# Patient Record
Sex: FEMALE | Race: WHITE | NOT HISPANIC OR LATINO | Employment: OTHER | URBAN - METROPOLITAN AREA
[De-identification: names, ages, dates, MRNs, and addresses within clinical notes are randomized per-mention and may not be internally consistent; named-entity substitution may affect disease eponyms.]

---

## 2017-01-06 ENCOUNTER — APPOINTMENT (OUTPATIENT)
Dept: LAB | Facility: CLINIC | Age: 77
End: 2017-01-06
Payer: MEDICARE

## 2017-01-06 ENCOUNTER — GENERIC CONVERSION - ENCOUNTER (OUTPATIENT)
Dept: OTHER | Facility: OTHER | Age: 77
End: 2017-01-06

## 2017-01-06 ENCOUNTER — TRANSCRIBE ORDERS (OUTPATIENT)
Dept: LAB | Facility: CLINIC | Age: 77
End: 2017-01-06

## 2017-01-06 ENCOUNTER — ALLSCRIPTS OFFICE VISIT (OUTPATIENT)
Dept: OTHER | Facility: OTHER | Age: 77
End: 2017-01-06

## 2017-01-06 DIAGNOSIS — I35.9 NONRHEUMATIC AORTIC VALVE DISORDER: ICD-10-CM

## 2017-01-06 DIAGNOSIS — I71.2 THORACIC AORTIC ANEURYSM WITHOUT RUPTURE (HCC): ICD-10-CM

## 2017-01-06 DIAGNOSIS — M62.81 MUSCLE WEAKNESS (GENERALIZED): ICD-10-CM

## 2017-01-06 DIAGNOSIS — Z79.01 LONG TERM CURRENT USE OF ANTICOAGULANT: ICD-10-CM

## 2017-01-06 LAB
INR PPP: 3.07 (ref 0.86–1.16)
PROTHROMBIN TIME: 31.1 SECONDS (ref 12–14.3)

## 2017-01-06 PROCEDURE — 85610 PROTHROMBIN TIME: CPT

## 2017-01-06 PROCEDURE — 36415 COLL VENOUS BLD VENIPUNCTURE: CPT

## 2017-01-13 ENCOUNTER — HOSPITAL ENCOUNTER (OUTPATIENT)
Dept: CT IMAGING | Facility: CLINIC | Age: 77
Discharge: HOME | End: 2017-01-13
Payer: MEDICARE

## 2017-01-13 ENCOUNTER — HOSPITAL ENCOUNTER (OUTPATIENT)
Dept: NON INVASIVE DIAGNOSTICS | Facility: CLINIC | Age: 77
Discharge: HOME | End: 2017-01-13
Payer: MEDICARE

## 2017-01-13 DIAGNOSIS — M62.81 MUSCLE WEAKNESS (GENERALIZED): ICD-10-CM

## 2017-01-13 DIAGNOSIS — I71.2 THORACIC AORTIC ANEURYSM WITHOUT RUPTURE (HCC): ICD-10-CM

## 2017-01-13 PROCEDURE — 70450 CT HEAD/BRAIN W/O DYE: CPT

## 2017-01-13 PROCEDURE — 93306 TTE W/DOPPLER COMPLETE: CPT

## 2017-03-01 ENCOUNTER — GENERIC CONVERSION - ENCOUNTER (OUTPATIENT)
Dept: OTHER | Facility: OTHER | Age: 77
End: 2017-03-01

## 2017-03-01 ENCOUNTER — ALLSCRIPTS OFFICE VISIT (OUTPATIENT)
Dept: OTHER | Facility: OTHER | Age: 77
End: 2017-03-01

## 2017-03-01 ENCOUNTER — APPOINTMENT (OUTPATIENT)
Dept: LAB | Facility: CLINIC | Age: 77
End: 2017-03-01
Payer: MEDICARE

## 2017-03-01 DIAGNOSIS — I35.9 NONRHEUMATIC AORTIC VALVE DISORDER: ICD-10-CM

## 2017-03-01 DIAGNOSIS — E11.49 TYPE 2 DIABETES MELLITUS WITH OTHER DIABETIC NEUROLOGICAL COMPLICATION (HCC): ICD-10-CM

## 2017-03-01 DIAGNOSIS — I25.10 ATHEROSCLEROTIC HEART DISEASE OF NATIVE CORONARY ARTERY WITHOUT ANGINA PECTORIS: ICD-10-CM

## 2017-03-01 LAB
INR PPP: 3.29 (ref 0.86–1.16)
PROTHROMBIN TIME: 32.8 SECONDS (ref 12–14.3)

## 2017-03-01 PROCEDURE — 36415 COLL VENOUS BLD VENIPUNCTURE: CPT

## 2017-03-01 PROCEDURE — 85610 PROTHROMBIN TIME: CPT

## 2017-03-03 ENCOUNTER — GENERIC CONVERSION - ENCOUNTER (OUTPATIENT)
Dept: OTHER | Facility: OTHER | Age: 77
End: 2017-03-03

## 2017-04-06 ENCOUNTER — TRANSCRIBE ORDERS (OUTPATIENT)
Dept: LAB | Facility: CLINIC | Age: 77
End: 2017-04-06

## 2017-04-06 ENCOUNTER — GENERIC CONVERSION - ENCOUNTER (OUTPATIENT)
Dept: OTHER | Facility: OTHER | Age: 77
End: 2017-04-06

## 2017-04-06 ENCOUNTER — APPOINTMENT (OUTPATIENT)
Dept: LAB | Facility: CLINIC | Age: 77
End: 2017-04-06
Payer: MEDICARE

## 2017-04-06 DIAGNOSIS — I25.10 ATHEROSCLEROTIC HEART DISEASE OF NATIVE CORONARY ARTERY WITHOUT ANGINA PECTORIS: ICD-10-CM

## 2017-04-06 LAB
INR PPP: 3.86 (ref 0.86–1.16)
PROTHROMBIN TIME: 37 SECONDS (ref 12–14.3)

## 2017-04-06 PROCEDURE — 36415 COLL VENOUS BLD VENIPUNCTURE: CPT

## 2017-04-06 PROCEDURE — 85610 PROTHROMBIN TIME: CPT

## 2017-04-13 DIAGNOSIS — I25.10 ATHEROSCLEROTIC HEART DISEASE OF NATIVE CORONARY ARTERY WITHOUT ANGINA PECTORIS: ICD-10-CM

## 2017-05-16 ENCOUNTER — ALLSCRIPTS OFFICE VISIT (OUTPATIENT)
Dept: OTHER | Facility: OTHER | Age: 77
End: 2017-05-16

## 2017-05-24 ENCOUNTER — GENERIC CONVERSION - ENCOUNTER (OUTPATIENT)
Dept: OTHER | Facility: OTHER | Age: 77
End: 2017-05-24

## 2017-05-24 ENCOUNTER — ALLSCRIPTS OFFICE VISIT (OUTPATIENT)
Dept: OTHER | Facility: OTHER | Age: 77
End: 2017-05-24

## 2017-08-01 ENCOUNTER — LAB CONVERSION - ENCOUNTER (OUTPATIENT)
Dept: OTHER | Facility: OTHER | Age: 77
End: 2017-08-01

## 2017-08-01 ENCOUNTER — APPOINTMENT (OUTPATIENT)
Dept: LAB | Facility: CLINIC | Age: 77
End: 2017-08-01
Payer: MEDICARE

## 2017-08-01 DIAGNOSIS — I25.10 ATHEROSCLEROTIC HEART DISEASE OF NATIVE CORONARY ARTERY WITHOUT ANGINA PECTORIS: ICD-10-CM

## 2017-08-01 LAB
INR PPP: 3.53 (ref 0.86–1.16)
PROTHROMBIN TIME: 35.9 SECONDS (ref 12.1–14.4)

## 2017-08-01 PROCEDURE — 36415 COLL VENOUS BLD VENIPUNCTURE: CPT

## 2017-08-01 PROCEDURE — 85610 PROTHROMBIN TIME: CPT

## 2017-08-08 DIAGNOSIS — Z95.2 PRESENCE OF PROSTHETIC HEART VALVE: ICD-10-CM

## 2017-08-11 ENCOUNTER — GENERIC CONVERSION - ENCOUNTER (OUTPATIENT)
Dept: OTHER | Facility: OTHER | Age: 77
End: 2017-08-11

## 2017-08-11 ENCOUNTER — APPOINTMENT (OUTPATIENT)
Dept: LAB | Facility: CLINIC | Age: 77
End: 2017-08-11
Payer: MEDICARE

## 2017-08-11 ENCOUNTER — TRANSCRIBE ORDERS (OUTPATIENT)
Dept: LAB | Facility: CLINIC | Age: 77
End: 2017-08-11

## 2017-08-11 DIAGNOSIS — Z95.2 PRESENCE OF PROSTHETIC HEART VALVE: ICD-10-CM

## 2017-08-11 LAB
INR PPP: 3.05 (ref 0.86–1.16)
PROTHROMBIN TIME: 32 SECONDS (ref 12.1–14.4)

## 2017-08-11 PROCEDURE — 85610 PROTHROMBIN TIME: CPT

## 2017-08-11 PROCEDURE — 36415 COLL VENOUS BLD VENIPUNCTURE: CPT

## 2017-09-27 ENCOUNTER — GENERIC CONVERSION - ENCOUNTER (OUTPATIENT)
Dept: OTHER | Facility: OTHER | Age: 77
End: 2017-09-27

## 2017-09-27 ENCOUNTER — ALLSCRIPTS OFFICE VISIT (OUTPATIENT)
Dept: OTHER | Facility: OTHER | Age: 77
End: 2017-09-27

## 2017-09-27 ENCOUNTER — TRANSCRIBE ORDERS (OUTPATIENT)
Dept: LAB | Facility: CLINIC | Age: 77
End: 2017-09-27

## 2017-09-27 ENCOUNTER — APPOINTMENT (OUTPATIENT)
Dept: LAB | Facility: CLINIC | Age: 77
End: 2017-09-27
Payer: MEDICARE

## 2017-09-27 DIAGNOSIS — I25.10 ATHEROSCLEROTIC HEART DISEASE OF NATIVE CORONARY ARTERY WITHOUT ANGINA PECTORIS: ICD-10-CM

## 2017-09-27 DIAGNOSIS — I25.10 ATHEROSCLEROSIS OF NATIVE CORONARY ARTERY OF NATIVE HEART WITHOUT ANGINA PECTORIS: ICD-10-CM

## 2017-09-27 DIAGNOSIS — I25.10 ATHEROSCLEROSIS OF NATIVE CORONARY ARTERY OF NATIVE HEART WITHOUT ANGINA PECTORIS: Primary | ICD-10-CM

## 2017-09-27 DIAGNOSIS — E11.49 TYPE 2 DIABETES MELLITUS WITH OTHER DIABETIC NEUROLOGICAL COMPLICATION (HCC): ICD-10-CM

## 2017-09-27 DIAGNOSIS — E61.1 IRON DEFICIENCY: ICD-10-CM

## 2017-09-27 LAB
ANION GAP SERPL CALCULATED.3IONS-SCNC: 5 MMOL/L (ref 4–13)
BASOPHILS # BLD AUTO: 0.02 THOUSANDS/ΜL (ref 0–0.1)
BASOPHILS NFR BLD AUTO: 1 % (ref 0–1)
BILIRUB UR QL STRIP: NEGATIVE
BUN SERPL-MCNC: 33 MG/DL (ref 5–25)
CALCIUM SERPL-MCNC: 9.3 MG/DL (ref 8.3–10.1)
CHLORIDE SERPL-SCNC: 106 MMOL/L (ref 100–108)
CLARITY UR: CLEAR
CO2 SERPL-SCNC: 28 MMOL/L (ref 21–32)
COLOR UR: YELLOW
CREAT SERPL-MCNC: 1.6 MG/DL (ref 0.6–1.3)
CREAT UR-MCNC: 27.5 MG/DL
EOSINOPHIL # BLD AUTO: 0.17 THOUSAND/ΜL (ref 0–0.61)
EOSINOPHIL NFR BLD AUTO: 4 % (ref 0–6)
ERYTHROCYTE [DISTWIDTH] IN BLOOD BY AUTOMATED COUNT: 16.8 % (ref 11.6–15.1)
EST. AVERAGE GLUCOSE BLD GHB EST-MCNC: 143 MG/DL
GFR SERPL CREATININE-BSD FRML MDRD: 31 ML/MIN/1.73SQ M
GLUCOSE P FAST SERPL-MCNC: 128 MG/DL (ref 65–99)
GLUCOSE UR STRIP-MCNC: NEGATIVE MG/DL
HBA1C MFR BLD: 6.6 % (ref 4.2–6.3)
HCT VFR BLD AUTO: 40 % (ref 34.8–46.1)
HGB BLD-MCNC: 12.3 G/DL (ref 11.5–15.4)
HGB UR QL STRIP.AUTO: NEGATIVE
INR PPP: 2.44 (ref 0.86–1.16)
KETONES UR STRIP-MCNC: NEGATIVE MG/DL
LDLC SERPL DIRECT ASSAY-MCNC: 75 MG/DL (ref 0–100)
LEUKOCYTE ESTERASE UR QL STRIP: NEGATIVE
LYMPHOCYTES # BLD AUTO: 1.28 THOUSANDS/ΜL (ref 0.6–4.47)
LYMPHOCYTES NFR BLD AUTO: 32 % (ref 14–44)
MCH RBC QN AUTO: 25.9 PG (ref 26.8–34.3)
MCHC RBC AUTO-ENTMCNC: 30.8 G/DL (ref 31.4–37.4)
MCV RBC AUTO: 84 FL (ref 82–98)
MICROALBUMIN UR-MCNC: 10.2 MG/L (ref 0–20)
MICROALBUMIN/CREAT 24H UR: 37 MG/G CREATININE (ref 0–30)
MONOCYTES # BLD AUTO: 0.52 THOUSAND/ΜL (ref 0.17–1.22)
MONOCYTES NFR BLD AUTO: 13 % (ref 4–12)
NEUTROPHILS # BLD AUTO: 1.96 THOUSANDS/ΜL (ref 1.85–7.62)
NEUTS SEG NFR BLD AUTO: 50 % (ref 43–75)
NITRITE UR QL STRIP: NEGATIVE
NRBC BLD AUTO-RTO: 0 /100 WBCS
PH UR STRIP.AUTO: 6 [PH] (ref 4.5–8)
PLATELET # BLD AUTO: 314 THOUSANDS/UL (ref 149–390)
PMV BLD AUTO: 11.5 FL (ref 8.9–12.7)
POTASSIUM SERPL-SCNC: 4.4 MMOL/L (ref 3.5–5.3)
PROT UR STRIP-MCNC: NEGATIVE MG/DL
PROTHROMBIN TIME: 26.8 SECONDS (ref 12.1–14.4)
RBC # BLD AUTO: 4.74 MILLION/UL (ref 3.81–5.12)
SODIUM SERPL-SCNC: 139 MMOL/L (ref 136–145)
SP GR UR STRIP.AUTO: 1.01 (ref 1–1.03)
UROBILINOGEN UR QL STRIP.AUTO: 0.2 E.U./DL
WBC # BLD AUTO: 3.95 THOUSAND/UL (ref 4.31–10.16)

## 2017-09-27 PROCEDURE — 82570 ASSAY OF URINE CREATININE: CPT

## 2017-09-27 PROCEDURE — 85610 PROTHROMBIN TIME: CPT

## 2017-09-27 PROCEDURE — 83036 HEMOGLOBIN GLYCOSYLATED A1C: CPT

## 2017-09-27 PROCEDURE — 80048 BASIC METABOLIC PNL TOTAL CA: CPT

## 2017-09-27 PROCEDURE — 36415 COLL VENOUS BLD VENIPUNCTURE: CPT

## 2017-09-27 PROCEDURE — 85025 COMPLETE CBC W/AUTO DIFF WBC: CPT

## 2017-09-27 PROCEDURE — 82043 UR ALBUMIN QUANTITATIVE: CPT

## 2017-09-27 PROCEDURE — 83721 ASSAY OF BLOOD LIPOPROTEIN: CPT

## 2017-09-27 PROCEDURE — 81003 URINALYSIS AUTO W/O SCOPE: CPT

## 2017-10-02 ENCOUNTER — ALLSCRIPTS OFFICE VISIT (OUTPATIENT)
Dept: OTHER | Facility: OTHER | Age: 77
End: 2017-10-02

## 2017-10-03 NOTE — PROGRESS NOTES
Assessment  Assessed    1  S/P AVR (aortic valve replacement) (V43 3) (Z95 2)   2  CAD in native artery (414 01) (I25 10)   3  Presence of cardiac pacemaker (V45 01) (Z95 0)   4  Hyperlipidemia (272 4) (E78 5)   5  Hypertension (401 9) (I10)   6  Anticoagulant long-term use (V58 61) (Z79 01)    Discussion/Summary  Cardiology Discussion Summary Free Text Note Form  Chetna Noun:   Patient is stable from a cardiac perspective no angina CHF symptomatic ectopy--continue with pacemaker checks regularly no significant arrhythmia---continue risk modification optimize weight regular exercise reviewed signs and symptoms of abnormal bleeding fu pt/inr-- report any bleeding safety measures reviewed in detail-reminded to get regular ProTime checks-reviewed signs and symptoms of abnormal bleeding-safety measures reviewed in detail-optimize weight regular exercise up with Dr Cici Jerez recommendation to be evaluated in the ER any head trauma on anticoagulationin pacer clinic no arrhythmic symptomsis cardiovascularly stable, patient understands antibody prophylaxis--all meds  Report any symptoms  All questions answered  Previous studies reviewed with patient, medications reviewed and possible side effects discussed  Continue risk factor modification  All questions answered  Safety measures reviewed  Patient advised to report any problems promptly to medical attention  Discussed concepts of atherosclerosis, signs and symptoms of cardiac disease-including ischemia arrhythmia congestion Optimize weight, regular exercise and follow up with appropriate specialists as discussed  Return for follow up visit in 4 months or earlier if needed      Counseling Documentation With Imm: The patient was counseled regarding diagnostic results,-instructions for management,-risk factor reductions,-risks and benefits of treatment options,-importance of compliance with treatment        Chief Complaint  Chief Complaint Free Text Note Form: Patient is seen today for follow-up cardiac evaluation  -History of AVR, pacemaker-ddd- had generator replacement May 2016     Chief Complaint Chronic Condition [facility]: Patient is here today for follow up of chronic conditions described in HPI  History of Present Illness  Cardiology HPI Free Text Note Form [facility]: Patient presents for follow up cardiovascular evaluation   She is feeling very well from a cardiac perspective active and asymptomatic-- Known history of AVR pacemaker mild nonobstructive CAD--surgery by Dr Alberto Langley 2007--active physically and doing well no impairment of exercise tolerance  no claudication -does all household activities shopping cleaning with no difficultyoverall feeling very well from a cardiac perspective -- active without difficulty-- no smoking no wheezes palpitations lightheadedness syncope seizures no angina not aware of any pacemaker abnormalities no palpitations dizziness lightheadednessPatient lost her  to pancreatic cancer May 2nd  2014 No angina, CHF, palpitations, syncope, seizures, dizziness, lightheadedness, amaurosis, orthostasis, myositis or edema  No urinary or bowel complaints  No rashes, fevers, arrhythmic symptoms, thromboembolic symptoms  No melena hematuria hematochezia focal motor symptoms  No bleeding bruising problems on Coumadin pacer check April 2017 with normal function battery life 9 yearsecho from January 2017 with EF of 45%-with borderline LVH abnormal septal motion due to pacemaker rhythm mild MR mechanical aortic valve function appropriately aorta normal sized  recent lab tests by Dr Magda Villanueva in September 2017 with hematocrit of 40 stable creatinine 1 6 LDL 75 A1c 6  6lives in her house with her son daughter and 2 grandchildren boys age 6 and 16    previously noted weakness in the left hand the symptoms resolved CT of the brain was negative  had mechanical AVR 2007 by dr Garth Mederos, he reimplanted coronaries  On angiogram before aortic valve replacement  Patient had 40% LAD disease  also with history of thoracic aortic aneurysm, cholesterol is good on therapy no myositis - on statins and fibrates not smoking  Blood pressures been controlled   CAT scan of the chest October 2012 of 4 3 cm dilatation of the ascending aorta, aortic root measuring 3 5 cm in 2015--aortic root measured 3 4 on echo from January 17 2014 with EF 45-50 appropriate mechanical prosthetic aVR function  blood tests 2016 with good cholesterol pacemaker implanted DDD unit  No arrhythmic symptoms status post recent generator replacement well-tolerated in May 2016  patient states her sugars are running very well no hypoglycemia3 8 in April 2017 reminded to go for regular checksdoes relate a fall with abrasion of the left side of her head on April 17--safety measures reviewed--she got tangled up in a telephone cordbleeding, bruising problems  No night sweats  No fevers no back pains  No bleeding on Coumadin- no melena hematuria hematochezia history of hypothyroidism, on treatment  of AVR- on coumadin, DM - glipizide, HPL - lipitor- normal SGOT no muscle pain during the day HTN - lasix  stress testing showing no ischemia  Alessandra Zarco CAT scan showing COPD patient was a previous smoker for many years, having stopped 10 years ago  Denies wheezes, hemoptysis    No myositis, orthostasis,   has gone for mammograms in the paststates that she is up to date with colonoscopydepression no claudication  No rashespreviously had cataract surgery by Dr Flaquita Stein, which was well tolerated on her right eyes for cataracts    pacer checks look good--April 2017 pacer check--With normal function   [ Aortic valve disease, Hypertension, Hyperlipidemia, Hypercholesterolemia, Gastroesophageal Reflux Disease, Cardiomyopathy, Aneurysm, Coronary Artery Disease, Type 2 Diabetes   S/p Aortic valve replacement and Thoracic aneurysm surgery (March 2007 [facility]), complete heart block s/p PPM with [facility] (2007), cardiac cath 2007 LAD 40%, LVEF 30%  ]  [ Father's and mother health unknown ]  [ Former smoker  No alcohol    - Smoked from age [age]  in the past atrial pacing ventricular sensing with left bundle branch block conduction      Review of Systems  Cardiology Female ROS:     Cardiac: No complaints of chest pain, no palpitations, no fainting  Skin: No complaints of nonhealing sores or skin rash  Genitourinary: No complaints of recurrent urinary tract infections, frequent urination at night, difficult urination, blood in urine, kidney stones, loss of bladder control, kidney problems, denies any birth control or hormone replacement, is not post menopausal, not currently pregnant  Psychological: No complaints of feeling depressed, anxiety, panic attacks, or difficulty concentrating  General: No complaints of trouble sleeping, lack of energy, fatigue, appetite changes, weight changes, fever, frequent infections, or night sweats  Respiratory: No complaints of shortness of breath, cough with sputum, or wheezing  HEENT: No complaints of serious problems, hearing problems, nose problems, throat problems, or snoring  Gastrointestinal: No complaints of liver problems, nausea, vomiting, heartburn, constipation, bloody stools, diarrhea, problems swallowing, adbominal pain, or rectal bleeding  Hematologic: No complaints of bleeding disorders, anemia, blood clots, or excessive brusing  Neurological: No complaints of numbness, tingling, dizziness, weakness, seizures, headaches, syncope or fainting, AM fatigue, daytime sleepiness, no witnessed apnea episodes  Musculoskeletal: No complaints of arthritis, back pain, or painfull swelling  Active Problems  Problems    1  Aneurysm of thoracic aorta (441 2) (I71 2)   2  Anticoagulant long-term use (V58 61) (Z79 01)   3  CAD in native artery (414 01) (I25 10)   4  Chronic obstructive pulmonary disease (496) (J44 9)   5  Diabetes mellitus with neurological manifestation (250 60) (E11 49)   6   Flu vaccine need (V04 81) (Z23)   7  History of aortic valve disorder (V12 59) (Z86 79)   8  Hyperlipidemia (272 4) (E78 5)   9  Hypertension (401 9) (I10)   10  Hypothyroidism (244 9) (E03 9)   11  Iron deficiency (280 9) (E61 1)   12  Laceration of scalp with delay in treatment, initial encounter (873 1) (S01 01XA)   13  Left hand weakness (728 87) (R29 898)   14  Pacemaker at end of battery life (V53 31) (Z45 010)   15  Presence of cardiac pacemaker (V45 01) (Z95 0)   16  Renal function impairment (593 9) (N28 9)   17  S/P AVR (aortic valve replacement) (V43 3) (Z95 2)   18  Screening for genitourinary condition (V81 6) (Z13 89)   19  Sinoatrial node dysfunction (427 81) (I49 5)   20  TIA (transient ischemic attack) (435 9) (G45 9)    Past Medical History  Problems    1  History of Benign Neoplasm Of The Sigmoid Colon (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 3)   2  History of Black tarry stools (578 1) (K92 1)   3  History of Cardiomyopathy (425 4) (I42 9)   4  History of Chest pain (786 50) (R07 9)   5  History of Emphysema (492 8) (J43 9)   6  History of GERD (gastroesophageal reflux disease) (530 81) (K21 9)   7  History of acute bronchitis (V12 69) (Z87 09)   8  History of aortic valve disorder (V12 59) (Z86 79)   9  History of aortic valve disorder (V12 59) (Z86 79)   10  History of complete atrioventricular block (V12 59) (Z86 79)   11  History of dizziness (V13 89) (Z87 898)   12  History of shortness of breath (V13 89) (Z87 898)   13  History of Type 2 diabetes mellitus (250 00) (E11 9)  Active Problems And Past Medical History Reviewed: The active problems and past medical history were reviewed and updated today  Surgical History  Problems    1  History of Aortic Valve Replacement   2  History of Capsule Endoscopy   3  History of Cholecystectomy   4  History of Complete Colonoscopy   5  History of Diagnostic Esophagogastroduodenoscopy   6  History of Hysterectomy   7  History of Pacemaker Placement  Surgical History Reviewed:    The surgical history was reviewed and updated today  Family History  Mother    1  No pertinent family history  Father    2  No pertinent family history  Family History Reviewed: The family history was reviewed and updated today  Social History  Problems    · Former smoker (V15 82) (L65 303)   · [address] (D[address])   · Living Independently With Spouse   · Never Drank Alcohol   · Never Used Drugs  Social History Reviewed: The social history was reviewed and updated today  The social history was reviewed and is unchanged  Current Meds   1  Aspirin 81 MG TABS; Take 1 tablet daily Recorded   2  Atorvastatin Calcium 20 MG Oral Tablet; take 1 tablet by mouth daily; Therapy: 66UYS8334 to (Evaluate:06Cqn6158)  Requested for: 50LGJ8350; Last   Rx:87Egk7701 Ordered   3  Fenofibrate Micronized 134 MG Oral Capsule; take 1 capsule by mouth once daily; Therapy: 07VSL7406 to (Evaluate:87Oli9815)  Requested for: 74Rag5692; Last   Rx:85Ehq1438 Ordered   4  FreeStyle Lancets Miscellaneous; test twice daily; Therapy: 39QRB1846 to (Evaluate:19Mar2015)  Requested for: 19GNQ3141; Last   Rx:23Jun2014 Ordered   5  FreeStyle Lancets Miscellaneous; testing bid  Requested for: 04TYA3128; Last   BY:30EHY2184 Ordered   6  FreeStyle Test In Vitro Strip; test blood twice daily; Therapy: 89GAV8052 to (Humble Flax)  Requested for: 23ETT0999; Last   Rx:23Jun2014 Ordered   7  FreeStyle Test In Vitro Strip; TEST TWICE DAILY  Requested for: 29MSW2894; Last   Rx:91Mwg9197 Ordered   8  Furosemide 40 MG Oral Tablet; take 1 and 1/2 tablets by mouth once daily; Therapy: 24WPY6411 to (Annamary Lords)  Requested for: 28Oct2016; Last   Rx:28Oct2016 Ordered   9  Gabapentin 100 MG Oral Capsule; take 3 capsules by mouth at bedtime; Therapy: 41GXE7562 to (Evaluate:78Oin7978)  Requested for: 04QCM8986; Last   Rx:52Iqp2692 Ordered   10   GlipiZIDE 10 MG Oral Tablet; TAKE 1 AND 1/2 TABLET BY MOUTH ONCE DAILY; Therapy: 00VPO4601 to (Evaluate:77Crp3194)  Requested for: 13Hfr9381; Last    Rx:41Rpk8943 Ordered   11  Levothyroxine Sodium 50 MCG Oral Tablet; take 1 tablet by mouth daily; Therapy: 56FJI5609 to (Evaluate:43Xdb2608)  Requested for: 71MTH7230; Last    Rx:66Vfx4628 Ordered   12  Lisinopril 2 5 MG Oral Tablet; take 1 tablet by mouth once daily; Therapy: 36Vpz9053 to (Evaluate:49Dtq5805)  Requested for: 60Lld8360; Last    Rx:63Mqv5133 Ordered   13  Metoprolol Tartrate 50 MG Oral Tablet; take 1 tablet by mouth twice a day; Therapy: 13Yqs9496 to (Evaluate:65Veb9041)  Requested for: 17Aug2017; Last    Rx:05Ppr1935 Ordered   14  Warfarin Sodium 5 MG Oral Tablet; take 1 tablet by mouth daily; Therapy: 48WBL6365 to (Evaluate:19Wti9822)  Requested for: 17Aug2017; Last    Rx:59Mwb5547 Ordered  Medication List Reviewed: The medication list was reviewed and updated today  Allergies  Medication    1  No Known Drug Allergies    Vitals  Vital Signs    Recorded: 98DYU2284 08:44AM   Heart Rate 60   Systolic 733   Diastolic 64   Height 5 ft 5 3 in   Weight 164 lb 7 04 oz   BMI Calculated 27 11   BSA Calculated 1 82     Physical Exam    Constitutional Comfortable overweight female in no distress  Eyes   Conjunctiva and Sclera examination: Conjunctiva pink, sclera anicteric  Ears, Nose, Mouth, and Throat - External inspection of ears and nose: Normal without deformities or discharge -Nasal mucosa, septum, and turbinates: Normal, no edema or discharge -Oropharynx: Clear, nares are clear, mucous membranes are moist    Neck   Neck and thyroid: Normal, supple, trachea midline, no thyromegaly  Pulmonary   Respiratory effort: No increased work of breathing or signs of respiratory distress  Auscultation of lungs: Clear to auscultation, no rales, no rhonchi, no wheezing, good air movement  Cardiovascular   Palpation of heart: Normal PMI, no thrills      Auscultation of heart: Abnormal  -S1-S2 normal crisp prosthetic heart sounds the aortic valve no gallops thrills rubs or diastolic murmur  Carotid pulses: Abnormal  -Soft bruits bilaterally  Peripheral vascular exam: Normal pulses throughout, no tenderness, erythema or swelling  Pedal pulses: Normal, 2+ bilaterally  Examination of extremities for edema and/or varicosities: Abnormal  -Mild spider veins no calf tenderness or edema  Chest -   Abnormal  -Well-healed median sternotomy  Abdomen   Abdomen: Non-tender and no distention  Liver and spleen: No hepatomegaly or splenomegaly  Musculoskeletal Gait and station: Normal gait  -Digits and nails: Normal without clubbing or cyanosis -Inspection/palpation of joints, bones, and muscles: Normal, ROM normal     Skin - Skin and subcutaneous tissue: Abnormal -pacer site healing well  Neurologic - Cranial nerves: II - XII intact -Speech: Normal     Psychiatric - Orientation to person, place, and time: Normal -Mood and affect: Normal       Future Appointments    Date/Time Provider Specialty Site   10/27/2017 09:00 AM Cardiology, Pacemaker Clin KRISTINE  [facility]   03/27/2018 09:00 AM CARSON Nowak   Internal Medicine [facility] by [facility]     Signatures   Electronically signed by : CARSON Payan ; Oct  2 2017  9:34AM EST                       (Author)

## 2017-10-27 ENCOUNTER — GENERIC CONVERSION - ENCOUNTER (OUTPATIENT)
Dept: OTHER | Facility: OTHER | Age: 77
End: 2017-10-27

## 2017-10-27 ENCOUNTER — APPOINTMENT (OUTPATIENT)
Dept: LAB | Facility: CLINIC | Age: 77
End: 2017-10-27
Payer: MEDICARE

## 2017-10-27 ENCOUNTER — TRANSCRIBE ORDERS (OUTPATIENT)
Dept: LAB | Facility: CLINIC | Age: 77
End: 2017-10-27

## 2017-10-27 DIAGNOSIS — E61.1 IRON DEFICIENCY: ICD-10-CM

## 2017-10-27 DIAGNOSIS — I25.10 ATHEROSCLEROTIC HEART DISEASE OF NATIVE CORONARY ARTERY WITHOUT ANGINA PECTORIS: ICD-10-CM

## 2017-10-27 LAB
FERRITIN SERPL-MCNC: 27 NG/ML (ref 8–388)
INR PPP: 2.51 (ref 0.86–1.16)
IRON SERPL-MCNC: 86 UG/DL (ref 50–170)
PROTHROMBIN TIME: 27.4 SECONDS (ref 12.1–14.4)
TRANSFERRIN SERPL-MCNC: 455 MG/DL (ref 200–400)

## 2017-10-27 PROCEDURE — 83540 ASSAY OF IRON: CPT

## 2017-10-27 PROCEDURE — 82728 ASSAY OF FERRITIN: CPT

## 2017-10-27 PROCEDURE — 84466 ASSAY OF TRANSFERRIN: CPT

## 2017-10-27 PROCEDURE — 36415 COLL VENOUS BLD VENIPUNCTURE: CPT

## 2017-10-27 PROCEDURE — 85610 PROTHROMBIN TIME: CPT

## 2017-11-01 ENCOUNTER — GENERIC CONVERSION - ENCOUNTER (OUTPATIENT)
Dept: OTHER | Facility: OTHER | Age: 77
End: 2017-11-01

## 2017-11-01 ENCOUNTER — APPOINTMENT (OUTPATIENT)
Dept: LAB | Facility: CLINIC | Age: 77
End: 2017-11-01
Payer: MEDICARE

## 2017-11-01 DIAGNOSIS — E61.1 IRON DEFICIENCY: ICD-10-CM

## 2017-11-01 LAB — HEMOCCULT STL QL IA: POSITIVE

## 2017-11-01 PROCEDURE — G0328 FECAL BLOOD SCRN IMMUNOASSAY: HCPCS

## 2017-11-14 ENCOUNTER — ALLSCRIPTS OFFICE VISIT (OUTPATIENT)
Dept: OTHER | Facility: OTHER | Age: 77
End: 2017-11-14

## 2017-11-15 NOTE — CONSULTS
Assessment    1  Fecal occult blood test positive (792 1) (R19 5)   2  Iron deficiency (280 9) (E61 1)    Plan  Aneurysm of thoracic aorta, Anticoagulant long-term use    · Enoxaparin Sodium 60 MG/0 6ML Subcutaneous Solution; take 60mg SC BID for 3days as directed   Rx By: Camlile Dos Santos; Dispense: 0 Days ; #:4 ML; Refill: 0;For: Aneurysm of thoracic aorta, Anticoagulant long-term use; ASHWINI = N; Sent To: [address]  Iron deficiency    · COLONOSCOPY; Status:Active; Requested for:14Nov2017;    Perform:[facility]; LRR:[id number]; Ordered; For:Iron deficiency; Ordered By:Genevieve Mcleod;   · EGD; Status:Hold For - Scheduling; Requested JCN:97ZOK8721;    Perform:[facility]; JMN:60ZIN2945;QCMIXZN;ZWPNUGFZGB; Ordered By:Genevieve Mcleod; Discussion/Summary  Discussion Summary:   She 49-year-old female with fecal immunochemical test that was positive  She does have a lot of heartburn that is uncontrolled  She had a colonoscopy with polyps in 2012  I treated her  and she wanted to see me   we'll do endoscopy and colonoscopy to investigate  I'm holding on giving her proton pump inhibitor  she will have bridge therapy we will hold her Coumadin for 3 days and I went over in detail her Lovenox shot regimen  Per  Counseling Documentation With Imm: The patient was counseled regarding diagnostic results,-- prognosis,-- risks and benefits of treatment options  History of Present Illness  HPI: She is 49-year-old female with 2 years in a row of stool Hemoccult-positive  Her fecal immunochemical test is positive  She is on warfarin  She had a polyp on a colonoscopy in 2012  She is here because her  just saw me and she wants to see me  She was having an iron deficiency  Her blood level is normal  She has a lot of heartburn  She denies nausea vomiting fevers chills melanoma hematochezia  She is a mechanical aortic valve        Review of Systems  Complete-Female GI Adult:  Constitutional: No fever, no chills, feels well, no tiredness, no recent weight gain or weight loss  Eyes: No complaints of eye pain, no red eyes, no eyesight problems, no discharge, no dry eyes, no itching of eyes  ENT: no complaints of earache, no loss of hearing, no nose bleeds, no nasal discharge, no sore throat, no hoarseness  Cardiovascular: No complaints of slow heart rate, no fast heart rate, no chest pain, no palpitations, no leg claudication, no lower extremity edema  Respiratory: No complaints of shortness of breath, no wheezing, no cough, no SOB on exertion, no orthopnea, no PND  Gastrointestinal: No complaints of abdominal pain, no constipation, no nausea or vomiting, no diarrhea, no bloody stools  Genitourinary: No complaints of dysuria, no incontinence, no pelvic pain, no dysmenorrhea, no vaginal discharge or bleeding  Musculoskeletal: No complaints of arthralgias, no myalgias, no joint swelling or stiffness, no limb pain or swelling  Integumentary: No complaints of skin rash or lesions, no itching, no skin wounds, no breast pain or lump  Neurological: No complaints of headache, no confusion, no convulsions, no numbness, no dizziness or fainting, no tingling, no limb weakness, no difficulty walking  Psychiatric: Not suicidal, no sleep disturbance, no anxiety or depression, no change in personality, no emotional problems  Endocrine: No complaints of proptosis, no hot flashes, no muscle weakness, no deepening of the voice, no feelings of weakness  Hematologic/Lymphatic: No complaints of swollen glands, no swollen glands in the neck, does not bleed easily, does not bruise easily  ROS Reviewed:   ROS reviewed  Active Problems  1  Aneurysm of thoracic aorta (441 2) (I71 2)   2  Anticoagulant long-term use (V58 61) (Z79 01)   3  CAD in native artery (414 01) (I25 10)   4  Chronic obstructive pulmonary disease (496) (J44 9)   5  Diabetes mellitus with neurological manifestation (250 60) (E11 49)   6  Fecal occult blood test positive (792 1) (R19 5)   7  Flu vaccine need (V04 81) (Z23)   8  History of aortic valve disorder (V12 59) (Z86 79)   9  Hyperlipidemia (272 4) (E78 5)   10  Hypertension (401 9) (I10)   11  Hypothyroidism (244 9) (E03 9)   12  Iron deficiency (280 9) (E61 1)   13  Laceration of scalp with delay in treatment, initial encounter (873 1) (S01 01XA)   14  Left hand weakness (728 87) (R29 898)   15  Pacemaker at end of battery life (V53 31) (Z45 010)   16  Presence of cardiac pacemaker (V45 01) (Z95 0)   17  Renal function impairment (593 9) (N28 9)   18  S/P AVR (aortic valve replacement) (V43 3) (Z95 2)   19  Screening for genitourinary condition (V81 6) (Z13 89)   20  Sinoatrial node dysfunction (427 81) (I49 5)   21  TIA (transient ischemic attack) (435 9) (G45 9)    Past Medical History  1  History of Benign Neoplasm Of The Sigmoid Colon (211 3)   2  History of Black tarry stools (578 1) (K92 1)   3  History of Cardiomyopathy (425 4) (I42 9)   4  History of Chest pain (786 50) (R07 9)   5  History of Emphysema (492 8) (J43 9)   6  History of GERD (gastroesophageal reflux disease) (530 81) (K21 9)   7  History of acute bronchitis (V12 69) (Z87 09)   8  History of aortic valve disorder (V12 59) (Z86 79)   9  History of aortic valve disorder (V12 59) (Z86 79)   10  History of complete atrioventricular block (V12 59) (Z86 79)   11  History of dizziness (V13 89) (Z87 898)   12  History of shortness of breath (V13 89) (Z87 898)   13  History of Type 2 diabetes mellitus (250 00) (E11 9)  Active Problems And Past Medical History Reviewed: The active problems and past medical history were reviewed and updated today  Surgical History  1  History of Aortic Valve Replacement   2  History of Capsule Endoscopy   3  History of Cholecystectomy   4  History of Complete Colonoscopy   5  History of Diagnostic Esophagogastroduodenoscopy   6  History of Hysterectomy   7   History of Pacemaker Placement  Surgical History Reviewed: The surgical history was reviewed and updated today  Family History  Mother    1  No pertinent family history  Father    2  No pertinent family history  Family History Reviewed: The family history was reviewed and updated today  Social History     · Former smoker (V15 82) (H46 829)   · [address] (DME)   · Living Independently With Spouse   · Never Drank Alcohol   · Never Used Drugs  Social History Reviewed: The social history was reviewed and updated today  The social history was reviewed and is unchanged  Current Meds   1  Aspirin 81 MG TABS; Take 1 tablet daily Recorded   2  Atorvastatin Calcium 20 MG Oral Tablet; take 1 tablet by mouth daily; Therapy: 64PGD6650 to (Evaluate:02Qlf1950)  Requested for: 02ZNE8947; Last Rx:73Nbw1191 Ordered   3  Fenofibrate Micronized 134 MG Oral Capsule; take 1 capsule by mouth once daily; Therapy: 76SIP4393 to (Evaluate:08Duu0927)  Requested for: 94Mkn7024; Last Rx:26Jzm6119 Ordered   4  FreeStyle Lancets Miscellaneous; test twice daily; Therapy: 45JOO0471 to (Evaluate:19Mar2015)  Requested for: 27SRC0374; Last Rx:93Ker0559 Ordered   5  FreeStyle Lancets Miscellaneous; testing bid  Requested for: 88GPS8949; Last KP:26JXG8791 Ordered   6  FreeStyle Test In Vitro Strip; test blood twice daily; Therapy: 22MSW1085 to (Catherine Rush)  Requested for: 84HXT7115; Last Rx:16Vcq6188 Ordered   7  FreeStyle Test In Vitro Strip; TEST TWICE DAILY  Requested for: 50VUU1763; Last Rx:85Etk2327 Ordered   8  Furosemide 40 MG Oral Tablet; take 1 and 1/2 tablets by mouth once daily; Therapy: 29NYE5431 to (Ivy Meth)  Requested for: 28Oct2016; Last Rx:28Oct2016 Ordered   9  Gabapentin 100 MG Oral Capsule; take 3 capsules by mouth at bedtime; Therapy: 53FTN3476 to (Evaluate:30Cdc1236)  Requested for: 81EUK3070; Last Rx:25Ttb7886 Ordered   10   GlipiZIDE 10 MG Oral Tablet; TAKE 1 AND 1/2 TABLET BY MOUTH ONCE DAILY; Therapy: 40PJP6572 to (Evaluate:70Yvr0594)  Requested for: 03Aip5045; Last  Rx:64Ajl2688 Ordered   11  Levothyroxine Sodium 50 MCG Oral Tablet; take 1 tablet by mouth daily; Therapy: 42JXU7605 to (Evaluate:04Nui1193)  Requested for: 39MBD6115; Last  Rx:66Jmz3283 Ordered   12  Lisinopril 2 5 MG Oral Tablet; take 1 tablet by mouth once daily; Therapy: 34Esz8769 to (Evaluate:15Iyj5821)  Requested for: 61Bee4146; Last  Rx:12Mjc2165 Ordered   13  Metoprolol Tartrate 50 MG Oral Tablet; take 1 tablet by mouth twice a day; Therapy: 55Bld8306 to (Evaluate:49Zad1126)  Requested for: 70Uik3704; Last  Rx:41Uon2073 Ordered   14  Warfarin Sodium 5 MG Oral Tablet; take 1 tablet by mouth daily; Therapy: 17ZUM0752 to (Evaluate:49Cuq2546)  Requested for: 17Aug2017; Last  Rx:57Kxx5216 Ordered  Medication List Reviewed: The medication list was reviewed and updated today  Allergies  1  No Known Drug Allergies    Vitals  Vital Signs    Recorded: 64WBW3003 11:41AM   Heart Rate 60   Systolic 866   Diastolic 70   Height 5 ft 5 3 in   Weight 164 lb 2 08 oz   BMI Calculated 27 06   BSA Calculated 1 82       Physical Exam   Constitutional  General appearance: No acute distress, well appearing and well nourished  Eyes  Conjunctiva and lids: No swelling, erythema or discharge  Pupils and irises: Equal, round and reactive to light  Ears, Nose, Mouth, and Throat  External inspection of ears and nose: Normal    Otoscopic examination: Tympanic membranes translucent with normal light reflex  Canals patent without erythema  Nasal mucosa, septum, and turbinates: Normal without edema or erythema  Oropharynx: Normal with no erythema, edema, exudate or lesions  Pulmonary  Respiratory effort: No increased work of breathing or signs of respiratory distress  Auscultation of lungs: Clear to auscultation  Cardiovascular  Palpation of heart: Normal PMI, no thrills     Auscultation of heart: Normal rate and rhythm, normal S1 and S2, without murmurs  Examination of extremities for edema and/or varicosities: Normal    Carotid pulses: Normal    Abdomen  Abdomen: Non-tender, no masses  Liver and spleen: No hepatomegaly or splenomegaly  Lymphatic  Palpation of lymph nodes in neck: No lymphadenopathy  Musculoskeletal  Gait and station: Normal    Digits and nails: Normal without clubbing or cyanosis  Inspection/palpation of joints, bones, and muscles: Normal    Skin  Skin and subcutaneous tissue: Normal without rashes or lesions  Neurologic  Cranial nerves: Cranial nerves 2-12 intact  Reflexes: 2+ and symmetric  Sensation: No sensory loss  Future Appointments    Date/Time Provider Specialty Site   04/27/2018 09:00 AM Cardiology, Pacemaker Clin KRISTINE  [facility]   03/27/2018 09:00 AM CARSON Bonilla   Internal Medicine [facility]       Signatures   Electronically signed by : Sheryle Clamp, MD; Nov 14 2017 11:57AM EST                       (Author)

## 2017-11-16 DIAGNOSIS — I25.10 ATHEROSCLEROTIC HEART DISEASE OF NATIVE CORONARY ARTERY WITHOUT ANGINA PECTORIS: ICD-10-CM

## 2017-11-22 RX ORDER — GLIPIZIDE 10 MG/1
10 TABLET ORAL
COMMUNITY
End: 2019-01-02 | Stop reason: SDUPTHER

## 2017-11-22 RX ORDER — LEVOTHYROXINE SODIUM 0.05 MG/1
50 TABLET ORAL DAILY
COMMUNITY
End: 2018-09-21 | Stop reason: SDUPTHER

## 2017-11-22 RX ORDER — METOPROLOL SUCCINATE 50 MG/1
25 TABLET, EXTENDED RELEASE ORAL DAILY
COMMUNITY
End: 2018-04-04 | Stop reason: ALTCHOICE

## 2017-11-22 RX ORDER — FENOFIBRATE 134 MG/1
134 CAPSULE ORAL
COMMUNITY
End: 2018-11-26 | Stop reason: SDUPTHER

## 2017-11-22 RX ORDER — ASPIRIN 81 MG/1
81 TABLET ORAL DAILY
COMMUNITY
End: 2018-03-27 | Stop reason: SDUPTHER

## 2017-11-22 RX ORDER — LISINOPRIL 2.5 MG/1
25 TABLET ORAL DAILY
COMMUNITY
End: 2018-08-14 | Stop reason: SDUPTHER

## 2017-11-22 RX ORDER — FUROSEMIDE 40 MG/1
40 TABLET ORAL 2 TIMES DAILY
COMMUNITY
End: 2019-01-21 | Stop reason: SDUPTHER

## 2017-11-22 RX ORDER — ATORVASTATIN CALCIUM 20 MG/1
20 TABLET, FILM COATED ORAL DAILY
COMMUNITY
End: 2018-03-27 | Stop reason: ALTCHOICE

## 2017-11-22 RX ORDER — WARFARIN SODIUM 5 MG/1
TABLET ORAL
COMMUNITY
End: 2018-11-26 | Stop reason: SDUPTHER

## 2017-11-22 RX ORDER — GABAPENTIN 100 MG/1
300 CAPSULE ORAL 3 TIMES DAILY
COMMUNITY
End: 2018-09-21 | Stop reason: SDUPTHER

## 2017-11-29 ENCOUNTER — ANESTHESIA EVENT (OUTPATIENT)
Dept: PERIOP | Facility: HOSPITAL | Age: 77
End: 2017-11-29
Payer: MEDICARE

## 2017-11-29 ENCOUNTER — ANESTHESIA (OUTPATIENT)
Dept: PERIOP | Facility: HOSPITAL | Age: 77
End: 2017-11-29
Payer: MEDICARE

## 2017-11-29 ENCOUNTER — GENERIC CONVERSION - ENCOUNTER (OUTPATIENT)
Dept: OTHER | Facility: OTHER | Age: 77
End: 2017-11-29

## 2017-11-29 ENCOUNTER — HOSPITAL ENCOUNTER (OUTPATIENT)
Facility: HOSPITAL | Age: 77
Setting detail: OUTPATIENT SURGERY
Discharge: HOME | End: 2017-11-29
Attending: INTERNAL MEDICINE | Admitting: INTERNAL MEDICINE
Payer: MEDICARE

## 2017-11-29 VITALS
DIASTOLIC BLOOD PRESSURE: 60 MMHG | OXYGEN SATURATION: 95 % | HEIGHT: 66 IN | TEMPERATURE: 98 F | WEIGHT: 159.5 LBS | BODY MASS INDEX: 25.63 KG/M2 | RESPIRATION RATE: 18 BRPM | HEART RATE: 60 BPM | SYSTOLIC BLOOD PRESSURE: 125 MMHG

## 2017-11-29 DIAGNOSIS — R19.5 FECAL OCCULT BLOOD TEST POSITIVE: ICD-10-CM

## 2017-11-29 DIAGNOSIS — E61.1 IRON DEFICIENCY: ICD-10-CM

## 2017-11-29 LAB — GLUCOSE SERPL-MCNC: 154 MG/DL (ref 65–140)

## 2017-11-29 PROCEDURE — 88305 TISSUE EXAM BY PATHOLOGIST: CPT | Performed by: INTERNAL MEDICINE

## 2017-11-29 PROCEDURE — 88342 IMHCHEM/IMCYTCHM 1ST ANTB: CPT | Performed by: INTERNAL MEDICINE

## 2017-11-29 PROCEDURE — 82948 REAGENT STRIP/BLOOD GLUCOSE: CPT

## 2017-11-29 RX ORDER — LIDOCAINE HYDROCHLORIDE 10 MG/ML
INJECTION, SOLUTION INFILTRATION; PERINEURAL AS NEEDED
Status: DISCONTINUED | OUTPATIENT
Start: 2017-11-29 | End: 2017-11-29 | Stop reason: SURG

## 2017-11-29 RX ORDER — SODIUM CHLORIDE, SODIUM LACTATE, POTASSIUM CHLORIDE, CALCIUM CHLORIDE 600; 310; 30; 20 MG/100ML; MG/100ML; MG/100ML; MG/100ML
50 INJECTION, SOLUTION INTRAVENOUS CONTINUOUS
Status: DISCONTINUED | OUTPATIENT
Start: 2017-11-29 | End: 2017-11-29 | Stop reason: HOSPADM

## 2017-11-29 RX ORDER — PROPOFOL 10 MG/ML
INJECTION, EMULSION INTRAVENOUS AS NEEDED
Status: DISCONTINUED | OUTPATIENT
Start: 2017-11-29 | End: 2017-11-29 | Stop reason: SURG

## 2017-11-29 RX ADMIN — PROPOFOL 20 MG: 10 INJECTION, EMULSION INTRAVENOUS at 08:17

## 2017-11-29 RX ADMIN — PROPOFOL 20 MG: 10 INJECTION, EMULSION INTRAVENOUS at 08:11

## 2017-11-29 RX ADMIN — PROPOFOL 20 MG: 10 INJECTION, EMULSION INTRAVENOUS at 08:03

## 2017-11-29 RX ADMIN — PROPOFOL 50 MG: 10 INJECTION, EMULSION INTRAVENOUS at 08:07

## 2017-11-29 RX ADMIN — PROPOFOL 20 MG: 10 INJECTION, EMULSION INTRAVENOUS at 08:14

## 2017-11-29 RX ADMIN — PROPOFOL 70 MG: 10 INJECTION, EMULSION INTRAVENOUS at 07:57

## 2017-11-29 RX ADMIN — SODIUM CHLORIDE, SODIUM LACTATE, POTASSIUM CHLORIDE, AND CALCIUM CHLORIDE: .6; .31; .03; .02 INJECTION, SOLUTION INTRAVENOUS at 07:30

## 2017-11-29 RX ADMIN — LIDOCAINE HYDROCHLORIDE 50 MG: 10 INJECTION, SOLUTION INFILTRATION; PERINEURAL at 07:57

## 2017-11-29 NOTE — ANESTHESIA PREPROCEDURE EVALUATION
Review of Systems/Medical History  Patient summary reviewed  Chart reviewed  No history of anesthetic complications     Cardiovascular  Hyperlipidemia, Hypertension , Valve replacement, CAD, , CHF compensated CHF,   Comment: Hx of mechanical Aortic Valve replacement 0099  Systolic HF  LVEF 52%  ,  Pulmonary  COPD , ,        GI/Hepatic    GERD ,        Chronic kidney disease ,        Endo/Other  Diabetes well controlled , History of thyroid disease ,      GYN       Hematology  Anemia ,     Musculoskeletal       Neurology    TIA,    Psychology           Physical Exam    Airway    Mallampati score: III  TM Distance: >3 FB       Dental   Comment: Edentulous ,     Cardiovascular  Rhythm: regular, Rate: normal, Murmur, Systolic click,     Pulmonary  Breath sounds clear to auscultation,     Other Findings        Anesthesia Plan  ASA Score- 3       Anesthesia Type- IV sedation with anesthesia with ASA Monitors  Additional Monitors:   Airway Plan:           Induction- intravenous  Informed Consent- Anesthetic plan and risks discussed with patient  I personally reviewed this patient with the CRNA  Discussed and agreed on the Anesthesia Plan with the CRNA  Staci Nolasco

## 2017-11-29 NOTE — ANESTHESIA POSTPROCEDURE EVALUATION
Post-Op Assessment Note      CV Status:  Stable    Mental Status:  Alert and awake    Hydration Status:  Euvolemic    PONV Controlled:  Controlled    Airway Patency:  Patent    Post Op Vitals Reviewed: Yes          Staff: CRNA           BP   120/58   Temp 98   Pulse 61   Resp 20   SpO2 94

## 2017-11-29 NOTE — OP NOTE
**** GI/ENDOSCOPY REPORT ****     PATIENT NAME: LAURA PULLIAM - VISIT ID:  Patient ID: [id number]   YOB: 1940     INTRODUCTION: Esophagogastroduodenoscopy - A 68 female patient presents   for an outpatient Esophagogastroduodenoscopy at [address] [place] [address]  INDICATIONS: GERD  Fecal occult blood positive  CONSENT: The benefits, risks, and alternatives to the procedure were   discussed and informed consent was obtained from the patient  PREPARATION:  EKG, pulse, pulse oximetry and blood pressure were monitored   throughout the procedure  ASA Classification: Class 3 - Patient has severe   systemic disturbance that may or may not be related to the disorder   requiring surgery  The patient was kept NPO for eight hours prior to the   procedure  MEDICATIONS: MAC anesthesia  PROCEDURE:  The endoscope was passed without difficulty through the mouth   under direct visualization and advanced to the 2nd portion of the   duodenum  The scope was withdrawn and the mucosa was carefully examined  FINDINGS:   Esophagus: The esophagus appeared to be normal   Stomach: Mild   erosive gastritis was found in the antrum and body of the stomach  The   fundus and cardia appeared to be normal  Multiple random biopsies was   taken  Duodenum: There was a single medium-sized ulcer in the duodenal   bulb  It showed no bleeding stigmata  The 2nd portion of the duodenum   appeared to be normal  Multiple random biopsies was taken  COMPLICATIONS: There were no complications  IMPRESSIONS: Normal esophagus  Mild erosive gastritis found in the antrum   and body of the stomach  Normal fundus and cardia  An ulcer found in the   duodenal bulb  Normal 2nd portion of the duodenum  Multiple biopsies taken  RECOMMENDATIONS: Anti-reflux measures: Raise the head of the bed 4 to 6   inches  Avoid smoking  Avoid excess coffee, tea or other caffeinated   beverages   Avoid garments that fit tightly through the abdomen  Avoid   eating before bed  PPI course  Follow-up on the results of the biopsy   specimens in 1 week  ESTIMATED BLOOD LOSS: None  PATHOLOGY SPECIMENS: Multiple random biopsies taken  Multiple random   biopsies taken  Yes     PROCEDURE CODES: 49562 - EGD flexible; with biopsy     ICD-9 Codes: 530 81 Esophageal reflux 792 1 Nonspecific abnormal findings   in stool contents 535 40 Other specified gastritides, without mention of   hemorrhage     ICD-10 Codes: K21 Gastro-esophageal reflux disease R19 5 Other fecal   abnormalities K29 Gastritis and duodenitis K26 Duodenal ulcer     PERFORMED BY: CARSON Pineda  on 11/29/2017  Version 1, electronically signed by CARSON Bolanos Aas  on   11/29/2017 at 08:03

## 2017-11-29 NOTE — OP NOTE
**** GI/ENDOSCOPY REPORT ****     PATIENT NAME: LAURA PULLIAM ------ VISIT ID:  Patient ID:   [id number] YOB: 1940     INTRODUCTION: Colonoscopy - A 68 female patient presents for an outpatient   Colonoscopy at [facility]  PREVIOUS COLONOSCOPY: Patient's last colonoscopy was 5 years ago  INDICATIONS: Surveillance  Screening for personal history of polyps  Fecal   occult blood positive  CONSENT:  The benefits, risks, and alternatives to the procedure were   discussed and informed consent was obtained from the patient  PREPARATION: EKG, pulse, pulse oximetry and blood pressure were monitored   throughout the procedure  The patient was identified by myself both   verbally and by visual inspection of ID band  Airway Assessment   Classification: Airway class 2 - Visualization of the soft palate, fauces   and uvula  ASA Classification: Class 3 - Patient has severe systemic   disturbance that may or may not be related to the disorder requiring   surgery  The patient was kept NPO for eight hours prior to the procedure  The patient received Nulytely in preparation for the procedure  MEDICATIONS: Anesthesia-check records MAC anesthesia  PROCEDURE:  The endoscope was passed without difficulty through the anus   under direct visualization and advanced to the cecum, confirmed by   appendiceal orifice and ileocecal valve  The scope was withdrawn and the   mucosa was carefully examined  The quality of the preparation was good  Cecal Intubation Time: 6 minutes(s) Scope Withdrawal Time: 8 minutes(s)     RECTAL EXAM: Normal rectal exam      FINDINGS:  There was evidence of diverticulosis in the sigmoid colon  A   single sessile polyp, measuring between 5 and 10 mm in size, was found in   the sigmoid colon  The polyp was completely removed by cold biopsy   polypectomy  The polyp was retrieved   The cecum, ascending colon, hepatic   flexure, transverse colon, rectosigmoid junction, and rectum appeared to   be normal      COMPLICATIONS: There were no complications  IMPRESSIONS: Diverticulosis found in the sigmoid colon  A single sessile   polyp found in the sigmoid colon; removed by cold biopsy polypectomy  Normal cecum, ascending colon, hepatic flexure, transverse colon,   rectosigmoid junction, and rectum  RECOMMENDATIONS: Follow-up on the results of the biopsy specimens  ESTIMATED BLOOD LOSS: None  PATHOLOGY SPECIMENS: Completely removed by cold biopsy polypectomy  Yes     PROCEDURE CODES: Colonoscopy with biopsy     ICD-9 Codes: V12 72 Personal history of colonic polyps 792 1 Nonspecific   abnormal findings in stool contents 562 10 Diverticulosis of colon   (without mention of hemorrhage) 211 3 Benign neoplasm of colon     ICD-10 Codes: Z86 010 Personal history of colonic polyps R19 5 Other fecal   abnormalities K57 Diverticular disease of intestine K63 5 Polyp of colon     PERFORMED BY: CARSON Cartagena  on 11/29/2017  Version 1, electronically signed by CARSON Yi  on   11/29/2017 at 08:20

## 2017-11-29 NOTE — ANESTHESIA PREPROCEDURE EVALUATION
Review of Systems/Medical History          Cardiovascular  EKG reviewed, Exercise tolerance: poor,  Hyperlipidemia, Hypertension poorly controlled, Valve replacement, CAD, , RAMON,    Pulmonary  COPD moderate- medication dependent , Shortness of breath, ,        GI/Hepatic    GERD ,             Endo/Other  Diabetes poorly controlled , History of thyroid disease , hypothyroidism,      GYN       Hematology  Anemia ,     Musculoskeletal       Neurology    TIA, CVA , no residual symptoms,    Psychology           Physical Exam    Airway    Mallampati score: II  TM Distance: >3 FB  Neck ROM: full     Dental   upper dentures and lower dentures,     Cardiovascular  Rhythm: regular, Rate: normal, Systolic click,     Pulmonary  Breath sounds clear to auscultation,     Other Findings        Anesthesia Plan  ASA Score- 3       Anesthesia Type- IV sedation with anesthesia with ASA Monitors  Additional Monitors:   Airway Plan:           Induction- intravenous  Informed Consent- Anesthetic plan and risks discussed with patient  I personally reviewed this patient with the CRNA  Discussed and agreed on the Anesthesia Plan with the CRNA  Heladio Nelson

## 2017-12-04 ENCOUNTER — GENERIC CONVERSION - ENCOUNTER (OUTPATIENT)
Dept: OTHER | Facility: OTHER | Age: 77
End: 2017-12-04

## 2018-01-09 NOTE — PROGRESS NOTES
REPORT NAME: Progress Notes Report  VISIT DATE: 10/27/2017  VISIT TIME: 4:22 PM EDT  PATIENT NAME: Jose Osorio   MEDICAL RECORD NUMBER: [id number]  YOB: 1940  AGE: 68  REFERRING PHYSICIAN: Mirela Hilton  SUPERVISING CLINICIAN: Theron Abrams CARE PROVIDER: Rock Lopez  PATIENT HOME ADDRESS: [address], [facility] [address] PHONE: [phone]  SOCIAL SECURITY NUMBER:   DIAGNOSIS 1: Aortic Valve Replacement / 424 1  DIAGNOSIS 2:   INR RANGE: 2 5 - 3 5  INR GOAL: 3  TREATMENT START DATE:   TREATMENT END DATE:   NEXT VISIT:     VISIT RESULTS  ENCOUNTER NUMBER:   TEST LOCATION: [address]  TEST TYPE: Outside Lab (Venipuncture)  VISIT TYPE:   CURRENT INR: 2 51 PROTIME:   SPECIMEN COL AND RPT DATE: 10/27/2017 4:22 PM  EDT  VITAL SIGNS  PULSE:  BP: / WEIGHT:  HEIGHT:  TEMP:   CURRENT ANTICOAGULANT DOSING SCHEDULE  DOSE SIZE: 5mg    ANTICOAGULANT TYPE: COUMADIN  DOSING REGIMEN  Sun       Mon       Tues      Wed       Thurs     Fri       Sat  Total/Wk  2 5       5         5         2 5       5         5         2 5       27 5  PATIENT MEDICATION INSTRUCTION: Yes  PATIENT NUTRITIONAL COUNSELING: No  PATIENT BRUISING INSTRUCTION: No  LAST EDUCATION DATE:   PREVIOUS VISIT INFORMATION  VISITDATE  INRGoal INR   Sun    Mon    Tues   Wed    Thurs  Fri    Sat  Total/wk  10/27/2017  3       2 51  2 5    5      5      2 5    5      5      2 5  27 5  9/27/2017   3       2 44  2 5    5      5      2 5    5      5      2 5  27 5  8/11/2017   3       3 05  2 5    5      5      2 5    5      5      2 5  27 5  8/1/2017    3       3 53  2 5    5      5      2 5    5      5      2 5  27 5  ADDITIONAL PREVIOUS VISIT INFORMATION  VISITDATE   PRIMARY RX               DOSE      CrCl  10/27/2017  COUMADIN                 5mg  9/27/2017   COUMADIN                 5mg  8/11/2017   COUMADIN                 5mg  8/1/2017    COUMADIN                 5mg  OTHER CURRENT MEDICATIONS:  COUMADIN  PROGRESS NOTES: PER AL/PA SAME DOSE RECHECK 3 WEEKS, SPOKE WITH PT  PATIENT INSTRUCTIONS: SEE PROGRESS NOTE  TEST LOCATION: [address] [place] [address], , ,   INBOUND LAB DATA:  Lab       Lab Value Col Date                 Rpt Date                 Lab  Reference Range  Electronically signed by: Sae Acevedo on 10/27/2017 4:22 PM EDT

## 2018-01-11 NOTE — RESULT NOTES
Verified Results  (1) PT WITH INR [id number] 09:29AM Makenzie Feathers Order Number: [id number]     Test Name Result Flag Reference   INR 3 07 H 0 86-1 16   PT 31 1 seconds H 12 0-14 3

## 2018-01-11 NOTE — RESULT NOTES
Verified Results  (1) PT WITH INR [id number] 09:20AM Roberto Carlos Caban Order Number: [id number]     Test Name Result Flag Reference   INR 3 29 H 0 86-1 16   PT 32 8 seconds H 12 0-14 3

## 2018-01-12 VITALS
WEIGHT: 164.13 LBS | DIASTOLIC BLOOD PRESSURE: 70 MMHG | HEIGHT: 65 IN | BODY MASS INDEX: 27.35 KG/M2 | SYSTOLIC BLOOD PRESSURE: 115 MMHG | HEART RATE: 60 BPM

## 2018-01-12 VITALS
SYSTOLIC BLOOD PRESSURE: 124 MMHG | HEART RATE: 58 BPM | HEIGHT: 67 IN | BODY MASS INDEX: 26.23 KG/M2 | DIASTOLIC BLOOD PRESSURE: 62 MMHG | WEIGHT: 167.13 LBS

## 2018-01-12 NOTE — RESULT NOTES
Verified Results  (1) PT WITH INR [id number] 08:07AM Dolores Hawkins Order Number: [id number]   Order Number: [id number]     Test Name Result Flag Reference   INR 2 58 H 0 86-1 16   PT 27 3 seconds H 12 0-14 3

## 2018-01-13 VITALS
HEIGHT: 65 IN | SYSTOLIC BLOOD PRESSURE: 132 MMHG | HEART RATE: 60 BPM | DIASTOLIC BLOOD PRESSURE: 64 MMHG | BODY MASS INDEX: 27.4 KG/M2 | WEIGHT: 164.44 LBS

## 2018-01-13 VITALS
HEIGHT: 67 IN | HEART RATE: 65 BPM | BODY MASS INDEX: 25.64 KG/M2 | SYSTOLIC BLOOD PRESSURE: 124 MMHG | WEIGHT: 163.38 LBS | DIASTOLIC BLOOD PRESSURE: 58 MMHG | OXYGEN SATURATION: 97 %

## 2018-01-13 VITALS
DIASTOLIC BLOOD PRESSURE: 60 MMHG | SYSTOLIC BLOOD PRESSURE: 122 MMHG | HEART RATE: 60 BPM | HEIGHT: 67 IN | BODY MASS INDEX: 26.37 KG/M2 | WEIGHT: 168 LBS

## 2018-01-13 VITALS
BODY MASS INDEX: 26.11 KG/M2 | HEIGHT: 67 IN | WEIGHT: 166.38 LBS | HEART RATE: 78 BPM | DIASTOLIC BLOOD PRESSURE: 78 MMHG | OXYGEN SATURATION: 95 % | SYSTOLIC BLOOD PRESSURE: 140 MMHG

## 2018-01-13 NOTE — PROGRESS NOTES
REPORT NAME: Progress Notes Report  VISIT DATE: 4/6/2017  VISIT TIME: 10:45 AM EDT  PATIENT NAME: Terry Simpson   MEDICAL RECORD NUMBER: [id number]  YOB: 1940  AGE: 68  REFERRING  PHYSICIAN: Danyell Boone  SUPERVISING CLINICIAN: [address] PROVIDER: Sae Acevedo   PATIENT HOME ADDRESS: [facility] [address], [facility], [address] PHONE: [phone]  SOCIAL SECURITY NUMBER:    DIAGNOSIS 1: Aortic Valve Replacement / 424 1  DIAGNOSIS 2:   INR RANGE: 2 5 - 3 5  INR GOAL: 3  TREATMENT START DATE:   TREATMENT END DATE:   NEXT VISIT:     VISIT RESULTS  ENCOUNTER NUMBER:   TEST LOCATION: [address]  TEST TYPE: Outside Lab (Venipuncture)  VISIT TYPE:   CURRENT INR: 3 86 PROTIME:   SPECIMEN COL AND RPT DATE: 4/6/2017 10:45 AM  EDT  VITAL SIGNS  PULSE:  BP: / WEIGHT:  HEIGHT:  TEMP:   CURRENT ANTICOAGULANT DOSING SCHEDULE   DOSE SIZE: 5mg    ANTICOAGULANT TYPE: COUMADIN  DOSING REGIMEN  Sun       Mon Tues Wed Thurs Fri       Sat  Total/Wk  5         5         5         5         5         5         5         35  PATIENT MEDICATION INSTRUCTION:  Yes  PATIENT NUTRITIONAL COUNSELING: No  PATIENT BRUISING INSTRUCTION: No  LAST EDUCATION DATE:   PREVIOUS VISIT INFORMATION  VISITDATE  INRGoal INR   Sun    Mon    Tues   Wed    Thurs  Fri    Sat  Total/wk  4/6/2017    3       3 86   5      5      5      5      5      5      5  35  3/1/2017    3       3 29  5      5      5      5      5      5      5  35  1/6/2017    3       3 07  5      5      5      5      5      5      5  35  10/10/2016  3       2 85  5       5      5      5      5      5      5  35  ADDITIONAL PREVIOUS VISIT INFORMATION  VISITDATE   PRIMARY RX               DOSE      CrCl  4/6/2017    COUMADIN                 5mg  3/1/2017    COUMADIN                 5mg  1/6/2017    COUMADIN                  5mg  10/10/2016  COUMADIN                 5mg  OTHER CURRENT MEDICATIONS:  COUMADIN  PROGRESS NOTES: PER DR Aragon Courser HOLD 1 DAY RESUME NORMAL RECHECK 1 WEEK, SPOKE  WITH PT  PATIENT INSTRUCTIONS: SEE PROGRESS NOTE  TEST  LOCATION: [address], , ,   INBOUND LAB DATA:  Lab       Lab Value Col Date                 Rpt Date                 Lab  Reference Range  Electronically signed by: Skye Veloz  on 4/7/2017 10:45 AM EDT              Electronically signed Jeane MENDIOLA    Apr 28 2017  9:30AM EST

## 2018-01-13 NOTE — PROGRESS NOTES
REPORT NAME: Progress Notes Report  VISIT DATE: 8/1/2017  VISIT TIME: 3:12 PM EDT  PATIENT NAME: Ernestine Conklin   MEDICAL RECORD NUMBER: [id number]  YOB: 1940  AGE: 68  REFERRING PHYSICIAN: Estephanie Shah  SUPERVISING CLINICIAN: [address] PROVIDER: Damian Roberto  PATIENT HOME ADDRESS: [place] [address] [address] , [address], [address]  PATIENT HOME PHONE: [phone]  SOCIAL SECURITY NUMBER:   DIAGNOSIS 1: Aortic Valve Replacement / 424 1  DIAGNOSIS 2:   INR RANGE: 2 5 - 3 5  INR GOAL: 3  TREATMENT START DATE:   TREATMENT END DATE:   NEXT VISIT:     VISIT RESULTS  ENCOUNTER NUMBER:   TEST LOCATION: [address]  TEST TYPE: Outside Lab (Venipuncture)  VISIT TYPE:   CURRENT INR: 3 53 PROTIME:   SPECIMEN COL AND RPT DATE: 8/1/2017 3:12 PM  EDT  VITAL SIGNS  PULSE:  BP: / WEIGHT:  HEIGHT:  TEMP:   CURRENT ANTICOAGULANT DOSING SCHEDULE  DOSE SIZE: 5mg    ANTICOAGULANT TYPE: COUMADIN  DOSING REGIMEN  Sun       Mon       Tues      Wed       Thurs     Fri       Sat  Total/Wk  2 5       5         5         2 5       5         5         2 5       27 5  PATIENT MEDICATION INSTRUCTION: Yes  PATIENT NUTRITIONAL COUNSELING: No  PATIENT BRUISING INSTRUCTION: No  LAST EDUCATION DATE:   PREVIOUS VISIT INFORMATION  VISITDATE  INRGoal INR   Sun    Mon    Tues   Wed    Thurs  Fri    Sat  Total/wk  8/1/2017    3       3 53  2 5    5      5      2 5    5      5      2 5  27 5  4/6/2017    3       3 86  5      5      5      5      5      5      5  35  3/1/2017    3       3 29  5      5      5      5      5      5      5  35  1/6/2017    3       3 07  5      5      5      5      5      5      5  35  ADDITIONAL PREVIOUS VISIT INFORMATION  VISITDATE   PRIMARY RX               DOSE      CrCl  8/1/2017    COUMADIN                 5mg  4/6/2017    COUMADIN                 5mg  3/1/2017    COUMADIN                 5mg  1/6/2017    COUMADIN                 5mg  OTHER CURRENT MEDICATIONS:  COUMADIN  PROGRESS NOTES: PER DR Joshi Ready DECREASE TO 2 5/5/5MG RECHECK 1 WEEK, SPOKE  WITH PT  PATIENT INSTRUCTIONS: SEE PROGRESS NOTE  TEST LOCATION: [address], , ,   INBOUND LAB DATA:  Lab       Lab Value Col Date                 Rpt Date                 Lab  Reference Range  Electronically signed by: Stephanie Reyna on 8/1/2017 3:12 PM EDT

## 2018-01-13 NOTE — RESULT NOTES
Verified Results  (1) PT WITH INR [id number] 10:04AM Donna Bueno     Test Name Result Flag Reference   INR 2 86 H 0 86-1 16   PT 28 5 seconds H 11 8-14 1

## 2018-01-14 NOTE — PROGRESS NOTES
REPORT NAME: Patient Visit Summary Report   VISIT DATE: 10/10/2016  VISIT TIME: 3:32 PM EDT  PATIENT NAME: Supriya [address] RECORD NUMBER: [id number]  SOCIAL SECURITY NUMBER:   YOB: 1940  AGE: 68  REFERRING PHYSICIAN: Lana Miramontes  SUPERVISING CLINICIAN: Lana Miramontes  HEALTH CARE PROFESSIONAL: Dayana Murray   PATIENT HOME ADDRESS: [place] [address], [address] [facility] PHONE: [phone]  DIAGNOSIS 1: Aortic Valve Replacement / 424 1  DIAGNOSIS 2:   DIAGNOSIS 3:   DIAGNOSIS 4:   INR RANGE: 2 5 - 3 5  INR GOAL: 3  TREATMENT START DATE:   TREATMENT END DATE:   NEXT VISIT:       VISIT RESULTS   ENCOUNTER NUMBER:   TEST LOCATION: [facility]  TEST TYPE: Outside Lab (Venipuncture)  VISIT TYPE:   CURRENT INR: 2 85 PROTIME:   SPECIMEN COL AND RPT DATE: 10/10/2016 3:32 PM  EDT    VITAL SIGNS  PULSE:  B/P:  WEIGHT:  HEIGHT:  TEMP:     CURRENT ANTICOAGULANT DOSING SCHEDULE  DOSE SIZE: 5mg    ANTICOAGULANT TYPE: COUMADIN  DOSING REGIMEN  Sun       Mon Tues Wed Thurs Fri       Sat  Total/Wk  5         5         5         5         5         5         5         35    PATIENT MEDICATION INSTRUCTION: Yes  PATIENT NUTRITIONAL COUNSELING: No  PATIENT BRUISING INSTRUCTION: No      LAST EDUCATION DATE:       PREVIOUS VISIT INFORMATION  VISITDATE   INR Goal  INR   Sun     Mon     Tues    Wed     Thurs   Fri  Sat     Total/wk  10/10/2016  3         2 85  5       5       5       5       5       5  5       35  8/11/2016   3         2 5   5       5       5       5       5       5  5       35  6/15/2016   3         2 58  5       5       5       5       5       5  5       35  3/10/2016   3         3 03  5       5       5       5       5       5  5       35    ADDITIONAL PREVIOUS VISIT INFORMATION  VISITDATE   PRIMARY RX               DOSE      CrCl  10/10/2016  COUMADIN                 5mg                 8/11/2016   COUMADIN                 5mg 6/15/2016   COUMADIN                 5mg                 3/10/2016   COUMADIN                 5mg                     OTHER CURRENT MEDICATIONS: COUMADIN      PROGRESS NOTES: PER AS/PA SMAE DOSE RECHECK 3 WEEKS, SPOKE WITH PT    PATIENT INSTRUCTIONS: SEE PROGRESS NOTE    TEST LOCATION: [address]    Electronically signed by: Ap Marsh  on 10/10/2016 at 3:32 PM EDT

## 2018-01-15 NOTE — RESULT NOTES
Verified Results  (1) PT WITH INR [id number] 10:01AM Minesh No Order Number: [id number]   Order Number: [id number]     Test Name Result Flag Reference   INR 2 55 H 0 86-1 16   PT 27 0 seconds H 12 0-14 3

## 2018-01-15 NOTE — PROGRESS NOTES
REPORT NAME: Patient Visit Summary Report   VISIT DATE: 2/3/2016  VISIT TIME: 4:24 PM EST  PATIENT NAME: Supriya [address] RECORD NUMBER: [id number]  SOCIAL SECURITY NUMBER:   YOB: 1940  AGE: 76  REFERRING PHYSICIAN: Mirela Hilton  SUPERVISING CLINICIAN: Mirela Hilton  HEALTH CARE PROFESSIONAL: Jeffery Vigil   PATIENT HOME ADDRESS: [address] PHONE: [phone]  DIAGNOSIS 1: Aortic Valve Replacement / 424 1  DIAGNOSIS 2:   DIAGNOSIS 3:   DIAGNOSIS 4:   INR RANGE: 2 5 - 3 5  INR GOAL: 3  TREATMENT START DATE:   TREATMENT END DATE:   NEXT VISIT:       VISIT RESULTS   ENCOUNTER NUMBER:   TEST LOCATION: [address]  TEST TYPE: Outside Lab (Venipuncture)  VISIT TYPE:   CURRENT INR: 2 86 PROTIME:   SPECIMEN COL AND RPT DATE: 2/3/2016 4:24 PM  EST    VITAL SIGNS  PULSE:  B/P:  WEIGHT:  HEIGHT:  TEMP:     CURRENT ANTICOAGULANT DOSING SCHEDULE  DOSE SIZE: 5mg    ANTICOAGULANT TYPE: COUMADIN  DOSING REGIMEN  Sun       Mon Tues Wed Thurs Fri       Sat  Total/Wk  5         5         5         5         5         5         5         35    PATIENT MEDICATION INSTRUCTION: Yes  PATIENT NUTRITIONAL COUNSELING: No  PATIENT BRUISING INSTRUCTION: No      LAST EDUCATION DATE:       PREVIOUS VISIT INFORMATION  VISITDATE   INR Goal  INR   Sun     Mon     Tues    Wed     Thurs   Fri  Sat     Total/wk  2/3/2016    3         2 86  5       5       5       5       5       5  5       35  11/18/2015  3         2 82  5       5       5       5       5       5  5       35  9/21/2015   3         3 34  5       5       5       5       5       5  5       35  8/26/2015   3         3 92  5       5       5       5       5       5  5       35    ADDITIONAL PREVIOUS VISIT INFORMATION  VISITDATE   PRIMARY RX               DOSE      CrCl  2/3/2016    COUMADIN                 5mg                 11/18/2015  COUMADIN                 5mg 9/21/2015   COUMADIN                 5mg                 8/26/2015   COUMADIN                 5mg                     OTHER CURRENT MEDICATIONS: COUMADIN      PROGRESS NOTES: PER SS/NP SAME DOSE RECHECK 3 WEEKS, SPOKE WITH PT    PATIENT INSTRUCTIONS: SEE PROGRESS NOTE    TEST LOCATION: [facility] [address] [place] [address]    Electronically signed by: Nolan Roa  on 2/3/2016 at 4:24 PM EST

## 2018-01-15 NOTE — RESULT NOTES
Verified Results  (1) PT WITH INR [id number] 09:36AM Meghan Bueno     Test Name Result Flag Reference   INR 2 44 H 0 86-1 16   PT 26 8 seconds H 12 1-14 4

## 2018-01-15 NOTE — RESULT NOTES
Verified Results  (1) OCCULT BLOOD, FECAL IMMUNOCHEMICAL TEST [id number] 08:59AM Gee Jimenes    Order Number: [id number]     Test Name Result Flag Reference   OCCULT BLD, FECAL IMMUNOLOGICAL Positive A Negative   Performed by Fecal Immunochemical Test        Plan  Anticoagulant long-term use, Fecal occult blood test positive    · 1 Fransisco Ta MD, Neeta Marcelino (Gastroenterology) Co-Management  *PATIENT HAS FECAL  OCCULT BLOOD IN THE STOOL AND SHE IS ON cOUMADIN   pLEASE GET HER  IN AS SOON AS POSSIBLE  Status: Active  Requested for: [id number]  Care Summary provided  : Yes

## 2018-01-15 NOTE — RESULT NOTES
Verified Results  (1) PT WITH INR 10Oct2016 11:34AM Geovany Bueno [place]     Test Name Result Flag Reference   INR 2 85 H 0 86-1 16   PT 29 4 seconds H 12 0-14 3

## 2018-01-15 NOTE — PROGRESS NOTES
REPORT NAME: Progress Notes Report  VISIT DATE: 8/11/2017  VISIT TIME: 3:00 PM EDT  PATIENT NAME: Cuco Lugo   MEDICAL RECORD NUMBER: [id number]  YOB: 1940  AGE: 68  REFERRING PHYSICIAN: Yonatan Ugalde  SUPERVISING CLINICIAN: [address] PROVIDER: James Sauceda  PATIENT HOME ADDRESS: [facility] [address] [facility], [facility] [address] [facility] [address], [address]  PATIENT HOME PHONE: [phone]  SOCIAL SECURITY NUMBER:   DIAGNOSIS 1: Aortic Valve Replacement / 424 1  DIAGNOSIS 2:   INR RANGE: 2 5 - 3 5  INR GOAL: 3  TREATMENT START DATE:   TREATMENT END DATE:   NEXT VISIT:     VISIT RESULTS  ENCOUNTER NUMBER:   TEST LOCATION: [address]  TEST TYPE: Outside Lab (Venipuncture)  VISIT TYPE:   CURRENT INR: 3 05 PROTIME:   SPECIMEN COL AND RPT DATE: 8/11/2017 3:00 PM  EDT  VITAL SIGNS  PULSE:  BP: / WEIGHT:  HEIGHT:  TEMP:   CURRENT ANTICOAGULANT DOSING SCHEDULE  DOSE SIZE: 5mg    ANTICOAGULANT TYPE: COUMADIN  DOSING REGIMEN  Sun       Mon       Tues      Wed       Thurs     Fri       Sat  Total/Wk  2 5       5         5         2 5       5         5         2 5       27 5  PATIENT MEDICATION INSTRUCTION: Yes  PATIENT NUTRITIONAL COUNSELING: No  PATIENT BRUISING INSTRUCTION: No  LAST EDUCATION DATE:   PREVIOUS VISIT INFORMATION  VISITDATE  INRGoal INR   Sun    Mon Tues Wed Thurs Fri    Sat  Total/wk  8/11/2017   3       3 05  2 5    5      5      2 5    5      5      2 5  27 5  8/1/2017    3       3 53  2 5    5      5      2 5    5      5      2 5  27 5  4/6/2017    3       3 86  5      5      5      5      5      5      5  35  3/1/2017    3       3 29  5      5      5      5      5      5      5  35  ADDITIONAL PREVIOUS VISIT INFORMATION  VISITDATE   PRIMARY RX               DOSE      CrCl  8/11/2017   COUMADIN                 5mg  8/1/2017    COUMADIN                 5mg  4/6/2017    COUMADIN                 5mg  3/1/2017    COUMADIN                 5mg  OTHER CURRENT MEDICATIONS:  COUMADIN  PROGRESS NOTES: Per Dr Anoop Duncan, keep dose the same and recheck in two weeks  PATIENT INSTRUCTIONS: Spoke with patient    TEST LOCATION: [address], , ,   INBOUND LAB DATA:  Lab       Lab Value Col Date                 Rpt Date                 Lab  Reference Range  Electronically signed by: Sunil Badillo on 8/11/2017 3:00 PM EDT

## 2018-01-16 NOTE — RESULT NOTES
Verified Results  (1) PT WITH INR [id number] 01:17PM Jovana Cassidy Order Number: [id number]     Test Name Result Flag Reference   INR 2 51 H 0 86-1 16   PT 27 4 seconds H 12 1-14 4

## 2018-01-16 NOTE — PROGRESS NOTES
Assessment   1  Encounter for preventive health examination (V70 0) (Z00 00)    Plan  Diabetes mellitus with neurological manifestation    · (1) BASIC METABOLIC PROFILE; Status:Active; Requested STEPHANIE:[id number];    · (1) CBC/PLT/DIFF; Status:Active; Requested USJ:50YWJ0423;    · (1) CBC/PLT/DIFF; Status:Canceled;    · (1) COMPREHENSIVE METABOLIC PANEL; Status:Canceled;    · (1) HEMOGLOBIN A1C; Status:Active; Requested KBX:[id number];    · (1) HEMOGLOBIN A1C; Status:Canceled;    · (1) LDL,DIRECT; Status:Active; Requested SAFIA:[id number];    · (1) LIPID PANEL FASTING W DIRECT LDL REFLEX; Status:Canceled;    · (1) MICROALBUMIN CREATININE RATIO, RANDOM URINE; Status:Active; Requested  DVS:33CTW7467;    · (1) MICROALBUMIN CREATININE RATIO, RANDOM URINE; Status:Canceled;    · (1) TSH; Status:Canceled;    · (1) URINALYSIS w URINE C/S REFLEX (will reflex a microscopy if leukocytes, occult  blood, or nitrites are not within normal limits); Status:Active; Requested UNQ:86HKZ4185;    · (1) URINALYSIS w URINE C/S REFLEX (will reflex a microscopy if leukocytes, occult  blood, or nitrites are not within normal limits); Status:Canceled; Discussion/Summary    Recommendation is to recheck hemoglobin A1c today and to come back for a follow-up visit in 6 months  Impression: Subsequent Annual Wellness Visit, with preventive exam as well as age and risk appropriate counseling completed  Cardiovascular screening and counseling: due for a lipid panel and Dx - V81 2 Screen for CV Disorder  Diabetes screening and counseling: due for blood glucose and Dx - V77 1 Screen for DM  Colorectal cancer screening and counseling: screening is current and Dx - V76 51 Screen for CRC  Breast cancer screening and counseling: the patient declines screening  Cervical cancer screening and counseling: screening not indicated   Immunizations: influenza vaccination is recommended annually, the patient declines the pneumococcal vaccination, hepatitis B vaccination series is not indicated at this time due to the patient's low risk of gage the disease, the patient declines the hepatitis vaccination series, the patient declines the Zostavax vaccine, the patient declines the Td vaccine and the patient declines the Tdap vaccine  Advance Directive Planning: she was encouraged to follow-up with me to discuss her questions and/or decisions  Patient Discussion: plan discussed with the patient, follow-up visit needed in one year  History of Present Illness  HPI: Chronic issues are "mild non obstructive CAD " followed by cardiology,hypertension, and hypothyroidism on replacement, hyperlipidemia, diabetes, COPD, and history of REPLACEMENT OF THE AORTIC VALVE  Has a pacemaker   Continued complaint of exertional dyspnea going on for years without a bad outcome    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 which is far more severe than one would think possible given her excellent diabetic control  However, she has not fallen  At the time of a prior visit I offered her gabapentin and Mirapex but she doesn't want to take them because she's afraid of potential side effects  The patient refuses vaccinations mammogram and colonoscopy  Her   from pancreatic cancer in    Welcome to Medicare and Wellness Visits: The patient is being seen for the subsequent annual wellness visit  Medicare Screening and Risk Factors   Hospitalizations: no previous hospitalizations  Once per lifetime medicare screening tests: AAA screening [place] has not yet been done  Medicare Screening Tests Risk Questions   Drug and Alcohol Use: The patient is a former cigarette smoker and quit smoking   The patient reports never drinking alcohol  She has never used illicit drugs  Diet and Physical Activity: Current diet includes well balanced meals, 1 servings of fruit per day, 1 servings of vegetables per day and 1 servings of meat per day   Exercise: walking  Mood Disorder and Cognitive Impairment Screening: PHQ-9 Depression Scale   Over the past 2 weeks, how often have you been bothered by the following problems? 1 ) Little interest or pleasure in doing things? Not at all    2 ) Feeling down, depressed or hopeless? Not at all    3 ) Trouble falling asleep or sleeping too much? Not at all    4 ) Feeling tired or having little energy? Not at all    5 ) Poor appetite or overeating? Not at all    6 ) Feeling bad about yourself, or that you are a failure, or have let yourself or your family down? Not at all    7 ) Trouble concentrating on things, such as reading a newspaper or watching television? Not at all    8 ) Moving or speaking so slowly that other people could have noticed, or the opposite, moving or speaking faster than usual? Not at all    9 ) Thoughts that you would be off dead or of hurting yourself in some way? Not at all  TOTAL SCORE: 0  She denies feeling down, depressed, or hopeless over the past two weeks  She denies feeling little interest or pleasure in doing things over the past two weeks  Cognitive impairment screening: denies difficulty learning/retaining new information, denies difficulty handling complex tasks, denies difficulty with reasoning, denies difficulty with spatial ability and orientation, denies difficulty with language and denies difficulty with behavior  Advance Directives: Advance directives: no living will, no durable power of  for health care directives and no advance directives  Co-Managers and Medical Equipment/Suppliers: See Patient Care Team      Patient Care Team    Care Team Member Role Specialty Office Number   Aviva Thaddeus MENDIOLA  Cardiology [phone]     Review of Systems    Over the past 2 weeks, how often have you been bothered by the following problems? 1 ) Little interest or pleasure in doing things? Not at all    2 ) Feeling down, depressed or hopeless?  Not at all    3 ) Trouble falling asleep or sleeping too much? Not at all    4 ) Feeling tired or having little energy? Not at all    5 ) Poor appetite or overeating? Not at all    6 ) Feeling bad about yourself, or that you are a failure, or have let yourself or your family down? Not at all    7 ) Trouble concentrating on things, such as reading a newspaper or watching television? Not at all    8 ) Moving or speaking so slowly that other people could have noticed, or the opposite, moving or speaking faster than usual? Not at all    9 ) Thoughts that you would be better off dead or of hurting yourself in some way? Not at all  Score 0      Active Problems   1  Aneurysm of thoracic aorta (441 2) (I71 2)  2  Anticoagulant long-term use (V58 61) (Z79 01)  3  CAD in native artery (414 01) (I25 10)  4  Chronic obstructive pulmonary disease (496) (J44 9)  5  Diabetes mellitus with neurological manifestation (250 60) (E11 49)  6  Flu vaccine need (V04 81) (Z23)  7  History of aortic valve disorder (V12 59) (Z86 79)  8  Hyperlipidemia (272 4) (E78 5)  9  Hypertension (401 9) (I10)  10  Hypothyroidism (244 9) (E03 9)  11  Laceration of scalp with delay in treatment, initial encounter (873 1) (S01 01XA)  12  Left hand weakness (728 87) (R29 898)  13  Pacemaker at end of battery life (V53 31) (Z45 010)  14  Presence of cardiac pacemaker (V45 01) (Z95 0)  15  Renal function impairment (593 9) (N28 9)  16  S/P AVR (aortic valve replacement) (V43 3) (Z95 2)  17  Screening for genitourinary condition (V81 6) (Z13 89)  18  Sinoatrial node dysfunction (427 81) (I49 5)  19   TIA (transient ischemic attack) (435 9) (G45 9)    Past Medical History    · History of Benign Neoplasm Of The Sigmoid Colon (211 3)   · History of Black tarry stools (578 1) (K92 1)   · History of Cardiomyopathy (425 4) (I42 9)   · History of Chest pain (786 50) (R07 9)   · History of Emphysema (492 8) (J43 9)   · History of GERD (gastroesophageal reflux disease) (530 81) (K21 9)   · History of acute bronchitis (V12 69) (Z87 09)   · History of aortic valve disorder (V12 59) (Z86 79)   · History of aortic valve disorder (V12 59) (Z86 79)   · History of complete atrioventricular block (V12 59) (Z86 79)   · History of dizziness (V13 89) (I22 369)   · History of shortness of breath (V13 89) (D48 545)   · History of Type 2 diabetes mellitus (250 00) (E11 9)    The active problems and past medical history were reviewed and updated today  Surgical History    · History of Aortic Valve Replacement   · History of Capsule Endoscopy   · History of Cholecystectomy   · History of Complete Colonoscopy   · History of Diagnostic Esophagogastroduodenoscopy   · History of Hysterectomy   · History of Pacemaker Placement    The surgical history was reviewed and updated today  Family History  Mother    · No pertinent family history  Father    · No pertinent family history    The family history was reviewed and updated today  Social History    · Former smoker (Z68 61) (D32 401)   · 2007   · [address] (DME)   · FREESTYLE TEST STRIPS BIDFREESTYLE LANCETS BID   · Living Independently With Spouse   · Never Drank Alcohol   · Never Used Drugs  The social history was reviewed and updated today  Current Meds  1  Aspirin 81 MG TABS; Take 1 tablet daily Recorded  2  Atorvastatin Calcium 20 MG Oral Tablet; take 1 tablet by mouth daily; Therapy: 01SAX5641 to (Evaluate:63Wus5142)  Requested for: 24YQD7839; Last   Rx:71Vbn7415 Ordered  3  Fenofibrate Micronized 134 MG Oral Capsule; take 1 capsule by mouth once daily; Therapy: 53HUW1684 to (Evaluate:30Too5688)  Requested for: 84Wlw7728; Last   Rx:78Bxy2536 Ordered  4  FreeStyle Lancets Miscellaneous; test twice daily; Therapy: 53OOD0810 to (Evaluate:59Xzu1556)  Requested for: 90VNC4329; Last   Rx:33Zwo1437 Ordered  5  FreeStyle Lancets Miscellaneous; testing bid  Requested for: 61OYJ7366; Last   BN:84XED0064 Ordered  6   FreeStyle Test In Vitro Strip; test blood twice daily; Therapy: 75GQW9832 to (Gilberto Von Ormy)  Requested for: 46AIU2320; Last   Rx:59Qio6721 Ordered  7  FreeStyle Test In Vitro Strip; TEST TWICE DAILY  Requested for: 75QMO9442; Last   Rx:61Cvm4159 Ordered  8  Furosemide 40 MG Oral Tablet; take 1 and 1/2 tablets by mouth once daily; Therapy: 82JHW1555 to ( Repress)  Requested for: 28Oct2016; Last   Rx:28Oct2016 Ordered  9  Gabapentin 100 MG Oral Capsule; take 3 capsules by mouth at bedtime; Therapy: 66MXC5505 to (Evaluate:47Unz2345)  Requested for: 00MJY0788; Last   Rx:64Zyp6346; Status: ACTIVE - Retrospective By Protocol Authorization Ordered  10  GlipiZIDE 10 MG Oral Tablet; TAKE 1 AND 1/2 TABLET BY MOUTH ONCE DAILY; Therapy: 85UBB8477 to (Evaluate:96Oew1890)  Requested for: 14Wrk6653; Last    Rx:06Zhh5782 Ordered  11  Levothyroxine Sodium 50 MCG Oral Tablet; take 1 tablet by mouth daily; Therapy: 01XHK0407 to (Evaluate:99Jhc2462)  Requested for: 41QAM8868; Last    Rx:12Ctk3288; Status: ACTIVE - Retrospective By Protocol Authorization Ordered  12  Lisinopril 2 5 MG Oral Tablet; take 1 tablet by mouth once daily; Therapy: 48Mgl3315 to (Evaluate:81Xlr3345)  Requested for: 29Exx1175; Last    Rx:82Xon5053 Ordered  13  Metoprolol Tartrate 50 MG Oral Tablet; take 1 tablet by mouth twice a day; Therapy: 02Uub3750 to (Evaluate:90Yrb0883)  Requested for: 22Dvu2240; Last    Rx:78Qgj5877 Ordered  14  Warfarin Sodium 5 MG Oral Tablet; take 1 tablet by mouth daily; Therapy: 84HEN9258 to (Evaluate:66Fij2770)  Requested for: 55Bhq7628; Last    Rx:85Fqq8403 Ordered    The medication list was reviewed and updated today  Allergies   1   No Known Drug Allergies    Immunizations   1 2    Influenza  03-Sep-2014 08-Oct-2015    PPSV  NEVER     Tdap  16-May-2017 NO    Zoster  NEVER      Vitals  Signs    Heart Rate: 61  Systolic: 893  Diastolic: 80  Height: 5 ft 5 25 in  Weight: 164 lb   BMI Calculated: 27 08  BSA Calculated: 1 82  O2 Saturation: 98    Results/Data  PHQ-9 Adult Depression Screening 27Sep2017 08:30AM User, [employer]     Test Name Result Flag Reference   PHQ-9 Adult Depression Score 1     Over the last two weeks, how often have you been bothered by any of the following problems? Little interest or pleasure in doing things: Not at all - 0  Feeling down, depressed, or hopeless: Not at all - 0  Trouble falling or staying asleep, or sleeping too much: Not at all - 0  Feeling tired or having little energy: Several days - 1  Poor appetite or over eating: Not at all - 0  Feeling bad about yourself - or that you are a failure or have let yourself or your family down: Not at all - 0  Trouble concentrating on things, such as reading the newspaper or watching television: Not at all - 0  Moving or speaking so slowly that other people could have noticed  Or the opposite -  being so fidgety or restless that you have been moving around a lot more than usual: Not at all - 0  Thoughts that you would be better off dead, or of hurting yourself in some way: Not at all - 0   PHQ-9 Adult Depression Screening Negative     PHQ-9 Difficulty Level Not difficult at all     PHQ-9 Severity Minimal Depression       Falls Risk Assessment (Dx Z13 89 Screen for Neurologic Disorder) 27Sep2017 08:30AM User, [employer]     Test Name Result Flag Reference   Falls Risk      No falls in the past year       Future Appointments    Date/Time Provider Specialty Site   10/02/2017 08:40 AM CARSON Dickinson Arm   Cardiology [facility]   10/27/2017 09:00 AM Cardiology, [facility] KRISTINE  [address]     Signatures   Electronically signed by : CARSON Pastrana ; Sep 27 2017  9:26AM EST                       (Author)

## 2018-01-16 NOTE — PROGRESS NOTES
REPORT NAME: Progress Notes Report  VISIT DATE: 9/27/2017  VISIT TIME: 8:47 AM EDT  PATIENT NAME: Yolanda Westbrook   MEDICAL RECORD NUMBER: [id number]  YOB: 1940  AGE: 68  REFERRING  PHYSICIAN: Mary Grace Brown  SUPERVISING CLINICIAN: Theron Abrams CARE PROVIDER: Stephanie Reyna  PATIENT HOME ADDRESS: [facility] [address], [place] [address], [facility] PHONE: [phone]  SOCIAL SECURITY NUMBER:    DIAGNOSIS 1: Aortic Valve Replacement / 424 1  DIAGNOSIS 2:   INR RANGE: 2 5 - 3 5  INR GOAL: 3  TREATMENT START DATE:   TREATMENT END DATE:   NEXT VISIT:     VISIT RESULTS  ENCOUNTER NUMBER:   TEST LOCATION: [address]  TEST TYPE: Outside Lab (Venipuncture)  VISIT TYPE:   CURRENT INR: 2 44 PROTIME:   SPECIMEN COL AND RPT DATE: 9/27/2017 8:47 AM  EDT  VITAL SIGNS  PULSE:  BP: / WEIGHT:  HEIGHT:  TEMP:   CURRENT ANTICOAGULANT DOSING SCHEDULE   DOSE SIZE: 5mg    ANTICOAGULANT TYPE: COUMADIN  DOSING REGIMEN  Sun       Mon       Tues      Wed       Thurs     Fri       Sat  Total/Wk  2 5       5         5         2 5       5         5         2 5       27 5  PATIENT MEDICATION INSTRUCTION:  Yes  PATIENT NUTRITIONAL COUNSELING: No  PATIENT BRUISING INSTRUCTION: No  LAST EDUCATION DATE:   PREVIOUS VISIT INFORMATION  VISITDATE  INRGoal INR   Sun    Mon    Tues   Wed    Thurs  Fri    Sat  Total/wk  9/27/2017   3       2 44   2 5    5      5      2 5    5      5      2 5  27 5  8/11/2017   3       3 05  2 5    5      5      2 5    5      5      2 5  27 5  8/1/2017    3       3 53  2 5    5      5      2 5    5      5      2 5  27 5  4/6/2017    3       3 86   5      5      5      5      5      5      5  35  ADDITIONAL PREVIOUS VISIT INFORMATION  VISITDATE   PRIMARY RX               DOSE      CrCl  9/27/2017   COUMADIN                 5mg  8/11/2017   COUMADIN                 5mg  8/1/2017     COUMADIN                 5mg  4/6/2017    COUMADIN                 5mg  OTHER CURRENT MEDICATIONS:  COUMADIN  PROGRESS NOTES: PER DR Niraj Holland SAME DOSE RECHECK 2 WEEKS, LMOM  PATIENT INSTRUCTIONS: SEE PROGRESS NOTE  TEST LOCATION: [address], , ,   INBOUND LAB DATA:  Lab       Lab Value Col Date                 Rpt Date                 Lab  Reference Range  Electronically signed by: Damian Roberto on 9/28/2017 8:47 AM EDT              Electronically signed Lulu MENDIOLA    Oct 24 2017  5:38PM EST

## 2018-01-16 NOTE — PROGRESS NOTES
REPORT NAME: Patient Visit Summary Report   VISIT DATE: 3/1/2017  VISIT TIME: 8:49 AM EST  PATIENT NAME: Supriya [address] RECORD NUMBER: [id number]  SOCIAL SECURITY NUMBER:   YOB: 1940  AGE: 68  REFERRING PHYSICIAN: Rigo Harris  SUPERVISING CLINICIAN: Theron Abrams CARE PROFESSIONAL: Pamela Strickland   PATIENT HOME ADDRESS: [address], [facility] [address] PHONE: [phone]  DIAGNOSIS 1: Aortic Valve Replacement / 424 1  DIAGNOSIS 2:   DIAGNOSIS 3:   DIAGNOSIS 4:   INR RANGE: 2 5 - 3 5  INR GOAL: 3  TREATMENT START DATE:   TREATMENT END DATE:   NEXT VISIT:       VISIT RESULTS   ENCOUNTER NUMBER:   TEST LOCATION: [address]  TEST TYPE: Outside Lab (Venipuncture)  VISIT TYPE:   CURRENT INR: 3 29 PROTIME:   SPECIMEN COL AND RPT DATE: 3/1/2017 8:49 AM  EST    VITAL SIGNS  PULSE:  B/P:  WEIGHT:  HEIGHT:  TEMP:     CURRENT ANTICOAGULANT DOSING SCHEDULE  DOSE SIZE: 5mg    ANTICOAGULANT TYPE: COUMADIN  DOSING REGIMEN  Sun       Mon Tues Wed Thurs Fri       Sat  Total/Wk  5         5         5         5         5         5         5         35    PATIENT MEDICATION INSTRUCTION: Yes  PATIENT NUTRITIONAL COUNSELING: No  PATIENT BRUISING INSTRUCTION: No      LAST EDUCATION DATE:       PREVIOUS VISIT INFORMATION  VISITDATE   INR Goal  INR   Sun     Mon Tues Wed Thurs Fri  Sat     Total/wk  3/1/2017    3         3 29  5       5       5       5       5       5  5       35  1/6/2017    3         3 07  5       5       5       5       5       5  5       35  10/10/2016  3         2 85  5       5       5       5       5       5  5       35  8/11/2016   3         2 5   5       5       5       5       5       5  5       35    ADDITIONAL PREVIOUS VISIT INFORMATION  VISITDATE   PRIMARY RX               DOSE      CrCl  3/1/2017    COUMADIN                 5mg                 1/6/2017    COUMADIN                 5mg 10/10/2016  COUMADIN                 5mg                 8/11/2016   COUMADIN                 5mg                     OTHER CURRENT MEDICATIONS: COUMADIN      PROGRESS NOTES: PER SS/NP SAME DOSE RECHECK 4 WEEKS, LMOM    PATIENT INSTRUCTIONS: SEE PROGRESS NOTE    TEST LOCATION: [address]    Electronically signed by: Ap Marsh  on 3/3/2017 at 8:49 AM EST

## 2018-01-17 NOTE — PROGRESS NOTES
REPORT NAME: Patient Visit Summary Report   VISIT DATE: 6/15/2016  VISIT TIME: 9:14 AM EDT  PATIENT NAME: Supriya [address] RECORD NUMBER: [id number]  SOCIAL SECURITY NUMBER:   YOB: 1940  AGE: 68  REFERRING PHYSICIAN: Jadyn Thornton  SUPERVISING CLINICIAN: Theron Abrams CARE PROFESSIONAL: Cielo Siu   PATIENT HOME ADDRESS: [place] [address], [address] PHONE: [phone]  DIAGNOSIS 1: Aortic Valve Replacement / 424 1  DIAGNOSIS 2:   DIAGNOSIS 3:   DIAGNOSIS 4:   INR RANGE: 2 5 - 3 5  INR GOAL: 3  TREATMENT START DATE:   TREATMENT END DATE:   NEXT VISIT:       VISIT RESULTS   ENCOUNTER NUMBER:   TEST LOCATION: [address]  TEST TYPE: Outside Lab (Venipuncture)  VISIT TYPE:   CURRENT INR: 2 58 PROTIME:   SPECIMEN COL AND RPT DATE: 6/15/2016 9:14 AM  EDT    VITAL SIGNS  PULSE:  B/P:  WEIGHT:  HEIGHT:  TEMP:     CURRENT ANTICOAGULANT DOSING SCHEDULE  DOSE SIZE: 5mg    ANTICOAGULANT TYPE: COUMADIN  DOSING REGIMEN  Sun       Mon       Tues      Wed       Thurs     Fri       Sat  Total/Wk  5         5         5         5         5         5         5         35    PATIENT MEDICATION INSTRUCTION: Yes  PATIENT NUTRITIONAL COUNSELING: No  PATIENT BRUISING INSTRUCTION: No      LAST EDUCATION DATE:       PREVIOUS VISIT INFORMATION  VISITDATE   INR Goal  INR   Sun     Mon     Tues    Wed     Thurs   Fri  Sat     Total/wk  6/15/2016   3         2 58  5       5       5       5       5       5  5       35  3/10/2016   3         3 03  5       5       5       5       5       5  5       35  2/3/2016    3         2 86  5       5       5       5       5       5  5       35  11/18/2015  3         2 82  5       5       5       5       5       5  5       35    ADDITIONAL PREVIOUS VISIT INFORMATION  VISITDATE   PRIMARY RX               DOSE      CrCl  6/15/2016   COUMADIN                 5mg                 3/10/2016   COUMADIN                 5mg 2/3/2016    COUMADIN                 5mg                 11/18/2015  COUMADIN                 5mg                     OTHER CURRENT MEDICATIONS: COUMADIN      PROGRESS NOTES: PER AS/PA SAME DOSE RECHECK 3 WEEKS, SPOKE WITH PT    PATIENT INSTRUCTIONS: SEE PROGRESS NOTE    TEST LOCATION: Pallavi Mcrae [address] [place] [address]    Electronically signed by: Mery Genao  on 6/16/2016 at 9:14 AM EDT

## 2018-01-17 NOTE — RESULT NOTES
Verified Results  (1) PT WITH INR [id number] 09:45AM Harvie Goodell Order Number: [id number]     Test Name Result Flag Reference   INR 3 53 H 0 86-1 16   PT 35 9 seconds H 12 1-14 4

## 2018-01-18 NOTE — RESULT NOTES
Verified Results  (1) PT WITH INR [id number] 09:43AM [place] Order Number: [id number]     Order Number: [id number]     Test Name Result Flag Reference   INR 3 03 H 0 86-1 16   PT 29 7 seconds H 11 8-14 1

## 2018-01-22 VITALS
HEART RATE: 61 BPM | HEIGHT: 65 IN | OXYGEN SATURATION: 98 % | DIASTOLIC BLOOD PRESSURE: 68 MMHG | SYSTOLIC BLOOD PRESSURE: 122 MMHG | WEIGHT: 164 LBS | BODY MASS INDEX: 27.32 KG/M2

## 2018-01-23 NOTE — RESULT NOTES
Verified Results  (1) TISSUE EXAM [id number] 08:00AM [employer]     Test Name Result Flag Reference   LAB AP CASE REPORT (Report)     Surgical Pathology Report             Case: [id number]                   Authorizing Provider: Macho Lee MD  Collected:      11/29/2017 0800        Ordering Location:   [facility] Received:      11/29/2017 1110                    Operating Room                                 Pathologist:      Lyla Patino MD                             Specimens:  A) - Duodenum                                             B) - Stomach                                              C) - Polyp, Colorectal, sigmoid   LAB AP FINAL DIAGNOSIS (Report)     A  Duodenum, biopsy:  - Benign duodenal mucosa  - No villous atrophy, intraepithelial lymphocytosis or crypt hyperplasia;   negative for features of malabsorptive enteropathy   - Negative for chronic or active duodenitis, dysplasia or malignancy  B  Stomach, biopsy:  - Chronic inactive antral gastritis  - Negative for Helicobacter pylori, confirmed by immunohistochemical   stain, evaluated with appropriate positive controls  - Negative for atrophy, intestinal metaplasia, dysplasia or carcinoma  C  Colon, sigmoid polyp, biopsy:   - Hyperplastic polyp  Electronically signed by Lyla Patino MD on 12/1/2017 at 1:59 PM   LAB AP SURGICAL ADDITIONAL INFORMATION (Report)     All controls performed with the immunohistochemical stains reported above   reacted appropriately  These tests were developed and their performance   characteristics determined by [facility] or   [facility]  They may not be cleared or approved by the [place]  Food and Drug Administration  The FDA has determined that such clearance   or approval is not necessary  These tests are used for clinical purposes  They should not be regarded as investigational or for research   This   laboratory has been approved by CLIA 88, designated as a high-complexity   laboratory and is qualified to perform these tests  Interpretation performed at , [facility]   LAB AP GROSS DESCRIPTION (Report)     A  The specimen is received in formalin, labeled with the patient's name   and hospital number, and is designated duodenum biopsy  The specimen   consists of 2 tan-pink soft tissue fragments each measuring 0 2 cm in   greatest dimension  Entirely submitted  One cassette  B  The specimen is received in formalin, labeled with the patient's name   and hospital number, and is designated Stomach  The specimen consists   of 2 tan-pink soft tissue fragments measuring 0 3 cm and 0 5 cm in   greatest dimension  Entirely submitted  One cassette  C  The specimen is received in formalin, labeled with the patient's name   and hospital number, and is designated Sigmoid polyp  The specimen   consists of one tan-pink soft tissue fragment measuring 0 2 cm in greatest   dimension  Entirely submitted  One cassette  Note: The estimated total formalin fixation time based upon information   provided by the submitting clinician and the standard processing schedule   is under 72 hours    Sonu   LAB AP CLINICAL INFORMATION Cold bx r/o c claudine     Cold bx r/o c claudine

## 2018-01-31 ENCOUNTER — APPOINTMENT (OUTPATIENT)
Dept: LAB | Facility: CLINIC | Age: 78
End: 2018-01-31
Payer: MEDICARE

## 2018-01-31 DIAGNOSIS — I25.10 ATHEROSCLEROTIC HEART DISEASE OF NATIVE CORONARY ARTERY WITHOUT ANGINA PECTORIS: ICD-10-CM

## 2018-01-31 LAB
INR PPP: 3.05 (ref 0.86–1.16)
INR PPP: 3.05 (ref 0.86–1.16)
PROTHROMBIN TIME: 32 SECONDS (ref 12.1–14.4)

## 2018-01-31 PROCEDURE — 85610 PROTHROMBIN TIME: CPT

## 2018-01-31 PROCEDURE — 36415 COLL VENOUS BLD VENIPUNCTURE: CPT

## 2018-02-08 ENCOUNTER — OFFICE VISIT (OUTPATIENT)
Dept: CARDIOLOGY CLINIC | Facility: CLINIC | Age: 78
End: 2018-02-08
Payer: MEDICARE

## 2018-02-08 VITALS
BODY MASS INDEX: 26.61 KG/M2 | DIASTOLIC BLOOD PRESSURE: 60 MMHG | HEART RATE: 60 BPM | WEIGHT: 165.6 LBS | HEIGHT: 66 IN | SYSTOLIC BLOOD PRESSURE: 118 MMHG | OXYGEN SATURATION: 98 %

## 2018-02-08 DIAGNOSIS — Z79.01 CHRONIC ANTICOAGULATION: ICD-10-CM

## 2018-02-08 DIAGNOSIS — E78.2 COMBINED HYPERLIPIDEMIA: ICD-10-CM

## 2018-02-08 DIAGNOSIS — I35.9 AORTIC VALVE DISORDER: Primary | ICD-10-CM

## 2018-02-08 DIAGNOSIS — I10 HYPERTENSION, ESSENTIAL: ICD-10-CM

## 2018-02-08 PROCEDURE — 99214 OFFICE O/P EST MOD 30 MIN: CPT | Performed by: INTERNAL MEDICINE

## 2018-02-08 NOTE — PROGRESS NOTES
Cardiology Follow Up    Micheal Oglesby  1940  [id number]  [address] [place] [id number]    1  Aortic valve disorder     2  Chronic anticoagulation     3  Combined hyperlipidemia     4  Hypertension, essential       Chief Complaint   Patient presents with    Follow-up       Interval History:  Patient presents for follow-up visit   patient used to be followed by Dr Bueno  Patient has history of mechanical aortic valve replacement and is on anticoagulation  Patient also has history of hypertension  Patient has been having some pain in the legs and cramping at night  She has been having this for a long time  Patient denies any bleeding issues  Patient states that she has some shortness of breath which is stable  No history of orthopnea PND  She states that she has been compliant with all her present medications    Patient Active Problem List   Diagnosis    Aortic valve disorder    Combined hyperlipidemia    Chronic anticoagulation    Hypertension, essential     Past Medical History:   Diagnosis Date    Anemia     Aneurysm of thoracic aorta (HCC)     Aortic valve disorder     [facility]ch[facility] obstructive [facility] disease) ([facility] )     Coronary artery disease     Diabetes mellitus ([facility] )     Disease of thyroid gland     GERD (gastroesophageal reflux disease)     Hyperlipidemia     Hypertension     Stroke ([facility] )     pt  states it was a TIA    TIA (transient ischemic attack)      Social History     Social History    Marital status:      Spouse name: N/A    Number of children: N/A    Years of education: N/A     Occupational History    Not on file       Social History Main Topics    Smoking status: Former Smoker     Quit date: 11/29/2007    Smokeless tobacco: Never Used    Alcohol use No    Drug use: No    Sexual activity: Not on file     Other Topics Concern    Not on file     Social History Narrative    No narrative on file      No family history on file  Past Surgical History:   Procedure Laterality Date    AORTIC VALVE REPLACEMENT      CARDIAC SURGERY      aortic valve replacement    CHOLECYSTECTOMY      COLONOSCOPY      HYSTERECTOMY      INSERT / REPLACE / REMOVE PACEMAKER      IN ESOPHAGOGASTRODUODENOSCOPY TRANSORAL DIAGNOSTIC N/A 11/29/2017    Procedure: EGD AND COLONOSCOPY;  Surgeon: Jean Claude Meier MD;  Location: [facility]; Service: Gastroenterology       Current Outpatient Prescriptions:     aspirin (ECOTRIN LOW STRENGTH) 81 mg EC tablet, Take 81 mg by mouth daily, Disp: , Rfl:     atorvastatin (LIPITOR) 20 mg tablet, Take 20 mg by mouth daily, Disp: , Rfl:     enoxaparin (LOVENOX) 40 mg/0 4 mL, Inject under the skin, Disp: , Rfl:     fenofibrate micronized (LOFIBRA) 134 MG capsule, Take 134 mg by mouth every morning before breakfast, Disp: , Rfl:     furosemide (LASIX) 40 mg tablet, Take 40 mg by mouth 2 (two) times a day, Disp: , Rfl:     gabapentin (NEURONTIN) 100 mg capsule, Take 100 mg by mouth 3 (three) times a day, Disp: , Rfl:     glipiZIDE (GLUCOTROL) 10 mg tablet, Take 10 mg by mouth 2 (two) times a day before meals, Disp: , Rfl:     levothyroxine 50 mcg tablet, Take 50 mcg by mouth daily, Disp: , Rfl:     lisinopril (ZESTRIL) 2 5 mg tablet, Take 2 5 mg by mouth daily, Disp: , Rfl:     metoprolol succinate (TOPROL-XL) 50 mg 24 hr tablet, Take 25 mg by mouth daily, Disp: , Rfl:     warfarin (COUMADIN) 5 mg tablet, Take by mouth daily, Disp: , Rfl:   No Known Allergies    Labs:  Appointment on 01/31/2018   Component Date Value    Protime 01/31/2018 32 0*    INR 01/31/2018 3 05*     Imaging: No results found      Review of Systems:  Review of Systems   REVIEW OF SYSTEMS:  Constitutional:  Denies fever or chills   Eyes:  Denies change in visual acuity   HENT:  Denies nasal congestion or sore throat   Respiratory:  Denies cough or shortness of breath   Cardiovascular:  Denies chest pain or edema   GI:  Denies abdominal pain, nausea, vomiting, bloody stools or diarrhea   :  Denies dysuria, frequency, difficulty in micturition and nocturia  Musculoskeletal:  Foot cramping mostly at night  Neurologic:  Denies headache, focal weakness or sensory changes   Endocrine:  Denies polyuria or polydipsia   Lymphatic:  Denies swollen glands   Psychiatric:  Denies depression or anxiety     Physical Exam:  Physical Exam   PHYSICAL EXAM:  General:  Patient is not in acute distress   Head: Normocephalic, Atraumatic  HEENT:  Both pupils normal-size atraumatic, normocephalic, nonicteric  Neck:  JVP not raised  Trachea central  No carotid bruit  Respiratory:  normal breath sounds no crackles  no rhonchi  Cardiovascular:  Regular rate and rhythm no S3 no murmurs heart sounds consistent with prosthetic mechanical valve  GI:  Abdomen soft nontender  No organomegaly  Lymphatic:  No cervical or inguinal lymphadenopathy  Neurologic:  Patient is awake alert, oriented   Grossly nonfocal  Extremities reveal 1+ edema    Discussion/Summary:   Patient with multiple medical problems who seems to be doing reasonably well from cardiac standpoint  Previous studies reviewed with patient  Medications reviewed and possible side effects discussed  concepts of cardiovascular disease , signs and symptoms of heart disease  Dietary and risk factor modification reinforced  All questions answered  Safety measures reviewed  Patient advised to report any problems prompting medical attention  The etiology of leg cramps and pain is unclear  Question whether this is related to statins  Patient has been instructed to stop atorvastatin for a month and see if the symptoms improve  Patient also may need lower extremity arterial studies if symptoms do not improve  Patient will be scheduled for a an echocardiogram to reassess ejection fraction and prosthetic mechanical aortic valve  Continue anticoagulation  Patient understands the risks and benefits of anticoagulation to prevent thrombotic risk from mechanical aortic valve  Patient had a few questions which were answered  Follow-up in 1 month or earlier as needed

## 2018-02-09 DIAGNOSIS — I35.9 AORTIC VALVE DISORDER: Primary | ICD-10-CM

## 2018-02-16 ENCOUNTER — ANTICOAG VISIT (OUTPATIENT)
Dept: CARDIOLOGY CLINIC | Facility: CLINIC | Age: 78
End: 2018-02-16

## 2018-02-16 DIAGNOSIS — Z95.2 HX OF AORTIC VALVE REPLACEMENT: Primary | ICD-10-CM

## 2018-02-21 ENCOUNTER — HOSPITAL ENCOUNTER (OUTPATIENT)
Dept: NON INVASIVE DIAGNOSTICS | Facility: CLINIC | Age: 78
Discharge: HOME | End: 2018-02-21
Payer: MEDICARE

## 2018-02-21 DIAGNOSIS — I35.9 AORTIC VALVE DISORDER: ICD-10-CM

## 2018-02-21 PROCEDURE — 93306 TTE W/DOPPLER COMPLETE: CPT

## 2018-02-23 PROCEDURE — 93306 TTE W/DOPPLER COMPLETE: CPT | Performed by: INTERNAL MEDICINE

## 2018-02-26 ENCOUNTER — TELEPHONE (OUTPATIENT)
Dept: CARDIOLOGY CLINIC | Facility: CLINIC | Age: 78
End: 2018-02-26

## 2018-02-26 NOTE — TELEPHONE ENCOUNTER
----- Message from Susanna Alvarez MD sent at 2/24/2018  6:56 PM EST -----  Echo shows normally working prosthetic mechanical AV  LEVF slightly reduced 45%   No major change from last year

## 2018-03-26 RX ORDER — LANCETS 28 GAUGE
EACH MISCELLANEOUS 2 TIMES DAILY
COMMUNITY
End: 2020-06-29 | Stop reason: SDUPTHER

## 2018-03-26 RX ORDER — METOPROLOL TARTRATE 50 MG/1
50 TABLET, FILM COATED ORAL EVERY 12 HOURS SCHEDULED
Refills: 0 | COMMUNITY
Start: 2018-02-15 | End: 2018-08-17 | Stop reason: SDUPTHER

## 2018-03-26 RX ORDER — LANCETS 28 GAUGE
EACH MISCELLANEOUS 2 TIMES DAILY
COMMUNITY
Start: 2014-05-23 | End: 2018-04-25 | Stop reason: SDUPTHER

## 2018-03-26 RX ORDER — PANTOPRAZOLE SODIUM 40 MG/1
1 TABLET, DELAYED RELEASE ORAL DAILY
COMMUNITY
Start: 2017-11-29 | End: 2018-03-27 | Stop reason: ALTCHOICE

## 2018-03-27 ENCOUNTER — APPOINTMENT (OUTPATIENT)
Dept: LAB | Facility: CLINIC | Age: 78
End: 2018-03-27
Payer: MEDICARE

## 2018-03-27 ENCOUNTER — TELEPHONE (OUTPATIENT)
Dept: INTERNAL MEDICINE CLINIC | Facility: CLINIC | Age: 78
End: 2018-03-27

## 2018-03-27 ENCOUNTER — OFFICE VISIT (OUTPATIENT)
Dept: INTERNAL MEDICINE CLINIC | Facility: CLINIC | Age: 78
End: 2018-03-27
Payer: MEDICARE

## 2018-03-27 ENCOUNTER — ANTICOAG VISIT (OUTPATIENT)
Dept: CARDIOLOGY CLINIC | Facility: CLINIC | Age: 78
End: 2018-03-27

## 2018-03-27 VITALS
HEIGHT: 65 IN | OXYGEN SATURATION: 98 % | DIASTOLIC BLOOD PRESSURE: 58 MMHG | HEART RATE: 67 BPM | BODY MASS INDEX: 26.99 KG/M2 | WEIGHT: 162 LBS | SYSTOLIC BLOOD PRESSURE: 130 MMHG

## 2018-03-27 DIAGNOSIS — R06.00 DYSPNEA ON EXERTION: ICD-10-CM

## 2018-03-27 DIAGNOSIS — E11.40 TYPE 2 DIABETES MELLITUS WITH DIABETIC NEUROPATHY, WITHOUT LONG-TERM CURRENT USE OF INSULIN (HCC): ICD-10-CM

## 2018-03-27 DIAGNOSIS — D50.9 IRON DEFICIENCY ANEMIA, UNSPECIFIED IRON DEFICIENCY ANEMIA TYPE: ICD-10-CM

## 2018-03-27 DIAGNOSIS — I35.9 AORTIC VALVE DISORDER: ICD-10-CM

## 2018-03-27 DIAGNOSIS — I25.118 CORONARY ARTERY DISEASE OF NATIVE ARTERY OF NATIVE HEART WITH STABLE ANGINA PECTORIS (HCC): Primary | ICD-10-CM

## 2018-03-27 DIAGNOSIS — G45.9 TRANSIENT CEREBRAL ISCHEMIA, UNSPECIFIED TYPE: ICD-10-CM

## 2018-03-27 DIAGNOSIS — I10 HYPERTENSION, ESSENTIAL: ICD-10-CM

## 2018-03-27 DIAGNOSIS — K21.9 GASTROESOPHAGEAL REFLUX DISEASE, ESOPHAGITIS PRESENCE NOT SPECIFIED: Primary | ICD-10-CM

## 2018-03-27 DIAGNOSIS — I25.10 CAD IN NATIVE ARTERY: ICD-10-CM

## 2018-03-27 DIAGNOSIS — N28.9 RENAL FUNCTION IMPAIRMENT: ICD-10-CM

## 2018-03-27 DIAGNOSIS — E78.2 COMBINED HYPERLIPIDEMIA: ICD-10-CM

## 2018-03-27 DIAGNOSIS — J41.0 SIMPLE CHRONIC BRONCHITIS (HCC): ICD-10-CM

## 2018-03-27 DIAGNOSIS — E61.1 IRON DEFICIENCY: ICD-10-CM

## 2018-03-27 DIAGNOSIS — Z79.01 CHRONIC ANTICOAGULATION: ICD-10-CM

## 2018-03-27 DIAGNOSIS — E03.9 ACQUIRED HYPOTHYROIDISM: ICD-10-CM

## 2018-03-27 PROBLEM — I44.2 COMPLETE ATRIOVENTRICULAR BLOCK (HCC): Status: RESOLVED | Noted: 2018-03-27 | Resolved: 2018-03-27

## 2018-03-27 PROBLEM — D12.5 BENIGN NEOPLASM OF SIGMOID COLON: Status: RESOLVED | Noted: 2018-03-27 | Resolved: 2018-03-27

## 2018-03-27 PROBLEM — R06.09 DYSPNEA ON EXERTION: Status: ACTIVE | Noted: 2018-03-27

## 2018-03-27 LAB
ALBUMIN SERPL BCP-MCNC: 3.5 G/DL (ref 3.5–5)
ALP SERPL-CCNC: 83 U/L (ref 46–116)
ALT SERPL W P-5'-P-CCNC: 15 U/L (ref 12–78)
ANION GAP SERPL CALCULATED.3IONS-SCNC: 7 MMOL/L (ref 4–13)
AST SERPL W P-5'-P-CCNC: 18 U/L (ref 5–45)
BASOPHILS # BLD AUTO: 0.02 THOUSANDS/ΜL (ref 0–0.1)
BASOPHILS NFR BLD AUTO: 1 % (ref 0–1)
BILIRUB SERPL-MCNC: 0.27 MG/DL (ref 0.2–1)
BILIRUB UR QL STRIP: NEGATIVE
BUN SERPL-MCNC: 39 MG/DL (ref 5–25)
CALCIUM SERPL-MCNC: 8.3 MG/DL (ref 8.3–10.1)
CHLORIDE SERPL-SCNC: 106 MMOL/L (ref 100–108)
CLARITY UR: CLEAR
CO2 SERPL-SCNC: 28 MMOL/L (ref 21–32)
COLOR UR: YELLOW
CREAT SERPL-MCNC: 1.87 MG/DL (ref 0.6–1.3)
EOSINOPHIL # BLD AUTO: 0.1 THOUSAND/ΜL (ref 0–0.61)
EOSINOPHIL NFR BLD AUTO: 3 % (ref 0–6)
ERYTHROCYTE [DISTWIDTH] IN BLOOD BY AUTOMATED COUNT: 16.4 % (ref 11.6–15.1)
EST. AVERAGE GLUCOSE BLD GHB EST-MCNC: 131 MG/DL
GFR SERPL CREATININE-BSD FRML MDRD: 26 ML/MIN/1.73SQ M
GLUCOSE P FAST SERPL-MCNC: 147 MG/DL (ref 65–99)
GLUCOSE UR STRIP-MCNC: NEGATIVE MG/DL
HBA1C MFR BLD: 6.2 % (ref 4.2–6.3)
HCT VFR BLD AUTO: 24.9 % (ref 34.8–46.1)
HGB BLD-MCNC: 7.2 G/DL (ref 11.5–15.4)
HGB UR QL STRIP.AUTO: NEGATIVE
IRON SERPL-MCNC: 16 UG/DL (ref 50–170)
KETONES UR STRIP-MCNC: NEGATIVE MG/DL
LEUKOCYTE ESTERASE UR QL STRIP: NEGATIVE
LYMPHOCYTES # BLD AUTO: 0.98 THOUSANDS/ΜL (ref 0.6–4.47)
LYMPHOCYTES NFR BLD AUTO: 26 % (ref 14–44)
MCH RBC QN AUTO: 21.1 PG (ref 26.8–34.3)
MCHC RBC AUTO-ENTMCNC: 28.9 G/DL (ref 31.4–37.4)
MCV RBC AUTO: 73 FL (ref 82–98)
MONOCYTES # BLD AUTO: 0.48 THOUSAND/ΜL (ref 0.17–1.22)
MONOCYTES NFR BLD AUTO: 13 % (ref 4–12)
NEUTROPHILS # BLD AUTO: 2.2 THOUSANDS/ΜL (ref 1.85–7.62)
NEUTS SEG NFR BLD AUTO: 57 % (ref 43–75)
NITRITE UR QL STRIP: NEGATIVE
NRBC BLD AUTO-RTO: 0 /100 WBCS
PH UR STRIP.AUTO: 6 [PH] (ref 4.5–8)
PLATELET # BLD AUTO: 508 THOUSANDS/UL (ref 149–390)
PMV BLD AUTO: 10.3 FL (ref 8.9–12.7)
POTASSIUM SERPL-SCNC: 4 MMOL/L (ref 3.5–5.3)
PROT SERPL-MCNC: 7.5 G/DL (ref 6.4–8.2)
PROT UR STRIP-MCNC: NEGATIVE MG/DL
RBC # BLD AUTO: 3.41 MILLION/UL (ref 3.81–5.12)
RETICS # AUTO: ABNORMAL 10*3/UL (ref 14097–95744)
RETICS # CALC: 1.97 % (ref 0.37–1.87)
SODIUM SERPL-SCNC: 141 MMOL/L (ref 136–145)
SP GR UR STRIP.AUTO: 1.01 (ref 1–1.03)
TSH SERPL DL<=0.05 MIU/L-ACNC: 2.99 UIU/ML (ref 0.36–3.74)
UROBILINOGEN UR QL STRIP.AUTO: 0.2 E.U./DL
VIT B12 SERPL-MCNC: 277 PG/ML (ref 100–900)
WBC # BLD AUTO: 3.79 THOUSAND/UL (ref 4.31–10.16)

## 2018-03-27 PROCEDURE — 83540 ASSAY OF IRON: CPT

## 2018-03-27 PROCEDURE — 84443 ASSAY THYROID STIM HORMONE: CPT

## 2018-03-27 PROCEDURE — 81003 URINALYSIS AUTO W/O SCOPE: CPT | Performed by: INTERNAL MEDICINE

## 2018-03-27 PROCEDURE — 36415 COLL VENOUS BLD VENIPUNCTURE: CPT

## 2018-03-27 PROCEDURE — 85045 AUTOMATED RETICULOCYTE COUNT: CPT

## 2018-03-27 PROCEDURE — 85610 PROTHROMBIN TIME: CPT

## 2018-03-27 PROCEDURE — 80053 COMPREHEN METABOLIC PANEL: CPT

## 2018-03-27 PROCEDURE — 85025 COMPLETE CBC W/AUTO DIFF WBC: CPT

## 2018-03-27 PROCEDURE — 83036 HEMOGLOBIN GLYCOSYLATED A1C: CPT

## 2018-03-27 PROCEDURE — 99215 OFFICE O/P EST HI 40 MIN: CPT | Performed by: INTERNAL MEDICINE

## 2018-03-27 PROCEDURE — 83010 ASSAY OF HAPTOGLOBIN QUANT: CPT

## 2018-03-27 PROCEDURE — 82607 VITAMIN B-12: CPT

## 2018-03-27 RX ORDER — NITROGLYCERIN 0.4 MG/1
0.4 TABLET SUBLINGUAL
Qty: 90 TABLET | Refills: 0 | Status: SHIPPED | OUTPATIENT
Start: 2018-03-27 | End: 2020-08-11 | Stop reason: ALTCHOICE

## 2018-03-27 RX ORDER — NITROGLYCERIN 0.4 MG/1
0.4 TABLET SUBLINGUAL
Status: DISCONTINUED | OUTPATIENT
Start: 2018-03-27 | End: 2019-05-08

## 2018-03-27 RX ORDER — FERROUS SULFATE TAB EC 324 MG (65 MG FE EQUIVALENT) 324 (65 FE) MG
324 TABLET DELAYED RESPONSE ORAL
Qty: 60 TABLET | Refills: 5 | Status: SHIPPED | OUTPATIENT
Start: 2018-03-27 | End: 2019-01-21 | Stop reason: SDUPTHER

## 2018-03-27 NOTE — PROGRESS NOTES
Assessment/Plan:       There are no diagnoses linked to this encounter  Subjective:      Patient ID: Carlos Castle is a 68 y o  female  Chief complaint today verbalized of increasing shortness of breath with exertion, in particular for the past month     This sensation of shortness of breath is accompanied by a sensation of pain in the back of the neck which radiates down the spine  This never occurs at rest   Alleviated with rest       [place] Smaller to [facility] Emergency room with low blood sugar March 9th  She reports her sugar to have been in the 40s  She received IV dextrose and was discharged home  There was no recommendation to decrease the dose of glipizide  While she was there, she had some lab testing done  It was done after she was given dextrose IV so the sugar was high but the Michaellesvia Diop and feature for the shortness of breath is that she had a hemoglobin of 8 3 with hematocrit of 26 3 and microcytic indices  Chronic issues are "mild non obstructive CAD " followed by cardiology,hypertension, and hypothyroidism on replacement, hyperlipidemia, diabetes, with hemoglobin A1c historically in the mid 6s  COPD, and history of REPLACEMENT OF THE AORTIC VALVE  Has a pacemaker     Continued complaint of exertional dyspnea going on for years without a bad outcome, but now she says it is worse, as mentioned above  In November 2017 she had a heme-positive stool which was investigated with colonoscopy which documented a sessile polyp in the sigmoid colon only  EGD done also nondiagnostic regarding any bleeding lesions     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 which is far more severe than one would think possible given her excellent diabetic control  However, she has not fallen         The patient refuses vaccinations mammogram but she did go for colonoscopy in November 29, 2017; 1 small sessile polyp in the sigmoid colon documented        Shortness of Breath   Pertinent negatives include no abdominal pain, chest pain, fever, headaches, leg swelling, rash, sore throat, vomiting or wheezing  Fatigue   Associated symptoms include arthralgias and fatigue  Pertinent negatives include no abdominal pain, chest pain, chills, coughing, fever, headaches, joint swelling, nausea, rash, sore throat or vomiting  Back Pain   Pertinent negatives include no abdominal pain, chest pain, dysuria, fever, headaches or pelvic pain  The following portions of the patient's history were reviewed and updated as appropriate:   She has a past medical history of Anemia; Aneurysm of thoracic aorta ([facility] ); Aortic valve disorder; Benign neoplasm of sigmoid colon; Cardiomyopathy ([facility] ); 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HCC); COPD (chronic obstructive pulmonary disease) ([facility] ); Coronary artery disease; Diabetes mellitus ([facility] ); Disease of thyroid gland; GERD (gastroesophageal reflux disease); History of emphysema; Hyperlipidemia; Hypertension; Stroke [facility]); TIA (transient ischemic attack); and Type 2 diabetes mellitus ([place] [address] )  ,   does not have any pertinent problems on file  ,   has a past surgical history that includes Insert / replace / remove pacemaker; Cardiac surgery; Cholecystectomy; Colonoscopy; Hysterectomy; Aortic valve replacement; pr esophagogastroduodenoscopy transoral diagnostic (N/A, 11/29/2017); Other surgical history; and Cardiac pacemaker placement  ,  family history includes No Known Problems in her father and mother  ,   reports that she quit smoking about 10 years ago  She quit smokeless tobacco use about 11 years ago  She reports that she does not drink alcohol or use drugs  ,  has No Known Allergies     Current Outpatient Prescriptions   Medication Sig Dispense Refill    glucose blood (FREESTYLE TEST STRIPS) test strip by In Vitro route      Lancets (FREESTYLE) lancets by Does not apply route 2 (two) times a day      pantoprazole (PROTONIX) 40 mg tablet Take 1 tablet by mouth daily      aspirin (ECOTRIN LOW STRENGTH) 81 mg EC tablet Take 81 mg by mouth daily      aspirin 81 MG tablet Take 1 tablet by mouth daily      atorvastatin (LIPITOR) 20 mg tablet Take 20 mg by mouth daily      enoxaparin (LOVENOX) 40 mg/0 4 mL Inject under the skin      fenofibrate micronized (LOFIBRA) 134 MG capsule Take 134 mg by mouth every morning before breakfast      furosemide (LASIX) 40 mg tablet Take 40 mg by mouth 2 (two) times a day      gabapentin (NEURONTIN) 100 mg capsule Take 100 mg by mouth 3 (three) times a day      glipiZIDE (GLUCOTROL) 10 mg tablet Take 10 mg by mouth 2 (two) times a day before meals      Lancets (FREESTYLE) lancets by Does not apply route 2 (two) times a day      levothyroxine 50 mcg tablet Take 50 mcg by mouth daily      lisinopril (ZESTRIL) 2 5 mg tablet Take 2 5 mg by mouth daily      metoprolol succinate (TOPROL-XL) 50 mg 24 hr tablet Take 25 mg by mouth daily      metoprolol tartrate (LOPRESSOR) 50 mg tablet   0    warfarin (COUMADIN) 5 mg tablet Take by mouth daily       No current facility-administered medications for this visit  Review of Systems   Constitutional: Positive for fatigue  Negative for chills and fever  HENT: Negative for sore throat and trouble swallowing  Eyes: Negative for pain  Respiratory: Positive for shortness of breath  Negative for cough and wheezing  Cardiovascular: Negative for chest pain, palpitations and leg swelling  Gastrointestinal: Negative for abdominal pain, diarrhea, nausea and vomiting  Endocrine: Negative for cold intolerance and heat intolerance  Genitourinary: Negative for dysuria, frequency and pelvic pain  Musculoskeletal: Positive for arthralgias and back pain  Negative for joint swelling  Skin: Negative for rash and wound  Allergic/Immunologic: Negative for immunocompromised state  Neurological: Negative for dizziness, seizures, syncope and headaches     Psychiatric/Behavioral: Negative for dysphoric mood  The patient is not nervous/anxious  Objective: There were no vitals filed for this visit  Physical Exam   Constitutional: She is oriented to person, place, and time  She appears well-developed and well-nourished  HENT:   Head: Normocephalic and atraumatic  Eyes: EOM are normal  Pupils are equal, round, and reactive to light  Neck: Normal range of motion  Neck supple  No tracheal deviation present  No thyromegaly present  Cardiovascular: Normal rate and regular rhythm  Exam reveals no gallop  No murmur heard  Pulses:       Dorsalis pedis pulses are 0 on the right side, and 0 on the left side  Posterior tibial pulses are 0 on the right side, and 0 on the left side  Loud mechanical S2   Pulmonary/Chest: Effort normal  No respiratory distress  She has no wheezes  She has no rales  Abdominal: Soft  Bowel sounds are normal  There is no tenderness  Musculoskeletal: Normal range of motion  She exhibits no tenderness or deformity  Neurological: She is alert and oriented to person, place, and time  Coordination normal    Skin: Skin is warm  There is pallor  Psychiatric: She has a normal mood and affect   Judgment normal

## 2018-03-27 NOTE — TELEPHONE ENCOUNTER
----- Message from Waylon Crowder MD sent at 3/27/2018  3:56 PM EDT -----  Extremely anemic and extremely low iron level  Needs to repeat the Hemoccult  Try to get that done as soon as possible  May need intravenous iron in may need a blood transfusion  Meanwhile, start oral iron 325 mg twice a day

## 2018-03-27 NOTE — TELEPHONE ENCOUNTER
----- Message from Tony Pat MD sent at 3/27/2018  4:00 PM EDT -----  Please call the lab  Request that they add a reticulocyte count 2 today's lab work    Order already put in

## 2018-03-27 NOTE — PATIENT INSTRUCTIONS
Dyspnea on exertion with anginal equivalent noted in this patient with known CAD  Also, known anemia  At this point, I suspect the biggest component of this is anemia  Her shortness of breath is predictable and reproducible and never occurs at rest; it is progressive in the sense of decreasing levels of exertion have been producing the same amount of dyspnea  Recommendation is to repeat anemia protocol and also to see Cardiology but I think the anemia will need to be addressed in some fashion or other, either with iron via infusion, erythropoietin, other supplementation, or some combination thereof  So get the laboratory testing and see me back here after the cardiology visit

## 2018-03-28 ENCOUNTER — TELEPHONE (OUTPATIENT)
Dept: INTERNAL MEDICINE CLINIC | Facility: CLINIC | Age: 78
End: 2018-03-28

## 2018-03-28 LAB — HAPTOGLOB SERPL-MCNC: <10 MG/DL (ref 34–200)

## 2018-03-28 NOTE — TELEPHONE ENCOUNTER
Pt  states BM was ordering meds for her in the event her blood sugar dropped too low her daughter could put the tablet under her tongue  Pls send to [facility] and call her once sent   [phone]

## 2018-03-28 NOTE — TELEPHONE ENCOUNTER
This older patient has severe iron deficiency anemia getting worse  She has symptomatic angina pectoris due to anemia  She had heme-positive stool with a nondiagnostic workup  I have started on oral iron  I do wonder to recheck the Hemoccult study  The you have any further suggestions? HIDA why know whether or not she needs intravenous iron? Thank you in advance for your response

## 2018-03-28 NOTE — TELEPHONE ENCOUNTER
Her haptoglobin is less than 10  She may have some hemolysis also  Her MCV is also low  I think you can set her up to see one of us for IV iron As with a low MCV she does appear to have a low iron  I would also recommend checking her iron studies  I would also consider getting a TEODORO to look for autoimmune hemolytic anemia  This is a direct Cyn test  I would also check an LDH  She needs both iron and a workup for immune mediated hemolytic anemia

## 2018-04-03 ENCOUNTER — APPOINTMENT (OUTPATIENT)
Dept: LAB | Facility: CLINIC | Age: 78
End: 2018-04-03
Payer: MEDICARE

## 2018-04-03 ENCOUNTER — TELEPHONE (OUTPATIENT)
Dept: GASTROENTEROLOGY | Facility: CLINIC | Age: 78
End: 2018-04-03

## 2018-04-03 ENCOUNTER — OFFICE VISIT (OUTPATIENT)
Dept: INTERNAL MEDICINE CLINIC | Facility: CLINIC | Age: 78
End: 2018-04-03
Payer: MEDICARE

## 2018-04-03 VITALS
OXYGEN SATURATION: 95 % | HEIGHT: 65 IN | HEART RATE: 69 BPM | WEIGHT: 161.4 LBS | BODY MASS INDEX: 26.89 KG/M2 | SYSTOLIC BLOOD PRESSURE: 122 MMHG | DIASTOLIC BLOOD PRESSURE: 52 MMHG

## 2018-04-03 DIAGNOSIS — E61.1 IRON DEFICIENCY: Primary | ICD-10-CM

## 2018-04-03 DIAGNOSIS — D50.0 IRON DEFICIENCY ANEMIA DUE TO CHRONIC BLOOD LOSS: ICD-10-CM

## 2018-04-03 PROBLEM — D64.9 ANEMIA: Status: ACTIVE | Noted: 2018-04-03

## 2018-04-03 LAB
BASOPHILS # BLD AUTO: 0.04 THOUSANDS/ΜL (ref 0–0.1)
BASOPHILS NFR BLD AUTO: 1 % (ref 0–1)
EOSINOPHIL # BLD AUTO: 0.1 THOUSAND/ΜL (ref 0–0.61)
EOSINOPHIL NFR BLD AUTO: 2 % (ref 0–6)
ERYTHROCYTE [DISTWIDTH] IN BLOOD BY AUTOMATED COUNT: 19.9 % (ref 11.6–15.1)
HCT VFR BLD AUTO: 26.6 % (ref 34.8–46.1)
HGB BLD-MCNC: 7.5 G/DL (ref 11.5–15.4)
LYMPHOCYTES # BLD AUTO: 1.02 THOUSANDS/ΜL (ref 0.6–4.47)
LYMPHOCYTES NFR BLD AUTO: 24 % (ref 14–44)
MCH RBC QN AUTO: 22.1 PG (ref 26.8–34.3)
MCHC RBC AUTO-ENTMCNC: 28.2 G/DL (ref 31.4–37.4)
MCV RBC AUTO: 78 FL (ref 82–98)
MONOCYTES # BLD AUTO: 0.49 THOUSAND/ΜL (ref 0.17–1.22)
MONOCYTES NFR BLD AUTO: 12 % (ref 4–12)
NEUTROPHILS # BLD AUTO: 2.54 THOUSANDS/ΜL (ref 1.85–7.62)
NEUTS SEG NFR BLD AUTO: 61 % (ref 43–75)
NRBC BLD AUTO-RTO: 0 /100 WBCS
PLATELET # BLD AUTO: 485 THOUSANDS/UL (ref 149–390)
PMV BLD AUTO: 10 FL (ref 8.9–12.7)
RBC # BLD AUTO: 3.4 MILLION/UL (ref 3.81–5.12)
RETICS # AUTO: ABNORMAL 10*3/UL (ref 14097–95744)
RETICS # CALC: 5.18 % (ref 0.37–1.87)
WBC # BLD AUTO: 4.2 THOUSAND/UL (ref 4.31–10.16)

## 2018-04-03 PROCEDURE — 85025 COMPLETE CBC W/AUTO DIFF WBC: CPT

## 2018-04-03 PROCEDURE — 36415 COLL VENOUS BLD VENIPUNCTURE: CPT

## 2018-04-03 PROCEDURE — 99213 OFFICE O/P EST LOW 20 MIN: CPT | Performed by: INTERNAL MEDICINE

## 2018-04-03 PROCEDURE — 96372 THER/PROPH/DIAG INJ SC/IM: CPT | Performed by: INTERNAL MEDICINE

## 2018-04-03 PROCEDURE — 85045 AUTOMATED RETICULOCYTE COUNT: CPT

## 2018-04-03 RX ORDER — FERROUS SULFATE 325(65) MG
1 TABLET ORAL
Refills: 0 | COMMUNITY
Start: 2018-03-27 | End: 2018-04-03 | Stop reason: SDUPTHER

## 2018-04-03 RX ORDER — CYANOCOBALAMIN 1000 UG/ML
1000 INJECTION INTRAMUSCULAR; SUBCUTANEOUS
Status: DISCONTINUED | OUTPATIENT
Start: 2018-04-03 | End: 2019-05-08

## 2018-04-03 RX ADMIN — CYANOCOBALAMIN 1000 MCG: 1000 INJECTION INTRAMUSCULAR; SUBCUTANEOUS at 09:47

## 2018-04-03 NOTE — PATIENT INSTRUCTIONS
Severe anemia most likely iron deficiency  GI referral needed  Iron twice a day  B12 shot today  Repeat CBC plus reticulocyte count today  Follow-up with me weekly  If the hemoglobin drops below 7 she will need a blood transfusion

## 2018-04-03 NOTE — PROGRESS NOTES
Assessment/Plan:       There are no diagnoses linked to this encounter  Subjective:      Patient ID: Valentino Maillard is a 68 y o  female  Dyspnea on exertion  A 68year-old lady with this complaint ongoing for months  She was seen about a week ago  History of RAMON without chest pain  Prior history of heme-positive stool evaluated with a GI run which was nondiagnostic  Examination significant for pallor  Laboratory testing city of again for a hypochromic microcytic anemia with hemoglobin of 7 2, very small Red cell indices, reticulocyte count 1 92        diagnosis is iron deficiency anemia; statistically the most likely source is GI blood loss  The patient was started on iron  B12 is below ideal but I do not think it is relevant to her problem  Nevertheless, we can still give her a B12 shot again  The major issue here is whether not the blood count continues to fall  She does need GI referral, possibly Hematology referral           The following portions of the patient's history were reviewed and updated as appropriate:   She has a past medical history of Anemia; Aneurysm of thoracic aorta ([address] ); Aortic valve disorder; Benign neoplasm of sigmoid colon; Cardiomyopathy ([address] ); 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[address] ); Coronary artery disease; GERD (gastroesophageal reflux disease); History of emphysema; Hyperlipidemia; Hypertension; and TIA (transient ischemic attack)  ,   does not have any pertinent problems on file  ,   has a past surgical history that includes Insert / replace / remove pacemaker; Cardiac surgery; Cholecystectomy; Colonoscopy; Hysterectomy; Aortic valve replacement; pr esophagogastroduodenoscopy transoral diagnostic (N/A, 11/29/2017); Other surgical history; and Cardiac pacemaker placement  ,  family history includes No Known Problems in her father and mother  ,   reports that she quit smoking about 10 years ago  She quit smokeless tobacco use about 11 years ago   She reports that she does not drink alcohol or use drugs  ,  has No Known Allergies     Current Outpatient Prescriptions   Medication Sig Dispense Refill    aspirin 81 MG tablet Take 1 tablet by mouth daily      fenofibrate micronized (LOFIBRA) 134 MG capsule Take 134 mg by mouth every morning before breakfast      ferrous sulfate 324 (65 Fe) mg Take 1 tablet (324 mg total) by mouth 2 (two) times a day before meals 60 tablet 5    furosemide (LASIX) 40 mg tablet Take 40 mg by mouth 2 (two) times a day      gabapentin (NEURONTIN) 100 mg capsule Take 100 mg by mouth 3 (three) times a day      glipiZIDE (GLUCOTROL) 10 mg tablet Take 10 mg by mouth 2 (two) times a day before meals      glucose blood (FREESTYLE TEST STRIPS) test strip by In Vitro route      Lancets (FREESTYLE) lancets by Does not apply route 2 (two) times a day      Lancets (FREESTYLE) lancets by Does not apply route 2 (two) times a day      levothyroxine 50 mcg tablet Take 50 mcg by mouth daily      lisinopril (ZESTRIL) 2 5 mg tablet Take 2 5 mg by mouth daily      metoprolol succinate (TOPROL-XL) 50 mg 24 hr tablet Take 25 mg by mouth daily      metoprolol tartrate (LOPRESSOR) 50 mg tablet   0    nitroglycerin (NITROSTAT) 0 4 mg SL tablet Place 1 tablet (0 4 mg total) under the tongue every 5 (five) minutes as needed for chest pain 90 tablet 0    warfarin (COUMADIN) 5 mg tablet Take by mouth daily       Current Facility-Administered Medications   Medication Dose Route Frequency Provider Last Rate Last Dose    nitroglycerin (NITROSTAT) SL tablet 0 4 mg  0 4 mg Sublingual Q5 Min PRN Agustin Smith MD           Review of Systems   Constitutional: Positive for fatigue  Negative for chills and fever  HENT: Negative for sore throat and trouble swallowing  Eyes: Negative for pain  Respiratory: Positive for shortness of breath  Negative for cough and wheezing  Cardiovascular: Negative for chest pain, palpitations and leg swelling     Gastrointestinal: Negative for abdominal pain, diarrhea, nausea and vomiting  Endocrine: Negative for cold intolerance and heat intolerance  Genitourinary: Negative for dysuria, frequency and pelvic pain  Musculoskeletal: Positive for arthralgias and back pain  Negative for joint swelling  Skin: Negative for rash and wound  Allergic/Immunologic: Negative for immunocompromised state  Neurological: Negative for dizziness, seizures, syncope and headaches  Psychiatric/Behavioral: Negative for dysphoric mood  The patient is not nervous/anxious  Objective:  Vitals:    04/03/18 0912   BP: 122/52   Pulse: 69   SpO2: 95%      Physical Exam   Constitutional: She appears well-developed and well-nourished  Cardiovascular: Normal rate and regular rhythm  Pulmonary/Chest: Effort normal and breath sounds normal  She has no wheezes  She has no rales     Skin:   Global pallor

## 2018-04-04 ENCOUNTER — OFFICE VISIT (OUTPATIENT)
Dept: CARDIOLOGY CLINIC | Facility: CLINIC | Age: 78
End: 2018-04-04
Payer: MEDICARE

## 2018-04-04 VITALS
DIASTOLIC BLOOD PRESSURE: 64 MMHG | BODY MASS INDEX: 27.39 KG/M2 | WEIGHT: 164.4 LBS | HEART RATE: 68 BPM | SYSTOLIC BLOOD PRESSURE: 118 MMHG | HEIGHT: 65 IN | OXYGEN SATURATION: 98 %

## 2018-04-04 DIAGNOSIS — I10 HYPERTENSION, ESSENTIAL: Primary | ICD-10-CM

## 2018-04-04 DIAGNOSIS — I25.10 CAD IN NATIVE ARTERY: ICD-10-CM

## 2018-04-04 DIAGNOSIS — Z79.01 CHRONIC ANTICOAGULATION: ICD-10-CM

## 2018-04-04 PROBLEM — R06.09 DYSPNEA ON EXERTION: Status: RESOLVED | Noted: 2018-03-27 | Resolved: 2018-04-04

## 2018-04-04 PROBLEM — D64.9 ANEMIA: Status: RESOLVED | Noted: 2018-04-03 | Resolved: 2018-04-04

## 2018-04-04 PROBLEM — R06.00 DYSPNEA ON EXERTION: Status: RESOLVED | Noted: 2018-03-27 | Resolved: 2018-04-04

## 2018-04-04 PROCEDURE — 99214 OFFICE O/P EST MOD 30 MIN: CPT | Performed by: INTERNAL MEDICINE

## 2018-04-04 NOTE — TELEPHONE ENCOUNTER
Spoke with patient and daughter owen  They were unable to make her appointment today at 1320, but they are able to come in tomorrow at 1340 to follow-up with Dr Mcleod  We will see her tomorrow  Dr Mcleod aware

## 2018-04-04 NOTE — PROGRESS NOTES
[facility]  [id number] [facility]  [id number]  Cardiology Follow Up    [facility]  1940  [phone]      1  Hypertension, essential     2  CAD in native artery     3  Chronic anticoagulation         Chief Complaint   Patient presents with    Follow-up    Shortness of Breath       Interval History:  Patient presents for follow-up visit  Patient has symptoms of shortness of breath and tiredness and weakness  Patient is been diagnosed to have severe anemia with hemoglobin in the 7 range recently  Patient was seen by her primary care physician  Patient is supposed to see Gastroenterology  Patient does have 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  Patient is also on baby aspirin  Patient denies any history of bleeding stools or bright red blood per rectum  Patient recently had an echocardiogram   She states that she has been compliant with all her present medications  Patient Active Problem List   Diagnosis    Combined hyperlipidemia    Chronic anticoagulation    Hypertension, essential    CAD in native artery    Chronic obstructive pulmonary disease ([facility] [address] )    Diabetes mellitus with neurological manifestation (HCC)    Hypothyroidism    Iron deficiency    Renal function impairment    GERD (gastroesophageal reflux disease)     Past Medical History:   Diagnosis Date    Anemia     Aneurysm of thoracic aorta ([facility] [address] )     Aortic valve disorder     Benign neoplasm of sigmoid colon     Cardiomyopathy ([facility] [address] )     last assessed: 10/10/2014    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HCC)     last assessed: 10/10/2014    Coronary artery disease     GERD (gastroesophageal reflux disease)     History of emphysema     Hyperlipidemia     Hypertension     TIA (transient ischemic attack)      Social History     Social History    Marital status:       Spouse name: N/A    Number of children: N/A    Years of education: N/A Occupational History    Not on file  Social History Main Topics    Smoking status: Former Smoker     Quit date: 11/29/2007    Smokeless tobacco: Former User     Quit date: 2007    Alcohol use No    Drug use: No    Sexual activity: Not on file     Other Topics Concern    Not on file     Social History Narrative    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 BID Freestyle lancets BID    Living independently with spouse          Family History   Problem Relation Age of Onset    No Known Problems Mother     No Known Problems Father      Past Surgical History:   Procedure Laterality Date    AORTIC VALVE REPLACEMENT      CARDIAC PACEMAKER PLACEMENT      last assessed: 10/10/2014   Cecile Ruiz CARDIAC SURGERY      aortic valve replacement    CHOLECYSTECTOMY      COLONOSCOPY      HYSTERECTOMY      INSERT / REPLACE / REMOVE PACEMAKER      OTHER SURGICAL HISTORY      Capsule ENDOscopy description: 12/19/2012    ID ESOPHAGOGASTRODUODENOSCOPY TRANSORAL DIAGNOSTIC N/A 11/29/2017    Procedure: EGD AND COLONOSCOPY;  Surgeon: Socorro Hernandez MD;  Location: [facility];   Service: Gastroenterology       Current Outpatient Prescriptions:     aspirin 81 MG tablet, Take 1 tablet by mouth daily, Disp: , Rfl:     fenofibrate micronized (LOFIBRA) 134 MG capsule, Take 134 mg by mouth every morning before breakfast, Disp: , Rfl:     ferrous sulfate 324 (65 Fe) mg, Take 1 tablet (324 mg total) by mouth 2 (two) times a day before meals, Disp: 60 tablet, Rfl: 5    furosemide (LASIX) 40 mg tablet, Take 40 mg by mouth 2 (two) times a day, Disp: , Rfl:     gabapentin (NEURONTIN) 100 mg capsule, Take 100 mg by mouth 3 (three) times a day, Disp: , Rfl:     glipiZIDE (GLUCOTROL) 10 mg tablet, Take 10 mg by mouth 2 (two) times a day before meals, Disp: , Rfl:     glucose blood (FREESTYLE TEST STRIPS) test strip, by In Vitro route, Disp: , Rfl:     Lancets (FREESTYLE) lancets, by Does not apply route 2 (two) times a day, Disp: , Rfl:     Lancets (FREESTYLE) lancets, by Does not apply route 2 (two) times a day, Disp: , Rfl:     levothyroxine 50 mcg tablet, Take 50 mcg by mouth daily, Disp: , Rfl:     lisinopril (ZESTRIL) 2 5 mg tablet, Take 2 5 mg by mouth daily, Disp: , Rfl:     metoprolol succinate (TOPROL-XL) 50 mg 24 hr tablet, Take 25 mg by mouth daily, Disp: , Rfl:     metoprolol tartrate (LOPRESSOR) 50 mg tablet, , Disp: , Rfl: 0    nitroglycerin (NITROSTAT) 0 4 mg SL tablet, Place 1 tablet (0 4 mg total) under the tongue every 5 (five) minutes as needed for chest pain, Disp: 90 tablet, Rfl: 0    warfarin (COUMADIN) 5 mg tablet, Take by mouth daily, Disp: , Rfl:     Current Facility-Administered Medications:     cyanocobalamin injection 1,000 mcg, 1,000 mcg, Intramuscular, Q30 Days, Jaylin Pardo MD, 1,000 mcg at 04/03/18 0947    nitroglycerin (NITROSTAT) SL tablet 0 4 mg, 0 4 mg, Sublingual, Q5 Min PRN, Jaylin Pardo MD  No Known Allergies    Labs:  Appointment on 04/03/2018   Component Date Value    WBC 04/03/2018 4 20*    RBC 04/03/2018 3 40*    Hemoglobin 04/03/2018 7 5*    Hematocrit 04/03/2018 26 6*    MCV 04/03/2018 78*    MCH 04/03/2018 22 1*    MCHC 04/03/2018 28 2*    RDW 04/03/2018 19 9*    MPV 04/03/2018 10 0     Platelets 97/28/5910 485*    nRBC 04/03/2018 0     Neutrophils Relative 04/03/2018 61     Lymphocytes Relative 04/03/2018 24     Monocytes Relative 04/03/2018 12     Eosinophils Relative 04/03/2018 2     Basophils Relative 04/03/2018 1     Neutrophils Absolute 04/03/2018 2 54     Lymphocytes Absolute 04/03/2018 1 02     Monocytes Absolute 04/03/2018 0 49     Eosinophils Absolute 04/03/2018 0 10     Basophils Absolute 04/03/2018 0 04     Retic Ct Abs 04/03/2018 539974*    Retic Ct Pct 04/03/2018 5 18*   Appointment on 03/27/2018   Component Date Value    WBC 03/27/2018 3 79*    RBC 03/27/2018 3 41*    Hemoglobin 03/27/2018 7 2*    Hematocrit 03/27/2018 24 9*  MCV 03/27/2018 73*    MCH 03/27/2018 21 1*    MCHC 03/27/2018 28 9*    RDW 03/27/2018 16 4*    MPV 03/27/2018 10 3     Platelets 49/03/0984 508*    nRBC 03/27/2018 0     Neutrophils Relative 03/27/2018 57     Lymphocytes Relative 03/27/2018 26     Monocytes Relative 03/27/2018 13*    Eosinophils Relative 03/27/2018 3     Basophils Relative 03/27/2018 1     Neutrophils Absolute 03/27/2018 2 20     Lymphocytes Absolute 03/27/2018 0 98     Monocytes Absolute 03/27/2018 0 48     Eosinophils Absolute 03/27/2018 0 10     Basophils Absolute 03/27/2018 0 02     Sodium 03/27/2018 141     Potassium 03/27/2018 4 0     Chloride 03/27/2018 106     CO2 03/27/2018 28     Anion Gap 03/27/2018 7     BUN 03/27/2018 39*    Creatinine 03/27/2018 1 87*    Glucose, Fasting 03/27/2018 147*    Calcium 03/27/2018 8 3     AST 03/27/2018 18     ALT 03/27/2018 15     Alkaline Phosphatase 03/27/2018 83     Total Protein 03/27/2018 7 5     Albumin 03/27/2018 3 5     Total Bilirubin 03/27/2018 0 27     eGFR 03/27/2018 26     Hemoglobin A1C 03/27/2018 6 2     EAG 03/27/2018 131     TSH 3RD GENERATON 03/27/2018 2 990     Iron 03/27/2018 16*    Vitamin B-12 03/27/2018 277     Haptoglobin 03/27/2018 <10*    Retic Ct Abs 03/27/2018 13598     Retic Ct Pct 03/27/2018 1 97*   Office Visit on 03/27/2018   Component Date Value    Color, UA 03/27/2018 Yellow     Clarity, UA 03/27/2018 Clear     Specific Gravity, UA 03/27/2018 1 009     pH, UA 03/27/2018 6 0     Leukocytes, UA 03/27/2018 Negative     Nitrite, UA 03/27/2018 Negative     Protein, UA 03/27/2018 Negative     Glucose, UA 03/27/2018 Negative     Ketones, UA 03/27/2018 Negative     Urobilinogen, UA 03/27/2018 0 2     Bilirubin, UA 03/27/2018 Negative     Blood, UA 03/27/2018 Negative    Ancillary Orders on 03/23/2018   Component Date Value    Protime 03/27/2018 29 3*    INR 03/27/2018 2 73*   Anticoag visit on 02/16/2018   Component Date Value    INR 01/31/2018 3 05*     Imaging: No results found  Review of Systems:  Review of Systems   REVIEW OF SYSTEMS:  Constitutional:  Fatigue and tiredness  Eyes:  Denies change in visual acuity   HENT:  Denies nasal congestion or sore throat   Respiratory: shortness of breath   Cardiovascular:  Denies chest pain or edema   GI:  Denies abdominal pain, nausea, vomiting, bloody stools or diarrhea   :  Denies dysuria, frequency, difficulty in micturition and nocturia  Musculoskeletal:  Denies back pain or joint pain   Neurologic:  Denies headache, focal weakness or sensory changes   Endocrine:  Denies polyuria or polydipsia   Lymphatic:  Denies swollen glands   Psychiatric:  Denies depression or anxiety     Physical Exam:    /64   Pulse 68   Ht 5' 5" (1 651 m)   Wt 74 6 kg (164 lb 6 4 oz)   SpO2 98%   BMI 27 36 kg/m²     Physical Exam   PHYSICAL EXAM:  General:  Patient is not in acute distress   Head: Normocephalic, Atraumatic  HEENT:  Both pupils normal-size atraumatic, normocephalic, nonicteric  Significant pallor  Neck:  JVP not raised  Trachea central  No carotid bruit  Respiratory:  normal breath sounds no crackles  no rhonchi  Cardiovascular:  Regular rate and rhythm no S3 no murmurs  Heart sounds consistent with prosthetic mechanical valve  GI:  Abdomen soft nontender  No organomegaly  Lymphatic:  No cervical or inguinal lymphadenopathy  Neurologic:  Patient is awake alert, oriented   Grossly nonfocal    Discussion/Summary:   Patient with multiple medical problems who seems to be doing reasonably well from cardiac standpoint  Previous studies reviewed with patient  Medications reviewed and possible side effects discussed  concepts of cardiovascular disease , signs and symptoms of heart disease  Dietary and risk factor modification reinforced  All questions answered  Safety measures reviewed  Patient advised to report any problems prompting medical attention      Patient will continue antibiotic prophylaxis  Patient needs a GI evaluation for severe anemia  Patient may need subsequent Hematology Oncology evaluation if GI workup is negative  Patient has been started on iron supplements by primary care physician Dr Alissa Murray  Results of echocardiogram recently which showed normal ejection fraction with normally functioning prosthetic mechanical aortic valve reviewed with patient  Follow-up in a few months or earlier as needed  Follow-up with primary care physician as well as Gastroenterology  Patient is agreeable with the plan of care

## 2018-04-05 ENCOUNTER — OFFICE VISIT (OUTPATIENT)
Dept: GASTROENTEROLOGY | Facility: CLINIC | Age: 78
End: 2018-04-05
Payer: MEDICARE

## 2018-04-05 VITALS
BODY MASS INDEX: 26.96 KG/M2 | DIASTOLIC BLOOD PRESSURE: 60 MMHG | WEIGHT: 162 LBS | SYSTOLIC BLOOD PRESSURE: 126 MMHG | HEART RATE: 60 BPM

## 2018-04-05 DIAGNOSIS — I82.90 CLOT: Primary | ICD-10-CM

## 2018-04-05 DIAGNOSIS — K21.9 GASTROESOPHAGEAL REFLUX DISEASE, ESOPHAGITIS PRESENCE NOT SPECIFIED: ICD-10-CM

## 2018-04-05 DIAGNOSIS — E61.1 IRON DEFICIENCY: ICD-10-CM

## 2018-04-05 PROCEDURE — 99214 OFFICE O/P EST MOD 30 MIN: CPT | Performed by: INTERNAL MEDICINE

## 2018-04-05 RX ORDER — PANTOPRAZOLE SODIUM 40 MG/1
40 TABLET, DELAYED RELEASE ORAL 2 TIMES DAILY
Qty: 60 TABLET | Refills: 2 | Status: SHIPPED | OUTPATIENT
Start: 2018-04-05 | End: 2018-07-15 | Stop reason: SDUPTHER

## 2018-04-05 NOTE — LETTER
April 5, 2018     Vince Newell MD  [address] [address]    Patient: Xiao Moreno   YOB: 1940   Date of Visit: 4/5/2018       Dear Dr Reynoso Late: Thank you for referring Xiao Moreno to me for evaluation  Below are my notes for this consultation  If you have questions, please do not hesitate to call me  I look forward to following your patient along with you  Sincerely,        Loy Rangel MD        CC: No Recipients  Loy Rangel MD  4/5/2018  1:55 PM  Sign at close encounter  Follow-up Note - [facility]  Xiao Moreno 1940 68 y o  female     ASSESSMENT @ PLAN:   She is a 66-year-old female on warfarin for aortic valve replacement with a drop in her hemoglobin to 7 5 with microcytosis and iron deficiency indicative of a GI bleed  She had a duodenal ulcer in the fall and she had a normal colonoscopy at that time  1 will do EGD to investigate  We will hold her warfarin and we will do bridge therapy with Lovenox weight based    2 she will do Protonix 40 mg p o  B i d     3 she is doing an iron supplement    4 if the endoscopy is non revealing she will need a video capsule endoscopy    Reason:  GERD and iron deficiency anemia    HPI:  She is 66-year-old female with a hemoglobin of 7 2 that was recently Check rechecks and is 7 5  It is microcytic and iron deficient  She has had no overt bleeding  She denies melena or hematochezia  She had endoscopy colonoscopy with me in the fall she had a duodenal ulcer and erosive gastritis  She reports that her symptoms of acid got better on Protonix  The colonoscopy showed diverticulosis and a small polyp removed  She had a pill endoscopy years ago  She is maintained on a blood thinner for aortic valve replacement years ago    She denies nausea vomiting abdominal pain diarrhea constipation as well she does report fatigue and but no shortness of breath she is resting comfortably in the office    REVIEW OF SYSTEMS:     CONSTITUTIONAL: Denies any fever, chills, or rigors  Good appetite, and no recent weight loss  HEENT: No earache or tinnitus  Denies hearing loss or visual disturbances  CARDIOVASCULAR: No chest pain or palpitations  RESPIRATORY: Denies any cough, hemoptysis, shortness of breath or dyspnea on exertion  GASTROINTESTINAL: As noted in the History of Present Illness  GENITOURINARY: No problems with urination  Denies any hematuria or dysuria  NEUROLOGIC: No dizziness or vertigo, denies headaches  MUSCULOSKELETAL: Denies any muscle or joint pain  SKIN: Denies skin rashes or itching  ENDOCRINE: Denies excessive thirst  Denies intolerance to heat or cold  PSYCHOSOCIAL: Denies depression or anxiety  Denies any recent memory loss  Past Medical History:   Diagnosis Date    Anemia     Aneurysm of thoracic aorta (HCC)     Aortic valve disorder     Benign neoplasm of sigmoid colon     Cardiomyopathy ([facility] [address] )     last assessed: 10/10/2014    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HCC)     last assessed: 10/10/2014    Coronary artery disease     GERD (gastroesophageal reflux disease)     History of emphysema     Hyperlipidemia     Hypertension     TIA (transient ischemic attack)       Past Surgical History:   Procedure Laterality Date    AORTIC VALVE REPLACEMENT      CARDIAC PACEMAKER PLACEMENT      last assessed: 10/10/2014   Maisha Lemon CARDIAC SURGERY      aortic valve replacement    CHOLECYSTECTOMY      COLONOSCOPY      HYSTERECTOMY      INSERT / REPLACE / REMOVE PACEMAKER      OTHER SURGICAL HISTORY      Capsule ENDOscopy description: 12/19/2012    RI ESOPHAGOGASTRODUODENOSCOPY TRANSORAL DIAGNOSTIC N/A 11/29/2017    Procedure: EGD AND COLONOSCOPY;  Surgeon: Barbara Goode MD;  Location: [facility]; Service: Gastroenterology     Social History     Social History    Marital status:       Spouse name: N/A    Number of children: N/A    Years of education: N/A     Occupational History    Not on file  Social History Main Topics    Smoking status: Former Smoker     Quit date: 11/29/2007    Smokeless tobacco: Former User     Quit date: 2007    Alcohol use No    Drug use: No    Sexual activity: Not on file     Other Topics Concern    Not on file     Social History Narrative    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 BID Freestyle lancets BID    Living independently with spouse         Family History   Problem Relation Age of Onset    No Known Problems Mother     No Known Problems Father      Patient has no known allergies    Current Outpatient Prescriptions   Medication Sig Dispense Refill    fenofibrate micronized (LOFIBRA) 134 MG capsule Take 134 mg by mouth every morning before breakfast      ferrous sulfate 324 (65 Fe) mg Take 1 tablet (324 mg total) by mouth 2 (two) times a day before meals 60 tablet 5    furosemide (LASIX) 40 mg tablet Take 40 mg by mouth 2 (two) times a day      gabapentin (NEURONTIN) 100 mg capsule Take 100 mg by mouth 3 (three) times a day      glipiZIDE (GLUCOTROL) 10 mg tablet Take 10 mg by mouth 2 (two) times a day before meals      glucose blood (FREESTYLE TEST STRIPS) test strip by In Vitro route      Lancets (FREESTYLE) lancets by Does not apply route 2 (two) times a day      Lancets (FREESTYLE) lancets by Does not apply route 2 (two) times a day      levothyroxine 50 mcg tablet Take 50 mcg by mouth daily      lisinopril (ZESTRIL) 2 5 mg tablet Take 2 5 mg by mouth daily      metoprolol tartrate (LOPRESSOR) 50 mg tablet 50 mg every 12 (twelve) hours    0    nitroglycerin (NITROSTAT) 0 4 mg SL tablet Place 1 tablet (0 4 mg total) under the tongue every 5 (five) minutes as needed for chest pain 90 tablet 0    warfarin (COUMADIN) 5 mg tablet Take by mouth daily      enoxaparin (LOVENOX) 60 mg/0 6 mL Inject 0 6 mL (60 mg total) under the skin 2 (two) times a day 4 Syringe 0    pantoprazole (PROTONIX) 40 mg tablet Take 1 tablet (40 mg total) by mouth 2 (two) times a day 60 tablet 2     Current Facility-Administered Medications   Medication Dose Route Frequency Provider Last Rate Last Dose    cyanocobalamin injection 1,000 mcg  1,000 mcg Intramuscular Q30 Days Agustin Smith MD   1,000 mcg at 04/03/18 0947    nitroglycerin (NITROSTAT) SL tablet 0 4 mg  0 4 mg Sublingual Q5 Min PRN Agustin Smith MD           Blood pressure 126/60, pulse 60, weight 73 5 kg (162 lb)  PHYSICAL EXAM:     General Appearance:   Alert, cooperative, no distress, appears stated age    HEENT:   Normocephalic, atraumatic, anicteric      Neck:  Supple, symmetrical, trachea midline, no adenopathy;    thyroid: no enlargement/tenderness/nodules; no carotid  bruit or JVD    Lungs:   Clear to auscultation bilaterally; no rales, rhonchi or wheezing; respirations unlabored    Heart[de-identified]   S1 and S2 normal; regular rate and rhythm; no murmur, rub, or gallop     Abdomen:   Soft, non-tender, non-distended; normal bowel sounds; no masses, no organomegaly    Genitalia:   Deferred    Rectal:   Deferred    Extremities:  No cyanosis, clubbing or edema    Pulses:  2+ and symmetric all extremities    Skin:  Skin color, texture, turgor normal, no rashes or lesions    Lymph nodes:  No palpable cervical, axillary or inguinal lymphadenopathy        Lab Results   Component Value Date    WBC 4 20 (L) 04/03/2018    HGB 7 5 (L) 04/03/2018    HCT 26 6 (L) 04/03/2018    MCV 78 (L) 04/03/2018     (H) 04/03/2018     Lab Results   Component Value Date    GLUCOSE 167 (H) 08/24/2016    CALCIUM 8 3 03/27/2018     03/27/2018    K 4 0 03/27/2018    CO2 28 03/27/2018     03/27/2018    BUN 39 (H) 03/27/2018    CREATININE 1 87 (H) 03/27/2018     Lab Results   Component Value Date    ALT 15 03/27/2018    AST 18 03/27/2018    ALKPHOS 83 03/27/2018    BILITOT 0 27 03/27/2018     Lab Results   Component Value Date    INR 2 73 (H) 03/27/2018    INR 3 05 (H) 01/31/2018    INR 3 05 (A) 01/31/2018    PROTIME 29 3 (H) 03/27/2018    PROTIME 32 0 (H) 01/31/2018    PROTIME 27 4 (H) 10/27/2017

## 2018-04-05 NOTE — PROGRESS NOTES
Follow-up Note -  [facility]  Jane June 1940 68 y o  female     ASSESSMENT @ PLAN:   She is a 26-year-old female on warfarin for aortic valve replacement with a drop in her hemoglobin to 7 5 with microcytosis and iron deficiency indicative of a GI bleed  She had a duodenal ulcer in the fall and she had a normal colonoscopy at that time  1 will do EGD to investigate  We will hold her warfarin and we will do bridge therapy with Lovenox weight based    2 she will do Protonix 40 mg p o  B i d     3 she is doing an iron supplement    4 if the endoscopy is non revealing she will need a video capsule endoscopy    Reason:  GERD and iron deficiency anemia    HPI:  She is 26-year-old female with a hemoglobin of 7 2 that was recently Check rechecks and is 7 5  It is microcytic and iron deficient  She has had no overt bleeding  She denies melena or hematochezia  She had endoscopy colonoscopy with me in the fall she had a duodenal ulcer and erosive gastritis  She reports that her symptoms of acid got better on Protonix  The colonoscopy showed diverticulosis and a small polyp removed  She had a pill endoscopy years ago  She is maintained on a blood thinner for aortic valve replacement years ago  She denies nausea vomiting abdominal pain diarrhea constipation as well she does report fatigue and but no shortness of breath she is resting comfortably in the office    REVIEW OF SYSTEMS:     CONSTITUTIONAL: Denies any fever, chills, or rigors  Good appetite, and no recent weight loss  HEENT: No earache or tinnitus  Denies hearing loss or visual disturbances  CARDIOVASCULAR: No chest pain or palpitations  RESPIRATORY: Denies any cough, hemoptysis, shortness of breath or dyspnea on exertion  GASTROINTESTINAL: As noted in the History of Present Illness  GENITOURINARY: No problems with urination  Denies any hematuria or dysuria  NEUROLOGIC: No dizziness or vertigo, denies headaches  MUSCULOSKELETAL: Denies any muscle or joint pain  SKIN: Denies skin rashes or itching  ENDOCRINE: Denies excessive thirst  Denies intolerance to heat or cold  PSYCHOSOCIAL: Denies depression or anxiety  Denies any recent memory loss  Past Medical History:   Diagnosis Date    Anemia     Aneurysm of thoracic aorta (HCC)     Aortic valve disorder     Benign neoplasm of sigmoid colon     Cardiomyopathy ([facility] [address] )     last assessed: 10/10/2014    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HCC)     last assessed: 10/10/2014    Coronary artery disease     GERD (gastroesophageal reflux disease)     History of emphysema     Hyperlipidemia     Hypertension     TIA (transient ischemic attack)       Past Surgical History:   Procedure Laterality Date    AORTIC VALVE REPLACEMENT      CARDIAC PACEMAKER PLACEMENT      last assessed: 10/10/2014   Muriel Kaplan CARDIAC SURGERY      aortic valve replacement    CHOLECYSTECTOMY      COLONOSCOPY      HYSTERECTOMY      INSERT / REPLACE / REMOVE PACEMAKER      OTHER SURGICAL HISTORY      Capsule ENDOscopy description: 12/19/2012    OH ESOPHAGOGASTRODUODENOSCOPY TRANSORAL DIAGNOSTIC N/A 11/29/2017    Procedure: EGD AND COLONOSCOPY;  Surgeon: Riddhi Mckenna MD;  Location: [facility]; Service: Gastroenterology     Social History     Social History    Marital status:      Spouse name: N/A    Number of children: N/A    Years of education: N/A     Occupational History    Not on file       Social History Main Topics    Smoking status: Former Smoker     Quit date: 11/29/2007    Smokeless tobacco: Former User     Quit date: 2007    Alcohol use No    Drug use: No    Sexual activity: Not on file     Other Topics Concern    Not on file     Social History Narrative    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 BID Freestyle lancets BID    Living independently with spouse         Family History   Problem Relation Age of Onset    No Known Problems Mother     No Known Problems Father      Patient has no known allergies  Current Outpatient Prescriptions   Medication Sig Dispense Refill    fenofibrate micronized (LOFIBRA) 134 MG capsule Take 134 mg by mouth every morning before breakfast      ferrous sulfate 324 (65 Fe) mg Take 1 tablet (324 mg total) by mouth 2 (two) times a day before meals 60 tablet 5    furosemide (LASIX) 40 mg tablet Take 40 mg by mouth 2 (two) times a day      gabapentin (NEURONTIN) 100 mg capsule Take 100 mg by mouth 3 (three) times a day      glipiZIDE (GLUCOTROL) 10 mg tablet Take 10 mg by mouth 2 (two) times a day before meals      glucose blood (FREESTYLE TEST STRIPS) test strip by In Vitro route      Lancets (FREESTYLE) lancets by Does not apply route 2 (two) times a day      Lancets (FREESTYLE) lancets by Does not apply route 2 (two) times a day      levothyroxine 50 mcg tablet Take 50 mcg by mouth daily      lisinopril (ZESTRIL) 2 5 mg tablet Take 2 5 mg by mouth daily      metoprolol tartrate (LOPRESSOR) 50 mg tablet 50 mg every 12 (twelve) hours    0    nitroglycerin (NITROSTAT) 0 4 mg SL tablet Place 1 tablet (0 4 mg total) under the tongue every 5 (five) minutes as needed for chest pain 90 tablet 0    warfarin (COUMADIN) 5 mg tablet Take by mouth daily      enoxaparin (LOVENOX) 60 mg/0 6 mL Inject 0 6 mL (60 mg total) under the skin 2 (two) times a day 4 Syringe 0    pantoprazole (PROTONIX) 40 mg tablet Take 1 tablet (40 mg total) by mouth 2 (two) times a day 60 tablet 2     Current Facility-Administered Medications   Medication Dose Route Frequency Provider Last Rate Last Dose    cyanocobalamin injection 1,000 mcg  1,000 mcg Intramuscular Q30 Days Richard Osei MD   1,000 mcg at 04/03/18 0947    nitroglycerin (NITROSTAT) SL tablet 0 4 mg  0 4 mg Sublingual Q5 Min PRN Richard Osei MD           Blood pressure 126/60, pulse 60, weight 73 5 kg (162 lb)      PHYSICAL EXAM:     General Appearance:   Alert, cooperative, no distress, appears stated age    HEENT:   Normocephalic, atraumatic, anicteric      Neck:  Supple, symmetrical, trachea midline, no adenopathy;    thyroid: no enlargement/tenderness/nodules; no carotid  bruit or JVD    Lungs:   Clear to auscultation bilaterally; no rales, rhonchi or wheezing; respirations unlabored    Heart[de-identified]   S1 and S2 normal; regular rate and rhythm; no murmur, rub, or gallop     Abdomen:   Soft, non-tender, non-distended; normal bowel sounds; no masses, no organomegaly    Genitalia:   Deferred    Rectal:   Deferred    Extremities:  No cyanosis, clubbing or edema    Pulses:  2+ and symmetric all extremities    Skin:  Skin color, texture, turgor normal, no rashes or lesions    Lymph nodes:  No palpable cervical, axillary or inguinal lymphadenopathy        Lab Results   Component Value Date    WBC 4 20 (L) 04/03/2018    HGB 7 5 (L) 04/03/2018    HCT 26 6 (L) 04/03/2018    MCV 78 (L) 04/03/2018     (H) 04/03/2018     Lab Results   Component Value Date    GLUCOSE 167 (H) 08/24/2016    CALCIUM 8 3 03/27/2018     03/27/2018    K 4 0 03/27/2018    CO2 28 03/27/2018     03/27/2018    BUN 39 (H) 03/27/2018    CREATININE 1 87 (H) 03/27/2018     Lab Results   Component Value Date    ALT 15 03/27/2018    AST 18 03/27/2018    ALKPHOS 83 03/27/2018    BILITOT 0 27 03/27/2018     Lab Results   Component Value Date    INR 2 73 (H) 03/27/2018    INR 3 05 (H) 01/31/2018    INR 3 05 (A) 01/31/2018    PROTIME 29 3 (H) 03/27/2018    PROTIME 32 0 (H) 01/31/2018    PROTIME 27 4 (H) 10/27/2017

## 2018-04-09 ENCOUNTER — TELEPHONE (OUTPATIENT)
Dept: INTERNAL MEDICINE CLINIC | Facility: CLINIC | Age: 78
End: 2018-04-09

## 2018-04-09 NOTE — TELEPHONE ENCOUNTER
Called pt to see if she received a call from us advising that she needed to repeat hemoccult test  Patient daughter said she received the kit in the mail today and will be sending it off

## 2018-04-09 NOTE — TELEPHONE ENCOUNTER
----- Message from Mattie David MD sent at 4/5/2018  6:15 PM EDT -----  Needs to redo the Hemoccult test specimen was unsatisfactory    Please call her

## 2018-04-10 ENCOUNTER — APPOINTMENT (OUTPATIENT)
Dept: LAB | Facility: CLINIC | Age: 78
End: 2018-04-10
Payer: MEDICARE

## 2018-04-10 ENCOUNTER — TRANSCRIBE ORDERS (OUTPATIENT)
Dept: LAB | Facility: CLINIC | Age: 78
End: 2018-04-10

## 2018-04-10 DIAGNOSIS — D50.0 IRON DEFICIENCY ANEMIA SECONDARY TO BLOOD LOSS (CHRONIC): Primary | ICD-10-CM

## 2018-04-10 DIAGNOSIS — D50.0 IRON DEFICIENCY ANEMIA SECONDARY TO BLOOD LOSS (CHRONIC): ICD-10-CM

## 2018-04-10 PROCEDURE — G0328 FECAL BLOOD SCRN IMMUNOASSAY: HCPCS

## 2018-04-20 ENCOUNTER — CLINICAL SUPPORT (OUTPATIENT)
Dept: GASTROENTEROLOGY | Facility: CLINIC | Age: 78
End: 2018-04-20
Payer: MEDICARE

## 2018-04-20 DIAGNOSIS — K55.21 AVM (ARTERIOVENOUS MALFORMATION) OF SMALL BOWEL, ACQUIRED WITH HEMORRHAGE: Primary | ICD-10-CM

## 2018-04-20 PROCEDURE — 99211 OFF/OP EST MAY X REQ PHY/QHP: CPT | Performed by: INTERNAL MEDICINE

## 2018-04-25 ENCOUNTER — TELEPHONE (OUTPATIENT)
Dept: INTERNAL MEDICINE CLINIC | Facility: CLINIC | Age: 78
End: 2018-04-25

## 2018-04-25 ENCOUNTER — OFFICE VISIT (OUTPATIENT)
Dept: INTERNAL MEDICINE CLINIC | Facility: CLINIC | Age: 78
End: 2018-04-25
Payer: MEDICARE

## 2018-04-25 ENCOUNTER — ANTICOAG VISIT (OUTPATIENT)
Dept: CARDIOLOGY CLINIC | Facility: CLINIC | Age: 78
End: 2018-04-25

## 2018-04-25 ENCOUNTER — APPOINTMENT (OUTPATIENT)
Dept: LAB | Facility: CLINIC | Age: 78
End: 2018-04-25
Payer: MEDICARE

## 2018-04-25 VITALS
WEIGHT: 160.8 LBS | HEIGHT: 65 IN | DIASTOLIC BLOOD PRESSURE: 60 MMHG | RESPIRATION RATE: 14 BRPM | HEART RATE: 56 BPM | BODY MASS INDEX: 26.79 KG/M2 | SYSTOLIC BLOOD PRESSURE: 140 MMHG

## 2018-04-25 DIAGNOSIS — I25.10 CAD IN NATIVE ARTERY: ICD-10-CM

## 2018-04-25 DIAGNOSIS — N28.9 RENAL FUNCTION IMPAIRMENT: ICD-10-CM

## 2018-04-25 DIAGNOSIS — E61.1 IRON DEFICIENCY: ICD-10-CM

## 2018-04-25 DIAGNOSIS — K29.51 CHRONIC GASTRITIS WITH BLEEDING, UNSPECIFIED GASTRITIS TYPE: Primary | ICD-10-CM

## 2018-04-25 DIAGNOSIS — Z79.01 CHRONIC ANTICOAGULATION: ICD-10-CM

## 2018-04-25 DIAGNOSIS — I10 HYPERTENSION, ESSENTIAL: ICD-10-CM

## 2018-04-25 LAB
BASOPHILS # BLD AUTO: 0.03 THOUSANDS/ΜL (ref 0–0.1)
BASOPHILS NFR BLD AUTO: 1 % (ref 0–1)
EOSINOPHIL # BLD AUTO: 0.1 THOUSAND/ΜL (ref 0–0.61)
EOSINOPHIL NFR BLD AUTO: 3 % (ref 0–6)
HCT VFR BLD AUTO: 36.2 % (ref 34.8–46.1)
HGB BLD-MCNC: 10.5 G/DL (ref 11.5–15.4)
LYMPHOCYTES # BLD AUTO: 0.87 THOUSANDS/ΜL (ref 0.6–4.47)
LYMPHOCYTES NFR BLD AUTO: 24 % (ref 14–44)
MCH RBC QN AUTO: 24.8 PG (ref 26.8–34.3)
MCHC RBC AUTO-ENTMCNC: 29 G/DL (ref 31.4–37.4)
MCV RBC AUTO: 86 FL (ref 82–98)
MONOCYTES # BLD AUTO: 0.37 THOUSAND/ΜL (ref 0.17–1.22)
MONOCYTES NFR BLD AUTO: 10 % (ref 4–12)
NEUTROPHILS # BLD AUTO: 2.28 THOUSANDS/ΜL (ref 1.85–7.62)
NEUTS SEG NFR BLD AUTO: 62 % (ref 43–75)
NRBC BLD AUTO-RTO: 0 /100 WBCS
PLATELET # BLD AUTO: 355 THOUSANDS/UL (ref 149–390)
PMV BLD AUTO: 10.6 FL (ref 8.9–12.7)
RBC # BLD AUTO: 4.23 MILLION/UL (ref 3.81–5.12)
WBC # BLD AUTO: 3.66 THOUSAND/UL (ref 4.31–10.16)

## 2018-04-25 PROCEDURE — 85610 PROTHROMBIN TIME: CPT

## 2018-04-25 PROCEDURE — 99214 OFFICE O/P EST MOD 30 MIN: CPT | Performed by: INTERNAL MEDICINE

## 2018-04-25 PROCEDURE — 36415 COLL VENOUS BLD VENIPUNCTURE: CPT

## 2018-04-25 PROCEDURE — 85025 COMPLETE CBC W/AUTO DIFF WBC: CPT

## 2018-04-25 RX ORDER — ATORVASTATIN CALCIUM 20 MG/1
20 TABLET, FILM COATED ORAL DAILY
COMMUNITY
End: 2018-11-26 | Stop reason: SDUPTHER

## 2018-04-25 NOTE — TELEPHONE ENCOUNTER
----- Message from Tej Obregon MD sent at 4/25/2018  2:38 PM EDT -----  As we anticipated, major improvement in hemoglobin up to 10 5  Continue the iron

## 2018-04-25 NOTE — PROGRESS NOTES
Assessment/Plan:       Diagnoses and all orders for this visit:    Chronic gastritis with bleeding, unspecified gastritis type    CAD in native artery    Hypertension, essential    Renal function impairment    Chronic anticoagulation    Iron deficiency    Other orders  -     atorvastatin (LIPITOR) 20 mg tablet; Take 20 mg by mouth daily              Subjective:      Patient ID: Seth Major is a 68 y o  female  The chronic microcytic anemia with strong suspicion for occult GI bleed  The patient had a heme-positive stool in November  Just recently had an upper endoscopy done which documented mild chronic gastritis  A prior colonoscopy was nondiagnostic  Now placed on double dose PPI for treatment of the chronic gastritis  Also, the capsule endoscopy was done but I do not have that result  Following up today  Taking iron  The patient's color is improved so I anticipate that her hemoglobin  Will be better  I had seen her in March; hemoglobin was down to the sevens and chief complaint was dyspnea on exertion  She has chronic RAMON due to COPD, but this was worse  Iron initiated and patient referred back to Gi  Chronic issues are "mild non obstructive CAD " followed by cardiology,hypertension, and hypothyroidism on replacement, hyperlipidemia, diabetes, with hemoglobin A1c historically in the mid 6s  COPD, and history of REPLACEMENT OF THE AORTIC VALVE  Has a pacemaker    COUMADIN for the mechanical valve followed by Cardiology        The following portions of the patient's history were reviewed and updated as appropriate:   She has a past medical history of Anemia; Aneurysm of thoracic aorta ([address] ); Aortic valve disorder; Benign neoplasm of sigmoid colon; Cardiomyopathy ([address] ); 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HCC); GERD (gastroesophageal reflux disease); History of emphysema; Hyperlipidemia; and TIA (transient ischemic attack)  ,   does not have any pertinent problems on file  ,   has a past surgical history that includes Insert / replace / remove pacemaker; Cardiac surgery; Cholecystectomy; Colonoscopy; Hysterectomy; Aortic valve replacement; pr esophagogastroduodenoscopy transoral diagnostic (N/A, 11/29/2017); Other surgical history; and Cardiac pacemaker placement  ,  family history includes No Known Problems in her father and mother  ,   reports that she quit smoking about 10 years ago  She quit smokeless tobacco use about 11 years ago  She reports that she does not drink alcohol or use drugs  ,  has No Known Allergies     Current Outpatient Prescriptions   Medication Sig Dispense Refill    atorvastatin (LIPITOR) 20 mg tablet Take 20 mg by mouth daily      fenofibrate micronized (LOFIBRA) 134 MG capsule Take 134 mg by mouth every morning before breakfast      ferrous sulfate 324 (65 Fe) mg Take 1 tablet (324 mg total) by mouth 2 (two) times a day before meals 60 tablet 5    furosemide (LASIX) 40 mg tablet Take 40 mg by mouth 2 (two) times a day      gabapentin (NEURONTIN) 100 mg capsule Take 100 mg by mouth 3 (three) times a day      glipiZIDE (GLUCOTROL) 10 mg tablet Take 10 mg by mouth 2 (two) times a day before meals      glucose blood (FREESTYLE TEST STRIPS) test strip by In Vitro route      Lancets (FREESTYLE) lancets by Does not apply route 2 (two) times a day      levothyroxine 50 mcg tablet Take 50 mcg by mouth daily      lisinopril (ZESTRIL) 2 5 mg tablet Take 2 5 mg by mouth daily      metoprolol tartrate (LOPRESSOR) 50 mg tablet 50 mg every 12 (twelve) hours    0    nitroglycerin (NITROSTAT) 0 4 mg SL tablet Place 1 tablet (0 4 mg total) under the tongue every 5 (five) minutes as needed for chest pain 90 tablet 0    pantoprazole (PROTONIX) 40 mg tablet Take 1 tablet (40 mg total) by mouth 2 (two) times a day 60 tablet 2    warfarin (COUMADIN) 5 mg tablet Take by mouth daily      enoxaparin (LOVENOX) 60 mg/0 6 mL Inject 0 6 mL (60 mg total) under the skin 2 (two) times a day 4 Syringe 0 Current Facility-Administered Medications   Medication Dose Route Frequency Provider Last Rate Last Dose    cyanocobalamin injection 1,000 mcg  1,000 mcg Intramuscular Q30 Days Carry MD [place]   1,000 mcg at 04/03/18 0947    nitroglycerin (NITROSTAT) SL tablet 0 4 mg  0 4 mg Sublingual Q5 Min PRN Carry MD [place]           Review of Systems   Constitutional: Positive for fatigue  Negative for chills and fever  HENT: Negative for sore throat and trouble swallowing  Eyes: Negative for pain  Respiratory: Positive for shortness of breath  Negative for cough and wheezing  Cardiovascular: Negative for chest pain, palpitations and leg swelling  Gastrointestinal: Negative for abdominal pain, diarrhea, nausea and vomiting  Endocrine: Negative for cold intolerance and heat intolerance  Genitourinary: Negative for dysuria, frequency and pelvic pain  Musculoskeletal: Positive for arthralgias and back pain  Negative for joint swelling  Skin: Negative for rash and wound  Allergic/Immunologic: Negative for immunocompromised state  Neurological: Negative for dizziness, seizures, syncope and headaches  Psychiatric/Behavioral: Negative for dysphoric mood  The patient is not nervous/anxious  Objective:  Vitals:    04/25/18 0752   BP: 140/60   Pulse: 56   Resp: 14      Physical Exam   Constitutional: She appears well-developed and well-nourished  Cardiovascular: Normal rate and regular rhythm  Pulmonary/Chest: Effort normal and breath sounds normal  She has no wheezes  She has no rales     Skin:   Global pallor

## 2018-04-25 NOTE — PATIENT INSTRUCTIONS
Being treated for iron deficiency  Chronic gastritis is a plausible explanation although the capsule needs to be interpreted  Recheck the hemoglobin today  Recheck the Hemoccult test of the stool - the last 1, a few weeks ago, was negative -and plan to see me again in late September or early October  Call if any problems develop  Also, recheck the hemoglobin in another month      There will be 2 laboratory lab slips out front,, with different dates

## 2018-04-26 ENCOUNTER — TELEPHONE (OUTPATIENT)
Dept: INTERNAL MEDICINE CLINIC | Facility: CLINIC | Age: 78
End: 2018-04-26

## 2018-04-26 NOTE — TELEPHONE ENCOUNTER
----- Message from Brennan Lozada MA sent at 4/26/2018 10:26 AM EDT -----  Patient called and notified of results  ----- Message -----  From: Eda Kahn MD  Sent: 4/25/2018   2:38 PM  To: [facility] Clinical    As we anticipated, major improvement in hemoglobin up to 10 5  Continue the iron

## 2018-04-27 ENCOUNTER — IN-CLINIC DEVICE VISIT (OUTPATIENT)
Dept: CARDIOLOGY CLINIC | Facility: CLINIC | Age: 78
End: 2018-04-27
Payer: MEDICARE

## 2018-04-27 DIAGNOSIS — Z95.0 CARDIAC PACEMAKER IN SITU: Primary | ICD-10-CM

## 2018-04-27 DIAGNOSIS — I49.5 SINOATRIAL NODE DYSFUNCTION (HCC): ICD-10-CM

## 2018-04-27 PROCEDURE — 93280 PM DEVICE PROGR EVAL DUAL: CPT | Performed by: INTERNAL MEDICINE

## 2018-04-27 NOTE — PROGRESS NOTES
Device in person    Levy Garner's device    Normal pacemaker function  Normal lead impedance  Battery status is good    P waves -   1 2 mV  R-waves  -  paced    Capture thresholds    Atrial lead -    0 62  V@   0 5 ms    Ventricular lead -  0 75   V@     0 5ms    Atrial lead impedance -     350  Ohms     ventricular lead impedance -    510  Ohms

## 2018-05-01 ENCOUNTER — TELEPHONE (OUTPATIENT)
Dept: GASTROENTEROLOGY | Facility: CLINIC | Age: 78
End: 2018-05-01

## 2018-05-01 PROBLEM — K55.21 AVM (ARTERIOVENOUS MALFORMATION) OF SMALL BOWEL, ACQUIRED WITH HEMORRHAGE: Status: ACTIVE | Noted: 2018-05-01

## 2018-05-02 ENCOUNTER — APPOINTMENT (OUTPATIENT)
Dept: LAB | Facility: CLINIC | Age: 78
End: 2018-05-02
Payer: MEDICARE

## 2018-05-02 DIAGNOSIS — E61.1 IRON DEFICIENCY: ICD-10-CM

## 2018-05-02 LAB — HEMOCCULT STL QL IA: NEGATIVE

## 2018-05-02 PROCEDURE — G0328 FECAL BLOOD SCRN IMMUNOASSAY: HCPCS

## 2018-05-03 ENCOUNTER — TELEPHONE (OUTPATIENT)
Dept: GASTROENTEROLOGY | Facility: CLINIC | Age: 78
End: 2018-05-03

## 2018-05-03 NOTE — TELEPHONE ENCOUNTER
It looks like Dr Michelle Toledo had an extensive convo with her on 5/1  Did she have further questions or just want to be scheduled? If so, ask Dr Michelle Toledo what procedure he wants so we can order it and have her set up for it at [place]

## 2018-05-03 NOTE — TELEPHONE ENCOUNTER
Pt was instructed to call the office back with the descion to go through with the procedure that goes down the throat and kill the vessels that are bleeding; that can only be done at the office in [place]  She agreed and would like a call back as to be instructed further

## 2018-05-03 NOTE — TELEPHONE ENCOUNTER
PLEASE SCHEDULE A DOUBLE BALLOON ENTEROSCOPY ASAP LET US KNOW WHAT DATE WAS CHOSEN PATIENT IS EXPECTING YOUR CALL Bryant Robledo YOU SO MUCH!

## 2018-05-14 NOTE — TELEPHONE ENCOUNTER
I called and spoke to the patient, I gave her your number and told her to call around 2  I explained again that this is very important and she needs to return your calls

## 2018-05-15 PROBLEM — I99.8 ANGIECTASIA: Status: ACTIVE | Noted: 2018-05-15

## 2018-05-15 NOTE — TELEPHONE ENCOUNTER
Balloon enteroscopy scheduled with Dr Dennis in [place] on 5/31  I gave pt verbal instructions/mailed  Pt is Diabetic   Medication Clearance faxed to Lyndsey Ureña

## 2018-05-18 ENCOUNTER — TELEPHONE (OUTPATIENT)
Dept: CARDIOLOGY CLINIC | Facility: CLINIC | Age: 78
End: 2018-05-18

## 2018-05-18 NOTE — TELEPHONE ENCOUNTER
MARK FROM  WANTS TO KNOW IF YOU RECEIVED A FAX FOR A MEDICATION CLEARANCE  PLEASE CALL AND LET HER KNOW  IT WAS SENT TO [facility] FAX ON 05/15  MARK SAID DR LOOMIS'S FAX WASN'T WORKING   Thee Serrano

## 2018-05-18 NOTE — TELEPHONE ENCOUNTER
Spoke with Alexandria Moura at the GI office, she said pt is having a single balloon enteroscopy that is scheduled on 5/31 and she'd like to know how long she should hold her warfarin?  She'll be faxing over a form that has to be completed also, aware you'll be in Monday to respond-MS

## 2018-05-21 NOTE — TELEPHONE ENCOUNTER
JENNA Logan's VM that Dr Willena Mohs wrote a note with his recommendations in patient's chart  If they need it faxed somewhere to give me a call back with fax number

## 2018-05-22 DIAGNOSIS — D64.9 ANEMIA, UNSPECIFIED TYPE: Primary | ICD-10-CM

## 2018-05-25 ENCOUNTER — ANTICOAG VISIT (OUTPATIENT)
Dept: CARDIOLOGY CLINIC | Facility: CLINIC | Age: 78
End: 2018-05-25

## 2018-05-25 ENCOUNTER — APPOINTMENT (OUTPATIENT)
Dept: LAB | Facility: CLINIC | Age: 78
End: 2018-05-25
Payer: MEDICARE

## 2018-05-25 ENCOUNTER — TRANSCRIBE ORDERS (OUTPATIENT)
Dept: LAB | Facility: CLINIC | Age: 78
End: 2018-05-25

## 2018-05-25 DIAGNOSIS — E61.1 IRON DEFICIENCY: ICD-10-CM

## 2018-05-25 LAB
BASOPHILS # BLD AUTO: 0.02 THOUSANDS/ΜL (ref 0–0.1)
BASOPHILS NFR BLD AUTO: 1 % (ref 0–1)
EOSINOPHIL # BLD AUTO: 0.11 THOUSAND/ΜL (ref 0–0.61)
EOSINOPHIL NFR BLD AUTO: 3 % (ref 0–6)
ERYTHROCYTE [DISTWIDTH] IN BLOOD BY AUTOMATED COUNT: 21.8 % (ref 11.6–15.1)
HCT VFR BLD AUTO: 38.3 % (ref 34.8–46.1)
HGB BLD-MCNC: 11.8 G/DL (ref 11.5–15.4)
LYMPHOCYTES # BLD AUTO: 1.21 THOUSANDS/ΜL (ref 0.6–4.47)
LYMPHOCYTES NFR BLD AUTO: 34 % (ref 14–44)
MCH RBC QN AUTO: 26 PG (ref 26.8–34.3)
MCHC RBC AUTO-ENTMCNC: 30.8 G/DL (ref 31.4–37.4)
MCV RBC AUTO: 85 FL (ref 82–98)
MONOCYTES # BLD AUTO: 0.33 THOUSAND/ΜL (ref 0.17–1.22)
MONOCYTES NFR BLD AUTO: 9 % (ref 4–12)
NEUTROPHILS # BLD AUTO: 1.88 THOUSANDS/ΜL (ref 1.85–7.62)
NEUTS SEG NFR BLD AUTO: 53 % (ref 43–75)
NRBC BLD AUTO-RTO: 0 /100 WBCS
PLATELET # BLD AUTO: 281 THOUSANDS/UL (ref 149–390)
PMV BLD AUTO: 11.8 FL (ref 8.9–12.7)
RBC # BLD AUTO: 4.53 MILLION/UL (ref 3.81–5.12)
WBC # BLD AUTO: 3.55 THOUSAND/UL (ref 4.31–10.16)

## 2018-05-25 PROCEDURE — 85025 COMPLETE CBC W/AUTO DIFF WBC: CPT

## 2018-05-29 ENCOUNTER — TELEPHONE (OUTPATIENT)
Dept: GASTROENTEROLOGY | Facility: CLINIC | Age: 78
End: 2018-05-29

## 2018-05-29 NOTE — TELEPHONE ENCOUNTER
Dr Allison Estrella pt     Please call the patient back in regards to her prep   She is scheduled on 5/31  [phone]

## 2018-05-31 ENCOUNTER — HOSPITAL ENCOUNTER (OUTPATIENT)
Facility: HOSPITAL | Age: 78
Setting detail: OUTPATIENT SURGERY
Discharge: HOME | End: 2018-05-31
Attending: INTERNAL MEDICINE | Admitting: INTERNAL MEDICINE
Payer: MEDICARE

## 2018-05-31 ENCOUNTER — ANESTHESIA (OUTPATIENT)
Dept: GASTROENTEROLOGY | Facility: HOSPITAL | Age: 78
End: 2018-05-31
Payer: MEDICARE

## 2018-05-31 ENCOUNTER — ANESTHESIA EVENT (OUTPATIENT)
Dept: GASTROENTEROLOGY | Facility: HOSPITAL | Age: 78
End: 2018-05-31
Payer: MEDICARE

## 2018-05-31 VITALS
HEIGHT: 65 IN | WEIGHT: 162 LBS | RESPIRATION RATE: 16 BRPM | OXYGEN SATURATION: 95 % | BODY MASS INDEX: 26.99 KG/M2 | TEMPERATURE: 97.8 F | HEART RATE: 60 BPM | DIASTOLIC BLOOD PRESSURE: 55 MMHG | SYSTOLIC BLOOD PRESSURE: 116 MMHG

## 2018-05-31 DIAGNOSIS — I99.8 ANGIECTASIA: ICD-10-CM

## 2018-05-31 LAB — GLUCOSE SERPL-MCNC: 114 MG/DL (ref 65–140)

## 2018-05-31 PROCEDURE — 43270 EGD LESION ABLATION: CPT | Performed by: INTERNAL MEDICINE

## 2018-05-31 PROCEDURE — 82948 REAGENT STRIP/BLOOD GLUCOSE: CPT

## 2018-05-31 PROCEDURE — 44369 SMALL BOWEL ENDOSCOPY: CPT | Performed by: INTERNAL MEDICINE

## 2018-05-31 PROCEDURE — 88305 TISSUE EXAM BY PATHOLOGIST: CPT | Performed by: PATHOLOGY

## 2018-05-31 PROCEDURE — 43239 EGD BIOPSY SINGLE/MULTIPLE: CPT | Performed by: INTERNAL MEDICINE

## 2018-05-31 RX ORDER — PROPOFOL 10 MG/ML
INJECTION, EMULSION INTRAVENOUS CONTINUOUS PRN
Status: DISCONTINUED | OUTPATIENT
Start: 2018-05-31 | End: 2018-05-31 | Stop reason: SURG

## 2018-05-31 RX ORDER — PROPOFOL 10 MG/ML
INJECTION, EMULSION INTRAVENOUS AS NEEDED
Status: DISCONTINUED | OUTPATIENT
Start: 2018-05-31 | End: 2018-05-31 | Stop reason: SURG

## 2018-05-31 RX ORDER — SODIUM CHLORIDE 9 MG/ML
50 INJECTION, SOLUTION INTRAVENOUS CONTINUOUS
Status: DISCONTINUED | OUTPATIENT
Start: 2018-05-31 | End: 2018-05-31 | Stop reason: HOSPADM

## 2018-05-31 RX ADMIN — SODIUM CHLORIDE: 0.9 INJECTION, SOLUTION INTRAVENOUS at 10:36

## 2018-05-31 RX ADMIN — SODIUM CHLORIDE 50 ML/HR: 0.9 INJECTION, SOLUTION INTRAVENOUS at 10:34

## 2018-05-31 RX ADMIN — PROPOFOL 50 MG: 10 INJECTION, EMULSION INTRAVENOUS at 10:42

## 2018-05-31 RX ADMIN — PROPOFOL 150 MCG/KG/MIN: 10 INJECTION, EMULSION INTRAVENOUS at 10:42

## 2018-05-31 NOTE — OP NOTE
**** GI/ENDOSCOPY REPORT ****     PATIENT NAME: LAURA PULLIAM - VISIT ID:  Patient ID: [id number]   YOB: 1940     INTRODUCTION: Enteroscopy - A 66 female patient presents for an outpatient   Enteroscopy at [address] [place] [address]  INDICATIONS: Iron deficiency anemia  Obscure GI bleeding from small bowel   AVM's seen on capsule  CONSENT: The benefits, risks, and alternatives to the procedure were   discussed and informed consent was obtained from the patient  PREPARATION:  EKG, pulse, pulse oximetry and blood pressure were monitored   throughout the procedure  MEDICATIONS: Anesthesia-check records     PROCEDURE:  The enteroscope was passed with ease through the mouth under   direct visualization and advanced to the jejunal crest  The scope was   withdrawn and the mucosa was carefully examined  The views were good  The   patient's toleration of the procedure was good  FINDINGS:   Esophagus: The esophagus appeared to be normal    GE junction:   The GE junction appeared to be normal   Stomach: The stomach appeared to   be normal      Duodenum: 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  Argon plasma   coagulation was applied for ablation, with success  A single smooth polyp   (measuring 20 mm in size) was found in the duodenum  Multiple biopsies   was taken  Jejunum: 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  Argon plasma   coagulation was applied for ablation, with success  The site was marked   with 2 injections of tattoo ([place] ink), with success  The total dosage   was 2 cc's  COMPLICATIONS: There were no complications  ESTIMATED BLOOD LOSS:     IMPRESSIONS: Normal esophagus  Normal GE junction  Normal stomach  Two   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  Argon plasma coagulation was applied  Duodenal polyp  Multiple biopsies taken  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

## 2018-05-31 NOTE — ANESTHESIA POSTPROCEDURE EVALUATION
Post-Op Assessment Note      CV Status:  Stable    Mental Status:  Alert and awake    Hydration Status:  Euvolemic    PONV Controlled:  Controlled    Airway Patency:  Patent    Post Op Vitals Reviewed: Yes          Staff: AnesthesiologistKAITLYNN           BP (!) 95/49 (05/31/18 1129)    Temp      Pulse 60 (05/31/18 1129)   Resp 16 (05/31/18 1129)    SpO2 99 % (05/31/18 1129)

## 2018-05-31 NOTE — ANESTHESIA PREPROCEDURE EVALUATION
Review of Systems/Medical History    Chart reviewed  No history of anesthetic complications     Cardiovascular  EKG reviewed, Exercise tolerance (METS): >4,  Pacemaker/AICD, Hyperlipidemia, Hypertension , Valve replacement aortic valve  replacement, CAD , Dysrhythmias , RAMON,   Comment: SUMMARY     LEFT VENTRICLE:  Ejection fraction was estimated to be 45 %  Abnormal septal motion c/w previous history of cardiac surgery  This study was inadequate for the evaluation of regional wall motion      RIGHT VENTRICLE:  Estimated peak pressure was at least 30 mmHg      LEFT ATRIUM:  The atrium was mildly dilated      MITRAL VALVE:  There was mild to moderate regurgitation      AORTIC VALVE:  A mechanical prosthesis was present  It exhibited normal function  There was mild regurgitation      TRICUSPID VALVE:  There was mild regurgitation  ,  Pulmonary  COPD mild- PRN medicaiton , Shortness of breath,        GI/Hepatic    GERD well controlled,        Negative  ROS        Endo/Other  Diabetes poorly controlled type 2 , History of thyroid disease , hypothyroidism,      GYN       Hematology  Anemia ,     Musculoskeletal  Negative musculoskeletal ROS        Neurology    TIA,    Psychology           Physical Exam    Airway    Mallampati score: III  TM Distance: >3 FB  Neck ROM: full     Dental   Comment: Pt edentulous, upper dentures,     Cardiovascular      Pulmonary      Other Findings        Anesthesia Plan  ASA Score- 3     Anesthesia Type- IV sedation with anesthesia with ASA Monitors  Additional Monitors:   Airway Plan:     Comment: GA backup  Plan Factors-    Induction- intravenous  Postoperative Plan-     Informed Consent- Anesthetic plan and risks discussed with patient  I personally reviewed this patient with the CRNA  Discussed and agreed on the Anesthesia Plan with the CRNA  Oliverio De La Torre

## 2018-05-31 NOTE — H&P
History and Physical -  [facility]  Claudia Ferguson 66 y o  female MRN: [id number]                  HPI: Claudia Ferguson is a 66y o  year old female who presents for small-bowel AVMs  REVIEW OF SYSTEMS: Per the HPI, and otherwise unremarkable  Historical Information   Past Medical History:   Diagnosis Date    Anemia     Aneurysm of thoracic aorta ([facility] )     Aortic valve disorder     Benign neoplasm of sigmoid colon     Cardiomyopathy ([facility] )     last assessed: 10/10/2014    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HCC)     last assessed: 10/10/2014    Diabetes mellitus ([facility] )     Disease of thyroid gland     hypothyroid    RAMON (dyspnea on exertion)     GERD (gastroesophageal reflux disease)     History of emphysema     Hyperlipidemia     Hypertension     TIA (transient ischemic attack)      Past Surgical History:   Procedure Laterality Date    AORTIC VALVE REPLACEMENT      CARDIAC PACEMAKER PLACEMENT      last assessed: 10/10/2014   [facility] CARDIAC SURGERY      aortic valve replacement    CHOLECYSTECTOMY      COLONOSCOPY      HYSTERECTOMY      INSERT / REPLACE / REMOVE PACEMAKER      KNEE SURGERY Right     OTHER SURGICAL HISTORY      Capsule ENDOscopy description: 12/19/2012    CO ESOPHAGOGASTRODUODENOSCOPY TRANSORAL DIAGNOSTIC N/A 11/29/2017    Procedure: EGD AND COLONOSCOPY;  Surgeon: Barbara Goode MD;  Location: [facility];   Service: Gastroenterology     Social History   History   Alcohol Use No     History   Drug Use No     History   Smoking Status    Former Smoker    Quit date: 11/29/2007   Smokeless Tobacco    Former User    Quit date: 2007     Family History   Problem Relation Age of Onset    No Known Problems Mother     No Known Problems Father        Meds/Allergies     Facility-Administered Medications Prior to Admission   Medication    cyanocobalamin injection 1,000 mcg    nitroglycerin (NITROSTAT) SL tablet 0 4 mg     Prescriptions Prior to Admission Medication    levothyroxine 50 mcg tablet    lisinopril (ZESTRIL) 2 5 mg tablet    metoprolol tartrate (LOPRESSOR) 50 mg tablet    warfarin (COUMADIN) 5 mg tablet    atorvastatin (LIPITOR) 20 mg tablet    fenofibrate micronized (LOFIBRA) 134 MG capsule    ferrous sulfate 324 (65 Fe) mg    furosemide (LASIX) 40 mg tablet    gabapentin (NEURONTIN) 100 mg capsule    glipiZIDE (GLUCOTROL) 10 mg tablet    glucose blood (FREESTYLE TEST STRIPS) test strip    Lancets (FREESTYLE) lancets    nitroglycerin (NITROSTAT) 0 4 mg SL tablet    pantoprazole (PROTONIX) 40 mg tablet       No Known Allergies    Objective     Blood pressure 135/62, pulse 60, temperature 97 8 °F (36 6 °C), temperature source Oral, resp  rate 18, height 5' 5" (1 651 m), weight 73 5 kg (162 lb), SpO2 96 %  PHYSICAL EXAM    Gen: NAD  CV: RRR  CHEST: Clear  ABD: soft, NT/ND  EXT: no edema      ASSESSMENT/PLAN:  This is a 66y o  year old female here for small-bowel AVMs  PLAN:   Procedure:  Single balloon enteroscopy

## 2018-06-07 NOTE — PROGRESS NOTES
I spoke with her about the results  She appears to have a subepithelial lesion in her duodenum  The mucosal biopsies were normal as expected  This is probably too distal for me to reach with an echoendoscope, but I would like to try as it would allow me to make a more definitive diagnosis  She said she would prefer not to have any procedures done in the near future, so I told her it would be okay to do the endoscopic ultrasound in about six months  The differential diagnosis for this lesion includes leiomyoma or gastrointestinal stromal tumor  A carcinoid or vascular lesion would be less likely

## 2018-07-15 DIAGNOSIS — I82.90 CLOT: ICD-10-CM

## 2018-07-16 RX ORDER — PANTOPRAZOLE SODIUM 40 MG/1
TABLET, DELAYED RELEASE ORAL
Qty: 60 TABLET | Refills: 2 | Status: SHIPPED | OUTPATIENT
Start: 2018-07-16 | End: 2018-10-16 | Stop reason: SDUPTHER

## 2018-08-13 ENCOUNTER — APPOINTMENT (OUTPATIENT)
Dept: LAB | Facility: CLINIC | Age: 78
End: 2018-08-13
Payer: MEDICARE

## 2018-08-13 ENCOUNTER — ANTICOAG VISIT (OUTPATIENT)
Dept: CARDIOLOGY CLINIC | Facility: CLINIC | Age: 78
End: 2018-08-13

## 2018-08-14 DIAGNOSIS — I10 ESSENTIAL HYPERTENSION: Primary | ICD-10-CM

## 2018-08-15 ENCOUNTER — OFFICE VISIT (OUTPATIENT)
Dept: CARDIOLOGY CLINIC | Facility: CLINIC | Age: 78
End: 2018-08-15
Payer: MEDICARE

## 2018-08-15 VITALS
DIASTOLIC BLOOD PRESSURE: 72 MMHG | SYSTOLIC BLOOD PRESSURE: 140 MMHG | HEIGHT: 65 IN | BODY MASS INDEX: 27.26 KG/M2 | WEIGHT: 163.6 LBS | HEART RATE: 65 BPM | OXYGEN SATURATION: 97 %

## 2018-08-15 DIAGNOSIS — E78.2 COMBINED HYPERLIPIDEMIA: ICD-10-CM

## 2018-08-15 DIAGNOSIS — I10 HYPERTENSION, ESSENTIAL: Primary | ICD-10-CM

## 2018-08-15 DIAGNOSIS — Z79.01 CHRONIC ANTICOAGULATION: ICD-10-CM

## 2018-08-15 DIAGNOSIS — I25.10 CAD IN NATIVE ARTERY: ICD-10-CM

## 2018-08-15 PROCEDURE — 99214 OFFICE O/P EST MOD 30 MIN: CPT | Performed by: INTERNAL MEDICINE

## 2018-08-15 NOTE — PROGRESS NOTES
[facility] [place]  [address] [place] [address] [address] [id number]  Cardiology Follow Up    Katharine Atkins  1940  [id number]      1  Hypertension, essential     2  CAD in native artery     3  Combined hyperlipidemia     4  Chronic anticoagulation         Chief Complaint   Patient presents with    Follow-up     no cardiac complaints       Interval History:  Patient presents for follow-up visit  Patient denies any chest pain  No shortness of breath out of the ordinary  No history of leg edema orthopnea PN D  No history of presyncope syncope  No bleeding issues presently  Patient had a extensive workup for evaluation of anemia  Patient Active Problem List   Diagnosis    Combined hyperlipidemia    Chronic anticoagulation    Hypertension, essential    CAD in native artery    Chronic obstructive pulmonary disease ([facility] )    Diabetes mellitus with neurological manifestation (HCC)    Hypothyroidism    Iron deficiency    Renal function impairment    GERD (gastroesophageal reflux disease)    Chronic gastritis with bleeding    AVM (arteriovenous malformation) of small bowel, acquired with hemorrhage ([facility] )    Angiectasia     Past Medical History:   Diagnosis Date    Anemia     Aneurysm of thoracic aorta (HCC)     Aortic valve disorder     Benign neoplasm of sigmoid colon     Cardiomyopathy ([facility] )     last assessed: 10/10/2014    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HCC)     last assessed: 10/10/2014    Diabetes mellitus ([facility] )     Disease of thyroid gland     hypothyroid    ATKINS (dyspnea on exertion)     GERD (gastroesophageal reflux disease)     History of emphysema     Hyperlipidemia     Hypertension     TIA (transient ischemic attack)      Social History     Social History    Marital status:      Spouse name: N/A    Number of children: N/A    Years of education: N/A     Occupational History    Not on file       Social History Main Topics    Smoking status: Former Smoker     Quit date: 11/29/2007    Smokeless tobacco: Former User     Quit date: 2007    Alcohol use No    Drug use: No    Sexual activity: Not on file     Other Topics Concern    Not on file     Social History Narrative    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 BID Freestyle lancets BID    Living independently with spouse          Family History   Problem Relation Age of Onset    No Known Problems Mother     No Known Problems Father      Past Surgical History:   Procedure Laterality Date    AORTIC VALVE REPLACEMENT      CARDIAC PACEMAKER PLACEMENT      last assessed: 10/10/2014   [facility] CARDIAC SURGERY      aortic valve replacement    CHOLECYSTECTOMY      COLONOSCOPY      HYSTERECTOMY      INSERT / REPLACE / REMOVE PACEMAKER      KNEE SURGERY Right     OTHER SURGICAL HISTORY      Capsule ENDOscopy description: 12/19/2012    MO COLONOSCOPY FLX DX W/COLLJ SPEC WHEN PFRMD N/A 5/31/2018    Procedure: single balloon ENTEROSCOPY;  Surgeon: Jayce Alvarado MD;  [facility]; Service: Gastroenterology    MO ESOPHAGOGASTRODUODENOSCOPY TRANSORAL DIAGNOSTIC N/A 11/29/2017    Procedure: EGD AND COLONOSCOPY;  Surgeon: Meng Baker MD;  [facility];   Service: Gastroenterology       Current Outpatient Prescriptions:     atorvastatin (LIPITOR) 20 mg tablet, Take 20 mg by mouth daily, Disp: , Rfl:     fenofibrate micronized (LOFIBRA) 134 MG capsule, Take 134 mg by mouth every morning before breakfast, Disp: , Rfl:     ferrous sulfate 324 (65 Fe) mg, Take 1 tablet (324 mg total) by mouth 2 (two) times a day before meals, Disp: 60 tablet, Rfl: 5    furosemide (LASIX) 40 mg tablet, Take 40 mg by mouth 2 (two) times a day, Disp: , Rfl:     gabapentin (NEURONTIN) 100 mg capsule, Take 300 mg by mouth 3 (three) times a day  , Disp: , Rfl:     glipiZIDE (GLUCOTROL) 10 mg tablet, Take 10 mg by mouth 2 (two) times a day before meals, Disp: , Rfl:     glucose blood (FREESTYLE TEST STRIPS) test strip, by In Vitro route, Disp: , Rfl:     Lancets (FREESTYLE) lancets, by Does not apply route 2 (two) times a day, Disp: , Rfl:     levothyroxine 50 mcg tablet, Take 50 mcg by mouth daily, Disp: , Rfl:     lisinopril (ZESTRIL) 2 5 mg tablet, Take 25 mg by mouth daily  , Disp: , Rfl:     metoprolol tartrate (LOPRESSOR) 50 mg tablet, 50 mg every 12 (twelve) hours  , Disp: , Rfl: 0    pantoprazole (PROTONIX) 40 mg tablet, take 1 tablet by mouth twice a day, Disp: 60 tablet, Rfl: 2    warfarin (COUMADIN) 5 mg tablet, Take by mouth daily, Disp: , Rfl:     nitroglycerin (NITROSTAT) 0 4 mg SL tablet, Place 1 tablet (0 4 mg total) under the tongue every 5 (five) minutes as needed for chest pain (Patient not taking: Reported on 8/15/2018 ), Disp: 90 tablet, Rfl: 0    Current Facility-Administered Medications:     cyanocobalamin injection 1,000 mcg, 1,000 mcg, Intramuscular, Q30 Days, Agustin Smith MD, 1,000 mcg at 04/03/18 0947    nitroglycerin (NITROSTAT) SL tablet 0 4 mg, 0 4 mg, Sublingual, Q5 Min PRN, Agustin Smith MD  No Known Allergies    Labs: Ancillary Orders on 07/27/2018   Component Date Value    Protime 08/13/2018 31 4*    INR 08/13/2018 3 03*     Imaging: No results found      Review of Systems:  Review of Systems   REVIEW OF SYSTEMS:  Constitutional:  Denies fever or chills   Eyes:  Denies change in visual acuity   HENT:  Denies nasal congestion or sore throat   Respiratory:  Denies cough or shortness of breath   Cardiovascular:  Denies chest pain or edema   GI:  Denies abdominal pain, nausea, vomiting, bloody stools or diarrhea   :  Denies dysuria, frequency, difficulty in micturition and nocturia  Musculoskeletal:  Denies back pain or joint pain   Neurologic:  Denies headache, focal weakness or sensory changes   Endocrine:  Denies polyuria or polydipsia   Lymphatic:  Denies swollen glands   Psychiatric:  Denies depression or anxiety     Physical Exam:    /72 (BP Location: Left arm, Patient Position: Sitting, Cuff Size: Adult)   Pulse 65   Ht 5' 5" (1 651 m)   Wt 74 2 kg (163 lb 9 6 oz)   SpO2 97%   BMI 27 22 kg/m²     Physical Exam   PHYSICAL EXAM:  General:  Patient is not in acute distress   Head: Normocephalic, Atraumatic  HEENT:  Both pupils normal-size atraumatic, normocephalic, nonicteric  Neck:  JVP not raised  Trachea central  No carotid bruit  Respiratory:  normal breath sounds no crackles  no rhonchi  Cardiovascular:  Heart sounds consistent with prosthetic mechanical valve   GI:  Abdomen soft nontender  No organomegaly  Lymphatic:  No cervical or inguinal lymphadenopathy  Neurologic:  Patient is awake alert, oriented   Grossly nonfocal    Discussion/Summary:  Patient with multiple medical problems who seems to be doing reasonably well from cardiac standpoint  Previous studies reviewed with patient  Medications reviewed and possible side effects discussed  concepts of cardiovascular disease , signs and symptoms of heart disease  Dietary and risk factor modification reinforced  All questions answered  Safety measures reviewed  Patient advised to report any problems prompting medical attention  Continue antibiotic prophylaxis  Patient to continue to follow PT INR for monitoring of anticoagulation  Results of echocardiogram reviewed  Stable functioning mechanical prosthetic aortic valve  Ejection fraction 45% which is unchanged from last year  Patient had a few questions which were answered  Follow-up in 6 months or earlier as needed  Follow-up with primary care physician

## 2018-08-16 RX ORDER — LISINOPRIL 2.5 MG/1
TABLET ORAL
Qty: 90 TABLET | Refills: 0 | Status: SHIPPED | OUTPATIENT
Start: 2018-08-16 | End: 2018-11-15 | Stop reason: SDUPTHER

## 2018-08-17 DIAGNOSIS — I10 HYPERTENSION, ESSENTIAL: Primary | ICD-10-CM

## 2018-08-17 RX ORDER — METOPROLOL TARTRATE 50 MG/1
TABLET, FILM COATED ORAL
Qty: 180 TABLET | Refills: 3 | Status: SHIPPED | OUTPATIENT
Start: 2018-08-17 | End: 2019-02-22 | Stop reason: SDUPTHER

## 2018-09-21 DIAGNOSIS — E03.9 HYPOTHYROIDISM, UNSPECIFIED TYPE: Primary | ICD-10-CM

## 2018-09-21 RX ORDER — GABAPENTIN 100 MG/1
CAPSULE ORAL
Qty: 270 CAPSULE | Refills: 3 | Status: SHIPPED | OUTPATIENT
Start: 2018-09-21 | End: 2019-05-08

## 2018-09-21 RX ORDER — LEVOTHYROXINE SODIUM 0.05 MG/1
TABLET ORAL
Qty: 90 TABLET | Refills: 3 | Status: SHIPPED | OUTPATIENT
Start: 2018-09-21 | End: 2019-09-27 | Stop reason: SDUPTHER

## 2018-10-16 DIAGNOSIS — I82.90 CLOT: ICD-10-CM

## 2018-10-16 RX ORDER — PANTOPRAZOLE SODIUM 40 MG/1
TABLET, DELAYED RELEASE ORAL
Qty: 60 TABLET | Refills: 0 | Status: SHIPPED | OUTPATIENT
Start: 2018-10-16 | End: 2019-02-22 | Stop reason: ALTCHOICE

## 2018-10-26 ENCOUNTER — IN-CLINIC DEVICE VISIT (OUTPATIENT)
Dept: CARDIOLOGY CLINIC | Facility: CLINIC | Age: 78
End: 2018-10-26
Payer: MEDICARE

## 2018-10-26 DIAGNOSIS — I49.5 SINOATRIAL NODE DYSFUNCTION (HCC): ICD-10-CM

## 2018-10-26 DIAGNOSIS — Z95.0 PACEMAKER: ICD-10-CM

## 2018-10-26 PROCEDURE — 93280 PM DEVICE PROGR EVAL DUAL: CPT | Performed by: INTERNAL MEDICINE

## 2018-11-07 ENCOUNTER — OFFICE VISIT (OUTPATIENT)
Dept: INTERNAL MEDICINE CLINIC | Facility: CLINIC | Age: 78
End: 2018-11-07
Payer: MEDICARE

## 2018-11-07 ENCOUNTER — APPOINTMENT (OUTPATIENT)
Dept: LAB | Facility: CLINIC | Age: 78
End: 2018-11-07
Payer: MEDICARE

## 2018-11-07 ENCOUNTER — TELEPHONE (OUTPATIENT)
Dept: INTERNAL MEDICINE CLINIC | Facility: CLINIC | Age: 78
End: 2018-11-07

## 2018-11-07 VITALS
HEART RATE: 64 BPM | SYSTOLIC BLOOD PRESSURE: 128 MMHG | HEIGHT: 66 IN | OXYGEN SATURATION: 94 % | DIASTOLIC BLOOD PRESSURE: 54 MMHG | WEIGHT: 161.2 LBS | BODY MASS INDEX: 25.91 KG/M2

## 2018-11-07 DIAGNOSIS — K55.21 AVM (ARTERIOVENOUS MALFORMATION) OF SMALL BOWEL, ACQUIRED WITH HEMORRHAGE: ICD-10-CM

## 2018-11-07 DIAGNOSIS — I10 HYPERTENSION, ESSENTIAL: ICD-10-CM

## 2018-11-07 DIAGNOSIS — I25.10 CAD IN NATIVE ARTERY: ICD-10-CM

## 2018-11-07 DIAGNOSIS — N28.9 RENAL FUNCTION IMPAIRMENT: ICD-10-CM

## 2018-11-07 DIAGNOSIS — J41.0 SIMPLE CHRONIC BRONCHITIS (HCC): ICD-10-CM

## 2018-11-07 DIAGNOSIS — E11.40 TYPE 2 DIABETES MELLITUS WITH DIABETIC NEUROPATHY, WITHOUT LONG-TERM CURRENT USE OF INSULIN (HCC): ICD-10-CM

## 2018-11-07 DIAGNOSIS — E03.9 ACQUIRED HYPOTHYROIDISM: ICD-10-CM

## 2018-11-07 DIAGNOSIS — Z23 ENCOUNTER FOR IMMUNIZATION: ICD-10-CM

## 2018-11-07 DIAGNOSIS — E78.2 COMBINED HYPERLIPIDEMIA: ICD-10-CM

## 2018-11-07 DIAGNOSIS — K29.51 CHRONIC GASTRITIS WITH BLEEDING, UNSPECIFIED GASTRITIS TYPE: Primary | ICD-10-CM

## 2018-11-07 DIAGNOSIS — E61.1 IRON DEFICIENCY: ICD-10-CM

## 2018-11-07 DIAGNOSIS — Z79.01 CHRONIC ANTICOAGULATION: ICD-10-CM

## 2018-11-07 LAB
LEFT EYE DIABETIC RETINOPATHY: NORMAL
RIGHT EYE DIABETIC RETINOPATHY: NORMAL
SEVERITY (EYE EXAM): NORMAL

## 2018-11-07 PROCEDURE — G0009 ADMIN PNEUMOCOCCAL VACCINE: HCPCS | Performed by: INTERNAL MEDICINE

## 2018-11-07 PROCEDURE — 99214 OFFICE O/P EST MOD 30 MIN: CPT | Performed by: INTERNAL MEDICINE

## 2018-11-07 PROCEDURE — 90670 PCV13 VACCINE IM: CPT | Performed by: INTERNAL MEDICINE

## 2018-11-07 NOTE — PROGRESS NOTES
Assessment/Plan:       Diagnoses and all orders for this visit:    Chronic gastritis with bleeding, unspecified gastritis type  -     CBC and differential; Future    AVM (arteriovenous malformation) of small bowel, acquired with hemorrhage (HCC)  -     CBC and differential; Future    Acquired hypothyroidism  -     TSH, 3rd generation with Free T4 reflex; Future    Type 2 diabetes mellitus with diabetic neuropathy, without long-term current use of insulin (HCC)  -     CBC and differential; Future  -     Lipid Panel with Direct LDL reflex; Future  -     Hemoglobin A1C; Future  -     Comprehensive metabolic panel; Future  -     TSH, 3rd generation with Free T4 reflex; Future  -     Urinalysis with reflex to microscopic  -     Sedimentation rate, automated; Future  -     CK (with reflex to MB); Future  -     Magnesium; Future    Simple chronic bronchitis (HCC)    Hypertension, essential  -     CBC and differential; Future  -     Lipid Panel with Direct LDL reflex; Future  -     Hemoglobin A1C; Future  -     Comprehensive metabolic panel; Future  -     TSH, 3rd generation with Free T4 reflex; Future  -     Urinalysis with reflex to microscopic  -     Sedimentation rate, automated; Future  -     CK (with reflex to MB); Future  -     Magnesium; Future    CAD in native artery  -     CBC and differential; Future  -     Lipid Panel with Direct LDL reflex; Future  -     Hemoglobin A1C; Future  -     Comprehensive metabolic panel; Future  -     TSH, 3rd generation with Free T4 reflex; Future  -     Urinalysis with reflex to microscopic  -     Sedimentation rate, automated; Future  -     CK (with reflex to MB); Future  -     Magnesium; Future    Renal function impairment  -     CBC and differential; Future  -     Lipid Panel with Direct LDL reflex; Future  -     Hemoglobin A1C; Future  -     Comprehensive metabolic panel; Future  -     TSH, 3rd generation with Free T4 reflex;  Future  -     Urinalysis with reflex to microscopic  -     Sedimentation rate, automated; Future  -     CK (with reflex to MB); Future  -     Magnesium; Future    Iron deficiency  -     CBC and differential; Future    Combined hyperlipidemia  -     Lipid Panel with Direct LDL reflex; Future    Chronic anticoagulation    Encounter for immunization  -     PNEUMOCOCCAL CONJUGATE VACCINE 13-VALENT GREATER THAN 6 MONTHS          Patient Instructions   A patient doing well  Recheck laboratory panels include CK and magnesium for nocturnal leg cramps  Sedimentation rate for the headache  Follow-up in 6 months  Influenza and pneumococcal vaccines 23 are done  Prevnar today  Six-month follow-up        Subjective:      Patient ID: Amy Nancy is a 66 y o  female  Chronic diagnoses:  Mild nonobstructive CAD followed by Cardiology  Essential hypertension treated and stable  Hypothyroidism on replacement  Hyperlipidemia treated  Diabetes well controlled with hemoglobin A1c in the mid 60s  COPD which is stable  Aortic mechanical valve replacement; on chronic Coumadin anticoagulation which is followed by Cardiology  Pacemaker in place    The patient has a chronic microcytic anemia  The levi was hemoglobin 7 5 in April  A few months later she was up into the 11th  She has had extensive GI workup for finding of Hemoccult positive stool  She appears to have AVMs  She had a "mild "chronic gastritis documented about 6 months ago treated with double dose PPI  Also, repeat endoscopy documented some kind of sub and of the ileal lesion in the duodenum  Per the GI note, the patient refused further workup right now  Current plan is endoscopic ultrasound down the road in about 6 months  Taking iron  No chest pain  Complaint of shortness of breath on exertion but the patient states I have always had that   No changes  She reports huffing and puffing a bit when walking up steps without chest pain or tightness or diaphoresis      Complaint of a throbbing sensation in the right temporal region occurring once or twice a week lasting 5 or 10 minutes for the past month with no obvious precipitant and no associated visual or neurological deficit  Complaint of nocturnal leg cramps  The following portions of the patient's history were reviewed and updated as appropriate:   She has a past medical history of Anemia; Aneurysm of thoracic aorta ([facility] ); Aortic valve disorder; Benign neoplasm of sigmoid colon; Cardiomyopathy ([facility] ); 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HCC); RAMON (dyspnea on exertion); GERD (gastroesophageal reflux disease); History of emphysema; Hyperlipidemia; and TIA (transient ischemic attack)  ,   does not have any pertinent problems on file  ,   has a past surgical history that includes Cholecystectomy; Colonoscopy; Hysterectomy; Aortic valve replacement; pr esophagogastroduodenoscopy transoral diagnostic (N/A, 11/29/2017); Other surgical history; Cardiac surgery; Insert / replace / remove pacemaker; Cardiac pacemaker placement; Knee surgery (Right); and pr colonoscopy flx dx w/collj spec when pfrmd (N/A, 5/31/2018)  ,  family history includes No Known Problems in her father and mother  ,   reports that she quit smoking about 10 years ago  She has a 50 00 pack-year smoking history  She quit smokeless tobacco use about 11 years ago  She reports that she does not drink alcohol or use drugs  ,  has No Known Allergies     Current Outpatient Prescriptions   Medication Sig Dispense Refill    atorvastatin (LIPITOR) 20 mg tablet Take 20 mg by mouth daily      fenofibrate micronized (LOFIBRA) 134 MG capsule Take 134 mg by mouth every morning before breakfast      ferrous sulfate 324 (65 Fe) mg Take 1 tablet (324 mg total) by mouth 2 (two) times a day before meals 60 tablet 5    furosemide (LASIX) 40 mg tablet Take 40 mg by mouth 2 (two) times a day      gabapentin (NEURONTIN) 100 mg capsule take 3 capsules by mouth at bedtime 270 capsule 3    glipiZIDE (GLUCOTROL) 10 mg tablet Take 10 mg by mouth 2 (two) times a day before meals      glucose blood (FREESTYLE TEST STRIPS) test strip by In Vitro route      Lancets (FREESTYLE) lancets by Does not apply route 2 (two) times a day      levothyroxine 50 mcg tablet take 1 tablet by mouth once daily 90 tablet 3    lisinopril (ZESTRIL) 2 5 mg tablet take 1 tablet by mouth once daily 90 tablet 0    metoprolol tartrate (LOPRESSOR) 50 mg tablet take 1 tablet by mouth twice a day 180 tablet 3    nitroglycerin (NITROSTAT) 0 4 mg SL tablet Place 1 tablet (0 4 mg total) under the tongue every 5 (five) minutes as needed for chest pain 90 tablet 0    pantoprazole (PROTONIX) 40 mg tablet take 1 tablet by mouth twice a day 60 tablet 0    warfarin (COUMADIN) 5 mg tablet Take by mouth daily       Current Facility-Administered Medications   Medication Dose Route Frequency Provider Last Rate Last Dose    cyanocobalamin injection 1,000 mcg  1,000 mcg Intramuscular Q30 Days David Graf MD   1,000 mcg at 04/03/18 0947    nitroglycerin (NITROSTAT) SL tablet 0 4 mg  0 4 mg Sublingual Q5 Min PRN David Graf MD           Review of Systems   Constitutional: Negative for chills and fever  HENT: Negative for sore throat and trouble swallowing  Eyes: Negative for pain  Respiratory: Positive for shortness of breath  Negative for cough and wheezing  Cardiovascular: Negative for chest pain and leg swelling  Gastrointestinal: Negative for abdominal pain, diarrhea, nausea and vomiting  Endocrine: Negative for cold intolerance and heat intolerance  Genitourinary: Negative for dysuria, frequency and pelvic pain  Musculoskeletal: Positive for arthralgias, back pain and myalgias  Negative for joint swelling  Skin: Negative for rash and wound  Allergic/Immunologic: Negative for immunocompromised state  Neurological: Positive for headaches  Negative for dizziness, seizures and syncope     Psychiatric/Behavioral: Negative for dysphoric mood  The patient is not nervous/anxious  Objective:  Vitals:    11/07/18 0754   BP: 128/54   Pulse: 64   SpO2: 94%      Physical Exam   Constitutional: She is oriented to person, place, and time  She appears well-developed and well-nourished  No distress  A female patient who appears stated age   HENT:   Head: Normocephalic and atraumatic  Eyes: Pupils are equal, round, and reactive to light  EOM are normal    Neck: Normal range of motion  Neck supple  No tracheal deviation present  No thyromegaly present  Cardiovascular: Normal rate, regular rhythm and normal heart sounds  Exam reveals no gallop  No murmur heard  Pulses:       Dorsalis pedis pulses are 2+ on the right side, and 2+ on the left side  Posterior tibial pulses are 1+ on the right side, and 1+ on the left side  Very soft S1  Loud mechanical S2   Pulmonary/Chest: No respiratory distress  She has no wheezes  She has no rales  Abdominal: Soft  Bowel sounds are normal  There is no tenderness  Musculoskeletal: Normal range of motion  She exhibits deformity  She exhibits no tenderness  Mild osteoarthritis deformities and also hammertoe of toes 2 through 5 both right and left  Neurological: She is alert and oriented to person, place, and time  Coordination normal    Skin: Skin is warm  Psychiatric: She has a normal mood and affect   Judgment normal

## 2018-11-07 NOTE — PATIENT INSTRUCTIONS
A patient doing well  Recheck laboratory panels include CK and magnesium for nocturnal leg cramps  Sedimentation rate for the headache  Follow-up in 6 months  Influenza and pneumococcal vaccines 23 are done  Prevnar today    Six-month follow-up

## 2018-11-15 DIAGNOSIS — I10 ESSENTIAL HYPERTENSION: ICD-10-CM

## 2018-11-15 RX ORDER — LISINOPRIL 2.5 MG/1
2.5 TABLET ORAL DAILY
Qty: 90 TABLET | Refills: 3 | Status: SHIPPED | OUTPATIENT
Start: 2018-11-15 | End: 2019-06-07

## 2018-11-26 DIAGNOSIS — E78.2 COMBINED HYPERLIPIDEMIA: Primary | ICD-10-CM

## 2018-11-26 DIAGNOSIS — I35.9 AORTIC VALVE DISORDER: ICD-10-CM

## 2018-11-26 RX ORDER — FENOFIBRATE 134 MG/1
134 CAPSULE ORAL
Qty: 90 CAPSULE | Refills: 3 | Status: SHIPPED | OUTPATIENT
Start: 2018-11-26 | End: 2019-01-02 | Stop reason: SDUPTHER

## 2018-11-26 RX ORDER — WARFARIN SODIUM 5 MG/1
5 TABLET ORAL
Qty: 90 TABLET | Refills: 3 | Status: SHIPPED | OUTPATIENT
Start: 2018-11-26 | End: 2019-12-13 | Stop reason: SDUPTHER

## 2018-11-26 RX ORDER — ATORVASTATIN CALCIUM 20 MG/1
20 TABLET, FILM COATED ORAL DAILY
Qty: 90 TABLET | Refills: 3 | Status: SHIPPED | OUTPATIENT
Start: 2018-11-26 | End: 2019-12-13 | Stop reason: SDUPTHER

## 2018-11-26 NOTE — TELEPHONE ENCOUNTER
Pt would like to know can prescriptions be sent in today pt has no more gavinin left  Pharmacy is requesting new scripts to be sent in  Please send refills to pharmacy on file for a 90 day supply

## 2018-12-01 ENCOUNTER — TELEPHONE (OUTPATIENT)
Dept: INTERNAL MEDICINE CLINIC | Facility: CLINIC | Age: 78
End: 2018-12-01

## 2018-12-01 NOTE — TELEPHONE ENCOUNTER
From groin to feet  On side & between her legs and sometimes down the back    Her muscles cramp    And she thinks the tendons get really tight    For past week very bad    This happened couple months ago    She can hardly stand    She needs help to get up    She saw Dr Ernestina Guido for this awhile back and he prescribed med for her, but after she read the side affects, she was afraid to take the med    What can she do  Gerhardt Sep ? ??

## 2018-12-28 ENCOUNTER — ANTICOAG VISIT (OUTPATIENT)
Dept: CARDIOLOGY CLINIC | Facility: CLINIC | Age: 78
End: 2018-12-28

## 2018-12-28 ENCOUNTER — APPOINTMENT (OUTPATIENT)
Dept: LAB | Facility: CLINIC | Age: 78
End: 2018-12-28
Payer: MEDICARE

## 2018-12-28 DIAGNOSIS — E11.40 TYPE 2 DIABETES MELLITUS WITH DIABETIC NEUROPATHY, WITHOUT LONG-TERM CURRENT USE OF INSULIN (HCC): Primary | ICD-10-CM

## 2018-12-28 DIAGNOSIS — I10 HYPERTENSION, ESSENTIAL: ICD-10-CM

## 2018-12-28 DIAGNOSIS — E11.40 TYPE 2 DIABETES MELLITUS WITH DIABETIC NEUROPATHY, WITHOUT LONG-TERM CURRENT USE OF INSULIN (HCC): ICD-10-CM

## 2018-12-28 DIAGNOSIS — N28.9 RENAL FUNCTION IMPAIRMENT: ICD-10-CM

## 2018-12-28 DIAGNOSIS — I25.10 CAD IN NATIVE ARTERY: ICD-10-CM

## 2018-12-28 DIAGNOSIS — E03.9 ACQUIRED HYPOTHYROIDISM: ICD-10-CM

## 2018-12-28 LAB
ALBUMIN SERPL BCP-MCNC: 3.6 G/DL (ref 3.5–5)
ALP SERPL-CCNC: 80 U/L (ref 46–116)
ALT SERPL W P-5'-P-CCNC: 31 U/L (ref 12–78)
ANION GAP SERPL CALCULATED.3IONS-SCNC: 1 MMOL/L (ref 4–13)
AST SERPL W P-5'-P-CCNC: 31 U/L (ref 5–45)
BASOPHILS # BLD AUTO: 0.03 THOUSANDS/ΜL (ref 0–0.1)
BASOPHILS NFR BLD AUTO: 1 % (ref 0–1)
BILIRUB SERPL-MCNC: 0.5 MG/DL (ref 0.2–1)
BILIRUB UR QL STRIP: NEGATIVE
BUN SERPL-MCNC: 30 MG/DL (ref 5–25)
CALCIUM SERPL-MCNC: 8.7 MG/DL (ref 8.3–10.1)
CHLORIDE SERPL-SCNC: 106 MMOL/L (ref 100–108)
CHOLEST SERPL-MCNC: 131 MG/DL (ref 50–200)
CK MB SERPL-MCNC: 1.7 % (ref 0–2.5)
CK MB SERPL-MCNC: 2.8 NG/ML (ref 0–5)
CK SERPL-CCNC: 162 U/L (ref 26–192)
CLARITY UR: CLEAR
CO2 SERPL-SCNC: 33 MMOL/L (ref 21–32)
COLOR UR: YELLOW
CREAT SERPL-MCNC: 1.58 MG/DL (ref 0.6–1.3)
CREAT UR-MCNC: 18.6 MG/DL
EOSINOPHIL # BLD AUTO: 0.12 THOUSAND/ΜL (ref 0–0.61)
EOSINOPHIL NFR BLD AUTO: 4 % (ref 0–6)
ERYTHROCYTE [DISTWIDTH] IN BLOOD BY AUTOMATED COUNT: 14.6 % (ref 11.6–15.1)
ERYTHROCYTE [SEDIMENTATION RATE] IN BLOOD: 12 MM/HOUR (ref 0–20)
EST. AVERAGE GLUCOSE BLD GHB EST-MCNC: 148 MG/DL
GFR SERPL CREATININE-BSD FRML MDRD: 31 ML/MIN/1.73SQ M
GLUCOSE SERPL-MCNC: 116 MG/DL (ref 65–140)
GLUCOSE UR STRIP-MCNC: NEGATIVE MG/DL
HBA1C MFR BLD: 6.8 % (ref 4.2–6.3)
HCT VFR BLD AUTO: 41 % (ref 34.8–46.1)
HDLC SERPL-MCNC: 28 MG/DL (ref 40–60)
HGB BLD-MCNC: 13 G/DL (ref 11.5–15.4)
HGB UR QL STRIP.AUTO: NEGATIVE
IMM GRANULOCYTES # BLD AUTO: 0.01 THOUSAND/UL (ref 0–0.2)
IMM GRANULOCYTES NFR BLD AUTO: 0 % (ref 0–2)
KETONES UR STRIP-MCNC: NEGATIVE MG/DL
LDLC SERPL CALC-MCNC: 65 MG/DL (ref 0–100)
LEUKOCYTE ESTERASE UR QL STRIP: NEGATIVE
LYMPHOCYTES # BLD AUTO: 1.23 THOUSANDS/ΜL (ref 0.6–4.47)
LYMPHOCYTES NFR BLD AUTO: 36 % (ref 14–44)
MAGNESIUM SERPL-MCNC: 2.6 MG/DL (ref 1.6–2.6)
MCH RBC QN AUTO: 28.7 PG (ref 26.8–34.3)
MCHC RBC AUTO-ENTMCNC: 31.7 G/DL (ref 31.4–37.4)
MCV RBC AUTO: 91 FL (ref 82–98)
MICROALBUMIN UR-MCNC: <5 MG/L (ref 0–20)
MICROALBUMIN/CREAT 24H UR: <27 MG/G CREATININE (ref 0–30)
MONOCYTES # BLD AUTO: 0.4 THOUSAND/ΜL (ref 0.17–1.22)
MONOCYTES NFR BLD AUTO: 12 % (ref 4–12)
NEUTROPHILS # BLD AUTO: 1.59 THOUSANDS/ΜL (ref 1.85–7.62)
NEUTS SEG NFR BLD AUTO: 47 % (ref 43–75)
NITRITE UR QL STRIP: NEGATIVE
NRBC BLD AUTO-RTO: 0 /100 WBCS
PH UR STRIP.AUTO: 7 [PH] (ref 4.5–8)
PLATELET # BLD AUTO: 251 THOUSANDS/UL (ref 149–390)
PMV BLD AUTO: 11.5 FL (ref 8.9–12.7)
POTASSIUM SERPL-SCNC: 4.5 MMOL/L (ref 3.5–5.3)
PROT SERPL-MCNC: 7.7 G/DL (ref 6.4–8.2)
PROT UR STRIP-MCNC: NEGATIVE MG/DL
RBC # BLD AUTO: 4.53 MILLION/UL (ref 3.81–5.12)
SODIUM SERPL-SCNC: 140 MMOL/L (ref 136–145)
SP GR UR STRIP.AUTO: 1.01 (ref 1–1.03)
TRIGL SERPL-MCNC: 188 MG/DL
TSH SERPL DL<=0.05 MIU/L-ACNC: 2.3 UIU/ML (ref 0.36–3.74)
UROBILINOGEN UR QL STRIP.AUTO: 0.2 E.U./DL
WBC # BLD AUTO: 3.38 THOUSAND/UL (ref 4.31–10.16)

## 2018-12-28 PROCEDURE — 81003 URINALYSIS AUTO W/O SCOPE: CPT | Performed by: INTERNAL MEDICINE

## 2018-12-28 PROCEDURE — 85025 COMPLETE CBC W/AUTO DIFF WBC: CPT

## 2018-12-28 PROCEDURE — 80053 COMPREHEN METABOLIC PANEL: CPT

## 2018-12-28 PROCEDURE — 82553 CREATINE MB FRACTION: CPT

## 2018-12-28 PROCEDURE — 85652 RBC SED RATE AUTOMATED: CPT

## 2018-12-28 PROCEDURE — 84443 ASSAY THYROID STIM HORMONE: CPT

## 2018-12-28 PROCEDURE — 82043 UR ALBUMIN QUANTITATIVE: CPT | Performed by: INTERNAL MEDICINE

## 2018-12-28 PROCEDURE — 82570 ASSAY OF URINE CREATININE: CPT | Performed by: INTERNAL MEDICINE

## 2018-12-28 PROCEDURE — 83036 HEMOGLOBIN GLYCOSYLATED A1C: CPT

## 2018-12-28 PROCEDURE — 83735 ASSAY OF MAGNESIUM: CPT

## 2018-12-28 PROCEDURE — 80061 LIPID PANEL: CPT

## 2018-12-28 PROCEDURE — 82550 ASSAY OF CK (CPK): CPT

## 2018-12-28 RX ORDER — GLIPIZIDE 10 MG/1
10 TABLET ORAL
Qty: 90 TABLET | Refills: 0 | OUTPATIENT
Start: 2018-12-28

## 2018-12-28 RX ORDER — GLIPIZIDE 10 MG/1
10 TABLET ORAL
Qty: 180 TABLET | Refills: 0 | OUTPATIENT
Start: 2018-12-28

## 2019-01-02 ENCOUNTER — TELEPHONE (OUTPATIENT)
Dept: INTERNAL MEDICINE CLINIC | Facility: CLINIC | Age: 79
End: 2019-01-02

## 2019-01-02 DIAGNOSIS — E78.2 COMBINED HYPERLIPIDEMIA: ICD-10-CM

## 2019-01-02 DIAGNOSIS — E11.9 TYPE 2 DIABETES MELLITUS WITHOUT COMPLICATION, WITHOUT LONG-TERM CURRENT USE OF INSULIN (HCC): Primary | ICD-10-CM

## 2019-01-02 RX ORDER — GLIPIZIDE 10 MG/1
10 TABLET ORAL
Qty: 180 TABLET | Refills: 2 | Status: SHIPPED | OUTPATIENT
Start: 2019-01-02 | End: 2019-11-22 | Stop reason: SDUPTHER

## 2019-01-02 RX ORDER — FENOFIBRATE 134 MG/1
134 CAPSULE ORAL
Qty: 90 CAPSULE | Refills: 2 | Status: SHIPPED | OUTPATIENT
Start: 2019-01-02 | End: 2019-12-30 | Stop reason: SDUPTHER

## 2019-01-02 NOTE — TELEPHONE ENCOUNTER
Patient called- She was in and got her labs done on Friday 12/28 and called to see if Dr Linda Flower sent in med renewals for her Fenofibrate 134mg and Glipizide 10mg    Pharmacy [address] [place] [address] in [facility]#[phone]

## 2019-01-21 DIAGNOSIS — D50.9 IRON DEFICIENCY ANEMIA, UNSPECIFIED IRON DEFICIENCY ANEMIA TYPE: ICD-10-CM

## 2019-01-21 DIAGNOSIS — I50.9 CONGESTIVE HEART FAILURE, UNSPECIFIED HF CHRONICITY, UNSPECIFIED HEART FAILURE TYPE (HCC): Primary | ICD-10-CM

## 2019-01-21 RX ORDER — FERROUS SULFATE TAB EC 324 MG (65 MG FE EQUIVALENT) 324 (65 FE) MG
324 TABLET DELAYED RESPONSE ORAL
Qty: 60 TABLET | Refills: 0 | Status: SHIPPED | OUTPATIENT
Start: 2019-01-21 | End: 2019-03-04 | Stop reason: SDUPTHER

## 2019-01-21 RX ORDER — FUROSEMIDE 40 MG/1
40 TABLET ORAL 2 TIMES DAILY
Qty: 180 TABLET | Refills: 3 | Status: SHIPPED | OUTPATIENT
Start: 2019-01-21 | End: 2019-02-22 | Stop reason: SDUPTHER

## 2019-02-22 ENCOUNTER — APPOINTMENT (OUTPATIENT)
Dept: LAB | Facility: CLINIC | Age: 79
End: 2019-02-22
Payer: MEDICARE

## 2019-02-22 ENCOUNTER — ANTICOAG VISIT (OUTPATIENT)
Dept: CARDIOLOGY CLINIC | Facility: CLINIC | Age: 79
End: 2019-02-22

## 2019-02-22 ENCOUNTER — OFFICE VISIT (OUTPATIENT)
Dept: CARDIOLOGY CLINIC | Facility: CLINIC | Age: 79
End: 2019-02-22
Payer: MEDICARE

## 2019-02-22 VITALS
OXYGEN SATURATION: 98 % | BODY MASS INDEX: 25.88 KG/M2 | HEIGHT: 66 IN | WEIGHT: 161 LBS | DIASTOLIC BLOOD PRESSURE: 68 MMHG | HEART RATE: 60 BPM | SYSTOLIC BLOOD PRESSURE: 130 MMHG

## 2019-02-22 DIAGNOSIS — Z79.01 CHRONIC ANTICOAGULATION: ICD-10-CM

## 2019-02-22 DIAGNOSIS — I50.9 CONGESTIVE HEART FAILURE, UNSPECIFIED HF CHRONICITY, UNSPECIFIED HEART FAILURE TYPE (HCC): ICD-10-CM

## 2019-02-22 DIAGNOSIS — I35.9 AVD (AORTIC VALVE DISEASE): Primary | ICD-10-CM

## 2019-02-22 DIAGNOSIS — I25.10 CAD IN NATIVE ARTERY: ICD-10-CM

## 2019-02-22 DIAGNOSIS — I10 HYPERTENSION, ESSENTIAL: Primary | ICD-10-CM

## 2019-02-22 DIAGNOSIS — I35.9 AORTIC VALVE DISORDER: ICD-10-CM

## 2019-02-22 LAB
INR PPP: 1.8 (ref 0.86–1.17)
PROTHROMBIN TIME: 21 SECONDS (ref 11.8–14.2)

## 2019-02-22 PROCEDURE — 36415 COLL VENOUS BLD VENIPUNCTURE: CPT

## 2019-02-22 PROCEDURE — 99214 OFFICE O/P EST MOD 30 MIN: CPT | Performed by: INTERNAL MEDICINE

## 2019-02-22 PROCEDURE — 85610 PROTHROMBIN TIME: CPT

## 2019-02-22 RX ORDER — METOPROLOL TARTRATE 50 MG/1
50 TABLET, FILM COATED ORAL 2 TIMES DAILY
Qty: 180 TABLET | Refills: 3
Start: 2019-02-22 | End: 2019-02-22 | Stop reason: SDUPTHER

## 2019-02-22 RX ORDER — FUROSEMIDE 40 MG/1
TABLET ORAL
Qty: 90 TABLET | Refills: 3
Start: 2019-02-22 | End: 2020-06-24 | Stop reason: SDUPTHER

## 2019-02-22 RX ORDER — METOPROLOL TARTRATE 50 MG/1
TABLET, FILM COATED ORAL
Qty: 90 TABLET | Refills: 3
Start: 2019-02-22 | End: 2020-01-29 | Stop reason: SDUPTHER

## 2019-02-22 NOTE — PROGRESS NOTES
[facility] [place] [id number]  [address]  Cardiology Follow Up    Vilma Rivera  1940  [id number]      1  Hypertension, essential     2  CAD in native artery     3  Chronic anticoagulation     4  Aortic valve disorder         Chief Complaint   Patient presents with    Follow-up     Gets a pulsating feeling in her Rt temple that comes and goes, ache underneath her breast at times    Shortness of Breath    Dizziness     Lightheaded at times       Interval History:  Patient presents for follow-up visit  Patient has been having issues with dizziness and lightheadedness  Patient has 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  Patient has not been regular with checking her INR on a regular basis  Patient denies any bleeding issues  She states that she has been compliant with all her present medications  Patient Active Problem List   Diagnosis    Combined hyperlipidemia    Chronic anticoagulation    Hypertension, essential    CAD in native artery    [facility] disease ([facility]    Diabetes mellitus with neurological manifestation (HCC)    Hypothyroidism    Iron deficiency    Renal function impairment    GERD (gastroesophageal reflux disease)    Chronic gastritis with bleeding    AVM (arteriovenous malformation) of small bowel, acquired with hemorrhage ([facility] )    Angiectasia     Past Medical History:   Diagnosis Date    Anemia     Aneurysm of thoracic aorta (HCC)     Aortic valve disorder     Benign neoplasm of sigmoid colon     Cardiomyopathy ([facility] )     last assessed: 10/10/2014    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HCC)     last assessed: 10/10/2014    RAMON (dyspnea on exertion)     GERD (gastroesophageal reflux disease)     History of emphysema     Hyperlipidemia     TIA (transient ischemic attack)      Social History     Socioeconomic History    Marital status:       Spouse name: Not on file    Number of children: Not on file    Years of education: Not on file    Highest education level: Not on file   Occupational History    Not on file   Social Needs    Financial resource strain: Not on file    Food insecurity:     Worry: Not on file     Inability: Not on file    Transportation needs:     Medical: Not on file     Non-medical: Not on file   Tobacco Use    Smoking status: Former Smoker     Packs/day: 1 00     Years: 50 00     Pack years: 50 00     Last attempt to quit: 2007     Years since quittin 2    Smokeless tobacco: Former User     Quit date:    Substance and Sexual Activity    Alcohol use: No    Drug use: No    Sexual activity: Not on file   Lifestyle    Physical activity:     Days per week: Not on file     Minutes per session: Not on file    Stress: Not on file   Relationships    Social connections:     Talks on phone: Not on file     Gets together: Not on file     Attends Jainism service: Not on file     Active member of club or organization: Not on file     Attends meetings of clubs or organizations: Not on file     Relationship status: Not on file    Intimate partner violence:     Fear of current or ex partner: Not on file     Emotionally abused: Not on file     Physically abused: Not on file     Forced sexual activity: Not on file   Other Topics Concern    Not on file   Social History Narrative    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 BID Freestyle lancets BID    Living independently with spouse      Family History   Problem Relation Age of Onset    No Known Problems Mother     No Known Problems Father      Past Surgical History:   Procedure Laterality Date    AORTIC VALVE REPLACEMENT      CARDIAC PACEMAKER PLACEMENT      last assessed: 10/10/2014    CARDIAC SURGERY      aortic valve replacement    CHOLECYSTECTOMY      COLONOSCOPY      HYSTERECTOMY      INSERT / REPLACE / [address] Right     OTHER SURGICAL HISTORY      Capsule ENDOscopy description: 12/19/2012    SC COLONOSCOPY FLX DX W/COLLJ SPEC WHEN PFRMD N/A 5/31/2018    Procedure: single balloon ENTEROSCOPY;  Surgeon: Celestino Brennan MD;  [facility]; Service: Gastroenterology    SC ESOPHAGOGASTRODUODENOSCOPY TRANSORAL DIAGNOSTIC N/A 11/29/2017    Procedure: EGD AND COLONOSCOPY;  Surgeon: Obi White MD;  [facility];   Service: Gastroenterology       Current Outpatient Medications:     atorvastatin (LIPITOR) 20 mg tablet, Take 1 tablet (20 mg total) by mouth daily, Disp: 90 tablet, Rfl: 3    fenofibrate micronized (LOFIBRA) 134 MG capsule, Take 1 capsule (134 mg total) by mouth daily with breakfast, Disp: 90 capsule, Rfl: 2    ferrous sulfate 324 (65 Fe) mg, Take 1 tablet (324 mg total) by mouth 2 (two) times a day before meals, Disp: 60 tablet, Rfl: 0    furosemide (LASIX) 40 mg tablet, Take 1 tablet (40 mg total) by mouth 2 (two) times a day, Disp: 180 tablet, Rfl: 3    gabapentin (NEURONTIN) 100 mg capsule, take 3 capsules by mouth at bedtime, Disp: 270 capsule, Rfl: 3    glipiZIDE (GLUCOTROL) 10 mg tablet, Take 1 tablet (10 mg total) by mouth 2 (two) times a day before meals, Disp: 180 tablet, Rfl: 2    glucose blood (FREESTYLE TEST STRIPS) test strip, by In Vitro route, Disp: , Rfl:     Lancets (FREESTYLE) lancets, by Does not apply route 2 (two) times a day, Disp: , Rfl:     levothyroxine 50 mcg tablet, take 1 tablet by mouth once daily, Disp: 90 tablet, Rfl: 3    lisinopril (ZESTRIL) 2 5 mg tablet, Take 1 tablet (2 5 mg total) by mouth daily, Disp: 90 tablet, Rfl: 3    metoprolol tartrate (LOPRESSOR) 50 mg tablet, take 1 tablet by mouth twice a day, Disp: 180 tablet, Rfl: 3    nitroglycerin (NITROSTAT) 0 4 mg SL tablet, Place 1 tablet (0 4 mg total) under the tongue every 5 (five) minutes as needed for chest pain, Disp: 90 tablet, Rfl: 0    warfarin (COUMADIN) 5 mg tablet, Take 1 tablet (5 mg total) by mouth daily, Disp: 90 tablet, Rfl: 3    pantoprazole (PROTONIX) 40 mg tablet, take 1 tablet by mouth twice a day (Patient not taking: Reported on 2/22/2019), Disp: 60 tablet, Rfl: 0    Current Facility-Administered Medications:     cyanocobalamin injection 1,000 mcg, 1,000 mcg, Intramuscular, Q30 Days, Meghana Lang MD, 1,000 mcg at 04/03/18 0947    nitroglycerin (NITROSTAT) SL tablet 0 4 mg, 0 4 mg, Sublingual, Q5 Min PRN, Meghana Lang MD  No Known Allergies    Labs:  Appointment on 12/28/2018   Component Date Value    WBC 12/28/2018 3 38*    RBC 12/28/2018 4 53     Hemoglobin 12/28/2018 13 0     Hematocrit 12/28/2018 41 0     MCV 12/28/2018 91     MCH 12/28/2018 28 7     MCHC 12/28/2018 31 7     RDW 12/28/2018 14 6     MPV 12/28/2018 11 5     Platelets 33/47/0400 251     nRBC 12/28/2018 0     Neutrophils Relative 12/28/2018 47     Immat GRANS % 12/28/2018 0     Lymphocytes Relative 12/28/2018 36     Monocytes Relative 12/28/2018 12     Eosinophils Relative 12/28/2018 4     Basophils Relative 12/28/2018 1     Neutrophils Absolute 12/28/2018 1 59*    Immature Grans Absolute 12/28/2018 0 01     Lymphocytes Absolute 12/28/2018 1 23     Monocytes Absolute 12/28/2018 0 40     Eosinophils Absolute 12/28/2018 0 12     Basophils Absolute 12/28/2018 0 03     Cholesterol 12/28/2018 131     Triglycerides 12/28/2018 188*    HDL, Direct 12/28/2018 28*    LDL Calculated 12/28/2018 65     Sodium 12/28/2018 140     Potassium 12/28/2018 4 5     Chloride 12/28/2018 106     CO2 12/28/2018 33*    ANION GAP 12/28/2018 1*    BUN 12/28/2018 30*    Creatinine 12/28/2018 1 58*    Glucose 12/28/2018 116     Calcium 12/28/2018 8 7     AST 12/28/2018 31     ALT 12/28/2018 31     Alkaline Phosphatase 12/28/2018 80     Total Protein 12/28/2018 7 7     Albumin 12/28/2018 3 6     Total Bilirubin 12/28/2018 0 50     eGFR 12/28/2018 31     TSH 3RD GENERATON 12/28/2018 2 300     Hemoglobin A1C 12/28/2018 6 8*    EAG 12/28/2018 148     Sed Rate 12/28/2018 12     Total CK 12/28/2018 162     Magnesium 12/28/2018 2 6     CK-MB Index 12/28/2018 1 7     CK-MB 12/28/2018 2 8    Orders Only on 12/28/2018   Component Date Value    Color, UA 12/28/2018 Yellow     Clarity, UA 12/28/2018 Clear     Specific Gravity, UA 12/28/2018 1 015     pH, UA 12/28/2018 7 0     Leukocytes, UA 12/28/2018 Negative     Nitrite, UA 12/28/2018 Negative     Protein, UA 12/28/2018 Negative     Glucose, UA 12/28/2018 Negative     Ketones, UA 12/28/2018 Negative     Urobilinogen, UA 12/28/2018 0 2     Bilirubin, UA 12/28/2018 Negative     Blood, UA 12/28/2018 Negative     Creatinine, Ur 12/28/2018 18 6     Microalbum  ,U,Random 12/28/2018 <5 0     Microalb Creat Ratio 12/28/2018 <27    Ancillary Orders on 12/28/2018   Component Date Value    Protime 12/28/2018 22 0*    INR 12/28/2018 1 91*     Imaging: No results found  Review of Systems:  Review of Systems   REVIEW OF SYSTEMS:  Constitutional:  Denies fever or chills   Eyes:  Denies change in visual acuity   HENT:  Denies nasal congestion or sore throat   Respiratory: shortness of breath   Cardiovascular:  Denies chest pain or edema   GI:  Denies abdominal pain, nausea, vomiting, bloody stools or diarrhea   :  Denies dysuria, frequency, difficulty in micturition and nocturia  Musculoskeletal:  Denies back pain or joint pain   Neurologic:  Denies headache, focal weakness or sensory changes   Endocrine:  Denies polyuria or polydipsia   Lymphatic:  Denies swollen glands   Psychiatric:  Denies depression or anxiety     Physical Exam:    /68   Pulse 60   Ht 5' 5 5" (1 664 m)   Wt 73 kg (161 lb)   SpO2 98%   BMI 26 38 kg/m²     Physical Exam  PHYSICAL EXAM:  General:  Patient is not in acute distress   Head: Normocephalic, Atraumatic  HEENT:  Both pupils normal-size atraumatic, normocephalic, nonicteric  Neck:  JVP not raised   Trachea central  No carotid bruit  Respiratory:  normal breath sounds no crackles  no rhonchi  Cardiovascular:  Regular rate and rhythm no S3 no murmurs  Heart sounds consistent with prosthetic mechanical valve  GI:  Abdomen soft nontender  No organomegaly  Lymphatic:  No cervical or inguinal lymphadenopathy  Neurologic:  Patient is awake alert, oriented   Grossly nonfocal      Discussion/Summary:  Patient with multiple medical problems who seems to be doing reasonably well from cardiac standpoint  Previous studies reviewed with patient  Medications reviewed and possible side effects discussed  concepts of cardiovascular disease , signs and symptoms of heart disease  Dietary and risk factor modification reinforced  All questions answered  Safety measures reviewed  Patient advised to report any problems prompting medical attention  Will decrease metoprolol to 25 mg twice a day to see if that helps her dizziness and lightheadedness  Symptoms water from cardiac standpoint which would indicate the need for further cardiac evaluation discussed with patient  Echocardiogram will be done to reassess ejection fraction and prosthetic mechanical aortic valve  Patient counseled about the regular PT INR measurements  Patient has been very sporadic and infrequent with her INRs  Continue antibiotic prophylaxis  All her medications were reviewed  Patient had a few questions which were answered  Follow-up in 6 months  Follow-up with primary care physician  Patient is agreeable with the plan of care

## 2019-03-04 DIAGNOSIS — D50.9 IRON DEFICIENCY ANEMIA, UNSPECIFIED IRON DEFICIENCY ANEMIA TYPE: ICD-10-CM

## 2019-03-04 RX ORDER — FERROUS SULFATE TAB EC 324 MG (65 MG FE EQUIVALENT) 324 (65 FE) MG
324 TABLET DELAYED RESPONSE ORAL
Qty: 60 TABLET | Refills: 0 | Status: SHIPPED | OUTPATIENT
Start: 2019-03-04 | End: 2019-05-16 | Stop reason: SDUPTHER

## 2019-03-05 ENCOUNTER — ANTICOAG VISIT (OUTPATIENT)
Dept: CARDIOLOGY CLINIC | Facility: CLINIC | Age: 79
End: 2019-03-05

## 2019-03-05 ENCOUNTER — APPOINTMENT (OUTPATIENT)
Dept: LAB | Facility: CLINIC | Age: 79
End: 2019-03-05
Payer: MEDICARE

## 2019-03-05 DIAGNOSIS — I35.9 AVD (AORTIC VALVE DISEASE): ICD-10-CM

## 2019-03-05 LAB
INR PPP: 2.28 (ref 0.86–1.17)
PROTHROMBIN TIME: 25.2 SECONDS (ref 11.8–14.2)

## 2019-03-05 PROCEDURE — 36415 COLL VENOUS BLD VENIPUNCTURE: CPT

## 2019-03-05 PROCEDURE — 85610 PROTHROMBIN TIME: CPT

## 2019-03-07 ENCOUNTER — HOSPITAL ENCOUNTER (OUTPATIENT)
Dept: NON INVASIVE DIAGNOSTICS | Facility: CLINIC | Age: 79
Discharge: HOME | End: 2019-03-07
Payer: MEDICARE

## 2019-03-07 DIAGNOSIS — I35.9 AORTIC VALVE DISORDER: ICD-10-CM

## 2019-03-07 PROCEDURE — 93306 TTE W/DOPPLER COMPLETE: CPT

## 2019-03-07 PROCEDURE — 93306 TTE W/DOPPLER COMPLETE: CPT | Performed by: INTERNAL MEDICINE

## 2019-03-08 NOTE — RESULT ENCOUNTER NOTE
S/w pts daughter and she verbally understood per Dr Carol Joseph echo has not changed from previous echo  Normal functioning prosthetic mechanical aortic valve  She is to continue present medications

## 2019-04-07 DIAGNOSIS — D50.9 IRON DEFICIENCY ANEMIA, UNSPECIFIED IRON DEFICIENCY ANEMIA TYPE: ICD-10-CM

## 2019-04-09 RX ORDER — FERROUS SULFATE TAB EC 324 MG (65 MG FE EQUIVALENT) 324 (65 FE) MG
TABLET DELAYED RESPONSE ORAL
Qty: 60 TABLET | Refills: 0 | Status: SHIPPED | OUTPATIENT
Start: 2019-04-09 | End: 2019-05-08

## 2019-04-11 ENCOUNTER — APPOINTMENT (OUTPATIENT)
Dept: LAB | Facility: CLINIC | Age: 79
End: 2019-04-11
Payer: MEDICARE

## 2019-04-11 ENCOUNTER — ANTICOAG VISIT (OUTPATIENT)
Dept: CARDIOLOGY CLINIC | Facility: CLINIC | Age: 79
End: 2019-04-11

## 2019-04-25 ENCOUNTER — ANTICOAG VISIT (OUTPATIENT)
Dept: CARDIOLOGY CLINIC | Facility: CLINIC | Age: 79
End: 2019-04-25

## 2019-04-25 ENCOUNTER — APPOINTMENT (OUTPATIENT)
Dept: LAB | Facility: CLINIC | Age: 79
End: 2019-04-25
Payer: MEDICARE

## 2019-04-26 ENCOUNTER — IN-CLINIC DEVICE VISIT (OUTPATIENT)
Dept: CARDIOLOGY CLINIC | Facility: CLINIC | Age: 79
End: 2019-04-26
Payer: MEDICARE

## 2019-04-26 DIAGNOSIS — I44.2 CHB (COMPLETE HEART BLOCK) (HCC): ICD-10-CM

## 2019-04-26 DIAGNOSIS — I48.0 PAROXYSMAL ATRIAL FIBRILLATION (HCC): Primary | ICD-10-CM

## 2019-04-26 DIAGNOSIS — Z95.0 PRESENCE OF PERMANENT CARDIAC PACEMAKER: ICD-10-CM

## 2019-04-26 PROCEDURE — 93280 PM DEVICE PROGR EVAL DUAL: CPT | Performed by: INTERNAL MEDICINE

## 2019-05-08 ENCOUNTER — TELEPHONE (OUTPATIENT)
Dept: INTERNAL MEDICINE CLINIC | Facility: CLINIC | Age: 79
End: 2019-05-08

## 2019-05-08 ENCOUNTER — OFFICE VISIT (OUTPATIENT)
Dept: INTERNAL MEDICINE CLINIC | Facility: CLINIC | Age: 79
End: 2019-05-08
Payer: MEDICARE

## 2019-05-08 ENCOUNTER — APPOINTMENT (OUTPATIENT)
Dept: LAB | Facility: CLINIC | Age: 79
End: 2019-05-08
Payer: MEDICARE

## 2019-05-08 VITALS
HEIGHT: 65 IN | SYSTOLIC BLOOD PRESSURE: 138 MMHG | BODY MASS INDEX: 26.49 KG/M2 | OXYGEN SATURATION: 96 % | HEART RATE: 61 BPM | DIASTOLIC BLOOD PRESSURE: 58 MMHG | WEIGHT: 159 LBS

## 2019-05-08 DIAGNOSIS — D50.9 IRON DEFICIENCY ANEMIA, UNSPECIFIED IRON DEFICIENCY ANEMIA TYPE: Primary | ICD-10-CM

## 2019-05-08 DIAGNOSIS — K55.8 OTHER VASCULAR DISORDERS OF INTESTINE (HCC): ICD-10-CM

## 2019-05-08 DIAGNOSIS — E78.2 COMBINED HYPERLIPIDEMIA: ICD-10-CM

## 2019-05-08 DIAGNOSIS — E11.40 TYPE 2 DIABETES MELLITUS WITH DIABETIC NEUROPATHY, WITHOUT LONG-TERM CURRENT USE OF INSULIN (HCC): ICD-10-CM

## 2019-05-08 DIAGNOSIS — E03.9 HYPOTHYROIDISM, UNSPECIFIED TYPE: ICD-10-CM

## 2019-05-08 DIAGNOSIS — I25.118 CORONARY ARTERY DISEASE OF NATIVE ARTERY OF NATIVE HEART WITH STABLE ANGINA PECTORIS (HCC): ICD-10-CM

## 2019-05-08 DIAGNOSIS — K21.9 GASTROESOPHAGEAL REFLUX DISEASE, ESOPHAGITIS PRESENCE NOT SPECIFIED: Primary | ICD-10-CM

## 2019-05-08 DIAGNOSIS — K55.21 AVM (ARTERIOVENOUS MALFORMATION) OF SMALL BOWEL, ACQUIRED WITH HEMORRHAGE: ICD-10-CM

## 2019-05-08 DIAGNOSIS — Z79.01 CHRONIC ANTICOAGULATION: ICD-10-CM

## 2019-05-08 DIAGNOSIS — K21.9 GASTROESOPHAGEAL REFLUX DISEASE, ESOPHAGITIS PRESENCE NOT SPECIFIED: ICD-10-CM

## 2019-05-08 LAB
ALBUMIN SERPL BCP-MCNC: 3.8 G/DL (ref 3.5–5)
ALP SERPL-CCNC: 137 U/L (ref 46–116)
ALT SERPL W P-5'-P-CCNC: 37 U/L (ref 12–78)
ANION GAP SERPL CALCULATED.3IONS-SCNC: 3 MMOL/L (ref 4–13)
AST SERPL W P-5'-P-CCNC: 30 U/L (ref 5–45)
BACTERIA UR QL AUTO: NORMAL /HPF
BASOPHILS # BLD AUTO: 0.03 THOUSANDS/ΜL (ref 0–0.1)
BASOPHILS NFR BLD AUTO: 1 % (ref 0–1)
BILIRUB SERPL-MCNC: 0.5 MG/DL (ref 0.2–1)
BILIRUB UR QL STRIP: NEGATIVE
BUN SERPL-MCNC: 34 MG/DL (ref 5–25)
CALCIUM SERPL-MCNC: 8.6 MG/DL (ref 8.3–10.1)
CHLORIDE SERPL-SCNC: 104 MMOL/L (ref 100–108)
CHOLEST SERPL-MCNC: 143 MG/DL (ref 50–200)
CLARITY UR: CLEAR
CO2 SERPL-SCNC: 31 MMOL/L (ref 21–32)
COLOR UR: YELLOW
CREAT SERPL-MCNC: 1.62 MG/DL (ref 0.6–1.3)
CREAT UR-MCNC: 32.4 MG/DL
EOSINOPHIL # BLD AUTO: 0.11 THOUSAND/ΜL (ref 0–0.61)
EOSINOPHIL NFR BLD AUTO: 3 % (ref 0–6)
ERYTHROCYTE [DISTWIDTH] IN BLOOD BY AUTOMATED COUNT: 14.2 % (ref 11.6–15.1)
EST. AVERAGE GLUCOSE BLD GHB EST-MCNC: 143 MG/DL
GFR SERPL CREATININE-BSD FRML MDRD: 30 ML/MIN/1.73SQ M
GLUCOSE P FAST SERPL-MCNC: 150 MG/DL (ref 65–99)
GLUCOSE UR STRIP-MCNC: NEGATIVE MG/DL
HBA1C MFR BLD: 6.6 % (ref 4.2–6.3)
HCT VFR BLD AUTO: 44.7 % (ref 34.8–46.1)
HDLC SERPL-MCNC: 34 MG/DL (ref 40–60)
HGB BLD-MCNC: 14.2 G/DL (ref 11.5–15.4)
HGB UR QL STRIP.AUTO: NEGATIVE
HYALINE CASTS #/AREA URNS LPF: NORMAL /LPF
IMM GRANULOCYTES # BLD AUTO: 0.01 THOUSAND/UL (ref 0–0.2)
IMM GRANULOCYTES NFR BLD AUTO: 0 % (ref 0–2)
KETONES UR STRIP-MCNC: NEGATIVE MG/DL
LDLC SERPL CALC-MCNC: 71 MG/DL (ref 0–100)
LEUKOCYTE ESTERASE UR QL STRIP: ABNORMAL
LYMPHOCYTES # BLD AUTO: 1.11 THOUSANDS/ΜL (ref 0.6–4.47)
LYMPHOCYTES NFR BLD AUTO: 33 % (ref 14–44)
MCH RBC QN AUTO: 29.1 PG (ref 26.8–34.3)
MCHC RBC AUTO-ENTMCNC: 31.8 G/DL (ref 31.4–37.4)
MCV RBC AUTO: 92 FL (ref 82–98)
MICROALBUMIN UR-MCNC: 8.9 MG/L (ref 0–20)
MICROALBUMIN/CREAT 24H UR: 27 MG/G CREATININE (ref 0–30)
MONOCYTES # BLD AUTO: 0.49 THOUSAND/ΜL (ref 0.17–1.22)
MONOCYTES NFR BLD AUTO: 14 % (ref 4–12)
NEUTROPHILS # BLD AUTO: 1.67 THOUSANDS/ΜL (ref 1.85–7.62)
NEUTS SEG NFR BLD AUTO: 49 % (ref 43–75)
NITRITE UR QL STRIP: NEGATIVE
NON-SQ EPI CELLS URNS QL MICRO: NORMAL /HPF
NRBC BLD AUTO-RTO: 0 /100 WBCS
PH UR STRIP.AUTO: 6 [PH]
PLATELET # BLD AUTO: 248 THOUSANDS/UL (ref 149–390)
PMV BLD AUTO: 11.1 FL (ref 8.9–12.7)
POTASSIUM SERPL-SCNC: 4.3 MMOL/L (ref 3.5–5.3)
PROT SERPL-MCNC: 7.9 G/DL (ref 6.4–8.2)
PROT UR STRIP-MCNC: NEGATIVE MG/DL
RBC # BLD AUTO: 4.88 MILLION/UL (ref 3.81–5.12)
RBC #/AREA URNS AUTO: NORMAL /HPF
SODIUM SERPL-SCNC: 138 MMOL/L (ref 136–145)
SP GR UR STRIP.AUTO: 1.01 (ref 1–1.03)
TRIGL SERPL-MCNC: 188 MG/DL
TSH SERPL DL<=0.05 MIU/L-ACNC: 2.99 UIU/ML (ref 0.36–3.74)
UROBILINOGEN UR QL STRIP.AUTO: 0.2 E.U./DL
WBC # BLD AUTO: 3.42 THOUSAND/UL (ref 4.31–10.16)
WBC #/AREA URNS AUTO: NORMAL /HPF

## 2019-05-08 PROCEDURE — 84443 ASSAY THYROID STIM HORMONE: CPT

## 2019-05-08 PROCEDURE — 85025 COMPLETE CBC W/AUTO DIFF WBC: CPT

## 2019-05-08 PROCEDURE — 83036 HEMOGLOBIN GLYCOSYLATED A1C: CPT

## 2019-05-08 PROCEDURE — 82043 UR ALBUMIN QUANTITATIVE: CPT | Performed by: INTERNAL MEDICINE

## 2019-05-08 PROCEDURE — 82570 ASSAY OF URINE CREATININE: CPT | Performed by: INTERNAL MEDICINE

## 2019-05-08 PROCEDURE — 81001 URINALYSIS AUTO W/SCOPE: CPT | Performed by: INTERNAL MEDICINE

## 2019-05-08 PROCEDURE — 80053 COMPREHEN METABOLIC PANEL: CPT

## 2019-05-08 PROCEDURE — 80061 LIPID PANEL: CPT

## 2019-05-08 PROCEDURE — 99214 OFFICE O/P EST MOD 30 MIN: CPT | Performed by: INTERNAL MEDICINE

## 2019-05-08 PROCEDURE — 36415 COLL VENOUS BLD VENIPUNCTURE: CPT

## 2019-05-08 PROCEDURE — G0439 PPPS, SUBSEQ VISIT: HCPCS | Performed by: INTERNAL MEDICINE

## 2019-05-08 RX ORDER — PANTOPRAZOLE SODIUM 40 MG/1
40 TABLET, DELAYED RELEASE ORAL
Qty: 90 TABLET | Refills: 3 | Status: SHIPPED | OUTPATIENT
Start: 2019-05-08 | End: 2020-04-28

## 2019-05-08 RX ORDER — GABAPENTIN 300 MG/1
300 CAPSULE ORAL 3 TIMES DAILY
Qty: 270 CAPSULE | Refills: 3 | Status: SHIPPED | OUTPATIENT
Start: 2019-05-08 | End: 2020-05-20

## 2019-05-16 DIAGNOSIS — D50.9 IRON DEFICIENCY ANEMIA, UNSPECIFIED IRON DEFICIENCY ANEMIA TYPE: ICD-10-CM

## 2019-05-16 RX ORDER — FERROUS SULFATE TAB EC 324 MG (65 MG FE EQUIVALENT) 324 (65 FE) MG
324 TABLET DELAYED RESPONSE ORAL
Qty: 60 TABLET | Refills: 2 | Status: SHIPPED | OUTPATIENT
Start: 2019-05-16 | End: 2019-09-17 | Stop reason: SDUPTHER

## 2019-05-28 ENCOUNTER — TELEPHONE (OUTPATIENT)
Dept: CARDIOLOGY CLINIC | Facility: CLINIC | Age: 79
End: 2019-05-28

## 2019-05-31 ENCOUNTER — APPOINTMENT (OUTPATIENT)
Dept: LAB | Facility: CLINIC | Age: 79
End: 2019-05-31
Payer: MEDICARE

## 2019-05-31 ENCOUNTER — ANTICOAG VISIT (OUTPATIENT)
Dept: CARDIOLOGY CLINIC | Facility: CLINIC | Age: 79
End: 2019-05-31

## 2019-05-31 ENCOUNTER — TRANSCRIBE ORDERS (OUTPATIENT)
Dept: LAB | Facility: CLINIC | Age: 79
End: 2019-05-31

## 2019-05-31 DIAGNOSIS — C71.9: ICD-10-CM

## 2019-05-31 DIAGNOSIS — C71.9: Primary | ICD-10-CM

## 2019-05-31 PROCEDURE — 36415 COLL VENOUS BLD VENIPUNCTURE: CPT

## 2019-06-07 ENCOUNTER — OFFICE VISIT (OUTPATIENT)
Dept: INTERNAL MEDICINE CLINIC | Facility: CLINIC | Age: 79
End: 2019-06-07
Payer: MEDICARE

## 2019-06-07 VITALS
DIASTOLIC BLOOD PRESSURE: 62 MMHG | SYSTOLIC BLOOD PRESSURE: 124 MMHG | BODY MASS INDEX: 26.36 KG/M2 | OXYGEN SATURATION: 98 % | WEIGHT: 158.2 LBS | HEART RATE: 60 BPM | HEIGHT: 65 IN

## 2019-06-07 DIAGNOSIS — J41.0 SIMPLE CHRONIC BRONCHITIS (HCC): ICD-10-CM

## 2019-06-07 DIAGNOSIS — T78.3XXA ANGIOEDEMA, INITIAL ENCOUNTER: Primary | ICD-10-CM

## 2019-06-07 PROCEDURE — 99495 TRANSJ CARE MGMT MOD F2F 14D: CPT | Performed by: INTERNAL MEDICINE

## 2019-06-07 RX ORDER — METHYLPREDNISOLONE 4 MG/1
TABLET ORAL
Qty: 1 EACH | Refills: 0 | Status: SHIPPED | OUTPATIENT
Start: 2019-06-07 | End: 2019-11-25 | Stop reason: ALTCHOICE

## 2019-06-07 RX ORDER — AMOXICILLIN AND CLAVULANATE POTASSIUM 875; 125 MG/1; MG/1
TABLET, FILM COATED ORAL
Refills: 0 | COMMUNITY
Start: 2019-05-27 | End: 2019-06-07

## 2019-06-07 RX ORDER — LACTOBACILLUS ACIDOPH-L.BULGARICUS 1 MILLION CELL CHEWABLE TABLET 1MM CELL
1 TABLET,CHEWABLE ORAL DAILY
Refills: 0 | COMMUNITY
Start: 2019-05-27 | End: 2020-08-11 | Stop reason: ALTCHOICE

## 2019-06-07 RX ORDER — METHYLPREDNISOLONE 4 MG/1
TABLET ORAL
Refills: 0 | COMMUNITY
Start: 2019-05-27 | End: 2019-06-07 | Stop reason: SDUPTHER

## 2019-06-14 ENCOUNTER — APPOINTMENT (OUTPATIENT)
Dept: LAB | Facility: CLINIC | Age: 79
End: 2019-06-14
Payer: MEDICARE

## 2019-06-14 ENCOUNTER — ANTICOAG VISIT (OUTPATIENT)
Dept: CARDIOLOGY CLINIC | Facility: CLINIC | Age: 79
End: 2019-06-14

## 2019-08-07 ENCOUNTER — REMOTE DEVICE CLINIC VISIT (OUTPATIENT)
Dept: CARDIOLOGY CLINIC | Facility: CLINIC | Age: 79
End: 2019-08-07
Payer: MEDICARE

## 2019-08-07 ENCOUNTER — TELEPHONE (OUTPATIENT)
Dept: CARDIOLOGY CLINIC | Facility: CLINIC | Age: 79
End: 2019-08-07

## 2019-08-07 DIAGNOSIS — Z95.0 PRESENCE OF PERMANENT CARDIAC PACEMAKER: Primary | ICD-10-CM

## 2019-08-07 PROCEDURE — 93296 REM INTERROG EVL PM/IDS: CPT | Performed by: INTERNAL MEDICINE

## 2019-08-07 PROCEDURE — 93294 REM INTERROG EVL PM/LDLS PM: CPT | Performed by: INTERNAL MEDICINE

## 2019-08-07 NOTE — PROGRESS NOTES
SJM DUAL CHAMBER PM  MERLIN TRANSMISSION:  BATTERY VOLTAGE ADEQUATE (9 7 YR)   AP 62%  99%   ALL AVAILABLE LEAD PARAMETERS WITHIN NORMAL LIMITS   2 AMS EPISODES WITH EGMS SHOWING AT/AF WITH LONGEST EPISODE 2 HR 39 MIN  [facility] & ON WARFARIN, ASA, AND METOPROLOL   NORMAL DEVICE FUNCTION   RG

## 2019-09-17 DIAGNOSIS — D50.9 IRON DEFICIENCY ANEMIA, UNSPECIFIED IRON DEFICIENCY ANEMIA TYPE: ICD-10-CM

## 2019-09-17 RX ORDER — FERROUS SULFATE TAB EC 324 MG (65 MG FE EQUIVALENT) 324 (65 FE) MG
324 TABLET DELAYED RESPONSE ORAL
Qty: 60 TABLET | Refills: 2 | Status: SHIPPED | OUTPATIENT
Start: 2019-09-17 | End: 2020-01-06 | Stop reason: SDUPTHER

## 2019-09-27 DIAGNOSIS — E03.9 HYPOTHYROIDISM, UNSPECIFIED TYPE: ICD-10-CM

## 2019-09-30 RX ORDER — LEVOTHYROXINE SODIUM 0.05 MG/1
TABLET ORAL
Qty: 90 TABLET | Refills: 3 | Status: SHIPPED | OUTPATIENT
Start: 2019-09-30 | End: 2020-09-23

## 2019-10-16 ENCOUNTER — TELEPHONE (OUTPATIENT)
Dept: CARDIOLOGY CLINIC | Facility: CLINIC | Age: 79
End: 2019-10-16

## 2019-10-16 NOTE — TELEPHONE ENCOUNTER
Called pt to remind her she is overdue for her INR  I also reminded her of the importance of close monitoring and management  She said she will go Friday

## 2019-10-18 ENCOUNTER — APPOINTMENT (OUTPATIENT)
Dept: LAB | Facility: CLINIC | Age: 79
End: 2019-10-18
Payer: MEDICARE

## 2019-10-18 ENCOUNTER — ANTICOAG VISIT (OUTPATIENT)
Dept: CARDIOLOGY CLINIC | Facility: CLINIC | Age: 79
End: 2019-10-18

## 2019-11-06 ENCOUNTER — REMOTE DEVICE CLINIC VISIT (OUTPATIENT)
Dept: CARDIOLOGY CLINIC | Facility: CLINIC | Age: 79
End: 2019-11-06
Payer: MEDICARE

## 2019-11-06 DIAGNOSIS — Z95.0 PRESENCE OF PERMANENT CARDIAC PACEMAKER: Primary | ICD-10-CM

## 2019-11-06 PROCEDURE — 93296 REM INTERROG EVL PM/IDS: CPT | Performed by: INTERNAL MEDICINE

## 2019-11-06 PROCEDURE — 93294 REM INTERROG EVL PM/LDLS PM: CPT | Performed by: INTERNAL MEDICINE

## 2019-11-06 NOTE — PROGRESS NOTES
SJM DUAL CHAMBER PM  MERLIN TRANSMISSION:  BATTERY VOLTAGE ADEQUATE (9 7 YR)    AP 63%  99% (CHB)   ALL LEAD PARAMETERS WITHIN NORMAL LIMITS   NO SIGNIFICANT HIGH RATE EPISODES   NORMAL DEVICE FUNCTION   RG

## 2019-11-13 ENCOUNTER — ANTICOAG VISIT (OUTPATIENT)
Dept: CARDIOLOGY CLINIC | Facility: CLINIC | Age: 79
End: 2019-11-13

## 2019-11-13 ENCOUNTER — APPOINTMENT (OUTPATIENT)
Dept: LAB | Facility: CLINIC | Age: 79
End: 2019-11-13
Payer: MEDICARE

## 2019-11-22 DIAGNOSIS — E11.9 TYPE 2 DIABETES MELLITUS WITHOUT COMPLICATION, WITHOUT LONG-TERM CURRENT USE OF INSULIN (HCC): ICD-10-CM

## 2019-11-22 RX ORDER — GLIPIZIDE 10 MG/1
TABLET ORAL
Qty: 180 TABLET | Refills: 2 | Status: SHIPPED | OUTPATIENT
Start: 2019-11-22 | End: 2020-09-14

## 2019-11-25 ENCOUNTER — OFFICE VISIT (OUTPATIENT)
Dept: CARDIOLOGY CLINIC | Facility: CLINIC | Age: 79
End: 2019-11-25
Payer: MEDICARE

## 2019-11-25 VITALS
HEART RATE: 62 BPM | BODY MASS INDEX: 27.49 KG/M2 | OXYGEN SATURATION: 97 % | HEIGHT: 65 IN | DIASTOLIC BLOOD PRESSURE: 64 MMHG | SYSTOLIC BLOOD PRESSURE: 132 MMHG | WEIGHT: 165 LBS

## 2019-11-25 DIAGNOSIS — Z79.01 CHRONIC ANTICOAGULATION: ICD-10-CM

## 2019-11-25 DIAGNOSIS — I35.9 AVD (AORTIC VALVE DISEASE): ICD-10-CM

## 2019-11-25 DIAGNOSIS — I25.10 CAD IN NATIVE ARTERY: ICD-10-CM

## 2019-11-25 DIAGNOSIS — M17.0 PRIMARY OSTEOARTHRITIS OF BOTH KNEES: Primary | ICD-10-CM

## 2019-11-25 DIAGNOSIS — I10 HYPERTENSION, ESSENTIAL: ICD-10-CM

## 2019-11-25 PROCEDURE — 99214 OFFICE O/P EST MOD 30 MIN: CPT | Performed by: INTERNAL MEDICINE

## 2019-11-25 NOTE — PROGRESS NOTES
[facility] [place]  [address] [facility] [address]  Cardiology Follow Up    Sterling Lee  1940  [id number]      1  Primary osteoarthritis of both knees  Walker rolling   2  CAD in native artery     3  AVD (aortic valve disease)     4  Hypertension, essential     5  Chronic anticoagulation         Chief Complaint   Patient presents with    Follow-up    Hypertension       Interval History:  Patient presents for follow-up visit  Patient does have some shortness of breath with exertion  Patient denies any history of chest pain  No history of leg edema orthopnea PND  No history of bleeding issues  She states that she has been compliant with all her present medications  Patient Active Problem List   Diagnosis    Combined hyperlipidemia    Chronic anticoagulation    Hypertension, essential    Coronary artery disease of native artery of native heart with stable angina pectoris (HCC)    [facility]    Diabetes mellitus with neurological manifestation (HCC)    Hypothyroidism    Iron deficiency    Renal function impairment    GERD (gastroesophageal reflux disease)    Chronic gastritis with bleeding    AVM (arteriovenous malformation) of small bowel, acquired with hemorrhage    Angiectasia    Other vascular disorders of intestine (HCC)    Angioedema     Past Medical History:   Diagnosis Date    Anemia     Aneurysm of thoracic aorta (HCC)     Aortic valve disorder     Benign neoplasm of sigmoid colon     Cardiomyopathy ([facility] )     last assessed: 10/10/2014    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HCC)     last assessed: 10/10/2014    RAMON (dyspnea on exertion)     GERD (gastroesophageal reflux disease)     History of emphysema     Hyperlipidemia     Pneumonia     TIA (transient ischemic attack)      Social History     Socioeconomic History    Marital status:       Spouse name: Not on file    Number of children: Not on file    Years of education: Not on file    Highest education level: Not on file   Occupational History    Not on file   Social Needs    Financial resource strain: Not on file    Food insecurity:     Worry: Not on file     Inability: Not on file    Transportation needs:     Medical: Not on file     Non-medical: Not on file   Tobacco Use    Smoking status: Former Smoker     Packs/day: 1 00     Years: 50 00     Pack years: 50 00     Last attempt to quit: 2007     Years since quittin 9    Smokeless tobacco: Former User     Quit date:    Substance and Sexual Activity    Alcohol use: No    Drug use: No    Sexual activity: Not on file   Lifestyle    Physical activity:     Days per week: Not on file     Minutes per session: Not on file    Stress: Not on file   Relationships    Social connections:     Talks on phone: Not on file     Gets together: Not on file     Attends Rastafari service: Not on file     Active member of club or organization: Not on file     Attends meetings of clubs or organizations: Not on file     Relationship status: Not on file    Intimate partner violence:     Fear of current or ex partner: Not on file     Emotionally abused: Not on file     Physically abused: Not on file     Forced sexual activity: Not on file   Other Topics Concern    Not on file   Social History Narrative    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 BID Freestyle lancets BID    Living independently with spouse      Family History   Problem Relation Age of Onset    No Known Problems Mother     No Known Problems Father      Past Surgical History:   Procedure Laterality Date    AORTIC VALVE REPLACEMENT      CARDIAC PACEMAKER PLACEMENT      last assessed: 10/10/2014    CARDIAC SURGERY      aortic valve replacement    CHOLECYSTECTOMY      COLONOSCOPY      HYSTERECTOMY      INSERT / REPLACE / [address] Right     OTHER SURGICAL HISTORY      Capsule ENDOscopy description: 12/19/2012    AK COLONOSCOPY FLX DX W/COLLJ SPEC WHEN PFRMD N/A 5/31/2018    Procedure: single balloon ENTEROSCOPY;  Surgeon: Diane Michele MD;  [facility]; Service: Gastroenterology    AK ESOPHAGOGASTRODUODENOSCOPY TRANSORAL DIAGNOSTIC N/A 11/29/2017    Procedure: EGD AND COLONOSCOPY;  Surgeon: Franky Culver MD;  [facility];   Service: Gastroenterology       Current Outpatient Medications:     atorvastatin (LIPITOR) 20 mg tablet, Take 1 tablet (20 mg total) by mouth daily, Disp: 90 tablet, Rfl: 3    fenofibrate micronized (LOFIBRA) 134 MG capsule, Take 1 capsule (134 mg total) by mouth daily with breakfast, Disp: 90 capsule, Rfl: 2    ferrous sulfate 324 (65 Fe) mg, Take 1 tablet (324 mg total) by mouth 2 (two) times a day before meals, Disp: 60 tablet, Rfl: 2    furosemide (LASIX) 40 mg tablet, 1 tab daily, Disp: 90 tablet, Rfl: 3    gabapentin (NEURONTIN) 300 mg capsule, Take 1 capsule (300 mg total) by mouth 3 (three) times a day, Disp: 270 capsule, Rfl: 3    glipiZIDE (GLUCOTROL) 10 mg tablet, take 1 tablet by mouth twice a day before meals, Disp: 180 tablet, Rfl: 2    glucose blood (FREESTYLE TEST STRIPS) test strip, by In Vitro route, Disp: , Rfl:     lactobacillus acidophilus-bulgaricus (LACTINEX) chewable tablet, Chew 1 tablet daily, Disp: , Rfl: 0    Lancets (FREESTYLE) lancets, by Does not apply route 2 (two) times a day, Disp: , Rfl:     levothyroxine 50 mcg tablet, take 1 tablet by mouth once daily, Disp: 90 tablet, Rfl: 3    metoprolol tartrate (LOPRESSOR) 50 mg tablet, 1/2 tab twice a day, Disp: 90 tablet, Rfl: 3    nitroglycerin (NITROSTAT) 0 4 mg SL tablet, Place 1 tablet (0 4 mg total) under the tongue every 5 (five) minutes as needed for chest pain, Disp: 90 tablet, Rfl: 0    pantoprazole (PROTONIX) 40 mg tablet, Take 1 tablet (40 mg total) by mouth daily before breakfast, Disp: 90 tablet, Rfl: 3    warfarin (COUMADIN) 5 mg tablet, Take 1 tablet (5 mg total) by mouth daily, Disp: 90 tablet, Rfl: 3  No Known Allergies    Labs: Ancillary Orders on 11/01/2019   Component Date Value    Protime 11/13/2019 26 3*    INR 11/13/2019 2 48*   Ancillary Orders on 10/18/2019   Component Date Value    Protime 10/18/2019 33 7*    INR 10/18/2019 3 39*     Imaging: No results found  Review of Systems:  Review of Systems   REVIEW OF SYSTEMS:  Constitutional:  Denies fever or chills   Eyes:  Denies change in visual acuity   HENT:  Denies nasal congestion or sore throat   Respiratory:   shortness of breath   Cardiovascular:  Denies chest pain or edema   GI:  Denies abdominal pain, nausea, vomiting, bloody stools or diarrhea   :  Denies dysuria, frequency, difficulty in micturition and nocturia  Musculoskeletal:  DJD  Neurologic:  Denies headache, focal weakness or sensory changes   Endocrine:  Denies polyuria or polydipsia   Lymphatic:  Denies swollen glands   Psychiatric:  Denies depression or anxiety   Physical Exam:    /64   Pulse 62   Ht 5' 5" (1 651 m)   Wt 74 8 kg (165 lb)   SpO2 97%   BMI 27 46 kg/m²     Physical Exam   PHYSICAL EXAM:  General:  Patient is not in acute distress   Head: Normocephalic, Atraumatic  HEENT:  Both pupils normal-size atraumatic, normocephalic, nonicteric  Neck:  JVP not raised  Trachea central  No carotid bruit  Respiratory:  normal breath sounds no crackles  no rhonchi  Cardiovascular:  Regular rate and rhythm no S3 no murmurs  Heart sounds consistent with prosthetic mechanical valve  GI:  Abdomen soft nontender  No organomegaly  Lymphatic:  No cervical or inguinal lymphadenopathy  Neurologic:  Patient is awake alert, oriented   Grossly nonfocal  Extremities no edema    Discussion/Summary:  Patient with multiple medical problems who seems to be doing reasonably well from cardiac standpoint  Previous studies reviewed with patient  Medications reviewed and possible side effects discussed   concepts of cardiovascular disease , signs and symptoms of heart disease  Dietary and risk factor modification reinforced  All questions answered  Safety measures reviewed  Patient advised to report any problems prompting medical attention  Given symptoms of shortness of breath and multiple risk factors for coronary artery disease, options of pharmacological nuclear stress test discussed with the patient  Patient declines any cardiac evaluation for this at this time  Patient did have an echocardiogram earlier this year which was reviewed with the patient  This showed mildly reduced LV systolic function with normally functioning prosthetic mechanical aortic valve  Antibiotic prophylaxis to continue  Patient will continue anticoagulation  Patient report any bleeding issues  Overall conservative management based on patient's preferences  Follow-up with primary care physician  Followup in 6 months or earlier as needed

## 2019-12-04 ENCOUNTER — TELEPHONE (OUTPATIENT)
Dept: CARDIOLOGY CLINIC | Facility: CLINIC | Age: 79
End: 2019-12-04

## 2019-12-04 DIAGNOSIS — M15.9 PRIMARY OSTEOARTHRITIS INVOLVING MULTIPLE JOINTS: Primary | ICD-10-CM

## 2019-12-04 NOTE — TELEPHONE ENCOUNTER
Dr Lay Tellez wrote a script for pt to get a rolling walker but pt needs a rollator which inc a seat & a braking system   Fax the script to [facility] at [phone]

## 2019-12-04 NOTE — TELEPHONE ENCOUNTER
Dr Albina Bedolla please put a order in for a walker wheels misc add into comment with seat and brakes

## 2019-12-13 DIAGNOSIS — I35.9 AORTIC VALVE DISORDER: ICD-10-CM

## 2019-12-13 DIAGNOSIS — E78.2 COMBINED HYPERLIPIDEMIA: ICD-10-CM

## 2019-12-13 RX ORDER — ATORVASTATIN CALCIUM 20 MG/1
20 TABLET, FILM COATED ORAL DAILY
Qty: 90 TABLET | Refills: 3 | Status: SHIPPED | OUTPATIENT
Start: 2019-12-13 | End: 2020-06-25

## 2019-12-13 RX ORDER — WARFARIN SODIUM 5 MG/1
5 TABLET ORAL
Qty: 90 TABLET | Refills: 3 | Status: SHIPPED | OUTPATIENT
Start: 2019-12-13 | End: 2020-08-28 | Stop reason: SDUPTHER

## 2019-12-30 DIAGNOSIS — E78.2 COMBINED HYPERLIPIDEMIA: ICD-10-CM

## 2019-12-30 RX ORDER — FENOFIBRATE 134 MG/1
CAPSULE ORAL
Qty: 90 CAPSULE | Refills: 3 | Status: SHIPPED | OUTPATIENT
Start: 2019-12-30 | End: 2020-06-25

## 2020-01-06 DIAGNOSIS — D50.9 IRON DEFICIENCY ANEMIA, UNSPECIFIED IRON DEFICIENCY ANEMIA TYPE: ICD-10-CM

## 2020-01-06 RX ORDER — FERROUS SULFATE TAB EC 324 MG (65 MG FE EQUIVALENT) 324 (65 FE) MG
324 TABLET DELAYED RESPONSE ORAL
Qty: 60 TABLET | Refills: 2 | Status: SHIPPED | OUTPATIENT
Start: 2020-01-06 | End: 2020-04-17

## 2020-01-29 DIAGNOSIS — I10 HYPERTENSION, ESSENTIAL: ICD-10-CM

## 2020-01-29 RX ORDER — METOPROLOL TARTRATE 50 MG/1
TABLET, FILM COATED ORAL
Qty: 90 TABLET | Refills: 3
Start: 2020-01-29 | End: 2020-01-31 | Stop reason: SDUPTHER

## 2020-01-31 NOTE — TELEPHONE ENCOUNTER
Patient called and stated the medication that she requested has not reached the pharmacy, Metoprolol 50 mg  It looks like medication was sent to pharmacy   Please Advise

## 2020-02-01 RX ORDER — METOPROLOL TARTRATE 50 MG/1
TABLET, FILM COATED ORAL
Qty: 90 TABLET | Refills: 3 | Status: SHIPPED | OUTPATIENT
Start: 2020-02-01 | End: 2021-02-03 | Stop reason: SDUPTHER

## 2020-02-05 ENCOUNTER — TELEPHONE (OUTPATIENT)
Dept: CARDIOLOGY CLINIC | Facility: CLINIC | Age: 80
End: 2020-02-05

## 2020-02-05 ENCOUNTER — REMOTE DEVICE CLINIC VISIT (OUTPATIENT)
Dept: CARDIOLOGY CLINIC | Facility: CLINIC | Age: 80
End: 2020-02-05
Payer: MEDICARE

## 2020-02-05 DIAGNOSIS — Z95.0 PRESENCE OF PERMANENT CARDIAC PACEMAKER: Primary | ICD-10-CM

## 2020-02-05 PROCEDURE — 93294 REM INTERROG EVL PM/LDLS PM: CPT | Performed by: INTERNAL MEDICINE

## 2020-02-05 PROCEDURE — 93296 REM INTERROG EVL PM/IDS: CPT | Performed by: INTERNAL MEDICINE

## 2020-02-05 NOTE — PROGRESS NOTES
[facility] DUAL CHAMBER PM  MERLIN TRANSMISSION:  BATTERY VOLTAGE ADEQUATE (9 8 YR)    AP 66%  >99% ([facility])   ALL LEAD PARAMETERS WITHIN NORMAL LIMITS   NO SIGNIFICANT HIGH RATE EPISODES   NORMAL DEVICE FUNCTION   RG

## 2020-02-05 NOTE — TELEPHONE ENCOUNTER
Called pt and lm with son Katie Gonzalez to remind her she is over due to have INR  Levels checked  He said he will give her the message

## 2020-02-07 ENCOUNTER — APPOINTMENT (OUTPATIENT)
Dept: LAB | Facility: CLINIC | Age: 80
End: 2020-02-07
Payer: MEDICARE

## 2020-02-07 ENCOUNTER — ANTICOAG VISIT (OUTPATIENT)
Dept: CARDIOLOGY CLINIC | Facility: CLINIC | Age: 80
End: 2020-02-07

## 2020-04-17 DIAGNOSIS — D50.9 IRON DEFICIENCY ANEMIA, UNSPECIFIED IRON DEFICIENCY ANEMIA TYPE: ICD-10-CM

## 2020-04-17 RX ORDER — FERROUS SULFATE 325(65) MG
TABLET ORAL
Qty: 60 TABLET | Refills: 2 | Status: SHIPPED | OUTPATIENT
Start: 2020-04-17 | End: 2020-07-23

## 2020-04-28 DIAGNOSIS — K21.9 GASTROESOPHAGEAL REFLUX DISEASE, ESOPHAGITIS PRESENCE NOT SPECIFIED: ICD-10-CM

## 2020-04-28 RX ORDER — PANTOPRAZOLE SODIUM 40 MG/1
TABLET, DELAYED RELEASE ORAL
Qty: 90 TABLET | Refills: 3 | Status: SHIPPED | OUTPATIENT
Start: 2020-04-28 | End: 2021-04-23

## 2020-05-13 ENCOUNTER — REMOTE DEVICE CLINIC VISIT (OUTPATIENT)
Dept: CARDIOLOGY CLINIC | Facility: CLINIC | Age: 80
End: 2020-05-13
Payer: MEDICARE

## 2020-05-13 DIAGNOSIS — Z95.0 CARDIAC PACEMAKER IN SITU: Primary | ICD-10-CM

## 2020-05-13 PROCEDURE — 93294 REM INTERROG EVL PM/LDLS PM: CPT | Performed by: INTERNAL MEDICINE

## 2020-05-13 PROCEDURE — 93296 REM INTERROG EVL PM/IDS: CPT | Performed by: INTERNAL MEDICINE

## 2020-05-18 ENCOUNTER — TELEPHONE (OUTPATIENT)
Dept: INTERNAL MEDICINE CLINIC | Facility: CLINIC | Age: 80
End: 2020-05-18

## 2020-05-18 ENCOUNTER — OFFICE VISIT (OUTPATIENT)
Dept: INTERNAL MEDICINE CLINIC | Facility: CLINIC | Age: 80
End: 2020-05-18
Payer: MEDICARE

## 2020-05-18 VITALS
HEIGHT: 65 IN | SYSTOLIC BLOOD PRESSURE: 130 MMHG | BODY MASS INDEX: 27.32 KG/M2 | WEIGHT: 164 LBS | DIASTOLIC BLOOD PRESSURE: 70 MMHG | HEART RATE: 61 BPM | OXYGEN SATURATION: 96 %

## 2020-05-18 DIAGNOSIS — E11.40 TYPE 2 DIABETES MELLITUS WITH DIABETIC NEUROPATHY, WITHOUT LONG-TERM CURRENT USE OF INSULIN (HCC): Primary | ICD-10-CM

## 2020-05-18 DIAGNOSIS — I10 HYPERTENSION, ESSENTIAL: ICD-10-CM

## 2020-05-18 DIAGNOSIS — I25.118 CORONARY ARTERY DISEASE OF NATIVE ARTERY OF NATIVE HEART WITH STABLE ANGINA PECTORIS (HCC): ICD-10-CM

## 2020-05-18 DIAGNOSIS — I25.5 ISCHEMIC CARDIOMYOPATHY: ICD-10-CM

## 2020-05-18 DIAGNOSIS — M94.0 COSTOCHONDRITIS: ICD-10-CM

## 2020-05-18 DIAGNOSIS — M79.10 PAIN IN THE MUSCLES: ICD-10-CM

## 2020-05-18 DIAGNOSIS — E03.9 ACQUIRED HYPOTHYROIDISM: ICD-10-CM

## 2020-05-18 DIAGNOSIS — E78.2 MIXED HYPERLIPIDEMIA: ICD-10-CM

## 2020-05-18 PROBLEM — I42.9 CARDIOMYOPATHY (HCC): Status: ACTIVE | Noted: 2020-05-18

## 2020-05-18 PROBLEM — E78.5 HYPERLIPIDEMIA: Status: ACTIVE | Noted: 2018-02-08

## 2020-05-18 LAB
LEFT EYE DIABETIC RETINOPATHY: NORMAL
LEFT EYE IMAGE QUALITY: NORMAL
LEFT EYE MACULAR EDEMA: NORMAL
LEFT EYE OTHER RETINOPATHY: NORMAL
RIGHT EYE DIABETIC RETINOPATHY: NORMAL
RIGHT EYE IMAGE QUALITY: NORMAL
RIGHT EYE MACULAR EDEMA: NORMAL
RIGHT EYE OTHER RETINOPATHY: NORMAL
SEVERITY (EYE EXAM): NORMAL

## 2020-05-18 PROCEDURE — 1160F RVW MEDS BY RX/DR IN RCRD: CPT | Performed by: INTERNAL MEDICINE

## 2020-05-18 PROCEDURE — 3008F BODY MASS INDEX DOCD: CPT | Performed by: INTERNAL MEDICINE

## 2020-05-18 PROCEDURE — 2022F DILAT RTA XM EVC RTNOPTHY: CPT | Performed by: INTERNAL MEDICINE

## 2020-05-18 PROCEDURE — 92250 FUNDUS PHOTOGRAPHY W/I&R: CPT | Performed by: INTERNAL MEDICINE

## 2020-05-18 PROCEDURE — 1036F TOBACCO NON-USER: CPT | Performed by: INTERNAL MEDICINE

## 2020-05-18 PROCEDURE — 3078F DIAST BP <80 MM HG: CPT | Performed by: INTERNAL MEDICINE

## 2020-05-18 PROCEDURE — 99214 OFFICE O/P EST MOD 30 MIN: CPT | Performed by: INTERNAL MEDICINE

## 2020-05-18 PROCEDURE — 3075F SYST BP GE 130 - 139MM HG: CPT | Performed by: INTERNAL MEDICINE

## 2020-05-18 PROCEDURE — 4040F PNEUMOC VAC/ADMIN/RCVD: CPT | Performed by: INTERNAL MEDICINE

## 2020-05-19 ENCOUNTER — APPOINTMENT (OUTPATIENT)
Dept: LAB | Facility: CLINIC | Age: 80
End: 2020-05-19
Payer: MEDICARE

## 2020-05-19 DIAGNOSIS — I25.5 ISCHEMIC CARDIOMYOPATHY: ICD-10-CM

## 2020-05-19 DIAGNOSIS — M79.10 PAIN IN THE MUSCLES: ICD-10-CM

## 2020-05-19 DIAGNOSIS — E78.2 MIXED HYPERLIPIDEMIA: ICD-10-CM

## 2020-05-19 DIAGNOSIS — E11.40 TYPE 2 DIABETES MELLITUS WITH DIABETIC NEUROPATHY, WITHOUT LONG-TERM CURRENT USE OF INSULIN (HCC): ICD-10-CM

## 2020-05-19 DIAGNOSIS — E03.9 ACQUIRED HYPOTHYROIDISM: ICD-10-CM

## 2020-05-19 DIAGNOSIS — I25.118 CORONARY ARTERY DISEASE OF NATIVE ARTERY OF NATIVE HEART WITH STABLE ANGINA PECTORIS (HCC): ICD-10-CM

## 2020-05-19 LAB
ALBUMIN SERPL BCP-MCNC: 3.6 G/DL (ref 3.5–5)
ALP SERPL-CCNC: 137 U/L (ref 46–116)
ALT SERPL W P-5'-P-CCNC: 35 U/L (ref 12–78)
ANION GAP SERPL CALCULATED.3IONS-SCNC: 6 MMOL/L (ref 4–13)
AST SERPL W P-5'-P-CCNC: 26 U/L (ref 5–45)
BASOPHILS # BLD AUTO: 0.02 THOUSANDS/ΜL (ref 0–0.1)
BASOPHILS NFR BLD AUTO: 1 % (ref 0–1)
BILIRUB SERPL-MCNC: 0.49 MG/DL (ref 0.2–1)
BILIRUB UR QL STRIP: NEGATIVE
BUN SERPL-MCNC: 35 MG/DL (ref 5–25)
CALCIUM SERPL-MCNC: 8.7 MG/DL (ref 8.3–10.1)
CHLORIDE SERPL-SCNC: 100 MMOL/L (ref 100–108)
CHOLEST SERPL-MCNC: 148 MG/DL (ref 50–200)
CK MB SERPL-MCNC: 2.7 NG/ML (ref 0–5)
CK MB SERPL-MCNC: <1 % (ref 0–2.5)
CK SERPL-CCNC: 337 U/L (ref 26–192)
CLARITY UR: CLEAR
CO2 SERPL-SCNC: 30 MMOL/L (ref 21–32)
COLOR UR: YELLOW
CREAT SERPL-MCNC: 1.68 MG/DL (ref 0.6–1.3)
CREAT UR-MCNC: 14.6 MG/DL
EOSINOPHIL # BLD AUTO: 0.1 THOUSAND/ΜL (ref 0–0.61)
EOSINOPHIL NFR BLD AUTO: 3 % (ref 0–6)
ERYTHROCYTE [DISTWIDTH] IN BLOOD BY AUTOMATED COUNT: 13.6 % (ref 11.6–15.1)
EST. AVERAGE GLUCOSE BLD GHB EST-MCNC: 263 MG/DL
GFR SERPL CREATININE-BSD FRML MDRD: 29 ML/MIN/1.73SQ M
GLUCOSE P FAST SERPL-MCNC: 351 MG/DL (ref 65–99)
GLUCOSE UR STRIP-MCNC: ABNORMAL MG/DL
HBA1C MFR BLD: 10.8 %
HCT VFR BLD AUTO: 42 % (ref 34.8–46.1)
HDLC SERPL-MCNC: 28 MG/DL
HGB BLD-MCNC: 14 G/DL (ref 11.5–15.4)
HGB UR QL STRIP.AUTO: NEGATIVE
IMM GRANULOCYTES # BLD AUTO: 0.01 THOUSAND/UL (ref 0–0.2)
IMM GRANULOCYTES NFR BLD AUTO: 0 % (ref 0–2)
KETONES UR STRIP-MCNC: NEGATIVE MG/DL
LDLC SERPL CALC-MCNC: 56 MG/DL (ref 0–100)
LEUKOCYTE ESTERASE UR QL STRIP: NEGATIVE
LYMPHOCYTES # BLD AUTO: 1.17 THOUSANDS/ΜL (ref 0.6–4.47)
LYMPHOCYTES NFR BLD AUTO: 31 % (ref 14–44)
MCH RBC QN AUTO: 29.9 PG (ref 26.8–34.3)
MCHC RBC AUTO-ENTMCNC: 33.3 G/DL (ref 31.4–37.4)
MCV RBC AUTO: 90 FL (ref 82–98)
MICROALBUMIN UR-MCNC: <5 MG/L (ref 0–20)
MICROALBUMIN/CREAT 24H UR: <34 MG/G CREATININE (ref 0–30)
MONOCYTES # BLD AUTO: 0.38 THOUSAND/ΜL (ref 0.17–1.22)
MONOCYTES NFR BLD AUTO: 10 % (ref 4–12)
NEUTROPHILS # BLD AUTO: 2.12 THOUSANDS/ΜL (ref 1.85–7.62)
NEUTS SEG NFR BLD AUTO: 55 % (ref 43–75)
NITRITE UR QL STRIP: NEGATIVE
NRBC BLD AUTO-RTO: 0 /100 WBCS
NT-PROBNP SERPL-MCNC: 827 PG/ML
PH UR STRIP.AUTO: 6.5 [PH]
PLATELET # BLD AUTO: 244 THOUSANDS/UL (ref 149–390)
PMV BLD AUTO: 11.7 FL (ref 8.9–12.7)
POTASSIUM SERPL-SCNC: 4 MMOL/L (ref 3.5–5.3)
PROT SERPL-MCNC: 7.6 G/DL (ref 6.4–8.2)
PROT UR STRIP-MCNC: NEGATIVE MG/DL
RBC # BLD AUTO: 4.69 MILLION/UL (ref 3.81–5.12)
SODIUM SERPL-SCNC: 136 MMOL/L (ref 136–145)
SP GR UR STRIP.AUTO: 1.01 (ref 1–1.03)
TRIGL SERPL-MCNC: 319 MG/DL
TSH SERPL DL<=0.05 MIU/L-ACNC: 3.67 UIU/ML (ref 0.36–3.74)
UROBILINOGEN UR QL STRIP.AUTO: 1 E.U./DL
WBC # BLD AUTO: 3.8 THOUSAND/UL (ref 4.31–10.16)

## 2020-05-19 PROCEDURE — 80053 COMPREHEN METABOLIC PANEL: CPT

## 2020-05-19 PROCEDURE — 36415 COLL VENOUS BLD VENIPUNCTURE: CPT

## 2020-05-19 PROCEDURE — 82085 ASSAY OF ALDOLASE: CPT

## 2020-05-19 PROCEDURE — 82553 CREATINE MB FRACTION: CPT

## 2020-05-19 PROCEDURE — 80061 LIPID PANEL: CPT

## 2020-05-19 PROCEDURE — 85025 COMPLETE CBC W/AUTO DIFF WBC: CPT

## 2020-05-19 PROCEDURE — 81003 URINALYSIS AUTO W/O SCOPE: CPT | Performed by: INTERNAL MEDICINE

## 2020-05-19 PROCEDURE — 83036 HEMOGLOBIN GLYCOSYLATED A1C: CPT

## 2020-05-19 PROCEDURE — 82570 ASSAY OF URINE CREATININE: CPT | Performed by: INTERNAL MEDICINE

## 2020-05-19 PROCEDURE — 82043 UR ALBUMIN QUANTITATIVE: CPT | Performed by: INTERNAL MEDICINE

## 2020-05-19 PROCEDURE — 84443 ASSAY THYROID STIM HORMONE: CPT

## 2020-05-19 PROCEDURE — 83880 ASSAY OF NATRIURETIC PEPTIDE: CPT

## 2020-05-19 PROCEDURE — 82550 ASSAY OF CK (CPK): CPT

## 2020-05-20 ENCOUNTER — TELEPHONE (OUTPATIENT)
Dept: INTERNAL MEDICINE CLINIC | Facility: CLINIC | Age: 80
End: 2020-05-20

## 2020-05-20 DIAGNOSIS — E11.40 TYPE 2 DIABETES MELLITUS WITH DIABETIC NEUROPATHY, WITHOUT LONG-TERM CURRENT USE OF INSULIN (HCC): ICD-10-CM

## 2020-05-20 LAB — ALDOLASE SERPL-CCNC: 4.7 U/L (ref 3.3–10.3)

## 2020-05-20 RX ORDER — GABAPENTIN 300 MG/1
CAPSULE ORAL
Qty: 270 CAPSULE | Refills: 3 | Status: SHIPPED | OUTPATIENT
Start: 2020-05-20 | End: 2021-05-24

## 2020-06-05 ENCOUNTER — HOSPITAL ENCOUNTER (OUTPATIENT)
Dept: NON INVASIVE DIAGNOSTICS | Facility: CLINIC | Age: 80
Discharge: HOME | End: 2020-06-05
Payer: MEDICARE

## 2020-06-05 ENCOUNTER — APPOINTMENT (OUTPATIENT)
Dept: LAB | Facility: CLINIC | Age: 80
End: 2020-06-05
Payer: MEDICARE

## 2020-06-05 ENCOUNTER — TELEPHONE (OUTPATIENT)
Dept: CARDIOLOGY CLINIC | Facility: CLINIC | Age: 80
End: 2020-06-05

## 2020-06-05 ENCOUNTER — TRANSCRIBE ORDERS (OUTPATIENT)
Dept: LAB | Facility: CLINIC | Age: 80
End: 2020-06-05

## 2020-06-05 ENCOUNTER — ANTICOAG VISIT (OUTPATIENT)
Dept: CARDIOLOGY CLINIC | Facility: CLINIC | Age: 80
End: 2020-06-05

## 2020-06-05 DIAGNOSIS — I35.9 AORTIC VALVE DISEASE: Primary | ICD-10-CM

## 2020-06-05 DIAGNOSIS — I48.0 PAROXYSMAL ATRIAL FIBRILLATION (HCC): Primary | ICD-10-CM

## 2020-06-05 DIAGNOSIS — I25.5 ISCHEMIC CARDIOMYOPATHY: ICD-10-CM

## 2020-06-05 DIAGNOSIS — I35.9 AORTIC VALVE DISEASE: ICD-10-CM

## 2020-06-05 DIAGNOSIS — I25.118 CORONARY ARTERY DISEASE OF NATIVE ARTERY OF NATIVE HEART WITH STABLE ANGINA PECTORIS (HCC): ICD-10-CM

## 2020-06-05 DIAGNOSIS — Z79.01 LONG TERM (CURRENT) USE OF ANTICOAGULANTS: ICD-10-CM

## 2020-06-05 DIAGNOSIS — E11.40 TYPE 2 DIABETES MELLITUS WITH DIABETIC NEUROPATHY, WITHOUT LONG-TERM CURRENT USE OF INSULIN (HCC): ICD-10-CM

## 2020-06-05 LAB
INR PPP: 1.55 (ref 0.84–1.19)
PROTHROMBIN TIME: 18.1 SECONDS (ref 11.6–14.5)

## 2020-06-05 PROCEDURE — 85610 PROTHROMBIN TIME: CPT

## 2020-06-05 PROCEDURE — 93306 TTE W/DOPPLER COMPLETE: CPT | Performed by: INTERNAL MEDICINE

## 2020-06-05 PROCEDURE — 93306 TTE W/DOPPLER COMPLETE: CPT

## 2020-06-05 PROCEDURE — 36415 COLL VENOUS BLD VENIPUNCTURE: CPT

## 2020-06-23 ENCOUNTER — TELEPHONE (OUTPATIENT)
Dept: INTERNAL MEDICINE CLINIC | Facility: CLINIC | Age: 80
End: 2020-06-23

## 2020-06-24 ENCOUNTER — TELEPHONE (OUTPATIENT)
Dept: INTERNAL MEDICINE CLINIC | Facility: CLINIC | Age: 80
End: 2020-06-24

## 2020-06-24 ENCOUNTER — APPOINTMENT (OUTPATIENT)
Dept: LAB | Facility: CLINIC | Age: 80
End: 2020-06-24
Payer: MEDICARE

## 2020-06-24 ENCOUNTER — OFFICE VISIT (OUTPATIENT)
Dept: INTERNAL MEDICINE CLINIC | Facility: CLINIC | Age: 80
End: 2020-06-24
Payer: MEDICARE

## 2020-06-24 ENCOUNTER — ANTICOAG VISIT (OUTPATIENT)
Dept: CARDIOLOGY CLINIC | Facility: CLINIC | Age: 80
End: 2020-06-24

## 2020-06-24 VITALS
TEMPERATURE: 98.2 F | OXYGEN SATURATION: 92 % | DIASTOLIC BLOOD PRESSURE: 70 MMHG | SYSTOLIC BLOOD PRESSURE: 160 MMHG | BODY MASS INDEX: 27.49 KG/M2 | HEART RATE: 75 BPM | HEIGHT: 65 IN | WEIGHT: 165 LBS

## 2020-06-24 DIAGNOSIS — N18.4 CKD (CHRONIC KIDNEY DISEASE) STAGE 4, GFR 15-29 ML/MIN (HCC): ICD-10-CM

## 2020-06-24 DIAGNOSIS — E11.40 TYPE 2 DIABETES MELLITUS WITH DIABETIC NEUROPATHY, WITHOUT LONG-TERM CURRENT USE OF INSULIN (HCC): Primary | ICD-10-CM

## 2020-06-24 DIAGNOSIS — N18.4 TYPE 2 DIABETES MELLITUS WITH STAGE 4 CHRONIC KIDNEY DISEASE, WITH LONG-TERM CURRENT USE OF INSULIN (HCC): ICD-10-CM

## 2020-06-24 DIAGNOSIS — Z79.4 TYPE 2 DIABETES MELLITUS WITH STAGE 4 CHRONIC KIDNEY DISEASE, WITH LONG-TERM CURRENT USE OF INSULIN (HCC): ICD-10-CM

## 2020-06-24 DIAGNOSIS — M79.10 PAIN IN THE MUSCLES: ICD-10-CM

## 2020-06-24 DIAGNOSIS — I50.9 CONGESTIVE HEART FAILURE, UNSPECIFIED HF CHRONICITY, UNSPECIFIED HEART FAILURE TYPE (HCC): ICD-10-CM

## 2020-06-24 DIAGNOSIS — N28.9 RENAL FUNCTION IMPAIRMENT: ICD-10-CM

## 2020-06-24 DIAGNOSIS — E11.22 TYPE 2 DIABETES MELLITUS WITH STAGE 4 CHRONIC KIDNEY DISEASE, WITH LONG-TERM CURRENT USE OF INSULIN (HCC): ICD-10-CM

## 2020-06-24 LAB
INR PPP: 1.62 (ref 0.84–1.19)
PROTHROMBIN TIME: 19.2 SECONDS (ref 11.6–14.5)

## 2020-06-24 PROCEDURE — 3077F SYST BP >= 140 MM HG: CPT | Performed by: INTERNAL MEDICINE

## 2020-06-24 PROCEDURE — 36415 COLL VENOUS BLD VENIPUNCTURE: CPT

## 2020-06-24 PROCEDURE — 85610 PROTHROMBIN TIME: CPT

## 2020-06-24 PROCEDURE — 3008F BODY MASS INDEX DOCD: CPT | Performed by: INTERNAL MEDICINE

## 2020-06-24 PROCEDURE — 3046F HEMOGLOBIN A1C LEVEL >9.0%: CPT | Performed by: INTERNAL MEDICINE

## 2020-06-24 PROCEDURE — 3078F DIAST BP <80 MM HG: CPT | Performed by: INTERNAL MEDICINE

## 2020-06-24 PROCEDURE — 4040F PNEUMOC VAC/ADMIN/RCVD: CPT | Performed by: INTERNAL MEDICINE

## 2020-06-24 PROCEDURE — 1036F TOBACCO NON-USER: CPT | Performed by: INTERNAL MEDICINE

## 2020-06-24 PROCEDURE — 1160F RVW MEDS BY RX/DR IN RCRD: CPT | Performed by: INTERNAL MEDICINE

## 2020-06-24 PROCEDURE — 99214 OFFICE O/P EST MOD 30 MIN: CPT | Performed by: INTERNAL MEDICINE

## 2020-06-24 PROCEDURE — 3066F NEPHROPATHY DOC TX: CPT | Performed by: INTERNAL MEDICINE

## 2020-06-24 PROCEDURE — 2022F DILAT RTA XM EVC RTNOPTHY: CPT | Performed by: INTERNAL MEDICINE

## 2020-06-24 RX ORDER — FUROSEMIDE 40 MG/1
TABLET ORAL
Qty: 90 TABLET | Refills: 3 | Status: SHIPPED | OUTPATIENT
Start: 2020-06-24 | End: 2021-07-21

## 2020-06-24 RX ORDER — PEN NEEDLE, DIABETIC 31 G X1/4"
NEEDLE, DISPOSABLE MISCELLANEOUS DAILY
Qty: 100 EACH | Refills: 3 | Status: SHIPPED | OUTPATIENT
Start: 2020-06-24 | End: 2021-08-20

## 2020-06-25 ENCOUNTER — OFFICE VISIT (OUTPATIENT)
Dept: INTERNAL MEDICINE CLINIC | Facility: CLINIC | Age: 80
End: 2020-06-25
Payer: MEDICARE

## 2020-06-25 VITALS
OXYGEN SATURATION: 95 % | DIASTOLIC BLOOD PRESSURE: 90 MMHG | SYSTOLIC BLOOD PRESSURE: 134 MMHG | HEART RATE: 75 BPM | TEMPERATURE: 98.3 F

## 2020-06-25 DIAGNOSIS — J41.0 SIMPLE CHRONIC BRONCHITIS (HCC): ICD-10-CM

## 2020-06-25 DIAGNOSIS — E11.40 TYPE 2 DIABETES MELLITUS WITH DIABETIC NEUROPATHY, WITHOUT LONG-TERM CURRENT USE OF INSULIN (HCC): Primary | ICD-10-CM

## 2020-06-25 DIAGNOSIS — I25.5 ISCHEMIC CARDIOMYOPATHY: ICD-10-CM

## 2020-06-25 DIAGNOSIS — I10 HYPERTENSION, ESSENTIAL: ICD-10-CM

## 2020-06-25 DIAGNOSIS — E03.9 ACQUIRED HYPOTHYROIDISM: ICD-10-CM

## 2020-06-25 DIAGNOSIS — K55.8 OTHER VASCULAR DISORDERS OF INTESTINE (HCC): ICD-10-CM

## 2020-06-25 DIAGNOSIS — C71.9: ICD-10-CM

## 2020-06-25 DIAGNOSIS — E78.2 MIXED HYPERLIPIDEMIA: ICD-10-CM

## 2020-06-25 PROCEDURE — 1160F RVW MEDS BY RX/DR IN RCRD: CPT | Performed by: PHYSICIAN ASSISTANT

## 2020-06-25 PROCEDURE — 4040F PNEUMOC VAC/ADMIN/RCVD: CPT | Performed by: PHYSICIAN ASSISTANT

## 2020-06-25 PROCEDURE — 3066F NEPHROPATHY DOC TX: CPT | Performed by: PHYSICIAN ASSISTANT

## 2020-06-25 PROCEDURE — 3046F HEMOGLOBIN A1C LEVEL >9.0%: CPT | Performed by: PHYSICIAN ASSISTANT

## 2020-06-25 PROCEDURE — 3008F BODY MASS INDEX DOCD: CPT | Performed by: PHYSICIAN ASSISTANT

## 2020-06-25 PROCEDURE — 2022F DILAT RTA XM EVC RTNOPTHY: CPT | Performed by: PHYSICIAN ASSISTANT

## 2020-06-25 PROCEDURE — 1036F TOBACCO NON-USER: CPT | Performed by: PHYSICIAN ASSISTANT

## 2020-06-25 PROCEDURE — 99214 OFFICE O/P EST MOD 30 MIN: CPT | Performed by: PHYSICIAN ASSISTANT

## 2020-06-25 PROCEDURE — 3080F DIAST BP >= 90 MM HG: CPT | Performed by: PHYSICIAN ASSISTANT

## 2020-06-25 PROCEDURE — 3075F SYST BP GE 130 - 139MM HG: CPT | Performed by: PHYSICIAN ASSISTANT

## 2020-06-26 ENCOUNTER — TELEPHONE (OUTPATIENT)
Dept: INTERNAL MEDICINE CLINIC | Facility: CLINIC | Age: 80
End: 2020-06-26

## 2020-06-26 DIAGNOSIS — E11.40 TYPE 2 DIABETES MELLITUS WITH DIABETIC NEUROPATHY, WITHOUT LONG-TERM CURRENT USE OF INSULIN (HCC): Primary | ICD-10-CM

## 2020-06-29 RX ORDER — LANCETS 28 GAUGE
EACH MISCELLANEOUS
Qty: 300 EACH | Refills: 3 | Status: SHIPPED | OUTPATIENT
Start: 2020-06-29 | End: 2021-12-06 | Stop reason: SDUPTHER

## 2020-06-30 ENCOUNTER — TELEPHONE (OUTPATIENT)
Dept: INTERNAL MEDICINE CLINIC | Facility: CLINIC | Age: 80
End: 2020-06-30

## 2020-07-01 ENCOUNTER — TELEPHONE (OUTPATIENT)
Dept: INTERNAL MEDICINE CLINIC | Facility: CLINIC | Age: 80
End: 2020-07-01

## 2020-07-01 ENCOUNTER — OFFICE VISIT (OUTPATIENT)
Dept: CARDIOLOGY CLINIC | Facility: CLINIC | Age: 80
End: 2020-07-01
Payer: MEDICARE

## 2020-07-01 VITALS
TEMPERATURE: 98.2 F | WEIGHT: 165 LBS | BODY MASS INDEX: 27.49 KG/M2 | HEART RATE: 64 BPM | SYSTOLIC BLOOD PRESSURE: 128 MMHG | DIASTOLIC BLOOD PRESSURE: 76 MMHG | HEIGHT: 65 IN | OXYGEN SATURATION: 96 %

## 2020-07-01 DIAGNOSIS — I10 HYPERTENSION, ESSENTIAL: ICD-10-CM

## 2020-07-01 DIAGNOSIS — I35.9 AORTIC VALVE DISORDER: ICD-10-CM

## 2020-07-01 DIAGNOSIS — Z79.01 LONG TERM (CURRENT) USE OF ANTICOAGULANTS: ICD-10-CM

## 2020-07-01 DIAGNOSIS — I48.0 PAROXYSMAL ATRIAL FIBRILLATION (HCC): Primary | ICD-10-CM

## 2020-07-01 PROCEDURE — 3074F SYST BP LT 130 MM HG: CPT | Performed by: INTERNAL MEDICINE

## 2020-07-01 PROCEDURE — 3008F BODY MASS INDEX DOCD: CPT | Performed by: INTERNAL MEDICINE

## 2020-07-01 PROCEDURE — 2022F DILAT RTA XM EVC RTNOPTHY: CPT | Performed by: INTERNAL MEDICINE

## 2020-07-01 PROCEDURE — 1036F TOBACCO NON-USER: CPT | Performed by: INTERNAL MEDICINE

## 2020-07-01 PROCEDURE — 3046F HEMOGLOBIN A1C LEVEL >9.0%: CPT | Performed by: INTERNAL MEDICINE

## 2020-07-01 PROCEDURE — 4040F PNEUMOC VAC/ADMIN/RCVD: CPT | Performed by: INTERNAL MEDICINE

## 2020-07-01 PROCEDURE — 3078F DIAST BP <80 MM HG: CPT | Performed by: INTERNAL MEDICINE

## 2020-07-01 PROCEDURE — 99214 OFFICE O/P EST MOD 30 MIN: CPT | Performed by: INTERNAL MEDICINE

## 2020-07-01 PROCEDURE — 1160F RVW MEDS BY RX/DR IN RCRD: CPT | Performed by: INTERNAL MEDICINE

## 2020-07-01 PROCEDURE — 3066F NEPHROPATHY DOC TX: CPT | Performed by: INTERNAL MEDICINE

## 2020-07-01 NOTE — TELEPHONE ENCOUNTER
Called pharmacy in re: to the form that needs to be filled out and signed and they are re faxing it over to my attention

## 2020-07-01 NOTE — PROGRESS NOTES
[facility] [place]  [address] [facility] [id number]  Cardiology Follow Up    Fatuma Dozier  1940  [id number]      1  Paroxysmal atrial fibrillation (HCC)     2  Hypertension, essential     3  Aortic valve disorder     4  Long term (current) use of anticoagulants         Chief Complaint   Patient presents with    Follow-up       Interval History:  Patient presents for follow-up visit  Patient denies any history of chest pain shortness of breath  Patient denies any history of leg edema or orthopnea PND  No history of presyncope syncope  Patient states compliance with the present list of medications          Patient Active Problem List   Diagnosis    Hyperlipidemia    Chronic anticoagulation    Hypertension, essential    Coronary artery disease of native artery of native heart with stable angina pectoris (HCC)    Chronic obstructive pulmonary disease (HCC)    Diabetes mellitus with neurological manifestation ([facility])    Hypothyroidism    Iron deficiency    Renal function impairment    GERD (gastroesophageal reflux disease)    Chronic gastritis with bleeding    AVM (arteriovenous malformation) of small bowel, acquired with hemorrhage    Angiectasia    Other vascular disorders of intestine (HCC)    Angioedema    Costochondritis    Aortic valve replaced    [facility]    Pain in the muscles    Type 2 diabetes mellitus with diabetic neuropathy, without long-term current use of insulin (HCC)    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[facility])     Past Medical History:   Diagnosis Date    Anemia     Aneurysm of thoracic aorta (HCC)     Aortic valve disorder     Benign neoplasm of sigmoid colon     Cardiomyopathy ([facility] )     last assessed: 10/10/2014    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HCC)     last assessed: 10/10/2014    RAMON (dyspnea on exertion)     GERD (gastroesophageal reflux disease)     History of emphysema     Hyperlipidemia     TIA (transient ischemic attack)      Social History     Socioeconomic History    Marital status:       Spouse name: Not on file    Number of children: Not on file    Years of education: Not on file    Highest education level: Not on file   Occupational History    Not on file   Social Needs    Financial resource strain: Not on file    Food insecurity:     Worry: Not on file     Inability: Not on file    Transportation needs:     Medical: Not on file     Non-medical: Not on file   Tobacco Use    Smoking status: Former Smoker     Packs/day: 1 00     Years: 50 00     Pack years: 50 00     Last attempt to quit: 2007     Years since quittin 5    Smokeless tobacco: Former User     Quit date:    Substance and Sexual Activity    Alcohol use: No    Drug use: No    Sexual activity: Not Currently   Lifestyle    Physical activity:     Days per week: 0 days     Minutes per session: 0 min    Stress: Not at all   Relationships    Social connections:     Talks on phone: Not on file     Gets together: Not on file     Attends [employer] service: Not on file     Active member of club or organization: Not on file     Attends meetings of clubs or organizations: Not on file     Relationship status: Not on file    Intimate partner violence:     Fear of current or ex partner: Not on file     Emotionally abused: Not on file     Physically abused: Not on file     Forced sexual activity: Not on file   Other Topics Concern    Not on file   Social History Narrative    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 BID Freestyle lancets BID    Living independently with spouse      Family History   Problem Relation Age of Onset    No Known Problems Mother     No Known Problems Father      Past Surgical History:   Procedure Laterality Date    AORTIC VALVE REPLACEMENT      CARDIAC PACEMAKER PLACEMENT      last assessed: 10/10/2014   [facility]      aortic valve replacement    CHOLECYSTECTOMY      COLONOSCOPY      HYSTERECTOMY      INSERT / REPLACE / REMOVE PACEMAKER      KNEE SURGERY Right     OTHER SURGICAL HISTORY      Capsule ENDOscopy description: 12/19/2012    MN COLONOSCOPY FLX DX W/COLLJ SPEC WHEN PFRMD N/A 5/31/2018    Procedure: single balloon ENTEROSCOPY;  Surgeon: Peter Dickerson MD;  Location: [facility]; Service: Gastroenterology    MN ESOPHAGOGASTRODUODENOSCOPY TRANSORAL DIAGNOSTIC N/A 11/29/2017    Procedure: EGD AND COLONOSCOPY;  Surgeon: Concetta Riojas MD;  Location: [facility];   Service: Gastroenterology       Current Outpatient Medications:     furosemide (LASIX) 40 mg tablet, 1 tab daily, Disp: 90 tablet, Rfl: 3    gabapentin (NEURONTIN) 300 mg capsule, take 1 capsule by mouth three times a day, Disp: 270 capsule, Rfl: 3    glipiZIDE (GLUCOTROL) 10 mg tablet, take 1 tablet by mouth twice a day before meals, Disp: 180 tablet, Rfl: 2    insulin detemir (Levemir FlexTouch) 100 Units/mL injection pen, Inject 20 Units under the skin daily at bedtime, Disp: 5 pen, Rfl: 11    Insulin Pen Needle (PEN NEEDLES) 31G X 6 MM MISC, by Does not apply route daily, Disp: 100 each, Rfl: 3    lactobacillus acidophilus-bulgaricus (LACTINEX) chewable tablet, Chew 1 tablet daily, Disp: , Rfl: 0    Lancets (FREESTYLE) lancets, Check blood glucose 3 times daily, Disp: 300 each, Rfl: 3    levothyroxine 50 mcg tablet, take 1 tablet by mouth once daily, Disp: 90 tablet, Rfl: 3    metoprolol tartrate (LOPRESSOR) 50 mg tablet, 1/2 tab twice a day, Disp: 90 tablet, Rfl: 3    nitroglycerin (NITROSTAT) 0 4 mg SL tablet, Place 1 tablet (0 4 mg total) under the tongue every 5 (five) minutes as needed for chest pain, Disp: 90 tablet, Rfl: 0    pantoprazole (PROTONIX) 40 mg tablet, take 1 tablet by mouth once daily BEFORE BREAKFAST, Disp: 90 tablet, Rfl: 3    RA IRON 325 (65 Fe) MG tablet, take 1 tablet by mouth twice a day before meals, Disp: 60 tablet, Rfl: 2    warfarin (COUMADIN) 5 mg tablet, Take 1 tablet (5 mg total) by mouth daily, Disp: 90 tablet, Rfl: 3  No Known Allergies    Labs:  Orders Only on 06/05/2020   Component Date Value    Protime 06/24/2020 19 2*    INR 06/24/2020 1 62*   Appointment on 06/05/2020   Component Date Value    Protime 06/05/2020 18 1*    INR 06/05/2020 1 55*   Appointment on 05/19/2020   Component Date Value    WBC 05/19/2020 3 80*    RBC 05/19/2020 4 69     Hemoglobin 05/19/2020 14 0     Hematocrit 05/19/2020 42 0     MCV 05/19/2020 90     MCH 05/19/2020 29 9     MCHC 05/19/2020 33 3     RDW 05/19/2020 13 6     MPV 05/19/2020 11 7     Platelets 41/14/1032 244     nRBC 05/19/2020 0     Neutrophils Relative 05/19/2020 55     Immat GRANS % 05/19/2020 0     Lymphocytes Relative 05/19/2020 31     Monocytes Relative 05/19/2020 10     Eosinophils Relative 05/19/2020 3     Basophils Relative 05/19/2020 1     Neutrophils Absolute 05/19/2020 2 12     Immature Grans Absolute 05/19/2020 0 01     Lymphocytes Absolute 05/19/2020 1 17     Monocytes Absolute 05/19/2020 0 38     Eosinophils Absolute 05/19/2020 0 10     Basophils Absolute 05/19/2020 0 02     Cholesterol 05/19/2020 148     Triglycerides 05/19/2020 319*    HDL, Direct 05/19/2020 28*    LDL Calculated 05/19/2020 56     TSH 3RD GENERATON 05/19/2020 3 670     Hemoglobin A1C 05/19/2020 10 8*    EAG 05/19/2020 263     Sodium 05/19/2020 136     Potassium 05/19/2020 4 0     Chloride 05/19/2020 100     CO2 05/19/2020 30     ANION GAP 05/19/2020 6     BUN 05/19/2020 35*    Creatinine 05/19/2020 1 68*    Glucose, Fasting 05/19/2020 351*    Calcium 05/19/2020 8 7     AST 05/19/2020 26     ALT 05/19/2020 35     Alkaline Phosphatase 05/19/2020 137*    Total Protein 05/19/2020 7 6     Albumin 05/19/2020 3 6     Total Bilirubin 05/19/2020 0 49     eGFR 05/19/2020 29     Total CK 05/19/2020 337*    Aldolase 05/19/2020 4 7     NT-proBNP 05/19/2020 827*    CK-MB Index 05/19/2020 <1 0     CK-MB 2020 2 7    Office Visit on 2020   Component Date Value    Severity 2020 NORMAL     Right Eye Diabetic Retin* 2020 None     Right Eye Macular Edema 2020 None     Right Eye Other Retinopa* 2020 None     Right Eye Image Quality 2020 Gradeable Image     Left Eye Diabetic Retino* 2020 None     Left Eye Macular Edema 2020 None     Left Eye Other Retinopat* 2020 None     Left Eye Image Quality 2020 Gradeable Image     Result 2020 Retinal Study Result for LAURA PULLIAM     Result 2020 vijaya FUENTES 77 y/o, F (: 99-, MRN: [id number])     Result 2020 presented to [facility] on 2020 for a retinal imaging study of the left and right eyes   Result 2020 Based on the findings of the study, the following is recommended for LAURA PULLIAM     Result 2020 Normal Study: Re-scan the patient in 12 months or in the next calendar year   Result 2020 Interpreting Provider's Comments:  No comments provided     Result 2020 Right eye findings: Normal Result  Negative for Diabetic Retinopathy   Result 2020 Left eye findings: Normal Result  Negative for Diabetic Retinopathy   Result 2020 This result was electronically signed by Jenny Oconnell MD, NPI: [id number], Taxonomy: [id number] on 2020 08:37:38 [facility] time   Result 2020 NOTE:  Any pathology noted on this diabetic retinal evaluation should be confirmed by an appropriate ophthalmic examination       Color, UA 2020 Yellow     Clarity, UA 2020 Clear     Specific Gravity, UA 2020 1 011     pH, UA 2020 6 5     Leukocytes, UA 2020 Negative     Nitrite, UA 2020 Negative     Protein, UA 2020 Negative     Glucose, UA 2020 500 (1/2%)*    Ketones, UA 2020 Negative     Urobilinogen, UA 2020 1 0     Bilirubin, UA 2020 Negative     Blood, UA 05/19/2020 Negative     Creatinine, Ur 05/19/2020 14 6     Microalbum  ,U,Random 05/19/2020 <5 0     Microalb Creat Ratio 05/19/2020 <34*     Imaging: No results found  Review of Systems:  Review of Systems   REVIEW OF SYSTEMS:  Constitutional:  Denies fever or chills   Eyes:  Denies change in visual acuity   HENT:  Denies nasal congestion or sore throat   Respiratory:  Denies cough or shortness of breath   Cardiovascular:  Denies chest pain or edema   GI:  Denies abdominal pain, nausea, vomiting, bloody stools or diarrhea   :  Denies dysuria, frequency, difficulty in micturition and nocturia  Musculoskeletal:  Denies back pain or joint pain   Neurologic:  Denies headache, focal weakness or sensory changes   Endocrine:  Denies polyuria or polydipsia   Lymphatic:  Denies swollen glands   Psychiatric:  Denies depression or anxiety       Physical Exam:    /76   Pulse 64   Temp 98 2 °F (36 8 °C) (Temporal)   Ht 5' 5" (1 651 m)   Wt 74 8 kg (165 lb)   SpO2 96%   BMI 27 46 kg/m²     Physical Exam  PHYSICAL EXAM:  General:  Patient is not in acute distress   Head: Normocephalic, Atraumatic  HEENT:  Both pupils normal-size atraumatic, normocephalic, nonicteric  Neck:  JVP not raised  Trachea central  No carotid bruit  Respiratory:  normal breath sounds no crackles  no rhonchi  Cardiovascular:  Regular rate and rhythm no S3 no murmurs  Heart sounds consistent with prosthetic mechanical valve  GI:  Abdomen soft nontender  No organomegaly  Lymphatic:  No cervical or inguinal lymphadenopathy  Neurologic:  Patient is awake alert, oriented   Grossly nonfocal  Extremities no edema    Discussion/Summary:  Patient with multiple medical problems who seems to be doing reasonably well from cardiac standpoint  Previous studies reviewed with patient  Medications reviewed and possible side effects discussed  concepts of cardiovascular disease , signs and symptoms of heart disease   Dietary and risk factor modification reinforced  All questions answered  Safety measures reviewed  Patient advised to report any problems prompting medical attention  Recent echocardiogram showed ejection fraction of 45 to 50% which is overall unchanged from previous echocardiograms  Prosthetic mechanical aortic valve which appears to be functioning normally  Antibiotic prophylaxis to continue  Continue anticoagulation  Patient understands the risks and benefits of anticoagulation to prevent thrombotic risk from prosthetic mechanical aortic valve  Importance of salt restriction reinforced  Medications reviewed  Follow-up in 6 months  Follow-up with primary care physician

## 2020-07-10 DIAGNOSIS — E11.40 TYPE 2 DIABETES MELLITUS WITH DIABETIC NEUROPATHY, WITHOUT LONG-TERM CURRENT USE OF INSULIN (HCC): Primary | ICD-10-CM

## 2020-07-10 NOTE — TELEPHONE ENCOUNTER
Got the ok for blood monitor now needs strips and lancets    Uses the one touch verio-IQ machine    She has been waiting for a week and a half for all this and is not very happy    Tests 2x a day      HUAN # [phone]    [place] cage # [phone]    Can Ariel Douglas put the order in?

## 2020-07-11 ENCOUNTER — TELEPHONE (OUTPATIENT)
Dept: INTERNAL MEDICINE CLINIC | Facility: CLINIC | Age: 80
End: 2020-07-11

## 2020-07-11 NOTE — TELEPHONE ENCOUNTER
Pt spoke with [facility]jel [facility]  Don't have orders for test strips or lancets    I see orders from [address] [place] [address] for these     Please send orders to [facility] [address]    Pt is completely out of strips and lancets    Call back upon completion

## 2020-07-11 NOTE — TELEPHONE ENCOUNTER
Have been back and forth on the phone with pharmacy and spoke with sridhar in scanning, we do not have the other forms and they are sending new ones to us  Will look out for this on Monday  none

## 2020-07-15 ENCOUNTER — TELEPHONE (OUTPATIENT)
Dept: INTERNAL MEDICINE CLINIC | Facility: CLINIC | Age: 80
End: 2020-07-15

## 2020-07-15 NOTE — TELEPHONE ENCOUNTER
Forms that need to be filled out for medicare from [employer] for the lancets and strips  [facility] has these medications for the patient, BUT Dr Demario Cerrato needs to fill out the form patient name, how many time a day to take for sugar and why she needs them  This has been going on for 2 weeks,  [employer] sent over this form 2x's

## 2020-07-16 ENCOUNTER — TELEPHONE (OUTPATIENT)
Dept: INTERNAL MEDICINE CLINIC | Facility: CLINIC | Age: 80
End: 2020-07-16

## 2020-07-22 ENCOUNTER — APPOINTMENT (OUTPATIENT)
Dept: LAB | Facility: CLINIC | Age: 80
End: 2020-07-22
Payer: MEDICARE

## 2020-07-22 ENCOUNTER — ANTICOAG VISIT (OUTPATIENT)
Dept: CARDIOLOGY CLINIC | Facility: CLINIC | Age: 80
End: 2020-07-22

## 2020-07-22 DIAGNOSIS — M79.10 PAIN IN THE MUSCLES: ICD-10-CM

## 2020-07-22 DIAGNOSIS — E11.40 TYPE 2 DIABETES MELLITUS WITH DIABETIC NEUROPATHY, WITHOUT LONG-TERM CURRENT USE OF INSULIN (HCC): ICD-10-CM

## 2020-07-22 LAB
ANION GAP SERPL CALCULATED.3IONS-SCNC: 7 MMOL/L (ref 4–13)
BUN SERPL-MCNC: 31 MG/DL (ref 5–25)
CALCIUM SERPL-MCNC: 9.2 MG/DL (ref 8.3–10.1)
CHLORIDE SERPL-SCNC: 110 MMOL/L (ref 100–108)
CK MB SERPL-MCNC: 1.3 % (ref 0–2.5)
CK MB SERPL-MCNC: 2.6 NG/ML (ref 0–5)
CK SERPL-CCNC: 200 U/L (ref 26–192)
CO2 SERPL-SCNC: 27 MMOL/L (ref 21–32)
CREAT SERPL-MCNC: 1.49 MG/DL (ref 0.6–1.3)
GFR SERPL CREATININE-BSD FRML MDRD: 33 ML/MIN/1.73SQ M
GLUCOSE P FAST SERPL-MCNC: 138 MG/DL (ref 65–99)
POTASSIUM SERPL-SCNC: 3.9 MMOL/L (ref 3.5–5.3)
SODIUM SERPL-SCNC: 144 MMOL/L (ref 136–145)

## 2020-07-22 PROCEDURE — 80048 BASIC METABOLIC PNL TOTAL CA: CPT

## 2020-07-22 PROCEDURE — 82553 CREATINE MB FRACTION: CPT

## 2020-07-22 PROCEDURE — 82550 ASSAY OF CK (CPK): CPT

## 2020-07-22 PROCEDURE — 82085 ASSAY OF ALDOLASE: CPT

## 2020-07-22 NOTE — PROGRESS NOTES
lmom for pt with inr results , dosing instructions and to retest 2 weeks  Pt to remain on same dose

## 2020-07-23 DIAGNOSIS — D50.9 IRON DEFICIENCY ANEMIA, UNSPECIFIED IRON DEFICIENCY ANEMIA TYPE: ICD-10-CM

## 2020-07-23 LAB — ALDOLASE SERPL-CCNC: 4.8 U/L (ref 3.3–10.3)

## 2020-07-23 RX ORDER — FERROUS SULFATE 325(65) MG
TABLET ORAL
Qty: 60 TABLET | Refills: 2 | Status: SHIPPED | OUTPATIENT
Start: 2020-07-23

## 2020-08-07 ENCOUNTER — TELEPHONE (OUTPATIENT)
Dept: NEPHROLOGY | Facility: CLINIC | Age: 80
End: 2020-08-07

## 2020-08-10 ENCOUNTER — TELEPHONE (OUTPATIENT)
Dept: NEPHROLOGY | Facility: CLINIC | Age: 80
End: 2020-08-10

## 2020-08-11 ENCOUNTER — CONSULT (OUTPATIENT)
Dept: NEPHROLOGY | Facility: CLINIC | Age: 80
End: 2020-08-11
Payer: MEDICARE

## 2020-08-11 VITALS — TEMPERATURE: 97.5 F | RESPIRATION RATE: 16 BRPM | HEIGHT: 65 IN | BODY MASS INDEX: 27.29 KG/M2 | WEIGHT: 163.8 LBS

## 2020-08-11 DIAGNOSIS — I10 HYPERTENSION, ESSENTIAL: ICD-10-CM

## 2020-08-11 DIAGNOSIS — M89.9 CHRONIC KIDNEY DISEASE-MINERAL AND BONE DISORDER: Primary | ICD-10-CM

## 2020-08-11 DIAGNOSIS — Z95.2 AORTIC VALVE REPLACED: ICD-10-CM

## 2020-08-11 DIAGNOSIS — I25.5 ISCHEMIC CARDIOMYOPATHY: ICD-10-CM

## 2020-08-11 DIAGNOSIS — N18.9 CHRONIC KIDNEY DISEASE-MINERAL AND BONE DISORDER: Primary | ICD-10-CM

## 2020-08-11 DIAGNOSIS — N18.4 CKD (CHRONIC KIDNEY DISEASE) STAGE 4, GFR 15-29 ML/MIN (HCC): ICD-10-CM

## 2020-08-11 DIAGNOSIS — E83.9 CHRONIC KIDNEY DISEASE-MINERAL AND BONE DISORDER: Primary | ICD-10-CM

## 2020-08-11 DIAGNOSIS — E11.40 TYPE 2 DIABETES MELLITUS WITH DIABETIC NEUROPATHY, WITHOUT LONG-TERM CURRENT USE OF INSULIN (HCC): ICD-10-CM

## 2020-08-11 PROCEDURE — 2022F DILAT RTA XM EVC RTNOPTHY: CPT | Performed by: INTERNAL MEDICINE

## 2020-08-11 PROCEDURE — 1036F TOBACCO NON-USER: CPT | Performed by: INTERNAL MEDICINE

## 2020-08-11 PROCEDURE — 4040F PNEUMOC VAC/ADMIN/RCVD: CPT | Performed by: INTERNAL MEDICINE

## 2020-08-11 PROCEDURE — 3066F NEPHROPATHY DOC TX: CPT | Performed by: INTERNAL MEDICINE

## 2020-08-11 PROCEDURE — 99204 OFFICE O/P NEW MOD 45 MIN: CPT | Performed by: INTERNAL MEDICINE

## 2020-08-11 PROCEDURE — 3074F SYST BP LT 130 MM HG: CPT | Performed by: INTERNAL MEDICINE

## 2020-08-11 PROCEDURE — 3008F BODY MASS INDEX DOCD: CPT | Performed by: INTERNAL MEDICINE

## 2020-08-11 PROCEDURE — 3078F DIAST BP <80 MM HG: CPT | Performed by: INTERNAL MEDICINE

## 2020-08-11 PROCEDURE — 1160F RVW MEDS BY RX/DR IN RCRD: CPT | Performed by: INTERNAL MEDICINE

## 2020-08-11 PROCEDURE — 3046F HEMOGLOBIN A1C LEVEL >9.0%: CPT | Performed by: INTERNAL MEDICINE

## 2020-08-11 NOTE — ASSESSMENT & PLAN NOTE
Very well controlled with present medication  She is on beta-blocker  May need to consider giving ACE-inhibitor as part of the renal protection    Will monitor patient closely before starting that medication

## 2020-08-11 NOTE — ASSESSMENT & PLAN NOTE
Lab Results   Component Value Date    HGBA1C 10 8 (H) 05/19/2020    Diabetes does not seems to be well controlled with hemoglobin A1c 10 8  Patient recently started on insulin    Importance of diabetic control and kidney disease discussed with

## 2020-08-11 NOTE — ASSESSMENT & PLAN NOTE
Patient GFR is 31 right now so it is actually stage III though it is fluctuating  Likely etiology is combination of diabetes and hypertension along with cardiac condition    Pathophysiology of kidney disease discussed at length with the patient  Asymptomatic and progressive nature of kidney disease also discussed with her    Importance of hydration and avoiding nephrotoxic medicine of procedure was advised    I will repeat blood and urine test   Will also get kidney ultrasound as part of the CKD management

## 2020-08-11 NOTE — ASSESSMENT & PLAN NOTE
Patient EF is 45-50%  On diuretic and stable at this point    Being monitored by cardiologist   Volume management discussed with the patient also

## 2020-08-11 NOTE — PATIENT INSTRUCTIONS
Chronic Kidney Disease   AMBULATORY CARE:   Chronic kidney disease (CKD)  is the gradual and permanent loss of kidney function  Normally, the kidneys remove fluid, chemicals, and waste from your blood  These wastes are turned into urine by your kidneys  CKD may worsen over time and lead to kidney failure  Common symptoms include the following:   · Changes in how often you need to urinate    · Swelling in your arms, legs, or feet    · Shortness of breath    · Fatigue or weakness    · Bad or bitter taste in your mouth    · Nausea, vomiting, or loss of appetite  Seek care immediately if:   · You are confused and very drowsy  · You have a seizure  · You have shortness of breath  Contact your healthcare provider if:   · You suddenly gain or lose more weight than your healthcare provider has told you is okay  · You have itchy skin or a rash  · You urinate more or less than you normally do  · You have blood in your urine  · You have nausea and repeated vomiting  · You have fatigue or muscle weakness  · You have hiccups that will not stop  · You have questions or concerns about your condition or care  Treatment for CKD:  Medicines may be given to decrease blood pressure and get rid of extra fluid  You may also receive medicine to manage health conditions that may occur with CKD  Dialysis is a treatment to remove chemicals and waste from your blood when your kidneys can no longer do this  Surgery may be needed to create an arteriovenous fistula (AVF) in your arm or insert a catheter into your abdomen so that you can receive dialysis  A kidney transplant may be done if your CKD becomes severe  Manage CKD:   · Maintain a healthy weight  Ask your healthcare provider how much you should weigh  Ask him to help you create a weight loss plan if you are overweight  · Exercise 30 to 60 minutes a day, 4 to 7 times a week, or as directed  Ask about the best exercise plan for you   Regular exercise can help you manage CKD, high blood pressure, and diabetes  · Follow your healthcare provider's advice about what to eat and drink  He may tell you to eat food low in sodium (salt), potassium, phosphorus, or protein  You may need to see a dietitian if you need help planning meals  Ask how much liquid to drink each day and which liquids are best for you  · Limit alcohol  Ask how much alcohol is safe for you to drink  A drink of alcohol is 12 ounces of beer, 5 ounces of wine, or 1½ ounces of liquor  · Do not smoke  Nicotine and other chemicals in cigarettes and cigars can cause lung and kidney damage  Ask your healthcare provider for information if you currently smoke and need help to quit  E-cigarettes or smokeless tobacco still contain nicotine  Talk to your healthcare provider before you use these products  · Ask your healthcare provider if you need vaccines  Infections such as pneumonia, influenza, and hepatitis can be more harmful or more likely to occur in a person who has CKD  Vaccines reduce your risk of infection with these viruses  Follow up with your healthcare provider as directed:  Write down your questions so you remember to ask them during your visits  © 2017 [employer] Information is for End User's use only and may not be sold, redistributed or otherwise used for commercial purposes  All illustrations and images included in CareNotes® are the copyrighted property of [employer]  or [employer]usman[employer] Mathe[employer]  The above information is an  only  It is not intended as medical advice for individual conditions or treatments  Talk to your doctor, nurse or pharmacist before following any medical regimen to see if it is safe and effective for you

## 2020-08-11 NOTE — PROGRESS NOTES
NEPHROLOGY OFFICE CONSULT  Vadim Dyer [de-identified] y o  female MRN: [id number]    Encounter: [id number] 8/11/2020    REASON FOR VISIT: Vadim Dyer is a [de-identified] y  o female who was referred by Bill Kenyon MD for evaluation of Chronic Kidney Disease and Consult    HPI:    Chana Graham is [de-identified] year woman with history of hypertension and diabetes was found to have worsening creatinine so was advised to see me    Patient with long history of diabetes and hypertension though according to her it is usually well control  She does get regular blood test done recently she was found to increasing creatinine with decrease in GFR so she was advised to see us    Patient claims she is feeling quite well denies any complaint    She also had aortic wall surgery in 2008 and since then she does get short of breath requiring diuretic    Patient has a long history of diabetic with diabetic neuropathy requiring gabapentin  Denies any diabetic retinopathy    Also long history of hypertension usually well control    Denies taking nonsteroidal pain killer      REVIEW OF SYSTEMS:    Review of Systems   Constitutional: Negative for activity change and fatigue  HENT: Negative for congestion and ear discharge  Eyes: Negative for photophobia and pain  Respiratory: Negative for apnea and choking  Cardiovascular: Negative for chest pain and palpitations  Gastrointestinal: Negative for abdominal distention and blood in stool  Endocrine: Negative for heat intolerance and polyphagia  Genitourinary: Negative for flank pain and urgency  Musculoskeletal: Negative for neck pain and neck stiffness  Skin: Negative for color change and wound  Allergic/Immunologic: Negative for food allergies and immunocompromised state  Neurological: Negative for seizures and facial asymmetry  Hematological: Negative for adenopathy  Does not bruise/bleed easily  Psychiatric/Behavioral: Negative for self-injury and suicidal ideas           PAST MEDICAL HISTORY:  Past Medical History:   Diagnosis Date    Anemia     Aneurysm of thoracic aorta (HCC)     Aortic valve disorder     Benign neoplasm of sigmoid colon     Cardiomyopathy ([facility] )     last assessed: 10/10/2014    CHF (congestive heart failure) (HCC)     Chronic kidney disease     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[facility])     last assessed: 10/10/2014    Coronary artery disease     Diabetes mellitus ([facility] )     Diabetic nephropathy ([place] [address] )     RAMON (dyspnea on exertion)     GERD (gastroesophageal reflux disease)     History of emphysema     Hyperlipidemia     Hypertension     TIA (transient ischemic attack)        PAST SURGICAL HISTORY:  Past Surgical History:   Procedure Laterality Date    AORTIC VALVE REPLACEMENT      CARDIAC PACEMAKER PLACEMENT      last assessed: 10/10/2014   Roc Polio CARDIAC SURGERY      aortic valve replacement    CHOLECYSTECTOMY      COLONOSCOPY      HYSTERECTOMY      INSERT / REPLACE / REMOVE PACEMAKER      KNEE SURGERY Right     OTHER SURGICAL HISTORY      Capsule ENDOscopy description: 2012    OK COLONOSCOPY FLX DX W/COLLJ SPEC WHEN PFRMD N/A 2018    Procedure: single balloon ENTEROSCOPY;  Surgeon: Tahira Richards MD;  Location:  GI LAB; Service: Gastroenterology    OK ESOPHAGOGASTRODUODENOSCOPY TRANSORAL DIAGNOSTIC N/A 2017    Procedure: EGD AND COLONOSCOPY;  Surgeon: Lexis Waterman MD;  Location: [place] [facility];   Service: Gastroenterology       SOCIAL HISTORY:  Social History     Substance and Sexual Activity   Alcohol Use No     Social History     Substance and Sexual Activity   Drug Use No     Social History     Tobacco Use   Smoking Status Former Smoker    Packs/day: 1 00    Years: 50 00    Pack years: 50 00    Last attempt to quit: 2007    Years since quittin 7   Smokeless Tobacco Former User    Quit date:        FAMILY HISTORY:  Family History   Problem Relation Age of Onset    No Known Problems Mother     No Known Problems Father        MEDICATIONS:    Current Outpatient Medications:     furosemide (LASIX) 40 mg tablet, 1 tab daily, Disp: 90 tablet, Rfl: 3    gabapentin (NEURONTIN) 300 mg capsule, take 1 capsule by mouth three times a day, Disp: 270 capsule, Rfl: 3    glipiZIDE (GLUCOTROL) 10 mg tablet, take 1 tablet by mouth twice a day before meals, Disp: 180 tablet, Rfl: 2    insulin detemir (Levemir FlexTouch) 100 Units/mL injection pen, Inject 20 Units under the skin daily at bedtime, Disp: 5 pen, Rfl: 11    Insulin Pen Needle (PEN NEEDLES) 31G X 6 MM MISC, by Does not apply route daily, Disp: 100 each, Rfl: 3    Lancets (FREESTYLE) lancets, Check blood glucose 3 times daily, Disp: 300 each, Rfl: 3    levothyroxine 50 mcg tablet, take 1 tablet by mouth once daily, Disp: 90 tablet, Rfl: 3    metoprolol tartrate (LOPRESSOR) 50 mg tablet, 1/2 tab twice a day, Disp: 90 tablet, Rfl: 3    pantoprazole (PROTONIX) 40 mg tablet, take 1 tablet by mouth once daily BEFORE BREAKFAST, Disp: 90 tablet, Rfl: 3    RA IRON 325 (65 Fe) MG tablet, take 1 tablet by mouth twice a day before meals, Disp: 60 tablet, Rfl: 2    warfarin (COUMADIN) 5 mg tablet, Take 1 tablet (5 mg total) by mouth daily, Disp: 90 tablet, Rfl: 3    PHYSICAL EXAM:  Vitals:    08/11/20 0857   Resp: 16   Temp: 97 5 °F (36 4 °C)   TempSrc: Tympanic   Weight: 74 3 kg (163 lb 12 8 oz)   Height: 5' 5" (1 651 m)     Body mass index is 27 26 kg/m²  Physical Exam  Constitutional:       General: She is not in acute distress  Appearance: Normal appearance  She is well-developed  HENT:      Head: Normocephalic and atraumatic  Mouth/Throat:      Mouth: Mucous membranes are moist    Eyes:      General: No scleral icterus  Extraocular Movements: Extraocular movements intact  Conjunctiva/sclera: Conjunctivae normal       Pupils: Pupils are equal, round, and reactive to light  Neck:      Musculoskeletal: Neck supple  Vascular: No JVD     Cardiovascular: Rate and Rhythm: Normal rate  Pulses: Normal pulses  Heart sounds: Normal heart sounds  No murmur  Pulmonary:      Effort: Pulmonary effort is normal  No respiratory distress  Breath sounds: No wheezing  Abdominal:      General: Abdomen is flat  There is no distension  Palpations: Abdomen is soft  Tenderness: There is no abdominal tenderness  Musculoskeletal: Normal range of motion  Skin:     General: Skin is warm  Findings: No rash  Neurological:      General: No focal deficit present  Mental Status: She is alert and oriented to person, place, and time  Psychiatric:         Behavior: Behavior normal          LAB RESULTS:  Results for orders placed or performed in visit on 06/26/29   Basic metabolic panel   Result Value Ref Range    Sodium 144 136 - 145 mmol/L    Potassium 3 9 3 5 - 5 3 mmol/L    Chloride 110 (H) 100 - 108 mmol/L    CO2 27 21 - 32 mmol/L    ANION GAP 7 4 - 13 mmol/L    BUN 31 (H) 5 - 25 mg/dL    Creatinine 1 49 (H) 0 60 - 1 30 mg/dL    Glucose, Fasting 138 (H) 65 - 99 mg/dL    Calcium 9 2 8 3 - 10 1 mg/dL    eGFR 33 ml/min/1 73sq m   CK (with reflex to MB)   Result Value Ref Range    Total  (H) 26 - 192 U/L   Aldolase   Result Value Ref Range    Aldolase 4 8 3 3 - 10 3 U/L   CKMB   Result Value Ref Range    CK-MB Index 1 3 0 0 - 2 5 %    CK-MB 2 6 0 0 - 5 0 ng/mL       ASSESSMENT and PLAN:    CKD (chronic kidney disease) stage 4, GFR 15-29 ml/min ([facility])  Patient GFR is 31 right now so it is actually stage III though it is fluctuating  Likely etiology is combination of diabetes and hypertension along with cardiac condition    Pathophysiology of kidney disease discussed at length with the patient  Asymptomatic and progressive nature of kidney disease also discussed with her    Importance of hydration and avoiding nephrotoxic medicine of procedure was advised    I will repeat blood and urine test   Will also get kidney ultrasound as part of the CKD management    Chronic kidney disease-mineral and bone disorder  Will check PTH and phosphorus along with vitamin-D as part of the  CKD management    Cardiomyopathy [facility])  Patient EF is 45-50%  On diuretic and stable at this point  Being monitored by cardiologist   Volume management discussed with the patient also    Hypertension, essential  Very well controlled with present medication  She is on beta-blocker  May need to consider giving ACE-inhibitor as part of the renal protection  Will monitor patient closely before starting that medication    Type 2 diabetes mellitus with diabetic neuropathy, without long-term current use of insulin ([facility])    Lab Results   Component Value Date    HGBA1C 10 8 (H) 05/19/2020    Diabetes does not seems to be well controlled with hemoglobin A1c 10 8  Patient recently started on insulin  Importance of diabetic control and kidney disease discussed with    Aortic valve replaced  Almost done 13 years ago  Patient is doing quite well on  Coumadin     I discussed everything at length with the patient  I will repeat blood and urine test along with kidney ultrasound  I will see her back in 3 months  Thank you very much for the consultation I will monitor the patient with you        Portions of the record may have been created with voice recognition software  Occasional wrong word or "sound a like" substitutions may have occurred due to the inherent limitations of voice recognition software  Read the chart carefully and recognize, using context, where substitutions have occurred  If you have any questions, please contact the dictating provider

## 2020-08-24 ENCOUNTER — ANTICOAG VISIT (OUTPATIENT)
Dept: CARDIOLOGY CLINIC | Facility: CLINIC | Age: 80
End: 2020-08-24

## 2020-08-24 ENCOUNTER — APPOINTMENT (OUTPATIENT)
Dept: LAB | Facility: CLINIC | Age: 80
End: 2020-08-24
Payer: MEDICARE

## 2020-08-26 ENCOUNTER — TELEPHONE (OUTPATIENT)
Dept: INTERNAL MEDICINE CLINIC | Facility: CLINIC | Age: 80
End: 2020-08-26

## 2020-08-26 NOTE — TELEPHONE ENCOUNTER
Has an appt with Dr Taty Jovel 8/27  Stated she had a sore throat last week  It has gone away and she just has a cold now   Do we still want to keep this appt reg or virtual????

## 2020-08-27 ENCOUNTER — OFFICE VISIT (OUTPATIENT)
Dept: INTERNAL MEDICINE CLINIC | Facility: CLINIC | Age: 80
End: 2020-08-27
Payer: MEDICARE

## 2020-08-27 ENCOUNTER — APPOINTMENT (OUTPATIENT)
Dept: LAB | Facility: CLINIC | Age: 80
End: 2020-08-27
Payer: MEDICARE

## 2020-08-27 ENCOUNTER — IN-CLINIC DEVICE VISIT (OUTPATIENT)
Dept: CARDIOLOGY CLINIC | Facility: CLINIC | Age: 80
End: 2020-08-27
Payer: MEDICARE

## 2020-08-27 ENCOUNTER — TELEPHONE (OUTPATIENT)
Dept: INTERNAL MEDICINE CLINIC | Facility: CLINIC | Age: 80
End: 2020-08-27

## 2020-08-27 VITALS
SYSTOLIC BLOOD PRESSURE: 124 MMHG | HEIGHT: 66 IN | TEMPERATURE: 97.9 F | OXYGEN SATURATION: 95 % | DIASTOLIC BLOOD PRESSURE: 76 MMHG | WEIGHT: 162 LBS | HEART RATE: 73 BPM | BODY MASS INDEX: 26.03 KG/M2

## 2020-08-27 DIAGNOSIS — E11.40 TYPE 2 DIABETES MELLITUS WITH DIABETIC NEUROPATHY, WITHOUT LONG-TERM CURRENT USE OF INSULIN (HCC): Primary | ICD-10-CM

## 2020-08-27 DIAGNOSIS — Z95.0 PRESENCE OF CARDIAC PACEMAKER: Primary | ICD-10-CM

## 2020-08-27 DIAGNOSIS — E11.40 TYPE 2 DIABETES MELLITUS WITH DIABETIC NEUROPATHY, WITHOUT LONG-TERM CURRENT USE OF INSULIN (HCC): ICD-10-CM

## 2020-08-27 DIAGNOSIS — I25.118 CORONARY ARTERY DISEASE OF NATIVE ARTERY OF NATIVE HEART WITH STABLE ANGINA PECTORIS (HCC): ICD-10-CM

## 2020-08-27 DIAGNOSIS — N18.4 CKD (CHRONIC KIDNEY DISEASE) STAGE 4, GFR 15-29 ML/MIN (HCC): ICD-10-CM

## 2020-08-27 DIAGNOSIS — Z95.2 AORTIC VALVE REPLACED: ICD-10-CM

## 2020-08-27 DIAGNOSIS — M79.10 PAIN IN THE MUSCLES: ICD-10-CM

## 2020-08-27 LAB
ANION GAP SERPL CALCULATED.3IONS-SCNC: 6 MMOL/L (ref 4–13)
BUN SERPL-MCNC: 26 MG/DL (ref 5–25)
CALCIUM SERPL-MCNC: 8.5 MG/DL (ref 8.3–10.1)
CHLORIDE SERPL-SCNC: 108 MMOL/L (ref 100–108)
CK SERPL-CCNC: 128 U/L (ref 26–192)
CO2 SERPL-SCNC: 27 MMOL/L (ref 21–32)
CREAT SERPL-MCNC: 1.24 MG/DL (ref 0.6–1.3)
EST. AVERAGE GLUCOSE BLD GHB EST-MCNC: 160 MG/DL
GFR SERPL CREATININE-BSD FRML MDRD: 41 ML/MIN/1.73SQ M
GLUCOSE P FAST SERPL-MCNC: 148 MG/DL (ref 65–99)
HBA1C MFR BLD: 7.2 %
POTASSIUM SERPL-SCNC: 4.6 MMOL/L (ref 3.5–5.3)
SODIUM SERPL-SCNC: 141 MMOL/L (ref 136–145)

## 2020-08-27 PROCEDURE — 93280 PM DEVICE PROGR EVAL DUAL: CPT | Performed by: INTERNAL MEDICINE

## 2020-08-27 PROCEDURE — 2022F DILAT RTA XM EVC RTNOPTHY: CPT | Performed by: INTERNAL MEDICINE

## 2020-08-27 PROCEDURE — 3078F DIAST BP <80 MM HG: CPT | Performed by: INTERNAL MEDICINE

## 2020-08-27 PROCEDURE — 3066F NEPHROPATHY DOC TX: CPT | Performed by: INTERNAL MEDICINE

## 2020-08-27 PROCEDURE — 3074F SYST BP LT 130 MM HG: CPT | Performed by: INTERNAL MEDICINE

## 2020-08-27 PROCEDURE — 82550 ASSAY OF CK (CPK): CPT

## 2020-08-27 PROCEDURE — 3046F HEMOGLOBIN A1C LEVEL >9.0%: CPT | Performed by: INTERNAL MEDICINE

## 2020-08-27 PROCEDURE — 83036 HEMOGLOBIN GLYCOSYLATED A1C: CPT

## 2020-08-27 PROCEDURE — 4040F PNEUMOC VAC/ADMIN/RCVD: CPT | Performed by: INTERNAL MEDICINE

## 2020-08-27 PROCEDURE — 82085 ASSAY OF ALDOLASE: CPT

## 2020-08-27 PROCEDURE — 1160F RVW MEDS BY RX/DR IN RCRD: CPT | Performed by: INTERNAL MEDICINE

## 2020-08-27 PROCEDURE — 3008F BODY MASS INDEX DOCD: CPT | Performed by: INTERNAL MEDICINE

## 2020-08-27 PROCEDURE — 36415 COLL VENOUS BLD VENIPUNCTURE: CPT

## 2020-08-27 PROCEDURE — 1036F TOBACCO NON-USER: CPT | Performed by: INTERNAL MEDICINE

## 2020-08-27 PROCEDURE — 80048 BASIC METABOLIC PNL TOTAL CA: CPT

## 2020-08-27 PROCEDURE — 99214 OFFICE O/P EST MOD 30 MIN: CPT | Performed by: INTERNAL MEDICINE

## 2020-08-27 NOTE — PROGRESS NOTES
Results for orders placed or performed in visit on 08/27/20   Cardiac EP device report    Narrative    SJM DUAL CHAMBER PM  DEVICE INTERROGATED IN THE [place] OFFICE: TEMP: 98 6  BATTERY VOLTAGE ADEQUATE (9 9 YRS)  AP: 59%  : 98% (>40%~CHB)  ALL LEAD PARAMETERS WITHIN NORMAL LIMITS  NO SIGNIFICANT HIGH RATE EPISODES  NO PROGRAMMING CHANGES MADE TO DEVICE PARAMETERS  PACEMAKER FUNCTIONING APPROPRIATELY    [address] [place] [address]

## 2020-08-27 NOTE — PROGRESS NOTES
Assessment/Plan:       Diagnoses and all orders for this visit:    Type 2 diabetes mellitus with diabetic neuropathy, without long-term current use of insulin ([facility])  -     Basic metabolic panel; Future  -     Hemoglobin A1C; Future  -     Aldolase; Future  -     CK (with reflex to MB); Future    Pain in the muscles  -     Basic metabolic panel; Future  -     Hemoglobin A1C; Future  -     Aldolase; Future  -     CK (with reflex to MB); Future    Coronary artery disease of native artery of native heart with stable angina pectoris ([facility])    CKD (chronic kidney disease) stage 4, GFR 15-29 ml/min ([facility])  -     Basic metabolic panel; Future  -     Hemoglobin A1C; Future  -     Aldolase; Future  -     CK (with reflex to MB); Future    Aortic valve replaced    Other orders  -     Cancel: HEMOGLOBIN A1C W/ EAG ESTIMATION; Future                Subjective:      Patient ID: Sung Burnett is a [de-identified] y o  female  An 44-year-old female who had been seen several months ago with complaints of a costochondritic left chest pain exacerbated by range of motion, a muscle pain in left upper thigh exacerbated by walking and activity  This had begun spontaneously    please refer to that note  The patient was screened for inflammatory disease and found to have elevated muscle enzymes  Recommendation was to  discontinue lipid therapy including statin and fibrate  She had a recheck about 1 month ago  The left chest pain exacerbated right range of motion disappeared  Left upper thigh pain exacerbated by activity and walking has continued; alleviated by wearing compression stocking  She had a recheck of the CK and although it had dropped it had not normalized  Went from 337-200  Hemoglobin A1c was elevated at 10 8 and she was started on insulin  Currently using 20 units of Lantus HS along with glimepiride  Again, CKD precludes metformin and the Invokana type drug  Home sugar readings vary anywhere between 100 to and 220   Will recheck the A1c again today  Consultation with nephrology regarding CKD  No major changes  Short of breath; this is chronic and unchanged from a baseline  She has a history of lung disease  Also history of cardiomyopathy with reduced ejection fraction and has an ICD  She had a metallic  Aortic valve placed about 10 years ago and has been maintained on chronic high-dose Coumadin since    Diabetic with some neuropathic symptoms  She has some paresthesia and numbness of the  Feet  No fever chills sweats no appetite loss no weight loss no abdominal pain  No rectal bleeding         The following portions of the patient's history were reviewed and updated as appropriate:   She has a past medical history of Anemia, Aneurysm of thoracic aorta ([facility] ), Aortic valve disorder, Benign neoplasm of sigmoid colon, Cardiomyopathy ([facility] ), CHF (congestive heart failure) ([facility] ), Chronic kidney disease, 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[facility] ), Diabetic nephropathy ([facility] ), RAMON (dyspnea on exertion), GERD (gastroesophageal reflux disease), History of emphysema, Hyperlipidemia, Hypertension, and TIA (transient ischemic attack)  ,  does not have any pertinent problems on file  ,   has a past surgical history that includes Cholecystectomy; Colonoscopy; Hysterectomy; Aortic valve replacement; pr esophagogastroduodenoscopy transoral diagnostic (N/A, 11/29/2017); Other surgical history; Cardiac surgery; Insert / replace / remove pacemaker; Cardiac pacemaker placement; Knee surgery (Right); and pr colonoscopy flx dx w/collj spec when pfrmd (N/A, 5/31/2018)  ,  family history includes No Known Problems in her father and mother  ,   reports that she quit smoking about 12 years ago  She has a 50 00 pack-year smoking history  She quit smokeless tobacco use about 13 years ago  She reports that she does not drink alcohol or use drugs  ,  has No Known Allergies     Current Outpatient Medications   Medication Sig Dispense Refill    furosemide (LASIX) 40 mg tablet 1 tab daily 90 tablet 3    gabapentin (NEURONTIN) 300 mg capsule take 1 capsule by mouth three times a day 270 capsule 3    glipiZIDE (GLUCOTROL) 10 mg tablet take 1 tablet by mouth twice a day before meals 180 tablet 2    insulin detemir (Levemir FlexTouch) 100 Units/mL injection pen Inject 20 Units under the skin daily at bedtime 5 pen 11    Insulin Pen Needle (PEN NEEDLES) 31G X 6 MM MISC by Does not apply route daily 100 each 3    Lancets (FREESTYLE) lancets Check blood glucose 3 times daily 300 each 3    levothyroxine 50 mcg tablet take 1 tablet by mouth once daily 90 tablet 3    metoprolol tartrate (LOPRESSOR) 50 mg tablet 1/2 tab twice a day 90 tablet 3    pantoprazole (PROTONIX) 40 mg tablet take 1 tablet by mouth once daily BEFORE BREAKFAST 90 tablet 3    RA IRON 325 (65 Fe) MG tablet take 1 tablet by mouth twice a day before meals 60 tablet 2    warfarin (COUMADIN) 5 mg tablet Take 1 tablet (5 mg total) by mouth daily 90 tablet 3     No current facility-administered medications for this visit  Review of Systems   Constitutional: Negative for fever  HENT: Positive for congestion  Negative for sinus pressure  Respiratory: Positive for shortness of breath  Negative for cough and wheezing  Gastrointestinal: Negative for abdominal distention and abdominal pain  Genitourinary: Negative for dyspareunia  Musculoskeletal: Positive for arthralgias  Skin: Negative for rash  Neurological: Negative for headaches  Hematological: Does not bruise/bleed easily  Psychiatric/Behavioral: Negative for decreased concentration  Objective:  Vitals:    08/27/20 0750   BP: 124/76   Pulse: 73   Temp: 97 9 °F (36 6 °C)   SpO2: 95%      Physical Exam  Constitutional:       Appearance: She is well-developed  Comments: Alert female patient who seems well  Seems stated age but looks to be in good shape overall     HENT:      Head: Normocephalic and atraumatic  Eyes:      Pupils: Pupils are equal, round, and reactive to light  Neck:      Musculoskeletal: Normal range of motion and neck supple  Thyroid: No thyromegaly  Trachea: No tracheal deviation  Cardiovascular:      Rate and Rhythm: Normal rate and regular rhythm  Heart sounds: Murmur present  Systolic murmur present with a grade of 1/6  No gallop  Comments:   A minimal aortic murmur but an extremely loud mechanical / metallic S2  Pulmonary:      Effort: No respiratory distress  Breath sounds: No wheezing or rales  Abdominal:      General: Bowel sounds are normal       Palpations: Abdomen is soft  Tenderness: There is no abdominal tenderness  Musculoskeletal: Normal range of motion  General: Deformity present  No tenderness  Comments:   Bilateral hammertoe deformity  Minimal arthropathy small joints  Skin:     General: Skin is warm  Neurological:      Mental Status: She is alert and oriented to person, place, and time  Coordination: Coordination normal    Psychiatric:         Judgment: Judgment normal            Patient Instructions     Diabetes type 2-needs recheck A1c using insulin 20  Muscle pain with elevated CK; falling  Will recheck  Hopefully it has normalized, if not this will need further investigation  Cardiac status is stable as is pulmonary status  Chronic low-grade shortness of breath  No chest pain, no PND, no pedal edema        So laboratory testing today; revisit after the next renal appointment which will be in late November

## 2020-08-27 NOTE — TELEPHONE ENCOUNTER
----- Message from Citlaly Martinez MD sent at 8/27/2020  1:02 PM EDT -----  Excellent blood test result  CK has normalized  Kidney is stable    Hemoglobin A1c of 7 2% is excellent

## 2020-08-27 NOTE — PATIENT INSTRUCTIONS
Diabetes type 2-needs recheck A1c using insulin 20  Muscle pain with elevated CK; falling  Will recheck  Hopefully it has normalized, if not this will need further investigation  Cardiac status is stable as is pulmonary status  Chronic low-grade shortness of breath  No chest pain, no PND, no pedal edema        So laboratory testing today; revisit after the next renal appointment which will be in late November

## 2020-08-28 ENCOUNTER — TELEPHONE (OUTPATIENT)
Dept: CARDIOLOGY CLINIC | Facility: CLINIC | Age: 80
End: 2020-08-28

## 2020-08-28 DIAGNOSIS — I35.9 AORTIC VALVE DISORDER: ICD-10-CM

## 2020-08-28 LAB — ALDOLASE SERPL-CCNC: 4.2 U/L (ref 3.3–10.3)

## 2020-08-28 NOTE — TELEPHONE ENCOUNTER
Pt called stating due to her warfarin dose she will be short 24 pills with the 90 days supply which she will need refills on 09/15  Pt also states she is currently short 24 pills before her refills

## 2020-08-31 RX ORDER — WARFARIN SODIUM 5 MG/1
5 TABLET ORAL
Qty: 90 TABLET | Refills: 3 | Status: SHIPPED | OUTPATIENT
Start: 2020-08-31 | End: 2020-09-02 | Stop reason: SDUPTHER

## 2020-09-02 DIAGNOSIS — I35.9 AORTIC VALVE DISORDER: ICD-10-CM

## 2020-09-02 RX ORDER — WARFARIN SODIUM 5 MG/1
TABLET ORAL
Qty: 180 TABLET | Refills: 3 | Status: SHIPPED | OUTPATIENT
Start: 2020-09-02 | End: 2021-09-13 | Stop reason: SDUPTHER

## 2020-09-12 DIAGNOSIS — E11.9 TYPE 2 DIABETES MELLITUS WITHOUT COMPLICATION, WITHOUT LONG-TERM CURRENT USE OF INSULIN (HCC): ICD-10-CM

## 2020-09-14 RX ORDER — GLIPIZIDE 10 MG/1
TABLET ORAL
Qty: 180 TABLET | Refills: 2 | Status: SHIPPED | OUTPATIENT
Start: 2020-09-14 | End: 2021-06-21

## 2020-09-18 ENCOUNTER — TELEMEDICINE (OUTPATIENT)
Dept: INTERNAL MEDICINE CLINIC | Facility: CLINIC | Age: 80
End: 2020-09-18
Payer: MEDICARE

## 2020-09-18 DIAGNOSIS — J06.9 UPPER RESPIRATORY TRACT INFECTION, UNSPECIFIED TYPE: Primary | ICD-10-CM

## 2020-09-18 PROCEDURE — 99442 PR PHYS/QHP TELEPHONE EVALUATION 11-20 MIN: CPT | Performed by: NURSE PRACTITIONER

## 2020-09-18 RX ORDER — PEN NEEDLE, DIABETIC 32 GX 1/4"
NEEDLE, DISPOSABLE MISCELLANEOUS
COMMUNITY
Start: 2020-09-16 | End: 2022-05-05

## 2020-09-18 RX ORDER — BLOOD-GLUCOSE METER
KIT MISCELLANEOUS
COMMUNITY
Start: 2020-09-04 | End: 2021-12-06 | Stop reason: SDUPTHER

## 2020-09-18 RX ORDER — AMOXICILLIN 500 MG/1
500 CAPSULE ORAL EVERY 8 HOURS SCHEDULED
Qty: 30 CAPSULE | Refills: 0 | Status: SHIPPED | OUTPATIENT
Start: 2020-09-18 | End: 2020-09-28

## 2020-09-18 NOTE — PATIENT INSTRUCTIONS
Cold Symptoms   AMBULATORY CARE:   Cold symptoms  include sneezing, dry throat, a stuffy nose, headache, watery eyes, and a cough  Your cough may be dry, or you may cough up mucus  You may also have muscle aches, joint pain, and tiredness  Rarely, you may have a fever  Cold symptoms occur from inflammation in your upper respiratory system caused by a virus  Most colds go away without treatment  Seek care immediately if:   · You have increased tiredness and weakness  · You are unable to eat  · Your heart is beating much faster than usual for you  · You see white spots in the back of your throat and your neck is swollen and sore to the touch  · You see pinpoint or larger reddish-purple dots on your skin  Contact your healthcare provider if:   · You have a fever higher than 102°F (38 9°C)  · You have new or worsening shortness of breath  · You have thick nasal drainage for more than 2 days  · Your symptoms do not improve or get worse within 5 days  · You have questions or concerns about your condition or care  Treatment for cold symptoms  may include NSAIDS to decrease muscle aches and fever  Cold medicines may also be given to decrease coughing, nasal stuffiness, sneezing, and a runny nose  Manage your cold symptoms: The following may help relieve cold symptoms, such as a dry throat and congestion:  · Gargle with mouthwash or warm salt water as directed  · Suck on throat lozenges or hard candy  · Use a cold or warm vaporizer or humidifier to ease your breathing  · Rest for at least 2 days and then as needed to decrease tiredness and weakness  · Use petroleum based jelly around your nostrils to decrease irritation from blowing your nose  · Drink plenty of liquids  Liquids will help thin and loosen thick mucus so you can cough it up  Liquids will also keep you hydrated   Ask your healthcare provider which liquids are best for you and how much to drink each day   Prevent the spread of germs  by washing your hands often  You can spread your cold germs to others for at least 3 days after your symptoms start  Do not share items, such as eating utensils  Cover your nose and mouth when you cough or sneeze using the crook of your elbow instead of your hands  Throw used tissues in the garbage  Do not smoke:  Smoking may worsen your symptoms and increase the length of time you feel sick  Talk with your healthcare provider if you need help to stop smoking  Follow up with your healthcare provider as directed:  Write down your questions so you remember to ask them during your visits  © 2017 [facility] Information is for End User's use only and may not be sold, redistributed or otherwise used for commercial purposes  All illustrations and images included in CareNotes® are the copyrighted property of [employer]  or [employer]  The above information is an  only  It is not intended as medical advice for individual conditions or treatments  Talk to your doctor, nurse or pharmacist before following any medical regimen to see if it is safe and effective for you

## 2020-09-18 NOTE — PROGRESS NOTES
Virtual Brief Visit    Assessment/Plan:  Patient most likely suffering from URI or ear infection due to progressive symptoms over the past four weeks  Will treat with amoxicillin  Advised to call back if symptoms do not improve  Advised to take with food  May want to take a probiotic to avoid GI upset  Advised this may affect warfarin levels  Patient check INR monthly and is due to be checked next week  Problem List Items Addressed This Visit     None      Visit Diagnoses     Upper respiratory tract infection, unspecified type    -  Primary    Relevant Medications    amoxicillin (AMOXIL) 500 mg capsule                Reason for visit is   Chief Complaint   Patient presents with    Virtual Brief Visit        Encounter provider CAMACHO Gilmore    Provider located at [address] [place] [address]    Recent Visits  No visits were found meeting these conditions  Showing recent visits within past 7 days and meeting all other requirements     Today's Visits  Date Type Provider Dept   09/18/20 [address]  Jasiel De Paume today's visits and meeting all other requirements     Future Appointments  No visits were found meeting these conditions  Showing future appointments within next 150 days and meeting all other requirements        After connecting through telephone, the patient was identified by name and date of birth  Tee Molina was informed that this is a telemedicine visit and that the visit is being conducted through telephone  My office door was closed  No one else was in the room  She acknowledged consent and understanding of privacy and security of the platform  The patient has agreed to participate and understands she can discontinue the visit at any time  Patient is aware this is a billable service       Subjective    Tee Molina is a [de-identified] y o  female with complaints of a head cold  Patient admits to having a "cold" for about 4 weeks now  States she had been taking Mucinex D over the counter which seemed to help in the beginning  States she knows she should be careful with using over the counter medications due to her other health conditions and medications  She admits to a feeling of her ears being clogged and pressure on the right side of the head  At times she feels off balance due to the pressure  She also has pain into her neck  She denies fever, sore throat, nasal congestion, post nasal drip, cough, or chest congestion  Patient is upset that every time she goes to the pharmacy she wants to get her flu shot, but has been sick and not able to get it  Past Medical History:   Diagnosis Date    Anemia     Aneurysm of thoracic aorta (HCC)     Aortic valve disorder     Benign neoplasm of sigmoid colon     [facility]     last assessed: 10/10/2014    [facility]est[facility])     Chronic kidney disease     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[facility])     last assessed: 10/10/2014    Diabetic nephropathy ([facility] )     RAMON (dyspnea on exertion)     GERD (gastroesophageal reflux disease)     History of emphysema     Hyperlipidemia     Hypertension     TIA (transient ischemic attack)        Past Surgical History:   Procedure Laterality Date    AORTIC VALVE REPLACEMENT      CARDIAC PACEMAKER PLACEMENT      last assessed: 10/10/2014   [facility] CARDIAC SURGERY      aortic valve replacement    CHOLECYSTECTOMY      COLONOSCOPY      HYSTERECTOMY      INSERT / REPLACE / REMOVE PACEMAKER      KNEE SURGERY Right     OTHER SURGICAL HISTORY      Capsule ENDOscopy description: 12/19/2012    NM COLONOSCOPY FLX DX W/COLLJ SPEC WHEN PFRMD N/A 5/31/2018    Procedure: single balloon ENTEROSCOPY;  Surgeon: Ruby Sierra MD;  [facility];   Service: Gastroenterology    NM ESOPHAGOGASTRODUODENOSCOPY TRANSORAL DIAGNOSTIC N/A 11/29/2017    Procedure: EGD AND COLONOSCOPY;  Surgeon: Grant Norman MD;  [facility]; Service: Gastroenterology       Current Outpatient Medications   Medication Sig Dispense Refill    amoxicillin (AMOXIL) 500 mg capsule Take 1 capsule (500 mg total) by mouth every 8 (eight) hours for 10 days 30 capsule 0    BD Pen Needle Micro U/F 32G X 6 MM MISC       FREESTYLE LITE test strip use 1 TEST STRIP to TEST BLOOD SUGAR three times a day      furosemide (LASIX) 40 mg tablet 1 tab daily 90 tablet 3    gabapentin (NEURONTIN) 300 mg capsule take 1 capsule by mouth three times a day 270 capsule 3    glipiZIDE (GLUCOTROL) 10 mg tablet take 1 tablet by mouth twice a day before meals 180 tablet 2    insulin detemir (Levemir FlexTouch) 100 Units/mL injection pen Inject 20 Units under the skin daily at bedtime 5 pen 11    Insulin Pen Needle (PEN NEEDLES) 31G X 6 MM MISC by Does not apply route daily 100 each 3    Lancets (FREESTYLE) lancets Check blood glucose 3 times daily 300 each 3    levothyroxine 50 mcg tablet take 1 tablet by mouth once daily 90 tablet 3    metoprolol tartrate (LOPRESSOR) 50 mg tablet 1/2 tab twice a day 90 tablet 3    pantoprazole (PROTONIX) 40 mg tablet take 1 tablet by mouth once daily BEFORE BREAKFAST 90 tablet 3    RA IRON 325 (65 Fe) MG tablet take 1 tablet by mouth twice a day before meals 60 tablet 2    warfarin (COUMADIN) 5 mg tablet Take 1-2 tabs daily or as directed 180 tablet 3     No current facility-administered medications for this visit  No Known Allergies    Review of Systems   Constitutional: Positive for fatigue  Negative for chills and fever  HENT: Positive for ear pain and tinnitus  Negative for congestion, postnasal drip, sinus pain and sore throat  Respiratory: Negative for cough, chest tightness, shortness of breath and wheezing  Cardiovascular: Negative for chest pain and palpitations     Gastrointestinal: Negative for abdominal pain, constipation, diarrhea and nausea  Neurological: Positive for dizziness  Negative for headaches  There were no vitals filed for this visit  I spent 15 minutes directly with the patient during this visit    VIRTUAL VISIT DISCLAIMER    Isidra Frank acknowledges that she has consented to an online visit or consultation  She understands that the online visit is based solely on information provided by her, and that, in the absence of a face-to-face physical evaluation by the physician, the diagnosis she receives is both limited and provisional in terms of accuracy and completeness  This is not intended to replace a full medical face-to-face evaluation by the physician  Isidra Frank understands and accepts these terms

## 2020-09-23 DIAGNOSIS — E03.9 HYPOTHYROIDISM, UNSPECIFIED TYPE: ICD-10-CM

## 2020-09-23 RX ORDER — LEVOTHYROXINE SODIUM 0.05 MG/1
TABLET ORAL
Qty: 90 TABLET | Refills: 3 | Status: SHIPPED | OUTPATIENT
Start: 2020-09-23 | End: 2021-09-10

## 2020-11-11 ENCOUNTER — HOSPITAL ENCOUNTER (OUTPATIENT)
Dept: ULTRASOUND IMAGING | Facility: CLINIC | Age: 80
Discharge: HOME | End: 2020-11-11
Payer: MEDICARE

## 2020-11-11 DIAGNOSIS — M89.9 CHRONIC KIDNEY DISEASE-MINERAL AND BONE DISORDER: ICD-10-CM

## 2020-11-11 DIAGNOSIS — E83.9 CHRONIC KIDNEY DISEASE-MINERAL AND BONE DISORDER: ICD-10-CM

## 2020-11-11 DIAGNOSIS — N18.4 CKD (CHRONIC KIDNEY DISEASE) STAGE 4, GFR 15-29 ML/MIN (HCC): ICD-10-CM

## 2020-11-11 DIAGNOSIS — N18.9 CHRONIC KIDNEY DISEASE-MINERAL AND BONE DISORDER: ICD-10-CM

## 2020-11-11 PROCEDURE — 76770 US EXAM ABDO BACK WALL COMP: CPT

## 2020-11-12 ENCOUNTER — ANTICOAG VISIT (OUTPATIENT)
Dept: CARDIOLOGY CLINIC | Facility: CLINIC | Age: 80
End: 2020-11-12

## 2020-11-12 ENCOUNTER — LAB (OUTPATIENT)
Dept: LAB | Facility: CLINIC | Age: 80
End: 2020-11-12
Payer: MEDICARE

## 2020-11-12 DIAGNOSIS — N18.4 CKD (CHRONIC KIDNEY DISEASE) STAGE 4, GFR 15-29 ML/MIN (HCC): ICD-10-CM

## 2020-11-12 DIAGNOSIS — N18.9 CHRONIC KIDNEY DISEASE-MINERAL AND BONE DISORDER: ICD-10-CM

## 2020-11-12 DIAGNOSIS — E83.9 CHRONIC KIDNEY DISEASE-MINERAL AND BONE DISORDER: ICD-10-CM

## 2020-11-12 DIAGNOSIS — M89.9 CHRONIC KIDNEY DISEASE-MINERAL AND BONE DISORDER: ICD-10-CM

## 2020-11-12 LAB
25(OH)D3 SERPL-MCNC: 17 NG/ML (ref 30–100)
ANION GAP SERPL CALCULATED.3IONS-SCNC: 3 MMOL/L (ref 4–13)
BACTERIA UR QL AUTO: NORMAL /HPF
BASOPHILS # BLD AUTO: 0.03 THOUSANDS/ΜL (ref 0–0.1)
BASOPHILS NFR BLD AUTO: 1 % (ref 0–1)
BILIRUB UR QL STRIP: NEGATIVE
BUN SERPL-MCNC: 20 MG/DL (ref 5–25)
CALCIUM SERPL-MCNC: 8.6 MG/DL (ref 8.3–10.1)
CHLORIDE SERPL-SCNC: 110 MMOL/L (ref 100–108)
CLARITY UR: CLEAR
CO2 SERPL-SCNC: 31 MMOL/L (ref 21–32)
COLOR UR: YELLOW
CREAT SERPL-MCNC: 1.06 MG/DL (ref 0.6–1.3)
CREAT UR-MCNC: 144 MG/DL
EOSINOPHIL # BLD AUTO: 0.09 THOUSAND/ΜL (ref 0–0.61)
EOSINOPHIL NFR BLD AUTO: 3 % (ref 0–6)
ERYTHROCYTE [DISTWIDTH] IN BLOOD BY AUTOMATED COUNT: 14.2 % (ref 11.6–15.1)
GFR SERPL CREATININE-BSD FRML MDRD: 50 ML/MIN/1.73SQ M
GLUCOSE P FAST SERPL-MCNC: 134 MG/DL (ref 65–99)
GLUCOSE UR STRIP-MCNC: NEGATIVE MG/DL
HCT VFR BLD AUTO: 42.6 % (ref 34.8–46.1)
HGB BLD-MCNC: 14 G/DL (ref 11.5–15.4)
HGB UR QL STRIP.AUTO: ABNORMAL
HYALINE CASTS #/AREA URNS LPF: NORMAL /LPF
IMM GRANULOCYTES # BLD AUTO: 0.01 THOUSAND/UL (ref 0–0.2)
IMM GRANULOCYTES NFR BLD AUTO: 0 % (ref 0–2)
KETONES UR STRIP-MCNC: NEGATIVE MG/DL
LEUKOCYTE ESTERASE UR QL STRIP: ABNORMAL
LYMPHOCYTES # BLD AUTO: 1.25 THOUSANDS/ΜL (ref 0.6–4.47)
LYMPHOCYTES NFR BLD AUTO: 36 % (ref 14–44)
MCH RBC QN AUTO: 29.8 PG (ref 26.8–34.3)
MCHC RBC AUTO-ENTMCNC: 32.9 G/DL (ref 31.4–37.4)
MCV RBC AUTO: 91 FL (ref 82–98)
MONOCYTES # BLD AUTO: 0.41 THOUSAND/ΜL (ref 0.17–1.22)
MONOCYTES NFR BLD AUTO: 12 % (ref 4–12)
NEUTROPHILS # BLD AUTO: 1.65 THOUSANDS/ΜL (ref 1.85–7.62)
NEUTS SEG NFR BLD AUTO: 48 % (ref 43–75)
NITRITE UR QL STRIP: NEGATIVE
NON-SQ EPI CELLS URNS QL MICRO: NORMAL /HPF
NRBC BLD AUTO-RTO: 0 /100 WBCS
PH UR STRIP.AUTO: 6 [PH]
PHOSPHATE SERPL-MCNC: 3.1 MG/DL (ref 2.3–4.1)
PLATELET # BLD AUTO: 219 THOUSANDS/UL (ref 149–390)
PMV BLD AUTO: 10.6 FL (ref 8.9–12.7)
POTASSIUM SERPL-SCNC: 3.9 MMOL/L (ref 3.5–5.3)
PROT UR STRIP-MCNC: NEGATIVE MG/DL
PROT UR-MCNC: 14 MG/DL
PROT/CREAT UR: 0.1 MG/G{CREAT} (ref 0–0.1)
PTH-INTACT SERPL-MCNC: 74.2 PG/ML (ref 18.4–80.1)
RBC # BLD AUTO: 4.7 MILLION/UL (ref 3.81–5.12)
RBC #/AREA URNS AUTO: NORMAL /HPF
SODIUM SERPL-SCNC: 144 MMOL/L (ref 136–145)
SP GR UR STRIP.AUTO: 1.02 (ref 1–1.03)
UROBILINOGEN UR QL STRIP.AUTO: 1 E.U./DL
WBC # BLD AUTO: 3.44 THOUSAND/UL (ref 4.31–10.16)
WBC #/AREA URNS AUTO: NORMAL /HPF

## 2020-11-12 PROCEDURE — 80048 BASIC METABOLIC PNL TOTAL CA: CPT

## 2020-11-12 PROCEDURE — 82570 ASSAY OF URINE CREATININE: CPT

## 2020-11-12 PROCEDURE — 85025 COMPLETE CBC W/AUTO DIFF WBC: CPT

## 2020-11-12 PROCEDURE — 84156 ASSAY OF PROTEIN URINE: CPT

## 2020-11-12 PROCEDURE — 83970 ASSAY OF PARATHORMONE: CPT

## 2020-11-12 PROCEDURE — 84100 ASSAY OF PHOSPHORUS: CPT

## 2020-11-12 PROCEDURE — 81001 URINALYSIS AUTO W/SCOPE: CPT

## 2020-11-12 PROCEDURE — 82306 VITAMIN D 25 HYDROXY: CPT

## 2020-11-17 ENCOUNTER — TELEPHONE (OUTPATIENT)
Dept: NEPHROLOGY | Facility: CLINIC | Age: 80
End: 2020-11-17

## 2020-11-18 ENCOUNTER — OFFICE VISIT (OUTPATIENT)
Dept: NEPHROLOGY | Facility: CLINIC | Age: 80
End: 2020-11-18
Payer: MEDICARE

## 2020-11-18 VITALS
WEIGHT: 166.6 LBS | RESPIRATION RATE: 16 BRPM | DIASTOLIC BLOOD PRESSURE: 80 MMHG | HEART RATE: 68 BPM | HEIGHT: 66 IN | TEMPERATURE: 96.5 F | SYSTOLIC BLOOD PRESSURE: 140 MMHG | BODY MASS INDEX: 26.78 KG/M2

## 2020-11-18 DIAGNOSIS — Z95.2 AORTIC VALVE REPLACED: ICD-10-CM

## 2020-11-18 DIAGNOSIS — N18.9 CHRONIC KIDNEY DISEASE-MINERAL AND BONE DISORDER: ICD-10-CM

## 2020-11-18 DIAGNOSIS — E11.40 TYPE 2 DIABETES MELLITUS WITH DIABETIC NEUROPATHY, WITHOUT LONG-TERM CURRENT USE OF INSULIN (HCC): ICD-10-CM

## 2020-11-18 DIAGNOSIS — E83.9 CHRONIC KIDNEY DISEASE-MINERAL AND BONE DISORDER: ICD-10-CM

## 2020-11-18 DIAGNOSIS — M89.9 CHRONIC KIDNEY DISEASE-MINERAL AND BONE DISORDER: ICD-10-CM

## 2020-11-18 DIAGNOSIS — N18.31 STAGE 3A CHRONIC KIDNEY DISEASE (HCC): Primary | ICD-10-CM

## 2020-11-18 DIAGNOSIS — I10 HYPERTENSION, ESSENTIAL: ICD-10-CM

## 2020-11-18 DIAGNOSIS — I25.5 ISCHEMIC CARDIOMYOPATHY: ICD-10-CM

## 2020-11-18 PROCEDURE — 99214 OFFICE O/P EST MOD 30 MIN: CPT | Performed by: INTERNAL MEDICINE

## 2020-11-18 RX ORDER — ERGOCALCIFEROL 1.25 MG/1
50000 CAPSULE ORAL WEEKLY
Qty: 15 CAPSULE | Refills: 0 | Status: SHIPPED | OUTPATIENT
Start: 2020-11-18 | End: 2022-01-22 | Stop reason: HOSPADM

## 2020-11-23 ENCOUNTER — APPOINTMENT (OUTPATIENT)
Dept: LAB | Facility: CLINIC | Age: 80
End: 2020-11-23
Payer: MEDICARE

## 2020-11-23 ENCOUNTER — ANTICOAG VISIT (OUTPATIENT)
Dept: CARDIOLOGY CLINIC | Facility: CLINIC | Age: 80
End: 2020-11-23

## 2020-11-23 ENCOUNTER — OFFICE VISIT (OUTPATIENT)
Dept: INTERNAL MEDICINE CLINIC | Facility: CLINIC | Age: 80
End: 2020-11-23
Payer: MEDICARE

## 2020-11-23 VITALS
WEIGHT: 166 LBS | TEMPERATURE: 98.2 F | BODY MASS INDEX: 26.68 KG/M2 | DIASTOLIC BLOOD PRESSURE: 70 MMHG | HEART RATE: 77 BPM | OXYGEN SATURATION: 95 % | SYSTOLIC BLOOD PRESSURE: 120 MMHG | HEIGHT: 66 IN

## 2020-11-23 DIAGNOSIS — M89.9 CHRONIC KIDNEY DISEASE-MINERAL AND BONE DISORDER: ICD-10-CM

## 2020-11-23 DIAGNOSIS — I25.5 ISCHEMIC CARDIOMYOPATHY: ICD-10-CM

## 2020-11-23 DIAGNOSIS — I50.9 CONGESTIVE HEART FAILURE, UNSPECIFIED HF CHRONICITY, UNSPECIFIED HEART FAILURE TYPE (HCC): ICD-10-CM

## 2020-11-23 DIAGNOSIS — Z12.11 COLON CANCER SCREENING: ICD-10-CM

## 2020-11-23 DIAGNOSIS — M25.572 ARTHRALGIA OF LEFT FOOT: ICD-10-CM

## 2020-11-23 DIAGNOSIS — E83.9 CHRONIC KIDNEY DISEASE-MINERAL AND BONE DISORDER: Primary | ICD-10-CM

## 2020-11-23 DIAGNOSIS — I10 HYPERTENSION, ESSENTIAL: ICD-10-CM

## 2020-11-23 DIAGNOSIS — E83.9 CHRONIC KIDNEY DISEASE-MINERAL AND BONE DISORDER: ICD-10-CM

## 2020-11-23 DIAGNOSIS — N18.9 CHRONIC KIDNEY DISEASE-MINERAL AND BONE DISORDER: ICD-10-CM

## 2020-11-23 DIAGNOSIS — E11.40 TYPE 2 DIABETES MELLITUS WITH DIABETIC NEUROPATHY, WITHOUT LONG-TERM CURRENT USE OF INSULIN (HCC): ICD-10-CM

## 2020-11-23 DIAGNOSIS — M89.9 CHRONIC KIDNEY DISEASE-MINERAL AND BONE DISORDER: Primary | ICD-10-CM

## 2020-11-23 DIAGNOSIS — I25.118 CORONARY ARTERY DISEASE OF NATIVE ARTERY OF NATIVE HEART WITH STABLE ANGINA PECTORIS (HCC): ICD-10-CM

## 2020-11-23 DIAGNOSIS — N18.9 CHRONIC KIDNEY DISEASE-MINERAL AND BONE DISORDER: Primary | ICD-10-CM

## 2020-11-23 DIAGNOSIS — N18.31 STAGE 3A CHRONIC KIDNEY DISEASE (HCC): ICD-10-CM

## 2020-11-23 DIAGNOSIS — E03.9 ACQUIRED HYPOTHYROIDISM: ICD-10-CM

## 2020-11-23 PROBLEM — K29.51 CHRONIC GASTRITIS WITH BLEEDING: Status: RESOLVED | Noted: 2018-04-25 | Resolved: 2020-11-23

## 2020-11-23 PROBLEM — M94.0 COSTOCHONDRITIS: Status: RESOLVED | Noted: 2020-05-18 | Resolved: 2020-11-23

## 2020-11-23 LAB
CK MB SERPL-MCNC: 2.1 % (ref 0–2.5)
CK MB SERPL-MCNC: 3.3 NG/ML (ref 0–5)
CK SERPL-CCNC: 155 U/L (ref 26–192)
ERYTHROCYTE [SEDIMENTATION RATE] IN BLOOD: 11 MM/HOUR (ref 0–29)
RHEUMATOID FACT SER QL LA: NEGATIVE
URATE SERPL-MCNC: 8.2 MG/DL (ref 2–6.8)

## 2020-11-23 PROCEDURE — 86200 CCP ANTIBODY: CPT

## 2020-11-23 PROCEDURE — 84550 ASSAY OF BLOOD/URIC ACID: CPT

## 2020-11-23 PROCEDURE — 99214 OFFICE O/P EST MOD 30 MIN: CPT | Performed by: INTERNAL MEDICINE

## 2020-11-23 PROCEDURE — 85652 RBC SED RATE AUTOMATED: CPT

## 2020-11-23 PROCEDURE — 86038 ANTINUCLEAR ANTIBODIES: CPT

## 2020-11-23 PROCEDURE — 86430 RHEUMATOID FACTOR TEST QUAL: CPT

## 2020-11-23 PROCEDURE — 82550 ASSAY OF CK (CPK): CPT

## 2020-11-23 PROCEDURE — 82553 CREATINE MB FRACTION: CPT

## 2020-11-23 PROCEDURE — 82085 ASSAY OF ALDOLASE: CPT

## 2020-11-23 PROCEDURE — G0439 PPPS, SUBSEQ VISIT: HCPCS | Performed by: INTERNAL MEDICINE

## 2020-11-23 RX ORDER — A/SINGAPORE/GP1908/2015 IVR-180 (AN A/MICHIGAN/45/2015 (H1N1)PDM09-LIKE VIRUS, A/HONG KONG/4801/2014, NYMC X-263B (H3N2) (AN A/HONG KONG/4801/2014-LIKE VIRUS), AND B/BRISBANE/60/2008, WILD TYPE (A B/BRISBANE/60/2008-LIKE VIRUS) 15; 15; 15 UG/.5ML; UG/.5ML; UG/.5ML
INJECTION, SUSPENSION INTRAMUSCULAR
COMMUNITY
Start: 2020-10-02 | End: 2022-01-22 | Stop reason: HOSPADM

## 2020-11-24 LAB — ALDOLASE SERPL-CCNC: 4.6 U/L (ref 3.3–10.3)

## 2020-11-25 LAB
CCP IGA+IGG SERPL IA-ACNC: 6 UNITS (ref 0–19)
RYE IGE QN: NEGATIVE

## 2020-11-27 ENCOUNTER — REMOTE DEVICE CLINIC VISIT (OUTPATIENT)
Dept: CARDIOLOGY CLINIC | Facility: CLINIC | Age: 80
End: 2020-11-27
Payer: MEDICARE

## 2020-11-27 DIAGNOSIS — Z95.0 CARDIAC PACEMAKER IN SITU: Primary | ICD-10-CM

## 2020-11-27 PROCEDURE — 93296 REM INTERROG EVL PM/IDS: CPT | Performed by: INTERNAL MEDICINE

## 2020-11-27 PROCEDURE — 93294 REM INTERROG EVL PM/LDLS PM: CPT | Performed by: INTERNAL MEDICINE

## 2020-11-28 ENCOUNTER — TELEPHONE (OUTPATIENT)
Dept: INTERNAL MEDICINE CLINIC | Facility: CLINIC | Age: 80
End: 2020-11-28

## 2021-02-03 DIAGNOSIS — I10 HYPERTENSION, ESSENTIAL: ICD-10-CM

## 2021-02-03 RX ORDER — METOPROLOL TARTRATE 50 MG/1
TABLET, FILM COATED ORAL
Qty: 90 TABLET | Refills: 0 | Status: SHIPPED | OUTPATIENT
Start: 2021-02-03 | End: 2021-05-06 | Stop reason: SDUPTHER

## 2021-02-03 NOTE — TELEPHONE ENCOUNTER
Pt called and needs refill for Metoprolol 50 mg for a 90 day supply   Please send to pharmacy on file

## 2021-03-01 ENCOUNTER — REMOTE DEVICE CLINIC VISIT (OUTPATIENT)
Dept: CARDIOLOGY CLINIC | Facility: CLINIC | Age: 81
End: 2021-03-01
Payer: MEDICARE

## 2021-03-01 DIAGNOSIS — Z95.0 PRESENCE OF PERMANENT CARDIAC PACEMAKER: Primary | ICD-10-CM

## 2021-03-01 PROCEDURE — 93296 REM INTERROG EVL PM/IDS: CPT | Performed by: INTERNAL MEDICINE

## 2021-03-01 PROCEDURE — 93294 REM INTERROG EVL PM/LDLS PM: CPT | Performed by: INTERNAL MEDICINE

## 2021-03-01 NOTE — PROGRESS NOTES
Results for orders placed or performed in visit on 03/01/21   Cardiac EP device report    Narrative    SJM DUAL CHAMBER PM  MERLIN TRANSMISSION: BATTERY VOLTAGE ADEQUATE  (9 8 YRS) AP 59%  99%  ALL AVAILABLE LEAD PARAMETERS WITHIN NORMAL LIMITS  NO SIGNIFICANT HIGH RATE EPISODES  3 AMS EPISODES DETECTED LONGEST 32 MINS  PATIENT IS ON WARFARIN AND METOPROLOL TART  NORMAL DEVICE FUNCTION  ----VALLE

## 2021-04-23 DIAGNOSIS — K21.9 GASTROESOPHAGEAL REFLUX DISEASE: ICD-10-CM

## 2021-04-23 RX ORDER — PANTOPRAZOLE SODIUM 40 MG/1
TABLET, DELAYED RELEASE ORAL
Qty: 90 TABLET | Refills: 3 | Status: SHIPPED | OUTPATIENT
Start: 2021-04-23 | End: 2022-04-26

## 2021-05-06 DIAGNOSIS — I10 HYPERTENSION, ESSENTIAL: ICD-10-CM

## 2021-05-06 RX ORDER — METOPROLOL TARTRATE 50 MG/1
TABLET, FILM COATED ORAL
Qty: 90 TABLET | Refills: 3 | Status: SHIPPED | OUTPATIENT
Start: 2021-05-06 | End: 2022-05-12 | Stop reason: SDUPTHER

## 2021-05-12 ENCOUNTER — TELEPHONE (OUTPATIENT)
Dept: NEPHROLOGY | Facility: CLINIC | Age: 81
End: 2021-05-12

## 2021-05-14 ENCOUNTER — ANTICOAG VISIT (OUTPATIENT)
Dept: CARDIOLOGY CLINIC | Facility: CLINIC | Age: 81
End: 2021-05-14

## 2021-05-14 ENCOUNTER — APPOINTMENT (OUTPATIENT)
Dept: LAB | Facility: CLINIC | Age: 81
End: 2021-05-14
Payer: MEDICARE

## 2021-05-14 DIAGNOSIS — N18.9 CHRONIC KIDNEY DISEASE-MINERAL AND BONE DISORDER: ICD-10-CM

## 2021-05-14 DIAGNOSIS — N18.31 STAGE 3A CHRONIC KIDNEY DISEASE (HCC): ICD-10-CM

## 2021-05-14 DIAGNOSIS — M89.9 CHRONIC KIDNEY DISEASE-MINERAL AND BONE DISORDER: ICD-10-CM

## 2021-05-14 DIAGNOSIS — E83.9 CHRONIC KIDNEY DISEASE-MINERAL AND BONE DISORDER: ICD-10-CM

## 2021-05-14 LAB
25(OH)D3 SERPL-MCNC: 26.9 NG/ML (ref 30–100)
ANION GAP SERPL CALCULATED.3IONS-SCNC: 4 MMOL/L (ref 4–13)
BACTERIA UR QL AUTO: NORMAL /HPF
BASOPHILS # BLD AUTO: 0.02 THOUSANDS/ΜL (ref 0–0.1)
BASOPHILS NFR BLD AUTO: 1 % (ref 0–1)
BILIRUB UR QL STRIP: NEGATIVE
BUN SERPL-MCNC: 18 MG/DL (ref 5–25)
CALCIUM SERPL-MCNC: 8.3 MG/DL (ref 8.3–10.1)
CHLORIDE SERPL-SCNC: 101 MMOL/L (ref 100–108)
CLARITY UR: CLEAR
CO2 SERPL-SCNC: 30 MMOL/L (ref 21–32)
COLOR UR: YELLOW
CREAT SERPL-MCNC: 1.1 MG/DL (ref 0.6–1.3)
CREAT UR-MCNC: 28.1 MG/DL
EOSINOPHIL # BLD AUTO: 0.08 THOUSAND/ΜL (ref 0–0.61)
EOSINOPHIL NFR BLD AUTO: 2 % (ref 0–6)
ERYTHROCYTE [DISTWIDTH] IN BLOOD BY AUTOMATED COUNT: 13.4 % (ref 11.6–15.1)
GFR SERPL CREATININE-BSD FRML MDRD: 48 ML/MIN/1.73SQ M
GLUCOSE P FAST SERPL-MCNC: 169 MG/DL (ref 65–99)
GLUCOSE UR STRIP-MCNC: NEGATIVE MG/DL
HCT VFR BLD AUTO: 42.6 % (ref 34.8–46.1)
HGB BLD-MCNC: 14.1 G/DL (ref 11.5–15.4)
HGB UR QL STRIP.AUTO: ABNORMAL
IMM GRANULOCYTES # BLD AUTO: 0.01 THOUSAND/UL (ref 0–0.2)
IMM GRANULOCYTES NFR BLD AUTO: 0 % (ref 0–2)
KETONES UR STRIP-MCNC: NEGATIVE MG/DL
LEUKOCYTE ESTERASE UR QL STRIP: NEGATIVE
LYMPHOCYTES # BLD AUTO: 1.25 THOUSANDS/ΜL (ref 0.6–4.47)
LYMPHOCYTES NFR BLD AUTO: 30 % (ref 14–44)
MCH RBC QN AUTO: 29.9 PG (ref 26.8–34.3)
MCHC RBC AUTO-ENTMCNC: 33.1 G/DL (ref 31.4–37.4)
MCV RBC AUTO: 90 FL (ref 82–98)
MONOCYTES # BLD AUTO: 0.42 THOUSAND/ΜL (ref 0.17–1.22)
MONOCYTES NFR BLD AUTO: 10 % (ref 4–12)
NEUTROPHILS # BLD AUTO: 2.38 THOUSANDS/ΜL (ref 1.85–7.62)
NEUTS SEG NFR BLD AUTO: 57 % (ref 43–75)
NITRITE UR QL STRIP: NEGATIVE
NON-SQ EPI CELLS URNS QL MICRO: NORMAL /HPF
NRBC BLD AUTO-RTO: 0 /100 WBCS
PH UR STRIP.AUTO: 6.5 [PH]
PHOSPHATE SERPL-MCNC: 2.4 MG/DL (ref 2.3–4.1)
PLATELET # BLD AUTO: 246 THOUSANDS/UL (ref 149–390)
PMV BLD AUTO: 10.8 FL (ref 8.9–12.7)
POTASSIUM SERPL-SCNC: 4 MMOL/L (ref 3.5–5.3)
PROT UR STRIP-MCNC: NEGATIVE MG/DL
PROT UR-MCNC: <6 MG/DL
PROT/CREAT UR: <0.21 MG/G{CREAT} (ref 0–0.1)
PTH-INTACT SERPL-MCNC: 142.4 PG/ML (ref 18.4–80.1)
RBC # BLD AUTO: 4.71 MILLION/UL (ref 3.81–5.12)
RBC #/AREA URNS AUTO: NORMAL /HPF
SODIUM SERPL-SCNC: 135 MMOL/L (ref 136–145)
SP GR UR STRIP.AUTO: 1 (ref 1–1.03)
UROBILINOGEN UR QL STRIP.AUTO: 0.2 E.U./DL
WBC # BLD AUTO: 4.16 THOUSAND/UL (ref 4.31–10.16)
WBC #/AREA URNS AUTO: NORMAL /HPF

## 2021-05-14 PROCEDURE — 85025 COMPLETE CBC W/AUTO DIFF WBC: CPT

## 2021-05-14 PROCEDURE — 82306 VITAMIN D 25 HYDROXY: CPT

## 2021-05-14 PROCEDURE — 84100 ASSAY OF PHOSPHORUS: CPT

## 2021-05-14 PROCEDURE — 81001 URINALYSIS AUTO W/SCOPE: CPT

## 2021-05-14 PROCEDURE — 80048 BASIC METABOLIC PNL TOTAL CA: CPT

## 2021-05-14 PROCEDURE — 82570 ASSAY OF URINE CREATININE: CPT

## 2021-05-14 PROCEDURE — 84156 ASSAY OF PROTEIN URINE: CPT

## 2021-05-14 PROCEDURE — 83970 ASSAY OF PARATHORMONE: CPT

## 2021-05-18 ENCOUNTER — OFFICE VISIT (OUTPATIENT)
Dept: NEPHROLOGY | Facility: CLINIC | Age: 81
End: 2021-05-18
Payer: MEDICARE

## 2021-05-18 VITALS
HEIGHT: 66 IN | SYSTOLIC BLOOD PRESSURE: 120 MMHG | WEIGHT: 172 LBS | TEMPERATURE: 97.4 F | RESPIRATION RATE: 16 BRPM | DIASTOLIC BLOOD PRESSURE: 70 MMHG | HEART RATE: 68 BPM | BODY MASS INDEX: 27.64 KG/M2

## 2021-05-18 DIAGNOSIS — E11.40 TYPE 2 DIABETES MELLITUS WITH DIABETIC NEUROPATHY, WITHOUT LONG-TERM CURRENT USE OF INSULIN (HCC): ICD-10-CM

## 2021-05-18 DIAGNOSIS — N18.31 STAGE 3A CHRONIC KIDNEY DISEASE (HCC): Primary | ICD-10-CM

## 2021-05-18 DIAGNOSIS — M89.9 CHRONIC KIDNEY DISEASE-MINERAL AND BONE DISORDER: ICD-10-CM

## 2021-05-18 DIAGNOSIS — I25.5 ISCHEMIC CARDIOMYOPATHY: ICD-10-CM

## 2021-05-18 DIAGNOSIS — N18.9 CHRONIC KIDNEY DISEASE-MINERAL AND BONE DISORDER: ICD-10-CM

## 2021-05-18 DIAGNOSIS — I10 HYPERTENSION, ESSENTIAL: ICD-10-CM

## 2021-05-18 DIAGNOSIS — E83.9 CHRONIC KIDNEY DISEASE-MINERAL AND BONE DISORDER: ICD-10-CM

## 2021-05-18 PROCEDURE — 99214 OFFICE O/P EST MOD 30 MIN: CPT | Performed by: INTERNAL MEDICINE

## 2021-05-18 NOTE — ASSESSMENT & PLAN NOTE
Lab Results   Component Value Date    HGBA1C 7 2 (H) 08/27/2020    diabetes seems to be reasonably well control    Importance of diabetic control and kidney disease discussed with the patient

## 2021-05-18 NOTE — ASSESSMENT & PLAN NOTE
Patient last EF was about 45%    Patient is on diuretic and overall seems to be quite asymptomatic being monitored by cardiologist

## 2021-05-18 NOTE — PATIENT INSTRUCTIONS

## 2021-05-18 NOTE — ASSESSMENT & PLAN NOTE
Lab Results   Component Value Date    EGFR 48 05/14/2021    EGFR 50 11/12/2020    EGFR 41 08/27/2020    CREATININE 1 10 05/14/2021    CREATININE 1 06 11/12/2020    CREATININE 1 24 08/27/2020    patient kidney function seems to be stable at GFR of 48  Asymptomatic and progressive nature of kidney disease discussed with the patient    Advised cautious hydration and avoiding any nephrotoxic medicine

## 2021-05-18 NOTE — PROGRESS NOTES
NEPHROLOGY OFFICE FOLLOW UP  Meka Lind [de-identified] y o  female MRN: [id number]    Encounter: [id number] 5/18/2021    REASON FOR VISIT: Meka Lind is a [de-identified] y o  female who is here on 5/18/2021 for Chronic Kidney Disease and Follow-up    HPI:    Wenceslao Crump came in today for follow-up of CKD stage 3  [age] year woman with stage III CKD who is doing well overall  Denies any acute complaint     No chest pain no palpitation or shortness of breath     No nausea no vomiting     Denies any leg swelling or any urinary complaint      REVIEW OF SYSTEMS:    Review of Systems   Constitutional: Negative for activity change and fatigue  HENT: Negative for congestion and ear discharge  Eyes: Negative for photophobia and pain  Respiratory: Negative for apnea and choking  Cardiovascular: Negative for chest pain and palpitations  Gastrointestinal: Negative for abdominal distention and blood in stool  Endocrine: Negative for heat intolerance and polyphagia  Genitourinary: Negative for flank pain and urgency  Musculoskeletal: Negative for neck pain and neck stiffness  Skin: Negative for color change and wound  Allergic/Immunologic: Negative for food allergies and immunocompromised state  Neurological: Negative for seizures and facial asymmetry  Hematological: Negative for adenopathy  Does not bruise/bleed easily  Psychiatric/Behavioral: Negative for self-injury and suicidal ideas           PAST MEDICAL HISTORY:  Past Medical History:   Diagnosis Date    Anemia     [facility]     Aortic valve disorder     Benign neoplasm of sigmoid colon     [facility]     last assessed: 10/10/2014    CHF (congestive heart failure) (HCC)     Chronic kidney disease     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HCC)     last assessed: 10/10/2014    Diabetic nephropathy [facility]     RAMON (dyspnea on exertion)     GERD (gastroesophageal reflux disease)     History of emphysema     Hyperlipidemia     Hypertension     TIA (transient ischemic attack)        PAST SURGICAL HISTORY:  Past Surgical History:   Procedure Laterality Date    AORTIC VALVE REPLACEMENT      CARDIAC PACEMAKER PLACEMENT      last assessed: 10/10/2014   [facility] CARDIAC SURGERY      aortic valve replacement    CHOLECYSTECTOMY      COLONOSCOPY      HYSTERECTOMY      INSERT / REPLACE / REMOVE PACEMAKER      KNEE SURGERY Right     OTHER SURGICAL HISTORY      Capsule ENDOscopy description: 2012    VA COLONOSCOPY FLX DX W/COLLJ SPEC WHEN PFRMD N/A 2018    Procedure: single balloon ENTEROSCOPY;  Surgeon: Reinaldo Spangler MD;  [facility]; Service: Gastroenterology    VA ESOPHAGOGASTRODUODENOSCOPY TRANSORAL DIAGNOSTIC N/A 2017    Procedure: EGD AND COLONOSCOPY;  Surgeon: Sri Oconnor MD;  [facility];   Service: Gastroenterology       SOCIAL HISTORY:  Social History     Substance and Sexual Activity   Alcohol Use No     Social History     Substance and Sexual Activity   Drug Use No     Social History     Tobacco Use   Smoking Status Former Smoker    Packs/day: 1 00    Years: 50 00    Pack years: 50 00    Quit date: 2007    Years since quittin 4   Smokeless Tobacco Former User    Quit date:        FAMILY HISTORY:  Family History   Problem Relation Age of Onset    No Known Problems Mother     No Known Problems Father        MEDICATIONS:    Current Outpatient Medications:     BD Pen Needle Micro U/F 32G X 6 MM MISC, , Disp: , Rfl:     Fluad Quadrivalent 0 5 ML PRSY, inject 0 5 milliliters intramuscularly, Disp: , Rfl:     FREESTYLE LITE test strip, use 1 TEST STRIP to TEST BLOOD SUGAR three times a day, Disp: , Rfl:     furosemide (LASIX) 40 mg tablet, 1 tab daily, Disp: 90 tablet, Rfl: 3    gabapentin (NEURONTIN) 300 mg capsule, take 1 capsule by mouth three times a day, Disp: 270 capsule, Rfl: 3    glipiZIDE (GLUCOTROL) 10 mg tablet, take 1 tablet by mouth twice a day before meals, Disp: 180 tablet, Rfl: 2    insulin detemir (Levemir FlexTouch) 100 Units/mL injection pen, Inject 20 Units under the skin daily at bedtime, Disp: 5 pen, Rfl: 11    Insulin Pen Needle (PEN NEEDLES) 31G X 6 MM MISC, by Does not apply route daily, Disp: 100 each, Rfl: 3    Lancets (FREESTYLE) lancets, Check blood glucose 3 times daily, Disp: 300 each, Rfl: 3    levothyroxine 50 mcg tablet, take 1 tablet by mouth once daily, Disp: 90 tablet, Rfl: 3    metoprolol tartrate (LOPRESSOR) 50 mg tablet, 1/2 tab twice a day, Disp: 90 tablet, Rfl: 3    pantoprazole (PROTONIX) 40 mg tablet, take 1 tablet by mouth once daily BEFORE BREAKFAST, Disp: 90 tablet, Rfl: 3    RA IRON 325 (65 Fe) MG tablet, take 1 tablet by mouth twice a day before meals, Disp: 60 tablet, Rfl: 2    warfarin (COUMADIN) 5 mg tablet, Take 1-2 tabs daily or as directed, Disp: 180 tablet, Rfl: 3    ergocalciferol (VITAMIN D2) 50,000 units, Take 1 capsule (50,000 Units total) by mouth once a week (Patient not taking: Reported on 5/18/2021), Disp: 15 capsule, Rfl: 0    PHYSICAL EXAM:  Vitals:    05/18/21 0906   BP: 120/70   BP Location: Right arm   Patient Position: Sitting   Pulse: 68   Resp: 16   Temp: (!) 97 4 °F (36 3 °C)   TempSrc: Temporal   Weight: 78 kg (172 lb)   Height: 5' 6" (1 676 m)     Body mass index is 27 76 kg/m²  Physical Exam  Constitutional:       General: She is not in acute distress  Appearance: Normal appearance  She is well-developed  HENT:      Head: Normocephalic  Eyes:      General: No scleral icterus  Conjunctiva/sclera: Conjunctivae normal       Pupils: Pupils are equal, round, and reactive to light  Neck:      Musculoskeletal: Neck supple  Vascular: No JVD  Cardiovascular:      Rate and Rhythm: Normal rate and regular rhythm  Heart sounds: Normal heart sounds  Pulmonary:      Effort: Pulmonary effort is normal       Breath sounds: Normal breath sounds  No wheezing     Abdominal:      General: Bowel sounds are normal       Palpations: Abdomen is soft  Tenderness: There is no abdominal tenderness  Musculoskeletal: Normal range of motion  Skin:     General: Skin is warm  Findings: No rash  Neurological:      Mental Status: She is alert and oriented to person, place, and time     Psychiatric:         Behavior: Behavior normal          LAB RESULTS:  Results for orders placed or performed in visit on 74/43/29   Basic metabolic panel   Result Value Ref Range    Sodium 135 (L) 136 - 145 mmol/L    Potassium 4 0 3 5 - 5 3 mmol/L    Chloride 101 100 - 108 mmol/L    CO2 30 21 - 32 mmol/L    ANION GAP 4 4 - 13 mmol/L    BUN 18 5 - 25 mg/dL    Creatinine 1 10 0 60 - 1 30 mg/dL    Glucose, Fasting 169 (H) 65 - 99 mg/dL    Calcium 8 3 8 3 - 10 1 mg/dL    eGFR 48 ml/min/1 73sq m   CBC and differential   Result Value Ref Range    WBC 4 16 (L) 4 31 - 10 16 Thousand/uL    RBC 4 71 3 81 - 5 12 Million/uL    Hemoglobin 14 1 11 5 - 15 4 g/dL    Hematocrit 42 6 34 8 - 46 1 %    MCV 90 82 - 98 fL    MCH 29 9 26 8 - 34 3 pg    MCHC 33 1 31 4 - 37 4 g/dL    RDW 13 4 11 6 - 15 1 %    MPV 10 8 8 9 - 12 7 fL    Platelets 194 240 - 751 Thousands/uL    nRBC 0 /100 WBCs    Neutrophils Relative 57 43 - 75 %    Immat GRANS % 0 0 - 2 %    Lymphocytes Relative 30 14 - 44 %    Monocytes Relative 10 4 - 12 %    Eosinophils Relative 2 0 - 6 %    Basophils Relative 1 0 - 1 %    Neutrophils Absolute 2 38 1 85 - 7 62 Thousands/µL    Immature Grans Absolute 0 01 0 00 - 0 20 Thousand/uL    Lymphocytes Absolute 1 25 0 60 - 4 47 Thousands/µL    Monocytes Absolute 0 42 0 17 - 1 22 Thousand/µL    Eosinophils Absolute 0 08 0 00 - 0 61 Thousand/µL    Basophils Absolute 0 02 0 00 - 0 10 Thousands/µL   Phosphorus   Result Value Ref Range    Phosphorus 2 4 2 3 - 4 1 mg/dL   Protein / creatinine ratio, urine   Result Value Ref Range    Creatinine, Ur 28 1 mg/dL    Protein Urine Random <6 mg/dL    Prot/Creat Ratio, Ur <0 21 (H) 0 00 - 0 10 PTH, intact   Result Value Ref Range     4 (H) 18 4 - 80 1 pg/mL   UA (URINE) with reflex to Scope   Result Value Ref Range    Color, UA Yellow     Clarity, UA Clear     Specific [place], UA 1 005 1 003 - 1 030    pH, UA 6 5 4 5, 5 0, 5 5, 6 0, 6 5, 7 0, 7 5, 8 0    Leukocytes, UA Negative Negative    Nitrite, UA Negative Negative    Protein, UA Negative Negative mg/dl    Glucose, UA Negative Negative mg/dl    Ketones, UA Negative Negative mg/dl    Urobilinogen, UA 0 2 0 2, 1 0 E U /dl E U /dl    Bilirubin, UA Negative Negative    Blood, UA Trace (A) Negative   Vitamin D 25 hydroxy   Result Value Ref Range    Vit D, 25-Hydroxy 26 9 (L) 30 0 - 100 0 ng/mL   Urine Microscopic   Result Value Ref Range    RBC, UA None Seen None Seen, 2-4 /hpf    WBC, UA None Seen None Seen, 2-4 /hpf    Epithelial Cells None Seen None Seen, Occasional /hpf    Bacteria, UA None Seen None Seen, Occasional /hpf       ASSESSMENT and PLAN:      Stage 3a chronic kidney disease  Lab Results   Component Value Date    EGFR 48 05/14/2021    EGFR 50 11/12/2020    EGFR 41 08/27/2020    CREATININE 1 10 05/14/2021    CREATININE 1 06 11/12/2020    CREATININE 1 24 08/27/2020    patient kidney function seems to be stable at GFR of 48  Asymptomatic and progressive nature of kidney disease discussed with the patient  Advised cautious hydration and avoiding any nephrotoxic medicine    Chronic kidney disease-mineral and bone disorder  Lab Results   Component Value Date    EGFR 48 05/14/2021    EGFR 50 11/12/2020    EGFR 41 08/27/2020    CREATININE 1 10 05/14/2021    CREATININE 1 06 11/12/2020    CREATININE 1 24 08/27/2020    PTH and phosphorus are within acceptable range  Vitamin-D level is still low  Advised to take over-the-counter vitamin-D 2000 unit once a day    Cardiomyopathy [facility])   Patient last EF was about 45%    Patient is on diuretic and overall seems to be quite asymptomatic being monitored by cardiologist    Type 2 diabetes mellitus with diabetic neuropathy, without long-term current use of insulin ([facility] )    Lab Results   Component Value Date    HGBA1C 7 2 (H) 08/27/2020    diabetes seems to be reasonably well control  Importance of diabetic control and kidney disease discussed with the patient    Hypertension, essential   Quite well control         everything discussed the patient at length  Discussed blood work and medication  I will see her back in 6 months and will get blood and urine test before that visit      Portions of the record may have been created with voice recognition software  Occasional wrong word or "sound a like" substitutions may have occurred due to the inherent limitations of voice recognition software  Read the chart carefully and recognize, using context, where substitutions have occurred  If you have any questions, please contact the dictating provider

## 2021-05-18 NOTE — ASSESSMENT & PLAN NOTE
Lab Results   Component Value Date    EGFR 48 05/14/2021    EGFR 50 11/12/2020    EGFR 41 08/27/2020    CREATININE 1 10 05/14/2021    CREATININE 1 06 11/12/2020    CREATININE 1 24 08/27/2020    PTH and phosphorus are within acceptable range  Vitamin-D level is still low    Advised to take over-the-counter vitamin-D 2000 unit once a day

## 2021-05-22 DIAGNOSIS — E11.40 TYPE 2 DIABETES MELLITUS WITH DIABETIC NEUROPATHY, WITHOUT LONG-TERM CURRENT USE OF INSULIN (HCC): ICD-10-CM

## 2021-05-24 RX ORDER — GABAPENTIN 300 MG/1
CAPSULE ORAL
Qty: 270 CAPSULE | Refills: 3 | Status: SHIPPED | OUTPATIENT
Start: 2021-05-24 | End: 2022-06-17

## 2021-05-26 ENCOUNTER — OFFICE VISIT (OUTPATIENT)
Dept: INTERNAL MEDICINE CLINIC | Facility: CLINIC | Age: 81
End: 2021-05-26
Payer: MEDICARE

## 2021-05-26 VITALS
SYSTOLIC BLOOD PRESSURE: 120 MMHG | BODY MASS INDEX: 27.45 KG/M2 | WEIGHT: 170.8 LBS | HEIGHT: 66 IN | DIASTOLIC BLOOD PRESSURE: 80 MMHG | OXYGEN SATURATION: 97 % | HEART RATE: 65 BPM | TEMPERATURE: 99 F

## 2021-05-26 DIAGNOSIS — E11.40 TYPE 2 DIABETES MELLITUS WITH DIABETIC NEUROPATHY, WITHOUT LONG-TERM CURRENT USE OF INSULIN (HCC): Primary | ICD-10-CM

## 2021-05-26 DIAGNOSIS — J41.0 SIMPLE CHRONIC BRONCHITIS (HCC): ICD-10-CM

## 2021-05-26 DIAGNOSIS — E78.2 MIXED HYPERLIPIDEMIA: ICD-10-CM

## 2021-05-26 DIAGNOSIS — I10 HYPERTENSION, ESSENTIAL: ICD-10-CM

## 2021-05-26 DIAGNOSIS — M15.9 PRIMARY OSTEOARTHRITIS INVOLVING MULTIPLE JOINTS: ICD-10-CM

## 2021-05-26 DIAGNOSIS — Z95.2 AORTIC VALVE REPLACED: ICD-10-CM

## 2021-05-26 PROBLEM — M79.10 PAIN IN THE MUSCLES: Status: RESOLVED | Noted: 2020-05-18 | Resolved: 2021-05-26

## 2021-05-26 PROBLEM — M15.0 PRIMARY OSTEOARTHRITIS INVOLVING MULTIPLE JOINTS: Status: ACTIVE | Noted: 2021-05-26

## 2021-05-26 LAB — SL AMB POCT HEMOGLOBIN AIC: 8.6 (ref ?–6.5)

## 2021-05-26 PROCEDURE — 83036 HEMOGLOBIN GLYCOSYLATED A1C: CPT | Performed by: INTERNAL MEDICINE

## 2021-05-26 PROCEDURE — 99214 OFFICE O/P EST MOD 30 MIN: CPT | Performed by: INTERNAL MEDICINE

## 2021-05-26 NOTE — PROGRESS NOTES
Diabetic Foot Exam    Patient's shoes and socks removed  Right Foot/Ankle   Right Foot Inspection  Skin Exam: skin normal and skin intact no dry skin, no warmth, no callus, no erythema, no maceration, no abnormal color, no pre-ulcer, no ulcer and no callus                                  Left Foot/Ankle  Left Foot Inspection  Skin Exam: skin normal and skin intactno dry skin, no warmth, no erythema, no maceration, normal color, no pre-ulcer, no ulcer and no callus                                             Assign Risk Category:  ; Loss of protective sensation; Weak pulses       Risk: 2

## 2021-05-26 NOTE — PROGRESS NOTES
Assessment/Plan:       Diagnoses and all orders for this visit:    Type 2 diabetes mellitus with diabetic neuropathy, without long-term current use of insulin (HCC)  -     POCT hemoglobin A1c    Simple chronic bronchitis (HCC)    Hypertension, essential    Aortic valve replaced    Mixed hyperlipidemia    Primary osteoarthritis involving multiple joints    Other orders  -     Cancel: CT lung screening program; Future  -     Cancel: PNEUMOCOCCAL POLYSACCHARIDE VACCINE 23-VALENT =>3YO SQ IM  -     Cancel: Hemoglobin A1C ([facility]); Future  -     Cancel: IRIS Diabetic eye exam                Subjective:      Patient ID: Vadim Dyer is a [de-identified] y o  female  [de-identified]Year old       Chronic problems are documented below     Still c/o  Foot pain with swelling Feet are numb secondary to diabetic neuropathy  Lack of sensation to monofilament  No ulcers  Deformities are noted including hammertoe  diabetic  Last year control was good but today her hemoglobin A1c fingerstick is 8 6  CKD stage 3 followed by Nephrology  mechanical aortic valve replacement in 2007  Last year I had seen her with a musculoskeletal chest pain , a muscle pain in left upper thigh exacerbated by walking and activity  This had begun spontaneously    please refer to that note  The patient was screened for inflammatory disease and found to have elevated muscle enzymes  Recommendation was to  discontinue lipid therapy including statin and fibrate  The left chest pain exacerbated right range of motion disappeared  Left upper thigh pain exacerbated by activity and walking has continued; alleviated by wearing compression stocking  serial CKs documented normal level in August and November      Hemoglobin A1c was elevated at 10 8 and she was started on insulin   Currently using 20 units of Lantus HS along with glimepiride  Again, CKD precludes metformin and the Invokana type drug        Consultation with nephrology regarding CKD  No major changes  Short of breath; this is chronic and unchanged from a baseline  She has a history of lung disease  A she stopped smoking 14 years ago  Also history of cardiomyopathy with reduced ejection fraction and has an ICD  She had a metallic  Aortic valve placed about 10 years ago and has been maintained on chronic high-dose Coumadin since    Diabetic with some neuropathic symptoms  She has some paresthesia and numbness of the  feet  No fever chills sweats no appetite loss no weight loss no abdominal pain  No rectal bleeding         The following portions of the patient's history were reviewed and updated as appropriate:   She has a past medical history of Anemia, Aneurysm of thoracic aorta ([address] ), Aortic valve disorder, Benign neoplasm of sigmoid colon, Cardiomyopathy ([address] ), CHF (congestive heart failure) ([address] ), Chronic kidney disease, 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[address] ), Diabetic nephropathy ([address] ), RAMON (dyspnea on exertion), GERD (gastroesophageal reflux disease), History of emphysema, Hyperlipidemia, and TIA (transient ischemic attack)  ,  does not have any pertinent problems on file  ,   has a past surgical history that includes Cholecystectomy; Colonoscopy; Hysterectomy; Aortic valve replacement; pr esophagogastroduodenoscopy transoral diagnostic (N/A, 11/29/2017); Other surgical history; Cardiac surgery; Insert / replace / remove pacemaker; Cardiac pacemaker placement; Knee surgery (Right); and pr colonoscopy flx dx w/collj spec when pfrmd (N/A, 5/31/2018)  ,  family history includes No Known Problems in her father and mother  ,   reports that she quit smoking about 13 years ago  She has a 50 00 pack-year smoking history  She quit smokeless tobacco use about 14 years ago  She reports that she does not drink alcohol or use drugs  ,  has No Known Allergies     Current Outpatient Medications   Medication Sig Dispense Refill    BD Pen Needle Micro U/F 32G X 6 MM MISC       ergocalciferol (VITAMIN D2) 50,000 units Take 1 capsule (50,000 Units total) by mouth once a week 15 capsule 0    FREESTYLE LITE test strip use 1 TEST STRIP to TEST BLOOD SUGAR three times a day      furosemide (LASIX) 40 mg tablet 1 tab daily 90 tablet 3    gabapentin (NEURONTIN) 300 mg capsule take 1 capsule by mouth three times a day 270 capsule 3    glipiZIDE (GLUCOTROL) 10 mg tablet take 1 tablet by mouth twice a day before meals 180 tablet 2    insulin detemir (Levemir FlexTouch) 100 Units/mL injection pen Inject 20 Units under the skin daily at bedtime 5 pen 11    Insulin Pen Needle (PEN NEEDLES) 31G X 6 MM MISC by Does not apply route daily 100 each 3    Lancets (FREESTYLE) lancets Check blood glucose 3 times daily 300 each 3    levothyroxine 50 mcg tablet take 1 tablet by mouth once daily 90 tablet 3    metoprolol tartrate (LOPRESSOR) 50 mg tablet 1/2 tab twice a day 90 tablet 3    pantoprazole (PROTONIX) 40 mg tablet take 1 tablet by mouth once daily BEFORE BREAKFAST 90 tablet 3    RA IRON 325 (65 Fe) MG tablet take 1 tablet by mouth twice a day before meals 60 tablet 2    warfarin (COUMADIN) 5 mg tablet Take 1-2 tabs daily or as directed 180 tablet 3    Fluad Quadrivalent 0 5 ML PRSY inject 0 5 milliliters intramuscularly       No current facility-administered medications for this visit  Review of Systems   Musculoskeletal: Positive for arthralgias  Neurological: Positive for numbness  All other systems reviewed and are negative  Objective:  Vitals:    05/26/21 0757   BP: 120/80   Pulse: 65   Temp: 99 °F (37 2 °C)   SpO2: 97%      Physical Exam  Constitutional:       Appearance: She is well-developed  HENT:      Head: Normocephalic and atraumatic  Eyes:      Pupils: Pupils are equal, round, and reactive to light  Neck:      Musculoskeletal: Normal range of motion and neck supple  Thyroid: No thyromegaly  Trachea: No tracheal deviation     Cardiovascular: Rate and Rhythm: Normal rate and regular rhythm  Heart sounds: Normal heart sounds  No murmur  No gallop  Comments:   A very loud S2  Pulmonary:      Effort: No respiratory distress  Breath sounds: No wheezing or rales  Abdominal:      General: Bowel sounds are normal       Palpations: Abdomen is soft  Tenderness: There is no abdominal tenderness  Musculoskeletal: Normal range of motion  General: No tenderness or deformity  Skin:     General: Skin is warm  Neurological:      Mental Status: She is alert and oriented to person, place, and time  Coordination: Coordination normal    Psychiatric:         Judgment: Judgment normal            Patient Instructions   Hemoglobin A1c is elevated 8 6  Currently, you are using Levemir 20 units daily  Try to increase your activity is a bit   Less carbohydrates    increase the Levemir to 25 units  Check the blood sugar twice a day 3 days a week  Call me in 2 weeks to let me know how things are going  We might have to adjust the dose over the phone   Plan to see me again this time in about 3 months

## 2021-05-26 NOTE — PATIENT INSTRUCTIONS
Hemoglobin A1c is elevated 8 6  Currently, you are using Levemir 20 units daily  Try to increase your activity is a bit   Less carbohydrates    increase the Levemir to 25 units  Check the blood sugar twice a day 3 days a week  Call me in 2 weeks to let me know how things are going  We might have to adjust the dose over the phone   Plan to see me again this time in about 3 months

## 2021-06-01 ENCOUNTER — REMOTE DEVICE CLINIC VISIT (OUTPATIENT)
Dept: CARDIOLOGY CLINIC | Facility: CLINIC | Age: 81
End: 2021-06-01
Payer: MEDICARE

## 2021-06-01 DIAGNOSIS — Z95.0 PRESENCE OF CARDIAC PACEMAKER: Primary | ICD-10-CM

## 2021-06-01 PROCEDURE — 93294 REM INTERROG EVL PM/LDLS PM: CPT | Performed by: INTERNAL MEDICINE

## 2021-06-01 PROCEDURE — 93296 REM INTERROG EVL PM/IDS: CPT | Performed by: INTERNAL MEDICINE

## 2021-06-01 NOTE — PROGRESS NOTES
Results for orders placed or performed in visit on 06/01/21   Cardiac EP device report    Narrative    [facility] PM  [employer] TRANSMISSION: BATTERY VOLTAGE ADEQUATE (9 8 YRS)  AP: 57%  : 99% (>40%~CHB)  ALL AVAILABLE LEAD PARAMETERS WITHIN NORMAL LIMITS  7 AMS EPISODES W/ AVAIL EGRMS SHOWING AF, PAT, MAX DURATION 6 HRS 62 MINS  PT TAKES METOPROLOL TART, WARFARIN  EF: 40-45% (ECHO 6/5/20)  PACEMAKER FUNCTIONING APPROPRIATELY    [address] [place] [address]

## 2021-06-11 ENCOUNTER — TELEPHONE (OUTPATIENT)
Dept: NEPHROLOGY | Facility: CLINIC | Age: 81
End: 2021-06-11

## 2021-06-18 DIAGNOSIS — E11.9 TYPE 2 DIABETES MELLITUS WITHOUT COMPLICATION, WITHOUT LONG-TERM CURRENT USE OF INSULIN (HCC): ICD-10-CM

## 2021-06-21 RX ORDER — GLIPIZIDE 10 MG/1
TABLET ORAL
Qty: 180 TABLET | Refills: 2 | Status: SHIPPED | OUTPATIENT
Start: 2021-06-21 | End: 2021-07-27 | Stop reason: SDUPTHER

## 2021-07-12 NOTE — TELEPHONE ENCOUNTER
Called patient and set up her 6 month neph follow up appt with Dr Sophia Duvall  Pt stated she can only do MWF earlier in the am   Set her up for first avail within those parameters (11/29/21 at 9:40)    Reminded pt to get her fasting lab work done shortly before this visit

## 2021-07-18 DIAGNOSIS — I50.9 CONGESTIVE HEART FAILURE, UNSPECIFIED HF CHRONICITY, UNSPECIFIED HEART FAILURE TYPE (HCC): ICD-10-CM

## 2021-07-20 DIAGNOSIS — Z79.4 TYPE 2 DIABETES MELLITUS WITH STAGE 4 CHRONIC KIDNEY DISEASE, WITH LONG-TERM CURRENT USE OF INSULIN (HCC): ICD-10-CM

## 2021-07-20 DIAGNOSIS — E11.22 TYPE 2 DIABETES MELLITUS WITH STAGE 4 CHRONIC KIDNEY DISEASE, WITH LONG-TERM CURRENT USE OF INSULIN (HCC): ICD-10-CM

## 2021-07-20 DIAGNOSIS — N18.4 TYPE 2 DIABETES MELLITUS WITH STAGE 4 CHRONIC KIDNEY DISEASE, WITH LONG-TERM CURRENT USE OF INSULIN (HCC): ICD-10-CM

## 2021-07-21 RX ORDER — FUROSEMIDE 40 MG/1
TABLET ORAL
Qty: 90 TABLET | Refills: 3 | Status: SHIPPED | OUTPATIENT
Start: 2021-07-21 | End: 2022-07-19 | Stop reason: SDUPTHER

## 2021-07-21 RX ORDER — INSULIN DETEMIR 100 [IU]/ML
INJECTION, SOLUTION SUBCUTANEOUS
Qty: 15 ML | Refills: 5 | Status: SHIPPED | OUTPATIENT
Start: 2021-07-21 | End: 2021-08-03 | Stop reason: SDUPTHER

## 2021-07-24 DIAGNOSIS — N18.4 TYPE 2 DIABETES MELLITUS WITH STAGE 4 CHRONIC KIDNEY DISEASE, WITH LONG-TERM CURRENT USE OF INSULIN (HCC): ICD-10-CM

## 2021-07-24 DIAGNOSIS — E11.22 TYPE 2 DIABETES MELLITUS WITH STAGE 4 CHRONIC KIDNEY DISEASE, WITH LONG-TERM CURRENT USE OF INSULIN (HCC): ICD-10-CM

## 2021-07-24 DIAGNOSIS — Z79.4 TYPE 2 DIABETES MELLITUS WITH STAGE 4 CHRONIC KIDNEY DISEASE, WITH LONG-TERM CURRENT USE OF INSULIN (HCC): ICD-10-CM

## 2021-07-24 NOTE — TELEPHONE ENCOUNTER
Pt sd Dr increased to 26 units per day, Instructions have to be changed on the RX  Pls change and order asap    she only has enough for 2 days

## 2021-07-27 RX ORDER — INSULIN DETEMIR 100 [IU]/ML
INJECTION, SOLUTION SUBCUTANEOUS
Qty: 15 ML | Refills: 5 | OUTPATIENT
Start: 2021-07-27

## 2021-07-27 NOTE — TELEPHONE ENCOUNTER
Pt ck'ed w/pharmacy and she was told they have not received RX for Levemir FlexTouch 100 units/mL injection pen, even though we show e prescribing status receipt confirmed by pharmacy 7/21/21  Instructions are still for 20 units  Naz Lightning and patient said Dr increased to 26 units    Should it be 20 or 26 units ? ?? Can the correct RX be resent to pharmacy ? ? She will be out in a few   day's

## 2021-07-30 ENCOUNTER — APPOINTMENT (OUTPATIENT)
Dept: LAB | Facility: CLINIC | Age: 81
End: 2021-07-30
Payer: MEDICARE

## 2021-07-30 ENCOUNTER — ANTICOAG VISIT (OUTPATIENT)
Dept: CARDIOLOGY CLINIC | Facility: CLINIC | Age: 81
End: 2021-07-30

## 2021-07-30 DIAGNOSIS — Z79.01 CHRONIC ANTICOAGULATION: Primary | ICD-10-CM

## 2021-07-30 DIAGNOSIS — I48.0 PAROXYSMAL ATRIAL FIBRILLATION (HCC): ICD-10-CM

## 2021-07-30 DIAGNOSIS — Z95.2 AORTIC VALVE REPLACED: ICD-10-CM

## 2021-07-30 NOTE — TELEPHONE ENCOUNTER
Patient is still waiting on her rx  Dr rivas increase it to 26 units  Please send to pharmacy  Patient is almost out of medication    Savanah Angulo

## 2021-08-03 DIAGNOSIS — Z79.4 TYPE 2 DIABETES MELLITUS WITH STAGE 4 CHRONIC KIDNEY DISEASE, WITH LONG-TERM CURRENT USE OF INSULIN (HCC): ICD-10-CM

## 2021-08-03 DIAGNOSIS — E11.22 TYPE 2 DIABETES MELLITUS WITH STAGE 4 CHRONIC KIDNEY DISEASE, WITH LONG-TERM CURRENT USE OF INSULIN (HCC): ICD-10-CM

## 2021-08-03 DIAGNOSIS — N18.4 TYPE 2 DIABETES MELLITUS WITH STAGE 4 CHRONIC KIDNEY DISEASE, WITH LONG-TERM CURRENT USE OF INSULIN (HCC): ICD-10-CM

## 2021-08-03 RX ORDER — INSULIN DETEMIR 100 [IU]/ML
INJECTION, SOLUTION SUBCUTANEOUS
Qty: 15 ML | Refills: 0 | Status: CANCELLED | OUTPATIENT
Start: 2021-08-03

## 2021-08-03 RX ORDER — INSULIN DETEMIR 100 [IU]/ML
26 INJECTION, SOLUTION SUBCUTANEOUS
Qty: 26 ML | Refills: 5 | Status: SHIPPED | OUTPATIENT
Start: 2021-08-03 | End: 2022-07-19 | Stop reason: SDUPTHER

## 2021-08-03 NOTE — TELEPHONE ENCOUNTER
Pt is stating that her conversation at her last visit with Dr Magda Villanueva was to increase the Levemir FlexTouch 100 units from 20 to 26 units, however the order that went to the pharmacy is only 20 units  She is down to 1 unit and states insurance will deny refill if this is not entered today  She is requested for a new order for 26 units of Levemir, and is requesting a follow up call to ensure there is no miscommunication       Per management instructed to route to MD & Practice Admin

## 2021-08-18 ENCOUNTER — OFFICE VISIT (OUTPATIENT)
Dept: CARDIOLOGY CLINIC | Facility: CLINIC | Age: 81
End: 2021-08-18
Payer: MEDICARE

## 2021-08-18 VITALS
SYSTOLIC BLOOD PRESSURE: 126 MMHG | OXYGEN SATURATION: 97 % | DIASTOLIC BLOOD PRESSURE: 66 MMHG | BODY MASS INDEX: 27 KG/M2 | HEIGHT: 66 IN | HEART RATE: 66 BPM | WEIGHT: 168 LBS

## 2021-08-18 DIAGNOSIS — I10 HYPERTENSION, ESSENTIAL: ICD-10-CM

## 2021-08-18 DIAGNOSIS — E78.2 COMBINED HYPERLIPIDEMIA: ICD-10-CM

## 2021-08-18 DIAGNOSIS — I48.0 PAROXYSMAL ATRIAL FIBRILLATION (HCC): ICD-10-CM

## 2021-08-18 DIAGNOSIS — I35.9 AORTIC VALVE DISORDER: Primary | ICD-10-CM

## 2021-08-18 DIAGNOSIS — Z79.01 CHRONIC ANTICOAGULATION: ICD-10-CM

## 2021-08-18 PROCEDURE — 99214 OFFICE O/P EST MOD 30 MIN: CPT | Performed by: INTERNAL MEDICINE

## 2021-08-18 NOTE — PROGRESS NOTES
[facility] [address] [id number]  Cardiology Follow Up    [facility]  1940  [id number]      1  Aortic valve disorder  Echo complete with contrast if indicated   2  Chronic anticoagulation     3  Paroxysmal atrial fibrillation (HCC)     4  Hypertension, essential     5  Combined hyperlipidemia         Chief Complaint   Patient presents with    Follow-up       Interval History:   Patient presents for follow-up visit  Patient denies any history of chest pain shortness of breath  Patient denies any history of leg edema or orthopnea PND  No history of presyncope syncope  Patient states compliance with the present list of medications  Patient denies any bleeding issues      Patient Active Problem List   Diagnosis    Hyperlipidemia    Chronic anticoagulation    Hypertension, essential    Coronary artery disease of native artery of native heart with stable angina pectoris (HCC)    Chronic obstructive pulmonary disease (HCC)    Diabetes mellitus with neurological manifestation (HCC)    Hypothyroidism    Iron deficiency    GERD (gastroesophageal reflux disease)    AVM (arteriovenous malformation) of small bowel, acquired with hemorrhage    Angiectasia    Other vascular disorders of intestine (HCC)    Angioedema    Aortic valve replaced    Cardiomy[facility]    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HCC)    Stage 3a chronic kidney disease    Chronic kidney disease-mineral and bone disorder    Primary osteoarthritis involving multiple joints     Past Medical History:   Diagnosis Date    Anemia     Aneurysm of thoracic aorta (HCC)     Aortic valve disorder     Benign neoplasm of sigmoid colon     Cardiomyopathy ([facility] )     last assessed: 10/10/2014    CHF (congestive heart failure) (HCC)     Chronic kidney disease     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[address] )     last assessed: 10/10/2014    Diabetic nephropathy (HCC)     RAMON (dyspnea on exertion)     GERD (gastroesophageal reflux disease)     History of emphysema     Hyperlipidemia     TIA (transient ischemic attack)      Social History     Socioeconomic History    Marital status:      Spouse name: Not on file    Number of children: Not on file    Years of education: Not on file    Highest education level: Not on file   Occupational History    Not on file   Tobacco Use    Smoking status: Former Smoker     Packs/day: 1 00     Years: 50 00     Pack years: 50 00     Quit date: 2007     Years since quittin 7    Smokeless tobacco: Former User     Quit date:    Vaping Use    Vaping Use: Never used   Substance and Sexual Activity    Alcohol use: No    Drug use: No    Sexual activity: Not Currently   Other Topics Concern    Not on file   Social History Narrative    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 BID Freestyle lancets BID    Living independently with spouse     Social Determinants of Health     Financial Resource Strain:     Difficulty of Paying Living Expenses:    Food Insecurity:     Worried About Running Out of Food in the Last Year:     [address] in the Last Year:    Transportation Needs:     Lack of Transportation (Medical):      Lack of Transportation (Non-Medical):    Physical Activity: Inactive    Days of Exercise per Week: 0 days    Minutes of Exercise per Session: 0 min   Stress: No Stress Concern Present    Feeling of Stress : Not at all   Social Connections:     Frequency of Communication with Friends and Family:     Frequency of Social Gatherings with Friends and Family:     Attends Jew Services:     Active Member of Clubs or Organizations:     Attends Club or Organization Meetings:     Marital Status:    Intimate Partner Violence:     Fear of Current or Ex-Partner:     Emotionally Abused:     Physically Abused:     Sexually Abused:       Family History   Problem Relation Age of Onset    No Known Problems Mother     No Known Problems Father      Past Surgical History:   Procedure Laterality Date    AORTIC VALVE REPLACEMENT      CARDIAC PACEMAKER PLACEMENT      last assessed: 10/10/2014   [facility]      aortic valve replacement    CHOLECYSTECTOMY      COLONOSCOPY      HYSTERECTOMY      INSERT / REPLACE / REMOVE PACEMAKER      KNEE SURGERY Right     OTHER SURGICAL HISTORY      Capsule ENDOscopy description: 12/19/2012    MD COLONOSCOPY FLX DX W/COLLJ SPEC WHEN PFRMD N/A 5/31/2018    Procedure: single balloon ENTEROSCOPY;  Surgeon: Stan Kraft MD;  Location: [facility]; Service: Gastroenterology    MD ESOPHAGOGASTRODUODENOSCOPY TRANSORAL DIAGNOSTIC N/A 11/29/2017    Procedure: EGD AND COLONOSCOPY;  Surgeon: Marlea Sacks, MD;  Location: [facility];   Service: Gastroenterology       Current Outpatient Medications:     BD Pen Needle Micro U/F 32G X 6 MM MISC, , Disp: , Rfl:     ergocalciferol (VITAMIN D2) 50,000 units, Take 1 capsule (50,000 Units total) by mouth once a week, Disp: 15 capsule, Rfl: 0    Fluad Quadrivalent 0 5 ML PRSY, inject 0 5 milliliters intramuscularly, Disp: , Rfl:     FREESTYLE LITE test strip, use 1 TEST STRIP to TEST BLOOD SUGAR three times a day, Disp: , Rfl:     furosemide (LASIX) 40 mg tablet, take 1 tablet by mouth daily, Disp: 90 tablet, Rfl: 3    gabapentin (NEURONTIN) 300 mg capsule, take 1 capsule by mouth three times a day, Disp: 270 capsule, Rfl: 3    glipiZIDE (GLUCOTROL) 10 mg tablet, Take 1 tablet (10 mg total) by mouth 2 (two) times a day before meals, Disp: 180 tablet, Rfl: 2    insulin detemir (Levemir FlexTouch) 100 Units/mL injection pen, Inject 26 Units under the skin daily at bedtime, Disp: 26 mL, Rfl: 5    Insulin Pen Needle (PEN NEEDLES) 31G X 6 MM MISC, by Does not apply route daily, Disp: 100 each, Rfl: 3    Lancets (FREESTYLE) lancets, Check blood glucose 3 times daily, Disp: 300 each, Rfl: 3    levothyroxine 50 mcg tablet, take 1 tablet by mouth once daily, Disp: 90 tablet, Rfl: 3    metoprolol tartrate (LOPRESSOR) 50 mg tablet, 1/2 tab twice a day, Disp: 90 tablet, Rfl: 3    pantoprazole (PROTONIX) 40 mg tablet, take 1 tablet by mouth once daily BEFORE BREAKFAST, Disp: 90 tablet, Rfl: 3    RA IRON 325 (65 Fe) MG tablet, take 1 tablet by mouth twice a day before meals, Disp: 60 tablet, Rfl: 2    warfarin (COUMADIN) 5 mg tablet, Take 1-2 tabs daily or as directed, Disp: 180 tablet, Rfl: 3  No Known Allergies    Labs:  No visits with results within 2 Month(s) from this visit  Latest known visit with results is:   Office Visit on 05/26/2021   Component Date Value    Hemoglobin A1C 05/26/2021 8 6*     Imaging: No results found  Review of Systems:  Review of Systems     REVIEW OF SYSTEMS:  Constitutional:  Denies fever or chills   Eyes:  Denies change in visual acuity   HENT:  Denies nasal congestion or sore throat   Respiratory:  Denies cough or shortness of breath   Cardiovascular:  Denies chest pain or edema   GI:  Denies abdominal pain, nausea, vomiting, bloody stools or diarrhea   :  Denies dysuria, frequency, difficulty in micturition and nocturia  Musculoskeletal:  Denies back pain or joint pain   Neurologic:  Denies headache, focal weakness or sensory changes   Endocrine:  Denies polyuria or polydipsia   Lymphatic:  Denies swollen glands   Psychiatric:  Denies depression or anxiety     Physical Exam:    /66   Pulse 66   Ht 5' 5 5" (1 664 m)   Wt 76 2 kg (168 lb)   SpO2 97%   BMI 27 53 kg/m²     Physical Exam   PHYSICAL EXAM:  General:  Patient is not in acute distress   Head: Normocephalic, Atraumatic  HEENT:  Both pupils normal-size atraumatic, normocephalic, nonicteric  Neck:  JVP not raised  Trachea central  No carotid bruit  Respiratory:  normal breath sounds no crackles  no rhonchi  Cardiovascular:  Regular rate and rhythm no S3 no murmurs   Heart sounds consistent with prosthetic mechanical valve  GI: Abdomen soft nontender  No organomegaly  Lymphatic:  No cervical or inguinal lymphadenopathy  Neurologic:  Patient is awake alert, oriented   Grossly nonfocal   extremities trace edema    Discussion/Summary:   Patient with multiple medical problems who seems to be doing reasonably well from cardiac standpoint  Previous studies reviewed with patient  Medications reviewed and possible side effects discussed  concepts of cardiovascular disease , signs and symptoms of heart disease  Dietary and risk factor modification reinforced  All questions answered  Safety measures reviewed  Patient advised to report any problems prompting medical attention  Risks and benefits  and alternativesof anticoagulation to prevent thromboembolic risk from   Prosthetic mechanical aortic valve discussed at length  Patient to report any bleeding issues  echocardiogram will be done to reassess prosthetic mechanical aortic valve  Antibiotic prophylaxis to continue  Symptoms to watch out from cardiac standpoint discussed at length with patient  Follow-up in 6 months or earlier as needed  Patient is agreeable with the plan of care

## 2021-09-02 ENCOUNTER — IN-CLINIC DEVICE VISIT (OUTPATIENT)
Dept: CARDIOLOGY CLINIC | Facility: CLINIC | Age: 81
End: 2021-09-02
Payer: MEDICARE

## 2021-09-02 DIAGNOSIS — Z95.0 PRESENCE OF PERMANENT CARDIAC PACEMAKER: Primary | ICD-10-CM

## 2021-09-02 PROCEDURE — 93280 PM DEVICE PROGR EVAL DUAL: CPT | Performed by: INTERNAL MEDICINE

## 2021-09-02 NOTE — PROGRESS NOTES
[facility]UAL [facility] PM   DEVICE INTERROGATED IN THE [place] OFFICE:  BATTERY VOLTAGE ADEQUATE (10 0 YR)   AP 55%  99% (>40%/AVB)   ALL LEAD PARAMETERS WITHIN NORMAL LIMITS     6 AMS EPISODES SHOWING AT/AF (<1%), WITH LONGEST EPISODE 1:04 HR   PT TAKES WARFARIN AND METOPROLOL   NO PROGRAMMING CHANGES MADE TO DEVICE PARAMETERS   NORMAL DEVICE FUNCTION   RG

## 2021-09-13 DIAGNOSIS — I35.9 AORTIC VALVE DISORDER: ICD-10-CM

## 2021-09-13 RX ORDER — WARFARIN SODIUM 5 MG/1
TABLET ORAL
Qty: 180 TABLET | Refills: 3 | Status: SHIPPED | OUTPATIENT
Start: 2021-09-13 | End: 2022-07-19 | Stop reason: SDUPTHER

## 2021-09-21 ENCOUNTER — HOSPITAL ENCOUNTER (OUTPATIENT)
Dept: NON INVASIVE DIAGNOSTICS | Facility: CLINIC | Age: 81
Discharge: HOME | End: 2021-09-21
Payer: MEDICARE

## 2021-09-21 ENCOUNTER — APPOINTMENT (OUTPATIENT)
Dept: LAB | Facility: CLINIC | Age: 81
End: 2021-09-21
Payer: MEDICARE

## 2021-09-21 ENCOUNTER — ANTICOAG VISIT (OUTPATIENT)
Dept: CARDIOLOGY CLINIC | Facility: CLINIC | Age: 81
End: 2021-09-21

## 2021-09-21 DIAGNOSIS — I35.9 AORTIC VALVE DISORDER: ICD-10-CM

## 2021-09-21 LAB
INR PPP: 3.06 (ref 0.84–1.19)
PROTHROMBIN TIME: 30.1 SECONDS (ref 11.6–14.5)

## 2021-09-21 PROCEDURE — 36415 COLL VENOUS BLD VENIPUNCTURE: CPT

## 2021-09-21 PROCEDURE — 93306 TTE W/DOPPLER COMPLETE: CPT

## 2021-09-21 PROCEDURE — 93306 TTE W/DOPPLER COMPLETE: CPT | Performed by: INTERNAL MEDICINE

## 2021-09-21 PROCEDURE — 85610 PROTHROMBIN TIME: CPT

## 2021-10-18 DIAGNOSIS — E03.9 HYPOTHYROIDISM, UNSPECIFIED TYPE: ICD-10-CM

## 2021-10-18 RX ORDER — LEVOTHYROXINE SODIUM 0.05 MG/1
TABLET ORAL
Qty: 30 TABLET | Refills: 0 | Status: SHIPPED | OUTPATIENT
Start: 2021-10-18 | End: 2021-11-14

## 2021-11-14 DIAGNOSIS — E03.9 HYPOTHYROIDISM, UNSPECIFIED TYPE: ICD-10-CM

## 2021-11-14 RX ORDER — LEVOTHYROXINE SODIUM 0.05 MG/1
TABLET ORAL
Qty: 30 TABLET | Refills: 0 | Status: SHIPPED | OUTPATIENT
Start: 2021-11-14 | End: 2021-11-16 | Stop reason: SDUPTHER

## 2021-11-15 ENCOUNTER — TELEPHONE (OUTPATIENT)
Dept: OTHER | Facility: OTHER | Age: 81
End: 2021-11-15

## 2021-11-19 ENCOUNTER — ANTICOAG VISIT (OUTPATIENT)
Dept: CARDIOLOGY CLINIC | Facility: CLINIC | Age: 81
End: 2021-11-19

## 2021-11-19 ENCOUNTER — APPOINTMENT (OUTPATIENT)
Dept: LAB | Facility: CLINIC | Age: 81
End: 2021-11-19
Payer: MEDICARE

## 2021-11-19 DIAGNOSIS — I25.5 ISCHEMIC CARDIOMYOPATHY: ICD-10-CM

## 2021-11-19 DIAGNOSIS — N18.31 STAGE 3A CHRONIC KIDNEY DISEASE (HCC): ICD-10-CM

## 2021-11-19 DIAGNOSIS — N18.9 CHRONIC KIDNEY DISEASE-MINERAL AND BONE DISORDER: ICD-10-CM

## 2021-11-19 DIAGNOSIS — E83.9 CHRONIC KIDNEY DISEASE-MINERAL AND BONE DISORDER: ICD-10-CM

## 2021-11-19 DIAGNOSIS — M89.9 CHRONIC KIDNEY DISEASE-MINERAL AND BONE DISORDER: ICD-10-CM

## 2021-11-19 LAB
25(OH)D3 SERPL-MCNC: 28.7 NG/ML (ref 30–100)
ANION GAP SERPL CALCULATED.3IONS-SCNC: 5 MMOL/L (ref 4–13)
BASOPHILS # BLD AUTO: 0.03 THOUSANDS/ΜL (ref 0–0.1)
BASOPHILS NFR BLD AUTO: 1 % (ref 0–1)
BILIRUB UR QL STRIP: NEGATIVE
BUN SERPL-MCNC: 24 MG/DL (ref 5–25)
CALCIUM SERPL-MCNC: 8.4 MG/DL (ref 8.3–10.1)
CHLORIDE SERPL-SCNC: 104 MMOL/L (ref 100–108)
CLARITY UR: CLEAR
CO2 SERPL-SCNC: 28 MMOL/L (ref 21–32)
COLOR UR: YELLOW
CREAT SERPL-MCNC: 1.39 MG/DL (ref 0.6–1.3)
CREAT UR-MCNC: 36.2 MG/DL
EOSINOPHIL # BLD AUTO: 0.12 THOUSAND/ΜL (ref 0–0.61)
EOSINOPHIL NFR BLD AUTO: 2 % (ref 0–6)
ERYTHROCYTE [DISTWIDTH] IN BLOOD BY AUTOMATED COUNT: 14.2 % (ref 11.6–15.1)
GFR SERPL CREATININE-BSD FRML MDRD: 36 ML/MIN/1.73SQ M
GLUCOSE SERPL-MCNC: 190 MG/DL (ref 65–140)
GLUCOSE UR STRIP-MCNC: NEGATIVE MG/DL
HCT VFR BLD AUTO: 41.8 % (ref 34.8–46.1)
HGB BLD-MCNC: 13.6 G/DL (ref 11.5–15.4)
HGB UR QL STRIP.AUTO: NEGATIVE
IMM GRANULOCYTES # BLD AUTO: 0.01 THOUSAND/UL (ref 0–0.2)
IMM GRANULOCYTES NFR BLD AUTO: 0 % (ref 0–2)
KETONES UR STRIP-MCNC: NEGATIVE MG/DL
LEUKOCYTE ESTERASE UR QL STRIP: NEGATIVE
LYMPHOCYTES # BLD AUTO: 1.18 THOUSANDS/ΜL (ref 0.6–4.47)
LYMPHOCYTES NFR BLD AUTO: 22 % (ref 14–44)
MCH RBC QN AUTO: 29.1 PG (ref 26.8–34.3)
MCHC RBC AUTO-ENTMCNC: 32.5 G/DL (ref 31.4–37.4)
MCV RBC AUTO: 90 FL (ref 82–98)
MONOCYTES # BLD AUTO: 0.44 THOUSAND/ΜL (ref 0.17–1.22)
MONOCYTES NFR BLD AUTO: 8 % (ref 4–12)
NEUTROPHILS # BLD AUTO: 3.53 THOUSANDS/ΜL (ref 1.85–7.62)
NEUTS SEG NFR BLD AUTO: 67 % (ref 43–75)
NITRITE UR QL STRIP: NEGATIVE
NRBC BLD AUTO-RTO: 0 /100 WBCS
PH UR STRIP.AUTO: 7 [PH]
PHOSPHATE SERPL-MCNC: 2.9 MG/DL (ref 2.3–4.1)
PLATELET # BLD AUTO: 231 THOUSANDS/UL (ref 149–390)
PMV BLD AUTO: 11 FL (ref 8.9–12.7)
POTASSIUM SERPL-SCNC: 4.4 MMOL/L (ref 3.5–5.3)
PROT UR STRIP-MCNC: NEGATIVE MG/DL
PROT UR-MCNC: <6 MG/DL
PROT/CREAT UR: <0.17 MG/G{CREAT} (ref 0–0.1)
PTH-INTACT SERPL-MCNC: 110.2 PG/ML (ref 18.4–80.1)
RBC # BLD AUTO: 4.67 MILLION/UL (ref 3.81–5.12)
SODIUM SERPL-SCNC: 137 MMOL/L (ref 136–145)
SP GR UR STRIP.AUTO: 1.01 (ref 1–1.03)
UROBILINOGEN UR QL STRIP.AUTO: 0.2 E.U./DL
WBC # BLD AUTO: 5.31 THOUSAND/UL (ref 4.31–10.16)

## 2021-11-19 PROCEDURE — 83970 ASSAY OF PARATHORMONE: CPT

## 2021-11-19 PROCEDURE — 84156 ASSAY OF PROTEIN URINE: CPT

## 2021-11-19 PROCEDURE — 80048 BASIC METABOLIC PNL TOTAL CA: CPT

## 2021-11-19 PROCEDURE — 81003 URINALYSIS AUTO W/O SCOPE: CPT

## 2021-11-19 PROCEDURE — 82306 VITAMIN D 25 HYDROXY: CPT

## 2021-11-19 PROCEDURE — 82570 ASSAY OF URINE CREATININE: CPT

## 2021-11-19 PROCEDURE — 85025 COMPLETE CBC W/AUTO DIFF WBC: CPT

## 2021-11-19 PROCEDURE — 84100 ASSAY OF PHOSPHORUS: CPT

## 2021-11-26 ENCOUNTER — TELEPHONE (OUTPATIENT)
Dept: NEPHROLOGY | Facility: CLINIC | Age: 81
End: 2021-11-26

## 2021-11-29 ENCOUNTER — OFFICE VISIT (OUTPATIENT)
Dept: NEPHROLOGY | Facility: CLINIC | Age: 81
End: 2021-11-29
Payer: MEDICARE

## 2021-11-29 VITALS
BODY MASS INDEX: 27.4 KG/M2 | RESPIRATION RATE: 16 BRPM | DIASTOLIC BLOOD PRESSURE: 60 MMHG | HEART RATE: 75 BPM | WEIGHT: 170.5 LBS | HEIGHT: 66 IN | TEMPERATURE: 97.2 F | SYSTOLIC BLOOD PRESSURE: 160 MMHG

## 2021-11-29 DIAGNOSIS — I25.118 CORONARY ARTERY DISEASE OF NATIVE ARTERY OF NATIVE HEART WITH STABLE ANGINA PECTORIS (HCC): ICD-10-CM

## 2021-11-29 DIAGNOSIS — M89.9 CHRONIC KIDNEY DISEASE-MINERAL AND BONE DISORDER: ICD-10-CM

## 2021-11-29 DIAGNOSIS — E83.9 CHRONIC KIDNEY DISEASE-MINERAL AND BONE DISORDER: ICD-10-CM

## 2021-11-29 DIAGNOSIS — N18.31 STAGE 3A CHRONIC KIDNEY DISEASE (HCC): Primary | ICD-10-CM

## 2021-11-29 DIAGNOSIS — I25.5 ISCHEMIC CARDIOMYOPATHY: ICD-10-CM

## 2021-11-29 DIAGNOSIS — N18.9 CHRONIC KIDNEY DISEASE-MINERAL AND BONE DISORDER: ICD-10-CM

## 2021-11-29 DIAGNOSIS — Z95.2 AORTIC VALVE REPLACED: ICD-10-CM

## 2021-11-29 DIAGNOSIS — I10 HYPERTENSION, ESSENTIAL: ICD-10-CM

## 2021-11-29 DIAGNOSIS — E11.40 TYPE 2 DIABETES MELLITUS WITH DIABETIC NEUROPATHY, WITHOUT LONG-TERM CURRENT USE OF INSULIN (HCC): ICD-10-CM

## 2021-11-29 PROCEDURE — 99214 OFFICE O/P EST MOD 30 MIN: CPT | Performed by: INTERNAL MEDICINE

## 2021-12-01 ENCOUNTER — TELEPHONE (OUTPATIENT)
Dept: CARDIOLOGY CLINIC | Facility: CLINIC | Age: 81
End: 2021-12-01

## 2021-12-06 ENCOUNTER — TELEPHONE (OUTPATIENT)
Dept: INTERNAL MEDICINE CLINIC | Facility: CLINIC | Age: 81
End: 2021-12-06

## 2021-12-07 ENCOUNTER — REMOTE DEVICE CLINIC VISIT (OUTPATIENT)
Dept: CARDIOLOGY CLINIC | Facility: CLINIC | Age: 81
End: 2021-12-07
Payer: MEDICARE

## 2021-12-07 DIAGNOSIS — Z95.0 CARDIAC PACEMAKER IN SITU: Primary | ICD-10-CM

## 2021-12-07 PROCEDURE — 93294 REM INTERROG EVL PM/LDLS PM: CPT | Performed by: INTERNAL MEDICINE

## 2021-12-07 PROCEDURE — 93296 REM INTERROG EVL PM/IDS: CPT | Performed by: INTERNAL MEDICINE

## 2022-01-05 ENCOUNTER — TELEMEDICINE (OUTPATIENT)
Dept: INTERNAL MEDICINE CLINIC | Facility: CLINIC | Age: 82
End: 2022-01-05
Payer: MEDICARE

## 2022-01-05 DIAGNOSIS — B34.9 VIRAL INFECTION, UNSPECIFIED: ICD-10-CM

## 2022-01-05 DIAGNOSIS — J06.9 VIRAL UPPER RESPIRATORY TRACT INFECTION: Primary | ICD-10-CM

## 2022-01-05 PROCEDURE — U0005 INFEC AGEN DETEC AMPLI PROBE: HCPCS | Performed by: NURSE PRACTITIONER

## 2022-01-05 PROCEDURE — 99441 PR PHYS/QHP TELEPHONE EVALUATION 5-10 MIN: CPT | Performed by: NURSE PRACTITIONER

## 2022-01-05 PROCEDURE — U0003 INFECTIOUS AGENT DETECTION BY NUCLEIC ACID (DNA OR RNA); SEVERE ACUTE RESPIRATORY SYNDROME CORONAVIRUS 2 (SARS-COV-2) (CORONAVIRUS DISEASE [COVID-19]), AMPLIFIED PROBE TECHNIQUE, MAKING USE OF HIGH THROUGHPUT TECHNOLOGIES AS DESCRIBED BY CMS-2020-01-R: HCPCS | Performed by: NURSE PRACTITIONER

## 2022-01-05 RX ORDER — AZITHROMYCIN 250 MG/1
TABLET, FILM COATED ORAL
Qty: 6 TABLET | Refills: 0 | Status: SHIPPED | OUTPATIENT
Start: 2022-01-05 | End: 2022-01-10

## 2022-01-05 NOTE — PROGRESS NOTES
COVID-19 Outpatient Progress Note    Assessment/Plan:  Advised to come to office for covid testing today  Rest, hydrate, start zpack, continue mucinex or day cold medications prns  Problem List Items Addressed This Visit     None      Visit Diagnoses     Viral upper respiratory tract infection    -  Primary    Relevant Medications    azithromycin (Zithromax) 250 mg tablet    Viral infection, unspecified        Relevant Orders    COVID Only - Office Collect         Disposition:     Recommended patient to come to the office to test for COVID-19  I recommended self-quarantine for 10 days and to watch for symptoms until 14 days after exposure  If patient were to develop symptoms, they should self isolate and call our office for further guidance  I have spent 10 minutes directly with the patient  Greater than 50% of this time was spent in counseling/coordination of care regarding: instructions for management, patient and family education, risk factor reductions and impressions  Encounter provider CAMACHO Valle    Provider located at [address] [place] [id number]    Recent Visits  No visits were found meeting these conditions  Showing recent visits within past 7 days and meeting all other requirements  Today's Visits  Date Type Provider Dept   01/05/22 [address] today's visits and meeting all other requirements  Future Appointments  No visits were found meeting these conditions  Showing future appointments within next 150 days and meeting all other requirements     This virtual check-in was done via telephone and she agrees to proceed  Patient agrees to participate in a virtual check in via telephone or video visit instead of presenting to the office to address urgent/immediate medical needs  Patient is aware this is a billable service      After connecting through Telephone, the patient was identified by name and date of birth  Isadora Carter was informed that this was a telemedicine visit and that the exam was being conducted confidentially over secure lines  My office door was closed  No one else was in the room  Isadora Carter acknowledged consent and understanding of privacy and security of the telemedicine visit  I informed the patient that I have reviewed her record in Epic and presented the opportunity for her to ask any questions regarding the visit today  The patient agreed to participate  It was my intent to perform this visit via video technology but the patient was not able to do a video connection so the visit was completed via audio telephone only  Verification of patient location:  Patient is located in the following state in which I hold an active license: [place]    Subjective:   Isadora Carter is a 80 y o  female who is concerned about COVID-19  Patient's symptoms include sore throat, chest tightness and headache  Patient denies fever, chills, fatigue, malaise, congestion, rhinorrhea, anosmia, loss of taste, cough, shortness of breath, abdominal pain, nausea, vomiting, diarrhea and myalgias       Date of symptom onset: 1/3/2022  COVID-19 vaccination status: Not vaccinated    Exposure:   Contact with a person who is under investigation (PUI) for or who is positive for COVID-19 within the last 14 days?: No    Hospitalized recently for fever and/or lower respiratory symptoms?: No      Currently a healthcare worker that is involved in direct patient care?: No      Works in a special setting where the risk of COVID-19 transmission may be high? (this may include long-term care, correctional and skilled nursing facilities; homeless shelters; assisted-living facilities and group homes ): No      Resident in a special setting where the risk of COVID-19 transmission may be high? (this may include long-term care, correctional and skilled nursing facilities; homeless shelters; assisted-living facilities and group homes ): No      Flu like symptoms for one week, then went away then returned  Taking mucinex and day/night cold medications  No results found for: [address] [facility], [address] [facility], [facility] [address], [address] [facility]  Past Medical History:   Diagnosis Date    Anemia     Aneurysm of thoracic aorta ([facility] )     Aortic valve disorder     Benign neoplasm of sigmoid colon     Cardiomyopathy ([facility] )     last assessed: 10/10/2014    CHF (congestive heart failure) (HCC)     Chronic kidney disease     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[facility] )     last assessed: 10/10/2014    Diabetic nephropathy ([facility] )     RAMON (dyspnea on exertion)     GERD (gastroesophageal reflux disease)     History of emphysema     Hyperlipidemia     TIA (transient ischemic attack)      Past Surgical History:   Procedure Laterality Date    AORTIC VALVE REPLACEMENT      CARDIAC PACEMAKER PLACEMENT      last assessed: 10/10/2014   Ashutosh Accomack CARDIAC SURGERY      aortic valve replacement    CHOLECYSTECTOMY      COLONOSCOPY      HYSTERECTOMY      INSERT / REPLACE / REMOVE PACEMAKER      KNEE SURGERY Right     OTHER SURGICAL HISTORY      Capsule ENDOscopy description: 12/19/2012    NC COLONOSCOPY FLX DX W/COLLJ SPEC WHEN PFRMD N/A 5/31/2018    Procedure: single balloon ENTEROSCOPY;  Surgeon: Tim Varela MD;  Location: [facility]; Service: Gastroenterology    NC ESOPHAGOGASTRODUODENOSCOPY TRANSORAL DIAGNOSTIC N/A 11/29/2017    Procedure: EGD AND COLONOSCOPY;  Surgeon: Felisa Fowler MD;  Location: [facility]; Service: Gastroenterology     Current Outpatient Medications   Medication Sig Dispense Refill    azithromycin (Zithromax) 250 mg tablet Take 2 tablets (500 mg total) by mouth daily for 1 day, THEN 1 tablet (250 mg total) daily for 4 days   6 tablet 0    BD Pen Needle Micro U/F 32G X 6 MM MISC       BD Pen Needle Micro U/F 32G X 6 MM MISC use daily 100 each 3    ergocalciferol (VITAMIN D2) 50,000 units Take 1 capsule (50,000 Units total) by mouth once a week (Patient not taking: Reported on 11/29/2021 ) 15 capsule 0    Fluad Quadrivalent 0 5 ML PRSY inject 0 5 milliliters intramuscularly      FREESTYLE LITE test strip Check blood glucose up to 3 times daily 300 strip 3    furosemide (LASIX) 40 mg tablet take 1 tablet by mouth daily 90 tablet 3    gabapentin (NEURONTIN) 300 mg capsule take 1 capsule by mouth three times a day 270 capsule 3    glipiZIDE (GLUCOTROL) 10 mg tablet Take 1 tablet (10 mg total) by mouth 2 (two) times a day before meals 180 tablet 2    insulin detemir (Levemir FlexTouch) 100 Units/mL injection pen Inject 26 Units under the skin daily at bedtime 26 mL 5    Lancets (freestyle) lancets Check blood glucose 3 times daily 300 each 3    levothyroxine 50 mcg tablet Take 1 tablet (50 mcg total) by mouth daily 100 tablet 0    metoprolol tartrate (LOPRESSOR) 50 mg tablet 1/2 tab twice a day 90 tablet 3    pantoprazole (PROTONIX) 40 mg tablet take 1 tablet by mouth once daily BEFORE BREAKFAST 90 tablet 3    RA IRON 325 (65 Fe) MG tablet take 1 tablet by mouth twice a day before meals 60 tablet 2    warfarin (COUMADIN) 5 mg tablet Take 1-2 tabs daily or as directed 180 tablet 3     No current facility-administered medications for this visit  No Known Allergies    Review of Systems   Constitutional: Negative for chills, fatigue and fever  HENT: Positive for sore throat  Negative for congestion and rhinorrhea  Respiratory: Positive for chest tightness  Negative for cough and shortness of breath  Gastrointestinal: Negative for abdominal pain, diarrhea, nausea and vomiting  Musculoskeletal: Positive for gait problem (feels off balanced)  Negative for myalgias  Neurological: Positive for headaches  Objective: There were no vitals filed for this visit  Physical Exam  Constitutional:       General: She is not in acute distress  Appearance: She is ill-appearing  Pulmonary:      Effort: No tachypnea or respiratory distress  Neurological:      Mental Status: She is alert and oriented to person, place, and time  Psychiatric:         Attention and Perception: Attention normal          Mood and Affect: Mood normal          Speech: Speech normal          Behavior: Behavior normal  Behavior is cooperative  VIRTUAL VISIT DISCLAIMER    Vadim Dyer verbally agrees to participate in [facility]  Pt is aware that [facility] could be limited without vital signs or the ability to perform a full hands-on physical Kiran Cruise understands she or the provider may request at any time to terminate the video visit and request the patient to seek care or treatment in person

## 2022-01-05 NOTE — PATIENT INSTRUCTIONS
COVID-19 (Coronavirus Disease 2019)   AMBULATORY CARE:   Coronavirus disease 2019 (COVID-19)  is the disease caused by a coronavirus first discovered in December 2019  Coronaviruses generally cause upper respiratory (nose, throat, and lung) infections, such as a cold  The new virus spreads quickly and easily  The virus can be spread starting 2 days before symptoms even begin  The virus has also changed into several new forms (called variants) since it was discovered  The variants may be more contagious (easily spread) than the original form  Some may also cause more severe illness than others  It is important to follow local, national, and worldwide measures to protect yourself and others from infection  Signs and symptoms of COVID-19  may not develop  Signs and symptoms that develop usually start about 5 days after infection but can take 2 to 14 days  You may feel like you have the flu or a bad cold  Some signs and symptoms go away in a few days  Others can last weeks, months, or possibly years  Information on COVID-19 is still being learned  Tell your healthcare provider if you think you were infected but develop signs or symptoms not listed below:  · A cough    · Shortness of breath or trouble breathing that may become severe    · A fever of at least 100 4°F, or 38°C (may be lower in adults 65 or older)    · Chills that might include shaking    · Muscle pain, body aches, or a headache    · A sore throat    · Suddenly not being able to taste or smell anything    · Feeling mentally and physically tired (fatigue)    · Congestion (stuffy head and nose), or a runny nose    · Diarrhea, nausea, or vomiting    If you think you or someone you know may be infected:  Do the following to protect others:  · If emergency care is needed,  tell the  about the possible infection, or call ahead and tell the emergency department  · Call a healthcare provider  for instructions if symptoms are mild   Anyone who may be infected should not  arrive without calling first  The provider will need to protect staff members and other patients  · The person who may be infected needs to wear a face covering  while getting medical care  This will help lower the risk of infecting others  Coverings are not used for anyone who is younger than 2 years, has breathing problems, or cannot remove it  The provider can give you instructions for anyone who cannot wear a covering  Call your local emergency number (911 in the [address] [facility]) or an emergency department if:   · You have trouble breathing or shortness of breath at rest     · You have chest pain or pressure that lasts longer than 5 minutes  · You become confused or hard to wake  · Your lips or face are blue  · You have a fever of 104°F (40°C) or higher  Call your doctor if:   · You do not  have symptoms of COVID-19 but had close physical contact within 14 days with someone who tested positive  · You have questions or concerns about your condition or care  How COVID-19 is diagnosed: If you think you have COVID-19, call your healthcare provider  He or she will tell you what to do based on your symptoms and testing guidelines in your area  In general, the following may be used:  · A viral test  shows if you have a current infection  Samples are taken from your nose and throat, usually with swabs  You may need to wait several days to get the test results  Your healthcare provider will tell you how to get your results  You will need to quarantine (stay physically away from others) until you get your results  If results show you have COVID-19, you will need to continue until you are well  Your provider or other health official may give you more directions  You will also need to prevent another infection until it is known if you can get COVID-19 again  · An antibody test  shows if you had a past infection   Blood samples are used for this test  Antibodies are made by your immune system to attack the virus that causes COVID-19  Antibodies will form 1 to 3 weeks after you are infected  It is not known if antibodies prevent a second infection, or for how long a person might be protected  If you have antibodies, you will still need to be careful around others until more is known  · CT scans or x-rays  may be used to check for signs of pneumonia  The 2019 coronavirus causes a specific kind of pneumonia, usually in both lungs  The pictures may also be used to check for health problems in other parts of your body  Treatment  such as monoclonal antibodies and convalescent plasma have emergency use authorization (EUA)  This means they may be given only to patients who are hospitalized with severe signs and symptoms  The following may be used to manage your symptoms:  · Mild symptoms  may get better on their own  If you do not need to be treated in a hospital, you will be given instructions to use at home  Your condition will be closely monitored  You will need to watch for worsening symptoms and seek immediate care if needed  Talk to your healthcare provider about the following:    ? Decongestants  help reduce nasal congestion and help you breathe more easily  If you take decongestant pills, they may make you feel restless or cause problems with your sleep  Do not use decongestant sprays for more than a few days  ? Cough suppressants  help reduce coughing  Ask your healthcare provider which type of cough medicine is best for you  ? To soothe a sore throat,  gargle with warm salt water, or use throat lozenges or a throat spray  Drink more liquids to thin and loosen mucus and to prevent dehydration  ? NSAIDs or acetaminophen  can help lower a fever and relieve body aches or a headache  Follow directions  If not taken correctly, NSAIDs can cause kidney damage and acetaminophen can cause liver damage      · Severe or life-threatening symptoms  are treated in the hospital  You may need a combination of the following:    ? Medicines  may be given to lower or prevent inflammation or to fight the virus  You may also need blood thinners to prevent or treat blood clots  If you have a deep vein thrombosis (DVT) or pulmonary embolism (PE), you may need to keep using blood thinners for 3 months  ? Extra oxygen  may be given if you have respiratory failure  This means your lungs cannot get enough oxygen into your blood and out to your organs  Extra oxygen can help prevent organ failure  ? A ventilator  may be used to help you breathe  What you need to know about health problems the virus may cause:  Serious health problems may improve or continue for weeks, months, and possibly years  Health problems that continue may be called long COVID  Anyone can develop serious problems from this virus, but your risk is higher if you are 72 or older  A weak immune system, diabetes, or a heart or lung condition can also increase your risk  Your risk is also higher if you are a current or former cigarette smoker  COVID-19 can lead to any of the following:  · Multisymptom inflammatory syndrome in adults (MIS-A) or in children (MIS-C), causing inflammation in the heart, digestive system, skin, or brain    · Serious lower respiratory conditions, such as pneumonia or acute respiratory distress syndrome (ARDS)    · Blood vessel damage, leading to blood clots    · Organ damage from a lack of oxygen or from blood clots    · Sleep problems    · Problems thinking clearly, remembering information, or concentrating    · Mood changes, depression, or anxiety    What you need to know about COVID-19 vaccines:  Get a vaccine even if you already had COVID-19  · COVID-19 vaccines are given as a shot in 1 or 2 doses  Some vaccines have emergency use authorization (EUA)  An EUA means the vaccine is not approved but is given because the benefits outweigh the risks   A 2-dose vaccine is fully approved for use in those 16 years or older  This vaccine also has an EUA for adolescents 12 to 15 years  Your healthcare provider can help you understand the benefits and risks  · A third dose is recommended for adults with a weakened immune system who get a 2-dose vaccine  The third dose is given at least 28 days after the second  · Even after you get the vaccine, continue social distancing and other measures  Experts are still learning how well the vaccines work to prevent infection, transmission, and severe illness  Although rare, you can become infected after you get the vaccine  You may also be able to pass the virus to others without knowing you are infected  · After you get the vaccine, check local, national, and international travel rules  Check to see if you need to be tested before you travel  You may also need to quarantine after you return  Some countries require proof of a negative test before you leave  You should also be tested 3 to 5 days after you return from another country  How the 2019 coronavirus spreads: The following are ways the virus is thought to spread, but more information may be coming:  · Droplets are the main way all coronaviruses spread  The virus travels in droplets that form when a person talks, coughs, or sneezes  The droplets can also float in the air for minutes or hours  Infection happens when you breathe in the droplets or get them in your eyes or nose  Close personal contact with an infected person increases your risk for infection  This means being within 6 feet (2 meters) of the person for at least 15 minutes over 24 hours  · Person-to-person contact can spread the virus  For example, a person with the virus on his or her hands can spread it by shaking hands with someone  · The virus can stay on objects and surfaces for a short time  You may become infected by touching the object or surface and then touching your eyes or mouth      · An infected animal may be able to infect a person who touches it  This may happen at live markets or on a farm  Help lower the risk for COVID-19:  The best way to prevent infection is to avoid anyone who is infected, but this can be hard to do  An infected person can spread the virus before signs or symptoms begin, or even if signs or symptoms never develop  The following can help lower the risk for infection:      · Wash your hands often throughout the day  Use soap and water  Rub your soapy hands together, lacing your fingers, for at least 20 seconds  Rinse with warm, running water  Dry your hands with a clean towel or paper towel  Use hand  that contains alcohol if soap and water are not available  Teach children how to wash their hands and use hand   · Cover sneezes and coughs  Turn your face away and cover your mouth and nose with a tissue  Throw the tissue away  Use the bend of your arm if a tissue is not available  Then wash your hands well with soap and water or use hand   Teach children how to cover a cough or sneeze  · Wear a face covering (mask) around anyone who does not live in your home  Use a cloth covering with at least 2 layers  You can also create layers by putting a cloth covering over a disposable non-medical mask  Cover your mouth and your nose  The covering should fit snugly against the bridge of your nose  Securely fasten it under your chin and on the sides of your face  Do not  wear a plastic face shield instead of a covering  Continue social distancing and washing your hands often  A face covering is not a substitute for social distancing safety measures  · Follow worldwide, national, and local social distancing guidelines  Keep at least 6 feet (2 meters) between you and others  Also keep this distance from anyone who comes to your home, such as someone making a delivery  Wear a face covering while you are around others   You will need to wear a covering in restaurants, stores, and other public buildings  You will also need a covering on mass transit, such as a bus, subway, or airplane  Remember to use a covering made from thick material or wear 2 coverings together  · Make a habit of not touching your face  If you get the virus on your hands, you can transfer it to your eyes, nose, or mouth and become infected  You can also transfer it to objects, surfaces, or people  Do not touch your eyes, nose, or mouth without washing your hands first     · Clean and disinfect high-touch surfaces and objects often  Use disinfecting wipes, or make a solution of 4 teaspoons of bleach in 1 quart (4 cups) of water  Clean and disinfect even if you think no one living in or coming to your home is infected with the virus  · Ask about other vaccines you may need  Get the influenza (flu) vaccine as soon as recommended each year, usually starting in September or October  Get the pneumonia vaccine if recommended  Your healthcare provider can tell you if you should also get other vaccines, and when to get them  Follow social distancing guidelines:  National and local social distancing rules vary  Rules may change over time as restrictions are lifted  Restrictions may return if an outbreak happens where you live  It is important to know and follow all current social distancing rules in your area  The following are general guidelines:  · Stay home if you are sick or think you may have COVID-19  It is important to stay home if you are waiting for a testing appointment or for test results  Even if you do not have symptoms, you can pass the virus to others  · Limit trips out of your home  Have food, medicines, and other supplies delivered and left at your door or other area, if possible  Plan trips out of your home so you make the fewest stops possible to limit close personal contact  Keep track of places you go  This will help contact tracers notify others if you become infected      · Avoid close physical contact with anyone who does not live in your home  Do not shake hands with, hug, or kiss a person as a greeting  If you must use public transportation (such as a bus or subway), try to sit or stand away from others  Only allow necessary people into your home  Wear your face covering, and remind others to wear a face covering  Remind them to wash their hands when they arrive and before they leave  Do not  let someone into your home or go to someone's home just to visit  Even if you both do not feel sick, the virus can pass from one of you to the other  · Avoid in-person gatherings and crowds  Gatherings or crowds of 10 or more individuals can cause the virus to spread  Avoid places such as [place], beaches, sporting events, and tourist attractions  For events such as parties, holiday meals, [employer] services, and conferences, attend virtually (on a computer), if possible  · Ask your healthcare provider for other ways to have appointments  Some providers offer phone, video, or other types of appointments  You may also be able to get prescriptions for a few months of your medicines at a time  · Stay safe if you must go out to work  Keep physical distance between you and other workers as much as possible  Follow your employer's rules so everyone stays safe  If you have COVID-19 and are recovering at home,  healthcare providers will give you specific instructions to follow  The following are general guidelines to remind you how to keep others safe until you are well:  · Wash your hands often  Use soap and water as much as possible  Use hand  that contains alcohol if soap and water are not available  Dry your hands with a clean towel or paper towel  Do not share towels with anyone  If you use paper towels, throw them away in a lined trash can kept in your room or area  Use a covered trash can, if possible  · Do not go out of your home unless it is necessary    Ask someone who is not infected to go out for groceries or supplies, or have them delivered  Do not go to your healthcare provider's office without an appointment  · Only have close physical contact with a person giving direct care, or a baby or child you must care for  Family members and friends should not visit you  If possible, stay in a separate area or room of your home if you live with others  No one should go into the area or room except to give you care  You can visit with others by phone, video chat, e-mail, or similar systems  · Wear a face covering while others are near you  This can help prevent droplets from spreading the virus when you talk, sneeze, or cough  Put the covering on before anyone comes into your room or area  Remind the person to cover his or her nose and mouth before coming in to provide care for you  · Do not share items  Do not share dishes, towels, or other items with anyone  Items need to be washed after you use them  · Protect your baby  Some newborns have tested positive for the virus  It is not known if they became infected before or after birth  The highest risk is when a  has close contact with an infected person  If you are pregnant or breastfeeding, talk to your healthcare provider or obstetrician about any concerns you have  He or she will tell you when to bring your baby in for check-ups and vaccines  He or she will also tell you what to do if you think your baby was infected with the coronavirus  Wash your hands and put on a clean face covering before you breastfeed or care for your baby  · Do not handle live animals unless it is necessary  Some animals, including pets, have been infected with the new coronavirus  Ask someone who is not infected to take care of your pet until you are well  If you must care for a pet, wear a face covering  Wash your hands before and after you give care  Talk to your healthcare provider about how to keep a service animal safe, if needed      · Follow directions from your healthcare provider for being around others after you recover  It is not known if or for how long a recovered person can pass the virus to others  Your provider may give you instructions, such as continuing social distancing and wearing a face covering  He or she will tell you when it is okay to be around others again  This may be 10 to 20 days after symptoms started or you had a positive test  Most symptoms will also need to be gone  Your provider will give you more information  Follow up with your doctor as directed:  Write down your questions so you remember to ask them during your visits  For more information:   · Centers for Disease Control and Prevention  [address] [place] , [address]  Phone: 4- 046 - 910-4655  Web Address: [employer]    © [address] [place] [address] 2021 Information is for End User's use only and may not be sold, redistributed or otherwise used for commercial purposes  All illustrations and images included in CareNotes® are the copyrighted property of [employer] D [employer] M [employer]  or [address] [place], [place] [address]pascale annamarie   The above information is an  only  It is not intended as medical advice for individual conditions or treatments  Talk to your doctor, nurse or pharmacist before following any medical regimen to see if it is safe and effective for you

## 2022-01-06 ENCOUNTER — TELEPHONE (OUTPATIENT)
Dept: INTERNAL MEDICINE CLINIC | Facility: CLINIC | Age: 82
End: 2022-01-06

## 2022-01-06 LAB — SARS-COV-2 RNA RESP QL NAA+PROBE: POSITIVE

## 2022-01-06 NOTE — TELEPHONE ENCOUNTER
----- Message from Juan Serrano sent at 1/6/2022  4:36 PM EST -----  Covid positive, quarantine for 10 days from onset of symptoms  Take medication as prescribed  Increase fluid intake  Vitamin c 1000 mg daily and vitamin d 5000 IU daily for immune support

## 2022-01-14 ENCOUNTER — TELEPHONE (OUTPATIENT)
Dept: INTERNAL MEDICINE CLINIC | Facility: CLINIC | Age: 82
End: 2022-01-14

## 2022-01-14 NOTE — TELEPHONE ENCOUNTER
Patient is COVID Positive   She is having body aches, nausea and diarrhea x 1 week  Very fatigued  She is not able to take her regular medications due to the nausea/vomiting  Patient is not eating and today is reporting she is very weak  Please advise her son Jennifer Soriano on recommendations   [phone]

## 2022-01-18 NOTE — TELEPHONE ENCOUNTER
Patient's son Leslie Haley called back  Patient is taking food such as a fruit cup and is drinking plenty of water but is still experiencing very loose bowels  Whatever she intakes, it filters right out  She is very fatigued and is requiring assistance going to and from the bathroom and getting out of bed       Call Leslie Haley at [phone]

## 2022-01-19 ENCOUNTER — HOSPITAL ENCOUNTER (INPATIENT)
Facility: HOSPITAL | Age: 82
LOS: 3 days | Discharge: HOME HEALTH | DRG: 871 | End: 2022-01-22
Attending: EMERGENCY MEDICINE | Admitting: INTERNAL MEDICINE
Payer: MEDICARE

## 2022-01-19 ENCOUNTER — APPOINTMENT (INPATIENT)
Dept: CT IMAGING | Facility: HOSPITAL | Age: 82
DRG: 871 | End: 2022-01-19
Payer: MEDICARE

## 2022-01-19 ENCOUNTER — APPOINTMENT (EMERGENCY)
Dept: RADIOLOGY | Facility: HOSPITAL | Age: 82
DRG: 871 | End: 2022-01-19
Payer: MEDICARE

## 2022-01-19 DIAGNOSIS — J12.82 PNEUMONIA DUE TO COVID-19 VIRUS: Primary | ICD-10-CM

## 2022-01-19 DIAGNOSIS — E87.1 HYPONATREMIA: ICD-10-CM

## 2022-01-19 DIAGNOSIS — R79.89 ELEVATED D-DIMER: ICD-10-CM

## 2022-01-19 DIAGNOSIS — U07.1 PNEUMONIA DUE TO COVID-19 VIRUS: Primary | ICD-10-CM

## 2022-01-19 DIAGNOSIS — R73.9 ELEVATED BLOOD SUGAR: ICD-10-CM

## 2022-01-19 DIAGNOSIS — R09.02 HYPOXEMIA: ICD-10-CM

## 2022-01-19 DIAGNOSIS — E86.0 DEHYDRATION: ICD-10-CM

## 2022-01-19 PROBLEM — I48.0 PAROXYSMAL ATRIAL FIBRILLATION (HCC): Status: ACTIVE | Noted: 2022-01-19

## 2022-01-19 PROBLEM — A41.89 VIRAL SEPSIS (HCC): Status: ACTIVE | Noted: 2022-01-19

## 2022-01-19 PROBLEM — B97.89 VIRAL SEPSIS (HCC): Status: ACTIVE | Noted: 2022-01-19

## 2022-01-19 LAB
2HR DELTA HS TROPONIN: 4 NG/L
4HR DELTA HS TROPONIN: 5 NG/L
ALBUMIN SERPL BCP-MCNC: 3 G/DL (ref 3.5–5)
ALP SERPL-CCNC: 62 U/L (ref 46–116)
ALT SERPL W P-5'-P-CCNC: 75 U/L (ref 12–78)
ANION GAP SERPL CALCULATED.3IONS-SCNC: 16 MMOL/L (ref 4–13)
APTT PPP: 31 SECONDS (ref 23–37)
APTT PPP: 69 SECONDS (ref 23–37)
AST SERPL W P-5'-P-CCNC: 64 U/L (ref 5–45)
BASOPHILS # BLD MANUAL: 0 THOUSAND/UL (ref 0–0.1)
BASOPHILS NFR MAR MANUAL: 0 % (ref 0–1)
BILIRUB SERPL-MCNC: 0.87 MG/DL (ref 0.2–1)
BUN SERPL-MCNC: 27 MG/DL (ref 5–25)
CALCIUM ALBUM COR SERPL-MCNC: 9.4 MG/DL (ref 8.3–10.1)
CALCIUM SERPL-MCNC: 8.6 MG/DL (ref 8.3–10.1)
CARDIAC TROPONIN I PNL SERPL HS: 36 NG/L
CARDIAC TROPONIN I PNL SERPL HS: 40 NG/L
CARDIAC TROPONIN I PNL SERPL HS: 41 NG/L
CHLORIDE SERPL-SCNC: 95 MMOL/L (ref 100–108)
CO2 SERPL-SCNC: 20 MMOL/L (ref 21–32)
CREAT SERPL-MCNC: 1.19 MG/DL (ref 0.6–1.3)
D DIMER PPP FEU-MCNC: 3.35 UG/ML FEU
EOSINOPHIL # BLD MANUAL: 0.04 THOUSAND/UL (ref 0–0.4)
EOSINOPHIL NFR BLD MANUAL: 1 % (ref 0–6)
ERYTHROCYTE [DISTWIDTH] IN BLOOD BY AUTOMATED COUNT: 13.4 % (ref 11.6–15.1)
GFR SERPL CREATININE-BSD FRML MDRD: 42 ML/MIN/1.73SQ M
GLUCOSE SERPL-MCNC: 137 MG/DL (ref 65–140)
GLUCOSE SERPL-MCNC: 177 MG/DL (ref 65–140)
GLUCOSE SERPL-MCNC: 202 MG/DL (ref 65–140)
HCT VFR BLD AUTO: 45.6 % (ref 34.8–46.1)
HGB BLD-MCNC: 15.2 G/DL (ref 11.5–15.4)
INR PPP: 1.32 (ref 0.84–1.19)
LACTATE SERPL-SCNC: 1.6 MMOL/L (ref 0.5–2)
LYMPHOCYTES # BLD AUTO: 0.7 THOUSAND/UL (ref 0.6–4.47)
LYMPHOCYTES # BLD AUTO: 19 % (ref 14–44)
MCH RBC QN AUTO: 28.5 PG (ref 26.8–34.3)
MCHC RBC AUTO-ENTMCNC: 33.3 G/DL (ref 31.4–37.4)
MCV RBC AUTO: 86 FL (ref 82–98)
MONOCYTES # BLD AUTO: 0.41 THOUSAND/UL (ref 0–1.22)
MONOCYTES NFR BLD: 11 % (ref 4–12)
NEUTROPHILS # BLD MANUAL: 2.41 THOUSAND/UL (ref 1.85–7.62)
NEUTS SEG NFR BLD AUTO: 65 % (ref 43–75)
PLATELET # BLD AUTO: 230 THOUSANDS/UL (ref 149–390)
PLATELET BLD QL SMEAR: ADEQUATE
PMV BLD AUTO: 10.9 FL (ref 8.9–12.7)
POTASSIUM SERPL-SCNC: 4.4 MMOL/L (ref 3.5–5.3)
PROCALCITONIN SERPL-MCNC: 0.13 NG/ML
PROT SERPL-MCNC: 7.9 G/DL (ref 6.4–8.2)
PROTHROMBIN TIME: 15.8 SECONDS (ref 11.6–14.5)
RBC # BLD AUTO: 5.33 MILLION/UL (ref 3.81–5.12)
RBC MORPH BLD: NORMAL
SODIUM SERPL-SCNC: 131 MMOL/L (ref 136–145)
VARIANT LYMPHS # BLD AUTO: 4 %
WBC # BLD AUTO: 3.7 THOUSAND/UL (ref 4.31–10.16)

## 2022-01-19 PROCEDURE — 93005 ELECTROCARDIOGRAM TRACING: CPT

## 2022-01-19 PROCEDURE — 85379 FIBRIN DEGRADATION QUANT: CPT | Performed by: EMERGENCY MEDICINE

## 2022-01-19 PROCEDURE — 99285 EMERGENCY DEPT VISIT HI MDM: CPT

## 2022-01-19 PROCEDURE — 84145 PROCALCITONIN (PCT): CPT | Performed by: EMERGENCY MEDICINE

## 2022-01-19 PROCEDURE — 85027 COMPLETE CBC AUTOMATED: CPT | Performed by: EMERGENCY MEDICINE

## 2022-01-19 PROCEDURE — 83605 ASSAY OF LACTIC ACID: CPT | Performed by: EMERGENCY MEDICINE

## 2022-01-19 PROCEDURE — 84484 ASSAY OF TROPONIN QUANT: CPT | Performed by: EMERGENCY MEDICINE

## 2022-01-19 PROCEDURE — G1004 CDSM NDSC: HCPCS

## 2022-01-19 PROCEDURE — 87040 BLOOD CULTURE FOR BACTERIA: CPT | Performed by: EMERGENCY MEDICINE

## 2022-01-19 PROCEDURE — 82948 REAGENT STRIP/BLOOD GLUCOSE: CPT

## 2022-01-19 PROCEDURE — 71275 CT ANGIOGRAPHY CHEST: CPT

## 2022-01-19 PROCEDURE — 36415 COLL VENOUS BLD VENIPUNCTURE: CPT | Performed by: EMERGENCY MEDICINE

## 2022-01-19 PROCEDURE — 85730 THROMBOPLASTIN TIME PARTIAL: CPT | Performed by: INTERNAL MEDICINE

## 2022-01-19 PROCEDURE — 99223 1ST HOSP IP/OBS HIGH 75: CPT | Performed by: INTERNAL MEDICINE

## 2022-01-19 PROCEDURE — 1124F ACP DISCUSS-NO DSCNMKR DOCD: CPT | Performed by: EMERGENCY MEDICINE

## 2022-01-19 PROCEDURE — XW033E5 INTRODUCTION OF REMDESIVIR ANTI-INFECTIVE INTO PERIPHERAL VEIN, PERCUTANEOUS APPROACH, NEW TECHNOLOGY GROUP 5: ICD-10-PCS | Performed by: INTERNAL MEDICINE

## 2022-01-19 PROCEDURE — 99285 EMERGENCY DEPT VISIT HI MDM: CPT | Performed by: EMERGENCY MEDICINE

## 2022-01-19 PROCEDURE — 85610 PROTHROMBIN TIME: CPT | Performed by: EMERGENCY MEDICINE

## 2022-01-19 PROCEDURE — 80053 COMPREHEN METABOLIC PANEL: CPT | Performed by: EMERGENCY MEDICINE

## 2022-01-19 PROCEDURE — 71045 X-RAY EXAM CHEST 1 VIEW: CPT

## 2022-01-19 PROCEDURE — 85007 BL SMEAR W/DIFF WBC COUNT: CPT | Performed by: EMERGENCY MEDICINE

## 2022-01-19 PROCEDURE — 85730 THROMBOPLASTIN TIME PARTIAL: CPT | Performed by: EMERGENCY MEDICINE

## 2022-01-19 RX ORDER — ACETAMINOPHEN 325 MG/1
650 TABLET ORAL EVERY 6 HOURS PRN
Status: DISCONTINUED | OUTPATIENT
Start: 2022-01-19 | End: 2022-01-22 | Stop reason: HOSPADM

## 2022-01-19 RX ORDER — LEVOTHYROXINE SODIUM 0.05 MG/1
50 TABLET ORAL DAILY
Status: DISCONTINUED | OUTPATIENT
Start: 2022-01-19 | End: 2022-01-22 | Stop reason: HOSPADM

## 2022-01-19 RX ORDER — GABAPENTIN 300 MG/1
300 CAPSULE ORAL 3 TIMES DAILY
Status: DISCONTINUED | OUTPATIENT
Start: 2022-01-19 | End: 2022-01-19

## 2022-01-19 RX ORDER — HEPARIN SODIUM 1000 [USP'U]/ML
4000 INJECTION, SOLUTION INTRAVENOUS; SUBCUTANEOUS ONCE
Status: COMPLETED | OUTPATIENT
Start: 2022-01-19 | End: 2022-01-19

## 2022-01-19 RX ORDER — HEPARIN SODIUM 1000 [USP'U]/ML
2000 INJECTION, SOLUTION INTRAVENOUS; SUBCUTANEOUS
Status: DISCONTINUED | OUTPATIENT
Start: 2022-01-19 | End: 2022-01-21

## 2022-01-19 RX ORDER — WARFARIN SODIUM 5 MG/1
5 TABLET ORAL
Status: COMPLETED | OUTPATIENT
Start: 2022-01-19 | End: 2022-01-19

## 2022-01-19 RX ORDER — HEPARIN SODIUM 10000 [USP'U]/100ML
3-20 INJECTION, SOLUTION INTRAVENOUS
Status: DISCONTINUED | OUTPATIENT
Start: 2022-01-19 | End: 2022-01-21

## 2022-01-19 RX ORDER — PANTOPRAZOLE SODIUM 40 MG/1
40 TABLET, DELAYED RELEASE ORAL
Status: DISCONTINUED | OUTPATIENT
Start: 2022-01-20 | End: 2022-01-22 | Stop reason: HOSPADM

## 2022-01-19 RX ORDER — FUROSEMIDE 40 MG/1
40 TABLET ORAL DAILY
Status: DISCONTINUED | OUTPATIENT
Start: 2022-01-19 | End: 2022-01-21

## 2022-01-19 RX ORDER — DEXAMETHASONE SODIUM PHOSPHATE 4 MG/ML
6 INJECTION, SOLUTION INTRA-ARTICULAR; INTRALESIONAL; INTRAMUSCULAR; INTRAVENOUS; SOFT TISSUE EVERY 24 HOURS
Status: DISCONTINUED | OUTPATIENT
Start: 2022-01-19 | End: 2022-01-22 | Stop reason: HOSPADM

## 2022-01-19 RX ORDER — HEPARIN SODIUM 1000 [USP'U]/ML
4000 INJECTION, SOLUTION INTRAVENOUS; SUBCUTANEOUS
Status: DISCONTINUED | OUTPATIENT
Start: 2022-01-19 | End: 2022-01-21

## 2022-01-19 RX ADMIN — REMDESIVIR 200 MG: 100 INJECTION, POWDER, LYOPHILIZED, FOR SOLUTION INTRAVENOUS at 16:42

## 2022-01-19 RX ADMIN — HEPARIN SODIUM 4000 UNITS: 1000 INJECTION INTRAVENOUS; SUBCUTANEOUS at 16:08

## 2022-01-19 RX ADMIN — HEPARIN SODIUM AND DEXTROSE 12 UNITS/KG/HR: 10000; 5 INJECTION INTRAVENOUS at 16:13

## 2022-01-19 RX ADMIN — FUROSEMIDE 40 MG: 40 TABLET ORAL at 16:06

## 2022-01-19 RX ADMIN — DEXAMETHASONE SODIUM PHOSPHATE 6 MG: 4 INJECTION INTRA-ARTICULAR; INTRALESIONAL; INTRAMUSCULAR; INTRAVENOUS; SOFT TISSUE at 16:03

## 2022-01-19 RX ADMIN — WARFARIN SODIUM 5 MG: 5 TABLET ORAL at 17:29

## 2022-01-19 RX ADMIN — Medication 12.5 MG: at 21:33

## 2022-01-19 RX ADMIN — SODIUM CHLORIDE 1000 ML: 9 INJECTION, SOLUTION INTRAVENOUS at 13:41

## 2022-01-19 RX ADMIN — IOHEXOL 85 ML: 350 INJECTION, SOLUTION INTRAVENOUS at 15:08

## 2022-01-19 RX ADMIN — LEVOTHYROXINE SODIUM 50 MCG: 50 TABLET ORAL at 16:43

## 2022-01-19 RX ADMIN — INSULIN LISPRO 4 UNITS: 100 INJECTION, SOLUTION INTRAVENOUS; SUBCUTANEOUS at 21:31

## 2022-01-19 NOTE — ASSESSMENT & PLAN NOTE
Patient presents with tachycardia, tachypnea    Patient was COVID-19 positive on 01/05/2022  Patient meets criteria sepsis  Patient received 1 L of NS at ED  In the setting of pneumonia due to COVID-19, will hold fluids

## 2022-01-19 NOTE — ED PROVIDER NOTES
Pt Name: Cindy Clemente  MRN: [id number]  [place] 1940  Age/Sex: 80 y o  female  Date of evaluation: 1/19/2022  PCP: Ariel Sotelo MD    [address] [place] [address]    Chief Complaint   Patient presents with    Shortness of Breath     Pt reports COVID + on 1/5, increased SOB and cough since  HPI    80 y o  female presenting with shortness of breath  Patient states that she was diagnosed with COVID based on positive test on the 5th of this month, has had worsening cough and shortness of breath since then with abrupt worsening over the past 1-2 days  Patient is unable to walk more than 1 or 2 steps without becoming very out of breath and lightheaded  Patient has also had significantly decreased appetite, having difficulty drinking water or eating more than a fruit cup a day  She denies fever, nausea, vomiting, diarrhea, trauma, other symptoms        HPI      Past Medical and Surgical History    Past Medical History:   Diagnosis Date    Anemia     Aneurysm of thoracic aorta ([facility] )     Aortic valve disorder     Benign neoplasm of sigmoid colon     Cardiomyopathy ([facility] )     last assessed: 10/10/2014    CHF (congestive heart failure) (HCC)     Chronic kidney disease     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HCC)     last assessed: 10/10/2014    Diabetic nephropathy ([facility] )     RAMON (dyspnea on exertion)     GERD (gastroesophageal reflux disease)     History of emphysema     Hyperlipidemia     TIA (transient ischemic attack)        Past Surgical History:   Procedure Laterality Date    AORTIC VALVE REPLACEMENT      CARDIAC PACEMAKER PLACEMENT      last assessed: 10/10/2014   [facility] CARDIAC SURGERY      aortic valve replacement    CHOLECYSTECTOMY      COLONOSCOPY      HYSTERECTOMY      INSERT / REPLACE / REMOVE PACEMAKER      KNEE SURGERY Right     OTHER SURGICAL HISTORY      Capsule ENDOscopy description: 12/19/2012    AL COLONOSCOPY FLX DX W/COLLJ SPEC WHEN PFRMD N/A 5/31/2018    Procedure: single balloon ENTEROSCOPY;  Surgeon: Roscoe Thompson MD;  Location:  GI LAB; Service: Gastroenterology    CO ESOPHAGOGASTRODUODENOSCOPY TRANSORAL DIAGNOSTIC N/A 2017    Procedure: EGD AND COLONOSCOPY;  Surgeon: Belle Villagomez MD;  Location: [place] [facility] LAB; Service: Gastroenterology       Family History   Problem Relation Age of Onset    No Known Problems Mother     No Known Problems Father        Social History     Tobacco Use    Smoking status: Former Smoker     Packs/day: 1 00     Years: 50 00     Pack years: 50 00     Quit date: 2007     Years since quittin 1    Smokeless tobacco: Former User     Quit date:    Vaping Use    Vaping Use: Never used   Substance Use Topics    Alcohol use: Never    Drug use: No           Allergies    No Known Allergies    Home Medications    Prior to Admission medications    Medication Sig Start Date End Date Taking?  Authorizing Provider   BD Pen Needle Micro U/F 32G X 6 MM MISC  20   Historical Provider, MD   BD Pen Needle Micro U/F 32G X 6 MM MISC use daily 21   Stan Green MD   ergocalciferol (VITAMIN D2) 50,000 units Take 1 capsule (50,000 Units total) by mouth once a week  Patient not taking: Reported on 2021   Victor M Cuba MD   Fluad Quadrivalent 0 5 ML PRSY inject 0 5 milliliters intramuscularly 10/2/20   Historical Provider, MD   FREESTYLE LITE test strip Check blood glucose up to 3 times daily 21   Stan Green MD   furosemide (LASIX) 40 mg tablet take 1 tablet by mouth daily 21   CAMACHO Jennings   gabapentin (NEURONTIN) 300 mg capsule take 1 capsule by mouth three times a day 21   Stan Green MD   glipiZIDE (GLUCOTROL) 10 mg tablet Take 1 tablet (10 mg total) by mouth 2 (two) times a day before meals 21   Stan Green MD   insulin detemir (Levemir FlexTouch) 100 Units/mL injection pen Inject 26 Units under the skin daily at bedtime 8/3/21   CAMACHO Benites   Lancets (freestyle) lancets Check blood glucose 3 times daily 12/6/21   Cody Miguel MD   levothyroxine 50 mcg tablet Take 1 tablet (50 mcg total) by mouth daily 11/16/21   Cody Miguel MD   metoprolol tartrate (LOPRESSOR) 50 mg tablet 1/2 tab twice a day 5/6/21   Adis Bull MD   pantoprazole (PROTONIX) 40 mg tablet take 1 tablet by mouth once daily BEFORE BREAKFAST 4/23/21   Cody Miguel MD   RA IRON 325 (65 Fe) MG tablet take 1 tablet by mouth twice a day before meals 7/23/20   Cody Miguel MD   warfarin (COUMADIN) 5 mg tablet Take 1-2 tabs daily or as directed 9/13/21   Adis Bull MD           Review of Systems    Review of Systems   Constitutional: Positive for fatigue  Negative for activity change, chills and fever  HENT: Negative for drooling and facial swelling  Eyes: Negative for pain, discharge and visual disturbance  Respiratory: Positive for cough, shortness of breath and wheezing  Negative for apnea and chest tightness  Cardiovascular: Negative for chest pain and leg swelling  Gastrointestinal: Negative for abdominal pain, constipation, diarrhea, nausea and vomiting  Genitourinary: Negative for difficulty urinating, dysuria and urgency  Musculoskeletal: Negative for arthralgias, back pain and gait problem  Skin: Negative for color change and rash  Neurological: Negative for dizziness, speech difficulty, weakness and headaches  Psychiatric/Behavioral: Negative for agitation, behavioral problems and confusion  All other systems reviewed and negative      Physical Exam      ED Triage Vitals   Temperature Pulse Respirations Blood Pressure SpO2   01/19/22 1020 01/19/22 1020 01/19/22 1020 01/19/22 1020 01/19/22 1020   97 7 °F (36 5 °C) 102 (!) 26 140/69 90 %      Temp Source Heart Rate Source Patient Position - Orthostatic VS BP Location FiO2 (%)   01/19/22 1020 01/19/22 1020 01/19/22 1020 01/19/22 1020 --   Oral Monitor Sitting Left arm       Pain Score       01/19/22 8014 No Pain               Physical Exam  Vitals and nursing note reviewed  Constitutional:       Appearance: She is well-developed  HENT:      Head: Normocephalic and atraumatic  Right Ear: External ear normal       Left Ear: External ear normal    Eyes:      Conjunctiva/sclera: Conjunctivae normal       Pupils: Pupils are equal, round, and reactive to light  Cardiovascular:      Rate and Rhythm: Normal rate and regular rhythm  Heart sounds: Normal heart sounds  Pulmonary:      Effort: Respiratory distress present  Breath sounds: No wheezing or rales  Comments: Patient in moderate to severe respiratory distress, diffuse crackles, profoundly dyspneic on transfer from wheelchair to bed  Abdominal:      General: There is no distension  Palpations: Abdomen is soft  Tenderness: There is no abdominal tenderness  There is no guarding or rebound  Musculoskeletal:         General: No deformity  Normal range of motion  Cervical back: Normal range of motion and neck supple  Skin:     General: Skin is warm and dry  Findings: No erythema or rash  Neurological:      Mental Status: She is alert and oriented to person, place, and time  Psychiatric:         Behavior: Behavior normal          Thought Content: Thought content normal          Judgment: Judgment normal               Diagnostic Results    EKG Interpretation    Rate:  101  BPM  Rhythm:  Normal Sinus Rhythm   Axis:  Right axis deviation  Intervals: With left bundle-branch block QTc  557 ms  Q waves:  No pathologic Q waves   T waves:  Normal   ST segments:  No significant elevations or depressions     Impression:  Normal sinus rhythm with right axis deviation and left bundle-branch block without evidence of acute ischemia or significant arrhythmia      EKG for comparison:  None available    EKG interpreted by me       Labs:    Results Reviewed     Procedure Component Value Units Date/Time    Fingerstick Glucose (POCT) [[id number]]  (Normal) Collected: 01/19/22 1641    Lab Status: Final result Updated: 01/19/22 1652     POC Glucose 137 mg/dl     HS Troponin I 4hr [[id number]] Collected: 01/19/22 1535    Lab Status: Final result Specimen: Blood from Arm, Right Updated: 01/19/22 1604     hs TnI 4hr 41 ng/L      Delta 4hr hsTnI 5 ng/L     HS Troponin I 2hr [[id number]] Collected: 01/19/22 1331    Lab Status: Final result Specimen: Blood from Arm, Left Updated: 01/19/22 1414     hs TnI 2hr 40 ng/L      Delta 2hr hsTnI 4 ng/L     D-dimer, quantitative [[id number]]  (Abnormal) Collected: 01/19/22 1130    Lab Status: Final result Specimen: Blood from Arm, Right Updated: 01/19/22 1252     D-Dimer, Quant 3 35 ug/ml FEU     Protime-INR [[id number]]  (Abnormal) Collected: 01/19/22 1130    Lab Status: Final result Specimen: Blood from Arm, Right Updated: 01/19/22 1248     Protime 15 8 seconds      INR 1 32    APTT [[id number]]  (Normal) Collected: 01/19/22 1130    Lab Status: Final result Specimen: Blood from Arm, Right Updated: 01/19/22 1248     PTT 31 seconds     Manual Differential(PHLEBS Do Not Order) [[id number]]  (Abnormal) Collected: 01/19/22 1129    Lab Status: Final result Specimen: Blood from Arm, Right Updated: 01/19/22 1227     Segmented % 65 %      Lymphocytes % 19 %      Monocytes % 11 %      Eosinophils, % 1 %      Basophils % 0 %      Atypical Lymphocytes % 4 %      Absolute Neutrophils 2 41 Thousand/uL      Lymphocytes Absolute 0 70 Thousand/uL      Monocytes Absolute 0 41 Thousand/uL      Eosinophils Absolute 0 04 Thousand/uL      Basophils Absolute 0 00 Thousand/uL      Total Counted --     RBC Morphology Normal     Platelet Estimate Adequate    CBC and differential [[id number]]  (Abnormal) Collected: 01/19/22 1129    Lab Status: Final result Specimen: Blood from Arm, Right Updated: 01/19/22 1227     WBC 3 70 Thousand/uL      RBC 5 33 Million/uL      Hemoglobin 15 2 g/dL      Hematocrit 45 6 %      MCV 86 fL      MCH 28 5 pg MCHC 33 3 g/dL      RDW 13 4 %      MPV 10 9 fL      Platelets 748 Thousands/uL     Narrative: This is an appended report  These results have been appended to a previously verified report  Procalcitonin with AM Reflex [[id number]]  (Normal) Collected: 01/19/22 1130    Lab Status: Final result Specimen: Blood from Arm, Right Updated: 01/19/22 1223     Procalcitonin 0 13 ng/ml     Procalcitonin Reflex [[id number]]     Lab Status: No result Specimen: Blood     Lactic acid [[id number]]  (Normal) Collected: 01/19/22 1130    Lab Status: Final result Specimen: Blood from Arm, Right Updated: 01/19/22 1223     LACTIC ACID 1 6 mmol/L     Narrative:      Result may be elevated if tourniquet was used during collection      HS Troponin 0hr (reflex protocol) [[id number]]  (Normal) Collected: 01/19/22 1130    Lab Status: Final result Specimen: Blood from Arm, Right Updated: 01/19/22 1223     hs TnI 0hr 36 ng/L     Comprehensive metabolic panel [[id number]]  (Abnormal) Collected: 01/19/22 1130    Lab Status: Final result Specimen: Blood from Arm, Right Updated: 01/19/22 1215     Sodium 131 mmol/L      Potassium 4 4 mmol/L      Chloride 95 mmol/L      CO2 20 mmol/L      ANION GAP 16 mmol/L      BUN 27 mg/dL      Creatinine 1 19 mg/dL      Glucose 177 mg/dL      Calcium 8 6 mg/dL      Corrected Calcium 9 4 mg/dL      AST 64 U/L      ALT 75 U/L      Alkaline Phosphatase 62 U/L      Total Protein 7 9 g/dL      Albumin 3 0 g/dL      Total Bilirubin 0 87 mg/dL      eGFR 42 ml/min/1 73sq m     Narrative:      [place] guidelines for Chronic Kidney Disease (CKD):     Stage 1 with normal or high GFR (GFR > 90 mL/min/1 73 square meters)    Stage 2 Mild CKD (GFR = 60-89 mL/min/1 73 square meters)    Stage 3A Moderate CKD (GFR = 45-59 mL/min/1 73 square meters)    Stage 3B Moderate CKD (GFR = 30-44 mL/min/1 73 square meters)    Stage 4 Severe CKD (GFR = 15-29 mL/min/1 73 square meters)    Stage 5 End Stage CKD (GFR <15 mL/min/1 73 square meters)  Note: GFR calculation is accurate only with a steady state creatinine    Blood culture #2 [[id number]] Collected: 01/19/22 1130    Lab Status: In process Specimen: Blood from Arm, Right Updated: 01/19/22 1138    Blood culture #1 [[id number]] Collected: 01/19/22 1130    Lab Status: In process Specimen: Blood from Arm, Right Updated: 01/19/22 1138    UA w Reflex to Microscopic w Reflex to Culture [[id number]]     Lab Status: No result Specimen: Urine           All labs reviewed and utilized in the medical decision making process    Radiology:    CTA chest pe study   Final Result      1  No pulmonary embolus  2   Bilateral pulmonary infiltrates with the pattern consistent with Covid 19 pneumonia  3   Stable 4 1 cm enlarged ascending aorta  Workstation performed: [id number]         XR chest portable   Final Result   Bilateral groundglass opacities, left greater than right  In the setting of clinically suspected/proven COVID-19, this plain film appearance while nonspecific, can be seen in cases of viral pneumonia such as COVID-19  Workstation performed: [id number]             All radiology studies independently viewed by me and interpreted by the radiologist     Procedure    Procedures        ED Course of Care and Re-Assessments      Patient given IV fluids for volume resuscitation due to dehydration hyponatremia  Admitted Internal Medicine      Medications   levothyroxine tablet 50 mcg (50 mcg Oral Given 1/19/22 1643)   metoprolol tartrate (LOPRESSOR) tablet 12 5 mg (has no administration in time range)   pantoprazole (PROTONIX) EC tablet 40 mg (has no administration in time range)   acetaminophen (TYLENOL) tablet 650 mg (has no administration in time range)   dexamethasone (DECADRON) injection 6 mg (6 mg Intravenous Given 1/19/22 1603)   remdesivir (Veklury) 200 mg in sodium chloride 0 9 % 290 mL IVPB (200 mg Intravenous Given 1/19/22 1642)     Followed by   remdesivir Catherine Nightingale) 100 mg in sodium chloride 0 9 % 270 mL IVPB (has no administration in time range)   warfarin (COUMADIN) tablet 5 mg (has no administration in time range)   heparin (porcine) 25,000 units in D5W 250 mL infusion (premix) (12 Units/kg/hr × 70 kg (Order-Specific) Intravenous New Bag 1/19/22 1613)   heparin (porcine) injection 4,000 Units (has no administration in time range)   heparin (porcine) injection 2,000 Units (has no administration in time range)   insulin lispro (HumaLOG) 100 units/mL subcutaneous injection 2-12 Units (2 Units Subcutaneous Not Given 1/19/22 1642)   insulin lispro (HumaLOG) 100 units/mL subcutaneous injection 2-12 Units (has no administration in time range)   furosemide (LASIX) tablet 40 mg (40 mg Oral Given 1/19/22 1606)   sodium chloride 0 9 % bolus 1,000 mL (0 mL Intravenous Stopped 1/19/22 1552)   iohexol (OMNIPAQUE) 350 MG/ML injection (SINGLE-DOSE) 85 mL (85 mL Intravenous Given 1/19/22 1508)   heparin (porcine) injection 4,000 Units (4,000 Units Intravenous Given 1/19/22 1608)           FINAL IMPRESSION    Final diagnoses:   Pneumonia due to COVID-19 virus   Hypoxemia   Elevated d-dimer   Hyponatremia   Dehydration   Elevated blood sugar         DISPOSITION/PLAN    Presentation as above with hypoxemia felt to be secondary pneumonia  COVID-19 virus as well as hyponatremia, dehydration, elevated blood sugar and elevated D-dimer  After initial treatment resuscitation emergency department, admitted Internal Medicine for further care, hemodynamically stable and comfortable at time of admit    Time reflects when diagnosis was documented in both MDM as applicable and the Disposition within this note     Time User Action Codes Description Comment    1/19/2022  1:37 PM Zoë Asp Add [U07 1,  J12 82] Pneumonia due to COVID-19 virus     1/19/2022  1:37 PM Zoë Asp Add [R09 02] Hypoxemia     1/19/2022  1:37 PM Zoë Asp Add [R79 89] Elevated d-dimer     1/19/2022  1:38 PM Shirin [place] T Add [E87 1] Hyponatremia     1/19/2022  1:38 PM Shirin [place] T Add [E86 0] Dehydration     1/19/2022  1:38 PM Shirin [place] T Add [R73 9] Elevated blood sugar       ED Disposition     ED Disposition Condition Date/Time Comment    Admit Stable Wed Jan 19, 2022  1:37 PM Case was discussed with HARRIET and the patient's admission status was agreed to be Admission Status: inpatient status to the service of Dr Milind Ritchie   Follow-up Information    None           PATIENT REFERRED TO:    No follow-up provider specified      DISCHARGE MEDICATIONS:    Current Discharge Medication List      CONTINUE these medications which have NOT CHANGED    Details   ergocalciferol (VITAMIN D2) 50,000 units Take 1 capsule (50,000 Units total) by mouth once a week  Qty: 15 capsule, Refills: 0    Associated Diagnoses: Chronic kidney disease-mineral and bone disorder      furosemide (LASIX) 40 mg tablet take 1 tablet by mouth daily  Qty: 90 tablet, Refills: 3    Associated Diagnoses: Congestive heart failure, unspecified HF chronicity, unspecified heart failure type ([facility])      gabapentin (NEURONTIN) 300 mg capsule take 1 capsule by mouth three times a day  Qty: 270 capsule, Refills: 3    Associated Diagnoses: Type 2 diabetes mellitus with diabetic neuropathy, without long-term current use of insulin ([facility])      glipiZIDE (GLUCOTROL) 10 mg tablet Take 1 tablet (10 mg total) by mouth 2 (two) times a day before meals  Qty: 180 tablet, Refills: 2    Associated Diagnoses: Type 2 diabetes mellitus with diabetic polyneuropathy, with long-term current use of insulin ([facility])      insulin detemir (Levemir FlexTouch) 100 Units/mL injection pen Inject 26 Units under the skin daily at bedtime  Qty: 26 mL, Refills: 5    Associated Diagnoses: Type 2 diabetes mellitus with stage 4 chronic kidney disease, with long-term current use of insulin ([facility])      levothyroxine 50 mcg tablet Take 1 tablet (50 mcg total) by mouth daily  Qty: 100 tablet, Refills: 0    Associated Diagnoses: Hypothyroidism, unspecified type      metoprolol tartrate (LOPRESSOR) 50 mg tablet 1/2 tab twice a day  Qty: 90 tablet, Refills: 3    Associated Diagnoses: Hypertension, essential      pantoprazole (PROTONIX) 40 mg tablet take 1 tablet by mouth once daily BEFORE BREAKFAST  Qty: 90 tablet, Refills: 3    Associated Diagnoses: Gastroesophageal reflux disease      RA IRON 325 (65 Fe) MG tablet take 1 tablet by mouth twice a day before meals  Qty: 60 tablet, Refills: 2    Associated Diagnoses: Iron deficiency anemia, unspecified iron deficiency anemia type      warfarin (COUMADIN) 5 mg tablet Take 1-2 tabs daily or as directed  Qty: 180 tablet, Refills: 3    Associated Diagnoses: Aortic valve disorder      !! BD Pen Needle Micro U/F 32G X 6 MM MISC       !! BD Pen Needle Micro U/F 32G X 6 MM MISC use daily  Qty: 100 each, Refills: 3    Associated Diagnoses: Type 2 diabetes mellitus with diabetic neuropathy, without long-term current use of insulin ([facility])      Fluad Quadrivalent 0 5 ML PRSY inject 0 5 milliliters intramuscularly      FREESTYLE LITE test strip Check blood glucose up to 3 times daily  Qty: 300 strip, Refills: 3    Associated Diagnoses: Type 2 diabetes mellitus with diabetic neuropathy, without long-term current use of insulin ([facility])      Lancets (freestyle) lancets Check blood glucose 3 times daily  Qty: 300 each, Refills: 3    Associated Diagnoses: Type 2 diabetes mellitus with diabetic neuropathy, without long-term current use of insulin ([facility] [address] )       ! ! - Potential duplicate medications found  Please discuss with provider  No discharge procedures on file  Carmelo Hernandez MD    Portions of the record may have been created with voice recognition software  Occasional wrong word or "sound alike" substitutions may have occurred due to the inherent limitations of voice recognition software    Please read the chart carefully and recognize, using context, where substitutions have occurred     Thea Silverio MD  01/19/22 2509

## 2022-01-19 NOTE — ASSESSMENT & PLAN NOTE
TTE on 9/2021 revealed LVEF 40%, nuclear study pending  Patient seems euvolemic  Will continue home dose Lasix 40 mg p o   Daily and metoprolol 12 5 mg p o  B i d

## 2022-01-19 NOTE — ASSESSMENT & PLAN NOTE
Lab Results   Component Value Date    EGFR 42 01/19/2022    EGFR 36 11/19/2021    EGFR 48 05/14/2021    CREATININE 1 19 01/19/2022    CREATININE 1 39 (H) 11/19/2021    CREATININE 1 10 05/14/2021     Baseline creatinine appears at 1 1-1 5  Currently at baseline    Patient received dye for CTA PE study  Avoid nephrotoxic medications  Will monitor creatinine

## 2022-01-19 NOTE — ASSESSMENT & PLAN NOTE
Heart rate is acceptable  Continue anticoagulation with warfarin, currently bridging with IV heparin

## 2022-01-19 NOTE — ASSESSMENT & PLAN NOTE
Patient presents with worsening shortness of breath and cough since 01/05/2022  Patient was tested COVID-19 positive on 01/05/2022 at primary care doctor's office due to flu-like symptoms  Patient was treated with vitamin D and C at home, but without improvement  Associated with poor appetite, she only can take a cup of fruit a day  Patient has stopped taking all his medications including warfarin since then  At ED, patient says 86% on room air, sats 94 on 2 L of nasal cannula  D-dimer was 3 35, in setting of COVID and mechanical aortic valve and    Anticoagulation for 2 weeks, CTA PE study revealed no evidence of PE but bilateral ground-glass opacity consistent with COVID-19 infection  Will treat patient with mild COVID-19 pathway  Initiate remdesivir, dexamethasone  IV heparin for anticoagulation  Will check CRP

## 2022-01-19 NOTE — H&P
[facility]  H&P- Raul Lama 1940, 80 y o  female MRN: [id number]  Unit/Bed#: -Nick Encounter: [id number]  Primary Care Provider: Lisa Berg MD   Date and time admitted to hospital: 1/19/2022 10:38 AM    * Pneumonia due to COVID-19 virus  Assessment & Plan  Patient presents with worsening shortness of breath and cough since 01/05/2022  Patient was tested COVID-19 positive on 01/05/2022 at primary care doctor's office due to flu-like symptoms  Patient was treated with vitamin D and C at home, but without improvement  Associated with poor appetite, she only can take a cup of fruit a day  Patient has stopped taking all his medications including warfarin since then  At ED, patient says 86% on room air, sats 94 on 2 L of nasal cannula  D-dimer was 3 35, in setting of COVID and mechanical aortic valve and  Anticoagulation for 2 weeks, CTA PE study revealed no evidence of PE but bilateral ground-glass opacity consistent with COVID-19 infection  Will treat patient with mild COVID-19 pathway  Initiate remdesivir, dexamethasone  IV heparin for anticoagulation  Will check CRP      Paroxysmal atrial fibrillation (HCC)  Assessment & Plan  Heart rate is acceptable  Continue anticoagulation with warfarin, currently bridging with IV heparin    Viral sepsis [facility])  Assessment & Plan  Patient presents with tachycardia, tachypnea  Patient was COVID-19 positive on 01/05/2022  Patient meets criteria sepsis  Patient received 1 L of NS at ED  In the setting of pneumonia due to COVID-19, will hold fluids    Stage 3a chronic kidney disease  Assessment & Plan  Lab Results   Component Value Date    EGFR 42 01/19/2022    EGFR 36 11/19/2021    EGFR 48 05/14/2021    CREATININE 1 19 01/19/2022    CREATININE 1 39 (H) 11/19/2021    CREATININE 1 10 05/14/2021     Baseline creatinine appears at 1 1-1 5  Currently at baseline    Patient received dye for CTA PE study  Avoid nephrotoxic medications  Will monitor creatinine    Cardiomyopathy ([facility] [address] )  Assessment & Plan  TTE on 9/2021 revealed LVEF 40%, nuclear study pending  Patient seems euvolemic  Will continue home dose Lasix 40 mg p o  Daily and metoprolol 12 5 mg p o  B i d  Aortic valve replaced  Assessment & Plan  Mechanical aortic valve  Outpatient follow-up with Cardiology  On Coumadin for anticoagulation    Chronic anticoagulation  Assessment & Plan  Status post aortic aneurysm repair and  aortic valve replacement  Patient was taking warfarin 5 mg on Tuesday, Thursday, Saturday and Sunday, 7 mg on Monday, Wednesday and Friday  Patient has not been taking warfarin for past 2 weeks  Will restart warfarin, IV heparin bridging  Check INR daily    VTE Prophylaxis: Heparin Drip  / sequential compression device   Code Status:  Level 1  POLST: There is no POLST form on file for this patient (pre-hospital)  Discussion with family:  Patient and his son    Anticipated Length of Stay:  Patient will be admitted on an Inpatient basis with an anticipated length of stay of  2 midnights  Justification for Hospital Stay:  Pneumonia      Chief Complaint:   Shortness of breath and cough    History of Present Illness:    Panda Tijerina is a 80 y o  female with a past medical history of cardiomyopathy, proximal atrial fibrillation, aortic aneurysm status post repair and mechanical aortic valve and CKD who presents with shortness breath and cough and generalized weakness  Patient was tested COVID 19 positive on 01/05/2022 at primary care doctor's office due to flu-like symptoms  Patient was treated with vitamin D and C at home, but without improvement  Associated with poor appetite, she only can take a cup of fruit a day  Patient has stopped taking all his medications including warfarin since then  At ED, patient says 86% on room air, sats 94 on 2 L of nasal cannula  Patient had 1 episode of diarrhea at ED    Patient denies chest pain, fever, chills, nausea or vomiting  Review of Systems:    Review of Systems   Constitutional: Negative for chills and fever  HENT: Negative for congestion, rhinorrhea, sneezing and sore throat  Eyes: Negative for pain and discharge  Respiratory: Positive for cough and shortness of breath  Negative for chest tightness and wheezing  Cardiovascular: Negative for chest pain and leg swelling  Gastrointestinal: Negative for abdominal pain, nausea and vomiting  Endocrine: Negative for polydipsia, polyphagia and polyuria  Genitourinary: Negative for flank pain, frequency and urgency  Musculoskeletal: Negative for arthralgias, back pain and joint swelling  Skin: Negative for color change and pallor  Neurological: Positive for weakness and light-headedness  Negative for dizziness and headaches  Psychiatric/Behavioral: Negative for agitation and confusion         Past Medical and Surgical History:     Past Medical History:   Diagnosis Date    Anemia     Aneurysm of thoracic aorta ([facility] [address] )     Aortic valve disorder     Benign neoplasm of sigmoid colon     Cardiomyopathy ([facility] [address] )     last assessed: 10/10/2014    CHF (congestive heart failure) (HCC)     Chronic kidney disease     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HCC)     last assessed: 10/10/2014    Diabetic nephropathy (HCC)     RAMON (dyspnea on exertion)     GERD (gastroesophageal reflux disease)     History of emphysema     Hyperlipidemia     TIA (transient ischemic attack)        Past Surgical History:   Procedure Laterality Date    AORTIC VALVE REPLACEMENT      CARDIAC PACEMAKER PLACEMENT      last assessed: 10/10/2014   [facility] CARDIAC SURGERY      aortic valve replacement    CHOLECYSTECTOMY      COLONOSCOPY      HYSTERECTOMY      INSERT / REPLACE / REMOVE PACEMAKER      KNEE SURGERY Right     OTHER SURGICAL HISTORY      Capsule ENDOscopy description: 12/19/2012    RI COLONOSCOPY FLX DX W/COLLJ SPEC WHEN PFRMD N/A 5/31/2018    Procedure: single balloon ENTEROSCOPY;  Surgeon: Nancy Echevarria MD;  Location:  GI LAB; Service: Gastroenterology    ID ESOPHAGOGASTRODUODENOSCOPY TRANSORAL DIAGNOSTIC N/A 11/29/2017    Procedure: EGD AND COLONOSCOPY;  Surgeon: Leann Cash MD;  Location: [place] [facility]; Service: Gastroenterology       Meds/Allergies:    Prior to Admission medications    Medication Sig Start Date End Date Taking? Authorizing Provider   ergocalciferol (VITAMIN D2) 50,000 units Take 1 capsule (50,000 Units total) by mouth once a week 11/18/20  Yes Yasir Putnam MD   furosemide (LASIX) 40 mg tablet take 1 tablet by mouth daily 7/21/21  Yes CAMACHO Jennings   gabapentin (NEURONTIN) 300 mg capsule take 1 capsule by mouth three times a day 5/24/21  Yes Melissa Kumari MD   glipiZIDE (GLUCOTROL) 10 mg tablet Take 1 tablet (10 mg total) by mouth 2 (two) times a day before meals 7/27/21  Yes Melissa Kumari MD   insulin detemir (Levemir FlexTouch) 100 Units/mL injection pen Inject 26 Units under the skin daily at bedtime 8/3/21  Yes CAMACHO Teresa   levothyroxine 50 mcg tablet Take 1 tablet (50 mcg total) by mouth daily 11/16/21  Yes Melissa Kumari MD   metoprolol tartrate (LOPRESSOR) 50 mg tablet 1/2 tab twice a day 5/6/21  Yes Alicia Haddad MD   pantoprazole (PROTONIX) 40 mg tablet take 1 tablet by mouth once daily BEFORE BREAKFAST 4/23/21  Yes Melissa Kumari MD   RA IRON 325 (65 Fe) MG tablet take 1 tablet by mouth twice a day before meals 7/23/20  Yes Melissa Kumari MD   warfarin (COUMADIN) 5 mg tablet Take 1-2 tabs daily or as directed  Patient taking differently: 5 mg 5 mg on sat, sun, tues-thursdays   7 mg M-W-F 9/13/21  Yes Alicia Haddad MD   BD Pen Needle Micro U/F 32G X 6 MM MISC  9/16/20   Peggy Hopkins MD   BD Pen Needle Micro U/F 32G X 6 MM MISC use daily 8/20/21   Melissa Kumari MD   Fluad Quadrivalent 0 5 ML PRSY inject 0 5 milliliters intramuscularly 10/2/20   Historical Provider, MD   FREESTYLE LITE test strip Check blood glucose up to 3 times daily 21   Neva Flores MD   Lancets (freestyle) lancets Check blood glucose 3 times daily 21   Neva Flores MD     I have reviewed home medications with patient personally  Allergies: No Known Allergies    Social History:     Marital Status:    Occupation:  Retired  Patient Pre-hospital Level of Mobility:  Independent  Patient Pre-hospital Diet Restrictions:  None  Substance Use History:   Social History     Substance and Sexual Activity   Alcohol Use Never     Social History     Tobacco Use   Smoking Status Former Smoker    Packs/day: 1 00    Years: 50 00    Pack years: 50 00    Quit date: 2007    Years since quittin 1   Smokeless Tobacco Former User    Quit date:      Social History     Substance and Sexual Activity   Drug Use No       Family History:    non-contributory    Physical Exam:     Vitals:   Blood Pressure: 152/79 (22)  Pulse: 87 (22)  Temperature: (!) 97 4 °F (36 3 °C) (22)  Temp Source: Oral (22 144)  Respirations: (!) 24 (22)  Height: 5' 6" (167 6 cm) (22 1444)  Weight - Scale: 74 4 kg (164 lb) (22 144)  SpO2: 91 % (22)    Physical Exam  HENT:      Head: Normocephalic  Eyes:      Pupils: Pupils are equal, round, and reactive to light  Cardiovascular:      Rate and Rhythm: Normal rate and regular rhythm  Heart sounds: No murmur heard  Pulmonary:      Effort: Pulmonary effort is normal  No respiratory distress  Breath sounds: Rales present  No wheezing  Abdominal:      Palpations: Abdomen is soft  Tenderness: There is no abdominal tenderness  Musculoskeletal:         General: No swelling  Normal range of motion  Cervical back: Normal range of motion  Skin:     General: Skin is warm  Neurological:      Mental Status: She is alert and oriented to person, place, and time     Psychiatric:         Mood and Affect: Mood normal            Additional Data:     Lab Results: I have personally reviewed pertinent reports  Results from last 7 days   Lab Units 01/19/22  1129   WBC Thousand/uL 3 70*   HEMOGLOBIN g/dL 15 2   HEMATOCRIT % 45 6   PLATELETS Thousands/uL 230   LYMPHO PCT % 19   MONO PCT % 11   EOS PCT % 1     Results from last 7 days   Lab Units 01/19/22  1130   SODIUM mmol/L 131*   POTASSIUM mmol/L 4 4   CHLORIDE mmol/L 95*   CO2 mmol/L 20*   BUN mg/dL 27*   CREATININE mg/dL 1 19   ANION GAP mmol/L 16*   CALCIUM mg/dL 8 6   ALBUMIN g/dL 3 0*   TOTAL BILIRUBIN mg/dL 0 87   ALK PHOS U/L 62   ALT U/L 75   AST U/L 64*   GLUCOSE RANDOM mg/dL 177*     Results from last 7 days   Lab Units 01/19/22  1130   INR  1 32*     Results from last 7 days   Lab Units 01/19/22  1641   POC GLUCOSE mg/dl 137         Results from last 7 days   Lab Units 01/19/22  1130   LACTIC ACID mmol/L 1 6   PROCALCITONIN ng/ml 0 13       Imaging: I have personally reviewed pertinent reports  CTA chest pe study   Final Result by Radha Barahona MD (01/19 1606)      1  No pulmonary embolus  2   Bilateral pulmonary infiltrates with the pattern consistent with Covid 19 pneumonia  3   Stable 4 1 cm enlarged ascending aorta  Workstation performed: [id number]         XR chest portable   Final Result by Anny Jha MD (01/19 1250)   Bilateral groundglass opacities, left greater than right  In the setting of clinically suspected/proven COVID-19, this plain film appearance while nonspecific, can be seen in cases of viral pneumonia such as COVID-19  Workstation performed: [id number]             EKG, Pathology, and Other Studies Reviewed on Admission:   · EKG: pending    Allscripts / Epic Records Reviewed: Yes     ** Please Note: This note has been constructed using a voice recognition system   **

## 2022-01-19 NOTE — ED NOTES
Pt noted to have room air ambulatory spo2 of 86%, Dr Hannah Heredia at bedside and aware   Pt placed on 2 L O2 nasal cannula, spo2 increased to  94%     Ran Molina RN  01/19/22 0444

## 2022-01-19 NOTE — ASSESSMENT & PLAN NOTE
Status post aortic aneurysm repair and  aortic valve replacement  Patient was taking warfarin 5 mg on Tuesday, Thursday, Saturday and Sunday, 7 mg on Monday, Wednesday and Friday  Patient has not been taking warfarin for past 2 weeks  Will restart warfarin, IV heparin bridging    Check INR daily

## 2022-01-19 NOTE — PLAN OF CARE
Problem: Potential for Falls  Goal: Patient will remain free of falls  Description: INTERVENTIONS:  - Educate patient/family on patient safety including physical limitations  - Instruct patient to call for assistance with activity   - Consult OT/PT to assist with strengthening/mobility   - Keep Call bell within reach  - Keep bed low and locked with side rails adjusted as appropriate  - Keep care items and personal belongings within reach  - Initiate and maintain comfort rounds  - Make Fall Risk Sign visible to staff  - Offer Toileting every 2 Hours, in advance of need  - Initiate/Maintain bed alarm  - Obtain necessary fall risk management equipment:   - Apply yellow socks and bracelet for high fall risk patients  - Consider moving patient to room near nurses station  Outcome: Progressing     Problem: Prexisting or High Potential for Compromised Skin Integrity  Goal: Skin integrity is maintained or improved  Description: INTERVENTIONS:  - Identify patients at risk for skin breakdown  - Assess and monitor skin integrity  - Assess and monitor nutrition and hydration status  - Monitor labs   - Assess for incontinence   - Turn and reposition patient  - Assist with mobility/ambulation  - Relieve pressure over bony prominences  - Avoid friction and shearing  - Provide appropriate hygiene as needed including keeping skin clean and dry  - Evaluate need for skin moisturizer/barrier cream  - Collaborate with interdisciplinary team   - Patient/family teaching  - Consider wound care consult   Outcome: Progressing     Problem: PAIN - ADULT  Goal: Verbalizes/displays adequate comfort level or baseline comfort level  Description: Interventions:  - Encourage patient to monitor pain and request assistance  - Assess pain using appropriate pain scale  - Administer analgesics based on type and severity of pain and evaluate response  - Implement non-pharmacological measures as appropriate and evaluate response  - Consider cultural and social influences on pain and pain management  - Notify physician/advanced practitioner if interventions unsuccessful or patient reports new pain  Outcome: Progressing     Problem: INFECTION - ADULT  Goal: Absence or prevention of progression during hospitalization  Description: INTERVENTIONS:  - Assess and monitor for signs and symptoms of infection  - Monitor lab/diagnostic results  - Monitor all insertion sites, i e  indwelling lines, tubes, and drains  - Monitor endotracheal if appropriate and nasal secretions for changes in amount and color  - [place] appropriate cooling/warming therapies per order  - Administer medications as ordered  - Instruct and encourage patient and family to use good hand hygiene technique  - Identify and instruct in appropriate isolation precautions for identified infection/condition  Outcome: Progressing     Problem: SAFETY ADULT  Goal: Patient will remain free of falls  Description: INTERVENTIONS:  - Educate patient/family on patient safety including physical limitations  - Instruct patient to call for assistance with activity   - Consult OT/PT to assist with strengthening/mobility   - Keep Call bell within reach  - Keep bed low and locked with side rails adjusted as appropriate  - Keep care items and personal belongings within reach  - Initiate and maintain comfort rounds  - Make Fall Risk Sign visible to staff  - Offer Toileting every 2 Hours, in advance of need  - Initiate/Maintain bed alarm  - Obtain necessary fall risk management equipment:   - Apply yellow socks and bracelet for high fall risk patients  - Consider moving patient to room near nurses station  Outcome: Progressing  Goal: Maintain or return to baseline ADL function  Description: INTERVENTIONS:  - Educate patient/family on patient safety including physical limitations  - Instruct patient to call for assistance with activity   - Consult OT/PT to assist with strengthening/mobility   - Keep Call bell within reach  - Keep bed low and locked with side rails adjusted as appropriate  - Keep care items and personal belongings within reach  - Initiate and maintain comfort rounds  - Make Fall Risk Sign visible to staff  - Offer Toileting every 2 Hours, in advance of need  - Initiate/Maintain bed alarm  - Obtain necessary fall risk management equipment:   - Apply yellow socks and bracelet for high fall risk patients  - Consider moving patient to room near nurses station  Outcome: Progressing  Goal: Maintains/Returns to pre admission functional level  Description: INTERVENTIONS:  -  Assess patient's ability to carry out ADLs; assess patient's baseline for ADL function and identify physical deficits which impact ability to perform ADLs (bathing, care of mouth/teeth, toileting, grooming, dressing, etc )  - Assess/evaluate cause of self-care deficits   - Assess range of motion  - Assess patient's mobility; develop plan if impaired  - Assess patient's need for assistive devices and provide as appropriate  - Encourage maximum independence but intervene and supervise when necessary  - Involve family in performance of ADLs  - Assess for home care needs following discharge   - Consider OT consult to assist with ADL evaluation and planning for discharge  - Provide patient education as appropriate  Outcome: Progressing     Problem: DISCHARGE PLANNING  Goal: Discharge to home or other facility with appropriate resources  Description: INTERVENTIONS:  - Identify barriers to discharge w/patient and caregiver  - Arrange for needed discharge resources and transportation as appropriate  - Identify discharge learning needs (meds, wound care, etc )  - Arrange for interpretive services to assist at discharge as needed  - Refer to Case Management Department for coordinating discharge planning if the patient needs post-hospital services based on physician/advanced practitioner order or complex needs related to functional status, cognitive ability, or social support system  Outcome: Progressing     Problem: Knowledge Deficit  Goal: Patient/family/caregiver demonstrates understanding of disease process, treatment plan, medications, and discharge instructions  Description: Complete learning assessment and assess knowledge base    Interventions:  - Provide teaching at level of understanding  - Provide teaching via preferred learning methods  Outcome: Progressing     Problem: RESPIRATORY - ADULT  Goal: Achieves optimal ventilation and oxygenation  Description: INTERVENTIONS:  - Assess for changes in respiratory status  - Assess for changes in mentation and behavior  - Position to facilitate oxygenation and minimize respiratory effort  - Oxygen administered by appropriate delivery if ordered  - Initiate smoking cessation education as indicated  - Encourage broncho-pulmonary hygiene including cough, deep breathe, Incentive Spirometry  - Assess the need for suctioning and aspirate as needed  - Assess and instruct to report SOB or any respiratory difficulty  - Respiratory Therapy support as indicated  Outcome: Progressing     Problem: METABOLIC, FLUID AND ELECTROLYTES - ADULT  Goal: Electrolytes maintained within normal limits  Description: INTERVENTIONS:  - Monitor labs and assess patient for signs and symptoms of electrolyte imbalances  - Administer electrolyte replacement as ordered  - Monitor response to electrolyte replacements, including repeat lab results as appropriate  - Instruct patient on fluid and nutrition as appropriate  Outcome: Progressing  Goal: Fluid balance maintained  Description: INTERVENTIONS:  - Monitor labs   - Monitor I/O and WT  - Instruct patient on fluid and nutrition as appropriate  - Assess for signs & symptoms of volume excess or deficit  Outcome: Progressing  Goal: Glucose maintained within target range  Description: INTERVENTIONS:  - Monitor Blood Glucose as ordered  - Assess for signs and symptoms of hyperglycemia and hypoglycemia  - Administer ordered medications to maintain glucose within target range  - Assess nutritional intake and initiate nutrition service referral as needed  Outcome: Progressing     Problem: SKIN/TISSUE INTEGRITY - ADULT  Goal: Skin Integrity remains intact(Skin Breakdown Prevention)  Description: Assess:  -Perform Sanjeev assessment every   -Clean and moisturize skin every   -Inspect skin when repositioning, toileting, and assisting with ADLS  -Assess under medical devices such as  every   -Assess extremities for adequate circulation and sensation     Bed Management:  -Have minimal linens on bed & keep smooth, unwrinkled  -Change linens as needed when moist or perspiring  -Avoid sitting or lying in one position for more than  hours while in bed  -Keep HOB at degrees     Toileting:  -Offer bedside commode  -Assess for incontinence every   -Use incontinent care products after each incontinent episode such as     Activity:  -Mobilize patient  times a day  -Encourage activity and walks on unit  -Encourage or provide ROM exercises   -Turn and reposition patient every  Hours  -Use appropriate equipment to lift or move patient in bed  -Instruct/ Assist with weight shifting every  when out of bed in chair  -Consider limitation of chair time  hour intervals    Skin Care:  -Avoid use of baby powder, tape, friction and shearing, hot water or constrictive clothing  -Relieve pressure over bony prominences using   -Do not massage red bony areas    Next Steps:  -Teach patient strategies to minimize risks such as    -Consider consults to  interdisciplinary teams such as   Outcome: Progressing  Goal: Incision(s), wounds(s) or drain site(s) healing without S/S of infection  Description: INTERVENTIONS  - Assess and document dressing, incision, wound bed, drain sites and surrounding tissue  - Provide patient and family education  - Perform skin care/dressing changes every   Outcome: Progressing  Goal: Pressure injury heals and does not worsen  Description: Interventions:  - Implement low air loss mattress or specialty surface (Criteria met)  - Apply silicone foam dressing  - Instruct/assist with weight shifting every  minutes when in chair   - Limit chair time to  hour intervals  - Use special pressure reducing interventions such as  when in chair   - Apply fecal or urinary incontinence containment device   - Perform passive or active ROM every   - Turn and reposition patient & offload bony prominences every  hours   - Utilize friction reducing device or surface for transfers   - Consider consults to  interdisciplinary teams such as   - Use incontinent care products after each incontinent episode such as  - Consider nutrition services referral as needed  Outcome: Progressing     Problem: MUSCULOSKELETAL - ADULT  Goal: Maintain or return mobility to safest level of function  Description: INTERVENTIONS:  - Assess patient's ability to carry out ADLs; assess patient's baseline for ADL function and identify physical deficits which impact ability to perform ADLs (bathing, care of mouth/teeth, toileting, grooming, dressing, etc )  - Assess/evaluate cause of self-care deficits   - Assess range of motion  - Assess patient's mobility  - Assess patient's need for assistive devices and provide as appropriate  - Encourage maximum independence but intervene and supervise when necessary  - Involve family in performance of ADLs  - Assess for home care needs following discharge   - Consider OT consult to assist with ADL evaluation and planning for discharge  - Provide patient education as appropriate  Outcome: Progressing  Goal: Maintain proper alignment of affected body part  Description: INTERVENTIONS:  - Support, maintain and protect limb and body alignment  - Provide patient/ family with appropriate education  Outcome: Progressing

## 2022-01-20 PROBLEM — D70.3 NEUTROPENIA ASSOCIATED WITH INFECTION (HCC): Status: ACTIVE | Noted: 2022-01-20

## 2022-01-20 LAB
ALBUMIN SERPL BCP-MCNC: 2.5 G/DL (ref 3.5–5)
ALP SERPL-CCNC: 55 U/L (ref 46–116)
ALT SERPL W P-5'-P-CCNC: 59 U/L (ref 12–78)
ANION GAP SERPL CALCULATED.3IONS-SCNC: 11 MMOL/L (ref 4–13)
ANISOCYTOSIS BLD QL SMEAR: PRESENT
APTT PPP: 52 SECONDS (ref 23–37)
APTT PPP: 80 SECONDS (ref 23–37)
APTT PPP: 94 SECONDS (ref 23–37)
AST SERPL W P-5'-P-CCNC: 39 U/L (ref 5–45)
ATRIAL RATE: 79 BPM
BASOPHILS # BLD MANUAL: 0 THOUSAND/UL (ref 0–0.1)
BASOPHILS NFR MAR MANUAL: 0 % (ref 0–1)
BILIRUB SERPL-MCNC: 0.42 MG/DL (ref 0.2–1)
BUN SERPL-MCNC: 29 MG/DL (ref 5–25)
BURR CELLS BLD QL SMEAR: PRESENT
CALCIUM ALBUM COR SERPL-MCNC: 9.2 MG/DL (ref 8.3–10.1)
CALCIUM SERPL-MCNC: 8 MG/DL (ref 8.3–10.1)
CHLORIDE SERPL-SCNC: 103 MMOL/L (ref 100–108)
CO2 SERPL-SCNC: 22 MMOL/L (ref 21–32)
CREAT SERPL-MCNC: 1.02 MG/DL (ref 0.6–1.3)
CRP SERPL QL: 146.3 MG/L
EOSINOPHIL # BLD MANUAL: 0 THOUSAND/UL (ref 0–0.4)
EOSINOPHIL NFR BLD MANUAL: 0 % (ref 0–6)
ERYTHROCYTE [DISTWIDTH] IN BLOOD BY AUTOMATED COUNT: 13.3 % (ref 11.6–15.1)
GFR SERPL CREATININE-BSD FRML MDRD: 51 ML/MIN/1.73SQ M
GLUCOSE SERPL-MCNC: 150 MG/DL (ref 65–140)
GLUCOSE SERPL-MCNC: 170 MG/DL (ref 65–140)
GLUCOSE SERPL-MCNC: 175 MG/DL (ref 65–140)
GLUCOSE SERPL-MCNC: 177 MG/DL (ref 65–140)
GLUCOSE SERPL-MCNC: 207 MG/DL (ref 65–140)
GLUCOSE SERPL-MCNC: 252 MG/DL (ref 65–140)
HCT VFR BLD AUTO: 42.1 % (ref 34.8–46.1)
HGB BLD-MCNC: 14.1 G/DL (ref 11.5–15.4)
INR PPP: 1.46 (ref 0.84–1.19)
LYMPHOCYTES # BLD AUTO: 0.6 THOUSAND/UL (ref 0.6–4.47)
LYMPHOCYTES # BLD AUTO: 40 % (ref 14–44)
MCH RBC QN AUTO: 28.3 PG (ref 26.8–34.3)
MCHC RBC AUTO-ENTMCNC: 33.5 G/DL (ref 31.4–37.4)
MCV RBC AUTO: 85 FL (ref 82–98)
MONOCYTES # BLD AUTO: 0.16 THOUSAND/UL (ref 0–1.22)
MONOCYTES NFR BLD: 11 % (ref 4–12)
NEUTROPHILS # BLD MANUAL: 0.74 THOUSAND/UL (ref 1.85–7.62)
NEUTS SEG NFR BLD AUTO: 49 % (ref 43–75)
P AXIS: 63 DEGREES
PLATELET # BLD AUTO: 231 THOUSANDS/UL (ref 149–390)
PLATELET BLD QL SMEAR: ADEQUATE
PMV BLD AUTO: 10.9 FL (ref 8.9–12.7)
POIKILOCYTOSIS BLD QL SMEAR: PRESENT
POTASSIUM SERPL-SCNC: 4.5 MMOL/L (ref 3.5–5.3)
PR INTERVAL: 230 MS
PROCALCITONIN SERPL-MCNC: 0.14 NG/ML
PROT SERPL-MCNC: 6.9 G/DL (ref 6.4–8.2)
PROTHROMBIN TIME: 17.1 SECONDS (ref 11.6–14.5)
QRS AXIS: 94 DEGREES
QRSD INTERVAL: 152 MS
QT INTERVAL: 468 MS
QTC INTERVAL: 536 MS
RBC # BLD AUTO: 4.98 MILLION/UL (ref 3.81–5.12)
SODIUM SERPL-SCNC: 136 MMOL/L (ref 136–145)
T WAVE AXIS: -68 DEGREES
VENTRICULAR RATE: 79 BPM
WBC # BLD AUTO: 1.5 THOUSAND/UL (ref 4.31–10.16)

## 2022-01-20 PROCEDURE — 82948 REAGENT STRIP/BLOOD GLUCOSE: CPT

## 2022-01-20 PROCEDURE — 85730 THROMBOPLASTIN TIME PARTIAL: CPT | Performed by: INTERNAL MEDICINE

## 2022-01-20 PROCEDURE — 85610 PROTHROMBIN TIME: CPT | Performed by: INTERNAL MEDICINE

## 2022-01-20 PROCEDURE — 85007 BL SMEAR W/DIFF WBC COUNT: CPT | Performed by: INTERNAL MEDICINE

## 2022-01-20 PROCEDURE — 86140 C-REACTIVE PROTEIN: CPT | Performed by: INTERNAL MEDICINE

## 2022-01-20 PROCEDURE — 84145 PROCALCITONIN (PCT): CPT | Performed by: INTERNAL MEDICINE

## 2022-01-20 PROCEDURE — 99233 SBSQ HOSP IP/OBS HIGH 50: CPT | Performed by: INTERNAL MEDICINE

## 2022-01-20 PROCEDURE — 85027 COMPLETE CBC AUTOMATED: CPT | Performed by: INTERNAL MEDICINE

## 2022-01-20 PROCEDURE — 80053 COMPREHEN METABOLIC PANEL: CPT | Performed by: INTERNAL MEDICINE

## 2022-01-20 PROCEDURE — 93010 ELECTROCARDIOGRAM REPORT: CPT | Performed by: INTERNAL MEDICINE

## 2022-01-20 RX ORDER — WARFARIN SODIUM 7.5 MG/1
7.5 TABLET ORAL
Status: COMPLETED | OUTPATIENT
Start: 2022-01-20 | End: 2022-01-20

## 2022-01-20 RX ADMIN — HEPARIN SODIUM AND DEXTROSE 14 UNITS/KG/HR: 10000; 5 INJECTION INTRAVENOUS at 14:37

## 2022-01-20 RX ADMIN — WARFARIN SODIUM 7.5 MG: 7.5 TABLET ORAL at 17:03

## 2022-01-20 RX ADMIN — INSULIN LISPRO 2 UNITS: 100 INJECTION, SOLUTION INTRAVENOUS; SUBCUTANEOUS at 17:03

## 2022-01-20 RX ADMIN — HEPARIN SODIUM 2000 UNITS: 1000 INJECTION INTRAVENOUS; SUBCUTANEOUS at 06:12

## 2022-01-20 RX ADMIN — INSULIN LISPRO 4 UNITS: 100 INJECTION, SOLUTION INTRAVENOUS; SUBCUTANEOUS at 11:25

## 2022-01-20 RX ADMIN — Medication 12.5 MG: at 21:30

## 2022-01-20 RX ADMIN — REMDESIVIR 100 MG: 100 INJECTION, POWDER, LYOPHILIZED, FOR SOLUTION INTRAVENOUS at 14:46

## 2022-01-20 RX ADMIN — FUROSEMIDE 40 MG: 40 TABLET ORAL at 08:45

## 2022-01-20 RX ADMIN — INSULIN LISPRO 6 UNITS: 100 INJECTION, SOLUTION INTRAVENOUS; SUBCUTANEOUS at 21:30

## 2022-01-20 RX ADMIN — PANTOPRAZOLE SODIUM 40 MG: 40 TABLET, DELAYED RELEASE ORAL at 06:12

## 2022-01-20 RX ADMIN — LEVOTHYROXINE SODIUM 50 MCG: 50 TABLET ORAL at 08:45

## 2022-01-20 RX ADMIN — Medication 12.5 MG: at 08:45

## 2022-01-20 RX ADMIN — INSULIN LISPRO 2 UNITS: 100 INJECTION, SOLUTION INTRAVENOUS; SUBCUTANEOUS at 08:44

## 2022-01-20 RX ADMIN — DEXAMETHASONE SODIUM PHOSPHATE 6 MG: 4 INJECTION INTRA-ARTICULAR; INTRALESIONAL; INTRAMUSCULAR; INTRAVENOUS; SOFT TISSUE at 14:46

## 2022-01-20 NOTE — CASE MANAGEMENT
Case Management Assessment & Discharge Planning Note    Patient name Koko Thibodeaux  Location [address] [id number]/-[id number] MRN [id number]  : 1940 Date 2022       Current Admission Date: 2022  Current Admission Diagnosis:Pneumonia due to COVID-19 virus   Patient Active Problem List    Diagnosis Date Noted    Neutropenia associated with infection ([place] [address] ) 2022    Pneumonia due to COVID-19 virus 2022    Viral sepsis ([place] [address] ) 2022    Paroxysmal atrial fibrillation ([place] [address] ) 2022    Primary osteoarthritis involving multiple joints 2021    Stage 3a chronic kidney disease 2020    Chronic kidney disease-mineral and bone disorder 2020    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[place] [address] ) 2020    Cardiomyopathy ([place] [address] ) 2020    Angioedema 2019    Other vascular disorders of intestine ([place] [address] ) 2019    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    AVM (arteriovenous malformation) of small bowel, acquired with hemorrhage 2018    GERD (gastroesophageal reflux disease) 2018    Hyperlipidemia 2018    Chronic anticoagulation 2018    Hypertension, essential 2018    Iron deficiency 2017    Coronary artery disease of native artery of native heart with stable angina pectoris ([place] [address] ) 2015    Hypothyroidism 2014    Chronic obstructive pulmonary disease ([place] [address] ) 2014    Diabetes mellitus with neurological manifestation ([place] [address] ) 2014    Aortic valve replaced 2007      LOS (days): 1  Geometric Mean LOS (GMLOS) (days): 4 80  Days to GMLOS:3 8     OBJECTIVE:    Risk of Unplanned Readmission Score: 23         Current admission status: Inpatient       Preferred Pharmacy:   [id number] [address]   [address] [facility] [place] [address]  Phone: [phone] Fax: [phone]    [facility] [address], [address] [place] [address]  [address] [facility] [address]  Phone: [phone] Fax: [phone]    Primary Care Provider: Bradley Cao MD    Primary Insurance: MEDICARE  Secondary Insurance:     ASSESSMENT:  [address] [facility] Representative - Son   Primary Phone: [phone] (Home)               Advance Directives  Does patient have a [address]?: No  Was patient offered paperwork?: Yes (Patient refused paperwork at this time )  Does patient currently have a Health Care decision maker?: Yes, please see Health Care Proxy section  Does patient have Advance Directives?: No  Was patient offered paperwork?: Yes (Patient refused paperwork at this time )  Primary Contact: Patient's Son    Readmission Root Cause  30 Day Readmission: No    Patient Information  Admitted from[de-identified] Home  Mental Status: Alert  During Assessment patient was accompanied by: Not accompanied during assessment  Assessment information provided by[de-identified] Patient  Primary Caregiver: Self  Support Systems: [facility] Residence: [place] [address] do you live in?: [place] entry access options   Select all that apply : Stairs  Number of steps to enter home : One Flight (13)  Do the steps have railings?: Yes  Type of Current Residence: [place]  In the last 12 months, was there a time when you were not able to pay the mortgage or rent on time?: No  In the last 12 months, how many places have you lived?: 1  In the last 12 months, was there a time when you did not have a steady place to sleep or slept in a shelter (including now)?: No  Homeless/housing insecurity resource given?: N/A  Living Arrangements: Other (Comment) (lives with son,daughter, and grandchildren)  Is patient a ?: No    Activities of Daily Living Prior to Admission  Functional Status: Independent  Completes ADLs independently?: Yes  Ambulates independently?: Yes  Does patient use assisted devices?: No  Does patient currently own DME?: No  Does patient have a history of Outpatient Therapy (PT/OT)?: No  Does the patient have a history of Short-Term Rehab?: No  Does patient have a history of HHC?: No  Does patient currently have [address]?: No    Patient Information Continued  Income Source: Pension/alf  Does patient have prescription coverage?: Yes  Within the past 12 months, you worried that your food would run out before you got the money to buy more : Never true  Within the past 12 months, the food you bought just didnt last and you didnt have money to get more : Never true  Food insecurity resource given?: N/A  Does patient receive dialysis treatments?: No  Does patient have a history of substance abuse?: No  Does patient have a history of Mental Health Diagnosis?: No    Means of Transportation  Means of Transport to Appts[de-identified] Family transport  In the past 12 months, has lack of transportation kept you from medical appointments or from getting medications?: No  In the past 12 months, has lack of transportation kept you from meetings, work, or from getting things needed for daily living?: No  Was application for public transport provided?: N/A    DISCHARGE DETAILS:    Discharge planning discussed with[de-identified] Patient  Freedom of Choice: Yes  Comments - Freedom of Choice: Patient denied any needs at this time and is not interested in [address]    CM contacted family/caregiver?: No- see comments (Patient declined phone call at this time )  Were Treatment Team discharge recommendations reviewed with patient/caregiver?: Yes  Did patient/caregiver verbalize understanding of patient care needs?: Yes  Were patient/caregiver advised of the risks associated with not following Treatment Team discharge recommendations?: Yes    [address]         Is the patient interested in [address] at discharge?: No    DME Referral Provided  Referral made for DME?: No    Would you like to participate in our [address] service program?  : No - Declined    Treatment Team Recommendation: Home  Discharge Destination Plan[de-identified] Home  Transport at Discharge : Family

## 2022-01-20 NOTE — ASSESSMENT & PLAN NOTE
Patient presents with tachycardia, tachypnea    Patient was COVID-19 positive on 01/05/2022  Patient meets criteria sepsis  Patient received 1 L of NS at ED  In the setting of pneumonia due to COVID-19, will hold fluids  Treatment see COVID-19

## 2022-01-20 NOTE — PROGRESS NOTES
[address] [facility]  Progress Note - Giovanna Camps 1940, 80 y o  female MRN: [id number]  Unit/Bed#: MS [facility] Encounter: [id number]  Primary Care Provider: Marianna Shook MD   Date and time admitted to hospital: 1/19/2022 10:38 AM    * Pneumonia due to COVID-19 virus  Assessment & Plan  Patient presents with worsening shortness of breath and cough since 01/05/2022  Patient was tested COVID-19 positive on 01/05/2022 at primary care doctor's office due to flu-like symptoms  Patient was treated with vitamin D and C at home, but without improvement  Associated with poor appetite, she only can take a cup of fruit a day  Patient has stopped taking all his medications including warfarin since then  At ED, patient says 86% on room air, sats 94 on 2 L of nasal cannula  D-dimer was 3 35, in setting of COVID and mechanical aortic valve and  Anticoagulation for 2 weeks, CTA PE study revealed no evidence of PE but bilateral ground-glass opacity consistent with COVID-19 infection    Procalcitonin negative x2  Continue to treat patient with mild COVID-19 pathway  Continue remdesivir (D2), dexamethasone (D2)  IV heparin for anticoagulation  Incentive spirometry        Neutropenia associated with infection (HCC)  Assessment & Plan  WBC 1 5, Absolute neutrophil 0 74  Likely due to COVID-19  Patient is afebrile  Procalcitonin negative x2  Will hold antibiotics  Continue monitor CBC  Neutropenic precautions    Paroxysmal atrial fibrillation (HCC)  Assessment & Plan  Heart rate is acceptable  Continue anticoagulation with warfarin, currently bridging with IV heparin    Viral sepsis [facility])  Assessment & Plan  Patient presents with tachycardia, tachypnea    Patient was COVID-19 positive on 01/05/2022  Patient meets criteria sepsis  Patient received 1 L of NS at ED  In the setting of pneumonia due to COVID-19, will hold fluids  Treatment see COVID-19    Stage 3a chronic kidney disease  Assessment & Plan  Lab Results   Component Value Date    EGFR 51 2022    EGFR 42 2022    EGFR 36 2021    CREATININE 1 02 2022    CREATININE 1 19 2022    CREATININE 1 39 (H) 2021     Baseline creatinine appears at 1 1-1 5  Currently at baseline  Patient received dye for CTA PE study  Avoid nephrotoxic medications  Will monitor creatinine    Cardiomyopathy ([facility] [address] )  Assessment & Plan  TTE on 2021 revealed LVEF 40%, nuclear study pending  Patient seems euvolemic  Will continue home dose Lasix 40 mg p o  Daily and metoprolol 12 5 mg p o  B i d  Aortic valve replaced  Assessment & Plan  Mechanical aortic valve  Outpatient follow-up with Cardiology  On Coumadin for anticoagulation    Chronic anticoagulation  Assessment & Plan  Status post aortic aneurysm repair and  aortic valve replacement  Patient was taking warfarin 5 mg on Tuesday, Thursday, Saturday and , 7 mg on Monday, Wednesday and Friday  Patient has not been taking warfarin for past 2 weeks  Will restart warfarin, IV heparin bridging  INR today 1 46  will give warfarin 7 5 mg po          VTE Pharmacologic Prophylaxis:     High Risk (Score >/= 5) - Pharmacological DVT Prophylaxis Ordered: Heparin Drip  Sequential Compression Devices Ordered  Mechanical VTE Prophylaxis in Place: Yes    Patient Centered Rounds: I have performed bedside rounds with nursing staff today  Discussions with Specialists or Other Care Team Provider:  None    Education and Discussions with Family / Patient: Updated  (son) via phone  Current Length of Stay: 1 day(s)     Current Patient Status: Inpatient     Discharge Plan / Estimated Discharge Date: To be determined    Code Status: Level 1 - Full Code      Subjective:   Patient states that her breathing is getting better  Patient denies fever, chills  No chest pain      Objective:     Vitals:   Temp (24hrs), Av 3 °F (36 3 °C), Min:96 2 °F (35 7 °C), Max:97 8 °F (36 6 °C)    Temp: [96 2 °F (35 7 °C)-97 8 °F (36 6 °C)] 97 6 °F (36 4 °C)  HR:  [68-87] 68  Resp:  [16-24] 22  BP: (134-176)/(69-80) 134/72  SpO2:  [91 %-98 %] 93 %  Body mass index is 26 47 kg/m²  Input and Output Summary (last 24 hours): Intake/Output Summary (Last 24 hours) at 1/20/2022 1410  Last data filed at 1/20/2022 0733  Gross per 24 hour   Intake 1420 ml   Output 1450 ml   Net -30 ml       Physical Exam:     Physical Exam  HENT:      Head: Normocephalic  Eyes:      Pupils: Pupils are equal, round, and reactive to light  Cardiovascular:      Rate and Rhythm: Normal rate and regular rhythm  Heart sounds: No murmur heard  Pulmonary:      Effort: Pulmonary effort is normal  No respiratory distress  Breath sounds: Rales present  No wheezing  Abdominal:      Palpations: Abdomen is soft  Tenderness: There is no abdominal tenderness  Musculoskeletal:         General: No swelling  Normal range of motion  Cervical back: Normal range of motion  Skin:     General: Skin is warm  Neurological:      Mental Status: She is alert and oriented to person, place, and time     Psychiatric:         Mood and Affect: Mood normal           Additional Data:     Labs:  Results from last 7 days   Lab Units 01/20/22  0451   WBC Thousand/uL 1 50*   HEMOGLOBIN g/dL 14 1   HEMATOCRIT % 42 1   PLATELETS Thousands/uL 231   LYMPHO PCT % 40   MONO PCT % 11   EOS PCT % 0     Results from last 7 days   Lab Units 01/20/22  0451   SODIUM mmol/L 136   POTASSIUM mmol/L 4 5   CHLORIDE mmol/L 103   CO2 mmol/L 22   BUN mg/dL 29*   CREATININE mg/dL 1 02   ANION GAP mmol/L 11   CALCIUM mg/dL 8 0*   ALBUMIN g/dL 2 5*   TOTAL BILIRUBIN mg/dL 0 42   ALK PHOS U/L 55   ALT U/L 59   AST U/L 39   GLUCOSE RANDOM mg/dL 177*     Results from last 7 days   Lab Units 01/20/22  0451   INR  1 46*     Results from last 7 days   Lab Units 01/20/22  1104 01/20/22  0730 01/20/22  0450 01/19/22  2131 01/19/22  1641   POC GLUCOSE mg/dl 207* 175* 170* 202* 137         Results from last 7 days   Lab Units 01/20/22  1210 01/19/22  1130   LACTIC ACID mmol/L  --  1 6   PROCALCITONIN ng/ml 0 14 0 13       Imaging: No pertinent imaging reviewed  Recent Cultures (last 7 days):     Results from last 7 days   Lab Units 01/19/22  1130   BLOOD CULTURE  Received in Microbiology Lab  Culture in Progress  Received in Microbiology Lab  Culture in Progress  Lines/Drains:  Invasive Devices  Report    Peripheral Intravenous Line            Peripheral IV 01/19/22 Right Hand 1 day    Peripheral IV 01/19/22 Right Antecubital <1 day          Drain            External Urinary Catheter <1 day                Telemetry:        Last 24 Hours Medication List:   Current Facility-Administered Medications   Medication Dose Route Frequency Provider Last Rate    acetaminophen  650 mg Oral Q6H PRN Jade Adrian MD      dexamethasone  6 mg Intravenous Q24H Jade Adrian MD      furosemide  40 mg Oral Daily Jade Adrian MD      heparin (porcine)  3-20 Units/kg/hr (Order-Specific) Intravenous Titrated aJde Adrian MD 14 Units/kg/hr (01/20/22 0612)    heparin (porcine)  2,000 Units Intravenous Q1H PRN Jade Adrian MD      heparin (porcine)  4,000 Units Intravenous Q1H PRN Jade Adrian MD      insulin lispro  2-12 Units Subcutaneous TID AC Jade Adrian MD      insulin lispro  2-12 Units Subcutaneous HS Jade Adrian MD      levothyroxine  50 mcg Oral Daily Jade Adrian MD      metoprolol tartrate  12 5 mg Oral Q12H [address] Jade Adrian MD      pantoprazole  40 mg Oral Early Morning Jade Adrian MD      remdesivir  100 mg Intravenous Q24H Jade Adrian MD      warfarin  7 5 mg Oral Once (warfarin) Steven Jackman MD          Today, Patient Was Seen By: Jade Adrian MD    ** Please Note: This note has been constructed using a voice recognition system   **

## 2022-01-20 NOTE — PLAN OF CARE
Problem: Potential for Falls  Goal: Patient will remain free of falls  Description: INTERVENTIONS:  - Educate patient/family on patient safety including physical limitations  - Instruct patient to call for assistance with activity   - Consult OT/PT to assist with strengthening/mobility   - Keep Call bell within reach  - Keep bed low and locked with side rails adjusted as appropriate  - Keep care items and personal belongings within reach  - Initiate and maintain comfort rounds  - Make Fall Risk Sign visible to staff  - Offer Toileting every 2 Hours, in advance of need  - Initiate/Maintain BED alarm  - Obtain necessary fall risk management equipment:   - Apply yellow socks and bracelet for high fall risk patients  - Consider moving patient to room near nurses station  Outcome: Progressing     Problem: Prexisting or High Potential for Compromised Skin Integrity  Goal: Skin integrity is maintained or improved  Description: INTERVENTIONS:  - Identify patients at risk for skin breakdown  - Assess and monitor skin integrity  - Assess and monitor nutrition and hydration status  - Monitor labs   - Assess for incontinence   - Turn and reposition patient  - Assist with mobility/ambulation  - Relieve pressure over bony prominences  - Avoid friction and shearing  - Provide appropriate hygiene as needed including keeping skin clean and dry  - Evaluate need for skin moisturizer/barrier cream  - Collaborate with interdisciplinary team   - Patient/family teaching  - Consider wound care consult   Outcome: Progressing     Problem: PAIN - ADULT  Goal: Verbalizes/displays adequate comfort level or baseline comfort level  Description: Interventions:  - Encourage patient to monitor pain and request assistance  - Assess pain using appropriate pain scale  - Administer analgesics based on type and severity of pain and evaluate response  - Implement non-pharmacological measures as appropriate and evaluate response  - Consider cultural and social influences on pain and pain management  - Notify physician/advanced practitioner if interventions unsuccessful or patient reports new pain  Outcome: Progressing     Problem: INFECTION - ADULT  Goal: Absence or prevention of progression during hospitalization  Description: INTERVENTIONS:  - Assess and monitor for signs and symptoms of infection  - Monitor lab/diagnostic results  - Monitor all insertion sites, i e  indwelling lines, tubes, and drains  - Monitor endotracheal if appropriate and nasal secretions for changes in amount and color  - [place] appropriate cooling/warming therapies per order  - Administer medications as ordered  - Instruct and encourage patient and family to use good hand hygiene technique  - Identify and instruct in appropriate isolation precautions for identified infection/condition  Outcome: Progressing     Problem: SAFETY ADULT  Goal: Patient will remain free of falls  Description: INTERVENTIONS:  - Educate patient/family on patient safety including physical limitations  - Instruct patient to call for assistance with activity   - Consult OT/PT to assist with strengthening/mobility   - Keep Call bell within reach  - Keep bed low and locked with side rails adjusted as appropriate  - Keep care items and personal belongings within reach  - Initiate and maintain comfort rounds  - Make Fall Risk Sign visible to staff  - Offer Toileting every 2 Hours, in advance of need  - Initiate/Maintain BED alarm  - Obtain necessary fall risk management equipment:   - Apply yellow socks and bracelet for high fall risk patients  - Consider moving patient to room near nurses station  Outcome: Progressing  Goal: Maintain or return to baseline ADL function  Description: INTERVENTIONS:  -  Assess patient's ability to carry out ADLs; assess patient's baseline for ADL function and identify physical deficits which impact ability to perform ADLs (bathing, care of mouth/teeth, toileting, grooming, dressing, etc )  - Assess/evaluate cause of self-care deficits   - Assess range of motion  - Assess patient's mobility; develop plan if impaired  - Assess patient's need for assistive devices and provide as appropriate  - Encourage maximum independence but intervene and supervise when necessary  - Involve family in performance of ADLs  - Assess for home care needs following discharge   - Consider OT consult to assist with ADL evaluation and planning for discharge  - Provide patient education as appropriate  Outcome: Progressing  Goal: Maintains/Returns to pre admission functional level  Description: INTERVENTIONS:  - Perform BMAT or MOVE assessment daily    - Set and communicate daily mobility goal to care team and patient/family/caregiver  - Collaborate with rehabilitation services on mobility goals if consulted  - Perform Range of Motion 3 times a day  - Reposition patient every 2 hours    - Dangle patient 3 times a day  - Stand patient 3 times a day  - Ambulate patient 3 times a day  - Out of bed to chair 3 times a day   - Out of bed for meals 3 times a day  - Out of bed for toileting  - Record patient progress and toleration of activity level   Outcome: Progressing     Problem: DISCHARGE PLANNING  Goal: Discharge to home or other facility with appropriate resources  Description: INTERVENTIONS:  - Identify barriers to discharge w/patient and caregiver  - Arrange for needed discharge resources and transportation as appropriate  - Identify discharge learning needs (meds, wound care, etc )  - Arrange for interpretive services to assist at discharge as needed  - Refer to Case Management Department for coordinating discharge planning if the patient needs post-hospital services based on physician/advanced practitioner order or complex needs related to functional status, cognitive ability, or social support system  Outcome: Progressing     Problem: Knowledge Deficit  Goal: Patient/family/caregiver demonstrates understanding of disease process, treatment plan, medications, and discharge instructions  Description: Complete learning assessment and assess knowledge base    Interventions:  - Provide teaching at level of understanding  - Provide teaching via preferred learning methods  Outcome: Progressing     Problem: RESPIRATORY - ADULT  Goal: Achieves optimal ventilation and oxygenation  Description: INTERVENTIONS:  - Assess for changes in respiratory status  - Assess for changes in mentation and behavior  - Position to facilitate oxygenation and minimize respiratory effort  - Oxygen administered by appropriate delivery if ordered  - Initiate smoking cessation education as indicated  - Encourage broncho-pulmonary hygiene including cough, deep breathe, Incentive Spirometry  - Assess the need for suctioning and aspirate as needed  - Assess and instruct to report SOB or any respiratory difficulty  - Respiratory Therapy support as indicated  Outcome: Progressing     Problem: METABOLIC, FLUID AND ELECTROLYTES - ADULT  Goal: Electrolytes maintained within normal limits  Description: INTERVENTIONS:  - Monitor labs and assess patient for signs and symptoms of electrolyte imbalances  - Administer electrolyte replacement as ordered  - Monitor response to electrolyte replacements, including repeat lab results as appropriate  - Instruct patient on fluid and nutrition as appropriate  Outcome: Progressing  Goal: Fluid balance maintained  Description: INTERVENTIONS:  - Monitor labs   - Monitor I/O and WT  - Instruct patient on fluid and nutrition as appropriate  - Assess for signs & symptoms of volume excess or deficit  Outcome: Progressing  Goal: Glucose maintained within target range  Description: INTERVENTIONS:  - Monitor Blood Glucose as ordered  - Assess for signs and symptoms of hyperglycemia and hypoglycemia  - Administer ordered medications to maintain glucose within target range  - Assess nutritional intake and initiate nutrition service referral as needed  Outcome: Progressing     Problem: SKIN/TISSUE INTEGRITY - ADULT  Goal: Skin Integrity remains intact(Skin Breakdown Prevention)  Description: Assess:  -Perform Sanjeev assessment every   -Clean and moisturize skin every   -Inspect skin when repositioning, toileting, and assisting with ADLS  -Assess under medical devices such as  every   -Assess extremities for adequate circulation and sensation     Bed Management:  -Have minimal linens on bed & keep smooth, unwrinkled  -Change linens as needed when moist or perspiring  -Avoid sitting or lying in one position for more than  hours while in bed  -Keep HOB at degrees     Toileting:  -Offer bedside commode  -Assess for incontinence every   -Use incontinent care products after each incontinent episode such as     Activity:  -Mobilize patient  times a day  -Encourage activity and walks on unit  -Encourage or provide ROM exercises   -Turn and reposition patient every  Hours  -Use appropriate equipment to lift or move patient in bed  -Instruct/ Assist with weight shifting every  when out of bed in chair  -Consider limitation of chair time  hour intervals    Skin Care:  -Avoid use of baby powder, tape, friction and shearing, hot water or constrictive clothing  -Relieve pressure over bony prominences using   -Do not massage red bony areas    Next Steps:  -Teach patient strategies to minimize risks such as    -Consider consults to  interdisciplinary teams such as   Outcome: Progressing  Goal: Incision(s), wounds(s) or drain site(s) healing without S/S of infection  Description: INTERVENTIONS  - Assess and document dressing, incision, wound bed, drain sites and surrounding tissue  - Provide patient and family education  - Perform skin care/dressing changes every   Outcome: Progressing  Goal: Pressure injury heals and does not worsen  Description: Interventions:  - Implement low air loss mattress or specialty surface (Criteria met)  - Apply silicone foam dressing  - Instruct/assist with weight shifting every  minutes when in chair   - Limit chair time to  hour intervals  - Use special pressure reducing interventions such as  when in chair   - Apply fecal or urinary incontinence containment device   - Perform passive or active ROM every   - Turn and reposition patient & offload bony prominences every  hours   - Utilize friction reducing device or surface for transfers   - Consider consults to  interdisciplinary teams such as  - Use incontinent care products after each incontinent episode such as   - Consider nutrition services referral as needed  Outcome: Progressing     Problem: MUSCULOSKELETAL - ADULT  Goal: Maintain or return mobility to safest level of function  Description: INTERVENTIONS:  - Assess patient's ability to carry out ADLs; assess patient's baseline for ADL function and identify physical deficits which impact ability to perform ADLs (bathing, care of mouth/teeth, toileting, grooming, dressing, etc )  - Assess/evaluate cause of self-care deficits   - Assess range of motion  - Assess patient's mobility  - Assess patient's need for assistive devices and provide as appropriate  - Encourage maximum independence but intervene and supervise when necessary  - Involve family in performance of ADLs  - Assess for home care needs following discharge   - Consider OT consult to assist with ADL evaluation and planning for discharge  - Provide patient education as appropriate  Outcome: Progressing  Goal: Maintain proper alignment of affected body part  Description: INTERVENTIONS:  - Support, maintain and protect limb and body alignment  - Provide patient/ family with appropriate education  Outcome: Progressing     Problem: MOBILITY - ADULT  Goal: Maintain or return to baseline ADL function  Description: INTERVENTIONS:  -  Assess patient's ability to carry out ADLs; assess patient's baseline for ADL function and identify physical deficits which impact ability to perform ADLs (bathing, care of mouth/teeth, toileting, grooming, dressing, etc )  - Assess/evaluate cause of self-care deficits   - Assess range of motion  - Assess patient's mobility; develop plan if impaired  - Assess patient's need for assistive devices and provide as appropriate  - Encourage maximum independence but intervene and supervise when necessary  - Involve family in performance of ADLs  - Assess for home care needs following discharge   - Consider OT consult to assist with ADL evaluation and planning for discharge  - Provide patient education as appropriate  Outcome: Progressing  Goal: Maintains/Returns to pre admission functional level  Description: INTERVENTIONS:  - Perform BMAT or MOVE assessment daily    - Set and communicate daily mobility goal to care team and patient/family/caregiver  - Collaborate with rehabilitation services on mobility goals if consulted  - Perform Range of Motion  times a day  - Reposition patient every hours    - Dangle patient  times a day  - Stand patient times a day  - Ambulate patient  times a day  - Out of bed to chair  times a day   - Out of bed for meals  times a day  - Out of bed for toileting  - Record patient progress and toleration of activity level   Outcome: Progressing

## 2022-01-20 NOTE — ASSESSMENT & PLAN NOTE
Status post aortic aneurysm repair and  aortic valve replacement  Patient was taking warfarin 5 mg on Tuesday, Thursday, Saturday and Sunday, 7 mg on Monday, Wednesday and Friday  Patient has not been taking warfarin for past 2 weeks  Will restart warfarin, IV heparin bridging    INR today 1 46  will give warfarin 7 5 mg po

## 2022-01-20 NOTE — ASSESSMENT & PLAN NOTE
Lab Results   Component Value Date    EGFR 51 01/20/2022    EGFR 42 01/19/2022    EGFR 36 11/19/2021    CREATININE 1 02 01/20/2022    CREATININE 1 19 01/19/2022    CREATININE 1 39 (H) 11/19/2021     Baseline creatinine appears at 1 1-1 5  Currently at baseline    Patient received dye for CTA PE study  Avoid nephrotoxic medications  Will monitor creatinine

## 2022-01-20 NOTE — ASSESSMENT & PLAN NOTE
WBC 1 5, Absolute neutrophil 0 74  Likely due to COVID-19  Patient is afebrile  Procalcitonin negative x2  Will hold antibiotics  Continue monitor CBC  Neutropenic precautions

## 2022-01-20 NOTE — ASSESSMENT & PLAN NOTE
Patient presents with worsening shortness of breath and cough since 01/05/2022  Patient was tested COVID-19 positive on 01/05/2022 at primary care doctor's office due to flu-like symptoms  Patient was treated with vitamin D and C at home, but without improvement  Associated with poor appetite, she only can take a cup of fruit a day  Patient has stopped taking all his medications including warfarin since then  At ED, patient says 86% on room air, sats 94 on 2 L of nasal cannula  D-dimer was 3 35, in setting of COVID and mechanical aortic valve and    Anticoagulation for 2 weeks, CTA PE study revealed no evidence of PE but bilateral ground-glass opacity consistent with COVID-19 infection    Procalcitonin negative x2  Continue to treat patient with mild COVID-19 pathway  Continue remdesivir (D2), dexamethasone (D2)  IV heparin for anticoagulation  Incentive spirometry

## 2022-01-20 NOTE — PLAN OF CARE
Problem: Potential for Falls  Goal: Patient will remain free of falls  Description: INTERVENTIONS:  - Educate patient/family on patient safety including physical limitations  - Instruct patient to call for assistance with activity   - Consult OT/PT to assist with strengthening/mobility   - Keep Call bell within reach  - Keep bed low and locked with side rails adjusted as appropriate  - Keep care items and personal belongings within reach  - Initiate and maintain comfort rounds  - Make Fall Risk Sign visible to staff  - Offer Toileting every  Hours, in advance of need  - Initiate/Maintain alarm  - Obtain necessary fall risk management equipment:   - Apply yellow socks and bracelet for high fall risk patients  - Consider moving patient to room near nurses station  Outcome: Progressing     Problem: Prexisting or High Potential for Compromised Skin Integrity  Goal: Skin integrity is maintained or improved  Description: INTERVENTIONS:  - Identify patients at risk for skin breakdown  - Assess and monitor skin integrity  - Assess and monitor nutrition and hydration status  - Monitor labs   - Assess for incontinence   - Turn and reposition patient  - Assist with mobility/ambulation  - Relieve pressure over bony prominences  - Avoid friction and shearing  - Provide appropriate hygiene as needed including keeping skin clean and dry  - Evaluate need for skin moisturizer/barrier cream  - Collaborate with interdisciplinary team   - Patient/family teaching  - Consider wound care consult   Outcome: Progressing     Problem: PAIN - ADULT  Goal: Verbalizes/displays adequate comfort level or baseline comfort level  Description: Interventions:  - Encourage patient to monitor pain and request assistance  - Assess pain using appropriate pain scale  - Administer analgesics based on type and severity of pain and evaluate response  - Implement non-pharmacological measures as appropriate and evaluate response  - Consider cultural and social influences on pain and pain management  - Notify physician/advanced practitioner if interventions unsuccessful or patient reports new pain  Outcome: Progressing     Problem: INFECTION - ADULT  Goal: Absence or prevention of progression during hospitalization  Description: INTERVENTIONS:  - Assess and monitor for signs and symptoms of infection  - Monitor lab/diagnostic results  - Monitor all insertion sites, i e  indwelling lines, tubes, and drains  - Monitor endotracheal if appropriate and nasal secretions for changes in amount and color  - [place] appropriate cooling/warming therapies per order  - Administer medications as ordered  - Instruct and encourage patient and family to use good hand hygiene technique  - Identify and instruct in appropriate isolation precautions for identified infection/condition  Outcome: Progressing     Problem: SAFETY ADULT  Goal: Patient will remain free of falls  Description: INTERVENTIONS:  - Educate patient/family on patient safety including physical limitations  - Instruct patient to call for assistance with activity   - Consult OT/PT to assist with strengthening/mobility   - Keep Call bell within reach  - Keep bed low and locked with side rails adjusted as appropriate  - Keep care items and personal belongings within reach  - Initiate and maintain comfort rounds  - Make Fall Risk Sign visible to staff  - Offer Toileting every  Hours, in advance of need  - Initiate/Maintain alarm  - Obtain necessary fall risk management equipment:   - Apply yellow socks and bracelet for high fall risk patients  - Consider moving patient to room near nurses station  Outcome: Progressing  Goal: Maintain or return to baseline ADL function  Description: INTERVENTIONS:  -  Assess patient's ability to carry out ADLs; assess patient's baseline for ADL function and identify physical deficits which impact ability to perform ADLs (bathing, care of mouth/teeth, toileting, grooming, dressing, etc )  - Assess/evaluate cause of self-care deficits   - Assess range of motion  - Assess patient's mobility; develop plan if impaired  - Assess patient's need for assistive devices and provide as appropriate  - Encourage maximum independence but intervene and supervise when necessary  - Involve family in performance of ADLs  - Assess for home care needs following discharge   - Consider OT consult to assist with ADL evaluation and planning for discharge  - Provide patient education as appropriate  Outcome: Progressing  Goal: Maintains/Returns to pre admission functional level  Description: INTERVENTIONS:  - Perform BMAT or MOVE assessment daily    - Set and communicate daily mobility goal to care team and patient/family/caregiver  - Collaborate with rehabilitation services on mobility goals if consulted  - Perform Range of Motion  times a day  - Reposition patient every  hours    - Dangle patient  times a day  - Stand patient  times a day  - Ambulate patient  times a day  - Out of bed to chair  times a day   - Out of bed for meals  times a day  - Out of bed for toileting  - Record patient progress and toleration of activity level   Outcome: Progressing     Problem: DISCHARGE PLANNING  Goal: Discharge to home or other facility with appropriate resources  Description: INTERVENTIONS:  - Identify barriers to discharge w/patient and caregiver  - Arrange for needed discharge resources and transportation as appropriate  - Identify discharge learning needs (meds, wound care, etc )  - Arrange for interpretive services to assist at discharge as needed  - Refer to Case Management Department for coordinating discharge planning if the patient needs post-hospital services based on physician/advanced practitioner order or complex needs related to functional status, cognitive ability, or social support system  Outcome: Progressing     Problem: Knowledge Deficit  Goal: Patient/family/caregiver demonstrates understanding of disease process, treatment plan, medications, and discharge instructions  Description: Complete learning assessment and assess knowledge base    Interventions:  - Provide teaching at level of understanding  - Provide teaching via preferred learning methods  Outcome: Progressing     Problem: RESPIRATORY - ADULT  Goal: Achieves optimal ventilation and oxygenation  Description: INTERVENTIONS:  - Assess for changes in respiratory status  - Assess for changes in mentation and behavior  - Position to facilitate oxygenation and minimize respiratory effort  - Oxygen administered by appropriate delivery if ordered  - Initiate smoking cessation education as indicated  - Encourage broncho-pulmonary hygiene including cough, deep breathe, Incentive Spirometry  - Assess the need for suctioning and aspirate as needed  - Assess and instruct to report SOB or any respiratory difficulty  - Respiratory Therapy support as indicated  Outcome: Progressing     Problem: METABOLIC, FLUID AND ELECTROLYTES - ADULT  Goal: Electrolytes maintained within normal limits  Description: INTERVENTIONS:  - Monitor labs and assess patient for signs and symptoms of electrolyte imbalances  - Administer electrolyte replacement as ordered  - Monitor response to electrolyte replacements, including repeat lab results as appropriate  - Instruct patient on fluid and nutrition as appropriate  Outcome: Progressing  Goal: Fluid balance maintained  Description: INTERVENTIONS:  - Monitor labs   - Monitor I/O and WT  - Instruct patient on fluid and nutrition as appropriate  - Assess for signs & symptoms of volume excess or deficit  Outcome: Progressing  Goal: Glucose maintained within target range  Description: INTERVENTIONS:  - Monitor Blood Glucose as ordered  - Assess for signs and symptoms of hyperglycemia and hypoglycemia  - Administer ordered medications to maintain glucose within target range  - Assess nutritional intake and initiate nutrition service referral as needed  Outcome: Progressing     Problem: SKIN/TISSUE INTEGRITY - ADULT  Goal: Skin Integrity remains intact(Skin Breakdown Prevention)  Description: Assess:  -Perform Sanjeev assessment every   -Clean and moisturize skin every   -Inspect skin when repositioning, toileting, and assisting with ADLS  -Assess under medical devices such as  every   -Assess extremities for adequate circulation and sensation     Bed Management:  -Have minimal linens on bed & keep smooth, unwrinkled  -Change linens as needed when moist or perspiring  -Avoid sitting or lying in one position for more than  hours while in bed  -Keep HOB at degrees     Toileting:  -Offer bedside commode  -Assess for incontinence every   -Use incontinent care products after each incontinent episode such as     Activity:  -Mobilize patient  times a day  -Encourage activity and walks on unit  -Encourage or provide ROM exercises   -Turn and reposition patient every  Hours  -Use appropriate equipment to lift or move patient in bed  -Instruct/ Assist with weight shifting every  when out of bed in chair  -Consider limitation of chair time  hour intervals    Skin Care:  -Avoid use of baby powder, tape, friction and shearing, hot water or constrictive clothing  -Relieve pressure over bony prominences using   -Do not massage red bony areas    Next Steps:  -Teach patient strategies to minimize risks such as    -Consider consults to  interdisciplinary teams such as   Outcome: Progressing  Goal: Incision(s), wounds(s) or drain site(s) healing without S/S of infection  Description: INTERVENTIONS  - Assess and document dressing, incision, wound bed, drain sites and surrounding tissue  - Provide patient and family education  - Perform skin care/dressing changes every   Outcome: Progressing  Goal: Pressure injury heals and does not worsen  Description: Interventions:  - Implement low air loss mattress or specialty surface (Criteria met)  - Apply silicone foam dressing  - Instruct/assist with weight shifting every  minutes when in chair   - Limit chair time to  hour intervals  - Use special pressure reducing interventions such as  when in chair   - Apply fecal or urinary incontinence containment device   - Perform passive or active ROM every   - Turn and reposition patient & offload bony prominences every  hours   - Utilize friction reducing device or surface for transfers   - Consider consults to  interdisciplinary teams such as   - Use incontinent care products after each incontinent episode such as   - Consider nutrition services referral as needed  Outcome: Progressing     Problem: MUSCULOSKELETAL - ADULT  Goal: Maintain or return mobility to safest level of function  Description: INTERVENTIONS:  - Assess patient's ability to carry out ADLs; assess patient's baseline for ADL function and identify physical deficits which impact ability to perform ADLs (bathing, care of mouth/teeth, toileting, grooming, dressing, etc )  - Assess/evaluate cause of self-care deficits   - Assess range of motion  - Assess patient's mobility  - Assess patient's need for assistive devices and provide as appropriate  - Encourage maximum independence but intervene and supervise when necessary  - Involve family in performance of ADLs  - Assess for home care needs following discharge   - Consider OT consult to assist with ADL evaluation and planning for discharge  - Provide patient education as appropriate  Outcome: Progressing  Goal: Maintain proper alignment of affected body part  Description: INTERVENTIONS:  - Support, maintain and protect limb and body alignment  - Provide patient/ family with appropriate education  Outcome: Progressing     Problem: MOBILITY - ADULT  Goal: Maintain or return to baseline ADL function  Description: INTERVENTIONS:  -  Assess patient's ability to carry out ADLs; assess patient's baseline for ADL function and identify physical deficits which impact ability to perform ADLs (bathing, care of mouth/teeth, toileting, grooming, dressing, etc )  - Assess/evaluate cause of self-care deficits   - Assess range of motion  - Assess patient's mobility; develop plan if impaired  - Assess patient's need for assistive devices and provide as appropriate  - Encourage maximum independence but intervene and supervise when necessary  - Involve family in performance of ADLs  - Assess for home care needs following discharge   - Consider OT consult to assist with ADL evaluation and planning for discharge  - Provide patient education as appropriate  Outcome: Progressing  Goal: Maintains/Returns to pre admission functional level  Description: INTERVENTIONS:  - Perform BMAT or MOVE assessment daily    - Set and communicate daily mobility goal to care team and patient/family/caregiver  - Collaborate with rehabilitation services on mobility goals if consulted  - Perform Range of Motion  times a day  - Reposition patient every  hours    - Dangle patient  times a day  - Stand patien times a day  - Ambulate patient  times a day  - Out of bed to chair  times a day   - Out of bed for meals  times a day  - Out of bed for toileting  - Record patient progress and toleration of activity level   Outcome: Progressing

## 2022-01-21 LAB
ANION GAP SERPL CALCULATED.3IONS-SCNC: 11 MMOL/L (ref 4–13)
APTT PPP: 49 SECONDS (ref 23–37)
APTT PPP: 78 SECONDS (ref 23–37)
BACTERIA UR QL AUTO: ABNORMAL /HPF
BASOPHILS # BLD MANUAL: 0 THOUSAND/UL (ref 0–0.1)
BASOPHILS NFR MAR MANUAL: 0 % (ref 0–1)
BILIRUB UR QL STRIP: NEGATIVE
BUN SERPL-MCNC: 36 MG/DL (ref 5–25)
CALCIUM SERPL-MCNC: 9 MG/DL (ref 8.3–10.1)
CHLORIDE SERPL-SCNC: 103 MMOL/L (ref 100–108)
CLARITY UR: CLEAR
CO2 SERPL-SCNC: 25 MMOL/L (ref 21–32)
COLOR UR: YELLOW
CREAT SERPL-MCNC: 1.34 MG/DL (ref 0.6–1.3)
EOSINOPHIL # BLD MANUAL: 0.04 THOUSAND/UL (ref 0–0.4)
EOSINOPHIL NFR BLD MANUAL: 1 % (ref 0–6)
ERYTHROCYTE [DISTWIDTH] IN BLOOD BY AUTOMATED COUNT: 13.6 % (ref 11.6–15.1)
GFR SERPL CREATININE-BSD FRML MDRD: 37 ML/MIN/1.73SQ M
GLUCOSE SERPL-MCNC: 161 MG/DL (ref 65–140)
GLUCOSE SERPL-MCNC: 183 MG/DL (ref 65–140)
GLUCOSE SERPL-MCNC: 209 MG/DL (ref 65–140)
GLUCOSE SERPL-MCNC: 221 MG/DL (ref 65–140)
GLUCOSE SERPL-MCNC: 234 MG/DL (ref 65–140)
GLUCOSE UR STRIP-MCNC: NEGATIVE MG/DL
HCT VFR BLD AUTO: 45.8 % (ref 34.8–46.1)
HGB BLD-MCNC: 15.3 G/DL (ref 11.5–15.4)
HGB UR QL STRIP.AUTO: NEGATIVE
INR PPP: 1.97 (ref 0.84–1.19)
KETONES UR STRIP-MCNC: NEGATIVE MG/DL
LEUKOCYTE ESTERASE UR QL STRIP: ABNORMAL
LYMPHOCYTES # BLD AUTO: 1.31 THOUSAND/UL (ref 0.6–4.47)
LYMPHOCYTES # BLD AUTO: 33 % (ref 14–44)
MCH RBC QN AUTO: 28.7 PG (ref 26.8–34.3)
MCHC RBC AUTO-ENTMCNC: 33.4 G/DL (ref 31.4–37.4)
MCV RBC AUTO: 86 FL (ref 82–98)
MONOCYTES # BLD AUTO: 0.28 THOUSAND/UL (ref 0–1.22)
MONOCYTES NFR BLD: 7 % (ref 4–12)
NEUTROPHILS # BLD MANUAL: 2.34 THOUSAND/UL (ref 1.85–7.62)
NEUTS BAND NFR BLD MANUAL: 1 % (ref 0–8)
NEUTS SEG NFR BLD AUTO: 58 % (ref 43–75)
NITRITE UR QL STRIP: NEGATIVE
NON-SQ EPI CELLS URNS QL MICRO: ABNORMAL /HPF
PH UR STRIP.AUTO: 6 [PH]
PLATELET # BLD AUTO: 357 THOUSANDS/UL (ref 149–390)
PLATELET BLD QL SMEAR: ADEQUATE
PMV BLD AUTO: 10.5 FL (ref 8.9–12.7)
POTASSIUM SERPL-SCNC: 4 MMOL/L (ref 3.5–5.3)
PROT UR STRIP-MCNC: NEGATIVE MG/DL
PROTHROMBIN TIME: 21.4 SECONDS (ref 11.6–14.5)
RBC # BLD AUTO: 5.33 MILLION/UL (ref 3.81–5.12)
RBC #/AREA URNS AUTO: ABNORMAL /HPF
SODIUM SERPL-SCNC: 139 MMOL/L (ref 136–145)
SP GR UR STRIP.AUTO: 1.01 (ref 1–1.03)
UROBILINOGEN UR QL STRIP.AUTO: 0.2 E.U./DL
WBC # BLD AUTO: 3.96 THOUSAND/UL (ref 4.31–10.16)
WBC #/AREA URNS AUTO: ABNORMAL /HPF

## 2022-01-21 PROCEDURE — 85610 PROTHROMBIN TIME: CPT | Performed by: INTERNAL MEDICINE

## 2022-01-21 PROCEDURE — 99233 SBSQ HOSP IP/OBS HIGH 50: CPT | Performed by: INTERNAL MEDICINE

## 2022-01-21 PROCEDURE — 81001 URINALYSIS AUTO W/SCOPE: CPT | Performed by: INTERNAL MEDICINE

## 2022-01-21 PROCEDURE — 87086 URINE CULTURE/COLONY COUNT: CPT | Performed by: INTERNAL MEDICINE

## 2022-01-21 PROCEDURE — 87077 CULTURE AEROBIC IDENTIFY: CPT | Performed by: INTERNAL MEDICINE

## 2022-01-21 PROCEDURE — 85027 COMPLETE CBC AUTOMATED: CPT | Performed by: INTERNAL MEDICINE

## 2022-01-21 PROCEDURE — 82948 REAGENT STRIP/BLOOD GLUCOSE: CPT

## 2022-01-21 PROCEDURE — 85730 THROMBOPLASTIN TIME PARTIAL: CPT | Performed by: INTERNAL MEDICINE

## 2022-01-21 PROCEDURE — 85007 BL SMEAR W/DIFF WBC COUNT: CPT | Performed by: INTERNAL MEDICINE

## 2022-01-21 PROCEDURE — 80048 BASIC METABOLIC PNL TOTAL CA: CPT | Performed by: INTERNAL MEDICINE

## 2022-01-21 RX ORDER — WARFARIN SODIUM 5 MG/1
5 TABLET ORAL
Status: COMPLETED | OUTPATIENT
Start: 2022-01-21 | End: 2022-01-21

## 2022-01-21 RX ADMIN — LEVOTHYROXINE SODIUM 50 MCG: 50 TABLET ORAL at 09:00

## 2022-01-21 RX ADMIN — FUROSEMIDE 40 MG: 40 TABLET ORAL at 08:59

## 2022-01-21 RX ADMIN — WARFARIN SODIUM 5 MG: 5 TABLET ORAL at 17:56

## 2022-01-21 RX ADMIN — Medication 12.5 MG: at 08:59

## 2022-01-21 RX ADMIN — INSULIN LISPRO 4 UNITS: 100 INJECTION, SOLUTION INTRAVENOUS; SUBCUTANEOUS at 21:09

## 2022-01-21 RX ADMIN — INSULIN LISPRO 4 UNITS: 100 INJECTION, SOLUTION INTRAVENOUS; SUBCUTANEOUS at 12:12

## 2022-01-21 RX ADMIN — HEPARIN SODIUM 2000 UNITS: 1000 INJECTION INTRAVENOUS; SUBCUTANEOUS at 01:44

## 2022-01-21 RX ADMIN — Medication 12.5 MG: at 21:08

## 2022-01-21 RX ADMIN — INSULIN LISPRO 2 UNITS: 100 INJECTION, SOLUTION INTRAVENOUS; SUBCUTANEOUS at 17:56

## 2022-01-21 RX ADMIN — DEXAMETHASONE SODIUM PHOSPHATE 6 MG: 4 INJECTION INTRA-ARTICULAR; INTRALESIONAL; INTRAMUSCULAR; INTRAVENOUS; SOFT TISSUE at 15:55

## 2022-01-21 RX ADMIN — PANTOPRAZOLE SODIUM 40 MG: 40 TABLET, DELAYED RELEASE ORAL at 06:00

## 2022-01-21 RX ADMIN — INSULIN LISPRO 2 UNITS: 100 INJECTION, SOLUTION INTRAVENOUS; SUBCUTANEOUS at 08:58

## 2022-01-21 NOTE — PLAN OF CARE
Problem: Potential for Falls  Goal: Patient will remain free of falls  Description: INTERVENTIONS:  - Educate patient/family on patient safety including physical limitations  - Instruct patient to call for assistance with activity   - Consult OT/PT to assist with strengthening/mobility   - Keep Call bell within reach  - Keep bed low and locked with side rails adjusted as appropriate  - Keep care items and personal belongings within reach  - Initiate and maintain comfort rounds  - Make Fall Risk Sign visible to staff  - Offer Toileting every 2 Hours, in advance of need  - Initiate/Maintain alarm  - Obtain necessary fall risk management equipment:   - Apply yellow socks and bracelet for high fall risk patients  - Consider moving patient to room near nurses station  Outcome: Progressing     Problem: Prexisting or High Potential for Compromised Skin Integrity  Goal: Skin integrity is maintained or improved  Description: INTERVENTIONS:  - Identify patients at risk for skin breakdown  - Assess and monitor skin integrity  - Assess and monitor nutrition and hydration status  - Monitor labs   - Assess for incontinence   - Turn and reposition patient  - Assist with mobility/ambulation  - Relieve pressure over bony prominences  - Avoid friction and shearing  - Provide appropriate hygiene as needed including keeping skin clean and dry  - Evaluate need for skin moisturizer/barrier cream  - Collaborate with interdisciplinary team   - Patient/family teaching  - Consider wound care consult   Outcome: Progressing     Problem: PAIN - ADULT  Goal: Verbalizes/displays adequate comfort level or baseline comfort level  Description: Interventions:  - Encourage patient to monitor pain and request assistance  - Assess pain using appropriate pain scale  - Administer analgesics based on type and severity of pain and evaluate response  - Implement non-pharmacological measures as appropriate and evaluate response  - Consider cultural and social influences on pain and pain management  - Notify physician/advanced practitioner if interventions unsuccessful or patient reports new pain  Outcome: Progressing     Problem: INFECTION - ADULT  Goal: Absence or prevention of progression during hospitalization  Description: INTERVENTIONS:  - Assess and monitor for signs and symptoms of infection  - Monitor lab/diagnostic results  - Monitor all insertion sites, i e  indwelling lines, tubes, and drains  - Monitor endotracheal if appropriate and nasal secretions for changes in amount and color  - [place] appropriate cooling/warming therapies per order  - Administer medications as ordered  - Instruct and encourage patient and family to use good hand hygiene technique  - Identify and instruct in appropriate isolation precautions for identified infection/condition  Outcome: Progressing     Problem: SAFETY ADULT  Goal: Patient will remain free of falls  Description: INTERVENTIONS:  - Educate patient/family on patient safety including physical limitations  - Instruct patient to call for assistance with activity   - Consult OT/PT to assist with strengthening/mobility   - Keep Call bell within reach  - Keep bed low and locked with side rails adjusted as appropriate  - Keep care items and personal belongings within reach  - Initiate and maintain comfort rounds  - Make Fall Risk Sign visible to staff  - Offer Toileting every 2 Hours, in advance of need  - Initiate/Maintain alarm  - Obtain necessary fall risk management equipment:   - Apply yellow socks and bracelet for high fall risk patients  - Consider moving patient to room near nurses station  Outcome: Progressing  Goal: Maintain or return to baseline ADL function  Description: INTERVENTIONS:  -  Assess patient's ability to carry out ADLs; assess patient's baseline for ADL function and identify physical deficits which impact ability to perform ADLs (bathing, care of mouth/teeth, toileting, grooming, dressing, etc )  - Assess/evaluate cause of self-care deficits   - Assess range of motion  - Assess patient's mobility; develop plan if impaired  - Assess patient's need for assistive devices and provide as appropriate  - Encourage maximum independence but intervene and supervise when necessary  - Involve family in performance of ADLs  - Assess for home care needs following discharge   - Consider OT consult to assist with ADL evaluation and planning for discharge  - Provide patient education as appropriate  Outcome: Progressing  Goal: Maintains/Returns to pre admission functional level  Description: INTERVENTIONS:  - Perform BMAT or MOVE assessment daily    - Set and communicate daily mobility goal to care team and patient/family/caregiver  - Collaborate with rehabilitation services on mobility goals if consulted  - Perform Range of Motion 2 times a day  - Reposition patient every 2 hours    - Dangle patient 2 times a day  - Stand patient 2 times a day  - Ambulate patient 2 times a day  - Out of bed to chair 2 times a day   - Out of bed for meals 2 times a day  - Out of bed for toileting  - Record patient progress and toleration of activity level   Outcome: Progressing     Problem: DISCHARGE PLANNING  Goal: Discharge to home or other facility with appropriate resources  Description: INTERVENTIONS:  - Identify barriers to discharge w/patient and caregiver  - Arrange for needed discharge resources and transportation as appropriate  - Identify discharge learning needs (meds, wound care, etc )  - Arrange for interpretive services to assist at discharge as needed  - Refer to Case Management Department for coordinating discharge planning if the patient needs post-hospital services based on physician/advanced practitioner order or complex needs related to functional status, cognitive ability, or social support system  Outcome: Progressing     Problem: Knowledge Deficit  Goal: Patient/family/caregiver demonstrates understanding of disease process, treatment plan, medications, and discharge instructions  Description: Complete learning assessment and assess knowledge base    Interventions:  - Provide teaching at level of understanding  - Provide teaching via preferred learning methods  Outcome: Progressing     Problem: RESPIRATORY - ADULT  Goal: Achieves optimal ventilation and oxygenation  Description: INTERVENTIONS:  - Assess for changes in respiratory status  - Assess for changes in mentation and behavior  - Position to facilitate oxygenation and minimize respiratory effort  - Oxygen administered by appropriate delivery if ordered  - Initiate smoking cessation education as indicated  - Encourage broncho-pulmonary hygiene including cough, deep breathe, Incentive Spirometry  - Assess the need for suctioning and aspirate as needed  - Assess and instruct to report SOB or any respiratory difficulty  - Respiratory Therapy support as indicated  Outcome: Progressing     Problem: METABOLIC, FLUID AND ELECTROLYTES - ADULT  Goal: Electrolytes maintained within normal limits  Description: INTERVENTIONS:  - Monitor labs and assess patient for signs and symptoms of electrolyte imbalances  - Administer electrolyte replacement as ordered  - Monitor response to electrolyte replacements, including repeat lab results as appropriate  - Instruct patient on fluid and nutrition as appropriate  Outcome: Progressing  Goal: Fluid balance maintained  Description: INTERVENTIONS:  - Monitor labs   - Monitor I/O and WT  - Instruct patient on fluid and nutrition as appropriate  - Assess for signs & symptoms of volume excess or deficit  Outcome: Progressing  Goal: Glucose maintained within target range  Description: INTERVENTIONS:  - Monitor Blood Glucose as ordered  - Assess for signs and symptoms of hyperglycemia and hypoglycemia  - Administer ordered medications to maintain glucose within target range  - Assess nutritional intake and initiate nutrition service referral as needed  Outcome: Progressing     Problem: SKIN/TISSUE INTEGRITY - ADULT  Goal: Skin Integrity remains intact(Skin Breakdown Prevention)  Description: Assess:  -Perform Sanjeev assessment every shift and after incontinent episodes  -Clean and moisturize skin   -Inspect skin when repositioning, toileting, and assisting with ADLS  -Assess under medical devices   -Assess extremities for adequate circulation and sensation     Bed Management:  -Have minimal linens on bed & keep smooth, unwrinkled  -Change linens as needed when moist or perspiring  -Avoid sitting or lying in one position for more than 2 hours while in bed  -Keep HOB at 30 degrees     Toileting:  -Offer bedside commode  -Assess for incontinence  -Use incontinent care products after each incontinent episode    Activity:  -Mobilize patient 2 times a day  -Encourage activity and walks on unit  -Encourage or provide ROM exercises   -Turn and reposition patient every 2 Hours  -Use appropriate equipment to lift or move patient in bed  -Instruct/ Assist with weight shifting every 2 hours when out of bed in chair  -Consider limitation of chair time 2 hour intervals    Skin Care:  -Avoid use of baby powder, tape, friction and shearing, hot water or constrictive clothing  -Relieve pressure over bony prominences using wedge  -Do not massage red bony areas    Next Steps:  -Teach patient strategies to minimize risks   -Consider consults to  interdisciplinary teams   Outcome: Progressing  Goal: Incision(s), wounds(s) or drain site(s) healing without S/S of infection  Description: INTERVENTIONS  - Assess and document dressing, incision, wound bed, drain sites and surrounding tissue  - Provide patient and family education  - Perform skin care/dressing changes   Outcome: Progressing  Goal: Pressure injury heals and does not worsen  Description: Interventions:  - Implement low air loss mattress or specialty surface (Criteria met)  - Apply silicone foam dressing  - Instruct/assist with weight shifting every 60  minutes when in chair   - Limit chair time to 2 hour intervals  - Use special pressure reducing interventions   - Apply fecal or urinary incontinence containment device   - Perform passive or active ROM every shift x2  - Turn and reposition patient & offload bony prominences every 2 hours   - Utilize friction reducing device or surface for transfers   - Consider consults to  interdisciplinary teams   - Use incontinent care products after each incontinent episode   - Consider nutrition services referral as needed  Outcome: Progressing     Problem: MUSCULOSKELETAL - ADULT  Goal: Maintain or return mobility to safest level of function  Description: INTERVENTIONS:  - Assess patient's ability to carry out ADLs; assess patient's baseline for ADL function and identify physical deficits which impact ability to perform ADLs (bathing, care of mouth/teeth, toileting, grooming, dressing, etc )  - Assess/evaluate cause of self-care deficits   - Assess range of motion  - Assess patient's mobility  - Assess patient's need for assistive devices and provide as appropriate  - Encourage maximum independence but intervene and supervise when necessary  - Involve family in performance of ADLs  - Assess for home care needs following discharge   - Consider OT consult to assist with ADL evaluation and planning for discharge  - Provide patient education as appropriate  Outcome: Progressing  Goal: Maintain proper alignment of affected body part  Description: INTERVENTIONS:  - Support, maintain and protect limb and body alignment  - Provide patient/ family with appropriate education  Outcome: Progressing     Problem: MOBILITY - ADULT  Goal: Maintain or return to baseline ADL function  Description: INTERVENTIONS:  -  Assess patient's ability to carry out ADLs; assess patient's baseline for ADL function and identify physical deficits which impact ability to perform ADLs (bathing, care of mouth/teeth, toileting, grooming, dressing, etc )  - Assess/evaluate cause of self-care deficits   - Assess range of motion  - Assess patient's mobility; develop plan if impaired  - Assess patient's need for assistive devices and provide as appropriate  - Encourage maximum independence but intervene and supervise when necessary  - Involve family in performance of ADLs  - Assess for home care needs following discharge   - Consider OT consult to assist with ADL evaluation and planning for discharge  - Provide patient education as appropriate  Outcome: Progressing  Goal: Maintains/Returns to pre admission functional level  Description: INTERVENTIONS:  - Perform BMAT or MOVE assessment daily    - Set and communicate daily mobility goal to care team and patient/family/caregiver  - Collaborate with rehabilitation services on mobility goals if consulted  - Perform Range of Motion 2 times a day  - Reposition patient every 2 hours    - Dangle patient 2 times a day  - Stand patient 2 times a day  - Ambulate patient 2 times a day  - Out of bed to chair 2 times a day   - Out of bed for meals 2 times a day  - Out of bed for toileting  - Record patient progress and toleration of activity level   Outcome: Progressing

## 2022-01-21 NOTE — ASSESSMENT & PLAN NOTE
Patient presents with worsening shortness of breath and cough since 01/05/2022  Patient was tested COVID-19 positive on 01/05/2022 at primary care doctor's office due to flu-like symptoms  Patient was treated with vitamin D and C at home, but without improvement  Associated with poor appetite, she only can take a cup of fruit a day  Patient has stopped taking all his medications including warfarin since then  At ED, patient says 86% on room air, sats 94 on 2 L of nasal cannula  D-dimer was 3 35, in setting of COVID and mechanical aortic valve and    Anticoagulation for 2 weeks, CTA PE study revealed no evidence of PE but bilateral ground-glass opacity consistent with COVID-19 infection    Procalcitonin negative x2  Continue to treat patient with mild COVID-19 pathway  Continue remdesivir, dexamethasone  IV heparin for anticoagulation, bridging with warfarin  Incentive spirometry

## 2022-01-21 NOTE — PROGRESS NOTES
[address] [facility]  Progress Note - Almaz Peña 1940, 80 y o  female MRN: [id number]  Unit/Bed#: -Nick Encounter: [id number]  Primary Care Provider: Tomas Giordano MD   Date and time admitted to hospital: 1/19/2022 10:38 AM    Neutropenia associated with infection [facility])  Assessment & Plan  WBC 1 5, Absolute neutrophil 0 74 on 1/20/22- now improved  Likely due to COVID-19  Patient is afebrile  Procalcitonin negative x2  Will hold antibiotics  Continue monitor CBC periodically  Neutropenic precautions    Paroxysmal atrial fibrillation (HCC)  Assessment & Plan  Heart rate is acceptable  Continue anticoagulation with warfarin, currently bridging with IV heparin    Blood pressure 144/73, pulse 66, temperature (!) 96 6 °F (35 9 °C), resp  rate 18, height 5' 6" (1 676 m), weight 74 4 kg (164 lb), SpO2 93 %  Viral sepsis [facility])  Assessment & Plan  Patient presented with tachycardia, tachypnea  Patient was COVID-19 positive on 01/05/2022  Viral sepsis is improving  In the setting of pneumonia due to COVID-19, will hold fluids  Treatment see COVID-19    Stage 3a chronic kidney disease  Assessment & Plan  Lab Results   Component Value Date    EGFR 37 01/21/2022    EGFR 51 01/20/2022    EGFR 42 01/19/2022    CREATININE 1 34 (H) 01/21/2022    CREATININE 1 02 01/20/2022    CREATININE 1 19 01/19/2022     Baseline creatinine appears at 1 1-1 5  Currently at baseline  Patient received dye for CTA PE study  Avoid nephrotoxic medications  Will monitor creatinine periodically especially as the creatinine bumped up to 1 34 from a baseline of 1 0    Cardiomyopathy [facility])  Assessment & Plan  TTE on 9/2021 revealed LVEF 40%  Patient seems euvolemic  Will continue home dose Lasix 40 mg p o  Daily and metoprolol 12 5 mg p o  B i d      Aortic valve replaced  Assessment & Plan  Mechanical aortic valve  Outpatient follow-up with Cardiology  On Coumadin for anticoagulation, bridging with heparin    Chronic anticoagulation  Assessment & Plan  Status post aortic aneurysm repair and  aortic valve replacement  Patient was taking warfarin 5 mg on Tuesday, Thursday, Saturday and Sunday, 7 mg on Monday, Wednesday and Friday  Patient has not been taking warfarin for past 2 weeks  Will do warfarin, IV heparin bridging  INR today 1 9  will give warfarin 5 mg po    * Pneumonia due to COVID-19 virus  Assessment & Plan  Patient presents with worsening shortness of breath and cough since 01/05/2022  Patient was tested COVID-19 positive on 01/05/2022 at primary care doctor's office due to flu-like symptoms  Patient was treated with vitamin D and C at home, but without improvement  Associated with poor appetite, she only can take a cup of fruit a day  Patient has stopped taking all his medications including warfarin since then  At ED, patient says 86% on room air, sats 94 on 2 L of nasal cannula  D-dimer was 3 35, in setting of COVID and mechanical aortic valve and  Anticoagulation for 2 weeks, CTA PE study revealed no evidence of PE but bilateral ground-glass opacity consistent with COVID-19 infection    Procalcitonin negative x2  Continue to treat patient with mild COVID-19 pathway  Continue remdesivir, dexamethasone  IV heparin for anticoagulation, bridging with warfarin  Incentive spirometry          TE Pharmacologic Prophylaxis: Warfarin (Coumadin)    Patient Centered Rounds: I have performed bedside rounds with nursing staff today    Discussions with Specialists or Other Care Team Provider:  Care team  Education and Discussions with Family / Patient:  Patient    Current Length of Stay: 2 day(s)  Current Patient Status: Inpatient     Certification Statement: The patient will continue to require additional inpatient hospital stay due to Pneumonia due to COVID-19 virus  Discharge Plan / Estimated Discharge Date:  1-2 days    Code Status: Level 1 - Full Code  ______________________________________________________________________________    Subjective:   Patient seen and examined by me  Patient does not complain of any chest pain, palpitations or diaphoresis  Does complain of weakness that is generalized  Does have mild cough but no expectoration  Does have shortness of breath which is present at rest and increases on exertion but overall feels a little bit better today compared to yesterday    Objective:   Vitals: Blood pressure 144/73, pulse 66, temperature (!) 96 6 °F (35 9 °C), resp  rate 18, height 5' 6" (1 676 m), weight 74 4 kg (164 lb), SpO2 93 %      Physical Exam:   General appearance: alert, appears stated age and cooperative  Constitutional- looks a little weak  HEENT - atraumatic and normocephalic  Neck- supple  Skin - no fresh rash  Extremities no fresh focal deformities  Cardiovascular- S1-S2 heard  Respiratory- bilateral air entry present, no crackles or rhonchi, no use of accessory muscles of respiration  Skin - no fresh rash  Abdomen - normal bowel sounds present, no rebound tenderness  CNS- No fresh focal deficits  Psych- no acute psychosis     Additional Data:   Labs:  Results from last 7 days   Lab Units 01/21/22  0929 01/20/22  0451 01/19/22  1130 01/19/22  1129   WBC Thousand/uL 3 96* 1 50*  --  3 70*   HEMOGLOBIN g/dL 15 3 14 1  --  15 2   HEMATOCRIT % 45 8 42 1  --  45 6   MCV fL 86 85  --  86   TOTAL NEUT ABS Thousand/uL 2 34 0 74*  --  2 41   BANDS PCT % 1  --   --   --    PLATELETS Thousands/uL 357 231  --  230   INR  1 97* 1 46* 1 32*  --      Results from last 7 days   Lab Units 01/21/22  0929 01/20/22  0451 01/19/22  1130   SODIUM mmol/L 139 136 131*   POTASSIUM mmol/L 4 0 4 5 4 4   CHLORIDE mmol/L 103 103 95*   CO2 mmol/L 25 22 20*   ANION GAP mmol/L 11 11 16*   BUN mg/dL 36* 29* 27*   CREATININE mg/dL 1 34* 1 02 1 19   CALCIUM mg/dL 9 0 8 0* 8 6   ALBUMIN g/dL  --  2 5* 3 0*   TOTAL BILIRUBIN mg/dL  --  0 42 0 87   ALK PHOS U/L --  55 62   ALT U/L  --  59 75   AST U/L  --  39 64*   EGFR ml/min/1 73sq m 37 51 42   GLUCOSE RANDOM mg/dL 234* 177* 177*                  Results from last 7 days   Lab Units 01/20/22  1210 01/19/22  1130 01/19/22  1130   LACTIC ACID mmol/L  --   --  1 6   PROCALCITONIN ng/ml 0 14   < > 0 13    < > = values in this interval not displayed  Results from last 7 days   Lab Units 01/21/22  1103 01/21/22  0749 01/20/22  2102 01/20/22  1549 01/20/22  1104 01/20/22  0730 01/20/22  0450 01/19/22  2131 01/19/22  1641   POC GLUCOSE mg/dl 209* 161* 252* 150* 207* 175* 170* 202* 137             * I Have Reviewed All Lab Data Listed Above  Cultures:   Results from last 7 days   Lab Units 01/19/22  1130   BLOOD CULTURE  No Growth at 24 hrs  No Growth at 24 hrs  Imaging:  Imaging Reports Reviewed Today Include:   XR chest portable    Result Date: 1/19/2022  Impression: Bilateral groundglass opacities, left greater than right  In the setting of clinically suspected/proven COVID-19, this plain film appearance while nonspecific, can be seen in cases of viral pneumonia such as COVID-19  Workstation performed: [id number]     CTA chest pe study    Result Date: 1/19/2022  Impression: 1  No pulmonary embolus  2   Bilateral pulmonary infiltrates with the pattern consistent with Covid 19 pneumonia  3   Stable 4 1 cm enlarged ascending aorta   Workstation performed: [id number]     Scheduled Meds:  Current Facility-Administered Medications   Medication Dose Route Frequency Provider Last Rate    acetaminophen  650 mg Oral Q6H PRN Ghazal Lynn MD      dexamethasone  6 mg Intravenous Q24H Ghazal Lynn MD      insulin lispro  2-12 Units Subcutaneous TID AC Ghazal Lynn MD      insulin lispro  2-12 Units Subcutaneous HS Ghazal Lynn MD      levothyroxine  50 mcg Oral Daily Ghazal Lynn MD      metoprolol tartrate  12 5 mg Oral Q12H [facility] Ghazal Lynn MD      pantoprazole  40 mg Oral Early Morning Ghazal Lynn MD      warfarin 5 mg Oral Once (warfarin) MD Misael Dennis MD [address] [facility]    ** Please Note: This note has been constructed using a voice recognition system   **

## 2022-01-21 NOTE — ASSESSMENT & PLAN NOTE
WBC 1 5, Absolute neutrophil 0 74 on 1/20/22- now improved  Likely due to COVID-19  Patient is afebrile  Procalcitonin negative x2  Will hold antibiotics  Continue monitor CBC periodically  Neutropenic precautions

## 2022-01-21 NOTE — PLAN OF CARE
Problem: Potential for Falls  Goal: Patient will remain free of falls  Description: INTERVENTIONS:  - Educate patient/family on patient safety including physical limitations  - Instruct patient to call for assistance with activity   - Consult OT/PT to assist with strengthening/mobility   - Keep Call bell within reach  - Keep bed low and locked with side rails adjusted as appropriate  - Keep care items and personal belongings within reach  - Initiate and maintain comfort rounds  - Make Fall Risk Sign visible to staff  - Offer Toileting every  Hours, in advance of need  - Initiate/Maintain alarm  - Obtain necessary fall risk management equipment:   - Apply yellow socks and bracelet for high fall risk patients  - Consider moving patient to room near nurses station  Outcome: Progressing     Problem: Prexisting or High Potential for Compromised Skin Integrity  Goal: Skin integrity is maintained or improved  Description: INTERVENTIONS:  - Identify patients at risk for skin breakdown  - Assess and monitor skin integrity  - Assess and monitor nutrition and hydration status  - Monitor labs   - Assess for incontinence   - Turn and reposition patient  - Assist with mobility/ambulation  - Relieve pressure over bony prominences  - Avoid friction and shearing  - Provide appropriate hygiene as needed including keeping skin clean and dry  - Evaluate need for skin moisturizer/barrier cream  - Collaborate with interdisciplinary team   - Patient/family teaching  - Consider wound care consult   Outcome: Progressing     Problem: PAIN - ADULT  Goal: Verbalizes/displays adequate comfort level or baseline comfort level  Description: Interventions:  - Encourage patient to monitor pain and request assistance  - Assess pain using appropriate pain scale  - Administer analgesics based on type and severity of pain and evaluate response  - Implement non-pharmacological measures as appropriate and evaluate response  - Consider cultural and social influences on pain and pain management  - Notify physician/advanced practitioner if interventions unsuccessful or patient reports new pain  Outcome: Progressing     Problem: INFECTION - ADULT  Goal: Absence or prevention of progression during hospitalization  Description: INTERVENTIONS:  - Assess and monitor for signs and symptoms of infection  - Monitor lab/diagnostic results  - Monitor all insertion sites, i e  indwelling lines, tubes, and drains  - Monitor endotracheal if appropriate and nasal secretions for changes in amount and color  - [place] appropriate cooling/warming therapies per order  - Administer medications as ordered  - Instruct and encourage patient and family to use good hand hygiene technique  - Identify and instruct in appropriate isolation precautions for identified infection/condition  Outcome: Progressing     Problem: SAFETY ADULT  Goal: Patient will remain free of falls  Description: INTERVENTIONS:  - Educate patient/family on patient safety including physical limitations  - Instruct patient to call for assistance with activity   - Consult OT/PT to assist with strengthening/mobility   - Keep Call bell within reach  - Keep bed low and locked with side rails adjusted as appropriate  - Keep care items and personal belongings within reach  - Initiate and maintain comfort rounds  - Make Fall Risk Sign visible to staff  - Offer Toileting every  Hours, in advance of need  - Initiate/Maintain alarm  - Obtain necessary fall risk management equipment:   - Apply yellow socks and bracelet for high fall risk patients  - Consider moving patient to room near nurses station  Outcome: Progressing  Goal: Maintain or return to baseline ADL function  Description: INTERVENTIONS:  -  Assess patient's ability to carry out ADLs; assess patient's baseline for ADL function and identify physical deficits which impact ability to perform ADLs (bathing, care of mouth/teeth, toileting, grooming, dressing, etc )  - Assess/evaluate cause of self-care deficits   - Assess range of motion  - Assess patient's mobility; develop plan if impaired  - Assess patient's need for assistive devices and provide as appropriate  - Encourage maximum independence but intervene and supervise when necessary  - Involve family in performance of ADLs  - Assess for home care needs following discharge   - Consider OT consult to assist with ADL evaluation and planning for discharge  - Provide patient education as appropriate  Outcome: Progressing  Goal: Maintains/Returns to pre admission functional level  Description: INTERVENTIONS:  - Perform BMAT or MOVE assessment daily    - Set and communicate daily mobility goal to care team and patient/family/caregiver  - Collaborate with rehabilitation services on mobility goals if consulted  - Perform Range of Motion  times a day  - Reposition patient every  hours    - Dangle patient  times a day  - Stand patient  times a day  - Ambulate patient  times a day  - Out of bed to chair  times a day   - Out of bed for meals  times a day  - Out of bed for toileting  - Record patient progress and toleration of activity level   Outcome: Progressing     Problem: DISCHARGE PLANNING  Goal: Discharge to home or other facility with appropriate resources  Description: INTERVENTIONS:  - Identify barriers to discharge w/patient and caregiver  - Arrange for needed discharge resources and transportation as appropriate  - Identify discharge learning needs (meds, wound care, etc )  - Arrange for interpretive services to assist at discharge as needed  - Refer to Case Management Department for coordinating discharge planning if the patient needs post-hospital services based on physician/advanced practitioner order or complex needs related to functional status, cognitive ability, or social support system  Outcome: Progressing     Problem: Knowledge Deficit  Goal: Patient/family/caregiver demonstrates understanding of disease process, treatment plan, medications, and discharge instructions  Description: Complete learning assessment and assess knowledge base    Interventions:  - Provide teaching at level of understanding  - Provide teaching via preferred learning methods  Outcome: Progressing     Problem: RESPIRATORY - ADULT  Goal: Achieves optimal ventilation and oxygenation  Description: INTERVENTIONS:  - Assess for changes in respiratory status  - Assess for changes in mentation and behavior  - Position to facilitate oxygenation and minimize respiratory effort  - Oxygen administered by appropriate delivery if ordered  - Initiate smoking cessation education as indicated  - Encourage broncho-pulmonary hygiene including cough, deep breathe, Incentive Spirometry  - Assess the need for suctioning and aspirate as needed  - Assess and instruct to report SOB or any respiratory difficulty  - Respiratory Therapy support as indicated  Outcome: Progressing     Problem: METABOLIC, FLUID AND ELECTROLYTES - ADULT  Goal: Electrolytes maintained within normal limits  Description: INTERVENTIONS:  - Monitor labs and assess patient for signs and symptoms of electrolyte imbalances  - Administer electrolyte replacement as ordered  - Monitor response to electrolyte replacements, including repeat lab results as appropriate  - Instruct patient on fluid and nutrition as appropriate  Outcome: Progressing  Goal: Fluid balance maintained  Description: INTERVENTIONS:  - Monitor labs   - Monitor I/O and WT  - Instruct patient on fluid and nutrition as appropriate  - Assess for signs & symptoms of volume excess or deficit  Outcome: Progressing  Goal: Glucose maintained within target range  Description: INTERVENTIONS:  - Monitor Blood Glucose as ordered  - Assess for signs and symptoms of hyperglycemia and hypoglycemia  - Administer ordered medications to maintain glucose within target range  - Assess nutritional intake and initiate nutrition service referral as needed  Outcome: Progressing     Problem: SKIN/TISSUE INTEGRITY - ADULT  Goal: Skin Integrity remains intact(Skin Breakdown Prevention)  Description: Assess:  -Perform Sanjeev assessment every   -Clean and moisturize skin every   -Inspect skin when repositioning, toileting, and assisting with ADLS  -Assess under medical devices such as  every   -Assess extremities for adequate circulation and sensation     Bed Management:  -Have minimal linens on bed & keep smooth, unwrinkled  -Change linens as needed when moist or perspiring  -Avoid sitting or lying in one position for more than  hours while in bed  -Keep HOB at degrees     Toileting:  -Offer bedside commode  -Assess for incontinence every   -Use incontinent care products after each incontinent episode such as     Activity:  -Mobilize patient  times a day  -Encourage activity and walks on unit  -Encourage or provide ROM exercises   -Turn and reposition patient every  Hours  -Use appropriate equipment to lift or move patient in bed  -Instruct/ Assist with weight shifting every  when out of bed in chair  -Consider limitation of chair time  hour intervals    Skin Care:  -Avoid use of baby powder, tape, friction and shearing, hot water or constrictive clothing  -Relieve pressure over bony prominences using   -Do not massage red bony areas    Next Steps:  -Teach patient strategies to minimize risks such as    -Consider consults to  interdisciplinary teams such as   Outcome: Progressing  Goal: Incision(s), wounds(s) or drain site(s) healing without S/S of infection  Description: INTERVENTIONS  - Assess and document dressing, incision, wound bed, drain sites and surrounding tissue  - Provide patient and family education  - Perform skin care/dressing changes every   Outcome: Progressing  Goal: Pressure injury heals and does not worsen  Description: Interventions:  - Implement low air loss mattress or specialty surface (Criteria met)  - Apply silicone foam dressing  - Instruct/assist with weight shifting every  minutes when in chair   - Limit chair time to  hour intervals  - Use special pressure reducing interventions such as  when in chair   - Apply fecal or urinary incontinence containment device   - Perform passive or active ROM every   - Turn and reposition patient & offload bony prominences every  hours   - Utilize friction reducing device or surface for transfers   - Consider consults to  interdisciplinary teams such as   - Use incontinent care products after each incontinent episode such as   - Consider nutrition services referral as needed  Outcome: Progressing     Problem: MUSCULOSKELETAL - ADULT  Goal: Maintain or return mobility to safest level of function  Description: INTERVENTIONS:  - Assess patient's ability to carry out ADLs; assess patient's baseline for ADL function and identify physical deficits which impact ability to perform ADLs (bathing, care of mouth/teeth, toileting, grooming, dressing, etc )  - Assess/evaluate cause of self-care deficits   - Assess range of motion  - Assess patient's mobility  - Assess patient's need for assistive devices and provide as appropriate  - Encourage maximum independence but intervene and supervise when necessary  - Involve family in performance of ADLs  - Assess for home care needs following discharge   - Consider OT consult to assist with ADL evaluation and planning for discharge  - Provide patient education as appropriate  Outcome: Progressing  Goal: Maintain proper alignment of affected body part  Description: INTERVENTIONS:  - Support, maintain and protect limb and body alignment  - Provide patient/ family with appropriate education  Outcome: Progressing     Problem: MOBILITY - ADULT  Goal: Maintain or return to baseline ADL function  Description: INTERVENTIONS:  -  Assess patient's ability to carry out ADLs; assess patient's baseline for ADL function and identify physical deficits which impact ability to perform ADLs (bathing, care of mouth/teeth, toileting, grooming, dressing, etc )  - Assess/evaluate cause of self-care deficits   - Assess range of motion  - Assess patient's mobility; develop plan if impaired  - Assess patient's need for assistive devices and provide as appropriate  - Encourage maximum independence but intervene and supervise when necessary  - Involve family in performance of ADLs  - Assess for home care needs following discharge   - Consider OT consult to assist with ADL evaluation and planning for discharge  - Provide patient education as appropriate  Outcome: Progressing  Goal: Maintains/Returns to pre admission functional level  Description: INTERVENTIONS:  - Perform BMAT or MOVE assessment daily    - Set and communicate daily mobility goal to care team and patient/family/caregiver  - Collaborate with rehabilitation services on mobility goals if consulted  - Perform Range of [place] times a day  - Reposition patient every  hours    - Dangle patient  times a day  - Stand patient  times a day  - Ambulate patient  times a day  - Out of bed to chair  times a day   - Out of bed for meals  times a day  - Out of bed for toileting  - Record patient progress and toleration of activity level   Outcome: Progressing

## 2022-01-21 NOTE — NURSING NOTE
Pulse ox eval completed per providers orders  On exertion, patient dropped to 86% o2 on room air  Provider made aware

## 2022-01-21 NOTE — CASE MANAGEMENT
Case Management Progress Note    Patient name Isadora Carter  Location [address] 209/-01 MRN [id number]  : 1940 Date 2022       LOS (days): 2  Geometric Mean LOS (GMLOS) (days): 4 80  Days to GMLOS:2 8        OBJECTIVE:        Current admission status: Inpatient  Preferred Pharmacy:   [address] [facility]   [address] [facility] [address]  Phone: [phone] Fax: [phone] [address] Jos[address] Zhang, [address]  [address] [address]  Phone: [phone] Fax: [phone]    Primary Care Provider: Joy Villafana MD    Primary Insurance: MEDICARE  Secondary Insurance:     PROGRESS NOTE:    Per rounding with SLIM, patient is anticipated to be discharged in 24-48 hrs and will need a home O2 evaluation prior to discharge  CM reviewed chart, and it has not been completed at this time  CM department will continue to follow patient through discharge

## 2022-01-21 NOTE — ASSESSMENT & PLAN NOTE
Lab Results   Component Value Date    EGFR 37 01/21/2022    EGFR 51 01/20/2022    EGFR 42 01/19/2022    CREATININE 1 34 (H) 01/21/2022    CREATININE 1 02 01/20/2022    CREATININE 1 19 01/19/2022     Baseline creatinine appears at 1 1-1 5  Currently at baseline    Patient received dye for CTA PE study  Avoid nephrotoxic medications  Will monitor creatinine periodically especially as the creatinine bumped up to 1 34 from a baseline of 1 0

## 2022-01-21 NOTE — ASSESSMENT & PLAN NOTE
Heart rate is acceptable  Continue anticoagulation with warfarin, currently bridging with IV heparin    Blood pressure 144/73, pulse 66, temperature (!) 96 6 °F (35 9 °C), resp  rate 18, height 5' 6" (1 676 m), weight 74 4 kg (164 lb), SpO2 93 %

## 2022-01-21 NOTE — ASSESSMENT & PLAN NOTE
Status post aortic aneurysm repair and  aortic valve replacement  Patient was taking warfarin 5 mg on Tuesday, Thursday, Saturday and Sunday, 7 mg on Monday, Wednesday and Friday  Patient has not been taking warfarin for past 2 weeks  Will do warfarin, IV heparin bridging    INR today 1 9  will give warfarin 5 mg po

## 2022-01-21 NOTE — ASSESSMENT & PLAN NOTE
TTE on 9/2021 revealed LVEF 40%  Patient seems euvolemic  Will continue home dose Lasix 40 mg p o   Daily and metoprolol 12 5 mg p o  B i d

## 2022-01-21 NOTE — ASSESSMENT & PLAN NOTE
Mechanical aortic valve  Outpatient follow-up with Cardiology  On Coumadin for anticoagulation, bridging with heparin

## 2022-01-21 NOTE — ASSESSMENT & PLAN NOTE
Patient presented with tachycardia, tachypnea    Patient was COVID-19 positive on 01/05/2022  Viral sepsis is improving  In the setting of pneumonia due to COVID-19, will hold fluids  Treatment see COVID-19

## 2022-01-22 VITALS
BODY MASS INDEX: 24.52 KG/M2 | TEMPERATURE: 96.7 F | DIASTOLIC BLOOD PRESSURE: 84 MMHG | SYSTOLIC BLOOD PRESSURE: 156 MMHG | HEART RATE: 93 BPM | WEIGHT: 152.56 LBS | HEIGHT: 66 IN | RESPIRATION RATE: 20 BRPM | OXYGEN SATURATION: 94 %

## 2022-01-22 LAB
ANION GAP SERPL CALCULATED.3IONS-SCNC: 6 MMOL/L (ref 4–13)
ATRIAL RATE: 101 BPM
BUN SERPL-MCNC: 31 MG/DL (ref 5–25)
CALCIUM SERPL-MCNC: 8.5 MG/DL (ref 8.3–10.1)
CHLORIDE SERPL-SCNC: 103 MMOL/L (ref 100–108)
CO2 SERPL-SCNC: 29 MMOL/L (ref 21–32)
CREAT SERPL-MCNC: 1.2 MG/DL (ref 0.6–1.3)
ERYTHROCYTE [DISTWIDTH] IN BLOOD BY AUTOMATED COUNT: 13.4 % (ref 11.6–15.1)
GFR SERPL CREATININE-BSD FRML MDRD: 42 ML/MIN/1.73SQ M
GLUCOSE SERPL-MCNC: 161 MG/DL (ref 65–140)
GLUCOSE SERPL-MCNC: 162 MG/DL (ref 65–140)
GLUCOSE SERPL-MCNC: 192 MG/DL (ref 65–140)
HCT VFR BLD AUTO: 41.6 % (ref 34.8–46.1)
HGB BLD-MCNC: 14.1 G/DL (ref 11.5–15.4)
INR PPP: 2.35 (ref 0.84–1.19)
MCH RBC QN AUTO: 29 PG (ref 26.8–34.3)
MCHC RBC AUTO-ENTMCNC: 33.9 G/DL (ref 31.4–37.4)
MCV RBC AUTO: 86 FL (ref 82–98)
P AXIS: 86 DEGREES
PLATELET # BLD AUTO: 286 THOUSANDS/UL (ref 149–390)
PMV BLD AUTO: 10.3 FL (ref 8.9–12.7)
POTASSIUM SERPL-SCNC: 5 MMOL/L (ref 3.5–5.3)
PR INTERVAL: 174 MS
PROTHROMBIN TIME: 24.6 SECONDS (ref 11.6–14.5)
QRS AXIS: 111 DEGREES
QRSD INTERVAL: 164 MS
QT INTERVAL: 430 MS
QTC INTERVAL: 557 MS
RBC # BLD AUTO: 4.86 MILLION/UL (ref 3.81–5.12)
SODIUM SERPL-SCNC: 138 MMOL/L (ref 136–145)
T WAVE AXIS: -52 DEGREES
VENTRICULAR RATE: 101 BPM
WBC # BLD AUTO: 4.22 THOUSAND/UL (ref 4.31–10.16)

## 2022-01-22 PROCEDURE — 93010 ELECTROCARDIOGRAM REPORT: CPT | Performed by: INTERNAL MEDICINE

## 2022-01-22 PROCEDURE — 97163 PT EVAL HIGH COMPLEX 45 MIN: CPT

## 2022-01-22 PROCEDURE — 85027 COMPLETE CBC AUTOMATED: CPT | Performed by: INTERNAL MEDICINE

## 2022-01-22 PROCEDURE — 80048 BASIC METABOLIC PNL TOTAL CA: CPT | Performed by: INTERNAL MEDICINE

## 2022-01-22 PROCEDURE — 85610 PROTHROMBIN TIME: CPT | Performed by: INTERNAL MEDICINE

## 2022-01-22 PROCEDURE — 82948 REAGENT STRIP/BLOOD GLUCOSE: CPT

## 2022-01-22 RX ADMIN — PANTOPRAZOLE SODIUM 40 MG: 40 TABLET, DELAYED RELEASE ORAL at 05:03

## 2022-01-22 RX ADMIN — INSULIN LISPRO 2 UNITS: 100 INJECTION, SOLUTION INTRAVENOUS; SUBCUTANEOUS at 12:43

## 2022-01-22 RX ADMIN — INSULIN LISPRO 2 UNITS: 100 INJECTION, SOLUTION INTRAVENOUS; SUBCUTANEOUS at 09:19

## 2022-01-22 RX ADMIN — LEVOTHYROXINE SODIUM 50 MCG: 50 TABLET ORAL at 09:18

## 2022-01-22 RX ADMIN — Medication 12.5 MG: at 09:18

## 2022-01-22 NOTE — DISCHARGE SUMMARY
[facility]  Discharge- Sung Burnett 1940, 80 y o  female MRN: [id number]  Unit/Bed#: -01 Encounter: [id number]  Primary Care Provider: Jim Michele MD   Date and time admitted to hospital: 1/19/2022 10:38 AM    * Pneumonia due to COVID-19 virus  Assessment & Plan  Patient presents with worsening shortness of breath and cough since 01/05/2022  Patient was tested COVID-19 positive on 01/05/2022 at primary care doctor's office due to flu-like symptoms  Patient was treated with vitamin D and C at home, but without improvement  Associated with poor appetite, she only can take a cup of fruit a day  Patient has stopped taking all his medications including warfarin since then  At ED, patient says 86% on room air, sats 94 on 2 L of nasal cannula  D-dimer was 3 35, in setting of COVID and mechanical aortic valve and  Anticoagulation for 2 weeks, CTA PE study revealed no evidence of PE but bilateral ground-glass opacity consistent with COVID-19 infection    Procalcitonin negative x2  Incentive spirometry  Patient feels better  Patient is currently on room air  PT recommended short-term rehab, but Patient expressed that she does not want to go to [facility]  Will arrange home PT  Home O2 evaluation reviewed patient qualify for home oxygen  Patient is stable for discharge  Discharge plan discussed with her son  Neutropenia associated with infection ([facility] [address] )  Assessment & Plan  WBC 1 5, Absolute neutrophil 0 74 on 1/20/22  Likely due to COVID-19  Patient is afebrile  Procalcitonin negative x2  Will hold antibiotics  Continue monitor CBC periodically  Resolved    Paroxysmal atrial fibrillation (HCC)  Assessment & Plan  Heart rate is acceptable  Continue anticoagulation with warfarin    Blood pressure 156/84, pulse 93, temperature (!) 96 7 °F (35 9 °C), temperature source Axillary, resp  rate 20, height 5' 6" (1 676 m), weight 69 2 kg (152 lb 8 9 oz), SpO2 91 %          Viral sepsis [facility])  Assessment & Plan  Patient presented with tachycardia, tachypnea  Patient was COVID-19 positive on 01/05/2022  In the setting of pneumonia due to COVID-19, will hold fluids  Treatment see COVID-19  Resolved  Stage 3a chronic kidney disease  Assessment & Plan  Lab Results   Component Value Date    EGFR 42 01/22/2022    EGFR 37 01/21/2022    EGFR 51 01/20/2022    CREATININE 1 20 01/22/2022    CREATININE 1 34 (H) 01/21/2022    CREATININE 1 02 01/20/2022     Baseline creatinine appears at 1 1-1 5  Currently at baseline  Patient received dye for CTA PE study  Avoid nephrotoxic medications  monitor creatinine periodically     Cardiomyopathy [facility])  Assessment & Plan  TTE on 9/2021 revealed LVEF 40%  Patient seems euvolemic  Daily and metoprolol 12 5 mg p o  B i d   Home lasix was hold today due to elevate Cr  Will resume after discharge    Aortic valve replaced  Assessment & Plan  Mechanical aortic valve  Outpatient follow-up with Cardiology  On Coumadin for anticoagulation, current INR is at goal    Chronic anticoagulation  Assessment & Plan  Status post aortic aneurysm repair and  aortic valve replacement  Patient was taking warfarin 5 mg on Tuesday, Thursday, Saturday and Sunday, 7 mg on Monday, Wednesday and Friday  Patient has not been taking warfarin for past 2 weeks  Discontinued heparin yesterday  INR was 2 3 today  Will resume home regimen upon discharge      Discharging Resident Physician: Laurel Morejon MD  Attending: Fabian Larkin MD  PCP: Tony Pat MD  Admission Date: 1/19/2022  Discharge Date: 01/22/22    Disposition:     Home    Reason for Admission:  Shortness of breath    Consultations During Hospital Stay:  · None    Procedures Performed:     · None    Significant Findings / Test Results:     CTA chest pe study   Final Result by Maisha Zabala MD (01/19 7914)      1  No pulmonary embolus        2   Bilateral pulmonary infiltrates with the pattern consistent with Covid 19 pneumonia  3   Stable 4 1 cm enlarged ascending aorta  Workstation performed: [id number]         XR chest portable   Final Result by Mai Jenkins MD (01/19 1250)   Bilateral groundglass opacities, left greater than right  In the setting of clinically suspected/proven COVID-19, this plain film appearance while nonspecific, can be seen in cases of viral pneumonia such as COVID-19  Workstation performed: [id number]             Incidental Findings:   · None    Test Results Pending at Discharge (will require follow up): · None     Outpatient Tests Requested:  · None    Complications:  None    Hospital Course:     Sung Burnett is a 80 y o  female patient who originally presented to the hospital on 1/19/2022 due to shortness of breath  Patient was test COVID-19 positive on 1/05/2022 due to flu-like symptoms  Patient did not get improved  Due to poor oral intakes, patient stop taking or home meds in the past 2 weeks before admission  CTA PE study on admission revealed negative for PE, but bilateral +ground opacity consistent with COVID-19 pneumonia  Patient was treated with dexamethasone and supportive care and incentive spirometry  Currently patient has improved  Patient is on room air  Home O2 evaluation reviewed patient qualifies for oxygen  PT recommended short-term rehab, however patient refused to go to short-term rehab  Advised patient outpatient follow-up with PCP  Will refer patient for home physical therapy  Condition at Discharge: stable     Discharge Day Visit / Exam:     Subjective:  Patient feels better  Patient denies shortness of breath on room air  Patient feels weak but was able to get out of bed and use walker to walk    Vitals: Blood Pressure: 156/84 (01/22/22 0722)  Pulse: 93 (01/22/22 0918)  Temperature: (!) 96 7 °F (35 9 °C) (01/22/22 0722)  Temp Source: Axillary (01/22/22 6082)  Respirations: 20 (01/21/22 2107)  Height: 5' 6" (167 6 cm) (01/19/22 1444)  Weight - Scale: 69 2 kg (152 lb 8 9 oz) (01/22/22 0505)  SpO2: 94 % (01/22/22 1245)  Exam:   Physical Exam  Constitutional:       General: She is not in acute distress  Appearance: She is well-developed  She is not diaphoretic  HENT:      Head: Normocephalic  Mouth/Throat:      Pharynx: No oropharyngeal exudate  Eyes:      Pupils: Pupils are equal, round, and reactive to light  Cardiovascular:      Rate and Rhythm: Normal rate and regular rhythm  Heart sounds: No murmur heard  Pulmonary:      Effort: Pulmonary effort is normal  No respiratory distress  Breath sounds: No wheezing or rales  Abdominal:      General: Bowel sounds are normal       Palpations: Abdomen is soft  Tenderness: There is no abdominal tenderness  Musculoskeletal:         General: No tenderness  Normal range of motion  Cervical back: Normal range of motion  Skin:     General: Skin is warm  Neurological:      Mental Status: She is alert and oriented to person, place, and time  Discussion with Family:     Discharge instructions/Information to patient and family:   See after visit summary for information provided to patient and family  Provisions for Follow-Up Care:  See after visit summary for information related to follow-up care and any pertinent home health orders  Planned Readmission:  No     Discharge Medications:  See after visit summary for reconciled discharge medications provided to patient and family        ** Please Note: This note has been constructed using a voice recognition system **

## 2022-01-22 NOTE — ASSESSMENT & PLAN NOTE
Lab Results   Component Value Date    EGFR 42 01/22/2022    EGFR 37 01/21/2022    EGFR 51 01/20/2022    CREATININE 1 20 01/22/2022    CREATININE 1 34 (H) 01/21/2022    CREATININE 1 02 01/20/2022     Baseline creatinine appears at 1 1-1 5  Currently at baseline    Patient received dye for CTA PE study  Avoid nephrotoxic medications  monitor creatinine periodically

## 2022-01-22 NOTE — RESPIRATORY THERAPY NOTE
Home Oxygen Qualifying Test       Patient name: Meka Lind        : 1940   Date of Test:  2022  Diagnosis: covid     Home Oxygen Test:    **Medicare Guidelines require item(s) 1-5 on all ambulatory patients or 1 and 2 on non-ambulatory patients  1   Baseline SPO2 on Room Air at rest 90 %  2   SPO2 during exercise on Room Air 85 %  During exercise monitor SpO2  If SPO2 increases >=89% with ambulation do not add supplemental             oxygen  If <= 88% on room air add O2 via NC and titrate patient  Patient must be ambulated with O2 and titrated to > 88% with exertion  3   SPO2 on Oxygen at rest 94 % 2 lpm     4   SPO2 during exercise on Oxygen  89% a liter flow of 2 lpm           [x]  Supplemental Home Oxygen is indicated  []  Client does not qualify for home oxygen  Respiratory Additional Notes- Spo2 drops to 85% while attempting to stand  Pt seems off balance and did not seem to be able to stand without great difficulty  Pt placed on 2L  Spo2 increases to 89%       Nathan Wing, RT

## 2022-01-22 NOTE — DISCHARGE INSTR - AVS FIRST PAGE
Please outpatient follow-up with your PCP in a week  Please outpatient follow-up with Cardiology and anticoagulation clinic for INR

## 2022-01-22 NOTE — DISCHARGE INSTRUCTIONS
COVID-19 and Chronic Health Conditions   WHAT YOU NEED TO KNOW:   Your chronic condition may increase your risk for COVID-19 or serious problems it can cause  Healthcare providers might need to make changes that affect how you usually manage your chronic health condition  Providers may change hours of operation or not have patients come in to be seen  You may not be able to make appointments to get blood drawn or to have tests or procedures  This may continue until the virus that causes COVID-19 is controlled  Until then, you can take steps to manage your condition  The steps will also lower your risk for COVID-19 or the serious problems it causes  If you do develop COVID-19, healthcare providers will tell you when it is okay to be around others after you recover  This depends on your chronic condition, any symptoms of COVID-19 that developed, and how severe the symptoms were  DISCHARGE INSTRUCTIONS:   If you think you may be infected with the coronavirus,  do the following to protect others:  · If emergency care is needed,  tell the  about the possible infection, or call ahead and tell the emergency department  · Call a healthcare provider  for instructions if symptoms are mild  Do not  arrive without calling first  Your provider will need to protect staff members and other patients  · Cover your mouth and nose  while you are getting medical care  This will help lower the risk of infecting others  Call your local emergency number [phone] in the [address] [place] [address],[address]) or an emergency department if:   · You have trouble breathing or shortness of breath  · You have chest pain or pressure that lasts longer than 5 minutes  · You become confused or hard to wake  · Your lips or face are blue  · You have a fever of 104°F (40°C) or higher  Call your doctor or healthcare provider if:   · You do not  have symptoms of COVID-19 but had close physical contact within 14 days with someone who tested positive      · You have questions or concerns about your COVID-19 or your chronic condition  What you need to know about serious problems from the virus:  Serious health problems may improve or continue for weeks or months, and possibly years  Health problems that continue may be called long COVID  · The virus can affect many parts of the body  Information is still being learned  You may develop these or other health problems:    ? Serious lower respiratory conditions, such as pneumonia or acute respiratory distress syndrome (ARDS)    ? Blood vessel damage, leading to blood clots    ? Organ damage from a lack of oxygen or from blood clots    ? Sleep problems    ? Problems thinking clearly, remembering information, or concentrating    ? Mood changes, depression, or anxiety    · Anyone can develop serious problems from the virus,  but some health conditions increase the risk  Healthcare professionals are still learning how the virus affects a person who has a chronic health condition  Protect yourself from infection, even if your chronic condition is not listed below  More is being learned about the virus every day  Health conditions known to increase the risk for COVID-19 or its serious complications include the following:    ? Obesity, diabetes, cancer, Down syndrome, or a liver disease    ? Kidney failure, chronic kidney disease, or sickle cell disease    ? Lung disease, COPD, moderate-to-severe asthma, cystic fibrosis, or pulmonary fibrosis (scarring in your lungs)    ? A severe heart disease, such as coronary artery disease or heart failure    ? A brain blood vessel disorder or disease, or a condition such as dementia    ? Hypertension (or high blood pressure), or thalassemia    ? An immune system problem, HIV or AIDS, or a blood, bone marrow, or organ transplant    · Other factors that increase your risk  are age 72 or older or living in a long-term care facility   Pregnancy, cigarette smoking, or long-term corticosteroid use can also increase your risk  How the 2019 coronavirus spreads: The virus spreads quickly and easily  The virus can be passed starting 2 days before symptoms begin or before a positive test if symptoms never begin  The following are ways the virus is thought to spread, but more information may be coming:  · Droplets are the main way all coronaviruses spread  The virus travels in droplets that form when a person talks, coughs, or sneezes  The droplets can also float in the air for minutes or hours  Infection happens when you breathe in the droplets or get them in your eyes or nose  Close personal contact with an infected person increases your risk for infection  This means being within 6 feet (2 meters) of the person for at least 15 minutes over 24 hours  · Person-to-person contact can spread the virus  For example, a person with the virus on his or her hands can spread it by shaking hands with someone  · The virus can stay on objects and surfaces for a short time  You may become infected by touching the object or surface and then touching your eyes or mouth  · An infected animal may be able to infect a person who touches it  This may happen at live markets or on a farm  Manage your chronic health condition during this time:  If you do not have a regular healthcare provider, experts recommend you contact a local community health center or health department  The following can help you manage your condition and prevent COVID-19:  · Get emergency care for your condition if needed  Talk to your healthcare providers about symptoms of your chronic condition that need immediate care  Your providers can help you create a plan or add exacerbation management to your plan  The plan will include when to go to an emergency department and when to call your local emergency number  This will depend on where you live and the services that are available during this time      · Go to dialysis appointments as scheduled  It is important to stay on schedule  You will need to have enough food to be able to follow the emergency diet plan if you must miss a session  The emergency diet needs to be part of the management plan for your condition  · Reschedule any upcoming appointments as needed  Medical facilities may be closed until the coronavirus is better controlled  This means you may need to reschedule a surgery, procedure, or check-up appointment  If you cannot have a phone or video appointment, you will need to make a new appointment  Some providers may be scheduling appointments several months in advance  Some surgeries and procedures will happen as scheduled  This depends on the medical condition and the reason for the surgery or procedure  You may need to have extra testing for COVID-19 several days before  · Follow any regular management plan you use  Your healthcare provider will tell you if you need to make any changes to your regular management plan  For example, if you have asthma, continue to follow your asthma action plan  If you have diabetes, you may need to check your blood sugar level more often  Stress and illness can make blood sugar levels go up  You may need to adjust medicine such as insulin  If you have a heart condition or high blood pressure, you may need to check your blood pressure more often  Stress and illness can also raise your blood pressure  · Talk to your healthcare providers about your medicines  You may be able to get more than 1 month of medicine at a time  This will lower the number of times you need to go to a pharmacy to get your medicines  Make sure you have enough medicine if you have a condition that can lead to an emergency  Examples include asthma medicines, insulin, or an epinephrine pen  Check the expiration dates on the medicines you currently have  Ask for refills as soon as possible, if needed   If it is not time to refill prescriptions, you may be able to get an emergency supply of some medicines  Medicine plans vary, so ask your healthcare provider or pharmacist for options  · Have supplies available in your home  If possible, get extra supplies you use regularly  Examples include absorbent pads, syringes, and wound cleaning solutions  This will limit the number of trips out of your home to get supplies  · Know the signs and symptoms of COVID-19  Signs and symptoms usually start about 5 days after infection but can take 2 to 14 days  Signs and symptoms range from mild to severe  You may feel like you have the flu or a bad cold  Your chronic health condition may cause some of the same symptoms COVID-19 causes  This can make it hard to know if a symptom is from COVID-19 or your chronic condition  Keep a record of any new or worsening symptom you have  This is especially important if you have a condition that often causes shortness of breath  Your provider can tell you if you should be tested for COVID-19  Information is still being learned  Tell your healthcare provider if you think you were infected but develop signs or symptoms not listed below:    ? A cough    ? Shortness of breath or trouble breathing that may become severe    ? A fever of at least 100 4°F, or 38°C (may be lower in adults 65 or older)    ? Chills that might include shaking    ? Muscle pain, body aches, or a headache    ? A sore throat    ? Suddenly not being able to taste or smell anything    ? Feeling very tired (fatigue)    ? Congestion (stuffy head and nose), or a runny nose    ? Diarrhea, nausea, or vomiting    What you can do to prevent having to go out of your home during this time:   · Ask your healthcare provider for other ways to have appointments  Some providers offer phone, video, or other types of appointments  · Have food, medicines, and other supplies delivered  Some pharmacies can send certain medicines to you through the mail   Grocery stores and restaurants may be able to deliver food and other items  If possible, have delivered items placed somewhere  Try not to have someone hand you an item  You will be so close to the person that the virus can spread between you  · Ask someone to get items you need  The person can get groceries, medicines, or other needed items for you  Choose a person who does not have signs or symptoms of COVID-19 or has tested negative for it  The person should not be waiting for test results  He or she should not have a condition that increases the risk for COVID-19 or serious problems it causes  What you need to know about COVID-19 vaccines: Your healthcare provider can give you more information about what to expect, depending on your chronic condition  · COVID-19 vaccines are given as a shot in 1 or 2 doses  A 2-dose vaccine is fully approved for use in those 16 years or older  Other vaccines have emergency use authorization (EUA)  An EUA means the vaccine is not approved but is given because the benefits outweigh the risks  Your healthcare provider can help you understand the benefits and risks  · A third dose is recommended for adults with a weakened immune system who get a 2-dose vaccine  The third dose is given at least 28 days after the second  · Even after you get the vaccine, continue social distancing and other measures  Experts are still learning how well the vaccines work to prevent infection, transmission, and severe illness  Although rare, you can become infected after you get the vaccine  You may also be able to pass the virus to others without knowing you are infected  · After you get the vaccine, check local, national, and international travel rules  Check to see if you need to be tested before you travel  You may also need to quarantine after you return  Some countries require proof of a negative test before you leave  You should also be tested 3 to 5 days after you return from another country      Lower your risk for COVID-19:  The best way to prevent infection is to avoid anyone who is infected, but this can be hard to do  An infected person can spread the virus before signs or symptoms begin, or even if signs or symptoms never develop  The following are ways to prevent the spread of the virus and lower your risk for COVID-19:      · Wash your hands often throughout the day  Use soap and water  Rub your soapy hands together, lacing your fingers  Wash the front and back of each hand, and in between your fingers  Use the fingers of one hand to scrub under the fingernails of the other hand  Wash for at least 20 seconds  Rinse with warm, running water for several seconds  Then dry your hands with a clean towel or paper towel  Use hand  that contains alcohol if soap and water are not available  Do not touch your eyes, nose, or mouth without washing your hands first  Teach children how to wash their hands  · Cover sneezes and coughs  Turn your face away and cover your mouth and nose with a tissue  Throw the tissue away  Use the bend of your arm if a tissue is not available  Then wash your hands well with soap and water or use hand   Turn your head and cover your face if someone near you is coughing or sneezing  Teach children how to cover a cough or sneeze  Remind them to wash their hands  · Make a habit of not touching your face  If you get the virus on your hands, you can transfer it to your eyes, nose, or mouth and become infected  · Wear a face covering (mask) around anyone who does not live in your home  A covering helps protect the person wearing it from being infected or passing the virus to others  Use a cloth covering with at least 2 layers  You can also create layers by putting a cloth face covering over a disposable non-medical mask  Cover your mouth and your nose  The covering should fit snugly against the bridge of your nose   Securely fasten it under your chin and on the sides of your face  Do not use coverings on children younger than 2 years or on anyone who has breathing problems or cannot remove it  Your healthcare provider can tell you what to do if you cannot wear a face covering  · Clean and disinfect high-touch surfaces and objects in your home often  Some surfaces and objects may need to be cleaned several times each day, depending on how often they are used  Use disinfecting wipes, or make a solution by mixing 4 teaspoons of bleach with 1 quart (4 cups) of water  Do not  use any cleaning or disinfecting products that can trigger an asthma attack or other breathing problems  Open windows or have circulating air as you clean  Do not  mix ammonia with bleach  This will create toxic fumes  How to follow social distancing guidelines:  Social distancing means people avoid close personal contact so the virus cannot spread from one person to another  Close personal contact means being within 6 feet (2 meters) of someone for at least 15 minutes over 24 hours  National and local social distancing rules vary  Rules may change over time as restrictions are lifted, but they may return if an outbreak happens where you live  It is important to know and follow all current social distancing rules in your area  The following are general guidelines:  · Stay home if you are sick or think you may have COVID-19  It is important to stay home if you are waiting for a testing appointment or for test results  Even if you do not have symptoms, you can pass the virus to others  · Limit trips out of your home  Plan your route so you make the fewest stops possible to limit contact  Keep track of places you go  This will help contact tracers notify others if you become infected  Wear a face covering while you are out  You will need to wear the covering on mass transit, such as a bus or the subway  You will also need to wear it while you travel on an airplane      · Stay at least 6 feet (2 meters) away from anyone who does not live in your home  Do not shake hands with, hug, or kiss a person as a greeting  Stand or walk as far from others as possible, especially around anyone who is sneezing or coughing  If you must use public transportation (such as a bus or subway), try to sit or stand away from others  Check in on anyone who lives alone  · Do not have anyone over to your home unless it is necessary  It is okay to have medical workers or other helpers in to provide care  Wear a face covering, and remind others to wear a mask or face covering  Remind them to wash their hands when they arrive and before they leave  Do not  have anyone over just to visit, even if you both do not feel sick  The virus can pass from one of you to the other before symptoms of COVID-19 begin  Some people never even develop symptoms  It is important that you continue social distancing with everyone, including children  It may be hard to tell a child not to hug or kiss you  Explain that this is how he or she can help you stay healthy  · Do not go to someone else's home unless it is necessary  Do not go over to visit, even if the person is lonely  Only go if you need to help him or her  · Avoid in-person gatherings and crowds  Gatherings or crowds of 10 or more individuals can cause the virus to spread  Avoid places such as [place], beaches, sporting events, and tourist attractions  For events such as parties, holiday meals, [employer] services, and conferences, attend virtually (on a computer), if possible  · Stay safe if you must go out to work  Keep physical distance between you and other workers as much as possible  Follow your employer's rules so everyone stays safe  Help strengthen your immune system:   · Ask about other vaccines you may need  Get the influenza (flu) vaccine as soon as recommended each year, usually starting in September or October  Get the pneumonia vaccine if recommended   Your healthcare provider can tell you if you should also get other vaccines, and when to get them  · Do not smoke  Nicotine and other chemicals in cigarettes and cigars can increase your risk for infection and for serious COVID-19 effects  Ask your healthcare provider for information if you currently smoke and need help to quit  E-cigarettes or smokeless tobacco still contain nicotine  Talk to your healthcare provider before you use these products  · Eat a variety of healthy foods  Examples include vegetables, fruits, whole-grain breads and cereals, lean meats and poultry, fish, low-fat dairy products, and cooked beans  Healthy foods contain nutrients that help keep your immune system strong  · Find ways to manage stress  You may be feeling more stressed than usual because of the COVID-19 outbreak  The situation is very stressful to many people  Talk to your healthcare providers about ways to manage stress during this time  Stress can lead to breathing problems or make the problems worse  Stress can trigger an attack or exacerbation of many health conditions  It is important to do things that help you feel more relaxed, such as the following:     ? Pick 1 or 2 times a day to watch the news  Constant news watching can increase your stress levels  ? Talk to a friend on the phone or through a video chat  ? Take a warm, soothing bath  ? Listen to music  ? Exercise can also help relieve stress  This may be hard if your regular gym or outdoor exercise area is closed  If you do not have exercise equipment at home, try walking inside your home  You can walk quickly or turn on music and dance  Follow up with your doctor or healthcare provider as directed: Your providers will tell you when you can come in for tests, procedures, or check-ups  Bring your symptom record with you to all appointments  Write down your questions so you remember to ask them during your visits    For more information:   · Centers for Disease Control and Prevention  [address] [place] , [address]  Phone: 1- 353 - 8850731  Phone: 8- 834 - 7503691  Web Address: [employer]    © 0938 [facility] 2021 Information is for End User's use only and may not be sold, redistributed or otherwise used for commercial purposes  All illustrations and images included in CareNotes® are the copyrighted property of [employer]  or [facility] Malgorzata Sweeney   The above information is an  only  It is not intended as medical advice for individual conditions or treatments  Talk to your doctor, nurse or pharmacist before following any medical regimen to see if it is safe and effective for you

## 2022-01-22 NOTE — ASSESSMENT & PLAN NOTE
Heart rate is acceptable  Continue anticoagulation with warfarin    Blood pressure 156/84, pulse 93, temperature (!) 96 7 °F (35 9 °C), temperature source Axillary, resp  rate 20, height 5' 6" (1 676 m), weight 69 2 kg (152 lb 8 9 oz), SpO2 91 %

## 2022-01-22 NOTE — ASSESSMENT & PLAN NOTE
WBC 1 5, Absolute neutrophil 0 74 on 1/20/22  Likely due to COVID-19  Patient is afebrile  Procalcitonin negative x2  Will hold antibiotics  Continue monitor CBC periodically  Resolved

## 2022-01-22 NOTE — ASSESSMENT & PLAN NOTE
Patient presented with tachycardia, tachypnea  Patient was COVID-19 positive on 01/05/2022  In the setting of pneumonia due to COVID-19, will hold fluids  Treatment see COVID-19  Resolved

## 2022-01-22 NOTE — PLAN OF CARE
Problem: Potential for Falls  Goal: Patient will remain free of falls  Description: INTERVENTIONS:  - Educate patient/family on patient safety including physical limitations  - Instruct patient to call for assistance with activity   - Consult OT/PT to assist with strengthening/mobility   - Keep Call bell within reach  - Keep bed low and locked with side rails adjusted as appropriate  - Keep care items and personal belongings within reach  - Initiate and maintain comfort rounds  - Make Fall Risk Sign visible to staff  - Offer Toileting every 2 Hours, in advance of need  - Initiate/Maintain alarm  - Obtain necessary fall risk management equipment:   - Apply yellow socks and bracelet for high fall risk patients  - Consider moving patient to room near nurses station  Outcome: Progressing     Problem: Prexisting or High Potential for Compromised Skin Integrity  Goal: Skin integrity is maintained or improved  Description: INTERVENTIONS:  - Identify patients at risk for skin breakdown  - Assess and monitor skin integrity  - Assess and monitor nutrition and hydration status  - Monitor labs   - Assess for incontinence   - Turn and reposition patient  - Assist with mobility/ambulation  - Relieve pressure over bony prominences  - Avoid friction and shearing  - Provide appropriate hygiene as needed including keeping skin clean and dry  - Evaluate need for skin moisturizer/barrier cream  - Collaborate with interdisciplinary team   - Patient/family teaching  - Consider wound care consult   Outcome: Progressing     Problem: PAIN - ADULT  Goal: Verbalizes/displays adequate comfort level or baseline comfort level  Description: Interventions:  - Encourage patient to monitor pain and request assistance  - Assess pain using appropriate pain scale  - Administer analgesics based on type and severity of pain and evaluate response  - Implement non-pharmacological measures as appropriate and evaluate response  - Consider cultural and social influences on pain and pain management  - Notify physician/advanced practitioner if interventions unsuccessful or patient reports new pain  Outcome: Progressing     Problem: INFECTION - ADULT  Goal: Absence or prevention of progression during hospitalization  Description: INTERVENTIONS:  - Assess and monitor for signs and symptoms of infection  - Monitor lab/diagnostic results  - Monitor all insertion sites, i e  indwelling lines, tubes, and drains  - Monitor endotracheal if appropriate and nasal secretions for changes in amount and color  - Accident appropriate cooling/warming therapies per order  - Administer medications as ordered  - Instruct and encourage patient and family to use good hand hygiene technique  - Identify and instruct in appropriate isolation precautions for identified infection/condition  Outcome: Progressing     Problem: SAFETY ADULT  Goal: Patient will remain free of falls  Description: INTERVENTIONS:  - Educate patient/family on patient safety including physical limitations  - Instruct patient to call for assistance with activity   - Consult OT/PT to assist with strengthening/mobility   - Keep Call bell within reach  - Keep bed low and locked with side rails adjusted as appropriate  - Keep care items and personal belongings within reach  - Initiate and maintain comfort rounds  - Make Fall Risk Sign visible to staff  - Offer Toileting every 2 Hours, in advance of need  - Initiate/Maintain alarm  - Obtain necessary fall risk management equipment:   - Apply yellow socks and bracelet for high fall risk patients  - Consider moving patient to room near nurses station  Outcome: Progressing  Goal: Maintain or return to baseline ADL function  Description: INTERVENTIONS:  -  Assess patient's ability to carry out ADLs; assess patient's baseline for ADL function and identify physical deficits which impact ability to perform ADLs (bathing, care of mouth/teeth, toileting, grooming, dressing, etc )  - Assess/evaluate cause of self-care deficits   - Assess range of motion  - Assess patient's mobility; develop plan if impaired  - Assess patient's need for assistive devices and provide as appropriate  - Encourage maximum independence but intervene and supervise when necessary  - Involve family in performance of ADLs  - Assess for home care needs following discharge   - Consider OT consult to assist with ADL evaluation and planning for discharge  - Provide patient education as appropriate  Outcome: Progressing  Goal: Maintains/Returns to pre admission functional level  Description: INTERVENTIONS:  - Perform BMAT or MOVE assessment daily    - Set and communicate daily mobility goal to care team and patient/family/caregiver  - Collaborate with rehabilitation services on mobility goals if consulted  - Perform Range of Motion 2 times a day  - Reposition patient every 2 hours    - Dangle patient 2 times a day  - Stand patient 2 times a day  - Ambulate patient 2 times a day  - Out of bed to chair 2 times a day   - Out of bed for meals 2 times a day  - Out of bed for toileting  - Record patient progress and toleration of activity level   Outcome: Progressing     Problem: DISCHARGE PLANNING  Goal: Discharge to home or other facility with appropriate resources  Description: INTERVENTIONS:  - Identify barriers to discharge w/patient and caregiver  - Arrange for needed discharge resources and transportation as appropriate  - Identify discharge learning needs (meds, wound care, etc )  - Arrange for interpretive services to assist at discharge as needed  - Refer to Case Management Department for coordinating discharge planning if the patient needs post-hospital services based on physician/advanced practitioner order or complex needs related to functional status, cognitive ability, or social support system  Outcome: Progressing     Problem: Knowledge Deficit  Goal: Patient/family/caregiver demonstrates understanding of disease process, treatment plan, medications, and discharge instructions  Description: Complete learning assessment and assess knowledge base    Interventions:  - Provide teaching at level of understanding  - Provide teaching via preferred learning methods  Outcome: Progressing     Problem: RESPIRATORY - ADULT  Goal: Achieves optimal ventilation and oxygenation  Description: INTERVENTIONS:  - Assess for changes in respiratory status  - Assess for changes in mentation and behavior  - Position to facilitate oxygenation and minimize respiratory effort  - Oxygen administered by appropriate delivery if ordered  - Initiate smoking cessation education as indicated  - Encourage broncho-pulmonary hygiene including cough, deep breathe, Incentive Spirometry  - Assess the need for suctioning and aspirate as needed  - Assess and instruct to report SOB or any respiratory difficulty  - Respiratory Therapy support as indicated  Outcome: Progressing     Problem: METABOLIC, FLUID AND ELECTROLYTES - ADULT  Goal: Electrolytes maintained within normal limits  Description: INTERVENTIONS:  - Monitor labs and assess patient for signs and symptoms of electrolyte imbalances  - Administer electrolyte replacement as ordered  - Monitor response to electrolyte replacements, including repeat lab results as appropriate  - Instruct patient on fluid and nutrition as appropriate  Outcome: Progressing  Goal: Fluid balance maintained  Description: INTERVENTIONS:  - Monitor labs   - Monitor I/O and WT  - Instruct patient on fluid and nutrition as appropriate  - Assess for signs & symptoms of volume excess or deficit  Outcome: Progressing  Goal: Glucose maintained within target range  Description: INTERVENTIONS:  - Monitor Blood Glucose as ordered  - Assess for signs and symptoms of hyperglycemia and hypoglycemia  - Administer ordered medications to maintain glucose within target range  - Assess nutritional intake and initiate nutrition service referral as needed  Outcome: Progressing     Problem: SKIN/TISSUE INTEGRITY - ADULT  Goal: Skin Integrity remains intact(Skin Breakdown Prevention)  Description: Assess:  -Perform Sanjeev assessment every shift and after incontinent episode   -Clean and moisturize skin every shift and after incontinent episode   -Inspect skin when repositioning, toileting, and assisting with ADLS  -Assess under medical devices  -Assess extremities for adequate circulation and sensation     Bed Management:  -Have minimal linens on bed & keep smooth, unwrinkled  -Change linens as needed when moist or perspiring  -Avoid sitting or lying in one position for more than 2 hours while in bed  -Keep HOB at 30 degrees     Toileting:  -Offer bedside commode  -Assess for incontinence   -Use incontinent care products after each incontinent episode such as     Activity:  -Mobilize patient 2 times a day  -Encourage activity and walks on unit  -Encourage or provide ROM exercises   -Turn and reposition patient every 2 Hours  -Use appropriate equipment to lift or move patient in bed  -Instruct/ Assist with weight shifting every 2 hours when out of bed in chair  -Consider limitation of chair time 2 hour intervals    Skin Care:  -Avoid use of baby powder, tape, friction and shearing, hot water or constrictive clothing  -Relieve pressure over bony prominences   -Do not massage red bony areas    Next Steps:  -Teach patient strategies to minimize risks    -Consider consults to  interdisciplinary teqmw  Outcome: Progressing  Goal: Incision(s), wounds(s) or drain site(s) healing without S/S of infection  Description: INTERVENTIONS  - Assess and document dressing, incision, wound bed, drain sites and surrounding tissue  - Provide patient and family education  - Perform skin care/dressing changes   Outcome: Progressing  Goal: Pressure injury heals and does not worsen  Description: Interventions:  - Implement low air loss mattress or specialty surface (Criteria met)  - Apply silicone foam dressing  - Instruct/assist with weight shifting every 30 minutes when in chair   - Limit chair time to 2 hour intervals  - Use special pressure reducing interventions such as wedge when in chair   - Apply fecal or urinary incontinence containment device   - Perform passive or active ROM   - Turn and reposition patient & offload bony prominences   - Utilize friction reducing device or surface for transfers   - Consider consults to  interdisciplinary teams   - Use incontinent care products after each incontinent episode   - Consider nutrition services referral as needed  Outcome: Progressing     Problem: MUSCULOSKELETAL - ADULT  Goal: Maintain or return mobility to safest level of function  Description: INTERVENTIONS:  - Assess patient's ability to carry out ADLs; assess patient's baseline for ADL function and identify physical deficits which impact ability to perform ADLs (bathing, care of mouth/teeth, toileting, grooming, dressing, etc )  - Assess/evaluate cause of self-care deficits   - Assess range of motion  - Assess patient's mobility  - Assess patient's need for assistive devices and provide as appropriate  - Encourage maximum independence but intervene and supervise when necessary  - Involve family in performance of ADLs  - Assess for home care needs following discharge   - Consider OT consult to assist with ADL evaluation and planning for discharge  - Provide patient education as appropriate  Outcome: Progressing  Goal: Maintain proper alignment of affected body part  Description: INTERVENTIONS:  - Support, maintain and protect limb and body alignment  - Provide patient/ family with appropriate education  Outcome: Progressing     Problem: MOBILITY - ADULT  Goal: Maintain or return to baseline ADL function  Description: INTERVENTIONS:  -  Assess patient's ability to carry out ADLs; assess patient's baseline for ADL function and identify physical deficits which impact ability to perform ADLs (bathing, care of mouth/teeth, toileting, grooming, dressing, etc )  - Assess/evaluate cause of self-care deficits   - Assess range of motion  - Assess patient's mobility; develop plan if impaired  - Assess patient's need for assistive devices and provide as appropriate  - Encourage maximum independence but intervene and supervise when necessary  - Involve family in performance of ADLs  - Assess for home care needs following discharge   - Consider OT consult to assist with ADL evaluation and planning for discharge  - Provide patient education as appropriate  Outcome: Progressing  Goal: Maintains/Returns to pre admission functional level  Description: INTERVENTIONS:  - Perform BMAT or MOVE assessment daily    - Set and communicate daily mobility goal to care team and patient/family/caregiver  - Collaborate with rehabilitation services on mobility goals if consulted  - Perform Range of Motion 2 times a day  - Reposition patient every 2 hours    - Dangle patient 2 times a day  - Stand patient 2 times a day  - Ambulate patient 2 times a day  - Out of bed to chair 2 times a day   - Out of bed for meals 2 times a day  - Out of bed for toileting  - Record patient progress and toleration of activity level   Outcome: Progressing

## 2022-01-22 NOTE — ASSESSMENT & PLAN NOTE
Mechanical aortic valve  Outpatient follow-up with Cardiology  On Coumadin for anticoagulation, current INR is at goal

## 2022-01-22 NOTE — PHYSICAL THERAPY NOTE
Physical Therapy Evaluation     Patient's Name: Sherie Azevedo    Admitting Diagnosis  Dehydration [E86 0]  Hypoxemia [R09 02]  Hyponatremia [E87 1]  Elevated blood sugar [R73 9]  Elevated d-dimer [R79 89]  Pneumonia due to COVID-19 virus [U07 1, J12 82]    Problem List  Patient Active Problem List   Diagnosis    Hyperlipidemia    Chronic anticoagulation    Hypertension, essential    Coronary artery disease of native artery of native heart with stable angina pectoris ([facility]    Chronic obstructive pulmonary disease ([facility] )    Diabetes mellitus with neurological manifestation ([place] [address] )    Hypothyroidism    Iron deficiency    GERD (gastroesophageal reflux disease)    AVM (arteriovenous malformation) of small bowel, acquired with hemorrhage    Angiectasia    Other vascular disorders of intestine ([facility]    Angioedema    Aortic valve replaced    Cardiomyopathy ([facility] )    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[facility]    Stage 3a chronic kidney disease    Chronic kidney disease-mineral and bone disorder    Primary osteoarthritis involving multiple joints    Pneumonia due to COVID-19 virus    Viral sepsis ([facility] )    Paroxysmal atrial fibrillation ([place] [address] )    Neutropenia associated with infection ([place] [address] )     Past Medical History  Past Medical History:   Diagnosis Date    Anemia     Aneurysm of thoracic aorta ([facility] )     Aortic valve disorder     Benign neoplasm of sigmoid colon     Cardiomyopathy ([facility] )     last assessed: 10/10/2014    CHF (congestive heart failure) ([facility])     Chronic kidney disease     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[facility] )     last assessed: 10/10/2014    Diabetic nephropathy ([facility] )     RAMON (dyspnea on exertion)     GERD (gastroesophageal reflux disease)     History of emphysema     Hyperlipidemia     TIA (transient ischemic attack)      Past Surgical History  Past Surgical History:   Procedure Laterality Date    AORTIC VALVE REPLACEMENT      CARDIAC PACEMAKER PLACEMENT      last assessed: 10/10/2014  CARDIAC SURGERY      aortic valve replacement    CHOLECYSTECTOMY      COLONOSCOPY      HYSTERECTOMY      INSERT / REPLACE / REMOVE PACEMAKER      KNEE SURGERY Right     OTHER SURGICAL HISTORY      Capsule ENDOscopy description: 12/19/2012    MT COLONOSCOPY FLX DX W/COLLJ SPEC WHEN PFRMD N/A 5/31/2018    Procedure: single balloon ENTEROSCOPY;  Surgeon: Gregoria Worrell MD;  Location: [facility]; Service: Gastroenterology    MT ESOPHAGOGASTRODUODENOSCOPY TRANSORAL DIAGNOSTIC N/A 11/29/2017    Procedure: EGD AND COLONOSCOPY;  Surgeon: Silvina Carbone MD;  Location: [facility]; Service: Gastroenterology      01/22/22 1053   PT Last Visit   PT Visit Date 01/22/22   Note Type   Note type Evaluation   Pain Assessment   Pain Assessment Tool 0-10   Pain Score No Pain   Restrictions/Precautions   Weight Bearing Precautions Per Order No   Braces or Orthoses Other (Comment)  (none per patient)   Other Precautions Contact/isolation; Airborne/isolation;Droplet precautions; Chair Alarm; Bed Alarm;O2;Fall Risk  (+COVID, +2L O2 via NC)   Home Living   Type of [address] [place] [address] Two level; Able to live on main level with bedroom/bathroom;Stairs to enter with rails  (0 SHIRA from basement; flight of stairs to main area)   Bathroom Shower/Tub Walk-in shower   Bathroom Toilet Standard   Bathroom Equipment Grab bars in [facility]   Additional Comments Pt ambulates without an AD     Prior Function   Level of [place] Independent with ADLs and functional mobility   Lives With Son;Daughter;Family   Receives Help From Family   ADL Assistance Independent   IADLs Independent   Falls in the last 6 months 1 to 4  (1 fall)   Vocational Retired   General   Family/Caregiver Present No   Cognition   Overall Cognitive Status   (questionable)   Arousal/Participation Alert   Orientation Level Oriented X4   Memory Decreased short term memory   Following Commands Follows multistep commands with increased time or repetition   Comments Pt agreeable to PT  Subjective   Subjective "I haven't been up "   RLE Assessment   RLE Assessment X   Strength RLE   RLE Overall Strength 4-/5   LLE Assessment   LLE Assessment X   Strength LLE   LLE Overall Strength 4-/5   Light Touch   RLE Light Touch Grossly intact   LLE Light Touch Grossly intact   Bed Mobility   Supine to Sit 5  Supervision   Additional items Assist x 1;HOB elevated; Bedrails; Increased time required;Verbal cues   Transfers   Sit to Stand 4  Minimal assistance   Additional items Assist x 1; Armrests; Increased time required;Verbal cues   Stand to Sit 4  Minimal assistance   Additional items Assist x 1; Armrests; Increased time required;Verbal cues   Ambulation/Elevation   Gait pattern Excessively slow; Step to;Short stride; Shuffling;Narrow MARIVEL   Gait Assistance 4  Minimal assist   Additional items Assist x 1;Verbal cues   Assistive Device Rolling walker   Distance 3 feet forward/backward; 3 feet, bed to chair   Stair Management Assistance Not tested   Balance   Static Sitting Fair +   Dynamic Sitting Fair   Static Standing Fair -   Dynamic Standing Poor +   Ambulatory Poor +   Endurance Deficit   Endurance Deficit Yes   Endurance Deficit Description decreased activity tolerance; pt requiring supplemental oxygen; pt was received on 2L O2 via NC; pt's SpO2 decreased to 83% with functional mobility, increased to 94% with seated rest break   Activity Tolerance   Activity Tolerance Patient limited by fatigue   Medical Staff Lamont Morales MD, Ma made aware of session outcomes   Nurse Made Aware Disucssed case with JOSSIE Akbar; post session pt was left seated in recliner in NAD, all belongings within reach, +chair alarm   Assessment   Prognosis Good   Problem List Decreased strength;Decreased endurance; Impaired balance;Decreased mobility   Assessment Pt is 80year old female seen for PT evaluation s/p admit to [facility] on 1/19/2022 with Pneumonia due to COVID-19 virus  PT consulted to assess pt's functional mobility and d/c needs  Order placed for PT eval and tx, with up and OOB as tolerated order  Comorbidities affecting pt's physical performance at time of assessment include chronic anticoagulation, aortic valve replaced, cardiomyopathy, stage 3a CKD, viral sepsis, paroxysmal atrial fibrillation, and neutropenia associated with infection  PTA, pt was independent with all functional mobility without an AD and ambulates household and community distances and elevations  Personal factors affecting pt at time of IE include inaccessible home environment, ambulating with an assistive device, stairs to enter home, inability to ambulate household distances, inability to navigate community distances, inability to navigate level surfaces without external assistance, unable to perform dynamic tasks in community, positive fall history, inability to perform IADLs, and inability to perform ADLs  Please find objective findings from PT assessment regarding body systems outlined above with impairments and limitations including weakness, impaired balance, decreased endurance, gait deviations, decreased activity tolerance, decreased functional mobility tolerance, fall risk, and SOB upon exertion  The following objective measures performed on IE also reveal limitations: Barthel Index: 50/100 and Modified [place]: 4 (moderate/severe disability)  Pt's clinical presentation is currently unstable/unpredictable seen in pt's presentation of need for ongoing medical management/monitoring, pt is a fall risk, pt is requiring use of RW for safe ambulation, pt is requiring supplemental oxygen, pt has SOB that is limiting functional mobility, and pt requires cues/assist for safety with functional mobility  Pt to benefit from continued PT tx to address deficits as defined above and maximize level of functional independent mobility and consistency   From PT/mobility standpoint, recommendation at time of d/c would be STR pending progress in order to facilitate return to PLOF  Barriers to Discharge Inaccessible home environment   Goals   STG Expiration Date 02/01/22   Short Term Goal #1 In 7-10 days: Increase bilateral LE strength 1/2 grade to facilitate independent mobility, Perform all bed mobility tasks modified independent to decrease caregiver burden, Perform all transfers modified independent to improve independence, Ambulate > 150 ft  with least restrictive assistive device modified independent w/o LOB and w/ normalized gait pattern 100% of the time, Navigate 13 stairs with CG assist with unilateral handrail to facilitate return to previous living environment and Increase all balance 1/2 grade to decrease risk for falls   Plan   Treatment/Interventions Functional transfer training;LE strengthening/ROM; Elevations; Therapeutic exercise; Endurance training;Patient/family training;Bed mobility;Gait training;Spoke to nursing;Spoke to MD   PT Frequency 3-5x/wk   Recommendation   PT Discharge Recommendation Post acute rehabilitation services   AM-PAC Basic Mobility Inpatient   Turning in Bed Without Bedrails 3   Lying on Back to Sitting on Edge of Flat Bed 3   Moving Bed to Chair 3   Standing Up From Chair 3   Walk in Room 2   Climb 3-5 Stairs 1   Basic Mobility Inpatient Raw Score 15   Basic Mobility Standardized Score 36 97   Highest Level Of Mobility   JH-HL Goal 4: Move to chair/commode   JH-HLM Highest Level of Mobility 4: Move to chair/commode   JH-HLM Goal Achieved Yes   Modified [place] Scale   Modified [place] Scale 4   Barthel Index   Feeding 10   Bathing 0   Grooming Score 0   Dressing Score 5   Bladder Score 10   Bowels Score 10   Toilet Use Score 5   Transfers (Bed/Chair) Score 10   Mobility (Level Surface) Score 0   Stairs Score 0   Barthel Index Score 50     PT Evaluation Time: 8658-0721    Deonte Sanon, PT, DPT

## 2022-01-22 NOTE — ASSESSMENT & PLAN NOTE
Status post aortic aneurysm repair and  aortic valve replacement  Patient was taking warfarin 5 mg on Tuesday, Thursday, Saturday and Sunday, 7 mg on Monday, Wednesday and Friday  Patient has not been taking warfarin for past 2 weeks  Discontinued heparin yesterday  INR was 2 3 today     Will resume home regimen upon discharge

## 2022-01-22 NOTE — ASSESSMENT & PLAN NOTE
TTE on 9/2021 revealed LVEF 40%  Patient seems euvolemic  Daily and metoprolol 12 5 mg p o  B i d   Home lasix was hold today due to elevate Cr   Will resume after discharge

## 2022-01-22 NOTE — CASE MANAGEMENT
Case Management Discharge Planning Note    Patient name Almaz Peña  Location [address] 209/-83 MRN [id number]  : 1940 Date 2022       Current Admission Date: 2022  Current Admission Diagnosis:Pneumonia due to COVID-19 virus   Patient Active Problem List    Diagnosis Date Noted    Neutropenia associated with infection ([place] [address] ) 2022    Pneumonia due to COVID-19 virus 2022    Viral sepsis ([place] [address] ) 2022    Paroxysmal atrial fibrillation ([place] [address] ) 2022    Primary osteoarthritis involving multiple joints 2021    Stage 3a chronic kidney disease 2020    Chronic kidney disease-mineral and bone disorder 2020    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[place] [address] ) 2020    Cardiomyopathy ([place] [address] ) 2020    Angioedema 2019    Other vascular disorders of intestine ([place] [address] ) 2019    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    AVM (arteriovenous malformation) of small bowel, acquired with hemorrhage 2018    GERD (gastroesophageal reflux disease) 2018    Hyperlipidemia 2018    Chronic anticoagulation 2018    Hypertension, essential 2018    Iron deficiency 2017    Coronary artery disease of native artery of native heart with stable angina pectoris ([place] [address] ) 2015    Hypothyroidism 2014    Chronic obstructive pulmonary disease ([place] [address] ) 2014    Diabetes mellitus with neurological manifestation ([place] [address] ) 2014    Aortic valve replaced 2007      LOS (days): 3  Geometric Mean LOS (GMLOS) (days): 4 80  Days to GMLOS:1 8     OBJECTIVE:  Risk of Unplanned Readmission Score: 20         Current admission status: Inpatient   Preferred Pharmacy:   [id number] 2   [address] [place] [address]  Phone: [phone] Fax: [phone]    [facility] [address], [address] [place] [address]  [facility] [place] [address]  Phone: [phone] Fax: [phone]    Primary Care Provider: Jose Reynolds MD    Primary Insurance: MEDICARE  Secondary Insurance:     DISCHARGE DETAILS:    Additional Comments: Per [place] communication, CM was asked to deliver [place] [facility]  [place] [facility] provided to [address] [place] [address], delivery ticket in folder for [employer]  Per [employer], [facility] able to accept at time of discharge  AVS updated to reflect same

## 2022-01-22 NOTE — CASE MANAGEMENT
Case Management Discharge Planning Note    Patient name Raul Lama  Location [address] 209/-83 MRN [id number]  : 1940 Date 2022       Current Admission Date: 2022  Current Admission Diagnosis:Pneumonia due to COVID-19 virus   Patient Active Problem List    Diagnosis Date Noted    Neutropenia associated with infection ([place] [address] ) 2022    Pneumonia due to COVID-19 virus 2022    Viral sepsis ([place] [address] ) 2022    Paroxysmal atrial fibrillation ([place] [address] ) 2022    Primary osteoarthritis involving multiple joints 2021    Stage 3a chronic kidney disease 2020    Chronic kidney disease-mineral and bone disorder 2020    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[place] [address] ) 2020    Cardiomyopathy ([place] [address] ) 2020    Angioedema 2019    Other vascular disorders of intestine ([place] [address] ) 2019    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    AVM (arteriovenous malformation) of small bowel, acquired with hemorrhage 2018    GERD (gastroesophageal reflux disease) 2018    Hyperlipidemia 2018    Chronic anticoagulation 2018    Hypertension, essential 2018    Iron deficiency 2017    Coronary artery disease of native artery of native heart with stable angina pectoris ([place] [address] ) 2015    Hypothyroidism 2014    Chronic obstructive pulmonary disease ([place] [address] ) 2014    Diabetes mellitus with neurological manifestation ([place] [address] ) 2014    Aortic valve replaced 2007      LOS (days): 3  Geometric Mean LOS (GMLOS) (days): 4 80  Days to GMLOS:1 9     OBJECTIVE:  Risk of Unplanned Readmission Score: 19         Current admission status: Inpatient   Preferred Pharmacy:   [id number] [place], [place] [facility] 2   [address] [place] [address]  Phone: [phone] Fax: [phone]    [facility] [address], [address] [place] [address]  [facility] [address]  Phone: [phone] Fax: [phone]    Primary Care Provider: Reyes Perales MD    Primary Insurance: MEDICARE  Secondary Insurance:     DISCHARGE DETAILS:    [address]         Is the patient interested in [facility] at discharge?: Yes  [address] requested[de-identified] [address] Name[de-identified] Other  [address] [place] [address] Provider[de-identified] PCP  Home Health Services Needed[de-identified] Strengthening/Theraputic Exercises to Improve Function,Evaluate Functional Status and Safety,Gait/ADL Training,Oxygen Via Nasal Cannula  Oxygen LPM Ordered (if applicable based on home health services needed):: 2 LPM  Homebound Criteria Met[de-identified] Requires the Assistance of Another Person for Safe Ambulation or to Leave the Home  Supporting Clincal Findings[de-identified] Fatigues Easliy in Short Distances,Limited Endurance,Dyspnea with Exertion,Requires Oxygen    Other Referral/Resources/Interventions Provided:  Interventions: [facility]  Referral Comments: Referrals placed via [facility], pt reports no preference for agency  Treatment Team Recommendation: Home with [address]  Discharge Destination Plan[de-identified] Home with [place] at Discharge : Family     Additional Comments: Patient reviewed with Dr Arnav Nair who informed that pt is medically stable for discharge home pending home 02 setup  Per respiratory note, pt qualifies for 2L home 02  CM sent referral via [place] to Dr Oz Brooks for signature and indicated that pt would qualify for [place] [facility] due to 2L requirement  CM department to follow for direction to deliver [place] [facility] to bedside  CM spoke with pt who is interested in [facility] for SN/PT/OT  Pt reports no preference for agency, referrals placed via [facility]  CM department to follow for accepting agency

## 2022-01-22 NOTE — PLAN OF CARE
Problem: PHYSICAL THERAPY ADULT  Goal: Performs mobility at highest level of function for planned discharge setting  See evaluation for individualized goals  Description: Treatment/Interventions: Functional transfer training,LE strengthening/ROM,Elevations,Therapeutic exercise,Endurance training,Patient/family training,Bed mobility,Gait training,Spoke to nursing,Spoke to MD          See flowsheet documentation for full assessment, interventions and recommendations  1/22/2022 1216 by Ynes Barr, PT  Note: Prognosis: Good  Problem List: Decreased strength,Decreased endurance,Impaired balance,Decreased mobility  Assessment: Pt is 80year old female seen for PT evaluation s/p admit to [facility] on 1/19/2022 with Pneumonia due to COVID-19 virus  PT consulted to assess pt's functional mobility and d/c needs  Order placed for PT eval and tx, with up and OOB as tolerated order  Comorbidities affecting pt's physical performance at time of assessment include chronic anticoagulation, aortic valve replaced, cardiomyopathy, stage 3a CKD, viral sepsis, paroxysmal atrial fibrillation, and neutropenia associated with infection  PTA, pt was independent with all functional mobility without an AD and ambulates household and community distances and elevations  Personal factors affecting pt at time of IE include inaccessible home environment, ambulating with an assistive device, stairs to enter home, inability to ambulate household distances, inability to navigate community distances, inability to navigate level surfaces without external assistance, unable to perform dynamic tasks in community, positive fall history, inability to perform IADLs, and inability to perform ADLs   Please find objective findings from PT assessment regarding body systems outlined above with impairments and limitations including weakness, impaired balance, decreased endurance, gait deviations, decreased activity tolerance, decreased functional mobility tolerance, fall risk, and SOB upon exertion  The following objective measures performed on IE also reveal limitations: Barthel Index: 50/100 and Modified [place]: 4 (moderate/severe disability)  Pt's clinical presentation is currently unstable/unpredictable seen in pt's presentation of need for ongoing medical management/monitoring, pt is a fall risk, pt is requiring use of RW for safe ambulation, pt is requiring supplemental oxygen, pt has SOB that is limiting functional mobility, and pt requires cues/assist for safety with functional mobility  Pt to benefit from continued PT tx to address deficits as defined above and maximize level of functional independent mobility and consistency  From PT/mobility standpoint, recommendation at time of d/c would be STR pending progress in order to facilitate return to PLOF  Barriers to Discharge: Inaccessible home environment     PT Discharge Recommendation: Post acute rehabilitation services     See flowsheet documentation for full assessment

## 2022-01-22 NOTE — ASSESSMENT & PLAN NOTE
Patient presents with worsening shortness of breath and cough since 01/05/2022  Patient was tested COVID-19 positive on 01/05/2022 at primary care doctor's office due to flu-like symptoms  Patient was treated with vitamin D and C at home, but without improvement  Associated with poor appetite, she only can take a cup of fruit a day  Patient has stopped taking all his medications including warfarin since then  At ED, patient says 86% on room air, sats 94 on 2 L of nasal cannula  D-dimer was 3 35, in setting of COVID and mechanical aortic valve and  Anticoagulation for 2 weeks, CTA PE study revealed no evidence of PE but bilateral ground-glass opacity consistent with COVID-19 infection    Procalcitonin negative x2  Incentive spirometry  Patient feels better  Patient is currently on room air  PT recommended short-term rehab, but Patient expressed that she does not want to go to STR  Will arrange home PT  Home O2 evaluation reviewed patient qualify for home oxygen  Patient is stable for discharge  Discharge plan discussed with her son

## 2022-01-24 ENCOUNTER — TRANSITIONAL CARE MANAGEMENT (OUTPATIENT)
Dept: INTERNAL MEDICINE CLINIC | Facility: CLINIC | Age: 82
End: 2022-01-24

## 2022-01-24 LAB
BACTERIA BLD CULT: NORMAL
BACTERIA BLD CULT: NORMAL
BACTERIA UR CULT: ABNORMAL
BACTERIA UR CULT: ABNORMAL

## 2022-01-31 ENCOUNTER — TELEPHONE (OUTPATIENT)
Dept: INTERNAL MEDICINE CLINIC | Facility: CLINIC | Age: 82
End: 2022-01-31

## 2022-01-31 NOTE — TELEPHONE ENCOUNTER
GUY Mitchell from physical therapy    patient requesting to be discharge   Dominic Quintanilla     Will be discharge today 1/31/2022

## 2022-02-07 ENCOUNTER — OFFICE VISIT (OUTPATIENT)
Dept: INTERNAL MEDICINE CLINIC | Facility: CLINIC | Age: 82
End: 2022-02-07
Payer: MEDICARE

## 2022-02-07 VITALS
WEIGHT: 156.4 LBS | DIASTOLIC BLOOD PRESSURE: 62 MMHG | HEIGHT: 66 IN | TEMPERATURE: 98.6 F | HEART RATE: 67 BPM | BODY MASS INDEX: 25.13 KG/M2 | SYSTOLIC BLOOD PRESSURE: 126 MMHG | OXYGEN SATURATION: 97 %

## 2022-02-07 DIAGNOSIS — Z23 ENCOUNTER FOR IMMUNIZATION: Primary | ICD-10-CM

## 2022-02-07 DIAGNOSIS — E11.40 TYPE 2 DIABETES MELLITUS WITH DIABETIC NEUROPATHY, WITHOUT LONG-TERM CURRENT USE OF INSULIN (HCC): ICD-10-CM

## 2022-02-07 PROCEDURE — 99214 OFFICE O/P EST MOD 30 MIN: CPT | Performed by: INTERNAL MEDICINE

## 2022-02-07 PROCEDURE — 92250 FUNDUS PHOTOGRAPHY W/I&R: CPT | Performed by: INTERNAL MEDICINE

## 2022-02-07 RX ORDER — MULTIVIT WITH MINERALS/LUTEIN
1000 TABLET ORAL DAILY
COMMUNITY

## 2022-02-07 RX ORDER — CHOLECALCIFEROL (VITAMIN D3) 125 MCG
2000 CAPSULE ORAL DAILY
COMMUNITY

## 2022-02-07 NOTE — PROGRESS NOTES
Assessment/Plan:       Diagnoses and all orders for this visit:    Encounter for immunization    Type 2 diabetes mellitus with diabetic neuropathy, without long-term current use of insulin ([facility] )  -     IRIS Diabetic eye exam  -     Hemoglobin A1C ([facility]); Future  -     CBC and differential; Future  -     Basic metabolic panel; Future    Other orders  -     Ascorbic Acid (vitamin C) 1000 MG tablet; Take 1,000 mg by mouth daily  -     Cholecalciferol (Vitamin D3) 50 MCG (2000 UT) TABS; Take 2,000 Units by mouth daily                Subjective:      Patient ID: Aleida Bain is a 80 y o  female  TCM  This 80year-old was hospitalized for 4 days with COVID pneumonia  She was on vaccinated  Still complaint of fatigue and still using oxygen at night but no coughing no wheezing no fever no chills and no sweats; almost completely back to baseline  Diabetic decent control but needs to check A1c eye exam       The following portions of the patient's history were reviewed and updated as appropriate:   She has a past medical history of Anemia, Aneurysm of thoracic aorta (HCC), Aortic valve disorder, Benign neoplasm of sigmoid colon, Cardiomyopathy ([facility] ), CHF (congestive heart failure) ([address] ), Chronic kidney disease, 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[facility] ), Diabetic nephropathy ([facility] ), RAMON (dyspnea on exertion), GERD (gastroesophageal reflux disease), History of emphysema, Hyperlipidemia, and TIA (transient ischemic attack)  ,  does not have any pertinent problems on file  ,   has a past surgical history that includes Cholecystectomy; Colonoscopy; Hysterectomy; Aortic valve replacement; pr esophagogastroduodenoscopy transoral diagnostic (N/A, 11/29/2017); Other surgical history; Cardiac surgery; Insert / replace / remove pacemaker; Cardiac pacemaker placement; Knee surgery (Right); and pr colonoscopy flx dx w/collj spec when pfrmd (N/A, 5/31/2018)  ,  family history includes No Known Problems in her father and mother  ,   reports that she quit smoking about 14 years ago  She has a 50 00 pack-year smoking history  She quit smokeless tobacco use about 15 years ago  She reports that she does not drink alcohol and does not use drugs  ,  has No Known Allergies     Current Outpatient Medications   Medication Sig Dispense Refill    Ascorbic Acid (vitamin C) 1000 MG tablet Take 1,000 mg by mouth daily      BD Pen Needle Micro U/F 32G X 6 MM MISC       BD Pen Needle Micro U/F 32G X 6 MM MISC use daily 100 each 3    Cholecalciferol (Vitamin D3) 50 MCG (2000 UT) TABS Take 2,000 Units by mouth daily      FREESTYLE LITE test strip Check blood glucose up to 3 times daily 300 strip 3    furosemide (LASIX) 40 mg tablet take 1 tablet by mouth daily 90 tablet 3    gabapentin (NEURONTIN) 300 mg capsule take 1 capsule by mouth three times a day 270 capsule 3    glipiZIDE (GLUCOTROL) 10 mg tablet Take 1 tablet (10 mg total) by mouth 2 (two) times a day before meals 180 tablet 2    insulin detemir (Levemir FlexTouch) 100 Units/mL injection pen Inject 26 Units under the skin daily at bedtime 26 mL 5    Lancets (freestyle) lancets Check blood glucose 3 times daily 300 each 3    levothyroxine 50 mcg tablet Take 1 tablet (50 mcg total) by mouth daily 100 tablet 0    metoprolol tartrate (LOPRESSOR) 50 mg tablet 1/2 tab twice a day 90 tablet 3    pantoprazole (PROTONIX) 40 mg tablet take 1 tablet by mouth once daily BEFORE BREAKFAST 90 tablet 3    RA IRON 325 (65 Fe) MG tablet take 1 tablet by mouth twice a day before meals 60 tablet 2    warfarin (COUMADIN) 5 mg tablet Take 1-2 tabs daily or as directed (Patient taking differently: 5 mg 5 mg on sat, sun, tues-thursdays  7 mg M-W-F ) 180 tablet 3     No current facility-administered medications for this visit  Review of Systems   Constitutional: Positive for fatigue  Respiratory: Positive for shortness of breath  Musculoskeletal: Positive for arthralgias  Objective:  Vitals:    02/07/22 1317   BP: 126/62   Pulse: 67   Temp: 98 6 °F (37 °C)   SpO2: 97%      Physical Exam  Constitutional:       Comments:   Female who appears to be stated age   Cardiovascular:      Rate and Rhythm: Normal rate and regular rhythm  Pulmonary:      Effort: Pulmonary effort is normal       Breath sounds: Normal breath sounds  No wheezing, rhonchi or rales  Neurological:      General: No focal deficit present  Patient Instructions    Will check A1c CBC BMP  Follow-up in May      Advised to initiate COVID vaccine April

## 2022-02-10 ENCOUNTER — APPOINTMENT (OUTPATIENT)
Dept: LAB | Facility: CLINIC | Age: 82
End: 2022-02-10
Payer: MEDICARE

## 2022-02-10 ENCOUNTER — ANTICOAG VISIT (OUTPATIENT)
Dept: CARDIOLOGY CLINIC | Facility: CLINIC | Age: 82
End: 2022-02-10

## 2022-02-10 DIAGNOSIS — N18.31 STAGE 3A CHRONIC KIDNEY DISEASE (HCC): ICD-10-CM

## 2022-02-10 DIAGNOSIS — E11.40 TYPE 2 DIABETES MELLITUS WITH DIABETIC NEUROPATHY, WITHOUT LONG-TERM CURRENT USE OF INSULIN (HCC): ICD-10-CM

## 2022-02-10 DIAGNOSIS — E83.9 CHRONIC KIDNEY DISEASE-MINERAL AND BONE DISORDER: ICD-10-CM

## 2022-02-10 DIAGNOSIS — N18.9 CHRONIC KIDNEY DISEASE-MINERAL AND BONE DISORDER: ICD-10-CM

## 2022-02-10 DIAGNOSIS — M89.9 CHRONIC KIDNEY DISEASE-MINERAL AND BONE DISORDER: ICD-10-CM

## 2022-02-10 LAB
25(OH)D3 SERPL-MCNC: 52.8 NG/ML (ref 30–100)
ANION GAP SERPL CALCULATED.3IONS-SCNC: 6 MMOL/L (ref 4–13)
BACTERIA UR QL AUTO: ABNORMAL /HPF
BASOPHILS # BLD AUTO: 0.02 THOUSANDS/ΜL (ref 0–0.1)
BASOPHILS NFR BLD AUTO: 0 % (ref 0–1)
BILIRUB UR QL STRIP: NEGATIVE
BUN SERPL-MCNC: 17 MG/DL (ref 5–25)
CALCIUM SERPL-MCNC: 9.2 MG/DL (ref 8.3–10.1)
CHLORIDE SERPL-SCNC: 100 MMOL/L (ref 100–108)
CLARITY UR: ABNORMAL
CO2 SERPL-SCNC: 32 MMOL/L (ref 21–32)
COLOR UR: YELLOW
CREAT SERPL-MCNC: 1.19 MG/DL (ref 0.6–1.3)
CREAT UR-MCNC: 222 MG/DL
EOSINOPHIL # BLD AUTO: 0.1 THOUSAND/ΜL (ref 0–0.61)
EOSINOPHIL NFR BLD AUTO: 2 % (ref 0–6)
ERYTHROCYTE [DISTWIDTH] IN BLOOD BY AUTOMATED COUNT: 14.5 % (ref 11.6–15.1)
EST. AVERAGE GLUCOSE BLD GHB EST-MCNC: 174 MG/DL
GFR SERPL CREATININE-BSD FRML MDRD: 42 ML/MIN/1.73SQ M
GLUCOSE P FAST SERPL-MCNC: 162 MG/DL (ref 65–99)
GLUCOSE UR STRIP-MCNC: NEGATIVE MG/DL
HBA1C MFR BLD: 7.7 %
HCT VFR BLD AUTO: 41.7 % (ref 34.8–46.1)
HGB BLD-MCNC: 13.8 G/DL (ref 11.5–15.4)
HGB UR QL STRIP.AUTO: NEGATIVE
HYALINE CASTS #/AREA URNS LPF: ABNORMAL /LPF
IMM GRANULOCYTES # BLD AUTO: 0.01 THOUSAND/UL (ref 0–0.2)
IMM GRANULOCYTES NFR BLD AUTO: 0 % (ref 0–2)
KETONES UR STRIP-MCNC: ABNORMAL MG/DL
LEUKOCYTE ESTERASE UR QL STRIP: ABNORMAL
LYMPHOCYTES # BLD AUTO: 1.23 THOUSANDS/ΜL (ref 0.6–4.47)
LYMPHOCYTES NFR BLD AUTO: 28 % (ref 14–44)
MCH RBC QN AUTO: 29.1 PG (ref 26.8–34.3)
MCHC RBC AUTO-ENTMCNC: 33.1 G/DL (ref 31.4–37.4)
MCV RBC AUTO: 88 FL (ref 82–98)
MONOCYTES # BLD AUTO: 0.45 THOUSAND/ΜL (ref 0.17–1.22)
MONOCYTES NFR BLD AUTO: 10 % (ref 4–12)
MUCOUS THREADS UR QL AUTO: ABNORMAL
NEUTROPHILS # BLD AUTO: 2.65 THOUSANDS/ΜL (ref 1.85–7.62)
NEUTS SEG NFR BLD AUTO: 60 % (ref 43–75)
NITRITE UR QL STRIP: NEGATIVE
NON-SQ EPI CELLS URNS QL MICRO: ABNORMAL /HPF
NRBC BLD AUTO-RTO: 0 /100 WBCS
PH UR STRIP.AUTO: 6 [PH]
PHOSPHATE SERPL-MCNC: 2.5 MG/DL (ref 2.3–4.1)
PLATELET # BLD AUTO: 204 THOUSANDS/UL (ref 149–390)
PMV BLD AUTO: 11.2 FL (ref 8.9–12.7)
POTASSIUM SERPL-SCNC: 3.5 MMOL/L (ref 3.5–5.3)
PROT UR STRIP-MCNC: ABNORMAL MG/DL
PROT UR-MCNC: 44 MG/DL
PROT/CREAT UR: 0.2 MG/G{CREAT} (ref 0–0.1)
PTH-INTACT SERPL-MCNC: 53.5 PG/ML (ref 18.4–80.1)
RBC # BLD AUTO: 4.75 MILLION/UL (ref 3.81–5.12)
RBC #/AREA URNS AUTO: ABNORMAL /HPF
SODIUM SERPL-SCNC: 138 MMOL/L (ref 136–145)
SP GR UR STRIP.AUTO: 1.01 (ref 1–1.03)
UROBILINOGEN UR QL STRIP.AUTO: 0.2 E.U./DL
WBC # BLD AUTO: 4.46 THOUSAND/UL (ref 4.31–10.16)
WBC #/AREA URNS AUTO: ABNORMAL /HPF

## 2022-02-10 PROCEDURE — 83036 HEMOGLOBIN GLYCOSYLATED A1C: CPT

## 2022-02-10 PROCEDURE — 82306 VITAMIN D 25 HYDROXY: CPT

## 2022-02-10 PROCEDURE — 85025 COMPLETE CBC W/AUTO DIFF WBC: CPT

## 2022-02-10 PROCEDURE — 84156 ASSAY OF PROTEIN URINE: CPT

## 2022-02-10 PROCEDURE — 84100 ASSAY OF PHOSPHORUS: CPT

## 2022-02-10 PROCEDURE — 80048 BASIC METABOLIC PNL TOTAL CA: CPT

## 2022-02-10 PROCEDURE — 82570 ASSAY OF URINE CREATININE: CPT

## 2022-02-10 PROCEDURE — 83970 ASSAY OF PARATHORMONE: CPT

## 2022-02-10 PROCEDURE — 81001 URINALYSIS AUTO W/SCOPE: CPT

## 2022-02-23 DIAGNOSIS — E03.9 HYPOTHYROIDISM, UNSPECIFIED TYPE: ICD-10-CM

## 2022-02-23 RX ORDER — LEVOTHYROXINE SODIUM 0.05 MG/1
50 TABLET ORAL DAILY
Qty: 30 TABLET | Refills: 0 | Status: SHIPPED | OUTPATIENT
Start: 2022-02-23 | End: 2022-02-25 | Stop reason: SDUPTHER

## 2022-02-25 DIAGNOSIS — E03.9 HYPOTHYROIDISM, UNSPECIFIED TYPE: ICD-10-CM

## 2022-02-25 NOTE — TELEPHONE ENCOUNTER
Patient is requesting a call back in regard to the medication levothyroxine 50mcg, she states the refill was sent for a 30 day supply but it should've been sent for 90 day  Please advise if this could be corrected

## 2022-02-26 RX ORDER — LEVOTHYROXINE SODIUM 0.05 MG/1
50 TABLET ORAL DAILY
Qty: 90 TABLET | Refills: 0 | Status: SHIPPED | OUTPATIENT
Start: 2022-02-26 | End: 2022-06-17

## 2022-03-15 ENCOUNTER — TELEPHONE (OUTPATIENT)
Dept: NEPHROLOGY | Facility: CLINIC | Age: 82
End: 2022-03-15

## 2022-03-16 ENCOUNTER — OFFICE VISIT (OUTPATIENT)
Dept: NEPHROLOGY | Facility: CLINIC | Age: 82
End: 2022-03-16
Payer: MEDICARE

## 2022-03-16 VITALS
DIASTOLIC BLOOD PRESSURE: 60 MMHG | RESPIRATION RATE: 16 BRPM | SYSTOLIC BLOOD PRESSURE: 140 MMHG | HEART RATE: 74 BPM | OXYGEN SATURATION: 95 % | TEMPERATURE: 96.5 F | WEIGHT: 159.6 LBS | HEIGHT: 66 IN | BODY MASS INDEX: 25.65 KG/M2

## 2022-03-16 DIAGNOSIS — E83.9 CHRONIC KIDNEY DISEASE-MINERAL AND BONE DISORDER: ICD-10-CM

## 2022-03-16 DIAGNOSIS — E11.40 TYPE 2 DIABETES MELLITUS WITH DIABETIC NEUROPATHY, WITHOUT LONG-TERM CURRENT USE OF INSULIN (HCC): ICD-10-CM

## 2022-03-16 DIAGNOSIS — I25.5 ISCHEMIC CARDIOMYOPATHY: ICD-10-CM

## 2022-03-16 DIAGNOSIS — N18.31 STAGE 3A CHRONIC KIDNEY DISEASE (HCC): Primary | ICD-10-CM

## 2022-03-16 DIAGNOSIS — I10 HYPERTENSION, ESSENTIAL: ICD-10-CM

## 2022-03-16 DIAGNOSIS — N18.9 CHRONIC KIDNEY DISEASE-MINERAL AND BONE DISORDER: ICD-10-CM

## 2022-03-16 DIAGNOSIS — M89.9 CHRONIC KIDNEY DISEASE-MINERAL AND BONE DISORDER: ICD-10-CM

## 2022-03-16 PROCEDURE — 99214 OFFICE O/P EST MOD 30 MIN: CPT | Performed by: INTERNAL MEDICINE

## 2022-03-16 NOTE — ASSESSMENT & PLAN NOTE
Lab Results   Component Value Date    EGFR 42 02/10/2022    EGFR 42 01/22/2022    EGFR 37 01/21/2022    CREATININE 1 19 02/10/2022    CREATININE 1 20 01/22/2022    CREATININE 1 34 (H) 01/21/2022   Patient renal function is overall stable    Advised hydration and avoiding any nephrotoxic medication

## 2022-03-16 NOTE — ASSESSMENT & PLAN NOTE
Lab Results   Component Value Date    HGBA1C 7 7 (H) 02/10/2022   Reasonably well control    Importance of diabetic control and effect on kidney disease discussed with the patient

## 2022-03-16 NOTE — ASSESSMENT & PLAN NOTE
Lab Results   Component Value Date    EGFR 42 02/10/2022    EGFR 42 01/22/2022    EGFR 37 01/21/2022    CREATININE 1 19 02/10/2022    CREATININE 1 20 01/22/2022    CREATININE 1 34 (H) 01/21/2022   Patient phosphorus PTH and vitamin-D are within acceptable range and will continue to monitor

## 2022-03-16 NOTE — PATIENT INSTRUCTIONS
Chronic Kidney Disease   AMBULATORY CARE:   Chronic kidney disease (CKD)  is the gradual and permanent loss of kidney function  Normally, the kidneys remove fluid, chemicals, and waste from your blood  These wastes are turned into urine by your kidneys  CKD may worsen over time and lead to kidney failure  Common signs and symptoms include the following:   · Changes in how often you need to urinate    · Swelling in your arms, legs, or feet    · Shortness of breath    · Fatigue or weakness    · Bad or bitter taste in your mouth    · Nausea, vomiting, or loss of appetite    Call your local emergency number (911 in the [address] [facility]) if:   · You have a seizure  · You have shortness of breath  Seek care immediately if:   · You are confused and very drowsy  Call your doctor or nephrologist if:   · You suddenly gain or lose more weight than your healthcare provider has told you is okay  · You have itchy skin or a rash  · You urinate more or less than you normally do  · You have blood in your urine  · You have nausea and are vomiting  · You have fatigue or muscle weakness  · You have hiccups that will not stop  · You have questions or concerns about your condition or care  How CKD is diagnosed:  CKD has 5 stages  Your healthcare provider will use results from the following tests to find the stage of CKD you have:  · Blood and urine tests  show how well your kidneys are working  They may also show the cause of your CKD  · Ultrasound, CT scan, or MRI  pictures may be used to check your kidneys  You may be given contrast liquid to help your kidneys show up better in the pictures  Tell the healthcare provider if you have ever had an allergic reaction to contrast liquid  Do not enter the MRI room with anything metal  Metal can cause serious injury  Tell the healthcare provider if you have any metal in or on your body      · A biopsy  is a procedure to remove a small piece of tissue from your kidney  It is done to find the cause of your CKD  Treatment  can help control signs and symptoms, and prevent a worse stage of CKD  Your care team may include specialists, such as a dietitian or a heart specialist  This depends on the stage of your CKD and if you have other health conditions to manage  Healthcare providers will work with you to create a plan based on your decisions for treatment  Your treatment plan may include any of the following:  · Medicines  may be given to decrease your blood pressure and get rid of extra fluid  You may also receive medicine to manage health conditions that may occur with CKD, such as anemia, diabetes, and heart disease  · Dialysis  is a treatment to remove chemicals and waste from your blood when your kidneys can no longer do this  · Surgery  may be needed to create an arteriovenous fistula (AVF) in your arm or insert a catheter into your abdomen  This is done so you can receive dialysis  · A kidney transplant  may be done if your CKD becomes severe  What you can do to manage CKD: Management may include making some lifestyle changes  Tell your healthcare provider if you have any concerns about being able to make changes  He or she can help you find solutions, including working with specialists  Ask for help creating a plan to break large goals into smaller steps  Your plan may include any of the following:  · Manage other health conditions  Your healthcare provider will work with you to make a care plan that meets your needs  You will be checked regularly for heart disease or other conditions that can make CKD worse, such as diabetes  Your blood pressure will be closely monitored  You will also get a target blood pressure and help making a plan to reach your target  This may include taking your blood pressure at home  · Maintain a healthy weight  Your weight and body mass index (BMI) will be checked regularly   BMI helps find if your weight is healthy for your height  Your healthcare provider will use other tests to check your muscle and protein levels  Extra weight can strain your kidneys  A low weight or low muscle mass can make you feel more tired  You may have trouble doing your daily activities  Ask your provider what a healthy weight is for you  He or she can help you create a plan to lose or gain weight safely, if needed  The plan may include keeping a food diary  This is a list of foods and liquids you have each day  Your provider will use the diary to help you make changes, if needed  Changes are based on your health and any other conditions you have, such as diabetes  · Create an exercise plan  Regular exercise can help you manage CKD, high blood pressure, and diabetes  Exercise also helps control weight  Your provider can help you create exercise goals and a plan to reach those goals  For example, your goal may be to exercise for 30 minutes in a day  Your plan can include breaking exercise into 10 minute sessions, 3 times during the day  · Create a healthy eating plan  Your provider may tell you to eat food low in potassium, phosphorus, or protein  Your provider may also recommend vitamin or mineral supplements  Do not take any supplements without talking to your provider  A dietitian can help you plan meals if needed  Ask how much liquid to drink each day and which liquids are best for you  · Limit sodium (salt) as directed  You may need to limit sodium to less than 2,300 milligrams (mg) each day  Ask your dietitian or healthcare provider how much sodium you can have each day  The amount depends on your stage of kidney disease  Table salt, canned foods, soups, salted snacks, and processed meats, like deli meats and sausage, are high in sodium  Your provider or a dietitian can show you how to read food labels for sodium  · Limit alcohol as directed  Alcohol can cause fluid retention and can affect your kidneys   Ask how much alcohol is safe for you  A drink of alcohol is 12 ounces of beer, 5 ounces of wine, or 1½ ounces of liquor  · Do not smoke  Nicotine and other chemicals in cigarettes and cigars can cause kidney damage  Ask your provider for information if you currently smoke and need help to quit  E-cigarettes or smokeless tobacco still contain nicotine  Talk to your provider before you use these products  · Ask about over-the-counter medicines  Medicines such as NSAIDs and laxatives may harm your kidneys  Some cough and cold medicines can raise your blood pressure  Always ask if a medicine is safe before you take it  · Ask about vaccines you may need  CKD can increase your risk for infections such as pneumonia, influenza, and hepatitis  Vaccines lower your risk for infection  Your healthcare provider will tell you which vaccines you need and when to get them  Follow up with your doctor or nephrologist as directed: You will need to return for tests to monitor your kidney and nerve function, and your parathyroid hormone level  Your medicines may be changed, based on certain test results  Write down your questions so you remember to ask them during your visits  © Copyright [employer] 2022 Information is for End User's use only and may not be sold, redistributed or otherwise used for commercial purposes  All illustrations and images included in CareNotes® are the copyrighted property of [employer]  or Sudarshan Crisostomo  The above information is an  only  It is not intended as medical advice for individual conditions or treatments  Talk to your doctor, nurse or pharmacist before following any medical regimen to see if it is safe and effective for you

## 2022-03-16 NOTE — PROGRESS NOTES
NEPHROLOGY OFFICE FOLLOW UP  Vilma Rivera 80 y o  female MRN: [id number]    Encounter: [id number] 3/16/2022    REASON FOR VISIT: Vilma Rivera is a 80 y o  female who is here on 3/16/2022 for Chronic Kidney Disease and Follow-up    HPI:    Blanquita Morocho came in today for follow-up of CKD  [age] woman with history of hypertension diabetes and CKD  Since I saw her last she was hospital with COVID-19 infection and pneumonia    She is feeling much better now     Denies any acute complaint     No chest pain no palpitation or shortness of breath     No nausea no vomiting or any urinary complaint      REVIEW OF SYSTEMS:    Review of Systems   Constitutional: Negative for activity change and fatigue  HENT: Negative for congestion and ear discharge  Eyes: Negative for photophobia and pain  Respiratory: Negative for apnea and choking  Cardiovascular: Negative for chest pain and palpitations  Gastrointestinal: Negative for abdominal distention and blood in stool  Endocrine: Negative for heat intolerance and polyphagia  Genitourinary: Negative for flank pain and urgency  Musculoskeletal: Negative for neck pain and neck stiffness  Skin: Negative for color change and wound  Allergic/Immunologic: Negative for food allergies and immunocompromised state  Neurological: Negative for seizures and facial asymmetry  Hematological: Negative for adenopathy  Does not bruise/bleed easily  Psychiatric/Behavioral: Negative for self-injury and suicidal ideas           PAST MEDICAL HISTORY:  Past Medical History:   Diagnosis Date    Anemia     Aneurysm [facility] aorta ([facility]     Aortic valve disorder     Benign neoplasm of sigmoid colon     Cardiomyopathy ([facility] )     last assessed: 10/10/2014    CHF (congestive heart failure) (HCC)     Chronic kidney disease     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[facility] )     last assessed: 10/10/2014    Diabetic nephropathy (HCC)     RAMON (dyspnea on exertion)     GERD (gastroesophageal reflux disease)     History of emphysema     Hyperlipidemia     TIA (transient ischemic attack)        PAST SURGICAL HISTORY:  Past Surgical History:   Procedure Laterality Date    AORTIC VALVE REPLACEMENT      CARDIAC PACEMAKER PLACEMENT      last assessed: 10/10/2014   Kermit Cao CARDIAC SURGERY      aortic valve replacement    CHOLECYSTECTOMY      COLONOSCOPY      HYSTERECTOMY      INSERT / REPLACE / REMOVE PACEMAKER      KNEE SURGERY Right     OTHER SURGICAL HISTORY      Capsule ENDOscopy description: 2012    RI COLONOSCOPY FLX DX W/COLLJ SPEC WHEN PFRMD N/A 2018    Procedure: single balloon ENTEROSCOPY;  Surgeon: Mary Anne Gibson MD;  [facility]; Service: Gastroenterology    RI ESOPHAGOGASTRODUODENOSCOPY TRANSORAL DIAGNOSTIC N/A 2017    Procedure: EGD AND COLONOSCOPY;  Surgeon: Grant Norman MD;  [facility];   Service: Gastroenterology       SOCIAL HISTORY:  Social History     Substance and Sexual Activity   Alcohol Use Never     Social History     Substance and Sexual Activity   Drug Use No     Social History     Tobacco Use   Smoking Status Former Smoker    Packs/day: 1 00    Years: 50 00    Pack years: 50 00    Quit date: 2007    Years since quittin 3   Smokeless Tobacco Former User    Quit date:        FAMILY HISTORY:  Family History   Problem Relation Age of Onset    No Known Problems Mother     No Known Problems Father        MEDICATIONS:    Current Outpatient Medications:     Ascorbic Acid (vitamin C) 1000 MG tablet, Take 1,000 mg by mouth daily, Disp: , Rfl:     BD Pen Needle Micro U/F 32G X 6 MM MISC, , Disp: , Rfl:     BD Pen Needle Micro U/F 32G X 6 MM MISC, use daily, Disp: 100 each, Rfl: 3    Cholecalciferol (Vitamin D3) 50 MCG (2000 UT) TABS, Take 2,000 Units by mouth daily, Disp: , Rfl:     FREESTYLE LITE test strip, Check blood glucose up to 3 times daily, Disp: 300 strip, Rfl: 3    furosemide (LASIX) 40 mg tablet, take 1 tablet by mouth daily, Disp: 90 tablet, Rfl: 3    gabapentin (NEURONTIN) 300 mg capsule, take 1 capsule by mouth three times a day, Disp: 270 capsule, Rfl: 3    glipiZIDE (GLUCOTROL) 10 mg tablet, Take 1 tablet (10 mg total) by mouth 2 (two) times a day before meals, Disp: 180 tablet, Rfl: 2    insulin detemir (Levemir FlexTouch) 100 Units/mL injection pen, Inject 26 Units under the skin daily at bedtime, Disp: 26 mL, Rfl: 5    Lancets (freestyle) lancets, Check blood glucose 3 times daily, Disp: 300 each, Rfl: 3    levothyroxine 50 mcg tablet, Take 1 tablet (50 mcg total) by mouth daily, Disp: 90 tablet, Rfl: 0    metoprolol tartrate (LOPRESSOR) 50 mg tablet, 1/2 tab twice a day, Disp: 90 tablet, Rfl: 3    pantoprazole (PROTONIX) 40 mg tablet, take 1 tablet by mouth once daily BEFORE BREAKFAST, Disp: 90 tablet, Rfl: 3    RA IRON 325 (65 Fe) MG tablet, take 1 tablet by mouth twice a day before meals, Disp: 60 tablet, Rfl: 2    warfarin (COUMADIN) 5 mg tablet, Take 1-2 tabs daily or as directed (Patient taking differently: 5 mg 5 mg on sat, sun, tues-thursdays  7 mg M-W-F ), Disp: 180 tablet, Rfl: 3    PHYSICAL EXAM:  Vitals:    03/16/22 0853   BP: 140/60   BP Location: Right arm   Patient Position: Sitting   Pulse: 74   Resp: 16   Temp: (!) 96 5 °F (35 8 °C)   TempSrc: Temporal   SpO2: 95%   Weight: 72 4 kg (159 lb 9 6 oz)   Height: 5' 6" (1 676 m)     Body mass index is 25 76 kg/m²  Physical Exam  Constitutional:       General: She is not in acute distress  Appearance: She is well-developed  HENT:      Head: Normocephalic  Eyes:      General: No scleral icterus  Conjunctiva/sclera: Conjunctivae normal    Neck:      Vascular: No JVD  Cardiovascular:      Rate and Rhythm: Normal rate  Heart sounds: Normal heart sounds  Pulmonary:      Effort: Pulmonary effort is normal       Breath sounds: Normal breath sounds  No wheezing     Abdominal:      General: Bowel sounds are normal       Palpations: Abdomen is soft  Tenderness: There is no abdominal tenderness  Musculoskeletal:         General: Normal range of motion  Cervical back: Normal range of motion and neck supple  Skin:     General: Skin is warm  Findings: No rash  Neurological:      Mental Status: She is alert and oriented to person, place, and time     Psychiatric:         Behavior: Behavior normal          LAB RESULTS:  Results for orders placed or performed in visit on 25/18/35   Basic metabolic panel   Result Value Ref Range    Sodium 138 136 - 145 mmol/L    Potassium 3 5 3 5 - 5 3 mmol/L    Chloride 100 100 - 108 mmol/L    CO2 32 21 - 32 mmol/L    ANION GAP 6 4 - 13 mmol/L    BUN 17 5 - 25 mg/dL    Creatinine 1 19 0 60 - 1 30 mg/dL    Glucose, Fasting 162 (H) 65 - 99 mg/dL    Calcium 9 2 8 3 - 10 1 mg/dL    eGFR 42 ml/min/1 73sq m   CBC and differential   Result Value Ref Range    WBC 4 46 4 31 - 10 16 Thousand/uL    RBC 4 75 3 81 - 5 12 Million/uL    Hemoglobin 13 8 11 5 - 15 4 g/dL    Hematocrit 41 7 34 8 - 46 1 %    MCV 88 82 - 98 fL    MCH 29 1 26 8 - 34 3 pg    MCHC 33 1 31 4 - 37 4 g/dL    RDW 14 5 11 6 - 15 1 %    MPV 11 2 8 9 - 12 7 fL    Platelets 786 696 - 288 Thousands/uL    nRBC 0 /100 WBCs    Neutrophils Relative 60 43 - 75 %    Immat GRANS % 0 0 - 2 %    Lymphocytes Relative 28 14 - 44 %    Monocytes Relative 10 4 - 12 %    Eosinophils Relative 2 0 - 6 %    Basophils Relative 0 0 - 1 %    Neutrophils Absolute 2 65 1 85 - 7 62 Thousands/µL    Immature Grans Absolute 0 01 0 00 - 0 20 Thousand/uL    Lymphocytes Absolute 1 23 0 60 - 4 47 Thousands/µL    Monocytes Absolute 0 45 0 17 - 1 22 Thousand/µL    Eosinophils Absolute 0 10 0 00 - 0 61 Thousand/µL    Basophils Absolute 0 02 0 00 - 0 10 Thousands/µL   Phosphorus   Result Value Ref Range    Phosphorus 2 5 2 3 - 4 1 mg/dL   Protein / creatinine ratio, urine   Result Value Ref Range    Creatinine, Ur 222 0 mg/dL    Protein Urine Random 44 mg/dL    Prot/Creat Ratio, Ur 0 20 (H) 0 00 - 0 10   PTH, intact   Result Value Ref Range    PTH 53 5 18 4 - 80 1 pg/mL   UA (URINE) with reflex to Scope   Result Value Ref Range    Color, UA Yellow     Clarity, UA Hazy     Specific [place], UA 1 015 1 003 - 1 030    pH, UA 6 0 4 5, 5 0, 5 5, 6 0, 6 5, 7 0, 7 5, 8 0    Leukocytes, UA Moderate (A) Negative    Nitrite, UA Negative Negative    Protein, UA 30 (1+) (A) Negative mg/dl    Glucose, UA Negative Negative mg/dl    Ketones, UA Trace (A) Negative mg/dl    Urobilinogen, UA 0 2 0 2, 1 0 E U /dl E U /dl    Bilirubin, UA Negative Negative    Blood, UA Negative    Vitamin D 25 hydroxy   Result Value Ref Range    Vit D, 25-Hydroxy 52 8 30 0 - 100 0 ng/mL   Hemoglobin A1C ([facility])   Result Value Ref Range    Hemoglobin A1C 7 7 (H) Normal 3 8-5 6%; PreDiabetic 5 7-6 4%; Diabetic >=6 5%; Glycemic control for adults with diabetes <7 0% %     mg/dl   Urine Microscopic   Result Value Ref Range    RBC, UA None Seen None Seen, 2-4 /hpf    WBC, UA 20-30 (A) None Seen, 2-4, 5-60 /hpf    Epithelial Cells Occasional None Seen, Occasional /hpf    Bacteria, UA Occasional None Seen, Occasional /hpf    Hyaline Casts, UA 10-25 (A) None Seen /lpf    MUCUS THREADS Occasional (A) None Seen       ASSESSMENT and PLAN:      Stage 3a chronic kidney disease  Lab Results   Component Value Date    EGFR 42 02/10/2022    EGFR 42 01/22/2022    EGFR 37 01/21/2022    CREATININE 1 19 02/10/2022    CREATININE 1 20 01/22/2022    CREATININE 1 34 (H) 01/21/2022   Patient renal function is overall stable    Advised hydration and avoiding any nephrotoxic medication    Chronic kidney disease-mineral and bone disorder  Lab Results   Component Value Date    EGFR 42 02/10/2022    EGFR 42 01/22/2022    EGFR 37 01/21/2022    CREATININE 1 19 02/10/2022    CREATININE 1 20 01/22/2022    CREATININE 1 34 (H) 01/21/2022   Patient phosphorus PTH and vitamin-D are within acceptable range and will continue to monitor    Hypertension, essential  Overall she is well control and will continue present management    Diabetes mellitus with neurological manifestation ([facility] [address] )    Lab Results   Component Value Date    HGBA1C 7 7 (H) 02/10/2022   Reasonably well control  Importance of diabetic control and effect on kidney disease discussed with the patient    Cardiomyopathy ([facility] [address] )  On diuretic and seems to be quite asymptomatic      Everything discussed with the patient at length  I will see her back in 6 months will get blood and urine test before that visit        Portions of the record may have been created with voice recognition software  Occasional wrong word or "sound a like" substitutions may have occurred due to the inherent limitations of voice recognition software  Read the chart carefully and recognize, using context, where substitutions have occurred  If you have any questions, please contact the dictating provider

## 2022-03-22 ENCOUNTER — REMOTE DEVICE CLINIC VISIT (OUTPATIENT)
Dept: CARDIOLOGY CLINIC | Facility: CLINIC | Age: 82
End: 2022-03-22
Payer: MEDICARE

## 2022-03-22 DIAGNOSIS — Z95.0 CARDIAC PACEMAKER IN SITU: Primary | ICD-10-CM

## 2022-03-22 PROCEDURE — 93294 REM INTERROG EVL PM/LDLS PM: CPT | Performed by: INTERNAL MEDICINE

## 2022-03-22 PROCEDURE — 93296 REM INTERROG EVL PM/IDS: CPT | Performed by: INTERNAL MEDICINE

## 2022-03-22 NOTE — PROGRESS NOTES
SJM DUAL CHAMBER PM/ NOT MRI CONDITIONAL   MERLIN TRANSMISSION: BATTERY VOLTAGE ADEQUATE (9 7 YRS)  AP 40%   99% (>40% CHB/DDDR 60)  ALL AVAILABLE LEAD PARAMETERS WITHIN NORMAL LIMITS  7 AMS EPISODES MAX DURATION 17:28:56 HRS  AF BURDEN 5 5%  HX:  PAF & PATIENT ON WARFARIN, METOPROLOL TART   PACEMAKER FUNCTIONING APPROPRIATELY   ES

## 2022-04-08 DIAGNOSIS — E11.42 TYPE 2 DIABETES MELLITUS WITH DIABETIC POLYNEUROPATHY, WITH LONG-TERM CURRENT USE OF INSULIN (HCC): ICD-10-CM

## 2022-04-08 DIAGNOSIS — Z79.4 TYPE 2 DIABETES MELLITUS WITH DIABETIC POLYNEUROPATHY, WITH LONG-TERM CURRENT USE OF INSULIN (HCC): ICD-10-CM

## 2022-04-09 RX ORDER — GLIPIZIDE 10 MG/1
10 TABLET ORAL
Qty: 180 TABLET | Refills: 3
Start: 2022-04-09 | End: 2022-04-10

## 2022-04-10 DIAGNOSIS — E11.42 TYPE 2 DIABETES MELLITUS WITH DIABETIC POLYNEUROPATHY, WITH LONG-TERM CURRENT USE OF INSULIN (HCC): ICD-10-CM

## 2022-04-10 DIAGNOSIS — Z79.4 TYPE 2 DIABETES MELLITUS WITH DIABETIC POLYNEUROPATHY, WITH LONG-TERM CURRENT USE OF INSULIN (HCC): ICD-10-CM

## 2022-04-10 RX ORDER — GLIPIZIDE 10 MG/1
TABLET ORAL
Qty: 180 TABLET | Refills: 3 | Status: SHIPPED | OUTPATIENT
Start: 2022-04-10 | End: 2022-07-19 | Stop reason: SDUPTHER

## 2022-04-26 DIAGNOSIS — K21.9 GASTROESOPHAGEAL REFLUX DISEASE: ICD-10-CM

## 2022-04-26 RX ORDER — PANTOPRAZOLE SODIUM 40 MG/1
TABLET, DELAYED RELEASE ORAL
Qty: 90 TABLET | Refills: 3 | Status: SHIPPED | OUTPATIENT
Start: 2022-04-26 | End: 2022-07-19 | Stop reason: SDUPTHER

## 2022-05-05 ENCOUNTER — TELEPHONE (OUTPATIENT)
Dept: INTERNAL MEDICINE CLINIC | Facility: CLINIC | Age: 82
End: 2022-05-05

## 2022-05-05 ENCOUNTER — APPOINTMENT (OUTPATIENT)
Dept: LAB | Facility: CLINIC | Age: 82
End: 2022-05-05
Payer: MEDICARE

## 2022-05-05 ENCOUNTER — OFFICE VISIT (OUTPATIENT)
Dept: INTERNAL MEDICINE CLINIC | Facility: CLINIC | Age: 82
End: 2022-05-05
Payer: MEDICARE

## 2022-05-05 ENCOUNTER — ANTICOAG VISIT (OUTPATIENT)
Dept: CARDIOLOGY CLINIC | Facility: CLINIC | Age: 82
End: 2022-05-05

## 2022-05-05 VITALS
TEMPERATURE: 98.6 F | DIASTOLIC BLOOD PRESSURE: 70 MMHG | BODY MASS INDEX: 26 KG/M2 | OXYGEN SATURATION: 97 % | WEIGHT: 161.8 LBS | HEART RATE: 66 BPM | HEIGHT: 66 IN | SYSTOLIC BLOOD PRESSURE: 158 MMHG

## 2022-05-05 DIAGNOSIS — I48.0 PAROXYSMAL ATRIAL FIBRILLATION (HCC): Primary | ICD-10-CM

## 2022-05-05 DIAGNOSIS — E03.9 ACQUIRED HYPOTHYROIDISM: ICD-10-CM

## 2022-05-05 DIAGNOSIS — L65.9 HAIR LOSS: Primary | ICD-10-CM

## 2022-05-05 DIAGNOSIS — L65.9 HAIR LOSS: ICD-10-CM

## 2022-05-05 PROBLEM — R05.9 COUGH: Status: ACTIVE | Noted: 2022-05-05

## 2022-05-05 PROBLEM — N18.4 CKD (CHRONIC KIDNEY DISEASE) STAGE 4, GFR 15-29 ML/MIN (HCC): Status: ACTIVE | Noted: 2022-05-05

## 2022-05-05 LAB
BASOPHILS # BLD AUTO: 0.02 THOUSANDS/ΜL (ref 0–0.1)
BASOPHILS NFR BLD AUTO: 0 % (ref 0–1)
EOSINOPHIL # BLD AUTO: 0.14 THOUSAND/ΜL (ref 0–0.61)
EOSINOPHIL NFR BLD AUTO: 3 % (ref 0–6)
ERYTHROCYTE [DISTWIDTH] IN BLOOD BY AUTOMATED COUNT: 13.8 % (ref 11.6–15.1)
HCT VFR BLD AUTO: 42.4 % (ref 34.8–46.1)
HGB BLD-MCNC: 14 G/DL (ref 11.5–15.4)
IMM GRANULOCYTES # BLD AUTO: 0.02 THOUSAND/UL (ref 0–0.2)
IMM GRANULOCYTES NFR BLD AUTO: 0 % (ref 0–2)
INR PPP: 2.75 (ref 0.84–1.19)
LYMPHOCYTES # BLD AUTO: 1.89 THOUSANDS/ΜL (ref 0.6–4.47)
LYMPHOCYTES NFR BLD AUTO: 38 % (ref 14–44)
MCH RBC QN AUTO: 29.5 PG (ref 26.8–34.3)
MCHC RBC AUTO-ENTMCNC: 33 G/DL (ref 31.4–37.4)
MCV RBC AUTO: 89 FL (ref 82–98)
MONOCYTES # BLD AUTO: 0.6 THOUSAND/ΜL (ref 0.17–1.22)
MONOCYTES NFR BLD AUTO: 12 % (ref 4–12)
NEUTROPHILS # BLD AUTO: 2.3 THOUSANDS/ΜL (ref 1.85–7.62)
NEUTS SEG NFR BLD AUTO: 47 % (ref 43–75)
NRBC BLD AUTO-RTO: 0 /100 WBCS
PLATELET # BLD AUTO: 226 THOUSANDS/UL (ref 149–390)
PMV BLD AUTO: 10.8 FL (ref 8.9–12.7)
PROTHROMBIN TIME: 27.7 SECONDS (ref 11.6–14.5)
RBC # BLD AUTO: 4.75 MILLION/UL (ref 3.81–5.12)
TSH SERPL DL<=0.05 MIU/L-ACNC: 2.78 UIU/ML (ref 0.45–4.5)
WBC # BLD AUTO: 4.97 THOUSAND/UL (ref 4.31–10.16)

## 2022-05-05 PROCEDURE — 85610 PROTHROMBIN TIME: CPT

## 2022-05-05 PROCEDURE — 85025 COMPLETE CBC W/AUTO DIFF WBC: CPT

## 2022-05-05 PROCEDURE — 99213 OFFICE O/P EST LOW 20 MIN: CPT

## 2022-05-05 PROCEDURE — 84443 ASSAY THYROID STIM HORMONE: CPT

## 2022-05-05 PROCEDURE — 36415 COLL VENOUS BLD VENIPUNCTURE: CPT

## 2022-05-05 NOTE — TELEPHONE ENCOUNTER
----- Message from Joi Reynaga sent at 5/5/2022 12:36 PM EDT -----  Please let her know that her labs looked good, hair loss most likely due to recent covid

## 2022-05-05 NOTE — TELEPHONE ENCOUNTER
I spoke to pt she is aware of her results she also want to know if provider can put in an order for her insulin needles she just have 5 left

## 2022-05-05 NOTE — PROGRESS NOTES
INTERNAL MEDICINE OFFICE VISIT  [facility]    NAME: Stephon Escalante  AGE: 80 y o  SEX: female  : 1940     DATE: 2022     Assessment and Plan:     Diagnoses and all orders for this visit:    Hair loss  Comments:  COVID infection in January, hairloss started around the same time  Will also obtain CBC and TSH  Instructed patient to use biotin OTC  Orders:  -     TSH, 3rd generation with Free T4 reflex; Future  -     CBC and differential; Future    Acquired hypothyroidism  Comments:  Rule out due to hair loss  Obtain TSH       Chief Complaint:     Chief Complaint   Patient presents with    Alopecia     Patient stated her hair is falling out by the chunks and its been happening for a few weeks now, patient denies any pain or scratching     HAMMER TOES     and bunnions that cause pain in her feet and she is supposed to get a form faxed here that will need to be signed so she can get the shoes      History of Present Illness:     Patient presents today in the office for a complaint of hair loss  She recently had covid in January and notes the hair loss started around then  She denies any other stressors, changes to diet, or medication or herbal use  The following portions of the patient's history were reviewed and updated as appropriate: allergies, current medications, past family history, past medical history, past social history, past surgical history and problem list      Review of Systems:     Review of Systems   Constitutional: Negative for appetite change, chills, diaphoresis, fatigue, fever and unexpected weight change  HENT: Negative for postnasal drip and sneezing  Eyes: Negative for visual disturbance  Respiratory: Negative for chest tightness and shortness of breath  Cardiovascular: Negative for chest pain, palpitations and leg swelling  Gastrointestinal: Negative for abdominal pain and blood in stool     Endocrine: Negative for cold intolerance, heat intolerance, polydipsia, polyphagia and polyuria  Genitourinary: Negative for difficulty urinating, dysuria, frequency and urgency  Musculoskeletal: Negative for arthralgias and myalgias  Skin: Negative for rash and wound  Complains of hair loss   Neurological: Negative for dizziness, weakness, light-headedness and headaches  Hematological: Negative for adenopathy  Psychiatric/Behavioral: Negative for confusion, dysphoric mood and sleep disturbance  The patient is not nervous/anxious  Past Medical History:     Past Medical History:   Diagnosis Date    Anemia     [facility] ([facility] [address] [facility]     Aortic valve disorder     Benign neoplasm of sigmoid colon     [facility]     last assessed: 10/10/2014    CHF (congestive heart failure) (HCC)     Chronic kidney disease     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HCC)     last assessed: 10/10/2014    Diabetic nephropathy (HCC)     RAMON (dyspnea on exertion)     GERD (gastroesophageal reflux disease)     History of emphysema     Hyperlipidemia     TIA (transient ischemic attack)         Past Surgical History:     Past Surgical History:   Procedure Laterality Date    AORTIC VALVE REPLACEMENT      CARDIAC PACEMAKER PLACEMENT      last assessed: 10/10/2014   Terri Chua CARDIAC SURGERY      aortic valve replacement    CHOLECYSTECTOMY      COLONOSCOPY      HYSTERECTOMY      INSERT / REPLACE / REMOVE PACEMAKER      KNEE SURGERY Right     OTHER SURGICAL HISTORY      Capsule ENDOscopy description: 12/19/2012    UT COLONOSCOPY FLX DX W/COLLJ SPEC WHEN PFRMD N/A 5/31/2018    Procedure: single balloon ENTEROSCOPY;  Surgeon: Celestina Mitchell MD;  [facility]; Service: Gastroenterology    UT ESOPHAGOGASTRODUODENOSCOPY TRANSORAL DIAGNOSTIC N/A 11/29/2017    Procedure: EGD AND COLONOSCOPY;  Surgeon: Cheryl Reynaga MD;  [facility];   Service: Gastroenterology        Social History:     Social History Socioeconomic History    Marital status:      Spouse name: None    Number of children: None    Years of education: None    Highest education level: None   Occupational History    None   Tobacco Use    Smoking status: Former Smoker     Packs/day: 1 00     Years: 50 00     Pack years: 50 00     Quit date: 2007     Years since quittin 4    Smokeless tobacco: Former User     Quit date:    Vaping Use    Vaping Use: Never used   Substance and Sexual Activity    Alcohol use: Never    Drug use: No    Sexual activity: Not Currently   Other Topics Concern    None   Social History Narrative    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 BID Freestyle lancets BID    Living independently with spouse     Social Determinants of Health     Financial Resource Strain: Not on file   Food Insecurity: No Food Insecurity    Worried About Running Out of Food in the Last Year: Never true    Arlen of Food in the Last Year: Never true   Transportation Needs: No Transportation Needs    Lack of Transportation (Medical): No    Lack of Transportation (Non-Medical):  No   Physical Activity: Inactive    Days of Exercise per Week: 0 days    Minutes of Exercise per Session: 0 min   Stress: No Stress Concern Present    Feeling of Stress : Not at all   Social Connections: Not on file   Intimate Partner Violence: Not on file   Housing Stability: Low Risk     Unable to Pay for Housing in the Last Year: No    Number of Places Lived in the Last Year: 1    Unstable Housing in the Last Year: No         Family History:     Family History   Problem Relation Age of Onset    No Known Problems Mother     No Known Problems Father         Current Medications:     Current Outpatient Medications:     Ascorbic Acid (vitamin C) 1000 MG tablet, Take 1,000 mg by mouth daily, Disp: , Rfl:     Cholecalciferol (Vitamin D3) 50 MCG (2000 UT) TABS, Take 2,000 Units by mouth daily, Disp: , Rfl:     FREESTYLE LITE test strip, Check blood glucose up to 3 times daily, Disp: 300 strip, Rfl: 3    furosemide (LASIX) 40 mg tablet, take 1 tablet by mouth daily, Disp: 90 tablet, Rfl: 3    gabapentin (NEURONTIN) 300 mg capsule, take 1 capsule by mouth three times a day, Disp: 270 capsule, Rfl: 3    glipiZIDE (GLUCOTROL) 10 mg tablet, take 1 tablet by mouth twice a day before meals, Disp: 180 tablet, Rfl: 3    insulin detemir (Levemir FlexTouch) 100 Units/mL injection pen, Inject 26 Units under the skin daily at bedtime, Disp: 26 mL, Rfl: 5    Lancets (freestyle) lancets, Check blood glucose 3 times daily, Disp: 300 each, Rfl: 3    levothyroxine 50 mcg tablet, Take 1 tablet (50 mcg total) by mouth daily, Disp: 90 tablet, Rfl: 0    metoprolol tartrate (LOPRESSOR) 50 mg tablet, 1/2 tab twice a day, Disp: 90 tablet, Rfl: 3    pantoprazole (PROTONIX) 40 mg tablet, take 1 tablet by mouth once daily BEFORE BREAKFAST, Disp: 90 tablet, Rfl: 3    RA IRON 325 (65 Fe) MG tablet, take 1 tablet by mouth twice a day before meals, Disp: 60 tablet, Rfl: 2    warfarin (COUMADIN) 5 mg tablet, Take 1-2 tabs daily or as directed (Patient taking differently: 5 mg 5 mg on sat, sun, tues-thursdays  7 mg M-W-F ), Disp: 180 tablet, Rfl: 3     Allergies:   No Known Allergies     Physical Exam:     /70 (BP Location: Left arm, Patient Position: Sitting, Cuff Size: Standard) Comment: bp  Pulse 66   Temp 98 6 °F (37 °C) (Temporal) Comment: no  Ht 5' 6" (1 676 m)   Wt 73 4 kg (161 lb 12 8 oz)   SpO2 97%   BMI 26 12 kg/m²     Physical Exam  Vitals reviewed  Constitutional:       General: She is not in acute distress  Appearance: She is not ill-appearing or toxic-appearing  HENT:      Head: Normocephalic and atraumatic  Right Ear: Tympanic membrane, ear canal and external ear normal  There is no impacted cerumen  Left Ear: Tympanic membrane, ear canal and external ear normal  There is no impacted cerumen        Nose: Nose normal  No congestion or rhinorrhea  Mouth/Throat:      Mouth: Mucous membranes are moist       Pharynx: No oropharyngeal exudate or posterior oropharyngeal erythema  Eyes:      General: No scleral icterus  Right eye: No discharge  Left eye: No discharge  Extraocular Movements: Extraocular movements intact  Conjunctiva/sclera: Conjunctivae normal       Pupils: Pupils are equal, round, and reactive to light  Neck:      Vascular: No carotid bruit  Cardiovascular:      Rate and Rhythm: Normal rate and regular rhythm  Heart sounds: No murmur heard  Pulmonary:      Effort: Pulmonary effort is normal  No respiratory distress  Breath sounds: Normal breath sounds  No wheezing or rales  Abdominal:      General: Abdomen is flat  Bowel sounds are normal  There is no distension  Palpations: Abdomen is soft  Tenderness: There is no abdominal tenderness  Hernia: No hernia is present  Musculoskeletal:         General: No tenderness or deformity  Cervical back: Neck supple  No muscular tenderness  Right lower leg: No edema  Left lower leg: No edema  Lymphadenopathy:      Cervical: No cervical adenopathy  Skin:     General: Skin is warm and dry  Findings: No rash  Neurological:      General: No focal deficit present  Mental Status: She is alert and oriented to person, place, and time  Cranial Nerves: No cranial nerve deficit  Sensory: No sensory deficit  Motor: No weakness        Coordination: Coordination normal       Gait: Gait normal    Psychiatric:         Mood and Affect: Mood normal          Behavior: Behavior normal            Data:     Laboratory Results: TSH and CBC pending    CAMACHO Barnett  [facility] [address] [place] [address]

## 2022-05-12 DIAGNOSIS — I10 HYPERTENSION, ESSENTIAL: ICD-10-CM

## 2022-05-12 RX ORDER — METOPROLOL TARTRATE 50 MG/1
TABLET, FILM COATED ORAL
Qty: 90 TABLET | Refills: 3 | Status: SHIPPED | OUTPATIENT
Start: 2022-05-12 | End: 2022-07-19 | Stop reason: SDUPTHER

## 2022-06-07 ENCOUNTER — REMOTE DEVICE CLINIC VISIT (OUTPATIENT)
Dept: CARDIOLOGY CLINIC | Facility: CLINIC | Age: 82
End: 2022-06-07
Payer: MEDICARE

## 2022-06-07 DIAGNOSIS — Z95.0 PRESENCE OF CARDIAC PACEMAKER: Primary | ICD-10-CM

## 2022-06-07 PROCEDURE — 93296 REM INTERROG EVL PM/IDS: CPT | Performed by: INTERNAL MEDICINE

## 2022-06-07 PROCEDURE — 93294 REM INTERROG EVL PM/LDLS PM: CPT | Performed by: INTERNAL MEDICINE

## 2022-06-07 NOTE — PROGRESS NOTES
Results for orders placed or performed in visit on 06/07/22   Cardiac EP device report    Narrative    SJM DUAL CHAMBER PM/ NOT MRI CONDITIONAL  MERLIN TRANSMISSION: BATTERY VOLTAGE ADEQUATE (9 4 YRS)  AP: 49%  : 97% (>40%~CHB)  ALL AVAILABLE LEAD PARAMETERS WITHIN NORMAL LIMITS  13 AMS EPISODES W/ AVAIL EGMS SHOWING PAT, AF, MAX DURATION 17 HRS 28 MINS  AF BURDEN: 1 1%  PT TAKES METOPROLOL TART, WARFARIN  EF: 40% (ECHO 9/21/21)  PACEMAKER FUNCTIONING APPROPRIATELY    [address] [place] [address]

## 2022-06-11 NOTE — TELEPHONE ENCOUNTER
Pt noted with distended, firm abdomen  Hypoactive bowel sounds  Pt having c/o of abdominal discomfort  PRN miralax administered @ 1440  Results pending  What is her phone number

## 2022-06-17 DIAGNOSIS — E11.40 TYPE 2 DIABETES MELLITUS WITH DIABETIC NEUROPATHY, WITHOUT LONG-TERM CURRENT USE OF INSULIN (HCC): ICD-10-CM

## 2022-06-17 DIAGNOSIS — E03.9 HYPOTHYROIDISM, UNSPECIFIED TYPE: ICD-10-CM

## 2022-06-17 RX ORDER — LEVOTHYROXINE SODIUM 0.05 MG/1
50 TABLET ORAL DAILY
Qty: 90 TABLET | Refills: 0 | Status: SHIPPED | OUTPATIENT
Start: 2022-06-17 | End: 2022-07-26

## 2022-06-17 RX ORDER — GABAPENTIN 300 MG/1
CAPSULE ORAL
Qty: 270 CAPSULE | Refills: 3 | Status: SHIPPED | OUTPATIENT
Start: 2022-06-17

## 2022-06-17 RX ORDER — GABAPENTIN 300 MG/1
300 CAPSULE ORAL 3 TIMES DAILY
Qty: 270 CAPSULE | Refills: 0 | Status: SHIPPED | OUTPATIENT
Start: 2022-06-17 | End: 2022-09-15

## 2022-06-17 RX ORDER — LEVOTHYROXINE SODIUM 0.05 MG/1
TABLET ORAL
Qty: 90 TABLET | Refills: 0 | Status: SHIPPED | OUTPATIENT
Start: 2022-06-17 | End: 2022-07-19 | Stop reason: SDUPTHER

## 2022-06-17 NOTE — TELEPHONE ENCOUNTER
Medication Refill Request     Name gabapentin (NEURONTIN) 300 mg capsule  Dose/Frequency take 1 capsule by mouth three times   Quantity 270 tablets  Verified pharmacy   [x]  Verified ordering Provider   [x]  Verified enough for 3 days  [x]    __________________________________Medication Refill Request     Name levothyroxine 50 mcg tablet (  Dose/Frequency Take 1 tablet (50 mcg total) by mouth daily  Quantity 90 tablets  Verified pharmacy   [x]  Verified ordering Provider   [x]  Verified enough for 3 days  [x]

## 2022-07-05 ENCOUNTER — EPISODE CHANGES (OUTPATIENT)
Dept: CASE MANAGEMENT | Facility: OTHER | Age: 82
End: 2022-07-05

## 2022-07-05 ENCOUNTER — PATIENT OUTREACH (OUTPATIENT)
Dept: INTERNAL MEDICINE CLINIC | Facility: CLINIC | Age: 82
End: 2022-07-05

## 2022-07-05 NOTE — PROGRESS NOTES
Chart review completed for the following sections:   Recent Vital Signs   Allergies/Contradictions   Medication Review    History    [facility]    Problem List   Immunizations   Past hospitalizations and major procedures, including surgery   Significant past illnesses and treatment history including: History and Physical   Relevant past medications related to the patient's condition

## 2022-07-05 NOTE — PROGRESS NOTES
Patient does not meet the criteria for CCMP due to not having any behavioral health conditions      I am removing myself and RNCM from the care team

## 2022-07-19 ENCOUNTER — TELEPHONE (OUTPATIENT)
Dept: CARDIOLOGY CLINIC | Facility: CLINIC | Age: 82
End: 2022-07-19

## 2022-07-19 DIAGNOSIS — I10 HYPERTENSION, ESSENTIAL: ICD-10-CM

## 2022-07-19 DIAGNOSIS — E11.22 TYPE 2 DIABETES MELLITUS WITH STAGE 4 CHRONIC KIDNEY DISEASE, WITH LONG-TERM CURRENT USE OF INSULIN (HCC): ICD-10-CM

## 2022-07-19 DIAGNOSIS — E11.42 TYPE 2 DIABETES MELLITUS WITH DIABETIC POLYNEUROPATHY, WITH LONG-TERM CURRENT USE OF INSULIN (HCC): ICD-10-CM

## 2022-07-19 DIAGNOSIS — I50.9 CONGESTIVE HEART FAILURE, UNSPECIFIED HF CHRONICITY, UNSPECIFIED HEART FAILURE TYPE (HCC): ICD-10-CM

## 2022-07-19 DIAGNOSIS — N18.4 TYPE 2 DIABETES MELLITUS WITH STAGE 4 CHRONIC KIDNEY DISEASE, WITH LONG-TERM CURRENT USE OF INSULIN (HCC): ICD-10-CM

## 2022-07-19 DIAGNOSIS — I35.9 AORTIC VALVE DISORDER: ICD-10-CM

## 2022-07-19 DIAGNOSIS — Z79.4 TYPE 2 DIABETES MELLITUS WITH DIABETIC POLYNEUROPATHY, WITH LONG-TERM CURRENT USE OF INSULIN (HCC): ICD-10-CM

## 2022-07-19 DIAGNOSIS — E03.9 HYPOTHYROIDISM, UNSPECIFIED TYPE: ICD-10-CM

## 2022-07-19 DIAGNOSIS — I48.0 PAROXYSMAL ATRIAL FIBRILLATION (HCC): Primary | ICD-10-CM

## 2022-07-19 DIAGNOSIS — Z79.4 TYPE 2 DIABETES MELLITUS WITH STAGE 4 CHRONIC KIDNEY DISEASE, WITH LONG-TERM CURRENT USE OF INSULIN (HCC): ICD-10-CM

## 2022-07-19 DIAGNOSIS — K21.9 GASTROESOPHAGEAL REFLUX DISEASE: ICD-10-CM

## 2022-07-20 RX ORDER — FUROSEMIDE 40 MG/1
TABLET ORAL
Qty: 90 TABLET | Refills: 3 | Status: SHIPPED | OUTPATIENT
Start: 2022-07-20

## 2022-07-20 RX ORDER — INSULIN DETEMIR 100 [IU]/ML
26 INJECTION, SOLUTION SUBCUTANEOUS
Qty: 26 ML | Refills: 5 | Status: SHIPPED | OUTPATIENT
Start: 2022-07-20

## 2022-07-20 RX ORDER — BLOOD SUGAR DIAGNOSTIC
STRIP MISCELLANEOUS DAILY
Qty: 100 EACH | Refills: 3 | Status: SHIPPED | OUTPATIENT
Start: 2022-07-20

## 2022-07-20 RX ORDER — LEVOTHYROXINE SODIUM 0.05 MG/1
50 TABLET ORAL DAILY
Qty: 90 TABLET | Refills: 3 | Status: SHIPPED | OUTPATIENT
Start: 2022-07-20

## 2022-07-20 RX ORDER — PANTOPRAZOLE SODIUM 40 MG/1
40 TABLET, DELAYED RELEASE ORAL DAILY
Qty: 90 TABLET | Refills: 3 | Status: SHIPPED | OUTPATIENT
Start: 2022-07-20

## 2022-07-20 RX ORDER — GLIPIZIDE 10 MG/1
10 TABLET ORAL
Qty: 180 TABLET | Refills: 3 | Status: SHIPPED | OUTPATIENT
Start: 2022-07-20

## 2022-07-20 RX ORDER — METOPROLOL TARTRATE 50 MG/1
TABLET, FILM COATED ORAL
Qty: 90 TABLET | Refills: 3 | Status: SHIPPED | OUTPATIENT
Start: 2022-07-20

## 2022-07-20 RX ORDER — WARFARIN SODIUM 5 MG/1
TABLET ORAL
Qty: 180 TABLET | Refills: 3 | Status: SHIPPED | OUTPATIENT
Start: 2022-07-20

## 2022-07-26 ENCOUNTER — ANTICOAG VISIT (OUTPATIENT)
Dept: CARDIOLOGY CLINIC | Facility: CLINIC | Age: 82
End: 2022-07-26

## 2022-07-26 ENCOUNTER — APPOINTMENT (OUTPATIENT)
Dept: LAB | Facility: CLINIC | Age: 82
End: 2022-07-26
Payer: MEDICARE

## 2022-07-26 LAB
INR PPP: 2.01 (ref 0.84–1.19)
PROTHROMBIN TIME: 23 SECONDS (ref 11.6–14.5)

## 2022-07-26 PROCEDURE — 85610 PROTHROMBIN TIME: CPT | Performed by: INTERNAL MEDICINE

## 2022-07-26 PROCEDURE — 36415 COLL VENOUS BLD VENIPUNCTURE: CPT | Performed by: INTERNAL MEDICINE

## 2022-08-16 ENCOUNTER — TELEPHONE (OUTPATIENT)
Dept: NEPHROLOGY | Facility: CLINIC | Age: 82
End: 2022-08-16

## 2022-08-16 NOTE — TELEPHONE ENCOUNTER
Received a voice message from [facility] [phone]  [facility]  Jaime is a pt in [facility]   Returned Ana's call- left voice message with the number for [facility] office

## 2022-08-18 ENCOUNTER — ANTICOAG VISIT (OUTPATIENT)
Dept: CARDIOLOGY CLINIC | Facility: CLINIC | Age: 82
End: 2022-08-18

## 2022-08-18 ENCOUNTER — OFFICE VISIT (OUTPATIENT)
Dept: INTERNAL MEDICINE CLINIC | Facility: CLINIC | Age: 82
End: 2022-08-18
Payer: COMMERCIAL

## 2022-08-18 ENCOUNTER — APPOINTMENT (OUTPATIENT)
Dept: LAB | Facility: CLINIC | Age: 82
End: 2022-08-18
Payer: COMMERCIAL

## 2022-08-18 VITALS
BODY MASS INDEX: 26.61 KG/M2 | SYSTOLIC BLOOD PRESSURE: 122 MMHG | WEIGHT: 165.6 LBS | OXYGEN SATURATION: 98 % | HEIGHT: 66 IN | HEART RATE: 64 BPM | TEMPERATURE: 97.4 F | DIASTOLIC BLOOD PRESSURE: 70 MMHG

## 2022-08-18 DIAGNOSIS — E03.9 ACQUIRED HYPOTHYROIDISM: ICD-10-CM

## 2022-08-18 DIAGNOSIS — M81.0 AGE-RELATED OSTEOPOROSIS WITHOUT CURRENT PATHOLOGICAL FRACTURE: ICD-10-CM

## 2022-08-18 DIAGNOSIS — N18.4 CKD (CHRONIC KIDNEY DISEASE) STAGE 4, GFR 15-29 ML/MIN (HCC): ICD-10-CM

## 2022-08-18 DIAGNOSIS — E11.42 TYPE 2 DIABETES MELLITUS WITH DIABETIC POLYNEUROPATHY, WITH LONG-TERM CURRENT USE OF INSULIN (HCC): ICD-10-CM

## 2022-08-18 DIAGNOSIS — I25.118 CORONARY ARTERY DISEASE OF NATIVE ARTERY OF NATIVE HEART WITH STABLE ANGINA PECTORIS (HCC): ICD-10-CM

## 2022-08-18 DIAGNOSIS — Z78.0 POSTMENOPAUSAL: Primary | ICD-10-CM

## 2022-08-18 DIAGNOSIS — Z79.4 TYPE 2 DIABETES MELLITUS WITH DIABETIC POLYNEUROPATHY, WITH LONG-TERM CURRENT USE OF INSULIN (HCC): ICD-10-CM

## 2022-08-18 PROBLEM — A41.89 VIRAL SEPSIS (HCC): Status: RESOLVED | Noted: 2022-01-19 | Resolved: 2022-08-18

## 2022-08-18 PROBLEM — B97.89 VIRAL SEPSIS (HCC): Status: RESOLVED | Noted: 2022-01-19 | Resolved: 2022-08-18

## 2022-08-18 PROCEDURE — G0439 PPPS, SUBSEQ VISIT: HCPCS | Performed by: INTERNAL MEDICINE

## 2022-08-18 PROCEDURE — 99214 OFFICE O/P EST MOD 30 MIN: CPT | Performed by: INTERNAL MEDICINE

## 2022-08-18 NOTE — PROGRESS NOTES
Diabetic Foot Exam    Patient's shoes and socks removed  Right Foot/Ankle   Right Foot Inspection  Skin Exam: skin intact  Toe Exam: right toe deformity  Sensory   Monofilament testing: intact    Vascular  The right DP pulse is 1+  The right PT pulse is 0  Left Foot/Ankle  Left Foot Inspection  Skin Exam: skin intact  Toe Exam: left toe deformity  Sensory   Monofilament testing: intact    Vascular  The left DP pulse is 1+  The left PT pulse is 0       Assign Risk Category  Deformity present  No loss of protective sensation  Weak pulses  Risk: 1

## 2022-08-18 NOTE — PROGRESS NOTES
Assessment and Plan:     Problem List Items Addressed This Visit        Endocrine    Diabetes mellitus with neurological manifestation ([facility] [address] )    Relevant Orders    DXA bone density spine hip and pelvis    Hemoglobin A1C    Hypothyroidism       Cardiovascular and Mediastinum    Coronary artery disease of native artery of native heart with stable angina pectoris (HCC)       Genitourinary    CKD (chronic kidney disease) stage 4, GFR 15-29 ml/min ([facility] [address] )      Other Visit Diagnoses     Postmenopausal    -  Primary    Relevant Orders    Diabetic foot exam    Basic metabolic panel    Age-related osteoporosis without current pathological fracture         Relevant Orders    DXA bone density spine hip and pelvis        BMI Counseling: Body mass index is 26 73 kg/m²  Follow-up plan was not completed due to elderly patient (72 years old) where weight reduction/weight gain would complicate underlying health condition such as: illness or physical disability  Preventive health issues were discussed with patient, and age appropriate screening tests were ordered as noted in patient's After Visit Summary  Personalized health advice and appropriate referrals for health education or preventive services given if needed, as noted in patient's After Visit Summary       History of Present Illness:     Patient presents for a Medicare Wellness Visit    HPI   Patient Care Team:  Joy Villafana MD as PCP - Jayla Johnson MD as PCP - [address] [place] [address] (RTE)  Jean Claude Meier MD as Endoscopist     Review of Systems:     Review of Systems     Problem List:     Patient Active Problem List   Diagnosis    Hyperlipidemia    Chronic anticoagulation    Hypertension, essential    Coronary artery disease of native artery of native heart with stable angina pectoris ([address] )    Chronic obstructive pulmonary disease ([address] )    Diabetes mellitus with neurological manifestation ([facility] [address] )    Hypothyroidism    Iron deficiency    GERD (gastroesophageal reflux disease)    AVM (arteriovenous malformation) of small bowel, acquired with hemorrhage    Angiectasia    Other vascular disorders of intestine (HCC)    Angioedema    Aortic valve replaced    Cardiomyopathy (HCC)    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HCC)    Stage 3a chronic kidney disease    Chronic kidney disease-mineral and bone disorder    Primary osteoarthritis involving multiple joints    Pneumonia due to COVID-19 virus    Paroxysmal atrial fibrillation (HCC)    Neutropenia associated with infection (HCC)    Cough    CKD (chronic kidney disease) stage 4, GFR 15-29 ml/min (HCC)      Past Medical and Surgical History:     Past Medical History:   Diagnosis Date    Anemia     Aneurysm of thoracic aorta (HCC)     Aortic valve disorder     Benign neoplasm of sigmoid colon     Cardiomyopathy ([facility] [address] )     last assessed: 10/10/2014    CHF (congestive heart failure) (HCC)     Chronic kidney disease     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HCC)     last assessed: 10/10/2014    Diabetic nephropathy (HCC)     RAMON (dyspnea on exertion)     GERD (gastroesophageal reflux disease)     History of emphysema     Hyperlipidemia     TIA (transient ischemic attack)      Past Surgical History:   Procedure Laterality Date    AORTIC VALVE REPLACEMENT      CARDIAC PACEMAKER PLACEMENT      last assessed: 10/10/2014   Bradley Ama CARDIAC SURGERY      aortic valve replacement    CHOLECYSTECTOMY      COLONOSCOPY      HYSTERECTOMY      INSERT / REPLACE / REMOVE PACEMAKER      KNEE SURGERY Right     OTHER SURGICAL HISTORY      Capsule ENDOscopy description: 12/19/2012    [place] COLONOSCOPY FLX DX W/COLLJ SPEC WHEN PFRMD N/A 5/31/2018    Procedure: single balloon ENTEROSCOPY;  Surgeon: Claudio Petersen MD;  Location: [facility];   Service: Gastroenterology    AZ ESOPHAGOGASTRODUODENOSCOPY TRANSORAL DIAGNOSTIC N/A 11/29/2017    Procedure: EGD AND COLONOSCOPY;  Surgeon: Joi Deutsch MD;  Location: [facility];  Service: Gastroenterology      Family History:     Family History   Problem Relation Age of Onset    No Known Problems Mother     No Known Problems Father       Social History:     Social History     Socioeconomic History    Marital status:      Spouse name: None    Number of children: None    Years of education: None    Highest education level: None   Occupational History    None   Tobacco Use    Smoking status: Former Smoker     Packs/day: 1 00     Years: 50 00     Pack years: 50 00     Quit date: 2007     Years since quittin 7    Smokeless tobacco: Former User     Quit date:    Vaping Use    Vaping Use: Never used   Substance and Sexual Activity    Alcohol use: Never    Drug use: No    Sexual activity: Not Currently   Other Topics Concern    None   Social History Narrative    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 BID Freestyle lancets BID    Living independently with spouse     Social Determinants of Health     Financial Resource Strain: Not on file   Food Insecurity: No Food Insecurity    Worried About Running Out of Food in the Last Year: Never true    Arlen of Food in the Last Year: Never true   Transportation Needs: No Transportation Needs    Lack of Transportation (Medical): No    Lack of Transportation (Non-Medical):  No   Physical Activity: Not on file   Stress: Not on file   Social Connections: Not on file   Intimate Partner Violence: Not on file   Housing Stability: Low Risk     Unable to Pay for Housing in the Last Year: No    Number of Places Lived in the Last Year: 1    Unstable Housing in the Last Year: No      Medications and Allergies:     Current Outpatient Medications   Medication Sig Dispense Refill    Ascorbic Acid (vitamin C) 1000 MG tablet Take 1,000 mg by mouth daily      Cholecalciferol (Vitamin D3) 50 MCG (2000 UT) TABS Take 2,000 Units by mouth daily      FREESTYLE LITE test strip Check blood glucose up to 3 times daily 300 strip 3    furosemide (LASIX) 40 mg tablet take 1 tablet by mouth daily 90 tablet 3    gabapentin (NEURONTIN) 300 mg capsule Take 1 capsule (300 mg total) by mouth 3 (three) times a day 270 capsule 0    glipiZIDE (GLUCOTROL) 10 mg tablet Take 1 tablet (10 mg total) by mouth 2 (two) times a day before meals 180 tablet 3    insulin detemir (Levemir FlexTouch) 100 Units/mL injection pen Inject 26 Units under the skin daily at bedtime 26 mL 5    Insulin Pen Needle (Advocate Insulin Pen Needles) 31G X 8 MM MISC Use daily 100 each 3    Lancets (freestyle) lancets Check blood glucose 3 times daily 300 each 3    levothyroxine 50 mcg tablet Take 1 tablet (50 mcg total) by mouth daily 90 tablet 3    metoprolol tartrate (LOPRESSOR) 50 mg tablet 1/2 tab twice a day 90 tablet 3    pantoprazole (PROTONIX) 40 mg tablet Take 1 tablet (40 mg total) by mouth daily 90 tablet 3    RA IRON 325 (65 Fe) MG tablet take 1 tablet by mouth twice a day before meals 60 tablet 2    warfarin (COUMADIN) 5 mg tablet Take 1-2 tabs daily or as directed 180 tablet 3    gabapentin (NEURONTIN) 300 mg capsule take 1 capsule by mouth three times a day 270 capsule 3     No current facility-administered medications for this visit  No Known Allergies   Immunizations:     Immunization History   Administered Date(s) Administered    INFLUENZA 09/16/2014, 09/16/2014, 10/08/2015, 09/08/2016, 09/14/2018, 09/27/2021    Influenza Split High Dose Preservative Free IM 10/08/2015    Influenza, high dose seasonal 0 7 mL 09/01/2020    Influenza, seasonal, injectable 09/03/2014    Pneumococcal Conjugate 13-Valent 11/07/2018    Pneumococcal Polysaccharide PPV23 1940, 04/13/2022    Tdap 1940, 05/16/2017    Zoster 1940    influenza, trivalent, adjuvanted 09/16/2019      Health Maintenance: There are no preventive care reminders to display for this patient        Topic Date Due    COVID-19 Vaccine (1) Never done    Influenza Vaccine (1) 09/01/2022      Medicare Screening Tests and Risk Assessments:     Francisca Page is here for her Subsequent Wellness visit  Last Medicare Wellness visit information reviewed, patient interviewed and updates made to the record today        PREVENTIVE SCREENINGS      Cardiovascular Screening:    General: Screening Not Indicated and History Lipid Disorder      Diabetes Screening:     General: Screening Not Indicated and History Diabetes      Cervical Cancer Screening:    General: Screening Not Indicated      Osteoporosis Screening:    General: Screening Not Indicated and History Osteoporosis      Lung Cancer Screening:     General: Screening Not Indicated    No exam data present     Physical Exam:     /70   Pulse 64   Temp (!) 97 4 °F (36 3 °C)   Ht 5' 6" (1 676 m)   Wt 75 1 kg (165 lb 9 6 oz)   SpO2 98%   BMI 26 73 kg/m²     Physical Exam     Leland Schmidt MD

## 2022-08-18 NOTE — PATIENT INSTRUCTIONS
A patient with numerous diagnoses but who was at a stable baseline   Recommendation is to recheck A1c      This can be done in September   Continue to follow with various specialists   Revisit with me for diabetic rate assessment after the new year

## 2022-08-18 NOTE — PROGRESS NOTES
Assessment/Plan:       Diagnoses and all orders for this visit:    Postmenopausal  -     Diabetic foot exam; Future  -     Basic metabolic panel; Future    Type 2 diabetes mellitus with diabetic polyneuropathy, with long-term current use of insulin ([address] )  -     DXA bone density spine hip and pelvis; Future  -     Hemoglobin A1C; Future    CKD (chronic kidney disease) stage 4, GFR 15-29 ml/min (HCC)    Coronary artery disease of native artery of native heart with stable angina pectoris (HCC)    Age-related osteoporosis without current pathological fracture   -     DXA bone density spine hip and pelvis; Future    Acquired hypothyroidism                Subjective:      Patient ID: Umberto Abreu is a 80 y o  female  Follow-up of this 80-year-old female who has multiple health problems but who at this point is doing quite well  Diabetic with moderate control hemoglobin A1c last 7 7 needs a recheck     Chronic lung disease but no major symptoms     Coronary artery disease but no angina pectoris     CKD 4    Reduced ejection fraction status post ICD but doing well again with no symptoms other than perhaps exertional fatigue     Mechanical aortic valve replacement about 15 years ago anticoagulated on Coumadin with no complication     Minimal complaints today; arthralgias joint pain   On examination we see a patient who looks to be the stated age  Clear lungs   Mechanical S2   Arthritic deformity especially with hammertoe     Diabetic foot exam really unremarkable  Perhaps some reduction in pulse and a bit of hyperesthesia of the feet but she does have sensation        The following portions of the patient's history were reviewed and updated as appropriate:   She has a past medical history of Anemia, Aneurysm of thoracic aorta ([address] ), Aortic valve disorder, Benign neoplasm of sigmoid colon, Cardiomyopathy ([address] ), CHF (congestive heart failure) ([address] ), Chronic kidney disease, 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[address] ), Diabetic nephropathy (HCC), RAMON (dyspnea on exertion), GERD (gastroesophageal reflux disease), History of emphysema, Hyperlipidemia, and TIA (transient ischemic attack)  ,  does not have any pertinent problems on file  ,   has a past surgical history that includes Cholecystectomy; Colonoscopy; Hysterectomy; Aortic valve replacement; pr esophagogastroduodenoscopy transoral diagnostic (N/A, 11/29/2017); Other surgical history; Cardiac surgery; Insert / replace / remove pacemaker; Cardiac pacemaker placement; Knee surgery (Right); and pr colonoscopy flx dx w/collj spec when pfrmd (N/A, 5/31/2018)  ,  family history includes No Known Problems in her father and mother  ,   reports that she quit smoking about 14 years ago  She has a 50 00 pack-year smoking history  She quit smokeless tobacco use about 15 years ago  She reports that she does not drink alcohol and does not use drugs  ,  has No Known Allergies     Current Outpatient Medications   Medication Sig Dispense Refill    Ascorbic Acid (vitamin C) 1000 MG tablet Take 1,000 mg by mouth daily      Cholecalciferol (Vitamin D3) 50 MCG (2000 UT) TABS Take 2,000 Units by mouth daily      FREESTYLE LITE test strip Check blood glucose up to 3 times daily 300 strip 3    furosemide (LASIX) 40 mg tablet take 1 tablet by mouth daily 90 tablet 3    gabapentin (NEURONTIN) 300 mg capsule Take 1 capsule (300 mg total) by mouth 3 (three) times a day 270 capsule 0    glipiZIDE (GLUCOTROL) 10 mg tablet Take 1 tablet (10 mg total) by mouth 2 (two) times a day before meals 180 tablet 3    insulin detemir (Levemir FlexTouch) 100 Units/mL injection pen Inject 26 Units under the skin daily at bedtime 26 mL 5    Insulin Pen Needle (Advocate Insulin Pen Needles) 31G X 8 MM MISC Use daily 100 each 3    Lancets (freestyle) lancets Check blood glucose 3 times daily 300 each 3    levothyroxine 50 mcg tablet Take 1 tablet (50 mcg total) by mouth daily 90 tablet 3    metoprolol tartrate (LOPRESSOR) 50 mg tablet 1/2 tab twice a day 90 tablet 3    pantoprazole (PROTONIX) 40 mg tablet Take 1 tablet (40 mg total) by mouth daily 90 tablet 3    RA IRON 325 (65 Fe) MG tablet take 1 tablet by mouth twice a day before meals 60 tablet 2    warfarin (COUMADIN) 5 mg tablet Take 1-2 tabs daily or as directed 180 tablet 3    gabapentin (NEURONTIN) 300 mg capsule take 1 capsule by mouth three times a day 270 capsule 3     No current facility-administered medications for this visit  Review of Systems   Respiratory: Positive for shortness of breath  Musculoskeletal: Positive for arthralgias  All other systems reviewed and are negative  Objective:  Vitals:    08/18/22 0753   BP: 122/70   Pulse: 64   Temp: (!) 97 4 °F (36 3 °C)   SpO2: 98%      Physical Exam  Constitutional:       Appearance: She is well-developed  Comments: A female patient who appears to be stated age but who looks well in spite of her numerous diagnoses   HENT:      Head: Normocephalic and atraumatic  Eyes:      General: No scleral icterus  Pupils: Pupils are equal, round, and reactive to light  Neck:      Thyroid: No thyromegaly  Trachea: No tracheal deviation  Cardiovascular:      Rate and Rhythm: Normal rate and regular rhythm  Pulses:           Dorsalis pedis pulses are 1+ on the right side and 1+ on the left side  Posterior tibial pulses are 0 on the right side and 0 on the left side  Heart sounds: Normal heart sounds  No murmur heard  No gallop  Comments: Lab mechanical S2  Pulmonary:      Effort: No respiratory distress  Breath sounds: No wheezing or rales  Abdominal:      General: Bowel sounds are normal       Palpations: Abdomen is soft  Tenderness: There is no abdominal tenderness  Musculoskeletal:         General: Deformity present  No tenderness  Normal range of motion  Cervical back: Normal range of motion and neck supple        Right lower leg: No edema  Left lower leg: No edema  Comments: Taurus bilateral   Skin:     General: Skin is warm  Neurological:      Mental Status: She is alert and oriented to person, place, and time  Coordination: Coordination normal    Psychiatric:         Judgment: Judgment normal            There are no Patient Instructions on file for this visit

## 2022-08-31 ENCOUNTER — TELEPHONE (OUTPATIENT)
Dept: NEPHROLOGY | Facility: CLINIC | Age: 82
End: 2022-08-31

## 2022-09-06 ENCOUNTER — APPOINTMENT (OUTPATIENT)
Dept: LAB | Facility: CLINIC | Age: 82
End: 2022-09-06
Payer: COMMERCIAL

## 2022-09-06 DIAGNOSIS — N18.9 CHRONIC KIDNEY DISEASE-MINERAL AND BONE DISORDER: ICD-10-CM

## 2022-09-06 DIAGNOSIS — E83.9 CHRONIC KIDNEY DISEASE-MINERAL AND BONE DISORDER: ICD-10-CM

## 2022-09-06 DIAGNOSIS — M89.9 CHRONIC KIDNEY DISEASE-MINERAL AND BONE DISORDER: ICD-10-CM

## 2022-09-06 DIAGNOSIS — E11.42 TYPE 2 DIABETES MELLITUS WITH DIABETIC POLYNEUROPATHY, WITH LONG-TERM CURRENT USE OF INSULIN (HCC): ICD-10-CM

## 2022-09-06 DIAGNOSIS — N18.31 STAGE 3A CHRONIC KIDNEY DISEASE (HCC): ICD-10-CM

## 2022-09-06 DIAGNOSIS — Z79.4 TYPE 2 DIABETES MELLITUS WITH DIABETIC POLYNEUROPATHY, WITH LONG-TERM CURRENT USE OF INSULIN (HCC): ICD-10-CM

## 2022-09-06 DIAGNOSIS — Z78.0 POSTMENOPAUSAL: ICD-10-CM

## 2022-09-06 LAB
25(OH)D3 SERPL-MCNC: 35.9 NG/ML (ref 30–100)
ANION GAP SERPL CALCULATED.3IONS-SCNC: 2 MMOL/L (ref 4–13)
BACTERIA UR QL AUTO: ABNORMAL /HPF
BASOPHILS # BLD AUTO: 0.02 THOUSANDS/ΜL (ref 0–0.1)
BASOPHILS NFR BLD AUTO: 1 % (ref 0–1)
BILIRUB UR QL STRIP: NEGATIVE
BUN SERPL-MCNC: 24 MG/DL (ref 5–25)
CALCIUM SERPL-MCNC: 9.2 MG/DL (ref 8.3–10.1)
CHLORIDE SERPL-SCNC: 107 MMOL/L (ref 96–108)
CLARITY UR: CLEAR
CO2 SERPL-SCNC: 30 MMOL/L (ref 21–32)
COLOR UR: ABNORMAL
CREAT SERPL-MCNC: 1.4 MG/DL (ref 0.6–1.3)
CREAT UR-MCNC: 171 MG/DL
EOSINOPHIL # BLD AUTO: 0.12 THOUSAND/ΜL (ref 0–0.61)
EOSINOPHIL NFR BLD AUTO: 3 % (ref 0–6)
ERYTHROCYTE [DISTWIDTH] IN BLOOD BY AUTOMATED COUNT: 14.1 % (ref 11.6–15.1)
EST. AVERAGE GLUCOSE BLD GHB EST-MCNC: 166 MG/DL
GFR SERPL CREATININE-BSD FRML MDRD: 35 ML/MIN/1.73SQ M
GLUCOSE P FAST SERPL-MCNC: 168 MG/DL (ref 65–99)
GLUCOSE UR STRIP-MCNC: NEGATIVE MG/DL
HBA1C MFR BLD: 7.4 %
HCT VFR BLD AUTO: 44.2 % (ref 34.8–46.1)
HGB BLD-MCNC: 14.2 G/DL (ref 11.5–15.4)
HGB UR QL STRIP.AUTO: NEGATIVE
HYALINE CASTS #/AREA URNS LPF: ABNORMAL /LPF
IMM GRANULOCYTES # BLD AUTO: 0.01 THOUSAND/UL (ref 0–0.2)
IMM GRANULOCYTES NFR BLD AUTO: 0 % (ref 0–2)
KETONES UR STRIP-MCNC: NEGATIVE MG/DL
LEUKOCYTE ESTERASE UR QL STRIP: ABNORMAL
LYMPHOCYTES # BLD AUTO: 1.6 THOUSANDS/ΜL (ref 0.6–4.47)
LYMPHOCYTES NFR BLD AUTO: 36 % (ref 14–44)
MCH RBC QN AUTO: 29.2 PG (ref 26.8–34.3)
MCHC RBC AUTO-ENTMCNC: 32.1 G/DL (ref 31.4–37.4)
MCV RBC AUTO: 91 FL (ref 82–98)
MONOCYTES # BLD AUTO: 0.49 THOUSAND/ΜL (ref 0.17–1.22)
MONOCYTES NFR BLD AUTO: 11 % (ref 4–12)
MUCOUS THREADS UR QL AUTO: ABNORMAL
NEUTROPHILS # BLD AUTO: 2.18 THOUSANDS/ΜL (ref 1.85–7.62)
NEUTS SEG NFR BLD AUTO: 49 % (ref 43–75)
NITRITE UR QL STRIP: NEGATIVE
NON-SQ EPI CELLS URNS QL MICRO: ABNORMAL /HPF
NRBC BLD AUTO-RTO: 0 /100 WBCS
PH UR STRIP.AUTO: 6 [PH]
PHOSPHATE SERPL-MCNC: 3 MG/DL (ref 2.3–4.1)
PLATELET # BLD AUTO: 234 THOUSANDS/UL (ref 149–390)
PMV BLD AUTO: 11 FL (ref 8.9–12.7)
POTASSIUM SERPL-SCNC: 4.3 MMOL/L (ref 3.5–5.3)
PROT UR STRIP-MCNC: NEGATIVE MG/DL
PROT UR-MCNC: 14 MG/DL
PROT/CREAT UR: 0.08 MG/G{CREAT} (ref 0–0.1)
PTH-INTACT SERPL-MCNC: 50.8 PG/ML (ref 18.4–80.1)
RBC # BLD AUTO: 4.86 MILLION/UL (ref 3.81–5.12)
RBC #/AREA URNS AUTO: ABNORMAL /HPF
SODIUM SERPL-SCNC: 139 MMOL/L (ref 135–147)
SP GR UR STRIP.AUTO: 1.02 (ref 1–1.03)
UROBILINOGEN UR STRIP-ACNC: <2 MG/DL
WBC # BLD AUTO: 4.42 THOUSAND/UL (ref 4.31–10.16)
WBC #/AREA URNS AUTO: ABNORMAL /HPF

## 2022-09-06 PROCEDURE — 84100 ASSAY OF PHOSPHORUS: CPT

## 2022-09-06 PROCEDURE — 36415 COLL VENOUS BLD VENIPUNCTURE: CPT

## 2022-09-06 PROCEDURE — 82570 ASSAY OF URINE CREATININE: CPT

## 2022-09-06 PROCEDURE — 83970 ASSAY OF PARATHORMONE: CPT

## 2022-09-06 PROCEDURE — 83036 HEMOGLOBIN GLYCOSYLATED A1C: CPT

## 2022-09-06 PROCEDURE — 82306 VITAMIN D 25 HYDROXY: CPT

## 2022-09-06 PROCEDURE — 85025 COMPLETE CBC W/AUTO DIFF WBC: CPT

## 2022-09-06 PROCEDURE — 84156 ASSAY OF PROTEIN URINE: CPT

## 2022-09-06 PROCEDURE — 80048 BASIC METABOLIC PNL TOTAL CA: CPT

## 2022-09-06 PROCEDURE — 81001 URINALYSIS AUTO W/SCOPE: CPT

## 2022-09-07 ENCOUNTER — TELEPHONE (OUTPATIENT)
Dept: INTERNAL MEDICINE CLINIC | Facility: CLINIC | Age: 82
End: 2022-09-07

## 2022-09-07 ENCOUNTER — TELEPHONE (OUTPATIENT)
Dept: NEPHROLOGY | Facility: CLINIC | Age: 82
End: 2022-09-07

## 2022-09-07 NOTE — TELEPHONE ENCOUNTER
----- Message from Brayden Anthony MD sent at 9/7/2022 10:40 AM EDT -----  Improvement in hemoglobin A1c down to 7 3%

## 2022-09-08 ENCOUNTER — OFFICE VISIT (OUTPATIENT)
Dept: NEPHROLOGY | Facility: CLINIC | Age: 82
End: 2022-09-08
Payer: COMMERCIAL

## 2022-09-08 VITALS
OXYGEN SATURATION: 97 % | HEIGHT: 66 IN | SYSTOLIC BLOOD PRESSURE: 130 MMHG | WEIGHT: 165 LBS | HEART RATE: 80 BPM | RESPIRATION RATE: 16 BRPM | TEMPERATURE: 97.5 F | BODY MASS INDEX: 26.52 KG/M2 | DIASTOLIC BLOOD PRESSURE: 70 MMHG

## 2022-09-08 DIAGNOSIS — N18.9 CHRONIC KIDNEY DISEASE-MINERAL AND BONE DISORDER: ICD-10-CM

## 2022-09-08 DIAGNOSIS — M89.9 CHRONIC KIDNEY DISEASE-MINERAL AND BONE DISORDER: ICD-10-CM

## 2022-09-08 DIAGNOSIS — I10 HYPERTENSION, ESSENTIAL: ICD-10-CM

## 2022-09-08 DIAGNOSIS — N18.31 STAGE 3A CHRONIC KIDNEY DISEASE (HCC): Primary | ICD-10-CM

## 2022-09-08 DIAGNOSIS — E83.9 CHRONIC KIDNEY DISEASE-MINERAL AND BONE DISORDER: ICD-10-CM

## 2022-09-08 DIAGNOSIS — I42.9 CARDIOMYOPATHY, UNSPECIFIED TYPE (HCC): ICD-10-CM

## 2022-09-08 DIAGNOSIS — E11.40 TYPE 2 DIABETES MELLITUS WITH DIABETIC NEUROPATHY, WITHOUT LONG-TERM CURRENT USE OF INSULIN (HCC): ICD-10-CM

## 2022-09-08 DIAGNOSIS — I48.0 PAROXYSMAL ATRIAL FIBRILLATION (HCC): ICD-10-CM

## 2022-09-08 PROCEDURE — 99214 OFFICE O/P EST MOD 30 MIN: CPT | Performed by: INTERNAL MEDICINE

## 2022-09-08 NOTE — ASSESSMENT & PLAN NOTE
Lab Results   Component Value Date    EGFR 35 09/06/2022    EGFR 42 02/10/2022    EGFR 42 01/22/2022    CREATININE 1 40 (H) 09/06/2022    CREATININE 1 19 02/10/2022    CREATININE 1 20 01/22/2022   It is fluctuating based on volume status as patient is on diuretic because of cardiomyopathy  Asymptomatic and progressive nature of kidney disease discussed with the patient    Advised cautious hydration and avoiding nephrotoxic medication

## 2022-09-08 NOTE — ASSESSMENT & PLAN NOTE
Lab Results   Component Value Date    EGFR 35 09/06/2022    EGFR 42 02/10/2022    EGFR 42 01/22/2022    CREATININE 1 40 (H) 09/06/2022    CREATININE 1 19 02/10/2022    CREATININE 1 20 01/22/2022   PTH and phosphorus along with vitamin-D are within acceptable range and will continue to monitor

## 2022-09-08 NOTE — PROGRESS NOTES
NEPHROLOGY OFFICE FOLLOW UP  Koko Thibodeaux 80 y o  female MRN: [id number]    Encounter: [id number] 9/8/2022    REASON FOR VISIT: Koko Thibodeaux is a 80 y o  female who is here on 9/8/2022 for Chronic Kidney Disease and Follow-up    HPI:    Marcus Capps came in today for follow-up of CKD  [age] year woman with history of cardiomyopathy who also has diabetic and hypertensive     She is overall feeling well denies any acute complaint     No chest pain no palpitation or shortness of breath     No nausea no vomiting no abdominal discomfort     Denies any urinary complaint      REVIEW OF SYSTEMS:    Review of Systems   Constitutional: Negative for activity change and fatigue  HENT: Negative for congestion and ear discharge  Eyes: Negative for photophobia and pain  Respiratory: Negative for apnea and choking  Cardiovascular: Negative for chest pain and palpitations  Gastrointestinal: Negative for abdominal distention and blood in stool  Endocrine: Negative for heat intolerance and polyphagia  Genitourinary: Negative for flank pain and urgency  Musculoskeletal: Negative for neck pain and neck stiffness  Skin: Negative for color change and wound  Allergic/Immunologic: Negative for food allergies and immunocompromised state  Neurological: Negative for seizures and facial asymmetry  Hematological: Negative for adenopathy  Does not bruise/bleed easily  Psychiatric/Behavioral: Negative for self-injury and suicidal ideas           PAST MEDICAL HISTORY:  Past Medical History:   Diagnosis Date    Anemia     [facility]     Aortic valve disorder     Benign neoplasm of sigmoid colon     Cardiomyopathy ([facility] )     last assessed: 10/10/2014    CHF (congestive heart failure) (HCC)     Chronic kidney disease     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[facility] )     last assessed: 10/10/2014    Diabetic nephropathy (HCC)     RAMON (dyspnea on exertion)     GERD (gastroesophageal reflux disease)     History of emphysema     Hyperlipidemia     TIA (transient ischemic attack)        PAST SURGICAL HISTORY:  Past Surgical History:   Procedure Laterality Date    AORTIC VALVE REPLACEMENT      CARDIAC PACEMAKER PLACEMENT      last assessed: 10/10/2014   Verna Gray CARDIAC SURGERY      aortic valve replacement    CHOLECYSTECTOMY      COLONOSCOPY      HYSTERECTOMY      INSERT / REPLACE / REMOVE PACEMAKER      KNEE SURGERY Right     OTHER SURGICAL HISTORY      Capsule ENDOscopy description: 2012    IA COLONOSCOPY FLX DX W/COLLJ SPEC WHEN PFRMD N/A 2018    Procedure: single balloon ENTEROSCOPY;  Surgeon: Soco Denny MD;  Location: [facility]; Service: Gastroenterology    IA ESOPHAGOGASTRODUODENOSCOPY TRANSORAL DIAGNOSTIC N/A 2017    Procedure: EGD AND COLONOSCOPY;  Surgeon: Macho Lee MD;  Location: [facility];   Service: Gastroenterology       SOCIAL HISTORY:  Social History     Substance and Sexual Activity   Alcohol Use Never     Social History     Substance and Sexual Activity   Drug Use No     Social History     Tobacco Use   Smoking Status Former Smoker    Packs/day: 1 00    Years: 50 00    Pack years: 50 00    Quit date: 2007    Years since quittin 7   Smokeless Tobacco Former User    Quit date:        FAMILY HISTORY:  Family History   Problem Relation Age of Onset    No Known Problems Mother     No Known Problems Father        MEDICATIONS:    Current Outpatient Medications:     Ascorbic Acid (vitamin C) 1000 MG tablet, Take 1,000 mg by mouth daily, Disp: , Rfl:     Cholecalciferol (Vitamin D3) 50 MCG (2000 UT) TABS, Take 2,000 Units by mouth daily, Disp: , Rfl:     FREESTYLE LITE test strip, Check blood glucose up to 3 times daily, Disp: 300 strip, Rfl: 3    furosemide (LASIX) 40 mg tablet, take 1 tablet by mouth daily, Disp: 90 tablet, Rfl: 3    gabapentin (NEURONTIN) 300 mg capsule, take 1 capsule by mouth three times a day, Disp: 270 capsule, Rfl: 3    gabapentin (NEURONTIN) 300 mg capsule, Take 1 capsule (300 mg total) by mouth 3 (three) times a day, Disp: 270 capsule, Rfl: 0    glipiZIDE (GLUCOTROL) 10 mg tablet, Take 1 tablet (10 mg total) by mouth 2 (two) times a day before meals, Disp: 180 tablet, Rfl: 3    insulin detemir (Levemir FlexTouch) 100 Units/mL injection pen, Inject 26 Units under the skin daily at bedtime, Disp: 26 mL, Rfl: 5    Insulin Pen Needle (Advocate Insulin Pen Needles) 31G X 8 MM MISC, Use daily, Disp: 100 each, Rfl: 3    Lancets (freestyle) lancets, Check blood glucose 3 times daily, Disp: 300 each, Rfl: 3    levothyroxine 50 mcg tablet, Take 1 tablet (50 mcg total) by mouth daily, Disp: 90 tablet, Rfl: 3    metoprolol tartrate (LOPRESSOR) 50 mg tablet, 1/2 tab twice a day, Disp: 90 tablet, Rfl: 3    pantoprazole (PROTONIX) 40 mg tablet, Take 1 tablet (40 mg total) by mouth daily, Disp: 90 tablet, Rfl: 3    RA IRON 325 (65 Fe) MG tablet, take 1 tablet by mouth twice a day before meals, Disp: 60 tablet, Rfl: 2    warfarin (COUMADIN) 5 mg tablet, Take 1-2 tabs daily or as directed, Disp: 180 tablet, Rfl: 3    PHYSICAL EXAM:  Vitals:    09/08/22 0855   BP: 130/70   BP Location: Right arm   Patient Position: Sitting   Pulse: 80   Resp: 16   Temp: 97 5 °F (36 4 °C)   TempSrc: Temporal   SpO2: 97%   Weight: 74 8 kg (165 lb)   Height: 5' 6" (1 676 m)     Body mass index is 26 63 kg/m²  Physical Exam  Constitutional:       General: She is not in acute distress  Appearance: She is well-developed  HENT:      Head: Normocephalic  Eyes:      General: No scleral icterus  Conjunctiva/sclera: Conjunctivae normal    Neck:      Vascular: No JVD  Cardiovascular:      Rate and Rhythm: Normal rate  Heart sounds: Normal heart sounds  Pulmonary:      Effort: Pulmonary effort is normal       Breath sounds: No wheezing  Abdominal:      Palpations: Abdomen is soft  Tenderness:  There is no abdominal tenderness  Musculoskeletal:         General: Normal range of motion  Cervical back: Neck supple  Skin:     General: Skin is warm  Findings: No rash  Neurological:      Mental Status: She is alert and oriented to person, place, and time     Psychiatric:         Behavior: Behavior normal          LAB RESULTS:  Results for orders placed or performed in visit on 09/06/22   CBC and differential   Result Value Ref Range    WBC 4 42 4 31 - 10 16 Thousand/uL    RBC 4 86 3 81 - 5 12 Million/uL    Hemoglobin 14 2 11 5 - 15 4 g/dL    Hematocrit 44 2 34 8 - 46 1 %    MCV 91 82 - 98 fL    MCH 29 2 26 8 - 34 3 pg    MCHC 32 1 31 4 - 37 4 g/dL    RDW 14 1 11 6 - 15 1 %    MPV 11 0 8 9 - 12 7 fL    Platelets 427 495 - 121 Thousands/uL    nRBC 0 /100 WBCs    Neutrophils Relative 49 43 - 75 %    Immat GRANS % 0 0 - 2 %    Lymphocytes Relative 36 14 - 44 %    Monocytes Relative 11 4 - 12 %    Eosinophils Relative 3 0 - 6 %    Basophils Relative 1 0 - 1 %    Neutrophils Absolute 2 18 1 85 - 7 62 Thousands/µL    Immature Grans Absolute 0 01 0 00 - 0 20 Thousand/uL    Lymphocytes Absolute 1 60 0 60 - 4 47 Thousands/µL    Monocytes Absolute 0 49 0 17 - 1 22 Thousand/µL    Eosinophils Absolute 0 12 0 00 - 0 61 Thousand/µL    Basophils Absolute 0 02 0 00 - 0 10 Thousands/µL   Phosphorus   Result Value Ref Range    Phosphorus 3 0 2 3 - 4 1 mg/dL   Protein / creatinine ratio, urine   Result Value Ref Range    Creatinine, Ur 171 0 mg/dL    Protein Urine Random 14 mg/dL    Prot/Creat Ratio, Ur 0 08 0 00 - 0 10   PTH, intact   Result Value Ref Range    PTH 50 8 18 4 - 80 1 pg/mL   UA (URINE) with reflex to Scope   Result Value Ref Range    Color, UA Light Yellow     Clarity, UA Clear     Specific [place], UA 1 016 1 003 - 1 030    pH, UA 6 0 4 5, 5 0, 5 5, 6 0, 6 5, 7 0, 7 5, 8 0    Leukocytes, UA Small (A) Negative    Nitrite, UA Negative Negative    Protein, UA Negative Negative mg/dl    Glucose, UA Negative Negative mg/dl Ketones, UA Negative Negative mg/dl    Urobilinogen, UA <2 0 <2 0 mg/dl mg/dl    Bilirubin, UA Negative Negative    Occult Blood, UA Negative Negative   Vitamin D 25 hydroxy   Result Value Ref Range    Vit D, 25-Hydroxy 35 9 30 0 - 100 0 ng/mL   Hemoglobin A1C   Result Value Ref Range    Hemoglobin A1C 7 4 (H) Normal 3 8-5 6%; PreDiabetic 5 7-6 4%; Diabetic >=6 5%; Glycemic control for adults with diabetes <7 0% %     mg/dl   Basic metabolic panel   Result Value Ref Range    Sodium 139 135 - 147 mmol/L    Potassium 4 3 3 5 - 5 3 mmol/L    Chloride 107 96 - 108 mmol/L    CO2 30 21 - 32 mmol/L    ANION GAP 2 (L) 4 - 13 mmol/L    BUN 24 5 - 25 mg/dL    Creatinine 1 40 (H) 0 60 - 1 30 mg/dL    Glucose, Fasting 168 (H) 65 - 99 mg/dL    Calcium 9 2 8 3 - 10 1 mg/dL    eGFR 35 ml/min/1 73sq m   Urine Microscopic   Result Value Ref Range    RBC, UA 2-4 (A) None Seen, 1-2 /hpf    WBC, UA 4-10 (A) None Seen, 1-2 /hpf    Epithelial Cells Occasional None Seen, Occasional /hpf    Bacteria, UA None Seen None Seen, Occasional /hpf    MUCUS THREADS Occasional (A) None Seen    Hyaline Casts, UA 0-3 (A) None Seen /lpf       ASSESSMENT and PLAN:      Stage 3a chronic kidney disease  Lab Results   Component Value Date    EGFR 35 09/06/2022    EGFR 42 02/10/2022    EGFR 42 01/22/2022    CREATININE 1 40 (H) 09/06/2022    CREATININE 1 19 02/10/2022    CREATININE 1 20 01/22/2022   It is fluctuating based on volume status as patient is on diuretic because of cardiomyopathy  Asymptomatic and progressive nature of kidney disease discussed with the patient    Advised cautious hydration and avoiding nephrotoxic medication    Chronic kidney disease-mineral and bone disorder  Lab Results   Component Value Date    EGFR 35 09/06/2022    EGFR 42 02/10/2022    EGFR 42 01/22/2022    CREATININE 1 40 (H) 09/06/2022    CREATININE 1 19 02/10/2022    CREATININE 1 20 01/22/2022   PTH and phosphorus along with vitamin-D are within acceptable range and will continue to monitor    Paroxysmal atrial fibrillation (HCC)  Rate is very well controlled    Diabetes mellitus with neurological manifestation [facility])    Lab Results   Component Value Date    HGBA1C 7 4 (H) 09/06/2022   Diabetes seems to be reasonably well controlled with present medication  Importance of diabetic control and effect on kidney disease discussed with the patient    Cardiomyopathy ([address] )  Last echo showed EF about 40%  Patient is on diuretic and seems to be euvolemic  Everything discussed at length with the patient  I will see her back in 6 months  She will get blood work in 3 months and 6 months in view of fluctuating kidney function        Portions of the record may have been created with voice recognition software  Occasional wrong word or "sound a like" substitutions may have occurred due to the inherent limitations of voice recognition software  Read the chart carefully and recognize, using context, where substitutions have occurred  If you have any questions, please contact the dictating provider

## 2022-09-08 NOTE — ASSESSMENT & PLAN NOTE
Lab Results   Component Value Date    HGBA1C 7 4 (H) 09/06/2022   Diabetes seems to be reasonably well controlled with present medication    Importance of diabetic control and effect on kidney disease discussed with the patient

## 2022-09-13 DIAGNOSIS — E11.40 TYPE 2 DIABETES MELLITUS WITH DIABETIC NEUROPATHY, WITHOUT LONG-TERM CURRENT USE OF INSULIN (HCC): ICD-10-CM

## 2022-09-13 RX ORDER — LANCETS 28 GAUGE
EACH MISCELLANEOUS
Qty: 300 EACH | Refills: 0 | Status: SHIPPED | OUTPATIENT
Start: 2022-09-13

## 2022-09-13 NOTE — TELEPHONE ENCOUNTER
Medication Refill Request     Name Lancets (freestyle) lancetsv  Dose/Frequency 300   Quantity 300  Verified pharmacy   [x]  Verified ordering Provider   [x]  Does patient have enough for the next 3 days?  Yes [] No [x]

## 2022-09-16 ENCOUNTER — APPOINTMENT (OUTPATIENT)
Dept: LAB | Facility: CLINIC | Age: 82
End: 2022-09-16
Payer: COMMERCIAL

## 2022-09-16 ENCOUNTER — ANTICOAG VISIT (OUTPATIENT)
Dept: CARDIOLOGY CLINIC | Facility: CLINIC | Age: 82
End: 2022-09-16

## 2022-09-16 ENCOUNTER — IN-CLINIC DEVICE VISIT (OUTPATIENT)
Dept: CARDIOLOGY CLINIC | Facility: CLINIC | Age: 82
End: 2022-09-16
Payer: COMMERCIAL

## 2022-09-16 DIAGNOSIS — Z95.0 PRESENCE OF PERMANENT CARDIAC PACEMAKER: Primary | ICD-10-CM

## 2022-09-16 PROCEDURE — 93280 PM DEVICE PROGR EVAL DUAL: CPT | Performed by: INTERNAL MEDICINE

## 2022-09-16 NOTE — PROGRESS NOTES
SJM DUAL CHAMBER PM/ NOT MRI CONDITIONAL   DEVICE INTERROGATED IN THE [place] OFFICE: BATTERY VOLTAGE ADEQUATE (3 3 YRS) AP 55%  99% (>40%/CHB)  ALL LEAD PARAMETERS WITHIN NORMAL LIMITS  8 AMS EPISODES WITH ALL AVAILABLE EGMS SHOWING AF WITH LONGEST 5 5 HOURS  PT TAKES WARFARIN AND METOPROLOL  NO PROGRAMMING CHANGES MADE TO DEVICE PARAMETERS  NORMAL DEVICE FUNCTION   AM/RG

## 2022-09-22 ENCOUNTER — TELEPHONE (OUTPATIENT)
Dept: OTHER | Facility: OTHER | Age: 82
End: 2022-09-22

## 2022-09-22 DIAGNOSIS — Z79.4 TYPE 2 DIABETES MELLITUS WITH STAGE 4 CHRONIC KIDNEY DISEASE, WITH LONG-TERM CURRENT USE OF INSULIN (HCC): Primary | ICD-10-CM

## 2022-09-22 DIAGNOSIS — N18.4 TYPE 2 DIABETES MELLITUS WITH STAGE 4 CHRONIC KIDNEY DISEASE, WITH LONG-TERM CURRENT USE OF INSULIN (HCC): Primary | ICD-10-CM

## 2022-09-22 DIAGNOSIS — E11.22 TYPE 2 DIABETES MELLITUS WITH STAGE 4 CHRONIC KIDNEY DISEASE, WITH LONG-TERM CURRENT USE OF INSULIN (HCC): Primary | ICD-10-CM

## 2022-09-22 RX ORDER — LANCETS
EACH MISCELLANEOUS
COMMUNITY
End: 2022-09-23 | Stop reason: SDUPTHER

## 2022-09-22 RX ORDER — BLOOD-GLUCOSE METER
EACH MISCELLANEOUS
Status: CANCELLED | OUTPATIENT
Start: 2022-09-22

## 2022-09-22 RX ORDER — LANCETS
EACH MISCELLANEOUS
Status: CANCELLED | OUTPATIENT
Start: 2022-09-22

## 2022-09-22 RX ORDER — BLOOD-GLUCOSE METER
EACH MISCELLANEOUS
COMMUNITY
End: 2022-09-23 | Stop reason: CLARIF

## 2022-09-22 NOTE — TELEPHONE ENCOUNTER
Patient states her insurance will no longer cover the Freestyle glucose meter, lancets, or test strips  She will need a new glucose kit ordered with test strip and lancet supplies  Please send to [facility]

## 2022-09-23 RX ORDER — LANCETS
EACH MISCELLANEOUS 3 TIMES DAILY
Qty: 300 EACH | Refills: 3 | Status: SHIPPED | OUTPATIENT
Start: 2022-09-23

## 2022-09-23 RX ORDER — BLOOD-GLUCOSE METER
EACH MISCELLANEOUS 3 TIMES DAILY
Qty: 1 KIT | Refills: 0 | Status: SHIPPED | OUTPATIENT
Start: 2022-09-23

## 2022-09-23 RX ORDER — BLOOD SUGAR DIAGNOSTIC
1 STRIP MISCELLANEOUS 3 TIMES DAILY
Qty: 300 EACH | Refills: 3 | Status: SHIPPED | OUTPATIENT
Start: 2022-09-23

## 2022-10-11 PROBLEM — R05.9 COUGH: Status: RESOLVED | Noted: 2022-05-05 | Resolved: 2022-10-11

## 2022-10-12 ENCOUNTER — HOSPITAL ENCOUNTER (OUTPATIENT)
Dept: BONE DENSITY | Facility: CLINIC | Age: 82
Discharge: HOME | End: 2022-10-12
Payer: COMMERCIAL

## 2022-10-12 DIAGNOSIS — E11.42 TYPE 2 DIABETES MELLITUS WITH DIABETIC POLYNEUROPATHY, WITH LONG-TERM CURRENT USE OF INSULIN (HCC): ICD-10-CM

## 2022-10-12 DIAGNOSIS — M81.0 AGE-RELATED OSTEOPOROSIS WITHOUT CURRENT PATHOLOGICAL FRACTURE: ICD-10-CM

## 2022-10-12 DIAGNOSIS — Z79.4 TYPE 2 DIABETES MELLITUS WITH DIABETIC POLYNEUROPATHY, WITH LONG-TERM CURRENT USE OF INSULIN (HCC): ICD-10-CM

## 2022-10-12 PROBLEM — J12.82 PNEUMONIA DUE TO COVID-19 VIRUS: Status: RESOLVED | Noted: 2022-01-19 | Resolved: 2022-10-12

## 2022-10-12 PROBLEM — U07.1 PNEUMONIA DUE TO COVID-19 VIRUS: Status: RESOLVED | Noted: 2022-01-19 | Resolved: 2022-10-12

## 2022-10-12 PROCEDURE — 77080 DXA BONE DENSITY AXIAL: CPT

## 2022-11-03 ENCOUNTER — OFFICE VISIT (OUTPATIENT)
Dept: INTERNAL MEDICINE CLINIC | Facility: CLINIC | Age: 82
End: 2022-11-03

## 2022-11-03 ENCOUNTER — NURSE TRIAGE (OUTPATIENT)
Dept: OTHER | Facility: OTHER | Age: 82
End: 2022-11-03

## 2022-11-03 VITALS
TEMPERATURE: 99.1 F | HEART RATE: 63 BPM | WEIGHT: 166.8 LBS | HEIGHT: 66 IN | OXYGEN SATURATION: 98 % | DIASTOLIC BLOOD PRESSURE: 80 MMHG | BODY MASS INDEX: 26.81 KG/M2 | SYSTOLIC BLOOD PRESSURE: 140 MMHG | RESPIRATION RATE: 16 BRPM

## 2022-11-03 DIAGNOSIS — J06.9 VIRAL UPPER RESPIRATORY TRACT INFECTION: ICD-10-CM

## 2022-11-03 DIAGNOSIS — B99.9 HEADACHE ASSOCIATED WITH INFECTION: ICD-10-CM

## 2022-11-03 DIAGNOSIS — J02.9 SORE THROAT: Primary | ICD-10-CM

## 2022-11-03 DIAGNOSIS — I10 HYPERTENSION, ESSENTIAL: ICD-10-CM

## 2022-11-03 DIAGNOSIS — B00.1 HERPES SIMPLEX LABIALIS: ICD-10-CM

## 2022-11-03 DIAGNOSIS — N18.31 STAGE 3A CHRONIC KIDNEY DISEASE (HCC): Primary | ICD-10-CM

## 2022-11-03 DIAGNOSIS — E11.40 TYPE 2 DIABETES MELLITUS WITH DIABETIC NEUROPATHY, WITHOUT LONG-TERM CURRENT USE OF INSULIN (HCC): ICD-10-CM

## 2022-11-03 DIAGNOSIS — E03.9 ACQUIRED HYPOTHYROIDISM: ICD-10-CM

## 2022-11-03 DIAGNOSIS — I48.0 PAROXYSMAL ATRIAL FIBRILLATION (HCC): ICD-10-CM

## 2022-11-03 DIAGNOSIS — I25.5 ISCHEMIC CARDIOMYOPATHY: ICD-10-CM

## 2022-11-03 DIAGNOSIS — Z79.01 CHRONIC ANTICOAGULATION: ICD-10-CM

## 2022-11-03 DIAGNOSIS — R51.9 HEADACHE ASSOCIATED WITH INFECTION: ICD-10-CM

## 2022-11-03 DIAGNOSIS — J41.0 SIMPLE CHRONIC BRONCHITIS (HCC): ICD-10-CM

## 2022-11-03 DIAGNOSIS — E78.2 MIXED HYPERLIPIDEMIA: ICD-10-CM

## 2022-11-03 DIAGNOSIS — Z95.2 AORTIC VALVE REPLACED: ICD-10-CM

## 2022-11-03 NOTE — TELEPHONE ENCOUNTER
Reason for Disposition  • SEVERE (e g , excruciating) throat pain    Answer Assessment - Initial Assessment Questions  1  ONSET: "When did the throat start hurting?" (Hours or days ago)       Threes days   2  SEVERITY: "How bad is the sore throat?" (Scale 1-10; mild, moderate or severe)    - MILD (1-3):  doesn't interfere with eating or normal activities    - MODERATE (4-7): interferes with eating some solids and normal activities    - SEVERE (8-10):  excruciating pain, interferes with most normal activities    - SEVERE DYSPHAGIA: can't swallow liquids, drooling  8/10  3  STREP EXPOSURE: "Has there been any exposure to strep within the past week?" If Yes, ask: "What type of contact occurred?"       Denies   4  VIRAL SYMPTOMS: "Are there any symptoms of a cold, such as a runny nose, cough, hoarse voice or red eyes?"       Cough and chest congestion  5  FEVER: "Do you have a fever?" If Yes, ask: "What is your temperature, how was it measured, and when did it start?"      Denies   7   OTHER SYMPTOMS: "Do you have any other symptoms?" (e g , difficulty breathing, headache, rash)   headache , diarrhea x1 - loose , chest congestion   robatussiun d    Protocols used: SORE THROAT-ADULT-AH

## 2022-11-03 NOTE — ASSESSMENT & PLAN NOTE
- allegra 1 pill daily  - flonase nasal spray  - over the counter losenges for sore throat  - continue mucinex

## 2022-11-03 NOTE — TELEPHONE ENCOUNTER
Regarding: sore throat, HA x 3 days  ----- Message from Luther Louie sent at 11/3/2022  7:28 AM EDT -----  "I have a sore throat and a headache for three days  "

## 2022-11-03 NOTE — PROGRESS NOTES
Assessment/Plan:    Herpes simplex labialis  Symptom onset 1 day ago  However patient with kidney disease will hold off antiviral therapy    Viral upper respiratory tract infection  - allegra 1 pill daily  - flonase nasal spray  - over the counter losenges for sore throat  - continue mucinex    Hypothyroidism  Continue levothyroxin    Chronic obstructive pulmonary disease (HCC)  Not in exacerabation    Hypertension, essential  /80 mmHg    - continue home medications    Paroxysmal atrial fibrillation ([address] )  Pacemaker in place  - continue on warfarin  - continue metoprolol      Subjective:      Patient ID: Don Yanez is a 80 y o  female  HPI    Patient comes in for cough and sore throat  Patient also had diarea yesterday, no N/V, no pain in belly  No chest pain, SOB at baseline, no fever/chills, no muscle aches  Patients grand son who lives with her had same symptoms 1 week prior  The symptoms started one week ago but did not go away  She took robitussin, mucinex  Patient is not vaccinated for COVID19  She was seek with COVID pneumonia in Jan 2022 for wich she was hospitalized and required O2 supplement for a while  Review of Systems   Constitutional: Negative  Negative for chills, fatigue and fever  HENT: Positive for rhinorrhea and sinus pain  Negative for congestion, hearing loss, mouth sores and trouble swallowing  Eyes: Negative  Negative for redness  Respiratory: Positive for cough  Negative for chest tightness, shortness of breath and wheezing  Cardiovascular: Negative for chest pain, palpitations and leg swelling  Gastrointestinal: Negative for abdominal pain, blood in stool, constipation, diarrhea and nausea  Endocrine: Negative  Genitourinary: Negative for dysuria, flank pain and hematuria  Musculoskeletal: Negative for arthralgias and joint swelling  Skin: Negative for pallor and rash  Allergic/Immunologic: Negative      Neurological: Negative for dizziness, numbness and headaches  Hematological: Negative  Psychiatric/Behavioral: Negative for suicidal ideas  The patient is not nervous/anxious  Objective:      /80 (BP Location: Left arm, Patient Position: Sitting, Cuff Size: Standard)   Pulse 63   Temp 99 1 °F (37 3 °C) (Tympanic)   Resp 16   Ht 5' 6" (1 676 m)   Wt 75 7 kg (166 lb 12 8 oz)   SpO2 98%   BMI 26 92 kg/m²          Physical Exam  Vitals reviewed  Constitutional:       Appearance: Normal appearance  She is obese  HENT:      Head: Normocephalic and atraumatic  Right Ear: No drainage or swelling  No middle ear effusion  Tympanic membrane is not erythematous  Left Ear: No drainage or swelling  No middle ear effusion  Tympanic membrane is not erythematous  Nose: Nose normal       Mouth/Throat:      Mouth: Mucous membranes are moist       Pharynx: No oropharyngeal exudate  Eyes:      General: No scleral icterus  Extraocular Movements: Extraocular movements intact  Conjunctiva/sclera: Conjunctivae normal    Cardiovascular:      Rate and Rhythm: Normal rate and regular rhythm  Pulses: Normal pulses  Heart sounds: Normal heart sounds  No murmur heard  No gallop  Pulmonary:      Effort: Pulmonary effort is normal  No respiratory distress  Breath sounds: Normal breath sounds  No wheezing or rales  Abdominal:      General: Bowel sounds are normal  There is no distension  Tenderness: There is no abdominal tenderness  There is no guarding or rebound  Musculoskeletal:         General: No swelling or tenderness  Skin:     General: Skin is warm  Coloration: Skin is not jaundiced  Findings: No erythema or rash  Neurological:      General: No focal deficit present  Mental Status: She is alert and oriented to person, place, and time  Cranial Nerves: No cranial nerve deficit  Motor: No weakness     Psychiatric:         Mood and Affect: Mood normal          Behavior: Behavior normal

## 2022-11-03 NOTE — TELEPHONE ENCOUNTER
Patient called in stating she is having chest congestion, cough, and sore throat  Didn't test for covid  Denies other symptoms  Scheduled appointment for 21  with Dr Brandy Vincent

## 2022-11-04 ENCOUNTER — TELEPHONE (OUTPATIENT)
Dept: INTERNAL MEDICINE CLINIC | Facility: CLINIC | Age: 82
End: 2022-11-04

## 2022-11-04 LAB — SARS-COV-2 RNA RESP QL NAA+PROBE: POSITIVE

## 2022-11-04 NOTE — TELEPHONE ENCOUNTER
----- Message from Tevin Dyer, [address] sent at 11/4/2022  4:37 PM EDT -----  Her covid was postiive- if her sx are getting worse she should go to the ER

## 2022-11-04 NOTE — TELEPHONE ENCOUNTER
Spoke with pt, advised for pt to stay home for 5 days from onset on symptoms  I told the pt this can be treated with basic cold and flu otc medication, warm salt gargle, lots of warms liquids like soups and teas  Pt is aware she should monitor her temperatures and if her symptoms get worse to visit the ER as soon as she can

## 2022-12-10 DIAGNOSIS — E11.22 TYPE 2 DIABETES MELLITUS WITH STAGE 4 CHRONIC KIDNEY DISEASE, WITH LONG-TERM CURRENT USE OF INSULIN (HCC): ICD-10-CM

## 2022-12-10 DIAGNOSIS — N18.4 TYPE 2 DIABETES MELLITUS WITH STAGE 4 CHRONIC KIDNEY DISEASE, WITH LONG-TERM CURRENT USE OF INSULIN (HCC): ICD-10-CM

## 2022-12-10 DIAGNOSIS — Z79.4 TYPE 2 DIABETES MELLITUS WITH STAGE 4 CHRONIC KIDNEY DISEASE, WITH LONG-TERM CURRENT USE OF INSULIN (HCC): ICD-10-CM

## 2022-12-10 RX ORDER — BLOOD-GLUCOSE METER
EACH MISCELLANEOUS
Qty: 1 KIT | Refills: 2 | Status: SHIPPED | OUTPATIENT
Start: 2022-12-10

## 2023-01-02 PROBLEM — J06.9 VIRAL UPPER RESPIRATORY TRACT INFECTION: Status: RESOLVED | Noted: 2022-11-03 | Resolved: 2023-01-02

## 2023-01-05 ENCOUNTER — APPOINTMENT (OUTPATIENT)
Dept: LAB | Facility: CLINIC | Age: 83
End: 2023-01-05

## 2023-01-05 DIAGNOSIS — M89.9 CHRONIC KIDNEY DISEASE-MINERAL AND BONE DISORDER: ICD-10-CM

## 2023-01-05 DIAGNOSIS — N18.9 CHRONIC KIDNEY DISEASE-MINERAL AND BONE DISORDER: ICD-10-CM

## 2023-01-05 DIAGNOSIS — N18.31 STAGE 3A CHRONIC KIDNEY DISEASE (HCC): ICD-10-CM

## 2023-01-05 DIAGNOSIS — E83.9 CHRONIC KIDNEY DISEASE-MINERAL AND BONE DISORDER: ICD-10-CM

## 2023-01-05 LAB
25(OH)D3 SERPL-MCNC: 29.5 NG/ML (ref 30–100)
ANION GAP SERPL CALCULATED.3IONS-SCNC: 4 MMOL/L (ref 4–13)
BACTERIA UR QL AUTO: ABNORMAL /HPF
BASOPHILS # BLD AUTO: 0.03 THOUSANDS/ÂΜL (ref 0–0.1)
BASOPHILS NFR BLD AUTO: 1 % (ref 0–1)
BILIRUB UR QL STRIP: NEGATIVE
BUN SERPL-MCNC: 22 MG/DL (ref 5–25)
CALCIUM SERPL-MCNC: 8.8 MG/DL (ref 8.3–10.1)
CHLORIDE SERPL-SCNC: 106 MMOL/L (ref 96–108)
CLARITY UR: ABNORMAL
CO2 SERPL-SCNC: 30 MMOL/L (ref 21–32)
COLOR UR: YELLOW
CREAT SERPL-MCNC: 1.18 MG/DL (ref 0.6–1.3)
EOSINOPHIL # BLD AUTO: 0.09 THOUSAND/ÂΜL (ref 0–0.61)
EOSINOPHIL NFR BLD AUTO: 2 % (ref 0–6)
ERYTHROCYTE [DISTWIDTH] IN BLOOD BY AUTOMATED COUNT: 14 % (ref 11.6–15.1)
GFR SERPL CREATININE-BSD FRML MDRD: 43 ML/MIN/1.73SQ M
GLUCOSE P FAST SERPL-MCNC: 182 MG/DL (ref 65–99)
GLUCOSE UR STRIP-MCNC: NEGATIVE MG/DL
HCT VFR BLD AUTO: 41.7 % (ref 34.8–46.1)
HGB BLD-MCNC: 13.7 G/DL (ref 11.5–15.4)
HGB UR QL STRIP.AUTO: NEGATIVE
HYALINE CASTS #/AREA URNS LPF: ABNORMAL /LPF
IMM GRANULOCYTES # BLD AUTO: 0.03 THOUSAND/UL (ref 0–0.2)
IMM GRANULOCYTES NFR BLD AUTO: 1 % (ref 0–2)
KETONES UR STRIP-MCNC: NEGATIVE MG/DL
LEUKOCYTE ESTERASE UR QL STRIP: ABNORMAL
LYMPHOCYTES # BLD AUTO: 1.65 THOUSANDS/ÂΜL (ref 0.6–4.47)
LYMPHOCYTES NFR BLD AUTO: 31 % (ref 14–44)
MCH RBC QN AUTO: 29.7 PG (ref 26.8–34.3)
MCHC RBC AUTO-ENTMCNC: 32.9 G/DL (ref 31.4–37.4)
MCV RBC AUTO: 90 FL (ref 82–98)
MONOCYTES # BLD AUTO: 0.48 THOUSAND/ÂΜL (ref 0.17–1.22)
MONOCYTES NFR BLD AUTO: 9 % (ref 4–12)
MUCOUS THREADS UR QL AUTO: ABNORMAL
NEUTROPHILS # BLD AUTO: 3.08 THOUSANDS/ÂΜL (ref 1.85–7.62)
NEUTS SEG NFR BLD AUTO: 56 % (ref 43–75)
NITRITE UR QL STRIP: NEGATIVE
NON-SQ EPI CELLS URNS QL MICRO: ABNORMAL /HPF
NRBC BLD AUTO-RTO: 0 /100 WBCS
PH UR STRIP.AUTO: 6 [PH]
PHOSPHATE SERPL-MCNC: 3 MG/DL (ref 2.3–4.1)
PLATELET # BLD AUTO: 250 THOUSANDS/UL (ref 149–390)
PMV BLD AUTO: 10.7 FL (ref 8.9–12.7)
POTASSIUM SERPL-SCNC: 4.3 MMOL/L (ref 3.5–5.3)
PROT UR STRIP-MCNC: ABNORMAL MG/DL
PTH-INTACT SERPL-MCNC: 72.6 PG/ML (ref 18.4–80.1)
RBC # BLD AUTO: 4.62 MILLION/UL (ref 3.81–5.12)
RBC #/AREA URNS AUTO: ABNORMAL /HPF
SODIUM SERPL-SCNC: 140 MMOL/L (ref 135–147)
SP GR UR STRIP.AUTO: 1.02 (ref 1–1.03)
UROBILINOGEN UR STRIP-ACNC: <2 MG/DL
WBC # BLD AUTO: 5.36 THOUSAND/UL (ref 4.31–10.16)
WBC #/AREA URNS AUTO: ABNORMAL /HPF

## 2023-01-06 ENCOUNTER — ANTICOAG VISIT (OUTPATIENT)
Dept: CARDIOLOGY CLINIC | Facility: CLINIC | Age: 83
End: 2023-01-06

## 2023-01-06 LAB
CREAT UR-MCNC: 193 MG/DL
PROT UR-MCNC: 16 MG/DL
PROT/CREAT UR: 0.08 MG/G{CREAT} (ref 0–0.1)

## 2023-01-11 ENCOUNTER — REMOTE DEVICE CLINIC VISIT (OUTPATIENT)
Dept: CARDIOLOGY CLINIC | Facility: CLINIC | Age: 83
End: 2023-01-11

## 2023-01-11 DIAGNOSIS — Z95.0 PRESENCE OF PERMANENT CARDIAC PACEMAKER: Primary | ICD-10-CM

## 2023-01-11 NOTE — PROGRESS NOTES
SJM DUAL CHAMBER PM/ NOT MRI CONDITIONAL   [employer] TRANSMISSION:  BATTERY VOLTAGE ADEQUATE (2 9 YR )   AP 61%  99% (>40%/CHB)   ALL LEAD PARAMETERS WITHIN NORMAL LIMITS   DEVICE REPORTS ELEVATED ATRIAL PACING THRESHOLD OF 1 875V/0 5MS (PROGRAMMED SETTING OF 2V/0 5MS)   NO CHANGE IN P WAVE AMPLITUDE OR ATRIAL PACING IMPEDANCE   NO SIGNIFICANT HIGH RATE EPISODES   NORMAL DEVICE FUNCTION   RG

## 2023-01-22 ENCOUNTER — TELEPHONE (OUTPATIENT)
Dept: OTHER | Facility: OTHER | Age: 83
End: 2023-01-22

## 2023-01-22 NOTE — TELEPHONE ENCOUNTER
Patient is calling regarding cancelling an appointment      Date/Time: Mon 1/23/23 @ 0800    Patient was rescheduled: YES [] NO [x]    Patient requesting call back to reschedule: YES [x] NO []

## 2023-03-07 ENCOUNTER — TELEPHONE (OUTPATIENT)
Dept: NEPHROLOGY | Facility: CLINIC | Age: 83
End: 2023-03-07

## 2023-03-07 ENCOUNTER — APPOINTMENT (OUTPATIENT)
Dept: LAB | Facility: CLINIC | Age: 83
End: 2023-03-07

## 2023-03-07 DIAGNOSIS — N18.31 STAGE 3A CHRONIC KIDNEY DISEASE (HCC): Primary | ICD-10-CM

## 2023-03-08 ENCOUNTER — ANTICOAG VISIT (OUTPATIENT)
Dept: CARDIOLOGY CLINIC | Facility: CLINIC | Age: 83
End: 2023-03-08

## 2023-03-09 ENCOUNTER — TELEPHONE (OUTPATIENT)
Dept: NEPHROLOGY | Facility: CLINIC | Age: 83
End: 2023-03-09

## 2023-03-10 ENCOUNTER — OFFICE VISIT (OUTPATIENT)
Dept: NEPHROLOGY | Facility: CLINIC | Age: 83
End: 2023-03-10

## 2023-03-10 VITALS
HEART RATE: 72 BPM | OXYGEN SATURATION: 98 % | TEMPERATURE: 98.1 F | RESPIRATION RATE: 16 BRPM | DIASTOLIC BLOOD PRESSURE: 70 MMHG | SYSTOLIC BLOOD PRESSURE: 130 MMHG | HEIGHT: 66 IN | WEIGHT: 166.6 LBS | BODY MASS INDEX: 26.78 KG/M2

## 2023-03-10 DIAGNOSIS — I42.9 CARDIOMYOPATHY, UNSPECIFIED TYPE (HCC): ICD-10-CM

## 2023-03-10 DIAGNOSIS — N18.9 CHRONIC KIDNEY DISEASE-MINERAL AND BONE DISORDER: ICD-10-CM

## 2023-03-10 DIAGNOSIS — E11.40 TYPE 2 DIABETES MELLITUS WITH DIABETIC NEUROPATHY, WITHOUT LONG-TERM CURRENT USE OF INSULIN (HCC): ICD-10-CM

## 2023-03-10 DIAGNOSIS — E83.9 CHRONIC KIDNEY DISEASE-MINERAL AND BONE DISORDER: ICD-10-CM

## 2023-03-10 DIAGNOSIS — I25.5 ISCHEMIC CARDIOMYOPATHY: ICD-10-CM

## 2023-03-10 DIAGNOSIS — M89.9 CHRONIC KIDNEY DISEASE-MINERAL AND BONE DISORDER: ICD-10-CM

## 2023-03-10 DIAGNOSIS — N18.31 STAGE 3A CHRONIC KIDNEY DISEASE (HCC): Primary | ICD-10-CM

## 2023-03-10 DIAGNOSIS — I10 HYPERTENSION, ESSENTIAL: ICD-10-CM

## 2023-03-10 PROBLEM — N18.4 CKD (CHRONIC KIDNEY DISEASE) STAGE 4, GFR 15-29 ML/MIN (HCC): Status: RESOLVED | Noted: 2022-05-05 | Resolved: 2023-03-10

## 2023-03-10 NOTE — PROGRESS NOTES
NEPHROLOGY OFFICE FOLLOW UP  Kathie Resendiz 80 y o  female MRN: [id number]    Encounter: [id number] 3/10/2023    REASON FOR VISIT: Kathie Resendiz is a 80 y o  female who is here on 3/10/2023 for Chronic Kidney Disease and Follow-up    HPI:    Boy Davies came in today for follow-up of CKD  Patient is doing well overall  Denies any acute complaint    No chest pain no palpitation    No shortness of breath    No nausea no vomiting      REVIEW OF SYSTEMS:    Review of Systems   Constitutional: Negative for activity change and fatigue  HENT: Negative for congestion and ear discharge  Eyes: Negative for photophobia and pain  Respiratory: Negative for apnea and choking  Cardiovascular: Negative for chest pain and palpitations  Gastrointestinal: Negative for abdominal distention and blood in stool  Endocrine: Negative for heat intolerance and polyphagia  Genitourinary: Negative for flank pain and urgency  Musculoskeletal: Negative for neck pain and neck stiffness  Skin: Negative for color change and wound  Allergic/Immunologic: Negative for food allergies and immunocompromised state  Neurological: Negative for seizures and facial asymmetry  Hematological: Negative for adenopathy  Does not bruise/bleed easily  Psychiatric/Behavioral: Negative for self-injury and suicidal ideas           PAST MEDICAL HISTORY:  Past Medical History:   Diagnosis Date   • Anemia    • Aneurysm of thoracic aorta    • Aortic valve disorder    • Benign neoplasm of sigmoid colon    • Cardiomyopathy ([facility] )     last assessed: 10/10/2014   • CHF (congestive heart failure) (HCC)    • Chronic kidney disease    • 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[facility] )     last assessed: 10/10/2014   • Diabetic nephropathy (HCC)    • RAMON (dyspnea on exertion)    • GERD (gastroesophageal reflux disease)    • History of emphysema    • Hyperlipidemia    • TIA (transient ischemic attack)        PAST SURGICAL HISTORY:  Past Surgical History: Procedure Laterality Date   • AORTIC VALVE REPLACEMENT     • CARDIAC PACEMAKER PLACEMENT      last assessed: 10/10/2014   • CARDIAC SURGERY      aortic valve replacement   • CHOLECYSTECTOMY     • COLONOSCOPY     • HYSTERECTOMY     • INSERT / REPLACE / REMOVE PACEMAKER     • KNEE SURGERY Right    • OTHER SURGICAL HISTORY      Capsule ENDOscopy description: 12/19/2012   • [place] COLONOSCOPY FLX DX W/COLLJ SPEC WHEN PFRMD N/A 5/31/2018    Procedure: single balloon ENTEROSCOPY;  Surgeon: Deny Torres MD;  Location: [facility]; Service: Gastroenterology   • [place] ESOPHAGOGASTRODUODENOSCOPY TRANSORAL DIAGNOSTIC N/A 11/29/2017    Procedure: EGD AND COLONOSCOPY;  Surgeon: Beti Patterson MD;  Location: [facility];   Service: Gastroenterology       SOCIAL HISTORY:  Social History     Substance and Sexual Activity   Alcohol Use Never     Social History     Substance and Sexual Activity   Drug Use No     Social History     Tobacco Use   Smoking Status Former   • Packs/day: 1 00   • Years: 50 00   • Pack years: 50 00   • Types: Cigarettes   • Quit date: 11/29/2007   • Years since quitting: 15 2   Smokeless Tobacco Former   • Quit date: 2007       FAMILY HISTORY:  Family History   Problem Relation Age of Onset   • No Known Problems Mother    • No Known Problems Father        MEDICATIONS:    Current Outpatient Medications:   •  Accu-Chek FastClix Lancets MISC, Use 3 (three) times a day, Disp: 300 each, Rfl: 3  •  Ascorbic Acid (vitamin C) 1000 MG tablet, Take 1,000 mg by mouth daily, Disp: , Rfl:   •  Blood Glucose Monitoring Suppl (Accu-Chek Guide Me) w/Device KIT, USE 3 TIMES DAILY, Disp: 1 kit, Rfl: 2  •  Cholecalciferol (Vitamin D3) 50 MCG (2000 UT) TABS, Take 2,000 Units by mouth daily, Disp: , Rfl:   •  furosemide (LASIX) 40 mg tablet, take 1 tablet by mouth daily, Disp: 90 tablet, Rfl: 3  •  gabapentin (NEURONTIN) 300 mg capsule, take 1 capsule by mouth three times a day (Patient taking differently: Take 300 mg by mouth daily), Disp: 270 capsule, Rfl: 3  •  glipiZIDE (GLUCOTROL) 10 mg tablet, Take 1 tablet (10 mg total) by mouth 2 (two) times a day before meals, Disp: 180 tablet, Rfl: 3  •  glucose blood (Accu-Chek Celeste Plus) test strip, Use 1 each 3 (three) times a day Use as instructed, Disp: 300 each, Rfl: 3  •  insulin detemir (Levemir FlexTouch) 100 Units/mL injection pen, Inject 26 Units under the skin daily at bedtime, Disp: 26 mL, Rfl: 5  •  Insulin Pen Needle (Advocate Insulin Pen Needles) 31G X 8 MM MISC, Use daily, Disp: 100 each, Rfl: 3  •  Lancets (freestyle) lancets, Check blood glucose 3 times daily, Disp: 300 each, Rfl: 0  •  levothyroxine 50 mcg tablet, Take 1 tablet (50 mcg total) by mouth daily, Disp: 90 tablet, Rfl: 3  •  metoprolol tartrate (LOPRESSOR) 50 mg tablet, 1/2 tab twice a day, Disp: 90 tablet, Rfl: 3  •  pantoprazole (PROTONIX) 40 mg tablet, Take 1 tablet (40 mg total) by mouth daily, Disp: 90 tablet, Rfl: 3  •  warfarin (COUMADIN) 5 mg tablet, Take 1-2 tabs daily or as directed, Disp: 180 tablet, Rfl: 3    PHYSICAL EXAM:  Vitals:    03/10/23 0847   BP: 130/70   BP Location: Right arm   Patient Position: Sitting   Pulse: 72   Resp: 16   Temp: 98 1 °F (36 7 °C)   TempSrc: Temporal   SpO2: 98%   Weight: 75 6 kg (166 lb 9 6 oz)   Height: 5' 6" (1 676 m)     Body mass index is 26 89 kg/m²  Physical Exam  Constitutional:       General: She is not in acute distress  Appearance: She is well-developed  HENT:      Head: Normocephalic  Eyes:      General: No scleral icterus  Conjunctiva/sclera: Conjunctivae normal    Neck:      Vascular: No JVD  Cardiovascular:      Rate and Rhythm: Normal rate  Heart sounds: Normal heart sounds  Pulmonary:      Effort: Pulmonary effort is normal       Breath sounds: No wheezing  Abdominal:      Palpations: Abdomen is soft  Tenderness: There is no abdominal tenderness  Musculoskeletal:         General: Normal range of motion        Cervical back: Neck supple  Skin:     General: Skin is warm  Findings: No rash  Neurological:      Mental Status: She is alert and oriented to person, place, and time  Psychiatric:         Behavior: Behavior normal          LAB RESULTS:  Results for orders placed or performed in visit on 01/13/23   Protime-INR   Result Value Ref Range    Protime 41 2 (H) 11 6 - 14 5 seconds    INR 4 26 (H) 0 84 - 1 19       ASSESSMENT and PLAN:      Stage 3a chronic kidney disease  Lab Results   Component Value Date    EGFR 43 01/05/2023    EGFR 35 09/06/2022    EGFR 42 02/10/2022    CREATININE 1 18 01/05/2023    CREATININE 1 40 (H) 09/06/2022    CREATININE 1 19 02/10/2022   Patient renal function is stable overall  GFR is about 43    Asymptomatic and progressive nature of kidney disease discussed with the patient  Importance of hydration and avoiding nephrotoxic medicine also discussed with her    Chronic kidney disease-mineral and bone disorder  Lab Results   Component Value Date    EGFR 43 01/05/2023    EGFR 35 09/06/2022    EGFR 42 02/10/2022    CREATININE 1 18 01/05/2023    CREATININE 1 40 (H) 09/06/2022    CREATININE 1 19 02/10/2022   PTH and phosphorus along with vitamin D are within acceptable range and will continue to monitor    Cardiomyopathy [facility])  Quite asymptomatic    Diabetes mellitus with neurological manifestation ([facility] )    Lab Results   Component Value Date    HGBA1C 7 4 (H) 09/06/2022   Reasonably well controlled  Importance of diabetic control effect on kidney disease discussed with the patient    Hypertension, essential  Very well controlled with present medication which we will continue      Everything discussed at length with the patient  I will see her back in 6 months  We will get blood and urine test before that visit        Portions of the record may have been created with voice recognition software   Occasional wrong word or "sound a like" substitutions may have occurred due to the inherent limitations of voice recognition software  Read the chart carefully and recognize, using context, where substitutions have occurred  If you have any questions, please contact the dictating provider

## 2023-03-10 NOTE — ASSESSMENT & PLAN NOTE
Lab Results   Component Value Date    EGFR 43 01/05/2023    EGFR 35 09/06/2022    EGFR 42 02/10/2022    CREATININE 1 18 01/05/2023    CREATININE 1 40 (H) 09/06/2022    CREATININE 1 19 02/10/2022   Patient renal function is stable overall  GFR is about 43    Asymptomatic and progressive nature of kidney disease discussed with the patient    Importance of hydration and avoiding nephrotoxic medicine also discussed with her

## 2023-03-10 NOTE — ASSESSMENT & PLAN NOTE
Lab Results   Component Value Date    EGFR 43 01/05/2023    EGFR 35 09/06/2022    EGFR 42 02/10/2022    CREATININE 1 18 01/05/2023    CREATININE 1 40 (H) 09/06/2022    CREATININE 1 19 02/10/2022   PTH and phosphorus along with vitamin D are within acceptable range and will continue to monitor

## 2023-03-10 NOTE — ASSESSMENT & PLAN NOTE
Lab Results   Component Value Date    HGBA1C 7 4 (H) 09/06/2022   Reasonably well controlled    Importance of diabetic control effect on kidney disease discussed with the patient

## 2023-03-14 ENCOUNTER — RA CDI HCC (OUTPATIENT)
Dept: OTHER | Facility: HOSPITAL | Age: 83
End: 2023-03-14

## 2023-03-14 NOTE — PROGRESS NOTES
[facility]  coding opportunities     J44 9 and E11 22     Chart Reviewed number of suggestions sent to Provider: 2     Patients Insurance     Medicare Insurance: [address] [place] [address]

## 2023-03-20 ENCOUNTER — OFFICE VISIT (OUTPATIENT)
Dept: INTERNAL MEDICINE CLINIC | Facility: CLINIC | Age: 83
End: 2023-03-20

## 2023-03-20 ENCOUNTER — ANTICOAG VISIT (OUTPATIENT)
Dept: CARDIOLOGY CLINIC | Facility: CLINIC | Age: 83
End: 2023-03-20

## 2023-03-20 ENCOUNTER — APPOINTMENT (OUTPATIENT)
Dept: LAB | Facility: CLINIC | Age: 83
End: 2023-03-20

## 2023-03-20 ENCOUNTER — TELEPHONE (OUTPATIENT)
Dept: ADMINISTRATIVE | Facility: OTHER | Age: 83
End: 2023-03-20

## 2023-03-20 VITALS
SYSTOLIC BLOOD PRESSURE: 140 MMHG | HEIGHT: 66 IN | HEART RATE: 79 BPM | BODY MASS INDEX: 26.93 KG/M2 | DIASTOLIC BLOOD PRESSURE: 70 MMHG | WEIGHT: 167.6 LBS | OXYGEN SATURATION: 97 % | TEMPERATURE: 97.7 F

## 2023-03-20 DIAGNOSIS — J41.0 SIMPLE CHRONIC BRONCHITIS (HCC): ICD-10-CM

## 2023-03-20 DIAGNOSIS — E11.40 TYPE 2 DIABETES MELLITUS WITH DIABETIC NEUROPATHY, WITHOUT LONG-TERM CURRENT USE OF INSULIN (HCC): ICD-10-CM

## 2023-03-20 DIAGNOSIS — E03.9 ACQUIRED HYPOTHYROIDISM: ICD-10-CM

## 2023-03-20 DIAGNOSIS — M15.9 PRIMARY OSTEOARTHRITIS INVOLVING MULTIPLE JOINTS: ICD-10-CM

## 2023-03-20 DIAGNOSIS — N18.4 TYPE 2 DIABETES MELLITUS WITH STAGE 4 CHRONIC KIDNEY DISEASE, WITH LONG-TERM CURRENT USE OF INSULIN (HCC): Primary | ICD-10-CM

## 2023-03-20 DIAGNOSIS — Z79.4 TYPE 2 DIABETES MELLITUS WITH STAGE 4 CHRONIC KIDNEY DISEASE, WITH LONG-TERM CURRENT USE OF INSULIN (HCC): Primary | ICD-10-CM

## 2023-03-20 DIAGNOSIS — E11.22 TYPE 2 DIABETES MELLITUS WITH STAGE 4 CHRONIC KIDNEY DISEASE, WITH LONG-TERM CURRENT USE OF INSULIN (HCC): Primary | ICD-10-CM

## 2023-03-20 DIAGNOSIS — I25.118 CORONARY ARTERY DISEASE OF NATIVE ARTERY OF NATIVE HEART WITH STABLE ANGINA PECTORIS (HCC): ICD-10-CM

## 2023-03-20 DIAGNOSIS — I25.5 ISCHEMIC CARDIOMYOPATHY: ICD-10-CM

## 2023-03-20 DIAGNOSIS — I48.0 PAROXYSMAL ATRIAL FIBRILLATION (HCC): ICD-10-CM

## 2023-03-20 LAB
BASOPHILS # BLD AUTO: 0.03 THOUSANDS/ÂΜL (ref 0–0.1)
BASOPHILS NFR BLD AUTO: 1 % (ref 0–1)
CREAT UR-MCNC: 169 MG/DL
EOSINOPHIL # BLD AUTO: 0.08 THOUSAND/ÂΜL (ref 0–0.61)
EOSINOPHIL NFR BLD AUTO: 2 % (ref 0–6)
ERYTHROCYTE [DISTWIDTH] IN BLOOD BY AUTOMATED COUNT: 14.1 % (ref 11.6–15.1)
EST. AVERAGE GLUCOSE BLD GHB EST-MCNC: 146 MG/DL
HBA1C MFR BLD: 6.7 %
HCT VFR BLD AUTO: 40.8 % (ref 34.8–46.1)
HGB BLD-MCNC: 12.9 G/DL (ref 11.5–15.4)
IMM GRANULOCYTES # BLD AUTO: 0.01 THOUSAND/UL (ref 0–0.2)
IMM GRANULOCYTES NFR BLD AUTO: 0 % (ref 0–2)
LYMPHOCYTES # BLD AUTO: 1.33 THOUSANDS/ÂΜL (ref 0.6–4.47)
LYMPHOCYTES NFR BLD AUTO: 32 % (ref 14–44)
MCH RBC QN AUTO: 29 PG (ref 26.8–34.3)
MCHC RBC AUTO-ENTMCNC: 31.6 G/DL (ref 31.4–37.4)
MCV RBC AUTO: 92 FL (ref 82–98)
MICROALBUMIN UR-MCNC: 25.9 MG/L (ref 0–20)
MICROALBUMIN/CREAT 24H UR: 15 MG/G CREATININE (ref 0–30)
MONOCYTES # BLD AUTO: 0.45 THOUSAND/ÂΜL (ref 0.17–1.22)
MONOCYTES NFR BLD AUTO: 11 % (ref 4–12)
NEUTROPHILS # BLD AUTO: 2.24 THOUSANDS/ÂΜL (ref 1.85–7.62)
NEUTS SEG NFR BLD AUTO: 54 % (ref 43–75)
NRBC BLD AUTO-RTO: 0 /100 WBCS
NT-PROBNP SERPL-MCNC: 903 PG/ML
PLATELET # BLD AUTO: 246 THOUSANDS/UL (ref 149–390)
PMV BLD AUTO: 10.8 FL (ref 8.9–12.7)
RBC # BLD AUTO: 4.45 MILLION/UL (ref 3.81–5.12)
T4 FREE SERPL-MCNC: 0.96 NG/DL (ref 0.76–1.46)
TSH SERPL DL<=0.05 MIU/L-ACNC: 4.59 UIU/ML (ref 0.45–4.5)
WBC # BLD AUTO: 4.14 THOUSAND/UL (ref 4.31–10.16)

## 2023-03-20 RX ORDER — FERROUS SULFATE 325(65) MG
325 TABLET ORAL
COMMUNITY

## 2023-03-20 NOTE — TELEPHONE ENCOUNTER
----- Message from Jaye Nicolas sent at 3/20/2023  8:03 AM EDT -----  03/20/23 8:04 AM    Hello, our patient Sung Burnett has had eye exam completed/performed  Please assist in updating the patient chart by making an External outreach to [facility]   The date of service is oct/nov 2022    Thank you,  [address]

## 2023-03-20 NOTE — PATIENT INSTRUCTIONS
Patient with worsening dyspnea and ankle edema needs an extensive work-up before initiation of treatment

## 2023-03-20 NOTE — PROGRESS NOTES
Assessment/Plan:       Diagnoses and all orders for this visit:    Type 2 diabetes mellitus with stage 4 chronic kidney disease, with long-term current use of insulin ([facility])  -     Microalbumin / creatinine urine ratio    Simple chronic bronchitis (HCC)  -     CBC and differential; Future  -     TSH, 3rd generation with Free T4 reflex; Future  -     Echo complete w/ contrast if indicated; Future  -     Complete PFT with post bronchodilator; Future  -     NT-BNP [facility] only; Future  -     Hemoglobin A1C; Future    Paroxysmal atrial fibrillation (HCC)    Coronary artery disease of native artery of native heart with stable angina pectoris (HCC)  -     CBC and differential; Future  -     TSH, 3rd generation with Free T4 reflex; Future  -     Echo complete w/ contrast if indicated; Future  -     Complete PFT with post bronchodilator; Future  -     NT[facility]BNP [facility] only; Future  -     Hemoglobin A1C; Future    Acquired hypothyroidism  -     CBC and differential; Future  -     TSH, 3rd generation with Free T4 reflex; Future  -     Echo complete w/ contrast if indicated; Future  -     Complete PFT with post bronchodilator; Future  -     NT[facility]BNP [facility] only; Future  -     Hemoglobin A1C; Future    Type 2 diabetes mellitus with diabetic neuropathy, without long-term current use of insulin (HCC)  -     CBC and differential; Future  -     TSH, 3rd generation with Free T4 reflex; Future  -     Echo complete w/ contrast if indicated; Future  -     Complete PFT with post bronchodilator; Future  -     [facility]BNP [facility] only; Future  -     Hemoglobin A1C; Future    Ischemic cardiomyopathy    Primary osteoarthritis involving multiple joints    Other orders  -     ferrous sulfate 325 (65 Fe) mg tablet; Take 325 mg by mouth daily with breakfast                Subjective:      Patient ID: Wilner Thomas is a 80 y o  female      Diabetic with moderate control hemoglobin A1c last 7 4 needs a recheck     Chronic lung disease     Today, the patient is stating that her shortness of breath is considerably worse  There is some associated ankle edema which occurs towards the end of the day  Coronary artery disease but no angina pectoris  The patient has a mechanical aortic valve  CKD 4    Reduced ejection fraction status post ICD but doing well again with no symptoms other than perhaps exertional fatigue     Mechanical aortic valve replacement about 15 years ago anticoagulated on Coumadin with no complication     Minimal complaints today; arthralgias joint pain   On examination we see a patient who looks to be the stated age  Clear lungs   Mechanical S2   Arthritic deformity especially with hammertoe     Diabetic foot exam really unremarkable  Perhaps some reduction in pulse and a bit of hyperesthesia of the feet but she does have sensation  She states she will not do a stress test another walking nor chemical       The following portions of the patient's history were reviewed and updated as appropriate:   She has a past medical history of Anemia, Aneurysm of thoracic aorta, Aortic valve disorder, Benign neoplasm of sigmoid colon, Cardiomyopathy ([address] ), CHF (congestive heart failure) ([address] ), Chronic kidney disease, 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[address] ), Diabetic nephropathy ([address] ), RAMON (dyspnea on exertion), GERD (gastroesophageal reflux disease), History of emphysema, Hyperlipidemia, and TIA (transient ischemic attack)  ,  does not have any pertinent problems on file  ,   has a past surgical history that includes Cholecystectomy; Colonoscopy; Hysterectomy; Aortic valve replacement; pr esophagogastroduodenoscopy transoral diagnostic (N/A, 11/29/2017); Other surgical history; Cardiac surgery; Insert / replace / remove pacemaker; Cardiac pacemaker placement; Knee surgery (Right); and pr colonoscopy flx dx w/collj spec when pfrmd (N/A, 5/31/2018)  ,  family history includes No Known Problems in her father and mother  ,   reports that she quit smoking about 15 years ago  Her smoking use included cigarettes  She has a 50 00 pack-year smoking history  She quit smokeless tobacco use about 16 years ago  She reports that she does not drink alcohol and does not use drugs  ,  has No Known Allergies     Current Outpatient Medications   Medication Sig Dispense Refill   • Accu-Chek FastClix Lancets MISC Use 3 (three) times a day 300 each 3   • Ascorbic Acid (vitamin C) 1000 MG tablet Take 1,000 mg by mouth daily     • Blood Glucose Monitoring Suppl (Accu-Chek Guide Me) w/Device KIT USE 3 TIMES DAILY 1 kit 2   • Cholecalciferol (Vitamin D3) 50 MCG (2000 UT) TABS Take 2,000 Units by mouth daily     • ferrous sulfate 325 (65 Fe) mg tablet Take 325 mg by mouth daily with breakfast     • furosemide (LASIX) 40 mg tablet take 1 tablet by mouth daily 90 tablet 3   • gabapentin (NEURONTIN) 300 mg capsule take 1 capsule by mouth three times a day (Patient taking differently: Take 300 mg by mouth daily) 270 capsule 3   • glipiZIDE (GLUCOTROL) 10 mg tablet Take 1 tablet (10 mg total) by mouth 2 (two) times a day before meals 180 tablet 3   • glucose blood (Accu-Chek Celeste Plus) test strip Use 1 each 3 (three) times a day Use as instructed 300 each 3   • insulin detemir (Levemir FlexTouch) 100 Units/mL injection pen Inject 26 Units under the skin daily at bedtime 26 mL 5   • Insulin Pen Needle (Advocate Insulin Pen Needles) 31G X 8 MM MISC Use daily 100 each 3   • Lancets (freestyle) lancets Check blood glucose 3 times daily 300 each 0   • levothyroxine 50 mcg tablet Take 1 tablet (50 mcg total) by mouth daily 90 tablet 3   • metoprolol tartrate (LOPRESSOR) 50 mg tablet 1/2 tab twice a day 90 tablet 3   • pantoprazole (PROTONIX) 40 mg tablet Take 1 tablet (40 mg total) by mouth daily 90 tablet 3   • warfarin (COUMADIN) 5 mg tablet Take 1-2 tabs daily or as directed 180 tablet 3     No current facility-administered medications for this visit         Review of Systems Respiratory: Positive for shortness of breath  Cardiovascular: Positive for leg swelling  Musculoskeletal: Positive for arthralgias  All other systems reviewed and are negative  Objective:  Vitals:    03/20/23 0752   BP: 140/70   Pulse: 79   Temp: 97 7 °F (36 5 °C)   SpO2: 97%      Physical Exam  Constitutional:       Appearance: She is well-developed  Comments: A female patient who appears to be stated age but who looks well in spite of her numerous diagnoses   HENT:      Head: Normocephalic and atraumatic  Eyes:      General: No scleral icterus  Pupils: Pupils are equal, round, and reactive to light  Neck:      Thyroid: No thyromegaly  Trachea: No tracheal deviation  Cardiovascular:      Rate and Rhythm: Normal rate and regular rhythm  Pulses:           Dorsalis pedis pulses are 1+ on the right side and 1+ on the left side  Posterior tibial pulses are 0 on the right side and 0 on the left side  Heart sounds: Normal heart sounds  No murmur heard  No gallop  Comments: Lab mechanical S2  Pulmonary:      Effort: No respiratory distress  Breath sounds: No wheezing or rales  Abdominal:      General: Bowel sounds are normal       Palpations: Abdomen is soft  Tenderness: There is no abdominal tenderness  Musculoskeletal:         General: Deformity present  No tenderness  Normal range of motion  Cervical back: Normal range of motion and neck supple  Right lower leg: No edema  Left lower leg: No edema  Comments: Hammertoe bilateral   Skin:     General: Skin is warm  Neurological:      Mental Status: She is alert and oriented to person, place, and time  Coordination: Coordination normal    Psychiatric:         Judgment: Judgment normal            Patient Instructions   Patient with worsening dyspnea and ankle edema needs an extensive work-up before initiation of treatment

## 2023-03-21 NOTE — TELEPHONE ENCOUNTER
Upon review of the In Basket request we have found that we are unable to obtain a copy of the exclusion documentation requested because I called the number for [facility] in [place] and was redirected to WRAREN Wyman looked it up in the computer and [facility]    Any additional questions or concerns should be emailed to [employer] via the appropriate education email address, please do not reply via In Basket      Thank you  Max Morrison MA

## 2023-04-24 ENCOUNTER — HOSPITAL ENCOUNTER (OUTPATIENT)
Dept: PULMONOLOGY | Facility: HOSPITAL | Age: 83
Discharge: HOME | End: 2023-04-24
Attending: INTERNAL MEDICINE

## 2023-04-24 DIAGNOSIS — E03.9 ACQUIRED HYPOTHYROIDISM: ICD-10-CM

## 2023-04-24 DIAGNOSIS — J41.0 SIMPLE CHRONIC BRONCHITIS (HCC): ICD-10-CM

## 2023-04-24 DIAGNOSIS — I25.118 CORONARY ARTERY DISEASE OF NATIVE ARTERY OF NATIVE HEART WITH STABLE ANGINA PECTORIS (HCC): ICD-10-CM

## 2023-04-24 DIAGNOSIS — E11.40 TYPE 2 DIABETES MELLITUS WITH DIABETIC NEUROPATHY, WITHOUT LONG-TERM CURRENT USE OF INSULIN (HCC): ICD-10-CM

## 2023-04-24 RX ORDER — ALBUTEROL SULFATE 2.5 MG/3ML
2.5 SOLUTION RESPIRATORY (INHALATION) ONCE
Status: COMPLETED | OUTPATIENT
Start: 2023-04-24 | End: 2023-04-24

## 2023-04-24 RX ADMIN — ALBUTEROL SULFATE 2.5 MG: 2.5 SOLUTION RESPIRATORY (INHALATION) at 09:23

## 2023-05-11 DIAGNOSIS — I50.9 CONGESTIVE HEART FAILURE, UNSPECIFIED HF CHRONICITY, UNSPECIFIED HEART FAILURE TYPE (HCC): ICD-10-CM

## 2023-05-12 RX ORDER — FUROSEMIDE 40 MG/1
TABLET ORAL
Qty: 90 TABLET | Refills: 3 | Status: SHIPPED | OUTPATIENT
Start: 2023-05-12

## 2023-05-15 ENCOUNTER — ANTICOAG VISIT (OUTPATIENT)
Dept: CARDIOLOGY CLINIC | Facility: CLINIC | Age: 83
End: 2023-05-15

## 2023-05-15 ENCOUNTER — APPOINTMENT (OUTPATIENT)
Age: 83
End: 2023-05-15

## 2023-05-16 ENCOUNTER — OFFICE VISIT (OUTPATIENT)
Dept: CARDIOLOGY CLINIC | Facility: CLINIC | Age: 83
End: 2023-05-16

## 2023-05-16 VITALS
HEART RATE: 68 BPM | OXYGEN SATURATION: 99 % | RESPIRATION RATE: 16 BRPM | BODY MASS INDEX: 26.36 KG/M2 | SYSTOLIC BLOOD PRESSURE: 132 MMHG | WEIGHT: 164 LBS | DIASTOLIC BLOOD PRESSURE: 70 MMHG | HEIGHT: 66 IN

## 2023-05-16 DIAGNOSIS — I35.9 AORTIC VALVE DISORDER: ICD-10-CM

## 2023-05-16 DIAGNOSIS — Z79.01 CHRONIC ANTICOAGULATION: ICD-10-CM

## 2023-05-16 DIAGNOSIS — R06.02 SHORTNESS OF BREATH: ICD-10-CM

## 2023-05-16 DIAGNOSIS — I10 HYPERTENSION, ESSENTIAL: ICD-10-CM

## 2023-05-16 DIAGNOSIS — I48.0 PAROXYSMAL ATRIAL FIBRILLATION (HCC): Primary | ICD-10-CM

## 2023-05-16 NOTE — PROGRESS NOTES
[facility] [address] [address]  Cardiology Follow Up    Letha West  1940  [id number]      1  Paroxysmal atrial fibrillation (HCC)        2  Aortic valve disorder        3  Chronic anticoagulation        4  Hypertension, essential            Chief Complaint   Patient presents with   • Follow-up       Interval History: Patient presents for follow-up visit  Patient denies any chest pain  Patient does have some shortness of breath with exertion  Patient had echocardiogram as well as pulmonary function test done through primary care physician  Patient also had COVID last year  Patient denies any history of leg edema orthopnea PND  No history of presyncope syncope  She denies any bleeding issues      Patient Active Problem List   Diagnosis   • Hyperlipidemia   • Chronic anticoagulation   • Hypertension, essential   • Coronary artery disease of native artery of native heart with stable angina pectoris (HCC)   • Chronic obstructive pulmonary disease (HCC)   • Diabetes mellitus with neurological manifestation (HCC)   • Hypothyroidism   • Iron deficiency   • GERD (gastroesophageal reflux disease)   • AVM (arteriovenous malformation) of small bowel, acquired with hemorrhage   • Angiectasia   • Other vascular disorders of intestine (HCC)   • Angioedema   • Aortic valve replaced   • Cardiomyopathy (HCC)   • 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HCC)   • Stage 3a chronic kidney disease   • Chronic kidney disease-mineral and bone disorder   • Primary osteoarthritis involving multiple joints   • Paroxysmal atrial fibrillation (HCC)   • Neutropenia associated with infection (HCC)   • Herpes simplex labialis     Past Medical History:   Diagnosis Date   • Anemia    • Aneurysm of thoracic aorta (HCC)    • Aortic valve disorder    • Benign neoplasm of sigmoid colon    • Cardiomyopathy ([facility] [address] )     last assessed: 10/10/2014   • CHF (congestive heart failure) (HCC)    • Chronic kidney disease    • 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HCC)     last assessed: 10/10/2014   • Diabetic nephropathy (HCC)    • ARMON (dyspnea on exertion)    • GERD (gastroesophageal reflux disease)    • History of emphysema    • Hyperlipidemia    • TIA (transient ischemic attack)      Social History     Socioeconomic History   • Marital status:       Spouse name: Not on file   • Number of children: Not on file   • Years of education: Not on file   • Highest education level: Not on file   Occupational History   • Not on file   Tobacco Use   • Smoking status: Former     Packs/day: 1 00     Years: 50 00     Pack years: 50 00     Types: Cigarettes     Quit date: 11/29/2007     Years since quitting: 15 4   • Smokeless tobacco: Former     Quit date: 2007   Vaping Use   • Vaping Use: Never used   Substance and Sexual Activity   • Alcohol use: Never   • Drug use: No   • Sexual activity: Not Currently     Partners: Male   Other Topics Concern   • Not on file   Social History Narrative    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 BID Freestyle lancets BID    Living independently with spouse     Social Determinants of Health     Financial Resource Strain: Not on file   Food Insecurity: Not on file   Transportation Needs: Not on file   Physical Activity: Not on file   Stress: Not on file   Social Connections: Not on file   Intimate Partner Violence: Not on file   Housing Stability: Not on file      Family History   Problem Relation Age of Onset   • No Known Problems Mother    • No Known Problems Father      Past Surgical History:   Procedure Laterality Date   • AORTIC VALVE REPLACEMENT     • CARDIAC PACEMAKER PLACEMENT      last assessed: 10/10/2014   • CARDIAC SURGERY      aortic valve replacement   • CHOLECYSTECTOMY     • COLONOSCOPY     • HYSTERECTOMY     • INSERT / REPLACE / REMOVE PACEMAKER     • KNEE SURGERY Right    • OTHER SURGICAL HISTORY      Capsule ENDOscopy description: 12/19/2012   • [place] COLONOSCOPY FLX DX W/COLLJ SPEC WHEN PFRMD N/A 5/31/2018    Procedure: single balloon ENTEROSCOPY;  Surgeon: Trinidad Sands MD;  Location: [facility]; Service: Gastroenterology   • MT ESOPHAGOGASTRODUODENOSCOPY TRANSORAL DIAGNOSTIC N/A 11/29/2017    Procedure: EGD AND COLONOSCOPY;  Surgeon: Salomón Harvey MD;  Location: [facility];   Service: Gastroenterology       Current Outpatient Medications:   •  Accu-Chek FastClix Lancets MISC, Use 3 (three) times a day, Disp: 300 each, Rfl: 3  •  Ascorbic Acid (vitamin C) 1000 MG tablet, Take 1,000 mg by mouth daily, Disp: , Rfl:   •  Blood Glucose Monitoring Suppl (Accu-Chek Guide Me) w/Device KIT, USE 3 TIMES DAILY, Disp: 1 kit, Rfl: 2  •  Cholecalciferol (Vitamin D3) 50 MCG (2000 UT) TABS, Take 2,000 Units by mouth daily, Disp: , Rfl:   •  furosemide (LASIX) 40 mg tablet, TAKE 1 TABLET BY MOUTH  DAILY, Disp: 90 tablet, Rfl: 3  •  gabapentin (NEURONTIN) 300 mg capsule, take 1 capsule by mouth three times a day (Patient taking differently: Take 300 mg by mouth daily), Disp: 270 capsule, Rfl: 3  •  glipiZIDE (GLUCOTROL) 10 mg tablet, Take 1 tablet (10 mg total) by mouth 2 (two) times a day before meals, Disp: 180 tablet, Rfl: 3  •  glucose blood (Accu-Chek Celeste Plus) test strip, Use 1 each 3 (three) times a day Use as instructed, Disp: 300 each, Rfl: 3  •  insulin detemir (Levemir FlexTouch) 100 Units/mL injection pen, Inject 26 Units under the skin daily at bedtime, Disp: 26 mL, Rfl: 5  •  Insulin Pen Needle (Advocate Insulin Pen Needles) 31G X 8 MM MISC, Use daily, Disp: 100 each, Rfl: 3  •  Lancets (freestyle) lancets, Check blood glucose 3 times daily, Disp: 300 each, Rfl: 0  •  levothyroxine 50 mcg tablet, Take 1 tablet (50 mcg total) by mouth daily, Disp: 90 tablet, Rfl: 3  •  metoprolol tartrate (LOPRESSOR) 50 mg tablet, 1/2 tab twice a day, Disp: 90 tablet, Rfl: 3  •  pantoprazole (PROTONIX) 40 mg tablet, Take 1 tablet (40 mg total) by mouth daily, Disp: 90 tablet, Rfl: 3  • warfarin (COUMADIN) 5 mg tablet, Take 1-2 tabs daily or as directed, Disp: 180 tablet, Rfl: 3  No Known Allergies    Labs:   Ancillary Orders on 05/05/2023   Component Date Value   • Protime 05/15/2023 33 0 (H)    • INR 05/15/2023 3 20 (H)    Hospital Outpatient Visit on 04/11/2023   Component Date Value   • AV area peak lionel 04/11/2023 4 8    • LA size 04/11/2023 4 1    • Aortic valve mean veloci* 04/11/2023 11 80    • Triscuspid Valve Regurgi* 04/11/2023 18 0    • Tricuspid valve peak reg* 04/11/2023 2 11    • LVPWd 04/11/2023 1 30    • Left Atrium Area-systoli* 04/11/2023 24 7    • Left Atrium Area-systoli* 04/11/2023 23 3    • MV E' Tissue Velocity Se* 04/11/2023 5    • TR Peak Lionel 04/11/2023 2 1    • IVSd 04/11/2023 5 87    • LV DIASTOLIC VOLUME (MOD* 74/49/7596 68    • LEFT VENTRICLE SYSTOLIC * 39/09/6333 40    • Left ventricular stroke * 04/11/2023 27 00    • A4C EF 04/11/2023 42    • LA length (A2C) 04/11/2023 6 00    • LVIDd 04/11/2023 3 90    • IVS 04/11/2023 1 3    • LVIDS 04/11/2023 3 20    • FS 04/11/2023 18    • Ao root 04/11/2023 3 70    • RVID d 04/11/2023 3 5    • LVOT mn grad 04/11/2023 5 0    • AV area by cont VTI 04/11/2023 5 3    • AV mean gradient 04/11/2023 6    • AV LVOT peak gradient 04/11/2023 9    • MV valve area p 1/2 meth* 04/11/2023 3 55    • E wave deceleration time 04/11/2023 213    • LVOT diameter 04/11/2023 2 6    • LVOT peak lionel 04/11/2023 1 53    • LVOT peak VTI 04/11/2023 37 41    • Aortic valve peak veloci* 04/11/2023 1 7    • Ao VTI 04/11/2023 37 83    • LVOT stroke volume 04/11/2023 198 52    • AV peak gradient 04/11/2023 12    • MV Peak E Lionel 04/11/2023 99    • MV Peak A Lionel 04/11/2023 1 05    • ALAN A4C 04/11/2023 21 9    • RAA A4C 04/11/2023 16 5    • MV stenosis pressure 1/2* 04/11/2023 62    • LVOT stroke volume index 04/11/2023 110 80    • LVSV, 2D 04/11/2023 27    • LVOT area 04/11/2023 5 31    • DVI 04/11/2023 0 90    • AV valve area 04/11/2023 5 25    • LV EF 04/11/2023 40    Appointment on 2023   Component Date Value   • WBC 2023 4 14 (L)    • RBC 2023 4 45    • Hemoglobin 2023 12 9    • Hematocrit 2023 40 8    • MCV 2023 92    • MCH 2023 29 0    • MCHC 2023 31 6    • RDW 2023 14 1    • MPV 2023 10 8    • Platelets  246    • nRBC 2023 0    • Neutrophils Relative 2023 54    • Immat GRANS % 2023 0    • Lymphocytes Relative 2023 32    • Monocytes Relative 2023 11    • Eosinophils Relative 2023 2    • Basophils Relative 2023 1    • Neutrophils Absolute 2023 2 24    • Immature Grans Absolute 2023 0 01    • Lymphocytes Absolute 2023 1 33    • Monocytes Absolute 2023 0 45    • Eosinophils Absolute 2023 0 08    • Basophils Absolute 2023 0 03    • TSH 3RD GENERATON 2023 4 590 (H)    • NT-proBNP 2023 903 (H)    • Hemoglobin A1C 2023 6 7 (H)    • EAG 2023 146    • Free T4 2023 0 96    Office Visit on 2023   Component Date Value   • Creatinine, Ur 2023 169 0    • Albumin,U,Random 2023 25 9 (H)    • Albumin Creat Ratio 2023 15      Imaging: Complete PFT with post bronchodilator    Result Date: 2023  Narrative: Pulmonary Function Test Report Mila Nelson 80 y o  female MRN: [id number] Date of Testin2023 Requesting Physician: Dr Cristina Lopez Reason for Testing: Chronic simple bronchitis Reference set for interpretation: NHANES III Procedure: The patient was taken to pulmonary function testing laboratory  The patient demonstrated good effort and cooperation  The results of this test meet ATS standards for acceptability and repeatability  Data set appears appropriate for interpretation  Post bronchodilator testing performed after the administration of 2 5mg albuterol in 3cc normal saline administered via nebulizer per bronchodilator protocol   Results: FEV1/FVC Ratio: 75 15 "Forced Vital Capacity: 1 69 L, 64% predicted FEV1: 1 27 L, 64% predicted After administration of bronchodilator FEV1: 1 42 L, 71% predicted with no appreciable response to the bronchodilator Lung volumes by  nitrogen wash out : Total Lung Capacity 73% predicted Residual volume 74% predicted DLCO corrected for patients hemoglobin level: 46% Interpretation: Patient had a full lung function testing with spirometry lung volumes and DLCO  Patient gave a good effort  Results meet the ATS standards for acceptability and repeatability  The flow-volume curve demonstrates a restrictive pattern Spirometry demonstrates restrictive airflow limitation The lung volumes are mildly decreased The DLCO is severely decreased  Evans Ross MD [facility]       Review of Systems:  Review of Systems   REVIEW OF SYSTEMS:  Constitutional:  Denies fever or chills   Eyes:  Denies change in visual acuity   HENT:  Denies nasal congestion or sore throat   Respiratory: shortness of breath   Cardiovascular:  Denies chest pain or edema   GI:  Denies abdominal pain, nausea, vomiting, bloody stools or diarrhea   :  Denies dysuria, frequency, difficulty in micturition and nocturia  Musculoskeletal:  Denies back pain or joint pain   Neurologic:  Denies headache, focal weakness or sensory changes   Endocrine:  Denies polyuria or polydipsia   Lymphatic:  Denies swollen glands   Psychiatric:  Denies depression or anxiety    Physical Exam:    /70 (BP Location: Left arm, Patient Position: Sitting, Cuff Size: Standard)   Pulse 68   Resp 16   Ht 5' 6\" (1 676 m)   Wt 74 4 kg (164 lb)   SpO2 99%   BMI 26 47 kg/m²     Physical Exam   PHYSICAL EXAM:  General:  Patient is not in acute distress   Head: Normocephalic, Atraumatic  HEENT:  Both pupils normal-size atraumatic, normocephalic, nonicteric  Neck:  JVP not raised   Trachea central  No carotid bruit  Respiratory: Decreased breath sounds " bilateral  Cardiovascular:  Regular rate and rhythm no S3 heart sounds consistent with prosthetic mechanical valve  GI:  Abdomen soft nontender  No organomegaly  Lymphatic:  No cervical or inguinal lymphadenopathy  Neurologic:  Patient is awake alert, oriented   Grossly nonfocal  Extremities no edema    Echocardiogram done showed ejection fraction of 40%  Normally functioning prosthetic mechanical aortic valve  No major change from previous echocardiograms  PFTs showed restrictive flow pattern with severely reduced DLCO        Discussion/Summary:    Patient with multiple medical problems who seems to be doing reasonably well from cardiac standpoint  Previous studies reviewed with patient  Medications reviewed and possible side effects discussed  concepts of cardiovascular disease , signs and symptoms of heart disease  Dietary and risk factor modification reinforced  All questions answered  Safety measures reviewed  Patient advised to report any problems prompting medical attention  Possible etiologies for her symptoms of shortness of breath discussed  Patient refuses consideration for pharmacological nuclear stress test     Patient does have history of smoking in the past and also had COVID in the past   PFTs were abnormal   Referred to pulmonary for further evaluation for symptoms of shortness of breath  Patient appears clinically euvolemic at this point  Continue anticoagulation  Patient understands risks and benefits of anticoagulation to prevent thrombotic risk from prosthetic mechanical aortic valve  Follow-up in 6 months or earlier as needed  Patient is agreeable with the plan of care

## 2023-05-28 DIAGNOSIS — E03.9 HYPOTHYROIDISM, UNSPECIFIED TYPE: ICD-10-CM

## 2023-05-28 DIAGNOSIS — K21.9 GASTROESOPHAGEAL REFLUX DISEASE: ICD-10-CM

## 2023-05-28 DIAGNOSIS — I10 HYPERTENSION, ESSENTIAL: ICD-10-CM

## 2023-05-28 DIAGNOSIS — Z79.4 TYPE 2 DIABETES MELLITUS WITH DIABETIC POLYNEUROPATHY, WITH LONG-TERM CURRENT USE OF INSULIN (HCC): ICD-10-CM

## 2023-05-28 DIAGNOSIS — E11.42 TYPE 2 DIABETES MELLITUS WITH DIABETIC POLYNEUROPATHY, WITH LONG-TERM CURRENT USE OF INSULIN (HCC): ICD-10-CM

## 2023-05-28 DIAGNOSIS — I35.9 AORTIC VALVE DISORDER: ICD-10-CM

## 2023-05-29 RX ORDER — PANTOPRAZOLE SODIUM 40 MG/1
TABLET, DELAYED RELEASE ORAL
Qty: 90 TABLET | Refills: 3 | Status: SHIPPED | OUTPATIENT
Start: 2023-05-29

## 2023-05-29 RX ORDER — WARFARIN SODIUM 5 MG/1
TABLET ORAL
Qty: 180 TABLET | Refills: 3 | Status: SHIPPED | OUTPATIENT
Start: 2023-05-29

## 2023-05-29 RX ORDER — METOPROLOL TARTRATE 50 MG/1
TABLET, FILM COATED ORAL
Qty: 90 TABLET | Refills: 3 | Status: SHIPPED | OUTPATIENT
Start: 2023-05-29

## 2023-05-29 RX ORDER — LEVOTHYROXINE SODIUM 0.05 MG/1
TABLET ORAL
Qty: 90 TABLET | Refills: 3 | Status: SHIPPED | OUTPATIENT
Start: 2023-05-29

## 2023-05-31 RX ORDER — GLIPIZIDE 10 MG/1
TABLET ORAL
Qty: 180 TABLET | Refills: 3 | Status: SHIPPED | OUTPATIENT
Start: 2023-05-31

## 2023-07-06 ENCOUNTER — TELEPHONE (OUTPATIENT)
Dept: CARDIOLOGY CLINIC | Facility: CLINIC | Age: 83
End: 2023-07-06

## 2023-07-06 NOTE — TELEPHONE ENCOUNTER
Patient called complaining of severe SOB every day. She called the pulmonology to schedule as per Dr. Shelia Zambrano referral and she could not get an appointment until 8/19 and she is concerned that it will be too far. She states everything she does now makes her short of breath. She would like to discuss being put on oxygen and if that would help. P.s. PT stated she did not want to talk to a nurse at the time of the call.

## 2023-07-12 ENCOUNTER — REMOTE DEVICE CLINIC VISIT (OUTPATIENT)
Dept: CARDIOLOGY CLINIC | Facility: CLINIC | Age: 83
End: 2023-07-12
Payer: COMMERCIAL

## 2023-07-12 DIAGNOSIS — Z95.0 CARDIAC PACEMAKER IN SITU: Primary | ICD-10-CM

## 2023-07-12 PROCEDURE — 93294 REM INTERROG EVL PM/LDLS PM: CPT | Performed by: INTERNAL MEDICINE

## 2023-07-12 PROCEDURE — 93296 REM INTERROG EVL PM/IDS: CPT | Performed by: INTERNAL MEDICINE

## 2023-07-12 NOTE — PROGRESS NOTES
Results for orders placed or performed in visit on 07/12/23   Cardiac EP device report    Narrative    SJM DUAL CHAMBER PM/ NOT MRI CONDITIONAL  MERLIN TRANSMISSION:  BATTERY VOLTAGE ADEQUATE (2.5 YR.). AP 51%  99% (>40%/CHB/DDDR 60PPM). ALL AVAILABLE LEAD PARAMETERS WITHIN NORMAL LIMITS.  2 AT/AF EPISODE WITH MAX EPISODE 10 SECS; PAT ON EGM'S; AT/AF BURDEN <1%;  PATIENT TAKES WARFARIN, METOPROLOL TART. CORVUE IMPEDANCE THRESHOLD WITHIN NORMAL LIMITS. NORMAL DEVICE FUNCTION.   ES

## 2023-07-18 ENCOUNTER — TELEPHONE (OUTPATIENT)
Dept: PULMONOLOGY | Facility: MEDICAL CENTER | Age: 83
End: 2023-07-18

## 2023-07-18 NOTE — TELEPHONE ENCOUNTER
Location was given to patient when appt was scheduled. Tried calling back and call just gets dropped. If patient calls back please provide address again.

## 2023-07-20 ENCOUNTER — TELEPHONE (OUTPATIENT)
Age: 83
End: 2023-07-20

## 2023-07-20 ENCOUNTER — CONSULT (OUTPATIENT)
Age: 83
End: 2023-07-20
Payer: COMMERCIAL

## 2023-07-20 VITALS
HEART RATE: 59 BPM | DIASTOLIC BLOOD PRESSURE: 80 MMHG | WEIGHT: 167.6 LBS | TEMPERATURE: 98.8 F | SYSTOLIC BLOOD PRESSURE: 124 MMHG | HEIGHT: 66 IN | BODY MASS INDEX: 26.93 KG/M2 | OXYGEN SATURATION: 95 %

## 2023-07-20 DIAGNOSIS — I25.5 ISCHEMIC CARDIOMYOPATHY: ICD-10-CM

## 2023-07-20 DIAGNOSIS — J98.4 RESTRICTIVE LUNG DISEASE: ICD-10-CM

## 2023-07-20 DIAGNOSIS — R06.02 SHORTNESS OF BREATH: Primary | ICD-10-CM

## 2023-07-20 DIAGNOSIS — Z87.891 FORMER SMOKER: ICD-10-CM

## 2023-07-20 DIAGNOSIS — J41.0 SIMPLE CHRONIC BRONCHITIS (HCC): ICD-10-CM

## 2023-07-20 LAB
DME PARACHUTE DELIVERY DATE REQUESTED: NORMAL
DME PARACHUTE ITEM DESCRIPTION: NORMAL
DME PARACHUTE ORDER STATUS: NORMAL
DME PARACHUTE SUPPLIER NAME: NORMAL
DME PARACHUTE SUPPLIER PHONE: NORMAL

## 2023-07-20 PROCEDURE — 94618 PULMONARY STRESS TESTING: CPT | Performed by: INTERNAL MEDICINE

## 2023-07-20 PROCEDURE — 99204 OFFICE O/P NEW MOD 45 MIN: CPT | Performed by: INTERNAL MEDICINE

## 2023-07-20 RX ORDER — IPRATROPIUM BROMIDE AND ALBUTEROL SULFATE 2.5; .5 MG/3ML; MG/3ML
3 SOLUTION RESPIRATORY (INHALATION) 4 TIMES DAILY
Qty: 360 ML | Refills: 2 | Status: SHIPPED | OUTPATIENT
Start: 2023-07-20 | End: 2023-07-21 | Stop reason: SDUPTHER

## 2023-07-20 RX ORDER — ALBUTEROL SULFATE 90 UG/1
2 AEROSOL, METERED RESPIRATORY (INHALATION) EVERY 6 HOURS PRN
Qty: 18 G | Refills: 1 | Status: SHIPPED | OUTPATIENT
Start: 2023-07-20 | End: 2023-07-21 | Stop reason: SDUPTHER

## 2023-07-20 NOTE — PROGRESS NOTES
Assessment/Plan:     Diagnoses and all orders for this visit:    Shortness of breath  -     Ambulatory referral to Pulmonology  -     POCT 6 minute walk  -     Cancel: CT chest without contrast; Future  -     CT chest high resolution; Future    Simple chronic bronchitis (HCC)  -     albuterol (Ventolin HFA) 90 mcg/act inhaler; Inhale 2 puffs every 6 (six) hours as needed for wheezing  -     ipratropium-albuterol (DUO-NEB) 0.5-2.5 mg/3 mL nebulizer solution; Take 3 mL by nebulization 4 (four) times a day  -     Pulse oximetry overnight    Former smoker    Ischemic cardiomyopathy    Restrictive lung disease  -     CT chest high resolution; Future          Plan for follow up:  Shortness of breath likely multifactorial from heart cardiac etiology also likely reactive airway disease/chronic simple bronchitis setting of prior smoking history  Prior PFTs demonstrated mainly restrictive airflow limitatio and the DLCO is severely decreased  ? Fibrosis from the COVID-19 infection  Also reviewed CT of the chest from January 2022 with bilateral pulmonary infiltrates pattern consistent with COVID 19 pneumonia at that time. We will follow-up with a HRCT  of the chest as well , given restrictive airflow limitation given her current worsening shortness of breath  DuoNeb via the nebulizer 4 times daily as needed  Albuterol MDI to be carried outside when she is outside the house for 2 puffs 4 times daily as needed  MDI technique reviewed with the patient. She had a 6-minute walk in the office with no evidence of any exercise desaturation. Discussed with the patient that she does not need any oxygen on exertion and we can get a nocturnal pulse oximetry to see if there is any nocturnal desaturations ordered for overnight pulse oximetry to be sent to [facility]. Call if any worsening symptoms otherwise we will see her back in 4 to 6 weeks or as needed earlier as needed. Return in about 6 weeks (around 8/31/2023).   All questions are answered to the patient's satisfaction and understanding. She verbalizes understanding. She is encouraged to call with any further questions or concerns. Portions of the record may have been created with voice recognition software. Occasional wrong word or "sound a like" substitutions may have occurred due to the inherent limitations of voice recognition software. Read the chart carefully and recognize, using context, where substitutions have occurred. a    Electronically Signed by Alyson Cantu MD    ______________________________________________________________________    Chief Complaint:   Chief Complaint   Patient presents with   • Shortness of Breath        Patient ID: Beatriz Coats is a 80 y.o. y.o. female has a past medical history of Anemia, Aneurysm of thoracic aorta ([address]), Aortic valve disorder, Benign neoplasm of sigmoid colon, Cardiomyopathy ([address]), CHF (congestive heart failure) ([address]), Chronic kidney disease, 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[address]), Diabetic nephropathy ([address]), RAMON (dyspnea on exertion), GERD (gastroesophageal reflux disease), History of emphysema, Hyperlipidemia, and TIA (transient ischemic attack). 7/20/2023  Patient presents today for initial visit. Beatriz Coats is a very pleasant 70-year-old lady with approximately 50-pack-year smoking history who quit in 2007 when she was diagnosed with coronary artery disease and she had surgery done. She also had an aortic valve replacement currently on Coumadin  States she has been having shortness of breath and dyspnea on exertion and occasional chest tightness on and off with cough and especially when it is raining and on humid days. She did not have any history of asthma she states in the past.  She has had extensive cardiac work-up done and it has all been negative currently. Here for evaluation for pulmonary cause for the shortness of breath as well as to see if she needs oxygen she states.         Occupational/Exposure history: retired  Pets/Enviroment: dog  Travel history: none  Review of Systems   Constitutional: Positive for fatigue. HENT: Negative. Eyes: Negative. Respiratory: Positive for cough and shortness of breath. Cardiovascular: Negative. Gastrointestinal: Negative. Endocrine: Negative. Genitourinary: Negative. Musculoskeletal: Negative. Allergic/Immunologic: Negative. Neurological: Negative. Hematological: Negative. Social history: She reports that she quit smoking about 15 years ago. Her smoking use included cigarettes. She started smoking about 68 years ago. She has a 52.00 pack-year smoking history. She quit smokeless tobacco use about 16 years ago. She reports that she does not drink alcohol and does not use drugs. Past surgical history:   Past Surgical History:   Procedure Laterality Date   • AORTIC VALVE REPLACEMENT     • CARDIAC PACEMAKER PLACEMENT      last assessed: 10/10/2014   • CARDIAC SURGERY      aortic valve replacement   • CHOLECYSTECTOMY     • COLONOSCOPY     • HYSTERECTOMY     • INSERT / REPLACE / REMOVE PACEMAKER     • KNEE SURGERY Right    • OTHER SURGICAL HISTORY      Capsule ENDOscopy description: 12/19/2012   • NV COLONOSCOPY FLX DX W/COLLJ SPEC WHEN PFRMD N/A 5/31/2018    Procedure: single balloon ENTEROSCOPY;  Surgeon: Dean Trejo MD;  Location:  GI LAB; Service: Gastroenterology   • NV ESOPHAGOGASTRODUODENOSCOPY TRANSORAL DIAGNOSTIC N/A 11/29/2017    Procedure: EGD AND COLONOSCOPY;  Surgeon: Kym Cisse MD;  Location: [facility];   Service: Gastroenterology     Family history:   Family History   Problem Relation Age of Onset   • No Known Problems Mother    • No Known Problems Father        Immunization History   Administered Date(s) Administered   • INFLUENZA 09/16/2014, 09/16/2014, 10/08/2015, 09/08/2016, 09/14/2018, 09/27/2021, 09/07/2022   • Influenza Split High Dose Preservative Free IM 10/08/2015   • Influenza, high dose seasonal 0.7 mL 09/01/2020   • Influenza, seasonal, injectable 09/03/2014   • Pneumococcal Conjugate 13-Valent 11/07/2018   • Pneumococcal Polysaccharide PPV23 1940, 04/13/2022   • Tdap 1940, 05/16/2017   • Zoster 1940   • influenza, trivalent, adjuvanted 09/16/2019     Current Outpatient Medications   Medication Sig Dispense Refill   • Accu-Chek FastClix Lancets MISC Use 3 (three) times a day 300 each 3   • albuterol (Ventolin HFA) 90 mcg/act inhaler Inhale 2 puffs every 6 (six) hours as needed for wheezing 18 g 1   • Ascorbic Acid (vitamin C) 1000 MG tablet Take 1,000 mg by mouth daily     • Blood Glucose Monitoring Suppl (Accu-Chek Guide Me) w/Device KIT USE 3 TIMES DAILY 1 kit 2   • Cholecalciferol (Vitamin D3) 50 MCG (2000 UT) TABS Take 2,000 Units by mouth daily     • furosemide (LASIX) 40 mg tablet TAKE 1 TABLET BY MOUTH  DAILY 90 tablet 3   • gabapentin (NEURONTIN) 300 mg capsule take 1 capsule by mouth three times a day 270 capsule 3   • glipiZIDE (GLUCOTROL) 10 mg tablet TAKE 1 TABLET BY MOUTH  TWICE DAILY BEFORE MEALS 180 tablet 3   • glucose blood (Accu-Chek Celeste Plus) test strip Use 1 each 3 (three) times a day Use as instructed 300 each 3   • insulin detemir (Levemir FlexTouch) 100 Units/mL injection pen Inject 26 Units under the skin daily at bedtime 26 mL 5   • Insulin Pen Needle (Advocate Insulin Pen Needles) 31G X 8 MM MISC Use daily 100 each 3   • ipratropium-albuterol (DUO-NEB) 0.5-2.5 mg/3 mL nebulizer solution Take 3 mL by nebulization 4 (four) times a day 360 mL 2   • Lancets (freestyle) lancets Check blood glucose 3 times daily 300 each 0   • levothyroxine 50 mcg tablet TAKE 1 TABLET BY MOUTH  DAILY 90 tablet 3   • metoprolol tartrate (LOPRESSOR) 50 mg tablet TAKE ONE-HALF TABLET BY  MOUTH TWICE DAILY 90 tablet 3   • pantoprazole (PROTONIX) 40 mg tablet TAKE 1 TABLET BY MOUTH  DAILY 90 tablet 3   • warfarin (COUMADIN) 5 mg tablet TAKE 1 TO 2 TABLETS BY  MOUTH DAILY OR AS DIRECTED 180 tablet 3     No current facility-administered medications for this visit. Allergies: Patient has no known allergies. Objective:  Vitals:    07/20/23 1207   BP: 124/80   Pulse: 59   Temp: 98.8 °F (37.1 °C)   SpO2: 95%   Weight: 76 kg (167 lb 9.6 oz)   Height: 5' 6" (1.676 m)   Oxygen Therapy  SpO2: 95 %  . Wt Readings from Last 3 Encounters:   07/20/23 76 kg (167 lb 9.6 oz)   05/16/23 74.4 kg (164 lb)   04/11/23 75.8 kg (167 lb)     Body mass index is 27.05 kg/m². Physical Exam  Vitals and nursing note reviewed. Constitutional:       Appearance: She is well-developed. HENT:      Head: Normocephalic and atraumatic. Eyes:      Conjunctiva/sclera: Conjunctivae normal.      Pupils: Pupils are equal, round, and reactive to light. Neck:      Thyroid: No thyromegaly. Vascular: No JVD. Cardiovascular:      Rate and Rhythm: Normal rate and regular rhythm. Heart sounds: Normal heart sounds. No murmur heard. No friction rub. No gallop. Pulmonary:      Effort: No respiratory distress. Breath sounds: No wheezing or rales. Comments: Decreased breath sounds bilaterally   Chest:      Chest wall: No tenderness. Musculoskeletal:         General: No tenderness or deformity. Normal range of motion. Cervical back: Normal range of motion and neck supple. Lymphadenopathy:      Cervical: No cervical adenopathy. Skin:     General: Skin is warm and dry. Neurological:      Mental Status: She is alert and oriented to person, place, and time. Diagnostics:  I have personally reviewed pertinent films in PACS  ESS: Total score: 6  Cardiac EP device report    Result Date: 7/12/2023  Narrative: SJM DUAL CHAMBER PM/ NOT MRI CONDITIONAL MERLIN TRANSMISSION:  BATTERY VOLTAGE ADEQUATE (2.5 YR.). AP 51%  99% (>40%/CHB/DDDR 60PPM).   ALL AVAILABLE LEAD PARAMETERS WITHIN NORMAL LIMITS.  2 AT/AF EPISODE WITH MAX EPISODE 10 SECS; PAT ON EGM'S; AT/AF BURDEN <1%;  PATIENT TAKES WARFARIN, METOPROLOL TART. CORVUE IMPEDANCE THRESHOLD WITHIN NORMAL LIMITS. NORMAL DEVICE FUNCTION. ES     Cardiac EP device report    Result Date: 7/12/2023  Narrative: SJM DUAL CHAMBER PM/ NOT MRI CONDITIONAL MERLIN TRANSMISSION:  BATTERY VOLTAGE ADEQUATE (2.5 YR.). AP 51%  99% (>40%/CHB/DDDR 60PPM). ALL AVAILABLE LEAD PARAMETERS WITHIN NORMAL LIMITS.  2 AT/AF EPISODE WITH MAX EPISODE 10 SECS; PAT ON EGM'S; AT/AF BURDEN <1%;  PATIENT TAKES WARFARIN, METOPROLOL TART. CORVUE IMPEDANCE THRESHOLD WITHIN NORMAL LIMITS. NORMAL DEVICE FUNCTION.   ES

## 2023-07-21 DIAGNOSIS — J41.0 SIMPLE CHRONIC BRONCHITIS (HCC): ICD-10-CM

## 2023-07-21 LAB
DME PARACHUTE DELIVERY DATE ACTUAL: NORMAL
DME PARACHUTE DELIVERY DATE REQUESTED: NORMAL
DME PARACHUTE ITEM DESCRIPTION: NORMAL
DME PARACHUTE ORDER STATUS: NORMAL
DME PARACHUTE SUPPLIER NAME: NORMAL
DME PARACHUTE SUPPLIER PHONE: NORMAL

## 2023-07-21 RX ORDER — ALBUTEROL SULFATE 90 UG/1
2 AEROSOL, METERED RESPIRATORY (INHALATION) EVERY 6 HOURS PRN
Qty: 18 G | Refills: 1 | Status: SHIPPED | OUTPATIENT
Start: 2023-07-21

## 2023-07-21 RX ORDER — IPRATROPIUM BROMIDE AND ALBUTEROL SULFATE 2.5; .5 MG/3ML; MG/3ML
3 SOLUTION RESPIRATORY (INHALATION) 4 TIMES DAILY
Qty: 360 ML | Refills: 2 | Status: SHIPPED | OUTPATIENT
Start: 2023-07-21 | End: 2023-10-19

## 2023-07-21 NOTE — TELEPHONE ENCOUNTER
Patient lvm asking if the doctor can send the scripts for the Duo Neb and albuterol nebulizer solution to the [address] [place] [address] on [facility] instead because [facility] says that they don't have these medications in stock. Please advise.

## 2023-07-24 NOTE — TELEPHONE ENCOUNTER
Pt lvm stating she needs our office to call [facility] and get her scripts sent over. It seems we have sent the script to her [address] on 7/21. I tried returning the pts call, its rang twice and the phone cuts out, unable to lvm. Please advise pt when she calls back.

## 2023-07-26 LAB
DME PARACHUTE DELIVERY DATE ACTUAL: NORMAL
DME PARACHUTE DELIVERY DATE EXPECTED: NORMAL
DME PARACHUTE DELIVERY DATE REQUESTED: NORMAL
DME PARACHUTE ITEM DESCRIPTION: NORMAL
DME PARACHUTE ORDER STATUS: NORMAL
DME PARACHUTE SUPPLIER NAME: NORMAL
DME PARACHUTE SUPPLIER PHONE: NORMAL

## 2023-08-04 DIAGNOSIS — E11.40 TYPE 2 DIABETES MELLITUS WITH DIABETIC NEUROPATHY, WITHOUT LONG-TERM CURRENT USE OF INSULIN (HCC): ICD-10-CM

## 2023-08-04 RX ORDER — GABAPENTIN 300 MG/1
300 CAPSULE ORAL 3 TIMES DAILY
Qty: 270 CAPSULE | Refills: 3 | Status: SHIPPED | OUTPATIENT
Start: 2023-08-04

## 2023-08-08 ENCOUNTER — HOSPITAL ENCOUNTER (OUTPATIENT)
Dept: CT IMAGING | Facility: CLINIC | Age: 83
Discharge: HOME | End: 2023-08-08
Payer: COMMERCIAL

## 2023-08-08 DIAGNOSIS — R06.02 SHORTNESS OF BREATH: ICD-10-CM

## 2023-08-08 DIAGNOSIS — J98.4 RESTRICTIVE LUNG DISEASE: ICD-10-CM

## 2023-08-08 PROCEDURE — G1004 CDSM NDSC: HCPCS

## 2023-08-08 PROCEDURE — 71250 CT THORAX DX C-: CPT

## 2023-08-09 DIAGNOSIS — G47.34 NOCTURNAL HYPOXEMIA: Primary | ICD-10-CM

## 2023-08-09 DIAGNOSIS — J98.4 RESTRICTIVE LUNG DISEASE: ICD-10-CM

## 2023-08-11 LAB
DME PARACHUTE DELIVERY DATE EXPECTED: NORMAL
DME PARACHUTE DELIVERY DATE REQUESTED: NORMAL
DME PARACHUTE ITEM DESCRIPTION: NORMAL
DME PARACHUTE ORDER STATUS: NORMAL
DME PARACHUTE SUPPLIER NAME: NORMAL
DME PARACHUTE SUPPLIER PHONE: NORMAL

## 2023-08-15 LAB

## 2023-08-19 DIAGNOSIS — Z79.4 TYPE 2 DIABETES MELLITUS WITH DIABETIC POLYNEUROPATHY, WITH LONG-TERM CURRENT USE OF INSULIN (HCC): ICD-10-CM

## 2023-08-19 DIAGNOSIS — E11.42 TYPE 2 DIABETES MELLITUS WITH DIABETIC POLYNEUROPATHY, WITH LONG-TERM CURRENT USE OF INSULIN (HCC): ICD-10-CM

## 2023-08-21 RX ORDER — PEN NEEDLE, DIABETIC 32GX 5/32"
NEEDLE, DISPOSABLE MISCELLANEOUS DAILY
Qty: 100 EACH | Refills: 1 | Status: SHIPPED | OUTPATIENT
Start: 2023-08-21

## 2023-08-29 ENCOUNTER — TELEPHONE (OUTPATIENT)
Dept: NEPHROLOGY | Facility: CLINIC | Age: 83
End: 2023-08-29

## 2023-08-29 NOTE — TELEPHONE ENCOUNTER
Called spoke with patient advised patient to get labs done prior to 09/07/23 fu with . patient understood and is okay with it.

## 2023-09-01 ENCOUNTER — APPOINTMENT (OUTPATIENT)
Age: 83
End: 2023-09-01
Payer: COMMERCIAL

## 2023-09-01 ENCOUNTER — OFFICE VISIT (OUTPATIENT)
Age: 83
End: 2023-09-01
Payer: COMMERCIAL

## 2023-09-01 VITALS
SYSTOLIC BLOOD PRESSURE: 126 MMHG | TEMPERATURE: 98.2 F | HEIGHT: 66 IN | OXYGEN SATURATION: 96 % | DIASTOLIC BLOOD PRESSURE: 80 MMHG | HEART RATE: 71 BPM | WEIGHT: 168.6 LBS | BODY MASS INDEX: 27.1 KG/M2

## 2023-09-01 DIAGNOSIS — N18.9 CHRONIC KIDNEY DISEASE-MINERAL AND BONE DISORDER: ICD-10-CM

## 2023-09-01 DIAGNOSIS — M89.9 CHRONIC KIDNEY DISEASE-MINERAL AND BONE DISORDER: ICD-10-CM

## 2023-09-01 DIAGNOSIS — N18.31 STAGE 3A CHRONIC KIDNEY DISEASE (HCC): ICD-10-CM

## 2023-09-01 DIAGNOSIS — E83.9 CHRONIC KIDNEY DISEASE-MINERAL AND BONE DISORDER: ICD-10-CM

## 2023-09-01 DIAGNOSIS — J41.0 SIMPLE CHRONIC BRONCHITIS (HCC): ICD-10-CM

## 2023-09-01 DIAGNOSIS — G47.34 NOCTURNAL HYPOXEMIA: ICD-10-CM

## 2023-09-01 DIAGNOSIS — R91.8 GROUND GLASS OPACITY PRESENT ON IMAGING OF LUNG: ICD-10-CM

## 2023-09-01 DIAGNOSIS — J98.4 RESTRICTIVE LUNG DISEASE: ICD-10-CM

## 2023-09-01 DIAGNOSIS — R06.02 SHORTNESS OF BREATH: Primary | ICD-10-CM

## 2023-09-01 LAB
25(OH)D3 SERPL-MCNC: 43 NG/ML (ref 30–100)
ANION GAP SERPL CALCULATED.3IONS-SCNC: 11 MMOL/L
BASOPHILS # BLD AUTO: 0.02 THOUSANDS/ÂΜL (ref 0–0.1)
BASOPHILS NFR BLD AUTO: 1 % (ref 0–1)
BILIRUB UR QL STRIP: NEGATIVE
BUN SERPL-MCNC: 24 MG/DL (ref 5–25)
CALCIUM SERPL-MCNC: 8.6 MG/DL (ref 8.4–10.2)
CHLORIDE SERPL-SCNC: 104 MMOL/L (ref 96–108)
CLARITY UR: CLEAR
CO2 SERPL-SCNC: 27 MMOL/L (ref 21–32)
COLOR UR: COLORLESS
CREAT SERPL-MCNC: 1.41 MG/DL (ref 0.6–1.3)
CREAT UR-MCNC: 37.1 MG/DL
EOSINOPHIL # BLD AUTO: 0.18 THOUSAND/ÂΜL (ref 0–0.61)
EOSINOPHIL NFR BLD AUTO: 4 % (ref 0–6)
ERYTHROCYTE [DISTWIDTH] IN BLOOD BY AUTOMATED COUNT: 14.6 % (ref 11.6–15.1)
GFR SERPL CREATININE-BSD FRML MDRD: 34 ML/MIN/1.73SQ M
GLUCOSE P FAST SERPL-MCNC: 268 MG/DL (ref 65–99)
GLUCOSE UR STRIP-MCNC: NEGATIVE MG/DL
HCT VFR BLD AUTO: 40.7 % (ref 34.8–46.1)
HGB BLD-MCNC: 13.2 G/DL (ref 11.5–15.4)
HGB UR QL STRIP.AUTO: NEGATIVE
IMM GRANULOCYTES # BLD AUTO: 0.01 THOUSAND/UL (ref 0–0.2)
IMM GRANULOCYTES NFR BLD AUTO: 0 % (ref 0–2)
KETONES UR STRIP-MCNC: NEGATIVE MG/DL
LEUKOCYTE ESTERASE UR QL STRIP: NEGATIVE
LYMPHOCYTES # BLD AUTO: 1.32 THOUSANDS/ÂΜL (ref 0.6–4.47)
LYMPHOCYTES NFR BLD AUTO: 31 % (ref 14–44)
MCH RBC QN AUTO: 28.6 PG (ref 26.8–34.3)
MCHC RBC AUTO-ENTMCNC: 32.4 G/DL (ref 31.4–37.4)
MCV RBC AUTO: 88 FL (ref 82–98)
MONOCYTES # BLD AUTO: 0.37 THOUSAND/ÂΜL (ref 0.17–1.22)
MONOCYTES NFR BLD AUTO: 9 % (ref 4–12)
NEUTROPHILS # BLD AUTO: 2.43 THOUSANDS/ÂΜL (ref 1.85–7.62)
NEUTS SEG NFR BLD AUTO: 55 % (ref 43–75)
NITRITE UR QL STRIP: NEGATIVE
NRBC BLD AUTO-RTO: 0 /100 WBCS
PH UR STRIP.AUTO: 6 [PH]
PHOSPHATE SERPL-MCNC: 2.4 MG/DL (ref 2.3–4.1)
PLATELET # BLD AUTO: 240 THOUSANDS/UL (ref 149–390)
PMV BLD AUTO: 11.5 FL (ref 8.9–12.7)
POTASSIUM SERPL-SCNC: 4.3 MMOL/L (ref 3.5–5.3)
PROT UR STRIP-MCNC: NEGATIVE MG/DL
PROT UR-MCNC: <4 MG/DL
PTH-INTACT SERPL-MCNC: 101.3 PG/ML (ref 12–88)
RBC # BLD AUTO: 4.61 MILLION/UL (ref 3.81–5.12)
SODIUM SERPL-SCNC: 142 MMOL/L (ref 135–147)
SP GR UR STRIP.AUTO: 1.01 (ref 1–1.03)
UROBILINOGEN UR STRIP-ACNC: <2 MG/DL
WBC # BLD AUTO: 4.33 THOUSAND/UL (ref 4.31–10.16)

## 2023-09-01 PROCEDURE — 85025 COMPLETE CBC W/AUTO DIFF WBC: CPT

## 2023-09-01 PROCEDURE — 84156 ASSAY OF PROTEIN URINE: CPT

## 2023-09-01 PROCEDURE — 80048 BASIC METABOLIC PNL TOTAL CA: CPT

## 2023-09-01 PROCEDURE — 36415 COLL VENOUS BLD VENIPUNCTURE: CPT

## 2023-09-01 PROCEDURE — 82570 ASSAY OF URINE CREATININE: CPT

## 2023-09-01 PROCEDURE — 84100 ASSAY OF PHOSPHORUS: CPT

## 2023-09-01 PROCEDURE — 83970 ASSAY OF PARATHORMONE: CPT

## 2023-09-01 PROCEDURE — 99214 OFFICE O/P EST MOD 30 MIN: CPT | Performed by: PHYSICIAN ASSISTANT

## 2023-09-01 PROCEDURE — 82306 VITAMIN D 25 HYDROXY: CPT

## 2023-09-01 PROCEDURE — 81003 URINALYSIS AUTO W/O SCOPE: CPT

## 2023-09-01 NOTE — PROGRESS NOTES
Assessment/Plan:   Diagnoses and all orders for this visit:    Shortness of breath    Simple chronic bronchitis (HCC)    Restrictive lung disease    Ground glass opacity present on imaging of lung  -     CT chest without contrast; Future    Nocturnal hypoxemia    Patient is here today for follow-up. Shortness of breath likely multifactorial related to her cardiac disease, COPD, restrictive lung disease. He is stable with her breathing, has been using the DuoNeb as needed and does note some improvement in her breathing particularly on very humid days. She is also using the oxygen 2 L at night and does note improvement in her sleep. She had a high-resolution CT scan done given her prior COVID and restrictive lung disease on PFT. CT scan shows some minimal dependent lower lobe groundglass opacity, possibly some early fibrosis related to her previous COVID, some scarring related to COVID, mild air trapping. There is a 9 mm groundglass opacity in the posterior right apex but unable to tell if this was present in January 2022. We will order a repeat CT scan to be done in 1 year. She will follow-up with us in about 6 months or sooner if necessary. Return in about 6 months (around 3/1/2024). All questions are answered to the patient's satisfaction and understanding. She verbalizes understanding. She is encouraged to call with any further questions or concerns. Portions of the record may have been created with voice recognition software. Occasional wrong word or "sound a like" substitutions may have occurred due to the inherent limitations of voice recognition software. Read the chart carefully and recognize, using context, where substitutions have occurred. Electronically Signed by Forrest Lama PA-C    ______________________________________________________________________    Chief Complaint:   Chief Complaint   Patient presents with   • Follow-up       Patient ID: Clau Ambriz is a 80 y.o. y.o. female has a past medical history of Anemia, Aneurysm of thoracic aorta (HCC), Aortic valve disorder, Benign neoplasm of sigmoid colon, Cardiomyopathy ([address]), CHF (congestive heart failure) ([address]), Chronic kidney disease, 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[address]), Diabetic nephropathy ([address]), RAMON (dyspnea on exertion), GERD (gastroesophageal reflux disease), History of emphysema, Hyperlipidemia, and TIA (transient ischemic attack). 9/1/2023  Patient presents today for follow-up visit. Patient is an 80-year-old female with about 50-pack-year smoking history who quit in 2007 with past medical history of CAD, aortic valve replacement, COPD, hypertension, hypothyroidism, cardiomyopathy. She is here today for follow-up. She is overall stable since her last visit, continues to have some shortness of breath/dyspnea on exertion without much change. She has been using her nebulizer as needed, particularly on very humid days. She has noted improvement with the use of the nebulizer. She has been using the oxygen at night and does find she is sleeping better with the oxygen. Review of Systems   Constitutional: Negative. HENT: Negative. Respiratory: Positive for shortness of breath. Cardiovascular: Negative. Gastrointestinal: Negative. Genitourinary: Negative. Musculoskeletal: Negative. Skin: Negative. Allergic/Immunologic: Negative. Neurological: Negative. Psychiatric/Behavioral: Negative. Smoking history: She reports that she quit smoking about 15 years ago. Her smoking use included cigarettes. She started smoking about 68 years ago. She has a 52.00 pack-year smoking history. She quit smokeless tobacco use about 16 years ago.     The following portions of the patient's history were reviewed and updated as appropriate: allergies, current medications, past family history, past medical history, past social history, past surgical history and problem list.    Immunization History Administered Date(s) Administered   • INFLUENZA 09/16/2014, 09/16/2014, 10/08/2015, 09/08/2016, 09/14/2018, 09/27/2021, 09/07/2022   • Influenza Split High Dose Preservative Free IM 10/08/2015   • Influenza, high dose seasonal 0.7 mL 09/01/2020   • Influenza, seasonal, injectable 09/03/2014   • Pneumococcal Conjugate 13-Valent 11/07/2018   • Pneumococcal Polysaccharide PPV23 1940, 04/13/2022   • Tdap 1940, 05/16/2017   • Zoster 1940   • influenza, trivalent, adjuvanted 09/16/2019     Current Outpatient Medications   Medication Sig Dispense Refill   • Accu-Chek FastClix Lancets MISC Use 3 (three) times a day 300 each 3   • albuterol (Ventolin HFA) 90 mcg/act inhaler Inhale 2 puffs every 6 (six) hours as needed for wheezing 18 g 1   • Ascorbic Acid (vitamin C) 1000 MG tablet Take 1,000 mg by mouth daily     • BD Pen Needle Rosie U/F 32G X 4 MM MISC USE DAILY 100 each 1   • Blood Glucose Monitoring Suppl (Accu-Chek Guide Me) w/Device KIT USE 3 TIMES DAILY 1 kit 2   • Cholecalciferol (Vitamin D3) 50 MCG (2000 UT) TABS Take 2,000 Units by mouth daily     • furosemide (LASIX) 40 mg tablet TAKE 1 TABLET BY MOUTH  DAILY 90 tablet 3   • gabapentin (NEURONTIN) 300 mg capsule Take 1 capsule (300 mg total) by mouth 3 (three) times a day 270 capsule 3   • glipiZIDE (GLUCOTROL) 10 mg tablet TAKE 1 TABLET BY MOUTH  TWICE DAILY BEFORE MEALS 180 tablet 3   • glucose blood (Accu-Chek Celeste Plus) test strip Use 1 each 3 (three) times a day Use as instructed 300 each 3   • insulin detemir (Levemir FlexTouch) 100 Units/mL injection pen Inject 26 Units under the skin daily at bedtime 26 mL 5   • ipratropium-albuterol (DUO-NEB) 0.5-2.5 mg/3 mL nebulizer solution Take 3 mL by nebulization 4 (four) times a day 360 mL 2   • Lancets (freestyle) lancets Check blood glucose 3 times daily 300 each 0   • levothyroxine 50 mcg tablet TAKE 1 TABLET BY MOUTH  DAILY 90 tablet 3   • metoprolol tartrate (LOPRESSOR) 50 mg tablet TAKE ONE-HALF TABLET BY  MOUTH TWICE DAILY 90 tablet 3   • pantoprazole (PROTONIX) 40 mg tablet TAKE 1 TABLET BY MOUTH  DAILY 90 tablet 3   • warfarin (COUMADIN) 5 mg tablet TAKE 1 TO 2 TABLETS BY  MOUTH DAILY OR AS DIRECTED 180 tablet 3     No current facility-administered medications for this visit. Allergies: Patient has no known allergies. Objective:  Vitals:    09/01/23 1330   BP: 126/80   Pulse: 71   Temp: 98.2 °F (36.8 °C)   SpO2: 96%   Weight: 76.5 kg (168 lb 9.6 oz)   Height: 5' 6" (1.676 m)   Oxygen Therapy  SpO2: 96 %  . Wt Readings from Last 3 Encounters:   09/01/23 76.5 kg (168 lb 9.6 oz)   07/20/23 76 kg (167 lb 9.6 oz)   05/16/23 74.4 kg (164 lb)     Body mass index is 27.21 kg/m². Physical Exam  Vitals reviewed. Constitutional:       Appearance: Normal appearance. HENT:      Head: Normocephalic and atraumatic. Mouth/Throat:      Pharynx: Oropharynx is clear. Eyes:      Conjunctiva/sclera: Conjunctivae normal.   Cardiovascular:      Rate and Rhythm: Normal rate and regular rhythm. Pulmonary:      Effort: Pulmonary effort is normal. No respiratory distress. Breath sounds: Normal breath sounds. No decreased breath sounds, wheezing, rhonchi or rales. Abdominal:      General: Abdomen is flat. There is no distension. Musculoskeletal:         General: Normal range of motion. Cervical back: Normal range of motion. Right lower leg: No edema. Left lower leg: No edema. Skin:     General: Skin is warm and dry. Neurological:      Mental Status: She is alert and oriented to person, place, and time.    Psychiatric:         Mood and Affect: Mood normal.         Behavior: Behavior normal.         Lab Review:   Lab Results   Component Value Date     11/18/2015    K 4.3 01/05/2023    K 5.2 11/18/2015     01/05/2023     11/18/2015    CO2 30 01/05/2023    CO2 29 11/18/2015    BUN 22 01/05/2023    BUN 37 (H) 11/18/2015    CREATININE 1.18 01/05/2023 CREATININE 1.90 (H) 11/18/2015    GLUCOSE 83 11/18/2015    CALCIUM 8.8 01/05/2023    CALCIUM 9.1 11/18/2015     Lab Results   Component Value Date    WBC 4.14 (L) 03/20/2023    WBC 3.2 (L) 06/18/2015    HGB 12.9 03/20/2023    HGB 13.2 06/18/2015    HCT 40.8 03/20/2023    HCT 39.6 06/18/2015    MCV 92 03/20/2023    MCV 88 06/18/2015     03/20/2023     06/18/2015       Diagnostics:  I have personally reviewed pertinent reports. and I have personally reviewed pertinent films in PACS  Reviewed CT scan   Office Spirometry Results:     ESS:    CT chest high resolution    Result Date: 8/16/2023  Narrative: CT CHEST WITH HIGH RESOLUTION SLICES - WITHOUT CONTRAST INDICATION:   R06.02: Shortness of breath J98.4: Other disorders of lung. Per my review of the medical record, former smoker with ischemic cardiomyopathy, shortness of breath likely multifactorial, likely reactive airway disease/chronic simple bronchitis. Prior PFTs mainly restrictive airflow limitation and severely decreased DLCO. Question fibrosis from COVID in January 2022. COMPARISON: CXR and chest CT 1/19/2022, chest CT 10/11/2012. TECHNIQUE: CT of the chest without intravenous contrast.  Contiguous 1.25 mm axial images and 1 x 10 mm transverse images in supine position. Thin section images at 10 mm intervals, supine and prone in inspiration and supine in expiration. Sagittal and  coronal reformats. Axial and coronal MIP. Coronal minIP. Radiation dose length product (DLP):  593 mGy-cm . Radiation dose exposure minimized using iterative reconstruction and automated exposure control. FINDINGS: LUNGS: Mild diffuse bilateral juxtapleural reticulation. Small subpleural line in the juxtapleural right upper lobe. 1 cm groundglass opacity in the posterior right apex (209/31. Stable 7 mm juxtapleural left lower lobe nodule since 2012, benign (209/157). Mild dependent lower lobe groundglass opacity persists in the prone position.  No architectural distortion with no traction bronchiectasis/bronchiolectasis or honeycombing. Mild air trapping on expiration. Mild paramediastinal scarring the right middle lobe. AIRWAYS: No significant filling defects. PLEURA:  Unremarkable. HEART/GREAT VESSELS: Mild cardiomegaly. AVR. Slight increase in size of ascending aorta from 4.1 to 4.3 cm. Left subclavian pacemaker leads in right atrial appendage and right ventricular apex. MEDIASTINUM AND MARYURI:  Unremarkable. CHEST WALL AND LOWER NECK: Unremarkable. UPPER ABDOMEN: Cholecystectomy. OSSEOUS STRUCTURES: Normal for age. Impression: Mild multifocal juxtapleural reticulation with minimal dependent lower lobe groundglass opacity which persists on prone imaging. It is uncertain if this represents early fibrosis. There is no architectural distortion such as traction bronchiectasis/bronchiolectasis or honeycombing. Small subpleural line in the juxtapleural right upper lobe, likely the sequela of COVID-19. Mild air trapping on expiration indicating small airways disease. 9 mm groundglass opacity in the posterior right apex. It cannot be determined if this was present in January 2022 due to changes from [facility]. Per 2017 Fleischner guidelines, follow-up is recommended with a chest CT with no contrast at 6 to 12 months to confirm persistence then every 2 years until 5 years of stability is established. Slight increase in size of the ectatic aorta from 4.1 to 4.3 cm. Follow-up is recommended with a chest CT with no contrast in 1 year. This study was marked in Epic for follow-up.  Workstation performed: [id number]

## 2023-09-06 ENCOUNTER — TELEPHONE (OUTPATIENT)
Dept: NEPHROLOGY | Facility: CLINIC | Age: 83
End: 2023-09-06

## 2023-09-06 NOTE — TELEPHONE ENCOUNTER
i called and left message on patients answering machine confirming appt for tomorrow thursday 09/07/2023 with Dr. Deyanira Rogers

## 2023-09-07 ENCOUNTER — OFFICE VISIT (OUTPATIENT)
Dept: NEPHROLOGY | Facility: CLINIC | Age: 83
End: 2023-09-07

## 2023-09-07 VITALS
HEIGHT: 66 IN | HEART RATE: 74 BPM | OXYGEN SATURATION: 91 % | SYSTOLIC BLOOD PRESSURE: 130 MMHG | WEIGHT: 166.4 LBS | DIASTOLIC BLOOD PRESSURE: 70 MMHG | RESPIRATION RATE: 18 BRPM | BODY MASS INDEX: 26.74 KG/M2 | TEMPERATURE: 98.2 F

## 2023-09-07 DIAGNOSIS — I25.5 ISCHEMIC CARDIOMYOPATHY: ICD-10-CM

## 2023-09-07 DIAGNOSIS — N18.9 CHRONIC KIDNEY DISEASE-MINERAL AND BONE DISORDER: ICD-10-CM

## 2023-09-07 DIAGNOSIS — E83.9 CHRONIC KIDNEY DISEASE-MINERAL AND BONE DISORDER: ICD-10-CM

## 2023-09-07 DIAGNOSIS — N18.31 STAGE 3A CHRONIC KIDNEY DISEASE (HCC): Primary | ICD-10-CM

## 2023-09-07 DIAGNOSIS — M89.9 CHRONIC KIDNEY DISEASE-MINERAL AND BONE DISORDER: ICD-10-CM

## 2023-09-07 DIAGNOSIS — E11.40 TYPE 2 DIABETES MELLITUS WITH DIABETIC NEUROPATHY, WITHOUT LONG-TERM CURRENT USE OF INSULIN (HCC): ICD-10-CM

## 2023-09-07 DIAGNOSIS — I10 HYPERTENSION, ESSENTIAL: ICD-10-CM

## 2023-09-07 NOTE — ASSESSMENT & PLAN NOTE
Called patient to review results. She verbalized understanding.    Lab Results   Component Value Date    EGFR 34 09/01/2023    EGFR 43 01/05/2023    EGFR 35 09/06/2022    CREATININE 1.41 (H) 09/01/2023    CREATININE 1.18 01/05/2023    CREATININE 1.40 (H) 09/06/2022   Renal function is fluctuating. Actually at this stage it is stage IIIb. We will continue to monitor. Fluctuation is based on volume status because of cardiomyopathy and need for diuretic    Everything discussed at length with the patient.   We will continue to monitor

## 2023-09-07 NOTE — ASSESSMENT & PLAN NOTE
Lab Results   Component Value Date    HGBA1C 6.7 (H) 03/20/2023   Diabetes seems to be reasonably well controlled. We will continue to monitor.   Importance of diabetic control and effect on kidney disease discussed with the patient

## 2023-09-07 NOTE — PROGRESS NOTES
NEPHROLOGY OFFICE FOLLOW UP  Anatoliy Webb 80 y.o. female MRN: [id number]    Encounter: [id number] 9/7/2023    REASON FOR VISIT: Anatoliy Webb is a 80 y.o. female who is here on 9/7/2023 for Chronic Kidney Disease and Follow-up  . HPI:    Linda Cho came in today for follow-up of CKD. 80-year-old woman who is overall doing well. Does a problem with recurrent shortness of breath and being monitored by cardiologist for cardiomyopathy and pulmonary failure pulmonary fibrosis    Patient is on oxygen at night    Does not appear volume overloaded with no leg swelling    No chest pain no palpitation      REVIEW OF SYSTEMS:    Review of Systems   Constitutional: Negative for activity change and fatigue. HENT: Negative for congestion and ear discharge. Eyes: Negative for photophobia and pain. Respiratory: Positive for shortness of breath. Negative for apnea and choking. Cardiovascular: Negative for chest pain and palpitations. Gastrointestinal: Negative for abdominal distention and blood in stool. Endocrine: Negative for heat intolerance and polyphagia. Genitourinary: Negative for flank pain and urgency. Musculoskeletal: Negative for neck pain and neck stiffness. Skin: Negative for color change and wound. Allergic/Immunologic: Negative for food allergies and immunocompromised state. Neurological: Negative for seizures and facial asymmetry. Hematological: Negative for adenopathy. Does not bruise/bleed easily. Psychiatric/Behavioral: Negative for self-injury and suicidal ideas.          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[address])     last assessed: 10/10/2014   • Diabetic nephropathy ([address])    • RAMON (dyspnea on exertion)    • GERD (gastroesophageal reflux disease)    • History of emphysema    • Hyperlipidemia    • TIA (transient ischemic attack)        PAST SURGICAL HISTORY:  Past Surgical History:   Procedure Laterality Date   • AORTIC VALVE REPLACEMENT     • CARDIAC PACEMAKER PLACEMENT      last assessed: 10/10/2014   • CARDIAC SURGERY      aortic valve replacement   • CHOLECYSTECTOMY     • COLONOSCOPY     • HYSTERECTOMY     • INSERT / REPLACE / REMOVE PACEMAKER     • KNEE SURGERY Right    • OTHER SURGICAL HISTORY      Capsule ENDOscopy description: 12/19/2012   • KS COLONOSCOPY FLX DX W/COLLJ SPEC WHEN PFRMD N/A 5/31/2018    Procedure: single balloon ENTEROSCOPY;  Surgeon: Clayton Fung MD;  Location: [facility]; Service: Gastroenterology   • KS ESOPHAGOGASTRODUODENOSCOPY TRANSORAL DIAGNOSTIC N/A 11/29/2017    Procedure: EGD AND COLONOSCOPY;  Surgeon: Dequan Schultz MD;  Location: [facility];   Service: Gastroenterology       SOCIAL HISTORY:  Social History     Substance and Sexual Activity   Alcohol Use Never     Social History     Substance and Sexual Activity   Drug Use Never     Social History     Tobacco Use   Smoking Status Former   • Packs/day: 1.00   • Years: 52.00   • Total pack years: 52.00   • Types: Cigarettes   • Start date: 5   • Quit date: 11/29/2007   • Years since quitting: 15.7   • Passive exposure: Past   Smokeless Tobacco Former   • Quit date: 2007       FAMILY HISTORY:  Family History   Problem Relation Age of Onset   • No Known Problems Mother    • No Known Problems Father        MEDICATIONS:    Current Outpatient Medications:   •  Accu-Chek FastClix Lancets MISC, Use 3 (three) times a day, Disp: 300 each, Rfl: 3  •  albuterol (Ventolin HFA) 90 mcg/act inhaler, Inhale 2 puffs every 6 (six) hours as needed for wheezing, Disp: 18 g, Rfl: 1  •  Ascorbic Acid (vitamin C) 1000 MG tablet, Take 1,000 mg by mouth daily, Disp: , Rfl:   •  BD Pen Needle Rosie U/F 32G X 4 MM MISC, USE DAILY, Disp: 100 each, Rfl: 1  •  Blood Glucose Monitoring Suppl (Accu-Chek Guide Me) w/Device KIT, USE 3 TIMES DAILY, Disp: 1 kit, Rfl: 2  •  Cholecalciferol (Vitamin D3) 50 MCG (2000 UT) TABS, Take 2,000 Units by mouth daily, Disp: , Rfl:   •  furosemide (LASIX) 40 mg tablet, TAKE 1 TABLET BY MOUTH  DAILY, Disp: 90 tablet, Rfl: 3  •  gabapentin (NEURONTIN) 300 mg capsule, Take 1 capsule (300 mg total) by mouth 3 (three) times a day (Patient taking differently: Take 300 mg by mouth daily at bedtime), Disp: 270 capsule, Rfl: 3  •  glipiZIDE (GLUCOTROL) 10 mg tablet, TAKE 1 TABLET BY MOUTH  TWICE DAILY BEFORE MEALS, Disp: 180 tablet, Rfl: 3  •  glucose blood (Accu-Chek Celeste Plus) test strip, Use 1 each 3 (three) times a day Use as instructed, Disp: 300 each, Rfl: 3  •  insulin detemir (Levemir FlexTouch) 100 Units/mL injection pen, Inject 26 Units under the skin daily at bedtime, Disp: 26 mL, Rfl: 5  •  ipratropium-albuterol (DUO-NEB) 0.5-2.5 mg/3 mL nebulizer solution, Take 3 mL by nebulization 4 (four) times a day, Disp: 360 mL, Rfl: 2  •  Lancets (freestyle) lancets, Check blood glucose 3 times daily, Disp: 300 each, Rfl: 0  •  levothyroxine 50 mcg tablet, TAKE 1 TABLET BY MOUTH  DAILY, Disp: 90 tablet, Rfl: 3  •  metoprolol tartrate (LOPRESSOR) 50 mg tablet, TAKE ONE-HALF TABLET BY  MOUTH TWICE DAILY, Disp: 90 tablet, Rfl: 3  •  oxygen gas, Inhale 2 L/min daily at bedtime as needed home oxygen nightly, Disp: , Rfl:   •  pantoprazole (PROTONIX) 40 mg tablet, TAKE 1 TABLET BY MOUTH  DAILY, Disp: 90 tablet, Rfl: 3  •  warfarin (COUMADIN) 5 mg tablet, TAKE 1 TO 2 TABLETS BY  MOUTH DAILY OR AS DIRECTED, Disp: 180 tablet, Rfl: 3    PHYSICAL EXAM:  Vitals:    09/07/23 0938   BP: 130/70   BP Location: Right arm   Patient Position: Sitting   Pulse: 74   Resp: 18   Temp: 98.2 °F (36.8 °C)   TempSrc: Temporal   SpO2: 91%   Weight: 75.5 kg (166 lb 6.4 oz)   Height: 5' 6" (1.676 m)     Body mass index is 26.86 kg/m². Physical Exam  Constitutional:       General: She is not in acute distress. Appearance: She is well-developed. HENT:      Head: Normocephalic. Eyes:      General: No scleral icterus. Conjunctiva/sclera: Conjunctivae normal.   Neck:      Vascular: No JVD. Cardiovascular:      Rate and Rhythm: Normal rate. Heart sounds: Normal heart sounds. Pulmonary:      Effort: Pulmonary effort is normal.      Breath sounds: No wheezing. Abdominal:      Palpations: Abdomen is soft. Tenderness: There is no abdominal tenderness. Musculoskeletal:         General: Normal range of motion. Cervical back: Neck supple. Skin:     General: Skin is warm. Findings: No rash. Neurological:      Mental Status: She is alert and oriented to person, place, and time.    Psychiatric:         Behavior: Behavior normal.         LAB RESULTS:  Results for orders placed or performed in visit on 88/53/65   Basic metabolic panel   Result Value Ref Range    Sodium 142 135 - 147 mmol/L    Potassium 4.3 3.5 - 5.3 mmol/L    Chloride 104 96 - 108 mmol/L    CO2 27 21 - 32 mmol/L    ANION GAP 11 mmol/L    BUN 24 5 - 25 mg/dL    Creatinine 1.41 (H) 0.60 - 1.30 mg/dL    Glucose, Fasting 268 (H) 65 - 99 mg/dL    Calcium 8.6 8.4 - 10.2 mg/dL    eGFR 34 ml/min/1.73sq m   CBC and differential   Result Value Ref Range    WBC 4.33 4.31 - 10.16 Thousand/uL    RBC 4.61 3.81 - 5.12 Million/uL    Hemoglobin 13.2 11.5 - 15.4 g/dL    Hematocrit 40.7 34.8 - 46.1 %    MCV 88 82 - 98 fL    MCH 28.6 26.8 - 34.3 pg    MCHC 32.4 31.4 - 37.4 g/dL    RDW 14.6 11.6 - 15.1 %    MPV 11.5 8.9 - 12.7 fL    Platelets 045 076 - 872 Thousands/uL    nRBC 0 /100 WBCs    Neutrophils Relative 55 43 - 75 %    Immat GRANS % 0 0 - 2 %    Lymphocytes Relative 31 14 - 44 %    Monocytes Relative 9 4 - 12 %    Eosinophils Relative 4 0 - 6 %    Basophils Relative 1 0 - 1 %    Neutrophils Absolute 2.43 1.85 - 7.62 Thousands/µL    Immature Grans Absolute 0. 01 0.00 - 0.20 Thousand/uL    Lymphocytes Absolute 1.32 0.60 - 4.47 Thousands/µL    Monocytes Absolute 0.37 0.17 - 1.22 Thousand/µL    Eosinophils Absolute 0.18 0.00 - 0.61 Thousand/µL    Basophils Absolute 0.02 0.00 - 0.10 Thousands/µL   Phosphorus   Result Value Ref Range    Phosphorus 2.4 2.3 - 4.1 mg/dL   Protein / creatinine ratio, urine   Result Value Ref Range    Creatinine, Ur 37.1 Reference range not established. mg/dL    Protein Urine Random <4 Reference range not established. mg/dL    Prot/Creat Ratio, Ur     PTH, intact   Result Value Ref Range    .3 (H) 12.0 - 88.0 pg/mL   UA (URINE) with reflex to Scope   Result Value Ref Range    Color, UA Colorless     Clarity, UA Clear     Specific Gravity, UA 1.008 1.003 - 1.030    pH, UA 6.0 4.5, 5.0, 5.5, 6.0, 6.5, 7.0, 7.5, 8.0    Leukocytes, UA Negative Negative    Nitrite, UA Negative Negative    Protein, UA Negative Negative mg/dl    Glucose, UA Negative Negative mg/dl    Ketones, UA Negative Negative mg/dl    Urobilinogen, UA <2.0 <2.0 mg/dl mg/dl    Bilirubin, UA Negative Negative    Occult Blood, UA Negative Negative   Vitamin D 25 hydroxy   Result Value Ref Range    Vit D, 25-Hydroxy 43.0 30.0 - 100.0 ng/mL       ASSESSMENT and PLAN:      Stage 3a chronic kidney disease  Lab Results   Component Value Date    EGFR 34 09/01/2023    EGFR 43 01/05/2023    EGFR 35 09/06/2022    CREATININE 1.41 (H) 09/01/2023    CREATININE 1.18 01/05/2023    CREATININE 1.40 (H) 09/06/2022   Renal function is fluctuating. Actually at this stage it is stage IIIb. We will continue to monitor. Fluctuation is based on volume status because of cardiomyopathy and need for diuretic    Everything discussed at length with the patient.   We will continue to monitor    Chronic kidney disease-mineral and bone disorder  Lab Results   Component Value Date    EGFR 34 09/01/2023    EGFR 43 01/05/2023    EGFR 35 09/06/2022    CREATININE 1.41 (H) 09/01/2023    CREATININE 1.18 01/05/2023    CREATININE 1.40 (H) 09/06/2022   PTH and phosphorus along with vitamin D are within acceptable range and will continue to monitor    Diabetes mellitus with neurological manifestation [facility])    Lab Results   Component Value Date    HGBA1C 6.7 (H) 03/20/2023   Diabetes seems to be reasonably well controlled. We will continue to monitor. Importance of diabetic control and effect on kidney disease discussed with the patient    Cardiomyopathy ([address])  Last EF is 40%. Patient is on diuretic and being monitored by cardiologist    Hypertension, essential  Very well controlled      Length with the patient. I will see her back in 6 months. We will get blood and urine test before that visit        Portions of the record may have been created with voice recognition software. Occasional wrong word or "sound a like" substitutions may have occurred due to the inherent limitations of voice recognition software. Read the chart carefully and recognize, using context, where substitutions have occurred. If you have any questions, please contact the dictating provider.

## 2023-09-15 DIAGNOSIS — N18.4 TYPE 2 DIABETES MELLITUS WITH STAGE 4 CHRONIC KIDNEY DISEASE, WITH LONG-TERM CURRENT USE OF INSULIN (HCC): ICD-10-CM

## 2023-09-15 DIAGNOSIS — E11.22 TYPE 2 DIABETES MELLITUS WITH STAGE 4 CHRONIC KIDNEY DISEASE, WITH LONG-TERM CURRENT USE OF INSULIN (HCC): ICD-10-CM

## 2023-09-15 DIAGNOSIS — Z79.4 TYPE 2 DIABETES MELLITUS WITH STAGE 4 CHRONIC KIDNEY DISEASE, WITH LONG-TERM CURRENT USE OF INSULIN (HCC): ICD-10-CM

## 2023-09-18 RX ORDER — INSULIN DETEMIR 100 [IU]/ML
INJECTION, SOLUTION SUBCUTANEOUS
Qty: 30 ML | Refills: 2 | Status: SHIPPED | OUTPATIENT
Start: 2023-09-18

## 2023-09-22 ENCOUNTER — IN-CLINIC DEVICE VISIT (OUTPATIENT)
Dept: CARDIOLOGY CLINIC | Facility: CLINIC | Age: 83
End: 2023-09-22
Payer: COMMERCIAL

## 2023-09-22 DIAGNOSIS — Z95.0 PRESENCE OF PERMANENT CARDIAC PACEMAKER: Primary | ICD-10-CM

## 2023-09-22 PROCEDURE — 93280 PM DEVICE PROGR EVAL DUAL: CPT | Performed by: INTERNAL MEDICINE

## 2023-09-22 NOTE — PROGRESS NOTES
SJM DUAL CHAMBER PM/ NOT MRI CONDITIONAL   DEVICE INTERROGATED IN THE [place] OFFICE:  BATTERY VOLTAGE ADEQUATE (2.2 YR.).  AP 42%  98% (>40%/AVB/DDDR 60 PPM, 180-200 MS).  ALL LEAD PARAMETERS WITHIN NORMAL LIMITS.  NO HIGH RATE EPISODES.  NO PROGRAMMING CHANGES MADE TO DEVICE PARAMETERS.  NORMAL DEVICE FUNCTION.  RG

## 2023-10-17 ENCOUNTER — TELEPHONE (OUTPATIENT)
Dept: NEPHROLOGY | Facility: CLINIC | Age: 83
End: 2023-10-17

## 2023-10-28 ENCOUNTER — HOSPITAL ENCOUNTER (INPATIENT)
Facility: HOSPITAL | Age: 83
LOS: 3 days | Discharge: HOME | DRG: 065 | End: 2023-10-31
Attending: EMERGENCY MEDICINE | Admitting: STUDENT IN AN ORGANIZED HEALTH CARE EDUCATION/TRAINING PROGRAM
Payer: COMMERCIAL

## 2023-10-28 ENCOUNTER — APPOINTMENT (EMERGENCY)
Dept: RADIOLOGY | Facility: HOSPITAL | Age: 83
DRG: 065 | End: 2023-10-28
Payer: COMMERCIAL

## 2023-10-28 ENCOUNTER — APPOINTMENT (EMERGENCY)
Dept: CT IMAGING | Facility: HOSPITAL | Age: 83
DRG: 065 | End: 2023-10-28
Payer: COMMERCIAL

## 2023-10-28 DIAGNOSIS — R29.90 STROKE-LIKE SYMPTOMS: ICD-10-CM

## 2023-10-28 DIAGNOSIS — R29.810 FACIAL DROOP: Primary | ICD-10-CM

## 2023-10-28 DIAGNOSIS — R29.898 LEFT ARM WEAKNESS: ICD-10-CM

## 2023-10-28 DIAGNOSIS — S81.801A WOUND OF RIGHT LEG, INITIAL ENCOUNTER: ICD-10-CM

## 2023-10-28 DIAGNOSIS — R29.898 LEFT LEG WEAKNESS: ICD-10-CM

## 2023-10-28 PROBLEM — I16.0 HYPERTENSIVE URGENCY: Status: ACTIVE | Noted: 2023-10-28

## 2023-10-28 LAB
2HR DELTA HS TROPONIN: 1 NG/L
ABO GROUP BLD: NORMAL
ABO GROUP BLD: NORMAL
ANION GAP SERPL CALCULATED.3IONS-SCNC: 6 MMOL/L
APTT PPP: 38 SECONDS (ref 23–37)
BLD GP AB SCN SERPL QL: NEGATIVE
BUN SERPL-MCNC: 24 MG/DL (ref 5–25)
CALCIUM SERPL-MCNC: 9 MG/DL (ref 8.4–10.2)
CARDIAC TROPONIN I PNL SERPL HS: 15 NG/L
CARDIAC TROPONIN I PNL SERPL HS: 16 NG/L
CHLORIDE SERPL-SCNC: 101 MMOL/L (ref 96–108)
CHOLEST SERPL-MCNC: 214 MG/DL
CO2 SERPL-SCNC: 29 MMOL/L (ref 21–32)
CREAT SERPL-MCNC: 1.2 MG/DL (ref 0.6–1.3)
ERYTHROCYTE [DISTWIDTH] IN BLOOD BY AUTOMATED COUNT: 14.3 % (ref 11.6–15.1)
FLUAV RNA RESP QL NAA+PROBE: NEGATIVE
FLUBV RNA RESP QL NAA+PROBE: NEGATIVE
GFR SERPL CREATININE-BSD FRML MDRD: 41 ML/MIN/1.73SQ M
GLUCOSE SERPL-MCNC: 132 MG/DL (ref 65–140)
GLUCOSE SERPL-MCNC: 153 MG/DL (ref 65–140)
GLUCOSE SERPL-MCNC: 204 MG/DL (ref 65–140)
GLUCOSE SERPL-MCNC: 210 MG/DL (ref 65–140)
HCT VFR BLD AUTO: 41.4 % (ref 34.8–46.1)
HDLC SERPL-MCNC: 41 MG/DL
HGB BLD-MCNC: 13.5 G/DL (ref 11.5–15.4)
INR PPP: 2.86 (ref 0.84–1.19)
LDLC SERPL CALC-MCNC: 98 MG/DL (ref 0–100)
MCH RBC QN AUTO: 28 PG (ref 26.8–34.3)
MCHC RBC AUTO-ENTMCNC: 32.6 G/DL (ref 31.4–37.4)
MCV RBC AUTO: 86 FL (ref 82–98)
PLATELET # BLD AUTO: 277 THOUSANDS/UL (ref 149–390)
PMV BLD AUTO: 10.2 FL (ref 8.9–12.7)
POTASSIUM SERPL-SCNC: 3.6 MMOL/L (ref 3.5–5.3)
PROTHROMBIN TIME: 30.9 SECONDS (ref 11.6–14.5)
RBC # BLD AUTO: 4.83 MILLION/UL (ref 3.81–5.12)
RH BLD: POSITIVE
RH BLD: POSITIVE
RSV RNA RESP QL NAA+PROBE: NEGATIVE
SARS-COV-2 RNA RESP QL NAA+PROBE: NEGATIVE
SODIUM SERPL-SCNC: 136 MMOL/L (ref 135–147)
SPECIMEN EXPIRATION DATE: NORMAL
TRIGL SERPL-MCNC: 377 MG/DL
WBC # BLD AUTO: 5.55 THOUSAND/UL (ref 4.31–10.16)

## 2023-10-28 PROCEDURE — 93005 ELECTROCARDIOGRAM TRACING: CPT

## 2023-10-28 PROCEDURE — 0241U HB NFCT DS VIR RESP RNA 4 TRGT: CPT | Performed by: EMERGENCY MEDICINE

## 2023-10-28 PROCEDURE — 71045 X-RAY EXAM CHEST 1 VIEW: CPT

## 2023-10-28 PROCEDURE — 82948 REAGENT STRIP/BLOOD GLUCOSE: CPT

## 2023-10-28 PROCEDURE — 99285 EMERGENCY DEPT VISIT HI MDM: CPT | Performed by: EMERGENCY MEDICINE

## 2023-10-28 PROCEDURE — 99285 EMERGENCY DEPT VISIT HI MDM: CPT

## 2023-10-28 PROCEDURE — 84484 ASSAY OF TROPONIN QUANT: CPT | Performed by: EMERGENCY MEDICINE

## 2023-10-28 PROCEDURE — 85027 COMPLETE CBC AUTOMATED: CPT | Performed by: EMERGENCY MEDICINE

## 2023-10-28 PROCEDURE — 86850 RBC ANTIBODY SCREEN: CPT | Performed by: EMERGENCY MEDICINE

## 2023-10-28 PROCEDURE — 86900 BLOOD TYPING SEROLOGIC ABO: CPT | Performed by: EMERGENCY MEDICINE

## 2023-10-28 PROCEDURE — 80048 BASIC METABOLIC PNL TOTAL CA: CPT | Performed by: EMERGENCY MEDICINE

## 2023-10-28 PROCEDURE — 99223 1ST HOSP IP/OBS HIGH 75: CPT | Performed by: STUDENT IN AN ORGANIZED HEALTH CARE EDUCATION/TRAINING PROGRAM

## 2023-10-28 PROCEDURE — 86901 BLOOD TYPING SEROLOGIC RH(D): CPT | Performed by: EMERGENCY MEDICINE

## 2023-10-28 PROCEDURE — 70496 CT ANGIOGRAPHY HEAD: CPT

## 2023-10-28 PROCEDURE — 36415 COLL VENOUS BLD VENIPUNCTURE: CPT | Performed by: EMERGENCY MEDICINE

## 2023-10-28 PROCEDURE — 99291 CRITICAL CARE FIRST HOUR: CPT | Performed by: PSYCHIATRY & NEUROLOGY

## 2023-10-28 PROCEDURE — 85610 PROTHROMBIN TIME: CPT | Performed by: EMERGENCY MEDICINE

## 2023-10-28 PROCEDURE — 83036 HEMOGLOBIN GLYCOSYLATED A1C: CPT | Performed by: STUDENT IN AN ORGANIZED HEALTH CARE EDUCATION/TRAINING PROGRAM

## 2023-10-28 PROCEDURE — 85730 THROMBOPLASTIN TIME PARTIAL: CPT | Performed by: EMERGENCY MEDICINE

## 2023-10-28 PROCEDURE — 80061 LIPID PANEL: CPT | Performed by: STUDENT IN AN ORGANIZED HEALTH CARE EDUCATION/TRAINING PROGRAM

## 2023-10-28 PROCEDURE — 70498 CT ANGIOGRAPHY NECK: CPT

## 2023-10-28 RX ORDER — WARFARIN SODIUM 5 MG/1
5 TABLET ORAL
Status: DISCONTINUED | OUTPATIENT
Start: 2023-10-28 | End: 2023-10-31 | Stop reason: HOSPADM

## 2023-10-28 RX ORDER — HEPARIN SODIUM 5000 [USP'U]/ML
5000 INJECTION, SOLUTION INTRAVENOUS; SUBCUTANEOUS EVERY 8 HOURS SCHEDULED
Status: DISCONTINUED | OUTPATIENT
Start: 2023-10-28 | End: 2023-10-28

## 2023-10-28 RX ORDER — HEPARIN SODIUM 5000 [USP'U]/ML
5000 INJECTION, SOLUTION INTRAVENOUS; SUBCUTANEOUS EVERY 8 HOURS SCHEDULED
Status: DISCONTINUED | OUTPATIENT
Start: 2023-10-29 | End: 2023-10-30

## 2023-10-28 RX ORDER — ATORVASTATIN CALCIUM 40 MG/1
40 TABLET, FILM COATED ORAL EVERY EVENING
Status: DISCONTINUED | OUTPATIENT
Start: 2023-10-28 | End: 2023-10-31 | Stop reason: HOSPADM

## 2023-10-28 RX ORDER — ACETAMINOPHEN 325 MG/1
650 TABLET ORAL EVERY 6 HOURS PRN
Status: DISCONTINUED | OUTPATIENT
Start: 2023-10-28 | End: 2023-10-31 | Stop reason: HOSPADM

## 2023-10-28 RX ORDER — INSULIN LISPRO 100 [IU]/ML
1-6 INJECTION, SOLUTION INTRAVENOUS; SUBCUTANEOUS
Status: DISCONTINUED | OUTPATIENT
Start: 2023-10-28 | End: 2023-10-31 | Stop reason: HOSPADM

## 2023-10-28 RX ORDER — ASPIRIN 81 MG/1
81 TABLET, CHEWABLE ORAL DAILY
Status: DISCONTINUED | OUTPATIENT
Start: 2023-10-29 | End: 2023-10-28

## 2023-10-28 RX ORDER — LEVOTHYROXINE SODIUM 0.05 MG/1
50 TABLET ORAL DAILY
Status: DISCONTINUED | OUTPATIENT
Start: 2023-10-29 | End: 2023-10-31 | Stop reason: HOSPADM

## 2023-10-28 RX ORDER — PANTOPRAZOLE SODIUM 40 MG/1
40 TABLET, DELAYED RELEASE ORAL DAILY
Status: DISCONTINUED | OUTPATIENT
Start: 2023-10-29 | End: 2023-10-31 | Stop reason: HOSPADM

## 2023-10-28 RX ORDER — GABAPENTIN 300 MG/1
300 CAPSULE ORAL 3 TIMES DAILY
Status: DISCONTINUED | OUTPATIENT
Start: 2023-10-28 | End: 2023-10-31 | Stop reason: HOSPADM

## 2023-10-28 RX ADMIN — INSULIN DETEMIR 26 UNITS: 100 INJECTION, SOLUTION SUBCUTANEOUS at 23:41

## 2023-10-28 RX ADMIN — INSULIN LISPRO 1 UNITS: 100 INJECTION, SOLUTION INTRAVENOUS; SUBCUTANEOUS at 23:42

## 2023-10-28 RX ADMIN — WARFARIN SODIUM 5 MG: 5 TABLET ORAL at 18:17

## 2023-10-28 RX ADMIN — GABAPENTIN 300 MG: 300 CAPSULE ORAL at 21:37

## 2023-10-28 RX ADMIN — ATORVASTATIN CALCIUM 40 MG: 40 TABLET, FILM COATED ORAL at 18:17

## 2023-10-28 RX ADMIN — IOHEXOL 85 ML: 350 INJECTION, SOLUTION INTRAVENOUS at 14:35

## 2023-10-28 NOTE — PLAN OF CARE
Problem: MOBILITY - ADULT  Goal: Maintain or return to baseline ADL function  Description: INTERVENTIONS:  -  Assess patient's ability to carry out ADLs; assess patient's baseline for ADL function and identify physical deficits which impact ability to perform ADLs (bathing, care of mouth/teeth, toileting, grooming, dressing, etc.)  - Assess/evaluate cause of self-care deficits   - Assess range of motion  - Assess patient's mobility; develop plan if impaired  - Assess patient's need for assistive devices and provide as appropriate  - Encourage maximum independence but intervene and supervise when necessary  - Involve family in performance of ADLs  - Assess for home care needs following discharge   - Consider OT consult to assist with ADL evaluation and planning for discharge  - Provide patient education as appropriate  Outcome: Progressing  Goal: Maintains/Returns to pre admission functional level  Description: INTERVENTIONS:  - Perform BMAT or MOVE assessment daily.   - Set and communicate daily mobility goal to care team and patient/family/caregiver. - Collaborate with rehabilitation services on mobility goals if consulted  - Perform Range of Motion 3 times a day. - Reposition patient every 3 hours.   - Dangle patient 3 times a day  - Stand patient 3 times a day  - Ambulate patient 3 times a day  - Out of bed to chair 3 times a day   - Out of bed for meals 3 times a day  - Out of bed for toileting  - Record patient progress and toleration of activity level   Outcome: Progressing     Problem: PAIN - ADULT  Goal: Verbalizes/displays adequate comfort level or baseline comfort level  Description: Interventions:  - Encourage patient to monitor pain and request assistance  - Assess pain using appropriate pain scale  - Administer analgesics based on type and severity of pain and evaluate response  - Implement non-pharmacological measures as appropriate and evaluate response  - Consider cultural and social influences on pain and pain management  - Notify physician/advanced practitioner if interventions unsuccessful or patient reports new pain  Outcome: Progressing     Problem: INFECTION - ADULT  Goal: Absence or prevention of progression during hospitalization  Description: INTERVENTIONS:  - Assess and monitor for signs and symptoms of infection  - Monitor lab/diagnostic results  - Monitor all insertion sites, i.e. indwelling lines, tubes, and drains  - Monitor endotracheal if appropriate and nasal secretions for changes in amount and color  - [place] appropriate cooling/warming therapies per order  - Administer medications as ordered  - Instruct and encourage patient and family to use good hand hygiene technique  - Identify and instruct in appropriate isolation precautions for identified infection/condition  Outcome: Progressing  Goal: Absence of fever/infection during neutropenic period  Description: INTERVENTIONS:  - Monitor WBC    Outcome: Progressing     Problem: SAFETY ADULT  Goal: Maintain or return to baseline ADL function  Description: INTERVENTIONS:  -  Assess patient's ability to carry out ADLs; assess patient's baseline for ADL function and identify physical deficits which impact ability to perform ADLs (bathing, care of mouth/teeth, toileting, grooming, dressing, etc.)  - Assess/evaluate cause of self-care deficits   - Assess range of motion  - Assess patient's mobility; develop plan if impaired  - Assess patient's need for assistive devices and provide as appropriate  - Encourage maximum independence but intervene and supervise when necessary  - Involve family in performance of ADLs  - Assess for home care needs following discharge   - Consider OT consult to assist with ADL evaluation and planning for discharge  - Provide patient education as appropriate  Outcome: Progressing  Goal: Maintains/Returns to pre admission functional level  Description: INTERVENTIONS:  - Perform BMAT or MOVE assessment daily.   - Set and communicate daily mobility goal to care team and patient/family/caregiver. - Collaborate with rehabilitation services on mobility goals if consulted  - Perform Range of Motion 3 times a day. - Reposition patient every 3 hours.   - Dangle patient 3 times a day  - Stand patient 3 times a day  - Ambulate patient 3 times a day  - Out of bed to chair 3 times a day   - Out of bed for meals 3 times a day  - Out of bed for toileting  - Record patient progress and toleration of activity level   Outcome: Progressing  Goal: Patient will remain free of falls  Description: INTERVENTIONS:  - Educate patient/family on patient safety including physical limitations  - Instruct patient to call for assistance with activity   - Consult OT/PT to assist with strengthening/mobility   - Keep Call bell within reach  - Keep bed low and locked with side rails adjusted as appropriate  - Keep care items and personal belongings within reach  - Initiate and maintain comfort rounds  - Make Fall Risk Sign visible to staff  - Offer Toileting every 2 Hours, in advance of need  - Initiate/Maintain bed alarm  - Obtain necessary fall risk management equipment: chair alarm  - Apply yellow socks and bracelet for high fall risk patients  - Consider moving patient to room near nurses station  Outcome: Progressing     Problem: DISCHARGE PLANNING  Goal: Discharge to home or other facility with appropriate resources  Description: INTERVENTIONS:  - Identify barriers to discharge w/patient and caregiver  - Arrange for needed discharge resources and transportation as appropriate  - Identify discharge learning needs (meds, wound care, etc.)  - Arrange for interpretive services to assist at discharge as needed  - Refer to Case Management Department for coordinating discharge planning if the patient needs post-hospital services based on physician/advanced practitioner order or complex needs related to functional status, cognitive ability, or social support system  Outcome: Progressing     Problem: Knowledge Deficit  Goal: Patient/family/caregiver demonstrates understanding of disease process, treatment plan, medications, and discharge instructions  Description: Complete learning assessment and assess knowledge base. Interventions:  - Provide teaching at level of understanding  - Provide teaching via preferred learning methods  Outcome: Progressing     Problem: NEUROSENSORY - ADULT  Goal: Achieves stable or improved neurological status  Description: INTERVENTIONS  - Monitor and report changes in neurological status  - Monitor vital signs such as temperature, blood pressure, glucose, and any other labs ordered   - Initiate measures to prevent increased intracranial pressure  - Monitor for seizure activity and implement precautions if appropriate      Outcome: Progressing  Goal: Remains free of injury related to seizures activity  Description: INTERVENTIONS  - Maintain airway, patient safety  and administer oxygen as ordered  - Monitor patient for seizure activity, document and report duration and description of seizure to physician/advanced practitioner  - If seizure occurs,  ensure patient safety during seizure  - Reorient patient post seizure  - Seizure pads on all 4 side rails  - Instruct patient/family to notify RN of any seizure activity including if an aura is experienced  - Instruct patient/family to call for assistance with activity based on nursing assessment  - Administer anti-seizure medications if ordered    Outcome: Progressing  Goal: Achieves maximal functionality and self care  Description: INTERVENTIONS  - Monitor swallowing and airway patency with patient fatigue and changes in neurological status  - Encourage and assist patient to increase activity and self care.    - Encourage visually impaired, hearing impaired and aphasic patients to use assistive/communication devices  Outcome: Progressing     Problem: CARDIOVASCULAR - ADULT  Goal: Maintains optimal cardiac output and hemodynamic stability  Description: INTERVENTIONS:  - Monitor I/O, vital signs and rhythm  - Monitor for S/S and trends of decreased cardiac output  - Administer and titrate ordered vasoactive medications to optimize hemodynamic stability  - Assess quality of pulses, skin color and temperature  - Assess for signs of decreased coronary artery perfusion  - Instruct patient to report change in severity of symptoms  Outcome: Progressing  Goal: Absence of cardiac dysrhythmias or at baseline rhythm  Description: INTERVENTIONS:  - Continuous cardiac monitoring, vital signs, obtain 12 lead EKG if ordered  - Administer antiarrhythmic and heart rate control medications as ordered  - Monitor electrolytes and administer replacement therapy as ordered  Outcome: Progressing     Problem: METABOLIC, FLUID AND ELECTROLYTES - ADULT  Goal: Glucose maintained within target range  Description: INTERVENTIONS:  - Monitor Blood Glucose as ordered  - Assess for signs and symptoms of hyperglycemia and hypoglycemia  - Administer ordered medications to maintain glucose within target range  - Assess nutritional intake and initiate nutrition service referral as needed  Outcome: Progressing

## 2023-10-28 NOTE — CONSULTS
Consultation - Stroke   Marcy Ghosh 80 y.o. female MRN: [id number]  Unit/Bed#: ED 12 Encounter: [id number]      Assessment/Plan     * Stroke-like symptoms  Assessment & Plan  80 y.o. female with history of HTN, HLD, DM, thoracic aortic aneurysm repair, mechanical aortic valve, cardiomyopathy (EF 40%), and paroxysmal afib on coumadin with goal INR 2.5-3.5 (last outpatient INR check was 5/2023), who presented as a stroke alert with left face, arm, and leg weakness and numbness, dysarthria, and dysphagia. Pt with fluctuating symptoms, with most of them resolved by the time she arrived. She also seemed to have clear improvement in at least her LLE strength when her SBP spontaneously increased to the 170s-190s as opposed to when she first arrived and her SBP was in the 140s. Her INR was therapeutic at the time of presentation, 2.86. CT head 10/28: "No acute intracranial abnormality."  CTA head/neck 10/28:  "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 No evidence of large vessel occlusion. Tiny 1.5 mm anteriorly projecting right distal M1 MCA aneurysm."    Strong suspicion for cerebral ischemia as the cause of her fluctuating symptoms, which seem to be pressure dependent at this time. Fortunately for now she is autoregulating to sufficiently high BP. Plan:  - Admit on stroke pathway  - Recommend continuing home coumadin, goal INR 2.5-3.5 due to mechanical valve. - hold antiplatelet medications at this time to not increase her bleeding risk. - start lipitor 40mg Qday  - Pt's pacemaker is not MRI conditional.  Plan to obtain repeat CT head 24 hours after her initial CT.  - echo, lipid panel, HbA1c  - permissive HTN, with SBP goal 170-190 for now. If her exam worsens with a lower BP, need to take measures to increase her BP again, ie IV fluid boluses, midodrine, or if needed pressors.   Normally limit permissive HTN to SBP up to 180, however, higher pressure being permitted at this time due to pressure dependence. - Frequent neurochecks and vital signs to monitor closely for decreasing BP/worsening exam.  Notify neurology for any significant change in her exam.  - low threshold for transfer to the ICU for pressors. - normothermia, euglycemia  - avoid hypotonic fluids.  - PT/OT/speech. Thrombolytic Decision: Patient not a candidate due to bleeding risk while already on therapeutic anticoagulation. Recommendations for outpatient neurological follow up have yet to be determined. History of Present Illness   Reason for Consult / Principal Problem: left facial droop and left leg weakness  Hx and PE limited by: Patient is an inconsistent historian and at times goes back and forth between talking about acute and chronic symptoms. Patient last known well: 11:00am  Stroke alert called: 2:24pm  Neurology time of arrival: 2:30pm    HPI: Octavia Cm is a 80 y.o. right handed female with history of HTN, HLD, DM, thoracic aortic aneurysm repair, mechanical aortic valve, cardiomyopathy (EF 40%), and paroxysmal afib on coumadin with goal INR 2.5-3.5 (last outpatient INR check was 5/2023), who presented as a stroke alert with left face, arm, and leg weakness and numbness, dysarthria, and dysphagia. She was in her usual state of health this morning and while doing laundry around 11am, she suddenly developed numbness and weakness in her left face and arm, and at least numbness in her left foot, impacting her ability to ambulate, and she also felt like her speech was slurred and she had difficulty swallowing. She sat down to rest for a time, and started improving, with her speech and swallowing returning to normal, and her , who had come back inside after doing some yard work, noted that her left facial droop was less pronounced. Then a couple of hours later, her symptoms worsened again, and this led to her presenting to the ED for evaluation.     In the ED before I arrived, she was noted by the ED attending to only have a mild left facial droop and left leg weakness on her exam, with her unable to sustain antigravity for more than a few seconds, with the rest of her exam being unremarkable, giving her an NIHSS of 3. I got to the ED when she was already in the CT scanner. By the time I was able to evaluate her about 15-20 minutes after the initial alert, she still had a mild left facial droop,  but was able to sustain antigravity without significant drift, although with significant left hip flexion weakness (4-/5). She reported that all of the numbness had resolved other than her normal numbness in her feet. She described the symptoms in her left arm as the arm getting pale, cold, and numb which caused her to drop things from the hand. She notes that this has actually been happening intermittently for a few weeks now, lasting 1-2 minutes, usually on the left, but sometimes on the right. Today, however, it persisted for much longer, although she could not give an exact duration. While I was in the room talking to her and her , her left leg improved even more, and she was able to give 5/5 strength throughout. She did have some pain limited weakness of her proximal LUE as well. At one point she reported it felt like the symptoms in her LUE were starting again, but her exam was unchanged. During my exam in the ED, she reported diplopia when looking towards the left (resolves with covering either eye), although was inconsistent when describing the orientation of the 2 images. I did not see any clear misalignment on her exam, but she had what appeared to be a mild left hypertropia on exam.  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 At that time she also reported intermittent drooping of her left eye. CT head was unremarkable.   CTA head/neck revealed only severe stenosis of the right vertebral artery origin. I checked in with her at the end of the day, around 6pm, and she reported a transient worsening of her symptoms between my 2 evaluations, including some recurrent brief difficulty swallowing, but they returned to where she had been, or better, as her face was nearly symmetric at that time. When she first arrived in the ED, her SBP was 142/72, but when I saw her, her SBP was consistently in the 170s-190s and she was doing better, causing concern for pressure dependence even though no clear reason for pressure dependence was seen on her CTA. Consult to Neurology  Consult performed by: Gabbi Johnson MD  Consult ordered by: Liseth Nava MD        Review of Systems: As above. 10 point ROS completed, and otherwise unremarkable and not pertinent to the HPI. 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[address])     last assessed: 10/10/2014    Diabetic nephropathy (HCC)     RAMON (dyspnea on exertion)     GERD (gastroesophageal reflux disease)     History of emphysema     Hyperlipidemia     TIA (transient ischemic attack)      Past Surgical History:   Procedure Laterality Date    AORTIC VALVE REPLACEMENT      CARDIAC PACEMAKER PLACEMENT      last assessed: 10/10/2014    CARDIAC SURGERY      aortic valve replacement    CHOLECYSTECTOMY      COLONOSCOPY      HYSTERECTOMY      INSERT / REPLACE / REMOVE PACEMAKER      KNEE SURGERY Right     OTHER SURGICAL HISTORY      Capsule ENDOscopy description: 12/19/2012    MD COLONOSCOPY FLX DX W/COLLJ SPEC WHEN PFRMD N/A 5/31/2018    Procedure: single balloon ENTEROSCOPY;  Surgeon: Arnoldo Baeza MD;  Location: [facility];   Service: Gastroenterology    VA ESOPHAGOGASTRODUODENOSCOPY TRANSORAL DIAGNOSTIC N/A 11/29/2017    Procedure: EGD AND COLONOSCOPY;  Surgeon: Delisa Dos Santos MD;  Location: [facility]; Service: Gastroenterology     Social History   Social History     Substance and Sexual Activity   Alcohol Use Never     Social History     Substance and Sexual Activity   Drug Use Never     E-Cigarette/Vaping    E-Cigarette Use Never User      E-Cigarette/Vaping Substances    Nicotine No     THC No     CBD No     Flavoring No     Other No     Unknown No      Social History     Tobacco Use   Smoking Status Former    Packs/day: 1.00    Years: 52.00    Total pack years: 52.00    Types: Cigarettes    Start date: 5    Quit date: 11/29/2007    Years since quitting: 15.9    Passive exposure: Past   Smokeless Tobacco Former    Quit date: 2007     Family History:   Family History   Problem Relation Age of Onset    No Known Problems Mother     No Known Problems Father        Review of previous medical records was completed. Meds/Allergies   PTA meds:   Prior to Admission Medications   Prescriptions Last Dose Informant Patient Reported? Taking?    Accu-Chek FastClix Lancets MISC 10/27/2023 Self No Yes   Sig: Use 3 (three) times a day   Ascorbic Acid (vitamin C) 1000 MG tablet 10/28/2023 Self Yes Yes   Sig: Take 1,000 mg by mouth daily   BD Pen Needle Rosie U/F 32G X 4 MM MISC 10/28/2023 Self No Yes   Sig: USE DAILY   Blood Glucose Monitoring Suppl (Accu-Chek Guide Me) w/Device KIT 10/28/2023 Self No Yes   Sig: USE 3 TIMES DAILY   Cholecalciferol (Vitamin D3) 50 MCG (2000 UT) TABS 10/28/2023 Self Yes Yes   Sig: Take 2,000 Units by mouth daily   Lancets (freestyle) lancets 10/28/2023 Self No Yes   Sig: Check blood glucose 3 times daily   Levemir FlexPen 100 units/mL injection pen 10/28/2023  No Yes   Sig: INJECT SUBCUTANEOUSLY 26 UNITS  DAILY AT BEDTIME   albuterol (Ventolin HFA) 90 mcg/act inhaler More than a month Self No No   Sig: Inhale 2 puffs every 6 (six) hours as needed for wheezing   furosemide (LASIX) 40 mg tablet 10/28/2023 Self No Yes   Sig: TAKE 1 TABLET BY MOUTH  DAILY   gabapentin (NEURONTIN) 300 mg capsule 10/28/2023 Self No Yes   Sig: Take 1 capsule (300 mg total) by mouth 3 (three) times a day   Patient taking differently: Take 300 mg by mouth daily at bedtime   glipiZIDE (GLUCOTROL) 10 mg tablet 10/28/2023 Self No Yes   Sig: TAKE 1 TABLET BY MOUTH  TWICE DAILY BEFORE MEALS   glucose blood (Accu-Chek Celeste Plus) test strip 10/28/2023 Self No Yes   Sig: Use 1 each 3 (three) times a day Use as instructed   levothyroxine 50 mcg tablet 10/28/2023 Self No Yes   Sig: TAKE 1 TABLET BY MOUTH  DAILY   metoprolol tartrate (LOPRESSOR) 50 mg tablet 10/28/2023 Self No Yes   Sig: TAKE ONE-HALF TABLET BY  MOUTH TWICE DAILY   oxygen gas Past Week Self Yes Yes   Sig: Inhale 2 L/min daily at bedtime as needed home oxygen nightly   pantoprazole (PROTONIX) 40 mg tablet 10/28/2023 Self No Yes   Sig: TAKE 1 TABLET BY MOUTH  DAILY   warfarin (COUMADIN) 5 mg tablet 10/28/2023 Self No Yes   Sig: TAKE 1 TO 2 TABLETS BY  MOUTH DAILY OR AS DIRECTED      Facility-Administered Medications: None       No Known Allergies    Objective   Vitals:Blood pressure (!) 188/81, pulse 64, temperature 97.7 °F (36.5 °C), temperature source Oral, resp. rate 18, weight 75.3 kg (166 lb), SpO2 94 %. ,Body mass index is 26.79 kg/m². No intake or output data in the 24 hours ending 10/28/23 1551    Invasive Devices: Invasive Devices       Peripheral Intravenous Line  Duration             Peripheral IV 10/28/23 Right Antecubital <1 day                    Physical Exam:  GENERAL PHYSICAL EXAMINATION   Constitutional: No acute distress. Pleasant, well-groomed. Cardiovascular: No edema noted. Pulmonary: normal respiratory effort. Musculoskeletal: See below.    Psych: See mental status below     NEUROLOGICAL EXAMINATION   Mental Status: Mood and affect normal, alert and oriented to person, place and time. Knows the identity of the President of the [facility]. Language: Spontaneous & fluent with good comprehension, able to repeat phrases, name objects, and follow complex commands. Cranial Nerves:   II, III: PERRL, visual fields full to confrontation. III, IV, VI: Extraocular movements intact, no saccadic pursuit, no nystagmus, no ptosis. Mild hypertropia on the left on cover-uncover and alternating cover test.  V: Intact V1-V3 bilaterally to light touch and pinprick. VII: Mild left lower facial weakness. No weakness of orbicularis oculi or orbicularis oris. VIII: Hearing intact finger rub bilaterally. IX, X: Uvula midline, palate elevates symmetrically, no dysarthria. XI: 5/5 strength in trapezius & sternocleidomastoid bilaterally. XII: Tongue is midline with symmetric movement. There is no tongue atrophy or fasciculation. Motor examination: There is no atrophy and tone is normal. Strength 5/5 throughout on the right.  5/5 LUE except pain limited 4/5 left deltoid. Initially 4-/5 left hip flexion with 5/5 strength in the distal LLE, but later improved to 5/5 throughout the LLE. No pronator drift. No fasciculations or spontaneous muscle movements are noted. Sensory examination: Sensation is symmetric to light touch, without extinction to dual simultaneous stimulation. Some patchy asymmetries to pinprick, with some areas being sharper or more dull on either side, but generally most locations checked were symmetric. Vibration and proprioception moderately decreased in the toes B/L. Reflexes: Reflexes are 3+ throughout the UEs, 3+ B/L patellas without crossed adductors. 1+ B? L ankles. Withdraws toes bilaterally. There is no Emeli's sign. Glabellar sign is absent. There is no palmomental reflex. Coordination: Finger to nose and heel to shin are intact without dysmetria (HTS slightly off on the left when she was having weakness in the LLE).   There is no tremor. Gait: deferred for patient safety during acute stroke evaluation      NIHSS:  1a.Level of Consciousness: 0 = Alert   1b. LOC Questions: 0 = Answers both correctly   1c. LOC Commands: 0 = Obeys both correctly   2. Best Gaze: 0 = Normal   3. Visual: 0 = No visual field loss   4. Facial Palsy: 1=Minor paralysis (flattened nasolabial fold, asymmetric on smiling)   5a. Motor Right Arm: 0=No drift, limb holds 90 (or 45) degrees for full 10 seconds   5b. Motor Left Arm: 0=No drift, limb holds 90 (or 45) degrees for full 10 seconds   6a. Motor Right Le=No drift, limb holds 90 (or 45) degrees for full 10 seconds   6b. Motor Left Le=Drift, limb holds 90 (or 45) degrees but drifts down before full 10 seconds: does not hit bed   7. Limb Ataxia:  0=Absent   8. Sensory: 0=Normal; no sensory loss   9. Best Language:  0=No aphasia, normal   10. Dysarthria: 0=Normal articulation   11. Extinction and Inattention (formerly Neglect): 0=No abnormality   Total Score: 2     Time NIHSS was completed: 2:45pm    Modified Rosalia Score:  0 (No baseline symptoms/disability)    Lab Results: I have personally reviewed pertinent reports.   , CBC:   Results from last 7 days   Lab Units 10/28/23  1428   WBC Thousand/uL 5.55   RBC Million/uL 4.83   HEMOGLOBIN g/dL 13.5   HEMATOCRIT % 41.4   MCV fL 86   PLATELETS Thousands/uL 277   , BMP/CMP:   Results from last 7 days   Lab Units 10/28/23  1428   SODIUM mmol/L 136   POTASSIUM mmol/L 3.6   CHLORIDE mmol/L 101   CO2 mmol/L 29   BUN mg/dL 24   CREATININE mg/dL 1.20   CALCIUM mg/dL 9.0   AST U/L  --    ALT U/L  --    ALK PHOS U/L  --    EGFR ml/min/1.73sq m 41   , Coagulation:   Results from last 7 days   Lab Units 10/28/23  1428   INR  2.86*     Imaging Studies: I have personally reviewed pertinent reports.  , I have personally reviewed pertinent films in PACS, and I have personally reviewed pertinent films in PACS with a Radiologist.    VTE Prophylaxis: Warfarin (Coumadin)      Critical Care Statement  I have personally seen and examined the patient on 10/28/2023. My critical care time was 58 minutes. The documented time was spent providing direct care to and coordinating care for this critically ill patient, including reviewing imaging, labs, and/or other test results, and discussing the patient with the primary team/ED providers, but does not include any time for procedures or time spent on educating the patient and/or family.       Gabbi Johnson MD

## 2023-10-28 NOTE — ASSESSMENT & PLAN NOTE
Present on admission  Initial neuroimaging negative for acute pathology  Neurology discussed with ED, admit to stroke pathway  Follow up brain MRI, not a candidate for TNK  Monitor on telemetry   Has pacemaker, not sure if its MRI compatible, will not know until Monday

## 2023-10-28 NOTE — ASSESSMENT & PLAN NOTE
Lab Results   Component Value Date    HGBA1C 6.7 (H) 03/20/2023       Recent Labs     10/28/23  1426   POCGLU 204*       Blood Sugar Average: Last 72 hrs:  (P) 204  Could be better controlled  Start sliding scale  Add basal and prandial insulin accordingly

## 2023-10-28 NOTE — H&P
[address]  H&P  Name: Octavia Cm 80 y.o. female I MRN: [id number]  Unit/Bed#: -01 I Date of Admission: 10/28/2023   Date of Service: 10/28/2023 I Hospital Day: 0      Assessment/Plan   * Stroke-like symptoms  Assessment & Plan  Present on admission  Initial neuroimaging negative for acute pathology  Neurology discussed with ED, admit to stroke pathway  Follow up brain MRI, not a candidate for TNK  Monitor on telemetry   Has pacemaker, not sure if its MRI compatible, will not know until Monday     Hypertensive urgency  Assessment & Plan  Continue with permissive hypertension for now  Monitor     Paroxysmal atrial fibrillation ([address])  Assessment & Plan  On coumadin for anticoagulation  INR within therapeutic range  Continue coumadin   Rate controlled    Diabetes mellitus with neurological manifestation [facility])  Assessment & Plan  Lab Results   Component Value Date    HGBA1C 6.7 (H) 03/20/2023       Recent Labs     10/28/23  1426   POCGLU 204*       Blood Sugar Average: Last 72 hrs:  (P) 204  Could be better controlled  Start sliding scale  Add basal and prandial insulin accordingly            VTE Pharmacologic Prophylaxis:   Moderate Risk (Score 3-4) - Pharmacological DVT Prophylaxis Ordered: heparin. Code Status: Level 1 - Full Code   Discussion with family: Updated  (son) at bedside. Anticipated Length of Stay: Patient will be admitted on an observation basis with an anticipated length of stay of less than 2 midnights secondary to stroke like symptoms. Total Time Spent on Date of Encounter in care of patient: 30+ mins. This time was spent on one or more of the following: performing physical exam; counseling and coordination of care; obtaining or reviewing history; documenting in the medical record; reviewing/ordering tests, medications or procedures; communicating with other healthcare professionals and discussing with patient's family/caregivers.     Chief Complaint: stroke like symptoms    History of Present Illness:  Mamadou Carlos is a 80 y.o. female with a PMH of diabetes, pafib, htn who presents with stroke like symptoms. She reported some leg weakness and facial droop. She is unable to determine the time frame of the events. She reports coming to the ED for further evaluation due to persistence of symptoms. She was not a candidate for TNK and admitted to [facility] for stroke pathway. Review of Systems:  Review of Systems   Constitutional:  Negative for chills and fever. HENT:  Negative for ear pain and sore throat. Eyes:  Negative for pain and visual disturbance. Respiratory:  Negative for cough and shortness of breath. Cardiovascular:  Negative for chest pain and palpitations. Gastrointestinal:  Negative for abdominal pain and vomiting. Genitourinary:  Negative for dysuria and hematuria. Musculoskeletal:  Negative for arthralgias and back pain. Skin:  Negative for color change and rash. Neurological:  Negative for seizures and syncope. All other systems reviewed and are negative.       Past Medical and Surgical History:   Past Medical History:   Diagnosis Date    Anemia     Aneurysm of thoracic aorta ([address])     Aortic valve disorder     Benign neoplasm of sigmoid colon     Cardiomyopathy ([address])     last assessed: 10/10/2014    CHF (congestive heart failure) (HCC)     Chronic kidney disease     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[address])     last assessed: 10/10/2014    Diabetic nephropathy (HCC)     RAMON (dyspnea on exertion)     GERD (gastroesophageal reflux disease)     History of emphysema     Hyperlipidemia     TIA (transient ischemic attack)        Past Surgical History:   Procedure Laterality Date    AORTIC VALVE REPLACEMENT      CARDIAC PACEMAKER PLACEMENT      last assessed: 10/10/2014    CARDIAC SURGERY      aortic valve replacement    CHOLECYSTECTOMY      COLONOSCOPY      HYSTERECTOMY      INSERT / REPLACE / REMOVE PACEMAKER      KNEE SURGERY Right     OTHER SURGICAL HISTORY      Capsule ENDOscopy description: 12/19/2012    NH COLONOSCOPY FLX DX W/COLLJ SPEC WHEN PFRMD N/A 5/31/2018    Procedure: single balloon ENTEROSCOPY;  Surgeon: Josué Fitzpatrick MD;  Location: [facility]; Service: Gastroenterology    NH ESOPHAGOGASTRODUODENOSCOPY TRANSORAL DIAGNOSTIC N/A 11/29/2017    Procedure: EGD AND COLONOSCOPY;  Surgeon: Wilbert Marcos MD;  Location: [facility]; Service: Gastroenterology       Meds/Allergies:  Prior to Admission medications    Medication Sig Start Date End Date Taking?  Authorizing Provider   Accu-Chek FastClix Lancets MISC Use 3 (three) times a day 9/23/22   Jai Guaman DO   albuterol (Ventolin HFA) 90 mcg/act inhaler Inhale 2 puffs every 6 (six) hours as needed for wheezing 7/21/23   CAMACHO Hernandez   Ascorbic Acid (vitamin C) 1000 MG tablet Take 1,000 mg by mouth daily    Historical Provider, MD   BD Pen Needle Rosie U/F 32G X 4 MM MISC USE DAILY 8/21/23   Della Katz MD   Blood Glucose Monitoring Suppl (Accu-Chek Guide Me) w/Device KIT USE 3 TIMES DAILY 12/10/22   [facility]    Cholecalciferol (Vitamin D3) 50 MCG (2000 UT) TABS Take 2,000 Units by mouth daily    Historical Provider, MD   furosemide (LASIX) 40 mg tablet TAKE 1 TABLET BY MOUTH  DAILY 5/12/23   Della Katz MD   gabapentin (NEURONTIN) 300 mg capsule Take 1 capsule (300 mg total) by mouth 3 (three) times a day  Patient taking differently: Take 300 mg by mouth daily at bedtime 8/4/23   Della Katz MD   glipiZIDE (GLUCOTROL) 10 mg tablet TAKE 1 TABLET BY MOUTH  TWICE DAILY BEFORE MEALS 5/31/23   Della Katz MD   glucose blood (Accu-Chek Celeste Plus) test strip Use 1 each 3 (three) times a day Use as instructed 9/23/22   Jai Guaman DO   Lancets (freestyle) lancets Check blood glucose 3 times daily 9/13/22   Della Katz MD   Levemir FlexPen 100 units/mL injection pen INJECT SUBCUTANEOUSLY 26 UNITS  DAILY AT BEDTIME 9/18/23 Chang Riggsshayla, DO   levothyroxine 50 mcg tablet TAKE 1 TABLET BY MOUTH  DAILY 5/29/23   Mark Bradford MD   metoprolol tartrate (LOPRESSOR) 50 mg tablet TAKE ONE-HALF TABLET BY  MOUTH TWICE DAILY 5/29/23   Mark Bradford MD   oxygen gas Inhale 2 L/min daily at bedtime as needed home oxygen nightly    Historical Provider, MD   pantoprazole (PROTONIX) 40 mg tablet TAKE 1 TABLET BY MOUTH  DAILY 5/29/23   Mark Bradford MD   warfarin (COUMADIN) 5 mg tablet TAKE 1 TO 2 TABLETS BY  MOUTH DAILY OR AS DIRECTED 5/29/23   Mark Bradford MD     I have reviewed home medications using recent Epic encounter. Allergies: No Known Allergies    Social History:  Marital Status:    Occupation: na  Patient Pre-hospital Living Situation: Home  Patient Pre-hospital Level of Mobility: walks  Patient Pre-hospital Diet Restrictions: na  Substance Use History:   Social History     Substance and Sexual Activity   Alcohol Use Never     Social History     Tobacco Use   Smoking Status Former    Packs/day: 1.00    Years: 52.00    Total pack years: 52.00    Types: Cigarettes    Start date: 5    Quit date: 11/29/2007    Years since quitting: 15.9    Passive exposure: Past   Smokeless Tobacco Former    Quit date: 2007     Social History     Substance and Sexual Activity   Drug Use Never       Family History:  Family History   Problem Relation Age of Onset    No Known Problems Mother     No Known Problems Father        Physical Exam:     Vitals:   Blood Pressure: (!) 193/85 (10/28/23 1702)  Pulse: 68 (10/28/23 1702)  Temperature: 98 °F (36.7 °C) (10/28/23 1702)  Temp Source: Oral (10/28/23 1430)  Respirations: 19 (10/28/23 1630)  Weight - Scale: 75.3 kg (166 lb) (10/28/23 1441)  SpO2: 97 % (10/28/23 1702)    Physical Exam  Constitutional:       General: She is not in acute distress. Appearance: Normal appearance. She is not toxic-appearing. Cardiovascular:      Rate and Rhythm: Normal rate and regular rhythm.       Heart sounds: Normal heart sounds. No murmur heard. Pulmonary:      Effort: Pulmonary effort is normal. No respiratory distress. Breath sounds: Normal breath sounds. No wheezing. Abdominal:      General: Abdomen is flat. There is no distension. Palpations: Abdomen is soft. Tenderness: There is no abdominal tenderness. Neurological:      Mental Status: She is alert and oriented to person, place, and time. Mental status is at baseline. Additional Data:     Lab Results:  Results from last 7 days   Lab Units 10/28/23  1428   WBC Thousand/uL 5.55   HEMOGLOBIN g/dL 13.5   HEMATOCRIT % 41.4   PLATELETS Thousands/uL 277     Results from last 7 days   Lab Units 10/28/23  1428   SODIUM mmol/L 136   POTASSIUM mmol/L 3.6   CHLORIDE mmol/L 101   CO2 mmol/L 29   BUN mg/dL 24   CREATININE mg/dL 1.20   ANION GAP mmol/L 6   CALCIUM mg/dL 9.0   GLUCOSE RANDOM mg/dL 210*     Results from last 7 days   Lab Units 10/28/23  1428   INR  2.86*     Results from last 7 days   Lab Units 10/28/23  1426   POC GLUCOSE mg/dl 204*               Lines/Drains:  Invasive Devices       Peripheral Intravenous Line  Duration             Peripheral IV 10/28/23 Right Antecubital <1 day                        Imaging: Reviewed radiology reports from this admission including: CT head  CTA stroke alert (head/neck)   Final Result by Corey Garza MD (10/28 1457)      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 No evidence of large vessel occlusion. No evidence of aneurysm. 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 I personally discussed this study with Dr. Omer Correa on 10/28/2023 2:45 PM.                           Workstation performed: [id number]         CT stroke alert brain   Final Result by Corey Garza MD (10/28 9457)      No acute intracranial abnormality.          I personally discussed this study with Dr. Omer Correa on 10/28/2023 2:45 PM.            Workstation performed: [id number] X-ray chest 1 view portable    (Results Pending)   MRI Inpatient Order    (Results Pending)       ** Please Note: This note has been constructed using a voice recognition system.  **

## 2023-10-28 NOTE — ED PROVIDER NOTES
Pt Name: Chayito Cisneros  MRN: [id number]  [address] 1940  Age/Sex: 80 y.o. female  Date of evaluation: 10/28/2023  PCP: Elvie Dozier MD    [address]    Chief Complaint   Patient presents with    CVA/TIA-like Symptoms     Pt presents in wheelchair c/o "left sided facial numbness, lightheadedness, left leg weakness since 1100 this morning." Left sided facial droop noted. HPI    80 y.o. female presenting with possible stroke. Patient states that around 11:00 this morning she was doing some dishes when she suddenly felt weakness in the left arm and left leg as well as a drooping of the left side of the face and some numbness. She states the symptoms seem to get better after resting but then occurred again at about 1300 while preparing some food. Since that time, symptoms seem to improve somewhat. She states that she previously was unable to lift anything with the left arm but that seems to have resolved. She denies headache, numbness, weakness, trauma, fevers, changes in speech or vision, other symptoms.       HPI      Past Medical and Surgical History    Past Medical History:   Diagnosis Date    Anemia     Aneurysm of thoracic aorta ([address])     Aortic valve disorder     Benign neoplasm of sigmoid colon     Cardiomyopathy ([address])     last assessed: 10/10/2014    CHF (congestive heart failure) (HCC)     Chronic kidney disease     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[address])     last assessed: 10/10/2014    Diabetic nephropathy (HCC)     RAMON (dyspnea on exertion)     GERD (gastroesophageal reflux disease)     History of emphysema     Hyperlipidemia     TIA (transient ischemic attack)        Past Surgical History:   Procedure Laterality Date    AORTIC VALVE REPLACEMENT      CARDIAC PACEMAKER PLACEMENT      last assessed: 10/10/2014    CARDIAC SURGERY      aortic valve replacement    CHOLECYSTECTOMY      COLONOSCOPY      HYSTERECTOMY      INSERT / REPLACE / REMOVE PACEMAKER      KNEE SURGERY Right     OTHER SURGICAL HISTORY      Capsule ENDOscopy description: 12/19/2012    WY COLONOSCOPY FLX DX W/COLLJ SPEC WHEN PFRMD N/A 5/31/2018    Procedure: single balloon ENTEROSCOPY;  Surgeon: Hien Scott MD;  Location:  GI LAB; Service: Gastroenterology    WY ESOPHAGOGASTRODUODENOSCOPY TRANSORAL DIAGNOSTIC N/A 11/29/2017    Procedure: EGD AND COLONOSCOPY;  Surgeon: Daniella Tubbs MD;  Location: [facility]; Service: Gastroenterology       Family History   Problem Relation Age of Onset    No Known Problems Mother     No Known Problems Father        Social History     Tobacco Use    Smoking status: Former     Packs/day: 1.00     Years: 52.00     Total pack years: 52.00     Types: Cigarettes     Start date: 5     Quit date: 11/29/2007     Years since quitting: 15.9     Passive exposure: Past    Smokeless tobacco: Former     Quit date: 2007   Vaping Use    Vaping Use: Never used   Substance Use Topics    Alcohol use: Never    Drug use: Never           Allergies    No Known Allergies    Home Medications    Prior to Admission medications    Medication Sig Start Date End Date Taking?  Authorizing Provider   Accu-Chek FastClix Lancets MISC Use 3 (three) times a day 9/23/22   Eulogio Leonard DO   albuterol (Ventolin HFA) 90 mcg/act inhaler Inhale 2 puffs every 6 (six) hours as needed for wheezing 7/21/23   CAMACHO Scherer   Ascorbic Acid (vitamin C) 1000 MG tablet Take 1,000 mg by mouth daily    Historical Provider, MD   BD Pen Needle Rosie U/F 32G X 4 MM MISC USE DAILY 8/21/23   Brian Pradhan MD   Blood Glucose Monitoring Suppl (Accu-Chek Guide Me) w/Device KIT USE 3 TIMES DAILY 12/10/22   Chang RiggsDO shayla   Cholecalciferol (Vitamin D3) 50 MCG (2000 UT) TABS Take 2,000 Units by mouth daily    Historical Provider, MD   furosemide (LASIX) 40 mg tablet TAKE 1 TABLET BY MOUTH  DAILY 5/12/23   Brian Pradhan MD   gabapentin (NEURONTIN) 300 mg capsule Take 1 capsule (300 mg total) by mouth 3 (three) times a day  Patient taking differently: Take 300 mg by mouth daily at bedtime 8/4/23   Nathan Page MD   glipiZIDE (GLUCOTROL) 10 mg tablet TAKE 1 TABLET BY MOUTH  TWICE DAILY BEFORE MEALS 5/31/23   Nathan Page MD   glucose blood (Accu-Chek Celeste Plus) test strip Use 1 each 3 (three) times a day Use as instructed 9/23/22   Mariya Hdz, DO   Lancets (freestyle) lancets Check blood glucose 3 times daily 9/13/22   Nathan Page MD   Levemir FlexPen 100 units/mL injection pen INJECT SUBCUTANEOUSLY 26 UNITS  DAILY AT BEDTIME 9/18/23   Chang [facility], DO   levothyroxine 50 mcg tablet TAKE 1 TABLET BY MOUTH  DAILY 5/29/23   Nathan Page MD   metoprolol tartrate (LOPRESSOR) 50 mg tablet TAKE ONE-HALF TABLET BY  MOUTH TWICE DAILY 5/29/23   Nathan Page MD   oxygen gas Inhale 2 L/min daily at bedtime as needed home oxygen nightly    Historical Provider, MD   pantoprazole (PROTONIX) 40 mg tablet TAKE 1 TABLET BY MOUTH  DAILY 5/29/23   Nathan Page MD   warfarin (COUMADIN) 5 mg tablet TAKE 1 TO 2 TABLETS BY  MOUTH DAILY OR AS DIRECTED 5/29/23   Nathan Page MD           Review of Systems    Review of Systems   Constitutional:  Negative for activity change, chills and fever. HENT:  Negative for drooling and facial swelling. Eyes:  Negative for pain, discharge and visual disturbance. Respiratory:  Negative for apnea, cough, chest tightness, shortness of breath and wheezing. Cardiovascular:  Negative for chest pain and leg swelling. Gastrointestinal:  Negative for abdominal pain, constipation, diarrhea, nausea and vomiting. Genitourinary:  Negative for difficulty urinating, dysuria and urgency. Musculoskeletal:  Negative for arthralgias, back pain and gait problem. Skin:  Negative for color change and rash. Neurological:  Positive for facial asymmetry, weakness and numbness. Negative for dizziness, speech difficulty and headaches.    Psychiatric/Behavioral:  Negative for agitation, behavioral problems and confusion. All other systems reviewed and negative. Physical Exam      ED Triage Vitals   Temperature Pulse Respirations Blood Pressure SpO2   10/28/23 1430 10/28/23 1422 10/28/23 1422 10/28/23 1422 10/28/23 1421   97.7 °F (36.5 °C) 77 18 145/72 97 %      Temp Source Heart Rate Source Patient Position - Orthostatic VS BP Location FiO2 (%)   10/28/23 1430 10/28/23 1422 -- 10/28/23 1545 --   Oral Monitor  Left arm       Pain Score       10/28/23 1652       No Pain               Physical Exam  Vitals and nursing note reviewed. Constitutional:       General: She is not in acute distress. Appearance: She is well-developed. She is not ill-appearing, toxic-appearing or diaphoretic. HENT:      Head: Normocephalic and atraumatic. Right Ear: External ear normal.      Left Ear: External ear normal.      Nose: Nose normal. No congestion or rhinorrhea. Mouth/Throat:      Mouth: Mucous membranes are moist.      Pharynx: Oropharynx is clear. No oropharyngeal exudate or posterior oropharyngeal erythema. Eyes:      Conjunctiva/sclera: Conjunctivae normal.      Pupils: Pupils are equal, round, and reactive to light. Cardiovascular:      Rate and Rhythm: Normal rate and regular rhythm. Pulses: Normal pulses. Heart sounds: Normal heart sounds. No murmur heard. No friction rub. No gallop. Pulmonary:      Effort: Pulmonary effort is normal. No respiratory distress. Breath sounds: Normal breath sounds. No wheezing or rales. Abdominal:      General: There is no distension. Palpations: Abdomen is soft. Tenderness: There is no abdominal tenderness. There is no guarding or rebound. Musculoskeletal:         General: No deformity. Normal range of motion. Cervical back: Normal range of motion and neck supple. No rigidity or tenderness. Skin:     General: Skin is warm and dry. Capillary Refill: Capillary refill takes less than 2 seconds.       Findings: No erythema or rash. Neurological:      Mental Status: She is alert and oriented to person, place, and time. Cranial Nerves: Cranial nerve deficit present. Motor: Weakness present. Comments: Patient noted to have mild left-sided facial droop affecting only the lower face, 5 out of 5 strength in all extremities other than the left lower extremity with some drift. Normal sensation throughout the body, no visual field cuts, normal rhythm and prosody of speech. NIH stroke scale of 2 for facial droop as well as leg weakness. Psychiatric:         Behavior: Behavior normal.         Thought Content: Thought content normal.         Judgment: Judgment normal.              Diagnostic Results    EKG Interpretation    Rate:  73  BPM  Rhythm:  Paced rhythm  Axis:  Normal   Intervals: Normal, no blocks, QTc  548 ms  Q waves:  No pathologic Q waves   T waves:  Normal   ST segments:  No significant elevations or depressions     Impression:  paced rhythm without evidence of acute ischemia or significant arrhythmia      EKG for comparison: ECG dated 19 Jan 2022 similar in character    EKG interpreted by me. Labs:    Results Reviewed       Procedure Component Value Units Date/Time    Hemoglobin A1c w/EAG Estimation [[id number]] Collected: 10/28/23 1428    Lab Status:  In process Specimen: Blood from Arm, Right Updated: 10/28/23 1920    Lipid Panel with Direct LDL reflex [[id number]]  (Abnormal) Collected: 10/28/23 1428    Lab Status: Final result Specimen: Blood from Arm, Right Updated: 10/28/23 1756     Cholesterol 214 mg/dL      Triglycerides 377 mg/dL      HDL, Direct 41 mg/dL      LDL Calculated 98 mg/dL     HS Troponin I 2hr [[id number]]  (Normal) Collected: 10/28/23 1617    Lab Status: Final result Specimen: Blood from Arm, Right Updated: 10/28/23 1706     hs TnI 2hr 16 ng/L      Delta 2hr hsTnI 1 ng/L     FLU/RSV/COVID - if FLU/RSV clinically relevant [[id number]]  (Normal) Collected: 10/28/23 1442    Lab Status: Final result Specimen: Nares from Nose Updated: 10/28/23 1532     SARS-CoV-2 Negative     INFLUENZA A PCR Negative     INFLUENZA B PCR Negative     RSV PCR Negative    Narrative:      FOR PEDIATRIC PATIENTS - copy/paste COVID Guidelines URL to browser: [email or web address]    SARS-CoV-2 assay is a Nucleic Acid Amplification assay intended for the  qualitative detection of nucleic acid from SARS-CoV-2 in nasopharyngeal  swabs. Results are for the presumptive identification of SARS-CoV-2 RNA. Positive results are indicative of infection with SARS-CoV-2, the virus  causing COVID-19, but do not rule out bacterial infection or co-infection  with other viruses. Laboratories within the [facility] and its  territories are required to report all positive results to the appropriate  public health authorities. Negative results do not preclude SARS-CoV-2  infection and should not be used as the sole basis for treatment or other  patient management decisions. Negative results must be combined with  clinical observations, patient history, and epidemiological information. This test has not been FDA cleared or approved. This test has been authorized by FDA under an Emergency Use Authorization  (EUA). This test is only authorized for the duration of time the  declaration that circumstances exist justifying the authorization of the  emergency use of an in vitro diagnostic tests for detection of SARS-CoV-2  virus and/or diagnosis of COVID-19 infection under section 564(b)(1) of  the Act, 21 U. S.C. 477DOC-3(B)(9), unless the authorization is terminated  or revoked sooner. The test has been validated but independent review by FDA  and CLIA is pending. Test performed using [employer]: This RT-PCR assay targets N2,  a region unique to SARS-CoV-2.  A conserved region in the E-gene was chosen  for pan-Sarbecovirus detection which includes SARS-CoV-2. According to CMS-2020-01-R, this platform meets the definition of high-throughput technology.     HS Troponin 0hr (reflex protocol) [[id number]]  (Normal) Collected: 10/28/23 1428    Lab Status: Final result Specimen: Blood from Arm, Right Updated: 10/28/23 1504     hs TnI 0hr 15 ng/L     Basic metabolic panel [[id number]]  (Abnormal) Collected: 10/28/23 1428    Lab Status: Final result Specimen: Blood from Arm, Right Updated: 10/28/23 1458     Sodium 136 mmol/L      Potassium 3.6 mmol/L      Chloride 101 mmol/L      CO2 29 mmol/L      ANION GAP 6 mmol/L      BUN 24 mg/dL      Creatinine 1.20 mg/dL      Glucose 210 mg/dL      Calcium 9.0 mg/dL      eGFR 41 ml/min/1.73sq m     Narrative:      [place] guidelines for Chronic Kidney Disease (CKD):     Stage 1 with normal or high GFR (GFR > 90 mL/min/1.73 square meters)    Stage 2 Mild CKD (GFR = 60-89 mL/min/1.73 square meters)    Stage 3A Moderate CKD (GFR = 45-59 mL/min/1.73 square meters)    Stage 3B Moderate CKD (GFR = 30-44 mL/min/1.73 square meters)    Stage 4 Severe CKD (GFR = 15-29 mL/min/1.73 square meters)    Stage 5 End Stage CKD (GFR <15 mL/min/1.73 square meters)  Note: GFR calculation is accurate only with a steady state creatinine    Protime-INR [[id number]]  (Abnormal) Collected: 10/28/23 1428    Lab Status: Final result Specimen: Blood from Arm, Right Updated: 10/28/23 1445     Protime 30.9 seconds      INR 2.86    APTT [[id number]]  (Abnormal) Collected: 10/28/23 1428    Lab Status: Final result Specimen: Blood from Arm, Right Updated: 10/28/23 1445     PTT 38 seconds     Fingerstick Glucose (POCT) [[id number]]  (Abnormal) Collected: 10/28/23 1426    Lab Status: Final result Updated: 10/28/23 1440     POC Glucose 204 mg/dl     CBC and Platelet [[id number]]  (Normal) Collected: 10/28/23 1428    Lab Status: Final result Specimen: Blood from Arm, Right Updated: 10/28/23 1433     WBC 5.55 Thousand/uL      RBC 4.83 Million/uL      Hemoglobin 13.5 g/dL      Hematocrit 41.4 %      MCV 86 fL      MCH 28.0 pg      MCHC 32.6 g/dL      RDW 14.3 %      Platelets 446 Thousands/uL      MPV 10.2 fL             All labs reviewed and utilized in the medical decision making process    Radiology:    CTA stroke alert (head/neck)   Final Result      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 No evidence of large vessel occlusion. No evidence of aneurysm. 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 I personally discussed this study with Dr. Jarrett Desouza on 10/28/2023 2:45 PM.                           Workstation performed: [id number]         CT stroke alert brain   Final Result      No acute intracranial abnormality. I personally discussed this study with Dr. Jarrett Desouza on 10/28/2023 2:45 PM.            Workstation performed: [id number]         X-ray chest 1 view portable    (Results Pending)   MRI Inpatient Order    (Results Pending)       All radiology studies independently viewed by me and interpreted by the radiologist.    Procedure    Procedures        ED Course of Care and Re-Assessments      Stroke alert called on initial presentation, symptoms improved from initial presentation, although did wax and wane at several points during ER course. Based on waxing and waning symptoms, as well as current Coumadin use and therapeutic INR, patient not a candidate for TNK does not appear to be candidate for neuro intervention based on CTA results. Appreciate neurology consultation. Plan form for admission to internal medicine with neurology consulting.     Medications   atorvastatin (LIPITOR) tablet 40 mg (40 mg Oral Given 10/28/23 1817)   insulin lispro (HumaLOG) 100 units/mL subcutaneous injection 1-6 Units ( Subcutaneous Not Given 10/28/23 1818)   insulin lispro (HumaLOG) 100 units/mL subcutaneous injection 1-6 Units (has no administration in time range)   pantoprazole (PROTONIX) EC tablet 40 mg (has no administration in time range)   warfarin (COUMADIN) tablet 5 mg (5 mg Oral Given 10/28/23 1817)   levothyroxine tablet 50 mcg (has no administration in time range)   gabapentin (NEURONTIN) capsule 300 mg (has no administration in time range)   acetaminophen (TYLENOL) tablet 650 mg (has no administration in time range)   insulin detemir (LEVEMIR) subcutaneous injection 26 Units (has no administration in time range)   heparin (porcine) subcutaneous injection 5,000 Units (has no administration in time range)   iohexol (OMNIPAQUE) 350 MG/ML injection (MULTI-DOSE) 85 mL (85 mL Intravenous Given 10/28/23 1435)           FINAL IMPRESSION    Final diagnoses:   Facial droop   Left arm weakness   Left leg weakness         DISPOSITION/PLAN    Presentation as above with facial droop as well as left arm and leg weakness and intermittent altered sensation. Vital signs reassuring, examination as above. After initial work-up in emergency department, eventually admitted to internal medicine with neurology consulting. Source of symptoms is unclear, based on waxing and waning seems inconsistent with stroke, based on timeframe seems inconsistent with demyelinating disease, and based on distribution vascular etiology also seems unlikely. Patient's blood pressure also quite labile, some concern for intermittent hypoperfusion. Low suspicion for sepsis or other infectious etiology at this time. Hemodynamically stable and comfortable at time of admit.   Time reflects when diagnosis was documented in both MDM as applicable and the Disposition within this note       Time User Action Codes Description Comment    10/28/2023  2:26 PM Matti Roberts [R29.810] Facial droop     10/28/2023  4:26 PM Matti Roberts [R29.898] Left arm weakness     10/28/2023  4:26 PM Matti Roberts [R29.898] Left leg weakness           ED Disposition       ED Disposition   Admit    Condition   Stable    Date/Time   Sat Oct 28, 2023  4:26 PM    Comment   Case was discussed with HARRIET and the patient's admission status was agreed to be Admission Status: observation status to the service of Dr. Mali Carey . Follow-up Information    None           PATIENT REFERRED TO:    No follow-up provider specified. DISCHARGE MEDICATIONS:    Current Discharge Medication List        CONTINUE these medications which have NOT CHANGED    Details   ! ! 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 Requesting 1 year supply.   Associated Diagnoses: Type 2 diabetes mellitus with diabetic polyneuropathy, with long-term current use of insulin ([facility])      Blood Glucose Monitoring Suppl (Accu-Chek Guide Me) w/Device KIT USE 3 TIMES DAILY  Qty: 1 kit, Refills: 2    Associated Diagnoses: Type 2 diabetes mellitus with stage 4 chronic kidney disease, with long-term current use of insulin ([facility])      Cholecalciferol (Vitamin D3) 50 MCG (2000 UT) TABS Take 2,000 Units by mouth daily      furosemide (LASIX) 40 mg tablet TAKE 1 TABLET BY MOUTH  DAILY  Qty: 90 tablet, Refills: 3    Comments: Requesting 1 year supply  Associated Diagnoses: Congestive heart failure, unspecified HF chronicity, unspecified heart failure type ([facility])      gabapentin (NEURONTIN) 300 mg capsule Take 1 capsule (300 mg total) by mouth 3 (three) times a day  Qty: 270 capsule, Refills: 3    Associated Diagnoses: Type 2 diabetes mellitus with diabetic neuropathy, without long-term current use of insulin ([facility])      glipiZIDE (GLUCOTROL) 10 mg tablet TAKE 1 TABLET BY MOUTH  TWICE DAILY BEFORE MEALS  Qty: 180 tablet, Refills: 3    Comments: Requesting 1 year supply  Associated Diagnoses: Type 2 diabetes mellitus with diabetic polyneuropathy, with long-term current use of insulin ([facility])      glucose blood (Accu-Chek Celeste Plus) test strip Use 1 each 3 (three) times a day Use as instructed  Qty: 300 each, Refills: 3    Associated Diagnoses: Type 2 diabetes mellitus with stage 4 chronic kidney disease, with long-term current use of insulin ([address])      ! ! Lancets (freestyle) lancets Check blood glucose 3 times daily  Qty: 300 each, Refills: 0    Associated Diagnoses: Type 2 diabetes mellitus with diabetic neuropathy, without long-term current use of insulin ([facility])      Levemir FlexPen 100 units/mL injection pen INJECT SUBCUTANEOUSLY 26 UNITS  DAILY AT BEDTIME  Qty: 30 mL, Refills: 2    Comments: Please send a replace/new response with 100-Day Supply if appropriate to maximize member benefit. Requesting 1 year supply. Associated Diagnoses: Type 2 diabetes mellitus with stage 4 chronic kidney disease, with long-term current use of insulin ([facility])      levothyroxine 50 mcg tablet TAKE 1 TABLET BY MOUTH  DAILY  Qty: 90 tablet, Refills: 3    Comments: Requesting 1 year supply  Associated Diagnoses: Hypothyroidism, unspecified type      metoprolol tartrate (LOPRESSOR) 50 mg tablet TAKE ONE-HALF TABLET BY  MOUTH TWICE DAILY  Qty: 90 tablet, Refills: 3    Comments: Requesting 1 year supply  Associated Diagnoses: Hypertension, essential      oxygen gas Inhale 2 L/min daily at bedtime as needed home oxygen nightly      pantoprazole (PROTONIX) 40 mg tablet TAKE 1 TABLET BY MOUTH  DAILY  Qty: 90 tablet, Refills: 3    Comments: Requesting 1 year supply  Associated Diagnoses: Gastroesophageal reflux disease      warfarin (COUMADIN) 5 mg tablet TAKE 1 TO 2 TABLETS BY  MOUTH DAILY OR AS DIRECTED  Qty: 180 tablet, Refills: 3    Comments: Requesting 1 year supply  Associated Diagnoses:  Aortic valve disorder      albuterol (Ventolin HFA) 90 mcg/act inhaler Inhale 2 puffs every 6 (six) hours as needed for wheezing  Qty: 18 g, Refills: 1    Comments: Substitution to a formulary equivalent within the same pharmaceutical class is authorized. Associated Diagnoses: Simple chronic bronchitis ([address])       ! ! - Potential duplicate medications found. Please discuss with provider. No discharge procedures on file. Felipa Romero MD    Portions of the record may have been created with voice recognition software. Occasional wrong word or "sound alike" substitutions may have occurred due to the inherent limitations of voice recognition software.   Please read the chart carefully and recognize, using context, where substitutions have occurred     Felipa Romero MD  10/28/23 7075

## 2023-10-29 ENCOUNTER — APPOINTMENT (INPATIENT)
Dept: CT IMAGING | Facility: HOSPITAL | Age: 83
DRG: 065 | End: 2023-10-29
Payer: COMMERCIAL

## 2023-10-29 ENCOUNTER — TELEPHONE (OUTPATIENT)
Dept: OTHER | Facility: OTHER | Age: 83
End: 2023-10-29

## 2023-10-29 LAB
ALBUMIN SERPL BCP-MCNC: 4 G/DL (ref 3.5–5)
ALP SERPL-CCNC: 88 U/L (ref 34–104)
ALT SERPL W P-5'-P-CCNC: 16 U/L (ref 7–52)
ANION GAP SERPL CALCULATED.3IONS-SCNC: 7 MMOL/L
AST SERPL W P-5'-P-CCNC: 18 U/L (ref 13–39)
ATRIAL RATE: 73 BPM
BASOPHILS # BLD AUTO: 0.03 THOUSANDS/ÂΜL (ref 0–0.1)
BASOPHILS NFR BLD AUTO: 1 % (ref 0–1)
BILIRUB SERPL-MCNC: 0.6 MG/DL (ref 0.2–1)
BUN SERPL-MCNC: 20 MG/DL (ref 5–25)
CALCIUM SERPL-MCNC: 9.3 MG/DL (ref 8.4–10.2)
CHLORIDE SERPL-SCNC: 102 MMOL/L (ref 96–108)
CO2 SERPL-SCNC: 31 MMOL/L (ref 21–32)
CREAT SERPL-MCNC: 1.19 MG/DL (ref 0.6–1.3)
EOSINOPHIL # BLD AUTO: 0.14 THOUSAND/ÂΜL (ref 0–0.61)
EOSINOPHIL NFR BLD AUTO: 3 % (ref 0–6)
ERYTHROCYTE [DISTWIDTH] IN BLOOD BY AUTOMATED COUNT: 14.6 % (ref 11.6–15.1)
EST. AVERAGE GLUCOSE BLD GHB EST-MCNC: 203 MG/DL
GFR SERPL CREATININE-BSD FRML MDRD: 42 ML/MIN/1.73SQ M
GLUCOSE SERPL-MCNC: 148 MG/DL (ref 65–140)
GLUCOSE SERPL-MCNC: 178 MG/DL (ref 65–140)
GLUCOSE SERPL-MCNC: 180 MG/DL (ref 65–140)
GLUCOSE SERPL-MCNC: 190 MG/DL (ref 65–140)
GLUCOSE SERPL-MCNC: 195 MG/DL (ref 65–140)
HBA1C MFR BLD: 8.7 %
HCT VFR BLD AUTO: 41.2 % (ref 34.8–46.1)
HGB BLD-MCNC: 13.7 G/DL (ref 11.5–15.4)
IMM GRANULOCYTES # BLD AUTO: 0.01 THOUSAND/UL (ref 0–0.2)
IMM GRANULOCYTES NFR BLD AUTO: 0 % (ref 0–2)
INR PPP: 2.73 (ref 0.84–1.19)
LYMPHOCYTES # BLD AUTO: 1.64 THOUSANDS/ÂΜL (ref 0.6–4.47)
LYMPHOCYTES NFR BLD AUTO: 39 % (ref 14–44)
MAGNESIUM SERPL-MCNC: 2 MG/DL (ref 1.9–2.7)
MCH RBC QN AUTO: 28.5 PG (ref 26.8–34.3)
MCHC RBC AUTO-ENTMCNC: 33.3 G/DL (ref 31.4–37.4)
MCV RBC AUTO: 86 FL (ref 82–98)
MONOCYTES # BLD AUTO: 0.42 THOUSAND/ÂΜL (ref 0.17–1.22)
MONOCYTES NFR BLD AUTO: 10 % (ref 4–12)
NEUTROPHILS # BLD AUTO: 1.94 THOUSANDS/ÂΜL (ref 1.85–7.62)
NEUTS SEG NFR BLD AUTO: 47 % (ref 43–75)
NRBC BLD AUTO-RTO: 0 /100 WBCS
P AXIS: 108 DEGREES
PLATELET # BLD AUTO: 259 THOUSANDS/UL (ref 149–390)
PMV BLD AUTO: 10.3 FL (ref 8.9–12.7)
POTASSIUM SERPL-SCNC: 4.2 MMOL/L (ref 3.5–5.3)
PR INTERVAL: 208 MS
PROT SERPL-MCNC: 7.7 G/DL (ref 6.4–8.4)
PROTHROMBIN TIME: 29.8 SECONDS (ref 11.6–14.5)
QRS AXIS: 120 DEGREES
QRSD INTERVAL: 182 MS
QT INTERVAL: 498 MS
QTC INTERVAL: 548 MS
RBC # BLD AUTO: 4.8 MILLION/UL (ref 3.81–5.12)
SODIUM SERPL-SCNC: 140 MMOL/L (ref 135–147)
T WAVE AXIS: -52 DEGREES
VENTRICULAR RATE: 73 BPM
WBC # BLD AUTO: 4.18 THOUSAND/UL (ref 4.31–10.16)

## 2023-10-29 PROCEDURE — 70450 CT HEAD/BRAIN W/O DYE: CPT

## 2023-10-29 PROCEDURE — 80053 COMPREHEN METABOLIC PANEL: CPT | Performed by: STUDENT IN AN ORGANIZED HEALTH CARE EDUCATION/TRAINING PROGRAM

## 2023-10-29 PROCEDURE — 85025 COMPLETE CBC W/AUTO DIFF WBC: CPT | Performed by: STUDENT IN AN ORGANIZED HEALTH CARE EDUCATION/TRAINING PROGRAM

## 2023-10-29 PROCEDURE — 82948 REAGENT STRIP/BLOOD GLUCOSE: CPT

## 2023-10-29 PROCEDURE — 83735 ASSAY OF MAGNESIUM: CPT | Performed by: STUDENT IN AN ORGANIZED HEALTH CARE EDUCATION/TRAINING PROGRAM

## 2023-10-29 PROCEDURE — 99232 SBSQ HOSP IP/OBS MODERATE 35: CPT | Performed by: STUDENT IN AN ORGANIZED HEALTH CARE EDUCATION/TRAINING PROGRAM

## 2023-10-29 PROCEDURE — 93010 ELECTROCARDIOGRAM REPORT: CPT | Performed by: INTERNAL MEDICINE

## 2023-10-29 PROCEDURE — G1004 CDSM NDSC: HCPCS

## 2023-10-29 PROCEDURE — 97162 PT EVAL MOD COMPLEX 30 MIN: CPT

## 2023-10-29 PROCEDURE — 85610 PROTHROMBIN TIME: CPT | Performed by: STUDENT IN AN ORGANIZED HEALTH CARE EDUCATION/TRAINING PROGRAM

## 2023-10-29 RX ORDER — MIDODRINE HYDROCHLORIDE 5 MG/1
5 TABLET ORAL 3 TIMES DAILY PRN
Status: DISCONTINUED | OUTPATIENT
Start: 2023-10-29 | End: 2023-10-29

## 2023-10-29 RX ORDER — MIDODRINE HYDROCHLORIDE 5 MG/1
5 TABLET ORAL 4 TIMES DAILY
Status: DISCONTINUED | OUTPATIENT
Start: 2023-10-29 | End: 2023-10-30

## 2023-10-29 RX ADMIN — INSULIN LISPRO 1 UNITS: 100 INJECTION, SOLUTION INTRAVENOUS; SUBCUTANEOUS at 22:59

## 2023-10-29 RX ADMIN — ATORVASTATIN CALCIUM 40 MG: 40 TABLET, FILM COATED ORAL at 17:11

## 2023-10-29 RX ADMIN — GABAPENTIN 300 MG: 300 CAPSULE ORAL at 08:21

## 2023-10-29 RX ADMIN — LEVOTHYROXINE SODIUM 50 MCG: 0.05 TABLET ORAL at 08:21

## 2023-10-29 RX ADMIN — GABAPENTIN 300 MG: 300 CAPSULE ORAL at 23:00

## 2023-10-29 RX ADMIN — INSULIN LISPRO 2 UNITS: 100 INJECTION, SOLUTION INTRAVENOUS; SUBCUTANEOUS at 12:33

## 2023-10-29 RX ADMIN — INSULIN DETEMIR 26 UNITS: 100 INJECTION, SOLUTION SUBCUTANEOUS at 22:59

## 2023-10-29 RX ADMIN — MIDODRINE HYDROCHLORIDE 5 MG: 5 TABLET ORAL at 22:58

## 2023-10-29 RX ADMIN — MIDODRINE HYDROCHLORIDE 5 MG: 5 TABLET ORAL at 12:33

## 2023-10-29 RX ADMIN — MIDODRINE HYDROCHLORIDE 5 MG: 5 TABLET ORAL at 17:11

## 2023-10-29 RX ADMIN — WARFARIN SODIUM 5 MG: 5 TABLET ORAL at 17:11

## 2023-10-29 RX ADMIN — PANTOPRAZOLE SODIUM 40 MG: 40 TABLET, DELAYED RELEASE ORAL at 08:21

## 2023-10-29 RX ADMIN — INSULIN LISPRO 2 UNITS: 100 INJECTION, SOLUTION INTRAVENOUS; SUBCUTANEOUS at 17:30

## 2023-10-29 NOTE — ASSESSMENT & PLAN NOTE
Present on admission  Initial neuroimaging negative for acute pathology  Neurology discussed with ED, admit to stroke pathway  Monitor on telemetry   Pacemaker is not MRI conditional based on most recent interrogation report.   Will obtain repeat CT head 24 hours after initial to evaluate for stroke

## 2023-10-29 NOTE — ASSESSMENT & PLAN NOTE
Continue with permissive hypertension for now  Blood pressure is decreasing. Started midodrine 5 mg every 6 hours with plan to hold if systolics are elevated. If this is not providing adequate hypertension, will increase to 10 mg.   If she continues to not be on the target range, she may require IV pressor support

## 2023-10-29 NOTE — PLAN OF CARE
Problem: MOBILITY - ADULT  Goal: Maintain or return to baseline ADL function  Description: INTERVENTIONS:  -  Assess patient's ability to carry out ADLs; assess patient's baseline for ADL function and identify physical deficits which impact ability to perform ADLs (bathing, care of mouth/teeth, toileting, grooming, dressing, etc.)  - Assess/evaluate cause of self-care deficits   - Assess range of motion  - Assess patient's mobility; develop plan if impaired  - Assess patient's need for assistive devices and provide as appropriate  - Encourage maximum independence but intervene and supervise when necessary  - Involve family in performance of ADLs  - Assess for home care needs following discharge   - Consider OT consult to assist with ADL evaluation and planning for discharge  - Provide patient education as appropriate  10/29/2023 0712 by Serene Rivers RN  Outcome: Progressing  10/29/2023 0712 by Serene Rivers RN  Outcome: Progressing  10/28/2023 1752 by Serene Rivers RN  Outcome: Progressing  Goal: Maintains/Returns to pre admission functional level  Description: INTERVENTIONS:  - Perform BMAT or MOVE assessment daily.   - Set and communicate daily mobility goal to care team and patient/family/caregiver. - Collaborate with rehabilitation services on mobility goals if consulted  - Perform Range of Motion 3 times a day. - Reposition patient every 3 hours.   - Dangle patient 3 times a day  - Stand patient 3 times a day  - Ambulate patient 3 times a day  - Out of bed to chair 3 times a day   - Out of bed for meals 3 times a day  - Out of bed for toileting  - Record patient progress and toleration of activity level   10/29/2023 0712 by Serene Rivers RN  Outcome: Progressing  10/29/2023 0712 by Serene Rivers RN  Outcome: Progressing  10/28/2023 1752 by Serene Rivers RN  Outcome: Progressing     Problem: PAIN - ADULT  Goal: Verbalizes/displays adequate comfort level or baseline comfort level  Description: Interventions:  - Encourage patient to monitor pain and request assistance  - Assess pain using appropriate pain scale  - Administer analgesics based on type and severity of pain and evaluate response  - Implement non-pharmacological measures as appropriate and evaluate response  - Consider cultural and social influences on pain and pain management  - Notify physician/advanced practitioner if interventions unsuccessful or patient reports new pain  10/29/2023 0712 by Omkar Clancy RN  Outcome: Progressing  10/29/2023 0712 by Omkar Clancy RN  Outcome: Progressing  10/28/2023 1752 by Omkar Clancy RN  Outcome: Progressing     Problem: INFECTION - ADULT  Goal: Absence or prevention of progression during hospitalization  Description: INTERVENTIONS:  - Assess and monitor for signs and symptoms of infection  - Monitor lab/diagnostic results  - Monitor all insertion sites, i.e. indwelling lines, tubes, and drains  - Monitor endotracheal if appropriate and nasal secretions for changes in amount and color  - Hewitt appropriate cooling/warming therapies per order  - Administer medications as ordered  - Instruct and encourage patient and family to use good hand hygiene technique  - Identify and instruct in appropriate isolation precautions for identified infection/condition  10/29/2023 0712 by Omkar Clancy RN  Outcome: Progressing  10/29/2023 0712 by Omkar Clancy RN  Outcome: Progressing  10/28/2023 1752 by Omkar Clancy RN  Outcome: Progressing  Goal: Absence of fever/infection during neutropenic period  Description: INTERVENTIONS:  - Monitor WBC    10/29/2023 0712 by Omkar Clancy RN  Outcome: Progressing  10/29/2023 0712 by Omkar Clancy RN  Outcome: Progressing  10/28/2023 1752 by Omkar Clancy RN  Outcome: Progressing     Problem: SAFETY ADULT  Goal: Maintain or return to baseline ADL function  Description: INTERVENTIONS:  -  Assess patient's ability to carry out ADLs; assess patient's baseline for ADL function and identify physical deficits which impact ability to perform ADLs (bathing, care of mouth/teeth, toileting, grooming, dressing, etc.)  - Assess/evaluate cause of self-care deficits   - Assess range of motion  - Assess patient's mobility; develop plan if impaired  - Assess patient's need for assistive devices and provide as appropriate  - Encourage maximum independence but intervene and supervise when necessary  - Involve family in performance of ADLs  - Assess for home care needs following discharge   - Consider OT consult to assist with ADL evaluation and planning for discharge  - Provide patient education as appropriate  10/29/2023 0712 by Deanna Franco RN  Outcome: Progressing  10/29/2023 0712 by Deanna Franco RN  Outcome: Progressing  10/28/2023 1752 by Deanna Franco RN  Outcome: Progressing  Goal: Maintains/Returns to pre admission functional level  Description: INTERVENTIONS:  - Perform BMAT or MOVE assessment daily.   - Set and communicate daily mobility goal to care team and patient/family/caregiver. - Collaborate with rehabilitation services on mobility goals if consulted  - Perform Range of Motion 3 times a day. - Reposition patient every 3 hours.   - Dangle patient 3 times a day  - Stand patient 3 times a day  - Ambulate patient 3 times a day  - Out of bed to chair 3 times a day   - Out of bed for meals 3 times a day  - Out of bed for toileting  - Record patient progress and toleration of activity level   10/29/2023 0712 by Deanna Franco RN  Outcome: Progressing  10/29/2023 0712 by Deanna Franco RN  Outcome: Progressing  10/28/2023 1752 by Deanna Franco RN  Outcome: Progressing  Goal: Patient will remain free of falls  Description: INTERVENTIONS:  - Educate patient/family on patient safety including physical limitations  - Instruct patient to call for assistance with activity   - Consult OT/PT to assist with strengthening/mobility   - Keep Call bell within reach  - Keep bed low and locked with side rails adjusted as appropriate  - Keep care items and personal belongings within reach  - Initiate and maintain comfort rounds  - Make Fall Risk Sign visible to staff  - Offer Toileting every 2 Hours, in advance of need  - Initiate/Maintain bed alarm  - Obtain necessary fall risk management equipment: chair alarm  - Apply yellow socks and bracelet for high fall risk patients  - Consider moving patient to room near nurses station  10/29/2023 0712 by Afshan Tilley RN  Outcome: Progressing  10/29/2023 0712 by Afshan Tilley RN  Outcome: Progressing  10/28/2023 1752 by Afshan Tilley RN  Outcome: Progressing     Problem: DISCHARGE PLANNING  Goal: Discharge to home or other facility with appropriate resources  Description: INTERVENTIONS:  - Identify barriers to discharge w/patient and caregiver  - Arrange for needed discharge resources and transportation as appropriate  - Identify discharge learning needs (meds, wound care, etc.)  - Arrange for interpretive services to assist at discharge as needed  - Refer to Case Management Department for coordinating discharge planning if the patient needs post-hospital services based on physician/advanced practitioner order or complex needs related to functional status, cognitive ability, or social support system  10/29/2023 0712 by Afshan Tilley RN  Outcome: Progressing  10/29/2023 0712 by Afshan Tilley RN  Outcome: Progressing  10/28/2023 1752 by Afshan Tilley RN  Outcome: Progressing     Problem: Knowledge Deficit  Goal: Patient/family/caregiver demonstrates understanding of disease process, treatment plan, medications, and discharge instructions  Description: Complete learning assessment and assess knowledge base.   Interventions:  - Provide teaching at level of understanding  - Provide teaching via preferred learning methods  10/29/2023 0712 by Afshan Tilley RN  Outcome: Progressing  10/29/2023 0712 by Afshan Tilley RN  Outcome: Progressing  10/28/2023 1752 by Mian Moody RN  Outcome: Progressing     Problem: NEUROSENSORY - ADULT  Goal: Achieves stable or improved neurological status  Description: INTERVENTIONS  - Monitor and report changes in neurological status  - Monitor vital signs such as temperature, blood pressure, glucose, and any other labs ordered   - Initiate measures to prevent increased intracranial pressure  - Monitor for seizure activity and implement precautions if appropriate      10/29/2023 0712 by Mian Moody RN  Outcome: Progressing  10/29/2023 0712 by Mian Moody RN  Outcome: Progressing  10/28/2023 1752 by Mian Moody RN  Outcome: Progressing  Goal: Remains free of injury related to seizures activity  Description: INTERVENTIONS  - Maintain airway, patient safety  and administer oxygen as ordered  - Monitor patient for seizure activity, document and report duration and description of seizure to physician/advanced practitioner  - If seizure occurs,  ensure patient safety during seizure  - Reorient patient post seizure  - Seizure pads on all 4 side rails  - Instruct patient/family to notify RN of any seizure activity including if an aura is experienced  - Instruct patient/family to call for assistance with activity based on nursing assessment  - Administer anti-seizure medications if ordered    10/29/2023 0712 by Mian Moody RN  Outcome: Progressing  10/29/2023 0712 by Mian Moody RN  Outcome: Progressing  10/28/2023 1752 by Mian Moody RN  Outcome: Progressing  Goal: Achieves maximal functionality and self care  Description: INTERVENTIONS  - Monitor swallowing and airway patency with patient fatigue and changes in neurological status  - Encourage and assist patient to increase activity and self care.    - Encourage visually impaired, hearing impaired and aphasic patients to use assistive/communication devices  10/29/2023 0712 by Mian Moody RN  Outcome: Progressing  10/29/2023 0712 by Mian Moody RN  Outcome: Progressing  10/28/2023 1752 by Deanna Franco RN  Outcome: Progressing     Problem: CARDIOVASCULAR - ADULT  Goal: Maintains optimal cardiac output and hemodynamic stability  Description: INTERVENTIONS:  - Monitor I/O, vital signs and rhythm  - Monitor for S/S and trends of decreased cardiac output  - Administer and titrate ordered vasoactive medications to optimize hemodynamic stability  - Assess quality of pulses, skin color and temperature  - Assess for signs of decreased coronary artery perfusion  - Instruct patient to report change in severity of symptoms  10/29/2023 0712 by Deanna Franco RN  Outcome: Progressing  10/29/2023 0712 by Deanna Franco RN  Outcome: Progressing  10/28/2023 1752 by Deanna Franco RN  Outcome: Progressing  Goal: Absence of cardiac dysrhythmias or at baseline rhythm  Description: INTERVENTIONS:  - Continuous cardiac monitoring, vital signs, obtain 12 lead EKG if ordered  - Administer antiarrhythmic and heart rate control medications as ordered  - Monitor electrolytes and administer replacement therapy as ordered  10/29/2023 5787 by Deanna Franco RN  Outcome: Progressing  10/29/2023 0712 by Deanna Franco RN  Outcome: Progressing  10/28/2023 1752 by Deanna Franco RN  Outcome: Progressing     Problem: METABOLIC, FLUID AND ELECTROLYTES - ADULT  Goal: Glucose maintained within target range  Description: INTERVENTIONS:  - Monitor Blood Glucose as ordered  - Assess for signs and symptoms of hyperglycemia and hypoglycemia  - Administer ordered medications to maintain glucose within target range  - Assess nutritional intake and initiate nutrition service referral as needed  10/29/2023 0712 by Deanna Franco RN  Outcome: Progressing  10/29/2023 0712 by Deanna Franco RN  Outcome: Progressing  10/28/2023 1752 by Deanna Franco RN  Outcome: Progressing     Problem: Neurological Deficit  Goal: Neurological status is stable or improving  Description: Interventions:  - Monitor and assess patient's level of consciousness, motor function, sensory function, and level of assistance needed for ADLs. - Monitor and report changes from baseline. Collaborate with interdisciplinary team to initiate plan and implement interventions as ordered. - Provide and maintain a safe environment. - Consider seizure precautions. - Consider fall precautions. - Consider aspiration precautions. - Consider bleeding precautions. Outcome: Progressing     Problem: Activity Intolerance/Impaired Mobility  Goal: Mobility/activity is maintained at optimum level for patient  Description: Interventions:  - Assess and monitor patient  barriers to mobility and need for assistive/adaptive devices. - Assess patient's emotional response to limitations. - Collaborate with interdisciplinary team and initiate plans and interventions as ordered. - Encourage independent activity per ability.  - Maintain proper body alignment. - Perform active/passive rom as tolerated/ordered.   - Plan activities to conserve energy.  - Turn patient as appropriate  Outcome: Progressing

## 2023-10-29 NOTE — PROGRESS NOTES
[address]  Progress Note  Name: Nitza Vega  MRN: [id number]  Unit/Bed#: -01 I Date of Admission: 10/28/2023   Date of Service: 10/29/2023 I Hospital Day: 1    Assessment/Plan   Hypertensive urgency  Assessment & Plan  Continue with permissive hypertension for now  Blood pressure is decreasing. Started midodrine 5 mg every 6 hours with plan to hold if systolics are elevated. If this is not providing adequate hypertension, will increase to 10 mg. If she continues to not be on the target range, she may require IV pressor support    Paroxysmal atrial fibrillation ([address])  Assessment & Plan  On coumadin for anticoagulation  INR within therapeutic range  Continue coumadin   Rate controlled    Diabetes mellitus with neurological manifestation [facility])  Assessment & Plan  Lab Results   Component Value Date    HGBA1C 8.7 (H) 10/28/2023       Recent Labs     10/28/23  1744 10/28/23  2220 10/29/23  0709 10/29/23  1116   POCGLU 132 153* 148* 190*         Blood Sugar Average: Last 72 hrs:  (P) 165.4  Continue sliding scale  Add basal and prandial insulin if ISS requirements are consistent    * Stroke-like symptoms  Assessment & Plan  Present on admission  Initial neuroimaging negative for acute pathology  Neurology discussed with ED, admit to stroke pathway  Monitor on telemetry   Pacemaker is not MRI conditional based on most recent interrogation report. Will obtain repeat CT head 24 hours after initial to evaluate for stroke           VTE Pharmacologic Prophylaxis: VTE Score: 9 High Risk (Score >/= 5) - Pharmacological DVT Prophylaxis Ordered: warfarin (Coumadin). Sequential Compression Devices Ordered. Patient Centered Rounds: I performed bedside rounds with nursing staff today. Discussions with Specialists or Other Care Team Provider: Neurology    Education and Discussions with Family / Patient: Updated  (son) via phone.     Total Time Spent on Date of Encounter in care of patient: 35 mins. This time was spent on one or more of the following: performing physical exam; counseling and coordination of care; obtaining or reviewing history; documenting in the medical record; reviewing/ordering tests, medications or procedures; communicating with other healthcare professionals and discussing with patient's family/caregivers. Current Length of Stay: 1 day(s)  Current Patient Status: Inpatient   Certification Statement: The patient will continue to require additional inpatient hospital stay due to stroke management  Discharge Plan: Anticipate discharge in 48-72 hrs to discharge location to be determined pending rehab evaluations. Code Status: Level 1 - Full Code    Subjective:   Patient reports that overnight as her blood pressures decreased, she had left facial and left upper extremity numbness which improved when blood pressures began to rise again. Denies any change in facial droop or strength. No new neurologic symptoms today. Denies chest pain or palpitations. Objective:     Vitals:   Temp (24hrs), Av.8 °F (36.6 °C), Min:97.3 °F (36.3 °C), Max:98.2 °F (36.8 °C)    Temp:  [97.3 °F (36.3 °C)-98.2 °F (36.8 °C)] 97.9 °F (36.6 °C)  HR:  [62-74] 74  Resp:  [16-19] 17  BP: (128-193)/() 146/64  SpO2:  [90 %-97 %] 94 %  Body mass index is 26.79 kg/m². Input and Output Summary (last 24 hours): Intake/Output Summary (Last 24 hours) at 10/29/2023 1514  Last data filed at 10/29/2023 0808  Gross per 24 hour   Intake 600 ml   Output --   Net 600 ml       Physical Exam:   Physical Exam  Vitals and nursing note reviewed. Constitutional:       Appearance: Normal appearance. HENT:      Head: Normocephalic and atraumatic. Mouth/Throat:      Mouth: Mucous membranes are moist.   Eyes:      Conjunctiva/sclera: Conjunctivae normal.      Pupils: Pupils are equal, round, and reactive to light. Cardiovascular:      Rate and Rhythm: Normal rate and regular rhythm. Pulses: Normal pulses. Heart sounds: Normal heart sounds. No murmur heard. No gallop. Pulmonary:      Effort: Pulmonary effort is normal. No respiratory distress. Breath sounds: Normal breath sounds. No wheezing or rales. Abdominal:      General: Abdomen is flat. Bowel sounds are normal. There is no distension. Palpations: Abdomen is soft. Tenderness: There is no abdominal tenderness. There is no guarding or rebound. Musculoskeletal:         General: No swelling. Normal range of motion. Skin:     General: Skin is warm and dry. Capillary Refill: Capillary refill takes less than 2 seconds. Neurological:      Mental Status: She is alert.       Comments: Left facial droop  Cranial nerves II through XII otherwise intact  Left upper extremity and left lower extremity strength 4+/5  Right upper extremity and right lower extremity strength 5 out of 5  Sensation intact in extremities to light touch  No aphasia noted   Psychiatric:         Mood and Affect: Mood normal.         Additional Data:     Labs:  Results from last 7 days   Lab Units 10/29/23  0808   WBC Thousand/uL 4.18*   HEMOGLOBIN g/dL 13.7   HEMATOCRIT % 41.2   PLATELETS Thousands/uL 259   NEUTROS PCT % 47   LYMPHS PCT % 39   MONOS PCT % 10   EOS PCT % 3     Results from last 7 days   Lab Units 10/29/23  0808   SODIUM mmol/L 140   POTASSIUM mmol/L 4.2   CHLORIDE mmol/L 102   CO2 mmol/L 31   BUN mg/dL 20   CREATININE mg/dL 1.19   ANION GAP mmol/L 7   CALCIUM mg/dL 9.3   ALBUMIN g/dL 4.0   TOTAL BILIRUBIN mg/dL 0.60   ALK PHOS U/L 88   ALT U/L 16   AST U/L 18   GLUCOSE RANDOM mg/dL 180*     Results from last 7 days   Lab Units 10/29/23  0808   INR  2.73*     Results from last 7 days   Lab Units 10/29/23  1116 10/29/23  0709 10/28/23  2220 10/28/23  1744 10/28/23  1426   POC GLUCOSE mg/dl 190* 148* 153* 132 204*     Results from last 7 days   Lab Units 10/28/23  1428   HEMOGLOBIN A1C % 8.7*           Lines/Drains:  Invasive Devices       Peripheral Intravenous Line  Duration             Peripheral IV 10/28/23 Right Antecubital 1 day                      Telemetry:  Telemetry Orders (From admission, onward)               24 Hour Telemetry Monitoring  Continuous x 24 Hours (Telem)        Question:  Reason for 24 Hour Telemetry  Answer:  TIA/Suspected CVA/ Confirmed CVA                     Telemetry Reviewed: PVCs  Indication for Continued Telemetry Use: Acute CVA             Imaging: Reviewed radiology reports from this admission including: CT head    Recent Cultures (last 7 days):         Last 24 Hours Medication List:   Current Facility-Administered Medications   Medication Dose Route Frequency Provider Last Rate    acetaminophen  650 mg Oral Q6H PRN Vadim Hunter PA-C      atorvastatin  40 mg Oral QPM Ron Wilcox MD      gabapentin  300 mg Oral TID Ron Wilcox MD      heparin (porcine)  5,000 Units Subcutaneous Q8H [facility] Vadim Hunter PA-C      insulin detemir  26 Units Subcutaneous HS Justa Frost PA-C      insulin lispro  1-6 Units Subcutaneous TID With Meals Ron Wilcox MD      insulin lispro  1-6 Units Subcutaneous HS Ron Wilcox MD      levothyroxine  50 mcg Oral Daily Ron Wilcox MD      midodrine  5 mg Oral 4x Daily Dani Gary MD      pantoprazole  40 mg Oral Daily Ron Wilcox MD      warfarin  5 mg Oral Daily (warfarin) Ron Wilcox MD          Today, Patient Was Seen By: Dani Gary MD    **Please Note: This note may have been constructed using a voice recognition system. **

## 2023-10-29 NOTE — PHYSICAL THERAPY NOTE
Physical Therapy Evaluation     Patient's Name: Blayne Addison    Admitting Diagnosis  Facial droop [R29.810]  Left leg weakness [R29.898]  Left arm weakness [R29.898]  Stroke-like symptoms [R29.90]    Problem List  Patient Active Problem List   Diagnosis    Hyperlipidemia    Chronic anticoagulation    Hypertension, essential    Coronary artery disease of native artery of native heart with stable angina pectoris (HCC)    Chronic obstructive pulmonary disease (HCC)    Diabetes mellitus with neurological manifestation (HCC)    Hypothyroidism    Iron deficiency    GERD (gastroesophageal reflux disease)    AVM (arteriovenous malformation) of small bowel, acquired with hemorrhage    Angiectasia    Other vascular disorders of intestine (HCC)    Angioedema    Aortic valve replaced    Cardiomyopathy ([address])    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[facility])    Stage 3a chronic kidney disease    Chronic kidney disease-mineral and bone disorder    Primary osteoarthritis involving multiple joints    Paroxysmal atrial fibrillation (HCC)    Neutropenia associated with infection (HCC)    Herpes simplex labialis    Restrictive lung disease    Nocturnal hypoxemia    Stroke-like symptoms    Hypertensive urgency       Past Medical History  Past Medical History:   Diagnosis Date    Anemia     Aneurysm of thoracic aorta (HCC)     Aortic valve disorder     Benign neoplasm of sigmoid colon     Cardiomyopathy ([address])     last assessed: 10/10/2014    CHF (congestive heart failure) (HCC)     Chronic kidney disease     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[address])     last assessed: 10/10/2014    Diabetic nephropathy (HCC)     RAMON (dyspnea on exertion)     GERD (gastroesophageal reflux disease)     History of emphysema     Hyperlipidemia     TIA (transient ischemic attack)        Past Surgical History  Past Surgical History:   Procedure Laterality Date    AORTIC VALVE REPLACEMENT      CARDIAC PACEMAKER PLACEMENT      last assessed: 10/10/2014    CARDIAC SURGERY aortic valve replacement    CHOLECYSTECTOMY      COLONOSCOPY      HYSTERECTOMY      INSERT / REPLACE / REMOVE PACEMAKER      KNEE SURGERY Right     OTHER SURGICAL HISTORY      Capsule ENDOscopy description: 12/19/2012    SD COLONOSCOPY FLX DX W/COLLJ SPEC WHEN PFRMD N/A 5/31/2018    Procedure: single balloon ENTEROSCOPY;  Surgeon: Cristóbal Haas MD;  Location: [facility]; Service: Gastroenterology    SD ESOPHAGOGASTRODUODENOSCOPY TRANSORAL DIAGNOSTIC N/A 11/29/2017    Procedure: EGD AND COLONOSCOPY;  Surgeon: Vera Rahman MD;  Location: [facility]; Service: Gastroenterology        10/29/23 1420   PT Last Visit   PT Visit Date 10/29/23   Note Type   Note type Evaluation   Pain Assessment   Pain Assessment Tool 0-10   Pain Score 2   Pain Location/Orientation Orientation: Left; Location: Knee   Restrictions/Precautions   Other Precautions Fall Risk;Pain   Home Living   Type of [address] [place] [address]  Multi-level  (basement/first floor; enters through basement lives on first floor)   Bathroom Shower/Tub Walk-in shower   [address] [place]   (rollator, son has a RW)   Additional Comments PTA pt. did not  utilize AD for mobility   Prior Function   Level of [place] Independent with ADLs; Independent with functional mobility; Independent with IADLS   Lives With Son;Daughter; Other (Comment)  (MARIA G)   Receives Help From Family   IADLs Independent with meal prep; Independent with medication management   Falls in the last 6 months 0   Cognition   Overall Cognitive Status WFL   Arousal/Participation Alert   Orientation Level Oriented X4   Memory Within functional limits   Following Commands Follows all commands and directions without difficulty   Subjective   Subjective "I am ok. I am still having numbness in my face hand and feet.    LUE Assessment   LUE Assessment X  (see OT assessment, able to complete oppositions and form  but weakness per pt)   RLE Assessment   RLE Assessment X   Strength RLE   R Hip Flexion 4/5   R Knee Extension 4/5   R Ankle Dorsiflexion 4/5   LLE Assessment   LLE Assessment X   Strength LLE   L Hip Flexion 4-/5   L Knee Extension 4/5   L Ankle Dorsiflexion 4/5   Light Touch   RLE Light Touch Impaired   RLE Light Touch Comments impaired to knee   LLE Light Touch Impaired   LLE Light Touch Comments impaired to knee   Bed Mobility   Additional Comments Pt. recieved OOB in recliner all needs in reach. Transfers   Sit to Stand 5  Supervision   Additional items Increased time required   Stand to Sit 5  Supervision   Additional items Increased time required   Additional Comments Ambulated with RW for balance. Pt. notes L knee pain with ambulation. Pt. also reports imbalance increased from baseline. No overt s/sx of LOB during IE. Did experience unsteadiness without RW in standing. Ambulation/Elevation   Gait pattern Decreased foot clearance;Decreased L stance; Short stride  (decreased speedc)   Gait Assistance 5  Supervision   Additional items Assist x 1;Verbal cues   Assistive Device Rolling walker   Distance 75'   Stair Management Assistance Not tested   Balance   Static Sitting Good   Dynamic Sitting Fair +   Static Standing Fair -   Ambulatory Fair -   Endurance Deficit   Endurance Deficit Yes   Endurance Deficit Description weakeness   Activity Tolerance   Activity Tolerance Patient tolerated treatment well   Nurse Made Aware RN ok to see   Assessment   Prognosis Good   Problem List Decreased strength; Impaired balance;Decreased mobility;Pain; Impaired sensation   Assessment Pt is 80 y.o. female seen for PT evaluation s/p admit to [address] [place] on 10/28/2023 w/ Stroke-like symptoms. PT consulted to assess pt's functional mobility and d/c needs. Order placed for PT eval and tx, w/  activity  order.  Comorbidities affecting pt's physical performance at time of assessment include: DM with neurological manifestation, atrial fibrillation, cva like sx with L side facial droop, L UE weakness/numbness, L LE numbness different than baseline; HTN. PTA, pt was independent w/ all functional mobility w/ no AD . Personal factors affecting pt at time of IE include: lives in main floor with 12 steps to get to floor in a multi story house, ambulating w/ assistive device, and stairs to enter home. Please find objective findings from PT assessment regarding body systems outlined above with impairments and limitations including weakness, impaired balance, gait deviations, pain, decreased functional mobility tolerance, and fall risk. The following objective measures performed on IE also reveal limitations: AM-PAC 6-Clicks: 24/24. Pt's clinical presentation is currently unstable/unpredictable seen in pt's presentation. Pt to benefit from continued PT tx to address deficits as defined above and maximize level of functional independent mobility and consistency. From PT/mobility standpoint, recommendation at time of d/c would be home with outpatient rehabilitation pending progress in order to facilitate return to PLOF. Barriers to Discharge None   Goals   Patient Goals to go home when they find out whats going on   STG Expiration Date 11/12/23   Short Term Goal #1 1. Pt will complete bed mobility with Mod I to increase functional mobility. 2. Pt will complete sit to stand transfers with Mod I to increase functional mobility. 3. Pt will ambulate 150 ft with RW with Mod I without LOB 4. Pt will increase B/L LE strength by 1 grade to facilitate improved functional mobility with decreased risk of falls. 5. Pt will increase standing balance to fair in order to decrease risk of falls. 6. Pt. Will navigate 12 steps with HR with Mod I.   PT Treatment Day 0   Plan   Treatment/Interventions Functional transfer training;LE strengthening/ROM; Elevations; Therapeutic exercise; Endurance training;Gait training;Bed mobility; Equipment eval/education;Spoke to nursing   PT Frequency 3-5x/wk   Discharge Recommendation   PT Discharge Recommendation Home with outpatient rehabilitation   Equipment Recommended Jannie Salcedo  (pt son owns one and is not using)   AM-PAC Basic Mobility Inpatient   Turning in Flat Bed Without Bedrails 4   Lying on Back to Sitting on Edge of Flat Bed Without Bedrails 4   Moving Bed to Chair 4   Standing Up From Chair Using Arms 3   Walk in Room 3   Climb 3-5 Stairs With Railing 3   Basic Mobility Inpatient Raw Score 21   Basic Mobility Standardized Score 45.55   Highest Level Of Mobility   JH-HLM Goal 6: Walk 10 steps or more   JH-HLM Achieved 7: Walk 25 feet or more           Chi Hicks, PT

## 2023-10-29 NOTE — QUICK NOTE
On chart review, patient's pacemaker is not MRI conditional. See last interrogation report from 9/22/23: SUZI DUAL CHAMBER PM/ NOT MRI CONDITIONAL

## 2023-10-29 NOTE — PROGRESS NOTES
Ventricular tachycardia alarm present on telemetry monitor. Mutiple multifocal PVCs found on telemetry strip with HR 95. Patient is asymptomatic and sitting comfortably in chair. Rishi Diallo notified of findings.

## 2023-10-29 NOTE — PROGRESS NOTES
Patient reports numbness and tingling of left arm, notably the first digit and thumb, and worsening left facial droop. /74, HR 69, temp 97.4. Rian Koyanagi notified of findings and symptoms.

## 2023-10-29 NOTE — PROGRESS NOTES
Progress Note - Neurology   Kai Alicea 80 y.o. female [id number]  Unit/Bed#: /-01    Assessment:  Kai Alicea is a 80 y.o. female    * Stroke-like symptoms  Assessment & Plan  80 y.o. female with history of HTN, HLD, DM, thoracic aortic aneurysm repair, mechanical aortic valve, cardiomyopathy (EF 40%), and paroxysmal afib on coumadin with goal INR 2.5-3.5 (last outpatient INR check was 5/2023), who presented as a stroke alert with left face, arm, and leg weakness and numbness, dysarthria, and dysphagia. Pt with fluctuating symptoms, with most of them resolved by the time she arrived. She also seemed to have clear improvement in at least her LLE strength when her SBP spontaneously increased to the 170s-190s as opposed to when she first arrived and her SBP was in the 140s. Her INR was therapeutic at the time of presentation, 2.86. CT head 10/28: "No acute intracranial abnormality."  CTA head/neck 10/28:  "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 No evidence of large vessel occlusion. Tiny 1.5 mm anteriorly projecting right distal M1 MCA aneurysm."    Strong suspicion for cerebral ischemia as the cause of her fluctuating symptoms, which seem to be pressure dependent at this time. Fortunately for now she is autoregulating to sufficiently high BP. Plan:  - Admit on stroke pathway  - Recommend continuing home coumadin, goal INR 2.5-3.5 due to mechanical valve. - hold antiplatelet medications at this time to not increase her bleeding risk. - start lipitor 40mg Qday  - Pt's pacemaker is not MRI conditional.  Plan to obtain repeat CT head 24 hours after her initial CT.  - echo, lipid panel, HbA1c  - permissive HTN, with SBP goal 170-190 for now. If her exam worsens with a lower BP, need to take measures to increase her BP again, ie IV fluid boluses, midodrine, or if needed pressors.   Normally limit permissive HTN to SBP up to 180, however, higher pressure being permitted at this time due to pressure dependence. - Frequent neurochecks and vital signs to monitor closely for decreasing BP/worsening exam.  Notify neurology for any significant change in her exam.  - low threshold for transfer to the ICU for pressors. - normothermia, euglycemia  - avoid hypotonic fluids.  - PT/OT/speech. {Neurology Follow Up:68963}        Subjective:   ***                ROS:   Review of Systems      Vitals:   Temp:  [97.3 °F (36.3 °C)-98.2 °F (36.8 °C)] 97.9 °F (36.6 °C)  HR:  [62-78] 71  Resp:  [16-19] 17  BP: (128-193)/() 129/66     Physical Exam:   Physical Exam  Neurologic Exam      Medications: All current active meds have been reviewed  Scheduled Meds:  Current Facility-Administered Medications   Medication Dose Route Frequency Provider Last Rate    acetaminophen  650 mg Oral Q6H PRN Beola Farhan, PA-C      atorvastatin  40 mg Oral QPM Kym Freeman MD      gabapentin  300 mg Oral TID Kym Freeman MD      heparin (porcine)  5,000 Units Subcutaneous Q8H [address] Beola Farhan, PA-C      insulin detemir  26 Units Subcutaneous HS Beola MALCOLM Real      insulin lispro  1-6 Units Subcutaneous TID With Meals Kym rFeeman MD      insulin lispro  1-6 Units Subcutaneous HS Kmy Freeman MD      levothyroxine  50 mcg Oral Daily Kym Freeman MD      midodrine  5 mg Oral 4x Daily Zaria Gorman MD      pantoprazole  40 mg Oral Daily Kym Freeman MD      warfarin  5 mg Oral Daily (warfarin) Kym Freeman MD       Continuous Infusions:   PRN Meds:.  acetaminophen     Labs: I have personally reviewed pertinent reports.    Recent Results (from the past 24 hour(s))   Fingerstick Glucose (POCT)    Collection Time: 10/28/23  2:26 PM   Result Value Ref Range    POC Glucose 204 (H) 65 - 140 mg/dl   Basic metabolic panel    Collection Time: 10/28/23  2:28 PM   Result Value Ref Range    Sodium 136 135 - 147 mmol/L    Potassium 3.6 3.5 - 5.3 mmol/L Chloride 101 96 - 108 mmol/L    CO2 29 21 - 32 mmol/L    ANION GAP 6 mmol/L    BUN 24 5 - 25 mg/dL    Creatinine 1.20 0.60 - 1.30 mg/dL    Glucose 210 (H) 65 - 140 mg/dL    Calcium 9.0 8.4 - 10.2 mg/dL    eGFR 41 ml/min/1.73sq m   CBC and Platelet    Collection Time: 10/28/23  2:28 PM   Result Value Ref Range    WBC 5.55 4.31 - 10.16 Thousand/uL    RBC 4.83 3.81 - 5.12 Million/uL    Hemoglobin 13.5 11.5 - 15.4 g/dL    Hematocrit 41.4 34.8 - 46.1 %    MCV 86 82 - 98 fL    MCH 28.0 26.8 - 34.3 pg    MCHC 32.6 31.4 - 37.4 g/dL    RDW 14.3 11.6 - 15.1 %    Platelets 269 651 - 289 Thousands/uL    MPV 10.2 8.9 - 12.7 fL   Protime-INR    Collection Time: 10/28/23  2:28 PM   Result Value Ref Range    Protime 30.9 (H) 11.6 - 14.5 seconds    INR 2.86 (H) 0.84 - 1.19   APTT    Collection Time: 10/28/23  2:28 PM   Result Value Ref Range    PTT 38 (H) 23 - 37 seconds   HS Troponin 0hr (reflex protocol)    Collection Time: 10/28/23  2:28 PM   Result Value Ref Range    hs TnI 0hr 15 "Refer to ACS Flowchart"- see link ng/L   Lipid Panel with Direct LDL reflex    Collection Time: 10/28/23  2:28 PM   Result Value Ref Range    Cholesterol 214 (H) See Comment mg/dL    Triglycerides 377 (H) See Comment mg/dL    HDL, Direct 41 (L) >=50 mg/dL    LDL Calculated 98 0 - 100 mg/dL   Hemoglobin A1c w/EAG Estimation    Collection Time: 10/28/23  2:28 PM   Result Value Ref Range    Hemoglobin A1C 8.7 (H) Normal 4.0-5.6%; PreDiabetic 5.7-6.4%;  Diabetic >=6.5%; Glycemic control for adults with diabetes <7.0% %     mg/dl   FLU/RSV/COVID - if FLU/RSV clinically relevant    Collection Time: 10/28/23  2:42 PM    Specimen: Nose; Nares   Result Value Ref Range    SARS-CoV-2 Negative Negative    INFLUENZA A PCR Negative Negative    INFLUENZA B PCR Negative Negative    RSV PCR Negative Negative   ECG 12 lead    Collection Time: 10/28/23  2:42 PM   Result Value Ref Range    Ventricular Rate 73 BPM    Atrial Rate 73 BPM    KY Interval 208 ms QRSD Interval 182 ms    QT Interval 498 ms    QTC Interval 548 ms    P [employer] 108 degrees    QRS Axis 120 degrees    T Wave Axis -52 degrees   Type and screen    Collection Time: 10/28/23  2:43 PM   Result Value Ref Range    ABO Grouping B     Rh Factor Positive     Antibody Screen Negative     Specimen Expiration Date [id number]    [facility] Recheck - Contact Blood Bank Prior to Collection    Collection Time: 10/28/23  2:55 PM   Result Value Ref Range    ABO Grouping B     Rh Factor Positive    HS Troponin I 2hr    Collection Time: 10/28/23  4:17 PM   Result Value Ref Range    hs TnI 2hr 16 "Refer to ACS Flowchart"- see link ng/L    Delta 2hr hsTnI 1 <20 ng/L   Fingerstick Glucose (POCT)    Collection Time: 10/28/23  5:44 PM   Result Value Ref Range    POC Glucose 132 65 - 140 mg/dl   Fingerstick Glucose (POCT)    Collection Time: 10/28/23 10:20 PM   Result Value Ref Range    POC Glucose 153 (H) 65 - 140 mg/dl   Fingerstick Glucose (POCT)    Collection Time: 10/29/23  7:09 AM   Result Value Ref Range    POC Glucose 148 (H) 65 - 140 mg/dl   Comprehensive metabolic panel    Collection Time: 10/29/23  8:08 AM   Result Value Ref Range    Sodium 140 135 - 147 mmol/L    Potassium 4.2 3.5 - 5.3 mmol/L    Chloride 102 96 - 108 mmol/L    CO2 31 21 - 32 mmol/L    ANION GAP 7 mmol/L    BUN 20 5 - 25 mg/dL    Creatinine 1.19 0.60 - 1.30 mg/dL    Glucose 180 (H) 65 - 140 mg/dL    Calcium 9.3 8.4 - 10.2 mg/dL    AST 18 13 - 39 U/L    ALT 16 7 - 52 U/L    Alkaline Phosphatase 88 34 - 104 U/L    Total Protein 7.7 6.4 - 8.4 g/dL    Albumin 4.0 3.5 - 5.0 g/dL    Total Bilirubin 0.60 0.20 - 1.00 mg/dL    eGFR 42 ml/min/1.73sq m   Magnesium    Collection Time: 10/29/23  8:08 AM   Result Value Ref Range    Magnesium 2.0 1.9 - 2.7 mg/dL   CBC and differential    Collection Time: 10/29/23  8:08 AM   Result Value Ref Range    WBC 4.18 (L) 4.31 - 10.16 Thousand/uL    RBC 4.80 3.81 - 5.12 Million/uL    Hemoglobin 13.7 11.5 - 15.4 g/dL    Hematocrit 41.2 34.8 - 46.1 %    MCV 86 82 - 98 fL    MCH 28.5 26.8 - 34.3 pg    MCHC 33.3 31.4 - 37.4 g/dL    RDW 14.6 11.6 - 15.1 %    MPV 10.3 8.9 - 12.7 fL    Platelets 282 933 - 753 Thousands/uL    nRBC 0 /100 WBCs    Neutrophils Relative 47 43 - 75 %    Immat GRANS % 0 0 - 2 %    Lymphocytes Relative 39 14 - 44 %    Monocytes Relative 10 4 - 12 %    Eosinophils Relative 3 0 - 6 %    Basophils Relative 1 0 - 1 %    Neutrophils Absolute 1.94 1.85 - 7.62 Thousands/µL    Immature Grans Absolute 0.01 0.00 - 0.20 Thousand/uL    Lymphocytes Absolute 1.64 0.60 - 4.47 Thousands/µL    Monocytes Absolute 0.42 0.17 - 1.22 Thousand/µL    Eosinophils Absolute 0.14 0.00 - 0.61 Thousand/µL    Basophils Absolute 0.03 0.00 - 0.10 Thousands/µL   Protime-INR    Collection Time: 10/29/23  8:08 AM   Result Value Ref Range    Protime 29.8 (H) 11.6 - 14.5 seconds    INR 2.73 (H) 0.84 - 1.19   Fingerstick Glucose (POCT)    Collection Time: 10/29/23 11:16 AM   Result Value Ref Range    POC Glucose 190 (H) 65 - 140 mg/dl       Imaging: I have personally reviewed pertinent imaging in PACS, and I have personally reviewed PACS reports. EKG, Pathology, and Other Studies: I have personally reviewed pertinent reports.        VTE Prophylaxis: { ip VTE prophylaxis:97256}        {[facility] neuro KIMBERLI statement:87018}

## 2023-10-29 NOTE — TELEPHONE ENCOUNTER
Patient is calling regarding cancelling an appointment.     Pt is currently admitted to the hospital     Date/Time: 10/30/23      Patient was rescheduled: YES [] NO [x]    Patient requesting call back to reschedule: YES [] NO [x]

## 2023-10-29 NOTE — ASSESSMENT & PLAN NOTE
Lab Results   Component Value Date    HGBA1C 8.7 (H) 10/28/2023       Recent Labs     10/28/23  1744 10/28/23  2220 10/29/23  0709 10/29/23  1116   POCGLU 132 153* 148* 190*         Blood Sugar Average: Last 72 hrs:  (P) 165.4  Continue sliding scale  Add basal and prandial insulin if ISS requirements are consistent

## 2023-10-29 NOTE — ASSESSMENT & PLAN NOTE
80 y.o. female with history of HTN, HLD, DM, thoracic aortic aneurysm repair, mechanical aortic valve, cardiomyopathy (EF 40%), and paroxysmal afib on coumadin with goal INR 2.5-3.5 (last outpatient INR check was 5/2023)    Presented as a stroke alert with left face, arm, and leg weakness and numbness, dysarthria, and dysphagia (fluctuating symptoms, with most of them resolved at time of alert). Improvement symptoms with 's-190's as opposed to when she first arrived and her SBP was in the 140s. INR was therapeutic at the time of presentation, 2.86. Strong suspicion for cerebral ischemia as the cause of her fluctuating symptoms (lacunar, small vessel based on symptoms); may be due to uncontrolled vascular risk factors. Symptoms over the weekend were pressure sensitive (on midodrine for BP support). Neuroimaging thusfar:  Initial CT head 10/28: "No acute intracranial abnormality."  CTA head/neck 10/28:  "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 No evidence of large vessel occlusion. Tiny 1.5 mm anteriorly projecting right distal M1 MCA aneurysm."  CT head repeated 10/29 : read pending  Patient's PPM is not MRI compatible  -2D echo pending  -Hemoglobin A1c of 8.7  -Lipid panel with LDL of 98    Plan:  - acute ischemic stroke pathway ongoing:  - Recommend continuing home coumadin, goal INR 2.5-3.5 due to mechanical valve.    -INR monitoring, 2.86 this morning (10/30)  - hold antiplatelet medications at this time to not increase her bleeding risk.    - lipitor 40mg daily    -On midodrine 5 mg 4x daily for permissive hypertension (as patient was reportedly pressure sensitive over the weekend;  -BP monitoring, 's this morning   -Neuro checks  -Telemetry monitoring   -Normothermia, euglycemia; avoid normotension  - PT/OT/speech

## 2023-10-30 ENCOUNTER — APPOINTMENT (INPATIENT)
Dept: NON INVASIVE DIAGNOSTICS | Facility: HOSPITAL | Age: 83
DRG: 065 | End: 2023-10-30
Payer: COMMERCIAL

## 2023-10-30 LAB
ALBUMIN SERPL BCP-MCNC: 3.7 G/DL (ref 3.5–5)
ALP SERPL-CCNC: 75 U/L (ref 34–104)
ALT SERPL W P-5'-P-CCNC: 13 U/L (ref 7–52)
ANION GAP SERPL CALCULATED.3IONS-SCNC: 6 MMOL/L
AORTIC ROOT: 3.3 CM
AORTIC VALVE MEAN VELOCITY: 12.3 M/S
APICAL FOUR CHAMBER EJECTION FRACTION: 56 %
ASCENDING AORTA: 3.8 CM
AST SERPL W P-5'-P-CCNC: 15 U/L (ref 13–39)
AV AREA BY CONTINUOUS VTI: 2 CM2
AV AREA PEAK VELOCITY: 2.2 CM2
AV LVOT MEAN GRADIENT: 1 MMHG
AV LVOT PEAK GRADIENT: 3 MMHG
AV MEAN GRADIENT: 7 MMHG
AV PEAK GRADIENT: 16 MMHG
AV VALVE AREA: 1.96 CM2
AV VELOCITY RATIO: 0.41
BASOPHILS # BLD AUTO: 0.02 THOUSANDS/ÂΜL (ref 0–0.1)
BASOPHILS NFR BLD AUTO: 0 % (ref 0–1)
BILIRUB SERPL-MCNC: 0.52 MG/DL (ref 0.2–1)
BUN SERPL-MCNC: 28 MG/DL (ref 5–25)
CALCIUM SERPL-MCNC: 9.6 MG/DL (ref 8.4–10.2)
CHLORIDE SERPL-SCNC: 104 MMOL/L (ref 96–108)
CO2 SERPL-SCNC: 27 MMOL/L (ref 21–32)
CREAT SERPL-MCNC: 1.26 MG/DL (ref 0.6–1.3)
DOP CALC AO PEAK VEL: 2 M/S
DOP CALC AO VTI: 44.65 CM
DOP CALC LVOT AREA: 5.31 CM2
DOP CALC LVOT CARDIAC INDEX: 4.33 L/MIN/M2
DOP CALC LVOT CARDIAC OUTPUT: 8.02 L/MIN
DOP CALC LVOT DIAMETER: 2.6 CM
DOP CALC LVOT PEAK VEL VTI: 16.53 CM
DOP CALC LVOT PEAK VEL: 0.82 M/S
DOP CALC LVOT STROKE INDEX: 48.1 ML/M2
DOP CALC LVOT STROKE VOLUME: 87.72
E WAVE DECELERATION TIME: 339 MS
E/A RATIO: 0.8
EOSINOPHIL # BLD AUTO: 0.18 THOUSAND/ÂΜL (ref 0–0.61)
EOSINOPHIL NFR BLD AUTO: 4 % (ref 0–6)
ERYTHROCYTE [DISTWIDTH] IN BLOOD BY AUTOMATED COUNT: 14.4 % (ref 11.6–15.1)
FRACTIONAL SHORTENING: 19 (ref 28–44)
GFR SERPL CREATININE-BSD FRML MDRD: 39 ML/MIN/1.73SQ M
GLUCOSE SERPL-MCNC: 154 MG/DL (ref 65–140)
GLUCOSE SERPL-MCNC: 154 MG/DL (ref 65–140)
GLUCOSE SERPL-MCNC: 160 MG/DL (ref 65–140)
GLUCOSE SERPL-MCNC: 164 MG/DL (ref 65–140)
GLUCOSE SERPL-MCNC: 205 MG/DL (ref 65–140)
HCT VFR BLD AUTO: 40.3 % (ref 34.8–46.1)
HGB BLD-MCNC: 13.1 G/DL (ref 11.5–15.4)
IMM GRANULOCYTES # BLD AUTO: 0.01 THOUSAND/UL (ref 0–0.2)
IMM GRANULOCYTES NFR BLD AUTO: 0 % (ref 0–2)
INR PPP: 2.86 (ref 0.84–1.19)
INTERVENTRICULAR SEPTUM IN DIASTOLE (PARASTERNAL SHORT AXIS VIEW): 1.5 CM
INTERVENTRICULAR SEPTUM: 1.5 CM (ref 0.6–1.1)
LAAS-AP2: 21 CM2
LAAS-AP4: 22.2 CM2
LEFT ATRIUM SIZE: 4.2 CM
LEFT ATRIUM VOLUME (MOD BIPLANE): 68 ML
LEFT ATRIUM VOLUME INDEX (MOD BIPLANE): 36.8 ML/M2
LEFT INTERNAL DIMENSION IN SYSTOLE: 3.9 CM (ref 2.1–4)
LEFT VENTRICLE DIASTOLIC VOLUME (MOD BIPLANE): 110 ML
LEFT VENTRICLE SYSTOLIC VOLUME (MOD BIPLANE): 60 ML
LEFT VENTRICULAR INTERNAL DIMENSION IN DIASTOLE: 4.8 CM (ref 3.5–6)
LEFT VENTRICULAR POSTERIOR WALL IN END DIASTOLE: 1.3 CM
LEFT VENTRICULAR STROKE VOLUME: 44 ML
LV EF: 45 %
LVSV (TEICH): 44 ML
LYMPHOCYTES # BLD AUTO: 1.52 THOUSANDS/ÂΜL (ref 0.6–4.47)
LYMPHOCYTES NFR BLD AUTO: 33 % (ref 14–44)
MAGNESIUM SERPL-MCNC: 2.1 MG/DL (ref 1.9–2.7)
MCH RBC QN AUTO: 27.3 PG (ref 26.8–34.3)
MCHC RBC AUTO-ENTMCNC: 32.5 G/DL (ref 31.4–37.4)
MCV RBC AUTO: 84 FL (ref 82–98)
MONOCYTES # BLD AUTO: 0.51 THOUSAND/ÂΜL (ref 0.17–1.22)
MONOCYTES NFR BLD AUTO: 11 % (ref 4–12)
MV E'TISSUE VEL-SEP: 4 CM/S
MV PEAK A VEL: 0.96 M/S
MV PEAK E VEL: 77 CM/S
NEUTROPHILS # BLD AUTO: 2.42 THOUSANDS/ÂΜL (ref 1.85–7.62)
NEUTS SEG NFR BLD AUTO: 52 % (ref 43–75)
NRBC BLD AUTO-RTO: 0 /100 WBCS
PLATELET # BLD AUTO: 254 THOUSANDS/UL (ref 149–390)
PMV BLD AUTO: 10.6 FL (ref 8.9–12.7)
POTASSIUM SERPL-SCNC: 4 MMOL/L (ref 3.5–5.3)
PROT SERPL-MCNC: 6.8 G/DL (ref 6.4–8.4)
PROTHROMBIN TIME: 31 SECONDS (ref 11.6–14.5)
RBC # BLD AUTO: 4.79 MILLION/UL (ref 3.81–5.12)
RIGHT ATRIUM AREA SYSTOLE A4C: 12 CM2
RIGHT VENTRICLE ID DIMENSION: 3.1 CM
SL CV LEFT ATRIUM LENGTH A2C: 6.1 CM
SL CV LV EF: 45
SL CV PED ECHO LEFT VENTRICLE DIASTOLIC VOLUME (MOD BIPLANE) 2D: 110 ML
SL CV PED ECHO LEFT VENTRICLE SYSTOLIC VOLUME (MOD BIPLANE) 2D: 66 ML
SODIUM SERPL-SCNC: 137 MMOL/L (ref 135–147)
TRICUSPID ANNULAR PLANE SYSTOLIC EXCURSION: 1.8 CM
WBC # BLD AUTO: 4.66 THOUSAND/UL (ref 4.31–10.16)

## 2023-10-30 PROCEDURE — 99232 SBSQ HOSP IP/OBS MODERATE 35: CPT | Performed by: PSYCHIATRY & NEUROLOGY

## 2023-10-30 PROCEDURE — 80053 COMPREHEN METABOLIC PANEL: CPT | Performed by: STUDENT IN AN ORGANIZED HEALTH CARE EDUCATION/TRAINING PROGRAM

## 2023-10-30 PROCEDURE — 82948 REAGENT STRIP/BLOOD GLUCOSE: CPT

## 2023-10-30 PROCEDURE — 92610 EVALUATE SWALLOWING FUNCTION: CPT

## 2023-10-30 PROCEDURE — 85610 PROTHROMBIN TIME: CPT | Performed by: STUDENT IN AN ORGANIZED HEALTH CARE EDUCATION/TRAINING PROGRAM

## 2023-10-30 PROCEDURE — 93306 TTE W/DOPPLER COMPLETE: CPT

## 2023-10-30 PROCEDURE — 97166 OT EVAL MOD COMPLEX 45 MIN: CPT

## 2023-10-30 PROCEDURE — 83735 ASSAY OF MAGNESIUM: CPT | Performed by: STUDENT IN AN ORGANIZED HEALTH CARE EDUCATION/TRAINING PROGRAM

## 2023-10-30 PROCEDURE — 85025 COMPLETE CBC W/AUTO DIFF WBC: CPT | Performed by: STUDENT IN AN ORGANIZED HEALTH CARE EDUCATION/TRAINING PROGRAM

## 2023-10-30 PROCEDURE — 93306 TTE W/DOPPLER COMPLETE: CPT | Performed by: INTERNAL MEDICINE

## 2023-10-30 PROCEDURE — 99232 SBSQ HOSP IP/OBS MODERATE 35: CPT | Performed by: STUDENT IN AN ORGANIZED HEALTH CARE EDUCATION/TRAINING PROGRAM

## 2023-10-30 RX ADMIN — WARFARIN SODIUM 5 MG: 5 TABLET ORAL at 17:04

## 2023-10-30 RX ADMIN — INSULIN DETEMIR 26 UNITS: 100 INJECTION, SOLUTION SUBCUTANEOUS at 21:33

## 2023-10-30 RX ADMIN — MIDODRINE HYDROCHLORIDE 5 MG: 5 TABLET ORAL at 10:12

## 2023-10-30 RX ADMIN — ASPIRIN 81 MG: 81 TABLET, COATED ORAL at 13:21

## 2023-10-30 RX ADMIN — LEVOTHYROXINE SODIUM 50 MCG: 0.05 TABLET ORAL at 10:02

## 2023-10-30 RX ADMIN — ATORVASTATIN CALCIUM 40 MG: 40 TABLET, FILM COATED ORAL at 17:04

## 2023-10-30 RX ADMIN — INSULIN LISPRO 1 UNITS: 100 INJECTION, SOLUTION INTRAVENOUS; SUBCUTANEOUS at 17:04

## 2023-10-30 RX ADMIN — INSULIN LISPRO 2 UNITS: 100 INJECTION, SOLUTION INTRAVENOUS; SUBCUTANEOUS at 21:34

## 2023-10-30 RX ADMIN — PANTOPRAZOLE SODIUM 40 MG: 40 TABLET, DELAYED RELEASE ORAL at 17:04

## 2023-10-30 RX ADMIN — INSULIN LISPRO 1 UNITS: 100 INJECTION, SOLUTION INTRAVENOUS; SUBCUTANEOUS at 08:35

## 2023-10-30 RX ADMIN — GABAPENTIN 300 MG: 300 CAPSULE ORAL at 21:33

## 2023-10-30 NOTE — PLAN OF CARE
Diet: regular diet and thin liquids   Meds: whole with liquid   Feeding assistance: tray set up  Frequent Oral care: 2-4x/day  Aspiration precautions and compensatory swallowing strategies: upright posture, slow rate of feeding, small bites/sips, no straws, and alternating bites and sips  Other Recommendations/ considerations: SLP will continue to follow to ensure adequate management of oral diet and adjust as clinically indicated x1-3

## 2023-10-30 NOTE — SPEECH THERAPY NOTE
Speech-Language Pathology Bedside Swallow Evaluation        Patient Name: Aldo KNUTSON Date: 10/30/2023     Problem List  Principal Problem:    Stroke-like symptoms  Active Problems:    Diabetes mellitus with neurological manifestation (HCC)    Paroxysmal atrial fibrillation ([address])    Hypertensive urgency       Past Medical History  Past Medical History:   Diagnosis Date    Anemia     Aneurysm of thoracic aorta ([address])     Aortic valve disorder     Benign neoplasm of sigmoid colon     Cardiomyopathy ([address])     last assessed: 10/10/2014    CHF (congestive heart failure) (HCC)     Chronic kidney disease     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[address])     last assessed: 10/10/2014    Diabetic nephropathy (HCC)     RAMON (dyspnea on exertion)     GERD (gastroesophageal reflux disease)     History of emphysema     Hyperlipidemia     TIA (transient ischemic attack)        Past Surgical History  Past Surgical History:   Procedure Laterality Date    AORTIC VALVE REPLACEMENT      CARDIAC PACEMAKER PLACEMENT      last assessed: 10/10/2014    CARDIAC SURGERY      aortic valve replacement    CHOLECYSTECTOMY      COLONOSCOPY      HYSTERECTOMY      INSERT / REPLACE / REMOVE PACEMAKER      KNEE SURGERY Right     OTHER SURGICAL HISTORY      Capsule ENDOscopy description: 12/19/2012    CA COLONOSCOPY FLX DX W/COLLJ SPEC WHEN PFRMD N/A 5/31/2018    Procedure: single balloon ENTEROSCOPY;  Surgeon: Kyung Hoskins MD;  Location: [facility]; Service: Gastroenterology    CA ESOPHAGOGASTRODUODENOSCOPY TRANSORAL DIAGNOSTIC N/A 11/29/2017    Procedure: EGD AND COLONOSCOPY;  Surgeon: Gagandeep Muhammad MD;  Location: [facility]; Service: Gastroenterology       Summary/Impressions:   Pt presents with s/s of oropharyngeal dysphagia. Noted slight anterior spillage on left side with soft solids likely due to left sided weakness. Mastication judged to be prolonged yet complete. Suspected delayed AP transfer.  Upon palpation, swallow initiation judged to be delayed. Oral residue noted. Pt benefited from liquid wash to clear oral residue. Cough with thin liquids by straw; pt stated that she felt it went down the wrong pipe. No overt s/s of aspiration otherwise. Provided pt education re: aspiration precautions (upright, small bites/sips, alternating bites/sips, no straws). Patient verbalized understanding. Recommendations:   Diet: regular diet and thin liquids   Meds: whole with liquid   Feeding assistance: tray set up  Frequent Oral care: 2-4x/day  Aspiration precautions and compensatory swallowing strategies: upright posture, slow rate of feeding, small bites/sips, no straws, and alternating bites and sips  Other Recommendations/ considerations: SLP will continue to follow to ensure adequate management of oral diet and adjust as clinically indicated x1-3       Current Medical Status  Pt is a 80 y.o. female who presented to [facility] with stroke like symptoms. She reported some leg weakness and facial droop. She is unable to determine the time frame of the events. She reports coming to the ED for further evaluation due to persistence of symptoms. She was not a candidate for TNK and admitted to [facility] for stroke pathway. Past medical history:  Please see H&P for details    Special Studies:    10/29/23 CT head wo contrast:  No acute infarction or other intracranial abnormality. Chronic microangiopathic changes. 10/28/23 X-ray chest:  No acute cardiopulmonary disease. 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 No evidence of large vessel occlusion. No evidence of aneurysm. 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 10/28/23 CT stroke alert brain:  No acute intracranial abnormality.      Social/Education/Vocational Hx:  Pt lives with family    Swallow Information   Current Risks for Dysphagia & Aspiration:  stroke alert  Current Symptoms/Concerns: left-sided facial weakness  Current Diet: regular diet and thin liquids   Baseline Diet: regular diet and thin liquids    Baseline Assessment   Behavior/Cognition: alert  Speech/Language Status: able to participate in conversation and able to follow commands  Patient Positioning: upright in chair    Swallow Mechanism Exam   Facial: left facial droop  Labial: decreased ROM left side  Lingual: right sided tongue deviation and decreased strength left side  Velum: symmetrical  Mandible: adequate ROM  Dentition: adequate  Vocal quality:clear/adequate   Volitional Cough: strong/productive   Respiratory: RA    Consistencies Assessed and Performance   Consistencies Administered: thin liquids, nectar thick, honey thick, puree, soft solids, and hard solids    Oral Stage: Slight anterior spillage noted on the left side with soft solids; likely due to left sided weakness. Mastication judged to be prolonged yet complete. AP transfer judged to be delayed. Oral residue noted. Benefited from alternating solids and liquids to clear oral residue. Pharyngeal Stage: Laryngeal rise noted upon palpation. Swallow initiation judged to be delayed. Cough noted with thin liquids by straw. No overt s/s of aspiration otherwise.        Esophageal Concerns: none reported      Results Reviewed with: patient, RN, and MD     Plan  Regular/thin  No straws   Upright posture  Alternating bites/sips  Small bites/sips   Will continue to follow for x1-3      Dysphagia Goals: pt will tolerate regular solids with thin liquids without s/s of aspiration x1-3  Discharge recommendation: likely no follow up needed for swallowing    Speech Therapy Prognosis   Prognosis: good  Prognosis Considerations: age and medical status

## 2023-10-30 NOTE — ASSESSMENT & PLAN NOTE
Lab Results   Component Value Date    HGBA1C 8.7 (H) 10/28/2023       Recent Labs     10/29/23  1116 10/29/23  1603 10/29/23  2153 10/30/23  0613   POCGLU 190* 195* 178* 154*         Blood Sugar Average: Last 72 hrs:  (P) 169.25  Continue sliding scale  Add basal and prandial insulin if ISS requirements are consistent

## 2023-10-30 NOTE — WOUND OSTOMY CARE
Progress Note - Wound   Bryon Yumiko 80 y.o. female MRN: [id number]  Unit/Bed#: -01 Encounter: [id number]      Assessment:   Patient admitted due to stroke-like symptoms. History of anemia, CHF, CKD, diabetes, neuropathy, TIA, GERD, emphysema. Wound care consulted for right lower extremity wound. Patient is agreeable to assessment, alert and oriented x4, continent of bowel and bladder, standby assist, is OOB to chair. Primary RN made aware of assessment. Patient states wound to her right leg is a chronic wound she has been treating for about 3 years. Spoke with patient about making an appointment with the out-patient wound center. 1. Patient denies assessment of sacrum and buttock- states skin is dry, intact, no wounds, no pain, no rash present. 2. Bilateral heels- skin is dry, intact, blanchable. 3. Right anterior tibial- Wound is oval in shape, full-thickness hypergranular tissue, 100% pink/beefy red hypergranular tissue, fragile, with small amount of serosanguineous drainage noted. Radha-wound is dry, intact, no redness. Educated patient on importance of frequent offloading of pressure via turning, repositioning, and weight-shifting. Verbalized understanding of plan of care. No induration, fluctuance, odor, warmth, redness, or purulence noted to the above noted wound. New dressing applied. Patient tolerated well, denies pain to the wound. Primary nurse aware of plan of care. See flow sheets for more detailed assessment findings. Will follow along. Skin care plans:  1-Hydraguard to bilateral sacrum, buttock and heels BID and PRN  2-Elevate heels to offload pressure. 3-Ehob cushion in chair when out of bed. 4-Moisturize skin daily with skin nourishing cream.  5-Turn/reposition q2h for pressure re-distribution on skin. 6-R tibial- Cleanse wound with NSS, pat dry. Apply Maxorb Silver Alginate over wound bed and cover with bordered clover Allevyn foam dressing.  Change every other day and as needed for soilage/displacement. Wound 10/29/23 Venous Ulcer Pretibial Right (Active)   Wound Image   10/30/23 1017   Wound Description Beefy red;Fragile; Hypergranulation 10/30/23 1017   Radha-wound Assessment Dry; Intact 10/30/23 1017   Wound Length (cm) 1.2 cm 10/30/23 1017   Wound Width (cm) 1.2 cm 10/30/23 1017   Wound Depth (cm) 0.1 cm 10/30/23 1017   Wound Surface Area (cm^2) 1.44 cm^2 10/30/23 1017   Wound Volume (cm^3) 0.144 cm^3 10/30/23 1017   Calculated Wound Volume (cm^3) 0.14 cm^3 10/30/23 1017   Change in Wound Size % 96.5 10/30/23 1017   Tunneling 0 cm 10/30/23 1017   Undermining 0 10/30/23 1017   Wound Site Closure Open to air 10/29/23 2015   Drainage Amount Small 10/30/23 1017   Drainage Description Serosanguineous 10/30/23 1017   Non-staged Wound Description Full thickness 10/30/23 1017   Treatments Cleansed;Irrigation with NSS;Site care 10/30/23 1017   Dressing Calcium Alginate with Silver; Foam, Silicon (eg. Allevyn, etc) 10/30/23 1017   Wound packed? No 10/30/23 1017   Dressing Changed Changed 10/30/23 1017   Patient Tolerance Tolerated well 10/30/23 1017   Dressing Status Clean;Dry; Intact 10/30/23 1017         Call or tigertext with any questions  Wound Care will continue to follow    Michael Cisneros BSN RN [facility]  Wound and Ostomy care

## 2023-10-30 NOTE — DISCHARGE INSTR - OTHER ORDERS
Skin care plans:  1-R leg wound- Cleanse wound with Saline solution, pat dry. Apply Maxorb Silver Alginate over wound bed and cover with Band-aid. Change every other day and as needed for soilage/displacement.

## 2023-10-30 NOTE — UTILIZATION REVIEW
Initial Clinical Review    Admission: Date/Time/Statement:   Admission Orders (From admission, onward)       Ordered        10/28/23 1706  Inpatient Admission  Once            10/28/23 1617  Place in Observation  Once                          Orders Placed This Encounter   Procedures    Place in Observation     Standing Status:   Standing     Number of Occurrences:   1     Order Specific Question:   Level of Care     Answer:   Med Surg [16]    Inpatient Admission     Standing Status:   Standing     Number of Occurrences:   1     Order Specific Question:   Level of Care     Answer:   Med Surg [16]     Order Specific Question:   Estimated length of stay     Answer:   More than 2 Midnights     Order Specific Question:   Certification     Answer:   I certify that inpatient services are medically necessary for this patient for a duration of greater than two midnights. See H&P and MD Progress Notes for additional information about the patient's course of treatment. ED Arrival Information       Expected   -    Arrival   10/28/2023 14:15    Acuity   Emergent              Means of arrival   Walk-In    Escorted by   Spouse    Service   Hospitalist    Admission type   Emergency              Arrival complaint   stroke like symptoms             Chief Complaint   Patient presents with    CVA/TIA-like Symptoms     Pt presents in wheelchair c/o "left sided facial numbness, lightheadedness, left leg weakness since 1100 this morning." Left sided facial droop noted. Initial Presentation: 80 y.o. female to ED from home w/ stroke like sx . Some leg weakness and facial droop . PMHX diabetes, pafib, htn . Not a candidate for TNK . Admitted IP status w/ stroke like sx plan to f/u MRI brain , tele , neuro consult . HTN cont permissive HTN , monitor . Afib cont coumadin , rate controlled. DM SSI and monitor . 10/28 Quick Note   Multiple PVC's noted on telemetry.  Continue to monitor for now, asymptomatic.      10/28 Neuro Consult   Stroke pathway , cont coumadin . Start lipitor , f/u echo , lipid panel , A1c , permissive HTN . Freq neuro checks . PT OT ST . Date: 10/30    Day 3: Has surpassed a 2nd midnight with active treatments and services, which include cont neurological workup for suspicion for small vessel/lacunar stroke and R vertebral stenosis on CTA , neuro checks . Still having slight dysarthria, L facial symptoms, trouble with L hand/fingers.        ED Triage Vitals   Temperature Pulse Respirations Blood Pressure SpO2   10/28/23 1430 10/28/23 1422 10/28/23 1422 10/28/23 1422 10/28/23 1421   97.7 °F (36.5 °C) 77 18 145/72 97 %      Temp Source Heart Rate Source Patient Position - Orthostatic VS BP Location FiO2 (%)   10/28/23 1430 10/28/23 1422 10/29/23 0700 10/28/23 1545 --   Oral Monitor Sitting Left arm       Pain Score       10/28/23 1652       No Pain          Wt Readings from Last 1 Encounters:   10/28/23 75.3 kg (166 lb)     Additional Vital Signs:   10/30/23 06:52:21 97.6 °F (36.4 °C) 62 18 173/67 Abnormal  102 96 % -- --   10/30/23 0300 97.6 °F (36.4 °C) 67 16 157/63 94 94 % None (Room air) --   10/29/23 23:17:11 97.2 °F (36.2 °C) Abnormal  63 18 162/65 97 93 % -- --   10/29/23 2300 97.2 °F (36.2 °C) Abnormal  67 18 162/65 97 94 % None (Room air) --   10/29/23 2015 -- -- -- -- -- 95 % -- --   10/29/23 19:04:56 -- -- -- -- -- 94 % -- --   10/29/23 1900 98.1 °F (36.7 °C) 66 18 145/74 98 94 % None (Room air) Sitting   10/29/23 17:33:03 -- 76 -- 152/118 Abnormal  129 95 % -- --   10/29/23 1500 98 °F (36.7 °C) 74 16 146/74 -- 96 % -- --   10/29/23 12:38:53 -- 74 -- 146/64 91 94 % -- --   10/29/23 11:16:23 97.9 °F (36.6 °C) 71 17 129/66 87 94 % -- --   10/29/23 1100 97.9 °F (36.6 °C) 71 19 129/66 -- 94 % -- --   10/29/23 0700 97.4 °F (36.3 °C) Abnormal  69 19 150/74 -- 94 % None (Room air) Sitting   10/29/23 0400 97.3 °F (36.3 °C) Abnormal  65 18 140/63 89 90 % None (Room air) --   10/29/23 0300 -- -- -- -- -- 94 % -- --   10/29/23 0200 98.1 °F (36.7 °C) 72 16 128/57 81 -- -- --   10/29/23 0000 97.7 °F (36.5 °C) 67 16 179/79 Abnormal  112 96 % -- --   10/28/23 2300 97.9 °F (36.6 °C) 66 16 166/73 101 96 % None (Room air) --   10/28/23 2200 97.9 °F (36.6 °C) 64 18 168/71 103 96 % None (Room air) --   10/28/23 2000 97.3 °F (36.3 °C) Abnormal  64 18 159/64 96 93 % -- --   10/28/23 19:59:28 -- -- -- -- -- 93 % -- --   10/28/23 19:20:30 97.7 °F (36.5 °C) 74 18 150/120 Abnormal  130 95 % -- --   10/28/23 1920 -- -- -- -- -- 93 % None (Room air) --   10/28/23 19:19:48 -- -- -- -- -- 93 % -- --   10/28/23 1900 97.7 °F (36.5 °C) 68 19 150/120 Abnormal  130 95 % None (Room air) --   10/28/23 18:18:39 98.2 °F (36.8 °C) 62 -- 164/72 103 97 % -- --   10/28/23 1800 98.2 °F (36.8 °C) 62 18 164/72 103 97 % -- --   10/28/23 17:02:30 98 °F (36.7 °C) 68 -- 193/85 Abnormal  121 97 % -- --   10/28/23 1700 98 °F (36.7 °C) 68 18 193/85 Abnormal  121 97 % -- --   10/28/23 1630 -- 65 19 176/78 Abnormal  112 96 % None (Room air) --   10/28/23 1615 -- 64 18 171/74 Abnormal  -- 95 % None (Room air) --   10/28/23 1600 -- 63 18 178/77 Abnormal  -- 97 % None (Room air) --   10/28/23 1545 -- 64 18 188/81 Abnormal  -- 94 % None (Room air) --   10/28/23 1530 -- 72 18 188/81 Abnormal  -- 96 % None (Room air) --   10/28/23 1515 -- 66 18 176/78 Abnormal  -- 94 % None (Room air) --   10/28/23 1500 -- 65 18 190/91 Abnormal  -- 94 % None (Room air) --   10/28/23 1445 -- 78 18 193/81 Abnormal  -- 98 % None (Room air) --   10/28/23 1430 97.7 °F (36.5 °C) 77 18 193/81 Abnormal  -- 97 % None (Room air) --   10/28/23 1422 -- 77 18 145/72 -- 97 % None (Room air)      Pertinent Labs/Diagnostic Test Results:   10/30 Echo   Left Ventricle: Left ventricular cavity size is normal. Wall thickness is moderately increased. The left ventricular ejection fraction is 45%. Systolic function is mildly reduced. There is mild global hypokinesis with regional variation.  Diastolic function is mildly abnormal, consistent with grade I (abnormal) relaxation. IVS: There is abnormal septal motion consistent with post-operative status. Left Atrium: The atrium is mildly dilated. Aortic Valve: The aortic valve was not well visualized. There is a mechanical valve. The prosthetic valve appears well-seated. There is trace paravalvular regurgitation. There is no evidence of transvalvular regurgitation. The gradient recorded across the prosthetic aortic valve is within the expected range. The aortic valve mean gradient is 7 mmHg. Mitral Valve: There is annular calcification. There is mild regurgitation. 10/28 EKG Ventricular-paced rhythm   X-ray chest 1 view portable   Final Result by Juan Clement MD (10/29 1213)      No acute cardiopulmonary disease. 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 No evidence of large vessel occlusion. No evidence of aneurysm. 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 I personally discussed this study with Dr. Terry Best on 10/28/2023 2:45 PM.                           Workstation performed: [id number]         CT stroke alert brain   Final Result by Librado Granados MD (10/28 8147)      No acute intracranial abnormality.          I personally discussed this study with Dr. Terry Best on 10/28/2023 2:45 PM.            Workstation performed: [id number]         CT head wo contrast    (Results Pending)     Results from last 7 days   Lab Units 10/28/23  1442   SARS-COV-2  Negative     Results from last 7 days   Lab Units 10/30/23  0517 10/29/23  0808 10/28/23  1428   WBC Thousand/uL 4.66 4.18* 5.55   HEMOGLOBIN g/dL 13.1 13.7 13.5   HEMATOCRIT % 40.3 41.2 41.4   PLATELETS Thousands/uL 254 259 277   NEUTROS ABS Thousands/µL 2.42 1.94  --          Results from last 7 days   Lab Units 10/30/23  0517 10/29/23  0808 10/28/23  1428   SODIUM mmol/L 137 140 136   POTASSIUM mmol/L 4.0 4.2 3.6   CHLORIDE mmol/L 104 102 101   CO2 mmol/L 27 31 29   ANION GAP mmol/L 6 7 6   BUN mg/dL 28* 20 24   CREATININE mg/dL 1.26 1.19 1.20   EGFR ml/min/1.73sq m 39 42 41   CALCIUM mg/dL 9.6 9.3 9.0   MAGNESIUM mg/dL 2.1 2.0  --      Results from last 7 days   Lab Units 10/30/23  0517 10/29/23  0808   AST U/L 15 18   ALT U/L 13 16   ALK PHOS U/L 75 88   TOTAL PROTEIN g/dL 6.8 7.7   ALBUMIN g/dL 3.7 4.0   TOTAL BILIRUBIN mg/dL 0.52 0.60     Results from last 7 days   Lab Units 10/30/23  0613 10/29/23  2153 10/29/23  1603 10/29/23  1116 10/29/23  0709 10/28/23  2220 10/28/23  1744 10/28/23  1426   POC GLUCOSE mg/dl 154* 178* 195* 190* 148* 153* 132 204*     Results from last 7 days   Lab Units 10/30/23  0517 10/29/23  0808 10/28/23  1428   GLUCOSE RANDOM mg/dL 154* 180* 210*         Results from last 7 days   Lab Units 10/28/23  1428   HEMOGLOBIN A1C % 8.7*   EAG mg/dl 203       Results from last 7 days   Lab Units 10/28/23  1617 10/28/23  1428   HS TNI 0HR ng/L  --  15   HS TNI 2HR ng/L 16  --    HSTNI D2 ng/L 1  --          Results from last 7 days   Lab Units 10/30/23  0517 10/29/23  0808 10/28/23  1428   PROTIME seconds 31.0* 29.8* 30.9*   INR  2.86* 2.73* 2.86*   PTT seconds  --   --  38*           Results from last 7 days   Lab Units 10/28/23  1442   INFLUENZA A PCR  Negative   INFLUENZA B PCR  Negative   RSV PCR  Negative       ED Treatment:   Medication Administration from 10/28/2023 1415 to 10/28/2023 1652         Date/Time Order Dose Route Action Action by Comments          Past Medical History:   Diagnosis Date    Anemia     Aneurysm of thoracic aorta ([address])     Aortic valve disorder     Benign neoplasm of sigmoid colon     Cardiomyopathy ([address])     last assessed: 10/10/2014    CHF (congestive heart failure) (HCC)     Chronic kidney disease     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[address])     last assessed: 10/10/2014 Diabetic nephropathy (HCC)     RAMON (dyspnea on exertion)     GERD (gastroesophageal reflux disease)     History of emphysema     Hyperlipidemia     TIA (transient ischemic attack)      Present on Admission:   Stroke-like symptoms   Paroxysmal atrial fibrillation (HCC)   Diabetes mellitus with neurological manifestation (HCC)      Admitting Diagnosis: Facial droop [R29.810]  Left leg weakness [R29.898]  Left arm weakness [R29.898]  Stroke-like symptoms [R29.90]  Age/Sex: 80 y.o. female  Admission Orders:  Scheduled Medications:  atorvastatin, 40 mg, Oral, QPM  gabapentin, 300 mg, Oral, TID  heparin (porcine), 5,000 Units, Subcutaneous, Q8H LIBAN  insulin detemir, 26 Units, Subcutaneous, HS  insulin lispro, 1-6 Units, Subcutaneous, TID With Meals  insulin lispro, 1-6 Units, Subcutaneous, HS  levothyroxine, 50 mcg, Oral, Daily  midodrine, 5 mg, Oral, 4x Daily  pantoprazole, 40 mg, Oral, Daily  warfarin, 5 mg, Oral, Daily (warfarin)      Continuous IV Infusions:     PRN Meds:  acetaminophen, 650 mg, Oral, Q6H PRN    Fingerstick ac and hs   Tele   PT OT eval     IP CONSULT TO NEUROLOGY  IP CONSULT TO CASE MANAGEMENT  IP CONSULT TO NUTRITION SERVICES    Network Utilization Review Department  ATTENTION: Please call with any questions or concerns to [phone] and carefully listen to the prompts so that you are directed to the right person. All voicemails are confidential.   For Discharge needs, contact Care Management DC Support Team at [phone] opt. 2  Send all requests for admission clinical reviews, approved or denied determinations and any other requests to dedicated fax number below belonging to the campus where the patient is receiving treatment.  List of dedicated fax numbers for the Facilities:  [place] DENIALS (Administrative/Medical Necessity) [phone]   DISCHARGE SUPPORT TEAM (NETWORK) [id number] Yadav (Maternity/NICU/Pediatrics) [phone]   [facility] Fran Cruz [address] [facility] [phone] 150[address] [facility] [address] [address] [facility] [address] [place] [address] [address] [phone]

## 2023-10-30 NOTE — ASSESSMENT & PLAN NOTE
On coumadin for anticoagulation  INR within therapeutic range (2.93 this AM)  Continue coumadin with outpatient INR checks to be monitored by PCP   Rate controlled

## 2023-10-30 NOTE — OCCUPATIONAL THERAPY NOTE
Occupational Therapy Evaluation      [facility]    10/30/2023    Principal Problem:    Stroke-like symptoms  Active Problems:    Diabetes mellitus with neurological manifestation (HCC)    Paroxysmal atrial fibrillation (HCC)    Hypertensive urgency      Past Medical History:   Diagnosis Date    Anemia     Aneurysm of thoracic aorta (HCC)     Aortic valve disorder     Benign neoplasm of sigmoid colon     Cardiomyopathy ([address])     last assessed: 10/10/2014    CHF (congestive heart failure) (HCC)     Chronic kidney disease     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[address])     last assessed: 10/10/2014    Diabetic nephropathy (HCC)     RAMON (dyspnea on exertion)     GERD (gastroesophageal reflux disease)     History of emphysema     Hyperlipidemia     TIA (transient ischemic attack)        Past Surgical History:   Procedure Laterality Date    AORTIC VALVE REPLACEMENT      CARDIAC PACEMAKER PLACEMENT      last assessed: 10/10/2014    CARDIAC SURGERY      aortic valve replacement    CHOLECYSTECTOMY      COLONOSCOPY      HYSTERECTOMY      INSERT / REPLACE / REMOVE PACEMAKER      KNEE SURGERY Right     OTHER SURGICAL HISTORY      Capsule ENDOscopy description: 12/19/2012    SC COLONOSCOPY FLX DX W/COLLJ SPEC WHEN PFRMD N/A 5/31/2018    Procedure: single balloon ENTEROSCOPY;  Surgeon: Buddy Vega MD;  Location: [facility]; Service: Gastroenterology    SC ESOPHAGOGASTRODUODENOSCOPY TRANSORAL DIAGNOSTIC N/A 11/29/2017    Procedure: EGD AND COLONOSCOPY;  Surgeon: Moncho Fall MD;  Location: [facility]; Service: Gastroenterology       10/30/23 1410   OT Last Visit   OT Visit Date 10/30/23   Note Type   Note type Evaluation   Pain Assessment   Pain Assessment Tool 0-10   Pain Score No Pain   Restrictions/Precautions   Other Precautions Fall Risk   Home Living   Type of [address] [place] [address] Dr One level;Performs ADLs on one level; Able to live on main level with bedroom/bathroom;Stairs to enter with rails  (0STE through basement and lives on 1st; 13 Step to main level (1st floor))   Bathroom Shower/Tub Walk-in shower   Bathroom Toilet Raised   Bathroom Equipment Shower chair;Grab bars in shower  (reports she doesn't use shower chair)   [address] [place];Walker  (1 rollator and 1 RW (at son's house))   Additional Comments PTA no AD for mobility   Prior Function   Level of [place] Independent with ADLs; Independent with functional mobility; Independent with IADLS   Lives With Son;Daughter; Other (Comment)  (MARIA G; (2) grandsons)   Receives Help From Family   IADLs Independent with meal prep; Independent with medication management; Family/Friend/Other provides transportation  (son or grandson does transportation)   Falls in the last 6 months 0   Vocational Retired  (; heavy equipment)   Lifestyle   Autonomy Pt reports PLOF was Ind with ADLs, ADLS, and (+) driving   Reciprocal Relationships son, DTR, MARIA G, and 2 grandsons   ADL   Eating Assistance 6  Modified independent   Eating Deficit Increased time to complete   Grooming Assistance 4  Minimal Assistance   Grooming Deficit Setup;Steadying;Verbal cueing;Supervision/safety; Increased time to complete   UB Bathing Assistance 4  Minimal Assistance   UB Bathing Deficit Setup;Steadying;Verbal cueing;Supervision/safety; Increased time to complete   LB Bathing Assistance 4  Minimal Assistance   LB Bathing Deficit Setup;Steadying;Verbal cueing;Supervision/safety; Increased time to complete   UB Dressing Assistance 4  Minimal Assistance   UB Dressing Deficit Setup;Steadying;Verbal cueing;Supervision/safety; Increased time to complete   LB Dressing Assistance 4  Minimal Assistance   LB Dressing Deficit Setup;Steadying;Verbal cueing;Supervision/safety; Increased time to complete   Toileting Assistance  4  Minimal Assistance   Toileting Deficit Increased time to complete;Supervison/safety;Verbal cueing;Steadying;Setup   Functional Assistance 4 Minimal Assistance   Functional Deficit Setup;Steadying;Verbal cueing;Supervision/safety; Increased time to complete   Bed Mobility   Additional Comments Pt was OOB to chair when OT arrived   Transfers   Sit to Stand 5  Supervision   Additional items Assist x 1; Increased time required   Stand to Sit 5  Supervision   Additional items Assist x 1; Increased time required   Functional Mobility   Functional Mobility 5  Supervision   Additional items Rolling walker   Balance   Static Sitting Good   Dynamic Sitting Fair +   Static Standing Fair -   Dynamic Standing Fair -   Ambulatory Fair -   Activity Tolerance   Activity Tolerance Patient tolerated treatment well   RUE Assessment   RUE Assessment WFL  (+4/5)   LUE Assessment   LUE Assessment   (ROM WFL/ MMT +3/5)   Hand Function   Gross Motor Coordination Impaired   Fine Motor Coordination Impaired   Hand Function Comments L hand GM and FM is impaired, demonstrating weakness and difficulty with dexterity. Sensation   Light Touch No apparent deficits   Sharp/Dull No apparent deficits   Stereognosis No apparent deficits   Proprioception   Proprioception No apparent deficits   Vision-Basic Assessment   Current Vision Wears glasses all the time   Vision - Complex Assessment   Ocular Range of Motion Intact   Psychosocial   Psychosocial (WDL) WDL   Patient Behaviors/Mood Calm; Cooperative   Cognition   Overall Cognitive Status WFL   Arousal/Participation Alert; Cooperative   Attention Within functional limits   Orientation Level Oriented X4   Memory Within functional limits   Following Commands Follows all commands and directions without difficulty   Comments Pt was agreeable to OT eval   Assessment   Limitation Decreased ADL status; Decreased UE ROM; Decreased UE strength;Decreased self-care trans;Decreased fine motor control;Decreased high-level ADLs; Non-func L UE;Decreased endurance   Assessment Pt is a 80 y.o. female seen for OT evaluation s/p admit to [facility] on 10/28/2023 w/ Stroke-like symptoms. Comorbidities affecting pt's functional performance at time of assessment include:Afib, CAD, COPD, DM, and AVM  . Orders placed for OT evaluation and treatment. Performed at least two patient identifiers during session including name and wristband. Personal factors affecting pt at time of IE include:steps to enter environment, difficulty performing ADLS, difficulty performing IADLS , decreased initiation and engagement , financial barriers, health management , and environment. Prior to admission, pt reports Ind with ADLs, Ind with IADLs, and (-) driving. Upon evaluation: Pt requires min A with UB ADLs, min A with LB ADLs, S with xfers and S with functional mobility 2* the following deficits impacting occupational performance: weakness, decreased strength, decreased dynamic sit/ stand balance, decreased activity tolerance, decreased standing tolerance time for self care and functional mobility, impaired [facility], impaired [place], environmental deficits, decreased coping skills, decreased mobilty, and requiring external assistance to complete transitional movements. Pt to benefit from continued skilled OT tx while in the hospital to address deficits as defined above and maximize level of functional independence w ADL's and functional mobility. Occupational Performance areas to address include: bathing/shower, toilet hygiene, dressing, medication management, functional mobility, community mobility, cleaning, meal prep, money management, and household maintenance. From OT standpoint, recommendation at time of d/c would be Level 3 (Min Resource Intensity). Plan   Treatment Interventions ADL retraining;Functional transfer training;UE strengthening/ROM; Patient/family training;Equipment evaluation/education; Fine motor coordination activities;Continued evaluation;Cardiac education; Energy conservation; Activityengagement   Goal Expiration Date 11/12/23   OT Frequency 2-3x/wk   Discharge Recommendation   Rehab Resource Intensity Level, OT III (Minimum Resource Intensity)   AM-PAC Daily Activity Inpatient   Lower Body Dressing 3   Bathing 3   Toileting 3   Upper Body Dressing 3   Grooming 3   Eating 4   Daily Activity Raw Score 19   Daily Activity Standardized Score (Calc for Raw Score >=11) 40.22     Occupational therapy Goals: In 5-7 days    Patient will verbalize and demonstrate use of energy conservation/ deep breathing technique and work simplification skills during functional activity with no verbal cues. Patient will verbalize and demonstrate good body mechanics and joint protection techniques during ADLs/ IADLs with no verbal cues     Patient will increase OOB/ sitting tolerance to 4-6 hours per day for increased participation in self care and leisure tasks with no s/s of exertion. Patient will identify s/s of exertion during ADL and functional mobility with no verbal cues. Patient will verbalize/ demonstrate compensatory strategies to recover from exertion with no verbal cues. Patient will increase standing tolerance time to 13-15 minutes with No UE support to complete sink level ADLs at modified independent level. Patient will increase sitting tolerance at edge of bed to 30-45 minutes to complete UB ADLs at modified independent level. Patient will demonstrate ability to safely perform LB ADLS with MI with long handled adaptive dressing equipment.     Patient/ Family will demonstrate competency with UE Home Exercise Program.       Rommel Del Cid MS OTR/L

## 2023-10-30 NOTE — ASSESSMENT & PLAN NOTE
Present on admission  Initial neuroimaging negative for acute pathology  Neurology discussed with ED, admit to stroke pathway  Pacemaker is not MRI conditional based on most recent interrogation report. Will obtain repeat CT head 24 hours after initial to evaluate for stroke. Repeat CT head obtained on 10/29. Showed no acute infarction. Suspect small vessel/lacunar stroke as source for symptoms. Discussed with Neurology, added ASA (already on coumadin).

## 2023-10-30 NOTE — ASSESSMENT & PLAN NOTE
Present on admission  Initial neuroimaging negative for acute pathology  Neurology discussed with ED, admit to stroke pathway  Monitor on telemetry   Pacemaker is not MRI conditional based on most recent interrogation report. Will obtain repeat CT head 24 hours after initial to evaluate for stroke. Repeat CT head obtained on 10/29. Showed no acute infarction. Atherosclerotic vascular calcifications in the carotid and vertebral arteries were present.

## 2023-10-30 NOTE — PLAN OF CARE
Problem: OCCUPATIONAL THERAPY ADULT  Goal: Performs self-care activities at highest level of function for planned discharge setting. See evaluation for individualized goals. Description: Treatment Interventions: ADL retraining, Functional transfer training, UE strengthening/ROM, Patient/family training, Equipment evaluation/education, Fine motor coordination activities, Continued evaluation, Cardiac education, Energy conservation, Activityengagement          See flowsheet documentation for full assessment, interventions and recommendations. Outcome: Progressing  Note: Limitation: Decreased ADL status, Decreased UE ROM, Decreased UE strength, Decreased self-care trans, Decreased fine motor control, Decreased high-level ADLs, Non-func L UE, Decreased endurance     Assessment: Pt is a 80 y.o. female seen for OT evaluation s/p admit to [address] [facility] on 10/28/2023 w/ Stroke-like symptoms. Comorbidities affecting pt's functional performance at time of assessment include:Afib, CAD, COPD, DM, and AVM  . Orders placed for OT evaluation and treatment. Performed at least two patient identifiers during session including name and wristband. Personal factors affecting pt at time of IE include:steps to enter environment, difficulty performing ADLS, difficulty performing IADLS , decreased initiation and engagement , financial barriers, health management , and environment. Prior to admission, pt reports Ind with ADLs, Ind with IADLs, and (-) driving.   Upon evaluation: Pt requires min A with UB ADLs, min A with LB ADLs, S with xfers and S with functional mobility 2* the following deficits impacting occupational performance: weakness, decreased strength, decreased dynamic sit/ stand balance, decreased activity tolerance, decreased standing tolerance time for self care and functional mobility, impaired [id number], impaired [facility], environmental deficits, decreased coping skills, decreased mobilty, and requiring external assistance to complete transitional movements. Pt to benefit from continued skilled OT tx while in the hospital to address deficits as defined above and maximize level of functional independence w ADL's and functional mobility. Occupational Performance areas to address include: bathing/shower, toilet hygiene, dressing, medication management, functional mobility, community mobility, cleaning, meal prep, money management, and household maintenance. From OT standpoint, recommendation at time of d/c would be Level 3 (Min Resource Intensity).      Rehab Resource Intensity Level, OT: III (Minimum Resource Intensity)

## 2023-10-30 NOTE — PROGRESS NOTES
Progress Note - Neurology   Bryon Yumiko 80 y.o. female MRN: [id number]  Unit/Bed#: -01 Encounter: [id number]      Assessment/Plan   * Stroke-like symptoms  Assessment & Plan  80 y.o. female with history of HTN, HLD, DM, thoracic aortic aneurysm repair, mechanical aortic valve, cardiomyopathy (EF 40%), and paroxysmal afib on coumadin with goal INR 2.5-3.5 (last outpatient INR check was 5/2023)    Presented as a stroke alert on 10/28 with left face, arm, and leg weakness and numbness, dysarthria, and dysphagia (fluctuating symptoms, with most of them resolved at time of alert). Improvement symptoms with 's-190's as opposed to when she first arrived and her SBP was in the 140s. INR was therapeutic at the time of presentation, 2.86. Strong suspicion for right small vessel/lacunar infarct as the cause of her symptoms as she remains symptomatic this morning and has focal exam findings (left facial droop, left-sided weakness/numbness, left arm ataxia). Likely in the setting of uncontrolled vascular risk factors as her INR was therapeutic on presentation. Neuroimaging thusfar:  Initial CT head 10/28: "No acute intracranial abnormality."  CTA head/neck 10/28:  "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 No evidence of large vessel occlusion. Tiny 1.5 mm anteriorly projecting right distal M1 MCA aneurysm."  CT head repeated 10/29: negative for acute intracranial pathology  Patient's PPM is not MRI compatible  -2D echo performed, read pending  -Hemoglobin A1c of 8.7  -Lipid panel with LDL of 98    Plan:  - acute ischemic stroke pathway ongoing:  - Recommend continuing home coumadin, goal INR 2.5-3.5 due to mechanical valve.    -INR monitoring, 2.86 this morning (10/30)  -would recommend aspirin 81 mg daily in addition to her Coumadin (given current suspicion for small vessel/lacunar stroke and R vertebral stenosis on CTA); discussed with patient and primary team  -Continue lipitor 40mg daily  -Discussed with attending: No need for any more permissive hypertension, can place midodrine now as as needed  -Neuro checks  -Telemetry monitoring   -Normothermia, euglycemia; avoid normotension  - PT/OT/speech    Once 2D echo performed/reviewed, no further inpatient neurologic workup and would be okay from neurologic standpoint for discharge. Discuss plan of care with attending neurologist.     Mamadou Carlos will need follow up in in 6 weeks with neurovascular attending or advance practitioner. She will not require outpatient neurological testing. Subjective:   Patient resting in bed, son at bedside. Had 2D echo done this morning. Still having slight dysarthria, L facial symptoms, trouble with L hand/fingers. Walks using son to help keep her balance. Double vision from "Wavy retina" in R eye; follows with eye doctor. No smoking, no ETOH. Vitals: Blood pressure (!) 173/67, pulse 62, temperature 97.6 °F (36.4 °C), resp. rate 18, height 5' 6" (1.676 m), weight 75.3 kg (166 lb), SpO2 96 %. ,Body mass index is 26.79 kg/m². Examined alongside Dr. Francisca Pallas. Physical Exam:  Physical Exam  Constitutional:       Appearance: Normal appearance. HENT:      Head: Normocephalic and atraumatic. Eyes:      Extraocular Movements: Extraocular movements intact and EOM normal.      Conjunctiva/sclera: Conjunctivae normal.      Pupils: Pupils are equal, round, and reactive to light. Cardiovascular:      Rate and Rhythm: Normal rate. Musculoskeletal:      Cervical back: Normal range of motion and neck supple. Neurological:      Mental Status: She is alert and oriented to person, place, and time. Deep Tendon Reflexes:      Reflex Scores:       Bicep reflexes are 2+ on the right side and 2+ on the left side. Brachioradialis reflexes are 2+ on the right side and 2+ on the left side. Patellar reflexes are 2+ on the right side and 2+ on the left side.   Psychiatric:         Speech: Speech normal.        Neurologic Exam     Mental Status   Oriented to person, place, and time. Attention: normal. Concentration: normal.   Speech: speech is normal   Normal comprehension. Cranial Nerves     CN II   Visual fields full to confrontation. CN III, IV, VI   Pupils are equal, round, and reactive to light. Extraocular motions are normal.   Nystagmus: none   Diplopia: none    CN V   Facial sensation intact. CN XII   Tongue deviation: left    L lower facial asymmetry with smile, slight L tongue deviation due to facial droop. Motor Exam     -Distal L UE strength 4, 4-/5, proximally 5/5  -R UE 5/5 throughout  -5/5 in bilateral LE throughout. Sensory Exam     Diminished sensation in the L UE/LE to light touch. No extinction/latasha-neglect. Gait, Coordination, and Reflexes     Reflexes   Right brachioradialis: 2+  Left brachioradialis: 2+  Right biceps: 2+  Left biceps: 2+  Right patellar: 2+  Left patellar: 2+  Diminished fine motor tasks in the L hand/fingers (slower finger taps, etc.). Clumsy with L end point finger to nose. Lab, Imaging and other studies:   CT head wo contrast   Final Result by Roma Sanchez MD (10/30 2637)      No acute infarction or other intracranial abnormality. Chronic microangiopathic changes. Resident: Shirley Majano, the attending radiologist, have reviewed the images and agree with the final report above. Workstation performed: [id number]         X-ray chest 1 view portable   Final Result by Kath Bennett MD (10/29 9419)      No acute cardiopulmonary disease. Workstation performed: [id number]         CTA stroke alert (head/neck)   Final Result by Laura Wong MD (10/28 8494)      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 No evidence of large vessel occlusion. No evidence of aneurysm. 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             I personally discussed this study with Dr. Donte Gaona on 10/28/2023 2:45 PM.                           Workstation performed: [id number]         CT stroke alert brain   Final Result by Russ Dobson MD (10/28 1447)      No acute intracranial abnormality. I personally discussed this study with Dr. Donte Gaona on 10/28/2023 2:45 PM.            Workstation performed: [id number]           CBC:   Results from last 7 days   Lab Units 10/30/23  0517 10/29/23  0808 10/28/23  1428   WBC Thousand/uL 4.66 4.18* 5.55   RBC Million/uL 4.79 4.80 4.83   HEMOGLOBIN g/dL 13.1 13.7 13.5   HEMATOCRIT % 40.3 41.2 41.4   MCV fL 84 86 86   PLATELETS Thousands/uL 254 259 277   , BMP/CMP:   Results from last 7 days   Lab Units 10/30/23  0517 10/29/23  0808 10/28/23  1428   SODIUM mmol/L 137 140 136   POTASSIUM mmol/L 4.0 4.2 3.6   CHLORIDE mmol/L 104 102 101   CO2 mmol/L 27 31 29   BUN mg/dL 28* 20 24   CREATININE mg/dL 1.26 1.19 1.20   CALCIUM mg/dL 9.6 9.3 9.0   AST U/L 15 18  --    ALT U/L 13 16  --    ALK PHOS U/L 75 88  --    EGFR ml/min/1.73sq m 39 42 41   , HgBA1C:   Results from last 7 days   Lab Units 10/28/23  1428   HEMOGLOBIN A1C % 8.7*   , TSH:   , Coagulation:   Results from last 7 days   Lab Units 10/30/23  0517   INR  2.86*   , Lipid Profile:   Results from last 7 days   Lab Units 10/28/23  1428   HDL mg/dL 41*   LDL CALC mg/dL 98   TRIGLYCERIDES mg/dL 377*     VTE Prophylaxis: Sequential compression device (Venodyne)  and Warfarin (Coumadin)      Total time spent today 25 minutes. Discussed plan of care with patient and primary team: Reviewed repeat CT head, still with overall concern for small right hemisphere infarct given her symptoms, will add aspirin to her Coumadin, statin, continued vascular risk factor optimization and control as outpatient, stroke clinic follow-up, therapies. Please note dictation software was used in the formulation of this note. Please keep that in mind in light of any grammatical errors.

## 2023-10-30 NOTE — PROGRESS NOTES
[address]  Progress Note  Name: Dali Astudillo  MRN: [id number]  Unit/Bed#: -01 I Date of Admission: 10/28/2023   Date of Service: 10/30/2023 I Hospital Day: 2    Assessment/Plan   Paroxysmal atrial fibrillation [facility])  Assessment & Plan  On coumadin for anticoagulation  INR within therapeutic range  Continue coumadin   Rate controlled    Diabetes mellitus with neurological manifestation [facility])  Assessment & Plan  Lab Results   Component Value Date    HGBA1C 8.7 (H) 10/28/2023       Recent Labs     10/29/23  1116 10/29/23  1603 10/29/23  2153 10/30/23  0613   POCGLU 190* 195* 178* 154*         Blood Sugar Average: Last 72 hrs:  (P) 169.25  Continue sliding scale  Add basal and prandial insulin if ISS requirements are consistent    * Stroke-like symptoms  Assessment & Plan  Present on admission  Initial neuroimaging negative for acute pathology  Neurology discussed with ED, admit to stroke pathway  Monitor on telemetry   Pacemaker is not MRI conditional based on most recent interrogation report. Will obtain repeat CT head 24 hours after initial to evaluate for stroke. Repeat CT head obtained on 10/29. Showed no acute infarction. Atherosclerotic vascular calcifications in the carotid and vertebral arteries were present.     -Since CT showed no acute infarction, can begin to lower her blood pressure by stopping the midodrine.   -Have PT/OT see her.  -Start asprin because of stenosis seen on CT.     VTE Pharmacologic Prophylaxis: VTE Score: 9 High Risk (Score >/= 5) - Pharmacological DVT Prophylaxis Ordered: warfarin (Coumadin). Sequential Compression Devices Ordered. Patient Centered Rounds: I performed bedside rounds with nursing staff today. Discussions with Specialists or Other Care Team Provider: neurology    Total Time Spent on Date of Encounter in care of patient: 30 mins.  This time was spent on one or more of the following: performing physical exam; counseling and coordination of care; obtaining or reviewing history; documenting in the medical record; reviewing/ordering tests, medications or procedures; communicating with other healthcare professionals and discussing with patient's family/caregivers. Current Length of Stay: 2 day(s)  Current Patient Status: Inpatient     Code Status: Level 1 - Full Code    Subjective:   She reports that she is feeling the same as yesterday. She said she still has numbness in her left hand and left leg. She said that it feels like the left side of her face near her mouth is swollen. She did not report trouble swallowing but she did say speech-language pathology evaluated her this morning and told her not to drink using a straw. She does not report any shortness of breath, chest pain, palpitations, nausea, vomiting, dizziness, headache, vision changes, diarrhea, constipation, or changes in urination. Objective:     Vitals:   Temp (24hrs), Av.7 °F (36.5 °C), Min:97.2 °F (36.2 °C), Max:98.1 °F (36.7 °C)    Temp:  [97.2 °F (36.2 °C)-98.1 °F (36.7 °C)] 97.6 °F (36.4 °C)  HR:  [63-82] 75  Resp:  [16-18] 18  BP: (129-173)/() 152/72  SpO2:  [93 %-96 %] 93 %  Body mass index is 26.79 kg/m². Input and Output Summary (last 24 hours): Intake/Output Summary (Last 24 hours) at 10/30/2023 1102  Last data filed at 10/30/2023 0801  Gross per 24 hour   Intake 238 ml   Output 0 ml   Net 238 ml       Physical Exam:   General:  HEENT: Normocephalic, atraumatic. Pupils equal, round, and reactive to light. Cardiovascular: Normal reate and rhythm. No murmurs, rubs, or gallops. 2+ dorsalis pedis pulses bilaterally. Pulmonary: Clear to ausculation. No rales, wheezes, or rhonchi. Neurological: Alert and oriented. Equal sensation on both sides of face. Left side of mouth is drooping. Decreased sensation on left wrist and hand, and left lower leg and foot. 5/5 strength bilaterally upper and lower extremities. Proprioception intact. Additional Data:     Labs:  Results from last 7 days   Lab Units 10/30/23  0517   WBC Thousand/uL 4.66   HEMOGLOBIN g/dL 13.1   HEMATOCRIT % 40.3   PLATELETS Thousands/uL 254   NEUTROS PCT % 52   LYMPHS PCT % 33   MONOS PCT % 11   EOS PCT % 4     Results from last 7 days   Lab Units 10/30/23  0517   SODIUM mmol/L 137   POTASSIUM mmol/L 4.0   CHLORIDE mmol/L 104   CO2 mmol/L 27   BUN mg/dL 28*   CREATININE mg/dL 1.26   ANION GAP mmol/L 6   CALCIUM mg/dL 9.6   ALBUMIN g/dL 3.7   TOTAL BILIRUBIN mg/dL 0.52   ALK PHOS U/L 75   ALT U/L 13   AST U/L 15   GLUCOSE RANDOM mg/dL 154*     Results from last 7 days   Lab Units 10/30/23  0517   INR  2.86*     Results from last 7 days   Lab Units 10/30/23  0613 10/29/23  2153 10/29/23  1603 10/29/23  1116 10/29/23  0709 10/28/23  2220 10/28/23  1744 10/28/23  1426   POC GLUCOSE mg/dl 154* 178* 195* 190* 148* 153* 132 204*     Results from last 7 days   Lab Units 10/28/23  1428   HEMOGLOBIN A1C % 8.7*           Lines/Drains:  Invasive Devices       Peripheral Intravenous Line  Duration             Peripheral IV 10/28/23 Right Antecubital 1 day                      Telemetry:  Telemetry Orders (From admission, onward)               24 Hour Telemetry Monitoring  Continuous x 24 Hours (Telem)        Expiring   Question:  Reason for 24 Hour Telemetry  Answer:  TIA/Suspected CVA/ Confirmed CVA                     Telemetry Reviewed: Normal Sinus Rhythm               Imaging: Reviewed radiology reports from this admission including: chest xray and CT head and Personally reviewed the following imaging: chest xray and CT head          Last 24 Hours Medication List:   Current Facility-Administered Medications   Medication Dose Route Frequency Provider Last Rate    acetaminophen  650 mg Oral Q6H PRN Lana Vieira PA-C      atorvastatin  40 mg Oral QPM Leeroy Funez MD      gabapentin  300 mg Oral TID Leeroy Funez MD      insulin detemir  26 Units Subcutaneous HS Justa JAH Shayna Lopez PA-C      insulin lispro  1-6 Units Subcutaneous TID With Meals Leeroy Funez MD      insulin lispro  1-6 Units Subcutaneous HS Leeroy Funez MD      levothyroxine  50 mcg Oral Daily Leeroy Funez MD      midodrine  5 mg Oral 4x Daily Cassandra Medina MD      pantoprazole  40 mg Oral Daily Leeroy Funez MD      warfarin  5 mg Oral Daily (warfarin) Leeroy Funez MD          Today, Patient Was Seen By: Ramonita Bair    **Please Note: This note may have been constructed using a voice recognition system. **

## 2023-10-30 NOTE — NURSING NOTE
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 Consult for wound care RN placed.

## 2023-10-30 NOTE — ASSESSMENT & PLAN NOTE
Lab Results   Component Value Date    HGBA1C 8.7 (H) 10/28/2023       Recent Labs     10/29/23  1603 10/29/23  2153 10/30/23  0613 10/30/23  1118   POCGLU 195* 178* 154* 164*         Blood Sugar Average: Last 72 hrs:  (P) [id number]  Continue sliding scale  Add basal and prandial insulin if ISS requirements are consistent

## 2023-10-30 NOTE — PLAN OF CARE
Problem: MOBILITY - ADULT  Goal: Maintain or return to baseline ADL function  Description: INTERVENTIONS:  -  Assess patient's ability to carry out ADLs; assess patient's baseline for ADL function and identify physical deficits which impact ability to perform ADLs (bathing, care of mouth/teeth, toileting, grooming, dressing, etc.)  - Assess/evaluate cause of self-care deficits   - Assess range of motion  - Assess patient's mobility; develop plan if impaired  - Assess patient's need for assistive devices and provide as appropriate  - Encourage maximum independence but intervene and supervise when necessary  - Involve family in performance of ADLs  - Assess for home care needs following discharge   - Consider OT consult to assist with ADL evaluation and planning for discharge  - Provide patient education as appropriate  Outcome: Progressing     Problem: NEUROSENSORY - ADULT  Goal: Achieves stable or improved neurological status  Description: INTERVENTIONS  - Monitor and report changes in neurological status  - Monitor vital signs such as temperature, blood pressure, glucose, and any other labs ordered   - Initiate measures to prevent increased intracranial pressure  - Monitor for seizure activity and implement precautions if appropriate      Outcome: Progressing     Problem: SAFETY ADULT  Goal: Maintain or return to baseline ADL function  Description: INTERVENTIONS:  -  Assess patient's ability to carry out ADLs; assess patient's baseline for ADL function and identify physical deficits which impact ability to perform ADLs (bathing, care of mouth/teeth, toileting, grooming, dressing, etc.)  - Assess/evaluate cause of self-care deficits   - Assess range of motion  - Assess patient's mobility; develop plan if impaired  - Assess patient's need for assistive devices and provide as appropriate  - Encourage maximum independence but intervene and supervise when necessary  - Involve family in performance of ADLs  - Assess for home care needs following discharge   - Consider OT consult to assist with ADL evaluation and planning for discharge  - Provide patient education as appropriate  Outcome: Progressing

## 2023-10-30 NOTE — UTILIZATION REVIEW
NOTIFICATION OF INPATIENT ADMISSION   AUTHORIZATION REQUEST   SERVICING FACILITY:   [address] [place] [address] [address]  Tax ID: [id number]  NPI: [id number] ATTENDING PROVIDER:  Attending Name and NPI#: Robert Camp Md [[id number]]  Address: V[place]ney Diaz, [address] [place] [address]  Phone: [phone]     ADMISSION INFORMATION:  Place of Service: [address] [place] [address]  Place of Service Code: 21  Inpatient Admission Date/Time: 10/28/23  5:06 PM  Discharge Date/Time: No discharge date for patient encounter. Admitting Diagnosis Code/Description:  Facial droop [R29.810]  Left leg weakness [R29.898]  Left arm weakness [R29.898]  Stroke-like symptoms [R29.90]     UTILIZATION REVIEW CONTACT:  Gordo Junior Utilization   Network Utilization Review Department  Phone: [phone]  Fax [phone]  Email: [email or web address]  Contact for approvals/pending authorizations, clinical reviews, and discharge. PHYSICIAN ADVISORY SERVICES:  Medical Necessity Denial & Mkml-fz-Bgyj Review  Phone: [phone]  Fax: [phone]  Email: [email or web address]     DISCHARGE SUPPORT TEAM:  For Patients Discharge Needs & Updates  Phone: [phone] opt. 2 Fax: [phone]  Email: [email or web address]

## 2023-10-31 ENCOUNTER — TRANSITIONAL CARE MANAGEMENT (OUTPATIENT)
Age: 83
End: 2023-10-31

## 2023-10-31 VITALS
TEMPERATURE: 97.3 F | RESPIRATION RATE: 16 BRPM | HEIGHT: 66 IN | WEIGHT: 166 LBS | DIASTOLIC BLOOD PRESSURE: 71 MMHG | BODY MASS INDEX: 26.68 KG/M2 | HEART RATE: 86 BPM | OXYGEN SATURATION: 95 % | SYSTOLIC BLOOD PRESSURE: 158 MMHG

## 2023-10-31 LAB
ALBUMIN SERPL BCP-MCNC: 3.7 G/DL (ref 3.5–5)
ALP SERPL-CCNC: 72 U/L (ref 34–104)
ALT SERPL W P-5'-P-CCNC: 10 U/L (ref 7–52)
ANION GAP SERPL CALCULATED.3IONS-SCNC: 8 MMOL/L
AST SERPL W P-5'-P-CCNC: 17 U/L (ref 13–39)
BASOPHILS # BLD AUTO: 0.02 THOUSANDS/ÂΜL (ref 0–0.1)
BASOPHILS NFR BLD AUTO: 1 % (ref 0–1)
BILIRUB SERPL-MCNC: 0.6 MG/DL (ref 0.2–1)
BUN SERPL-MCNC: 27 MG/DL (ref 5–25)
CALCIUM SERPL-MCNC: 9.3 MG/DL (ref 8.4–10.2)
CHLORIDE SERPL-SCNC: 106 MMOL/L (ref 96–108)
CO2 SERPL-SCNC: 22 MMOL/L (ref 21–32)
CREAT SERPL-MCNC: 1.16 MG/DL (ref 0.6–1.3)
EOSINOPHIL # BLD AUTO: 0.14 THOUSAND/ÂΜL (ref 0–0.61)
EOSINOPHIL NFR BLD AUTO: 3 % (ref 0–6)
ERYTHROCYTE [DISTWIDTH] IN BLOOD BY AUTOMATED COUNT: 14.5 % (ref 11.6–15.1)
GFR SERPL CREATININE-BSD FRML MDRD: 43 ML/MIN/1.73SQ M
GLUCOSE SERPL-MCNC: 138 MG/DL (ref 65–140)
GLUCOSE SERPL-MCNC: 148 MG/DL (ref 65–140)
GLUCOSE SERPL-MCNC: 175 MG/DL (ref 65–140)
HCT VFR BLD AUTO: 39 % (ref 34.8–46.1)
HGB BLD-MCNC: 12.7 G/DL (ref 11.5–15.4)
IMM GRANULOCYTES # BLD AUTO: 0.01 THOUSAND/UL (ref 0–0.2)
IMM GRANULOCYTES NFR BLD AUTO: 0 % (ref 0–2)
INR PPP: 2.93 (ref 0.84–1.19)
LYMPHOCYTES # BLD AUTO: 1.58 THOUSANDS/ÂΜL (ref 0.6–4.47)
LYMPHOCYTES NFR BLD AUTO: 36 % (ref 14–44)
MAGNESIUM SERPL-MCNC: 2.3 MG/DL (ref 1.9–2.7)
MCH RBC QN AUTO: 28 PG (ref 26.8–34.3)
MCHC RBC AUTO-ENTMCNC: 32.6 G/DL (ref 31.4–37.4)
MCV RBC AUTO: 86 FL (ref 82–98)
MONOCYTES # BLD AUTO: 0.45 THOUSAND/ÂΜL (ref 0.17–1.22)
MONOCYTES NFR BLD AUTO: 10 % (ref 4–12)
NEUTROPHILS # BLD AUTO: 2.2 THOUSANDS/ÂΜL (ref 1.85–7.62)
NEUTS SEG NFR BLD AUTO: 50 % (ref 43–75)
NRBC BLD AUTO-RTO: 0 /100 WBCS
PLATELET # BLD AUTO: 229 THOUSANDS/UL (ref 149–390)
PMV BLD AUTO: 10.5 FL (ref 8.9–12.7)
POTASSIUM SERPL-SCNC: 4.1 MMOL/L (ref 3.5–5.3)
PROT SERPL-MCNC: 7 G/DL (ref 6.4–8.4)
PROTHROMBIN TIME: 31.5 SECONDS (ref 11.6–14.5)
RBC # BLD AUTO: 4.53 MILLION/UL (ref 3.81–5.12)
SODIUM SERPL-SCNC: 136 MMOL/L (ref 135–147)
WBC # BLD AUTO: 4.4 THOUSAND/UL (ref 4.31–10.16)

## 2023-10-31 PROCEDURE — 85025 COMPLETE CBC W/AUTO DIFF WBC: CPT | Performed by: STUDENT IN AN ORGANIZED HEALTH CARE EDUCATION/TRAINING PROGRAM

## 2023-10-31 PROCEDURE — 82948 REAGENT STRIP/BLOOD GLUCOSE: CPT

## 2023-10-31 PROCEDURE — 83735 ASSAY OF MAGNESIUM: CPT | Performed by: STUDENT IN AN ORGANIZED HEALTH CARE EDUCATION/TRAINING PROGRAM

## 2023-10-31 PROCEDURE — 99239 HOSP IP/OBS DSCHRG MGMT >30: CPT | Performed by: STUDENT IN AN ORGANIZED HEALTH CARE EDUCATION/TRAINING PROGRAM

## 2023-10-31 PROCEDURE — 92526 ORAL FUNCTION THERAPY: CPT

## 2023-10-31 PROCEDURE — 80053 COMPREHEN METABOLIC PANEL: CPT | Performed by: STUDENT IN AN ORGANIZED HEALTH CARE EDUCATION/TRAINING PROGRAM

## 2023-10-31 PROCEDURE — 85610 PROTHROMBIN TIME: CPT | Performed by: STUDENT IN AN ORGANIZED HEALTH CARE EDUCATION/TRAINING PROGRAM

## 2023-10-31 RX ORDER — ATORVASTATIN CALCIUM 40 MG/1
40 TABLET, FILM COATED ORAL EVERY EVENING
Qty: 30 TABLET | Refills: 0 | Status: SHIPPED | OUTPATIENT
Start: 2023-10-31

## 2023-10-31 RX ADMIN — INSULIN LISPRO 1 UNITS: 100 INJECTION, SOLUTION INTRAVENOUS; SUBCUTANEOUS at 11:44

## 2023-10-31 RX ADMIN — LEVOTHYROXINE SODIUM 50 MCG: 0.05 TABLET ORAL at 05:51

## 2023-10-31 RX ADMIN — Medication 12.5 MG: at 10:57

## 2023-10-31 RX ADMIN — ASPIRIN 81 MG: 81 TABLET, COATED ORAL at 08:57

## 2023-10-31 NOTE — ASSESSMENT & PLAN NOTE
Lab Results   Component Value Date    HGBA1C 8.7 (H) 10/28/2023       Recent Labs     10/30/23  1118 10/30/23  1557 10/30/23  2118 10/31/23  0604   POCGLU 164* 160* 205* 138         Blood Sugar Average: Last 72 hrs:  (P) [id number]  Continued  sliding scale while admitted (was only getting 1-2 units)   Resume levemir 26 u QHS and glipizide on dc

## 2023-10-31 NOTE — DISCHARGE INSTR - AVS FIRST PAGE
- Please start daily Aspirin 81 mg per recommendations of Neurology service   - Continue warfarin and INR checks per outpatient providers   - Please follow up with Neurology within 2-4 weeks for outpatient stroke follow up   - Please follow up with PCP within 7-10 days

## 2023-10-31 NOTE — PLAN OF CARE
Problem: MOBILITY - ADULT  Goal: Maintain or return to baseline ADL function  Description: INTERVENTIONS:  -  Assess patient's ability to carry out ADLs; assess patient's baseline for ADL function and identify physical deficits which impact ability to perform ADLs (bathing, care of mouth/teeth, toileting, grooming, dressing, etc.)  - Assess/evaluate cause of self-care deficits   - Assess range of motion  - Assess patient's mobility; develop plan if impaired  - Assess patient's need for assistive devices and provide as appropriate  - Encourage maximum independence but intervene and supervise when necessary  - Involve family in performance of ADLs  - Assess for home care needs following discharge   - Consider OT consult to assist with ADL evaluation and planning for discharge  - Provide patient education as appropriate  Outcome: Progressing     Problem: INFECTION - ADULT  Goal: Absence or prevention of progression during hospitalization  Description: INTERVENTIONS:  - Assess and monitor for signs and symptoms of infection  - Monitor lab/diagnostic results  - Monitor all insertion sites, i.e. indwelling lines, tubes, and drains  - Monitor endotracheal if appropriate and nasal secretions for changes in amount and color  - [place] appropriate cooling/warming therapies per order  - Administer medications as ordered  - Instruct and encourage patient and family to use good hand hygiene technique  - Identify and instruct in appropriate isolation precautions for identified infection/condition  Outcome: Progressing  Goal: Absence of fever/infection during neutropenic period  Description: INTERVENTIONS:  - Monitor WBC    Outcome: Progressing

## 2023-10-31 NOTE — SPEECH THERAPY NOTE
Speech Language/Pathology     Speech/Language Pathology Progress Note     Patient Name: Lula Wright    JBCUI'G Date: 10/31/2023     Problem List  Principal Problem:    Stroke-like symptoms  Active Problems:    Diabetes mellitus with neurological manifestation (HCC)    Paroxysmal atrial fibrillation (HCC)    Hypertensive urgency        Recommendations:   Diet: regular diet and thin liquids   Meds: whole with liquid   Feeding Assistance: independent  Frequent Oral care: 2-4x/day  Aspiration precautions and compensatory swallowing strategies: upright posture, slow rate of feeding, small bites/sips, no straws, and alternating bites and sips  Other Recommendations/ considerations: SLP will continue to follow x1-3      Subjective:  Patient received alert in bedside chair. Beginning to eat breakfast tray. Previous/current diet: regular/thin    Objective:  Patient received regular solids and thin liquids. No anterior loss noted. Mastication judged to be prolonged yet effective. AP transfer judged to be delayed. Laryngeal rise noted upon palpation. Swallow initiation judged to be delayed. Slight oral residue noted; benefited from liquid wash to clear oral residue. Cough with thin liquids by straw. No overt s/s of aspiration otherwise. Assessment:  Oropharyngeal swallow function appears same from previous encounters. Recommend continuing to follow aspiration precautions as outlined above.      Plan:  Regular/thin  No straws   Upright posture  Small bites/sips  Alternating bites/sips  Will continue to follow x1-3

## 2023-10-31 NOTE — PLAN OF CARE
Problem: MOBILITY - ADULT  Goal: Maintain or return to baseline ADL function  Description: INTERVENTIONS:  -  Assess patient's ability to carry out ADLs; assess patient's baseline for ADL function and identify physical deficits which impact ability to perform ADLs (bathing, care of mouth/teeth, toileting, grooming, dressing, etc.)  - Assess/evaluate cause of self-care deficits   - Assess range of motion  - Assess patient's mobility; develop plan if impaired  - Assess patient's need for assistive devices and provide as appropriate  - Encourage maximum independence but intervene and supervise when necessary  - Involve family in performance of ADLs  - Assess for home care needs following discharge   - Consider OT consult to assist with ADL evaluation and planning for discharge  - Provide patient education as appropriate  Outcome: Progressing  Goal: Maintains/Returns to pre admission functional level  Description: INTERVENTIONS:  - Perform BMAT or MOVE assessment daily.   - Set and communicate daily mobility goal to care team and patient/family/caregiver. - Collaborate with rehabilitation services on mobility goals if consulted  - Perform Range of Motion  times a day. - Reposition patient every  hours.   - Dangle patient  times a day  - Stand patient  times a day  - Ambulate patient  times a day  - Out of bed to chair  times a day   - Out of bed for meals  times a day  - Out of bed for toileting  - Record patient progress and toleration of activity level   Outcome: Progressing     Problem: PAIN - ADULT  Goal: Verbalizes/displays adequate comfort level or baseline comfort level  Description: Interventions:  - Encourage patient to monitor pain and request assistance  - Assess pain using appropriate pain scale  - Administer analgesics based on type and severity of pain and evaluate response  - Implement non-pharmacological measures as appropriate and evaluate response  - Consider cultural and social influences on pain and pain management  - Notify physician/advanced practitioner if interventions unsuccessful or patient reports new pain  Outcome: Progressing     Problem: INFECTION - ADULT  Goal: Absence or prevention of progression during hospitalization  Description: INTERVENTIONS:  - Assess and monitor for signs and symptoms of infection  - Monitor lab/diagnostic results  - Monitor all insertion sites, i.e. indwelling lines, tubes, and drains  - Monitor endotracheal if appropriate and nasal secretions for changes in amount and color  - [place] appropriate cooling/warming therapies per order  - Administer medications as ordered  - Instruct and encourage patient and family to use good hand hygiene technique  - Identify and instruct in appropriate isolation precautions for identified infection/condition  Outcome: Progressing  Goal: Absence of fever/infection during neutropenic period  Description: INTERVENTIONS:  - Monitor WBC    Outcome: Progressing     Problem: SAFETY ADULT  Goal: Maintain or return to baseline ADL function  Description: INTERVENTIONS:  -  Assess patient's ability to carry out ADLs; assess patient's baseline for ADL function and identify physical deficits which impact ability to perform ADLs (bathing, care of mouth/teeth, toileting, grooming, dressing, etc.)  - Assess/evaluate cause of self-care deficits   - Assess range of motion  - Assess patient's mobility; develop plan if impaired  - Assess patient's need for assistive devices and provide as appropriate  - Encourage maximum independence but intervene and supervise when necessary  - Involve family in performance of ADLs  - Assess for home care needs following discharge   - Consider OT consult to assist with ADL evaluation and planning for discharge  - Provide patient education as appropriate  Outcome: Progressing  Goal: Maintains/Returns to pre admission functional level  Description: INTERVENTIONS:  - Perform BMAT or MOVE assessment daily.   - Set and communicate daily mobility goal to care team and patient/family/caregiver. - Collaborate with rehabilitation services on mobility goals if consulted  - Perform Range of Motion  times a day. - Reposition patient every  hours.   - Dangle patient  times a day  - Stand patient  times a day  - Ambulate patient  times a day  - Out of bed to chair  times a day   - Out of bed for meals  times a day  - Out of bed for toileting  - Record patient progress and toleration of activity level   Outcome: Progressing  Goal: Patient will remain free of falls  Description: INTERVENTIONS:  - Educate patient/family on patient safety including physical limitations  - Instruct patient to call for assistance with activity   - Consult OT/PT to assist with strengthening/mobility   - Keep Call bell within reach  - Keep bed low and locked with side rails adjusted as appropriate  - Keep care items and personal belongings within reach  - Initiate and maintain comfort rounds  - Make Fall Risk Sign visible to staff  - Offer Toileting every  Hours, in advance of need  - Initiate/Maintain alarm  - Obtain necessary fall risk management equipment:  - Apply yellow socks and bracelet for high fall risk patients  - Consider moving patient to room near nurses station  Outcome: Progressing     Problem: DISCHARGE PLANNING  Goal: Discharge to home or other facility with appropriate resources  Description: INTERVENTIONS:  - Identify barriers to discharge w/patient and caregiver  - Arrange for needed discharge resources and transportation as appropriate  - Identify discharge learning needs (meds, wound care, etc.)  - Arrange for interpretive services to assist at discharge as needed  - Refer to Case Management Department for coordinating discharge planning if the patient needs post-hospital services based on physician/advanced practitioner order or complex needs related to functional status, cognitive ability, or social support system  Outcome: Progressing Problem: Knowledge Deficit  Goal: Patient/family/caregiver demonstrates understanding of disease process, treatment plan, medications, and discharge instructions  Description: Complete learning assessment and assess knowledge base. Interventions:  - Provide teaching at level of understanding  - Provide teaching via preferred learning methods  Outcome: Progressing     Problem: NEUROSENSORY - ADULT  Goal: Achieves stable or improved neurological status  Description: INTERVENTIONS  - Monitor and report changes in neurological status  - Monitor vital signs such as temperature, blood pressure, glucose, and any other labs ordered   - Initiate measures to prevent increased intracranial pressure  - Monitor for seizure activity and implement precautions if appropriate      Outcome: Progressing  Goal: Remains free of injury related to seizures activity  Description: INTERVENTIONS  - Maintain airway, patient safety  and administer oxygen as ordered  - Monitor patient for seizure activity, document and report duration and description of seizure to physician/advanced practitioner  - If seizure occurs,  ensure patient safety during seizure  - Reorient patient post seizure  - Seizure pads on all 4 side rails  - Instruct patient/family to notify RN of any seizure activity including if an aura is experienced  - Instruct patient/family to call for assistance with activity based on nursing assessment  - Administer anti-seizure medications if ordered    Outcome: Progressing  Goal: Achieves maximal functionality and self care  Description: INTERVENTIONS  - Monitor swallowing and airway patency with patient fatigue and changes in neurological status  - Encourage and assist patient to increase activity and self care.    - Encourage visually impaired, hearing impaired and aphasic patients to use assistive/communication devices  Outcome: Progressing     Problem: CARDIOVASCULAR - ADULT  Goal: Maintains optimal cardiac output and hemodynamic stability  Description: INTERVENTIONS:  - Monitor I/O, vital signs and rhythm  - Monitor for S/S and trends of decreased cardiac output  - Administer and titrate ordered vasoactive medications to optimize hemodynamic stability  - Assess quality of pulses, skin color and temperature  - Assess for signs of decreased coronary artery perfusion  - Instruct patient to report change in severity of symptoms  Outcome: Progressing  Goal: Absence of cardiac dysrhythmias or at baseline rhythm  Description: INTERVENTIONS:  - Continuous cardiac monitoring, vital signs, obtain 12 lead EKG if ordered  - Administer antiarrhythmic and heart rate control medications as ordered  - Monitor electrolytes and administer replacement therapy as ordered  Outcome: Progressing     Problem: METABOLIC, FLUID AND ELECTROLYTES - ADULT  Goal: Glucose maintained within target range  Description: INTERVENTIONS:  - Monitor Blood Glucose as ordered  - Assess for signs and symptoms of hyperglycemia and hypoglycemia  - Administer ordered medications to maintain glucose within target range  - Assess nutritional intake and initiate nutrition service referral as needed  Outcome: Progressing     Problem: Neurological Deficit  Goal: Neurological status is stable or improving  Description: Interventions:  - Monitor and assess patient's level of consciousness, motor function, sensory function, and level of assistance needed for ADLs. - Monitor and report changes from baseline. Collaborate with interdisciplinary team to initiate plan and implement interventions as ordered. - Provide and maintain a safe environment. - Consider seizure precautions. - Consider fall precautions. - Consider aspiration precautions. - Consider bleeding precautions. Outcome: Progressing     Problem:  Activity Intolerance/Impaired Mobility  Goal: Mobility/activity is maintained at optimum level for patient  Description: Interventions:  - Assess and monitor patient barriers to mobility and need for assistive/adaptive devices. - Assess patient's emotional response to limitations. - Collaborate with interdisciplinary team and initiate plans and interventions as ordered. - Encourage independent activity per ability.  - Maintain proper body alignment. - Perform active/passive rom as tolerated/ordered.   - Plan activities to conserve energy.  - Turn patient as appropriate  Outcome: Progressing     Problem: Prexisting or High Potential for Compromised Skin Integrity  Goal: Skin integrity is maintained or improved  Description: INTERVENTIONS:  - Identify patients at risk for skin breakdown  - Assess and monitor skin integrity  - Assess and monitor nutrition and hydration status  - Monitor labs   - Assess for incontinence   - Turn and reposition patient  - Assist with mobility/ambulation  - Relieve pressure over bony prominences  - Avoid friction and shearing  - Provide appropriate hygiene as needed including keeping skin clean and dry  - Evaluate need for skin moisturizer/barrier cream  - Collaborate with interdisciplinary team   - Patient/family teaching  - Consider wound care consult   Outcome: Progressing

## 2023-10-31 NOTE — ASSESSMENT & PLAN NOTE
Present on admission  Initial neuroimaging negative for acute pathology  Neurology discussed with ED, admit to stroke pathway  Pacemaker is not MRI conditional based on most recent interrogation report. repeat CT head 24 hours after initial to evaluate for stroke. Repeat CT head obtained on 10/29. Showed no acute infarction. Suspect small vessel/lacunar stroke as source for symptoms. Discussed with Neurology, added ASA (already on coumadin).

## 2023-11-01 ENCOUNTER — OFFICE VISIT (OUTPATIENT)
Age: 83
End: 2023-11-01
Payer: COMMERCIAL

## 2023-11-01 VITALS
DIASTOLIC BLOOD PRESSURE: 78 MMHG | WEIGHT: 165.4 LBS | RESPIRATION RATE: 18 BRPM | HEART RATE: 71 BPM | BODY MASS INDEX: 26.7 KG/M2 | TEMPERATURE: 97.4 F | SYSTOLIC BLOOD PRESSURE: 142 MMHG | OXYGEN SATURATION: 97 %

## 2023-11-01 DIAGNOSIS — Z09 ENCOUNTER FOR EXAMINATION FOLLOWING TREATMENT AT HOSPITAL: Primary | ICD-10-CM

## 2023-11-01 DIAGNOSIS — R29.90 STROKE-LIKE SYMPTOMS: ICD-10-CM

## 2023-11-01 DIAGNOSIS — R25.2 LEG CRAMPS: ICD-10-CM

## 2023-11-01 PROCEDURE — 99496 TRANSJ CARE MGMT HIGH F2F 7D: CPT

## 2023-11-01 RX ORDER — VITAMIN B COMPLEX
1 CAPSULE ORAL DAILY
Qty: 30 CAPSULE | Refills: 2 | Status: SHIPPED | OUTPATIENT
Start: 2023-11-01 | End: 2024-01-30

## 2023-11-01 NOTE — PROGRESS NOTES
Assessment & Plan     1. Encounter for examination following treatment at hospital  Comments:  reviewed medication changes, specialist follow up, and speech therapy follow up. 2. Stroke-like symptoms  Assessment & Plan:  Pt seems to be doing relatively well all things considered. I don't see a need to follow up with any labs at this time as her labs were fine while in the hospital  She is to follow up with neurology in the next week or so. Call speech therapy to set up appointments, continue with precautions of small bites of food and slow sips of fluids. Continue low dose aspirin and statin. BP is OK today at 142/78. Keep blood sugars under control. She hasn't had an A1c done since 2021 according to my records. Should return in a month and consider health maintenance labs at that time. 3. Leg cramps  Assessment & Plan: Will get an CHEYANNE to evaluate for insufficiency of LE's. Pt resistant to try Mirapex. Does not want to go up on Neurontin. Pt does a lot of leg exercises. Told her to not do as much. Do gentle stretching throughout the day, though, and especially before bed. Will try B complex vitamin. Her electrolytes including mag was normal in the hospital.  Follow up in one month to go over CHEYANNE test and if vitamin B complex is helping. Orders:  -     VAS CHEYANNE & waveform analysis, multiple levels; Future; Expected date: 11/01/2023  -     b complex vitamins capsule; Take 1 capsule by mouth daily      Depression Screening and Follow-up Plan: Patient was screened for depression during today's encounter. They screened negative with a PHQ-2 score of 0. Subjective     Transitional Care Management Review:   Kai Alicea is a 80 y.o. female here for TCM follow up.      During the TCM phone call patient stated:  TCM Call     Date and time call was made  11/1/2023  9:55 AM    Hospital care reviewed  Records reviewed    Patient was hospitialized at  [facility]    Date of Admission 10/28/23    Date of discharge  10/31/23    Diagnosis  leg weakness, facial droop=Stroke-like symptoms    Disposition  Home    Current Symptoms  --  R leg, left foot severe muscle cramp    Cough Severity  Mild      TCM Call     Post hospital issues  None    Scheduled for follow up? Yes    Patients specialists  Neurologist; Other (comment)    Nephrologist name  Hang Castaneda    Nephrologist contact #  [phone]    Neurologist name  [facility]    Neurologist contact #  [phone]    Other specialists names  Wound Care-    Did you obtain your prescribed medications  Yes  atorvastatin, aspirin    Do you need help managing your prescriptions or medications  No    Is transportation to your appointment needed  No    I have advised the patient to call PCP with any new or worsening symptoms  Elliot Cotton LPN    Living Arrangements  Spouse or Significiant other    Are you recieving any outpatient services  Yes    What type of services  device check    Interperter language line needed  No    Counseling  Patient    Counseling topics  patient and family education; Importance of RX compliance        HPI    Pt was in the hospital for a stroke from 10/28 to 10/31. She was not a candidate for TNK because of being on Coumadin, which was therapeutic. Could not get MRI due to pacemaker not compatible. Achieved BP control, started on statin, low dose aspirin and discharged yesterday. Pt to make appt with neurology to follow up. Will also contact speech therapy. She does have some issues with dysphagia, has to cut food up smaller and take slower sips, but is on thin liquids. Pt also complaining of R leg and L foot. This was going on before she went into the hospital, but it is getting worse. Pt has muscle cramps in the R calf where she will fall asleep and then her muscle will cramp up and be "as hard as a rock."  She had this for many years past, but not this bad.   She has seen multiple providers for this without relief including vascular who referred pt to ortho (but she did not follow up). She is currently on gabapentin. She declined mirapex in past due to side effects. Pt has a wound on the R medial shin. She was treating with H2O2 and neosporin and this prevented it from healing. She saw wound care in hospital and they put silver alginate on wound and covered with mepilex. Pt has not seen wound care outpatient and does not have their information. Review of Systems   Constitutional:  Negative for fever. Respiratory:  Negative for shortness of breath and wheezing. Cardiovascular:  Negative for chest pain, palpitations and leg swelling. Musculoskeletal:  Positive for gait problem and myalgias (nocturnal leg cramps). Skin:  Positive for wound (R medial calf). Negative for rash. Neurological:  Positive for speech difficulty (mild dysarthria and dysphagia post stroke). Negative for syncope, facial asymmetry, weakness (left sided U and L extrem.) and headaches. All other systems reviewed and are negative. Objective     /78 (BP Location: Right arm, Patient Position: Sitting, Cuff Size: Standard)   Pulse 71   Temp (!) 97.4 °F (36.3 °C) (Tympanic)   Resp 18   Wt 75 kg (165 lb 6.4 oz)   SpO2 97%   BMI 26.70 kg/m²      Physical Exam  Vitals and nursing note reviewed. Constitutional:       General: She is not in acute distress. Appearance: Normal appearance. She is not toxic-appearing. HENT:      Head: Normocephalic and atraumatic. Comments: Mild left facial droop  Sensation intact       Right Ear: Hearing normal.      Left Ear: Hearing normal.      Nose: Nose normal.      Mouth/Throat:      Lips: Pink. Mouth: Mucous membranes are moist.      Tongue: Tongue does not deviate from midline. Eyes:      General: Lids are normal. Vision grossly intact. No visual field deficit.      Conjunctiva/sclera: Conjunctivae normal.   Cardiovascular:      Rate and Rhythm: Normal rate and regular rhythm. Pulses:           Radial pulses are 2+ on the right side and 2+ on the left side. Heart sounds: Normal heart sounds. No murmur heard. Pulmonary:      Effort: Pulmonary effort is normal. No respiratory distress. Breath sounds: Normal breath sounds. Musculoskeletal:         General: No tenderness, deformity or signs of injury. Cervical back: Full passive range of motion without pain. Comments: Bilateral calves are soft and nontender. Pt able to dorsi and plantar flex without difficulty. Left a little weaker than right (post stroke). Skin is dry, flaky, and minimal hair growth. No appreciable edema. 2+ PT pulse, legs are warm and dry. Wound covered by dressing on the R medial calf. No surrounding erythema. Left dressing in place. Skin:     General: Skin is warm and dry. Neurological:      General: No focal deficit present. Mental Status: She is alert and oriented to person, place, and time. Mental status is at baseline. GCS: GCS eye subscore is 4. GCS verbal subscore is 5. GCS motor subscore is 6. Cranial Nerves: Dysarthria and facial asymmetry present. Sensory: Sensation is intact. Motor: Weakness (left side) present. No tremor. Gait: Gait abnormal (requires assistance, more than baseline).    Psychiatric:         Behavior: Behavior normal.         Cognition and Memory: Cognition normal.      Comments: Mild dysarthria       Medications have been reviewed by provider in current encounter    Aviva Vergara PA-C

## 2023-11-01 NOTE — ASSESSMENT & PLAN NOTE
Pt seems to be doing relatively well all things considered. I don't see a need to follow up with any labs at this time as her labs were fine while in the hospital  She is to follow up with neurology in the next week or so. Call speech therapy to set up appointments, continue with precautions of small bites of food and slow sips of fluids. Continue low dose aspirin and statin. BP is OK today at 142/78. Keep blood sugars under control. She hasn't had an A1c done since 2021 according to my records. Should return in a month and consider health maintenance labs at that time.

## 2023-11-01 NOTE — ASSESSMENT & PLAN NOTE
Will get an CHEYANNE to evaluate for insufficiency of LE's. Pt resistant to try Mirapex. Does not want to go up on Neurontin. Pt does a lot of leg exercises. Told her to not do as much. Do gentle stretching throughout the day, though, and especially before bed. Will try B complex vitamin. Her electrolytes including mag was normal in the hospital.  Follow up in one month to go over CHEYANNE test and if vitamin B complex is helping.

## 2023-11-02 ENCOUNTER — TELEPHONE (OUTPATIENT)
Dept: NEUROLOGY | Facility: CLINIC | Age: 83
End: 2023-11-02

## 2023-11-02 NOTE — TELEPHONE ENCOUNTER
[facility]/10/28-10/31/Stroke Like Symptoms    Emilia Richards will need follow up in in 6 weeks with neurovascular attending or advance practitioner. She will not require outpatient neurological testing. HFU scheduled for 12/19 @ 10:00 with Dr. María Elena Gracia in [place].

## 2023-11-04 NOTE — DISCHARGE SUMMARY
[address]  Discharge- [facility] 1940, 80 y.o. female MRN: [id number]  Unit/Bed#: -01 Encounter: [id number]  Primary Care Provider: Aviva Cody PA-C   Date and time admitted to hospital: 10/28/2023  2:16 PM    * Stroke-like symptoms  Assessment & Plan  Present on admission  Initial neuroimaging negative for acute pathology  Neurology discussed with ED, admit to stroke pathway  Pacemaker is not MRI conditional based on most recent interrogation report. repeat CT head 24 hours after initial to evaluate for stroke. Repeat CT head obtained on 10/29. Showed no acute infarction. Suspect small vessel/lacunar stroke as source for symptoms. Discussed with Neurology, added ASA (already on coumadin).      Hypertensive urgency  Assessment & Plan  Goal normotension    Paroxysmal atrial fibrillation ([address])  Assessment & Plan  On coumadin for anticoagulation  INR within therapeutic range (2.93 this AM)  Continue coumadin with outpatient INR checks to be monitored by PCP   Rate controlled    Diabetes mellitus with neurological manifestation [facility])  Assessment & Plan  Lab Results   Component Value Date    HGBA1C 8.7 (H) 10/28/2023       Recent Labs     10/30/23  1118 10/30/23  1557 10/30/23  2118 10/31/23  0604   POCGLU 164* 160* 205* 138         Blood Sugar Average: Last 72 hrs:  (P) [id number]  Continued  sliding scale while admitted (was only getting 1-2 units)   Resume levemir 26 u QHS and glipizide on dc         Medical Problems       Resolved Problems  Date Reviewed: 11/1/2023   None       Discharging Physician / Practitioner: Nely Can MD  PCP: Aviva Cody PA-C  Admission Date:   Admission Orders (From admission, onward)       Ordered        10/28/23 1706  Inpatient Admission  Once            10/28/23 1617  Place in Observation  Once                          Discharge Date: 10/31/23    Consultations During Hospital Stay:  Neurology    Procedures Performed:   none    Significant Findings / Test Results:   none    Incidental Findings:   none     Test Results Pending at Discharge (will require follow up):   none     Outpatient Tests Requested:  none    Complications:  none    Reason for Admission: stroke-like symptoms     Hospital Course:   Sudha Bridges is a 80 y.o. female patient who originally presented to the hospital on 10/28/2023 due to stroke alert with left face, arm and left leg weakness/numbness. Also had dysarthria and dysphagia. Most of her symptoms resolved by the time she arrived. She was admitted as a stroke rule out. MRI was not pursued due to pacemaker and not being MRI conditional.  Initial and repeat head CT showed no evidence of acute CVA. CTA of the head and neck showed severe right vertebral artery plaque stenosis. Per neurology they believe the patient likely did have a lacunar stroke given her ongoing left hand weakness and mild left facial asymmetry. They recommended her to be started on low-dose aspirin in addition to her Coumadin. She was cleared for discharge on 10/31 with outpatient neurology follow-up. She was also recommended to follow-up with her PCP within 1 to 2 weeks. Please see above list of diagnoses and related plan for additional information. Condition at Discharge: fair    Discharge Day Visit / Exam:   Subjective: Intermittent L facial and LUE sensory changes which are slowly starting to improve. No cp, sob.    Vitals: Blood Pressure: 158/71 (10/31/23 1013)  Pulse: 86 (10/31/23 1013)  Temperature: (!) 97.3 °F (36.3 °C) (10/31/23 1013)  Temp Source: Oral (10/31/23 1013)  Respirations: 16 (10/31/23 1013)  Height: 5' 6" (167.6 cm) (10/30/23 0949)  Weight - Scale: 75.3 kg (166 lb) (10/30/23 0949)  SpO2: 95 % (10/31/23 1013)  Exam:   Physical Exam   General: NAD  HEENT: Normal conjunctiva, EOMI  Heart: Regular rate and rhythm, no murmurs  Lungs: Clear lungs, nonlabored respirations, no wheezing  Abdomen: Nontender, nondistended  Extremities: No pitting edema in bilateral lower extremities  Neuro: Alert and oriented x3, L FD, Equal upper and lower ext strength and sensation   Psych: Cooperative/calm      Discussion with Family: Patient declined call to . Discharge instructions/Information to patient and family:   See after visit summary for information provided to patient and family. Provisions for Follow-Up Care:  See after visit summary for information related to follow-up care and any pertinent home health orders. Disposition:   Home with VNA Services (Reminder: Complete face to face encounter)    Planned Readmission: none     Discharge Statement:  I spent 40 minutes discharging the patient. This time was spent on the day of discharge. I had direct contact with the patient on the day of discharge. Greater than 50% of the total time was spent examining patient, answering all patient questions, arranging and discussing plan of care with patient as well as directly providing post-discharge instructions. Additional time then spent on discharge activities. Discharge Medications:  See after visit summary for reconciled discharge medications provided to patient and/or family.       **Please Note: This note may have been constructed using a voice recognition system**

## 2023-11-06 ENCOUNTER — OFFICE VISIT (OUTPATIENT)
Dept: WOUND CARE | Facility: CLINIC | Age: 83
End: 2023-11-06
Payer: COMMERCIAL

## 2023-11-06 VITALS
BODY MASS INDEX: 27.49 KG/M2 | WEIGHT: 165 LBS | RESPIRATION RATE: 18 BRPM | TEMPERATURE: 97.8 F | DIASTOLIC BLOOD PRESSURE: 65 MMHG | HEART RATE: 60 BPM | SYSTOLIC BLOOD PRESSURE: 150 MMHG | HEIGHT: 65 IN

## 2023-11-06 DIAGNOSIS — I83.019 VENOUS ULCER OF RIGHT LEG (HCC): Primary | ICD-10-CM

## 2023-11-06 DIAGNOSIS — L97.919 VENOUS ULCER OF RIGHT LEG (HCC): Primary | ICD-10-CM

## 2023-11-06 PROCEDURE — 99204 OFFICE O/P NEW MOD 45 MIN: CPT | Performed by: ORTHOPAEDIC SURGERY

## 2023-11-06 PROCEDURE — 17250 CHEM CAUT OF GRANLTJ TISSUE: CPT | Performed by: ORTHOPAEDIC SURGERY

## 2023-11-06 PROCEDURE — 99213 OFFICE O/P EST LOW 20 MIN: CPT | Performed by: ORTHOPAEDIC SURGERY

## 2023-11-06 RX ORDER — LIDOCAINE 40 MG/G
CREAM TOPICAL ONCE
Status: COMPLETED | OUTPATIENT
Start: 2023-11-06 | End: 2023-11-06

## 2023-11-06 RX ADMIN — LIDOCAINE 1 APPLICATION: 40 CREAM TOPICAL at 08:32

## 2023-11-06 NOTE — PATIENT INSTRUCTIONS
Orders Placed This Encounter   Procedures    Wound cleansing and dressings     Right Leg wound:     Wash your hands with soap and water. Remove old dressing, discard into plastic bag and place in trash. Cleanse the wound with normal saline solution prior to applying a clean dressing. Do not use tissue or cotton balls. Do not scrub the wound. Pat dry using gauze. Shower yes, if able to use cast cover. Do not get dressing wet. Apply polymem cut fit to the open wound. Cover with gauze  Secure with rolled gauze and tape  Change dressing 3 times weekly. The above was completed today at the wound center. Standing Status:   Future     Standing Expiration Date:   11/6/2024    Wound compression and edema control     Elastic Tubular Stocking: spanda- size F    Tubular elastic bandage: Apply from base of toes to behind the knee. Apply in AM, may remove for sleep. Avoid prolonged standing in one place. Elevate leg(s) above the level of the heart when sitting or as much as possible.      Standing Status:   Future     Standing Expiration Date:   11/6/2024

## 2023-11-06 NOTE — PROGRESS NOTES
Patient ID: Andre Clark is a 80 y.o. female Date of Birth 1940       Chief Complaint   Patient presents with    New Patient Visit     RLE wound       Allergies:  Patient has no known allergies. Diagnosis:      Diagnosis ICD-10-CM Associated Orders   1. Venous ulcer of right leg (HCC)  I83.019 Wound cleansing and dressings    L97.919 Wound compression and edema control     lidocaine (LMX) 4 % cream              Assessment and Plan :  Initial Evaluation Right venous ulcer. No signs of infection today. Chemically cauterized as below  Wound management with Polymem, see wound orders below   Compression with Tubigrip  No harsh cleansers such as alcohol, peroxide, or antibacterial soap, do not submerge in water  Can cleanse with NSS at dressing changes. Counseled on importance of frequent elevation of leg and increase exercise/walking for wound healing. A1C results reviewed with the patient today. Followup in 1 week(s) or call sooner with questions or concerns or any signs of infection such as redness, swelling, increased/purulent drainage, fever, chills, increased severe pain. Subjective:   Pt is an 80 y.o. female with pmhx HTN, DMII (A1c 8.7),  CKD, Pafib, CVA (10/28/23 on Coumadin/ASA 81mg) with residual deficits (left sided facial drop and leg weakness) who presents for initial eval of RLE venous ulcer which has been present for about 2 years. Pt has been covering wound with a bandaid. Does not have an odor. No smoking, ETOH or drug use. Pt denies any sob, fatigue, N/V, CP, fever or chills.       The following portions of the patient's history were reviewed and updated as appropriate:   Patient Active Problem List   Diagnosis    Hyperlipidemia    Chronic anticoagulation    Hypertension, essential    Coronary artery disease of native artery of native heart with stable angina pectoris (HCC)    Chronic obstructive pulmonary disease (HCC)    Diabetes mellitus with neurological manifestation ([address]) Hypothyroidism    Iron deficiency    GERD (gastroesophageal reflux disease)    AVM (arteriovenous malformation) of small bowel, acquired with hemorrhage    Angiectasia    Other vascular disorders of intestine (HCC)    Angioedema    Aortic valve replaced    Cardiomyopathy (HCC)    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HCC)    Stage 3a chronic kidney disease    Chronic kidney disease-mineral and bone disorder    Primary osteoarthritis involving multiple joints    Paroxysmal atrial fibrillation (HCC)    Neutropenia associated with infection (HCC)    Herpes simplex labialis    Restrictive lung disease    Nocturnal hypoxemia    Stroke-like symptoms    Hypertensive urgency    Leg cramps     Past Medical History:   Diagnosis Date    Anemia     Aneurysm of thoracic aorta (HCC)     Aortic valve disorder     Benign neoplasm of sigmoid colon     Cardiomyopathy ([address])     last assessed: 10/10/2014    CHF (congestive heart failure) (HCC)     Chronic kidney disease     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[address])     last assessed: 10/10/2014    Diabetic nephropathy (HCC)     RAMON (dyspnea on exertion)     GERD (gastroesophageal reflux disease)     History of emphysema (HCC)     Hyperlipidemia     TIA (transient ischemic attack)      Past Surgical History:   Procedure Laterality Date    AORTIC VALVE REPLACEMENT      CARDIAC PACEMAKER PLACEMENT      last assessed: 10/10/2014    CARDIAC SURGERY      aortic valve replacement    CHOLECYSTECTOMY      COLONOSCOPY      HYSTERECTOMY      INSERT / REPLACE / REMOVE PACEMAKER      KNEE SURGERY Right     OTHER SURGICAL HISTORY      Capsule ENDOscopy description: 12/19/2012    NJ COLONOSCOPY FLX DX W/COLLJ SPEC WHEN PFRMD N/A 5/31/2018    Procedure: single balloon ENTEROSCOPY;  Surgeon: Jhonny Guzmán MD;  Location: [facility];   Service: Gastroenterology    NJ ESOPHAGOGASTRODUODENOSCOPY TRANSORAL DIAGNOSTIC N/A 11/29/2017    Procedure: EGD AND COLONOSCOPY;  Surgeon: Ortiz Leos MD;  Location: [facility];  Service: Gastroenterology     Family History   Problem Relation Age of Onset    No Known Problems Mother     No Known Problems Father       Social History     Socioeconomic History    Marital status:      Spouse name: None    Number of children: None    Years of education: None    Highest education level: None   Occupational History    None   Tobacco Use    Smoking status: Former     Packs/day: 1.00     Years: 52.00     Total pack years: 52.00     Types: Cigarettes     Start date: 5     Quit date: 11/29/2007     Years since quitting: 15.9     Passive exposure: Past    Smokeless tobacco: Former     Quit date: 2007   Vaping Use    Vaping Use: Never used   Substance and Sexual Activity    Alcohol use: Never    Drug use: Never    Sexual activity: Not Currently     Partners: Male   Other Topics Concern    None   Social History Narrative    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 BID Freestyle lancets BID    Living independently with spouse     Social Determinants of Health     Financial Resource Strain: Not on file   Food Insecurity: No Food Insecurity (1/20/2022)    Hunger Vital Sign     Worried About Running Out of Food in the Last Year: Never true     Ran Out of Food in the Last Year: Never true   Transportation Needs: No Transportation Needs (1/20/2022)    PRAPARE - Transportation     Lack of Transportation (Medical): No     Lack of Transportation (Non-Medical):  No   Physical Activity: Inactive (5/26/2021)    Exercise Vital Sign     Days of Exercise per Week: 0 days     Minutes of Exercise per Session: 0 min   Stress: No Stress Concern Present (5/26/2021)    [facility]     Feeling of Stress : Not at all   Social Connections: Not on file   Intimate Partner Violence: Not on file   Housing Stability: Low Risk  (1/20/2022)    Housing Stability Vital Sign     Unable to Pay for Housing in the Last Year: No     Number of Places Lived in the Last Year: 1     Unstable Housing in the Last Year: No        Current Outpatient Medications:     Accu-Chek FastClix Lancets MISC, Use 3 (three) times a day, Disp: 300 each, Rfl: 3    albuterol (Ventolin HFA) 90 mcg/act inhaler, Inhale 2 puffs every 6 (six) hours as needed for wheezing, Disp: 18 g, Rfl: 1    Ascorbic Acid (vitamin C) 1000 MG tablet, Take 1,000 mg by mouth daily, Disp: , Rfl:     aspirin (ECOTRIN LOW STRENGTH) 81 mg EC tablet, Take 1 tablet (81 mg total) by mouth daily Do not start before November 1, 2023., Disp: 30 tablet, Rfl: 0    atorvastatin (LIPITOR) 40 mg tablet, Take 1 tablet (40 mg total) by mouth every evening, Disp: 30 tablet, Rfl: 0    b complex vitamins capsule, Take 1 capsule by mouth daily, Disp: 30 capsule, Rfl: 2    BD Pen Needle Rosie U/F 32G X 4 MM MISC, USE DAILY, Disp: 100 each, Rfl: 1    Blood Glucose Monitoring Suppl (Accu-Chek Guide Me) w/Device KIT, USE 3 TIMES DAILY, Disp: 1 kit, Rfl: 2    Cholecalciferol (Vitamin D3) 50 MCG (2000 UT) TABS, Take 2,000 Units by mouth daily, Disp: , Rfl:     furosemide (LASIX) 40 mg tablet, TAKE 1 TABLET BY MOUTH  DAILY, Disp: 90 tablet, Rfl: 3    gabapentin (NEURONTIN) 300 mg capsule, Take 1 capsule (300 mg total) by mouth 3 (three) times a day, Disp: 270 capsule, Rfl: 3    glipiZIDE (GLUCOTROL) 10 mg tablet, TAKE 1 TABLET BY MOUTH  TWICE DAILY BEFORE MEALS, Disp: 180 tablet, Rfl: 3    glucose blood (Accu-Chek Celeste Plus) test strip, Use 1 each 3 (three) times a day Use as instructed, Disp: 300 each, Rfl: 3    Lancets (freestyle) lancets, Check blood glucose 3 times daily, Disp: 300 each, Rfl: 0    Levemir FlexPen 100 units/mL injection pen, INJECT SUBCUTANEOUSLY 26 UNITS  DAILY AT BEDTIME, Disp: 30 mL, Rfl: 2    levothyroxine 50 mcg tablet, TAKE 1 TABLET BY MOUTH  DAILY, Disp: 90 tablet, Rfl: 3    metoprolol tartrate (LOPRESSOR) 50 mg tablet, TAKE ONE-HALF TABLET BY  MOUTH TWICE DAILY, Disp: 90 tablet, Rfl: 3    oxygen gas, Inhale 2 L/min daily at bedtime as needed home oxygen nightly, Disp: , Rfl:     pantoprazole (PROTONIX) 40 mg tablet, TAKE 1 TABLET BY MOUTH  DAILY, Disp: 90 tablet, Rfl: 3    warfarin (COUMADIN) 5 mg tablet, TAKE 1 TO 2 TABLETS BY  MOUTH DAILY OR AS DIRECTED, Disp: 180 tablet, Rfl: 3  No current facility-administered medications for this visit. Review of Systems   Constitutional:  Negative for appetite change, chills, fatigue, fever and unexpected weight change. HENT:  Negative for congestion, hearing loss and postnasal drip. Respiratory:  Negative for cough and shortness of breath. Cardiovascular:  Negative for leg swelling. Musculoskeletal:  Positive for gait problem. Skin:  Positive for wound (RLE). Negative for rash. Neurological:  Negative for numbness. Hematological:  Does not bruise/bleed easily. Psychiatric/Behavioral: Negative. Objective:  /65   Pulse 60   Temp 97.8 °F (36.6 °C)   Resp 18   Ht 5' 5" (1.651 m)   Wt 74.8 kg (165 lb)   BMI 27.46 kg/m²   Pain Score: 0-No pain     Physical Exam  Vitals reviewed. Constitutional:       General: She is not in acute distress. Appearance: Normal appearance. She is well-developed. She is obese. HENT:      Head: Normocephalic and atraumatic. Cardiovascular:      Rate and Rhythm: Normal rate. Pulmonary:      Effort: Pulmonary effort is normal.   Musculoskeletal:         General: No deformity. Right lower leg: No edema. Left lower leg: No edema. Skin:     General: Skin is warm and dry. Findings: Wound (RLE) present. Comments: Pink hypergranular tissue. See wound assessment   Neurological:      General: No focal deficit present. Mental Status: She is alert and oriented to person, place, and time. Gait: Gait normal.   Psychiatric:         Mood and Affect: Mood and affect normal.         Behavior: Behavior normal. Behavior is cooperative.          Wound 10/29/23 Venous Ulcer Pretibial Right (Active)   Wound Image Images linked 11/06/23 0814   Wound Description Yellow;Pink;Hypergranulation 11/06/23 0814   Radha-wound Assessment Intact 11/06/23 0814   Wound Length (cm) 1.4 cm 11/06/23 0814   Wound Width (cm) 1.4 cm 11/06/23 0814   Wound Depth (cm) 0.1 cm 11/06/23 0814   Wound Surface Area (cm^2) 1.96 cm^2 11/06/23 0814   Wound Volume (cm^3) 0.196 cm^3 11/06/23 0814   Calculated Wound Volume (cm^3) 0.2 cm^3 11/06/23 0814   Change in Wound Size % 95 11/06/23 0814   Drainage Amount Moderate 11/06/23 0814   Drainage Description Serosanguineous 11/06/23 0814   Non-staged Wound Description Full thickness 11/06/23 0814   Dressing Status Intact 11/06/23 0814           Chemical Caut Of A Wound     Date/Time  11/6/2023 8:20 AM     Performed by  Shayna Lobo PA-C   Authorized by  Shayna Lobo PA-C      Associated wounds:   Wound 10/29/23 Venous Ulcer Pretibial Right   Universal Protocol   Consent: Verbal consent obtained. Written consent obtained. Risks and benefits: risks, benefits and alternatives were discussed  Consent given by: patient  Time out: Immediately prior to procedure a "time out" was called to verify the correct patient, procedure, equipment, support staff and site/side marked as required. Patient understanding: patient states understanding of the procedure being performed  Patient identity confirmed: verbally with patient      Local anesthesia used: yes     Anesthesia   Local anesthesia used: yes  Local Anesthetic: topical anesthetic     Sedation   Patient sedated: no        Specimen: no    Culture: no   Procedure Details   Procedure Notes: After permission and placement of topical local anesthetic, 1 Silver nitrate stick(s) were used to cauterize the hypergranular tissue of the open wound. Normal saline was used to neutralize the reaction. A dressing was applied, see orders. Patient tolerated procedure well.       Patient tolerance: Patient tolerated the procedure well with no immediate complications                    Wound Instructions:  Orders Placed This Encounter   Procedures    Wound cleansing and dressings     Right Leg wound:     Wash your hands with soap and water. Remove old dressing, discard into plastic bag and place in trash. Cleanse the wound with normal saline solution prior to applying a clean dressing. Do not use tissue or cotton balls. Do not scrub the wound. Pat dry using gauze. Shower yes, if able to use cast cover. Do not get dressing wet. Apply polymem cut fit to the open wound. Cover with gauze  Secure with rolled gauze and tape  Change dressing 3 times weekly. The above was completed today at the wound center. Standing Status:   Future     Standing Expiration Date:   11/6/2024    Wound compression and edema control     Elastic Tubular Stocking: spanda- size F    Tubular elastic bandage: Apply from base of toes to behind the knee. Apply in AM, may remove for sleep. Avoid prolonged standing in one place. Elevate leg(s) above the level of the heart when sitting or as much as possible. Standing Status:   Future     Standing Expiration Date:   11/6/2024       Total time spent today:  30 minutes. This includes reviewing the patient's chart, pertinent physician records [facility] [place], Family Medicine, 11/1/23, Dr. Duc Guillen, 10/28/23, Dr Ada Parker, Nephrology 9/7/23, and Hgb A1c 10/28/23. Sonya Gibson PA-C, [facility]    Portions of the record may have been created with voice recognition software. Occasional wrong word or "sound alike" substitutions may have occurred due to the inherent limitations of voice recognition software. Read the chart carefully and recognize, using context, where substitutions have occurred.

## 2023-11-16 ENCOUNTER — OFFICE VISIT (OUTPATIENT)
Dept: WOUND CARE | Facility: CLINIC | Age: 83
End: 2023-11-16
Payer: COMMERCIAL

## 2023-11-16 VITALS
HEART RATE: 63 BPM | TEMPERATURE: 97.8 F | SYSTOLIC BLOOD PRESSURE: 158 MMHG | RESPIRATION RATE: 18 BRPM | DIASTOLIC BLOOD PRESSURE: 72 MMHG

## 2023-11-16 DIAGNOSIS — L97.919 VENOUS ULCER OF RIGHT LEG (HCC): Primary | ICD-10-CM

## 2023-11-16 DIAGNOSIS — I83.019 VENOUS ULCER OF RIGHT LEG (HCC): Primary | ICD-10-CM

## 2023-11-16 PROCEDURE — 99213 OFFICE O/P EST LOW 20 MIN: CPT | Performed by: ORTHOPAEDIC SURGERY

## 2023-11-16 RX ORDER — LIDOCAINE 40 MG/G
CREAM TOPICAL ONCE
Status: COMPLETED | OUTPATIENT
Start: 2023-11-16 | End: 2023-11-16

## 2023-11-16 RX ADMIN — LIDOCAINE 1 APPLICATION: 40 CREAM TOPICAL at 08:05

## 2023-11-16 NOTE — PATIENT INSTRUCTIONS
Orders Placed This Encounter   Procedures    Wound cleansing and dressings     Right Leg wound:      Wash your hands with soap and water. Remove old dressing, discard into plastic bag and place in trash. Cleanse the wound with normal saline solution prior to applying a clean dressing. Do not use tissue or cotton balls. Do not scrub the wound. Pat dry using gauze. Shower yes, if able to use cast cover. Do not get dressing wet. Apply polymem cut fit to the open wound. Cover with gauze  Secure with rolled gauze and tape  Change dressing 3 times weekly. The above was completed today at the wound center. Standing Status:   Future     Standing Expiration Date:   11/16/2024    Wound compression and edema control     Elastic Tubular Stocking: spanda- size F     Tubular elastic bandage: Apply from base of toes to behind the knee. Apply in AM, may remove for sleep. Avoid prolonged standing in one place.      Elevate leg(s) above the level of the heart when sitting or as much as possible     Standing Status:   Future     Standing Expiration Date:   11/16/2024

## 2023-11-16 NOTE — PROGRESS NOTES
Patient ID: Alee  is a 80 y.o. female Date of Birth 1940       Chief Complaint   Patient presents with    Follow Up Wound Care Visit     RLE wound       Allergies:  Patient has no known allergies. Diagnosis:   Diagnosis ICD-10-CM Associated Orders   1. Venous ulcer of right leg (HCC)  I83.019 lidocaine (LMX) 4 % cream    L97.919 Wound cleansing and dressings     Wound compression and edema control           Assessment and Plan :  Follow up evaluation of right venous ulcer slowly improving. Continue wound management with Polymem, see wound orders below   Continue compression with Tubigrip  No harsh cleansers such as alcohol, peroxide, or antibacterial soap, do not submerge in water  Can cleanse with NSS at dressing changes. Counseled on importance of frequent elevation of leg and increase exercise/walking for wound healing. Follow-up in 2 week(s) or call sooner with questions or concerns or any signs of infection such as redness, swelling, increased/purulent drainage, fever, chills, increased severe pain. Subjective:   11/6:  Pt is an 80 y.o. female with pmhx HTN, DMII (A1c 8.7),  CKD, Pafib, CVA (10/28/23 on Coumadin/ASA 81mg) with residual deficits (left sided facial drop and leg weakness) who presents for initial eval of RLE venous ulcer which has been present for about 2 years. Pt has been covering wound with a bandaid. Does not have an odor. No smoking, ETOH or drug use. Pt denies any sob, fatigue, N/V, CP, fever or chills. 11/16: Patient presents for followup evaluation of RLE venous ulcer. No new complaints. No increased pain or drainage. Has been using Polymem on the wound bed and Tubigrip for compression. Patient is using her compression pumps twice daily. Pt denies any fevers or chills.         The following portions of the patient's history were reviewed and updated as appropriate:   Patient Active Problem List   Diagnosis    Hyperlipidemia    Chronic anticoagulation Hypertension, essential    Coronary artery disease of native artery of native heart with stable angina pectoris (HCC)    Chronic obstructive pulmonary disease (HCC)    Diabetes mellitus with neurological manifestation (HCC)    Hypothyroidism    Iron deficiency    GERD (gastroesophageal reflux disease)    AVM (arteriovenous malformation) of small bowel, acquired with hemorrhage    Angiectasia    Other vascular disorders of intestine (HCC)    Angioedema    Aortic valve replaced    Cardiomyopathy (HCC)    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HCC)    Stage 3a chronic kidney disease    Chronic kidney disease-mineral and bone disorder    Primary osteoarthritis involving multiple joints    Paroxysmal atrial fibrillation (HCC)    Neutropenia associated with infection (HCC)    Herpes simplex labialis    Restrictive lung disease    Nocturnal hypoxemia    Stroke-like symptoms    Hypertensive urgency    Leg cramps     Past Medical History:   Diagnosis Date    Anemia     Aneurysm of thoracic aorta (HCC)     Aortic valve disorder     Benign neoplasm of sigmoid colon     Cardiomyopathy ([address])     last assessed: 10/10/2014    CHF (congestive heart failure) (HCC)     Chronic kidney disease     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[address])     last assessed: 10/10/2014    Diabetic nephropathy (HCC)     RAMON (dyspnea on exertion)     GERD (gastroesophageal reflux disease)     History of emphysema (HCC)     Hyperlipidemia     TIA (transient ischemic attack)      Past Surgical History:   Procedure Laterality Date    AORTIC VALVE REPLACEMENT      CARDIAC PACEMAKER PLACEMENT      last assessed: 10/10/2014    CARDIAC SURGERY      aortic valve replacement    CHOLECYSTECTOMY      COLONOSCOPY      HYSTERECTOMY      INSERT / REPLACE / REMOVE PACEMAKER      KNEE SURGERY Right     OTHER SURGICAL HISTORY      Capsule ENDOscopy description: 12/19/2012    [place] COLONOSCOPY FLX DX W/COLLJ SPEC WHEN PFRMD N/A 5/31/2018    Procedure: single balloon ENTEROSCOPY;  Surgeon: Sharron Queen MD;  Location: [facility]; Service: Gastroenterology    [place] ESOPHAGOGASTRODUODENOSCOPY TRANSORAL DIAGNOSTIC N/A 11/29/2017    Procedure: EGD AND COLONOSCOPY;  Surgeon: Lynsey Gale MD;  Location: [facility]; Service: Gastroenterology     Family History   Problem Relation Age of Onset    No Known Problems Mother     No Known Problems Father      Social History     Socioeconomic History    Marital status:      Spouse name: None    Number of children: None    Years of education: None    Highest education level: None   Occupational History    None   Tobacco Use    Smoking status: Former     Packs/day: 1.00     Years: 52.00     Total pack years: 52.00     Types: Cigarettes     Start date: 5     Quit date: 11/29/2007     Years since quitting: 15.9     Passive exposure: Past    Smokeless tobacco: Former     Quit date: 2007   Vaping Use    Vaping Use: Never used   Substance and Sexual Activity    Alcohol use: Never    Drug use: Never    Sexual activity: Not Currently     Partners: Male   Other Topics Concern    None   Social History Narrative    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 BID Freestyle lancets BID    Living independently with spouse     Social Determinants of Health     Financial Resource Strain: Not on file   Food Insecurity: No Food Insecurity (1/20/2022)    Hunger Vital Sign     Worried About Running Out of Food in the Last Year: Never true     Ran Out of Food in the Last Year: Never true   Transportation Needs: No Transportation Needs (1/20/2022)    PRAPARE - Transportation     Lack of Transportation (Medical): No     Lack of Transportation (Non-Medical):  No   Physical Activity: Inactive (5/26/2021)    Exercise Vital Sign     Days of Exercise per Week: 0 days     Minutes of Exercise per Session: 0 min   Stress: No Stress Concern Present (5/26/2021)    [address] [place] [address]     Feeling of Stress : Not at all Social Connections: Not on file   Intimate Partner Violence: Not on file   Housing Stability: Low Risk  (1/20/2022)    Housing Stability Vital Sign     Unable to Pay for Housing in the Last Year: No     Number of Places Lived in the Last Year: 1     Unstable Housing in the Last Year: No       Current Outpatient Medications:     Accu-Chek FastClix Lancets MISC, Use 3 (three) times a day, Disp: 300 each, Rfl: 3    albuterol (Ventolin HFA) 90 mcg/act inhaler, Inhale 2 puffs every 6 (six) hours as needed for wheezing, Disp: 18 g, Rfl: 1    Ascorbic Acid (vitamin C) 1000 MG tablet, Take 1,000 mg by mouth daily, Disp: , Rfl:     aspirin (ECOTRIN LOW STRENGTH) 81 mg EC tablet, Take 1 tablet (81 mg total) by mouth daily Do not start before November 1, 2023., Disp: 30 tablet, Rfl: 0    atorvastatin (LIPITOR) 40 mg tablet, Take 1 tablet (40 mg total) by mouth every evening, Disp: 30 tablet, Rfl: 0    b complex vitamins capsule, Take 1 capsule by mouth daily, Disp: 30 capsule, Rfl: 2    BD Pen Needle Rosie U/F 32G X 4 MM MISC, USE DAILY, Disp: 100 each, Rfl: 1    Blood Glucose Monitoring Suppl (Accu-Chek Guide Me) w/Device KIT, USE 3 TIMES DAILY, Disp: 1 kit, Rfl: 2    Cholecalciferol (Vitamin D3) 50 MCG (2000 UT) TABS, Take 2,000 Units by mouth daily, Disp: , Rfl:     furosemide (LASIX) 40 mg tablet, TAKE 1 TABLET BY MOUTH  DAILY, Disp: 90 tablet, Rfl: 3    gabapentin (NEURONTIN) 300 mg capsule, Take 1 capsule (300 mg total) by mouth 3 (three) times a day, Disp: 270 capsule, Rfl: 3    glipiZIDE (GLUCOTROL) 10 mg tablet, TAKE 1 TABLET BY MOUTH  TWICE DAILY BEFORE MEALS, Disp: 180 tablet, Rfl: 3    glucose blood (Accu-Chek Celeste Plus) test strip, Use 1 each 3 (three) times a day Use as instructed, Disp: 300 each, Rfl: 3    Lancets (freestyle) lancets, Check blood glucose 3 times daily, Disp: 300 each, Rfl: 0    Levemir FlexPen 100 units/mL injection pen, INJECT SUBCUTANEOUSLY 26 UNITS  DAILY AT BEDTIME, Disp: 30 mL, Rfl: 2 levothyroxine 50 mcg tablet, TAKE 1 TABLET BY MOUTH  DAILY, Disp: 90 tablet, Rfl: 3    metoprolol tartrate (LOPRESSOR) 50 mg tablet, TAKE ONE-HALF TABLET BY  MOUTH TWICE DAILY, Disp: 90 tablet, Rfl: 3    oxygen gas, Inhale 2 L/min daily at bedtime as needed home oxygen nightly, Disp: , Rfl:     pantoprazole (PROTONIX) 40 mg tablet, TAKE 1 TABLET BY MOUTH  DAILY, Disp: 90 tablet, Rfl: 3    warfarin (COUMADIN) 5 mg tablet, TAKE 1 TO 2 TABLETS BY  MOUTH DAILY OR AS DIRECTED, Disp: 180 tablet, Rfl: 3  No current facility-administered medications for this visit. Review of Systems   Constitutional:  Negative for appetite change, chills, fatigue, fever and unexpected weight change. HENT:  Negative for congestion, hearing loss and postnasal drip. Respiratory:  Negative for cough and shortness of breath. Cardiovascular:  Negative for leg swelling. Musculoskeletal:  Positive for gait problem. Skin:  Positive for wound (RLE). Negative for rash. Neurological:  Negative for numbness. Hematological:  Does not bruise/bleed easily. Psychiatric/Behavioral: Negative. Objective:  /72   Pulse 63   Temp 97.8 °F (36.6 °C)   Resp 18   Pain Score: 0-No pain     Physical Exam  Vitals reviewed. Constitutional:       General: She is not in acute distress. Appearance: Normal appearance. She is well-developed. She is obese. HENT:      Head: Normocephalic and atraumatic. Cardiovascular:      Rate and Rhythm: Normal rate. Pulmonary:      Effort: Pulmonary effort is normal.   Musculoskeletal:         General: No deformity. Right lower leg: No edema. Left lower leg: No edema. Skin:     General: Skin is warm and dry. Findings: Wound (RLE) present. Comments: Pink granular tissue with serosanguinous drainage. See wound assessment   Neurological:      General: No focal deficit present. Mental Status: She is alert and oriented to person, place, and time.       Gait: Gait normal.   Psychiatric:         Mood and Affect: Mood and affect normal.         Behavior: Behavior normal. Behavior is cooperative. Wound 10/29/23 Venous Ulcer Pretibial Right (Active)   Wound Image Images linked 11/16/23 0757   Wound Description Yellow;Granulation tissue 11/16/23 0757   Radha-wound Assessment Intact 11/16/23 0757   Wound Length (cm) 1 cm 11/16/23 0757   Wound Width (cm) 1 cm 11/16/23 0757   Wound Depth (cm) 0.1 cm 11/16/23 0757   Wound Surface Area (cm^2) 1 cm^2 11/16/23 0757   Wound Volume (cm^3) 0.1 cm^3 11/16/23 0757   Calculated Wound Volume (cm^3) 0.1 cm^3 11/16/23 0757   Change in Wound Size % 97.5 11/16/23 0757   Drainage Amount Moderate 11/16/23 0757   Drainage Description Serosanguineous 11/16/23 0757   Non-staged Wound Description Full thickness 11/16/23 0757   Dressing Status Intact 11/16/23 0757               Wound Instructions:  Orders Placed This Encounter   Procedures    Wound cleansing and dressings     Right Leg wound:      Wash your hands with soap and water. Remove old dressing, discard into plastic bag and place in trash. Cleanse the wound with normal saline solution prior to applying a clean dressing. Do not use tissue or cotton balls. Do not scrub the wound. Pat dry using gauze. Shower yes, if able to use cast cover. Do not get dressing wet. Apply polymem cut fit to the open wound. Cover with gauze  Secure with rolled gauze and tape  Change dressing 3 times weekly. The above was completed today at the wound center. Standing Status:   Future     Standing Expiration Date:   11/16/2024    Wound compression and edema control     Elastic Tubular Stocking: spanda- size E     Tubular elastic bandage: Apply from base of toes to behind the knee. Apply in AM, may remove for sleep. Avoid prolonged standing in one place.      Elevate leg(s) above the level of the heart when sitting or as much as possible     Standing Status:   Future     Standing Expiration Date:   11/16/2024       Tamir Alcantar PA-C, [facility]      Portions of the record may have been created with voice recognition software. Occasional wrong word or "sound alike" substitutions may have occurred due to the inherent limitations of voice recognition software. Read the chart carefully and recognize, using context, where substitutions have occurred.

## 2023-12-12 ENCOUNTER — TELEPHONE (OUTPATIENT)
Dept: OTHER | Facility: OTHER | Age: 83
End: 2023-12-12

## 2023-12-12 NOTE — TELEPHONE ENCOUNTER
Patient is calling regarding cancelling an appointment.     Date/Time: 12/12/23 at 9am    Patient was rescheduled: YES [] NO [x]    Patient requesting call back to reschedule: YES [] NO [x]  Patient not feeling well

## 2023-12-13 ENCOUNTER — APPOINTMENT (OUTPATIENT)
Age: 83
DRG: 811 | End: 2023-12-13
Payer: COMMERCIAL

## 2023-12-13 ENCOUNTER — TELEPHONE (OUTPATIENT)
Dept: CARDIOLOGY CLINIC | Facility: CLINIC | Age: 83
End: 2023-12-13

## 2023-12-13 ENCOUNTER — TELEPHONE (OUTPATIENT)
Dept: OTHER | Facility: HOSPITAL | Age: 83
End: 2023-12-13

## 2023-12-13 ENCOUNTER — OFFICE VISIT (OUTPATIENT)
Dept: CARDIOLOGY CLINIC | Facility: CLINIC | Age: 83
End: 2023-12-13
Payer: COMMERCIAL

## 2023-12-13 VITALS
OXYGEN SATURATION: 95 % | RESPIRATION RATE: 16 BRPM | HEART RATE: 50 BPM | HEIGHT: 65 IN | WEIGHT: 159 LBS | SYSTOLIC BLOOD PRESSURE: 124 MMHG | BODY MASS INDEX: 26.49 KG/M2 | DIASTOLIC BLOOD PRESSURE: 70 MMHG

## 2023-12-13 DIAGNOSIS — I48.0 PAROXYSMAL ATRIAL FIBRILLATION (HCC): ICD-10-CM

## 2023-12-13 DIAGNOSIS — I10 HYPERTENSION, ESSENTIAL: ICD-10-CM

## 2023-12-13 DIAGNOSIS — I48.0 PAROXYSMAL ATRIAL FIBRILLATION (HCC): Primary | ICD-10-CM

## 2023-12-13 DIAGNOSIS — J40 BRONCHITIS: ICD-10-CM

## 2023-12-13 DIAGNOSIS — I35.9 AORTIC VALVE DISORDER: ICD-10-CM

## 2023-12-13 DIAGNOSIS — Z79.01 CHRONIC ANTICOAGULATION: ICD-10-CM

## 2023-12-13 LAB
ALBUMIN SERPL BCP-MCNC: 3.7 G/DL (ref 3.5–5)
ALP SERPL-CCNC: 62 U/L (ref 34–104)
ALT SERPL W P-5'-P-CCNC: 10 U/L (ref 7–52)
ANION GAP SERPL CALCULATED.3IONS-SCNC: 13 MMOL/L
AST SERPL W P-5'-P-CCNC: 20 U/L (ref 13–39)
BASOPHILS # BLD AUTO: 0.02 THOUSANDS/ÂΜL (ref 0–0.1)
BASOPHILS NFR BLD AUTO: 0 % (ref 0–1)
BILIRUB SERPL-MCNC: 0.46 MG/DL (ref 0.2–1)
BUN SERPL-MCNC: 25 MG/DL (ref 5–25)
CALCIUM SERPL-MCNC: 8.6 MG/DL (ref 8.4–10.2)
CHLORIDE SERPL-SCNC: 103 MMOL/L (ref 96–108)
CO2 SERPL-SCNC: 22 MMOL/L (ref 21–32)
CREAT SERPL-MCNC: 1.33 MG/DL (ref 0.6–1.3)
EOSINOPHIL # BLD AUTO: 0.11 THOUSAND/ÂΜL (ref 0–0.61)
EOSINOPHIL NFR BLD AUTO: 2 % (ref 0–6)
ERYTHROCYTE [DISTWIDTH] IN BLOOD BY AUTOMATED COUNT: 17.7 % (ref 11.6–15.1)
GFR SERPL CREATININE-BSD FRML MDRD: 36 ML/MIN/1.73SQ M
GLUCOSE P FAST SERPL-MCNC: 284 MG/DL (ref 65–99)
HCT VFR BLD AUTO: 20.5 % (ref 34.8–46.1)
HGB BLD-MCNC: 5.6 G/DL (ref 11.5–15.4)
IMM GRANULOCYTES # BLD AUTO: 0.03 THOUSAND/UL (ref 0–0.2)
IMM GRANULOCYTES NFR BLD AUTO: 1 % (ref 0–2)
LYMPHOCYTES # BLD AUTO: 0.62 THOUSANDS/ÂΜL (ref 0.6–4.47)
LYMPHOCYTES NFR BLD AUTO: 10 % (ref 14–44)
MCH RBC QN AUTO: 22.4 PG (ref 26.8–34.3)
MCHC RBC AUTO-ENTMCNC: 26.8 G/DL (ref 31.4–37.4)
MCV RBC AUTO: 84 FL (ref 82–98)
MONOCYTES # BLD AUTO: 0.38 THOUSAND/ÂΜL (ref 0.17–1.22)
MONOCYTES NFR BLD AUTO: 6 % (ref 4–12)
NEUTROPHILS # BLD AUTO: 4.87 THOUSANDS/ÂΜL (ref 1.85–7.62)
NEUTS SEG NFR BLD AUTO: 81 % (ref 43–75)
NRBC BLD AUTO-RTO: 0 /100 WBCS
PLATELET # BLD AUTO: 369 THOUSANDS/UL (ref 149–390)
PMV BLD AUTO: 10.9 FL (ref 8.9–12.7)
POTASSIUM SERPL-SCNC: 3.4 MMOL/L (ref 3.5–5.3)
PROT SERPL-MCNC: 6.4 G/DL (ref 6.4–8.4)
RBC # BLD AUTO: 2.45 MILLION/UL (ref 3.81–5.12)
SODIUM SERPL-SCNC: 138 MMOL/L (ref 135–147)
TSH SERPL DL<=0.05 MIU/L-ACNC: 1.3 UIU/ML (ref 0.45–4.5)
WBC # BLD AUTO: 6.03 THOUSAND/UL (ref 4.31–10.16)

## 2023-12-13 PROCEDURE — 80053 COMPREHEN METABOLIC PANEL: CPT | Performed by: INTERNAL MEDICINE

## 2023-12-13 PROCEDURE — 71046 X-RAY EXAM CHEST 2 VIEWS: CPT

## 2023-12-13 PROCEDURE — 84443 ASSAY THYROID STIM HORMONE: CPT | Performed by: INTERNAL MEDICINE

## 2023-12-13 PROCEDURE — 85025 COMPLETE CBC W/AUTO DIFF WBC: CPT | Performed by: INTERNAL MEDICINE

## 2023-12-13 PROCEDURE — 99214 OFFICE O/P EST MOD 30 MIN: CPT | Performed by: INTERNAL MEDICINE

## 2023-12-13 PROCEDURE — 36415 COLL VENOUS BLD VENIPUNCTURE: CPT | Performed by: INTERNAL MEDICINE

## 2023-12-13 RX ORDER — AMOXICILLIN 500 MG/1
500 TABLET, FILM COATED ORAL 3 TIMES DAILY
Qty: 21 TABLET | Refills: 0 | Status: SHIPPED | OUTPATIENT
Start: 2023-12-13 | End: 2023-12-23

## 2023-12-13 NOTE — PROGRESS NOTES
[facility]  [place] [id number]  Cardiology Follow Up    Mamadou Carlos  1940  [id number]      1. Paroxysmal atrial fibrillation (HCC)        2. Aortic valve disorder        3. Hypertension, essential        4. Chronic anticoagulation            Chief Complaint   Patient presents with    Follow-up       Interval History: This patient presents for follow-up visit. Patient was hospitalized for strokelike symptoms a month or 2 ago. Patient had flulike symptoms initially but now has cough with expectoration. Patient feels weak and tired. Patient states that she has been compliant with all her present medications. No history of leg edema orthopnea PND. Patient does have shortness of breath with exertion. Patient did have an echocardiogram while she was hospitalized. She denies any history of bleeding issues.     Patient Active Problem List   Diagnosis    Hyperlipidemia    Chronic anticoagulation    Hypertension, essential    Coronary artery disease of native artery of native heart with stable angina pectoris (HCC)    Chronic obstructive pulmonary disease (HCC)    Diabetes mellitus with neurological manifestation (HCC)    Hypothyroidism    Iron deficiency    GERD (gastroesophageal reflux disease)    AVM (arteriovenous malformation) of small bowel, acquired with hemorrhage    Angiectasia    Other vascular disorders of intestine (HCC)    Angioedema    Aortic valve replaced    Cardiomyopathy (HCC)    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HCC)    Stage 3a chronic kidney disease    Chronic kidney disease-mineral and bone disorder    Primary osteoarthritis involving multiple joints    Paroxysmal atrial fibrillation (HCC)    Neutropenia associated with infection (HCC)    Herpes simplex labialis    Restrictive lung disease    Nocturnal hypoxemia    Stroke-like symptoms    Hypertensive urgency    Leg cramps     Past Medical History:   Diagnosis Date    Anemia Aneurysm of thoracic aorta (HCC)     Aortic valve disorder     Benign neoplasm of sigmoid colon     Cardiomyopathy ([address])     last assessed: 10/10/2014    CHF (congestive heart failure) (HCC)     Chronic kidney disease     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[address])     last assessed: 10/10/2014    Diabetic nephropathy (HCC)     RAMON (dyspnea on exertion)     GERD (gastroesophageal reflux disease)     History of emphysema (HCC)     Hyperlipidemia     TIA (transient ischemic attack)      Social History     Socioeconomic History    Marital status:      Spouse name: Not on file    Number of children: Not on file    Years of education: Not on file    Highest education level: Not on file   Occupational History    Not on file   Tobacco Use    Smoking status: Former     Current packs/day: 0.00     Average packs/day: 1 pack/day for 52.9 years (52.9 ttl pk-yrs)     Types: Cigarettes     Start date:      Quit date: 2007     Years since quittin.0     Passive exposure: Past    Smokeless tobacco: Former     Quit date:    Vaping Use    Vaping status: Never Used   Substance and Sexual Activity    Alcohol use: Never    Drug use: Never    Sexual activity: Not Currently     Partners: Male   Other Topics Concern    Not on file   Social History Narrative    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 BID Freestyle lancets BID    Living independently with spouse     Social Determinants of Health     Financial Resource Strain: Not on file   Food Insecurity: No Food Insecurity (2022)    Hunger Vital Sign     Worried About Running Out of Food in the Last Year: Never true     Ran Out of Food in the Last Year: Never true   Transportation Needs: No Transportation Needs (2022)    PRAPARE - Transportation     Lack of Transportation (Medical): No     Lack of Transportation (Non-Medical):  No   Physical Activity: Inactive (2021)    Exercise Vital Sign     Days of Exercise per Week: 0 days     Minutes of Exercise per Session: 0 min   Stress: No Stress Concern Present (5/26/2021)    [address] [facility]     Feeling of Stress : Not at all   Social Connections: Not on file   Intimate Partner Violence: Not on file   Housing Stability: Low Risk  (1/20/2022)    Housing Stability Vital Sign     Unable to Pay for Housing in the Last Year: No     Number of Places Lived in the Last Year: 1     Unstable Housing in the Last Year: No      Family History   Problem Relation Age of Onset    No Known Problems Mother     No Known Problems Father      Past Surgical History:   Procedure Laterality Date    AORTIC VALVE REPLACEMENT      CARDIAC PACEMAKER PLACEMENT      last assessed: 10/10/2014    CARDIAC SURGERY      aortic valve replacement    CHOLECYSTECTOMY      COLONOSCOPY      HYSTERECTOMY      INSERT / REPLACE / REMOVE PACEMAKER      KNEE SURGERY Right     OTHER SURGICAL HISTORY      Capsule ENDOscopy description: 12/19/2012    [place] COLONOSCOPY FLX DX W/COLLJ SPEC WHEN PFRMD N/A 5/31/2018    Procedure: single balloon ENTEROSCOPY;  Surgeon: Clayton Fung MD;  Location: [facility]; Service: Gastroenterology    [place] ESOPHAGOGASTRODUODENOSCOPY TRANSORAL DIAGNOSTIC N/A 11/29/2017    Procedure: EGD AND COLONOSCOPY;  Surgeon: Dequan Schultz MD;  Location: [facility];   Service: Gastroenterology       Current Outpatient Medications:     Accu-Chek FastClix Lancets MISC, Use 3 (three) times a day, Disp: 300 each, Rfl: 3    albuterol (Ventolin HFA) 90 mcg/act inhaler, Inhale 2 puffs every 6 (six) hours as needed for wheezing, Disp: 18 g, Rfl: 1    Ascorbic Acid (vitamin C) 1000 MG tablet, Take 1,000 mg by mouth daily, Disp: , Rfl:     aspirin (ECOTRIN LOW STRENGTH) 81 mg EC tablet, Take 1 tablet (81 mg total) by mouth daily Do not start before November 1, 2023., Disp: 30 tablet, Rfl: 0    atorvastatin (LIPITOR) 40 mg tablet, Take 1 tablet (40 mg total) by mouth every evening, Disp: 30 tablet, Rfl: 0    b complex vitamins capsule, Take 1 capsule by mouth daily, Disp: 30 capsule, Rfl: 2    BD Pen Needle Rosie U/F 32G X 4 MM MISC, USE DAILY, Disp: 100 each, Rfl: 1    Blood Glucose Monitoring Suppl (Accu-Chek Guide Me) w/Device KIT, USE 3 TIMES DAILY, Disp: 1 kit, Rfl: 2    Cholecalciferol (Vitamin D3) 50 MCG (2000 UT) TABS, Take 2,000 Units by mouth daily, Disp: , Rfl:     furosemide (LASIX) 40 mg tablet, TAKE 1 TABLET BY MOUTH  DAILY, Disp: 90 tablet, Rfl: 3    gabapentin (NEURONTIN) 300 mg capsule, Take 1 capsule (300 mg total) by mouth 3 (three) times a day, Disp: 270 capsule, Rfl: 3    glipiZIDE (GLUCOTROL) 10 mg tablet, TAKE 1 TABLET BY MOUTH  TWICE DAILY BEFORE MEALS, Disp: 180 tablet, Rfl: 3    glucose blood (Accu-Chek Celeste Plus) test strip, Use 1 each 3 (three) times a day Use as instructed, Disp: 300 each, Rfl: 3    Lancets (freestyle) lancets, Check blood glucose 3 times daily, Disp: 300 each, Rfl: 0    Levemir FlexPen 100 units/mL injection pen, INJECT SUBCUTANEOUSLY 26 UNITS  DAILY AT BEDTIME, Disp: 30 mL, Rfl: 2    levothyroxine 50 mcg tablet, TAKE 1 TABLET BY MOUTH  DAILY, Disp: 90 tablet, Rfl: 3    metoprolol tartrate (LOPRESSOR) 50 mg tablet, TAKE ONE-HALF TABLET BY  MOUTH TWICE DAILY, Disp: 90 tablet, Rfl: 3    oxygen gas, Inhale 2 L/min daily at bedtime as needed home oxygen nightly, Disp: , Rfl:     pantoprazole (PROTONIX) 40 mg tablet, TAKE 1 TABLET BY MOUTH  DAILY, Disp: 90 tablet, Rfl: 3    warfarin (COUMADIN) 5 mg tablet, TAKE 1 TO 2 TABLETS BY  MOUTH DAILY OR AS DIRECTED, Disp: 180 tablet, Rfl: 3  No Known Allergies    Labs:  No results displayed because visit has over 200 results. Imaging: No results found.     Review of Systems:  Review of Systems  REVIEW OF SYSTEMS:  Constitutional:  Denies fever or chills   Eyes:  Denies change in visual acuity   HENT:  Denies nasal congestion or sore throat   Respiratory: Cough and shortness of breath  Cardiovascular:  Denies chest pain or edema   GI:  Denies abdominal pain, nausea, vomiting, bloody stools or diarrhea   :  Denies dysuria, frequency, difficulty in micturition and nocturia  Musculoskeletal:  Denies back pain or joint pain   Neurologic:  Denies headache, focal weakness or sensory changes   Endocrine:  Denies polyuria or polydipsia   Lymphatic:  Denies swollen glands   Psychiatric:  Denies depression or anxiety    Physical Exam:    /70 (BP Location: Left arm, Patient Position: Sitting, Cuff Size: Standard)   Pulse (!) 50   Resp 16   Ht 5' 5" (1.651 m)   Wt 72.1 kg (159 lb)   SpO2 95%   BMI 26.46 kg/m²     Physical Exam  PHYSICAL EXAM:  General:  Patient is not in acute distress   Head: Normocephalic, Atraumatic. HEENT:  Both pupils normal-size atraumatic, normocephalic, nonicteric  Neck:  JVP not raised. Trachea central. No carotid bruit  Respiratory:  normal breath sounds no crackles. no rhonchi  Cardiovascular:  Regular rate and rhythm no S3 no murmurs. Heart sounds consistent with prosthetic mechanical valve  GI:  Abdomen soft nontender. No organomegaly. Lymphatic:  No cervical or inguinal lymphadenopathy  Neurologic:  Patient is awake alert, oriented . Grossly nonfocal  Extremities no edema    Echocardiogram in October 2023   Left Ventricle: Left ventricular cavity size is normal. Wall thickness is moderately increased. The left ventricular ejection fraction is 45%. Systolic function is mildly reduced. There is mild global hypokinesis with regional variation. Diastolic function is mildly abnormal, consistent with grade I (abnormal) relaxation. IVS: There is abnormal septal motion consistent with post-operative status. Left Atrium: The atrium is mildly dilated. Aortic Valve: The aortic valve was not well visualized. There is a mechanical valve. The prosthetic valve appears well-seated. There is trace paravalvular regurgitation. There is no evidence of transvalvular regurgitation.  The gradient recorded across the prosthetic aortic valve is within the expected range. The aortic valve mean gradient is 7 mmHg. Mitral Valve: There is annular calcification. There is mild regurgitation. Pacemaker interrogation data reviewed. Normally functioning device. No high heart rate episodes noted. Discussion/Summary:  Patient with multiple medical problems who seems to be doing reasonably well from cardiac standpoint. Previous studies reviewed with patient. Medications reviewed and possible side effects discussed. concepts of cardiovascular disease , signs and symptoms of heart disease. Dietary and risk factor modification reinforced. All questions answered. Safety measures reviewed. Patient advised to report any problems prompting medical attention. Given symptoms of cough and shortness of breath with recent respiratory illness, patient started on amoxicillin 500 mg 3 times a day for 7 days. Check blood work. Check chest x-ray. Patient to follow-up with primary care physician. Patient to let us know how she is doing in the next week or so. Continue to follow PT/INR for anticoagulation for prosthetic mechanical aortic valve. .  Symptoms to watch out from cardiac standpoint which would indicate the need for further cardiac evaluation discussed. Follow-up in 4 months or earlier as needed. Patient is agreeable with the plan of care.

## 2023-12-13 NOTE — TELEPHONE ENCOUNTER
----- Message from Tyler Young MD sent at 12/13/2023  1:06 PM EST -----  Please call the patient. Chest x-ray overall no abnormal findings.

## 2023-12-14 ENCOUNTER — APPOINTMENT (EMERGENCY)
Dept: RADIOLOGY | Facility: HOSPITAL | Age: 83
DRG: 811 | End: 2023-12-14
Payer: COMMERCIAL

## 2023-12-14 ENCOUNTER — HOSPITAL ENCOUNTER (INPATIENT)
Facility: HOSPITAL | Age: 83
LOS: 9 days | Discharge: HOME | DRG: 811 | End: 2023-12-23
Attending: EMERGENCY MEDICINE | Admitting: STUDENT IN AN ORGANIZED HEALTH CARE EDUCATION/TRAINING PROGRAM
Payer: COMMERCIAL

## 2023-12-14 DIAGNOSIS — R79.1 SUBTHERAPEUTIC INTERNATIONAL NORMALIZED RATIO (INR): ICD-10-CM

## 2023-12-14 DIAGNOSIS — D64.9 ANEMIA: Primary | ICD-10-CM

## 2023-12-14 DIAGNOSIS — E61.1 IRON DEFICIENCY: ICD-10-CM

## 2023-12-14 DIAGNOSIS — R79.1 SUPRATHERAPEUTIC INR: ICD-10-CM

## 2023-12-14 DIAGNOSIS — Z95.2 H/O MECHANICAL AORTIC VALVE REPLACEMENT: ICD-10-CM

## 2023-12-14 DIAGNOSIS — R06.02 SHORTNESS OF BREATH: ICD-10-CM

## 2023-12-14 DIAGNOSIS — D62 ACUTE BLOOD LOSS ANEMIA: ICD-10-CM

## 2023-12-14 DIAGNOSIS — K55.21 AVM (ARTERIOVENOUS MALFORMATION) OF SMALL BOWEL, ACQUIRED WITH HEMORRHAGE: ICD-10-CM

## 2023-12-14 DIAGNOSIS — I48.0 PAROXYSMAL ATRIAL FIBRILLATION (HCC): ICD-10-CM

## 2023-12-14 LAB
2HR DELTA HS TROPONIN: 0 NG/L
4HR DELTA HS TROPONIN: 1 NG/L
ABO GROUP BLD: NORMAL
ALBUMIN SERPL BCP-MCNC: 4 G/DL (ref 3.5–5)
ALP SERPL-CCNC: 61 U/L (ref 34–104)
ALT SERPL W P-5'-P-CCNC: 12 U/L (ref 7–52)
ANION GAP SERPL CALCULATED.3IONS-SCNC: 9 MMOL/L
APTT PPP: 37 SECONDS (ref 23–37)
APTT PPP: 53 SECONDS (ref 23–37)
AST SERPL W P-5'-P-CCNC: 15 U/L (ref 13–39)
BASOPHILS # BLD AUTO: 0.02 THOUSANDS/ÂΜL (ref 0–0.1)
BASOPHILS NFR BLD AUTO: 0 % (ref 0–1)
BILIRUB SERPL-MCNC: 0.46 MG/DL (ref 0.2–1)
BLD GP AB SCN SERPL QL: NEGATIVE
BUN SERPL-MCNC: 28 MG/DL (ref 5–25)
CALCIUM SERPL-MCNC: 8.6 MG/DL (ref 8.4–10.2)
CARDIAC TROPONIN I PNL SERPL HS: 19 NG/L
CARDIAC TROPONIN I PNL SERPL HS: 19 NG/L
CARDIAC TROPONIN I PNL SERPL HS: 20 NG/L
CHLORIDE SERPL-SCNC: 104 MMOL/L (ref 96–108)
CO2 SERPL-SCNC: 26 MMOL/L (ref 21–32)
CREAT SERPL-MCNC: 1.37 MG/DL (ref 0.6–1.3)
EOSINOPHIL # BLD AUTO: 0.2 THOUSAND/ÂΜL (ref 0–0.61)
EOSINOPHIL NFR BLD AUTO: 4 % (ref 0–6)
ERYTHROCYTE [DISTWIDTH] IN BLOOD BY AUTOMATED COUNT: 17.5 % (ref 11.6–15.1)
GFR SERPL CREATININE-BSD FRML MDRD: 35 ML/MIN/1.73SQ M
GLUCOSE SERPL-MCNC: 152 MG/DL (ref 65–140)
GLUCOSE SERPL-MCNC: 166 MG/DL (ref 65–140)
HCT VFR BLD AUTO: 18.9 % (ref 34.8–46.1)
HCT VFR BLD AUTO: 24.3 % (ref 34.8–46.1)
HGB BLD-MCNC: 5.5 G/DL (ref 11.5–15.4)
HGB BLD-MCNC: 7.4 G/DL (ref 11.5–15.4)
IMM GRANULOCYTES # BLD AUTO: 0.02 THOUSAND/UL (ref 0–0.2)
IMM GRANULOCYTES NFR BLD AUTO: 0 % (ref 0–2)
INR PPP: 2.69 (ref 0.84–1.19)
LYMPHOCYTES # BLD AUTO: 1.36 THOUSANDS/ÂΜL (ref 0.6–4.47)
LYMPHOCYTES NFR BLD AUTO: 26 % (ref 14–44)
MCH RBC QN AUTO: 23.3 PG (ref 26.8–34.3)
MCHC RBC AUTO-ENTMCNC: 29.6 G/DL (ref 31.4–37.4)
MCV RBC AUTO: 79 FL (ref 82–98)
MONOCYTES # BLD AUTO: 0.43 THOUSAND/ÂΜL (ref 0.17–1.22)
MONOCYTES NFR BLD AUTO: 8 % (ref 4–12)
NEUTROPHILS # BLD AUTO: 3.15 THOUSANDS/ÂΜL (ref 1.85–7.62)
NEUTS SEG NFR BLD AUTO: 62 % (ref 43–75)
NRBC BLD AUTO-RTO: 0 /100 WBCS
PLATELET # BLD AUTO: 381 THOUSANDS/UL (ref 149–390)
PMV BLD AUTO: 9.7 FL (ref 8.9–12.7)
POTASSIUM SERPL-SCNC: 3.4 MMOL/L (ref 3.5–5.3)
PROT SERPL-MCNC: 7.3 G/DL (ref 6.4–8.4)
PROTHROMBIN TIME: 29.5 SECONDS (ref 11.6–14.5)
RBC # BLD AUTO: 2.4 MILLION/UL (ref 3.81–5.12)
RH BLD: POSITIVE
SODIUM SERPL-SCNC: 139 MMOL/L (ref 135–147)
SPECIMEN EXPIRATION DATE: NORMAL
WBC # BLD AUTO: 5.18 THOUSAND/UL (ref 4.31–10.16)

## 2023-12-14 PROCEDURE — 86850 RBC ANTIBODY SCREEN: CPT

## 2023-12-14 PROCEDURE — 84484 ASSAY OF TROPONIN QUANT: CPT

## 2023-12-14 PROCEDURE — 85018 HEMOGLOBIN: CPT | Performed by: NURSE PRACTITIONER

## 2023-12-14 PROCEDURE — 85730 THROMBOPLASTIN TIME PARTIAL: CPT | Performed by: STUDENT IN AN ORGANIZED HEALTH CARE EDUCATION/TRAINING PROGRAM

## 2023-12-14 PROCEDURE — 85025 COMPLETE CBC W/AUTO DIFF WBC: CPT

## 2023-12-14 PROCEDURE — 30233N1 TRANSFUSION OF NONAUTOLOGOUS RED BLOOD CELLS INTO PERIPHERAL VEIN, PERCUTANEOUS APPROACH: ICD-10-PCS | Performed by: INTERNAL MEDICINE

## 2023-12-14 PROCEDURE — 36415 COLL VENOUS BLD VENIPUNCTURE: CPT

## 2023-12-14 PROCEDURE — 71046 X-RAY EXAM CHEST 2 VIEWS: CPT

## 2023-12-14 PROCEDURE — 82948 REAGENT STRIP/BLOOD GLUCOSE: CPT

## 2023-12-14 PROCEDURE — 85610 PROTHROMBIN TIME: CPT

## 2023-12-14 PROCEDURE — P9016 RBC LEUKOCYTES REDUCED: HCPCS

## 2023-12-14 PROCEDURE — 85014 HEMATOCRIT: CPT | Performed by: NURSE PRACTITIONER

## 2023-12-14 PROCEDURE — 86900 BLOOD TYPING SEROLOGIC ABO: CPT

## 2023-12-14 PROCEDURE — 85730 THROMBOPLASTIN TIME PARTIAL: CPT

## 2023-12-14 PROCEDURE — 86923 COMPATIBILITY TEST ELECTRIC: CPT

## 2023-12-14 PROCEDURE — 93005 ELECTROCARDIOGRAM TRACING: CPT

## 2023-12-14 PROCEDURE — 99285 EMERGENCY DEPT VISIT HI MDM: CPT

## 2023-12-14 PROCEDURE — 86901 BLOOD TYPING SEROLOGIC RH(D): CPT

## 2023-12-14 PROCEDURE — 99223 1ST HOSP IP/OBS HIGH 75: CPT | Performed by: STUDENT IN AN ORGANIZED HEALTH CARE EDUCATION/TRAINING PROGRAM

## 2023-12-14 PROCEDURE — 99222 1ST HOSP IP/OBS MODERATE 55: CPT | Performed by: INTERNAL MEDICINE

## 2023-12-14 PROCEDURE — C9113 INJ PANTOPRAZOLE SODIUM, VIA: HCPCS | Performed by: PHYSICIAN ASSISTANT

## 2023-12-14 PROCEDURE — 80053 COMPREHEN METABOLIC PANEL: CPT

## 2023-12-14 RX ORDER — INSULIN LISPRO 100 [IU]/ML
1-6 INJECTION, SOLUTION INTRAVENOUS; SUBCUTANEOUS
Status: DISCONTINUED | OUTPATIENT
Start: 2023-12-14 | End: 2023-12-23 | Stop reason: HOSPADM

## 2023-12-14 RX ORDER — POTASSIUM CHLORIDE 20 MEQ/1
40 TABLET, EXTENDED RELEASE ORAL ONCE
Status: COMPLETED | OUTPATIENT
Start: 2023-12-14 | End: 2023-12-14

## 2023-12-14 RX ORDER — VITAMIN B COMPLEX
1 CAPSULE ORAL DAILY
Status: DISCONTINUED | OUTPATIENT
Start: 2023-12-15 | End: 2023-12-14

## 2023-12-14 RX ORDER — HEPARIN SODIUM 10000 [USP'U]/100ML
3-20 INJECTION, SOLUTION INTRAVENOUS
Status: DISCONTINUED | OUTPATIENT
Start: 2023-12-14 | End: 2023-12-23

## 2023-12-14 RX ORDER — GABAPENTIN 300 MG/1
300 CAPSULE ORAL 2 TIMES DAILY
Status: DISCONTINUED | OUTPATIENT
Start: 2023-12-14 | End: 2023-12-23 | Stop reason: HOSPADM

## 2023-12-14 RX ORDER — MELATONIN
2000 DAILY
Status: DISCONTINUED | OUTPATIENT
Start: 2023-12-15 | End: 2023-12-23 | Stop reason: HOSPADM

## 2023-12-14 RX ORDER — ATORVASTATIN CALCIUM 40 MG/1
40 TABLET, FILM COATED ORAL EVERY EVENING
Status: DISCONTINUED | OUTPATIENT
Start: 2023-12-14 | End: 2023-12-23 | Stop reason: HOSPADM

## 2023-12-14 RX ORDER — LEVOTHYROXINE SODIUM 0.05 MG/1
50 TABLET ORAL DAILY
Status: DISCONTINUED | OUTPATIENT
Start: 2023-12-15 | End: 2023-12-23 | Stop reason: HOSPADM

## 2023-12-14 RX ORDER — INSULIN LISPRO 100 [IU]/ML
1-5 INJECTION, SOLUTION INTRAVENOUS; SUBCUTANEOUS
Status: DISCONTINUED | OUTPATIENT
Start: 2023-12-14 | End: 2023-12-23 | Stop reason: HOSPADM

## 2023-12-14 RX ADMIN — HEPARIN SODIUM 12 UNITS/KG/HR: 10000 INJECTION, SOLUTION INTRAVENOUS at 16:34

## 2023-12-14 RX ADMIN — INSULIN DETEMIR 26 UNITS: 100 INJECTION, SOLUTION SUBCUTANEOUS at 22:59

## 2023-12-14 RX ADMIN — INSULIN LISPRO 1 UNITS: 100 INJECTION, SOLUTION INTRAVENOUS; SUBCUTANEOUS at 21:48

## 2023-12-14 RX ADMIN — POTASSIUM CHLORIDE 40 MEQ: 1500 TABLET, EXTENDED RELEASE ORAL at 18:13

## 2023-12-14 RX ADMIN — SODIUM CHLORIDE 8 MG/HR: 9 INJECTION, SOLUTION INTRAVENOUS at 18:00

## 2023-12-14 RX ADMIN — GABAPENTIN 300 MG: 300 CAPSULE ORAL at 21:48

## 2023-12-14 NOTE — ASSESSMENT & PLAN NOTE
History of mechanical valve replacement in 2007  Maintained on Coumadin typically  Coumadin on hold, initiated on Heparin drip after discussion with GI  Heparin drip is now on hold in setting of EGD planned for later this afternoon.  Restart coumadin once cleared

## 2023-12-14 NOTE — ASSESSMENT & PLAN NOTE
Most recent echocardiogram with an EF of 45% with grade 1 diastolic dysfunction  Patient takes 40 mg Lasix daily at home  Patient will need a dose of IV Lasix between units of blood.

## 2023-12-14 NOTE — ASSESSMENT & PLAN NOTE
Lab Results   Component Value Date    EGFR 35 12/14/2023    EGFR 36 12/13/2023    EGFR 43 10/31/2023    CREATININE 1.37 (H) 12/14/2023    CREATININE 1.33 (H) 12/13/2023    CREATININE 1.16 10/31/2023     Chronic, baseline creatinine noted to be 1.1-1.3  Currently within baseline  Avoid nephrotoxins, hypotension  Monitor BMP

## 2023-12-14 NOTE — H&P
[facility]  H&P  Name: Ely Hernadez 83 y.o. female I MRN: [id number]  Unit/Bed#: ED 09 I Date of Admission: 12/14/2023   Date of Service: 12/14/2023 I Hospital Day: 0      Assessment/Plan   * Acute blood loss anemia  Assessment & Plan  Patient presented to the ED with a hemoglobin of 5.5 on outpatient labs; referred to the ER by cardiology  Hemoglobin on 10/31 noted to be 12.7  Patient denies any bright red blood per rectum, but does endorse dark stools.  She is symptomatic with shortness of breath  Patient currently receiving 2 units PRBC  Check h&h 1 hour post infusion.  GI consult, appreciate input  INR must be below 2 before can have scope  Protonix drip  NPO after mn    Supratherapeutic INR  Assessment & Plan  INR noted to be 2.6  Will hold Warfarin in setting of GI bleed    Paroxysmal atrial fibrillation (HCC)  Assessment & Plan  Chronic  Takes Warfarin for AC and Lopressor for rate control  Rate is controlled    Stage 3a chronic kidney disease  Assessment & Plan  Lab Results   Component Value Date    EGFR 35 12/14/2023    EGFR 36 12/13/2023    EGFR 43 10/31/2023    CREATININE 1.37 (H) 12/14/2023    CREATININE 1.33 (H) 12/13/2023    CREATININE 1.16 10/31/2023     Chronic, baseline creatinine noted to be 1.1-1.3  Currently within baseline  Avoid nephrotoxins, hypotension  Monitor BMP    Cardiomyopathy (HCC)  Assessment & Plan  Most recent echocardiogram with an EF of 45% with grade 1 diastolic dysfunction  Patient takes 40 mg Lasix daily at home  Patient will need a dose of IV Lasix between units of blood.    AVM (arteriovenous malformation) of small bowel, acquired with hemorrhage  Assessment & Plan  History from 2018  Plan is for a EGD with push enteroscopy tomorrow pending INR    Diabetes mellitus with neurological manifestation (HCC)  Assessment & Plan  Chronic, uncontrolled, as evidenced by a hemoglobin A1C of 8.7  Hold home oral anti-diabetics  Initiate patient on insulin  sliding scale  Monitor blood glucose AC/HS  Hypoglycemia protocol      Coronary artery disease of native artery of native heart with stable angina pectoris (HCC)  Assessment & Plan  History of CAD  Takes Aspirin and Lopressor at home  Cleared by GI to continue with Aspirin  No chest pain at this time, however, given degree of anemia, will monitor on telemetry           VTE Pharmacologic Prophylaxis: VTE Score: 4 Moderate Risk (Score 3-4) - Pharmacological DVT Prophylaxis Ordered: heparin drip.  Code Status: Level 1 - Full Code   Discussion with family: Updated  (son) at bedside.    Anticipated Length of Stay: Patient will be admitted on an inpatient basis with an anticipated length of stay of greater than 2 midnights secondary to Severe anemia, need for GI evaluation, need for blood transfusion, and endoscopy.    Total Time Spent on Date of Encounter in care of patient: 65 mins. This time was spent on one or more of the following: performing physical exam; counseling and coordination of care; obtaining or reviewing history; documenting in the medical record; reviewing/ordering tests, medications or procedures; communicating with other healthcare professionals and discussing with patient's family/caregivers.    Chief Complaint: Hemoglobin of 5.5    History of Present Illness:  Ely Hernadez is a 83 y.o. female with a PMH of anemia, mechanical aortic valve, A-fib on Coumadin, CKD, hyperlipidemia who presents with hemoglobin of 5.5, advised to come to the ER by cardiology.  She had seen her Cardiologist in the office on 12/13 or complaints of worsening shortness of breath.  Patient was noted to have a normal hemoglobin on 10/31 of 12.7.  Patient denies any signs of active bleeding at this time.  States that she had a bowel movement yesterday which was normal brown in color and no sign of blood whatsoever.  Does report that last week she had a bowel movement that was dark in color but nothing since.  No  active bleeding noted at all.  Patient denies abdominal pain, nausea/vomiting.     Review of Systems:  Review of Systems   Constitutional:  Negative for activity change, appetite change, chills and fever.   HENT:  Negative for congestion, dental problem, ear pain and sore throat.    Eyes:  Negative for pain, discharge, itching and visual disturbance.   Respiratory:  Positive for shortness of breath. Negative for apnea, cough and chest tightness.    Cardiovascular:  Negative for chest pain and palpitations.   Gastrointestinal:  Negative for abdominal pain, anal bleeding, blood in stool, diarrhea, nausea and vomiting.   Endocrine: Negative for cold intolerance, heat intolerance, polydipsia and polyphagia.   Genitourinary:  Negative for dysuria, flank pain, hematuria and urgency.   Musculoskeletal:  Negative for arthralgias, back pain and myalgias.   Skin:  Negative for color change, pallor, rash and wound.   Allergic/Immunologic: Negative for environmental allergies, food allergies and immunocompromised state.   Neurological:  Negative for dizziness, seizures, syncope and headaches.   Hematological:  Negative for adenopathy. Does not bruise/bleed easily.   Psychiatric/Behavioral:  Negative for decreased concentration, dysphoric mood and hallucinations. The patient is not hyperactive.    All other systems reviewed and are negative.      Past Medical and Surgical History:   Past Medical History:   Diagnosis Date    Anemia     Aneurysm of thoracic aorta (HCC)     Aortic valve disorder     Benign neoplasm of sigmoid colon     Cardiomyopathy (HCC)     last assessed: 10/10/2014    CHF (congestive heart failure) (HCC)     Chronic kidney disease     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HCC)     last assessed: 10/10/2014    Diabetic nephropathy (HCC)     RAMON (dyspnea on exertion)     GERD (gastroesophageal reflux disease)     History of emphysema (HCC)     Hyperlipidemia     TIA (transient ischemic attack)        Past Surgical  History:   Procedure Laterality Date    AORTIC VALVE REPLACEMENT      CARDIAC PACEMAKER PLACEMENT      last assessed: 10/10/2014    CARDIAC SURGERY      aortic valve replacement    CHOLECYSTECTOMY      COLONOSCOPY      HYSTERECTOMY      INSERT / REPLACE / REMOVE PACEMAKER      KNEE SURGERY Right     OTHER SURGICAL HISTORY      Capsule ENDOscopy description: 12/19/2012    AR COLONOSCOPY FLX DX W/COLLJ SPEC WHEN PFRMD N/A 5/31/2018    Procedure: single balloon ENTEROSCOPY;  Surgeon: David Dennis MD;  Location: [facility];  Service: Gastroenterology    AR ESOPHAGOGASTRODUODENOSCOPY TRANSORAL DIAGNOSTIC N/A 11/29/2017    Procedure: EGD AND COLONOSCOPY;  Surgeon: Jay Mcleod III, MD;  Location: [facility];  Service: Gastroenterology       Meds/Allergies:  Prior to Admission medications    Medication Sig Start Date End Date Taking? Authorizing Provider   Accu-Chek FastClix Lancets MISC Use 3 (three) times a day 9/23/22   Chang Mcneil, DO   albuterol (Ventolin HFA) 90 mcg/act inhaler Inhale 2 puffs every 6 (six) hours as needed for wheezing 7/21/23   CAMACHO Reyes   amoxicillin (AMOXIL) 500 MG tablet Take 1 tablet (500 mg total) by mouth 3 (three) times a day for 7 days 12/13/23 12/20/23  Qiana Layton MD   Ascorbic Acid (vitamin C) 1000 MG tablet Take 1,000 mg by mouth daily    Historical Provider, MD   aspirin (ECOTRIN LOW STRENGTH) 81 mg EC tablet Take 1 tablet (81 mg total) by mouth daily Do not start before November 1, 2023. 11/1/23   Chico Joshi MD   atorvastatin (LIPITOR) 40 mg tablet Take 1 tablet (40 mg total) by mouth every evening 10/31/23   Chico Joshi MD   b complex vitamins capsule Take 1 capsule by mouth daily 11/1/23 1/30/24 April Cynthia Mccabe PA-C   BD Pen Needle Rosie U/F 32G X 4 MM MISC USE DAILY 8/21/23   Corey Silva MD   Blood Glucose Monitoring Suppl (Accu-Chek Guide Me) w/Device KIT USE 3 TIMES DAILY 12/10/22   Chang Mcneil, DO   Cholecalciferol (Vitamin D3) 50 MCG (2000  UT) TABS Take 2,000 Units by mouth daily    Historical Provider, MD   furosemide (LASIX) 40 mg tablet TAKE 1 TABLET BY MOUTH  DAILY 23   Corey Silva MD   gabapentin (NEURONTIN) 300 mg capsule Take 1 capsule (300 mg total) by mouth 3 (three) times a day 23   Corey Silva MD   glipiZIDE (GLUCOTROL) 10 mg tablet TAKE 1 TABLET BY MOUTH  TWICE DAILY BEFORE MEALS 23   Corey Silva MD   glucose blood (Accu-Chek Celeste Plus) test strip Use 1 each 3 (three) times a day Use as instructed 22   Chang Mcneil,    Lancets (freestyle) lancets Check blood glucose 3 times daily 22   oCrey Silva MD   Levemir FlexPen 100 units/mL injection pen INJECT SUBCUTANEOUSLY 26 UNITS  DAILY AT BEDTIME 23   Chang Mcneil, DO   levothyroxine 50 mcg tablet TAKE 1 TABLET BY MOUTH  DAILY 23   Corey Silva MD   metoprolol tartrate (LOPRESSOR) 50 mg tablet TAKE ONE-HALF TABLET BY  MOUTH TWICE DAILY 23   Corey Silva MD   oxygen gas Inhale 2 L/min daily at bedtime as needed home oxygen nightly    Historical Provider, MD   pantoprazole (PROTONIX) 40 mg tablet TAKE 1 TABLET BY MOUTH  DAILY 23   Corey Silva MD   warfarin (COUMADIN) 5 mg tablet TAKE 1 TO 2 TABLETS BY  MOUTH DAILY OR AS DIRECTED 23   Corey Silva MD     I have reviewed home medications with patient personally.    Allergies: No Known Allergies    Social History:  Marital Status:    Occupation:   Patient Pre-hospital Living Situation: Home  Patient Pre-hospital Level of Mobility: walks  Patient Pre-hospital Diet Restrictions:   Substance Use History:   Social History     Substance and Sexual Activity   Alcohol Use Never     Social History     Tobacco Use   Smoking Status Former    Current packs/day: 0.00    Average packs/day: 1 pack/day for 52.9 years (52.9 ttl pk-yrs)    Types: Cigarettes    Start date:     Quit date: 2007    Years since quittin.0    Passive exposure: Past   Smokeless  Tobacco Former    Quit date: 2007     Social History     Substance and Sexual Activity   Drug Use Never       Family History:  Family History   Problem Relation Age of Onset    No Known Problems Mother     No Known Problems Father        Physical Exam:     Vitals:   Blood Pressure: 130/60 (12/14/23 1615)  Pulse: 61 (12/14/23 1615)  Temperature: 98.3 °F (36.8 °C) (12/14/23 1615)  Temp Source: Oral (12/14/23 1615)  Respirations: 16 (12/14/23 1615)  SpO2: 98 % (12/14/23 1615)    Physical Exam  Vitals and nursing note reviewed.   Constitutional:       General: She is not in acute distress.     Appearance: She is not ill-appearing.   Cardiovascular:      Rate and Rhythm: Normal rate.      Pulses: Normal pulses.      Heart sounds: Normal heart sounds.      Comments: + mechanical valve click  Pulmonary:      Effort: Pulmonary effort is normal.      Breath sounds: Normal breath sounds.      Comments: RA 96%  Abdominal:      General: Bowel sounds are normal.      Palpations: Abdomen is soft.   Musculoskeletal:         General: Normal range of motion.      Right lower leg: No edema.      Left lower leg: No edema.   Skin:     General: Skin is warm and dry.      Capillary Refill: Capillary refill takes less than 2 seconds.      Coloration: Skin is pale.   Neurological:      Mental Status: She is alert and oriented to person, place, and time.   Psychiatric:         Mood and Affect: Mood normal.          Additional Data:     Lab Results:  Results from last 7 days   Lab Units 12/14/23  1336   WBC Thousand/uL 5.18   HEMOGLOBIN g/dL 5.5*   HEMATOCRIT % 18.9*   PLATELETS Thousands/uL 381   NEUTROS PCT % 62   LYMPHS PCT % 26   MONOS PCT % 8   EOS PCT % 4     Results from last 7 days   Lab Units 12/14/23  1336   SODIUM mmol/L 139   POTASSIUM mmol/L 3.4*   CHLORIDE mmol/L 104   CO2 mmol/L 26   BUN mg/dL 28*   CREATININE mg/dL 1.37*   ANION GAP mmol/L 9   CALCIUM mg/dL 8.6   ALBUMIN g/dL 4.0   TOTAL BILIRUBIN mg/dL 0.46   ALK PHOS U/L 61    ALT U/L 12   AST U/L 15   GLUCOSE RANDOM mg/dL 152*     Results from last 7 days   Lab Units 12/14/23  1434   INR  2.69*                   Lines/Drains:  Invasive Devices       Peripheral Intravenous Line  Duration             Peripheral IV 12/14/23 Right Antecubital <1 day    Peripheral IV 12/14/23 Right;Dorsal (posterior) Forearm <1 day                    Imaging: No pertinent imaging reviewed.  XR chest 2 views    (Results Pending)       EKG and Other Studies Reviewed on Admission:   EKG: Atrial fibrillation. HR 60.    ** Please Note: This note has been constructed using a voice recognition system. **

## 2023-12-14 NOTE — ASSESSMENT & PLAN NOTE
Chronic, uncontrolled, as evidenced by a hemoglobin A1C of 8.7  Hold home oral anti-diabetics  Initiate patient on insulin sliding scale  Monitor blood glucose AC/HS  Hypoglycemia protocol

## 2023-12-14 NOTE — TELEPHONE ENCOUNTER
Critical result on Blayne Addison, 1940, MRN [id number]  Hemoglobin 5.6  Collected 12/13/23 @1130  Ordering: Dr. Burrows Early    Please reply with "Acknowledged" upon receipt of this critical value. By acknowledging, you are agreeing to act upon this critical value. If this is not your patient, please advise. Reported by Yonatan Cervantes on 12/13/23 at 8:11 PM    Acknowledged by Vivian Navarro via [employer].

## 2023-12-14 NOTE — PLAN OF CARE
Problem: Potential for Falls  Goal: Patient will remain free of falls  Description: INTERVENTIONS:  - Educate patient/family on patient safety including physical limitations  - Instruct patient to call for assistance with activity   - Consult OT/PT to assist with strengthening/mobility   - Keep Call bell within reach  - Keep bed low and locked with side rails adjusted as appropriate  - Keep care items and personal belongings within reach  - Initiate and maintain comfort rounds  - Make Fall Risk Sign visible to staff  - Offer Toileting every 2 Hours, in advance of need  - Initiate/Maintain BED alarm  - Obtain necessary fall risk management equipment:   - Apply yellow socks and bracelet for high fall risk patients  - Consider moving patient to room near nurses station  Outcome: Progressing     Problem: PAIN - ADULT  Goal: Verbalizes/displays adequate comfort level or baseline comfort level  Description: Interventions:  - Encourage patient to monitor pain and request assistance  - Assess pain using appropriate pain scale  - Administer analgesics based on type and severity of pain and evaluate response  - Implement non-pharmacological measures as appropriate and evaluate response  - Consider cultural and social influences on pain and pain management  - Notify physician/advanced practitioner if interventions unsuccessful or patient reports new pain  Outcome: Progressing     Problem: INFECTION - ADULT  Goal: Absence or prevention of progression during hospitalization  Description: INTERVENTIONS:  - Assess and monitor for signs and symptoms of infection  - Monitor lab/diagnostic results  - Monitor all insertion sites, i.e. indwelling lines, tubes, and drains  - Monitor endotracheal if appropriate and nasal secretions for changes in amount and color  - [place] appropriate cooling/warming therapies per order  - Administer medications as ordered  - Instruct and encourage patient and family to use good hand hygiene  technique  - Identify and instruct in appropriate isolation precautions for identified infection/condition  Outcome: Progressing     Problem: SAFETY ADULT  Goal: Patient will remain free of falls  Description: INTERVENTIONS:  - Educate patient/family on patient safety including physical limitations  - Instruct patient to call for assistance with activity   - Consult OT/PT to assist with strengthening/mobility   - Keep Call bell within reach  - Keep bed low and locked with side rails adjusted as appropriate  - Keep care items and personal belongings within reach  - Initiate and maintain comfort rounds  - Make Fall Risk Sign visible to staff  - Offer Toileting every 2 Hours, in advance of need  - Initiate/Maintain  BED alarm  - Obtain necessary fall risk management equipment:   - Apply yellow socks and bracelet for high fall risk patients  - Consider moving patient to room near nurses station  Outcome: Progressing  Goal: Maintain or return to baseline ADL function  Description: INTERVENTIONS:  -  Assess patient's ability to carry out ADLs; assess patient's baseline for ADL function and identify physical deficits which impact ability to perform ADLs (bathing, care of mouth/teeth, toileting, grooming, dressing, etc.)  - Assess/evaluate cause of self-care deficits   - Assess range of motion  - Assess patient's mobility; develop plan if impaired  - Assess patient's need for assistive devices and provide as appropriate  - Encourage maximum independence but intervene and supervise when necessary  - Involve family in performance of ADLs  - Assess for home care needs following discharge   - Consider OT consult to assist with ADL evaluation and planning for discharge  - Provide patient education as appropriate  Outcome: Progressing  Goal: Maintains/Returns to pre admission functional level  Description: INTERVENTIONS:  - Perform AM-PAC 6 Click Basic Mobility/ Daily Activity assessment daily.  - Set and communicate daily  mobility goal to care team and patient/family/caregiver.   - Collaborate with rehabilitation services on mobility goals if consulted  - Perform Range of Motion 3 times a day.  - Reposition patient every 2 hours.  - Dangle patient 3 times a day  - Stand patient 3 times a day  - Ambulate patient 3 times a day  - Out of bed to chair 3 times a day   - Out of bed for meals 3 times a day  - Out of bed for toileting  - Record patient progress and toleration of activity level   Outcome: Progressing     Problem: DISCHARGE PLANNING  Goal: Discharge to home or other facility with appropriate resources  Description: INTERVENTIONS:  - Identify barriers to discharge w/patient and caregiver  - Arrange for needed discharge resources and transportation as appropriate  - Identify discharge learning needs (meds, wound care, etc.)  - Arrange for interpretive services to assist at discharge as needed  - Refer to Case Management Department for coordinating discharge planning if the patient needs post-hospital services based on physician/advanced practitioner order or complex needs related to functional status, cognitive ability, or social support system  Outcome: Progressing     Problem: Knowledge Deficit  Goal: Patient/family/caregiver demonstrates understanding of disease process, treatment plan, medications, and discharge instructions  Description: Complete learning assessment and assess knowledge base.  Interventions:  - Provide teaching at level of understanding  - Provide teaching via preferred learning methods  Outcome: Progressing     Problem: CARDIOVASCULAR - ADULT  Goal: Maintains optimal cardiac output and hemodynamic stability  Description: INTERVENTIONS:  - Monitor I/O, vital signs and rhythm  - Monitor for S/S and trends of decreased cardiac output  - Administer and titrate ordered vasoactive medications to optimize hemodynamic stability  - Assess quality of pulses, skin color and temperature  - Assess for signs of  decreased coronary artery perfusion  - Instruct patient to report change in severity of symptoms  Outcome: Progressing  Goal: Absence of cardiac dysrhythmias or at baseline rhythm  Description: INTERVENTIONS:  - Continuous cardiac monitoring, vital signs, obtain 12 lead EKG if ordered  - Administer antiarrhythmic and heart rate control medications as ordered  - Monitor electrolytes and administer replacement therapy as ordered  Outcome: Progressing     Problem: HEMATOLOGIC - ADULT  Goal: Maintains hematologic stability  Description: INTERVENTIONS  - Assess for signs and symptoms of bleeding or hemorrhage  - Monitor labs  - Administer supportive blood products/factors as ordered and appropriate  Outcome: Progressing

## 2023-12-14 NOTE — ASSESSMENT & PLAN NOTE
Patient presented to the ED with a hemoglobin of 5.5 on outpatient labs; referred to the ER by cardiology  Hemoglobin on 10/31 noted to be 12.7  Patient denies any bright red blood per rectum, but does endorse dark stools.  She is symptomatic with shortness of breath  Patient currently receiving 2 units PRBC  Check h&h 1 hour post infusion.  GI consult, appreciate input  INR must be below 2 before can have scope  Protonix drip  NPO after mn

## 2023-12-14 NOTE — CONSULTS
Consultation -  [facility]  Ely Hernadez 83 y.o. female MRN: [id number]  Unit/Bed#: ED 09 Encounter: [id number]         Reason for Consult / Principal Problem: Anemia, history of AVMs    HPI: Ely is an 83-year-old female with history of paroxysmal atrial fibrillation, mechanical aortic valve replacement on Coumadin, hypertension, CKD stage III, COPD, hypertension, and type 2 diabetes.  Also has history of recent CVA on baby aspirin.  Patient presented to the emergency room by direction of her cardiologist for hemoglobin of 5.6 as an outpatient.  Patient reports that she has been more short of breath, and noticed a dark stool last week.  She denies abdominal pain.  Patient is awaiting blood transfusion.  Fortunately, she is here hemodynamically stable.    Patient's last EGD with push enteroscopy was in 2018 performed by Dr. 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  Said about 10 years ago, she had a colonoscopy and endoscopy with Dr. Mcleod for anemia as well.  Those records are not available to me at the time of consultation.    Review of Systems:    CONSTITUTIONAL: Denies any fever, chills, or rigors. Good appetite, and no recent weight loss.  HEENT: No earache or tinnitus. Denies hearing loss or visual disturbances.  CARDIOVASCULAR: No chest pain or palpitations.   RESPIRATORY: Denies any cough, hemoptysis, shortness of breath or dyspnea on exertion.  GASTROINTESTINAL: As noted in the History of Present Illness.   GENITOURINARY: No problems with urination. Denies any hematuria or dysuria.  NEUROLOGIC: No dizziness or vertigo, denies headaches.   MUSCULOSKELETAL: Denies any muscle or joint pain.   SKIN: Denies skin rashes or itching.   ENDOCRINE: Denies excessive thirst. Denies intolerance to heat or cold.  PSYCHOSOCIAL: Denies depression or anxiety. Denies any recent memory loss.       Historical Information   Past Medical History:   Diagnosis Date     Anemia     Aneurysm of thoracic aorta (HCC)     Aortic valve disorder     Benign neoplasm of sigmoid colon     Cardiomyopathy (HCC)     last assessed: 10/10/2014    CHF (congestive heart failure) (HCC)     Chronic kidney disease     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HCC)     last assessed: 10/10/2014    Diabetic nephropathy (HCC)     RAMON (dyspnea on exertion)     GERD (gastroesophageal reflux disease)     History of emphysema (HCC)     Hyperlipidemia     TIA (transient ischemic attack)      Past Surgical History:   Procedure Laterality Date    AORTIC VALVE REPLACEMENT      CARDIAC PACEMAKER PLACEMENT      last assessed: 10/10/2014    CARDIAC SURGERY      aortic valve replacement    CHOLECYSTECTOMY      COLONOSCOPY      HYSTERECTOMY      INSERT / REPLACE / REMOVE PACEMAKER      KNEE SURGERY Right     OTHER SURGICAL HISTORY      Capsule ENDOscopy description: 2012    MI COLONOSCOPY FLX DX W/COLLJ SPEC WHEN PFRMD N/A 2018    Procedure: single balloon ENTEROSCOPY;  Surgeon: David Dennis MD;  Location:  [facility];  Service: Gastroenterology    MI ESOPHAGOGASTRODUODENOSCOPY TRANSORAL DIAGNOSTIC N/A 2017    Procedure: EGD AND COLONOSCOPY;  Surgeon: Jay Mcleod III, MD;  Location: [facility];  Service: Gastroenterology     Social History   Social History     Substance and Sexual Activity   Alcohol Use Never     Social History     Substance and Sexual Activity   Drug Use Never     Social History     Tobacco Use   Smoking Status Former    Current packs/day: 0.00    Average packs/day: 1 pack/day for 52.9 years (52.9 ttl pk-yrs)    Types: Cigarettes    Start date:     Quit date: 2007    Years since quittin.0    Passive exposure: Past   Smokeless Tobacco Former    Quit date:      Family History   Problem Relation Age of Onset    No Known Problems Mother     No Known Problems Father         Meds/Allergies     Current Facility-Administered Medications   Medication Dose Route Frequency     heparin (porcine) 25,000 units in 0.45% NaCl 250 mL infusion (premix)  3-20 Units/kg/hr (Order-Specific) Intravenous Titrated       No Known Allergies      Objective     Blood pressure (!) 118/49, pulse 64, temperature 97.8 °F (36.6 °C), temperature source Temporal, resp. rate (!) 30, SpO2 99%.    No intake or output data in the 24 hours ending 12/14/23 1550      PHYSICAL EXAM:      General Appearance:   Alert and oriented x 3. Cooperative, and in no respiratory distress   HEENT:   Normocephalic, atraumatic, anicteric.     Neck:  Supple, symmetrical, trachea midline   Lungs:   Clear to auscultation bilaterally; no rales, rhonchi or wheezing; respirations unlabored    Heart::   S1 and S2 normal; regular rate and rhythm; no murmur, rub, or gallop.   Abdomen:   Soft, non-tender, non-distended; normal bowel sounds; no masses, no organomegaly    Genitalia:   Deferred    Rectal:   Deferred    Extremities:  No cyanosis, clubbing or edema    Pulses:  2+ and symmetric all extremities    Skin:  General pallor   Lymph nodes:  No palpable cervical or supraclavicular lymphadenopathy        Lab Results:   Results from last 7 days   Lab Units 12/14/23  1336   WBC Thousand/uL 5.18   HEMOGLOBIN g/dL 5.5*   HEMATOCRIT % 18.9*   PLATELETS Thousands/uL 381   NEUTROS PCT % 62   LYMPHS PCT % 26   MONOS PCT % 8   EOS PCT % 4     Results from last 7 days   Lab Units 12/14/23  1336   POTASSIUM mmol/L 3.4*   CHLORIDE mmol/L 104   CO2 mmol/L 26   BUN mg/dL 28*   CREATININE mg/dL 1.37*   CALCIUM mg/dL 8.6   ALK PHOS U/L 61   ALT U/L 12   AST U/L 15     Results from last 7 days   Lab Units 12/14/23  1434   INR  2.69*           Imaging Studies:  No results found.    ASSESSMENT and PLAN:      1) Acute blood loss anemia, melena and history of AVMs - Patient is on Coumadin for history of mechanical valve.  She has history of AVMs in 2018 in the duodenum and jejunum that were treated with APC.  Hemoglobin 5.5 on admission.  Baseline of 12-13.  -  "Hold Coumadin   - Start patient on heparin drip to maintain crucial function of mechanical valve  - Please check INR tomorrow morning, no need to give reversal agent  - Consult cardiology  - Continue monitor hemoglobin closely and transfuse as necessary to goal hemoglobin above 8  - PPI drip  - Clear liquid diet, n.p.o. after midnight  - Please contact GI if patient has evidence of hemodynamic instability or signs or GI bleeding while inpatient  - Plan for push enteroscopy tomorrow, will need colonoscopy and video capsule endoscopy if no or source of anemia is discovered  - Discussed with internal medicine team, Lucila SAUER      The patient was seen and examined by Dr. Queen, all velásquez medical decisions were made with Dr. Queen.  Thank you for allowing us to participate in the care of this pleasant patient.  We will follow up with you closely.    Portions of the record may have been created with voice recognition software.  Occasional wrong word or \"sound a like\" substitutions may have occurred due to the inherent limitations of voice recognition software.  Read the chart carefully and recognize, using context, where substitutions have occurred.  "

## 2023-12-14 NOTE — TELEPHONE ENCOUNTER
Not sure whether the patient was called last night regarding severe anemia with hemoglobin of 5.6. I did call her this morning and instructed her to go to the emergency room.

## 2023-12-14 NOTE — ASSESSMENT & PLAN NOTE
History of CAD  Takes Aspirin and Lopressor at home  Cleared by GI to continue with Aspirin  No chest pain at this time, however, given degree of anemia, will monitor on telemetry

## 2023-12-14 NOTE — ED PROVIDER NOTES
History  Chief Complaint   Patient presents with    Abnormal Lab     HGB 5.6, sent for transfusion, pt appears pale      Patient is an 83-year-old female with a past medical history of anemia, CHF, CKD, GERD, TIA, hyperlipidemia, cardiomyopathy presenting to the emergency department for evaluation of low hemoglobin.  Patient was sent in by Dr. Layton for a hemoglobin of 5.6.  Patient states she has been having shortness of breath for the past month.  Patient states that she gets very short of breath with any sort of exertional activity even walking to the bathroom.  Patient denies weakness, dizziness, abdominal pain, rectal bleeding or vomiting blood.  Patient does endorse having darker stool yesterday but denies noticing any blood.  Patient offers no other concerns or complaints at this time.        Prior to Admission Medications   Prescriptions Last Dose Informant Patient Reported? Taking?   Accu-Chek FastClix Lancets MISC  Self No No   Sig: Use 3 (three) times a day   Ascorbic Acid (vitamin C) 1000 MG tablet  Self Yes No   Sig: Take 1,000 mg by mouth daily   BD Pen Needle Rosie U/F 32G X 4 MM MISC  Self No No   Sig: USE DAILY   Blood Glucose Monitoring Suppl (Accu-Chek Guide Me) w/Device KIT  Self No No   Sig: USE 3 TIMES DAILY   Cholecalciferol (Vitamin D3) 50 MCG (2000 UT) TABS  Self Yes No   Sig: Take 2,000 Units by mouth daily   Lancets (freestyle) lancets  Self No No   Sig: Check blood glucose 3 times daily   Levemir FlexPen 100 units/mL injection pen  Self No No   Sig: INJECT SUBCUTANEOUSLY 26 UNITS  DAILY AT BEDTIME   albuterol (Ventolin HFA) 90 mcg/act inhaler  Self No No   Sig: Inhale 2 puffs every 6 (six) hours as needed for wheezing   amoxicillin (AMOXIL) 500 MG tablet   No No   Sig: Take 1 tablet (500 mg total) by mouth 3 (three) times a day for 7 days   aspirin (ECOTRIN LOW STRENGTH) 81 mg EC tablet  Self No No   Sig: Take 1 tablet (81 mg total) by mouth daily Do not start before November 1, 2023.    atorvastatin (LIPITOR) 40 mg tablet  Self No No   Sig: Take 1 tablet (40 mg total) by mouth every evening   b complex vitamins capsule  Self No No   Sig: Take 1 capsule by mouth daily   furosemide (LASIX) 40 mg tablet  Self No No   Sig: TAKE 1 TABLET BY MOUTH  DAILY   gabapentin (NEURONTIN) 300 mg capsule  Self No No   Sig: Take 1 capsule (300 mg total) by mouth 3 (three) times a day   glipiZIDE (GLUCOTROL) 10 mg tablet  Self No No   Sig: TAKE 1 TABLET BY MOUTH  TWICE DAILY BEFORE MEALS   glucose blood (Accu-Chek Celeste Plus) test strip  Self No No   Sig: Use 1 each 3 (three) times a day Use as instructed   levothyroxine 50 mcg tablet  Self No No   Sig: TAKE 1 TABLET BY MOUTH  DAILY   metoprolol tartrate (LOPRESSOR) 50 mg tablet  Self No No   Sig: TAKE ONE-HALF TABLET BY  MOUTH TWICE DAILY   oxygen gas  Self Yes No   Sig: Inhale 2 L/min daily at bedtime as needed home oxygen nightly   pantoprazole (PROTONIX) 40 mg tablet  Self No No   Sig: TAKE 1 TABLET BY MOUTH  DAILY   warfarin (COUMADIN) 5 mg tablet  Self No No   Sig: TAKE 1 TO 2 TABLETS BY  MOUTH DAILY OR AS DIRECTED      Facility-Administered Medications: None       Past Medical History:   Diagnosis Date    Anemia     Aneurysm of thoracic aorta (HCC)     Aortic valve disorder     Benign neoplasm of sigmoid colon     Cardiomyopathy (HCC)     last assessed: 10/10/2014    CHF (congestive heart failure) (HCC)     Chronic kidney disease     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HCC)     last assessed: 10/10/2014    Diabetic nephropathy (HCC)     RAMON (dyspnea on exertion)     GERD (gastroesophageal reflux disease)     History of emphysema (HCC)     Hyperlipidemia     TIA (transient ischemic attack)        Past Surgical History:   Procedure Laterality Date    AORTIC VALVE REPLACEMENT      CARDIAC PACEMAKER PLACEMENT      last assessed: 10/10/2014    CARDIAC SURGERY      aortic valve replacement    CHOLECYSTECTOMY      COLONOSCOPY      HYSTERECTOMY      INSERT / REPLACE /  REMOVE PACEMAKER      KNEE SURGERY Right     OTHER SURGICAL HISTORY      Capsule ENDOscopy description: 2012    OR COLONOSCOPY FLX DX W/COLLJ SPEC WHEN PFRMD N/A 2018    Procedure: single balloon ENTEROSCOPY;  Surgeon: David Dennis MD;  Location:  [facility];  Service: Gastroenterology    OR ESOPHAGOGASTRODUODENOSCOPY TRANSORAL DIAGNOSTIC N/A 2017    Procedure: EGD AND COLONOSCOPY;  Surgeon: Jay Mcleod III, MD;  Location: [facility];  Service: Gastroenterology       Family History   Problem Relation Age of Onset    No Known Problems Mother     No Known Problems Father      I have reviewed and agree with the history as documented.    E-Cigarette/Vaping    E-Cigarette Use Never User      E-Cigarette/Vaping Substances    Nicotine No     THC No     CBD No     Flavoring No     Other No     Unknown No      Social History     Tobacco Use    Smoking status: Former     Current packs/day: 0.00     Average packs/day: 1 pack/day for 52.9 years (52.9 ttl pk-yrs)     Types: Cigarettes     Start date:      Quit date: 2007     Years since quittin.0     Passive exposure: Past    Smokeless tobacco: Former     Quit date:    Vaping Use    Vaping status: Never Used   Substance Use Topics    Alcohol use: Never    Drug use: Never       Review of Systems   Constitutional:  Negative for chills and fever.   HENT:  Negative for ear pain and sore throat.    Eyes:  Negative for pain and visual disturbance.   Respiratory:  Positive for shortness of breath. Negative for cough, chest tightness and wheezing.    Cardiovascular:  Negative for chest pain and palpitations.   Gastrointestinal:  Negative for abdominal pain and vomiting.   Genitourinary:  Negative for dysuria and hematuria.   Musculoskeletal:  Negative for arthralgias and back pain.   Skin:  Negative for color change and rash.   Neurological:  Negative for seizures and syncope.   All other systems reviewed and are negative.      Physical  Exam  Physical Exam  Vitals and nursing note reviewed.   Constitutional:       General: She is not in acute distress.     Appearance: Normal appearance. She is not toxic-appearing or diaphoretic.   HENT:      Head: Normocephalic and atraumatic.      Right Ear: External ear normal.      Left Ear: External ear normal.      Nose: Nose normal.      Mouth/Throat:      Mouth: Mucous membranes are moist.   Eyes:      General: No scleral icterus.        Right eye: No discharge.         Left eye: No discharge.      Conjunctiva/sclera: Conjunctivae normal.   Pulmonary:      Effort: Pulmonary effort is normal. No respiratory distress.   Abdominal:      Tenderness: There is no abdominal tenderness.   Genitourinary:     Rectum: Guaiac result negative.   Musculoskeletal:         General: No swelling, deformity or signs of injury. Normal range of motion.      Cervical back: Normal range of motion and neck supple. No rigidity.   Skin:     General: Skin is warm and dry.      Coloration: Skin is pale. Skin is not jaundiced.      Findings: No erythema or rash.   Neurological:      General: No focal deficit present.      Mental Status: She is alert and oriented to person, place, and time. Mental status is at baseline.      Cranial Nerves: No cranial nerve deficit.      Gait: Gait normal.   Psychiatric:         Mood and Affect: Mood normal.         Behavior: Behavior normal.         Thought Content: Thought content normal.         Judgment: Judgment normal.         Vital Signs  ED Triage Vitals [12/14/23 1245]   Temperature Pulse Respirations Blood Pressure SpO2   97.8 °F (36.6 °C) 87 20 139/56 94 %      Temp Source Heart Rate Source Patient Position - Orthostatic VS BP Location FiO2 (%)   Temporal Monitor Sitting Left arm --      Pain Score       No Pain           Vitals:    12/14/23 1245 12/14/23 1420 12/14/23 1556 12/14/23 1615   BP: 139/56 (!) 118/49 121/57 130/60   Pulse: 87 64 62 61   Patient Position - Orthostatic VS: Sitting  Lying Sitting          Visual Acuity      ED Medications  Medications   heparin (porcine) 25,000 units in 0.45% NaCl 250 mL infusion (premix) (has no administration in time range)   pantoprazole (PROTONIX) 80 mg in sodium chloride 0.9 % 100 mL infusion (has no administration in time range)   aspirin (ECOTRIN LOW STRENGTH) EC tablet 81 mg (has no administration in time range)   atorvastatin (LIPITOR) tablet 40 mg (has no administration in time range)   cholecalciferol (VITAMIN D3) tablet 2,000 Units (has no administration in time range)   gabapentin (NEURONTIN) capsule 300 mg (has no administration in time range)   insulin detemir (LEVEMIR) subcutaneous injection 26 Units (has no administration in time range)   levothyroxine tablet 50 mcg (has no administration in time range)   metoprolol tartrate (LOPRESSOR) tablet 25 mg (has no administration in time range)   insulin lispro (HumaLOG) 100 units/mL subcutaneous injection 1-5 Units (has no administration in time range)   insulin lispro (HumaLOG) 100 units/mL subcutaneous injection 1-6 Units (has no administration in time range)   potassium chloride (K-DUR,KLOR-CON) CR tablet 40 mEq (has no administration in time range)       Diagnostic Studies  Results Reviewed       Procedure Component Value Units Date/Time    HS Troponin I 4hr [[id number]]     Lab Status: No result Specimen: Blood     APTT six (6) hours after Heparin bolus/drip initiation or dosing change [[id number]]     Lab Status: No result Specimen: Blood     Protime-INR [[id number]]  (Abnormal) Collected: 12/14/23 1434    Lab Status: Final result Specimen: Blood from Arm, Right Updated: 12/14/23 1456     Protime 29.5 seconds      INR 2.69    APTT [[id number]]  (Normal) Collected: 12/14/23 1434    Lab Status: Final result Specimen: Blood from Arm, Right Updated: 12/14/23 1456     PTT 37 seconds     HS Troponin I 2hr [[id number]]     Lab Status: No result Specimen: Blood     HS Troponin 0hr (reflex protocol)  [[id number]]  (Normal) Collected: 12/14/23 1336    Lab Status: Final result Specimen: Blood from Arm, Right Updated: 12/14/23 1413     hs TnI 0hr 19 ng/L     Comprehensive metabolic panel [[id number]]  (Abnormal) Collected: 12/14/23 1336    Lab Status: Final result Specimen: Blood from Arm, Right Updated: 12/14/23 1404     Sodium 139 mmol/L      Potassium 3.4 mmol/L      Chloride 104 mmol/L      CO2 26 mmol/L      ANION GAP 9 mmol/L      BUN 28 mg/dL      Creatinine 1.37 mg/dL      Glucose 152 mg/dL      Calcium 8.6 mg/dL      AST 15 U/L      ALT 12 U/L      Alkaline Phosphatase 61 U/L      Total Protein 7.3 g/dL      Albumin 4.0 g/dL      Total Bilirubin 0.46 mg/dL      eGFR 35 ml/min/1.73sq m     Narrative:      National Kid[employer] Foundation guidelines for Chronic Kidney Disease (CKD):     Stage 1 with normal or high GFR (GFR > 90 mL/min/1.73 square meters)    Stage 2 Mild CKD (GFR = 60-89 mL/min/1.73 square meters)    Stage 3A Moderate CKD (GFR = 45-59 mL/min/1.73 square meters)    Stage 3B Moderate CKD (GFR = 30-44 mL/min/1.73 square meters)    Stage 4 Severe CKD (GFR = 15-29 mL/min/1.73 square meters)    Stage 5 End Stage CKD (GFR <15 mL/min/1.73 square meters)  Note: GFR calculation is accurate only with a steady state creatinine    CBC and differential [[id number]]  (Abnormal) Collected: 12/14/23 1336    Lab Status: Final result Specimen: Blood from Arm, Right Updated: 12/14/23 1401     WBC 5.18 Thousand/uL      RBC 2.40 Million/uL      Hemoglobin 5.5 g/dL      Hematocrit 18.9 %      MCV 79 fL      MCH 23.3 pg      MCHC 29.6 g/dL      RDW 17.5 %      MPV 9.7 fL      Platelets 381 Thousands/uL      nRBC 0 /100 WBCs      Neutrophils Relative 62 %      Immat GRANS % 0 %      Lymphocytes Relative 26 %      Monocytes Relative 8 %      Eosinophils Relative 4 %      Basophils Relative 0 %      Neutrophils Absolute 3.15 Thousands/µL      Immature Grans Absolute 0.02 Thousand/uL      Lymphocytes Absolute 1.36  Thousands/µL      Monocytes Absolute 0.43 Thousand/µL      Eosinophils Absolute 0.20 Thousand/µL      Basophils Absolute 0.02 Thousands/µL     Narrative:      This is an appended report.  These results have been appended to a previously verified report.                   XR chest 2 views    (Results Pending)              Procedures  Procedures         ED Course             HEART Risk Score      Flowsheet Row Most Recent Value   Heart Score Risk Calculator    History 1 Filed at: 12/14/2023 1628   ECG 1 Filed at: 12/14/2023 1628   Age 2 Filed at: 12/14/2023 1628   Risk Factors 2 Filed at: 12/14/2023 1628   Troponin 1 Filed at: 12/14/2023 1628   HEART Score 7 Filed at: 12/14/2023 1628                          SBIRT 20yo+      Flowsheet Row Most Recent Value   Initial Alcohol Screen: US AUDIT-C     1. How often do you have a drink containing alcohol? 0 Filed at: 12/14/2023 1245   2. How many drinks containing alcohol do you have on a typical day you are drinking?  0 Filed at: 12/14/2023 1245   3a. Male UNDER 65: How often do you have five or more drinks on one occasion? 0 Filed at: 12/14/2023 1245   3b. FEMALE Any Age, or MALE 65+: How often do you have 4 or more drinks on one occassion? 0 Filed at: 12/14/2023 1245   Audit-C Score 0 Filed at: 12/14/2023 1245   RY: How many times in the past year have you...    Used an illegal drug or used a prescription medication for non-medical reasons? Never Filed at: 12/14/2023 1245                      Medical Decision Making    This is a 83-year-old female present to the emergency department for evaluation of hemoglobin 5.6.  Patient was sent in by Dr. Layton for a low hemoglobin level.  Patient has been complaining of shortness of breath for the past month.  Patient reports she feels very short of breath when doing any exertional activity including walking to the bathroom.  Patient denies weakness, dizziness, abdominal pain, rectal bleeding or vomiting blood.  Patient  appears pale on examination, otherwise well-appearing with vital signs.    Differential diagnosis to include but is not limited to: ACS, STEMI, arrhythmia, CHF exacerbation, anemia    Initial ED Plan: Imaging, labs    ED results:  Hemoglobin 5.5.  2 units of blood ordered  0 hour troponin: 19  Heart score: 7    Final ED assessment: Patient is stable and well appearing. Discussed radiologic studies and laboratory results. Patient verbalized understanding and is agreeable with the plan for admission.  Discussed with Dr. Joshi, inpatient, bridging orders placed.     Amount and/or Complexity of Data Reviewed  Labs: ordered.  Radiology: ordered.    Risk  Decision regarding hospitalization.             Disposition  Final diagnoses:   Anemia   Shortness of breath     Time reflects when diagnosis was documented in both MDM as applicable and the Disposition within this note       Time User Action Codes Description Comment    12/14/2023  2:27 PM Myriam Dumont Add [D64.9] Anemia     12/14/2023  2:27 PM Myriam Dumont Add [R06.02] Shortness of breath     12/14/2023  3:10 PM Lucila Malhotra Add [D62] Acute blood loss anemia     12/14/2023  3:56 PM Sumi Solis Add [K55.21] AVM (arteriovenous malformation) of small bowel, acquired with hemorrhage     12/14/2023  3:56 PM Sumi Soils Add [E61.1] Iron deficiency     12/14/2023  4:22 PM Lucila Malhotra Add [Z95.2] H/O mechanical aortic valve replacement     12/14/2023  4:22 PM Lucila Malhotra Add [I48.0] Paroxysmal atrial fibrillation (HCC)           ED Disposition       ED Disposition   Admit    Condition   Stable    Date/Time   u Dec 14, 2023 5255    Comment   Case was discussed with Dr. Joshi and the patient's admission status was agreed to be Admission Status: inpatient status to the service of Dr. Joshi .               Follow-up Information    None         Patient's Medications   Discharge Prescriptions    No medications on file       No discharge  procedures on file.    PDMP Review       None            ED Provider  Electronically Signed by             Myriam Dumont PA-C  12/14/23 9007

## 2023-12-15 ENCOUNTER — APPOINTMENT (INPATIENT)
Dept: GASTROENTEROLOGY | Facility: HOSPITAL | Age: 83
DRG: 811 | End: 2023-12-15
Payer: COMMERCIAL

## 2023-12-15 ENCOUNTER — ANESTHESIA (INPATIENT)
Dept: GASTROENTEROLOGY | Facility: HOSPITAL | Age: 83
DRG: 811 | End: 2023-12-15
Payer: COMMERCIAL

## 2023-12-15 ENCOUNTER — ANESTHESIA EVENT (INPATIENT)
Dept: GASTROENTEROLOGY | Facility: HOSPITAL | Age: 83
DRG: 811 | End: 2023-12-15
Payer: COMMERCIAL

## 2023-12-15 ENCOUNTER — TELEPHONE (OUTPATIENT)
Dept: NEUROLOGY | Facility: CLINIC | Age: 83
End: 2023-12-15

## 2023-12-15 LAB
ABO GROUP BLD BPU: NORMAL
ABO GROUP BLD BPU: NORMAL
ANION GAP SERPL CALCULATED.3IONS-SCNC: 4 MMOL/L
APTT PPP: 49 SECONDS (ref 23–37)
APTT PPP: 70 SECONDS (ref 23–37)
ATRIAL RATE: 66 BPM
BPU ID: NORMAL
BPU ID: NORMAL
BUN SERPL-MCNC: 18 MG/DL (ref 5–25)
CALCIUM SERPL-MCNC: 8.4 MG/DL (ref 8.4–10.2)
CHLORIDE SERPL-SCNC: 111 MMOL/L (ref 96–108)
CO2 SERPL-SCNC: 28 MMOL/L (ref 21–32)
CREAT SERPL-MCNC: 1.29 MG/DL (ref 0.6–1.3)
CROSSMATCH: NORMAL
CROSSMATCH: NORMAL
ERYTHROCYTE [DISTWIDTH] IN BLOOD BY AUTOMATED COUNT: 17.2 % (ref 11.6–15.1)
GFR SERPL CREATININE-BSD FRML MDRD: 38 ML/MIN/1.73SQ M
GLUCOSE SERPL-MCNC: 104 MG/DL (ref 65–140)
GLUCOSE SERPL-MCNC: 106 MG/DL (ref 65–140)
GLUCOSE SERPL-MCNC: 117 MG/DL (ref 65–140)
GLUCOSE SERPL-MCNC: 146 MG/DL (ref 65–140)
GLUCOSE SERPL-MCNC: 67 MG/DL (ref 65–140)
HCT VFR BLD AUTO: 24.8 % (ref 34.8–46.1)
HGB BLD-MCNC: 7.5 G/DL (ref 11.5–15.4)
INR PPP: 2.07 (ref 0.84–1.19)
INR PPP: 2.57 (ref 0.84–1.19)
MCH RBC QN AUTO: 23.7 PG (ref 26.8–34.3)
MCHC RBC AUTO-ENTMCNC: 30.2 G/DL (ref 31.4–37.4)
MCV RBC AUTO: 79 FL (ref 82–98)
P AXIS: 71 DEGREES
PLATELET # BLD AUTO: 297 THOUSANDS/UL (ref 149–390)
PMV BLD AUTO: 10.2 FL (ref 8.9–12.7)
POTASSIUM SERPL-SCNC: 3.9 MMOL/L (ref 3.5–5.3)
PR INTERVAL: 266 MS
PROTHROMBIN TIME: 24.1 SECONDS (ref 11.6–14.5)
PROTHROMBIN TIME: 28.5 SECONDS (ref 11.6–14.5)
QRS AXIS: 122 DEGREES
QRSD INTERVAL: 152 MS
QT INTERVAL: 476 MS
QTC INTERVAL: 499 MS
RBC # BLD AUTO: 3.16 MILLION/UL (ref 3.81–5.12)
SODIUM SERPL-SCNC: 143 MMOL/L (ref 135–147)
T WAVE AXIS: -50 DEGREES
UNIT DISPENSE STATUS: NORMAL
UNIT DISPENSE STATUS: NORMAL
UNIT PRODUCT CODE: NORMAL
UNIT PRODUCT CODE: NORMAL
UNIT PRODUCT VOLUME: 350 ML
UNIT PRODUCT VOLUME: 350 ML
UNIT RH: NORMAL
UNIT RH: NORMAL
VENTRICULAR RATE: 66 BPM
WBC # BLD AUTO: 3.95 THOUSAND/UL (ref 4.31–10.16)

## 2023-12-15 PROCEDURE — 85730 THROMBOPLASTIN TIME PARTIAL: CPT | Performed by: STUDENT IN AN ORGANIZED HEALTH CARE EDUCATION/TRAINING PROGRAM

## 2023-12-15 PROCEDURE — 85610 PROTHROMBIN TIME: CPT | Performed by: NURSE PRACTITIONER

## 2023-12-15 PROCEDURE — P9017 PLASMA 1 DONOR FRZ W/IN 8 HR: HCPCS

## 2023-12-15 PROCEDURE — 93010 ELECTROCARDIOGRAM REPORT: CPT | Performed by: INTERNAL MEDICINE

## 2023-12-15 PROCEDURE — 80048 BASIC METABOLIC PNL TOTAL CA: CPT | Performed by: NURSE PRACTITIONER

## 2023-12-15 PROCEDURE — 99232 SBSQ HOSP IP/OBS MODERATE 35: CPT | Performed by: NURSE PRACTITIONER

## 2023-12-15 PROCEDURE — 0DJ08ZZ INSPECTION OF UPPER INTESTINAL TRACT, VIA NATURAL OR ARTIFICIAL OPENING ENDOSCOPIC: ICD-10-PCS | Performed by: INTERNAL MEDICINE

## 2023-12-15 PROCEDURE — C9113 INJ PANTOPRAZOLE SODIUM, VIA: HCPCS | Performed by: PHYSICIAN ASSISTANT

## 2023-12-15 PROCEDURE — 30233K1 TRANSFUSION OF NONAUTOLOGOUS FROZEN PLASMA INTO PERIPHERAL VEIN, PERCUTANEOUS APPROACH: ICD-10-PCS | Performed by: INTERNAL MEDICINE

## 2023-12-15 PROCEDURE — 82948 REAGENT STRIP/BLOOD GLUCOSE: CPT

## 2023-12-15 PROCEDURE — 44360 SMALL BOWEL ENDOSCOPY: CPT | Performed by: INTERNAL MEDICINE

## 2023-12-15 PROCEDURE — 85027 COMPLETE CBC AUTOMATED: CPT | Performed by: PHYSICIAN ASSISTANT

## 2023-12-15 PROCEDURE — 99223 1ST HOSP IP/OBS HIGH 75: CPT | Performed by: INTERNAL MEDICINE

## 2023-12-15 RX ORDER — DEXTROSE AND SODIUM CHLORIDE 5; .9 G/100ML; G/100ML
75 INJECTION, SOLUTION INTRAVENOUS CONTINUOUS
Status: DISCONTINUED | OUTPATIENT
Start: 2023-12-15 | End: 2023-12-15

## 2023-12-15 RX ORDER — SODIUM CHLORIDE, SODIUM LACTATE, POTASSIUM CHLORIDE, CALCIUM CHLORIDE 600; 310; 30; 20 MG/100ML; MG/100ML; MG/100ML; MG/100ML
INJECTION, SOLUTION INTRAVENOUS CONTINUOUS PRN
Status: DISCONTINUED | OUTPATIENT
Start: 2023-12-15 | End: 2023-12-15

## 2023-12-15 RX ORDER — LIDOCAINE HYDROCHLORIDE 10 MG/ML
INJECTION, SOLUTION EPIDURAL; INFILTRATION; INTRACAUDAL; PERINEURAL AS NEEDED
Status: DISCONTINUED | OUTPATIENT
Start: 2023-12-15 | End: 2023-12-15

## 2023-12-15 RX ORDER — PROPOFOL 10 MG/ML
INJECTION, EMULSION INTRAVENOUS AS NEEDED
Status: DISCONTINUED | OUTPATIENT
Start: 2023-12-15 | End: 2023-12-15

## 2023-12-15 RX ORDER — WARFARIN SODIUM 5 MG/1
5 TABLET ORAL
Status: DISCONTINUED | OUTPATIENT
Start: 2023-12-15 | End: 2023-12-16

## 2023-12-15 RX ADMIN — SODIUM CHLORIDE, SODIUM LACTATE, POTASSIUM CHLORIDE, AND CALCIUM CHLORIDE: .6; .31; .03; .02 INJECTION, SOLUTION INTRAVENOUS at 14:26

## 2023-12-15 RX ADMIN — GABAPENTIN 300 MG: 300 CAPSULE ORAL at 08:02

## 2023-12-15 RX ADMIN — LEVOTHYROXINE SODIUM 50 MCG: 0.05 TABLET ORAL at 08:02

## 2023-12-15 RX ADMIN — Medication 2000 UNITS: at 08:06

## 2023-12-15 RX ADMIN — METOPROLOL TARTRATE 25 MG: 25 TABLET, FILM COATED ORAL at 08:02

## 2023-12-15 RX ADMIN — PROPOFOL 80 MG: 10 INJECTION, EMULSION INTRAVENOUS at 14:45

## 2023-12-15 RX ADMIN — ASPIRIN 81 MG: 81 TABLET, COATED ORAL at 08:02

## 2023-12-15 RX ADMIN — HEPARIN SODIUM 12 UNITS/KG/HR: 10000 INJECTION, SOLUTION INTRAVENOUS at 16:50

## 2023-12-15 RX ADMIN — PROPOFOL 20 MG: 10 INJECTION, EMULSION INTRAVENOUS at 14:56

## 2023-12-15 RX ADMIN — WARFARIN SODIUM 5 MG: 5 TABLET ORAL at 16:58

## 2023-12-15 RX ADMIN — LIDOCAINE HYDROCHLORIDE 50 MG: 10 INJECTION, SOLUTION EPIDURAL; INFILTRATION; INTRACAUDAL; PERINEURAL at 14:44

## 2023-12-15 RX ADMIN — METOPROLOL TARTRATE 25 MG: 25 TABLET, FILM COATED ORAL at 22:51

## 2023-12-15 RX ADMIN — INSULIN DETEMIR 26 UNITS: 100 INJECTION, SOLUTION SUBCUTANEOUS at 22:52

## 2023-12-15 RX ADMIN — SODIUM CHLORIDE 8 MG/HR: 9 INJECTION, SOLUTION INTRAVENOUS at 00:58

## 2023-12-15 RX ADMIN — PROPOFOL 40 MG: 10 INJECTION, EMULSION INTRAVENOUS at 14:50

## 2023-12-15 RX ADMIN — ATORVASTATIN CALCIUM 40 MG: 40 TABLET, FILM COATED ORAL at 16:57

## 2023-12-15 RX ADMIN — DEXTROSE AND SODIUM CHLORIDE 75 ML/HR: 5; .9 INJECTION, SOLUTION INTRAVENOUS at 07:57

## 2023-12-15 RX ADMIN — SODIUM CHLORIDE 8 MG/HR: 9 INJECTION, SOLUTION INTRAVENOUS at 11:42

## 2023-12-15 RX ADMIN — GABAPENTIN 300 MG: 300 CAPSULE ORAL at 22:52

## 2023-12-15 RX ADMIN — PROPOFOL 40 MG: 10 INJECTION, EMULSION INTRAVENOUS at 14:54

## 2023-12-15 RX ADMIN — Medication 40 MG: at 14:50

## 2023-12-15 NOTE — PLAN OF CARE
Problem: HEMATOLOGIC - ADULT  Goal: Maintains hematologic stability  Description: INTERVENTIONS  - Assess for signs and symptoms of bleeding or hemorrhage  - Monitor labs  - Administer supportive blood products/factors as ordered and appropriate  Outcome: Progressing     Problem: CARDIOVASCULAR - ADULT  Goal: Maintains optimal cardiac output and hemodynamic stability  Description: INTERVENTIONS:  - Monitor I/O, vital signs and rhythm  - Monitor for S/S and trends of decreased cardiac output  - Administer and titrate ordered vasoactive medications to optimize hemodynamic stability  - Assess quality of pulses, skin color and temperature  - Assess for signs of decreased coronary artery perfusion  - Instruct patient to report change in severity of symptoms  Outcome: Progressing  Goal: Absence of cardiac dysrhythmias or at baseline rhythm  Description: INTERVENTIONS:  - Continuous cardiac monitoring, vital signs, obtain 12 lead EKG if ordered  - Administer antiarrhythmic and heart rate control medications as ordered  - Monitor electrolytes and administer replacement therapy as ordered  Outcome: Progressing     Problem: SAFETY ADULT  Goal: Patient will remain free of falls  Description: INTERVENTIONS:  - Educate patient/family on patient safety including physical limitations  - Instruct patient to call for assistance with activity   - Consult OT/PT to assist with strengthening/mobility   - Keep Call bell within reach  - Keep bed low and locked with side rails adjusted as appropriate  - Keep care items and personal belongings within reach  - Initiate and maintain comfort rounds  - Make Fall Risk Sign visible to staff  - Offer Toileting every 2 Hours, in advance of need  - Initiate/Maintain bed alarm  - Obtain necessary fall risk management equipment: bedside commode  - Apply yellow socks and bracelet for high fall risk patients  - Consider moving patient to room near nurses station  Outcome: Progressing  Goal: Maintain  or return to baseline ADL function  Description: INTERVENTIONS:  -  Assess patient's ability to carry out ADLs; assess patient's baseline for ADL function and identify physical deficits which impact ability to perform ADLs (bathing, care of mouth/teeth, toileting, grooming, dressing, etc.)  - Assess/evaluate cause of self-care deficits   - Assess range of motion  - Assess patient's mobility; develop plan if impaired  - Assess patient's need for assistive devices and provide as appropriate  - Encourage maximum independence but intervene and supervise when necessary  - Involve family in performance of ADLs  - Assess for home care needs following discharge   - Consider OT consult to assist with ADL evaluation and planning for discharge  - Provide patient education as appropriate  Outcome: Progressing  Goal: Maintains/Returns to pre admission functional level  Description: INTERVENTIONS:  - Perform AM-PAC 6 Click Basic Mobility/ Daily Activity assessment daily.  - Set and communicate daily mobility goal to care team and patient/family/caregiver.   - Collaborate with rehabilitation services on mobility goals if consulted  - Perform Range of Motion 4 times a day.  - Reposition patient every 2 hours.  - Dangle patient 3 times a day  - Stand patient 4 times a day  - Ambulate patient 4 times a day  - Out of bed to chair 3 times a day   - Out of bed for meals 3 times a day  - Out of bed for toileting  - Record patient progress and toleration of activity level   Outcome: Progressing     Problem: DISCHARGE PLANNING  Goal: Discharge to home or other facility with appropriate resources  Description: INTERVENTIONS:  - Identify barriers to discharge w/patient and caregiver  - Arrange for needed discharge resources and transportation as appropriate  - Identify discharge learning needs (meds, wound care, etc.)  - Arrange for interpretive services to assist at discharge as needed  - Refer to Case Management Department for coordinating  discharge planning if the patient needs post-hospital services based on physician/advanced practitioner order or complex needs related to functional status, cognitive ability, or social support system  Outcome: Progressing     Problem: Knowledge Deficit  Goal: Patient/family/caregiver demonstrates understanding of disease process, treatment plan, medications, and discharge instructions  Description: Complete learning assessment and assess knowledge base.  Interventions:  - Provide teaching at level of understanding  - Provide teaching via preferred learning methods  Outcome: Progressing

## 2023-12-15 NOTE — PLAN OF CARE
Problem: Potential for Falls  Goal: Patient will remain free of falls  Description: INTERVENTIONS:  - Educate patient/family on patient safety including physical limitations  - Instruct patient to call for assistance with activity   - Consult OT/PT to assist with strengthening/mobility   - Keep Call bell within reach  - Keep bed low and locked with side rails adjusted as appropriate  - Keep care items and personal belongings within reach  - Initiate and maintain comfort rounds  - Make Fall Risk Sign visible to staff  - Offer Toileting every  Hours, in advance of need  - Initiate/Maintain alarm  - Obtain necessary fall risk management equipment:   - Apply yellow socks and bracelet for high fall risk patients  - Consider moving patient to room near nurses station  Outcome: Progressing     Problem: PAIN - ADULT  Goal: Verbalizes/displays adequate comfort level or baseline comfort level  Description: Interventions:  - Encourage patient to monitor pain and request assistance  - Assess pain using appropriate pain scale  - Administer analgesics based on type and severity of pain and evaluate response  - Implement non-pharmacological measures as appropriate and evaluate response  - Consider cultural and social influences on pain and pain management  - Notify physician/advanced practitioner if interventions unsuccessful or patient reports new pain  Outcome: Progressing     Problem: INFECTION - ADULT  Goal: Absence or prevention of progression during hospitalization  Description: INTERVENTIONS:  - Assess and monitor for signs and symptoms of infection  - Monitor lab/diagnostic results  - Monitor all insertion sites, i.e. indwelling lines, tubes, and drains  - Monitor endotracheal if appropriate and nasal secretions for changes in amount and color  - [place] appropriate cooling/warming therapies per order  - Administer medications as ordered  - Instruct and encourage patient and family to use good hand hygiene technique  -  Identify and instruct in appropriate isolation precautions for identified infection/condition  Outcome: Progressing     Problem: SAFETY ADULT  Goal: Patient will remain free of falls  Description: INTERVENTIONS:  - Educate patient/family on patient safety including physical limitations  - Instruct patient to call for assistance with activity   - Consult OT/PT to assist with strengthening/mobility   - Keep Call bell within reach  - Keep bed low and locked with side rails adjusted as appropriate  - Keep care items and personal belongings within reach  - Initiate and maintain comfort rounds  - Make Fall Risk Sign visible to staff  - Offer Toileting every  Hours, in advance of need  - Initiate/Maintain alarm  - Obtain necessary fall risk management equipment:   - Apply yellow socks and bracelet for high fall risk patients  - Consider moving patient to room near nurses station  Outcome: Progressing  Goal: Maintain or return to baseline ADL function  Description: INTERVENTIONS:  -  Assess patient's ability to carry out ADLs; assess patient's baseline for ADL function and identify physical deficits which impact ability to perform ADLs (bathing, care of mouth/teeth, toileting, grooming, dressing, etc.)  - Assess/evaluate cause of self-care deficits   - Assess range of motion  - Assess patient's mobility; develop plan if impaired  - Assess patient's need for assistive devices and provide as appropriate  - Encourage maximum independence but intervene and supervise when necessary  - Involve family in performance of ADLs  - Assess for home care needs following discharge   - Consider OT consult to assist with ADL evaluation and planning for discharge  - Provide patient education as appropriate  Outcome: Progressing  Goal: Maintains/Returns to pre admission functional level  Description: INTERVENTIONS:  - Perform AM-PAC 6 Click Basic Mobility/ Daily Activity assessment daily.  - Set and communicate daily mobility goal to care  team and patient/family/caregiver.   - Collaborate with rehabilitation services on mobility goals if consulted  - Perform Range of Motion  times a day.  - Reposition patient every  hours.  - Dangle patient  times a day  - Stand patient  times a day  - Ambulate patient  times a day  - Out of bed to chair  times a day   - Out of bed for meals  times a day  - Out of bed for toileting  - Record patient progress and toleration of activity level   Outcome: Progressing     Problem: DISCHARGE PLANNING  Goal: Discharge to home or other facility with appropriate resources  Description: INTERVENTIONS:  - Identify barriers to discharge w/patient and caregiver  - Arrange for needed discharge resources and transportation as appropriate  - Identify discharge learning needs (meds, wound care, etc.)  - Arrange for interpretive services to assist at discharge as needed  - Refer to Case Management Department for coordinating discharge planning if the patient needs post-hospital services based on physician/advanced practitioner order or complex needs related to functional status, cognitive ability, or social support system  Outcome: Progressing     Problem: Knowledge Deficit  Goal: Patient/family/caregiver demonstrates understanding of disease process, treatment plan, medications, and discharge instructions  Description: Complete learning assessment and assess knowledge base.  Interventions:  - Provide teaching at level of understanding  - Provide teaching via preferred learning methods  Outcome: Progressing     Problem: CARDIOVASCULAR - ADULT  Goal: Maintains optimal cardiac output and hemodynamic stability  Description: INTERVENTIONS:  - Monitor I/O, vital signs and rhythm  - Monitor for S/S and trends of decreased cardiac output  - Administer and titrate ordered vasoactive medications to optimize hemodynamic stability  - Assess quality of pulses, skin color and temperature  - Assess for signs of decreased coronary artery  perfusion  - Instruct patient to report change in severity of symptoms  Outcome: Progressing  Goal: Absence of cardiac dysrhythmias or at baseline rhythm  Description: INTERVENTIONS:  - Continuous cardiac monitoring, vital signs, obtain 12 lead EKG if ordered  - Administer antiarrhythmic and heart rate control medications as ordered  - Monitor electrolytes and administer replacement therapy as ordered  Outcome: Progressing     Problem: HEMATOLOGIC - ADULT  Goal: Maintains hematologic stability  Description: INTERVENTIONS  - Assess for signs and symptoms of bleeding or hemorrhage  - Monitor labs  - Administer supportive blood products/factors as ordered and appropriate  Outcome: Progressing

## 2023-12-15 NOTE — ASSESSMENT & PLAN NOTE
Chronic, uncontrolled, as evidenced by a hemoglobin A1C of 8.7  Hold home oral anti-diabetics  Continue with insulin sliding scale  Monitor blood glucose AC/HS  Hypoglycemia protocol

## 2023-12-15 NOTE — TELEPHONE ENCOUNTER
Patient is admitted into hospital 12/14. I did call and left an message for patient to call back to confirm if discharged.

## 2023-12-15 NOTE — ASSESSMENT & PLAN NOTE
Chronic  Takes Warfarin for AC and Lopressor for rate control  Coumadin is on hold in setting of need for EGD today.  Rate is controlled

## 2023-12-15 NOTE — ANESTHESIA PREPROCEDURE EVALUATION
Procedure:  EGD WITH PUSH ENTEROSCOPY    Relevant Problems   CARDIO   (+) AVM (arteriovenous malformation) of small bowel, acquired with hemorrhage   (+) Angiectasia   (+) Coronary artery disease of native artery of native heart with stable angina pectoris (HCC)   (+) Hyperlipidemia   (+) Hypertension, essential   (+) Hypertensive urgency   (+) Other vascular disorders of intestine (HCC)   (+) Paroxysmal atrial fibrillation (HCC)      ENDO   (+) Hypothyroidism      GI/HEPATIC   (+) GERD (gastroesophageal reflux disease)      /RENAL   (+) Chronic kidney disease-mineral and bone disorder   (+) Stage 3a chronic kidney disease      HEMATOLOGY   (+) Acute blood loss anemia      MUSCULOSKELETAL   (+) Primary osteoarthritis involving multiple joints      PULMONARY   (+) Chronic obstructive pulmonary disease (HCC)      Cardiovascular and Mediastinum   (+) Cardiomyopathy (HCC)      Respiratory   (+) Nocturnal hypoxemia   (+) Restrictive lung disease      Endocrine   (+) Diabetes mellitus with neurological manifestation (HCC)        Physical Exam    Airway    Mallampati score: II  TM Distance: >3 FB  Neck ROM: full     Dental       Cardiovascular  Cardiovascular exam normal    Pulmonary  Pulmonary exam normal     Other Findings  post-pubertal.      Anesthesia Plan  ASA Score- 3     Anesthesia Type- IV sedation with anesthesia with ASA Monitors.         Additional Monitors:     Airway Plan:            Plan Factors-Exercise tolerance (METS): >4 METS.    Chart reviewed. EKG reviewed. Imaging results reviewed. Existing labs reviewed. Patient summary reviewed.    Patient is not a current smoker.              Induction- intravenous.    Postoperative Plan-     Informed Consent- Anesthetic plan and risks discussed with patient.  I personally reviewed this patient with the CRNA. Discussed and agreed on the Anesthesia Plan with the CRNA..

## 2023-12-15 NOTE — ASSESSMENT & PLAN NOTE
Patient presented to the ED with a hemoglobin of 5.5 on outpatient labs; referred to the ER by cardiology  Hemoglobin on 10/31 noted to be 12.7  Patient denies any bright red blood per rectum, but does endorse dark stools approx 1 wk ago  She is symptomatic with shortness of breath  Patient currently receiving 2 units PRBC  Hemoglobin post transfusion 7.4  GI consult, appreciate input  INR 2.57 this morning, GI requesting 1 unit FFP prior to scope  Continue protonix drip  Keep NPO for endoscopy today.

## 2023-12-15 NOTE — ASSESSMENT & PLAN NOTE
Most recent echocardiogram with an EF of 45% with grade 1 diastolic dysfunction  Patient takes 40 mg Lasix daily at home  Hold for now, plan to restart later today

## 2023-12-15 NOTE — CONSULTS
Consultation - Cardiology   Ely Hernadez 83 y.o. female MRN: [id number]  Unit/Bed#: -01 Encounter: [id number]  12/15/23  2:32 PM    Assessment/ Plan:  1.  Acute blood loss anemia  Hemoglobin 5.6 on presentation.  Further evaluation per GI  Heparin infusion currently on hold pending procedure.  Resume when able.    2.  S/p mechanical AVR  Continue heparin infusion postprocedure    3.  PAF  And V-paced rhythm  Continue Lopressor 25 mg twice daily  Coumadin on hold, currently bridging on heparin at during evaluation for anemia.    4.  Stage III CKD  Creatinine 1.37    5. Chronic HFmrEF, NICM, EF 45%  Patient appears euvolemic  Diuretics currently on hold  Maintain strict I/O's, daily weights, and low sodium diet.    6. Nonobstructive CAD  Patient denies chest pain or anginal Blepha.  Continue aspirin, atorvastatin, and Lopressor.    History of Present Illness   Physician Requesting Consult: Vega SAVAGE MD    Reason for Consult / Principal Problem: Acute blood loss anemia, H/L mechanical aortic valve replacement, paroxysmal atrial fibrillation    HPI: Ely Hernadez is a 83 y.o. year old female with PMH of hypertension, CAD, DM, COPD, mechanical AVR, PAF who presents with acute anemia.  Patient initially presented to cardiology office with complaints of shortness of breath and fatigue.  Patient also endorses a recent respiratory illness.  Patient was started on antibiotics, underwent chest x-ray, CBC, CMP, and TSH.  CBC showed hemoglobin of 5.6.  Patient was referred to the ED for further evaluation.  GI evaluated.  Patient was transition to heparin infusion and will be undergoing post enteroscopy today.  Patient denies any chest pain or shortness of breath at this time.  She notes that she had black stools about 1 week ago for several days that spontaneously resolved.  Patient is known to Dr. Layton.    Inpatient consult to Cardiology  Consult performed by: CAMACHO García  Consult ordered by:  CAMACHO Phan              Review of Systems   Constitutional:  Positive for fatigue. Negative for chills, diaphoresis and fever.   Respiratory:  Positive for shortness of breath. Negative for cough and chest tightness.    Cardiovascular:  Negative for chest pain, palpitations and leg swelling.   Gastrointestinal:  Negative for abdominal distention, blood in stool, nausea and vomiting.   Genitourinary:  Negative for difficulty urinating.   Musculoskeletal:  Negative for arthralgias and back pain.   Neurological:  Negative for dizziness, syncope, light-headedness and headaches.   Psychiatric/Behavioral:  Negative for agitation and confusion. The patient is not nervous/anxious.        Historical Information   Past Medical History:   Diagnosis Date    Anemia     Aneurysm of thoracic aorta (HCC)     Aortic valve disorder     Benign neoplasm of sigmoid colon     Cardiomyopathy (HCC)     last assessed: 10/10/2014    CHF (congestive heart failure) (HCC)     Chronic kidney disease     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HCC)     last assessed: 10/10/2014    Diabetic nephropathy (HCC)     RAMON (dyspnea on exertion)     GERD (gastroesophageal reflux disease)     History of emphysema (HCC)     Hyperlipidemia     TIA (transient ischemic attack)      Past Surgical History:   Procedure Laterality Date    AORTIC VALVE REPLACEMENT      CARDIAC PACEMAKER PLACEMENT      last assessed: 10/10/2014    CARDIAC SURGERY      aortic valve replacement    CHOLECYSTECTOMY      COLONOSCOPY      HYSTERECTOMY      INSERT / REPLACE / REMOVE PACEMAKER      KNEE SURGERY Right     OTHER SURGICAL HISTORY      Capsule ENDOscopy description: 12/19/2012    GA COLONOSCOPY FLX DX W/COLLJ SPEC WHEN PFRMD N/A 5/31/2018    Procedure: single balloon ENTEROSCOPY;  Surgeon: David Dennis MD;  Location: [facility];  Service: Gastroenterology    GA ESOPHAGOGASTRODUODENOSCOPY TRANSORAL DIAGNOSTIC N/A 11/29/2017    Procedure: EGD AND COLONOSCOPY;  Surgeon:  Jay Mcleod III, MD;  Location: [facility];  Service: Gastroenterology     Social History     Substance and Sexual Activity   Alcohol Use Never     Social History     Substance and Sexual Activity   Drug Use Never     Social History     Tobacco Use   Smoking Status Former    Current packs/day: 0.00    Average packs/day: 1 pack/day for 52.9 years (52.9 ttl pk-yrs)    Types: Cigarettes    Start date:     Quit date: 2007    Years since quittin.0    Passive exposure: Past   Smokeless Tobacco Former    Quit date:        Family History:   Family History   Problem Relation Age of Onset    No Known Problems Mother     No Known Problems Father        Meds/Allergies   all current active meds have been reviewed and current meds:   Current Facility-Administered Medications   Medication Dose Route Frequency    aspirin (ECOTRIN LOW STRENGTH) EC tablet 81 mg  81 mg Oral Daily    atorvastatin (LIPITOR) tablet 40 mg  40 mg Oral QPM    cholecalciferol (VITAMIN D3) tablet 2,000 Units  2,000 Units Oral Daily    dextrose 5 % and sodium chloride 0.9 % infusion  75 mL/hr Intravenous Continuous    gabapentin (NEURONTIN) capsule 300 mg  300 mg Oral BID    heparin (porcine) 25,000 units in 0.45% NaCl 250 mL infusion (premix)  3-20 Units/kg/hr (Order-Specific) Intravenous Titrated    insulin detemir (LEVEMIR) subcutaneous injection 26 Units  26 Units Subcutaneous HS    insulin lispro (HumaLOG) 100 units/mL subcutaneous injection 1-5 Units  1-5 Units Subcutaneous TID AC    insulin lispro (HumaLOG) 100 units/mL subcutaneous injection 1-6 Units  1-6 Units Subcutaneous HS    levothyroxine tablet 50 mcg  50 mcg Oral Daily    metoprolol tartrate (LOPRESSOR) tablet 25 mg  25 mg Oral Q12H LIBAN    pantoprazole (PROTONIX) 80 mg in sodium chloride 0.9 % 100 mL infusion  8 mg/hr Intravenous Continuous     No Known Allergies    Objective   Vitals: Blood pressure 127/58, pulse 73, temperature 97.8 °F (36.6 °C), temperature source  "Temporal, resp. rate 18, height 5' 5\" (1.651 m), weight 72.1 kg (158 lb 15.2 oz), SpO2 96%., Body mass index is 26.45 kg/m².,   Orthostatic Blood Pressures      Flowsheet Row Most Recent Value   Blood Pressure 127/58 filed at 12/15/2023 1315   Patient Position - Orthostatic VS Sitting filed at 2023 1741            Systolic (24hrs), Av , Min:107 , Max:151     Diastolic (24hrs), Av, Min:47, Max:76        Intake/Output Summary (Last 24 hours) at 12/15/2023 1432  Last data filed at 12/15/2023 1240  Gross per 24 hour   Intake 1187.08 ml   Output 500 ml   Net 687.08 ml       Invasive Devices       Peripheral Intravenous Line  Duration             Peripheral IV 23 Right Antecubital 1 day    Peripheral IV 23 Right;Dorsal (posterior) Forearm <1 day    Peripheral IV 23 Right;Ventral (anterior) Forearm <1 day                        Physical Exam:  Physical Exam  Vitals and nursing note reviewed.   Constitutional:       General: She is not in acute distress.     Appearance: She is well-developed.   HENT:      Head: Normocephalic and atraumatic.   Eyes:      Conjunctiva/sclera: Conjunctivae normal.   Neck:      Vascular: No carotid bruit.   Cardiovascular:      Rate and Rhythm: Normal rate and regular rhythm.      Heart sounds: Murmur: +click.   Pulmonary:      Effort: Pulmonary effort is normal. No respiratory distress.      Breath sounds: Normal breath sounds.   Abdominal:      Palpations: Abdomen is soft.      Tenderness: There is no abdominal tenderness.   Musculoskeletal:         General: No swelling.      Cervical back: Neck supple.      Right lower leg: No edema.      Left lower leg: No edema.   Skin:     General: Skin is warm and dry.      Capillary Refill: Capillary refill takes less than 2 seconds.      Coloration: Skin is pale.   Neurological:      Mental Status: She is alert and oriented to person, place, and time.   Psychiatric:         Mood and Affect: Mood normal.          Lab " Results:     Cardiac Profile:   Results from last 7 days   Lab Units 12/14/23  2242 12/14/23  1752 12/14/23  1336   HS TNI 0HR ng/L  --   --  19   HS TNI 2HR ng/L  --  19  --    HSTNI D2 ng/L  --  0  --    HS TNI 4HR ng/L 20  --   --    HSTNI D4 ng/L 1  --   --        CBC with diff:   Results from last 7 days   Lab Units 12/15/23  0510 12/14/23  2242 12/14/23  1336 12/13/23  1130   WBC Thousand/uL 3.95*  --  5.18 6.03   HEMOGLOBIN g/dL 7.5* 7.4* 5.5* 5.6*   HEMATOCRIT % 24.8* 24.3* 18.9* 20.5*   MCV fL 79*  --  79* 84   PLATELETS Thousands/uL 297  --  381 369   RBC Million/uL 3.16*  --  2.40* 2.45*   MCH pg 23.7*  --  23.3* 22.4*   MCHC g/dL 30.2*  --  29.6* 26.8*   RDW % 17.2*  --  17.5* 17.7*   MPV fL 10.2  --  9.7 10.9   NRBC AUTO /100 WBCs  --   --  0 0         CMP:   Results from last 7 days   Lab Units 12/14/23  1336 12/13/23  1141   POTASSIUM mmol/L 3.4* 3.4*   CHLORIDE mmol/L 104 103   CO2 mmol/L 26 22   BUN mg/dL 28* 25   CREATININE mg/dL 1.37* 1.33*   CALCIUM mg/dL 8.6 8.6   AST U/L 15 20   ALT U/L 12 10   ALK PHOS U/L 61 62   EGFR ml/min/1.73sq m 35 36

## 2023-12-15 NOTE — UTILIZATION REVIEW
Initial Clinical Review    Admission: Date/Time/Statement:   Admission Orders (From admission, onward)       Ordered        12/14/23 1427  INPATIENT ADMISSION  Once                          Orders Placed This Encounter   Procedures    INPATIENT ADMISSION     Standing Status:   Standing     Number of Occurrences:   1     Order Specific Question:   Level of Care     Answer:   Med Surg [16]     Order Specific Question:   Estimated length of stay     Answer:   More than 2 Midnights     Order Specific Question:   Certification     Answer:   I certify that inpatient services are medically necessary for this patient for a duration of greater than two midnights. See H&P and MD Progress Notes for additional information about the patient's course of treatment.     ED Arrival Information       Expected   -    Arrival   12/14/2023 12:31    Acuity   Emergent              Means of arrival   Walk-In    Escorted by   Family Member    Service   Hospitalist    Admission type   Emergency              Arrival complaint   Abnormal lab             Chief Complaint   Patient presents with    Abnormal Lab     HGB 5.6, sent for transfusion, pt appears pale        Initial Presentation: 83 y.o. female to ED from home w/ PMH of anemia, mechanical aortic valve, A-fib on Coumadin, CKD, hyperlipidemia who presents with hemoglobin of 5.5, advised to come to the ER by cardiology.  She had seen her Cardiologist in the office on 12/13 or complaints of worsening shortness of breath.  Patient was noted to have a normal hemoglobin on 10/31 of 12.7. reports last wek had a BM was that dark in color . Given 2u PRBC . Admitted IP status w/ acute bld loss anemia plan to check H&H q1hr post infusion . GI consult . INR must be below 2 to do scope , PPI gtt , NPO . INR 2.69 , hold coumadin and monitor. Afib rate controlled. CKD CR 1.37 , baseline 1.1-1.3 BMP in am . DM SSI and monitor . CAD asa, lopressor .     12/14 GI Consult   Acute blood loss anemia, Hx of  small bowel AVMs, Elevated INR. Hold coumadin , started heparin gtt , hold heparin 5 hrs prior to scope .     Date: 12/15   Day 2:plan for endoscopy today . INR 2.57 this am , 1 u FFP given prior to scope . Cont PPI gtt . NPO . Hgb 7.4 post transfusion .     ED Triage Vitals [12/14/23 1245]   Temperature Pulse Respirations Blood Pressure SpO2   97.8 °F (36.6 °C) 87 20 139/56 94 %      Temp Source Heart Rate Source Patient Position - Orthostatic VS BP Location FiO2 (%)   Temporal Monitor Sitting Left arm --      Pain Score       No Pain          Wt Readings from Last 1 Encounters:   12/14/23 72.1 kg (158 lb 15.2 oz)     Additional Vital Signs:   12/15/23 07:32:08 98.4 °F (36.9 °C) 70 17 151/63 92 96 % -- --   12/14/23 21:32:22 99.4 °F (37.4 °C) 69 18 107/53 71 94 % -- --   12/14/23 1840 98.7 °F (37.1 °C) 63 16 139/47 Abnormal  -- -- -- --   12/14/23 1815 97.4 °F (36.3 °C) Abnormal  82 16 135/76 -- -- -- --   12/14/23 17:41:41 97.7 °F (36.5 °C) 73 -- 150/52 85 98 % -- Sitting   12/14/23 1615 98.3 °F (36.8 °C) 61 16 130/60 -- 98 % None (Room air) --   12/14/23 1556 98.1 °F (36.7 °C) 62 16 121/57 -- 97 % None (Room air) Sitting   12/14/23 1516 -- -- -- -- -- -- None (Room air) --   12/14/23 1420 -- 64 30 Abnormal  118/49 Abnormal  -- 99 % None (Room air) Lyin     Pertinent Labs/Diagnostic Test Results:   XR chest 2 views   Final Result by Ramez Conley MD (12/15 0802)      Probable trace costophrenic angle effusions. Otherwise unremarkable examination.                  Workstation performed: [id number]               Results from last 7 days   Lab Units 12/15/23  0510 12/14/23  2242 12/14/23  1336 12/13/23  1130   WBC Thousand/uL 3.95*  --  5.18 6.03   HEMOGLOBIN g/dL 7.5* 7.4* 5.5* 5.6*   HEMATOCRIT % 24.8* 24.3* 18.9* 20.5*   PLATELETS Thousands/uL 297  --  381 369   NEUTROS ABS Thousands/µL  --   --  3.15 4.87         Results from last 7 days   Lab Units 12/14/23  1336 12/13/23  1141   SODIUM mmol/L 139 138    POTASSIUM mmol/L 3.4* 3.4*   CHLORIDE mmol/L 104 103   CO2 mmol/L 26 22   ANION GAP mmol/L 9 13   BUN mg/dL 28* 25   CREATININE mg/dL 1.37* 1.33*   EGFR ml/min/1.73sq m 35 36   CALCIUM mg/dL 8.6 8.6     Results from last 7 days   Lab Units 12/14/23  1336 12/13/23  1141   AST U/L 15 20   ALT U/L 12 10   ALK PHOS U/L 61 62   TOTAL PROTEIN g/dL 7.3 6.4   ALBUMIN g/dL 4.0 3.7   TOTAL BILIRUBIN mg/dL 0.46 0.46     Results from last 7 days   Lab Units 12/15/23  0733 12/14/23 2007   POC GLUCOSE mg/dl 67 166*     Results from last 7 days   Lab Units 12/14/23  1336   GLUCOSE RANDOM mg/dL 152*     Results from last 7 days   Lab Units 12/14/23  2242 12/14/23  1752 12/14/23  1336   HS TNI 0HR ng/L  --   --  19   HS TNI 2HR ng/L  --  19  --    HSTNI D2 ng/L  --  0  --    HS TNI 4HR ng/L 20  --   --    HSTNI D4 ng/L 1  --   --          Results from last 7 days   Lab Units 12/15/23  0510 12/14/23  2242 12/14/23  1434   PROTIME seconds 28.5*  --  29.5*   INR  2.57*  --  2.69*   PTT seconds 70* 53* 37     Results from last 7 days   Lab Units 12/13/23  1130   TSH 3RD GENERATON uIU/mL 1.296         Results from last 7 days   Lab Units 12/15/23  0410   UNIT PRODUCT CODE  [id number]  [id number]   UNIT NUMBER  [id number]  [id number]   UNITABO  B  B   UNITRH  POS  POS   CROSSMATCH  Compatible  Compatible   UNIT DISPENSE STATUS  Presumed Trans  Presumed Trans   UNIT PRODUCT VOL ml 350  350       ED Treatment:   Medication Administration from 12/14/2023 1231 to 12/14/2023 1728         Date/Time Order Dose Route Action Action by Comments     12/14/2023 1634 EST heparin (porcine) 25,000 units in 0.45% NaCl 250 mL infusion (premix) 12 Units/kg/hr Intravenous New Bag Amanda Contreras RN --     12/14/2023 1700 EST insulin lispro (HumaLOG) 100 units/mL subcutaneous injection 1-5 Units -- Subcutaneous Not Given Amanda Contreras RN --          Past Medical History:   Diagnosis Date    Anemia     Aneurysm of thoracic aorta (HCC)      Aortic valve disorder     Benign neoplasm of sigmoid colon     Cardiomyopathy (HCC)     last assessed: 10/10/2014    CHF (congestive heart failure) (HCC)     Chronic kidney disease     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HCC)     last assessed: 10/10/2014    Diabetic nephropathy (HCC)     RAMON (dyspnea on exertion)     GERD (gastroesophageal reflux disease)     History of emphysema (HCC)     Hyperlipidemia     TIA (transient ischemic attack)      Present on Admission:   Coronary artery disease of native artery of native heart with stable angina pectoris (HCC)   AVM (arteriovenous malformation) of small bowel, acquired with hemorrhage   Cardiomyopathy (HCC)   Paroxysmal atrial fibrillation (HCC)   Acute blood loss anemia   Diabetes mellitus with neurological manifestation (HCC)   Stage 3a chronic kidney disease      Admitting Diagnosis: Shortness of breath [R06.02]  Iron deficiency [E61.1]  Acute blood loss anemia [D62]  Paroxysmal atrial fibrillation (HCC) [I48.0]  Anemia [D64.9]  H/O mechanical aortic valve replacement [Z95.2]  AVM (arteriovenous malformation) of small bowel, acquired with hemorrhage [K55.21]  Age/Sex: 83 y.o. female  Admission Orders:  Scheduled Medications:  aspirin, 81 mg, Oral, Daily  atorvastatin, 40 mg, Oral, QPM  cholecalciferol, 2,000 Units, Oral, Daily  gabapentin, 300 mg, Oral, BID  insulin detemir, 26 Units, Subcutaneous, HS  insulin lispro, 1-5 Units, Subcutaneous, TID AC  insulin lispro, 1-6 Units, Subcutaneous, HS  levothyroxine, 50 mcg, Oral, Daily  metoprolol tartrate, 25 mg, Oral, Q12H LIBAN      Continuous IV Infusions:  dextrose 5 % and sodium chloride 0.9 %, 75 mL/hr, Intravenous, Continuous  heparin (porcine), 3-20 Units/kg/hr (Order-Specific), Intravenous, Titrated  pantoprazole (PROTONIX) 80 mg in sodium chloride 0.9 % 100 mL infusion, 8 mg/hr, Intravenous, Continuous      PRN Meds:     Fingerstick q6hr   NPO   Transfuse 2 u PRBC   I&O     IP CONSULT TO GASTROENTEROLOGY  IP  CONSULT TO CARDIOLOGY    Network Utilization Review Department  ATTENTION: Please call with any questions or concerns to [phone] and carefully listen to the prompts so that you are directed to the right person. All voicemails are confidential.   For Discharge needs, contact Care Management DC Support Team at [phone] opt. [phone]  Send all requests for admission clinical reviews, approved or denied determinations and any other requests to dedicated fax number below belonging to the campus where the patient is receiving treatment. List of dedicated fax numbers for the Facilities:  FACILITY NAME UR FAX NUMBER   ADMISSION DENIALS (Administrative/Medical Necessity) [phone]   DISCHARGE SUPPORT TEAM (NETWORK) [phone]   PARENT CHILD HEALTH (Maternity/NICU/Pediatrics) [phone]   [facility] [phone]   [facility] [phone]   [facility] [phone]   [facility] [phone]   [facility] [phone]   [facility] [phone]   [facility] [phone]   [facility] [phone]   [facility] [phone]   [facility] [phone]   [facility] [phone]

## 2023-12-15 NOTE — ASSESSMENT & PLAN NOTE
Lab Results   Component Value Date    EGFR 35 12/14/2023    EGFR 36 12/13/2023    EGFR 43 10/31/2023    CREATININE 1.37 (H) 12/14/2023    CREATININE 1.33 (H) 12/13/2023    CREATININE 1.16 10/31/2023     Chronic, baseline creatinine noted to be 1.1-1.3  Currently within baseline  Will check BMP today.  Avoid nephrotoxins, hypotension  Monitor BMP

## 2023-12-15 NOTE — ASSESSMENT & PLAN NOTE
History of CAD  Takes Aspirin and Lopressor at home  Cleared by GI to continue with Aspirin  No chest pain at this time   Pt declined Covid testing.

## 2023-12-15 NOTE — CASE MANAGEMENT
Case Management Progress Note    Patient name Ely Hernadez  Location /-01 MRN [id number]  : 1940 Date 12/15/2023       LOS (days): 1  Geometric Mean LOS (GMLOS) (days): 2.8  Days to GMLOS:1.7        OBJECTIVE:  Current admission status: Inpatient  Preferred Pharmacy:   [id number] - [address] [address]  Phone: [phone] Fax: [phone]    [facility] - [place], [place] - [address]  [address]  [facility] [address]  [facility] [address]  Phone: [phone] Fax: [phone]    Primary Care Provider: Aviva Mccabe PA-C  Primary Insurance: A[employer] MC REP  Secondary Insurance:     PROGRESS NOTE:  Patient reviewed during Interdisciplinary Rounds with SLIM today.  Anticipated for d/c in 24hrs.  Patient for EGD, endoscopy, and possible coumadin bridge.  CM assessment pending, general chart review completed.  Patient has a PCP.  Was sent to ED by cardiologist.  AMPAC is 20.  Medicare advantage program is primary.  CM will continue to follow for d/c needs and planning.

## 2023-12-16 LAB
ABO GROUP BLD BPU: NORMAL
APTT PPP: 101 SECONDS (ref 23–37)
APTT PPP: 59 SECONDS (ref 23–37)
APTT PPP: 92 SECONDS (ref 23–37)
BPU ID: NORMAL
ERYTHROCYTE [DISTWIDTH] IN BLOOD BY AUTOMATED COUNT: 17.5 % (ref 11.6–15.1)
GLUCOSE SERPL-MCNC: 104 MG/DL (ref 65–140)
GLUCOSE SERPL-MCNC: 111 MG/DL (ref 65–140)
GLUCOSE SERPL-MCNC: 175 MG/DL (ref 65–140)
GLUCOSE SERPL-MCNC: 75 MG/DL (ref 65–140)
HCT VFR BLD AUTO: 25.1 % (ref 34.8–46.1)
HGB BLD-MCNC: 7.6 G/DL (ref 11.5–15.4)
INR PPP: 2.49 (ref 0.84–1.19)
MAGNESIUM SERPL-MCNC: 1.8 MG/DL (ref 1.9–2.7)
MCH RBC QN AUTO: 23.8 PG (ref 26.8–34.3)
MCHC RBC AUTO-ENTMCNC: 30.3 G/DL (ref 31.4–37.4)
MCV RBC AUTO: 79 FL (ref 82–98)
PLATELET # BLD AUTO: 285 THOUSANDS/UL (ref 149–390)
PMV BLD AUTO: 9.9 FL (ref 8.9–12.7)
PROTHROMBIN TIME: 27.9 SECONDS (ref 11.6–14.5)
RBC # BLD AUTO: 3.19 MILLION/UL (ref 3.81–5.12)
UNIT DISPENSE STATUS: NORMAL
UNIT PRODUCT CODE: NORMAL
UNIT PRODUCT VOLUME: 283 ML
UNIT RH: NORMAL
WBC # BLD AUTO: 5.65 THOUSAND/UL (ref 4.31–10.16)

## 2023-12-16 PROCEDURE — 99232 SBSQ HOSP IP/OBS MODERATE 35: CPT | Performed by: INTERNAL MEDICINE

## 2023-12-16 PROCEDURE — 85027 COMPLETE CBC AUTOMATED: CPT | Performed by: STUDENT IN AN ORGANIZED HEALTH CARE EDUCATION/TRAINING PROGRAM

## 2023-12-16 PROCEDURE — 85610 PROTHROMBIN TIME: CPT | Performed by: STUDENT IN AN ORGANIZED HEALTH CARE EDUCATION/TRAINING PROGRAM

## 2023-12-16 PROCEDURE — 83735 ASSAY OF MAGNESIUM: CPT | Performed by: STUDENT IN AN ORGANIZED HEALTH CARE EDUCATION/TRAINING PROGRAM

## 2023-12-16 PROCEDURE — 85730 THROMBOPLASTIN TIME PARTIAL: CPT | Performed by: STUDENT IN AN ORGANIZED HEALTH CARE EDUCATION/TRAINING PROGRAM

## 2023-12-16 PROCEDURE — 82948 REAGENT STRIP/BLOOD GLUCOSE: CPT

## 2023-12-16 PROCEDURE — 99232 SBSQ HOSP IP/OBS MODERATE 35: CPT | Performed by: NURSE PRACTITIONER

## 2023-12-16 RX ORDER — MAGNESIUM SULFATE HEPTAHYDRATE 40 MG/ML
2 INJECTION, SOLUTION INTRAVENOUS ONCE
Status: COMPLETED | OUTPATIENT
Start: 2023-12-16 | End: 2023-12-16

## 2023-12-16 RX ADMIN — ATORVASTATIN CALCIUM 40 MG: 40 TABLET, FILM COATED ORAL at 17:10

## 2023-12-16 RX ADMIN — METOPROLOL TARTRATE 25 MG: 25 TABLET, FILM COATED ORAL at 20:55

## 2023-12-16 RX ADMIN — Medication 2000 UNITS: at 08:01

## 2023-12-16 RX ADMIN — MAGNESIUM SULFATE HEPTAHYDRATE 2 G: 40 INJECTION, SOLUTION INTRAVENOUS at 10:10

## 2023-12-16 RX ADMIN — INSULIN DETEMIR 26 UNITS: 100 INJECTION, SOLUTION SUBCUTANEOUS at 21:00

## 2023-12-16 RX ADMIN — METOPROLOL TARTRATE 25 MG: 25 TABLET, FILM COATED ORAL at 08:01

## 2023-12-16 RX ADMIN — GABAPENTIN 300 MG: 300 CAPSULE ORAL at 20:55

## 2023-12-16 RX ADMIN — ASPIRIN 81 MG: 81 TABLET, COATED ORAL at 08:01

## 2023-12-16 RX ADMIN — HEPARIN SODIUM 10 UNITS/KG/HR: 10000 INJECTION, SOLUTION INTRAVENOUS at 20:57

## 2023-12-16 RX ADMIN — LEVOTHYROXINE SODIUM 50 MCG: 0.05 TABLET ORAL at 08:01

## 2023-12-16 RX ADMIN — GABAPENTIN 300 MG: 300 CAPSULE ORAL at 08:01

## 2023-12-16 NOTE — PROGRESS NOTES
"Cardiology Progress Note - Ely Hernadez 83 y.o. female MRN: [id number]    Unit/Bed#: -01 Encounter: [id number]      Assessment/Plan:  1.  Acute blood loss anemia  Hemoglobin 5.6 on presentation.  She underwent endoscopy which showed no overt bleeding.  Plan for colonoscopy on Monday per GI.  Continue heparin infusion.  Resume Coumadin when cleared by GI.     2.  S/p mechanical AVR  Continue heparin infusion, transition to Coumadin when cleared by GI     3.  PAF  And V-paced rhythm  Continue Lopressor 25 mg twice daily  Coumadin on hold, currently bridging on heparin at during evaluation for anemia.     4.  Stage III CKD  Creatinine 1.29     5. Chronic HFmrEF, NICM, EF 45%  Patient appears euvolemic  Diuretics currently on hold  Maintain strict I/O's, daily weights, and low sodium diet.     6. Nonobstructive CAD  Patient denies chest pain or anginal Blepha.  Continue aspirin, atorvastatin, and Lopressor.    Will see as needed. Please reach out with any questions or concerns.   Will arrange outpatient follow-up.    Subjective:   Patient seen and examined.  No significant events overnight.  Patient feels okay today.    Objective:     Vitals: Blood pressure (!) 128/49, pulse 60, temperature 98.7 °F (37.1 °C), resp. rate 16, height 5' 5\" (1.651 m), weight 72.1 kg (158 lb 15.2 oz), SpO2 95%., Body mass index is 26.45 kg/m².,   Orthostatic Blood Pressures      Flowsheet Row Most Recent Value   Blood Pressure 128/49 filed at 12/16/2023 0722   Patient Position - Orthostatic VS Sitting filed at 12/14/2023 1741              Intake/Output Summary (Last 24 hours) at 12/16/2023 1226  Last data filed at 12/16/2023 0001  Gross per 24 hour   Intake 417.08 ml   Output 1100 ml   Net -682.92 ml         Physical Exam:  Physical Exam  Vitals and nursing note reviewed.   Constitutional:       General: She is not in acute distress.     Appearance: She is well-developed. She is not ill-appearing.   HENT:      Head: Normocephalic and " atraumatic.   Eyes:      Conjunctiva/sclera: Conjunctivae normal.   Neck:      Vascular: No carotid bruit.   Cardiovascular:      Rate and Rhythm: Normal rate and regular rhythm.      Heart sounds: Normal heart sounds. No murmur heard.     Comments: + click  Pulmonary:      Effort: Pulmonary effort is normal. No respiratory distress.      Breath sounds: Normal breath sounds.   Abdominal:      Palpations: Abdomen is soft.      Tenderness: There is no abdominal tenderness.   Musculoskeletal:         General: No swelling.      Cervical back: Neck supple.      Right lower leg: No edema.      Left lower leg: No edema.   Skin:     General: Skin is warm and dry.      Capillary Refill: Capillary refill takes less than 2 seconds.      Coloration: Skin is pale.   Neurological:      Mental Status: She is alert and oriented to person, place, and time.   Psychiatric:         Mood and Affect: Mood normal.              Medications:      Current Facility-Administered Medications:     aspirin (ECOTRIN LOW STRENGTH) EC tablet 81 mg, 81 mg, Oral, Daily, CAMACHO Phan, 81 mg at 12/16/23 0801    atorvastatin (LIPITOR) tablet 40 mg, 40 mg, Oral, QPM, CAMACHO Phan, 40 mg at 12/15/23 1657    cholecalciferol (VITAMIN D3) tablet 2,000 Units, 2,000 Units, Oral, Daily, CAMACHO Phan, 2,000 Units at 12/16/23 0801    gabapentin (NEURONTIN) capsule 300 mg, 300 mg, Oral, BID, CAMACHO Phan, 300 mg at 12/16/23 0801    heparin (porcine) 25,000 units in 0.45% NaCl 250 mL infusion (premix), 3-20 Units/kg/hr (Order-Specific), Intravenous, Titrated, CAMACHO Phan, Last Rate: 8.4 mL/hr at 12/16/23 0514, 12 Units/kg/hr at 12/16/23 0514    insulin detemir (LEVEMIR) subcutaneous injection 26 Units, 26 Units, Subcutaneous, HS, CAMACHO Phan, 26 Units at 12/15/23 2252    insulin lispro (HumaLOG) 100 units/mL subcutaneous injection 1-5 Units, 1-5 Units, Subcutaneous, TID AC **AND**  "Fingerstick Glucose (POCT), , , 4x Daily AC and at bedtime, CAMACHO Phan    insulin lispro (HumaLOG) 100 units/mL subcutaneous injection 1-6 Units, 1-6 Units, Subcutaneous, HS, CAMACHO Phan, 1 Units at 12/14/23 2148    levothyroxine tablet 50 mcg, 50 mcg, Oral, Daily, CAMACHO Phan, 50 mcg at 12/16/23 0801    metoprolol tartrate (LOPRESSOR) tablet 25 mg, 25 mg, Oral, Q12H LIBAN, CAMACHO Phan, 25 mg at 12/16/23 0801    [START ON 12/17/2023] polyethylene glycol (GOLYTELY) bowel prep 4,000 mL, 4,000 mL, Oral, See Admin Instructions, Sumi Lenz PA-C     Labs & Results:     Results from last 7 days   Lab Units 12/14/23  2242 12/14/23  1752 12/14/23  1336   HS TNI 0HR ng/L  --   --  19   HS TNI 2HR ng/L  --  19  --    HSTNI D2 ng/L  --  0  --    HS TNI 4HR ng/L 20  --   --    HSTNI D4 ng/L 1  --   --      Results from last 7 days   Lab Units 12/16/23  0343 12/15/23  0510 12/14/23  2242 12/14/23  1336   WBC Thousand/uL 5.65 3.95*  --  5.18   HEMOGLOBIN g/dL 7.6* 7.5* 7.4* 5.5*   HEMATOCRIT % 25.1* 24.8* 24.3* 18.9*   PLATELETS Thousands/uL 285 297  --  381         Results from last 7 days   Lab Units 12/15/23  1627 12/14/23  1336 12/13/23  1141   POTASSIUM mmol/L 3.9 3.4* 3.4*   CHLORIDE mmol/L 111* 104 103   CO2 mmol/L 28 26 22   BUN mg/dL 18 28* 25   CREATININE mg/dL 1.29 1.37* 1.33*   CALCIUM mg/dL 8.4 8.6 8.6   ALK PHOS U/L  --  61 62   ALT U/L  --  12 10   AST U/L  --  15 20     Results from last 7 days   Lab Units 12/16/23  0343 12/16/23  0335 12/15/23  2009 12/15/23  1627 12/15/23  0510   INR  2.49*  --   --  2.07* 2.57*   PTT seconds  --  101* 49*  --  70*     Results from last 7 days   Lab Units 12/16/23  0343   MAGNESIUM mg/dL 1.8*       Vitals: Blood pressure (!) 128/49, pulse 60, temperature 98.7 °F (37.1 °C), resp. rate 16, height 5' 5\" (1.651 m), weight 72.1 kg (158 lb 15.2 oz), SpO2 95%., Body mass index is 26.45 kg/m².,   Orthostatic Blood Pressures  "     Flowsheet Row Most Recent Value   Blood Pressure 128/49 filed at 2023 0722   Patient Position - Orthostatic VS Sitting filed at 2023 1741            Systolic (24hrs), Av , Min:126 , Max:148     Diastolic (24hrs), Av, Min:49, Max:65        Intake/Output Summary (Last 24 hours) at 2023 1226  Last data filed at 2023 0001  Gross per 24 hour   Intake 417.08 ml   Output 1100 ml   Net -682.92 ml       Invasive Devices       Peripheral Intravenous Line  Duration             Peripheral IV 23 Right Antecubital 1 day    Peripheral IV 23 Right;Dorsal (posterior) Forearm 1 day    Peripheral IV 23 Right;Ventral (anterior) Forearm 1 day                        BP Readings from Last 3 Encounters:   23 (!) 128/49   23 124/70   23 158/72      Wt Readings from Last 3 Encounters:   23 72.1 kg (158 lb 15.2 oz)   23 72.1 kg (159 lb)   23 74.8 kg (165 lb)

## 2023-12-16 NOTE — PROGRESS NOTES
[facility]  Progress Note  Name: Ely Hernadez I  MRN: [id number]  Unit/Bed#: MS Diana I Date of Admission: 12/14/2023   Date of Service: 12/16/2023 I Hospital Day: 2    Assessment/Plan   * Acute blood loss anemia  Assessment & Plan  Patient presented to the ED with a hemoglobin of 5.5 on outpatient labs; referred to the ER by cardiology  Hemoglobin on 10/31 noted to be 12.7  Patient denies any bright red blood per rectum, but does endorse dark stools approx 1 wk ago  She is symptomatic with shortness of breath  Patient received 2 units PRBC, 1 U FFP  GI consult, appreciate input  EGD (12/15/23): The esophagus, stomach, duodenum and proximal jejunum appeared normal.   Planning for colonoscopy on Monday  Clear liquid diet on Sunday, NPO Monday  Continue to hold Coumadin, continue Heparin drip.  Hemoglobin remain stable, 7.6    H/O mechanical aortic valve replacement  Assessment & Plan  History of mechanical valve replacement in 2007  Maintained on Coumadin typically with a goal INR of 2.5-3.5  Coumadin on hold, initiated on Heparin drip after discussion with GI  Heparin drip is now on hold in setting of EGD planned for later this afternoon.  Restart coumadin once cleared    Paroxysmal atrial fibrillation (HCC)  Assessment & Plan  Chronic  Takes Warfarin for AC and Lopressor for rate control  Continue with Heparin drip  Coumadin on hold for colonoscopy  Rate is controlled    Stage 3a chronic kidney disease  Assessment & Plan  Lab Results   Component Value Date    EGFR 38 12/15/2023    EGFR 35 12/14/2023    EGFR 36 12/13/2023    CREATININE 1.29 12/15/2023    CREATININE 1.37 (H) 12/14/2023    CREATININE 1.33 (H) 12/13/2023     Chronic, baseline creatinine noted to be 1.1-1.3  Currently within baseline, 1.29  Avoid nephrotoxins, hypotension  Monitor BMP    Cardiomyopathy (HCC)  Assessment & Plan  Most recent echocardiogram with an EF of 45% with grade 1 diastolic dysfunction  Patient takes 40  mg Lasix daily at home  Hold for now, plan to restart later today    AVM (arteriovenous malformation) of small bowel, acquired with hemorrhage  Assessment & Plan  History from 2018  Plan is for a EGD with push enteroscopy this afternoon    Diabetes mellitus with neurological manifestation (HCC)  Assessment & Plan  Chronic, uncontrolled, as evidenced by a hemoglobin A1C of 8.7  Hold home oral anti-diabetics  Continue with insulin sliding scale  Monitor blood glucose AC/HS  Hypoglycemia protocol      Coronary artery disease of native artery of native heart with stable angina pectoris (HCC)  Assessment & Plan  History of CAD  Takes Aspirin and Lopressor at home  Cleared by GI to continue with Aspirin  No chest pain at this time           VTE Pharmacologic Prophylaxis: VTE Score: 4 Moderate Risk (Score 3-4) - Pharmacological DVT Prophylaxis Ordered: heparin drip.    Mobility:   Basic Mobility Inpatient Raw Score: 20  JH-HLM Goal: 6: Walk 10 steps or more  JH-HLM Achieved: 4: Move to chair/commode  HLM Goal achieved. Continue to encourage appropriate mobility.    Patient Centered Rounds: I performed bedside rounds with nursing staff today.   Discussions with Specialists or Other Care Team Provider:     Education and Discussions with Family / Patient: Patient declined call to .     Total Time Spent on Date of Encounter in care of patient: 36 mins. This time was spent on one or more of the following: performing physical exam; counseling and coordination of care; obtaining or reviewing history; documenting in the medical record; reviewing/ordering tests, medications or procedures; communicating with other healthcare professionals and discussing with patient's family/caregivers.    Current Length of Stay: 2 day(s)  Current Patient Status: Inpatient   Certification Statement: The patient will continue to require additional inpatient hospital stay due to needing a colonoscopy on Monday to rule out lower GI bleed  in setting of acute blood loss anemia.  Discharge Plan: Anticipate discharge in 48-72 hrs to home.    Code Status: Level 1 - Full Code    Subjective:   Patient resting in bed, reports feeling fine except for a lot of gas.  Denies complaints of chest pain, shortness of breath, fever, or chills.    Objective:     Vitals:   Temp (24hrs), Av.1 °F (36.7 °C), Min:96.9 °F (36.1 °C), Max:98.7 °F (37.1 °C)    Temp:  [96.9 °F (36.1 °C)-98.7 °F (37.1 °C)] 98.7 °F (37.1 °C)  HR:  [60-75] 60  Resp:  [16-18] 16  BP: (126-148)/(49-65) 128/49  SpO2:  [91 %-98 %] 95 %  Body mass index is 26.45 kg/m².     Input and Output Summary (last 24 hours):     Intake/Output Summary (Last 24 hours) at 2023 1057  Last data filed at 2023 0001  Gross per 24 hour   Intake 417.08 ml   Output 1100 ml   Net -682.92 ml       Physical Exam:   Physical Exam  Vitals and nursing note reviewed.   Constitutional:       General: She is not in acute distress.     Appearance: She is normal weight. She is ill-appearing.   Cardiovascular:      Rate and Rhythm: Normal rate.      Pulses: Normal pulses.      Heart sounds: Normal heart sounds.   Pulmonary:      Effort: Pulmonary effort is normal.      Breath sounds: Normal breath sounds.   Abdominal:      General: Bowel sounds are normal.      Palpations: Abdomen is soft.   Musculoskeletal:         General: Normal range of motion.      Right lower leg: No edema.      Left lower leg: No edema.   Skin:     General: Skin is warm and dry.      Coloration: Skin is pale.   Neurological:      Mental Status: She is alert and oriented to person, place, and time.   Psychiatric:         Mood and Affect: Mood normal.          Additional Data:     Labs:  Results from last 7 days   Lab Units 23  0343 23  2242 23  1336   WBC Thousand/uL 5.65   < > 5.18   HEMOGLOBIN g/dL 7.6*   < > 5.5*   HEMATOCRIT % 25.1*   < > 18.9*   PLATELETS Thousands/uL 285   < > 381   NEUTROS PCT %  --   --  62   LYMPHS PCT  %  --   --  26   MONOS PCT %  --   --  8   EOS PCT %  --   --  4    < > = values in this interval not displayed.     Results from last 7 days   Lab Units 12/15/23  1627 12/14/23  1336   SODIUM mmol/L 143 139   POTASSIUM mmol/L 3.9 3.4*   CHLORIDE mmol/L 111* 104   CO2 mmol/L 28 26   BUN mg/dL 18 28*   CREATININE mg/dL 1.29 1.37*   ANION GAP mmol/L 4 9   CALCIUM mg/dL 8.4 8.6   ALBUMIN g/dL  --  4.0   TOTAL BILIRUBIN mg/dL  --  0.46   ALK PHOS U/L  --  61   ALT U/L  --  12   AST U/L  --  15   GLUCOSE RANDOM mg/dL 106 152*     Results from last 7 days   Lab Units 12/16/23  0343   INR  2.49*     Results from last 7 days   Lab Units 12/16/23  0722 12/15/23  2040 12/15/23  1608 12/15/23  1125 12/15/23  0733 12/14/23 2007   POC GLUCOSE mg/dl 75 146* 117 104 67 166*               Lines/Drains:  Invasive Devices       Peripheral Intravenous Line  Duration             Peripheral IV 12/14/23 Right Antecubital 1 day    Peripheral IV 12/14/23 Right;Dorsal (posterior) Forearm 1 day    Peripheral IV 12/14/23 Right;Ventral (anterior) Forearm 1 day                    Imaging: No pertinent imaging reviewed.    Recent Cultures (last 7 days):         Last 24 Hours Medication List:   Current Facility-Administered Medications   Medication Dose Route Frequency Provider Last Rate    aspirin  81 mg Oral Daily CAMACHO Phan      atorvastatin  40 mg Oral QPM CAMACHO Phan      cholecalciferol  2,000 Units Oral Daily CAMACHO Phan      gabapentin  300 mg Oral BID CAMACHO Phan      heparin (porcine)  3-20 Units/kg/hr (Order-Specific) Intravenous Titrated CAMACHO Phan 12 Units/kg/hr (12/16/23 0514)    insulin detemir  26 Units Subcutaneous HS CAMACHO Phan      insulin lispro  1-5 Units Subcutaneous TID AC CAMACHO Phan      insulin lispro  1-6 Units Subcutaneous HS CAMACHO Phan      levothyroxine  50 mcg Oral Daily CAMACHO Phan       magnesium sulfate  2 g Intravenous Once CAMACHO Phan 2 g (12/16/23 1010)    metoprolol tartrate  25 mg Oral Q12H [facility] CAMACHO Phan          Today, Patient Was Seen By: CAMACHO Phan    **Please Note: This note may have been constructed using a voice recognition system.**

## 2023-12-16 NOTE — UTILIZATION REVIEW
12/16/23  Day 3: Has surpassed a 2nd midnight with active treatments and services, which include heparin drip, continued monitoring of hemoglobin. See initial review completed by Harper Ca RN  on 12/15/23.

## 2023-12-16 NOTE — PLAN OF CARE
Problem: Potential for Falls  Goal: Patient will remain free of falls  Description: INTERVENTIONS:  - Educate patient/family on patient safety including physical limitations  - Instruct patient to call for assistance with activity   - Consult OT/PT to assist with strengthening/mobility   - Keep Call bell within reach  - Keep bed low and locked with side rails adjusted as appropriate  - Keep care items and personal belongings within reach  - Initiate and maintain comfort rounds  - Make Fall Risk Sign visible to staff  - Offer Toileting every 2 Hours, in advance of need  - Initiate/Maintain bed alarm  - Obtain necessary fall risk management equipment: yellow non-skid socks  - Apply yellow socks and bracelet for high fall risk patients  - Consider moving patient to room near nurses station  Outcome: Progressing     Problem: PAIN - ADULT  Goal: Verbalizes/displays adequate comfort level or baseline comfort level  Description: Interventions:  - Encourage patient to monitor pain and request assistance  - Assess pain using appropriate pain scale  - Administer analgesics based on type and severity of pain and evaluate response  - Implement non-pharmacological measures as appropriate and evaluate response  - Consider cultural and social influences on pain and pain management  - Notify physician/advanced practitioner if interventions unsuccessful or patient reports new pain  Outcome: Progressing     Problem: INFECTION - ADULT  Goal: Absence or prevention of progression during hospitalization  Description: INTERVENTIONS:  - Assess and monitor for signs and symptoms of infection  - Monitor lab/diagnostic results  - Monitor all insertion sites, i.e. indwelling lines, tubes, and drains  - Monitor endotracheal if appropriate and nasal secretions for changes in amount and color  - [place] appropriate cooling/warming therapies per order  - Administer medications as ordered  - Instruct and encourage patient and family to use good  hand hygiene technique  - Identify and instruct in appropriate isolation precautions for identified infection/condition  Outcome: Progressing     Problem: SAFETY ADULT  Goal: Patient will remain free of falls  Description: INTERVENTIONS:  - Educate patient/family on patient safety including physical limitations  - Instruct patient to call for assistance with activity   - Consult OT/PT to assist with strengthening/mobility   - Keep Call bell within reach  - Keep bed low and locked with side rails adjusted as appropriate  - Keep care items and personal belongings within reach  - Initiate and maintain comfort rounds  - Make Fall Risk Sign visible to staff  - Offer Toileting every 2 Hours, in advance of need  - Initiate/Maintain bed alarm  - Obtain necessary fall risk management equipment: yellow non-skid socks  - Apply yellow socks and bracelet for high fall risk patients  - Consider moving patient to room near nurses station  Outcome: Progressing  Goal: Maintain or return to baseline ADL function  Description: INTERVENTIONS:  -  Assess patient's ability to carry out ADLs; assess patient's baseline for ADL function and identify physical deficits which impact ability to perform ADLs (bathing, care of mouth/teeth, toileting, grooming, dressing, etc.)  - Assess/evaluate cause of self-care deficits   - Assess range of motion  - Assess patient's mobility; develop plan if impaired  - Assess patient's need for assistive devices and provide as appropriate  - Encourage maximum independence but intervene and supervise when necessary  - Involve family in performance of ADLs  - Assess for home care needs following discharge   - Consider OT consult to assist with ADL evaluation and planning for discharge  - Provide patient education as appropriate  Outcome: Progressing  Goal: Maintains/Returns to pre admission functional level  Description: INTERVENTIONS:  - Perform AM-PAC 6 Click Basic Mobility/ Daily Activity assessment daily.  -  Set and communicate daily mobility goal to care team and patient/family/caregiver.   - Collaborate with rehabilitation services on mobility goals if consulted  - Perform Range of Motion 3 times a day.  - Reposition patient every 2 hours.  - Dangle patient 3 times a day  - Stand patient 2 times a day  - Ambulate patient 2 times a day  - Out of bed to chair 3 times a day   - Out of bed for meals 3 times a day  - Out of bed for toileting  - Record patient progress and toleration of activity level   Outcome: Progressing     Problem: DISCHARGE PLANNING  Goal: Discharge to home or other facility with appropriate resources  Description: INTERVENTIONS:  - Identify barriers to discharge w/patient and caregiver  - Arrange for needed discharge resources and transportation as appropriate  - Identify discharge learning needs (meds, wound care, etc.)  - Arrange for interpretive services to assist at discharge as needed  - Refer to Case Management Department for coordinating discharge planning if the patient needs post-hospital services based on physician/advanced practitioner order or complex needs related to functional status, cognitive ability, or social support system  Outcome: Progressing     Problem: Knowledge Deficit  Goal: Patient/family/caregiver demonstrates understanding of disease process, treatment plan, medications, and discharge instructions  Description: Complete learning assessment and assess knowledge base.  Interventions:  - Provide teaching at level of understanding  - Provide teaching via preferred learning methods  Outcome: Progressing     Problem: CARDIOVASCULAR - ADULT  Goal: Maintains optimal cardiac output and hemodynamic stability  Description: INTERVENTIONS:  - Monitor I/O, vital signs and rhythm  - Monitor for S/S and trends of decreased cardiac output  - Administer and titrate ordered vasoactive medications to optimize hemodynamic stability  - Assess quality of pulses, skin color and temperature  -  Assess for signs of decreased coronary artery perfusion  - Instruct patient to report change in severity of symptoms  Outcome: Progressing  Goal: Absence of cardiac dysrhythmias or at baseline rhythm  Description: INTERVENTIONS:  - Continuous cardiac monitoring, vital signs, obtain 12 lead EKG if ordered  - Administer antiarrhythmic and heart rate control medications as ordered  - Monitor electrolytes and administer replacement therapy as ordered  Outcome: Progressing     Problem: HEMATOLOGIC - ADULT  Goal: Maintains hematologic stability  Description: INTERVENTIONS  - Assess for signs and symptoms of bleeding or hemorrhage  - Monitor labs  - Administer supportive blood products/factors as ordered and appropriate  Outcome: Progressing

## 2023-12-16 NOTE — ASSESSMENT & PLAN NOTE
History of CAD  Takes Aspirin and Lopressor at home  Cleared by GI to continue with Aspirin  No chest pain at this time

## 2023-12-16 NOTE — ASSESSMENT & PLAN NOTE
History of mechanical valve replacement in 2007  Maintained on Coumadin typically with a goal INR of 2.5-3.5  Coumadin on hold, initiated on Heparin drip after discussion with GI  Heparin drip is now on hold in setting of EGD planned for later this afternoon.  Restart coumadin once cleared

## 2023-12-16 NOTE — ASSESSMENT & PLAN NOTE
Patient presented to the ED with a hemoglobin of 5.5 on outpatient labs; referred to the ER by cardiology  Hemoglobin on 10/31 noted to be 12.7  Patient denies any bright red blood per rectum, but does endorse dark stools approx 1 wk ago  She is symptomatic with shortness of breath  Patient received 2 units PRBC, 1 U FFP  GI consult, appreciate input  EGD (12/15/23): The esophagus, stomach, duodenum and proximal jejunum appeared normal.   Planning for colonoscopy on Monday  Clear liquid diet on Sunday, NPO Monday  Continue to hold Coumadin, continue Heparin drip.  Hemoglobin remain stable, 7.6

## 2023-12-16 NOTE — ASSESSMENT & PLAN NOTE
Lab Results   Component Value Date    EGFR 38 12/15/2023    EGFR 35 12/14/2023    EGFR 36 12/13/2023    CREATININE 1.29 12/15/2023    CREATININE 1.37 (H) 12/14/2023    CREATININE 1.33 (H) 12/13/2023     Chronic, baseline creatinine noted to be 1.1-1.3  Currently within baseline, 1.29  Avoid nephrotoxins, hypotension  Monitor BMP

## 2023-12-16 NOTE — ASSESSMENT & PLAN NOTE
Chronic  Takes Warfarin for AC and Lopressor for rate control  Continue with Heparin drip  Coumadin on hold for colonoscopy  Rate is controlled

## 2023-12-16 NOTE — PROGRESS NOTES
Progress Note  Ely Hernadez 83 y.o. female MRN: [id number]  Unit/Bed#: -01 Encounter: [id number]    Subjective:  Patient denies any melena at present.  No abdominal pain.  No vomiting.    Objective:     Vitals:   Vitals:    12/16/23 0722   BP: (!) 128/49   Pulse: 60   Resp:    Temp: 98.7 °F (37.1 °C)   SpO2: 95%   ,Body mass index is 26.45 kg/m².      Intake/Output Summary (Last 24 hours) at 12/16/2023 1022  Last data filed at 12/16/2023 0001  Gross per 24 hour   Intake 417.08 ml   Output 1100 ml   Net -682.92 ml       Physical Exam:     General Appearance: Alert, appears stated age and cooperative  Lungs: Clear to auscultation bilaterally, no rales or rhonchi, no labored breathing/accessory muscle use  Heart: Regular rate and rhythm, S1, S2 normal, mechanical click  Abdomen: Soft, non-tender, non-distended; bowel sounds normal; no masses or no organomegaly  Extremities: No cyanosis    Invasive Devices       Peripheral Intravenous Line  Duration             Peripheral IV 12/14/23 Right Antecubital 1 day    Peripheral IV 12/14/23 Right;Dorsal (posterior) Forearm 1 day    Peripheral IV 12/14/23 Right;Ventral (anterior) Forearm 1 day                    Lab Results:  Admission on 12/14/2023   Component Date Value    WBC 12/14/2023 5.18     RBC 12/14/2023 2.40 (L)     Hemoglobin 12/14/2023 5.5 (LL)     Hematocrit 12/14/2023 18.9 (L)     MCV 12/14/2023 79 (L)     MCH 12/14/2023 23.3 (L)     MCHC 12/14/2023 29.6 (L)     RDW 12/14/2023 17.5 (H)     MPV 12/14/2023 9.7     Platelets 12/14/2023 381     nRBC 12/14/2023 0     Neutrophils Relative 12/14/2023 62     Immat GRANS % 12/14/2023 0     Lymphocytes Relative 12/14/2023 26     Monocytes Relative 12/14/2023 8     Eosinophils Relative 12/14/2023 4     Basophils Relative 12/14/2023 0     Neutrophils Absolute 12/14/2023 3.15     Immature Grans Absolute 12/14/2023 0.02     Lymphocytes Absolute 12/14/2023 1.36     Monocytes Absolute 12/14/2023 0.43     Eosinophils  Absolute 12/14/2023 0.20     Basophils Absolute 12/14/2023 0.02     Sodium 12/14/2023 139     Potassium 12/14/2023 3.4 (L)     Chloride 12/14/2023 104     CO2 12/14/2023 26     ANION GAP 12/14/2023 9     BUN 12/14/2023 28 (H)     Creatinine 12/14/2023 1.37 (H)     Glucose 12/14/2023 152 (H)     Calcium 12/14/2023 8.6     AST 12/14/2023 15     ALT 12/14/2023 12     Alkaline Phosphatase 12/14/2023 61     Total Protein 12/14/2023 7.3     Albumin 12/14/2023 4.0     Total Bilirubin 12/14/2023 0.46     eGFR 12/14/2023 35     hs TnI 0hr 12/14/2023 19     ABO Grouping 12/14/2023 B     Rh Factor 12/14/2023 Positive     Antibody Screen 12/14/2023 Negative     Specimen Expiration Date 12/14/2023 20231217     Unit Product Code 12/15/2023 O9177N13     Unit Number 12/15/2023 [id number]     Unit ABO 12/15/2023 B     Unit RH 12/15/2023 POS     Crossmatch 12/15/2023 Compatible     Unit Dispense Status 12/15/2023 Presumed Trans     Unit Product Volume 12/15/2023 350     Unit Product Code 12/15/2023 [id number]     Unit Number 12/15/2023 [id number]     Unit ABO 12/15/2023 B     Unit RH 12/15/2023 POS     Crossmatch 12/15/2023 Compatible     Unit Dispense Status 12/15/2023 Presumed Trans     Unit Product Volume 12/15/2023 350     hs TnI 2hr 12/14/2023 19     Delta 2hr hsTnI 12/14/2023 0     Protime 12/14/2023 29.5 (H)     INR 12/14/2023 2.69 (H)     PTT 12/14/2023 37     hs TnI 4hr 12/14/2023 20     Delta 4hr hsTnI 12/14/2023 1     Hemoglobin 12/14/2023 7.4 (L)     Hematocrit 12/14/2023 24.3 (L)     PTT 12/14/2023 53 (H)     POC Glucose 12/14/2023 166 (H)     PTT 12/15/2023 70 (H)     WBC 12/15/2023 3.95 (L)     RBC 12/15/2023 3.16 (L)     Hemoglobin 12/15/2023 7.5 (L)     Hematocrit 12/15/2023 24.8 (L)     MCV 12/15/2023 79 (L)     MCH 12/15/2023 23.7 (L)     MCHC 12/15/2023 30.2 (L)     RDW 12/15/2023 17.2 (H)     Platelets 12/15/2023 297     MPV 12/15/2023 10.2     Protime 12/15/2023 28.5 (H)     INR 12/15/2023 2.57 (H)      POC Glucose 12/15/2023 67     Unit Product Code 12/16/2023 J3246M26     Unit Number 12/16/2023 [id number]     Unit ABO 12/16/2023 B     Unit RH 12/16/2023 POS     Unit Dispense Status 12/16/2023 Presumed Trans     Unit Product Volume 12/16/2023 283     POC Glucose 12/15/2023 104     Protime 12/15/2023 24.1 (H)     INR 12/15/2023 2.07 (H)     Sodium 12/15/2023 143     Potassium 12/15/2023 3.9     Chloride 12/15/2023 111 (H)     CO2 12/15/2023 28     ANION GAP 12/15/2023 4     BUN 12/15/2023 18     Creatinine 12/15/2023 1.29     Glucose 12/15/2023 106     Calcium 12/15/2023 8.4     eGFR 12/15/2023 38     POC Glucose 12/15/2023 117     Ventricular Rate 12/14/2023 66     Atrial Rate 12/14/2023 66     FL Interval 12/14/2023 266     QRSD Interval 12/14/2023 152     QT Interval 12/14/2023 476     QTC Interval 12/14/2023 499     P Axis 12/14/2023 71     QRS [place] 12/14/2023 122     T Wave [place] 12/14/2023 -50     PTT 12/15/2023 49 (H)     POC Glucose 12/15/2023 146 (H)     PTT 12/16/2023 101 (H)     WBC 12/16/2023 5.65     RBC 12/16/2023 3.19 (L)     Hemoglobin 12/16/2023 7.6 (L)     Hematocrit 12/16/2023 25.1 (L)     MCV 12/16/2023 79 (L)     MCH 12/16/2023 23.8 (L)     MCHC 12/16/2023 30.3 (L)     RDW 12/16/2023 17.5 (H)     Platelets 12/16/2023 285     MPV 12/16/2023 9.9     Magnesium 12/16/2023 1.8 (L)     Protime 12/16/2023 27.9 (H)     INR 12/16/2023 2.49 (H)     POC Glucose 12/16/2023 75        Imaging Studies:   I have personally reviewed pertinent imaging studies.    EGD with Push Enteroscopy    Addendum Date: 12/15/2023 Addendum:    [facility] [address] [facility] [place] [address] [phone] DATE OF SERVICE: 12/15/23 PHYSICIAN(S): Attending: Edson Qeuen MD Fellow: No Staff Documented INDICATION: Iron deficiency, AVM (arteriovenous malformation) of small bowel, acquired with hemorrhage POST-OP DIAGNOSIS: See the impression below. PREPROCEDURE: Informed consent was obtained for the  procedure, including sedation.  Risks of perforation, hemorrhage, adverse drug reaction and aspiration were discussed. The patient was placed in the left lateral decubitus position. Patient was explained about the risks and benefits of the procedure. Risks including but not limited to bleeding, infection, and perforation were explained in detail. Also explained about less than 100% sensitivity with the exam and other alternatives. PROCEDURE: EGD DETAILS OF PROCEDURE: Patient was taken to the procedure room where a time out was performed to confirm correct patient and correct procedure. The patient underwent monitored anesthesia care, which was administered by an anesthesia professional. The patient's blood pressure, ECG, heart rate, level of consciousness, oxygen and respirations were monitored throughout the procedure. The scope was introduced through the mouth and advanced to the jejunum. Fluoroscopy was not used. The patient experienced no blood loss. The procedure was not difficult. The patient tolerated the procedure well. There were no apparent adverse events. ANESTHESIA INFORMATION: ASA: III Anesthesia Type: Anesthesia type not filed in the log. MEDICATIONS: simethicone (MYLICON) 40 mg in sterile water 1,000 mL 40 mg (Totals for administrations occurring from 1437 to 1458 on 12/15/23) FINDINGS: The esophagus, stomach, duodenum and proximal jejunum appeared normal. No signs of AVMs. SPECIMENS: * No specimens in log * IMPRESSION: The esophagus, stomach, duodenum and proximal jejunum appeared normal. RECOMMENDATION:  Follow up with me in clinic If patient's hgb is stable, we can follow up closely as outpatient for colonoscopy and capsule endoscopy.  Resume coumadin today.  Edson Queen MD     Result Date: 12/15/2023  Narrative: Table formatting from the original result was not included.  [facility] [address] [facility] [address] [phone] DATE OF SERVICE: 12/15/23  PHYSICIAN(S): Attending: Edson Queen MD Fellow: No Staff Documented INDICATION: Iron deficiency, AVM (arteriovenous malformation) of small bowel, acquired with hemorrhage POST-OP DIAGNOSIS: See the impression below. PREPROCEDURE: Informed consent was obtained for the procedure, including sedation.  Risks of perforation, hemorrhage, adverse drug reaction and aspiration were discussed. The patient was placed in the left lateral decubitus position. Patient was explained about the risks and benefits of the procedure. Risks including but not limited to bleeding, infection, and perforation were explained in detail. Also explained about less than 100% sensitivity with the exam and other alternatives. PROCEDURE: EGD DETAILS OF PROCEDURE: Patient was taken to the procedure room where a time out was performed to confirm correct patient and correct procedure. The patient underwent monitored anesthesia care, which was administered by an anesthesia professional. The patient's blood pressure, ECG, heart rate, level of consciousness, oxygen and respirations were monitored throughout the procedure. The scope was introduced through the mouth and advanced to the jejunum. Fluoroscopy was not used. The patient experienced no blood loss. The procedure was not difficult. The patient tolerated the procedure well. There were no apparent adverse events. ANESTHESIA INFORMATION: ASA: III Anesthesia Type: Anesthesia type not filed in the log. MEDICATIONS: simethicone (MYLICON) 40 mg in sterile water 1,000 mL 40 mg (Totals for administrations occurring from 1437 to 1458 on 12/15/23) FINDINGS: The esophagus, stomach, duodenum and proximal jejunum appeared normal. No signs of AVMs. SPECIMENS: * No specimens in log *     Impression: The esophagus, stomach, duodenum and proximal jejunum appeared normal. RECOMMENDATION:  Follow up with me in clinic If patient's hgb is stable, we can follow up closely as outpatient for colonoscopy and capsule  endoscopy.   Edson Queen MD     XR chest 2 views    Result Date: 12/15/2023  Narrative: CHEST INDICATION:   Palpitations. Shortness of breath. COMPARISON: December 13, 2023 EXAM PERFORMED/VIEWS:  XR CHEST PA & LATERAL FINDINGS: Left chest pacer with intact leads noted. Prosthetic aortic valve noted. Aortic tortuosity reidentified. The lungs are clear.  No pneumothorax. Blunting of posterior costophrenic angles on lateral radiograph suggesting small costophrenic angle effusions. Healed posterior right seventh and eighth rib fractures. No acute osseous abnormality. Surgical clips over the right upper abdomen.     Impression: Probable trace costophrenic angle effusions. Otherwise unremarkable examination. Workstation performed: [id number]     XR chest pa & lateral    Result Date: 12/13/2023  Narrative: CHEST INDICATION:   Paroxysmal atrial fibrillation. Bronchitis, not specified as acute or chronic. COMPARISON:  Comparison made with previous examination(s) dated (DX) 28-Oct-2023,(CT) 19-Jan-2022,(DX) 19-Jan-2022. EXAM PERFORMED/VIEWS:  XR CHEST PA & LATERAL Images: 4 FINDINGS: Stable left chest wall dual lead cardiac pacing device. Stable median sternotomy wires and cardiac valve prosthesis. Cardiomediastinal silhouette appears unremarkable. The lungs are clear.  No pneumothorax or pleural effusion. Osseous structures appear within normal limits for patient age.     Impression: No acute cardiopulmonary disease. Electronically signed: 12/13/2023 01:03 PM Jozef Garcia, MPH,MD        Assessment & Plan    Acute blood loss anemia  History of AVMs    - Patient presented to the ED due to a severe anemia with a HGB of 5.6 requiring blood transfusion.  She reports melena over a week ago as well as a recent red hue to her stool.  - She has a history of SB AVMs in 2018 treated with APC.  - Push enteroscopy 12/15 was normal.  - HGB this am is 7.6.  - Monitor H&H and patient for further obvious bleeding.  - Supportive care, Venofer  per SLIM.  - On Heparin gtt (due to mechanical AVR - Coumadin has been held).  - Plan for colonoscopy on Monday to further investigate. Clear liquid diet and Golytely prep tomorrow.  Will need to hold Heparin gtt Monday am (timing TBD pending the procedure time).  - Patient will likely also need videocapsule endoscopy as an outpatient.    The patient will be seen by Dr. Rivas.    Sumi Lenz PA-C  12/16/2023,10:22 AM

## 2023-12-17 LAB
APTT PPP: 58 SECONDS (ref 23–37)
APTT PPP: 68 SECONDS (ref 23–37)
APTT PPP: 91 SECONDS (ref 23–37)
ERYTHROCYTE [DISTWIDTH] IN BLOOD BY AUTOMATED COUNT: 18.2 % (ref 11.6–15.1)
GLUCOSE SERPL-MCNC: 101 MG/DL (ref 65–140)
GLUCOSE SERPL-MCNC: 134 MG/DL (ref 65–140)
GLUCOSE SERPL-MCNC: 136 MG/DL (ref 65–140)
GLUCOSE SERPL-MCNC: 150 MG/DL (ref 65–140)
HCT VFR BLD AUTO: 25 % (ref 34.8–46.1)
HGB BLD-MCNC: 7.3 G/DL (ref 11.5–15.4)
INR PPP: 2.27 (ref 0.84–1.19)
MCH RBC QN AUTO: 23.5 PG (ref 26.8–34.3)
MCHC RBC AUTO-ENTMCNC: 29.2 G/DL (ref 31.4–37.4)
MCV RBC AUTO: 80 FL (ref 82–98)
PLATELET # BLD AUTO: 307 THOUSANDS/UL (ref 149–390)
PMV BLD AUTO: 10.9 FL (ref 8.9–12.7)
PROTHROMBIN TIME: 25.9 SECONDS (ref 11.6–14.5)
RBC # BLD AUTO: 3.11 MILLION/UL (ref 3.81–5.12)
WBC # BLD AUTO: 4.41 THOUSAND/UL (ref 4.31–10.16)

## 2023-12-17 PROCEDURE — 85730 THROMBOPLASTIN TIME PARTIAL: CPT | Performed by: STUDENT IN AN ORGANIZED HEALTH CARE EDUCATION/TRAINING PROGRAM

## 2023-12-17 PROCEDURE — 85610 PROTHROMBIN TIME: CPT | Performed by: NURSE PRACTITIONER

## 2023-12-17 PROCEDURE — 85027 COMPLETE CBC AUTOMATED: CPT | Performed by: NURSE PRACTITIONER

## 2023-12-17 PROCEDURE — 82948 REAGENT STRIP/BLOOD GLUCOSE: CPT

## 2023-12-17 PROCEDURE — 99232 SBSQ HOSP IP/OBS MODERATE 35: CPT | Performed by: NURSE PRACTITIONER

## 2023-12-17 RX ORDER — FUROSEMIDE 40 MG/1
40 TABLET ORAL DAILY
Status: DISCONTINUED | OUTPATIENT
Start: 2023-12-18 | End: 2023-12-23 | Stop reason: HOSPADM

## 2023-12-17 RX ADMIN — LEVOTHYROXINE SODIUM 50 MCG: 0.05 TABLET ORAL at 08:16

## 2023-12-17 RX ADMIN — GABAPENTIN 300 MG: 300 CAPSULE ORAL at 21:24

## 2023-12-17 RX ADMIN — Medication 2000 UNITS: at 08:16

## 2023-12-17 RX ADMIN — POLYETHYLENE GLYCOL 3350, SODIUM SULFATE ANHYDROUS, SODIUM BICARBONATE, SODIUM CHLORIDE, POTASSIUM CHLORIDE 4000 ML: 236; 22.74; 6.74; 5.86; 2.97 POWDER, FOR SOLUTION ORAL at 15:33

## 2023-12-17 RX ADMIN — GABAPENTIN 300 MG: 300 CAPSULE ORAL at 08:16

## 2023-12-17 RX ADMIN — ATORVASTATIN CALCIUM 40 MG: 40 TABLET, FILM COATED ORAL at 17:17

## 2023-12-17 RX ADMIN — ASPIRIN 81 MG: 81 TABLET, COATED ORAL at 08:16

## 2023-12-17 RX ADMIN — HEPARIN SODIUM 12 UNITS/KG/HR: 10000 INJECTION, SOLUTION INTRAVENOUS at 23:23

## 2023-12-17 RX ADMIN — METOPROLOL TARTRATE 25 MG: 25 TABLET, FILM COATED ORAL at 21:26

## 2023-12-17 RX ADMIN — METOPROLOL TARTRATE 25 MG: 25 TABLET, FILM COATED ORAL at 08:16

## 2023-12-17 NOTE — ASSESSMENT & PLAN NOTE
Most recent echocardiogram with an EF of 45% with grade 1 diastolic dysfunction  Patient takes 40 mg Lasix daily at home  Restart on 12/18

## 2023-12-17 NOTE — PROGRESS NOTES
[facility]  Progress Note  Name: Ely Hernadez I  MRN: [id number]  Unit/Bed#: MS Diana I Date of Admission: 12/14/2023   Date of Service: 12/17/2023 I Hospital Day: 3    Assessment/Plan   * Acute blood loss anemia  Assessment & Plan  Patient presented to the ED with a hemoglobin of 5.5 on outpatient labs; referred to the ER by cardiology  Hemoglobin on 10/31 noted to be 12.7  Patient denies any bright red blood per rectum, but does endorse dark stools approx 1 wk ago  She is symptomatic with shortness of breath  Patient received 2 units PRBC, 1 U FFP  GI consult, appreciate input  EGD (12/15/23): The esophagus, stomach, duodenum and proximal jejunum appeared normal.   Planning for colonoscopy on 12/18  Clear liquid diet today with bowel prep  NPO after mn  Continue to hold Coumadin, continue Heparin drip.  Hemoglobin remain slightly lower than yesterday, 7.3  Monitor for any signs of blood in stool    H/O mechanical aortic valve replacement  Assessment & Plan  History of mechanical valve replacement in 2007  Maintained on Coumadin typically with a goal INR of 2.5-3.5  Coumadin on hold, initiated on Heparin drip after discussion with GI  Heparin drip is now on hold in setting of EGD planned for later this afternoon.  Restart coumadin once cleared    Paroxysmal atrial fibrillation (HCC)  Assessment & Plan  Chronic  Takes Warfarin for AC and Lopressor for rate control  Continue with Heparin drip  Coumadin on hold for colonoscopy  Rate is controlled    Stage 3a chronic kidney disease  Assessment & Plan  Lab Results   Component Value Date    EGFR 38 12/15/2023    EGFR 35 12/14/2023    EGFR 36 12/13/2023    CREATININE 1.29 12/15/2023    CREATININE 1.37 (H) 12/14/2023    CREATININE 1.33 (H) 12/13/2023     Chronic, baseline creatinine noted to be 1.1-1.3  Currently within baseline, 1.29  Avoid nephrotoxins, hypotension  Monitor BMP    Cardiomyopathy (HCC)  Assessment & Plan  Most recent  echocardiogram with an EF of 45% with grade 1 diastolic dysfunction  Patient takes 40 mg Lasix daily at home  Restart on 12/18    AVM (arteriovenous malformation) of small bowel, acquired with hemorrhage  Assessment & Plan  History from 2018  Plan is for a EGD with push enteroscopy this afternoon    Diabetes mellitus with neurological manifestation (HCC)  Assessment & Plan  Chronic, uncontrolled, as evidenced by a hemoglobin A1C of 8.7  Hold home oral anti-diabetics  Continue with insulin sliding scale  Monitor blood glucose AC/HS  Hypoglycemia protocol      Coronary artery disease of native artery of native heart with stable angina pectoris (HCC)  Assessment & Plan  History of CAD  Takes Aspirin and Lopressor at home  Cleared by GI to continue with Aspirin  No chest pain at this time           VTE Pharmacologic Prophylaxis: VTE Score: 4 Moderate Risk (Score 3-4) - Pharmacological DVT Prophylaxis Ordered: heparin drip.    Mobility:   Basic Mobility Inpatient Raw Score: 20  JH-HLM Goal: 6: Walk 10 steps or more  JH-HLM Achieved: 4: Move to chair/commode  HLM Goal NOT achieved. Continue with multidisciplinary rounding and encourage appropriate mobility to improve upon HLM goals.    Patient Centered Rounds: I performed bedside rounds with nursing staff today.   Discussions with Specialists or Other Care Team Provider: GI note reviewed    Education and Discussions with Family / Patient: Updated  (son) at bedside.    Total Time Spent on Date of Encounter in care of patient: 36 mins. This time was spent on one or more of the following: performing physical exam; counseling and coordination of care; obtaining or reviewing history; documenting in the medical record; reviewing/ordering tests, medications or procedures; communicating with other healthcare professionals and discussing with patient's family/caregivers.    Current Length of Stay: 3 day(s)  Current Patient Status: Inpatient   Certification Statement:  The patient will continue to require additional inpatient hospital stay due to ongoing treatment in setting of ABLA; need for colonoscopy tomorrow.  Discharge Plan: Anticipate discharge in 24-48 hrs to home.    Code Status: Level 1 - Full Code    Subjective:   Patient seen and examined laying in bed.  Denies any complaints of chest pain, shortness of breath, fever or chills.  States that she knows that she will be getting this colonoscopy tomorrow and if that does not show anything that she will possibly need a pill capsule study.  Denying any complaints of abdominal pain, nausea or vomiting.    Objective:     Vitals:   Temp (24hrs), Av °F (37.2 °C), Min:98.7 °F (37.1 °C), Max:99.3 °F (37.4 °C)    Temp:  [98.7 °F (37.1 °C)-99.3 °F (37.4 °C)] 99.1 °F (37.3 °C)  HR:  [59-74] 59  Resp:  [18] 18  BP: (128-132)/(48-63) 129/63  SpO2:  [96 %-97 %] 96 %  Body mass index is 26.45 kg/m².     Input and Output Summary (last 24 hours):   No intake or output data in the 24 hours ending 23 1041    Physical Exam:   Physical Exam  Vitals and nursing note reviewed.   Constitutional:       General: She is not in acute distress.     Appearance: She is normal weight. She is ill-appearing (elderly, frail).   Cardiovascular:      Rate and Rhythm: Normal rate.      Pulses: Normal pulses.   Pulmonary:      Effort: Pulmonary effort is normal.   Abdominal:      General: There is no distension.      Palpations: Abdomen is soft.      Tenderness: There is no abdominal tenderness.   Musculoskeletal:         General: Normal range of motion.      Right lower leg: No edema.      Left lower leg: No edema.   Skin:     General: Skin is warm and dry.      Coloration: Skin is pale.   Neurological:      Mental Status: She is alert and oriented to person, place, and time.   Psychiatric:         Mood and Affect: Mood normal.          Additional Data:     Labs:  Results from last 7 days   Lab Units 23  0427 23  2242 23  1336   WBC  Thousand/uL 4.41   < > 5.18   HEMOGLOBIN g/dL 7.3*   < > 5.5*   HEMATOCRIT % 25.0*   < > 18.9*   PLATELETS Thousands/uL 307   < > 381   NEUTROS PCT %  --   --  62   LYMPHS PCT %  --   --  26   MONOS PCT %  --   --  8   EOS PCT %  --   --  4    < > = values in this interval not displayed.     Results from last 7 days   Lab Units 12/15/23  1627 12/14/23  1336   SODIUM mmol/L 143 139   POTASSIUM mmol/L 3.9 3.4*   CHLORIDE mmol/L 111* 104   CO2 mmol/L 28 26   BUN mg/dL 18 28*   CREATININE mg/dL 1.29 1.37*   ANION GAP mmol/L 4 9   CALCIUM mg/dL 8.4 8.6   ALBUMIN g/dL  --  4.0   TOTAL BILIRUBIN mg/dL  --  0.46   ALK PHOS U/L  --  61   ALT U/L  --  12   AST U/L  --  15   GLUCOSE RANDOM mg/dL 106 152*     Results from last 7 days   Lab Units 12/17/23  0427   INR  2.27*     Results from last 7 days   Lab Units 12/17/23  0722 12/16/23  2037 12/16/23  1609 12/16/23  1116 12/16/23  0722 12/15/23  2040 12/15/23  1608 12/15/23  1125 12/15/23  0733 12/14/23 2007   POC GLUCOSE mg/dl 101 175* 111 104 75 146* 117 104 67 166*               Lines/Drains:  Invasive Devices       Peripheral Intravenous Line  Duration             Peripheral IV 12/14/23 Right Antecubital 2 days    Peripheral IV 12/14/23 Right;Dorsal (posterior) Forearm 2 days    Peripheral IV 12/14/23 Right;Ventral (anterior) Forearm 2 days                      Imaging: No pertinent imaging reviewed.    Recent Cultures (last 7 days):         Last 24 Hours Medication List:   Current Facility-Administered Medications   Medication Dose Route Frequency Provider Last Rate    aspirin  81 mg Oral Daily CAMACHO Phan      atorvastatin  40 mg Oral QPM CAMACHO Phan      cholecalciferol  2,000 Units Oral Daily CAMACHO Phan      [START ON 12/18/2023] furosemide  40 mg Oral Daily CAMACHO Phan      gabapentin  300 mg Oral BID CAMACHO Phan      heparin (porcine)  3-20 Units/kg/hr (Order-Specific) Intravenous Titrated Lucila  CAMACHO Malhotra 12 Units/kg/hr (12/16/23 5979)    insulin detemir  26 Units Subcutaneous HS CAMACHO Phan      insulin lispro  1-5 Units Subcutaneous TID AC CAMACHO Phan      insulin lispro  1-6 Units Subcutaneous HS CAMACHO Phan      levothyroxine  50 mcg Oral Daily CAMACHO Phan      metoprolol tartrate  25 mg Oral Q12H LIBAN CAMACHO Phan      polyethylene glycol  4,000 mL Oral See Admin Instructions Sumi Lenz PA-C          Today, Patient Was Seen By: CAMACHO Phan    **Please Note: This note may have been constructed using a voice recognition system.**

## 2023-12-17 NOTE — ASSESSMENT & PLAN NOTE
Patient presented to the ED with a hemoglobin of 5.5 on outpatient labs; referred to the ER by cardiology  Hemoglobin on 10/31 noted to be 12.7  Patient denies any bright red blood per rectum, but does endorse dark stools approx 1 wk ago  She is symptomatic with shortness of breath  Patient received 2 units PRBC, 1 U FFP  GI consult, appreciate input  EGD (12/15/23): The esophagus, stomach, duodenum and proximal jejunum appeared normal.   Planning for colonoscopy on 12/18  Clear liquid diet today with bowel prep  NPO after mn  Continue to hold Coumadin, continue Heparin drip.  Hemoglobin remain slightly lower than yesterday, 7.3  Monitor for any signs of blood in stool

## 2023-12-17 NOTE — PLAN OF CARE
Problem: Potential for Falls  Goal: Patient will remain free of falls  Description: INTERVENTIONS:  - Educate patient/family on patient safety including physical limitations  - Instruct patient to call for assistance with activity   - Consult OT/PT to assist with strengthening/mobility   - Keep Call bell within reach  - Keep bed low and locked with side rails adjusted as appropriate  - Keep care items and personal belongings within reach  - Initiate and maintain comfort rounds  - Make Fall Risk Sign visible to staff  - Offer Toileting every 2 Hours, in advance of need  - Initiate/Maintain bed alarm  - Obtain necessary fall risk management equipment: bed alarm, nonskid socks  - Apply yellow socks and bracelet for high fall risk patients  - Consider moving patient to room near nurses station  12/16/2023 2106 by Tanja Can RN  Outcome: Progressing  12/16/2023 2106 by Tanja Can RN  Outcome: Progressing     Problem: PAIN - ADULT  Goal: Verbalizes/displays adequate comfort level or baseline comfort level  Description: Interventions:  - Encourage patient to monitor pain and request assistance  - Assess pain using appropriate pain scale  - Administer analgesics based on type and severity of pain and evaluate response  - Implement non-pharmacological measures as appropriate and evaluate response  - Consider cultural and social influences on pain and pain management  - Notify physician/advanced practitioner if interventions unsuccessful or patient reports new pain  12/16/2023 2106 by Tanja Can RN  Outcome: Progressing  12/16/2023 2106 by Tanja Can RN  Outcome: Progressing     Problem: INFECTION - ADULT  Goal: Absence or prevention of progression during hospitalization  Description: INTERVENTIONS:  - Assess and monitor for signs and symptoms of infection  - Monitor lab/diagnostic results  - Monitor all insertion sites, i.e. indwelling lines, tubes, and drains  - Monitor endotracheal if  appropriate and nasal secretions for changes in amount and color  - [place] appropriate cooling/warming therapies per order  - Administer medications as ordered  - Instruct and encourage patient and family to use good hand hygiene technique  - Identify and instruct in appropriate isolation precautions for identified infection/condition  12/16/2023 2106 by Tanja Can RN  Outcome: Progressing  12/16/2023 2106 by Tanja Can RN  Outcome: Progressing     Problem: SAFETY ADULT  Goal: Patient will remain free of falls  Description: INTERVENTIONS:  - Educate patient/family on patient safety including physical limitations  - Instruct patient to call for assistance with activity   - Consult OT/PT to assist with strengthening/mobility   - Keep Call bell within reach  - Keep bed low and locked with side rails adjusted as appropriate  - Keep care items and personal belongings within reach  - Initiate and maintain comfort rounds  - Make Fall Risk Sign visible to staff  - Offer Toileting every 2 Hours, in advance of need  - Initiate/Maintain bed alarm  - Obtain necessary fall risk management equipment: bed alarm, nonskid socks  - Apply yellow socks and bracelet for high fall risk patients  - Consider moving patient to room near nurses station  12/16/2023 2106 by Tanja Can RN  Outcome: Progressing  12/16/2023 2106 by Tanja Can RN  Outcome: Progressing  Goal: Maintain or return to baseline ADL function  Description: INTERVENTIONS:  -  Assess patient's ability to carry out ADLs; assess patient's baseline for ADL function and identify physical deficits which impact ability to perform ADLs (bathing, care of mouth/teeth, toileting, grooming, dressing, etc.)  - Assess/evaluate cause of self-care deficits   - Assess range of motion  - Assess patient's mobility; develop plan if impaired  - Assess patient's need for assistive devices and provide as appropriate  - Encourage maximum independence but intervene  and supervise when necessary  - Involve family in performance of ADLs  - Assess for home care needs following discharge   - Consider OT consult to assist with ADL evaluation and planning for discharge  - Provide patient education as appropriate  12/16/2023 2106 by Tanja Can RN  Outcome: Progressing  12/16/2023 2106 by Tanja Can RN  Outcome: Progressing  Goal: Maintains/Returns to pre admission functional level  Description: INTERVENTIONS:  - Perform AM-PAC 6 Click Basic Mobility/ Daily Activity assessment daily.  - Set and communicate daily mobility goal to care team and patient/family/caregiver.   - Collaborate with rehabilitation services on mobility goals if consulted  - Perform Range of Motion 4 times a day.  - Reposition patient every 4 hours.  - Dangle patient 4 times a day  - Stand patient 4 times a day  - Ambulate patient 4 times a day  - Out of bed to chair 3 times a day   - Out of bed for meals 3 times a day  - Out of bed for toileting  - Record patient progress and toleration of activity level   12/16/2023 2106 by Tanja Can RN  Outcome: Progressing  12/16/2023 2106 by Tanja Can RN  Outcome: Progressing     Problem: DISCHARGE PLANNING  Goal: Discharge to home or other facility with appropriate resources  Description: INTERVENTIONS:  - Identify barriers to discharge w/patient and caregiver  - Arrange for needed discharge resources and transportation as appropriate  - Identify discharge learning needs (meds, wound care, etc.)  - Arrange for interpretive services to assist at discharge as needed  - Refer to Case Management Department for coordinating discharge planning if the patient needs post-hospital services based on physician/advanced practitioner order or complex needs related to functional status, cognitive ability, or social support system  12/16/2023 2106 by Tanja Can RN  Outcome: Progressing  12/16/2023 2106 by Tanja Can RN  Outcome: Progressing      Problem: Knowledge Deficit  Goal: Patient/family/caregiver demonstrates understanding of disease process, treatment plan, medications, and discharge instructions  Description: Complete learning assessment and assess knowledge base.  Interventions:  - Provide teaching at level of understanding  - Provide teaching via preferred learning methods  12/16/2023 2106 by Tanja Can RN  Outcome: Progressing  12/16/2023 2106 by Tanja Can RN  Outcome: Progressing     Problem: CARDIOVASCULAR - ADULT  Goal: Maintains optimal cardiac output and hemodynamic stability  Description: INTERVENTIONS:  - Monitor I/O, vital signs and rhythm  - Monitor for S/S and trends of decreased cardiac output  - Administer and titrate ordered vasoactive medications to optimize hemodynamic stability  - Assess quality of pulses, skin color and temperature  - Assess for signs of decreased coronary artery perfusion  - Instruct patient to report change in severity of symptoms  12/16/2023 2106 by Tanja Can RN  Outcome: Progressing  12/16/2023 2106 by Tanja Can RN  Outcome: Progressing  Goal: Absence of cardiac dysrhythmias or at baseline rhythm  Description: INTERVENTIONS:  - Continuous cardiac monitoring, vital signs, obtain 12 lead EKG if ordered  - Administer antiarrhythmic and heart rate control medications as ordered  - Monitor electrolytes and administer replacement therapy as ordered  12/16/2023 2106 by Tanja Can RN  Outcome: Progressing  12/16/2023 2106 by Tanja Can RN  Outcome: Progressing     Problem: HEMATOLOGIC - ADULT  Goal: Maintains hematologic stability  Description: INTERVENTIONS  - Assess for signs and symptoms of bleeding or hemorrhage  - Monitor labs  - Administer supportive blood products/factors as ordered and appropriate  12/16/2023 2106 by Tanja Can RN  Outcome: Progressing  12/16/2023 2106 by Tanja Can RN  Outcome: Progressing

## 2023-12-18 ENCOUNTER — TELEPHONE (OUTPATIENT)
Dept: NEUROLOGY | Facility: CLINIC | Age: 83
End: 2023-12-18

## 2023-12-18 ENCOUNTER — ANESTHESIA EVENT (INPATIENT)
Dept: GASTROENTEROLOGY | Facility: HOSPITAL | Age: 83
DRG: 811 | End: 2023-12-18
Payer: COMMERCIAL

## 2023-12-18 ENCOUNTER — APPOINTMENT (INPATIENT)
Dept: GASTROENTEROLOGY | Facility: HOSPITAL | Age: 83
DRG: 811 | End: 2023-12-18
Payer: COMMERCIAL

## 2023-12-18 ENCOUNTER — TELEPHONE (OUTPATIENT)
Dept: CARDIOLOGY CLINIC | Facility: CLINIC | Age: 83
End: 2023-12-18

## 2023-12-18 ENCOUNTER — ANESTHESIA (INPATIENT)
Dept: GASTROENTEROLOGY | Facility: HOSPITAL | Age: 83
DRG: 811 | End: 2023-12-18
Payer: COMMERCIAL

## 2023-12-18 LAB
ANION GAP SERPL CALCULATED.3IONS-SCNC: 5 MMOL/L
APTT PPP: 51 SECONDS (ref 23–37)
APTT PPP: 62 SECONDS (ref 23–37)
APTT PPP: 66 SECONDS (ref 23–37)
BUN SERPL-MCNC: 15 MG/DL (ref 5–25)
CALCIUM SERPL-MCNC: 8.8 MG/DL (ref 8.4–10.2)
CHLORIDE SERPL-SCNC: 109 MMOL/L (ref 96–108)
CO2 SERPL-SCNC: 28 MMOL/L (ref 21–32)
CREAT SERPL-MCNC: 1.04 MG/DL (ref 0.6–1.3)
ERYTHROCYTE [DISTWIDTH] IN BLOOD BY AUTOMATED COUNT: 18.1 % (ref 11.6–15.1)
GFR SERPL CREATININE-BSD FRML MDRD: 49 ML/MIN/1.73SQ M
GLUCOSE SERPL-MCNC: 117 MG/DL (ref 65–140)
GLUCOSE SERPL-MCNC: 119 MG/DL (ref 65–140)
GLUCOSE SERPL-MCNC: 151 MG/DL (ref 65–140)
GLUCOSE SERPL-MCNC: 160 MG/DL (ref 65–140)
HCT VFR BLD AUTO: 26 % (ref 34.8–46.1)
HGB BLD-MCNC: 7.7 G/DL (ref 11.5–15.4)
INR PPP: 1.82 (ref 0.84–1.19)
MAGNESIUM SERPL-MCNC: 2 MG/DL (ref 1.9–2.7)
MCH RBC QN AUTO: 23.5 PG (ref 26.8–34.3)
MCHC RBC AUTO-ENTMCNC: 29.6 G/DL (ref 31.4–37.4)
MCV RBC AUTO: 79 FL (ref 82–98)
PLATELET # BLD AUTO: 321 THOUSANDS/UL (ref 149–390)
PMV BLD AUTO: 10.1 FL (ref 8.9–12.7)
POTASSIUM SERPL-SCNC: 3.8 MMOL/L (ref 3.5–5.3)
PROTHROMBIN TIME: 21.9 SECONDS (ref 11.6–14.5)
RBC # BLD AUTO: 3.28 MILLION/UL (ref 3.81–5.12)
SODIUM SERPL-SCNC: 142 MMOL/L (ref 135–147)
WBC # BLD AUTO: 3.78 THOUSAND/UL (ref 4.31–10.16)

## 2023-12-18 PROCEDURE — 83735 ASSAY OF MAGNESIUM: CPT | Performed by: NURSE PRACTITIONER

## 2023-12-18 PROCEDURE — 99232 SBSQ HOSP IP/OBS MODERATE 35: CPT | Performed by: NURSE PRACTITIONER

## 2023-12-18 PROCEDURE — 80048 BASIC METABOLIC PNL TOTAL CA: CPT | Performed by: NURSE PRACTITIONER

## 2023-12-18 PROCEDURE — 45378 DIAGNOSTIC COLONOSCOPY: CPT | Performed by: INTERNAL MEDICINE

## 2023-12-18 PROCEDURE — 85730 THROMBOPLASTIN TIME PARTIAL: CPT | Performed by: STUDENT IN AN ORGANIZED HEALTH CARE EDUCATION/TRAINING PROGRAM

## 2023-12-18 PROCEDURE — 82948 REAGENT STRIP/BLOOD GLUCOSE: CPT

## 2023-12-18 PROCEDURE — 85027 COMPLETE CBC AUTOMATED: CPT | Performed by: NURSE PRACTITIONER

## 2023-12-18 PROCEDURE — 0DJD8ZZ INSPECTION OF LOWER INTESTINAL TRACT, VIA NATURAL OR ARTIFICIAL OPENING ENDOSCOPIC: ICD-10-PCS | Performed by: INTERNAL MEDICINE

## 2023-12-18 PROCEDURE — 85610 PROTHROMBIN TIME: CPT | Performed by: NURSE PRACTITIONER

## 2023-12-18 RX ORDER — PROPOFOL 10 MG/ML
INJECTION, EMULSION INTRAVENOUS AS NEEDED
Status: DISCONTINUED | OUTPATIENT
Start: 2023-12-18 | End: 2023-12-18

## 2023-12-18 RX ORDER — SODIUM CHLORIDE, SODIUM LACTATE, POTASSIUM CHLORIDE, CALCIUM CHLORIDE 600; 310; 30; 20 MG/100ML; MG/100ML; MG/100ML; MG/100ML
INJECTION, SOLUTION INTRAVENOUS CONTINUOUS PRN
Status: DISCONTINUED | OUTPATIENT
Start: 2023-12-18 | End: 2023-12-18

## 2023-12-18 RX ORDER — WARFARIN SODIUM 5 MG/1
5 TABLET ORAL
Status: DISCONTINUED | OUTPATIENT
Start: 2023-12-18 | End: 2023-12-20

## 2023-12-18 RX ADMIN — SODIUM CHLORIDE, SODIUM LACTATE, POTASSIUM CHLORIDE, AND CALCIUM CHLORIDE: .6; .31; .03; .02 INJECTION, SOLUTION INTRAVENOUS at 10:26

## 2023-12-18 RX ADMIN — GABAPENTIN 300 MG: 300 CAPSULE ORAL at 21:47

## 2023-12-18 RX ADMIN — INSULIN LISPRO 1 UNITS: 100 INJECTION, SOLUTION INTRAVENOUS; SUBCUTANEOUS at 17:35

## 2023-12-18 RX ADMIN — ATORVASTATIN CALCIUM 40 MG: 40 TABLET, FILM COATED ORAL at 17:20

## 2023-12-18 RX ADMIN — INSULIN LISPRO 1 UNITS: 100 INJECTION, SOLUTION INTRAVENOUS; SUBCUTANEOUS at 21:48

## 2023-12-18 RX ADMIN — PROPOFOL 60 MG: 10 INJECTION, EMULSION INTRAVENOUS at 10:29

## 2023-12-18 RX ADMIN — ASPIRIN 81 MG: 81 TABLET, COATED ORAL at 08:31

## 2023-12-18 RX ADMIN — METOPROLOL TARTRATE 25 MG: 25 TABLET, FILM COATED ORAL at 08:30

## 2023-12-18 RX ADMIN — GABAPENTIN 300 MG: 300 CAPSULE ORAL at 08:32

## 2023-12-18 RX ADMIN — PROPOFOL 30 MG: 10 INJECTION, EMULSION INTRAVENOUS at 10:33

## 2023-12-18 RX ADMIN — METOPROLOL TARTRATE 25 MG: 25 TABLET, FILM COATED ORAL at 21:47

## 2023-12-18 RX ADMIN — PROPOFOL 30 MG: 10 INJECTION, EMULSION INTRAVENOUS at 10:31

## 2023-12-18 RX ADMIN — PROPOFOL 30 MG: 10 INJECTION, EMULSION INTRAVENOUS at 10:40

## 2023-12-18 RX ADMIN — INSULIN DETEMIR 26 UNITS: 100 INJECTION, SOLUTION SUBCUTANEOUS at 21:48

## 2023-12-18 RX ADMIN — WARFARIN SODIUM 5 MG: 5 TABLET ORAL at 17:20

## 2023-12-18 RX ADMIN — Medication 2000 UNITS: at 08:30

## 2023-12-18 RX ADMIN — PROPOFOL 20 MG: 10 INJECTION, EMULSION INTRAVENOUS at 10:30

## 2023-12-18 RX ADMIN — PROPOFOL 30 MG: 10 INJECTION, EMULSION INTRAVENOUS at 10:36

## 2023-12-18 RX ADMIN — LEVOTHYROXINE SODIUM 50 MCG: 0.05 TABLET ORAL at 08:30

## 2023-12-18 NOTE — ANESTHESIA POSTPROCEDURE EVALUATION
Post-Op Assessment Note    CV Status:  Stable    Pain management: adequate       Mental Status:  Sleepy   Hydration Status:  Euvolemic   PONV Controlled:  Controlled   Airway Patency:  Patent  Two or more mitigation strategies used for obstructive sleep apnea   Post Op Vitals Reviewed: Yes      Staff: Anesthesiologist, CRNA               BP   136/63   Temp 97.5   Pulse 60   Resp 18   SpO2 96

## 2023-12-18 NOTE — ANESTHESIA PREPROCEDURE EVALUATION
Procedure:  COLONOSCOPY    Relevant Problems   CARDIO   (+) Coronary artery disease of native artery of native heart with stable angina pectoris (HCC)   (+) Hyperlipidemia   (+) Hypertension, essential   (+) Hypertensive urgency   (+) Paroxysmal atrial fibrillation (HCC)      ENDO   (+) Hypothyroidism      GI/HEPATIC   (+) GERD (gastroesophageal reflux disease)      /RENAL   (+) Chronic kidney disease-mineral and bone disorder   (+) Stage 3a chronic kidney disease      HEMATOLOGY   (+) Acute blood loss anemia      MUSCULOSKELETAL   (+) Primary osteoarthritis involving multiple joints      PULMONARY   (+) Chronic obstructive pulmonary disease (HCC)        Physical Exam    Airway    Mallampati score: II  TM Distance: >3 FB  Neck ROM: full     Dental       Cardiovascular  Cardiovascular exam normal    Pulmonary  Pulmonary exam normal     Other Findings  post-pubertal.      Anesthesia Plan  ASA Score- 3     Anesthesia Type- IV sedation with anesthesia with ASA Monitors.         Additional Monitors:     Airway Plan:            Plan Factors-Exercise tolerance (METS): >4 METS.    Chart reviewed. EKG reviewed. Imaging results reviewed. Existing labs reviewed. Patient summary reviewed.    Patient is not a current smoker.              Induction- intravenous.    Postoperative Plan-     Informed Consent- Anesthetic plan and risks discussed with patient.  I personally reviewed this patient with the CRNA. Discussed and agreed on the Anesthesia Plan with the CRNA..

## 2023-12-18 NOTE — PROGRESS NOTES
[facility]  Progress Note  Name: Ely Hernadez I  MRN: [id number]  Unit/Bed#: MS 206Jack I Date of Admission: 12/14/2023   Date of Service: 12/18/2023 I Hospital Day: 4    Assessment/Plan   * Acute blood loss anemia  Assessment & Plan  Patient presented to the ED with a hemoglobin of 5.5 on outpatient labs; referred to the ER by cardiology.  Does note melena 1 week prior to admission. Otherwise, no sign of bleeding.  Hemoglobin on 10/31 noted to be 12.7  Patient received 2 units PRBC, 1 U FFP  GI consult, appreciate input  EGD (12/15/23): The esophagus, stomach, duodenum and proximal jejunum appeared normal.   Plan is for a colonoscopy today  Currently NPO  Continue to hold Coumadin, continue Heparin drip.  Hemoglobin remain slightly lower than yesterday, 7.7  INR 1.82  Monitor for any signs of blood in stool    H/O mechanical aortic valve replacement  Assessment & Plan  History of mechanical valve replacement in 2007  Maintained on Coumadin typically with a goal INR of 2.5-3.5  Coumadin on hold, initiated on Heparin drip after discussion with GI  Heparin drip on hold for colonoscopy today; restart once completed to bridge to coumadin  Restart coumadin once cleared    Paroxysmal atrial fibrillation (HCC)  Assessment & Plan  Chronic  Takes Warfarin for AC and Lopressor for rate control  Continue with Heparin drip  Coumadin on hold for colonoscopy  INR 1.82  Rate is controlled    Stage 3a chronic kidney disease  Assessment & Plan  Lab Results   Component Value Date    EGFR 49 12/18/2023    EGFR 38 12/15/2023    EGFR 35 12/14/2023    CREATININE 1.04 12/18/2023    CREATININE 1.29 12/15/2023    CREATININE 1.37 (H) 12/14/2023     Chronic, baseline creatinine noted to be 1.1-1.3  Currently below baseline, 1.04  Avoid nephrotoxins, hypotension  Monitor BMP    Cardiomyopathy (HCC)  Assessment & Plan  Most recent echocardiogram with an EF of 45% with grade 1 diastolic dysfunction  Patient takes 40 mg  Lasix daily at home  Restart today    AVM (arteriovenous malformation) of small bowel, acquired with hemorrhage  Assessment & Plan  History from 2018  She is now s/p EGD with push enteroscopy; which was normal on 12/15    Diabetes mellitus with neurological manifestation (HCC)  Assessment & Plan  Chronic, uncontrolled, as evidenced by a hemoglobin A1C of 8.7  Hold home oral anti-diabetics  Continue with insulin sliding scale  Monitor blood glucose AC/HS  Hypoglycemia protocol    Coronary artery disease of native artery of native heart with stable angina pectoris (HCC)  Assessment & Plan  History of CAD  Takes Aspirin and Lopressor at home  Cleared by GI to continue with Aspirin  No chest pain at this time           VTE Pharmacologic Prophylaxis: VTE Score: 4 Moderate Risk (Score 3-4) - Pharmacological DVT Prophylaxis Ordered: heparin drip. With eventual bridge to coumadin    Mobility:   Basic Mobility Inpatient Raw Score: 24  JH-HLM Goal: 8: Walk 250 feet or more  JH-HLM Achieved: 8: Walk 250 feet ot more  HLM Goal achieved. Continue to encourage appropriate mobility.    Patient Centered Rounds: I performed bedside rounds with nursing staff today.   Discussions with Specialists or Other Care Team Provider: Case Management, GI    Education and Discussions with Family / Patient: Patient declined call to .     Total Time Spent on Date of Encounter in care of patient: 37 mins. This time was spent on one or more of the following: performing physical exam; counseling and coordination of care; obtaining or reviewing history; documenting in the medical record; reviewing/ordering tests, medications or procedures; communicating with other healthcare professionals and discussing with patient's family/caregivers.    Current Length of Stay: 4 day(s)  Current Patient Status: Inpatient   Certification Statement: The patient will continue to require additional inpatient hospital stay due to ongoing treatment in  setting acute anemia, need for colonoscopy today.  Discharge Plan: Anticipate discharge in 24-48 hrs to home.    Code Status: Level 1 - Full Code    Subjective:   Patient seen ambulating from the bathroom to her bed.  Denies any complaints of chest pain, worsening shortness of breath, fever or chills.  States that she has chronic shortness of breath with activity.  Denies any complaints of bloody bowel movements.      Objective:     Vitals:   Temp (24hrs), Av °F (35.6 °C), Min:87.1 °F (30.6 °C), Max:98.9 °F (37.2 °C)    Temp:  [87.1 °F (30.6 °C)-98.9 °F (37.2 °C)] 87.1 °F (30.6 °C)  HR:  [59-75] 59  Resp:  [17-23] 23  BP: (112-168)/(57-72) 138/65  SpO2:  [95 %-98 %] 98 %  Body mass index is 26.45 kg/m².     Input and Output Summary (last 24 hours):   No intake or output data in the 24 hours ending 23 1100    Physical Exam:   Physical Exam  Vitals and nursing note reviewed.   Constitutional:       General: She is not in acute distress.     Appearance: She is normal weight. She is not ill-appearing.   Cardiovascular:      Rate and Rhythm: Normal rate.      Pulses: Normal pulses.      Heart sounds: Normal heart sounds.   Pulmonary:      Effort: Pulmonary effort is normal. No tachypnea, bradypnea or respiratory distress.      Breath sounds: Normal breath sounds.      Comments: Patient short of breath with activity, which is baseline. Lung sounds clear to auscultation.  Abdominal:      General: Bowel sounds are normal.      Palpations: Abdomen is soft.   Musculoskeletal:         General: Normal range of motion.      Right lower leg: No edema.      Left lower leg: No edema.   Skin:     General: Skin is warm and dry.      Coloration: Skin is pale.   Neurological:      Mental Status: She is alert and oriented to person, place, and time.   Psychiatric:         Mood and Affect: Mood normal.          Additional Data:     Labs:  Results from last 7 days   Lab Units 23  0751 23  2242 23  1336   WBC  Thousand/uL 3.78*   < > 5.18   HEMOGLOBIN g/dL 7.7*   < > 5.5*   HEMATOCRIT % 26.0*   < > 18.9*   PLATELETS Thousands/uL 321   < > 381   NEUTROS PCT %  --   --  62   LYMPHS PCT %  --   --  26   MONOS PCT %  --   --  8   EOS PCT %  --   --  4    < > = values in this interval not displayed.     Results from last 7 days   Lab Units 12/18/23  0751 12/15/23  1627 12/14/23  1336   SODIUM mmol/L 142   < > 139   POTASSIUM mmol/L 3.8   < > 3.4*   CHLORIDE mmol/L 109*   < > 104   CO2 mmol/L 28   < > 26   BUN mg/dL 15   < > 28*   CREATININE mg/dL 1.04   < > 1.37*   ANION GAP mmol/L 5   < > 9   CALCIUM mg/dL 8.8   < > 8.6   ALBUMIN g/dL  --   --  4.0   TOTAL BILIRUBIN mg/dL  --   --  0.46   ALK PHOS U/L  --   --  61   ALT U/L  --   --  12   AST U/L  --   --  15   GLUCOSE RANDOM mg/dL 117   < > 152*    < > = values in this interval not displayed.     Results from last 7 days   Lab Units 12/18/23  0751   INR  1.82*     Results from last 7 days   Lab Units 12/17/23 2028 12/17/23  1606 12/17/23  1125 12/17/23  0722 12/16/23 2037 12/16/23  1609 12/16/23  1116 12/16/23  0722 12/15/23  2040 12/15/23  1608 12/15/23  1125 12/15/23  0733   POC GLUCOSE mg/dl 150* 136 134 101 175* 111 104 75 146* 117 104 67               Lines/Drains:  Invasive Devices       Peripheral Intravenous Line  Duration             Peripheral IV 12/14/23 Right Antecubital 3 days    Peripheral IV 12/14/23 Right;Dorsal (posterior) Forearm 3 days    Peripheral IV 12/14/23 Right;Ventral (anterior) Forearm 3 days                          Imaging: No pertinent imaging reviewed.    Recent Cultures (last 7 days):         Last 24 Hours Medication List:   Current Facility-Administered Medications   Medication Dose Route Frequency Provider Last Rate    aspirin  81 mg Oral Daily Lucila Strausberger, CRNP      atorvastatin  40 mg Oral QPM CAMACHO Phan      cholecalciferol  2,000 Units Oral Daily CAMACHO Phan      furosemide  40 mg Oral Daily Lucila  CAMACHO Malhotra      gabapentin  300 mg Oral BID CAMACHO Phan      heparin (porcine)  3-20 Units/kg/hr (Order-Specific) Intravenous Titrated CAMACHO Phan Stopped (12/18/23 0734)    insulin detemir  26 Units Subcutaneous HS CAMACHO Phan      insulin lispro  1-5 Units Subcutaneous TID AC CAMACHO Phan      insulin lispro  1-6 Units Subcutaneous HS CAMACHO Phan      levothyroxine  50 mcg Oral Daily CAMACHO Phan      metoprolol tartrate  25 mg Oral Q12H [facility] CAMACHO Phan          Today, Patient Was Seen By: CAMACHO Phan    **Please Note: This note may have been constructed using a voice recognition system.**

## 2023-12-18 NOTE — ASSESSMENT & PLAN NOTE
Most recent echocardiogram with an EF of 45% with grade 1 diastolic dysfunction  Patient takes 40 mg Lasix daily at home  Restart today

## 2023-12-18 NOTE — PLAN OF CARE
Problem: Potential for Falls  Goal: Patient will remain free of falls  Description: INTERVENTIONS:  - Educate patient/family on patient safety including physical limitations  - Instruct patient to call for assistance with activity   - Consult OT/PT to assist with strengthening/mobility   - Keep Call bell within reach  - Keep bed low and locked with side rails adjusted as appropriate  - Keep care items and personal belongings within reach  - Initiate and maintain comfort rounds  - Make Fall Risk Sign visible to staff  - Offer Toileting every 2 Hours, in advance of need  - Initiate/Maintain bed alarm  - Obtain necessary fall risk management equipment: bed alarm, nonskid socks  - Apply yellow socks and bracelet for high fall risk patients  - Consider moving patient to room near nurses station  Outcome: Progressing     Problem: PAIN - ADULT  Goal: Verbalizes/displays adequate comfort level or baseline comfort level  Description: Interventions:  - Encourage patient to monitor pain and request assistance  - Assess pain using appropriate pain scale  - Administer analgesics based on type and severity of pain and evaluate response  - Implement non-pharmacological measures as appropriate and evaluate response  - Consider cultural and social influences on pain and pain management  - Notify physician/advanced practitioner if interventions unsuccessful or patient reports new pain  Outcome: Progressing     Problem: INFECTION - ADULT  Goal: Absence or prevention of progression during hospitalization  Description: INTERVENTIONS:  - Assess and monitor for signs and symptoms of infection  - Monitor lab/diagnostic results  - Monitor all insertion sites, i.e. indwelling lines, tubes, and drains  - Monitor endotracheal if appropriate and nasal secretions for changes in amount and color  - [place] appropriate cooling/warming therapies per order  - Administer medications as ordered  - Instruct and encourage patient and family to use  good hand hygiene technique  - Identify and instruct in appropriate isolation precautions for identified infection/condition  Outcome: Progressing     Problem: SAFETY ADULT  Goal: Patient will remain free of falls  Description: INTERVENTIONS:  - Educate patient/family on patient safety including physical limitations  - Instruct patient to call for assistance with activity   - Consult OT/PT to assist with strengthening/mobility   - Keep Call bell within reach  - Keep bed low and locked with side rails adjusted as appropriate  - Keep care items and personal belongings within reach  - Initiate and maintain comfort rounds  - Make Fall Risk Sign visible to staff  - Offer Toileting every 2 Hours, in advance of need  - Initiate/Maintain bed alarm  - Obtain necessary fall risk management equipment: bed alarm, nonskid socks  - Apply yellow socks and bracelet for high fall risk patients  - Consider moving patient to room near nurses station  Outcome: Progressing  Goal: Maintain or return to baseline ADL function  Description: INTERVENTIONS:  -  Assess patient's ability to carry out ADLs; assess patient's baseline for ADL function and identify physical deficits which impact ability to perform ADLs (bathing, care of mouth/teeth, toileting, grooming, dressing, etc.)  - Assess/evaluate cause of self-care deficits   - Assess range of motion  - Assess patient's mobility; develop plan if impaired  - Assess patient's need for assistive devices and provide as appropriate  - Encourage maximum independence but intervene and supervise when necessary  - Involve family in performance of ADLs  - Assess for home care needs following discharge   - Consider OT consult to assist with ADL evaluation and planning for discharge  - Provide patient education as appropriate  Outcome: Progressing  Goal: Maintains/Returns to pre admission functional level  Description: INTERVENTIONS:  - Perform AM-PAC 6 Click Basic Mobility/ Daily Activity assessment  daily.  - Set and communicate daily mobility goal to care team and patient/family/caregiver.   - Collaborate with rehabilitation services on mobility goals if consulted  - Perform Range of Motion 4 times a day.  - Reposition patient every 4 hours.  - Dangle patient 4 times a day  - Stand patient 4 times a day  - Ambulate patient 4 times a day  - Out of bed to chair 3 times a day   - Out of bed for meals 3 times a day  - Out of bed for toileting  - Record patient progress and toleration of activity level   Outcome: Progressing     Problem: DISCHARGE PLANNING  Goal: Discharge to home or other facility with appropriate resources  Description: INTERVENTIONS:  - Identify barriers to discharge w/patient and caregiver  - Arrange for needed discharge resources and transportation as appropriate  - Identify discharge learning needs (meds, wound care, etc.)  - Arrange for interpretive services to assist at discharge as needed  - Refer to Case Management Department for coordinating discharge planning if the patient needs post-hospital services based on physician/advanced practitioner order or complex needs related to functional status, cognitive ability, or social support system  Outcome: Progressing     Problem: Knowledge Deficit  Goal: Patient/family/caregiver demonstrates understanding of disease process, treatment plan, medications, and discharge instructions  Description: Complete learning assessment and assess knowledge base.  Interventions:  - Provide teaching at level of understanding  - Provide teaching via preferred learning methods  Outcome: Progressing     Problem: CARDIOVASCULAR - ADULT  Goal: Maintains optimal cardiac output and hemodynamic stability  Description: INTERVENTIONS:  - Monitor I/O, vital signs and rhythm  - Monitor for S/S and trends of decreased cardiac output  - Administer and titrate ordered vasoactive medications to optimize hemodynamic stability  - Assess quality of pulses, skin color and  temperature  - Assess for signs of decreased coronary artery perfusion  - Instruct patient to report change in severity of symptoms  Outcome: Progressing  Goal: Absence of cardiac dysrhythmias or at baseline rhythm  Description: INTERVENTIONS:  - Continuous cardiac monitoring, vital signs, obtain 12 lead EKG if ordered  - Administer antiarrhythmic and heart rate control medications as ordered  - Monitor electrolytes and administer replacement therapy as ordered  Outcome: Progressing     Problem: HEMATOLOGIC - ADULT  Goal: Maintains hematologic stability  Description: INTERVENTIONS  - Assess for signs and symptoms of bleeding or hemorrhage  - Monitor labs  - Administer supportive blood products/factors as ordered and appropriate  Outcome: Progressing

## 2023-12-18 NOTE — TELEPHONE ENCOUNTER
Called spoke to patient's son r/s appointment due to patient being currently admitted in the hospital. New appointment for 2/20/24 at [facility].

## 2023-12-18 NOTE — ASSESSMENT & PLAN NOTE
Lab Results   Component Value Date    EGFR 49 12/18/2023    EGFR 38 12/15/2023    EGFR 35 12/14/2023    CREATININE 1.04 12/18/2023    CREATININE 1.29 12/15/2023    CREATININE 1.37 (H) 12/14/2023     Chronic, baseline creatinine noted to be 1.1-1.3  Currently below baseline, 1.04  Avoid nephrotoxins, hypotension  Monitor BMP

## 2023-12-18 NOTE — ASSESSMENT & PLAN NOTE
History of mechanical valve replacement in 2007  Maintained on Coumadin typically with a goal INR of 2.5-3.5  Coumadin on hold, initiated on Heparin drip after discussion with GI  Heparin drip on hold for colonoscopy today; restart once completed to bridge to coumadin  Restart coumadin once cleared

## 2023-12-18 NOTE — WOUND OSTOMY CARE
Progress Note - Wound   Ely Hernadez 83 y.o. female MRN: [id number]  Unit/Bed#: -01 Encounter: [id number]      Assessment:   Patient admitted due to acute blood loss anemia. History of cardiomyopathy, CHF, CKD, diabetes, GERD, TIA, HLD. Wound care consulted for right lower extremity wound. Patient agreeable to assessment, alert and oriented x4, continent of bowel and bladder, standby assist with care, independently turns for assessment.    1. Bilateral sacrum, elbows, hips and heels- skin is dry, intact, blanchable.    2. Bilateral buttock- skin is dry, intact, with blanchable area of red skin and hyperpigmented skin.    3. Right anterior tibial- Chronic wound, patient follows with out-patient wound center. Wound is oval in shape, partial thickness, with scant amount of serous drainage noted. Radha-wound is dry, intact, blanchable, no redness.    Educated patient on importance of frequent offloading of pressure via turning, repositioning, and weight-shifting. Verbalized understanding of plan of care.    No induration, fluctuance, odor, warmth, redness, or purulence noted to the above noted wound. New dressing applied. Patient tolerated well, denies pain to the wound. Primary nurse aware of plan of care. See flow sheets for more detailed assessment findings. Will follow along.      Skin care plans:  1-Hydraguard to bilateral sacrum, buttock and heels BID and PRN  2-Elevate heels to offload pressure.  3-Ehob cushion in chair when out of bed.  4-Moisturize skin daily with skin nourishing cream.  5-Turn/reposition q2h for pressure re-distribution on skin.   6-R leg - Cleanse wound with NSS, pat dry. Apply Silicone bordered foam dressing over wound bed. Change every other day and as needed for soilage/displacement.      Wound 10/29/23 Venous Ulcer Pretibial Right (Active)   Wound Image   12/18/23 0902   Wound Description Pink 12/18/23 0902   Radha-wound Assessment Dry;Intact 12/18/23 0902   Wound Length (cm) 1 cm  12/18/23 0902   Wound Width (cm) 1.1 cm 12/18/23 0902   Wound Depth (cm) 0.1 cm 12/18/23 0902   Wound Surface Area (cm^2) 1.1 cm^2 12/18/23 0902   Wound Volume (cm^3) 0.11 cm^3 12/18/23 0902   Calculated Wound Volume (cm^3) 0.11 cm^3 12/18/23 0902   Change in Wound Size % 97.25 12/18/23 0902   Tunneling 0 cm 12/18/23 0902   Undermining 0 12/18/23 0902   Drainage Amount Scant 12/18/23 0902   Drainage Description Serous 12/18/23 0902   Non-staged Wound Description Partial thickness 12/18/23 0902   Treatments Cleansed;Irrigation with NSS;Site care 12/18/23 0902   Dressing Foam, Silicon (eg. Allevyn, etc) 12/18/23 0902   Wound packed? No 12/18/23 0902   Dressing Changed Changed 12/18/23 0902   Patient Tolerance Tolerated well 12/18/23 0902   Dressing Status Clean;Dry;Intact 12/18/23 0902       Call or tigertext with any questions  Wound Care will continue to follow    Traci RAMACHANDRANN RN CWON  Wound and Ostomy care

## 2023-12-18 NOTE — ASSESSMENT & PLAN NOTE
Patient presented to the ED with a hemoglobin of 5.5 on outpatient labs; referred to the ER by cardiology.  Does note melena 1 week prior to admission. Otherwise, no sign of bleeding.  Hemoglobin on 10/31 noted to be 12.7  Patient received 2 units PRBC, 1 U FFP  GI consult, appreciate input  EGD (12/15/23): The esophagus, stomach, duodenum and proximal jejunum appeared normal.   Plan is for a colonoscopy today  Currently NPO  Continue to hold Coumadin, continue Heparin drip.  Hemoglobin remain slightly lower than yesterday, 7.7  INR 1.82  Monitor for any signs of blood in stool

## 2023-12-18 NOTE — TELEPHONE ENCOUNTER
----- Message from CAMACHO García sent at 12/16/2023 12:29 PM EST -----  Regarding: Hospital Follow-up  Cardiology Follow-up:    Patient clinical visit in 2 week at the Cardio location: [place] office. .    Schedule visit with Cardio [facility]ae Providers: Qiana Layton or first available provider.    Type of Visit: VISIT TYPE: in-person office visit.    Test ordered: .    Additional Details:

## 2023-12-18 NOTE — PLAN OF CARE
Problem: Potential for Falls  Goal: Patient will remain free of falls  Description: INTERVENTIONS:  - Educate patient/family on patient safety including physical limitations  - Instruct patient to call for assistance with activity   - Consult OT/PT to assist with strengthening/mobility   - Keep Call bell within reach  - Keep bed low and locked with side rails adjusted as appropriate  - Keep care items and personal belongings within reach  - Initiate and maintain comfort rounds  - Make Fall Risk Sign visible to staff  - Offer Toileting every  Hours, in advance of need  - Initiate/Maintain alarm  - Obtain necessary fall risk management equipment:   - Apply yellow socks and bracelet for high fall risk patients  - Consider moving patient to room near nurses station  Outcome: Progressing     Problem: PAIN - ADULT  Goal: Verbalizes/displays adequate comfort level or baseline comfort level  Description: Interventions:  - Encourage patient to monitor pain and request assistance  - Assess pain using appropriate pain scale  - Administer analgesics based on type and severity of pain and evaluate response  - Implement non-pharmacological measures as appropriate and evaluate response  - Consider cultural and social influences on pain and pain management  - Notify physician/advanced practitioner if interventions unsuccessful or patient reports new pain  Outcome: Progressing     Problem: INFECTION - ADULT  Goal: Absence or prevention of progression during hospitalization  Description: INTERVENTIONS:  - Assess and monitor for signs and symptoms of infection  - Monitor lab/diagnostic results  - Monitor all insertion sites, i.e. indwelling lines, tubes, and drains  - Monitor endotracheal if appropriate and nasal secretions for changes in amount and color  - [place] appropriate cooling/warming therapies per order  - Administer medications as ordered  - Instruct and encourage patient and family to use good hand hygiene technique  -  Identify and instruct in appropriate isolation precautions for identified infection/condition  Outcome: Progressing     Problem: SAFETY ADULT  Goal: Patient will remain free of falls  Description: INTERVENTIONS:  - Educate patient/family on patient safety including physical limitations  - Instruct patient to call for assistance with activity   - Consult OT/PT to assist with strengthening/mobility   - Keep Call bell within reach  - Keep bed low and locked with side rails adjusted as appropriate  - Keep care items and personal belongings within reach  - Initiate and maintain comfort rounds  - Make Fall Risk Sign visible to staff  - Offer Toileting every  Hours, in advance of need  - Initiate/Maintain alarm  - Obtain necessary fall risk management equipment:   - Apply yellow socks and bracelet for high fall risk patients  - Consider moving patient to room near nurses station  Outcome: Progressing  Goal: Maintain or return to baseline ADL function  Description: INTERVENTIONS:  -  Assess patient's ability to carry out ADLs; assess patient's baseline for ADL function and identify physical deficits which impact ability to perform ADLs (bathing, care of mouth/teeth, toileting, grooming, dressing, etc.)  - Assess/evaluate cause of self-care deficits   - Assess range of motion  - Assess patient's mobility; develop plan if impaired  - Assess patient's need for assistive devices and provide as appropriate  - Encourage maximum independence but intervene and supervise when necessary  - Involve family in performance of ADLs  - Assess for home care needs following discharge   - Consider OT consult to assist with ADL evaluation and planning for discharge  - Provide patient education as appropriate  Outcome: Progressing  Goal: Maintains/Returns to pre admission functional level  Description: INTERVENTIONS:  - Perform AM-PAC 6 Click Basic Mobility/ Daily Activity assessment daily.  - Set and communicate daily mobility goal to care  team and patient/family/caregiver.   - Collaborate with rehabilitation services on mobility goals if consulted  - Perform Range of Motion  times a day.  - Reposition patient every  hours.  - Dangle patient  times a day  - Stand patient  times a day  - Ambulate patient  times a day  - Out of bed to chair  times a day   - Out of bed for meals  times a day  - Out of bed for toileting  - Record patient progress and toleration of activity level   Outcome: Progressing     Problem: DISCHARGE PLANNING  Goal: Discharge to home or other facility with appropriate resources  Description: INTERVENTIONS:  - Identify barriers to discharge w/patient and caregiver  - Arrange for needed discharge resources and transportation as appropriate  - Identify discharge learning needs (meds, wound care, etc.)  - Arrange for interpretive services to assist at discharge as needed  - Refer to Case Management Department for coordinating discharge planning if the patient needs post-hospital services based on physician/advanced practitioner order or complex needs related to functional status, cognitive ability, or social support system  Outcome: Progressing     Problem: Knowledge Deficit  Goal: Patient/family/caregiver demonstrates understanding of disease process, treatment plan, medications, and discharge instructions  Description: Complete learning assessment and assess knowledge base.  Interventions:  - Provide teaching at level of understanding  - Provide teaching via preferred learning methods  Outcome: Progressing     Problem: CARDIOVASCULAR - ADULT  Goal: Maintains optimal cardiac output and hemodynamic stability  Description: INTERVENTIONS:  - Monitor I/O, vital signs and rhythm  - Monitor for S/S and trends of decreased cardiac output  - Administer and titrate ordered vasoactive medications to optimize hemodynamic stability  - Assess quality of pulses, skin color and temperature  - Assess for signs of decreased coronary artery  perfusion  - Instruct patient to report change in severity of symptoms  Outcome: Progressing  Goal: Absence of cardiac dysrhythmias or at baseline rhythm  Description: INTERVENTIONS:  - Continuous cardiac monitoring, vital signs, obtain 12 lead EKG if ordered  - Administer antiarrhythmic and heart rate control medications as ordered  - Monitor electrolytes and administer replacement therapy as ordered  Outcome: Progressing     Problem: HEMATOLOGIC - ADULT  Goal: Maintains hematologic stability  Description: INTERVENTIONS  - Assess for signs and symptoms of bleeding or hemorrhage  - Monitor labs  - Administer supportive blood products/factors as ordered and appropriate  Outcome: Progressing

## 2023-12-18 NOTE — CASE MANAGEMENT
Case Management Assessment & Discharge Planning Note    Patient name Ely Hernadez  Location /-01 MRN [id number]  : 1940 Date 2023       Current Admission Date: 2023  Current Admission Diagnosis:Acute blood loss anemia   Patient Active Problem List    Diagnosis Date Noted    Supratherapeutic INR 2023    Acute blood loss anemia 2023    H/O mechanical aortic valve replacement 2023    Leg cramps 2023    Stroke-like symptoms 10/28/2023    Hypertensive urgency 10/28/2023    Restrictive lung disease 2023    Nocturnal hypoxemia 2023    Herpes simplex labialis 2022    Neutropenia associated with infection ([facility]) 2022    Paroxysmal atrial fibrillation ([facility]) 2022    Primary osteoarthritis involving multiple joints 2021    Stage 3a chronic kidney disease 2020    Chronic kidney disease-mineral and bone disorder 2020    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    Cardiomyopathy ([facility]) 2020    Angioedema 2019    Other vascular disorders of intestine ([facility]) 2019    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    AVM (arteriovenous malformation) of small bowel, acquired with hemorrhage 2018    GERD (gastroesophageal reflux disease) 2018    Hyperlipidemia 2018    Chronic anticoagulation 2018    Hypertension, essential 2018    Iron deficiency 2017    Coronary artery disease of native artery of native heart with stable angina pectoris ([facility]) 2015    Hypothyroidism 2014    Chronic obstructive pulmonary disease (HCC) 2014    Diabetes mellitus with neurological manifestation ([facility]) 2014    Aortic valve replaced 2007      LOS (days): 4  Geometric Mean LOS (GMLOS) (days): 2.8  Days to GMLOS:-1.3     OBJECTIVE:    Risk of Unplanned Readmission Score: 22.01         Current admission status: Inpatient       Preferred Pharmacy:   [id number] [place] - [id number] [facility] [address]  Phone: [phone] Fax: [phone]    [facility] - [place], [place] - [address]  [address]  [address]  [facility] [address]  Phone: [phone] Fax: [phone]    Primary Care Provider: Aviva Mccabe PA-C    Primary Insurance: [employer] REP  Secondary Insurance:     ASSESSMENT:  Active Health Care Proxies       Odell Hernadez Health Care Representative - Son   Primary Phone: [phone] (Home)                 Advance Directives  Does patient have a Health Care POA?: No  Was patient offered paperwork?: Yes (CM supplied info)  Does patient currently have a Health Care decision maker?: Yes, please see Health Care Proxy section  Does patient have Advance Directives?: No  Was patient offered paperwork?: Yes (CM supplied info)  Primary Contact: daughter Senait and son Odell         Readmission Root Cause  30 Day Readmission: No    Patient Information  Admitted from:: Home  Mental Status: Alert  During Assessment patient was accompanied by: Not accompanied during assessment  Assessment information provided by:: Patient  Primary Caregiver: Self  Support Systems: Family members  County of Residence: [place]  What city do you live in?: [facility]  Home entry access options. Select all that apply.: No steps to enter home (but pt enters that home through the basement and needs to navigate 11 steps w/railing to reach the main level)  Type of Current Residence: 2 [place] home  Upon entering residence, is there a bedroom on the main floor (no further steps)?: Yes  Upon entering residence, is there a bathroom on the main floor (no further steps)?: Yes  Living Arrangements: Lives w/ Extended Family, Lives w/ Daughter, Lives w/ Son (Pt lives with her daughter Senait, son in law Hira, son Odell, and 2 grandchildren)  Is patient a ?: No    Activities of Daily Living Prior to Admission  Functional Status:  Independent  Completes ADLs independently?: Yes  Ambulates independently?: Yes  Does patient use assisted devices?: Yes  Assisted Devices (DME) used: Walker, Shower Chair  Does patient currently own DME?: Yes  What DME does the patient currently own?: Bedside Commode, Shower Chair, Walker, Straight Cane  Does patient have a history of Outpatient Therapy (PT/OT)?: No  Does the patient have a history of Short-Term Rehab?: No  Does patient have a history of HHC?: No  Does patient currently have HHC?: No         Patient Information Continued  Income Source: Pension/nursing home  Does patient have prescription coverage?: Yes  Does patient receive dialysis treatments?: No  Does patient have a history of substance abuse?: No  Does patient have a history of Mental Health Diagnosis?: No    PHQ 2/9 Screening   Reviewed PHQ 2/9 Depression Screening Score?: No    Means of Transportation  Means of Transport to Appts:: Family transport      Housing Stability: Low Risk  (1/20/2022)    Housing Stability Vital Sign     Unable to Pay for Housing in the Last Year: No     Number of Places Lived in the Last Year: 1     Unstable Housing in the Last Year: No   Food Insecurity: No Food Insecurity (1/20/2022)    Hunger Vital Sign     Worried About Running Out of Food in the Last Year: Never true     Ran Out of Food in the Last Year: Never true   Transportation Needs: No Transportation Needs (1/20/2022)    PRAPARE - Transportation     Lack of Transportation (Medical): No     Lack of Transportation (Non-Medical): No   Utilities: Not on file       DISCHARGE DETAILS:    Discharge planning discussed with:: patient  Freedom of Choice: Yes  Comments - Freedom of Choice: CM discussed d/c needs including VNA services, but pt feels she has adequate family support and will not need this.  CM will continue to follow  CM contacted family/caregiver?: No- see comments (pt will update her family herself)  Were Treatment Team discharge recommendations  reviewed with patient/caregiver?: Yes  Did patient/caregiver verbalize understanding of patient care needs?: Yes  Were patient/caregiver advised of the risks associated with not following Treatment Team discharge recommendations?: Yes    Contacts  Patient Contacts: Senait  Relationship to Patient:: Family    Requested Home Health Care         Is the patient interested in HHC at discharge?: No    DME Referral Provided  Referral made for DME?: No    Other Referral/Resources/Interventions Provided:  Interventions: Advanced Directives  Referral Comments: No anticipated d/c needs.  Pt declining VNA services.  Info on Advanced Directive and POA given to pt for review    Would you like to participate in our [facility] service program?  : No - Declined    Treatment Team Recommendation: Home  Discharge Destination Plan:: Home  Transport at Discharge : Family

## 2023-12-19 LAB
ANION GAP SERPL CALCULATED.3IONS-SCNC: 3 MMOL/L
APTT PPP: 74 SECONDS (ref 23–37)
APTT PPP: 85 SECONDS (ref 23–37)
BUN SERPL-MCNC: 14 MG/DL (ref 5–25)
CALCIUM SERPL-MCNC: 8.8 MG/DL (ref 8.4–10.2)
CHLORIDE SERPL-SCNC: 110 MMOL/L (ref 96–108)
CO2 SERPL-SCNC: 26 MMOL/L (ref 21–32)
CREAT SERPL-MCNC: 0.95 MG/DL (ref 0.6–1.3)
ERYTHROCYTE [DISTWIDTH] IN BLOOD BY AUTOMATED COUNT: 18.1 % (ref 11.6–15.1)
FERRITIN SERPL-MCNC: 9 NG/ML (ref 11–307)
FOLATE SERPL-MCNC: >22.3 NG/ML
GFR SERPL CREATININE-BSD FRML MDRD: 55 ML/MIN/1.73SQ M
GLUCOSE SERPL-MCNC: 108 MG/DL (ref 65–140)
GLUCOSE SERPL-MCNC: 120 MG/DL (ref 65–140)
GLUCOSE SERPL-MCNC: 135 MG/DL (ref 65–140)
GLUCOSE SERPL-MCNC: 170 MG/DL (ref 65–140)
GLUCOSE SERPL-MCNC: 92 MG/DL (ref 65–140)
HCT VFR BLD AUTO: 25.3 % (ref 34.8–46.1)
HCT VFR BLD AUTO: 26.6 % (ref 34.8–46.1)
HGB BLD-MCNC: 7.4 G/DL (ref 11.5–15.4)
HGB BLD-MCNC: 7.8 G/DL (ref 11.5–15.4)
INR PPP: 1.93 (ref 0.84–1.19)
IRON SERPL-MCNC: <10 UG/DL (ref 50–212)
MCH RBC QN AUTO: 23.3 PG (ref 26.8–34.3)
MCHC RBC AUTO-ENTMCNC: 29.2 G/DL (ref 31.4–37.4)
MCV RBC AUTO: 80 FL (ref 82–98)
PLATELET # BLD AUTO: 285 THOUSANDS/UL (ref 149–390)
PMV BLD AUTO: 9.8 FL (ref 8.9–12.7)
POTASSIUM SERPL-SCNC: 3.6 MMOL/L (ref 3.5–5.3)
PROTHROMBIN TIME: 22.9 SECONDS (ref 11.6–14.5)
RBC # BLD AUTO: 3.17 MILLION/UL (ref 3.81–5.12)
SODIUM SERPL-SCNC: 139 MMOL/L (ref 135–147)
TIBC SERPL-MCNC: <437 UG/DL (ref 250–450)
UIBC SERPL-MCNC: 427 UG/DL (ref 155–355)
VIT B12 SERPL-MCNC: 206 PG/ML (ref 180–914)
WBC # BLD AUTO: 3.52 THOUSAND/UL (ref 4.31–10.16)

## 2023-12-19 PROCEDURE — 85610 PROTHROMBIN TIME: CPT | Performed by: NURSE PRACTITIONER

## 2023-12-19 PROCEDURE — 99232 SBSQ HOSP IP/OBS MODERATE 35: CPT | Performed by: PHYSICIAN ASSISTANT

## 2023-12-19 PROCEDURE — 82728 ASSAY OF FERRITIN: CPT | Performed by: NURSE PRACTITIONER

## 2023-12-19 PROCEDURE — 83540 ASSAY OF IRON: CPT | Performed by: NURSE PRACTITIONER

## 2023-12-19 PROCEDURE — 85018 HEMOGLOBIN: CPT | Performed by: PHYSICIAN ASSISTANT

## 2023-12-19 PROCEDURE — 82607 VITAMIN B-12: CPT | Performed by: NURSE PRACTITIONER

## 2023-12-19 PROCEDURE — 85027 COMPLETE CBC AUTOMATED: CPT | Performed by: NURSE PRACTITIONER

## 2023-12-19 PROCEDURE — 82746 ASSAY OF FOLIC ACID SERUM: CPT | Performed by: NURSE PRACTITIONER

## 2023-12-19 PROCEDURE — 82948 REAGENT STRIP/BLOOD GLUCOSE: CPT

## 2023-12-19 PROCEDURE — 85014 HEMATOCRIT: CPT | Performed by: PHYSICIAN ASSISTANT

## 2023-12-19 PROCEDURE — 85730 THROMBOPLASTIN TIME PARTIAL: CPT | Performed by: STUDENT IN AN ORGANIZED HEALTH CARE EDUCATION/TRAINING PROGRAM

## 2023-12-19 PROCEDURE — 80048 BASIC METABOLIC PNL TOTAL CA: CPT | Performed by: NURSE PRACTITIONER

## 2023-12-19 PROCEDURE — 83550 IRON BINDING TEST: CPT | Performed by: NURSE PRACTITIONER

## 2023-12-19 RX ADMIN — INSULIN LISPRO 1 UNITS: 100 INJECTION, SOLUTION INTRAVENOUS; SUBCUTANEOUS at 17:27

## 2023-12-19 RX ADMIN — FUROSEMIDE 40 MG: 40 TABLET ORAL at 08:34

## 2023-12-19 RX ADMIN — ASPIRIN 81 MG: 81 TABLET, COATED ORAL at 08:34

## 2023-12-19 RX ADMIN — METOPROLOL TARTRATE 25 MG: 25 TABLET, FILM COATED ORAL at 08:29

## 2023-12-19 RX ADMIN — IRON SUCROSE 300 MG: 20 INJECTION, SOLUTION INTRAVENOUS at 09:24

## 2023-12-19 RX ADMIN — ATORVASTATIN CALCIUM 40 MG: 40 TABLET, FILM COATED ORAL at 17:30

## 2023-12-19 RX ADMIN — METOPROLOL TARTRATE 25 MG: 25 TABLET, FILM COATED ORAL at 22:58

## 2023-12-19 RX ADMIN — GABAPENTIN 300 MG: 300 CAPSULE ORAL at 08:34

## 2023-12-19 RX ADMIN — Medication 2000 UNITS: at 08:34

## 2023-12-19 RX ADMIN — LEVOTHYROXINE SODIUM 50 MCG: 0.05 TABLET ORAL at 08:34

## 2023-12-19 RX ADMIN — HEPARIN SODIUM 14 UNITS/KG/HR: 10000 INJECTION, SOLUTION INTRAVENOUS at 08:16

## 2023-12-19 RX ADMIN — WARFARIN SODIUM 5 MG: 5 TABLET ORAL at 17:30

## 2023-12-19 RX ADMIN — GABAPENTIN 300 MG: 300 CAPSULE ORAL at 22:58

## 2023-12-19 NOTE — ASSESSMENT & PLAN NOTE
Chronic, uncontrolled, as evidenced by a hemoglobin A1C of 8.7  Hold home oral anti-diabetics; continue levemir 26 u HS   Continue with insulin sliding scale  Monitor blood glucose AC/HS  Hypoglycemia protocol

## 2023-12-19 NOTE — ASSESSMENT & PLAN NOTE
History of mechanical valve replacement in 2007  Maintained on Coumadin typically with a goal INR of 2.5-3.5  Coumadin restarted, continue Heparin drip bridge

## 2023-12-19 NOTE — ASSESSMENT & PLAN NOTE
Most recent echocardiogram with an EF of 45% with grade 1 diastolic dysfunction  Resume 40 mg Lasix daily 12/18

## 2023-12-19 NOTE — PLAN OF CARE
Problem: Potential for Falls  Goal: Patient will remain free of falls  Description: INTERVENTIONS:  - Educate patient/family on patient safety including physical limitations  - Instruct patient to call for assistance with activity   - Consult OT/PT to assist with strengthening/mobility   - Keep Call bell within reach  - Keep bed low and locked with side rails adjusted as appropriate  - Keep care items and personal belongings within reach  - Initiate and maintain comfort rounds  - Make Fall Risk Sign visible to staff  - Offer Toileting every 2 Hours, in advance of need  - Initiate/Maintain bed alarm  - Obtain necessary fall risk management equipment:   - Apply yellow socks and bracelet for high fall risk patients  - Consider moving patient to room near nurses station  Outcome: Progressing     Problem: PAIN - ADULT  Goal: Verbalizes/displays adequate comfort level or baseline comfort level  Description: Interventions:  - Encourage patient to monitor pain and request assistance  - Assess pain using appropriate pain scale  - Administer analgesics based on type and severity of pain and evaluate response  - Implement non-pharmacological measures as appropriate and evaluate response  - Consider cultural and social influences on pain and pain management  - Notify physician/advanced practitioner if interventions unsuccessful or patient reports new pain  Outcome: Progressing     Problem: SAFETY ADULT  Goal: Patient will remain free of falls  Description: INTERVENTIONS:  - Educate patient/family on patient safety including physical limitations  - Instruct patient to call for assistance with activity   - Consult OT/PT to assist with strengthening/mobility   - Keep Call bell within reach  - Keep bed low and locked with side rails adjusted as appropriate  - Keep care items and personal belongings within reach  - Initiate and maintain comfort rounds  - Make Fall Risk Sign visible to staff  - Offer Toileting every 2 Hours, in  advance of need  - Initiate/Maintain bed alarm  - Obtain necessary fall risk management equipment:   - Apply yellow socks and bracelet for high fall risk patients  - Consider moving patient to room near nurses station  Outcome: Progressing     Problem: Knowledge Deficit  Goal: Patient/family/caregiver demonstrates understanding of disease process, treatment plan, medications, and discharge instructions  Description: Complete learning assessment and assess knowledge base.  Interventions:  - Provide teaching at level of understanding  - Provide teaching via preferred learning methods  Outcome: Progressing

## 2023-12-19 NOTE — PLAN OF CARE
Problem: Potential for Falls  Goal: Patient will remain free of falls  Description: INTERVENTIONS:  - Educate patient/family on patient safety including physical limitations  - Instruct patient to call for assistance with activity   - Consult OT/PT to assist with strengthening/mobility   - Keep Call bell within reach  - Keep bed low and locked with side rails adjusted as appropriate  - Keep care items and personal belongings within reach  - Initiate and maintain comfort rounds  - Make Fall Risk Sign visible to staff  - Offer Toileting every 2 Hours, in advance of need  - Initiate/Maintain 2alarm  - Obtain necessary fall risk management equipment: 2  - Apply yellow socks and bracelet for high fall risk patients  - Consider moving patient to room near nurses station  Outcome: Progressing     Problem: PAIN - ADULT  Goal: Verbalizes/displays adequate comfort level or baseline comfort level  Description: Interventions:  - Encourage patient to monitor pain and request assistance  - Assess pain using appropriate pain scale  - Administer analgesics based on type and severity of pain and evaluate response  - Implement non-pharmacological measures as appropriate and evaluate response  - Consider cultural and social influences on pain and pain management  - Notify physician/advanced practitioner if interventions unsuccessful or patient reports new pain  Outcome: Progressing     Problem: INFECTION - ADULT  Goal: Absence or prevention of progression during hospitalization  Description: INTERVENTIONS:  - Assess and monitor for signs and symptoms of infection  - Monitor lab/diagnostic results  - Monitor all insertion sites, i.e. indwelling lines, tubes, and drains  - Monitor endotracheal if appropriate and nasal secretions for changes in amount and color  - [place] appropriate cooling/warming therapies per order  - Administer medications as ordered  - Instruct and encourage patient and family to use good hand hygiene  technique  - Identify and instruct in appropriate isolation precautions for identified infection/condition  Outcome: Progressing     Problem: SAFETY ADULT  Goal: Patient will remain free of falls  Description: INTERVENTIONS:  - Educate patient/family on patient safety including physical limitations  - Instruct patient to call for assistance with activity   - Consult OT/PT to assist with strengthening/mobility   - Keep Call bell within reach  - Keep bed low and locked with side rails adjusted as appropriate  - Keep care items and personal belongings within reach  - Initiate and maintain comfort rounds  - Make Fall Risk Sign visible to staff  - Offer Toileting every 2 Hours, in advance of need  - Initiate/Maintain 2alarm  - Obtain necessary fall risk management equipment: 2  - Apply yellow socks and bracelet for high fall risk patients  - Consider moving patient to room near nurses station  Outcome: Progressing  Goal: Maintain or return to baseline ADL function  Description: INTERVENTIONS:  -  Assess patient's ability to carry out ADLs; assess patient's baseline for ADL function and identify physical deficits which impact ability to perform ADLs (bathing, care of mouth/teeth, toileting, grooming, dressing, etc.)  - Assess/evaluate cause of self-care deficits   - Assess range of motion  - Assess patient's mobility; develop plan if impaired  - Assess patient's need for assistive devices and provide as appropriate  - Encourage maximum independence but intervene and supervise when necessary  - Involve family in performance of ADLs  - Assess for home care needs following discharge   - Consider OT consult to assist with ADL evaluation and planning for discharge  - Provide patient education as appropriate  Outcome: Progressing  Goal: Maintains/Returns to pre admission functional level  Description: INTERVENTIONS:  - Perform AM-PAC 6 Click Basic Mobility/ Daily Activity assessment daily.  - Set and communicate daily mobility  goal to care team and patient/family/caregiver.   - Collaborate with rehabilitation services on mobility goals if consulted  - Perform Range of Motion 2 times a day.  - Reposition patient every 2 hours.  - Dangle patient 2 times a day  - Stand patient 2 times a day  - Ambulate patient 2 times a day  - Out of bed to chair 2 times a day   - Out of bed for meals 2 times a day  - Out of bed for toileting  - Record patient progress and toleration of activity level   Outcome: Progressing     Problem: DISCHARGE PLANNING  Goal: Discharge to home or other facility with appropriate resources  Description: INTERVENTIONS:  - Identify barriers to discharge w/patient and caregiver  - Arrange for needed discharge resources and transportation as appropriate  - Identify discharge learning needs (meds, wound care, etc.)  - Arrange for interpretive services to assist at discharge as needed  - Refer to Case Management Department for coordinating discharge planning if the patient needs post-hospital services based on physician/advanced practitioner order or complex needs related to functional status, cognitive ability, or social support system  Outcome: Progressing     Problem: Knowledge Deficit  Goal: Patient/family/caregiver demonstrates understanding of disease process, treatment plan, medications, and discharge instructions  Description: Complete learning assessment and assess knowledge base.  Interventions:  - Provide teaching at level of understanding  - Provide teaching via preferred learning methods  Outcome: Progressing     Problem: CARDIOVASCULAR - ADULT  Goal: Maintains optimal cardiac output and hemodynamic stability  Description: INTERVENTIONS:  - Monitor I/O, vital signs and rhythm  - Monitor for S/S and trends of decreased cardiac output  - Administer and titrate ordered vasoactive medications to optimize hemodynamic stability  - Assess quality of pulses, skin color and temperature  - Assess for signs of decreased  coronary artery perfusion  - Instruct patient to report change in severity of symptoms  Outcome: Progressing  Goal: Absence of cardiac dysrhythmias or at baseline rhythm  Description: INTERVENTIONS:  - Continuous cardiac monitoring, vital signs, obtain 12 lead EKG if ordered  - Administer antiarrhythmic and heart rate control medications as ordered  - Monitor electrolytes and administer replacement therapy as ordered  Outcome: Progressing     Problem: HEMATOLOGIC - ADULT  Goal: Maintains hematologic stability  Description: INTERVENTIONS  - Assess for signs and symptoms of bleeding or hemorrhage  - Monitor labs  - Administer supportive blood products/factors as ordered and appropriate  Outcome: Progressing

## 2023-12-19 NOTE — PROGRESS NOTES
Formerly [facility], [facility]  Progress Note  Name: Ely Hernadez I  MRN: [id number]  Unit/Bed#: MS [facility] I Date of Admission: 12/14/2023   Date of Service: 12/19/2023 I Hospital Day: 5    Assessment/Plan   * Acute blood loss anemia  Assessment & Plan  Patient presented to the ED with a hemoglobin of 5.5 on outpatient labs; referred to the ER by cardiology.  Does note melena 1 week prior to admission. Otherwise, no sign of bleeding.  Hemoglobin on 10/31 noted to be 12.7  Patient received 2 units PRBC, 1 U FFP  GI consult, appreciate input  EGD (12/15/23): The esophagus, stomach, duodenum and proximal jejunum appeared normal.   Colonoscopy (12/19/23): No blood or source of anemia seen. Diverticulosis of moderate severity causing moderate luminal narrowing in the sigmoid colon. 2 subcentimeter polyps in the cecum and ascending colon  OK to restart anticoagulation as of 12/18  Hgb 7.4, INR 1.93  Monitor for any signs of blood in stool  Iron panel shows RHEA, however taken s/p transfusion; nonetheless, will give 300 mg IV venofer x1 today    AVM (arteriovenous malformation) of small bowel, acquired with hemorrhage  Assessment & Plan  History from 2018  She is now s/p EGD with push enteroscopy; which was normal on 12/15    H/O mechanical aortic valve replacement  Assessment & Plan  History of mechanical valve replacement in 2007  Maintained on Coumadin typically with a goal INR of 2.5-3.5  Coumadin restarted, continue Heparin drip bridge    Paroxysmal atrial fibrillation (HCC)  Assessment & Plan  Chronic, rate controlled on lopressor     Stage 3a chronic kidney disease  Assessment & Plan  Chronic, baseline creatinine noted to be 1.1-1.3  Currently below baseline, 0.95  Avoid nephrotoxins, hypotension  Monitor BMP    Cardiomyopathy (HCC)  Assessment & Plan  Most recent echocardiogram with an EF of 45% with grade 1 diastolic dysfunction  Resume 40 mg Lasix daily 12/18    Diabetes mellitus with neurological  manifestation (HCC)  Assessment & Plan  Chronic, uncontrolled, as evidenced by a hemoglobin A1C of 8.7  Hold home oral anti-diabetics; continue levemir 26 u HS   Continue with insulin sliding scale  Monitor blood glucose AC/HS  Hypoglycemia protocol    Coronary artery disease of native artery of native heart with stable angina pectoris (HCC)  Assessment & Plan  History of CAD, no chest pain   Continue Aspirin, Lipitor, and Lopressor            VTE Pharmacologic Prophylaxis: VTE Score: 4 Moderate Risk (Score 3-4) - Pharmacological DVT Prophylaxis Ordered: heparin drip/coumadin bridge .    Mobility:   Basic Mobility Inpatient Raw Score: 24  JH-HLM Goal: 8: Walk 250 feet or more  JH-HLM Achieved: 1: Laying in bed  HLM Goal NOT achieved. Continue with multidisciplinary rounding and encourage appropriate mobility to improve upon HLM goals.    Patient Centered Rounds: I evaluated the patient without nursing staff present due to d/w nurse independently    Discussions with Specialists or Other Care Team Provider: CM     Education and Discussions with Family / Patient: Updated  (son) at bedside.    Total Time Spent on Date of Encounter in care of patient: 38 mins. This time was spent on one or more of the following: performing physical exam; counseling and coordination of care; obtaining or reviewing history; documenting in the medical record; reviewing/ordering tests, medications or procedures; communicating with other healthcare professionals and discussing with patient's family/caregivers.    Current Length of Stay: 5 day(s)  Current Patient Status: Inpatient   Certification Statement: The patient will continue to require additional inpatient hospital stay due to anemia, subtherapeutic INR   Discharge Plan: Anticipate discharge in 24-48 hrs to home.    Code Status: Level 1 - Full Code    Subjective:   Patient seen sitting up in bed.  Denies any chest pain or shortness of breath presently, occasionally does  feel short of breath though.  No palpitations.  No dizziness.    Objective:     Vitals:   Temp (24hrs), Av.1 °F (35.6 °C), Min:87.1 °F (30.6 °C), Max:98.9 °F (37.2 °C)    Temp:  [87.1 °F (30.6 °C)-98.9 °F (37.2 °C)] 98.7 °F (37.1 °C)  HR:  [59-87] 70  Resp:  [17-23] 18  BP: (134-169)/(57-75) 164/75  SpO2:  [93 %-98 %] 95 %  Body mass index is 26.45 kg/m².     Input and Output Summary (last 24 hours):   No intake or output data in the 24 hours ending 23 0839    Physical Exam:   Physical Exam  Vitals and nursing note reviewed.   Constitutional:       General: She is not in acute distress.     Appearance: Normal appearance. She is not ill-appearing or toxic-appearing.   Cardiovascular:      Rate and Rhythm: Normal rate and regular rhythm.      Heart sounds: Normal heart sounds.   Pulmonary:      Effort: Pulmonary effort is normal. No respiratory distress.      Breath sounds: Normal breath sounds. No wheezing or rales.   Abdominal:      General: There is no distension.      Palpations: Abdomen is soft.   Skin:     General: Skin is warm and dry.      Coloration: Skin is pale.   Neurological:      Mental Status: She is alert and oriented to person, place, and time.   Psychiatric:         Mood and Affect: Mood normal.         Behavior: Behavior normal.          Additional Data:     Labs:  Results from last 7 days   Lab Units 23  0527 23  2242 23  1336   WBC Thousand/uL 3.52*   < > 5.18   HEMOGLOBIN g/dL 7.4*   < > 5.5*   HEMATOCRIT % 25.3*   < > 18.9*   PLATELETS Thousands/uL 285   < > 381   NEUTROS PCT %  --   --  62   LYMPHS PCT %  --   --  26   MONOS PCT %  --   --  8   EOS PCT %  --   --  4    < > = values in this interval not displayed.     Results from last 7 days   Lab Units 23  0527 12/15/23  1627 23  1336   SODIUM mmol/L 139   < > 139   POTASSIUM mmol/L 3.6   < > 3.4*   CHLORIDE mmol/L 110*   < > 104   CO2 mmol/L 26   < > 26   BUN mg/dL 14   < > 28*   CREATININE mg/dL 0.95    < > 1.37*   ANION GAP mmol/L 3   < > 9   CALCIUM mg/dL 8.8   < > 8.6   ALBUMIN g/dL  --   --  4.0   TOTAL BILIRUBIN mg/dL  --   --  0.46   ALK PHOS U/L  --   --  61   ALT U/L  --   --  12   AST U/L  --   --  15   GLUCOSE RANDOM mg/dL 92   < > 152*    < > = values in this interval not displayed.     Results from last 7 days   Lab Units 12/19/23  0527   INR  1.93*     Results from last 7 days   Lab Units 12/19/23  0726 12/18/23  2126 12/18/23  1658 12/18/23  0719 12/17/23  2028 12/17/23  1606 12/17/23  1125 12/17/23  0722 12/16/23  2037 12/16/23  1609 12/16/23  1116 12/16/23  0722   POC GLUCOSE mg/dl 108 151* 160* 119 150* 136 134 101 175* 111 104 75               Lines/Drains:  Invasive Devices       Peripheral Intravenous Line  Duration             Peripheral IV 12/14/23 Right;Dorsal (posterior) Forearm 4 days    Peripheral IV 12/14/23 Right;Ventral (anterior) Forearm 4 days                          Imaging: Reviewed radiology reports from this admission including: EGD and colonoscopy reports    Recent Cultures (last 7 days):         Last 24 Hours Medication List:   Current Facility-Administered Medications   Medication Dose Route Frequency Provider Last Rate    aspirin  81 mg Oral Daily CAMACHO Phan      atorvastatin  40 mg Oral QPM CAMACHO Phan      cholecalciferol  2,000 Units Oral Daily CAMACHO Phna      furosemide  40 mg Oral Daily JEEVAN PhanNP      gabapentin  300 mg Oral BID CAMACHO Phan      heparin (porcine)  3-20 Units/kg/hr (Order-Specific) Intravenous Titrated LucilaCAMACHO Renteria 14 Units/kg/hr (12/19/23 0816)    insulin detemir  26 Units Subcutaneous HS CAMACHO Phan      insulin lispro  1-5 Units Subcutaneous TID AC LucilaCAMACHO Renteria      insulin lispro  1-6 Units Subcutaneous HS LucilaCAMACHO Renteria      iron sucrose  300 mg Intravenous Once Terri Raymond PA-C      levothyroxine  50 mcg Oral Daily Luicla  CAMACHO Malhotra      metoprolol tartrate  25 mg Oral Q12[facility] CAMACHO Phan      warfarin  5 mg Oral Daily (warfarin) CAMACHO Phan          Today, Patient Was Seen By: Terri Raymond PA-C    **Please Note: This note may have been constructed using a voice recognition system.**

## 2023-12-19 NOTE — ASSESSMENT & PLAN NOTE
Chronic, baseline creatinine noted to be 1.1-1.3  Currently below baseline, 0.95  Avoid nephrotoxins, hypotension  Monitor BMP

## 2023-12-19 NOTE — ASSESSMENT & PLAN NOTE
Patient presented to the ED with a hemoglobin of 5.5 on outpatient labs; referred to the ER by cardiology.  Does note melena 1 week prior to admission. Otherwise, no sign of bleeding.  Hemoglobin on 10/31 noted to be 12.7  Patient received 2 units PRBC, 1 U FFP  GI consult, appreciate input  EGD (12/15/23): The esophagus, stomach, duodenum and proximal jejunum appeared normal.   Colonoscopy (12/19/23): No blood or source of anemia seen. Diverticulosis of moderate severity causing moderate luminal narrowing in the sigmoid colon. 2 subcentimeter polyps in the cecum and ascending colon  OK to restart anticoagulation as of 12/18  Hgb 7.4, INR 1.93  Monitor for any signs of blood in stool  Iron panel shows RHEA, however taken s/p transfusion; nonetheless, will give 300 mg IV venofer x1 today

## 2023-12-20 LAB
APTT PPP: 88 SECONDS (ref 23–37)
GLUCOSE SERPL-MCNC: 114 MG/DL (ref 65–140)
GLUCOSE SERPL-MCNC: 125 MG/DL (ref 65–140)
GLUCOSE SERPL-MCNC: 137 MG/DL (ref 65–140)
GLUCOSE SERPL-MCNC: 143 MG/DL (ref 65–140)
HCT VFR BLD AUTO: 25.4 % (ref 34.8–46.1)
HGB BLD-MCNC: 7.3 G/DL (ref 11.5–15.4)
INR PPP: 1.88 (ref 0.84–1.19)
PROTHROMBIN TIME: 22.4 SECONDS (ref 11.6–14.5)

## 2023-12-20 PROCEDURE — 85018 HEMOGLOBIN: CPT | Performed by: PHYSICIAN ASSISTANT

## 2023-12-20 PROCEDURE — 82948 REAGENT STRIP/BLOOD GLUCOSE: CPT

## 2023-12-20 PROCEDURE — 85014 HEMATOCRIT: CPT | Performed by: PHYSICIAN ASSISTANT

## 2023-12-20 PROCEDURE — 99232 SBSQ HOSP IP/OBS MODERATE 35: CPT | Performed by: PHYSICIAN ASSISTANT

## 2023-12-20 PROCEDURE — 85730 THROMBOPLASTIN TIME PARTIAL: CPT | Performed by: INTERNAL MEDICINE

## 2023-12-20 PROCEDURE — 85610 PROTHROMBIN TIME: CPT | Performed by: NURSE PRACTITIONER

## 2023-12-20 RX ORDER — WARFARIN SODIUM 7.5 MG/1
7.5 TABLET ORAL
Status: DISCONTINUED | OUTPATIENT
Start: 2023-12-20 | End: 2023-12-22

## 2023-12-20 RX ADMIN — ASPIRIN 81 MG: 81 TABLET, COATED ORAL at 09:34

## 2023-12-20 RX ADMIN — WARFARIN SODIUM 7.5 MG: 7.5 TABLET ORAL at 17:22

## 2023-12-20 RX ADMIN — FUROSEMIDE 40 MG: 40 TABLET ORAL at 09:34

## 2023-12-20 RX ADMIN — LEVOTHYROXINE SODIUM 50 MCG: 0.05 TABLET ORAL at 09:34

## 2023-12-20 RX ADMIN — ATORVASTATIN CALCIUM 40 MG: 40 TABLET, FILM COATED ORAL at 17:22

## 2023-12-20 RX ADMIN — Medication 2000 UNITS: at 09:34

## 2023-12-20 RX ADMIN — METOPROLOL TARTRATE 25 MG: 25 TABLET, FILM COATED ORAL at 22:26

## 2023-12-20 RX ADMIN — GABAPENTIN 300 MG: 300 CAPSULE ORAL at 22:26

## 2023-12-20 RX ADMIN — HEPARIN SODIUM 14 UNITS/KG/HR: 10000 INJECTION, SOLUTION INTRAVENOUS at 07:47

## 2023-12-20 RX ADMIN — GABAPENTIN 300 MG: 300 CAPSULE ORAL at 09:34

## 2023-12-20 RX ADMIN — METOPROLOL TARTRATE 25 MG: 25 TABLET, FILM COATED ORAL at 09:34

## 2023-12-20 NOTE — PROGRESS NOTES
Patient w/poor venous access. RN attempted x2 insertion attempts. Discussed with oncoming nurse; will contact MD regarding anticipated discharge; need to reattempt IV insertion. Current IV(s) accessible; site(s) WDL; x1 infusing.

## 2023-12-20 NOTE — PLAN OF CARE
Problem: PAIN - ADULT  Goal: Verbalizes/displays adequate comfort level or baseline comfort level  Description: Interventions:  - Encourage patient to monitor pain and request assistance  - Assess pain using appropriate pain scale  - Administer analgesics based on type and severity of pain and evaluate response  - Implement non-pharmacological measures as appropriate and evaluate response  - Consider cultural and social influences on pain and pain management  - Notify physician/advanced practitioner if interventions unsuccessful or patient reports new pain  Outcome: Progressing     Problem: Potential for Falls  Goal: Patient will remain free of falls  Description: INTERVENTIONS:  - Educate patient/family on patient safety including physical limitations  - Instruct patient to call for assistance with activity   - Consult OT/PT to assist with strengthening/mobility   - Keep Call bell within reach  - Keep bed low and locked with side rails adjusted as appropriate  - Keep care items and personal belongings within reach  - Initiate and maintain comfort rounds  - Make Fall Risk Sign visible to staff  - Offer Toileting every 3 Hours, in advance of need  - Initiate/Maintain bed alarm  - Obtain necessary fall risk management equipment:   - Apply yellow socks and bracelet for high fall risk patients  - Consider moving patient to room near nurses station  Outcome: Progressing     Problem: INFECTION - ADULT  Goal: Absence or prevention of progression during hospitalization  Description: INTERVENTIONS:  - Assess and monitor for signs and symptoms of infection  - Monitor lab/diagnostic results  - Monitor all insertion sites, i.e. indwelling lines, tubes, and drains  - Monitor endotracheal if appropriate and nasal secretions for changes in amount and color  - [place] appropriate cooling/warming therapies per order  - Administer medications as ordered  - Instruct and encourage patient and family to use good hand hygiene  technique  - Identify and instruct in appropriate isolation precautions for identified infection/condition  Outcome: Progressing     Problem: SAFETY ADULT  Goal: Patient will remain free of falls  Description: INTERVENTIONS:  - Educate patient/family on patient safety including physical limitations  - Instruct patient to call for assistance with activity   - Consult OT/PT to assist with strengthening/mobility   - Keep Call bell within reach  - Keep bed low and locked with side rails adjusted as appropriate  - Keep care items and personal belongings within reach  - Initiate and maintain comfort rounds  - Make Fall Risk Sign visible to staff  - Offer Toileting every 3 Hours, in advance of need  - Initiate/Maintain bed alarm  - Obtain necessary fall risk management equipment:   - Apply yellow socks and bracelet for high fall risk patients  - Consider moving patient to room near nurses station  Outcome: Progressing  Goal: Maintain or return to baseline ADL function  Description: INTERVENTIONS:  -  Assess patient's ability to carry out ADLs; assess patient's baseline for ADL function and identify physical deficits which impact ability to perform ADLs (bathing, care of mouth/teeth, toileting, grooming, dressing, etc.)  - Assess/evaluate cause of self-care deficits   - Assess range of motion  - Assess patient's mobility; develop plan if impaired  - Assess patient's need for assistive devices and provide as appropriate  - Encourage maximum independence but intervene and supervise when necessary  - Involve family in performance of ADLs  - Assess for home care needs following discharge   - Consider OT consult to assist with ADL evaluation and planning for discharge  - Provide patient education as appropriate  Outcome: Progressing  Goal: Maintains/Returns to pre admission functional level  Description: INTERVENTIONS:  - Perform AM-PAC 6 Click Basic Mobility/ Daily Activity assessment daily.  - Set and communicate daily  mobility goal to care team and patient/family/caregiver.   - Collaborate with rehabilitation services on mobility goals if consulted  - Perform Range of Motion 3 times a day.  - Reposition patient every 2 hours.  - Dangle patient 3 times a day  - Stand patient 3 times a day  - Ambulate patient 3 times a day  - Out of bed to chair 3 times a day   - Out of bed for meals 3 times a day  - Out of bed for toileting  - Record patient progress and toleration of activity level   Outcome: Progressing     Problem: DISCHARGE PLANNING  Goal: Discharge to home or other facility with appropriate resources  Description: INTERVENTIONS:  - Identify barriers to discharge w/patient and caregiver  - Arrange for needed discharge resources and transportation as appropriate  - Identify discharge learning needs (meds, wound care, etc.)  - Arrange for interpretive services to assist at discharge as needed  - Refer to Case Management Department for coordinating discharge planning if the patient needs post-hospital services based on physician/advanced practitioner order or complex needs related to functional status, cognitive ability, or social support system  Outcome: Progressing     Problem: Knowledge Deficit  Goal: Patient/family/caregiver demonstrates understanding of disease process, treatment plan, medications, and discharge instructions  Description: Complete learning assessment and assess knowledge base.  Interventions:  - Provide teaching at level of understanding  - Provide teaching via preferred learning methods  Outcome: Progressing     Problem: CARDIOVASCULAR - ADULT  Goal: Maintains optimal cardiac output and hemodynamic stability  Description: INTERVENTIONS:  - Monitor I/O, vital signs and rhythm  - Monitor for S/S and trends of decreased cardiac output  - Administer and titrate ordered vasoactive medications to optimize hemodynamic stability  - Assess quality of pulses, skin color and temperature  - Assess for signs of  decreased coronary artery perfusion  - Instruct patient to report change in severity of symptoms  Outcome: Progressing  Goal: Absence of cardiac dysrhythmias or at baseline rhythm  Description: INTERVENTIONS:  - Continuous cardiac monitoring, vital signs, obtain 12 lead EKG if ordered  - Administer antiarrhythmic and heart rate control medications as ordered  - Monitor electrolytes and administer replacement therapy as ordered  Outcome: Progressing     Problem: HEMATOLOGIC - ADULT  Goal: Maintains hematologic stability  Description: INTERVENTIONS  - Assess for signs and symptoms of bleeding or hemorrhage  - Monitor labs  - Administer supportive blood products/factors as ordered and appropriate  Outcome: Progressing

## 2023-12-20 NOTE — PROGRESS NOTES
[facility]  Progress Note  Name: Ely Hernadez I  MRN: [id number]  Unit/Bed#: [facility] I Date of Admission: 12/14/2023   Date of Service: 12/20/2023 I Hospital Day: 6    Assessment/Plan   * Acute blood loss anemia  Assessment & Plan  Patient presented to the ED with a hemoglobin of 5.5 on outpatient labs; referred to the ER by cardiology.  Does note melena 1 week prior to admission. Otherwise, no sign of bleeding.  Hemoglobin on 10/31 noted to be 12.7  Patient received 2 units PRBC, 1 U FFP  GI consult, appreciate input  EGD (12/15/23): The esophagus, stomach, duodenum and proximal jejunum appeared normal.   Colonoscopy (12/19/23): No blood or source of anemia seen. Diverticulosis of moderate severity causing moderate luminal narrowing in the sigmoid colon. 2 subcentimeter polyps in the cecum and ascending colon  OK to restart anticoagulation as of 12/18  Hgb 7.8, INR 1.88  Monitor for any signs of blood in stool  Iron panel shows RHEA, however taken s/p transfusion; s/p Venofer 300 mg x1 on 12/19    AVM (arteriovenous malformation) of small bowel, acquired with hemorrhage  Assessment & Plan  History from 2018  She is now s/p EGD with push enteroscopy; which was normal on 12/15    H/O mechanical aortic valve replacement  Assessment & Plan  History of mechanical valve replacement in 2007  Maintained on Coumadin typically with a goal INR of 2.5-3.5  Coumadin restarted, continue Heparin drip bridge    Paroxysmal atrial fibrillation (HCC)  Assessment & Plan  Chronic, rate controlled on lopressor     Stage 3a chronic kidney disease  Assessment & Plan  Chronic, baseline creatinine noted to be 1.1-1.3  Currently below baseline, 0.95  Avoid nephrotoxins, hypotension  Monitor BMP    Cardiomyopathy (HCC)  Assessment & Plan  Most recent echocardiogram with an EF of 45% with grade 1 diastolic dysfunction  Resume 40 mg Lasix daily 12/18    Diabetes mellitus with neurological manifestation  (HCC)  Assessment & Plan  Chronic, uncontrolled, as evidenced by a hemoglobin A1C of 8.7  Hold home oral anti-diabetics; continue levemir 26 u HS   Continue with insulin sliding scale  Monitor blood glucose AC/HS  Hypoglycemia protocol    Coronary artery disease of native artery of native heart with stable angina pectoris (HCC)  Assessment & Plan  History of CAD, no chest pain   Continue Aspirin, Lipitor, and Lopressor            VTE Pharmacologic Prophylaxis: VTE Score: 4 Moderate Risk (Score 3-4) - Pharmacological DVT Prophylaxis Ordered: heparin drip/coumadin bridge.    Mobility:   Basic Mobility Inpatient Raw Score: 23  JH-HLM Goal: 7: Walk 25 feet or more  JH-HLM Achieved: 7: Walk 25 feet or more  HLM Goal achieved. Continue to encourage appropriate mobility.    Patient Centered Rounds: I performed bedside rounds with nursing staff today.   Discussions with Specialists or Other Care Team Provider: MANUELITO     Education and Discussions with Family / Patient: Patient declined call to .  Reports her son will be here later today     Total Time Spent on Date of Encounter in care of patient: 33 mins. This time was spent on one or more of the following: performing physical exam; counseling and coordination of care; obtaining or reviewing history; documenting in the medical record; reviewing/ordering tests, medications or procedures; communicating with other healthcare professionals and discussing with patient's family/caregivers.    Current Length of Stay: 6 day(s)  Current Patient Status: Inpatient   Certification Statement: The patient will continue to require additional inpatient hospital stay due to awaiting therapeutic INR   Discharge Plan: Anticipate discharge in 24-48 hrs to home.    Code Status: Level 1 - Full Code    Subjective:   Patient doing well this morning, denies acute issues overnight.  Has not had a bowel movement but denies any abdominal pain or reflux symptoms.  Shortness of breath is  present, similar to yesterday.  No exertional chest pain and no dizziness.    Objective:     Vitals:   Temp (24hrs), Av.9 °F (37.2 °C), Min:97.7 °F (36.5 °C), Max:100.1 °F (37.8 °C)    Temp:  [97.7 °F (36.5 °C)-100.1 °F (37.8 °C)] 100.1 °F (37.8 °C)  HR:  [69-87] 76  Resp:  [18] 18  BP: (129-164)/(48-75) 129/48  SpO2:  [89 %-96 %] 94 %  Body mass index is 26.45 kg/m².     Input and Output Summary (last 24 hours):     Intake/Output Summary (Last 24 hours) at 2023 0808  Last data filed at 2023 0100  Gross per 24 hour   Intake 700 ml   Output 300 ml   Net 400 ml       Physical Exam:   Physical Exam  Vitals and nursing note reviewed.   Constitutional:       General: She is awake. She is not in acute distress.     Appearance: Normal appearance. She is well-developed and well-groomed. She is not ill-appearing or toxic-appearing.   Cardiovascular:      Rate and Rhythm: Normal rate and regular rhythm.      Heart sounds: Murmur heard.   Pulmonary:      Effort: Pulmonary effort is normal. No respiratory distress.      Breath sounds: Normal breath sounds. No wheezing.   Abdominal:      General: Bowel sounds are normal. There is no distension.      Palpations: Abdomen is soft.      Tenderness: There is no abdominal tenderness.   Skin:     General: Skin is warm and dry.      Coloration: Skin is not pale.   Neurological:      General: No focal deficit present.      Mental Status: She is alert.   Psychiatric:         Mood and Affect: Mood normal.         Behavior: Behavior normal. Behavior is cooperative.           Additional Data:     Labs:  Results from last 7 days   Lab Units 23  1243 23  0527 23  2242 23  1336   WBC Thousand/uL  --  3.52*   < > 5.18   HEMOGLOBIN g/dL 7.8* 7.4*   < > 5.5*   HEMATOCRIT % 26.6* 25.3*   < > 18.9*   PLATELETS Thousands/uL  --  285   < > 381   NEUTROS PCT %  --   --   --  62   LYMPHS PCT %  --   --   --  26   MONOS PCT %  --   --   --  8   EOS PCT %  --   --    --  4    < > = values in this interval not displayed.     Results from last 7 days   Lab Units 12/19/23  0527 12/15/23  1627 12/14/23  1336   SODIUM mmol/L 139   < > 139   POTASSIUM mmol/L 3.6   < > 3.4*   CHLORIDE mmol/L 110*   < > 104   CO2 mmol/L 26   < > 26   BUN mg/dL 14   < > 28*   CREATININE mg/dL 0.95   < > 1.37*   ANION GAP mmol/L 3   < > 9   CALCIUM mg/dL 8.8   < > 8.6   ALBUMIN g/dL  --   --  4.0   TOTAL BILIRUBIN mg/dL  --   --  0.46   ALK PHOS U/L  --   --  61   ALT U/L  --   --  12   AST U/L  --   --  15   GLUCOSE RANDOM mg/dL 92   < > 152*    < > = values in this interval not displayed.     Results from last 7 days   Lab Units 12/20/23  0528   INR  1.88*     Results from last 7 days   Lab Units 12/20/23  0712 12/19/23  2053 12/19/23  1556 12/19/23  1043 12/19/23  0726 12/18/23  2126 12/18/23  1658 12/18/23  0719 12/17/23  2028 12/17/23  1606 12/17/23  1125 12/17/23  0722   POC GLUCOSE mg/dl 114 135 170* 120 108 151* 160* 119 150* 136 134 101               Lines/Drains:  Invasive Devices       Peripheral Intravenous Line  Duration             Peripheral IV 12/14/23 Right;Dorsal (posterior) Forearm 5 days    Peripheral IV 12/14/23 Right;Ventral (anterior) Forearm 5 days                          Imaging: No pertinent imaging reviewed.    Recent Cultures (last 7 days):         Last 24 Hours Medication List:   Current Facility-Administered Medications   Medication Dose Route Frequency Provider Last Rate    aspirin  81 mg Oral Daily LucilaJEEVAN RenteriaNP      atorvastatin  40 mg Oral QPM LucilaCAMACHO Renteria      cholecalciferol  2,000 Units Oral Daily Lucila Strausberger, CRNP      furosemide  40 mg Oral Daily Lucila Strabrenda, CRNP      gabapentin  300 mg Oral BID Lucila JEEVAN MalhotraNP      heparin (porcine)  3-20 Units/kg/hr (Order-Specific) Intravenous Titrated Lucila StraJEEVAN gutierrezNP 14 Units/kg/hr (12/20/23 0747)    insulin detemir  26 Units Subcutaneous HS CAMACHO Phan       insulin lispro  1-5 Units Subcutaneous TID AC Lucila Malhotra, CRNP      insulin lispro  1-6 Units Subcutaneous HS Lucila Malhotra, CRJEWEL      levothyroxine  50 mcg Oral Daily CAMACHO Phan      metoprolol tartrate  25 mg Oral Q12H LIBAN Lucila Malhotra, CAMACHO      warfarin  7.5 mg Oral Daily (warfarin) Terri Raymond PA-C          Today, Patient Was Seen By: Terri Raymond PA-C    **Please Note: This note may have been constructed using a voice recognition system.**

## 2023-12-20 NOTE — CASE MANAGEMENT
Case Management Progress Note    Patient name Ely Hernadez  Location /-01 MRN [id number]  : 1940 Date 2023       LOS (days): 6  Geometric Mean LOS (GMLOS) (days): 2.8  Days to GMLOS:-3.3        OBJECTIVE:  Current admission status: Inpatient  Preferred Pharmacy:   [id number] [address] [address]  Phone: [phone] Fax: [phone]    [facility] - [place], [place] - [address]  [address]  [address] [address]  Phone: [phone] Fax: [phone]    Primary Care Provider: Aviva Mccabe PA-C  Primary Insurance: [facility] REP  Secondary Insurance:     PROGRESS NOTE:  Patient reviewed during Interdisciplinary Rounds with SLIM today.  Anticipated for d/c in 48hrs, pending therapeutic INR.  AMPAC is 23.  Patient's LOS is 6 days.  Declining HHC services.  CM will continue to follow for d/c planning and needs.

## 2023-12-20 NOTE — ASSESSMENT & PLAN NOTE
Patient presented to the ED with a hemoglobin of 5.5 on outpatient labs; referred to the ER by cardiology.  Does note melena 1 week prior to admission. Otherwise, no sign of bleeding.  Hemoglobin on 10/31 noted to be 12.7  Patient received 2 units PRBC, 1 U FFP  GI consult, appreciate input  EGD (12/15/23): The esophagus, stomach, duodenum and proximal jejunum appeared normal.   Colonoscopy (12/19/23): No blood or source of anemia seen. Diverticulosis of moderate severity causing moderate luminal narrowing in the sigmoid colon. 2 subcentimeter polyps in the cecum and ascending colon  OK to restart anticoagulation as of 12/18  Hgb 7.8, INR 1.88  Monitor for any signs of blood in stool  Iron panel shows RHEA, however taken s/p transfusion; s/p Venofer 300 mg x1 on 12/19

## 2023-12-20 NOTE — PLAN OF CARE
Problem: Potential for Falls  Goal: Patient will remain free of falls  Description: INTERVENTIONS:  - Educate patient/family on patient safety including physical limitations  - Instruct patient to call for assistance with activity   - Consult OT/PT to assist with strengthening/mobility   - Keep Call bell within reach  - Keep bed low and locked with side rails adjusted as appropriate  - Keep care items and personal belongings within reach  - Initiate and maintain comfort rounds  - Make Fall Risk Sign visible to staff  - Offer Toileting every 3 Hours, in advance of need  - Initiate/Maintain bed alarm  - Obtain necessary fall risk management equipment:   - Apply yellow socks and bracelet for high fall risk patients  - Consider moving patient to room near nurses station  12/20/2023 0818 by Peace Nelson, RN  Outcome: Progressing  12/20/2023 0816 by Peace Nelson RN  Outcome: Progressing     Problem: PAIN - ADULT  Goal: Verbalizes/displays adequate comfort level or baseline comfort level  Description: Interventions:  - Encourage patient to monitor pain and request assistance  - Assess pain using appropriate pain scale  - Administer analgesics based on type and severity of pain and evaluate response  - Implement non-pharmacological measures as appropriate and evaluate response  - Consider cultural and social influences on pain and pain management  - Notify physician/advanced practitioner if interventions unsuccessful or patient reports new pain  12/20/2023 0818 by Peace Nelson, RN  Outcome: Progressing  12/20/2023 0816 by Peace Nelson, RN  Outcome: Progressing     Problem: INFECTION - ADULT  Goal: Absence or prevention of progression during hospitalization  Description: INTERVENTIONS:  - Assess and monitor for signs and symptoms of infection  - Monitor lab/diagnostic results  - Monitor all insertion sites, i.e. indwelling lines, tubes, and drains  - Monitor endotracheal if appropriate and nasal secretions for  changes in amount and color  - [place] appropriate cooling/warming therapies per order  - Administer medications as ordered  - Instruct and encourage patient and family to use good hand hygiene technique  - Identify and instruct in appropriate isolation precautions for identified infection/condition  12/20/2023 0818 by Peace Nelson RN  Outcome: Progressing  12/20/2023 0816 by Peace Nelson RN  Outcome: Progressing     Problem: SAFETY ADULT  Goal: Patient will remain free of falls  Description: INTERVENTIONS:  - Educate patient/family on patient safety including physical limitations  - Instruct patient to call for assistance with activity   - Consult OT/PT to assist with strengthening/mobility   - Keep Call bell within reach  - Keep bed low and locked with side rails adjusted as appropriate  - Keep care items and personal belongings within reach  - Initiate and maintain comfort rounds  - Make Fall Risk Sign visible to staff  - Offer Toileting every 3 Hours, in advance of need  - Initiate/Maintain bed alarm  - Obtain necessary fall risk management equipment:   - Apply yellow socks and bracelet for high fall risk patients  - Consider moving patient to room near nurses station  12/20/2023 0818 by Peace Nelson RN  Outcome: Progressing  12/20/2023 0816 by Peace Nelson RN  Outcome: Progressing  Goal: Maintain or return to baseline ADL function  Description: INTERVENTIONS:  -  Assess patient's ability to carry out ADLs; assess patient's baseline for ADL function and identify physical deficits which impact ability to perform ADLs (bathing, care of mouth/teeth, toileting, grooming, dressing, etc.)  - Assess/evaluate cause of self-care deficits   - Assess range of motion  - Assess patient's mobility; develop plan if impaired  - Assess patient's need for assistive devices and provide as appropriate  - Encourage maximum independence but intervene and supervise when necessary  - Involve family in performance of  ADLs  - Assess for home care needs following discharge   - Consider OT consult to assist with ADL evaluation and planning for discharge  - Provide patient education as appropriate  12/20/2023 0818 by Peace Nelson RN  Outcome: Progressing  12/20/2023 0816 by Peace Nelson RN  Outcome: Progressing  Goal: Maintains/Returns to pre admission functional level  Description: INTERVENTIONS:  - Perform AM-PAC 6 Click Basic Mobility/ Daily Activity assessment daily.  - Set and communicate daily mobility goal to care team and patient/family/caregiver.   - Collaborate with rehabilitation services on mobility goals if consulted  - Perform Range of Motion 3 times a day.  - Reposition patient every 2 hours.  - Dangle patient 3 times a day  - Stand patient 3 times a day  - Ambulate patient 3 times a day  - Out of bed to chair 3 times a day   - Out of bed for meals 3 times a day  - Out of bed for toileting  - Record patient progress and toleration of activity level   12/20/2023 0818 by Peace Nelson RN  Outcome: Progressing  12/20/2023 0816 by Peace Nelson RN  Outcome: Progressing     Problem: DISCHARGE PLANNING  Goal: Discharge to home or other facility with appropriate resources  Description: INTERVENTIONS:  - Identify barriers to discharge w/patient and caregiver  - Arrange for needed discharge resources and transportation as appropriate  - Identify discharge learning needs (meds, wound care, etc.)  - Arrange for interpretive services to assist at discharge as needed  - Refer to Case Management Department for coordinating discharge planning if the patient needs post-hospital services based on physician/advanced practitioner order or complex needs related to functional status, cognitive ability, or social support system  12/20/2023 0818 by Peace Nelson RN  Outcome: Progressing  12/20/2023 0816 by Peace Nelson RN  Outcome: Progressing     Problem: Knowledge Deficit  Goal: Patient/family/caregiver demonstrates  understanding of disease process, treatment plan, medications, and discharge instructions  Description: Complete learning assessment and assess knowledge base.  Interventions:  - Provide teaching at level of understanding  - Provide teaching via preferred learning methods  12/20/2023 0818 by Peace Nelson RN  Outcome: Progressing  12/20/2023 0816 by Peace Nelson RN  Outcome: Progressing     Problem: CARDIOVASCULAR - ADULT  Goal: Maintains optimal cardiac output and hemodynamic stability  Description: INTERVENTIONS:  - Monitor I/O, vital signs and rhythm  - Monitor for S/S and trends of decreased cardiac output  - Administer and titrate ordered vasoactive medications to optimize hemodynamic stability  - Assess quality of pulses, skin color and temperature  - Assess for signs of decreased coronary artery perfusion  - Instruct patient to report change in severity of symptoms  12/20/2023 0818 by Peace Nelson RN  Outcome: Progressing  12/20/2023 0816 by Peace Nelson RN  Outcome: Progressing  Goal: Absence of cardiac dysrhythmias or at baseline rhythm  Description: INTERVENTIONS:  - Continuous cardiac monitoring, vital signs, obtain 12 lead EKG if ordered  - Administer antiarrhythmic and heart rate control medications as ordered  - Monitor electrolytes and administer replacement therapy as ordered  12/20/2023 0818 by Peace Nelson RN  Outcome: Progressing  12/20/2023 0816 by Peace Nelson RN  Outcome: Progressing     Problem: HEMATOLOGIC - ADULT  Goal: Maintains hematologic stability  Description: INTERVENTIONS  - Assess for signs and symptoms of bleeding or hemorrhage  - Monitor labs  - Administer supportive blood products/factors as ordered and appropriate  12/20/2023 0818 by Peace Nelson RN  Outcome: Progressing  12/20/2023 0816 by Peace Nelson RN  Outcome: Progressing      Laurie Mayers  (RN)  2020 03:13:41

## 2023-12-20 NOTE — PLAN OF CARE
Problem: PAIN - ADULT  Goal: Verbalizes/displays adequate comfort level or baseline comfort level  Description: Interventions:  - Encourage patient to monitor pain and request assistance  - Assess pain using appropriate pain scale  - Administer analgesics based on type and severity of pain and evaluate response  - Implement non-pharmacological measures as appropriate and evaluate response  - Consider cultural and social influences on pain and pain management  - Notify physician/advanced practitioner if interventions unsuccessful or patient reports new pain  Outcome: Progressing     Problem: INFECTION - ADULT  Goal: Absence or prevention of progression during hospitalization  Description: INTERVENTIONS:  - Assess and monitor for signs and symptoms of infection  - Monitor lab/diagnostic results  - Monitor all insertion sites, i.e. indwelling lines, tubes, and drains  - Monitor endotracheal if appropriate and nasal secretions for changes in amount and color  - [place] appropriate cooling/warming therapies per order  - Administer medications as ordered  - Instruct and encourage patient and family to use good hand hygiene technique  - Identify and instruct in appropriate isolation precautions for identified infection/condition  Outcome: Progressing     Problem: DISCHARGE PLANNING  Goal: Discharge to home or other facility with appropriate resources  Description: INTERVENTIONS:  - Identify barriers to discharge w/patient and caregiver  - Arrange for needed discharge resources and transportation as appropriate  - Identify discharge learning needs (meds, wound care, etc.)  - Arrange for interpretive services to assist at discharge as needed  - Refer to Case Management Department for coordinating discharge planning if the patient needs post-hospital services based on physician/advanced practitioner order or complex needs related to functional status, cognitive ability, or social support system  Outcome: Progressing      Problem: Knowledge Deficit  Goal: Patient/family/caregiver demonstrates understanding of disease process, treatment plan, medications, and discharge instructions  Description: Complete learning assessment and assess knowledge base.  Interventions:  - Provide teaching at level of understanding  - Provide teaching via preferred learning methods  Outcome: Progressing     Problem: CARDIOVASCULAR - ADULT  Goal: Maintains optimal cardiac output and hemodynamic stability  Description: INTERVENTIONS:  - Monitor I/O, vital signs and rhythm  - Monitor for S/S and trends of decreased cardiac output  - Administer and titrate ordered vasoactive medications to optimize hemodynamic stability  - Assess quality of pulses, skin color and temperature  - Assess for signs of decreased coronary artery perfusion  - Instruct patient to report change in severity of symptoms  Outcome: Progressing  Goal: Absence of cardiac dysrhythmias or at baseline rhythm  Description: INTERVENTIONS:  - Continuous cardiac monitoring, vital signs, obtain 12 lead EKG if ordered  - Administer antiarrhythmic and heart rate control medications as ordered  - Monitor electrolytes and administer replacement therapy as ordered  Outcome: Progressing     Problem: HEMATOLOGIC - ADULT  Goal: Maintains hematologic stability  Description: INTERVENTIONS  - Assess for signs and symptoms of bleeding or hemorrhage  - Monitor labs  - Administer supportive blood products/factors as ordered and appropriate  Outcome: Progressing

## 2023-12-21 LAB
ABO GROUP BLD: NORMAL
APTT PPP: 88 SECONDS (ref 23–37)
BLD GP AB SCN SERPL QL: NEGATIVE
ERYTHROCYTE [DISTWIDTH] IN BLOOD BY AUTOMATED COUNT: 18.6 % (ref 11.6–15.1)
GLUCOSE SERPL-MCNC: 115 MG/DL (ref 65–140)
GLUCOSE SERPL-MCNC: 134 MG/DL (ref 65–140)
GLUCOSE SERPL-MCNC: 143 MG/DL (ref 65–140)
GLUCOSE SERPL-MCNC: 148 MG/DL (ref 65–140)
HCT VFR BLD AUTO: 25.5 % (ref 34.8–46.1)
HGB BLD-MCNC: 7.3 G/DL (ref 11.5–15.4)
INR PPP: 1.81 (ref 0.84–1.19)
MCH RBC QN AUTO: 23.2 PG (ref 26.8–34.3)
MCHC RBC AUTO-ENTMCNC: 28.6 G/DL (ref 31.4–37.4)
MCV RBC AUTO: 81 FL (ref 82–98)
PLATELET # BLD AUTO: 286 THOUSANDS/UL (ref 149–390)
PMV BLD AUTO: 10.4 FL (ref 8.9–12.7)
PROTHROMBIN TIME: 21.8 SECONDS (ref 11.6–14.5)
RBC # BLD AUTO: 3.15 MILLION/UL (ref 3.81–5.12)
RH BLD: POSITIVE
SPECIMEN EXPIRATION DATE: NORMAL
WBC # BLD AUTO: 3 THOUSAND/UL (ref 4.31–10.16)

## 2023-12-21 PROCEDURE — 85027 COMPLETE CBC AUTOMATED: CPT | Performed by: PHYSICIAN ASSISTANT

## 2023-12-21 PROCEDURE — 82948 REAGENT STRIP/BLOOD GLUCOSE: CPT

## 2023-12-21 PROCEDURE — 85730 THROMBOPLASTIN TIME PARTIAL: CPT | Performed by: INTERNAL MEDICINE

## 2023-12-21 PROCEDURE — P9040 RBC LEUKOREDUCED IRRADIATED: HCPCS

## 2023-12-21 PROCEDURE — 30233N1 TRANSFUSION OF NONAUTOLOGOUS RED BLOOD CELLS INTO PERIPHERAL VEIN, PERCUTANEOUS APPROACH: ICD-10-PCS | Performed by: INTERNAL MEDICINE

## 2023-12-21 PROCEDURE — 86920 COMPATIBILITY TEST SPIN: CPT

## 2023-12-21 PROCEDURE — 86900 BLOOD TYPING SEROLOGIC ABO: CPT | Performed by: PHYSICIAN ASSISTANT

## 2023-12-21 PROCEDURE — 85610 PROTHROMBIN TIME: CPT | Performed by: NURSE PRACTITIONER

## 2023-12-21 PROCEDURE — 99232 SBSQ HOSP IP/OBS MODERATE 35: CPT | Performed by: PHYSICIAN ASSISTANT

## 2023-12-21 PROCEDURE — 86850 RBC ANTIBODY SCREEN: CPT | Performed by: PHYSICIAN ASSISTANT

## 2023-12-21 PROCEDURE — 86901 BLOOD TYPING SEROLOGIC RH(D): CPT | Performed by: PHYSICIAN ASSISTANT

## 2023-12-21 RX ORDER — FUROSEMIDE 10 MG/ML
20 INJECTION INTRAMUSCULAR; INTRAVENOUS ONCE
Status: COMPLETED | OUTPATIENT
Start: 2023-12-21 | End: 2023-12-21

## 2023-12-21 RX ADMIN — ASPIRIN 81 MG: 81 TABLET, COATED ORAL at 09:19

## 2023-12-21 RX ADMIN — HEPARIN SODIUM 14 UNITS/KG/HR: 10000 INJECTION, SOLUTION INTRAVENOUS at 09:22

## 2023-12-21 RX ADMIN — GABAPENTIN 300 MG: 300 CAPSULE ORAL at 21:08

## 2023-12-21 RX ADMIN — GABAPENTIN 300 MG: 300 CAPSULE ORAL at 09:19

## 2023-12-21 RX ADMIN — WARFARIN SODIUM 7.5 MG: 7.5 TABLET ORAL at 18:00

## 2023-12-21 RX ADMIN — METOPROLOL TARTRATE 25 MG: 25 TABLET, FILM COATED ORAL at 09:20

## 2023-12-21 RX ADMIN — INSULIN DETEMIR 26 UNITS: 100 INJECTION, SOLUTION SUBCUTANEOUS at 21:08

## 2023-12-21 RX ADMIN — Medication 2000 UNITS: at 09:19

## 2023-12-21 RX ADMIN — FUROSEMIDE 20 MG: 10 INJECTION, SOLUTION INTRAMUSCULAR; INTRAVENOUS at 15:12

## 2023-12-21 RX ADMIN — LEVOTHYROXINE SODIUM 50 MCG: 0.05 TABLET ORAL at 09:19

## 2023-12-21 RX ADMIN — ATORVASTATIN CALCIUM 40 MG: 40 TABLET, FILM COATED ORAL at 18:00

## 2023-12-21 RX ADMIN — METOPROLOL TARTRATE 25 MG: 25 TABLET, FILM COATED ORAL at 21:08

## 2023-12-21 RX ADMIN — FUROSEMIDE 40 MG: 40 TABLET ORAL at 09:21

## 2023-12-21 NOTE — ASSESSMENT & PLAN NOTE
Patient presented to the ED with a hemoglobin of 5.5 on outpatient labs; referred to the ER by cardiology.  Does note melena 1 week prior to admission. Otherwise, no sign of bleeding.  Hemoglobin on 10/31 noted to be 12.7  Patient received 2 units PRBC, 1 U FFP; will give another 1u PRBCs 12/21  GI consult, appreciate input  EGD (12/15/23): The esophagus, stomach, duodenum and proximal jejunum appeared normal.   Colonoscopy (12/19/23): No blood or source of anemia seen. Diverticulosis of moderate severity causing moderate luminal narrowing in the sigmoid colon. 2 subcentimeter polyps in the cecum and ascending colon  OK to restart anticoagulation as of 12/18  Hgb 7.3, INR 1.81  Monitor for any signs of blood in stool  Iron panel shows RHEA, however taken s/p transfusion; s/p Venofer 300 mg x1 on 12/19

## 2023-12-21 NOTE — PLAN OF CARE
Problem: Potential for Falls  Goal: Patient will remain free of falls  Description: INTERVENTIONS:  - Educate patient/family on patient safety including physical limitations  - Instruct patient to call for assistance with activity   - Consult OT/PT to assist with strengthening/mobility   - Keep Call bell within reach  - Keep bed low and locked with side rails adjusted as appropriate  - Keep care items and personal belongings within reach  - Initiate and maintain comfort rounds  - Make Fall Risk Sign visible to staff  - Offer Toileting every  Hours, in advance of need  - Initiate/Maintain alarm  - Obtain necessary fall risk management equipment:   - Apply yellow socks and bracelet for high fall risk patients  - Consider moving patient to room near nurses station  Outcome: Progressing     Problem: PAIN - ADULT  Goal: Verbalizes/displays adequate comfort level or baseline comfort level  Description: Interventions:  - Encourage patient to monitor pain and request assistance  - Assess pain using appropriate pain scale  - Administer analgesics based on type and severity of pain and evaluate response  - Implement non-pharmacological measures as appropriate and evaluate response  - Consider cultural and social influences on pain and pain management  - Notify physician/advanced practitioner if interventions unsuccessful or patient reports new pain  Outcome: Progressing     Problem: INFECTION - ADULT  Goal: Absence or prevention of progression during hospitalization  Description: INTERVENTIONS:  - Assess and monitor for signs and symptoms of infection  - Monitor lab/diagnostic results  - Monitor all insertion sites, i.e. indwelling lines, tubes, and drains  - Monitor endotracheal if appropriate and nasal secretions for changes in amount and color  - [place] appropriate cooling/warming therapies per order  - Administer medications as ordered  - Instruct and encourage patient and family to use good hand hygiene technique  -  Identify and instruct in appropriate isolation precautions for identified infection/condition  Outcome: Progressing     Problem: SAFETY ADULT  Goal: Patient will remain free of falls  Description: INTERVENTIONS:  - Educate patient/family on patient safety including physical limitations  - Instruct patient to call for assistance with activity   - Consult OT/PT to assist with strengthening/mobility   - Keep Call bell within reach  - Keep bed low and locked with side rails adjusted as appropriate  - Keep care items and personal belongings within reach  - Initiate and maintain comfort rounds  - Make Fall Risk Sign visible to staff  - Offer Toileting every  Hours, in advance of need  - Initiate/Maintain alarm  - Obtain necessary fall risk management equipment:   - Apply yellow socks and bracelet for high fall risk patients  - Consider moving patient to room near nurses station  Outcome: Progressing  Goal: Maintain or return to baseline ADL function  Description: INTERVENTIONS:  -  Assess patient's ability to carry out ADLs; assess patient's baseline for ADL function and identify physical deficits which impact ability to perform ADLs (bathing, care of mouth/teeth, toileting, grooming, dressing, etc.)  - Assess/evaluate cause of self-care deficits   - Assess range of motion  - Assess patient's mobility; develop plan if impaired  - Assess patient's need for assistive devices and provide as appropriate  - Encourage maximum independence but intervene and supervise when necessary  - Involve family in performance of ADLs  - Assess for home care needs following discharge   - Consider OT consult to assist with ADL evaluation and planning for discharge  - Provide patient education as appropriate  Outcome: Progressing  Goal: Maintains/Returns to pre admission functional level  Description: INTERVENTIONS:  - Perform AM-PAC 6 Click Basic Mobility/ Daily Activity assessment daily.  - Set and communicate daily mobility goal to care  team and patient/family/caregiver.   - Collaborate with rehabilitation services on mobility goals if consulted  - Perform Range of Motion  times a day.  - Reposition patient every  hours.  - Dangle patient  times a day  - Stand patient  times a day  - Ambulate patient  times a day  - Out of bed to chair  times a day   - Out of bed for meals  times a day  - Out of bed for toileting  - Record patient progress and toleration of activity level   Outcome: Progressing     Problem: DISCHARGE PLANNING  Goal: Discharge to home or other facility with appropriate resources  Description: INTERVENTIONS:  - Identify barriers to discharge w/patient and caregiver  - Arrange for needed discharge resources and transportation as appropriate  - Identify discharge learning needs (meds, wound care, etc.)  - Arrange for interpretive services to assist at discharge as needed  - Refer to Case Management Department for coordinating discharge planning if the patient needs post-hospital services based on physician/advanced practitioner order or complex needs related to functional status, cognitive ability, or social support system  Outcome: Progressing     Problem: Knowledge Deficit  Goal: Patient/family/caregiver demonstrates understanding of disease process, treatment plan, medications, and discharge instructions  Description: Complete learning assessment and assess knowledge base.  Interventions:  - Provide teaching at level of understanding  - Provide teaching via preferred learning methods  Outcome: Progressing     Problem: CARDIOVASCULAR - ADULT  Goal: Maintains optimal cardiac output and hemodynamic stability  Description: INTERVENTIONS:  - Monitor I/O, vital signs and rhythm  - Monitor for S/S and trends of decreased cardiac output  - Administer and titrate ordered vasoactive medications to optimize hemodynamic stability  - Assess quality of pulses, skin color and temperature  - Assess for signs of decreased coronary artery  perfusion  - Instruct patient to report change in severity of symptoms  Outcome: Progressing  Goal: Absence of cardiac dysrhythmias or at baseline rhythm  Description: INTERVENTIONS:  - Continuous cardiac monitoring, vital signs, obtain 12 lead EKG if ordered  - Administer antiarrhythmic and heart rate control medications as ordered  - Monitor electrolytes and administer replacement therapy as ordered  Outcome: Progressing     Problem: HEMATOLOGIC - ADULT  Goal: Maintains hematologic stability  Description: INTERVENTIONS  - Assess for signs and symptoms of bleeding or hemorrhage  - Monitor labs  - Administer supportive blood products/factors as ordered and appropriate  Outcome: Progressing

## 2023-12-21 NOTE — PLAN OF CARE
Problem: Potential for Falls  Goal: Patient will remain free of falls  Description: INTERVENTIONS:  - Educate patient/family on patient safety including physical limitations  - Instruct patient to call for assistance with activity   - Consult OT/PT to assist with strengthening/mobility   - Keep Call bell within reach  - Keep bed low and locked with side rails adjusted as appropriate  - Keep care items and personal belongings within reach  - Initiate and maintain comfort rounds  - Make Fall Risk Sign visible to staff  - Offer Toileting every 3 Hours, in advance of need  - Initiate/Maintain bed alarm  - Obtain necessary fall risk management equipment:   - Apply yellow socks and bracelet for high fall risk patients  - Consider moving patient to room near nurses station  Outcome: Progressing     Problem: PAIN - ADULT  Goal: Verbalizes/displays adequate comfort level or baseline comfort level  Description: Interventions:  - Encourage patient to monitor pain and request assistance  - Assess pain using appropriate pain scale  - Administer analgesics based on type and severity of pain and evaluate response  - Implement non-pharmacological measures as appropriate and evaluate response  - Consider cultural and social influences on pain and pain management  - Notify physician/advanced practitioner if interventions unsuccessful or patient reports new pain  Outcome: Progressing     Problem: INFECTION - ADULT  Goal: Absence or prevention of progression during hospitalization  Description: INTERVENTIONS:  - Assess and monitor for signs and symptoms of infection  - Monitor lab/diagnostic results  - Monitor all insertion sites, i.e. indwelling lines, tubes, and drains  - Monitor endotracheal if appropriate and nasal secretions for changes in amount and color  - [place] appropriate cooling/warming therapies per order  - Administer medications as ordered  - Instruct and encourage patient and family to use good hand hygiene  technique  - Identify and instruct in appropriate isolation precautions for identified infection/condition  Outcome: Progressing     Problem: SAFETY ADULT  Goal: Patient will remain free of falls  Description: INTERVENTIONS:  - Educate patient/family on patient safety including physical limitations  - Instruct patient to call for assistance with activity   - Consult OT/PT to assist with strengthening/mobility   - Keep Call bell within reach  - Keep bed low and locked with side rails adjusted as appropriate  - Keep care items and personal belongings within reach  - Initiate and maintain comfort rounds  - Make Fall Risk Sign visible to staff  - Offer Toileting every 3 Hours, in advance of need  - Initiate/Maintain bed alarm  - Obtain necessary fall risk management equipment:   - Apply yellow socks and bracelet for high fall risk patients  - Consider moving patient to room near nurses station  Outcome: Progressing  Goal: Maintain or return to baseline ADL function  Description: INTERVENTIONS:  -  Assess patient's ability to carry out ADLs; assess patient's baseline for ADL function and identify physical deficits which impact ability to perform ADLs (bathing, care of mouth/teeth, toileting, grooming, dressing, etc.)  - Assess/evaluate cause of self-care deficits   - Assess range of motion  - Assess patient's mobility; develop plan if impaired  - Assess patient's need for assistive devices and provide as appropriate  - Encourage maximum independence but intervene and supervise when necessary  - Involve family in performance of ADLs  - Assess for home care needs following discharge   - Consider OT consult to assist with ADL evaluation and planning for discharge  - Provide patient education as appropriate  Outcome: Progressing  Goal: Maintains/Returns to pre admission functional level  Description: INTERVENTIONS:  - Perform AM-PAC 6 Click Basic Mobility/ Daily Activity assessment daily.  - Set and communicate daily  mobility goal to care team and patient/family/caregiver.   - Collaborate with rehabilitation services on mobility goals if consulted  - Perform Range of Motion 3 times a day.  - Reposition patient every 2 hours.  - Dangle patient 3 times a day  - Stand patient 3 times a day  - Ambulate patient 3 times a day  - Out of bed to chair 3 times a day   - Out of bed for meals 3 times a day  - Out of bed for toileting  - Record patient progress and toleration of activity level   Outcome: Progressing     Problem: DISCHARGE PLANNING  Goal: Discharge to home or other facility with appropriate resources  Description: INTERVENTIONS:  - Identify barriers to discharge w/patient and caregiver  - Arrange for needed discharge resources and transportation as appropriate  - Identify discharge learning needs (meds, wound care, etc.)  - Arrange for interpretive services to assist at discharge as needed  - Refer to Case Management Department for coordinating discharge planning if the patient needs post-hospital services based on physician/advanced practitioner order or complex needs related to functional status, cognitive ability, or social support system  Outcome: Progressing     Problem: Knowledge Deficit  Goal: Patient/family/caregiver demonstrates understanding of disease process, treatment plan, medications, and discharge instructions  Description: Complete learning assessment and assess knowledge base.  Interventions:  - Provide teaching at level of understanding  - Provide teaching via preferred learning methods  Outcome: Progressing     Problem: CARDIOVASCULAR - ADULT  Goal: Maintains optimal cardiac output and hemodynamic stability  Description: INTERVENTIONS:  - Monitor I/O, vital signs and rhythm  - Monitor for S/S and trends of decreased cardiac output  - Administer and titrate ordered vasoactive medications to optimize hemodynamic stability  - Assess quality of pulses, skin color and temperature  - Assess for signs of  decreased coronary artery perfusion  - Instruct patient to report change in severity of symptoms  Outcome: Progressing  Goal: Absence of cardiac dysrhythmias or at baseline rhythm  Description: INTERVENTIONS:  - Continuous cardiac monitoring, vital signs, obtain 12 lead EKG if ordered  - Administer antiarrhythmic and heart rate control medications as ordered  - Monitor electrolytes and administer replacement therapy as ordered  Outcome: Progressing     Problem: HEMATOLOGIC - ADULT  Goal: Maintains hematologic stability  Description: INTERVENTIONS  - Assess for signs and symptoms of bleeding or hemorrhage  - Monitor labs  - Administer supportive blood products/factors as ordered and appropriate  Outcome: Progressing

## 2023-12-21 NOTE — PROGRESS NOTES
[facility]  Progress Note  Name: Ely Hernadez I  MRN: [id number]  Unit/Bed#: [facility] I Date of Admission: 12/14/2023   Date of Service: 12/21/2023 I Hospital Day: 7    Assessment/Plan   * Acute blood loss anemia  Assessment & Plan  Patient presented to the ED with a hemoglobin of 5.5 on outpatient labs; referred to the ER by cardiology.  Does note melena 1 week prior to admission. Otherwise, no sign of bleeding.  Hemoglobin on 10/31 noted to be 12.7  Patient received 2 units PRBC, 1 U FFP; will give another 1u PRBCs 12/21  GI consult, appreciate input  EGD (12/15/23): The esophagus, stomach, duodenum and proximal jejunum appeared normal.   Colonoscopy (12/19/23): No blood or source of anemia seen. Diverticulosis of moderate severity causing moderate luminal narrowing in the sigmoid colon. 2 subcentimeter polyps in the cecum and ascending colon  OK to restart anticoagulation as of 12/18  Hgb 7.3, INR 1.81  Monitor for any signs of blood in stool  Iron panel shows RHEA, however taken s/p transfusion; s/p Venofer 300 mg x1 on 12/19    AVM (arteriovenous malformation) of small bowel, acquired with hemorrhage  Assessment & Plan  History from 2018  She is now s/p EGD with push enteroscopy; which was normal on 12/15    H/O mechanical aortic valve replacement  Assessment & Plan  History of mechanical valve replacement in 2007  Maintained on Coumadin typically with a goal INR of 2.5-3.5  Coumadin restarted, continue Heparin drip bridge    Paroxysmal atrial fibrillation (HCC)  Assessment & Plan  Chronic, rate controlled on lopressor     Stage 3a chronic kidney disease  Assessment & Plan  Chronic, baseline creatinine noted to be 1.1-1.3  Currently below baseline, 0.95  Avoid nephrotoxins, hypotension  Monitor BMP    Cardiomyopathy (HCC)  Assessment & Plan  Most recent echocardiogram with an EF of 45% with grade 1 diastolic dysfunction  Resume 40 mg Lasix daily 12/18    Diabetes mellitus with  neurological manifestation (HCC)  Assessment & Plan  Chronic, uncontrolled, as evidenced by a hemoglobin A1C of 8.7  Hold home oral anti-diabetics; continue levemir 26 u HS   Continue with insulin sliding scale  Monitor blood glucose AC/HS  Hypoglycemia protocol    Coronary artery disease of native artery of native heart with stable angina pectoris (HCC)  Assessment & Plan  History of CAD, no chest pain   Continue Aspirin, Lipitor, and Lopressor            VTE Pharmacologic Prophylaxis: VTE Score: 4 Moderate Risk (Score 3-4) - Pharmacological DVT Prophylaxis Contraindicated. Sequential Compression Devices Ordered.    Mobility:   Basic Mobility Inpatient Raw Score: 23  -[facility] Goal: 7: Walk 25 feet or more  -HL Achieved: 1: Laying in bed  Improperly assessed     Patient Centered Rounds: I performed bedside rounds with nursing staff today.   Discussions with Specialists or Other Care Team Provider: MANUELITO     Education and Discussions with Family / Patient: Patient declined call to .  Reports that son is coming in again this afternoon and can be updated then     Total Time Spent on Date of Encounter in care of patient: 40 mins. This time was spent on one or more of the following: performing physical exam; counseling and coordination of care; obtaining or reviewing history; documenting in the medical record; reviewing/ordering tests, medications or procedures; communicating with other healthcare professionals and discussing with patient's family/caregivers.    Current Length of Stay: 7 day(s)  Current Patient Status: Inpatient   Certification Statement: The patient will continue to require additional inpatient hospital stay due to need for additional transfusion and subtherapeutic INR treatment   Discharge Plan: Anticipate discharge in 24-48 hrs to home. Declines [facility]     Code Status: Level 1 - Full Code    Subjective:   Patient seen this morning, feeling about the same as yesterday.  No worsening shortness  of breath, no bloody bowel movements.  Urinating well.  Patient expresses frustration regarding persistently subtherapeutic INR, advised on higher dosing last night, will likely not take effect yet.  Today    Objective:     Vitals:   Temp (24hrs), Av.7 °F (36.5 °C), Min:97.7 °F (36.5 °C), Max:97.8 °F (36.6 °C)    Temp:  [97.7 °F (36.5 °C)-97.8 °F (36.6 °C)] 97.7 °F (36.5 °C)  HR:  [61-71] 61  Resp:  [16-18] 18  BP: (132-150)/(54-59) 132/56  SpO2:  [95 %-98 %] 97 %  Body mass index is 26.45 kg/m².     Input and Output Summary (last 24 hours):     Intake/Output Summary (Last 24 hours) at 2023 0930  Last data filed at 2023 0839  Gross per 24 hour   Intake 600 ml   Output 300 ml   Net 300 ml       Physical Exam:   Physical Exam  Vitals and nursing note reviewed.   Constitutional:       General: She is not in acute distress.     Appearance: Normal appearance. She is not ill-appearing or toxic-appearing.   Cardiovascular:      Rate and Rhythm: Normal rate and regular rhythm.      Heart sounds: Normal heart sounds.   Pulmonary:      Effort: Pulmonary effort is normal. No respiratory distress.      Breath sounds: Normal breath sounds. No wheezing.   Abdominal:      General: Bowel sounds are normal. There is no distension.      Palpations: Abdomen is soft.      Tenderness: There is no abdominal tenderness.   Skin:     General: Skin is warm and dry.      Coloration: Skin is pale.   Neurological:      Mental Status: She is alert and oriented to person, place, and time.   Psychiatric:         Mood and Affect: Mood normal.         Behavior: Behavior normal.           Additional Data:     Labs:  Results from last 7 days   Lab Units 23  0447 23  2242 23  1336   WBC Thousand/uL 3.00*   < > 5.18   HEMOGLOBIN g/dL 7.3*   < > 5.5*   HEMATOCRIT % 25.5*   < > 18.9*   PLATELETS Thousands/uL 286   < > 381   NEUTROS PCT %  --   --  62   LYMPHS PCT %  --   --  26   MONOS PCT %  --   --  8   EOS PCT %  --    --  4    < > = values in this interval not displayed.     Results from last 7 days   Lab Units 12/19/23  0527 12/15/23  1627 12/14/23  1336   SODIUM mmol/L 139   < > 139   POTASSIUM mmol/L 3.6   < > 3.4*   CHLORIDE mmol/L 110*   < > 104   CO2 mmol/L 26   < > 26   BUN mg/dL 14   < > 28*   CREATININE mg/dL 0.95   < > 1.37*   ANION GAP mmol/L 3   < > 9   CALCIUM mg/dL 8.8   < > 8.6   ALBUMIN g/dL  --   --  4.0   TOTAL BILIRUBIN mg/dL  --   --  0.46   ALK PHOS U/L  --   --  61   ALT U/L  --   --  12   AST U/L  --   --  15   GLUCOSE RANDOM mg/dL 92   < > 152*    < > = values in this interval not displayed.     Results from last 7 days   Lab Units 12/21/23  0447   INR  1.81*     Results from last 7 days   Lab Units 12/21/23  0715 12/20/23  2100 12/20/23  1556 12/20/23  1042 12/20/23  0712 12/19/23  2053 12/19/23  1556 12/19/23  1043 12/19/23  0726 12/18/23  2126 12/18/23  1658 12/18/23  0719   POC GLUCOSE mg/dl 115 125 143* 137 114 135 170* 120 108 151* 160* 119               Lines/Drains:  Invasive Devices       Peripheral Intravenous Line  Duration             Peripheral IV 12/20/23 Distal;Dorsal (posterior);Right Forearm <1 day                          Imaging: No pertinent imaging reviewed.    Recent Cultures (last 7 days):         Last 24 Hours Medication List:   Current Facility-Administered Medications   Medication Dose Route Frequency Provider Last Rate    aspirin  81 mg Oral Daily LucilaJEEVAN RenteriaNP      atorvastatin  40 mg Oral QPM Lucila CAMACHO Malhotra      cholecalciferol  2,000 Units Oral Daily Lucila Roscoe, JEEVANNP      furosemide  20 mg Intravenous Once Terri Raymond PA-C      furosemide  40 mg Oral Daily Lucila JEEVAN MalhotraNP      gabapentin  300 mg Oral BID Lucila JEEVAN MalhotraNP      heparin (porcine)  3-20 Units/kg/hr (Order-Specific) Intravenous Titrated Lucila StraJEEVAN gutierrezNP 14 Units/kg/hr (12/21/23 0922)    insulin detemir  26 Units Subcutaneous MUNA Malhotra,  CRNP      insulin lispro  1-5 Units Subcutaneous TID AC Lucila Malhotra, CRNP      insulin lispro  1-6 Units Subcutaneous HS Lucilamarty Malhotra, CRNP      levothyroxine  50 mcg Oral Daily Lucila Malhotra, CRNP      metoprolol tartrate  25 mg Oral Q12H LIBAN Lucila Malhotra, CRNP      warfarin  7.5 mg Oral Daily (warfarin) Terri Raymond PA-C          Today, Patient Was Seen By: Terri Raymond PA-C    **Please Note: This note may have been constructed using a voice recognition system.**

## 2023-12-22 LAB
ABO GROUP BLD BPU: NORMAL
ANION GAP SERPL CALCULATED.3IONS-SCNC: 5 MMOL/L
APTT PPP: 62 SECONDS (ref 23–37)
BPU ID: NORMAL
BUN SERPL-MCNC: 10 MG/DL (ref 5–25)
CALCIUM SERPL-MCNC: 8.8 MG/DL (ref 8.4–10.2)
CHLORIDE SERPL-SCNC: 107 MMOL/L (ref 96–108)
CO2 SERPL-SCNC: 31 MMOL/L (ref 21–32)
CREAT SERPL-MCNC: 0.97 MG/DL (ref 0.6–1.3)
CROSSMATCH: NORMAL
ERYTHROCYTE [DISTWIDTH] IN BLOOD BY AUTOMATED COUNT: 18.4 % (ref 11.6–15.1)
GFR SERPL CREATININE-BSD FRML MDRD: 54 ML/MIN/1.73SQ M
GLUCOSE SERPL-MCNC: 114 MG/DL (ref 65–140)
GLUCOSE SERPL-MCNC: 123 MG/DL (ref 65–140)
GLUCOSE SERPL-MCNC: 149 MG/DL (ref 65–140)
GLUCOSE SERPL-MCNC: 75 MG/DL (ref 65–140)
GLUCOSE SERPL-MCNC: 98 MG/DL (ref 65–140)
HCT VFR BLD AUTO: 33.2 % (ref 34.8–46.1)
HGB BLD-MCNC: 9.7 G/DL (ref 11.5–15.4)
INR PPP: 1.91 (ref 0.84–1.19)
MCH RBC QN AUTO: 24.1 PG (ref 26.8–34.3)
MCHC RBC AUTO-ENTMCNC: 29.2 G/DL (ref 31.4–37.4)
MCV RBC AUTO: 82 FL (ref 82–98)
PLATELET # BLD AUTO: 317 THOUSANDS/UL (ref 149–390)
PMV BLD AUTO: 10.1 FL (ref 8.9–12.7)
POTASSIUM SERPL-SCNC: 3.5 MMOL/L (ref 3.5–5.3)
PROTHROMBIN TIME: 22.7 SECONDS (ref 11.6–14.5)
RBC # BLD AUTO: 4.03 MILLION/UL (ref 3.81–5.12)
SODIUM SERPL-SCNC: 143 MMOL/L (ref 135–147)
UNIT DISPENSE STATUS: NORMAL
UNIT PRODUCT CODE: NORMAL
UNIT PRODUCT VOLUME: 350 ML
UNIT RH: NORMAL
WBC # BLD AUTO: 4.41 THOUSAND/UL (ref 4.31–10.16)

## 2023-12-22 PROCEDURE — 99233 SBSQ HOSP IP/OBS HIGH 50: CPT | Performed by: INTERNAL MEDICINE

## 2023-12-22 PROCEDURE — 82948 REAGENT STRIP/BLOOD GLUCOSE: CPT

## 2023-12-22 PROCEDURE — 85610 PROTHROMBIN TIME: CPT | Performed by: PHYSICIAN ASSISTANT

## 2023-12-22 PROCEDURE — 85730 THROMBOPLASTIN TIME PARTIAL: CPT | Performed by: INTERNAL MEDICINE

## 2023-12-22 PROCEDURE — 85027 COMPLETE CBC AUTOMATED: CPT | Performed by: PHYSICIAN ASSISTANT

## 2023-12-22 PROCEDURE — 80048 BASIC METABOLIC PNL TOTAL CA: CPT | Performed by: PHYSICIAN ASSISTANT

## 2023-12-22 RX ORDER — WARFARIN SODIUM 5 MG/1
10 TABLET ORAL
Status: COMPLETED | OUTPATIENT
Start: 2023-12-22 | End: 2023-12-22

## 2023-12-22 RX ORDER — WARFARIN SODIUM 7.5 MG/1
7.5 TABLET ORAL
Status: DISCONTINUED | OUTPATIENT
Start: 2023-12-23 | End: 2023-12-23 | Stop reason: HOSPADM

## 2023-12-22 RX ADMIN — GABAPENTIN 300 MG: 300 CAPSULE ORAL at 08:27

## 2023-12-22 RX ADMIN — HEPARIN SODIUM 9.8 UNITS/KG/HR: 10000 INJECTION, SOLUTION INTRAVENOUS at 12:24

## 2023-12-22 RX ADMIN — METOPROLOL TARTRATE 25 MG: 25 TABLET, FILM COATED ORAL at 08:27

## 2023-12-22 RX ADMIN — ASPIRIN 81 MG: 81 TABLET, COATED ORAL at 08:27

## 2023-12-22 RX ADMIN — WARFARIN SODIUM 10 MG: 5 TABLET ORAL at 17:35

## 2023-12-22 RX ADMIN — GABAPENTIN 300 MG: 300 CAPSULE ORAL at 20:29

## 2023-12-22 RX ADMIN — Medication 2000 UNITS: at 08:27

## 2023-12-22 RX ADMIN — INSULIN DETEMIR 26 UNITS: 100 INJECTION, SOLUTION SUBCUTANEOUS at 22:21

## 2023-12-22 RX ADMIN — METOPROLOL TARTRATE 25 MG: 25 TABLET, FILM COATED ORAL at 20:28

## 2023-12-22 RX ADMIN — ATORVASTATIN CALCIUM 40 MG: 40 TABLET, FILM COATED ORAL at 17:35

## 2023-12-22 RX ADMIN — LEVOTHYROXINE SODIUM 50 MCG: 0.05 TABLET ORAL at 08:27

## 2023-12-22 NOTE — ASSESSMENT & PLAN NOTE
Recent Labs     12/21/23  0447 12/22/23  0502 12/23/23  0517   INR 1.81* 1.91* 2.25*       History of mechanical valve replacement in 2007  Maintained on Coumadin typically with a goal INR of 2.5-3.5  Coumadin restarted, continue Heparin drip bridge  Coumadin one time dose of 10 mg to help get closer to bridge

## 2023-12-22 NOTE — PROGRESS NOTES
Pending sale to [facility]   Progress Note  Name: Ely Hernadez I  MRN: [id number]  Unit/Bed#: MS [facility]iz I Date of Admission: 12/14/2023   Date of Service: 12/22/2023 I Hospital Day: 8    * Acute blood loss anemia  Assessment & Plan  Recent Labs     12/20/23  1205 12/21/23  0447 12/22/23  0502   HGB 7.3* 7.3* 9.7*       Patient presented to the ED with a hemoglobin of 5.5 on outpatient labs; referred to the ER by cardiology.  Does note melena 1 week prior to admission. Otherwise, no sign of bleeding.  Hemoglobin on 10/31 noted to be 12.7  Patient received 2 units PRBC, 1 U FFP; will give another 1u PRBCs 12/21  GI consult, appreciate input  EGD (12/15/23): The esophagus, stomach, duodenum and proximal jejunum appeared normal.   Colonoscopy (12/19/23): No blood or source of anemia seen. Diverticulosis of moderate severity causing moderate luminal narrowing in the sigmoid colon. 2 subcentimeter polyps in the cecum and ascending colon  OK to restart anticoagulation as of 12/18  Monitor for any signs of blood in stool  Iron panel shows RHEA, however taken s/p transfusion; s/p Venofer 300 mg x1 on 12/19    H/O mechanical aortic valve replacement  Assessment & Plan  Recent Labs     12/20/23  0528 12/21/23  0447 12/22/23  0502   INR 1.88* 1.81* 1.91*       History of mechanical valve replacement in 2007  Maintained on Coumadin typically with a goal INR of 2.5-3.5  Coumadin restarted, continue Heparin drip bridge  Coumadin one time dose of 10 mg to help get closer to bridge    Paroxysmal atrial fibrillation (HCC)  Assessment & Plan  Chronic, rate controlled on lopressor     Stage 3a chronic kidney disease  Assessment & Plan  Recent Labs     12/22/23  0502   CREATININE 0.97   EGFR 54     Estimated Creatinine Clearance: 43.7 mL/min (by C-G formula based on SCr of 0.97 mg/dL).    Currently at/below baseline  Avoid nephrotoxins, hypotension  Monitor BMP    Cardiomyopathy (HCC)  Assessment & Plan  Most recent  echocardiogram with an EF of 45% with grade 1 diastolic dysfunction  Resume 40 mg Lasix daily 12/18    AVM (arteriovenous malformation) of small bowel, acquired with hemorrhage  Assessment & Plan  History from 2018  She is now s/p EGD with push enteroscopy; which was normal on 12/15    Diabetes mellitus with neurological manifestation (HCC)  Assessment & Plan  Lab Results   Component Value Date    HGBA1C 8.7 (H) 10/28/2023     Recent Labs     12/21/23  2040 12/22/23  0730 12/22/23  1048   POCGLU 143* 98 114       Chronic, uncontrolled, as evidenced by a hemoglobin A1C of 8.7  Hold home oral anti-diabetics; continue levemir 26 u HS   Continue with insulin sliding scale  Monitor blood glucose AC/HS  Hypoglycemia protocol    Coronary artery disease of native artery of native heart with stable angina pectoris (HCC)  Assessment & Plan  History of CAD, no chest pain   Continue Aspirin, Lipitor, and Lopressor         VTE Pharmacologic Prophylaxis: VTE Score: 4 Moderate Risk (Score 3-4) - Pharmacological DVT Prophylaxis Ordered: heparin drip.    Mobility:   Basic Mobility Inpatient Raw Score: 20  JH-HLM Goal: 6: Walk 10 steps or more  JH-HLM Achieved: 6: Walk 10 steps or more  HLM Goal achieved. Continue to encourage appropriate mobility.    Patient Centered Rounds: I performed bedside rounds with nursing staff today.  Discussions with Specialists or Other Care Team Provider:  IP CONSULT TO GASTROENTEROLOGY  IP CONSULT TO CARDIOLOGY      Education and Discussions with Family / Patient: patient     Total Time Spent on Date of Encounter in care of patient: 30+ mins. This time was spent on one or more of the following: performing physical exam; counseling and coordination of care; obtaining or reviewing history; documenting in the medical record; reviewing/ordering tests, medications or procedures; communicating with other healthcare professionals and discussing with patient's family/caregivers.    Current Length of Stay: 8  day(s)  Current Patient Status: Inpatient   Certification Statement: The patient will continue to require additional inpatient hospital stay due to plan as noted above  Discharge Plan: Anticipate discharge tomorrow to home.    Code Status: Level 1 - Full Code    Subjective:   Eager to be discharged but understanding of plan. Offers no new complaints at this time. No acute events reported overnight. Understanding of plan.  All questions answered.    Objective:     Vitals:   Temp (24hrs), Av.7 °F (37.1 °C), Min:98.4 °F (36.9 °C), Max:99 °F (37.2 °C)    Temp:  [98.4 °F (36.9 °C)-99 °F (37.2 °C)] 98.4 °F (36.9 °C)  HR:  [60] 60  Resp:  [16-18] 16  BP: (146)/(56-60) 146/60  SpO2:  [94 %-97 %] 94 %  Body mass index is 26.45 kg/m².     Input and Output Summary (last 24 hours):     Intake/Output Summary (Last 24 hours) at 2023 1525  Last data filed at 2023 0730  Gross per 24 hour   Intake 120 ml   Output --   Net 120 ml       Physical Exam:   Physical Exam  Vitals and nursing note reviewed.   Constitutional:       General: She is not in acute distress.     Appearance: Normal appearance. She is not ill-appearing or toxic-appearing.   HENT:      Head: Normocephalic and atraumatic.   Eyes:      General: No scleral icterus.     Conjunctiva/sclera: Conjunctivae normal.   Cardiovascular:      Rate and Rhythm: Normal rate and regular rhythm.      Pulses: Normal pulses.      Heart sounds: Normal heart sounds.   Pulmonary:      Effort: Pulmonary effort is normal. No respiratory distress.      Breath sounds: Normal breath sounds. No wheezing.   Abdominal:      General: Bowel sounds are normal. There is no distension.      Palpations: Abdomen is soft.      Tenderness: There is no abdominal tenderness.   Musculoskeletal:         General: No swelling or tenderness.   Skin:     General: Skin is warm and dry.      Coloration: Skin is pale.      Findings: No bruising, erythema or rash.   Neurological:      Mental Status:  She is alert and oriented to person, place, and time.   Psychiatric:         Mood and Affect: Mood normal.         Behavior: Behavior normal.          Additional Data:     Labs:  Results from last 7 days   Lab Units 12/22/23  0502   WBC Thousand/uL 4.41   HEMOGLOBIN g/dL 9.7*   HEMATOCRIT % 33.2*   PLATELETS Thousands/uL 317     Results from last 7 days   Lab Units 12/22/23  0502   SODIUM mmol/L 143   POTASSIUM mmol/L 3.5   CHLORIDE mmol/L 107   CO2 mmol/L 31   BUN mg/dL 10   CREATININE mg/dL 0.97   ANION GAP mmol/L 5   CALCIUM mg/dL 8.8   GLUCOSE RANDOM mg/dL 75     Results from last 7 days   Lab Units 12/22/23  0502   INR  1.91*     Results from last 7 days   Lab Units 12/22/23  1048 12/22/23  0730 12/21/23  2040 12/21/23  1547 12/21/23  1053 12/21/23  0715 12/20/23  2100 12/20/23  1556 12/20/23  1042 12/20/23  0712 12/19/23  2053 12/19/23  1556   POC GLUCOSE mg/dl 114 98 143* 148* 134 115 125 143* 137 114 135 170*               Lines/Drains:  Invasive Devices       Peripheral Intravenous Line  Duration             Peripheral IV 12/20/23 Distal;Dorsal (posterior);Right Forearm 1 day    Peripheral IV 12/21/23 Right;Ventral (anterior) Forearm 1 day                          Imaging: Reviewed radiology reports from this admission including:   Colonoscopy    Result Date: 12/18/2023  Impression: No blood or source of anemia seen in this colonoscopy. Diverticulosis of moderate severity causing moderate luminal narrowing in the sigmoid colon 2 subcentimeter polyps in the cecum and ascending colon RECOMMENDATION:  No further screening colonoscopies necessary  Age greater than 65   Okay to restart anticoagulation.  Outpatient capsule endoscopy.  She will need outpatient hematology follow-up.  If patient's hemoglobin stable tomorrow, then can discharge.  Gastroenterology will sign off.  Please call with questions.    Cierra Pfeiffer MD     EGD with Push Enteroscopy    Addendum Date: 12/15/2023     [facility]  Endoscopy [address] [facility] [address] [phone] DATE OF SERVICE: 12/15/23 PHYSICIAN(S): Attending: Edson Queen MD Fellow: No Staff Documented INDICATION: Iron deficiency, AVM (arteriovenous malformation) of small bowel, acquired with hemorrhage POST-OP DIAGNOSIS: See the impression below. PREPROCEDURE: Informed consent was obtained for the procedure, including sedation.  Risks of perforation, hemorrhage, adverse drug reaction and aspiration were discussed. The patient was placed in the left lateral decubitus position. Patient was explained about the risks and benefits of the procedure. Risks including but not limited to bleeding, infection, and perforation were explained in detail. Also explained about less than 100% sensitivity with the exam and other alternatives. PROCEDURE: EGD DETAILS OF PROCEDURE: Patient was taken to the procedure room where a time out was performed to confirm correct patient and correct procedure. The patient underwent monitored anesthesia care, which was administered by an anesthesia professional. The patient's blood pressure, ECG, heart rate, level of consciousness, oxygen and respirations were monitored throughout the procedure. The scope was introduced through the mouth and advanced to the jejunum. Fluoroscopy was not used. The patient experienced no blood loss. The procedure was not difficult. The patient tolerated the procedure well. There were no apparent adverse events. ANESTHESIA INFORMATION: ASA: III Anesthesia Type: Anesthesia type not filed in the log. MEDICATIONS: simethicone (MYLICON) 40 mg in sterile water 1,000 mL 40 mg (Totals for administrations occurring from 1437 to 1458 on 12/15/23) FINDINGS: The esophagus, stomach, duodenum and proximal jejunum appeared normal. No signs of AVMs. SPECIMENS: * No specimens in log * IMPRESSION: The esophagus, stomach, duodenum and proximal jejunum appeared normal. RECOMMENDATION:  Follow up with me in clinic If patient's hgb  is stable, we can follow up closely as outpatient for colonoscopy and capsule endoscopy.  Resume coumadin today.  Edson Queen MD     Result Date: 12/15/2023  Impression: The esophagus, stomach, duodenum and proximal jejunum appeared normal. RECOMMENDATION:  Follow up with me in clinic If patient's hgb is stable, we can follow up closely as outpatient for colonoscopy and capsule endoscopy.   Edson Queen MD     XR chest 2 views    Result Date: 12/15/2023  Impression: Probable trace costophrenic angle effusions. Otherwise unremarkable examination. Workstation performed: [id number]       No Chest XR results available for this patient.     Results for orders placed during the hospital encounter of 10/28/23    Echo complete w/ contrast if indicated    Interpretation Summary    Left Ventricle: Left ventricular cavity size is normal. Wall thickness is moderately increased. The left ventricular ejection fraction is 45%. Systolic function is mildly reduced. There is mild global hypokinesis with regional variation. Diastolic function is mildly abnormal, consistent with grade I (abnormal) relaxation.    IVS: There is abnormal septal motion consistent with post-operative status.    Left Atrium: The atrium is mildly dilated.    Aortic Valve: The aortic valve was not well visualized. There is a mechanical valve. The prosthetic valve appears well-seated. There is trace paravalvular regurgitation. There is no evidence of transvalvular regurgitation. The gradient recorded across the prosthetic aortic valve is within the expected range. The aortic valve mean gradient is 7 mmHg.    Mitral Valve: There is annular calcification. There is mild regurgitation.      Recent Cultures (last 7 days):         Last 24 Hours Medication List:   Current Facility-Administered Medications   Medication Dose Route Frequency Provider Last Rate    aspirin  81 mg Oral Daily CAMACHO Phan      atorvastatin  40 mg Oral QPM CAMACHO Phan       cholecalciferol  2,000 Units Oral Daily CAMACHO Phan      furosemide  40 mg Oral Daily CAMACHO Phan      gabapentin  300 mg Oral BID CAMACHO Phan      heparin (porcine)  3-20 Units/kg/hr (Order-Specific) Intravenous Titrated CAMACHO Phan 9.8 Units/kg/hr (12/22/23 1224)    insulin detemir  26 Units Subcutaneous HS CAMACHO Phan      insulin lispro  1-5 Units Subcutaneous TID AC LucilaCAMACHO Renteria      insulin lispro  1-6 Units Subcutaneous HS LucilaCAMACHO Renteria      levothyroxine  50 mcg Oral Daily CAMACHO Phan      metoprolol tartrate  25 mg Oral Q12H [facility] CAMACHO Phan      warfarin  10 mg Oral Once (warfarin) Mo Cornejo DO      [START ON 12/23/2023] warfarin  7.5 mg Oral Daily (warfarin) Mo Cornejo DO          Today, Patient Was Seen By: Mo Cornejo DO    **Please Note: This note may have been constructed using a voice recognition system.**

## 2023-12-22 NOTE — ASSESSMENT & PLAN NOTE
Recent Labs     12/21/23  0447 12/22/23  0502 12/23/23  0517   HGB 7.3* 9.7* 8.9*       Patient presented to the ED with a hemoglobin of 5.5 on outpatient labs; referred to the ER by cardiology.  Does note melena 1 week prior to admission. Otherwise, no sign of bleeding.  Hemoglobin on 10/31 noted to be 12.7  Patient received 2 units PRBC, 1 U FFP; will give another 1u PRBCs 12/21  GI consult, appreciate input  EGD (12/15/23): The esophagus, stomach, duodenum and proximal jejunum appeared normal.   Colonoscopy (12/19/23): No blood or source of anemia seen. Diverticulosis of moderate severity causing moderate luminal narrowing in the sigmoid colon. 2 subcentimeter polyps in the cecum and ascending colon  OK to restart anticoagulation as of 12/18  Monitor for any signs of blood in stool  Iron panel shows RHEA, however taken s/p transfusion; s/p Venofer 300 mg x1 on 12/19  Recheck CBC 12/26

## 2023-12-22 NOTE — PLAN OF CARE
Problem: PAIN - ADULT  Goal: Verbalizes/displays adequate comfort level or baseline comfort level  Description: Interventions:  - Encourage patient to monitor pain and request assistance  - Assess pain using appropriate pain scale  - Administer analgesics based on type and severity of pain and evaluate response  - Implement non-pharmacological measures as appropriate and evaluate response  - Consider cultural and social influences on pain and pain management  - Notify physician/advanced practitioner if interventions unsuccessful or patient reports new pain  Outcome: Progressing     Problem: INFECTION - ADULT  Goal: Absence or prevention of progression during hospitalization  Description: INTERVENTIONS:  - Assess and monitor for signs and symptoms of infection  - Monitor lab/diagnostic results  - Monitor all insertion sites, i.e. indwelling lines, tubes, and drains  - Monitor endotracheal if appropriate and nasal secretions for changes in amount and color  - [place] appropriate cooling/warming therapies per order  - Administer medications as ordered  - Instruct and encourage patient and family to use good hand hygiene technique  - Identify and instruct in appropriate isolation precautions for identified infection/condition  Outcome: Progressing

## 2023-12-22 NOTE — CASE MANAGEMENT
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    Paroxysmal atrial fibrillation ([facility]) 2022    Primary osteoarthritis involving multiple joints 2021    Stage 3a chronic kidney disease 2020    Chronic kidney disease-mineral and bone disorder 2020    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    Cardiomyopathy ([facility]) 2020    Angioedema 2019    Other vascular disorders of intestine ([facility]) 2019    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    AVM (arteriovenous malformation) of small bowel, acquired with hemorrhage 2018    GERD (gastroesophageal reflux disease) 2018    Hyperlipidemia 2018    Chronic anticoagulation 2018    Hypertension, essential 2018    Iron deficiency 2017    Coronary artery disease of native artery of native heart with stable angina pectoris ([facility]) 2015    Hypothyroidism 2014    Chronic obstructive pulmonary disease ([facility]) 2014    Diabetes mellitus with neurological manifestation ([facility]) 2014    Aortic valve replaced 2007      LOS (days): 8  Geometric Mean LOS (GMLOS) (days): 2.8  Days to GMLOS:-5     OBJECTIVE:  Risk of Unplanned Readmission Score: 23.18         Current admission status: Inpatient   Preferred Pharmacy:   [id number]  [place], [place] [address]  [facility] [place] [address]  Phone: [phone] Fax: [phone]    [facility] - [place], [place] - [address]  [address]  [address] [address]  Phone: [phone] Fax: [phone]    Primary Care Provider: Aviva Mccabe PA-C    Primary Insurance: AARP  REP  Secondary Insurance:     DISCHARGE DETAILS:             Other Referral/Resources/Interventions Provided:  Referral Comments: Pt aware of [employer] Appeal rights

## 2023-12-22 NOTE — ASSESSMENT & PLAN NOTE
Recent Labs     12/22/23  0502   CREATININE 0.97   EGFR 54     Estimated Creatinine Clearance: 43.7 mL/min (by C-G formula based on SCr of 0.97 mg/dL).    F/u outpatient

## 2023-12-22 NOTE — ASSESSMENT & PLAN NOTE
Lab Results   Component Value Date    HGBA1C 8.7 (H) 10/28/2023     Recent Labs     12/22/23  1654 12/22/23  2044 12/23/23  0811   POCGLU 123 149* 84       Resume home regimen

## 2023-12-23 VITALS
DIASTOLIC BLOOD PRESSURE: 58 MMHG | OXYGEN SATURATION: 95 % | HEIGHT: 65 IN | RESPIRATION RATE: 18 BRPM | BODY MASS INDEX: 26.48 KG/M2 | WEIGHT: 158.95 LBS | HEART RATE: 67 BPM | TEMPERATURE: 98.4 F | SYSTOLIC BLOOD PRESSURE: 151 MMHG

## 2023-12-23 PROBLEM — R79.1 SUBTHERAPEUTIC INTERNATIONAL NORMALIZED RATIO (INR): Status: ACTIVE | Noted: 2023-12-23

## 2023-12-23 PROBLEM — R04.0 ACUTE ANTERIOR EPISTAXIS: Status: ACTIVE | Noted: 2023-12-23

## 2023-12-23 LAB
APTT PPP: 49 SECONDS (ref 23–37)
ERYTHROCYTE [DISTWIDTH] IN BLOOD BY AUTOMATED COUNT: 19.9 % (ref 11.6–15.1)
GLUCOSE SERPL-MCNC: 84 MG/DL (ref 65–140)
HCT VFR BLD AUTO: 30 % (ref 34.8–46.1)
HGB BLD-MCNC: 8.9 G/DL (ref 11.5–15.4)
INR PPP: 2.25 (ref 0.84–1.19)
MCH RBC QN AUTO: 24.7 PG (ref 26.8–34.3)
MCHC RBC AUTO-ENTMCNC: 29.7 G/DL (ref 31.4–37.4)
MCV RBC AUTO: 83 FL (ref 82–98)
PLATELET # BLD AUTO: 296 THOUSANDS/UL (ref 149–390)
PMV BLD AUTO: 10.6 FL (ref 8.9–12.7)
PROTHROMBIN TIME: 25.7 SECONDS (ref 11.6–14.5)
RBC # BLD AUTO: 3.61 MILLION/UL (ref 3.81–5.12)
WBC # BLD AUTO: 3.71 THOUSAND/UL (ref 4.31–10.16)

## 2023-12-23 PROCEDURE — 85730 THROMBOPLASTIN TIME PARTIAL: CPT | Performed by: INTERNAL MEDICINE

## 2023-12-23 PROCEDURE — 85610 PROTHROMBIN TIME: CPT | Performed by: PHYSICIAN ASSISTANT

## 2023-12-23 PROCEDURE — 85027 COMPLETE CBC AUTOMATED: CPT | Performed by: PHYSICIAN ASSISTANT

## 2023-12-23 PROCEDURE — 82948 REAGENT STRIP/BLOOD GLUCOSE: CPT

## 2023-12-23 PROCEDURE — 99239 HOSP IP/OBS DSCHRG MGMT >30: CPT | Performed by: INTERNAL MEDICINE

## 2023-12-23 RX ORDER — ENOXAPARIN SODIUM 100 MG/ML
1 INJECTION SUBCUTANEOUS EVERY 12 HOURS SCHEDULED
Qty: 10 ML | Refills: 0 | Status: CANCELLED | OUTPATIENT
Start: 2023-12-23 | End: 2023-12-29

## 2023-12-23 RX ORDER — ENOXAPARIN SODIUM 100 MG/ML
1 INJECTION SUBCUTANEOUS EVERY 12 HOURS SCHEDULED
Qty: 10 ML | Refills: 0 | Status: SHIPPED | OUTPATIENT
Start: 2023-12-23 | End: 2023-12-23

## 2023-12-23 RX ORDER — ENOXAPARIN SODIUM 100 MG/ML
1 INJECTION SUBCUTANEOUS EVERY 12 HOURS SCHEDULED
Status: DISCONTINUED | OUTPATIENT
Start: 2023-12-23 | End: 2023-12-23 | Stop reason: HOSPADM

## 2023-12-23 RX ORDER — ENOXAPARIN SODIUM 100 MG/ML
80 INJECTION SUBCUTANEOUS EVERY 12 HOURS SCHEDULED
Qty: 16 ML | Refills: 0 | Status: SHIPPED | OUTPATIENT
Start: 2023-12-23 | End: 2024-01-02

## 2023-12-23 RX ADMIN — ENOXAPARIN SODIUM 70 MG: 80 INJECTION SUBCUTANEOUS at 08:47

## 2023-12-23 RX ADMIN — METOPROLOL TARTRATE 25 MG: 25 TABLET, FILM COATED ORAL at 08:46

## 2023-12-23 RX ADMIN — LEVOTHYROXINE SODIUM 50 MCG: 0.05 TABLET ORAL at 08:46

## 2023-12-23 RX ADMIN — GABAPENTIN 300 MG: 300 CAPSULE ORAL at 08:46

## 2023-12-23 RX ADMIN — Medication 2000 UNITS: at 08:46

## 2023-12-23 RX ADMIN — ASPIRIN 81 MG: 81 TABLET, COATED ORAL at 08:46

## 2023-12-23 NOTE — DISCHARGE SUMMARY
[facility]   Discharge - Name: Ely Hernadez I  MRN: [id number]  Unit/Bed#: MS [facility] I Date of Admission: 12/14/2023   Date of Service: 12/23/2023 I Hospital Day: 9    * Acute blood loss anemia  Assessment & Plan  Recent Labs     12/21/23  0447 12/22/23  0502 12/23/23  0517   HGB 7.3* 9.7* 8.9*       Patient presented to the ED with a hemoglobin of 5.5 on outpatient labs; referred to the ER by cardiology.  Does note melena 1 week prior to admission. Otherwise, no sign of bleeding.  Hemoglobin on 10/31 noted to be 12.7  Patient received 2 units PRBC, 1 U FFP; will give another 1u PRBCs 12/21  GI consult, appreciate input  EGD (12/15/23): The esophagus, stomach, duodenum and proximal jejunum appeared normal.   Colonoscopy (12/19/23): No blood or source of anemia seen. Diverticulosis of moderate severity causing moderate luminal narrowing in the sigmoid colon. 2 subcentimeter polyps in the cecum and ascending colon  OK to restart anticoagulation as of 12/18  Monitor for any signs of blood in stool  Iron panel shows RHEA, however taken s/p transfusion; s/p Venofer 300 mg x1 on 12/19  Recheck CBC 12/26    H/O mechanical aortic valve replacement  Assessment & Plan  Recent Labs     12/21/23  0447 12/22/23  0502 12/23/23  0517   INR 1.81* 1.91* 2.25*       History of mechanical valve replacement in 2007  Maintained on Coumadin typically with a goal INR of 2.5-3.5  Coumadin restarted, continue Heparin drip bridge  Coumadin one time dose of 10 mg to help get closer to bridge    Acute anterior epistaxis  Assessment & Plan  Transient episode of acute epistaxis occurred this AM  Occurred due to digital trauma as patient was trying to clean nose; unlikely to be result of blood thinning meds as PT/INR is subtherapeutic    Now has stopped; supportive care with reccs to lean forward  Recommended to monitor for further episodes of epistaxis now that patient is on warfarin with lovenox  bridge    Subtherapeutic international normalized ratio (INR)  Assessment & Plan  Recent Labs     12/21/23  0447 12/22/23  0502 12/23/23  0517   INR 1.81* 1.91* 2.25*     INR recheck 12/26  Lovenox bridge until recheck results finalized  Warfarin 7 mg for 2 days then standard regimen; follow up with pcp in 1 week    Paroxysmal atrial fibrillation (HCC)  Assessment & Plan  Chronic, rate controlled on lopressor     Stage 3a chronic kidney disease  Assessment & Plan  Recent Labs     12/22/23  0502   CREATININE 0.97   EGFR 54     Estimated Creatinine Clearance: 43.7 mL/min (by C-G formula based on SCr of 0.97 mg/dL).    F/u outpatient    Cardiomyopathy ([facility])  Assessment & Plan  Most recent echocardiogram with an EF of 45% with grade 1 diastolic dysfunction  Resume 40 mg Lasix daily 12/18    AVM (arteriovenous malformation) of small bowel, acquired with hemorrhage  Assessment & Plan  History from 2018  She is now s/p EGD with push enteroscopy; which was normal on 12/15    Diabetes mellitus with neurological manifestation ([facility])  Assessment & Plan  Lab Results   Component Value Date    HGBA1C 8.7 (H) 10/28/2023     Recent Labs     12/22/23  1654 12/22/23  2044 12/23/23  0811   POCGLU 123 149* 84       Resume home regimen    Coronary artery disease of native artery of native heart with stable angina pectoris ([facility])  Assessment & Plan  History of CAD, no chest pain   Continue Aspirin, Lipitor, and Lopressor           Medical Problems       Resolved Problems  Date Reviewed: 12/23/2023   None         Discharging Physician / Practitioner: Mo Cornejo DO  PCP: Aviva Mccabe PA-C  Admission Date:   Admission Orders (From admission, onward)       Ordered        12/14/23 1427  INPATIENT ADMISSION  Once                          Discharge Date: 12/23/23    Consultations During Hospital Stay:  IP CONSULT TO GASTROENTEROLOGY  IP CONSULT TO CARDIOLOGY    Procedures Performed:   None    Significant Findings / Test Results:    Colonoscopy    Result Date: 12/18/2023  Impression: No blood or source of anemia seen in this colonoscopy. Diverticulosis of moderate severity causing moderate luminal narrowing in the sigmoid colon 2 subcentimeter polyps in the cecum and ascending colon RECOMMENDATION:  No further screening colonoscopies necessary  Age greater than 65   Okay to restart anticoagulation.  Outpatient capsule endoscopy.  She will need outpatient hematology follow-up.  If patient's hemoglobin stable tomorrow, then can discharge.  Gastroenterology will sign off.  Please call with questions.    Cierra Pfeiffer MD     EGD with Push Enteroscopy    Addendum Date: 12/15/2023     [facility] [address] [facility] [address] [phone] DATE OF SERVICE: 12/15/23 PHYSICIAN(S): Attending: Edson Queen MD Fellow: No Staff Documented INDICATION: Iron deficiency, AVM (arteriovenous malformation) of small bowel, acquired with hemorrhage POST-OP DIAGNOSIS: See the impression below. PREPROCEDURE: Informed consent was obtained for the procedure, including sedation.  Risks of perforation, hemorrhage, adverse drug reaction and aspiration were discussed. The patient was placed in the left lateral decubitus position. Patient was explained about the risks and benefits of the procedure. Risks including but not limited to bleeding, infection, and perforation were explained in detail. Also explained about less than 100% sensitivity with the exam and other alternatives. PROCEDURE: EGD DETAILS OF PROCEDURE: Patient was taken to the procedure room where a time out was performed to confirm correct patient and correct procedure. The patient underwent monitored anesthesia care, which was administered by an anesthesia professional. The patient's blood pressure, ECG, heart rate, level of consciousness, oxygen and respirations were monitored throughout the procedure. The scope was introduced through the mouth and advanced to the jejunum.  Fluoroscopy was not used. The patient experienced no blood loss. The procedure was not difficult. The patient tolerated the procedure well. There were no apparent adverse events. ANESTHESIA INFORMATION: ASA: III Anesthesia Type: Anesthesia type not filed in the log. MEDICATIONS: simethicone (MYLICON) 40 mg in sterile water 1,000 mL 40 mg (Totals for administrations occurring from 1437 to 1458 on 12/15/23) FINDINGS: The esophagus, stomach, duodenum and proximal jejunum appeared normal. No signs of AVMs. SPECIMENS: * No specimens in log * IMPRESSION: The esophagus, stomach, duodenum and proximal jejunum appeared normal. RECOMMENDATION:  Follow up with me in clinic If patient's hgb is stable, we can follow up closely as outpatient for colonoscopy and capsule endoscopy.  Resume coumadin today.  Edson Queen MD     Result Date: 12/15/2023  Impression: The esophagus, stomach, duodenum and proximal jejunum appeared normal. RECOMMENDATION:  Follow up with me in clinic If patient's hgb is stable, we can follow up closely as outpatient for colonoscopy and capsule endoscopy.   Edson Queen MD     XR chest 2 views    Result Date: 12/15/2023  Impression: Probable trace costophrenic angle effusions. Otherwise unremarkable examination. Workstation performed: [id number]       No Chest XR results available for this patient.   Results for orders placed during the hospital encounter of 10/28/23    Echo complete w/ contrast if indicated    Interpretation Summary    Left Ventricle: Left ventricular cavity size is normal. Wall thickness is moderately increased. The left ventricular ejection fraction is 45%. Systolic function is mildly reduced. There is mild global hypokinesis with regional variation. Diastolic function is mildly abnormal, consistent with grade I (abnormal) relaxation.    IVS: There is abnormal septal motion consistent with post-operative status.    Left Atrium: The atrium is mildly dilated.    Aortic Valve: The aortic valve  "was not well visualized. There is a mechanical valve. The prosthetic valve appears well-seated. There is trace paravalvular regurgitation. There is no evidence of transvalvular regurgitation. The gradient recorded across the prosthetic aortic valve is within the expected range. The aortic valve mean gradient is 7 mmHg.    Mitral Valve: There is annular calcification. There is mild regurgitation.      No results for input(s): \"BLOODCX\", \"SPUTUMCULTUR\", \"GRAMSTAIN\", \"URINECX\", \"WOUNDCULT\", \"BODYFLUIDCUL\", \"MRSACULTURE\", \"INFLUAPCR\", \"INFLUBPCR\", \"RSVPCR\", \"LEGIONELLAUR\", \"STPU\", \"CDIFFTOXINB\" in the last 72 hours.    Incidental Findings:   None other than noted above;I reviewed the above mentioned incidental findings with the patient and/or family and they expressed understanding.    Test Results Pending at Discharge (will require follow up):   None     Outpatient Tests Requested:  CBC and INR 12/26    Complications:  None    Reason for Admission:   Chief Complaint   Patient presents with    Abnormal Lab     HGB 5.6, sent for transfusion, pt appears pale          Hospital Course:   Ely Hernadez is a 83 y.o. female patient who originally presented to the hospital on 12/14/2023 due to acute blood loss anemia.  Patient is currently on warfarin and that was held on admission.  GI was consulted for scoping and results are noted above.  Patient's hemoglobin remained stable postprocedure no further acute interventions were indicated.  Patient remains subtherapeutic INR having Coumadin multiple days.  Was placed on heparin drip and that the day of discharge INR remains subtherapeutic.  Patient requesting to be discharged and agreeable to be on Lovenox bridge as an outpatient with recommendations to follow-up with PCP and have INR and H&H obtained on 12/26 to determine if Lovenox bridge needs to be continued or if patient could resume home regimen of warfarin alone.  Understanding of plan and all questions " "answered.    Please see above list of diagnoses and related plan for additional information.     Condition at Discharge: stable    Discharge Day Visit / Exam:   Subjective:  Offers no new complaints at this time. No acute events reported overnight. This AM having epistaxis due to digital trauma while cleaning nose. Bleeding has stopped. Understanding of plan.  All questions answered.    Vitals: Blood Pressure: 152/59 (12/23/23 0808)  Pulse: 67 (12/23/23 0808)  Temperature: 98.1 °F (36.7 °C) (12/23/23 0808)  Temp Source: Oral (12/22/23 2152)  Respirations: 18 (12/22/23 2200)  Height: 5' 5\" (165.1 cm) (12/14/23 1740)  Weight - Scale: 72.1 kg (158 lb 15.2 oz) (12/14/23 1740)  SpO2: 95 % (12/23/23 0808)  Exam:   Physical Exam  Vitals and nursing note reviewed.   Constitutional:       General: She is not in acute distress.     Appearance: Normal appearance. She is not ill-appearing or toxic-appearing.   HENT:      Head: Normocephalic and atraumatic.      Nose: Nose normal.      Right Nostril: No foreign body, epistaxis, septal hematoma or occlusion.      Left Nostril: No foreign body, epistaxis, septal hematoma or occlusion.   Eyes:      General: No scleral icterus.     Conjunctiva/sclera: Conjunctivae normal.   Cardiovascular:      Rate and Rhythm: Normal rate and regular rhythm.      Pulses: Normal pulses.      Heart sounds: Normal heart sounds.   Pulmonary:      Effort: Pulmonary effort is normal. No respiratory distress.      Breath sounds: Normal breath sounds. No wheezing.   Abdominal:      General: Bowel sounds are normal. There is no distension.      Palpations: Abdomen is soft.      Tenderness: There is no abdominal tenderness.   Musculoskeletal:         General: No swelling or tenderness.   Skin:     General: Skin is warm and dry.      Coloration: Skin is pale.      Findings: No bruising, erythema or rash.   Neurological:      Mental Status: She is alert and oriented to person, place, and time.   Psychiatric:   "       Mood and Affect: Mood normal.         Behavior: Behavior normal.          Discussion with Family: Patient declined call to .     Discharge instructions/Information to patient and family:   See after visit summary for information provided to patient and family.      Provisions for Follow-Up Care:  See after visit summary for information related to follow-up care and any pertinent home health orders.       Mobility at time of Discharge:   Basic Mobility Inpatient Raw Score: 20  JH-HLM Goal: 6: Walk 10 steps or more  JH-HLM Achieved: 6: Walk 10 steps or more  HLM Goal achieved. Continue to encourage appropriate mobility.    Disposition:   Home    Planned Readmission: none     Discharge Statement:  I spent 37 minutes discharging the patient. This time was spent on the day of discharge. I had direct contact with the patient on the day of discharge. Greater than 50% of the total time was spent examining patient, answering all patient questions, arranging and discussing plan of care with patient as well as directly providing post-discharge instructions.  Additional time then spent on discharge activities.    Discharge Medications:  See after visit summary for reconciled discharge medications provided to patient and/or family.      **Please Note: This note may have been constructed using a voice recognition system**

## 2023-12-23 NOTE — PROGRESS NOTES
Patient discharged to home with son. VSS. Patient and son verbalized understanding of discharge instructions including diagnosis, medications, follow -up and plan of care. Patient able to demonstrate lovenox administration. [facility] pharmacy called and lovenox script clarified with SLIM and in stock. Patient given lab scripts for follow-up .

## 2023-12-23 NOTE — PLAN OF CARE
Problem: PAIN - ADULT  Goal: Verbalizes/displays adequate comfort level or baseline comfort level  Description: Interventions:  - Encourage patient to monitor pain and request assistance  - Assess pain using appropriate pain scale  - Administer analgesics based on type and severity of pain and evaluate response  - Implement non-pharmacological measures as appropriate and evaluate response  - Consider cultural and social influences on pain and pain management  - Notify physician/advanced practitioner if interventions unsuccessful or patient reports new pain  Outcome: Progressing     Problem: INFECTION - ADULT  Goal: Absence or prevention of progression during hospitalization  Description: INTERVENTIONS:  - Assess and monitor for signs and symptoms of infection  - Monitor lab/diagnostic results  - Monitor all insertion sites, i.e. indwelling lines, tubes, and drains  - Monitor endotracheal if appropriate and nasal secretions for changes in amount and color  - [place] appropriate cooling/warming therapies per order  - Administer medications as ordered  - Instruct and encourage patient and family to use good hand hygiene technique  - Identify and instruct in appropriate isolation precautions for identified infection/condition  Outcome: Progressing     Problem: Knowledge Deficit  Goal: Patient/family/caregiver demonstrates understanding of disease process, treatment plan, medications, and discharge instructions  Description: Complete learning assessment and assess knowledge base.  Interventions:  - Provide teaching at level of understanding  - Provide teaching via preferred learning methods  Outcome: Progressing     Problem: DISCHARGE PLANNING  Goal: Discharge to home or other facility with appropriate resources  Description: INTERVENTIONS:  - Identify barriers to discharge w/patient and caregiver  - Arrange for needed discharge resources and transportation as appropriate  - Identify discharge learning needs (meds,  wound care, etc.)  - Arrange for interpretive services to assist at discharge as needed  - Refer to Case Management Department for coordinating discharge planning if the patient needs post-hospital services based on physician/advanced practitioner order or complex needs related to functional status, cognitive ability, or social support system  Outcome: Progressing     Problem: CARDIOVASCULAR - ADULT  Goal: Maintains optimal cardiac output and hemodynamic stability  Description: INTERVENTIONS:  - Monitor I/O, vital signs and rhythm  - Monitor for S/S and trends of decreased cardiac output  - Administer and titrate ordered vasoactive medications to optimize hemodynamic stability  - Assess quality of pulses, skin color and temperature  - Assess for signs of decreased coronary artery perfusion  - Instruct patient to report change in severity of symptoms  Outcome: Progressing  Goal: Absence of cardiac dysrhythmias or at baseline rhythm  Description: INTERVENTIONS:  - Continuous cardiac monitoring, vital signs, obtain 12 lead EKG if ordered  - Administer antiarrhythmic and heart rate control medications as ordered  - Monitor electrolytes and administer replacement therapy as ordered  Outcome: Progressing     Problem: HEMATOLOGIC - ADULT  Goal: Maintains hematologic stability  Description: INTERVENTIONS  - Assess for signs and symptoms of bleeding or hemorrhage  - Monitor labs  - Administer supportive blood products/factors as ordered and appropriate  Outcome: Progressing

## 2023-12-23 NOTE — PLAN OF CARE
Problem: INFECTION - ADULT  Goal: Absence or prevention of progression during hospitalization  Description: INTERVENTIONS:  - Assess and monitor for signs and symptoms of infection  - Monitor lab/diagnostic results  - Monitor all insertion sites, i.e. indwelling lines, tubes, and drains  - Monitor endotracheal if appropriate and nasal secretions for changes in amount and color  - [place] appropriate cooling/warming therapies per order  - Administer medications as ordered  - Instruct and encourage patient and family to use good hand hygiene technique  - Identify and instruct in appropriate isolation precautions for identified infection/condition  Outcome: Progressing

## 2023-12-23 NOTE — ASSESSMENT & PLAN NOTE
Recent Labs     12/21/23  0447 12/22/23  0502 12/23/23  0517   INR 1.81* 1.91* 2.25*     INR recheck 12/26  Lovenox bridge until recheck results finalized  Warfarin 7 mg for 2 days then standard regimen; follow up with pcp in 1 week

## 2023-12-23 NOTE — ASSESSMENT & PLAN NOTE
Transient episode of acute epistaxis occurred this AM  Occurred due to digital trauma as patient was trying to clean nose; unlikely to be result of blood thinning meds as PT/INR is subtherapeutic    Now has stopped; supportive care with reccs to lean forward  Recommended to monitor for further episodes of epistaxis now that patient is on warfarin with lovenox bridge

## 2023-12-26 ENCOUNTER — TELEPHONE (OUTPATIENT)
Dept: LAB | Facility: HOSPITAL | Age: 83
End: 2023-12-26

## 2023-12-26 ENCOUNTER — TELEPHONE (OUTPATIENT)
Age: 83
End: 2023-12-26

## 2023-12-26 ENCOUNTER — TRANSITIONAL CARE MANAGEMENT (OUTPATIENT)
Age: 83
End: 2023-12-26

## 2023-12-26 DIAGNOSIS — Z71.89 COORDINATION OF COMPLEX CARE: Primary | ICD-10-CM

## 2023-12-26 DIAGNOSIS — J41.0 SIMPLE CHRONIC BRONCHITIS (HCC): Primary | ICD-10-CM

## 2023-12-26 DIAGNOSIS — D62 ACUTE BLOOD LOSS ANEMIA: ICD-10-CM

## 2023-12-26 DIAGNOSIS — Z79.01 CHRONIC ANTICOAGULATION: ICD-10-CM

## 2023-12-26 DIAGNOSIS — I25.118 CORONARY ARTERY DISEASE OF NATIVE ARTERY OF NATIVE HEART WITH STABLE ANGINA PECTORIS (HCC): ICD-10-CM

## 2023-12-26 NOTE — TELEPHONE ENCOUNTER
Discharge 12/23/23-Acute blood loss anemia     Pt c/o severe left arm pain related to multiple blood draw. Pain from elbow to fingertips, unable to move fingers, slight left wrist swelling.    Pt, due to fatigue, pain and difficulty with transport, is requesting home nursing services for eval and treat.  Please advise.

## 2023-12-26 NOTE — TELEPHONE ENCOUNTER
Called spoke to son and was notified and he is not able to get her out of house to get blood work for today

## 2023-12-28 ENCOUNTER — REMOTE DEVICE CLINIC VISIT (OUTPATIENT)
Dept: CARDIOLOGY CLINIC | Facility: CLINIC | Age: 83
End: 2023-12-28
Payer: COMMERCIAL

## 2023-12-28 ENCOUNTER — APPOINTMENT (OUTPATIENT)
Dept: LAB | Facility: HOSPITAL | Age: 83
End: 2023-12-28
Attending: INTERNAL MEDICINE
Payer: COMMERCIAL

## 2023-12-28 ENCOUNTER — TELEPHONE (OUTPATIENT)
Age: 83
End: 2023-12-28

## 2023-12-28 DIAGNOSIS — Z95.0 CARDIAC PACEMAKER IN SITU: Primary | ICD-10-CM

## 2023-12-28 DIAGNOSIS — R79.1 SUPRATHERAPEUTIC INR: ICD-10-CM

## 2023-12-28 DIAGNOSIS — D64.9 ANEMIA: ICD-10-CM

## 2023-12-28 DIAGNOSIS — Z95.2 H/O MECHANICAL AORTIC VALVE REPLACEMENT: ICD-10-CM

## 2023-12-28 DIAGNOSIS — R79.1 SUBTHERAPEUTIC INTERNATIONAL NORMALIZED RATIO (INR): ICD-10-CM

## 2023-12-28 DIAGNOSIS — I48.0 PAROXYSMAL ATRIAL FIBRILLATION (HCC): ICD-10-CM

## 2023-12-28 LAB
BASOPHILS # BLD AUTO: 0.04 THOUSANDS/ÂΜL (ref 0–0.1)
BASOPHILS NFR BLD AUTO: 1 % (ref 0–1)
EOSINOPHIL # BLD AUTO: 0.17 THOUSAND/ÂΜL (ref 0–0.61)
EOSINOPHIL NFR BLD AUTO: 4 % (ref 0–6)
ERYTHROCYTE [DISTWIDTH] IN BLOOD BY AUTOMATED COUNT: 22.4 % (ref 11.6–15.1)
HCT VFR BLD AUTO: 30.9 % (ref 34.8–46.1)
HGB BLD-MCNC: 9 G/DL (ref 11.5–15.4)
IMM GRANULOCYTES # BLD AUTO: 0.01 THOUSAND/UL (ref 0–0.2)
IMM GRANULOCYTES NFR BLD AUTO: 0 % (ref 0–2)
INR PPP: 3.69 (ref 0.84–1.19)
LYMPHOCYTES # BLD AUTO: 1.27 THOUSANDS/ÂΜL (ref 0.6–4.47)
LYMPHOCYTES NFR BLD AUTO: 29 % (ref 14–44)
MCH RBC QN AUTO: 25.3 PG (ref 26.8–34.3)
MCHC RBC AUTO-ENTMCNC: 29.1 G/DL (ref 31.4–37.4)
MCV RBC AUTO: 87 FL (ref 82–98)
MONOCYTES # BLD AUTO: 0.52 THOUSAND/ÂΜL (ref 0.17–1.22)
MONOCYTES NFR BLD AUTO: 12 % (ref 4–12)
NEUTROPHILS # BLD AUTO: 2.34 THOUSANDS/ÂΜL (ref 1.85–7.62)
NEUTS SEG NFR BLD AUTO: 54 % (ref 43–75)
NRBC BLD AUTO-RTO: 0 /100 WBCS
PLATELET # BLD AUTO: 324 THOUSANDS/UL (ref 149–390)
PMV BLD AUTO: 11.3 FL (ref 8.9–12.7)
PROTHROMBIN TIME: 35.8 SECONDS (ref 11.6–14.5)
RBC # BLD AUTO: 3.56 MILLION/UL (ref 3.81–5.12)
WBC # BLD AUTO: 4.35 THOUSAND/UL (ref 4.31–10.16)

## 2023-12-28 PROCEDURE — 85610 PROTHROMBIN TIME: CPT

## 2023-12-28 PROCEDURE — 36415 COLL VENOUS BLD VENIPUNCTURE: CPT

## 2023-12-28 PROCEDURE — 85025 COMPLETE CBC W/AUTO DIFF WBC: CPT

## 2023-12-28 PROCEDURE — 93294 REM INTERROG EVL PM/LDLS PM: CPT | Performed by: INTERNAL MEDICINE

## 2023-12-28 PROCEDURE — 93296 REM INTERROG EVL PM/IDS: CPT | Performed by: INTERNAL MEDICINE

## 2023-12-28 NOTE — PROGRESS NOTES
Results for orders placed or performed in visit on 12/28/23   Cardiac EP device report    Narrative    SJM DUAL CHAMBER PM/ NOT MRI CONDITIONAL  MERLIN TRANSMISSION: BATTERY VOLTAGE ADEQUATE (1.8 YRS). AP-42%, -96% (>40% CHB/DDDR@60PPM). ALL AVAILABLE LEAD PARAMETERS WITHIN NORMAL LIMITS. 1 AMS EPISODES LASTING 10 SEC- PAT ON EGM. HX: PAF & ON WARFARIN, ASA 81MG & METOPROLOL. NORMAL DEVICE FUNCTION. GV

## 2023-12-29 DIAGNOSIS — Z95.2 H/O MECHANICAL AORTIC VALVE REPLACEMENT: Primary | ICD-10-CM

## 2024-01-02 ENCOUNTER — TELEPHONE (OUTPATIENT)
Age: 84
End: 2024-01-02

## 2024-01-02 NOTE — TELEPHONE ENCOUNTER
----- Message from Aviva Mccabe PA-C sent at 12/29/2023  6:53 PM EST -----  Please fax this over to [facility] or the mobile lab for the patient to have this done either 1/2/24 or 1/3/24.  Thank you.

## 2024-01-05 ENCOUNTER — TELEPHONE (OUTPATIENT)
Age: 84
End: 2024-01-05

## 2024-01-05 NOTE — TELEPHONE ENCOUNTER
?  
Attempted to call patient and her son, left voice mail.  
Called and spoke to son and stated they came out on 1/1/2024 and did blood work and they were told to stop injections as inr over 3.5, stated nurse put note in for someone to come next week?  
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     []Yes []No Syncope or near syncope during or after exercise?     []Yes []No Unexplained fatigue, dyspnea or palpitations associated with exercise?   []Yes []No Prior recognition of a heart murmur?     []Yes []No Elevated BP?     []Yes []No Prior restriction from participation in sports?     []Yes []No Prior testing for heart ordered by a physician?       Family History (3)  []Yes []No Sudden premature unexpected death before age 50 in a relative?   []Yes []No Disability from heart disease in a close relative before age 50?     []Yes []No Specific knowledge of certain cardiac conditions in family members - hypertrophic or dilated cardiomyopathy, long QT syndrome or other arrhythmias or Marfan Syndrome?       Physical Exam (4)  []Yes []No Heart murmur - supine and standing     []Yes []No Femoral pulses present to excluded aortic stenosis     []Yes []No Physical stigmata of Marfan syndrome     []Normal []Abnormal BP, sitting, preferably in both arms         Positive/abnormal screen warrants further evaluation and 12-lead EKG/2024 and   
Called spoke to recpt. And she is waiting for nurse to get back to her and will call us back  
Did speak with pt and her son on Saturday, December 30th.  Informed to stop the lovenox at this time.  Continue with 5 mg of Coumadin.  Repeat INR next week.  Message sent to staff to set up labs through home health or the mobile lab.  Odell (son) to call if he hasn't heard about the lab coming out by next Thursday.    Pt doing OK overall.  No further evidence of bleeding.  Her left arm swelling is improving slowly and the pain is much improved.  Encouraged to keep elevated and use warm compresses.  If becomes red or warm, need to be evaluated in ED.  
Odell; the son called about a nurse coming to Ely's home. Her left arm is swollen; 5 days ago she was discharged.    A referral was put in by April for: Ambulatory to Complex Care Management Program    Can someone call Odell back about the steps for this program to start  
Yes

## 2024-01-09 ENCOUNTER — ANTICOAG VISIT (OUTPATIENT)
Age: 84
End: 2024-01-09

## 2024-01-10 ENCOUNTER — TELEPHONE (OUTPATIENT)
Age: 84
End: 2024-01-10

## 2024-01-10 ENCOUNTER — TELEPHONE (OUTPATIENT)
Dept: CARDIOLOGY CLINIC | Facility: CLINIC | Age: 84
End: 2024-01-10

## 2024-01-10 ENCOUNTER — TELEPHONE (OUTPATIENT)
Dept: LAB | Facility: HOSPITAL | Age: 84
End: 2024-01-10

## 2024-01-10 DIAGNOSIS — I48.0 PAROXYSMAL ATRIAL FIBRILLATION (HCC): ICD-10-CM

## 2024-01-10 DIAGNOSIS — Z79.01 LONG TERM (CURRENT) USE OF ANTICOAGULANTS: Primary | ICD-10-CM

## 2024-01-10 NOTE — TELEPHONE ENCOUNTER
S/w pt and we will resume Warfarin management. Placed standing inr order. Called [facility] mobile lab to request an inr on their first available appointment. They will contact the pt to schedule.

## 2024-01-11 ENCOUNTER — APPOINTMENT (OUTPATIENT)
Dept: LAB | Facility: HOSPITAL | Age: 84
End: 2024-01-11
Payer: COMMERCIAL

## 2024-01-11 LAB
INR PPP: 1.67 (ref 0.84–1.19)
PROTHROMBIN TIME: 19.4 SECONDS (ref 11.6–14.5)

## 2024-01-11 PROCEDURE — 36415 COLL VENOUS BLD VENIPUNCTURE: CPT

## 2024-01-11 PROCEDURE — 85610 PROTHROMBIN TIME: CPT

## 2024-01-12 ENCOUNTER — ANTICOAG VISIT (OUTPATIENT)
Dept: CARDIOLOGY CLINIC | Facility: CLINIC | Age: 84
End: 2024-01-12

## 2024-01-12 ENCOUNTER — TELEPHONE (OUTPATIENT)
Dept: LAB | Facility: HOSPITAL | Age: 84
End: 2024-01-12

## 2024-01-12 NOTE — RESULT ENCOUNTER NOTE
S/w pt. Advised to take 5/5/7.5 alt and retest in 1 week. Uses [facility]

## 2024-01-16 ENCOUNTER — ANTICOAG VISIT (OUTPATIENT)
Dept: CARDIOLOGY CLINIC | Facility: CLINIC | Age: 84
End: 2024-01-16

## 2024-01-16 LAB — INR PPP: 2 (ref 0.84–1.19)

## 2024-01-18 NOTE — UTILIZATION REVIEW
NOTIFICATION OF ADMISSION DISCHARGE   This is a Notification of Discharge from [facility]. Please be advised that this patient has been discharge from our facility. Below you will find the admission and discharge date and time including the patient’s disposition.   UTILIZATION REVIEW CONTACT:  Sharon Leonardo  Utilization   Network Utilization Review Department  Phone: [phone] x carefully listen to the prompts. All voicemails are confidential.  Email: [email or web address]     ADMISSION INFORMATION  PRESENTATION DATE: 12/14/2023  1:10 PM  OBERVATION ADMISSION DATE:   INPATIENT ADMISSION DATE: 12/14/23  2:27 PM   DISCHARGE DATE: 12/23/2023 12:02 PM   DISPOSITION:Home/Self Care    Network Utilization Review Department  ATTENTION: Please call with any questions or concerns to [phone] and carefully listen to the prompts so that you are directed to the right person. All voicemails are confidential.   For Discharge needs, contact Care Management DC Support Team at [phone] opt. 2  Send all requests for admission clinical reviews, approved or denied determinations and any other requests to dedicated fax number below belonging to the campus where the patient is receiving treatment. List of dedicated fax numbers for the Facilities:  FACILITY NAME UR FAX NUMBER   ADMISSION DENIALS (Administrative/Medical Necessity) [phone]   DISCHARGE SUPPORT TEAM ([facility]) [phone]   PARENT CHILD HEALTH (Maternity/NICU/Pediatrics) [phone]   [facility] [phone]   [facility] [phone]   [facility] [phone]   [facility] [phone]   [facility] [phone]   [facility] [phone]   [facility] [phone]   [facility]  [phone]   [facility] [phone]   [facility] [phone]   [facility] [phone]

## 2024-01-22 DIAGNOSIS — Z79.4 TYPE 2 DIABETES MELLITUS WITH STAGE 4 CHRONIC KIDNEY DISEASE, WITH LONG-TERM CURRENT USE OF INSULIN (HCC): ICD-10-CM

## 2024-01-22 DIAGNOSIS — E11.22 TYPE 2 DIABETES MELLITUS WITH STAGE 4 CHRONIC KIDNEY DISEASE, WITH LONG-TERM CURRENT USE OF INSULIN (HCC): ICD-10-CM

## 2024-01-22 DIAGNOSIS — N18.4 TYPE 2 DIABETES MELLITUS WITH STAGE 4 CHRONIC KIDNEY DISEASE, WITH LONG-TERM CURRENT USE OF INSULIN (HCC): ICD-10-CM

## 2024-01-22 RX ORDER — LANCETS
EACH MISCELLANEOUS 3 TIMES DAILY
Qty: 300 EACH | Refills: 3 | Status: SHIPPED | OUTPATIENT
Start: 2024-01-22

## 2024-01-23 ENCOUNTER — ANTICOAG VISIT (OUTPATIENT)
Dept: CARDIOLOGY CLINIC | Facility: CLINIC | Age: 84
End: 2024-01-23

## 2024-01-23 LAB — INR PPP: 2.4 (ref 0.84–1.19)

## 2024-02-05 ENCOUNTER — PATIENT OUTREACH (OUTPATIENT)
Dept: CASE MANAGEMENT | Facility: HOSPITAL | Age: 84
End: 2024-02-05

## 2024-02-05 NOTE — PROGRESS NOTES
HRR referral received and chart review performed. Pt was hospitalized 12/14-12/23 with anemia. Referred by cardiology to hospital for hemoglobin 5.5 on outpatient labs. Pt was transfused. EGD and colonoscopy completed. Pt was restarted on anticoagulation. Discharged home.     Call placed to Ely for care management. She states that she has been feeling more SOB lately, over the past week. Wears 2L O2 at baseline. States that homecare nurse visited her today and was going to call cardiologist to report symptoms. Notes her O2 was 95% when nurse checked.   Ely denies any signs of bleeding and reports no blood in her stool that she has noticed lately. She does report some LE edema but feels this is normal for her and has not significantly increased. Reports she has all medications and she takes them daily. Reports she is taking her lasix. She admits that she does not weigh herself daily. We spoke about importance of starting to weigh when she is at baseline to keep track of fluid status.     Ely states that she lives with her daughter and son. Has someone in home with her at all times. Advised pt to go to ED for increasing SOB, dizziness or passing out. She states that when she was previously hospitalized for low hemoglobin she was dizzy, pale, and profoundly SOB. She denies feeling that way at this time. States she will have her daughter take her to the ED if needed. She is awaiting call back from cardiologist. States she completed lab work for INR and blood counts today.    Explained RN CM and continued follow up with Ely for care management. She is agreeable to follow up and appreciative of the call.     Rody Jacobo RN CM added to care team for continued care management. Note routed to Rody and PCP to update.

## 2024-02-06 ENCOUNTER — HOSPITAL ENCOUNTER (INPATIENT)
Facility: HOSPITAL | Age: 84
LOS: 8 days | Discharge: HOME HEALTH | DRG: 189 | End: 2024-02-14
Attending: EMERGENCY MEDICINE | Admitting: INTERNAL MEDICINE
Payer: COMMERCIAL

## 2024-02-06 ENCOUNTER — APPOINTMENT (EMERGENCY)
Dept: RADIOLOGY | Facility: HOSPITAL | Age: 84
DRG: 189 | End: 2024-02-06
Payer: COMMERCIAL

## 2024-02-06 DIAGNOSIS — J96.01 ACUTE RESPIRATORY FAILURE WITH HYPOXIA (HCC): ICD-10-CM

## 2024-02-06 DIAGNOSIS — R06.02 SOB (SHORTNESS OF BREATH): ICD-10-CM

## 2024-02-06 DIAGNOSIS — S81.801D WOUND OF RIGHT LEG, SUBSEQUENT ENCOUNTER: ICD-10-CM

## 2024-02-06 DIAGNOSIS — D64.9 ANEMIA: ICD-10-CM

## 2024-02-06 DIAGNOSIS — R06.09 DYSPNEA ON EXERTION: ICD-10-CM

## 2024-02-06 DIAGNOSIS — K21.9 GASTROESOPHAGEAL REFLUX DISEASE: ICD-10-CM

## 2024-02-06 DIAGNOSIS — I42.9 CARDIOMYOPATHY (HCC): ICD-10-CM

## 2024-02-06 DIAGNOSIS — R09.02 HYPOXIA: ICD-10-CM

## 2024-02-06 DIAGNOSIS — J44.1 ACUTE EXACERBATION OF CHRONIC OBSTRUCTIVE PULMONARY DISEASE (COPD) (HCC): Primary | ICD-10-CM

## 2024-02-06 LAB
ANION GAP SERPL CALCULATED.3IONS-SCNC: 7 MMOL/L
APTT PPP: 36 SECONDS (ref 23–37)
BASOPHILS # BLD AUTO: 0.03 THOUSANDS/ÂΜL (ref 0–0.1)
BASOPHILS NFR BLD AUTO: 1 % (ref 0–1)
BNP SERPL-MCNC: 653 PG/ML (ref 0–100)
BUN SERPL-MCNC: 20 MG/DL (ref 5–25)
CALCIUM SERPL-MCNC: 9.1 MG/DL (ref 8.4–10.2)
CARDIAC TROPONIN I PNL SERPL HS: 18 NG/L
CHLORIDE SERPL-SCNC: 104 MMOL/L (ref 96–108)
CO2 SERPL-SCNC: 28 MMOL/L (ref 21–32)
CREAT SERPL-MCNC: 1.24 MG/DL (ref 0.6–1.3)
EOSINOPHIL # BLD AUTO: 0.21 THOUSAND/ÂΜL (ref 0–0.61)
EOSINOPHIL NFR BLD AUTO: 4 % (ref 0–6)
ERYTHROCYTE [DISTWIDTH] IN BLOOD BY AUTOMATED COUNT: 17.6 % (ref 11.6–15.1)
GFR SERPL CREATININE-BSD FRML MDRD: 40 ML/MIN/1.73SQ M
GLUCOSE SERPL-MCNC: 161 MG/DL (ref 65–140)
HCT VFR BLD AUTO: 28.2 % (ref 34.8–46.1)
HGB BLD-MCNC: 8.3 G/DL (ref 11.5–15.4)
IMM GRANULOCYTES # BLD AUTO: 0.02 THOUSAND/UL (ref 0–0.2)
IMM GRANULOCYTES NFR BLD AUTO: 0 % (ref 0–2)
INR PPP: 2.36 (ref 0.84–1.19)
LYMPHOCYTES # BLD AUTO: 1.24 THOUSANDS/ÂΜL (ref 0.6–4.47)
LYMPHOCYTES NFR BLD AUTO: 24 % (ref 14–44)
MCH RBC QN AUTO: 23.2 PG (ref 26.8–34.3)
MCHC RBC AUTO-ENTMCNC: 29.4 G/DL (ref 31.4–37.4)
MCV RBC AUTO: 79 FL (ref 82–98)
MONOCYTES # BLD AUTO: 0.38 THOUSAND/ÂΜL (ref 0.17–1.22)
MONOCYTES NFR BLD AUTO: 8 % (ref 4–12)
NEUTROPHILS # BLD AUTO: 3.2 THOUSANDS/ÂΜL (ref 1.85–7.62)
NEUTS SEG NFR BLD AUTO: 63 % (ref 43–75)
NRBC BLD AUTO-RTO: 0 /100 WBCS
PLATELET # BLD AUTO: 406 THOUSANDS/UL (ref 149–390)
PMV BLD AUTO: 9.7 FL (ref 8.9–12.7)
POTASSIUM SERPL-SCNC: 3.5 MMOL/L (ref 3.5–5.3)
PROTHROMBIN TIME: 26.8 SECONDS (ref 11.6–14.5)
RBC # BLD AUTO: 3.57 MILLION/UL (ref 3.81–5.12)
SODIUM SERPL-SCNC: 139 MMOL/L (ref 135–147)
WBC # BLD AUTO: 5.08 THOUSAND/UL (ref 4.31–10.16)

## 2024-02-06 PROCEDURE — 71045 X-RAY EXAM CHEST 1 VIEW: CPT

## 2024-02-06 PROCEDURE — 85610 PROTHROMBIN TIME: CPT | Performed by: EMERGENCY MEDICINE

## 2024-02-06 PROCEDURE — 93005 ELECTROCARDIOGRAM TRACING: CPT

## 2024-02-06 PROCEDURE — 99223 1ST HOSP IP/OBS HIGH 75: CPT | Performed by: INTERNAL MEDICINE

## 2024-02-06 PROCEDURE — 85025 COMPLETE CBC W/AUTO DIFF WBC: CPT | Performed by: EMERGENCY MEDICINE

## 2024-02-06 PROCEDURE — 99285 EMERGENCY DEPT VISIT HI MDM: CPT | Performed by: EMERGENCY MEDICINE

## 2024-02-06 PROCEDURE — 84484 ASSAY OF TROPONIN QUANT: CPT | Performed by: EMERGENCY MEDICINE

## 2024-02-06 PROCEDURE — 80048 BASIC METABOLIC PNL TOTAL CA: CPT | Performed by: EMERGENCY MEDICINE

## 2024-02-06 PROCEDURE — 99285 EMERGENCY DEPT VISIT HI MDM: CPT

## 2024-02-06 PROCEDURE — 36415 COLL VENOUS BLD VENIPUNCTURE: CPT | Performed by: EMERGENCY MEDICINE

## 2024-02-06 PROCEDURE — 83880 ASSAY OF NATRIURETIC PEPTIDE: CPT | Performed by: EMERGENCY MEDICINE

## 2024-02-06 PROCEDURE — 94645 CONT INHLJ TX EACH ADDL HOUR: CPT

## 2024-02-06 PROCEDURE — 94760 N-INVAS EAR/PLS OXIMETRY 1: CPT

## 2024-02-06 PROCEDURE — 85730 THROMBOPLASTIN TIME PARTIAL: CPT | Performed by: EMERGENCY MEDICINE

## 2024-02-06 PROCEDURE — 94644 CONT INHLJ TX 1ST HOUR: CPT

## 2024-02-06 RX ORDER — ACETAMINOPHEN 325 MG/1
650 TABLET ORAL EVERY 6 HOURS PRN
Status: DISCONTINUED | OUTPATIENT
Start: 2024-02-06 | End: 2024-02-14 | Stop reason: HOSPADM

## 2024-02-06 RX ORDER — METHYLPREDNISOLONE SODIUM SUCCINATE 125 MG/2ML
62.5 INJECTION, POWDER, LYOPHILIZED, FOR SOLUTION INTRAMUSCULAR; INTRAVENOUS ONCE
Status: COMPLETED | OUTPATIENT
Start: 2024-02-06 | End: 2024-02-06

## 2024-02-06 RX ORDER — SODIUM CHLORIDE FOR INHALATION 0.9 %
12 VIAL, NEBULIZER (ML) INHALATION ONCE
Status: COMPLETED | OUTPATIENT
Start: 2024-02-06 | End: 2024-02-06

## 2024-02-06 RX ORDER — INSULIN LISPRO 100 [IU]/ML
1-6 INJECTION, SOLUTION INTRAVENOUS; SUBCUTANEOUS
Status: DISCONTINUED | OUTPATIENT
Start: 2024-02-07 | End: 2024-02-14 | Stop reason: HOSPADM

## 2024-02-06 RX ORDER — LEVALBUTEROL INHALATION SOLUTION 1.25 MG/3ML
1.25 SOLUTION RESPIRATORY (INHALATION)
Status: DISCONTINUED | OUTPATIENT
Start: 2024-02-06 | End: 2024-02-07

## 2024-02-06 RX ORDER — METHYLPREDNISOLONE SODIUM SUCCINATE 40 MG/ML
40 INJECTION, POWDER, LYOPHILIZED, FOR SOLUTION INTRAMUSCULAR; INTRAVENOUS EVERY 8 HOURS
Status: DISCONTINUED | OUTPATIENT
Start: 2024-02-07 | End: 2024-02-07

## 2024-02-06 RX ORDER — INSULIN LISPRO 100 [IU]/ML
1-5 INJECTION, SOLUTION INTRAVENOUS; SUBCUTANEOUS
Status: DISCONTINUED | OUTPATIENT
Start: 2024-02-06 | End: 2024-02-14 | Stop reason: HOSPADM

## 2024-02-06 RX ORDER — SODIUM CHLORIDE FOR INHALATION 0.9 %
3 VIAL, NEBULIZER (ML) INHALATION
Status: DISCONTINUED | OUTPATIENT
Start: 2024-02-06 | End: 2024-02-06

## 2024-02-06 RX ORDER — HEPARIN SODIUM 5000 [USP'U]/ML
5000 INJECTION, SOLUTION INTRAVENOUS; SUBCUTANEOUS EVERY 8 HOURS SCHEDULED
Status: DISCONTINUED | OUTPATIENT
Start: 2024-02-07 | End: 2024-02-06

## 2024-02-06 RX ORDER — HEPARIN SODIUM 5000 [USP'U]/ML
5000 INJECTION, SOLUTION INTRAVENOUS; SUBCUTANEOUS EVERY 8 HOURS SCHEDULED
Status: DISCONTINUED | OUTPATIENT
Start: 2024-02-07 | End: 2024-02-07

## 2024-02-06 RX ORDER — BENZONATATE 100 MG/1
100 CAPSULE ORAL 3 TIMES DAILY PRN
Status: DISCONTINUED | OUTPATIENT
Start: 2024-02-06 | End: 2024-02-14 | Stop reason: HOSPADM

## 2024-02-06 RX ORDER — ONDANSETRON 2 MG/ML
4 INJECTION INTRAMUSCULAR; INTRAVENOUS EVERY 6 HOURS PRN
Status: DISCONTINUED | OUTPATIENT
Start: 2024-02-06 | End: 2024-02-06

## 2024-02-06 RX ADMIN — Medication 12 ML: at 17:49

## 2024-02-06 RX ADMIN — ALBUTEROL SULFATE 10 MG: 2.5 SOLUTION RESPIRATORY (INHALATION) at 18:54

## 2024-02-06 RX ADMIN — METHYLPREDNISOLONE SODIUM SUCCINATE 62.5 MG: 125 INJECTION, POWDER, FOR SOLUTION INTRAMUSCULAR; INTRAVENOUS at 19:03

## 2024-02-06 RX ADMIN — ALBUTEROL SULFATE 10 MG: 2.5 SOLUTION RESPIRATORY (INHALATION) at 17:49

## 2024-02-06 RX ADMIN — Medication 12 ML: at 18:54

## 2024-02-06 RX ADMIN — IPRATROPIUM BROMIDE 1 MG: 0.5 SOLUTION RESPIRATORY (INHALATION) at 17:49

## 2024-02-06 RX ADMIN — IPRATROPIUM BROMIDE 1 MG: 0.5 SOLUTION RESPIRATORY (INHALATION) at 18:54

## 2024-02-06 NOTE — ED PROVIDER NOTES
History  Chief Complaint   Patient presents with    Shortness of Breath     Per EMS patient family called for a respiratory distress. Patient per EMS and during triage is alert and oriented. Hx of COPD (suspected exacerbation). Patient is chronically on 2L of 02 via NC. Patient as a hx of TIA in  September 2023, low HGB in December of 2023. Lab work completed 12/5/2024 waiting on results. Patient states she feels the same as when she had low HGB. Patient is pale appearing upon inspection.      83-year-old female history of stroke, heart failure, A-fib, diabetes, COPD on aspirin and warfarin presenting with shortness of breath.  Reports history of similar recently with low hemoglobin.  Reports grossly worsening shortness of breath and generalized weakness over the last few days.  She is now short of breath at rest.  Also reports significant dyspnea.  Denies any orthopnea.  Denies any chest pain.  Denies abdominal pain nausea vomiting diarrhea.  Denies any bleeding.  Denies any other complaints.  Chart reviewed.    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 Taking?   Accu-Chek FastClix Lancets MISC   No No   Sig: Use 3 (three) times a day   Ascorbic Acid (vitamin C) 1000 MG tablet  Self Yes No   Sig: Take  1,000 mg by mouth daily   BD Pen Needle Rosie U/F 32G X 4 MM MISC  Self No No   Sig: USE DAILY   Blood Glucose Monitoring Suppl (Accu-Chek Guide Me) w/Device KIT  Self No No   Sig: USE 3 TIMES DAILY   Cholecalciferol (Vitamin D3) 50 MCG (2000 UT) TABS  Self Yes No   Sig: Take 2,000 Units by mouth daily   Lancets (freestyle) lancets  Self No No   Sig: Check blood glucose 3 times daily   Levemir FlexPen 100 units/mL injection pen  Self No No   Sig: INJECT SUBCUTANEOUSLY 26 UNITS  DAILY AT BEDTIME   albuterol (Ventolin HFA) 90 mcg/act inhaler  Self No No   Sig: Inhale 2 puffs every 6 (six) hours as needed for wheezing   aspirin (ECOTRIN LOW STRENGTH) 81 mg EC tablet  Self No No   Sig: Take 1 tablet (81 mg total) by mouth daily Do not start before November 1, 2023.   atorvastatin (LIPITOR) 40 mg tablet  Self No No   Sig: Take 1 tablet (40 mg total) by mouth every evening   b complex vitamins capsule  Self No No   Sig: Take 1 capsule by mouth daily   enoxaparin (LOVENOX) 80 mg/0.8 mL   No No   Sig: Inject 0.8 mL (80 mg total) under the skin every 12 (twelve) hours for 10 days To be taken until Tuesday AM labs   furosemide (LASIX) 40 mg tablet  Self No No   Sig: TAKE 1 TABLET BY MOUTH  DAILY   gabapentin (NEURONTIN) 300 mg capsule  Self No No   Sig: Take 1 capsule (300 mg total) by mouth 3 (three) times a day   glipiZIDE (GLUCOTROL) 10 mg tablet  Self No No   Sig: TAKE 1 TABLET BY MOUTH  TWICE DAILY BEFORE MEALS   glucose blood (Accu-Chek Celeste Plus) test strip  Self No No   Sig: Use 1 each 3 (three) times a day Use as instructed   levothyroxine 50 mcg tablet  Self No No   Sig: TAKE 1 TABLET BY MOUTH  DAILY   metoprolol tartrate (LOPRESSOR) 50 mg tablet  Self No No   Sig: TAKE ONE-HALF TABLET BY  MOUTH TWICE DAILY   oxygen gas  Self Yes No   Sig: Inhale 2 L/min daily at bedtime as needed home oxygen nightly   pantoprazole (PROTONIX) 40 mg tablet  Self No No   Sig: TAKE 1 TABLET BY MOUTH  DAILY   warfarin (COUMADIN) 5 mg  tablet  Self No No   Sig: TAKE 1 TO 2 TABLETS BY  MOUTH DAILY OR AS DIRECTED      Facility-Administered Medications: None       Past Medical History:   Diagnosis Date    Anemia     Aneurysm of thoracic aorta (HCC)     Aortic valve disorder     Benign neoplasm of sigmoid colon     Cardiomyopathy (HCC)     last assessed: 10/10/2014    CHF (congestive heart failure) (HCC)     Chronic kidney disease     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HCC)     last assessed: 10/10/2014    Diabetic nephropathy (HCC)     RAMON (dyspnea on exertion)     GERD (gastroesophageal reflux disease)     History of emphysema (HCC)     Hyperlipidemia     TIA (transient ischemic attack)        Past Surgical History:   Procedure Laterality Date    AORTIC VALVE REPLACEMENT      CARDIAC PACEMAKER PLACEMENT      last assessed: 10/10/2014    CARDIAC SURGERY      aortic valve replacement    CHOLECYSTECTOMY      COLONOSCOPY      HYSTERECTOMY      INSERT / REPLACE / REMOVE PACEMAKER      KNEE SURGERY Right     OTHER SURGICAL HISTORY      Capsule ENDOscopy description: 12/19/2012    DE COLONOSCOPY FLX DX W/COLLJ SPEC WHEN PFRMD N/A 5/31/2018    Procedure: single balloon ENTEROSCOPY;  Surgeon: David Dennis MD;  Location: [facility];  Service: Gastroenterology    DE ESOPHAGOGASTRODUODENOSCOPY TRANSORAL DIAGNOSTIC N/A 11/29/2017    Procedure: EGD AND COLONOSCOPY;  Surgeon: Jay Mcleod III, MD;  Location: [facility];  Service: Gastroenterology       Family History   Problem Relation Age of Onset    No Known Problems Mother     No Known Problems Father      I have reviewed and agree with the history as documented.    E-Cigarette/Vaping    E-Cigarette Use Never User      E-Cigarette/Vaping Substances    Nicotine No     THC No     CBD No     Flavoring No     Other No     Unknown No      Social History     Tobacco Use    Smoking status: Former     Current packs/day: 0.00     Average packs/day: 1 pack/day for 52.9 years (52.9 ttl pk-yrs)     Types: Cigarettes      Start date:      Quit date: 2007     Years since quittin.2     Passive exposure: Past    Smokeless tobacco: Former     Quit date:    Vaping Use    Vaping status: Never Used   Substance Use Topics    Alcohol use: Never    Drug use: Never       Review of Systems   Constitutional:  Negative for appetite change, chills, diaphoresis, fever and unexpected weight change.   HENT:  Negative for congestion and rhinorrhea.    Eyes:  Negative for photophobia and visual disturbance.   Respiratory:  Positive for shortness of breath. Negative for cough and chest tightness.    Cardiovascular:  Negative for chest pain, palpitations and leg swelling.   Gastrointestinal:  Negative for abdominal distention, abdominal pain, blood in stool, constipation, diarrhea, nausea and vomiting.   Genitourinary:  Negative for dysuria and hematuria.   Musculoskeletal:  Negative for back pain, joint swelling, neck pain and neck stiffness.   Skin:  Negative for color change, pallor, rash and wound.   Neurological:  Positive for weakness. Negative for dizziness, syncope, light-headedness and headaches.   Psychiatric/Behavioral:  Negative for agitation.    All other systems reviewed and are negative.      Physical Exam  Physical Exam  Vitals and nursing note reviewed.   Constitutional:       General: She is not in acute distress.     Appearance: Normal appearance. She is well-developed. She is not ill-appearing, toxic-appearing or diaphoretic.   HENT:      Head: Normocephalic and atraumatic.      Nose: Nose normal. No congestion or rhinorrhea.      Mouth/Throat:      Mouth: Mucous membranes are moist.      Pharynx: Oropharynx is clear. No oropharyngeal exudate or posterior oropharyngeal erythema.   Eyes:      General: No scleral icterus.        Right eye: No discharge.         Left eye: No discharge.      Extraocular Movements: Extraocular movements intact.      Conjunctiva/sclera: Conjunctivae normal.      Pupils: Pupils are equal,  round, and reactive to light.      Comments: Conjunctival pallor   Neck:      Vascular: No JVD.      Trachea: No tracheal deviation.      Comments: Supple. Normal range of motion.   Cardiovascular:      Rate and Rhythm: Normal rate and regular rhythm.      Heart sounds: Normal heart sounds. No murmur heard.     No friction rub. No gallop.      Comments: Normal rate and regular rhythm  Pulmonary:      Effort: Pulmonary effort is normal. No respiratory distress.      Breath sounds: No stridor. Examination of the right-upper field reveals wheezing. Examination of the left-upper field reveals wheezing. Examination of the right-middle field reveals wheezing. Examination of the left-middle field reveals wheezing. Examination of the right-lower field reveals wheezing. Examination of the left-lower field reveals wheezing. Wheezing present. No rales.      Comments: Mild wheezing  Chest:      Chest wall: No tenderness.   Abdominal:      General: Bowel sounds are normal. There is no distension.      Palpations: Abdomen is soft.      Tenderness: There is no abdominal tenderness. There is no right CVA tenderness, left CVA tenderness, guarding or rebound.      Comments: Soft, nontender, nondistended.  Normal bowel sounds throughout   Musculoskeletal:         General: No swelling, tenderness, deformity or signs of injury. Normal range of motion.      Cervical back: Normal range of motion and neck supple. No rigidity. No muscular tenderness.      Right lower leg: No edema.      Left lower leg: No edema.   Lymphadenopathy:      Cervical: No cervical adenopathy.   Skin:     General: Skin is warm and dry.      Coloration: Skin is not pale.      Findings: No erythema or rash.   Neurological:      General: No focal deficit present.      Mental Status: She is alert. Mental status is at baseline.      Sensory: No sensory deficit.      Motor: No weakness or abnormal muscle tone.      Coordination: Coordination normal.      Gait: Gait  normal.      Comments: Alert.  Strength and sensation grossly intact.  Ambulatory without difficulty at baseline.    Psychiatric:         Behavior: Behavior normal.         Thought Content: Thought content normal.         Vital Signs  ED Triage Vitals   Temperature Pulse Respirations Blood Pressure SpO2   02/06/24 1725 02/06/24 1720 02/06/24 1720 02/06/24 1720 02/06/24 1720   (!) 97.4 °F (36.3 °C) 61 (!) 25 (!) 172/102 92 %      Temp Source Heart Rate Source Patient Position - Orthostatic VS BP Location FiO2 (%)   02/06/24 1725 02/06/24 1720 02/06/24 1720 02/06/24 1720 --   Oral Monitor Sitting Right arm       Pain Score       02/06/24 1725       3           Vitals:    02/06/24 1720   BP: (!) 172/102   Pulse: 61   Patient Position - Orthostatic VS: Sitting         Visual Acuity      ED Medications  Medications   acetaminophen (TYLENOL) tablet 650 mg (has no administration in time range)   benzonatate (TESSALON PERLES) capsule 100 mg (has no administration in time range)   methylPREDNISolone sodium succinate (Solu-MEDROL) injection 40 mg (has no administration in time range)   heparin (porcine) subcutaneous injection 5,000 Units (has no administration in time range)   levalbuterol (XOPENEX) inhalation solution 1.25 mg (has no administration in time range)     And   sodium chloride 0.9 % inhalation solution 3 mL (has no administration in time range)   ipratropium (ATROVENT) 0.02 % inhalation solution 0.5 mg (has no administration in time range)   insulin lispro (HumaLOG) 100 units/mL subcutaneous injection 1-6 Units (has no administration in time range)   insulin lispro (HumaLOG) 100 units/mL subcutaneous injection 1-5 Units (has no administration in time range)   albuterol inhalation solution 10 mg (10 mg Nebulization Given 2/6/24 1749)   ipratropium (ATROVENT) 0.02 % inhalation solution 1 mg (1 mg Nebulization Given 2/6/24 1749)   sodium chloride 0.9 % inhalation solution 12 mL (12 mL Nebulization Given 2/6/24 1749)    albuterol inhalation solution 10 mg (10 mg Nebulization Given 2/6/24 1854)   ipratropium (ATROVENT) 0.02 % inhalation solution 1 mg (1 mg Nebulization Given 2/6/24 1854)   sodium chloride 0.9 % inhalation solution 12 mL (12 mL Nebulization Given 2/6/24 1854)   methylPREDNISolone sodium succinate (Solu-MEDROL) injection 62.5 mg (62.5 mg Intravenous Given 2/6/24 1903)       Diagnostic Studies  Results Reviewed       Procedure Component Value Units Date/Time    HS Troponin I 4hr [[id number]]     Lab Status: No result Specimen: Blood     HS Troponin 0hr (reflex protocol) [[id number]]  (Normal) Collected: 02/06/24 1734    Lab Status: Final result Specimen: Blood from Arm, Right Updated: 02/06/24 1813     hs TnI 0hr 18 ng/L     HS Troponin I 2hr [[id number]]     Lab Status: No result Specimen: Blood     B-Type Natriuretic Peptide(BNP) [[id number]]  (Abnormal) Collected: 02/06/24 1734    Lab Status: Final result Specimen: Blood from Arm, Right Updated: 02/06/24 1810      pg/mL     Protime-INR [[id number]]  (Abnormal) Collected: 02/06/24 1734    Lab Status: Final result Specimen: Blood from Arm, Right Updated: 02/06/24 1806     Protime 26.8 seconds      INR 2.36    APTT [[id number]]  (Normal) Collected: 02/06/24 1734    Lab Status: Final result Specimen: Blood from Arm, Right Updated: 02/06/24 1806     PTT 36 seconds     Basic metabolic panel [[id number]]  (Abnormal) Collected: 02/06/24 1734    Lab Status: Final result Specimen: Blood from Arm, Right Updated: 02/06/24 1806     Sodium 139 mmol/L      Potassium 3.5 mmol/L      Chloride 104 mmol/L      CO2 28 mmol/L      ANION GAP 7 mmol/L      BUN 20 mg/dL      Creatinine 1.24 mg/dL      Glucose 161 mg/dL      Calcium 9.1 mg/dL      eGFR 40 ml/min/1.73sq m     Narrative:      National Kid[employer] Disease Foundation guidelines for Chronic Kidney Disease (CKD):     Stage 1 with normal or high GFR (GFR > 90 mL/min/1.73 square meters)    Stage 2 Mild CKD (GFR = 60-89 mL/min/1.73  square meters)    Stage 3A Moderate CKD (GFR = 45-59 mL/min/1.73 square meters)    Stage 3B Moderate CKD (GFR = 30-44 mL/min/1.73 square meters)    Stage 4 Severe CKD (GFR = 15-29 mL/min/1.73 square meters)    Stage 5 End Stage CKD (GFR <15 mL/min/1.73 square meters)  Note: GFR calculation is accurate only with a steady state creatinine    CBC and differential [[id number]]  (Abnormal) Collected: 02/06/24 1734    Lab Status: Final result Specimen: Blood from Arm, Right Updated: 02/06/24 1747     WBC 5.08 Thousand/uL      RBC 3.57 Million/uL      Hemoglobin 8.3 g/dL      Hematocrit 28.2 %      MCV 79 fL      MCH 23.2 pg      MCHC 29.4 g/dL      RDW 17.6 %      MPV 9.7 fL      Platelets 406 Thousands/uL      nRBC 0 /100 WBCs      Neutrophils Relative 63 %      Immat GRANS % 0 %      Lymphocytes Relative 24 %      Monocytes Relative 8 %      Eosinophils Relative 4 %      Basophils Relative 1 %      Neutrophils Absolute 3.20 Thousands/µL      Immature Grans Absolute 0.02 Thousand/uL      Lymphocytes Absolute 1.24 Thousands/µL      Monocytes Absolute 0.38 Thousand/µL      Eosinophils Absolute 0.21 Thousand/µL      Basophils Absolute 0.03 Thousands/µL                    XR chest 1 view portable    (Results Pending)              Procedures  Procedures         ED Course             HEART Risk Score      Flowsheet Row Most Recent Value   Heart Score Risk Calculator    History 0 Filed at: 02/06/2024 1942   ECG 1 Filed at: 02/06/2024 1942   Age 2 Filed at: 02/06/2024 1942   Risk Factors 2 Filed at: 02/06/2024 1942   Troponin 1 Filed at: 02/06/2024 1942   HEART Score 6 Filed at: 02/06/2024 1942          Identification of Seniors at Risk      Flowsheet Row Most Recent Value   (ISAR) Identification of Seniors at Risk    Before the illness or injury that brought you to the Emergency, did you need someone to help you on a regular basis? 1 Filed at: 02/06/2024 1724   In the last 24 hours, have you needed more help than usual? 1 Filed  at: 02/06/2024 1724   Have you been hospitalized for one or more nights during the past 6 months? 1 Filed at: 02/06/2024 1724   In general, do you see well? 0 Filed at: 02/06/2024 1724   In general, do you have serious problems with your memory? 0 Filed at: 02/06/2024 1724   Do you take more than three different medications every day? 1 Filed at: 02/06/2024 1724   ISAR Score 4 Filed at: 02/06/2024 1724                        SBIRT 22yo+      Flowsheet Row Most Recent Value   Initial Alcohol Screen: US AUDIT-C     1. How often do you have a drink containing alcohol? 1 Filed at: 02/06/2024 1724   2. How many drinks containing alcohol do you have on a typical day you are drinking?  1 Filed at: 02/06/2024 1724   3b. FEMALE Any Age, or MALE 65+: How often do you have 4 or more drinks on one occassion? 0 Filed at: 02/06/2024 1724   Audit-C Score 2 Filed at: 02/06/2024 1724   RY: How many times in the past year have you...    Used an illegal drug or used a prescription medication for non-medical reasons? Never Filed at: 02/06/2024 1724                      Medical Decision Making  83-year-old female history of stroke, heart failure, A-fib, diabetes, COPD on aspirin and warfarin presenting with shortness of breath.  Shortness of breath and weakness in setting of known COPD and recent anemia with conjunctival pallor.  Possible component of anemia.  Mild wheezing.  Possible component of COPD exacerbation.  On 2 L O2 satting mid 90s.  Will trial breathing treatment.  Cardiac evaluation including EKG and troponin plus chest x-ray.  Basic labs.  Reassess.    EKG interpreted by me with paced rhythm and nonspecific ST abnormality.  Labs interpreted by me notable for hemoglobin of 8.3.  Troponin of 18 plan for delta.  Hemoglobin 8.3 down slightly from most recent hemoglobin of 9.  Breathing improving with breathing treatment.  Patient with some hypoxia.  Plan for further evaluation and management inpatient.  Admitted.    Amount  and/or Complexity of Data Reviewed  Labs: ordered.  Radiology: ordered.    Risk  Prescription drug management.  Decision regarding hospitalization.             Disposition  Final diagnoses:   Anemia   Hypoxia   SOB (shortness of breath)   Dyspnea on exertion   Acute exacerbation of chronic obstructive pulmonary disease (COPD) ([facility])     Time reflects when diagnosis was documented in both MDM as applicable and the Disposition within this note       Time User Action Codes Description Comment    2/6/2024  6:56 PM ErneDeisyn Add [D64.9] Anemia     2/6/2024  6:56 PM Erne, Terence Add [R09.02] Hypoxia     2/6/2024  6:56 PM Erne, Terence Add [R06.02] SOB (shortness of breath)     2/6/2024  6:56 PM Erne, Terence Add [R06.09] Dyspnea on exertion     2/6/2024  6:56 PM Erne, Terence Add [J44.1] Acute exacerbation of chronic obstructive pulmonary disease (COPD) (HCC)     2/6/2024  6:56 PM Erne, Terence Modify [D64.9] Anemia     2/6/2024  6:56 PM Erne, Terence Modify [J44.1] Acute exacerbation of chronic obstructive pulmonary disease (COPD) ([facility])           ED Disposition       ED Disposition   Admit    Condition   Stable    Date/Time   Tue Feb 6, 2024 1856    Comment   Case was discussed with HARRIET and the patient's admission status was agreed to be Admission Status: inpatient status to the service of Dr. Pfeiffer .               Follow-up Information    None         Patient's Medications   Discharge Prescriptions    No medications on file       No discharge procedures on file.    PDMP Review         Value Time User    PDMP Reviewed  Yes 12/23/2023  8:29 AM Mo Cornejo DO            ED Provider  Electronically Signed by             Terence Hurley MD  02/06/24 8520

## 2024-02-06 NOTE — ED NOTES
Patient rang for assistance. Patient stated that she removed her mask to cough and spilled the hour long heart neb. Provider notified. Waiting for orders. Plan of care ongoing.      Shakira Kelley RN  02/06/24 4701

## 2024-02-07 LAB
2HR DELTA HS TROPONIN: -4 NG/L
ABO GROUP BLD: NORMAL
ANION GAP SERPL CALCULATED.3IONS-SCNC: 11 MMOL/L
ATRIAL RATE: 55 BPM
BASOPHILS # BLD AUTO: 0 THOUSANDS/ÂΜL (ref 0–0.1)
BASOPHILS NFR BLD AUTO: 0 % (ref 0–1)
BLD GP AB SCN SERPL QL: POSITIVE
BLOOD GROUP ANTIBODIES SERPL: NORMAL
BUN SERPL-MCNC: 25 MG/DL (ref 5–25)
CALCIUM SERPL-MCNC: 8.7 MG/DL (ref 8.4–10.2)
CARDIAC TROPONIN I PNL SERPL HS: 14 NG/L
CHLORIDE SERPL-SCNC: 102 MMOL/L (ref 96–108)
CO2 SERPL-SCNC: 23 MMOL/L (ref 21–32)
CREAT SERPL-MCNC: 1.34 MG/DL (ref 0.6–1.3)
EOSINOPHIL # BLD AUTO: 0 THOUSAND/ÂΜL (ref 0–0.61)
EOSINOPHIL NFR BLD AUTO: 0 % (ref 0–6)
ERYTHROCYTE [DISTWIDTH] IN BLOOD BY AUTOMATED COUNT: 17.5 % (ref 11.6–15.1)
FERRITIN SERPL-MCNC: 13 NG/ML (ref 11–307)
GFR SERPL CREATININE-BSD FRML MDRD: 36 ML/MIN/1.73SQ M
GLUCOSE SERPL-MCNC: 125 MG/DL (ref 65–140)
GLUCOSE SERPL-MCNC: 181 MG/DL (ref 65–140)
GLUCOSE SERPL-MCNC: 255 MG/DL (ref 65–140)
GLUCOSE SERPL-MCNC: 257 MG/DL (ref 65–140)
HCT VFR BLD AUTO: 25.6 % (ref 34.8–46.1)
HGB BLD-MCNC: 7.6 G/DL (ref 11.5–15.4)
IMM GRANULOCYTES # BLD AUTO: 0.1 THOUSAND/UL (ref 0–0.2)
IMM GRANULOCYTES NFR BLD AUTO: 2 % (ref 0–2)
INR PPP: 2.37 (ref 0.84–1.19)
IRON SATN MFR SERPL: 3 % (ref 15–50)
IRON SERPL-MCNC: 11 UG/DL (ref 50–212)
KELL GROUP AG RBC: NEGATIVE
LYMPHOCYTES # BLD AUTO: 0.33 THOUSANDS/ÂΜL (ref 0.6–4.47)
LYMPHOCYTES NFR BLD AUTO: 7 % (ref 14–44)
MCH RBC QN AUTO: 23.4 PG (ref 26.8–34.3)
MCHC RBC AUTO-ENTMCNC: 29.7 G/DL (ref 31.4–37.4)
MCV RBC AUTO: 79 FL (ref 82–98)
MONOCYTES # BLD AUTO: 0.05 THOUSAND/ÂΜL (ref 0.17–1.22)
MONOCYTES NFR BLD AUTO: 1 % (ref 4–12)
NEUTROPHILS # BLD AUTO: 4.07 THOUSANDS/ÂΜL (ref 1.85–7.62)
NEUTS SEG NFR BLD AUTO: 90 % (ref 43–75)
NRBC BLD AUTO-RTO: 0 /100 WBCS
PLATELET # BLD AUTO: 326 THOUSANDS/UL (ref 149–390)
PMV BLD AUTO: 10 FL (ref 8.9–12.7)
POTASSIUM SERPL-SCNC: 3.8 MMOL/L (ref 3.5–5.3)
PROCALCITONIN SERPL-MCNC: 0.06 NG/ML
PROTHROMBIN TIME: 26.8 SECONDS (ref 11.6–14.5)
QRS AXIS: 116 DEGREES
QRSD INTERVAL: 170 MS
QT INTERVAL: 532 MS
QTC INTERVAL: 522 MS
RBC # BLD AUTO: 3.25 MILLION/UL (ref 3.81–5.12)
RH BLD: POSITIVE
SODIUM SERPL-SCNC: 136 MMOL/L (ref 135–147)
SPECIMEN EXPIRATION DATE: NORMAL
T WAVE AXIS: -50 DEGREES
TIBC SERPL-MCNC: 397 UG/DL (ref 250–450)
UIBC SERPL-MCNC: 386 UG/DL (ref 155–355)
VENTRICULAR RATE: 58 BPM
WBC # BLD AUTO: 4.55 THOUSAND/UL (ref 4.31–10.16)

## 2024-02-07 PROCEDURE — 94640 AIRWAY INHALATION TREATMENT: CPT

## 2024-02-07 PROCEDURE — 86870 RBC ANTIBODY IDENTIFICATION: CPT | Performed by: EMERGENCY MEDICINE

## 2024-02-07 PROCEDURE — 80048 BASIC METABOLIC PNL TOTAL CA: CPT | Performed by: INTERNAL MEDICINE

## 2024-02-07 PROCEDURE — 36415 COLL VENOUS BLD VENIPUNCTURE: CPT | Performed by: EMERGENCY MEDICINE

## 2024-02-07 PROCEDURE — 84484 ASSAY OF TROPONIN QUANT: CPT | Performed by: EMERGENCY MEDICINE

## 2024-02-07 PROCEDURE — 86905 BLOOD TYPING RBC ANTIGENS: CPT

## 2024-02-07 PROCEDURE — 94760 N-INVAS EAR/PLS OXIMETRY 1: CPT

## 2024-02-07 PROCEDURE — 84145 PROCALCITONIN (PCT): CPT | Performed by: INTERNAL MEDICINE

## 2024-02-07 PROCEDURE — 82948 REAGENT STRIP/BLOOD GLUCOSE: CPT

## 2024-02-07 PROCEDURE — 85610 PROTHROMBIN TIME: CPT | Performed by: INTERNAL MEDICINE

## 2024-02-07 PROCEDURE — 99233 SBSQ HOSP IP/OBS HIGH 50: CPT | Performed by: STUDENT IN AN ORGANIZED HEALTH CARE EDUCATION/TRAINING PROGRAM

## 2024-02-07 PROCEDURE — 82728 ASSAY OF FERRITIN: CPT | Performed by: INTERNAL MEDICINE

## 2024-02-07 PROCEDURE — 83540 ASSAY OF IRON: CPT | Performed by: INTERNAL MEDICINE

## 2024-02-07 PROCEDURE — 83550 IRON BINDING TEST: CPT | Performed by: INTERNAL MEDICINE

## 2024-02-07 PROCEDURE — 94664 DEMO&/EVAL PT USE INHALER: CPT

## 2024-02-07 PROCEDURE — 86900 BLOOD TYPING SEROLOGIC ABO: CPT | Performed by: EMERGENCY MEDICINE

## 2024-02-07 PROCEDURE — 86901 BLOOD TYPING SEROLOGIC RH(D): CPT | Performed by: EMERGENCY MEDICINE

## 2024-02-07 PROCEDURE — 85025 COMPLETE CBC W/AUTO DIFF WBC: CPT | Performed by: INTERNAL MEDICINE

## 2024-02-07 PROCEDURE — 86850 RBC ANTIBODY SCREEN: CPT | Performed by: EMERGENCY MEDICINE

## 2024-02-07 PROCEDURE — 93010 ELECTROCARDIOGRAM REPORT: CPT | Performed by: INTERNAL MEDICINE

## 2024-02-07 RX ORDER — FUROSEMIDE 40 MG/1
40 TABLET ORAL DAILY
Status: DISCONTINUED | OUTPATIENT
Start: 2024-02-08 | End: 2024-02-07

## 2024-02-07 RX ORDER — LEVOTHYROXINE SODIUM 0.05 MG/1
50 TABLET ORAL DAILY
Status: DISCONTINUED | OUTPATIENT
Start: 2024-02-07 | End: 2024-02-14 | Stop reason: HOSPADM

## 2024-02-07 RX ORDER — GABAPENTIN 300 MG/1
300 CAPSULE ORAL 3 TIMES DAILY
Status: DISCONTINUED | OUTPATIENT
Start: 2024-02-07 | End: 2024-02-14 | Stop reason: HOSPADM

## 2024-02-07 RX ORDER — LEVALBUTEROL INHALATION SOLUTION 1.25 MG/3ML
1.25 SOLUTION RESPIRATORY (INHALATION)
Status: DISCONTINUED | OUTPATIENT
Start: 2024-02-07 | End: 2024-02-14 | Stop reason: HOSPADM

## 2024-02-07 RX ORDER — PANTOPRAZOLE SODIUM 40 MG/1
40 TABLET, DELAYED RELEASE ORAL DAILY
Status: DISCONTINUED | OUTPATIENT
Start: 2024-02-07 | End: 2024-02-07

## 2024-02-07 RX ORDER — ALBUTEROL SULFATE 90 UG/1
2 AEROSOL, METERED RESPIRATORY (INHALATION) EVERY 6 HOURS PRN
Status: DISCONTINUED | OUTPATIENT
Start: 2024-02-07 | End: 2024-02-14 | Stop reason: HOSPADM

## 2024-02-07 RX ORDER — FUROSEMIDE 40 MG/1
40 TABLET ORAL DAILY
Status: DISCONTINUED | OUTPATIENT
Start: 2024-02-07 | End: 2024-02-07

## 2024-02-07 RX ORDER — ATORVASTATIN CALCIUM 40 MG/1
40 TABLET, FILM COATED ORAL EVERY EVENING
Status: DISCONTINUED | OUTPATIENT
Start: 2024-02-07 | End: 2024-02-14 | Stop reason: HOSPADM

## 2024-02-07 RX ORDER — METHYLPREDNISOLONE SODIUM SUCCINATE 40 MG/ML
40 INJECTION, POWDER, LYOPHILIZED, FOR SOLUTION INTRAMUSCULAR; INTRAVENOUS EVERY 12 HOURS SCHEDULED
Status: DISCONTINUED | OUTPATIENT
Start: 2024-02-07 | End: 2024-02-09

## 2024-02-07 RX ORDER — WARFARIN SODIUM 5 MG/1
5 TABLET ORAL
Status: DISCONTINUED | OUTPATIENT
Start: 2024-02-07 | End: 2024-02-14 | Stop reason: HOSPADM

## 2024-02-07 RX ORDER — FUROSEMIDE 40 MG/1
40 TABLET ORAL DAILY
Status: DISCONTINUED | OUTPATIENT
Start: 2024-02-07 | End: 2024-02-09

## 2024-02-07 RX ORDER — PANTOPRAZOLE SODIUM 40 MG/10ML
40 INJECTION, POWDER, LYOPHILIZED, FOR SOLUTION INTRAVENOUS
Status: DISCONTINUED | OUTPATIENT
Start: 2024-02-08 | End: 2024-02-14 | Stop reason: HOSPADM

## 2024-02-07 RX ADMIN — INSULIN LISPRO 3 UNITS: 100 INJECTION, SOLUTION INTRAVENOUS; SUBCUTANEOUS at 06:11

## 2024-02-07 RX ADMIN — PANTOPRAZOLE SODIUM 40 MG: 40 TABLET, DELAYED RELEASE ORAL at 06:11

## 2024-02-07 RX ADMIN — GABAPENTIN 300 MG: 300 CAPSULE ORAL at 21:19

## 2024-02-07 RX ADMIN — LEVALBUTEROL HYDROCHLORIDE 1.25 MG: 1.25 SOLUTION RESPIRATORY (INHALATION) at 13:38

## 2024-02-07 RX ADMIN — IPRATROPIUM BROMIDE 0.5 MG: 0.5 SOLUTION RESPIRATORY (INHALATION) at 07:49

## 2024-02-07 RX ADMIN — GABAPENTIN 300 MG: 300 CAPSULE ORAL at 09:16

## 2024-02-07 RX ADMIN — WARFARIN SODIUM 5 MG: 5 TABLET ORAL at 17:39

## 2024-02-07 RX ADMIN — METHYLPREDNISOLONE SODIUM SUCCINATE 40 MG: 40 INJECTION, POWDER, FOR SOLUTION INTRAMUSCULAR; INTRAVENOUS at 21:19

## 2024-02-07 RX ADMIN — ATORVASTATIN CALCIUM 40 MG: 40 TABLET, FILM COATED ORAL at 17:41

## 2024-02-07 RX ADMIN — LEVOTHYROXINE SODIUM 50 MCG: 0.05 TABLET ORAL at 06:11

## 2024-02-07 RX ADMIN — FUROSEMIDE 40 MG: 40 TABLET ORAL at 09:16

## 2024-02-07 RX ADMIN — IPRATROPIUM BROMIDE 0.5 MG: 0.5 SOLUTION RESPIRATORY (INHALATION) at 13:38

## 2024-02-07 RX ADMIN — HEPARIN SODIUM 5000 UNITS: 5000 INJECTION INTRAVENOUS; SUBCUTANEOUS at 06:11

## 2024-02-07 RX ADMIN — INSULIN LISPRO 2 UNITS: 100 INJECTION, SOLUTION INTRAVENOUS; SUBCUTANEOUS at 11:30

## 2024-02-07 RX ADMIN — METHYLPREDNISOLONE SODIUM SUCCINATE 40 MG: 40 INJECTION, POWDER, FOR SOLUTION INTRAMUSCULAR; INTRAVENOUS at 06:14

## 2024-02-07 RX ADMIN — BENZONATATE 100 MG: 100 CAPSULE ORAL at 07:49

## 2024-02-07 RX ADMIN — IPRATROPIUM BROMIDE 0.5 MG: 0.5 SOLUTION RESPIRATORY (INHALATION) at 20:06

## 2024-02-07 RX ADMIN — LEVALBUTEROL HYDROCHLORIDE 1.25 MG: 1.25 SOLUTION RESPIRATORY (INHALATION) at 07:49

## 2024-02-07 RX ADMIN — ASPIRIN 81 MG: 81 TABLET, COATED ORAL at 09:16

## 2024-02-07 RX ADMIN — GABAPENTIN 300 MG: 300 CAPSULE ORAL at 01:52

## 2024-02-07 RX ADMIN — INSULIN DETEMIR 26 UNITS: 100 INJECTION, SOLUTION SUBCUTANEOUS at 21:19

## 2024-02-07 RX ADMIN — METOPROLOL TARTRATE 25 MG: 25 TABLET, FILM COATED ORAL at 09:16

## 2024-02-07 RX ADMIN — INSULIN DETEMIR 26 UNITS: 100 INJECTION, SOLUTION SUBCUTANEOUS at 01:52

## 2024-02-07 RX ADMIN — LEVALBUTEROL HYDROCHLORIDE 1.25 MG: 1.25 SOLUTION RESPIRATORY (INHALATION) at 20:06

## 2024-02-07 NOTE — ASSESSMENT & PLAN NOTE
(P) 257Hold home p.o. agents  Levemir 26 units at bedtime  Sliding scale insulin  Monitor blood glucose

## 2024-02-07 NOTE — UTILIZATION REVIEW
Initial Clinical Review    Admission: Date/Time/Statement:   Admission Orders (From admission, onward)       Ordered        02/06/24 1857  INPATIENT ADMISSION  Once                          Orders Placed This Encounter   Procedures    INPATIENT ADMISSION     Standing Status:   Standing     Number of Occurrences:   1     Order Specific Question:   Level of Care     Answer:   Med Surg [16]     Order Specific Question:   Estimated length of stay     Answer:   More than 2 Midnights     Order Specific Question:   Certification     Answer:   I certify that inpatient services are medically necessary for this patient for a duration of greater than two midnights. See H&P and MD Progress Notes for additional information about the patient's course of treatment.     ED Arrival Information       Expected   -    Arrival   2/6/2024 17:12    Acuity   Urgent              Means of arrival   Ambulance    Escorted by   [facility]    Service   Hospitalist    Admission type   Emergency              Arrival complaint   COPD             Chief Complaint   Patient presents with    Shortness of Breath     Per EMS patient family called for a respiratory distress. Patient per EMS and during triage is alert and oriented. Hx of COPD (suspected exacerbation). Patient is chronically on 2L of 02 via NC. Patient as a hx of TIA in  September 2023, low HGB in December of 2023. Lab work completed 12/5/2024 waiting on results. Patient states she feels the same as when she had low HGB. Patient is pale appearing upon inspection.        Initial Presentation: 83 y.o. female to ED from home via EMS w/ COPD, DM , CAD , mechanical aortic valve on coumadin . C/o SOB began past few days , worsened despite home inhaler use. Admitted IP status w/ COPD exacerbation , on 3l NC plan for nebs , wean O2 as able , iv steroids , wean O2 as able . Cont coumadin , INR 2.3 , goal 2.5-3.5 monitor INR . Afib on BB , cont coumadin . Anemia hgb stable . Hypothyroidism cont  synthroid . DM SSI and monitor . CAD asa, BB , statin .     Date:  2/7 Day 2: wean O2 as able . On 2l . Cont IV solumedrol , schedule nebs . Monitor H&H , cont PPI .     Date: 2/8    Day 3: Has surpassed a 2nd midnight with active treatments and services, which include IV solumedrol , monitor O2 sat , PPI and nebs .     ED Triage Vitals   Temperature Pulse Respirations Blood Pressure SpO2   02/06/24 1725 02/06/24 1720 02/06/24 1720 02/06/24 1720 02/06/24 1720   (!) 97.4 °F (36.3 °C) 61 (!) 25 (!) 172/102 92 %      Temp Source Heart Rate Source Patient Position - Orthostatic VS BP Location FiO2 (%)   02/06/24 1725 02/06/24 1720 02/06/24 1720 02/06/24 1720 --   Oral Monitor Sitting Right arm       Pain Score       02/06/24 1725       3          Wt Readings from Last 1 Encounters:   12/14/23 72.1 kg (158 lb 15.2 oz)     Additional Vital Signs:   Date/Time Temp Pulse Resp BP MAP (mmHg) SpO2 Calculated FIO2 (%) - Nasal Cannula Nasal Cannula O2 Flow Rate (L/min) O2 Device Patient Position - Orthostatic VS   02/07/24 0941 -- 60 -- 170/68 98 97 % -- -- -- Lying   02/07/24 0700 -- 60 16 158/68 98 97 % 28 2 L/min Nasal cannula Lying   02/07/24 0600 -- 60 16 137/62 89 96 % 28 2 L/min Nasal cannula Lying   02/07/24 0441 -- 69 20 142/64 -- 95 % -- -- None (Room air) Sitting   02/07/24 0101 -- -- -- -- -- 93 % 28 2 L/min Nasal cannula --   02/07/24 0056 -- 60 20 -- -- 93 % -- -- -- --   02/07/24 0000 -- 59 18 149/66 95 94 % 28 2 L/min Nasal cannula Lying   02/06/24 2331 -- 59 19 144/65 94 -- -- -- -- --   02/06/24 2300 -- 59 22 -- -- 96 % -- -- -- --   02/06/24 2131 -- 59 35 Abnormal  160/70 100 94 % -- -- -- --   02/06/24 2101 -- 62 30 Abnormal  165/64 92 91 % -- -- -- --   02/06/24 2001 -- 60 23 Abnormal  182/92 Abnormal  125 93 % -- -- -- --   02/06/24 1902 -- 59 26 Abnormal  165/65 93 100 % -- -- -- --   02/06/24 1853 -- -- -- -- -- 94 % -- -- -- --   02/06/24 1749 -- -- -- -- -- 98 % 68 12 L/min Nasal cannula --   02/06/24  1725 97.4 °F (36.3 °C) Abnormal  -- -- -- -- 97 % 28 2 L/min Nasal cannula      Pertinent Labs/Diagnostic Test Results:   2/6 EKG NSR   XR chest 1 view portable   Final Result by Wan Sweeney MD (02/07 0901)      Mild congestive changes with small effusions.            Workstation performed: [id number]               Results from last 7 days   Lab Units 02/07/24 0441 02/06/24  1734   WBC Thousand/uL 4.55 5.08   HEMOGLOBIN g/dL 7.6* 8.3*   HEMATOCRIT % 25.6* 28.2*   PLATELETS Thousands/uL 326 406*   NEUTROS ABS Thousands/µL 4.07 3.20         Results from last 7 days   Lab Units 02/07/24 0441 02/06/24  1734 02/05/24  0940   SODIUM mmol/L 136 139 142   POTASSIUM mmol/L 3.8 3.5 4.3   CHLORIDE mmol/L 102 104 107   CO2 mmol/L 23 28 29   ANION GAP mmol/L 11 7 6   BUN mg/dL 25 20 21   CREATININE mg/dL 1.34* 1.24 1.13*   EGFR ml/min/1.73sq m 36 40 48*   CALCIUM mg/dL 8.7 9.1 8.9     Results from last 7 days   Lab Units 02/05/24  0940   AST U/L 12   ALT U/L 8   ALK PHOS U/L 65   TOTAL PROTEIN g/dL 6.8   ALBUMIN g/dL 3.9   TOTAL BILIRUBIN mg/dL 0.5         Results from last 7 days   Lab Units 02/07/24 0441 02/06/24  1734 02/05/24  0940   GLUCOSE RANDOM mg/dL 255* 161* 105*         Results from last 7 days   Lab Units 02/07/24  0030 02/06/24  1734   HS TNI 0HR ng/L  --  18   HS TNI 2HR ng/L 14  --    HSTNI D2 ng/L -4  --          Results from last 7 days   Lab Units 02/07/24 0441 02/06/24  1734 02/05/24  0940   PROTIME seconds 26.8* 26.8*  --    INR  2.37* 2.36* 2.7   PTT seconds  --  36  --          Results from last 7 days   Lab Units 02/07/24  0441   PROCALCITONIN ng/ml 0.06     Results from last 7 days   Lab Units 02/06/24  1734   BNP pg/mL 653*     Results from last 7 days   Lab Units 02/07/24  0441   FERRITIN ng/mL 13       ED Treatment:   Medication Administration from 02/06/2024 1712 to 02/07/2024 1034         Date/Time Order Dose Route Action     02/06/2024 1749 EST albuterol inhalation solution 10 mg 10  mg Nebulization Given     02/06/2024 1749 EST ipratropium (ATROVENT) 0.02 % inhalation solution 1 mg 1 mg Nebulization Given     02/06/2024 1749 EST sodium chloride 0.9 % inhalation solution 12 mL 12 mL Nebulization Given     02/06/2024 1854 EST albuterol inhalation solution 10 mg 10 mg Nebulization Given     02/06/2024 1854 EST ipratropium (ATROVENT) 0.02 % inhalation solution 1 mg 1 mg Nebulization Given     02/06/2024 1854 EST sodium chloride 0.9 % inhalation solution 12 mL 12 mL Nebulization Given     02/06/2024 1903 EST methylPREDNISolone sodium succinate (Solu-MEDROL) injection 62.5 mg 62.5 mg Intravenous Given     02/07/2024 0749 EST benzonatate (TESSALON PERLES) capsule 100 mg 100 mg Oral Given     02/07/2024 0614 EST methylPREDNISolone sodium succinate (Solu-MEDROL) injection 40 mg 40 mg Intravenous Given     02/07/2024 0611 EST heparin (porcine) subcutaneous injection 5,000 Units 5,000 Units Subcutaneous Given     02/07/2024 0749 EST levalbuterol (XOPENEX) inhalation solution 1.25 mg 1.25 mg Nebulization Given     02/07/2024 0749 EST ipratropium (ATROVENT) 0.02 % inhalation solution 0.5 mg 0.5 mg Nebulization Given     02/07/2024 0916 EST aspirin (ECOTRIN LOW STRENGTH) EC tablet 81 mg 81 mg Oral Given     02/07/2024 0916 EST gabapentin (NEURONTIN) capsule 300 mg 300 mg Oral Given     02/07/2024 0152 EST gabapentin (NEURONTIN) capsule 300 mg 300 mg Oral Given     02/07/2024 0152 EST insulin detemir (LEVEMIR) subcutaneous injection 26 Units 26 Units Subcutaneous Given     02/07/2024 0611 EST levothyroxine tablet 50 mcg 50 mcg Oral Given     02/07/2024 0916 EST metoprolol tartrate (LOPRESSOR) tablet 25 mg 25 mg Oral Given     02/07/2024 0611 EST pantoprazole (PROTONIX) EC tablet 40 mg 40 mg Oral Given     02/07/2024 0611 EST insulin lispro (HumaLOG) 100 units/mL subcutaneous injection 1-6 Units 3 Units Subcutaneous Given     02/07/2024 0916 EST furosemide (LASIX) tablet 40 mg 40 mg Oral Given          Past  Medical History:   Diagnosis Date    Anemia     Aneurysm of thoracic aorta (HCC)     Aortic valve disorder     Benign neoplasm of sigmoid colon     Cardiomyopathy (HCC)     last assessed: 10/10/2014    CHF (congestive heart failure) (HCC)     Chronic kidney disease     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HCC)     last assessed: 10/10/2014    Diabetic nephropathy (HCC)     RAMON (dyspnea on exertion)     GERD (gastroesophageal reflux disease)     History of emphysema (HCC)     Hyperlipidemia     TIA (transient ischemic attack)      Present on Admission:   Stage 3a chronic kidney disease   Hypothyroidism   Paroxysmal atrial fibrillation (HCC)   Diabetes mellitus with neurological manifestation (HCC)   Coronary artery disease of native artery of native heart with stable angina pectoris (HCC)   Anemia   COPD with acute exacerbation (HCC)      Admitting Diagnosis: SOB (shortness of breath) [R06.02]  Age/Sex: 83 y.o. female  Admission Orders:  Scheduled Medications:  aspirin, 81 mg, Oral, Daily  atorvastatin, 40 mg, Oral, QPM  furosemide, 40 mg, Oral, Daily  gabapentin, 300 mg, Oral, TID  insulin detemir, 26 Units, Subcutaneous, HS  insulin lispro, 1-5 Units, Subcutaneous, HS  insulin lispro, 1-6 Units, Subcutaneous, TID AC  ipratropium, 0.5 mg, Nebulization, TID  levalbuterol, 1.25 mg, Nebulization, TID  levothyroxine, 50 mcg, Oral, Daily  methylPREDNISolone sodium succinate, 40 mg, Intravenous, Q12H LIBAN  metoprolol tartrate, 25 mg, Oral, Q12H LIBAN  [START ON 2/8/2024] pantoprazole, 40 mg, Intravenous, Q24H LIBAN  warfarin, 5 mg, Oral, Daily (warfarin)      Continuous IV Infusions:     PRN Meds:  acetaminophen, 650 mg, Oral, Q6H PRN  albuterol, 2 puff, Inhalation, Q6H PRN  benzonatate, 100 mg, Oral, TID PRN    Fingerstick ac and hs   Up and OOB   I&O   Reg diet       Network Utilization Review Department  ATTENTION: Please call with any questions or concerns to [phone] and carefully listen to the prompts so that you are  directed to the right person. All voicemails are confidential.   For Discharge needs, contact Care Management DC Support Team at [phone] opt. [phone]  Send all requests for admission clinical reviews, approved or denied determinations and any other requests to dedicated fax number below belonging to the [facility] where the patient is receiving treatment. List of dedicated fax numbers for the Facilities:  FACILITY NAME UR FAX NUMBER   ADMISSION DENIALS (Administrative/Medical Necessity) [phone]   DISCHARGE SUPPORT TEAM (NETWORK) [phone]   PARENT CHILD HEALTH (Maternity/NICU/Pediatrics) [phone]   [facility] [phone]   [facility] [phone]   [facility] [phone]   [facility] [phone]   Pending sale to [facility] [phone]   [facility] [phone]   [facility] [phone]   [facility] [phone]   [facility] [phone]   [facility] [phone]   [facility] [phone]   [facility] [phone]

## 2024-02-07 NOTE — ASSESSMENT & PLAN NOTE
Was recently admitted for a GI bleed no further GI bleeding  Hemoglobin remained stable at approximately 8  Continue to monitor

## 2024-02-07 NOTE — ASSESSMENT & PLAN NOTE
Presented with shortness of breath and wheezing noted to be saturating in the mid 80s on room air  Consistent with COPD exacerbation  Currently on 3 L nasal cannula saturating adequately  Continue scheduled steroids 40 mg every 8 hours  Scheduled nebs  Wean oxygen as able

## 2024-02-07 NOTE — ASSESSMENT & PLAN NOTE
Presented with shortness of breath and wheezing noted to be saturating in the mid 80s on room air. In on home o2 2L qhs  Consistent with COPD exacerbation  Currently on 2 L nasal cannula saturating adequately  Will decrease to IV solumedrol 40 mg bid today  Scheduled nebs  Wean oxygen as able

## 2024-02-07 NOTE — ASSESSMENT & PLAN NOTE
Secondary to COPD exacerbation  Currently on 3 L nasal cannula  Plan as outlined above  Wean oxygen as able

## 2024-02-07 NOTE — H&P
[facility]  H&P  Name: Ely Hernadez 83 y.o. female I MRN: [id number]  Unit/Bed#: FT 04 I Date of Admission: 2/6/2024   Date of Service: 2/6/2024 I Hospital Day: 0      Assessment/Plan   * COPD with acute exacerbation (HCC)  Assessment & Plan  Presented with shortness of breath and wheezing noted to be saturating in the mid 80s on room air  Consistent with COPD exacerbation  Currently on 3 L nasal cannula saturating adequately  Continue scheduled steroids 40 mg every 8 hours  Scheduled nebs  Wean oxygen as able    Acute respiratory failure with hypoxia (HCC)  Assessment & Plan  Secondary to COPD exacerbation  Currently on 3 L nasal cannula  Plan as outlined above  Wean oxygen as able    H/O mechanical aortic valve replacement  Assessment & Plan  Continue home Coumadin  INR currently 2.3  Goal INR 2.5-3.5  Monitor INR daily    Paroxysmal atrial fibrillation (HCC)  Assessment & Plan  Currently rate controlled  Continue beta-blocker  Continue Coumadin    Stage 3a chronic kidney disease  Assessment & Plan  Creatinine currently at baseline at approximately 1.2  Continue      Anemia  Assessment & Plan  Was recently admitted for a GI bleed no further GI bleeding  Hemoglobin remained stable at approximately 8  Continue to monitor    Hypothyroidism  Assessment & Plan  Continue Synthroid    Diabetes mellitus with neurological manifestation (HCC)  Assessment & Plan    Hold home p.o. agents  Levemir 26 units at bedtime  Sliding scale insulin  Monitor blood glucose    Coronary artery disease of native artery of native heart with stable angina pectoris (HCC)  Assessment & Plan  Currently without chest pain  Continue aspirin, beta-blocker, statin       VTE Prophylaxis: Warfarin (Coumadin)  / sequential compression device   Code Status: full code  POLST: There is no POLST form on file for this patient (pre-hospital)  Discussion with family: patient    Anticipated Length of Stay:  Patient will be admitted  on an Inpatient basis with an anticipated length of stay of  > 2 midnights.   Justification for Hospital Stay: COPD exacerbation    Total Time for Visit, including Counseling / Coordination of Care: 60 minutes.  Greater than 50% of this total time spent on direct patient counseling and coordination of care.    Chief Complaint:   Shortness of breath    History of Present Illness:    Ely Hernadez is a 83 y.o. female with past medical history significant of COPD, type 2 diabetes, coronary artery disease, mechanical aortic valve on Coumadin history presented with shortness of breath.  Patient reports shortness of breath that began over the past few days reports progressively worsened despite home inhaler use.  She otherwise denies any acute complaints.  Denies orthopnea or worsening leg swelling.  Denies fevers, chills, cough, abdominal pain, nausea, vomiting or any other complaints at this time      Review of Systems:    Review of Systems   Constitutional:  Negative for activity change, appetite change, chills, diaphoresis, fever and unexpected weight change.   HENT:  Negative for congestion, facial swelling and rhinorrhea.    Eyes:  Negative for photophobia and visual disturbance.   Respiratory:  Negative for cough, shortness of breath and wheezing.    Cardiovascular:  Negative for chest pain and palpitations.   Gastrointestinal:  Negative for abdominal pain, blood in stool, constipation, diarrhea, nausea and vomiting.   Genitourinary:  Negative for decreased urine volume, difficulty urinating, dysuria, flank pain, frequency, hematuria and urgency.   Musculoskeletal:  Negative for arthralgias, back pain, joint swelling and myalgias.   Neurological:  Negative for dizziness, syncope, facial asymmetry, light-headedness, numbness and headaches.   Psychiatric/Behavioral:  Negative for confusion and decreased concentration. The patient is not nervous/anxious.        Past Medical and Surgical History:     Past Medical  History:   Diagnosis Date    Anemia     Aneurysm of thoracic aorta (HCC)     Aortic valve disorder     Benign neoplasm of sigmoid colon     Cardiomyopathy (HCC)     last assessed: 10/10/2014    CHF (congestive heart failure) (HCC)     Chronic kidney disease     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HCC)     last assessed: 10/10/2014    Diabetic nephropathy (HCC)     RAMON (dyspnea on exertion)     GERD (gastroesophageal reflux disease)     History of emphysema (HCC)     Hyperlipidemia     TIA (transient ischemic attack)        Past Surgical History:   Procedure Laterality Date    AORTIC VALVE REPLACEMENT      CARDIAC PACEMAKER PLACEMENT      last assessed: 10/10/2014    CARDIAC SURGERY      aortic valve replacement    CHOLECYSTECTOMY      COLONOSCOPY      HYSTERECTOMY      INSERT / REPLACE / REMOVE PACEMAKER      KNEE SURGERY Right     OTHER SURGICAL HISTORY      Capsule ENDOscopy description: 12/19/2012    WY COLONOSCOPY FLX DX W/COLLJ SPEC WHEN PFRMD N/A 5/31/2018    Procedure: single balloon ENTEROSCOPY;  Surgeon: David Dennis MD;  Location: [facility];  Service: Gastroenterology    WY ESOPHAGOGASTRODUODENOSCOPY TRANSORAL DIAGNOSTIC N/A 11/29/2017    Procedure: EGD AND COLONOSCOPY;  Surgeon: Jay Mcleod III, MD;  Location: [facility];  Service: Gastroenterology       Meds/Allergies:    Prior to Admission medications    Medication Sig Start Date End Date Taking? Authorizing Provider   Accu-Chek FastClix Lancets MISC Use 3 (three) times a day 1/22/24 April Cynthia Mccabe PA-C   albuterol (Ventolin HFA) 90 mcg/act inhaler Inhale 2 puffs every 6 (six) hours as needed for wheezing 7/21/23   CAMACHO Reyes   Ascorbic Acid (vitamin C) 1000 MG tablet Take 1,000 mg by mouth daily    Historical Provider, MD   aspirin (ECOTRIN LOW STRENGTH) 81 mg EC tablet Take 1 tablet (81 mg total) by mouth daily Do not start before November 1, 2023. 11/1/23   Chico Joshi MD   atorvastatin (LIPITOR) 40 mg tablet Take 1 tablet (40  mg total) by mouth every evening 10/31/23   Chico Joshi MD   b complex vitamins capsule Take 1 capsule by mouth daily 11/1/23 1/30/24  Aviva Mccabe PA-C   BD Pen Needle Rosie U/F 32G X 4 MM MISC USE DAILY 8/21/23   Corey Silva MD   Blood Glucose Monitoring Suppl (Accu-Chek Guide Me) w/Device KIT USE 3 TIMES DAILY 12/10/22   Chang Mcneil DO   Cholecalciferol (Vitamin D3) 50 MCG (2000 UT) TABS Take 2,000 Units by mouth daily    Historical Provider, MD   enoxaparin (LOVENOX) 80 mg/0.8 mL Inject 0.8 mL (80 mg total) under the skin every 12 (twelve) hours for 10 days To be taken until Tuesday AM labs 12/23/23 1/2/24  Mo Cornejo DO   furosemide (LASIX) 40 mg tablet TAKE 1 TABLET BY MOUTH  DAILY 5/12/23   Corey Silva MD   gabapentin (NEURONTIN) 300 mg capsule Take 1 capsule (300 mg total) by mouth 3 (three) times a day 8/4/23   Corey Silva MD   glipiZIDE (GLUCOTROL) 10 mg tablet TAKE 1 TABLET BY MOUTH  TWICE DAILY BEFORE MEALS 5/31/23   Corey Silva MD   glucose blood (Accu-Chek Celeste Plus) test strip Use 1 each 3 (three) times a day Use as instructed 9/23/22   Chang Mcneil DO   Lancets (freestyle) lancets Check blood glucose 3 times daily 9/13/22   Corey Silva MD   Levemir FlexPen 100 units/mL injection pen INJECT SUBCUTANEOUSLY 26 UNITS  DAILY AT BEDTIME 9/18/23   Chang Mcneil DO   levothyroxine 50 mcg tablet TAKE 1 TABLET BY MOUTH  DAILY 5/29/23   Corey Silva MD   metoprolol tartrate (LOPRESSOR) 50 mg tablet TAKE ONE-HALF TABLET BY  MOUTH TWICE DAILY 5/29/23   Corey Silva MD   oxygen gas Inhale 2 L/min daily at bedtime as needed home oxygen nightly    Historical Provider, MD   pantoprazole (PROTONIX) 40 mg tablet TAKE 1 TABLET BY MOUTH  DAILY 5/29/23   Corey Silva MD   warfarin (COUMADIN) 5 mg tablet TAKE 1 TO 2 TABLETS BY  MOUTH DAILY OR AS DIRECTED 5/29/23   Corey Silva MD     I have reviewed home medications with patient personally.    Allergies: No Known  Allergies    Social History:     Marital Status:      Patient Pre-hospital Living Situation: home  Patient Pre-hospital Level of Mobility: independent  Patient Pre-hospital Diet Restrictions: none  Substance Use History:   Social History     Substance and Sexual Activity   Alcohol Use Never     Social History     Tobacco Use   Smoking Status Former    Current packs/day: 0.00    Average packs/day: 1 pack/day for 52.9 years (52.9 ttl pk-yrs)    Types: Cigarettes    Start date:     Quit date: 2007    Years since quittin.2    Passive exposure: Past   Smokeless Tobacco Former    Quit date:      Social History     Substance and Sexual Activity   Drug Use Never       Family History:    Family History   Problem Relation Age of Onset    No Known Problems Mother     No Known Problems Father        Physical Exam:     Vitals:   Blood Pressure: (!) 172/102 (24 1720)  Pulse: 61 (24 1720)  Temperature: (!) 97.4 °F (36.3 °C) (24 172)  Temp Source: Oral (24 172)  Respirations: (!) 25 (24 1720)  SpO2: 94 % (24 1853)    Physical Exam  Constitutional:       General: She is not in acute distress.     Appearance: She is well-developed. She is not diaphoretic.   HENT:      Head: Normocephalic and atraumatic.      Nose: Nose normal.      Mouth/Throat:      Pharynx: No oropharyngeal exudate.   Eyes:      General: No scleral icterus.        Right eye: No discharge.         Left eye: No discharge.      Conjunctiva/sclera: Conjunctivae normal.   Neck:      Thyroid: No thyromegaly.      Vascular: No JVD.   Cardiovascular:      Rate and Rhythm: Normal rate and regular rhythm.      Heart sounds: Normal heart sounds. No murmur heard.     No friction rub. No gallop.   Pulmonary:      Effort: Pulmonary effort is normal. No respiratory distress.      Breath sounds: Normal breath sounds. No wheezing or rales.   Chest:      Chest wall: No tenderness.   Abdominal:      General: Bowel sounds  are normal. There is no distension.      Palpations: Abdomen is soft.      Tenderness: There is no abdominal tenderness. There is no guarding or rebound.   Musculoskeletal:         General: No tenderness or deformity. Normal range of motion.      Cervical back: Normal range of motion and neck supple.   Skin:     General: Skin is warm and dry.      Findings: No erythema or rash.   Neurological:      Mental Status: She is alert. Mental status is at baseline.      Cranial Nerves: No cranial nerve deficit.      Sensory: No sensory deficit.      Motor: No abnormal muscle tone.      Coordination: Coordination normal.           Additional Data:     Lab Results: I have personally reviewed pertinent reports.      Results from last 7 days   Lab Units 02/06/24  1734   WBC Thousand/uL 5.08   HEMOGLOBIN g/dL 8.3*   HEMATOCRIT % 28.2*   PLATELETS Thousands/uL 406*   NEUTROS PCT % 63   LYMPHS PCT % 24   MONOS PCT % 8   EOS PCT % 4     Results from last 7 days   Lab Units 02/06/24  1734 02/05/24  0940   SODIUM mmol/L 139 142   POTASSIUM mmol/L 3.5 4.3   CHLORIDE mmol/L 104 107   CO2 mmol/L 28 29   BUN mg/dL 20 21   CREATININE mg/dL 1.24 1.13*   ANION GAP mmol/L 7 6   CALCIUM mg/dL 9.1 8.9   ALBUMIN g/dL  --  3.9   TOTAL BILIRUBIN mg/dL  --  0.5   ALK PHOS U/L  --  65   ALT U/L  --  8   AST U/L  --  12   GLUCOSE RANDOM mg/dL 161* 105*     Results from last 7 days   Lab Units 02/06/24  1734   INR  2.36*                   Imaging: I have personally reviewed pertinent reports.      XR chest 1 view portable    (Results Pending)       EKG, Pathology, and Other Studies Reviewed on Admission:   EKG: reviewed    Allscripts / Epic Records Reviewed: Yes     ** Please Note: This note has been constructed using a voice recognition system. **

## 2024-02-07 NOTE — PROGRESS NOTES
[facility]  Progress Note  Name: Ely Hernadez I  MRN: [id number]  Unit/Bed#: FT 04 I Date of Admission: 2/6/2024   Date of Service: 2/7/2024 I Hospital Day: 1    Assessment/Plan   Acute respiratory failure with hypoxia (HCC)  Assessment & Plan  Secondary to COPD exacerbation  Currently on 2 L nasal cannula  Baseline 2L Qhs only   Plan as outlined above  Wean oxygen as able    H/O mechanical aortic valve replacement  Assessment & Plan  Continue home Coumadin  INR currently 2.37  Goal INR 2.5-3.5  Monitor INR daily    Paroxysmal atrial fibrillation (HCC)  Assessment & Plan  Currently rate controlled  Continue beta-blocker  Continue Coumadin    Stage 3a chronic kidney disease  Assessment & Plan  Creatinine currently at baseline at approximately 1.2  Continue      Anemia  Assessment & Plan  Was recently admitted for a GI bleed no further GI bleeding  Hemoglobin remained stable at approximately 8  Continue to monitor    Hypothyroidism  Assessment & Plan  Continue Synthroid    Diabetes mellitus with neurological manifestation (HCC)  Assessment & Plan    (P) 257Hold home p.o. agents  Levemir 26 units at bedtime  Sliding scale insulin  Monitor blood glucose    Coronary artery disease of native artery of native heart with stable angina pectoris (HCC)  Assessment & Plan  Currently without chest pain  Continue aspirin, beta-blocker, statin    * COPD with acute exacerbation (HCC)  Assessment & Plan  Presented with shortness of breath and wheezing noted to be saturating in the mid 80s on room air. In on home o2 2L qhs  Consistent with COPD exacerbation  Currently on 2 L nasal cannula saturating adequately  Will decrease to IV solumedrol 40 mg bid today  Scheduled nebs  Wean oxygen as able           VTE Pharmacologic Prophylaxis: VTE Score: 6 High Risk (Score >/= 5) - Pharmacological DVT Prophylaxis Ordered: warfarin (Coumadin). Sequential Compression Devices Ordered.    Mobility:   Basic Mobility  Inpatient Raw Score: 21  JH-HLM Goal: 6: Walk 10 steps or more  JH-HLM Achieved: 7: Walk 25 feet or more  HLM Goal achieved. Continue to encourage appropriate mobility.    Patient Centered Rounds: I performed bedside rounds with nursing staff today.   Discussions with Specialists or Other Care Team Provider: MANUELITO    Education and Discussions with Family / Patient: Patient declined call to .     Total Time Spent on Date of Encounter in care of patient: 41 mins. This time was spent on one or more of the following: performing physical exam; counseling and coordination of care; obtaining or reviewing history; documenting in the medical record; reviewing/ordering tests, medications or procedures; communicating with other healthcare professionals and discussing with patient's family/caregivers.    Current Length of Stay: 1 day(s)  Current Patient Status: Inpatient   Certification Statement: The patient will continue to require additional inpatient hospital stay due to IV steroids  Discharge Plan: Anticipate discharge tomorrow to home with home services.    Code Status: Level 1 - Full Code    Subjective:   Pt reports improvement in sob. Denies chest pain.     Objective:     Vitals:   Temp (24hrs), Av.4 °F (36.3 °C), Min:97.4 °F (36.3 °C), Max:97.4 °F (36.3 °C)    Temp:  [97.4 °F (36.3 °C)] 97.4 °F (36.3 °C)  HR:  [57-69] 58  Resp:  [16-35] 16  BP: (137-182)/() 160/67  SpO2:  [91 %-100 %] 97 %  There is no height or weight on file to calculate BMI.     Input and Output Summary (last 24 hours):   No intake or output data in the 24 hours ending 24 1209    Physical Exam:   Physical Exam  Vitals reviewed.   Constitutional:       General: She is not in acute distress.     Appearance: She is well-developed. She is not diaphoretic.   HENT:      Head: Normocephalic and atraumatic.      Nose: Nose normal.      Mouth/Throat:      Pharynx: No oropharyngeal exudate.   Eyes:      General: No scleral icterus.         Right eye: No discharge.         Left eye: No discharge.      Conjunctiva/sclera: Conjunctivae normal.   Cardiovascular:      Rate and Rhythm: Normal rate and regular rhythm.      Heart sounds: Normal heart sounds. No murmur heard.     No friction rub. No gallop.   Pulmonary:      Effort: Pulmonary effort is normal. No respiratory distress.      Breath sounds: No wheezing or rales.      Comments: 2L NC  Abdominal:      General: Bowel sounds are normal. There is no distension.      Palpations: Abdomen is soft.      Tenderness: There is no abdominal tenderness. There is no guarding.   Musculoskeletal:      Cervical back: Normal range of motion and neck supple.   Skin:     General: Skin is warm and dry.      Coloration: Skin is pale.      Findings: No erythema.   Neurological:      Mental Status: She is alert and oriented to person, place, and time. Mental status is at baseline.   Psychiatric:         Behavior: Behavior normal.          Additional Data:     Labs:  Results from last 7 days   Lab Units 02/07/24  0441   WBC Thousand/uL 4.55   HEMOGLOBIN g/dL 7.6*   HEMATOCRIT % 25.6*   PLATELETS Thousands/uL 326   NEUTROS PCT % 90*   LYMPHS PCT % 7*   MONOS PCT % 1*   EOS PCT % 0     Results from last 7 days   Lab Units 02/07/24  0441 02/06/24  1734 02/05/24  0940   SODIUM mmol/L 136   < > 142   POTASSIUM mmol/L 3.8   < > 4.3   CHLORIDE mmol/L 102   < > 107   CO2 mmol/L 23   < > 29   BUN mg/dL 25   < > 21   CREATININE mg/dL 1.34*   < > 1.13*   ANION GAP mmol/L 11   < > 6   CALCIUM mg/dL 8.7   < > 8.9   ALBUMIN g/dL  --   --  3.9   TOTAL BILIRUBIN mg/dL  --   --  0.5   ALK PHOS U/L  --   --  65   ALT U/L  --   --  8   AST U/L  --   --  12   GLUCOSE RANDOM mg/dL 255*   < > 105*    < > = values in this interval not displayed.     Results from last 7 days   Lab Units 02/07/24  0441   INR  2.37*     Results from last 7 days   Lab Units 02/07/24  1045   POC GLUCOSE mg/dl 257*         Results from last 7 days   Lab Units  02/07/24  0441   PROCALCITONIN ng/ml 0.06       Lines/Drains:  Invasive Devices       Peripheral Intravenous Line  Duration             Peripheral IV 02/06/24 Dorsal (posterior);Right Forearm <1 day                          Imaging: No pertinent imaging reviewed.    Recent Cultures (last 7 days):         Last 24 Hours Medication List:   Current Facility-Administered Medications   Medication Dose Route Frequency Provider Last Rate    acetaminophen  650 mg Oral Q6H PRN Darwin Pfeiffer MD      albuterol  2 puff Inhalation Q6H PRN Darwin Pfeiffer MD      aspirin  81 mg Oral Daily Darwin Pfeiffer MD      atorvastatin  40 mg Oral QPM Darwin Pfeiffer MD      benzonatate  100 mg Oral TID PRN Darwin Pfeiffer MD      furosemide  40 mg Oral Daily Lilibeth Bolivar DO      gabapentin  300 mg Oral TID Darwin Pfeiffer MD      insulin detemir  26 Units Subcutaneous HS Darwin Pfeiffer MD      insulin lispro  1-5 Units Subcutaneous HS Darwin Pfeiffer MD      insulin lispro  1-6 Units Subcutaneous TID AC Darwin Pfeiffer MD      ipratropium  0.5 mg Nebulization TID Darwin Pfeiffer MD      levalbuterol  1.25 mg Nebulization TID Darwin Pfeiffer MD      levothyroxine  50 mcg Oral Daily Darwin Pfeiffer MD      methylPREDNISolone sodium succinate  40 mg Intravenous Q12H [facility] Lilibeth Bolivar DO      metoprolol tartrate  25 mg Oral Q12H LIBAN Darwin Pfeiffer MD      [START ON 2/8/2024] pantoprazole  40 mg Intravenous Q24H [facility] Lilibeth Bolivar DO      warfarin  5 mg Oral Daily (warfarin) Darwin Pfeiffer MD          Today, Patient Was Seen By: Lilibeth Bolivar DO    **Please Note: This note may have been constructed using a voice recognition system.**

## 2024-02-07 NOTE — RESPIRATORY THERAPY NOTE
RT Protocol Note  Ely Hernadez 83 y.o. female MRN: [id number]  Unit/Bed#: FT 04 Encounter: [id number]    Assessment    Principal Problem:    COPD with acute exacerbation (HCC)  Active Problems:    Coronary artery disease of native artery of native heart with stable angina pectoris (HCC)    Diabetes mellitus with neurological manifestation (HCC)    Hypothyroidism    Anemia    Stage 3a chronic kidney disease    Paroxysmal atrial fibrillation (HCC)    H/O mechanical aortic valve replacement    Acute respiratory failure with hypoxia (HCC)      Home Pulmonary Medications:  Nebulizers/inhalers  Home Devices/Therapy: Home O2    Past Medical History:   Diagnosis Date    Anemia     Aneurysm of thoracic aorta (HCC)     Aortic valve disorder     Benign neoplasm of sigmoid colon     Cardiomyopathy (HCC)     last assessed: 10/10/2014    CHF (congestive heart failure) (HCC)     Chronic kidney disease     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HCC)     last assessed: 10/10/2014    Diabetic nephropathy (HCC)     RAMON (dyspnea on exertion)     GERD (gastroesophageal reflux disease)     History of emphysema (HCC)     Hyperlipidemia     TIA (transient ischemic attack)      Social History     Socioeconomic History    Marital status:      Spouse name: None    Number of children: None    Years of education: None    Highest education level: None   Occupational History    None   Tobacco Use    Smoking status: Former     Current packs/day: 0.00     Average packs/day: 1 pack/day for 52.9 years (52.9 ttl pk-yrs)     Types: Cigarettes     Start date:      Quit date: 2007     Years since quittin.2     Passive exposure: Past    Smokeless tobacco: Former     Quit date:    Vaping Use    Vaping status: Never Used   Substance and Sexual Activity    Alcohol use: Never    Drug use: Never    Sexual activity: Not Currently     Partners: Male   Other Topics Concern    None   Social History Narrative    Home durable medical equipment  - Freestyle test strips BID Freestyle lancets BID    Living independently with spouse     Social Determinants of Health     Financial Resource Strain: Not on file   Food Insecurity: No Food Insecurity (1/20/2022)    Hunger Vital Sign     Worried About Running Out of Food in the Last Year: Never true     Ran Out of Food in the Last Year: Never true   Transportation Needs: No Transportation Needs (1/20/2022)    PRAPARE - Transportation     Lack of Transportation (Medical): No     Lack of Transportation (Non-Medical): No   Physical Activity: Inactive (5/26/2021)    Exercise Vital Sign     Days of Exercise per Week: 0 days     Minutes of Exercise per Session: 0 min   Stress: No Stress Concern Present (5/26/2021)    [employer] - Occupational Stress Questionnaire     Feeling of Stress : Not at all   Social Connections: Not on file   Intimate Partner Violence: Not on file   Housing Stability: Low Risk  (1/20/2022)    Housing Stability Vital Sign     Unable to Pay for Housing in the Last Year: No     Number of Places Lived in the Last Year: 1     Unstable Housing in the Last Year: No       Subjective    Subjective Data: awake and alert    Objective    Physical Exam:   Assessment Type: Assess only  General Appearance: Alert, Awake  Respiratory Pattern: Shallow, Spontaneous  Chest Assessment: Chest expansion symmetrical  Bilateral Breath Sounds: Clear, Diminished, Scattered, Expiratory wheezes  Cough: None  O2 Device: nasal cannula    Vitals:  Blood pressure 149/66, pulse 60, temperature (!) 97.4 °F (36.3 °C), temperature source Oral, resp. rate 20, SpO2 93%.          Imaging and other studies: I have personally reviewed pertinent reports.      O2 Device: nasal cannula     Plan    Respiratory Plan: Home Bronchodilator Patient pathway  Airway Clearance Plan: Incentive Spirometer     Resp Comments: respiratory and airway clearance protocol competed patient being admitted for COPD exacerbation  patient resting without great apparent respiratory distress patient uses oxygen at home and treatments PRN treatment frequecy will be increased in attemept to improve pulmonary status. patient able to achieve vt >10 ml/kg without apparent retained secretions and will be ordered I.S. therapy per protocol.

## 2024-02-07 NOTE — ASSESSMENT & PLAN NOTE
Hold home p.o. agents  Levemir 26 units at bedtime  Sliding scale insulin  Monitor blood glucose

## 2024-02-07 NOTE — ASSESSMENT & PLAN NOTE
Secondary to COPD exacerbation  Currently on 2 L nasal cannula  Baseline 2L Qhs only   Plan as outlined above  Wean oxygen as able

## 2024-02-08 ENCOUNTER — PATIENT OUTREACH (OUTPATIENT)
Age: 84
End: 2024-02-08

## 2024-02-08 LAB
ANION GAP SERPL CALCULATED.3IONS-SCNC: 6 MMOL/L
BUN SERPL-MCNC: 39 MG/DL (ref 5–25)
CALCIUM SERPL-MCNC: 9.1 MG/DL (ref 8.4–10.2)
CHLORIDE SERPL-SCNC: 104 MMOL/L (ref 96–108)
CO2 SERPL-SCNC: 27 MMOL/L (ref 21–32)
CREAT SERPL-MCNC: 1.37 MG/DL (ref 0.6–1.3)
ERYTHROCYTE [DISTWIDTH] IN BLOOD BY AUTOMATED COUNT: 17.4 % (ref 11.6–15.1)
GFR SERPL CREATININE-BSD FRML MDRD: 35 ML/MIN/1.73SQ M
GLUCOSE SERPL-MCNC: 213 MG/DL (ref 65–140)
GLUCOSE SERPL-MCNC: 214 MG/DL (ref 65–140)
GLUCOSE SERPL-MCNC: 224 MG/DL (ref 65–140)
GLUCOSE SERPL-MCNC: 228 MG/DL (ref 65–140)
GLUCOSE SERPL-MCNC: 241 MG/DL (ref 65–140)
HCT VFR BLD AUTO: 24.8 % (ref 34.8–46.1)
HGB BLD-MCNC: 7.3 G/DL (ref 11.5–15.4)
INR PPP: 2.79 (ref 0.84–1.19)
MCH RBC QN AUTO: 23.2 PG (ref 26.8–34.3)
MCHC RBC AUTO-ENTMCNC: 29.4 G/DL (ref 31.4–37.4)
MCV RBC AUTO: 79 FL (ref 82–98)
PLATELET # BLD AUTO: 348 THOUSANDS/UL (ref 149–390)
PMV BLD AUTO: 10.1 FL (ref 8.9–12.7)
POTASSIUM SERPL-SCNC: 5.2 MMOL/L (ref 3.5–5.3)
PROTHROMBIN TIME: 30.4 SECONDS (ref 11.6–14.5)
RBC # BLD AUTO: 3.14 MILLION/UL (ref 3.81–5.12)
SODIUM SERPL-SCNC: 137 MMOL/L (ref 135–147)
WBC # BLD AUTO: 5.74 THOUSAND/UL (ref 4.31–10.16)

## 2024-02-08 PROCEDURE — C9113 INJ PANTOPRAZOLE SODIUM, VIA: HCPCS | Performed by: STUDENT IN AN ORGANIZED HEALTH CARE EDUCATION/TRAINING PROGRAM

## 2024-02-08 PROCEDURE — 80048 BASIC METABOLIC PNL TOTAL CA: CPT | Performed by: STUDENT IN AN ORGANIZED HEALTH CARE EDUCATION/TRAINING PROGRAM

## 2024-02-08 PROCEDURE — 94640 AIRWAY INHALATION TREATMENT: CPT

## 2024-02-08 PROCEDURE — 82948 REAGENT STRIP/BLOOD GLUCOSE: CPT

## 2024-02-08 PROCEDURE — 94664 DEMO&/EVAL PT USE INHALER: CPT

## 2024-02-08 PROCEDURE — 85027 COMPLETE CBC AUTOMATED: CPT | Performed by: STUDENT IN AN ORGANIZED HEALTH CARE EDUCATION/TRAINING PROGRAM

## 2024-02-08 PROCEDURE — 85610 PROTHROMBIN TIME: CPT | Performed by: STUDENT IN AN ORGANIZED HEALTH CARE EDUCATION/TRAINING PROGRAM

## 2024-02-08 PROCEDURE — 94760 N-INVAS EAR/PLS OXIMETRY 1: CPT

## 2024-02-08 PROCEDURE — 99233 SBSQ HOSP IP/OBS HIGH 50: CPT | Performed by: STUDENT IN AN ORGANIZED HEALTH CARE EDUCATION/TRAINING PROGRAM

## 2024-02-08 RX ADMIN — PANTOPRAZOLE SODIUM 40 MG: 40 INJECTION, POWDER, FOR SOLUTION INTRAVENOUS at 09:22

## 2024-02-08 RX ADMIN — INSULIN LISPRO 2 UNITS: 100 INJECTION, SOLUTION INTRAVENOUS; SUBCUTANEOUS at 16:56

## 2024-02-08 RX ADMIN — INSULIN LISPRO 2 UNITS: 100 INJECTION, SOLUTION INTRAVENOUS; SUBCUTANEOUS at 21:16

## 2024-02-08 RX ADMIN — IPRATROPIUM BROMIDE 0.5 MG: 0.5 SOLUTION RESPIRATORY (INHALATION) at 07:39

## 2024-02-08 RX ADMIN — METHYLPREDNISOLONE SODIUM SUCCINATE 40 MG: 40 INJECTION, POWDER, FOR SOLUTION INTRAMUSCULAR; INTRAVENOUS at 21:28

## 2024-02-08 RX ADMIN — METHYLPREDNISOLONE SODIUM SUCCINATE 40 MG: 40 INJECTION, POWDER, FOR SOLUTION INTRAMUSCULAR; INTRAVENOUS at 09:22

## 2024-02-08 RX ADMIN — LEVALBUTEROL HYDROCHLORIDE 1.25 MG: 1.25 SOLUTION RESPIRATORY (INHALATION) at 19:43

## 2024-02-08 RX ADMIN — GABAPENTIN 300 MG: 300 CAPSULE ORAL at 16:58

## 2024-02-08 RX ADMIN — INSULIN DETEMIR 26 UNITS: 100 INJECTION, SOLUTION SUBCUTANEOUS at 21:15

## 2024-02-08 RX ADMIN — METOPROLOL TARTRATE 25 MG: 25 TABLET, FILM COATED ORAL at 21:15

## 2024-02-08 RX ADMIN — INSULIN LISPRO 2 UNITS: 100 INJECTION, SOLUTION INTRAVENOUS; SUBCUTANEOUS at 11:52

## 2024-02-08 RX ADMIN — GABAPENTIN 300 MG: 300 CAPSULE ORAL at 21:15

## 2024-02-08 RX ADMIN — FUROSEMIDE 40 MG: 40 TABLET ORAL at 09:21

## 2024-02-08 RX ADMIN — LEVALBUTEROL HYDROCHLORIDE 1.25 MG: 1.25 SOLUTION RESPIRATORY (INHALATION) at 07:39

## 2024-02-08 RX ADMIN — GABAPENTIN 300 MG: 300 CAPSULE ORAL at 09:21

## 2024-02-08 RX ADMIN — WARFARIN SODIUM 5 MG: 5 TABLET ORAL at 17:00

## 2024-02-08 RX ADMIN — LEVOTHYROXINE SODIUM 50 MCG: 0.05 TABLET ORAL at 05:58

## 2024-02-08 RX ADMIN — ASPIRIN 81 MG: 81 TABLET, COATED ORAL at 09:21

## 2024-02-08 RX ADMIN — IPRATROPIUM BROMIDE 0.5 MG: 0.5 SOLUTION RESPIRATORY (INHALATION) at 19:43

## 2024-02-08 RX ADMIN — INSULIN LISPRO 2 UNITS: 100 INJECTION, SOLUTION INTRAVENOUS; SUBCUTANEOUS at 09:22

## 2024-02-08 RX ADMIN — METOPROLOL TARTRATE 25 MG: 25 TABLET, FILM COATED ORAL at 09:21

## 2024-02-08 NOTE — WOUND OSTOMY CARE
Progress Note - Wound   Ely Hernadez 83 y.o. female MRN: [id number]  Unit/Bed#: -Nick Encounter: [id number]    Assessment:   Patient admitted due to COPD with acute exacerbation. History of cardiomyopathy, CHF, CKD, diabetes, GERD, TIA, HLD. Wound care consulted for right lower extremity wound. Patient agreeable to assessment, alert and oriented x4, continent of bowel and bladder, standby assist with care, independently turns for assessment.     1. Bilateral sacrum, buttock, elbows, hips and heels- skin is dry, intact, blanchable.     2. Right anterior tibial- Chronic wound, patient follows with out-patient wound center. Wound is round in shape, full-thickness, 100% beefy red hypergranular tissue, with scant amount of serosanguineous drainage noted. Radha-wound is dry, intact, blanchable, no redness.     Educated patient on importance of frequent offloading of pressure via turning, repositioning, and weight-shifting. Verbalized understanding of plan of care.     No induration, fluctuance, odor, warmth, redness, or purulence noted to the above noted wound. New dressing applied. Patient tolerated well, denies pain to the wound. Primary nurse aware of plan of care. See flow sheets for more detailed assessment findings. Will follow along.        Skin care plans:  1-Hydraguard to bilateral sacrum, buttock and heels BID and PRN  2-Elevate heels to offload pressure.  3-Ehob cushion in chair when out of bed.  4-Moisturize skin daily with skin nourishing cream.  5-Turn/reposition q2h for pressure re-distribution on skin.   6-R leg - Cleanse wound with NSS, pat dry. Apply Calcium Alginate with silver over wound bed and secure with small Silicone bordered foam dressing. Change every other day and as needed for soilage/displacement.      Wound 10/29/23 Venous Ulcer Pretibial Right (Active)   Wound Image   02/08/24 1122   Wound Description Beefy red;Hypergranulation 02/08/24 1122   Radha-wound Assessment Dry;Intact 02/08/24  1122   Wound Length (cm) 1.5 cm 02/08/24 1122   Wound Width (cm) 1.5 cm 02/08/24 1122   Wound Depth (cm) 0.1 cm 02/08/24 1122   Wound Surface Area (cm^2) 2.25 cm^2 02/08/24 1122   Wound Volume (cm^3) 0.225 cm^3 02/08/24 1122   Calculated Wound Volume (cm^3) 0.23 cm^3 02/08/24 1122   Change in Wound Size % 94.25 02/08/24 1122   Drainage Amount Small 02/08/24 1122   Drainage Description Serosanguineous 02/08/24 1122   Non-staged Wound Description Full thickness 02/08/24 1122   Treatments Cleansed;Irrigation with NSS;Site care 02/08/24 1122   Dressing Calcium Alginate with Silver;Foam, Silicon (eg. Allevyn, etc) 02/08/24 1122   Wound packed? No 02/08/24 1122   Dressing Changed Changed 02/08/24 1122   Patient Tolerance Tolerated well 02/08/24 1122   Dressing Status Clean;Dry;Intact 02/08/24 1122       Call or tigertext with any questions  Wound Care will continue to follow    Traci RAMACHANDRANN RN CWON  Wound and Ostomy care

## 2024-02-08 NOTE — CASE MANAGEMENT
Case Management Assessment & Discharge Planning Note    Patient name Ely Hernadez  Location /-01 MRN [id number]  : 1940 Date 2024       Current Admission Date: 2024  Current Admission Diagnosis:COPD with acute exacerbation (HCC)   Patient Active Problem List    Diagnosis Date Noted    Acute respiratory failure with hypoxia (HCC) 2024    Subtherapeutic international normalized ratio (INR) 2023    Acute anterior epistaxis 2023    Supratherapeutic INR 2023    Acute blood loss anemia 2023    H/O mechanical aortic valve replacement 2023    Leg cramps 2023    Stroke-like symptoms 10/28/2023    Hypertensive urgency 10/28/2023    Restrictive lung disease 2023    Nocturnal hypoxemia 2023    Herpes simplex labialis 2022    Neutropenia associated with infection ([facility]) 2022    Paroxysmal atrial fibrillation ([facility]) 2022    Primary osteoarthritis involving multiple joints 2021    Stage 3a chronic kidney disease 2020    Chronic kidney disease-mineral and bone disorder 2020    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    Cardiomyopathy ([facility]) 2020    Angioedema 2019    Other vascular disorders of intestine ([facility]) 2019    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    AVM (arteriovenous malformation) of small bowel, acquired with hemorrhage 2018    Anemia 2018    GERD (gastroesophageal reflux disease) 2018    Hyperlipidemia 2018    Chronic anticoagulation 2018    Hypertension, essential 2018    Iron deficiency 2017    Coronary artery disease of native artery of native heart with stable angina pectoris ([facility]) 2015    Hypothyroidism 2014    COPD with acute exacerbation (HCC) 2014    Diabetes mellitus with neurological manifestation ([facility]) 2014    Aortic valve replaced 2007      LOS (days): 2  Geometric Mean LOS (GMLOS) (days): 3.6  Days to  GMLOS:1.8     OBJECTIVE:    Risk of Unplanned Readmission Score: 32.05         Current admission status: Inpatient       Preferred Pharmacy:   [id number] - [facility] [place] - [address] [place] [place] [address] [place] [address]  Phone: [phone] Fax: [phone]    [facility] - [place], [place] - [address]  [address]  [address]  [facility] [address]  Phone: [phone] Fax: [phone]    Primary Care Provider: Aviva Mccabe PA-C    Primary Insurance: AARP [employer] REP  Secondary Insurance:     ASSESSMENT:  Active Health Care Proxies       Odell Hernadez Health Care Representative - Son   Primary Phone: [phone] (Home)                           Readmission Root Cause  30 Day Readmission: No    Patient Information  Admitted from:: Home  Mental Status: Alert  During Assessment patient was accompanied by: Daughter  Assessment information provided by:: Patient, Daughter  Primary Caregiver: Self  Support Systems: Family members  County of Residence: [place]  What city do you live in?: [place]  Home entry access options. Select all that apply.: Stairs  Number of steps to enter home.: One Flight  Do the steps have railings?: Yes  Type of Current Residence: [facility]  Living Arrangements: Lives w/ Daughter, Lives w/ Extended Family  Is patient a ?: No    Activities of Daily Living Prior to Admission  Functional Status: Independent  Completes ADLs independently?: Yes  Ambulates independently?: Yes  Does patient use assisted devices?: Yes  Assisted Devices (DME) used: Walker, Straight Cane, Shower Chair, Portable Oxygen concentrator, Bedside Commode  Does patient currently own DME?: Yes  What DME does the patient currently own?: Straight Cane, Walker, Shower Chair, Portable Oxygen concentrator, Bedside Commode  Does patient have a history of Outpatient Therapy (PT/OT)?: No  Does the patient have a history of Short-Term Rehab?: No  Does patient  have a history of HHC?: Yes  Does patient currently have HHC?: Yes ([facility], she thinks)    Current Home Health Care  Type of Current Home Care Services: Nurse visit, Home PT  Current Home Health Agency:: [facility]  Current Home Health Follow-Up Provider:: [facility]    Patient Information Continued  Income Source: Pension/shelter  Does patient have prescription coverage?: Yes  Does patient receive dialysis treatments?: No  Does patient have a history of substance abuse?: No  Does patient have a history of Mental Health Diagnosis?: No    PHQ 2/9 Screening   Reviewed PHQ 2/9 Depression Screening Score?: No    Means of Transportation  Means of Transport to Appts:: Family transport      Housing Stability: Low Risk  (2/8/2024)    Housing Stability Vital Sign     Unable to Pay for Housing in the Last Year: No     Number of Places Lived in the Last Year: 1     Unstable Housing in the Last Year: No   Food Insecurity: No Food Insecurity (2/8/2024)    Hunger Vital Sign     Worried About Running Out of Food in the Last Year: Never true     Ran Out of Food in the Last Year: Never true   Transportation Needs: No Transportation Needs (2/8/2024)    PRAPARE - Transportation     Lack of Transportation (Medical): No     Lack of Transportation (Non-Medical): No   Utilities: Not At Risk (2/8/2024)    AHC Utilities     Threatened with loss of utilities: No       DISCHARGE DETAILS:    Discharge planning discussed with:: Patient and daughter at the bedside  Freedom of Choice: Yes  Comments - Freedom of Choice: FOC maintained in discussion regarding discharge planning. Patient is alert oriented and competent to make decisions. Introduced self and role, explained role of CM in arranging services such as DME, STR, HHC, and providing community resource information. Discussed current living situation and needs at discharge. Patient is IPTA. CM offered HHC, STR, DME, PCP, OP CM, community resources. Patient interested in resuming care  with HHC, she believes it is [facility]. Does not need additional DME. Pt is aware and encouraged to seek CM for any questions or concerns. CM continues to follow.  CM contacted family/caregiver?: Yes  Were Treatment Team discharge recommendations reviewed with patient/caregiver?: Yes  Did patient/caregiver verbalize understanding of patient care needs?: Yes  Were patient/caregiver advised of the risks associated with not following Treatment Team discharge recommendations?: Yes    Contacts  Patient Contacts: [place]  Relationship to Patient:: Family  Contact Method: In Person  Reason/Outcome: Continuity of Care, Emergency Contact, Discharge Planning    Requested Home Health Care         Is the patient interested in HHC at discharge?: Yes  Home Health Discipline requested:: Nursing, Physical Therapy, Occupational Therapy  Home Health Agency Name:: [facility]  HHA External Referral Reason (only applicable if external HHA name selected): Patient has established relationship with provider  Home Health Follow-Up Provider:: PCP  Home Health Services Needed:: COPD Management, Gait/ADL Training, Strengthening/Theraputic Exercises to Improve Function, Oxygen Via Nasal Cannula, Evaluate Functional Status and Safety, Diabetes Management, Heart Failure Management  Oxygen LPM Ordered (if applicable based on home health services needed):: 2 LPM  Homebound Criteria Met:: Uses an Assist Device (i.e. cane, walker, etc)  Supporting Clincal Findings:: Limited Endurance, Requires Oxygen, Dyspnea with Exertion, Fatigues Easliy in Short Distances    DME Referral Provided  Referral made for DME?: No    Other Referral/Resources/Interventions Provided:  Interventions: HHC  Referral Comments: [place] with [facility] sent through [employer]. CM will continue to follow for needs.    Would you like to participate in our [facility] service program?  : No - Declined    Treatment Team Recommendation: Home with Home Health Care  Discharge  Destination Plan:: Home with Home Health Care  Transport at Discharge : Family

## 2024-02-08 NOTE — CASE MANAGEMENT
Case Management Progress Note    Patient name Ely Hernadez  Location /-01 MRN [id number]  : 1940 Date 2024       LOS (days): 2  Geometric Mean LOS (GMLOS) (days): 3.6  Days to GMLOS:1.8        OBJECTIVE:        Current admission status: Inpatient  [facility]   [id number] [address]  Phone: [phone] Fax: [phone]    [facility] - [place], [place] - [address]  [address] [address]  Phone: [phone] Fax: [phone]    Primary Care Provider: Aviva Mccabe PA-C    Primary Insurance: AAREBONIE MALCOLM REP  Secondary Insurance:     PROGRESS NOTE:  Confirmed [facility] for [facility].

## 2024-02-08 NOTE — ASSESSMENT & PLAN NOTE
Was recently admitted for a GI bleed no further GI bleeding  Hb downtrending to 7.2  No acute s/s of bleeding  Continue to monitor  Transfuse for hb goal >7

## 2024-02-08 NOTE — PLAN OF CARE
Problem: RESPIRATORY - ADULT  Goal: Achieves optimal ventilation and oxygenation  Description: INTERVENTIONS:  - Assess for changes in respiratory status  - Assess for changes in mentation and behavior  - Position to facilitate oxygenation and minimize respiratory effort  - Oxygen administered by appropriate delivery if ordered  - Initiate smoking cessation education as indicated  - Encourage broncho-pulmonary hygiene including cough, deep breathe, Incentive Spirometry  - Assess the need for suctioning and aspirate as needed  - Assess and instruct to report SOB or any respiratory difficulty  - Respiratory Therapy support as indicated  Outcome: Progressing     Problem: METABOLIC, FLUID AND ELECTROLYTES - ADULT  Goal: Fluid balance maintained  Description: INTERVENTIONS:  - Monitor labs   - Monitor I/O and WT  - Instruct patient on fluid and nutrition as appropriate  - Assess for signs & symptoms of volume excess or deficit  Outcome: Progressing

## 2024-02-08 NOTE — RESPIRATORY THERAPY NOTE
RT Protocol Note  Ely Hernadez 83 y.o. female MRN: [id number]  Unit/Bed#: -01 Encounter: [id number]    Assessment    Principal Problem:    COPD with acute exacerbation (HCC)  Active Problems:    Coronary artery disease of native artery of native heart with stable angina pectoris (HCC)    Diabetes mellitus with neurological manifestation (HCC)    Hypothyroidism    Anemia    Stage 3a chronic kidney disease    Paroxysmal atrial fibrillation (HCC)    H/O mechanical aortic valve replacement    Acute respiratory failure with hypoxia (HCC)      Home Pulmonary Medications:    Home Devices/Therapy: Home O2    Past Medical History:   Diagnosis Date    Anemia     Aneurysm of thoracic aorta (HCC)     Aortic valve disorder     Benign neoplasm of sigmoid colon     Cardiomyopathy (HCC)     last assessed: 10/10/2014    CHF (congestive heart failure) (HCC)     Chronic kidney disease     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HCC)     last assessed: 10/10/2014    Diabetic nephropathy (HCC)     RAMON (dyspnea on exertion)     GERD (gastroesophageal reflux disease)     History of emphysema (HCC)     Hyperlipidemia     TIA (transient ischemic attack)      Social History     Socioeconomic History    Marital status:      Spouse name: None    Number of children: None    Years of education: None    Highest education level: None   Occupational History    None   Tobacco Use    Smoking status: Former     Current packs/day: 0.00     Average packs/day: 1 pack/day for 52.9 years (52.9 ttl pk-yrs)     Types: Cigarettes     Start date:      Quit date: 2007     Years since quittin.2     Passive exposure: Past    Smokeless tobacco: Former     Quit date:    Vaping Use    Vaping status: Never Used   Substance and Sexual Activity    Alcohol use: Never    Drug use: Never    Sexual activity: Not Currently     Partners: Male   Other Topics Concern    None   Social History Narrative    Home durable medical equipment - Freestyle  "test strips BID Freestyle lancets BID    Living independently with spouse     Social Determinants of Health     Financial Resource Strain: Not on file   Food Insecurity: No Food Insecurity (2/8/2024)    Hunger Vital Sign     Worried About Running Out of Food in the Last Year: Never true     Ran Out of Food in the Last Year: Never true   Transportation Needs: No Transportation Needs (2/8/2024)    PRAPARE - Transportation     Lack of Transportation (Medical): No     Lack of Transportation (Non-Medical): No   Physical Activity: Inactive (5/26/2021)    Exercise Vital Sign     Days of Exercise per Week: 0 days     Minutes of Exercise per Session: 0 min   Stress: No Stress Concern Present (5/26/2021)    [employer] [place] [employer] - Occupational Stress Questionnaire     Feeling of Stress : Not at all   Social Connections: Not on file   Intimate Partner Violence: Not on file   Housing Stability: Low Risk  (2/8/2024)    Housing Stability Vital Sign     Unable to Pay for Housing in the Last Year: No     Number of Places Lived in the Last Year: 1     Unstable Housing in the Last Year: No       Subjective    Subjective Data: awake and alert    Objective    Physical Exam:   Assessment Type: Post-treatment  General Appearance: Alert, Awake  Respiratory Pattern: Normal  Chest Assessment: Chest expansion symmetrical  Bilateral Breath Sounds: Diminished  O2 Device: nc    Vitals:  Blood pressure 142/62, pulse 63, temperature 97.9 °F (36.6 °C), resp. rate 18, height 5' 6\" (1.676 m), weight 74.4 kg (164 lb), SpO2 96%.          Imaging and other studies: I have personally reviewed pertinent reports.      O2 Device: nc     Plan    Respiratory Plan: Mild Distress pathway  Airway Clearance Plan: Incentive Spirometer     Resp Comments: will continue with bid xop/atr   "

## 2024-02-08 NOTE — ASSESSMENT & PLAN NOTE
(P) 198Hold home p.o. agents  Levemir 26 units at bedtime  Sliding scale insulin  Monitor blood glucose

## 2024-02-08 NOTE — PROGRESS NOTES
Critical access hospital  Progress Note  Name: Ely Hernadez I  MRN: [id number]  Unit/Bed#: -01 I Date of Admission: 2/6/2024   Date of Service: 2/8/2024 I Hospital Day: 2    Assessment/Plan   Acute respiratory failure with hypoxia (HCC)  Assessment & Plan  Secondary to COPD exacerbation  Currently on 2 L nasal cannula  Baseline 2L Qhs only   Plan as outlined above  Wean oxygen as able    H/O mechanical aortic valve replacement  Assessment & Plan  Continue home Coumadin  INR 2.37, repeat am INR pending  Goal INR 2.5-3.5  Monitor INR daily    Paroxysmal atrial fibrillation (HCC)  Assessment & Plan  Currently rate controlled  Continue beta-blocker  Continue Coumadin    Stage 3a chronic kidney disease  Assessment & Plan  Creatinine currently at baseline at approximately 1.2  Continue      Anemia  Assessment & Plan  Was recently admitted for a GI bleed no further GI bleeding  Hb downtrending to 7.2  No acute s/s of bleeding  Continue to monitor  Transfuse for hb goal >7    Hypothyroidism  Assessment & Plan  Continue Synthroid    Diabetes mellitus with neurological manifestation (HCC)  Assessment & Plan    (P) 198Hold home p.o. agents  Levemir 26 units at bedtime  Sliding scale insulin  Monitor blood glucose    Coronary artery disease of native artery of native heart with stable angina pectoris (HCC)  Assessment & Plan  Currently without chest pain  Continue aspirin, beta-blocker, statin    * COPD with acute exacerbation (HCC)  Assessment & Plan  Presented with shortness of breath and wheezing noted to be saturating in the mid 80s on room air. In on home o2 2L qhs  Consistent with COPD exacerbation  Currently on 2 L nasal cannula saturating adequately  Continue with IV solumedrol 40 mg bid today, plan to transition to po prednisone tomorrow   Scheduled nebs  Wean oxygen as able               VTE Pharmacologic Prophylaxis: VTE Score: 6 High Risk (Score >/= 5) - Pharmacological DVT Prophylaxis Ordered:  warfarin (Coumadin). Sequential Compression Devices Ordered.    Mobility:   Basic Mobility Inpatient Raw Score: 23  JH-HLM Goal: 7: Walk 25 feet or more  JH-HLM Achieved: 7: Walk 25 feet or more  HLM Goal achieved. Continue to encourage appropriate mobility.    Patient Centered Rounds: I performed bedside rounds with nursing staff today.   Discussions with Specialists or Other Care Team Provider: MANUELITO    Education and Discussions with Family / Patient: Patient declined call to .     Total Time Spent on Date of Encounter in care of patient: 50 mins. This time was spent on one or more of the following: performing physical exam; counseling and coordination of care; obtaining or reviewing history; documenting in the medical record; reviewing/ordering tests, medications or procedures; communicating with other healthcare professionals and discussing with patient's family/caregivers.    Current Length of Stay: 2 day(s)  Current Patient Status: Inpatient   Certification Statement: The patient will continue to require additional inpatient hospital stay due to IV steroids, anemia  Discharge Plan: Anticipate discharge tomorrow to home with home services.    Code Status: Level 1 - Full Code    Subjective:   Pt has no acute complaints. Sob improving.     Objective:     Vitals:   Temp (24hrs), Av.5 °F (36.4 °C), Min:97.2 °F (36.2 °C), Max:97.9 °F (36.6 °C)    Temp:  [97.2 °F (36.2 °C)-97.9 °F (36.6 °C)] 97.8 °F (36.6 °C)  HR:  [52-66] 62  Resp:  [16-18] 18  BP: (109-157)/(45-71) 138/62  SpO2:  [92 %-100 %] 97 %  Body mass index is 26.47 kg/m².     Input and Output Summary (last 24 hours):     Intake/Output Summary (Last 24 hours) at 2024 1227  Last data filed at 2024 1100  Gross per 24 hour   Intake 240 ml   Output 700 ml   Net -460 ml       Physical Exam:   Physical Exam  Constitutional:       General: She is not in acute distress.     Appearance: She is well-developed. She is not diaphoretic.   HENT:       Head: Normocephalic and atraumatic.      Nose: Nose normal.      Mouth/Throat:      Pharynx: No oropharyngeal exudate.   Eyes:      General: No scleral icterus.        Right eye: No discharge.         Left eye: No discharge.      Conjunctiva/sclera: Conjunctivae normal.   Cardiovascular:      Rate and Rhythm: Normal rate and regular rhythm.      Heart sounds: Normal heart sounds. No murmur heard.     No friction rub. No gallop.   Pulmonary:      Effort: Pulmonary effort is normal. No respiratory distress.      Breath sounds: No wheezing or rales.   Abdominal:      General: Bowel sounds are normal. There is no distension.      Palpations: Abdomen is soft.      Tenderness: There is no abdominal tenderness. There is no guarding.   Musculoskeletal:         General: Normal range of motion.      Cervical back: Normal range of motion and neck supple.   Skin:     General: Skin is warm and dry.      Findings: No erythema.   Neurological:      Mental Status: She is oriented to person, place, and time.   Psychiatric:         Behavior: Behavior normal.          Additional Data:     Labs:  Results from last 7 days   Lab Units 02/08/24  0554 02/07/24  0441   WBC Thousand/uL 5.74 4.55   HEMOGLOBIN g/dL 7.3* 7.6*   HEMATOCRIT % 24.8* 25.6*   PLATELETS Thousands/uL 348 326   NEUTROS PCT %  --  90*   LYMPHS PCT %  --  7*   MONOS PCT %  --  1*   EOS PCT %  --  0     Results from last 7 days   Lab Units 02/08/24  0554 02/06/24  1734 02/05/24  0940   SODIUM mmol/L 137   < > 142   POTASSIUM mmol/L 5.2   < > 4.3   CHLORIDE mmol/L 104   < > 107   CO2 mmol/L 27   < > 29   BUN mg/dL 39*   < > 21   CREATININE mg/dL 1.37*   < > 1.13*   ANION GAP mmol/L 6   < > 6   CALCIUM mg/dL 9.1   < > 8.9   ALBUMIN g/dL  --   --  3.9   TOTAL BILIRUBIN mg/dL  --   --  0.5   ALK PHOS U/L  --   --  65   ALT U/L  --   --  8   AST U/L  --   --  12   GLUCOSE RANDOM mg/dL 228*   < > 105*    < > = values in this interval not displayed.     Results from last 7 days    Lab Units 02/07/24  0441   INR  2.37*     Results from last 7 days   Lab Units 02/08/24  1059 02/08/24  0716 02/07/24  2101 02/07/24  1631 02/07/24  1045   POC GLUCOSE mg/dl 214* 213* 181* 125 257*         Results from last 7 days   Lab Units 02/07/24  0441   PROCALCITONIN ng/ml 0.06       Lines/Drains:  Invasive Devices       Peripheral Intravenous Line  Duration             Peripheral IV 02/06/24 Dorsal (posterior);Right Forearm 1 day                          Imaging: No pertinent imaging reviewed.    Recent Cultures (last 7 days):         Last 24 Hours Medication List:   Current Facility-Administered Medications   Medication Dose Route Frequency Provider Last Rate    acetaminophen  650 mg Oral Q6H PRN Darwin Pfeiffer MD      albuterol  2 puff Inhalation Q6H PRN Darwin Pfeiffer MD      aspirin  81 mg Oral Daily Darwin Pfeiffer MD      atorvastatin  40 mg Oral QPM Darwin Pfeiffer MD      benzonatate  100 mg Oral TID PRN Darwin Pfeiffer MD      furosemide  40 mg Oral Daily Lilibeth Bolivar DO      gabapentin  300 mg Oral TID Darwin Pfeiffer MD      insulin detemir  26 Units Subcutaneous HS Darwin Pfeiffer MD      insulin lispro  1-5 Units Subcutaneous HS Darwin Pfeiffer MD      insulin lispro  1-6 Units Subcutaneous TID AC Darwin Pfeiffer MD      ipratropium  0.5 mg Nebulization BID Lilibeth Luz Maria Bolivar, DO      levalbuterol  1.25 mg Nebulization BID Lilibeth Luz Maria Bolivar, DO      levothyroxine  50 mcg Oral Daily Darwin Pfeiffer MD      methylPREDNISolone sodium succinate  40 mg Intravenous Q12H [facility] Lilibeth Bolivar, DO      metoprolol tartrate  25 mg Oral Q12H [facility] Darwin Pfeiffer MD      pantoprazole  40 mg Intravenous Q24H [facility] Lilibeth Bolivar, DO      warfarin  5 mg Oral Daily (warfarin) Darwin Pfeiffer MD          Today, Patient Was Seen By: Lilibeth Bolivar DO    **Please Note: This note may have been constructed using a voice recognition system.**

## 2024-02-08 NOTE — PROGRESS NOTES
Outpatient Care Management Note:  Inbasket ADT admission alert received.  Chart review completed.  Patient admitted to [facility] on 02/06/24.   Admission diagnosis:  COPD with acute exacerbation.  This OPCM will continue to monitor EMR for progress towards discharge and CECILIA.

## 2024-02-08 NOTE — ASSESSMENT & PLAN NOTE
Presented with shortness of breath and wheezing noted to be saturating in the mid 80s on room air. In on home o2 2L qhs  Consistent with COPD exacerbation  Currently on 2 L nasal cannula saturating adequately  Continue with IV solumedrol 40 mg bid today, plan to transition to po prednisone tomorrow   Scheduled nebs  Wean oxygen as able

## 2024-02-08 NOTE — DISCHARGE INSTR - OTHER ORDERS
Skin care plans:  1-R leg - Cleanse wound with NSS, pat dry. Apply Calcium Alginate with silver over wound bed and secure with small Silicone bordered foam dressing. Change every other day and as needed for soilage/displacement.      Schedule Follow-up Outpatient Wound Appointment.  Call Outpatient Wound and Ostomy Care Team @ [phone]   Option 1: [place], [place], [place], [place]  Option 2: Tomas Damian, [place]

## 2024-02-09 ENCOUNTER — APPOINTMENT (INPATIENT)
Dept: RADIOLOGY | Facility: HOSPITAL | Age: 84
DRG: 189 | End: 2024-02-09
Payer: COMMERCIAL

## 2024-02-09 LAB
ANION GAP SERPL CALCULATED.3IONS-SCNC: 8 MMOL/L
BACTERIA UR QL AUTO: ABNORMAL /HPF
BILIRUB UR QL STRIP: NEGATIVE
BUN SERPL-MCNC: 47 MG/DL (ref 5–25)
CALCIUM SERPL-MCNC: 9.2 MG/DL (ref 8.4–10.2)
CHLORIDE SERPL-SCNC: 102 MMOL/L (ref 96–108)
CLARITY UR: CLEAR
CO2 SERPL-SCNC: 27 MMOL/L (ref 21–32)
COLOR UR: COLORLESS
CREAT SERPL-MCNC: 1.44 MG/DL (ref 0.6–1.3)
ERYTHROCYTE [DISTWIDTH] IN BLOOD BY AUTOMATED COUNT: 17.6 % (ref 11.6–15.1)
FERRITIN SERPL-MCNC: 17 NG/ML (ref 11–307)
GFR SERPL CREATININE-BSD FRML MDRD: 33 ML/MIN/1.73SQ M
GLUCOSE SERPL-MCNC: 166 MG/DL (ref 65–140)
GLUCOSE SERPL-MCNC: 196 MG/DL (ref 65–140)
GLUCOSE SERPL-MCNC: 206 MG/DL (ref 65–140)
GLUCOSE SERPL-MCNC: 214 MG/DL (ref 65–140)
GLUCOSE SERPL-MCNC: 224 MG/DL (ref 65–140)
GLUCOSE UR STRIP-MCNC: NEGATIVE MG/DL
HCT VFR BLD AUTO: 26.1 % (ref 34.8–46.1)
HGB BLD-MCNC: 7.5 G/DL (ref 11.5–15.4)
HGB UR QL STRIP.AUTO: NEGATIVE
HYALINE CASTS #/AREA URNS LPF: ABNORMAL /LPF
INR PPP: 3.12 (ref 0.84–1.19)
IRON SATN MFR SERPL: 3 % (ref 15–50)
IRON SERPL-MCNC: 13 UG/DL (ref 50–212)
KETONES UR STRIP-MCNC: NEGATIVE MG/DL
LEUKOCYTE ESTERASE UR QL STRIP: ABNORMAL
MCH RBC QN AUTO: 23 PG (ref 26.8–34.3)
MCHC RBC AUTO-ENTMCNC: 28.7 G/DL (ref 31.4–37.4)
MCV RBC AUTO: 80 FL (ref 82–98)
NITRITE UR QL STRIP: NEGATIVE
NON-SQ EPI CELLS URNS QL MICRO: ABNORMAL /HPF
PH UR STRIP.AUTO: 5.5 [PH]
PLATELET # BLD AUTO: 354 THOUSANDS/UL (ref 149–390)
PMV BLD AUTO: 10.4 FL (ref 8.9–12.7)
POTASSIUM SERPL-SCNC: 4.7 MMOL/L (ref 3.5–5.3)
PROT UR STRIP-MCNC: NEGATIVE MG/DL
PROTHROMBIN TIME: 33.1 SECONDS (ref 11.6–14.5)
RBC # BLD AUTO: 3.26 MILLION/UL (ref 3.81–5.12)
RBC #/AREA URNS AUTO: ABNORMAL /HPF
SODIUM SERPL-SCNC: 137 MMOL/L (ref 135–147)
SP GR UR STRIP.AUTO: 1.01 (ref 1–1.03)
TIBC SERPL-MCNC: 429 UG/DL (ref 250–450)
UIBC SERPL-MCNC: 416 UG/DL (ref 155–355)
UROBILINOGEN UR STRIP-ACNC: <2 MG/DL
WBC # BLD AUTO: 4.79 THOUSAND/UL (ref 4.31–10.16)
WBC #/AREA URNS AUTO: ABNORMAL /HPF

## 2024-02-09 PROCEDURE — 82728 ASSAY OF FERRITIN: CPT | Performed by: STUDENT IN AN ORGANIZED HEALTH CARE EDUCATION/TRAINING PROGRAM

## 2024-02-09 PROCEDURE — 94760 N-INVAS EAR/PLS OXIMETRY 1: CPT

## 2024-02-09 PROCEDURE — 83540 ASSAY OF IRON: CPT | Performed by: STUDENT IN AN ORGANIZED HEALTH CARE EDUCATION/TRAINING PROGRAM

## 2024-02-09 PROCEDURE — 85027 COMPLETE CBC AUTOMATED: CPT | Performed by: STUDENT IN AN ORGANIZED HEALTH CARE EDUCATION/TRAINING PROGRAM

## 2024-02-09 PROCEDURE — C9113 INJ PANTOPRAZOLE SODIUM, VIA: HCPCS | Performed by: STUDENT IN AN ORGANIZED HEALTH CARE EDUCATION/TRAINING PROGRAM

## 2024-02-09 PROCEDURE — 94664 DEMO&/EVAL PT USE INHALER: CPT

## 2024-02-09 PROCEDURE — 97166 OT EVAL MOD COMPLEX 45 MIN: CPT

## 2024-02-09 PROCEDURE — 80048 BASIC METABOLIC PNL TOTAL CA: CPT | Performed by: STUDENT IN AN ORGANIZED HEALTH CARE EDUCATION/TRAINING PROGRAM

## 2024-02-09 PROCEDURE — 81001 URINALYSIS AUTO W/SCOPE: CPT | Performed by: STUDENT IN AN ORGANIZED HEALTH CARE EDUCATION/TRAINING PROGRAM

## 2024-02-09 PROCEDURE — 71045 X-RAY EXAM CHEST 1 VIEW: CPT

## 2024-02-09 PROCEDURE — 99232 SBSQ HOSP IP/OBS MODERATE 35: CPT | Performed by: STUDENT IN AN ORGANIZED HEALTH CARE EDUCATION/TRAINING PROGRAM

## 2024-02-09 PROCEDURE — 82948 REAGENT STRIP/BLOOD GLUCOSE: CPT

## 2024-02-09 PROCEDURE — 97163 PT EVAL HIGH COMPLEX 45 MIN: CPT

## 2024-02-09 PROCEDURE — 94640 AIRWAY INHALATION TREATMENT: CPT

## 2024-02-09 PROCEDURE — 83550 IRON BINDING TEST: CPT | Performed by: STUDENT IN AN ORGANIZED HEALTH CARE EDUCATION/TRAINING PROGRAM

## 2024-02-09 PROCEDURE — 85610 PROTHROMBIN TIME: CPT | Performed by: STUDENT IN AN ORGANIZED HEALTH CARE EDUCATION/TRAINING PROGRAM

## 2024-02-09 RX ORDER — PREDNISONE 20 MG/1
40 TABLET ORAL DAILY
Status: DISCONTINUED | OUTPATIENT
Start: 2024-02-10 | End: 2024-02-11

## 2024-02-09 RX ORDER — FUROSEMIDE 10 MG/ML
40 INJECTION INTRAMUSCULAR; INTRAVENOUS ONCE
Status: COMPLETED | OUTPATIENT
Start: 2024-02-09 | End: 2024-02-09

## 2024-02-09 RX ORDER — FUROSEMIDE 40 MG/1
40 TABLET ORAL DAILY
Status: DISCONTINUED | OUTPATIENT
Start: 2024-02-10 | End: 2024-02-09

## 2024-02-09 RX ADMIN — LEVOTHYROXINE SODIUM 50 MCG: 0.05 TABLET ORAL at 06:06

## 2024-02-09 RX ADMIN — GABAPENTIN 300 MG: 300 CAPSULE ORAL at 08:42

## 2024-02-09 RX ADMIN — METHYLPREDNISOLONE SODIUM SUCCINATE 40 MG: 40 INJECTION, POWDER, FOR SOLUTION INTRAMUSCULAR; INTRAVENOUS at 08:42

## 2024-02-09 RX ADMIN — INSULIN LISPRO 2 UNITS: 100 INJECTION, SOLUTION INTRAVENOUS; SUBCUTANEOUS at 17:42

## 2024-02-09 RX ADMIN — WARFARIN SODIUM 5 MG: 5 TABLET ORAL at 17:42

## 2024-02-09 RX ADMIN — IPRATROPIUM BROMIDE 0.5 MG: 0.5 SOLUTION RESPIRATORY (INHALATION) at 19:15

## 2024-02-09 RX ADMIN — LEVALBUTEROL HYDROCHLORIDE 1.25 MG: 1.25 SOLUTION RESPIRATORY (INHALATION) at 19:15

## 2024-02-09 RX ADMIN — FUROSEMIDE 40 MG: 10 INJECTION, SOLUTION INTRAMUSCULAR; INTRAVENOUS at 12:05

## 2024-02-09 RX ADMIN — ASPIRIN 81 MG: 81 TABLET, COATED ORAL at 08:42

## 2024-02-09 RX ADMIN — INSULIN DETEMIR 26 UNITS: 100 INJECTION, SOLUTION SUBCUTANEOUS at 21:29

## 2024-02-09 RX ADMIN — PANTOPRAZOLE SODIUM 40 MG: 40 INJECTION, POWDER, FOR SOLUTION INTRAVENOUS at 08:42

## 2024-02-09 RX ADMIN — INSULIN LISPRO 2 UNITS: 100 INJECTION, SOLUTION INTRAVENOUS; SUBCUTANEOUS at 08:43

## 2024-02-09 RX ADMIN — METOPROLOL TARTRATE 25 MG: 25 TABLET, FILM COATED ORAL at 21:29

## 2024-02-09 RX ADMIN — IPRATROPIUM BROMIDE 0.5 MG: 0.5 SOLUTION RESPIRATORY (INHALATION) at 07:17

## 2024-02-09 RX ADMIN — INSULIN LISPRO 2 UNITS: 100 INJECTION, SOLUTION INTRAVENOUS; SUBCUTANEOUS at 12:06

## 2024-02-09 RX ADMIN — LEVALBUTEROL HYDROCHLORIDE 1.25 MG: 1.25 SOLUTION RESPIRATORY (INHALATION) at 07:17

## 2024-02-09 RX ADMIN — INSULIN LISPRO 1 UNITS: 100 INJECTION, SOLUTION INTRAVENOUS; SUBCUTANEOUS at 21:29

## 2024-02-09 RX ADMIN — GABAPENTIN 300 MG: 300 CAPSULE ORAL at 17:42

## 2024-02-09 RX ADMIN — METOPROLOL TARTRATE 25 MG: 25 TABLET, FILM COATED ORAL at 08:42

## 2024-02-09 RX ADMIN — GABAPENTIN 300 MG: 300 CAPSULE ORAL at 21:29

## 2024-02-09 NOTE — ASSESSMENT & PLAN NOTE
(P) [id number] home p.o. agents  Levemir 26 units at bedtime  Sliding scale insulin  Monitor blood glucose

## 2024-02-09 NOTE — CASE MANAGEMENT
Case Management Progress Note    Patient name Ely Hernadez  Location /-01 MRN [id number]  : 1940 Date 2024       LOS (days): 3  Geometric Mean LOS (GMLOS) (days): 3.6  Days to GMLOS:0.9        OBJECTIVE:        Current admission status: Inpatient  Preferred Pharmacy:   [id number] - [address] [address]  Phone: [phone] Fax: [phone]    [facility] - [place], [place] - [address]  [address]  [facility] [address]  [facility] [address]  Phone: [phone] Fax: [phone]    Primary Care Provider: Aviva Mccabe PA-C    Primary Insurance: [employer] [facility] REP  Secondary Insurance:     PROGRESS NOTE:  Discussed patient's case in interdisciplinary rounds this morning with SLIM provider. Glucose too high, glycemic control and then hopeful DC home tomorrow- DME ordered, VNA ordered for new start DM and teaching CM will continue to follow for needs.

## 2024-02-09 NOTE — ASSESSMENT & PLAN NOTE
Was recently admitted for a GI bleed no further GI bleeding  Hb stable around 7.5  No acute s/s of bleeding  Continue to monitor  Transfuse for hb goal >7

## 2024-02-09 NOTE — NURSING NOTE
This nurse entered room after hearing patient's son screaming for help. Upon entering room, nurse noted patient to be sitting on side of bed, coughing and gesturing towards her throat. Per son, patient was eating dinner when she started choking.  During coughing/choking episode, patient's SpO2 noted to be 89% room air. This nurse instructed patient to continue to remain sitting up and to continue to cough. Once patient stopped coughing, patient informed nurse that meat was dry and got stuck to her throat. Patient also reports having some difficulty eating her meal previously in day.  Once patient finished coughing, SpO2 increased to 94% room air. Lung sounds clear. Nurse observed patient while she took a few sips of water, no coughing noted. SLIM informed of choking incident. No new orders at this time.

## 2024-02-09 NOTE — PLAN OF CARE
Problem: PHYSICAL THERAPY ADULT  Goal: Performs mobility at highest level of function for planned discharge setting.  See evaluation for individualized goals.  Description: Treatment/Interventions: Functional transfer training, LE strengthening/ROM, Elevations, Therapeutic exercise, Endurance training, Patient/family training, Equipment eval/education, Bed mobility, Gait training, Spoke to nursing, OT          See flowsheet documentation for full assessment, interventions and recommendations.  Note: Prognosis: Good  Problem List: Decreased strength, Decreased endurance, Impaired balance, Decreased mobility  Assessment: pt is an 82y/o f who presents to [facility] c COPD exacerbation. PMH significant for anemia, CHF, TIA, GERD, + RAMON. at baseline, pt (I) c ADLs + functional mobility. resides c family in ranch home c [address]. currently presents c deficits in strength, balance, gait quality, + activity tolerance noted in PT exam above. Barthel Index 50/100, Modified Branch 3. ambulated 50'x2 c RW c standing rest 2* fatigue/SOB. SpO2 85-91% on RA c mobility despite SOB. would benefit from skilled PT to maximize functional mobility + return home safely. AM-PAC raw score 18 indicating pt safe for d/c home, however please refer to PT d/c recommendation for safe d/c planning. PT eval of high complexity 2* unstable med status c pt requiring ongoing medical management 2* COPD exacerbation. pt c multiple co-morbidities + mobility deficits requiring (A) to complete mobility tasks. resides in ranch home, however has [address]. cont to report SOB c mobility.  Barriers to Discharge: Inaccessible home environment     Rehab Resource Intensity Level, PT: III (Minimum Resource Intensity)    See flowsheet documentation for full assessment.

## 2024-02-09 NOTE — ASSESSMENT & PLAN NOTE
Presented with shortness of breath and wheezing noted to be saturating in the mid 80s on room air. Is on home o2 2L qhs. Weaned off oxygen, back to baseline now.   Consistent with COPD exacerbation  Continue with IV solumedrol 40 mg bid today, plan to transition to po prednisone today  Scheduled nebs  Wean oxygen as able

## 2024-02-09 NOTE — PLAN OF CARE
Problem: OCCUPATIONAL THERAPY ADULT  Goal: Performs self-care activities at highest level of function for planned discharge setting.  See evaluation for individualized goals.  Description: Treatment Interventions: ADL retraining, Functional transfer training, Endurance training, Patient/family training, Compensatory technique education, Continued evaluation, Energy conservation, Activityengagement          See flowsheet documentation for full assessment, interventions and recommendations.   Note: Limitation: Decreased ADL status, Decreased endurance, Decreased self-care trans, Decreased high-level ADLs  Prognosis: Good  Assessment: Patient is a 83 y.o. female seen for OT evaluation s/p admit to [facility] on 2/6/2024 w/COPD with acute exacerbation (HCC). Commorbidities affecting patient's functional performance at time of assessment include:  Acuter respiratory failure with hypoxia, h/o of mechanical aortic valve replacement, paroxysmal atrial fibrillation, stage 3 CKD, anemia, hypothyroidism, DM, CAD, presented to ED with SOB. Orders placed for OT evaluation and treatment.  Performed at least two patient identifiers during session including name and wristband.  Prior to admission,  Patient was independnet with ADLs/ family assists with IADLs, patient ambulates with a walker PRN, usually ambulates without AD indoors, holding onto walls/ furniture. Patient lives with family in a 1 story house, [address], [place].  Personal factors affecting patient at time of initial evaluation include: steps to enter, difficulty performing ADLs, and difficulty performing IADLs. Upon evaluation, patient requires supervision and contact guard assist for UB ADLs, minimal  assist for LB ADLs, transfers and functional ambulation in room and bathroom with contact guard and close supervision assist, with the use of Rolling Walker.   Occupational performance is affected by the following deficits:  degenerative arthritic joint changes, dynamic sit/ stand balance deficit with poor standing tolerance time for self care and functional mobility, and decreased activity tolerance.  Patient to benefit from continued Occupational Therapy treatment while in the hospital to address deficits as defined above and maximize level of functional independence with ADLs and functional mobility. Occupational Performance areas to address include: bathing/ shower, dressing, toilet hygiene, transfer to all surfaces, functional mobility, health maintenance, IADLs: safety procedures, IADLs: meal prep/ clean up, and Leisure Participation. From OT standpoint, recommendation at time of d/c would be Level 3- Home Care services.     Rehab Resource Intensity Level, OT: III (Minimum Resource Intensity) (Home Care)

## 2024-02-09 NOTE — RESPIRATORY THERAPY NOTE
02/08/24 1943   Respiratory Protocol   Protocol Initiated? No   Protocol Selection Respiratory   Language Barrier? No   Medical & Social History Reviewed? Yes   Diagnostic Studies Reviewed? Yes   Physical Assessment Performed? Yes   Home Devices/Therapy Home O2  (2L hs)   Respiratory Plan No distress/Pulmonary history   Respiratory Assessment   General Appearance Alert;Awake   Respiratory Pattern Normal   Chest Assessment Chest expansion symmetrical   Bilateral Breath Sounds Diminished   Cough None   Resp Comments even non labored respirations   O2 Device r/a   Additional Assessments   SpO2 94 %

## 2024-02-09 NOTE — RESPIRATORY THERAPY NOTE
RT Protocol Note  Ely Hernadez 83 y.o. female MRN: [id number]  Unit/Bed#: -01 Encounter: [id number]    Assessment    Principal Problem:    COPD with acute exacerbation (HCC)  Active Problems:    Coronary artery disease of native artery of native heart with stable angina pectoris (HCC)    Diabetes mellitus with neurological manifestation (HCC)    Hypothyroidism    Anemia    Stage 3a chronic kidney disease    Paroxysmal atrial fibrillation (HCC)    H/O mechanical aortic valve replacement    Acute respiratory failure with hypoxia (HCC)      Home Pulmonary Medications:    Home Devices/Therapy: Home O2    Past Medical History:   Diagnosis Date    Anemia     Aneurysm of thoracic aorta (HCC)     Aortic valve disorder     Benign neoplasm of sigmoid colon     Cardiomyopathy (HCC)     last assessed: 10/10/2014    CHF (congestive heart failure) (HCC)     Chronic kidney disease     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HCC)     last assessed: 10/10/2014    Diabetic nephropathy (HCC)     RAMON (dyspnea on exertion)     GERD (gastroesophageal reflux disease)     History of emphysema (HCC)     Hyperlipidemia     TIA (transient ischemic attack)      Social History     Socioeconomic History    Marital status:      Spouse name: None    Number of children: None    Years of education: None    Highest education level: None   Occupational History    None   Tobacco Use    Smoking status: Former     Current packs/day: 0.00     Average packs/day: 1 pack/day for 52.9 years (52.9 ttl pk-yrs)     Types: Cigarettes     Start date:      Quit date: 2007     Years since quittin.2     Passive exposure: Past    Smokeless tobacco: Former     Quit date:    Vaping Use    Vaping status: Never Used   Substance and Sexual Activity    Alcohol use: Never    Drug use: Never    Sexual activity: Not Currently     Partners: Male   Other Topics Concern    None   Social History Narrative    Home durable medical equipment - Freestyle  "test strips BID Freestyle lancets BID    Living independently with spouse     Social Determinants of Health     Financial Resource Strain: Not on file   Food Insecurity: No Food Insecurity (2/8/2024)    Hunger Vital Sign     Worried About Running Out of Food in the Last Year: Never true     Ran Out of Food in the Last Year: Never true   Transportation Needs: No Transportation Needs (2/8/2024)    PRAPARE - Transportation     Lack of Transportation (Medical): No     Lack of Transportation (Non-Medical): No   Physical Activity: Inactive (5/26/2021)    Exercise Vital Sign     Days of Exercise per Week: 0 days     Minutes of Exercise per Session: 0 min   Stress: No Stress Concern Present (5/26/2021)    [employer] - Occupational Stress Questionnaire     Feeling of Stress : Not at all   Social Connections: Not on file   Intimate Partner Violence: Not on file   Housing Stability: Low Risk  (2/8/2024)    Housing Stability Vital Sign     Unable to Pay for Housing in the Last Year: No     Number of Places Lived in the Last Year: 1     Unstable Housing in the Last Year: No       Subjective    Subjective Data: awake and alert    Objective    Physical Exam:        Vitals:  Blood pressure 147/66, pulse 59, temperature 97.6 °F (36.4 °C), resp. rate 17, height 5' 6\" (1.676 m), weight 74.4 kg (164 lb), SpO2 92%.          Imaging and other studies: I have personally reviewed pertinent reports.      O2 Device: r/a     Plan    Respiratory Plan: No distress/Pulmonary history  Airway Clearance Plan: Incentive Spirometer     Resp Comments: pt will continue with xop/atr bid   "

## 2024-02-09 NOTE — ASSESSMENT & PLAN NOTE
Secondary to COPD exacerbation and volume overload  Currently at baseline   Baseline 2L Qhs only   IV lasix as above  Plan as outlined above  Wean oxygen as able

## 2024-02-09 NOTE — ASSESSMENT & PLAN NOTE
Creatinine currently at baseline at approximately 1.2., Cr 1.44  Pt appears volume overloaded on exam, crackles + b/l LE edema   On po lasix 40 mg daily, Will give 1 x dose of 40 IV lasix x 1   Intake and output   Avoid nephrotoxic agents  Monitor cr with am bmp

## 2024-02-09 NOTE — PHYSICAL THERAPY NOTE
PT Evaluation (15min)  (7:41-7:56)    Past Medical History:   Diagnosis Date    Anemia     Aneurysm of thoracic aorta (HCC)     Aortic valve disorder     Benign neoplasm of sigmoid colon     Cardiomyopathy (HCC)     last assessed: 10/10/2014    CHF (congestive heart failure) (HCC)     Chronic kidney disease     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HCC)     last assessed: 10/10/2014    Diabetic nephropathy (HCC)     RAMON (dyspnea on exertion)     GERD (gastroesophageal reflux disease)     History of emphysema (HCC)     Hyperlipidemia     TIA (transient ischemic attack)          02/09/24 0755   PT Last Visit   PT Visit Date 02/09/24   Note Type   Note type Evaluation   Pain Assessment   Pain Assessment Tool 0-10   Pain Score No Pain   Restrictions/Precautions   Weight Bearing Precautions Per Order No   Braces or Orthoses Other (Comment)  (none at baseline)   Other Precautions Fall Risk;Telemetry   Home Living   Type of Home House   Home Layout One level;Performs ADLs on one level;Able to live on main level with bedroom/bathroom;Stairs to enter with rails  (13 SHIRA)   Bathroom Shower/Tub Walk-in shower   Bathroom Toilet Standard   Bathroom Equipment Grab bars in shower;Shower chair;Commode   Bathroom Accessibility Accessible   Home Equipment Walker;Other (Comment)  (home O2 (2L at night))   Prior Function   Level of [place] Independent with ADLs   Lives With Family   Receives Help From Family   IADLs Family/Friend/Other provides transportation;Family/Friend/Other provides meals;Independent with medication management   Falls in the last 6 months 0   Vocational Retired   General   Additional Pertinent History pt presents to [place] c COPD exacerbation. PT consulted for mobility + d/c planning.   Family/Caregiver Present Yes  (pt's dtr)   Cognition   Overall Cognitive Status WFL   Attention Within functional limits   Orientation Level Oriented X4   Memory Within functional limits   Following Commands Follows all commands  "and directions without difficulty   Subjective   Subjective \"I was up walking to the bathroom\".   RUE Assessment   RUE Assessment WFL   LUE Assessment   LUE Assessment WFL   RLE Assessment   RLE Assessment WFL  (4/5)   LLE Assessment   LLE Assessment WFL  (4/5)   Vision-Basic Assessment   Current Vision Wears glasses for distance only   Coordination   Sensation WFL   Bed Mobility   Supine to Sit 5  Supervision   Additional items Assist x 1;HOB elevated;Bedrails;Increased time required;Verbal cues  (CG)   Transfers   Sit to Stand 5  Supervision   Additional items Assist x 1;Verbal cues;Increased time required  (CG)   Stand to Sit 5  Supervision   Additional items Assist x 1;Verbal cues;Increased time required  (CG)   Ambulation/Elevation   Gait pattern Decreased foot clearance;Inconsistent tacos   Gait Assistance 5  Supervision   Additional items Assist x 1;Verbal cues  (CG)   Assistive Device Rolling walker   Distance 50'x2 c standing rest   Stair Management Assistance Not tested   Balance   Static Sitting Good   Dynamic Sitting Fair +   Static Standing Fair +   Dynamic Standing Fair   Ambulatory Fair -   Endurance Deficit   Endurance Deficit Yes   Endurance Deficit Description SpO2 85-91% on RA c mobility. pt in no apparent distress.   Activity Tolerance   Activity Tolerance Patient limited by fatigue  (increased geart rate)   Medical Staff Made Aware Carol (co-eval 2* pt's multiple co-morbidities + mobility deficits including SOB c mobility).   Nurse Made Aware JOSSIE Salazar   Assessment   Prognosis Good   Problem List Decreased strength;Decreased endurance;Impaired balance;Decreased mobility   Assessment pt is an 84y/o f who presents to [facility] c COPD exacerbation. PMH significant for anemia, CHF, TIA, GERD, + RAMON. at baseline, pt (I) c ADLs + functional mobility. resides c family in ranch home c 13 SHIRA. currently presents c deficits in strength, balance, gait quality, + activity tolerance noted in PT exam above. " "Barthel Index 50/100, Modified [place] 3. ambulated 50'x2 c RW c standing rest 2* fatigue/SOB. SpO2 85-91% on RA c mobility despite SOB. would benefit from skilled PT to maximize functional mobility + return home safely. AM-PAC raw score 18 indicating pt safe for d/c home, however please refer to PT d/c recommendation for safe d/c planning. PT eval of high complexity 2* unstable med status c pt requiring ongoing medical management 2* COPD exacerbation. pt c multiple co-morbidities + mobility deficits requiring (A) to complete mobility tasks. resides in ranch home, however has 13 SHIRA. cont to report SOB c mobility.   Barriers to Discharge Inaccessible home environment   Goals   Patient Goals \"to go home\".   STG Expiration Date 02/19/24   Short Term Goal #1 1. increase strength 1/2 grade to improve overall functional mobility, 2. perform bed mobility mod (I) to decrease caregiver burden, 3. perform transfers mod (I) to safely perform ADLs, 4. ambulate >150' mod (I) c least restrictive AD c SpO2 >92% on RA to safely navigate home environment, 5. negotiate 13 stairs mod (I) to safely enter home   Plan   Treatment/Interventions Functional transfer training;LE strengthening/ROM;Elevations;Therapeutic exercise;Endurance training;Patient/family training;Equipment eval/education;Bed mobility;Gait training;Spoke to nursing;OT   PT Frequency 2-3x/wk   Discharge Recommendation   Rehab Resource Intensity Level, PT III (Minimum Resource Intensity)   AM-PAC Basic Mobility Inpatient   Turning in Flat Bed Without Bedrails 3   Lying on Back to Sitting on Edge of Flat Bed Without Bedrails 3   Moving Bed to Chair 3   Standing Up From Chair Using Arms 3   Walk in Room 3   Climb 3-5 Stairs With Railing 3   Basic Mobility Inpatient Raw Score 18   Basic Mobility Standardized Score 41.05   Highest Level Of Mobility   JH-HLM Goal 6: Walk 10 steps or more   JH-HLM Achieved 7: Walk 25 feet or more   Modified [place] Scale   Modified [place] Scale " 3   Barthel Index   Feeding 10   Bathing 0   Grooming Score 0   Dressing Score 5   Bladder Score 10   Bowels Score 10   Toilet Use Score 5   Transfers (Bed/Chair) Score 10   Mobility (Level Surface) Score 0   Stairs Score 0   Barthel Index Score 50     HOLA SantiagoT

## 2024-02-09 NOTE — PLAN OF CARE
Problem: Potential for Falls  Goal: Patient will remain free of falls  Description: INTERVENTIONS:  - Educate patient/family on patient safety including physical limitations  - Instruct patient to call for assistance with activity   - Consult OT/PT to assist with strengthening/mobility   - Keep Call bell within reach  - Keep bed low and locked with side rails adjusted as appropriate  - Keep care items and personal belongings within reach  - Initiate and maintain comfort rounds  - Make Fall Risk Sign visible to staff  - Apply yellow socks and bracelet for high fall risk patients  - Consider moving patient to room near nurses station  Outcome: Progressing     Problem: RESPIRATORY - ADULT  Goal: Achieves optimal ventilation and oxygenation  Description: INTERVENTIONS:  - Assess for changes in respiratory status  - Assess for changes in mentation and behavior  - Position to facilitate oxygenation and minimize respiratory effort  - Oxygen administered by appropriate delivery if ordered  - Initiate smoking cessation education as indicated  - Encourage broncho-pulmonary hygiene including cough, deep breathe, Incentive Spirometry  - Assess the need for suctioning and aspirate as needed  - Assess and instruct to report SOB or any respiratory difficulty  - Respiratory Therapy support as indicated  Outcome: Progressing     Problem: METABOLIC, FLUID AND ELECTROLYTES - ADULT  Goal: Fluid balance maintained  Description: INTERVENTIONS:  - Monitor labs   - Monitor I/O and WT  - Instruct patient on fluid and nutrition as appropriate  - Assess for signs & symptoms of volume excess or deficit  Outcome: Progressing

## 2024-02-09 NOTE — OCCUPATIONAL THERAPY NOTE
Occupational Therapy Evaluation        Patient Name: Ely Hernadez  Today's Date: 2/9/2024 02/09/24 0742   OT Last Visit   OT Visit Date 02/09/24   Note Type   Note type Evaluation   Pain Assessment   Pain Assessment Tool 0-10   Pain Score No Pain   Restrictions/Precautions   Weight Bearing Precautions Per Order No   Braces or Orthoses Other (Comment)  (none at baseline)   Other Precautions Fall Risk   Home Living   Type of Home House   Home Layout One level;Performs ADLs on one level;Able to live on main level with bedroom/bathroom;Stairs to enter with rails  (13 steps to enter)   Bathroom Shower/Tub Walk-in shower   Bathroom Toilet Standard   Bathroom Equipment Grab bars in shower;Shower chair;Commode   Bathroom Accessibility Accessible   Home Equipment Walker   Additional Comments Home O2 at 2 liters at night   Prior Function   Level of [place] Independent with ADLs   Lives With Family   Receives Help From Family   IADLs Family/Friend/Other provides transportation;Family/Friend/Other provides meals;Independent with medication management   Falls in the last 6 months 0   Vocational Retired   Lifestyle   Autonomy Patient was independnet with ADLs/ family assists with IADLs, patient ambulates with  a walker PRN, usually ambulates without AD indoors, holding onto walls/ furniture. Patient lives with family in a 1 story house, 13 [address], daughter [facility].   Reciprocal Relationships Supportive Family   Service to Others Retired   General   Family/Caregiver Present Yes  (daughter present)   ADL   Eating Assistance 7  Independent   Grooming Assistance 5  Supervision/Setup   UB Bathing Assistance 5  Supervision/Setup   LB Bathing Assistance 4  Minimal Assistance   UB Dressing Assistance 5  Supervision/Setup   LB Dressing Assistance 4  Minimal Assistance   Toileting Assistance  4  Minimal Assistance   Functional Assistance 5  Supervision/Setup   Bed Mobility   Supine to Sit 5   Supervision  (contact guard assist)   Additional items Assist x 1;Verbal cues   Transfers   Sit to Stand 5  Supervision  (contact guard)   Additional items Assist x 1;Verbal cues   Stand to Sit 5  Supervision  (contact guard)   Additional items Assist x 1;Verbal cues   Functional Mobility   Functional Mobility 5  Supervision  (contact guard assist)   Additional Comments assist of 1/ RW   Balance   Static Sitting Good   Dynamic Sitting Fair +   Static Standing Fair +   Dynamic Standing Fair   Activity Tolerance   Activity Tolerance Patient limited by fatigue  (increased geart rate)   Medical Staff Made Aware Pt seen as a co-eval with PT due to the patient's co-morbidities, clinically unstable presentation, and present impairments which are a regression from the patient's baseline.   RUE Assessment   RUE Assessment WFL   LUE Assessment   LUE Assessment WFL   Hand Function   Gross Motor Coordination Functional   Fine Motor Coordination Functional   Sensation   Light Touch No apparent deficits  (BUEs)   Vision-Basic Assessment   Current Vision Wears glasses for distance only   Psychosocial   Psychosocial (WDL) WDL   Cognition   Overall Cognitive Status WFL   Arousal/Participation Alert;Responsive;Cooperative   Attention Within functional limits   Orientation Level Oriented X4   Memory Within functional limits   Following Commands Follows all commands and directions without difficulty   Assessment   Limitation Decreased ADL status;Decreased endurance;Decreased self-care trans;Decreased high-level ADLs   Prognosis Good   Assessment Patient is a 83 y.o. female seen for OT evaluation s/p admit to [facility] on 2/6/2024 w/COPD with acute exacerbation (HCC). Commorbidities affecting patient's functional performance at time of assessment include:  [place] respiratory failure with hypoxia, h/o of mechanical aortic valve replacement, paroxysmal atrial fibrillation, stage 3 CKD, anemia, hypothyroidism, DM, CAD,  presented to ED with SOB. Orders placed for OT evaluation and treatment.  Performed at least two patient identifiers during session including name and wristband.  Prior to admission,  Patient was independnet with ADLs/ family assists with IADLs, patient ambulates with a walker PRN, usually ambulates without AD indoors, holding onto walls/ furniture. Patient lives with family in a 1 story house, [address], [place]  Personal factors affecting patient at time of initial evaluation include: steps to enter, difficulty performing ADLs, and difficulty performing IADLs. Upon evaluation, patient requires supervision and contact guard assist for UB ADLs, minimal  assist for LB ADLs, transfers and functional ambulation in room and bathroom with contact guard and close supervision assist, with the use of Rolling Walker.   Occupational performance is affected by the following deficits: degenerative arthritic joint changes, dynamic sit/ stand balance deficit with poor standing tolerance time for self care and functional mobility, and decreased activity tolerance.  Patient to benefit from continued Occupational Therapy treatment while in the hospital to address deficits as defined above and maximize level of functional independence with ADLs and functional mobility. Occupational Performance areas to address include: bathing/ shower, dressing, toilet hygiene, transfer to all surfaces, functional mobility, health maintenance, IADLs: safety procedures, IADLs: meal prep/ clean up, and Leisure Participation. From OT standpoint, recommendation at time of d/c would be Level 3- Home Care services.   Plan   Treatment Interventions ADL retraining;Functional transfer training;Endurance training;Patient/family training;Compensatory technique education;Continued evaluation;Energy conservation;Activityengagement   Goal Expiration Date 02/23/24   OT Frequency 2-3x/wk   Discharge Recommendation   Rehab Resource  Intensity Level, OT III (Minimum Resource Intensity)  (Home Care)   AM-PAC Daily Activity Inpatient   Lower Body Dressing 3   Bathing 3   Toileting 3   Upper Body Dressing 3   Grooming 4   Eating 4   Daily Activity Raw Score 20   Daily Activity Standardized Score (Calc for Raw Score >=11) 42.03   AM-PAC Applied Cognition Inpatient   Following a Speech/Presentation 4   Understanding Ordinary Conversation 4   Taking Medications 4   Remembering Where Things Are Placed or Put Away 4   Remembering List of 4-5 Errands 4   Taking Care of Complicated Tasks 4   Applied Cognition Raw Score 24   Applied Cognition Standardized Score 62.21   Barthel Index   Feeding 10   Bathing 0   Grooming Score 0   Dressing Score 5   Bladder Score 10   Bowels Score 10   Toilet Use Score 5   Transfers (Bed/Chair) Score 10   Mobility (Level Surface) Score 0   Stairs Score 0   Barthel Index Score 50         Occupational therapy Goals: In 2-4 days    1- Patient will verbalize and demonstrate use of energy conservation/ deep breathing technique and work simplification skills during functional activity with no verbal cues.  2- Patient will verbalize and demonstrate good body mechanics and joint protection techniques during ADLs/ IADLs with no verbal cues   3- Patient will increase OOB/ sitting tolerance to 4-6 hours per day for increased participation in self care and leisure tasks with no s/s of exertion.  4- Patient will identify s/s of exertion during ADL and functional mobility with no verbal cues  . 5-Patient will verbalize/ demonstrate compensatory strategies to recover from exertion with no verbal cues.   6-Patient will increase standing tolerance time to 10 minutes with No UE support to complete sink level ADLs @ Mod I level   7- Patient will increase sitting tolerance at edge of bed to 30 minutes to complete UB ADLs @ Indep. level.   8-- Patient/ Family will demonstrate competency with UE Home Exercise Program.

## 2024-02-10 LAB
ANION GAP SERPL CALCULATED.3IONS-SCNC: 7 MMOL/L
BUN SERPL-MCNC: 47 MG/DL (ref 5–25)
CALCIUM SERPL-MCNC: 9 MG/DL (ref 8.4–10.2)
CHLORIDE SERPL-SCNC: 101 MMOL/L (ref 96–108)
CO2 SERPL-SCNC: 30 MMOL/L (ref 21–32)
CREAT SERPL-MCNC: 1.38 MG/DL (ref 0.6–1.3)
ERYTHROCYTE [DISTWIDTH] IN BLOOD BY AUTOMATED COUNT: 17.4 % (ref 11.6–15.1)
GFR SERPL CREATININE-BSD FRML MDRD: 35 ML/MIN/1.73SQ M
GLUCOSE SERPL-MCNC: 125 MG/DL (ref 65–140)
GLUCOSE SERPL-MCNC: 133 MG/DL (ref 65–140)
GLUCOSE SERPL-MCNC: 227 MG/DL (ref 65–140)
GLUCOSE SERPL-MCNC: 325 MG/DL (ref 65–140)
GLUCOSE SERPL-MCNC: 72 MG/DL (ref 65–140)
HCT VFR BLD AUTO: 25.5 % (ref 34.8–46.1)
HGB BLD-MCNC: 7.7 G/DL (ref 11.5–15.4)
INR PPP: 2.91 (ref 0.84–1.19)
MCH RBC QN AUTO: 23.8 PG (ref 26.8–34.3)
MCHC RBC AUTO-ENTMCNC: 30.2 G/DL (ref 31.4–37.4)
MCV RBC AUTO: 79 FL (ref 82–98)
PLATELET # BLD AUTO: 344 THOUSANDS/UL (ref 149–390)
PMV BLD AUTO: 10.5 FL (ref 8.9–12.7)
POTASSIUM SERPL-SCNC: 3.3 MMOL/L (ref 3.5–5.3)
PROTHROMBIN TIME: 31.4 SECONDS (ref 11.6–14.5)
RBC # BLD AUTO: 3.24 MILLION/UL (ref 3.81–5.12)
SODIUM SERPL-SCNC: 138 MMOL/L (ref 135–147)
WBC # BLD AUTO: 5.3 THOUSAND/UL (ref 4.31–10.16)

## 2024-02-10 PROCEDURE — 82948 REAGENT STRIP/BLOOD GLUCOSE: CPT

## 2024-02-10 PROCEDURE — C9113 INJ PANTOPRAZOLE SODIUM, VIA: HCPCS | Performed by: STUDENT IN AN ORGANIZED HEALTH CARE EDUCATION/TRAINING PROGRAM

## 2024-02-10 PROCEDURE — 80048 BASIC METABOLIC PNL TOTAL CA: CPT | Performed by: STUDENT IN AN ORGANIZED HEALTH CARE EDUCATION/TRAINING PROGRAM

## 2024-02-10 PROCEDURE — 85610 PROTHROMBIN TIME: CPT | Performed by: STUDENT IN AN ORGANIZED HEALTH CARE EDUCATION/TRAINING PROGRAM

## 2024-02-10 PROCEDURE — 99232 SBSQ HOSP IP/OBS MODERATE 35: CPT | Performed by: STUDENT IN AN ORGANIZED HEALTH CARE EDUCATION/TRAINING PROGRAM

## 2024-02-10 PROCEDURE — 94640 AIRWAY INHALATION TREATMENT: CPT

## 2024-02-10 PROCEDURE — 85027 COMPLETE CBC AUTOMATED: CPT | Performed by: STUDENT IN AN ORGANIZED HEALTH CARE EDUCATION/TRAINING PROGRAM

## 2024-02-10 PROCEDURE — 94664 DEMO&/EVAL PT USE INHALER: CPT

## 2024-02-10 PROCEDURE — 94760 N-INVAS EAR/PLS OXIMETRY 1: CPT

## 2024-02-10 RX ORDER — POTASSIUM CHLORIDE 20 MEQ/1
40 TABLET, EXTENDED RELEASE ORAL ONCE
Status: DISCONTINUED | OUTPATIENT
Start: 2024-02-10 | End: 2024-02-10

## 2024-02-10 RX ORDER — POTASSIUM CHLORIDE 20 MEQ/1
40 TABLET, EXTENDED RELEASE ORAL
Status: COMPLETED | OUTPATIENT
Start: 2024-02-10 | End: 2024-02-10

## 2024-02-10 RX ORDER — FUROSEMIDE 10 MG/ML
40 INJECTION INTRAMUSCULAR; INTRAVENOUS ONCE
Status: COMPLETED | OUTPATIENT
Start: 2024-02-10 | End: 2024-02-10

## 2024-02-10 RX ADMIN — INSULIN DETEMIR 23 UNITS: 100 INJECTION, SOLUTION SUBCUTANEOUS at 22:00

## 2024-02-10 RX ADMIN — IPRATROPIUM BROMIDE 0.5 MG: 0.5 SOLUTION RESPIRATORY (INHALATION) at 19:50

## 2024-02-10 RX ADMIN — ASPIRIN 81 MG: 81 TABLET, COATED ORAL at 08:02

## 2024-02-10 RX ADMIN — POTASSIUM CHLORIDE 40 MEQ: 1500 TABLET, EXTENDED RELEASE ORAL at 13:20

## 2024-02-10 RX ADMIN — INSULIN LISPRO 3 UNITS: 100 INJECTION, SOLUTION INTRAVENOUS; SUBCUTANEOUS at 22:01

## 2024-02-10 RX ADMIN — GABAPENTIN 300 MG: 300 CAPSULE ORAL at 22:00

## 2024-02-10 RX ADMIN — LEVALBUTEROL HYDROCHLORIDE 1.25 MG: 1.25 SOLUTION RESPIRATORY (INHALATION) at 19:50

## 2024-02-10 RX ADMIN — PREDNISONE 40 MG: 20 TABLET ORAL at 08:02

## 2024-02-10 RX ADMIN — METOPROLOL TARTRATE 25 MG: 25 TABLET, FILM COATED ORAL at 22:00

## 2024-02-10 RX ADMIN — GABAPENTIN 300 MG: 300 CAPSULE ORAL at 17:43

## 2024-02-10 RX ADMIN — LEVALBUTEROL HYDROCHLORIDE 1.25 MG: 1.25 SOLUTION RESPIRATORY (INHALATION) at 07:15

## 2024-02-10 RX ADMIN — FUROSEMIDE 40 MG: 10 INJECTION, SOLUTION INTRAMUSCULAR; INTRAVENOUS at 11:14

## 2024-02-10 RX ADMIN — POTASSIUM CHLORIDE 40 MEQ: 1500 TABLET, EXTENDED RELEASE ORAL at 11:15

## 2024-02-10 RX ADMIN — IPRATROPIUM BROMIDE 0.5 MG: 0.5 SOLUTION RESPIRATORY (INHALATION) at 07:15

## 2024-02-10 RX ADMIN — LEVOTHYROXINE SODIUM 50 MCG: 0.05 TABLET ORAL at 05:24

## 2024-02-10 RX ADMIN — METOPROLOL TARTRATE 25 MG: 25 TABLET, FILM COATED ORAL at 08:02

## 2024-02-10 RX ADMIN — PANTOPRAZOLE SODIUM 40 MG: 40 INJECTION, POWDER, FOR SOLUTION INTRAVENOUS at 08:02

## 2024-02-10 RX ADMIN — GABAPENTIN 300 MG: 300 CAPSULE ORAL at 08:02

## 2024-02-10 RX ADMIN — INSULIN LISPRO 2 UNITS: 100 INJECTION, SOLUTION INTRAVENOUS; SUBCUTANEOUS at 18:25

## 2024-02-10 RX ADMIN — WARFARIN SODIUM 5 MG: 5 TABLET ORAL at 17:43

## 2024-02-10 NOTE — PLAN OF CARE
Problem: RESPIRATORY - ADULT  Goal: Achieves optimal ventilation and oxygenation  Description: INTERVENTIONS:  - Assess for changes in respiratory status  - Assess for changes in mentation and behavior  - Position to facilitate oxygenation and minimize respiratory effort  - Oxygen administered by appropriate delivery if ordered  - Initiate smoking cessation education as indicated  - Encourage broncho-pulmonary hygiene including cough, deep breathe, Incentive Spirometry  - Assess the need for suctioning and aspirate as needed  - Assess and instruct to report SOB or any respiratory difficulty  - Respiratory Therapy support as indicated  Outcome: Progressing     Problem: Knowledge Deficit  Goal: Patient/family/caregiver demonstrates understanding of disease process, treatment plan, medications, and discharge instructions  Description: Complete learning assessment and assess knowledge base.  Interventions:  - Provide teaching at level of understanding  - Provide teaching via preferred learning methods  Outcome: Progressing

## 2024-02-10 NOTE — RESPIRATORY THERAPY NOTE
RT Protocol Note  Ely Hernadez 83 y.o. female MRN: [id number]  Unit/Bed#: -01 Encounter: [id number]    Assessment    Principal Problem:    COPD with acute exacerbation (HCC)  Active Problems:    Coronary artery disease of native artery of native heart with stable angina pectoris (HCC)    Diabetes mellitus with neurological manifestation (HCC)    Hypothyroidism    Anemia    Stage 3a chronic kidney disease    Paroxysmal atrial fibrillation (HCC)    H/O mechanical aortic valve replacement    Acute respiratory failure with hypoxia (HCC)      Home Pulmonary Medications:    Home Devices/Therapy: Home O2    Past Medical History:   Diagnosis Date    Anemia     Aneurysm of thoracic aorta (HCC)     Aortic valve disorder     Benign neoplasm of sigmoid colon     Cardiomyopathy (HCC)     last assessed: 10/10/2014    CHF (congestive heart failure) (HCC)     Chronic kidney disease     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HCC)     last assessed: 10/10/2014    Diabetic nephropathy (HCC)     RAMON (dyspnea on exertion)     GERD (gastroesophageal reflux disease)     History of emphysema (HCC)     Hyperlipidemia     TIA (transient ischemic attack)      Social History     Socioeconomic History    Marital status:      Spouse name: None    Number of children: None    Years of education: None    Highest education level: None   Occupational History    None   Tobacco Use    Smoking status: Former     Current packs/day: 0.00     Average packs/day: 1 pack/day for 52.9 years (52.9 ttl pk-yrs)     Types: Cigarettes     Start date:      Quit date: 2007     Years since quittin.2     Passive exposure: Past    Smokeless tobacco: Former     Quit date:    Vaping Use    Vaping status: Never Used   Substance and Sexual Activity    Alcohol use: Never    Drug use: Never    Sexual activity: Not Currently     Partners: Male   Other Topics Concern    None   Social History Narrative    Home durable medical equipment - Freestyle  "test strips BID Freestyle lancets BID    Living independently with spouse     Social Determinants of Health     Financial Resource Strain: Not on file   Food Insecurity: No Food Insecurity (2/8/2024)    Hunger Vital Sign     Worried About Running Out of Food in the Last Year: Never true     Ran Out of Food in the Last Year: Never true   Transportation Needs: No Transportation Needs (2/8/2024)    PRAPARE - Transportation     Lack of Transportation (Medical): No     Lack of Transportation (Non-Medical): No   Physical Activity: Inactive (5/26/2021)    Exercise Vital Sign     Days of Exercise per Week: 0 days     Minutes of Exercise per Session: 0 min   Stress: No Stress Concern Present (5/26/2021)    [employer] - Occupational Stress Questionnaire     Feeling of Stress : Not at all   Social Connections: Not on file   Intimate Partner Violence: Not on file   Housing Stability: Low Risk  (2/8/2024)    Housing Stability Vital Sign     Unable to Pay for Housing in the Last Year: No     Number of Places Lived in the Last Year: 1     Unstable Housing in the Last Year: No       Subjective    Subjective Data: awake and alert    Objective    Physical Exam:        Vitals:  Blood pressure 141/62, pulse 63, temperature 97.5 °F (36.4 °C), resp. rate 18, height 5' 6\" (1.676 m), weight 74.4 kg (164 lb), SpO2 98%.          Imaging and other studies: I have personally reviewed pertinent reports.      O2 Device: room air     Plan    Respiratory Plan: No distress/Pulmonary history  Airway Clearance Plan: Incentive Spirometer     Resp Comments: will continue with bid xop/atr   "

## 2024-02-10 NOTE — ASSESSMENT & PLAN NOTE
Was recently admitted for a GI bleed no further GI bleeding  Hb stable around 7.7  No acute s/s of bleeding  Continue to monitor  Transfuse for hb goal >7

## 2024-02-10 NOTE — RESPIRATORY THERAPY NOTE
02/09/24 1915   Respiratory Protocol   Protocol Initiated? No   Protocol Selection Respiratory   Language Barrier? No   Medical & Social History Reviewed? Yes   Diagnostic Studies Reviewed? Yes   Physical Assessment Performed? Yes   Home Devices/Therapy Home O2  (2L HS)   Respiratory Plan No distress/Pulmonary history   Respiratory Assessment   General Appearance Awake;Alert   Respiratory Pattern Normal   Chest Assessment Chest expansion symmetrical   Bilateral Breath Sounds Clear   Cough Non-productive;Strong   Resp Comments even non labored respirations   O2 Device room air   Additional Assessments   Pulse 68   Respirations 18   SpO2 95 %     Consider frequency change to PRN

## 2024-02-10 NOTE — ASSESSMENT & PLAN NOTE
Creatinine currently at baseline at approximately 1.2., Cr 1.38 today  Pt appears volume overloaded, improved compared to yesterday   Cr improved with IV lasix 40 mg x 1, will give additional dose today   Intake and output   Avoid nephrotoxic agents  Monitor cr with am bmp

## 2024-02-10 NOTE — PROGRESS NOTES
[facility]  Progress Note  Name: Ely Hernadez I  MRN: [id number]  Unit/Bed#: -01 I Date of Admission: 2/6/2024   Date of Service: 2/10/2024 I Hospital Day: 4    Assessment/Plan   Acute respiratory failure with hypoxia (HCC)  Assessment & Plan  Secondary to COPD exacerbation and volume overload  Currently at baseline   Baseline 2L Qhs only   IV lasix as above  Plan as outlined above  Wean oxygen as able    H/O mechanical aortic valve replacement  Assessment & Plan  Continue home Coumadin  INR 3,12  Goal INR 2.5-3.5  Monitor INR daily    Paroxysmal atrial fibrillation (HCC)  Assessment & Plan  Currently rate controlled  Continue beta-blocker  Continue Coumadin    Stage 3a chronic kidney disease  Assessment & Plan  Creatinine currently at baseline at approximately 1.2., Cr 1.38 today  Pt appears volume overloaded, improved compared to yesterday   Cr improved with IV lasix 40 mg x 1, will give additional dose today   Intake and output   Avoid nephrotoxic agents  Monitor cr with am bmp       Anemia  Assessment & Plan  Was recently admitted for a GI bleed no further GI bleeding  Hb stable around 7.7  No acute s/s of bleeding  Continue to monitor  Transfuse for hb goal >7    Hypothyroidism  Assessment & Plan  Continue Synthroid    Diabetes mellitus with neurological manifestation (HCC)  Assessment & Plan    (P) [id number] home p.o. agents  Levemir 26 units at bedtime  Sliding scale insulin  Monitor blood glucose    Coronary artery disease of native artery of native heart with stable angina pectoris (HCC)  Assessment & Plan  Currently without chest pain  Continue aspirin, beta-blocker, statin    * COPD with acute exacerbation (HCC)  Assessment & Plan  Presented with shortness of breath and wheezing noted to be saturating in the mid 80s on room air. Is on home o2 2L qhs. Weaned off oxygen, back to baseline now.   Consistent with COPD exacerbation  Transition to po prednisone  taper   Scheduled nebs  Wean oxygen as able               VTE Pharmacologic Prophylaxis: VTE Score: 6 High Risk (Score >/= 5) - Pharmacological DVT Prophylaxis Ordered: warfarin (Coumadin). Sequential Compression Devices Ordered.    Mobility:   Basic Mobility Inpatient Raw Score: 18  JH-HLM Goal: 6: Walk 10 steps or more  JH-HLM Achieved: 6: Walk 10 steps or more  HLM Goal achieved. Continue to encourage appropriate mobility.    Patient Centered Rounds: I performed bedside rounds with nursing staff today.   Discussions with Specialists or Other Care Team Provider: MANUELITO    Education and Discussions with Family / Patient: Patient declined call to .     Total Time Spent on Date of Encounter in care of patient: 40 mins. This time was spent on one or more of the following: performing physical exam; counseling and coordination of care; obtaining or reviewing history; documenting in the medical record; reviewing/ordering tests, medications or procedures; communicating with other healthcare professionals and discussing with patient's family/caregivers.    Current Length of Stay: 4 day(s)  Current Patient Status: Inpatient   Certification Statement: The patient will continue to require additional inpatient hospital stay due to volume overload   Discharge Plan: Anticipate discharge in 24-48 hrs to home with home services.    Code Status: Level 1 - Full Code    Subjective:   Pt reports sob is improving     Objective:     Vitals:   Temp (24hrs), Av.9 °F (36.6 °C), Min:97.5 °F (36.4 °C), Max:98.3 °F (36.8 °C)    Temp:  [97.5 °F (36.4 °C)-98.3 °F (36.8 °C)] 97.5 °F (36.4 °C)  HR:  [60-68] 63  Resp:  [18] 18  BP: (141-156)/(58-96) 141/62  SpO2:  [94 %-98 %] 98 %  Body mass index is 26.47 kg/m².     Input and Output Summary (last 24 hours):     Intake/Output Summary (Last 24 hours) at 2/10/2024 1126  Last data filed at 2/10/2024 0801  Gross per 24 hour   Intake 420 ml   Output 3000 ml   Net -2580 ml       Physical  Exam:   Physical Exam  Constitutional:       General: She is not in acute distress.     Appearance: She is well-developed. She is not diaphoretic.   HENT:      Head: Normocephalic and atraumatic.      Nose: Nose normal.      Mouth/Throat:      Pharynx: No oropharyngeal exudate.   Eyes:      General: No scleral icterus.        Right eye: No discharge.         Left eye: No discharge.      Conjunctiva/sclera: Conjunctivae normal.   Cardiovascular:      Rate and Rhythm: Normal rate and regular rhythm.      Heart sounds: Normal heart sounds. No murmur heard.     No friction rub. No gallop.   Pulmonary:      Effort: Pulmonary effort is normal. No respiratory distress.      Breath sounds: Rales present. No wheezing.   Abdominal:      General: Bowel sounds are normal. There is no distension.      Palpations: Abdomen is soft.      Tenderness: There is no abdominal tenderness. There is no guarding.   Musculoskeletal:         General: Normal range of motion.      Cervical back: Normal range of motion and neck supple.      Right lower leg: Edema present.      Left lower leg: Edema present.   Skin:     General: Skin is warm and dry.      Findings: No erythema.   Neurological:      Mental Status: She is oriented to person, place, and time.   Psychiatric:         Behavior: Behavior normal.          Additional Data:     Labs:  Results from last 7 days   Lab Units 02/10/24  0930 02/08/24  0554 02/07/24  0441   WBC Thousand/uL 5.30   < > 4.55   HEMOGLOBIN g/dL 7.7*   < > 7.6*   HEMATOCRIT % 25.5*   < > 25.6*   PLATELETS Thousands/uL 344   < > 326   NEUTROS PCT %  --   --  90*   LYMPHS PCT %  --   --  7*   MONOS PCT %  --   --  1*   EOS PCT %  --   --  0    < > = values in this interval not displayed.     Results from last 7 days   Lab Units 02/10/24  0930 02/06/24  1734 02/05/24  0940   SODIUM mmol/L 138   < > 142   POTASSIUM mmol/L 3.3*   < > 4.3   CHLORIDE mmol/L 101   < > 107   CO2 mmol/L 30   < > 29   BUN mg/dL 47*   < > 21    CREATININE mg/dL 1.38*   < > 1.13*   ANION GAP mmol/L 7   < > 6   CALCIUM mg/dL 9.0   < > 8.9   ALBUMIN g/dL  --   --  3.9   TOTAL BILIRUBIN mg/dL  --   --  0.5   ALK PHOS U/L  --   --  65   ALT U/L  --   --  8   AST U/L  --   --  12   GLUCOSE RANDOM mg/dL 125   < > 105*    < > = values in this interval not displayed.     Results from last 7 days   Lab Units 02/09/24  0555   INR  3.12*     Results from last 7 days   Lab Units 02/10/24  1051 02/10/24  0707 02/09/24  2110 02/09/24  1550 02/09/24  1053 02/09/24  0715 02/08/24  2111 02/08/24  1603 02/08/24  1059 02/08/24  0716 02/07/24  2101 02/07/24  1631   POC GLUCOSE mg/dl 133 72 166* 214* 224* 196* 241* 224* 214* 213* 181* 125         Results from last 7 days   Lab Units 02/07/24  0441   PROCALCITONIN ng/ml 0.06       Lines/Drains:  Invasive Devices       Peripheral Intravenous Line  Duration             Peripheral IV 02/06/24 Dorsal (posterior);Right Forearm 3 days                          Imaging: No pertinent imaging reviewed.    Recent Cultures (last 7 days):         Last 24 Hours Medication List:   Current Facility-Administered Medications   Medication Dose Route Frequency Provider Last Rate    acetaminophen  650 mg Oral Q6H PRN Darwin Pfeiffer MD      albuterol  2 puff Inhalation Q6H PRN Dawrin Pfeiffer MD      aspirin  81 mg Oral Daily Darwin Pfeiffer MD      atorvastatin  40 mg Oral QPM Darwin Pfeiffer MD      benzonatate  100 mg Oral TID PRN Darwin Pfeiffer MD      gabapentin  300 mg Oral TID Darwin Pfeiffer MD      insulin detemir  23 Units Subcutaneous HS Lilibeth Bolivar, DO      insulin lispro  1-5 Units Subcutaneous HS Darwin Pfeiffer MD      insulin lispro  1-6 Units Subcutaneous TID AC Darwin Pfeiffer MD      ipratropium  0.5 mg Nebulization BID Lilibeth Luz Maria Sarailanny, DO      levalbuterol  1.25 mg Nebulization BID Lilibeth Luz Maria Saraiya, DO      levothyroxine  50 mcg Oral Daily Darwin Pfeiffer MD      metoprolol tartrate  25 mg Oral Q12H [facility] Darwin Pfeiffer MD       pantoprazole  40 mg Intravenous Q24H [facility] Lilibeth Bolivar,       potassium chloride  40 mEq Oral Q2H Lilibethmagdalena Bolivar, DO      predniSONE  40 mg Oral Daily Lilibeth Bolivar, DO      warfarin  5 mg Oral Daily (warfarin) Darwin Pfeiffer MD          Today, Patient Was Seen By: Lilibeth Bolivar DO    **Please Note: This note may have been constructed using a voice recognition system.**

## 2024-02-10 NOTE — PLAN OF CARE
Problem: RESPIRATORY - ADULT  Goal: Achieves optimal ventilation and oxygenation  Description: INTERVENTIONS:  - Assess for changes in respiratory status  - Assess for changes in mentation and behavior  - Position to facilitate oxygenation and minimize respiratory effort  - Oxygen administered by appropriate delivery if ordered  - Initiate smoking cessation education as indicated  - Encourage broncho-pulmonary hygiene including cough, deep breathe, Incentive Spirometry  - Assess the need for suctioning and aspirate as needed  - Assess and instruct to report SOB or any respiratory difficulty  - Respiratory Therapy support as indicated  Outcome: Progressing     Problem: Potential for Falls  Goal: Patient will remain free of falls  Description: INTERVENTIONS:  - Educate patient/family on patient safety including physical limitations  - Instruct patient to call for assistance with activity   - Consult OT/PT to assist with strengthening/mobility   - Keep Call bell within reach  - Keep bed low and locked with side rails adjusted as appropriate  - Keep care items and personal belongings within reach  - Initiate and maintain comfort rounds  - Make Fall Risk Sign visible to staff  - Offer Toileting every 2 Hours, in advance of need  - Initiate/Maintain bed alarm  - Apply yellow socks and bracelet for high fall risk patients  - Consider moving patient to room near nurses station  Outcome: Progressing

## 2024-02-10 NOTE — ASSESSMENT & PLAN NOTE
Presented with shortness of breath and wheezing noted to be saturating in the mid 80s on room air. Is on home o2 2L qhs. Weaned off oxygen, back to baseline now.   Consistent with COPD exacerbation  Transition to po prednisone taper   Scheduled nebs  Wean oxygen as able

## 2024-02-10 NOTE — ASSESSMENT & PLAN NOTE
(P) [id number] home p.o. agents  Levemir 26 units at bedtime  Sliding scale insulin  Monitor blood glucose

## 2024-02-11 LAB
ANION GAP SERPL CALCULATED.3IONS-SCNC: 5 MMOL/L
BUN SERPL-MCNC: 44 MG/DL (ref 5–25)
CALCIUM SERPL-MCNC: 8.9 MG/DL (ref 8.4–10.2)
CHLORIDE SERPL-SCNC: 103 MMOL/L (ref 96–108)
CO2 SERPL-SCNC: 31 MMOL/L (ref 21–32)
CREAT SERPL-MCNC: 1.28 MG/DL (ref 0.6–1.3)
ERYTHROCYTE [DISTWIDTH] IN BLOOD BY AUTOMATED COUNT: 17.2 % (ref 11.6–15.1)
GFR SERPL CREATININE-BSD FRML MDRD: 38 ML/MIN/1.73SQ M
GLUCOSE SERPL-MCNC: 100 MG/DL (ref 65–140)
GLUCOSE SERPL-MCNC: 102 MG/DL (ref 65–140)
GLUCOSE SERPL-MCNC: 152 MG/DL (ref 65–140)
GLUCOSE SERPL-MCNC: 203 MG/DL (ref 65–140)
GLUCOSE SERPL-MCNC: 256 MG/DL (ref 65–140)
HCT VFR BLD AUTO: 25.9 % (ref 34.8–46.1)
HGB BLD-MCNC: 7.6 G/DL (ref 11.5–15.4)
INR PPP: 3.01 (ref 0.84–1.19)
MCH RBC QN AUTO: 22.8 PG (ref 26.8–34.3)
MCHC RBC AUTO-ENTMCNC: 29.3 G/DL (ref 31.4–37.4)
MCV RBC AUTO: 78 FL (ref 82–98)
PLATELET # BLD AUTO: 353 THOUSANDS/UL (ref 149–390)
PMV BLD AUTO: 10.3 FL (ref 8.9–12.7)
POTASSIUM SERPL-SCNC: 4.4 MMOL/L (ref 3.5–5.3)
PROTHROMBIN TIME: 32.2 SECONDS (ref 11.6–14.5)
RBC # BLD AUTO: 3.34 MILLION/UL (ref 3.81–5.12)
SODIUM SERPL-SCNC: 139 MMOL/L (ref 135–147)
WBC # BLD AUTO: 4.58 THOUSAND/UL (ref 4.31–10.16)

## 2024-02-11 PROCEDURE — 94760 N-INVAS EAR/PLS OXIMETRY 1: CPT

## 2024-02-11 PROCEDURE — 94640 AIRWAY INHALATION TREATMENT: CPT

## 2024-02-11 PROCEDURE — 82948 REAGENT STRIP/BLOOD GLUCOSE: CPT

## 2024-02-11 PROCEDURE — 99232 SBSQ HOSP IP/OBS MODERATE 35: CPT | Performed by: INTERNAL MEDICINE

## 2024-02-11 PROCEDURE — 85027 COMPLETE CBC AUTOMATED: CPT | Performed by: STUDENT IN AN ORGANIZED HEALTH CARE EDUCATION/TRAINING PROGRAM

## 2024-02-11 PROCEDURE — 80048 BASIC METABOLIC PNL TOTAL CA: CPT | Performed by: STUDENT IN AN ORGANIZED HEALTH CARE EDUCATION/TRAINING PROGRAM

## 2024-02-11 PROCEDURE — 85610 PROTHROMBIN TIME: CPT | Performed by: STUDENT IN AN ORGANIZED HEALTH CARE EDUCATION/TRAINING PROGRAM

## 2024-02-11 PROCEDURE — 94664 DEMO&/EVAL PT USE INHALER: CPT

## 2024-02-11 PROCEDURE — C9113 INJ PANTOPRAZOLE SODIUM, VIA: HCPCS | Performed by: STUDENT IN AN ORGANIZED HEALTH CARE EDUCATION/TRAINING PROGRAM

## 2024-02-11 RX ORDER — METHYLPREDNISOLONE SODIUM SUCCINATE 40 MG/ML
40 INJECTION, POWDER, LYOPHILIZED, FOR SOLUTION INTRAMUSCULAR; INTRAVENOUS EVERY 12 HOURS SCHEDULED
Status: DISCONTINUED | OUTPATIENT
Start: 2024-02-11 | End: 2024-02-14 | Stop reason: HOSPADM

## 2024-02-11 RX ADMIN — LEVOTHYROXINE SODIUM 50 MCG: 0.05 TABLET ORAL at 05:22

## 2024-02-11 RX ADMIN — METOPROLOL TARTRATE 25 MG: 25 TABLET, FILM COATED ORAL at 22:42

## 2024-02-11 RX ADMIN — IPRATROPIUM BROMIDE 0.5 MG: 0.5 SOLUTION RESPIRATORY (INHALATION) at 07:13

## 2024-02-11 RX ADMIN — PREDNISONE 40 MG: 20 TABLET ORAL at 08:10

## 2024-02-11 RX ADMIN — INSULIN DETEMIR 23 UNITS: 100 INJECTION, SOLUTION SUBCUTANEOUS at 22:41

## 2024-02-11 RX ADMIN — INSULIN LISPRO 3 UNITS: 100 INJECTION, SOLUTION INTRAVENOUS; SUBCUTANEOUS at 16:42

## 2024-02-11 RX ADMIN — LEVALBUTEROL HYDROCHLORIDE 1.25 MG: 1.25 SOLUTION RESPIRATORY (INHALATION) at 07:13

## 2024-02-11 RX ADMIN — INSULIN LISPRO 1 UNITS: 100 INJECTION, SOLUTION INTRAVENOUS; SUBCUTANEOUS at 12:03

## 2024-02-11 RX ADMIN — ATORVASTATIN CALCIUM 40 MG: 40 TABLET, FILM COATED ORAL at 17:01

## 2024-02-11 RX ADMIN — ASPIRIN 81 MG: 81 TABLET, COATED ORAL at 08:09

## 2024-02-11 RX ADMIN — METHYLPREDNISOLONE SODIUM SUCCINATE 40 MG: 40 INJECTION, POWDER, FOR SOLUTION INTRAMUSCULAR; INTRAVENOUS at 22:42

## 2024-02-11 RX ADMIN — INSULIN LISPRO 1 UNITS: 100 INJECTION, SOLUTION INTRAVENOUS; SUBCUTANEOUS at 22:41

## 2024-02-11 RX ADMIN — GABAPENTIN 300 MG: 300 CAPSULE ORAL at 22:42

## 2024-02-11 RX ADMIN — WARFARIN SODIUM 5 MG: 5 TABLET ORAL at 17:01

## 2024-02-11 RX ADMIN — METOPROLOL TARTRATE 25 MG: 25 TABLET, FILM COATED ORAL at 08:09

## 2024-02-11 RX ADMIN — IPRATROPIUM BROMIDE 0.5 MG: 0.5 SOLUTION RESPIRATORY (INHALATION) at 20:06

## 2024-02-11 RX ADMIN — GABAPENTIN 300 MG: 300 CAPSULE ORAL at 17:01

## 2024-02-11 RX ADMIN — GABAPENTIN 300 MG: 300 CAPSULE ORAL at 08:09

## 2024-02-11 RX ADMIN — LEVALBUTEROL HYDROCHLORIDE 1.25 MG: 1.25 SOLUTION RESPIRATORY (INHALATION) at 20:07

## 2024-02-11 RX ADMIN — PANTOPRAZOLE SODIUM 40 MG: 40 INJECTION, POWDER, FOR SOLUTION INTRAVENOUS at 08:10

## 2024-02-11 RX ADMIN — METHYLPREDNISOLONE SODIUM SUCCINATE 40 MG: 40 INJECTION, POWDER, FOR SOLUTION INTRAMUSCULAR; INTRAVENOUS at 11:30

## 2024-02-11 NOTE — RESPIRATORY THERAPY NOTE
RT Protocol Note  Ely Hernadez 83 y.o. female MRN: [id number]  Unit/Bed#: -01 Encounter: [id number]    Assessment    Principal Problem:    COPD with acute exacerbation (HCC)  Active Problems:    Coronary artery disease of native artery of native heart with stable angina pectoris (HCC)    Diabetes mellitus with neurological manifestation (HCC)    Hypothyroidism    Anemia    Stage 3a chronic kidney disease    Paroxysmal atrial fibrillation (HCC)    H/O mechanical aortic valve replacement    Acute respiratory failure with hypoxia (HCC)      Home Pulmonary Medications:  Inhaler/neb    Home Devices/Therapy: Home O2    Past Medical History:   Diagnosis Date    Anemia     Aneurysm of thoracic aorta (HCC)     Aortic valve disorder     Benign neoplasm of sigmoid colon     Cardiomyopathy (HCC)     last assessed: 10/10/2014    CHF (congestive heart failure) (HCC)     Chronic kidney disease     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HCC)     last assessed: 10/10/2014    Diabetic nephropathy (HCC)     RAMON (dyspnea on exertion)     GERD (gastroesophageal reflux disease)     History of emphysema (HCC)     Hyperlipidemia     TIA (transient ischemic attack)      Social History     Socioeconomic History    Marital status:      Spouse name: None    Number of children: None    Years of education: None    Highest education level: None   Occupational History    None   Tobacco Use    Smoking status: Former     Current packs/day: 0.00     Average packs/day: 1 pack/day for 52.9 years (52.9 ttl pk-yrs)     Types: Cigarettes     Start date:      Quit date: 2007     Years since quittin.2     Passive exposure: Past    Smokeless tobacco: Former     Quit date:    Vaping Use    Vaping status: Never Used   Substance and Sexual Activity    Alcohol use: Never    Drug use: Never    Sexual activity: Not Currently     Partners: Male   Other Topics Concern    None   Social History Narrative    Home durable medical equipment -  "Freestyle test strips BID Freestyle lancets BID    Living independently with spouse     Social Determinants of Health     Financial Resource Strain: Not on file   Food Insecurity: No Food Insecurity (2/8/2024)    Hunger Vital Sign     Worried About Running Out of Food in the Last Year: Never true     Ran Out of Food in the Last Year: Never true   Transportation Needs: No Transportation Needs (2/8/2024)    PRAPARE - Transportation     Lack of Transportation (Medical): No     Lack of Transportation (Non-Medical): No   Physical Activity: Inactive (5/26/2021)    Exercise Vital Sign     Days of Exercise per Week: 0 days     Minutes of Exercise per Session: 0 min   Stress: No Stress Concern Present (5/26/2021)    [employer] - Occupational Stress Questionnaire     Feeling of Stress : Not at all   Social Connections: Not on file   Intimate Partner Violence: Not on file   Housing Stability: Low Risk  (2/8/2024)    Housing Stability Vital Sign     Unable to Pay for Housing in the Last Year: No     Number of Places Lived in the Last Year: 1     Unstable Housing in the Last Year: No       Subjective    Subjective Data: awake and alert    Objective    Physical Exam:   Assessment Type: During-treatment  General Appearance: Awake, Alert  Respiratory Pattern: Normal  Chest Assessment: Chest expansion symmetrical  Bilateral Breath Sounds: Diminished, Crackles (crackles in the bases)  Location Specific: No  Cough: None  O2 Device: NC    Vitals:  Blood pressure 139/69, pulse 60, temperature 97.8 °F (36.6 °C), resp. rate 16, height 5' 6\" (1.676 m), weight 74.4 kg (164 lb), SpO2 98%.          Imaging and other studies: I have personally reviewed pertinent reports.      O2 Device: NC     Plan    Respiratory Plan: No distress/Pulmonary history      Resp Comments: Pt resting comfortably and in no apparent distress.  Offers no complaints.  Will cont w/ current therapy.   "

## 2024-02-11 NOTE — PLAN OF CARE
Problem: Potential for Falls  Goal: Patient will remain free of falls  Description: INTERVENTIONS:  - Educate patient/family on patient safety including physical limitations  - Instruct patient to call for assistance with activity   - Consult OT/PT to assist with strengthening/mobility   - Keep Call bell within reach  - Keep bed low and locked with side rails adjusted as appropriate  - Keep care items and personal belongings within reach  - Initiate and maintain comfort rounds  - Make Fall Risk Sign visible to staff  - Offer Toileting every 2  Hours, in advance of need  - Initiate/Maintain bed alarm  - Apply yellow socks and bracelet for high fall risk patients  - Consider moving patient to room near nurses station  Outcome: Progressing     Problem: RESPIRATORY - ADULT  Goal: Achieves optimal ventilation and oxygenation  Description: INTERVENTIONS:  - Assess for changes in respiratory status  - Assess for changes in mentation and behavior  - Position to facilitate oxygenation and minimize respiratory effort  - Oxygen administered by appropriate delivery if ordered  - Initiate smoking cessation education as indicated  - Encourage broncho-pulmonary hygiene including cough, deep breathe, Incentive Spirometry  - Assess the need for suctioning and aspirate as needed  - Assess and instruct to report SOB or any respiratory difficulty  - Respiratory Therapy support as indicated  Outcome: Progressing     Problem: METABOLIC, FLUID AND ELECTROLYTES - ADULT  Goal: Fluid balance maintained  Description: INTERVENTIONS:  - Monitor labs   - Monitor I/O and WT  - Instruct patient on fluid and nutrition as appropriate  - Assess for signs & symptoms of volume excess or deficit  Outcome: Progressing     Problem: HEMATOLOGIC - ADULT  Goal: Maintains hematologic stability  Description: INTERVENTIONS  - Assess for signs and symptoms of bleeding or hemorrhage  - Monitor labs  - Administer supportive blood products/factors as ordered and  appropriate  Outcome: Progressing     Problem: Knowledge Deficit  Goal: Patient/family/caregiver demonstrates understanding of disease process, treatment plan, medications, and discharge instructions  Description: Complete learning assessment and assess knowledge base.  Interventions:  - Provide teaching at level of understanding  - Provide teaching via preferred learning methods  Outcome: Progressing     Problem: RESPIRATORY - ADULT  Goal: Achieves optimal ventilation and oxygenation  Description: INTERVENTIONS:  - Assess for changes in respiratory status  - Assess for changes in mentation and behavior  - Position to facilitate oxygenation and minimize respiratory effort  - Oxygen administered by appropriate delivery if ordered  - Initiate smoking cessation education as indicated  - Encourage broncho-pulmonary hygiene including cough, deep breathe, Incentive Spirometry  - Assess the need for suctioning and aspirate as needed  - Assess and instruct to report SOB or any respiratory difficulty  - Respiratory Therapy support as indicated  Outcome: Progressing

## 2024-02-11 NOTE — ASSESSMENT & PLAN NOTE
Presented with shortness of breath and wheezing noted to be saturating in the mid 80s on room air. Is on home o2 2L qhs. Weaned off oxygen, back to baseline now.   Consistent with COPD exacerbation  Still with tachypnea will place on IV steroids 40 twice daily for now  Can transition likely to oral steroids tomorrow and discharge

## 2024-02-11 NOTE — ASSESSMENT & PLAN NOTE
urine manage [employer] Was recently admitted for a GI bleed no further GI bleeding  Hb stable around 7.7  No acute s/s of bleeding  Continue to monitor  Transfuse for hb goal >7   98.3

## 2024-02-11 NOTE — PROGRESS NOTES
[facility]ly [facility]  Progress Note  Name: Ely Hernadez I  MRN: [id number]  Unit/Bed#: -01 I Date of Admission: 2/6/2024   Date of Service: 2/11/2024 I Hospital Day: 5    Assessment/Plan   * COPD with acute exacerbation (HCC)  Assessment & Plan  Presented with shortness of breath and wheezing noted to be saturating in the mid 80s on room air. Is on home o2 2L qhs. Weaned off oxygen, back to baseline now.   Consistent with COPD exacerbation  Still with tachypnea will place on IV steroids 40 twice daily for now  Can transition likely to oral steroids tomorrow and discharge    Acute respiratory failure with hypoxia (HCC)  Assessment & Plan  Secondary to COPD exacerbation and volume overload  Currently at baseline   Baseline 2L Qhs only   Currently saturating adequately on room air now    H/O mechanical aortic valve replacement  Assessment & Plan  Continue home Coumadin  INR therapeutic  Goal INR 2.5-3.5  Monitor INR daily    Paroxysmal atrial fibrillation (HCC)  Assessment & Plan  Currently rate controlled   continue beta-blocker.  Continue Coumadin            Stage 3a chronic kidney disease  Assessment & Plan  Creatinine currently at baseline at approximately 1.2., Cr 1.38 today  Euvolemic status post IV diuretics   Creatinine improved  Continue to monitor      Anemia  Assessment & Plan  urine manage [employer] Was recently admitted for a GI bleed no further GI bleeding  Hb stable around 7.7  No acute s/s of bleeding  Continue to monitor  Transfuse for hb goal >7    Hypothyroidism  Assessment & Plan  Continue Synthroid     Diabetes mellitus with neurological manifestation (HCC)  Assessment & Plan  Hold home p.o. agents  Levemir 26 units at bedtime  Continue sliding scale insulin  Monitor blood glucose    Coronary artery disease of native artery of native heart with stable angina pectoris (HCC)  Assessment & Plan  Continue aspirin, beta-blocker, statin          VTE Pharmacologic Prophylaxis:    Pharmacologic: Warfarin (Coumadin)  Mechanical VTE Prophylaxis in Place: Yes    Patient Centered Rounds: I have performed bedside rounds with nursing staff today.    Discussions with Specialists or Other Care Team Provider: cm, nursing    Education and Discussions with Family / Patient: pt    Time Spent for Care: 30 minutes.  More than 50% of total time spent on counseling and coordination of care as described above.    Current Length of Stay: 5 day(s)    Current Patient Status: Inpatient   Certification Statement: The patient will continue to require additional inpatient hospital stay due to see below    Discharge Plan: Pending improvement in respiratory status.  Anticipate discharge in 24 hours if continues to improve    Code Status: Level 1 - Full Code      Subjective:   Denies fevers, chills, shortness of breath, cough    Objective:     Vitals:   Temp (24hrs), Av.7 °F (36.5 °C), Min:97.5 °F (36.4 °C), Max:97.9 °F (36.6 °C)    Temp:  [97.5 °F (36.4 °C)-97.9 °F (36.6 °C)] 97.8 °F (36.6 °C)  HR:  [59-66] 60  Resp:  [16] 16  BP: (139-141)/(64-69) 139/69  SpO2:  [95 %-98 %] 98 %  Body mass index is 26.47 kg/m².     Input and Output Summary (last 24 hours):       Intake/Output Summary (Last 24 hours) at 2024 1036  Last data filed at 2024 0900  Gross per 24 hour   Intake 240 ml   Output 2400 ml   Net -2160 ml       Physical Exam:     Physical Exam  Constitutional:       General: She is not in acute distress.     Appearance: She is well-developed. She is not diaphoretic.   HENT:      Head: Normocephalic and atraumatic.      Nose: Nose normal.      Mouth/Throat:      Pharynx: No oropharyngeal exudate.   Eyes:      General: No scleral icterus.        Right eye: No discharge.         Left eye: No discharge.      Conjunctiva/sclera: Conjunctivae normal.   Neck:      Thyroid: No thyromegaly.      Vascular: No JVD.   Cardiovascular:      Rate and Rhythm: Normal rate and regular rhythm.      Heart sounds:  Normal heart sounds. No murmur heard.     No friction rub. No gallop.   Pulmonary:      Effort: Pulmonary effort is normal. No respiratory distress.      Breath sounds: Normal breath sounds. No wheezing or rales.   Chest:      Chest wall: No tenderness.   Abdominal:      General: Bowel sounds are normal. There is no distension.      Palpations: Abdomen is soft.      Tenderness: There is no abdominal tenderness. There is no guarding or rebound.   Musculoskeletal:         General: No tenderness or deformity. Normal range of motion.      Cervical back: Normal range of motion and neck supple.   Skin:     General: Skin is warm and dry.      Findings: No erythema or rash.   Neurological:      Mental Status: She is alert. Mental status is at baseline.      Cranial Nerves: No cranial nerve deficit.      Sensory: No sensory deficit.      Motor: No abnormal muscle tone.      Coordination: Coordination normal.         Additional Data:     Labs:    Results from last 7 days   Lab Units 02/11/24  0651 02/08/24  0554 02/07/24  0441   WBC Thousand/uL 4.58   < > 4.55   HEMOGLOBIN g/dL 7.6*   < > 7.6*   HEMATOCRIT % 25.9*   < > 25.6*   PLATELETS Thousands/uL 353   < > 326   NEUTROS PCT %  --   --  90*   LYMPHS PCT %  --   --  7*   MONOS PCT %  --   --  1*   EOS PCT %  --   --  0    < > = values in this interval not displayed.     Results from last 7 days   Lab Units 02/11/24  0651 02/06/24  1734 02/05/24  0940   SODIUM mmol/L 139   < > 142   POTASSIUM mmol/L 4.4   < > 4.3   CHLORIDE mmol/L 103   < > 107   CO2 mmol/L 31   < > 29   BUN mg/dL 44*   < > 21   CREATININE mg/dL 1.28   < > 1.13*   ANION GAP mmol/L 5   < > 6   CALCIUM mg/dL 8.9   < > 8.9   ALBUMIN g/dL  --   --  3.9   TOTAL BILIRUBIN mg/dL  --   --  0.5   ALK PHOS U/L  --   --  65   ALT U/L  --   --  8   AST U/L  --   --  12   GLUCOSE RANDOM mg/dL 102   < > 105*    < > = values in this interval not displayed.     Results from last 7 days   Lab Units 02/11/24  0651   INR   3.01*     Results from last 7 days   Lab Units 02/11/24  0706 02/10/24  2130 02/10/24  1601 02/10/24  1051 02/10/24  0707 02/09/24  2110 02/09/24  1550 02/09/24  1053 02/09/24  0715 02/08/24  2111 02/08/24  1603 02/08/24  1059   POC GLUCOSE mg/dl 100 325* 227* 133 72 166* 214* 224* 196* 241* 224* 214*         Results from last 7 days   Lab Units 02/07/24  0441   PROCALCITONIN ng/ml 0.06           * I Have Reviewed All Lab Data Listed Above.  * Additional Pertinent Lab Tests Reviewed: All Labs Within Last 24 Hours Reviewed    Imaging:    Imaging Reports Reviewed Today Include: na  Imaging Personally Reviewed by Myself Includes:  na    Recent Cultures (last 7 days):           Last 24 Hours Medication List:   Current Facility-Administered Medications   Medication Dose Route Frequency Provider Last Rate    acetaminophen  650 mg Oral Q6H PRN Darwin Pfeiffer MD      albuterol  2 puff Inhalation Q6H PRN Darwin Pfeiffer MD      aspirin  81 mg Oral Daily Darwin Pfeiffer MD      atorvastatin  40 mg Oral QPM Darwin Pfeiffer MD      benzonatate  100 mg Oral TID PRN Darwin Pfeiffer MD      gabapentin  300 mg Oral TID Darwin Pfeiffer MD      insulin detemir  23 Units Subcutaneous HS Lilibeth Luz Maria Saraiya, DO      insulin lispro  1-5 Units Subcutaneous HS Darwin Pfeiffer MD      insulin lispro  1-6 Units Subcutaneous TID AC Darwin Pfeiffer MD      ipratropium  0.5 mg Nebulization BID Lilibeth Luz Maria Saraiya, DO      levalbuterol  1.25 mg Nebulization BID Lilibeth Luz Maria Saraiya, DO      levothyroxine  50 mcg Oral Daily Darwin Pfeiffer MD      methylPREDNISolone sodium succinate  40 mg Intravenous Q12H [facility] Darwin Pfeiffer MD      metoprolol tartrate  25 mg Oral Q12H [facility] Darwin Pfeiffer MD      pantoprazole  40 mg Intravenous Q24H [facility] Lilibeth Luz Maria Saraiya, DO      warfarin  5 mg Oral Daily (warfarin) Darwin Pfeiffer MD          Today, Patient Was Seen By: Darwin Pfeiffer MD    ** Please Note: Dictation voice to text software may have been used in the creation of this  document. **

## 2024-02-11 NOTE — RESPIRATORY THERAPY NOTE
RT Protocol Note  Ely Hernadez 83 y.o. female MRN: [id number]  Unit/Bed#: -01 Encounter: [id number]    Assessment    Principal Problem:    COPD with acute exacerbation (HCC)  Active Problems:    Coronary artery disease of native artery of native heart with stable angina pectoris (HCC)    Diabetes mellitus with neurological manifestation (HCC)    Hypothyroidism    Anemia    Stage 3a chronic kidney disease    Paroxysmal atrial fibrillation (HCC)    H/O mechanical aortic valve replacement    Acute respiratory failure with hypoxia (HCC)      Home Pulmonary Medications:     02/10/24 1950   Respiratory Protocol   Protocol Initiated? No   Protocol Selection Respiratory   Language Barrier? No   Medical & Social History Reviewed? Yes   Diagnostic Studies Reviewed? Yes   Physical Assessment Performed? Yes   Home Devices/Therapy Home O2   Airway Clearance Plan Incentive Spirometer   Respiratory Plan No distress/Pulmonary history   Respiratory Assessment   Assessment Type During-treatment   General Appearance Alert;Awake   Respiratory Pattern Normal   Chest Assessment Chest expansion symmetrical   Bilateral Breath Sounds Diminished   Location Specific No   Cough None   Resp Comments Will continue with current tx plan   O2 Device RA   Cough Description   Sputum Amount None   Additional Assessments   Pulse 59   Respirations 16   SpO2 96 %       Home Devices/Therapy: Home O2    Past Medical History:   Diagnosis Date    Anemia     Aneurysm of thoracic aorta (HCC)     Aortic valve disorder     Benign neoplasm of sigmoid colon     Cardiomyopathy (HCC)     last assessed: 10/10/2014    CHF (congestive heart failure) (HCC)     Chronic kidney disease     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HCC)     last assessed: 10/10/2014    Diabetic nephropathy (HCC)     RAMON (dyspnea on exertion)     GERD (gastroesophageal reflux disease)     History of emphysema (HCC)     Hyperlipidemia     TIA (transient ischemic attack)      Social History      Socioeconomic History    Marital status:      Spouse name: None    Number of children: None    Years of education: None    Highest education level: None   Occupational History    None   Tobacco Use    Smoking status: Former     Current packs/day: 0.00     Average packs/day: 1 pack/day for 52.9 years (52.9 ttl pk-yrs)     Types: Cigarettes     Start date:      Quit date: 2007     Years since quittin.2     Passive exposure: Past    Smokeless tobacco: Former     Quit date:    Vaping Use    Vaping status: Never Used   Substance and Sexual Activity    Alcohol use: Never    Drug use: Never    Sexual activity: Not Currently     Partners: Male   Other Topics Concern    None   Social History Narrative    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 BID Freestyle lancets BID    Living independently with spouse     Social Determinants of Health     Financial Resource Strain: Not on file   Food Insecurity: No Food Insecurity (2024)    Hunger Vital Sign     Worried About Running Out of Food in the Last Year: Never true     Ran Out of Food in the Last Year: Never true   Transportation Needs: No Transportation Needs (2024)    PRAPARE - Transportation     Lack of Transportation (Medical): No     Lack of Transportation (Non-Medical): No   Physical Activity: Inactive (2021)    Exercise Vital Sign     Days of Exercise per Week: 0 days     Minutes of Exercise per Session: 0 min   Stress: No Stress Concern Present (2021)    [employer] - Occupational Stress Questionnaire     Feeling of Stress : Not at all   Social Connections: Not on file   Intimate Partner Violence: Not on file   Housing Stability: Low Risk  (2024)    Housing Stability Vital Sign     Unable to Pay for Housing in the Last Year: No     Number of Places Lived in the Last Year: 1     Unstable Housing in the Last Year: No       Subjective    Subjective Data: awake and  "alert    Objective    Physical Exam:   Assessment Type: During-treatment  General Appearance: Alert, Awake  Respiratory Pattern: Normal  Chest Assessment: Chest expansion symmetrical  Bilateral Breath Sounds: Diminished  Location Specific: No  Cough: None  O2 Device: RA    Vitals:  Blood pressure 140/64, pulse 59, temperature 97.9 °F (36.6 °C), resp. rate 16, height 5' 6\" (1.676 m), weight 74.4 kg (164 lb), SpO2 96%.          Imaging and other studies: I have personally reviewed pertinent reports.      O2 Device: RA     Plan    Respiratory Plan: No distress/Pulmonary history  Airway Clearance Plan: Incentive Spirometer     Resp Comments: Will continue with current tx plan   "

## 2024-02-11 NOTE — ASSESSMENT & PLAN NOTE
Hold home p.o. agents  Levemir 26 units at bedtime  Continue sliding scale insulin  Monitor blood glucose

## 2024-02-11 NOTE — ASSESSMENT & PLAN NOTE
Creatinine currently at baseline at approximately 1.2., Cr 1.38 today  Euvolemic status post IV diuretics   Creatinine improved  Continue to monitor

## 2024-02-11 NOTE — ASSESSMENT & PLAN NOTE
Secondary to COPD exacerbation and volume overload  Currently at baseline   Baseline 2L Qhs only   Currently saturating adequately on room air now

## 2024-02-11 NOTE — CASE MANAGEMENT
Case Management Discharge Planning Note    Patient name Ely Martell /-01 MRN [id number]  : 1940 Date 2024       Current Admission Date: 2024  Current Admission Diagnosis:COPD with acute exacerbation (HCC)   Patient Active Problem List    Diagnosis Date Noted    Acute respiratory failure with hypoxia (HCC) 2024    Subtherapeutic international normalized ratio (INR) 2023    Acute anterior epistaxis 2023    Supratherapeutic INR 2023    Acute blood loss anemia 2023    H/O mechanical aortic valve replacement 2023    Leg cramps 2023    Stroke-like symptoms 10/28/2023    Hypertensive urgency 10/28/2023    Restrictive lung disease 2023    Nocturnal hypoxemia 2023    Herpes simplex labialis 2022    Neutropenia associated with infection ([facility]) 2022    Paroxysmal atrial fibrillation ([facility]) 2022    Primary osteoarthritis involving multiple joints 2021    Stage 3a chronic kidney disease 2020    Chronic kidney disease-mineral and bone disorder 2020    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    Cardiomyopathy ([facility]) 2020    Angioedema 2019    Other vascular disorders of intestine ([facility]) 2019    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    AVM (arteriovenous malformation) of small bowel, acquired with hemorrhage 2018    Anemia 2018    GERD (gastroesophageal reflux disease) 2018    Hyperlipidemia 2018    Chronic anticoagulation 2018    Hypertension, essential 2018    Iron deficiency 2017    Coronary artery disease of native artery of native heart with stable angina pectoris ([facility]) 2015    Hypothyroidism 2014    COPD with acute exacerbation (HCC) 2014    Diabetes mellitus with neurological manifestation ([facility]) 2014    Aortic valve replaced 2007      LOS (days): 5  Geometric Mean LOS (GMLOS) (days): 3.6  Days to GMLOS:-1.2      OBJECTIVE:  Risk of Unplanned Readmission Score: 31.5         Current admission status: Inpatient   Preferred Pharmacy:   [id number] - [facility] [place] - [address] [facility]  [address] [place] [address] [facility] [place] [address]  Phone: [phone] Fax: [phone]    [facility] - [place], [place] - [address]  [address]  [address]  [facility] [address]  Phone: [phone] Fax: [phone]    Primary Care Provider: Aviva Mccabe PA-C    Primary Insurance: [facility] REP  Secondary Insurance:     DISCHARGE DETAILS:    Discharge planning discussed with:: Patient  Freedom of Choice: Yes  Comments - Freedom of Choice: CM met with patient at discharge to discuss DC plan. Patient confirmed that she wishes to return home with [place] from [facility]. She reported that she has all the DME she needs and family support. Her son or daughter will provide transportation home. No additional CM needs anticipated at this time.  CM contacted family/caregiver?: No- see comments (Patient declined.)  Were Treatment Team discharge recommendations reviewed with patient/caregiver?: Yes  Did patient/caregiver verbalize understanding of patient care needs?: Yes  Were patient/caregiver advised of the risks associated with not following Treatment Team discharge recommendations?: Yes    IMM Given (Date):: 02/11/24  IMM Given to:: Patient (CM reviewed IMM with patient at bedside. She reported understanding of these rights and denied any questions or concerns at this time. Copy given. Original copy placed in medical records.)

## 2024-02-12 LAB
ANION GAP SERPL CALCULATED.3IONS-SCNC: 5 MMOL/L
BASOPHILS # BLD AUTO: 0 THOUSANDS/ÂΜL (ref 0–0.1)
BASOPHILS NFR BLD AUTO: 0 % (ref 0–1)
BUN SERPL-MCNC: 41 MG/DL (ref 5–25)
CALCIUM SERPL-MCNC: 9.4 MG/DL (ref 8.4–10.2)
CHLORIDE SERPL-SCNC: 103 MMOL/L (ref 96–108)
CO2 SERPL-SCNC: 29 MMOL/L (ref 21–32)
CREAT SERPL-MCNC: 1.26 MG/DL (ref 0.6–1.3)
EOSINOPHIL # BLD AUTO: 0 THOUSAND/ÂΜL (ref 0–0.61)
EOSINOPHIL NFR BLD AUTO: 0 % (ref 0–6)
ERYTHROCYTE [DISTWIDTH] IN BLOOD BY AUTOMATED COUNT: 17.3 % (ref 11.6–15.1)
GFR SERPL CREATININE-BSD FRML MDRD: 39 ML/MIN/1.73SQ M
GLUCOSE SERPL-MCNC: 169 MG/DL (ref 65–140)
GLUCOSE SERPL-MCNC: 179 MG/DL (ref 65–140)
GLUCOSE SERPL-MCNC: 186 MG/DL (ref 65–140)
GLUCOSE SERPL-MCNC: 202 MG/DL (ref 65–140)
GLUCOSE SERPL-MCNC: 240 MG/DL (ref 65–140)
HCT VFR BLD AUTO: 26.7 % (ref 34.8–46.1)
HGB BLD-MCNC: 7.9 G/DL (ref 11.5–15.4)
IMM GRANULOCYTES # BLD AUTO: 0.03 THOUSAND/UL (ref 0–0.2)
IMM GRANULOCYTES NFR BLD AUTO: 1 % (ref 0–2)
INR PPP: 3.03 (ref 0.84–1.19)
LYMPHOCYTES # BLD AUTO: 0.58 THOUSANDS/ÂΜL (ref 0.6–4.47)
LYMPHOCYTES NFR BLD AUTO: 15 % (ref 14–44)
MCH RBC QN AUTO: 22.8 PG (ref 26.8–34.3)
MCHC RBC AUTO-ENTMCNC: 29.6 G/DL (ref 31.4–37.4)
MCV RBC AUTO: 77 FL (ref 82–98)
MONOCYTES # BLD AUTO: 0.16 THOUSAND/ÂΜL (ref 0.17–1.22)
MONOCYTES NFR BLD AUTO: 4 % (ref 4–12)
NEUTROPHILS # BLD AUTO: 3.17 THOUSANDS/ÂΜL (ref 1.85–7.62)
NEUTS SEG NFR BLD AUTO: 80 % (ref 43–75)
NRBC BLD AUTO-RTO: 0 /100 WBCS
PLATELET # BLD AUTO: 395 THOUSANDS/UL (ref 149–390)
PMV BLD AUTO: 10.7 FL (ref 8.9–12.7)
POTASSIUM SERPL-SCNC: 4.7 MMOL/L (ref 3.5–5.3)
PROTHROMBIN TIME: 32.4 SECONDS (ref 11.6–14.5)
RBC # BLD AUTO: 3.46 MILLION/UL (ref 3.81–5.12)
SODIUM SERPL-SCNC: 137 MMOL/L (ref 135–147)
WBC # BLD AUTO: 3.94 THOUSAND/UL (ref 4.31–10.16)

## 2024-02-12 PROCEDURE — 94640 AIRWAY INHALATION TREATMENT: CPT

## 2024-02-12 PROCEDURE — 94760 N-INVAS EAR/PLS OXIMETRY 1: CPT

## 2024-02-12 PROCEDURE — 85025 COMPLETE CBC W/AUTO DIFF WBC: CPT | Performed by: INTERNAL MEDICINE

## 2024-02-12 PROCEDURE — 80048 BASIC METABOLIC PNL TOTAL CA: CPT | Performed by: INTERNAL MEDICINE

## 2024-02-12 PROCEDURE — 94664 DEMO&/EVAL PT USE INHALER: CPT

## 2024-02-12 PROCEDURE — 85610 PROTHROMBIN TIME: CPT | Performed by: INTERNAL MEDICINE

## 2024-02-12 PROCEDURE — 82948 REAGENT STRIP/BLOOD GLUCOSE: CPT

## 2024-02-12 PROCEDURE — C9113 INJ PANTOPRAZOLE SODIUM, VIA: HCPCS | Performed by: STUDENT IN AN ORGANIZED HEALTH CARE EDUCATION/TRAINING PROGRAM

## 2024-02-12 PROCEDURE — 99232 SBSQ HOSP IP/OBS MODERATE 35: CPT | Performed by: STUDENT IN AN ORGANIZED HEALTH CARE EDUCATION/TRAINING PROGRAM

## 2024-02-12 RX ORDER — FLUTICASONE FUROATE AND VILANTEROL 100; 25 UG/1; UG/1
1 POWDER RESPIRATORY (INHALATION) DAILY
Status: DISCONTINUED | OUTPATIENT
Start: 2024-02-12 | End: 2024-02-14 | Stop reason: HOSPADM

## 2024-02-12 RX ORDER — FUROSEMIDE 40 MG/1
40 TABLET ORAL DAILY
Status: DISCONTINUED | OUTPATIENT
Start: 2024-02-12 | End: 2024-02-14 | Stop reason: HOSPADM

## 2024-02-12 RX ADMIN — INSULIN LISPRO 1 UNITS: 100 INJECTION, SOLUTION INTRAVENOUS; SUBCUTANEOUS at 22:46

## 2024-02-12 RX ADMIN — ATORVASTATIN CALCIUM 40 MG: 40 TABLET, FILM COATED ORAL at 17:41

## 2024-02-12 RX ADMIN — FUROSEMIDE 40 MG: 40 TABLET ORAL at 10:01

## 2024-02-12 RX ADMIN — INSULIN LISPRO 1 UNITS: 100 INJECTION, SOLUTION INTRAVENOUS; SUBCUTANEOUS at 07:59

## 2024-02-12 RX ADMIN — IPRATROPIUM BROMIDE 0.5 MG: 0.5 SOLUTION RESPIRATORY (INHALATION) at 07:19

## 2024-02-12 RX ADMIN — GABAPENTIN 300 MG: 300 CAPSULE ORAL at 08:40

## 2024-02-12 RX ADMIN — LEVALBUTEROL HYDROCHLORIDE 1.25 MG: 1.25 SOLUTION RESPIRATORY (INHALATION) at 20:38

## 2024-02-12 RX ADMIN — GABAPENTIN 300 MG: 300 CAPSULE ORAL at 16:08

## 2024-02-12 RX ADMIN — METHYLPREDNISOLONE SODIUM SUCCINATE 40 MG: 40 INJECTION, POWDER, FOR SOLUTION INTRAMUSCULAR; INTRAVENOUS at 08:41

## 2024-02-12 RX ADMIN — WARFARIN SODIUM 5 MG: 5 TABLET ORAL at 17:41

## 2024-02-12 RX ADMIN — IPRATROPIUM BROMIDE 0.5 MG: 0.5 SOLUTION RESPIRATORY (INHALATION) at 20:38

## 2024-02-12 RX ADMIN — METHYLPREDNISOLONE SODIUM SUCCINATE 40 MG: 40 INJECTION, POWDER, FOR SOLUTION INTRAMUSCULAR; INTRAVENOUS at 21:49

## 2024-02-12 RX ADMIN — INSULIN LISPRO 1 UNITS: 100 INJECTION, SOLUTION INTRAVENOUS; SUBCUTANEOUS at 11:29

## 2024-02-12 RX ADMIN — METOPROLOL TARTRATE 25 MG: 25 TABLET, FILM COATED ORAL at 08:41

## 2024-02-12 RX ADMIN — PANTOPRAZOLE SODIUM 40 MG: 40 INJECTION, POWDER, FOR SOLUTION INTRAVENOUS at 08:41

## 2024-02-12 RX ADMIN — METOPROLOL TARTRATE 25 MG: 25 TABLET, FILM COATED ORAL at 21:49

## 2024-02-12 RX ADMIN — LEVOTHYROXINE SODIUM 50 MCG: 0.05 TABLET ORAL at 05:27

## 2024-02-12 RX ADMIN — LEVALBUTEROL HYDROCHLORIDE 1.25 MG: 1.25 SOLUTION RESPIRATORY (INHALATION) at 07:19

## 2024-02-12 RX ADMIN — GABAPENTIN 300 MG: 300 CAPSULE ORAL at 21:49

## 2024-02-12 RX ADMIN — INSULIN DETEMIR 23 UNITS: 100 INJECTION, SOLUTION SUBCUTANEOUS at 22:46

## 2024-02-12 RX ADMIN — INSULIN LISPRO 3 UNITS: 100 INJECTION, SOLUTION INTRAVENOUS; SUBCUTANEOUS at 16:08

## 2024-02-12 RX ADMIN — ASPIRIN 81 MG: 81 TABLET, COATED ORAL at 08:40

## 2024-02-12 NOTE — PROGRESS NOTES
[facility]  Progress Note  Name: Ely Hernadez I  MRN: [id number]  Unit/Bed#: -01 I Date of Admission: 2/6/2024   Date of Service: 2/12/2024 I Hospital Day: 6    Assessment/Plan   Acute respiratory failure with hypoxia (HCC)  Assessment & Plan  Secondary to COPD exacerbation and volume overload  Currently at baseline   Baseline 2L Qhs only   Currently saturating adequately on room air now    H/O mechanical aortic valve replacement  Assessment & Plan  Continue home Coumadin  INR therapeutic  Goal INR 2.5-3.5  Monitor INR daily    Paroxysmal atrial fibrillation (HCC)  Assessment & Plan  Currently rate controlled   continue beta-blocker.  Continue Coumadin            Stage 3a chronic kidney disease  Assessment & Plan  Creatinine currently at baseline at approximately 1.2., cr stable  Euvolemic status post IV diuretics   Creatinine improved  Continue to monitor      Cardiomyopathy (HCC)  Assessment & Plan  Hx of cardiomyopathy with EF 45%, G1DD. Pt reports drinking lots of water. Suspect in this cause of volume overload status intermittent.   Currently appears euvolemic after prn IV lasix.   Educated on fluid restriction, verbalized understanding  Continue with po lasix 40 mg daily   Nutrition consult  Continue outpatient cardiology follow up     Anemia  Assessment & Plan  urine manage [employer] Was recently admitted for a GI bleed no further GI bleeding  Hb stable around 8  No acute s/s of bleeding  Continue to monitor  Transfuse for hb goal >7    Hypothyroidism  Assessment & Plan  Continue Synthroid     Diabetes mellitus with neurological manifestation (HCC)  Assessment & Plan  Hold home p.o. agents  Levemir 26 units at bedtime  Continue sliding scale insulin  Monitor blood glucose    Coronary artery disease of native artery of native heart with stable angina pectoris (HCC)  Assessment & Plan  Continue aspirin, beta-blocker, statin       * COPD with acute exacerbation (HCC)  Assessment &  Plan  Presented with shortness of breath and wheezing noted to be saturating in the mid 80s on room air. Is on home o2 2L qhs. Weaned off oxygen, back to baseline now.   Consistent with COPD exacerbation  Continue with IV solumedrol bid  Can transition likely to oral steroids tomorrow and discharge             VTE Pharmacologic Prophylaxis: VTE Score: 6 High Risk (Score >/= 5) - Pharmacological DVT Prophylaxis Ordered: warfarin (Coumadin). Sequential Compression Devices Ordered.    Mobility:   Basic Mobility Inpatient Raw Score: 22  JH-HLM Goal: 7: Walk 25 feet or more  JH-HLM Achieved: 7: Walk 25 feet or more  HLM Goal achieved. Continue to encourage appropriate mobility.    Patient Centered Rounds: I performed bedside rounds with nursing staff today.   Discussions with Specialists or Other Care Team Provider: MANUELITO    Education and Discussions with Family / Patient: Patient declined call to .     Total Time Spent on Date of Encounter in care of patient: 50 mins. This time was spent on one or more of the following: performing physical exam; counseling and coordination of care; obtaining or reviewing history; documenting in the medical record; reviewing/ordering tests, medications or procedures; communicating with other healthcare professionals and discussing with patient's family/caregivers.    Current Length of Stay: 6 day(s)  Current Patient Status: Inpatient   Certification Statement: The patient will continue to require additional inpatient hospital stay due to IV steroids  Discharge Plan: Anticipate discharge in 24-48 hrs to home with home services.    Code Status: Level 1 - Full Code    Subjective:   Pt reports improvement in sob    Objective:     Vitals:   Temp (24hrs), Av.5 °F (36.4 °C), Min:97.4 °F (36.3 °C), Max:97.6 °F (36.4 °C)    Temp:  [97.4 °F (36.3 °C)-97.6 °F (36.4 °C)] 97.4 °F (36.3 °C)  HR:  [58-75] 62  Resp:  [16-18] 17  BP: (148-153)/(61-65) 152/65  SpO2:  [93 %-99 %] 99 %  Body  mass index is 26.47 kg/m².     Input and Output Summary (last 24 hours):     Intake/Output Summary (Last 24 hours) at 2/12/2024 1238  Last data filed at 2/12/2024 1231  Gross per 24 hour   Intake 240 ml   Output 2050 ml   Net -1810 ml       Physical Exam:   Physical Exam  Constitutional:       General: She is not in acute distress.     Appearance: She is well-developed. She is not diaphoretic.   HENT:      Head: Normocephalic and atraumatic.      Nose: Nose normal.      Mouth/Throat:      Pharynx: No oropharyngeal exudate.   Eyes:      General: No scleral icterus.        Right eye: No discharge.         Left eye: No discharge.      Conjunctiva/sclera: Conjunctivae normal.   Cardiovascular:      Rate and Rhythm: Normal rate and regular rhythm.      Heart sounds: Normal heart sounds. No murmur heard.     No friction rub. No gallop.   Pulmonary:      Effort: Pulmonary effort is normal. No respiratory distress.      Breath sounds: Normal breath sounds. No wheezing or rales.   Abdominal:      General: Bowel sounds are normal. There is no distension.      Palpations: Abdomen is soft.      Tenderness: There is no abdominal tenderness. There is no guarding.   Musculoskeletal:         General: Normal range of motion.      Cervical back: Normal range of motion and neck supple.   Skin:     General: Skin is warm and dry.      Findings: No erythema.   Neurological:      Mental Status: She is oriented to person, place, and time.   Psychiatric:         Behavior: Behavior normal.          Additional Data:     Labs:  Results from last 7 days   Lab Units 02/12/24  0534   WBC Thousand/uL 3.94*   HEMOGLOBIN g/dL 7.9*   HEMATOCRIT % 26.7*   PLATELETS Thousands/uL 395*   NEUTROS PCT % 80*   LYMPHS PCT % 15   MONOS PCT % 4   EOS PCT % 0     Results from last 7 days   Lab Units 02/12/24  0534   SODIUM mmol/L 137   POTASSIUM mmol/L 4.7   CHLORIDE mmol/L 103   CO2 mmol/L 29   BUN mg/dL 41*   CREATININE mg/dL 1.26   ANION GAP mmol/L 5    CALCIUM mg/dL 9.4   GLUCOSE RANDOM mg/dL 169*     Results from last 7 days   Lab Units 02/12/24  0534   INR  3.03*     Results from last 7 days   Lab Units 02/12/24  1115 02/12/24  0746 02/11/24  2058 02/11/24  1610 02/11/24  1106 02/11/24  0706 02/10/24  2130 02/10/24  1601 02/10/24  1051 02/10/24  0707 02/09/24  2110 02/09/24  1550   POC GLUCOSE mg/dl 179* 186* 203* 256* 152* 100 325* 227* 133 72 166* 214*         Results from last 7 days   Lab Units 02/07/24  0441   PROCALCITONIN ng/ml 0.06       Lines/Drains:  Invasive Devices       Peripheral Intravenous Line  Duration             Peripheral IV 02/11/24 Left;Ventral (anterior) Forearm 1 day                          Imaging: No pertinent imaging reviewed.    Recent Cultures (last 7 days):         Last 24 Hours Medication List:   Current Facility-Administered Medications   Medication Dose Route Frequency Provider Last Rate    acetaminophen  650 mg Oral Q6H PRN Darwin Pfeiffer MD      albuterol  2 puff Inhalation Q6H PRN Darwin Pfeiffer MD      aspirin  81 mg Oral Daily Darwin Pfeiffer MD      atorvastatin  40 mg Oral QPM Darwin Pfeiffer MD      benzonatate  100 mg Oral TID PRN Darwin Pfeiffer MD      Fluticasone Furoate-Vilanterol  1 puff Inhalation Daily Lilibeth Luz Maria Saraiya, DO      furosemide  40 mg Oral Daily Lilibeth [facility] Saraiya, DO      gabapentin  300 mg Oral TID Darwin Pfeiffer MD      insulin detemir  23 Units Subcutaneous HS Lilibeth [facility] Saraiya, DO      insulin lispro  1-5 Units Subcutaneous HS Darwin Pfeiffer MD      insulin lispro  1-6 Units Subcutaneous TID AC Darwin Pfeiffer MD      ipratropium  0.5 mg Nebulization BID Lilibeth Luz Maria Saraiya, DO      levalbuterol  1.25 mg Nebulization BID Lilibeth Luz Maria Saraiya, DO      levothyroxine  50 mcg Oral Daily Darwin Pfeiffer MD      methylPREDNISolone sodium succinate  40 mg Intravenous Q12H [facility] Darwin Pfeiffer MD      metoprolol tartrate  25 mg Oral Q12H [facility] Darwin Pfeiffer MD      pantoprazole  40 mg Intravenous Q24H LIBAN Lilibeth  Luz Maria Bolivar DO      warfarin  5 mg Oral Daily (warfarin) Darwin Pfeiffer MD          Today, Patient Was Seen By: Lilibeth Bolivar DO    **Please Note: This note may have been constructed using a voice recognition system.**

## 2024-02-12 NOTE — ASSESSMENT & PLAN NOTE
Hx of cardiomyopathy with EF 45%, G1DD. Pt reports drinking lots of water. Suspect in this cause of volume overload status intermittent.   Currently appears euvolemic after prn IV lasix.   Educated on fluid restriction, verbalized understanding  Continue with po lasix 40 mg daily   Nutrition consult  Continue outpatient cardiology follow up

## 2024-02-12 NOTE — CASE MANAGEMENT
Case Management Discharge Planning Note    Patient name Ely Martell /-01 MRN [id number]  : 1940 Date 2024       Current Admission Date: 2024  Current Admission Diagnosis:COPD with acute exacerbation (HCC)   Patient Active Problem List    Diagnosis Date Noted    Acute respiratory failure with hypoxia (HCC) 2024    Subtherapeutic international normalized ratio (INR) 2023    Acute anterior epistaxis 2023    Supratherapeutic INR 2023    Acute blood loss anemia 2023    H/O mechanical aortic valve replacement 2023    Leg cramps 2023    Stroke-like symptoms 10/28/2023    Hypertensive urgency 10/28/2023    Restrictive lung disease 2023    Nocturnal hypoxemia 2023    Herpes simplex labialis 2022    Neutropenia associated with infection ([facility]) 2022    Paroxysmal atrial fibrillation ([facility]) 2022    Primary osteoarthritis involving multiple joints 2021    Stage 3a chronic kidney disease 2020    Chronic kidney disease-mineral and bone disorder 2020    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    Cardiomyopathy ([facility]) 2020    Angioedema 2019    Other vascular disorders of intestine ([facility]) 2019    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    AVM (arteriovenous malformation) of small bowel, acquired with hemorrhage 2018    Anemia 2018    GERD (gastroesophageal reflux disease) 2018    Hyperlipidemia 2018    Chronic anticoagulation 2018    Hypertension, essential 2018    Iron deficiency 2017    Coronary artery disease of native artery of native heart with stable angina pectoris ([facility]) 2015    Hypothyroidism 2014    COPD with acute exacerbation (HCC) 2014    Diabetes mellitus with neurological manifestation ([facility]) 2014    Aortic valve replaced 2007      LOS (days): 6  Geometric Mean LOS (GMLOS) (days): 3.6  Days to GMLOS:-2.2      OBJECTIVE:  Risk of Unplanned Readmission Score: 32.15         Current admission status: Inpatient   Preferred Pharmacy:   [id number] - [facility] [place], [place] - [address] [place] [address] [facility] [place] [address]  Phone: [phone] Fax: [phone]    [facility] - [place], [place] - [address]  [address]  [address]  [facility] [address]  Phone: [phone] Fax: [phone]    Primary Care Provider: Aviva Mccabe PA-C    Primary Insurance: [facility] REP  Secondary Insurance:     DISCHARGE DETAILS:                                                                                        IMM Given (Date):: 02/12/24  IMM Given to:: Patient     Additional Comments: IMM and Medicare rights reviewed with patient at the bedside. Patient verbalized understanding and signed original. Copy given to patient, signed original filed to medical records.

## 2024-02-12 NOTE — ASSESSMENT & PLAN NOTE
urine manage [employer] Was recently admitted for a GI bleed no further GI bleeding  Hb stable around 8  No acute s/s of bleeding  Continue to monitor  Transfuse for hb goal >7

## 2024-02-12 NOTE — RESPIRATORY THERAPY NOTE
RT Protocol Note  Ely Hernadez 83 y.o. female MRN: [id number]  Unit/Bed#: -01 Encounter: [id number]    Assessment    Principal Problem:    COPD with acute exacerbation (HCC)  Active Problems:    Coronary artery disease of native artery of native heart with stable angina pectoris (HCC)    Diabetes mellitus with neurological manifestation (HCC)    Hypothyroidism    Anemia    Stage 3a chronic kidney disease    Paroxysmal atrial fibrillation (HCC)    H/O mechanical aortic valve replacement    Acute respiratory failure with hypoxia (HCC)      Home Pulmonary Medications:     02/11/24 2005   Respiratory Protocol   Protocol Initiated? No   Protocol Selection Respiratory   Language Barrier? No   Medical & Social History Reviewed? Yes   Diagnostic Studies Reviewed? Yes   Physical Assessment Performed? Yes   Home Devices/Therapy Home O2   Airway Clearance Plan Incentive Spirometer   Respiratory Plan No distress/Pulmonary history   Respiratory Assessment   Assessment Type During-treatment   General Appearance Alert;Awake   Respiratory Pattern Normal   Chest Assessment Chest expansion symmetrical   Bilateral Breath Sounds Diminished   Cough None   Resp Comments Will continue with current tx plan   O2 Device NC   Additional Assessments   Pulse 75   Respirations 18   SpO2 93 %       Home Devices/Therapy: Home O2    Past Medical History:   Diagnosis Date    Anemia     Aneurysm of thoracic aorta (HCC)     Aortic valve disorder     Benign neoplasm of sigmoid colon     Cardiomyopathy (HCC)     last assessed: 10/10/2014    CHF (congestive heart failure) (HCC)     Chronic kidney disease     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HCC)     last assessed: 10/10/2014    Diabetic nephropathy (HCC)     RAMON (dyspnea on exertion)     GERD (gastroesophageal reflux disease)     History of emphysema (HCC)     Hyperlipidemia     TIA (transient ischemic attack)      Social History     Socioeconomic History    Marital status:      Spouse  name: None    Number of children: None    Years of education: None    Highest education level: None   Occupational History    None   Tobacco Use    Smoking status: Former     Current packs/day: 0.00     Average packs/day: 1 pack/day for 52.9 years (52.9 ttl pk-yrs)     Types: Cigarettes     Start date:      Quit date: 2007     Years since quittin.2     Passive exposure: Past    Smokeless tobacco: Former     Quit date:    Vaping Use    Vaping status: Never Used   Substance and Sexual Activity    Alcohol use: Never    Drug use: Never    Sexual activity: Not Currently     Partners: Male   Other Topics Concern    None   Social History Narrative    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 BID Freestyle lancets BID    Living independently with spouse     Social Determinants of Health     Financial Resource Strain: Not on file   Food Insecurity: No Food Insecurity (2024)    Hunger Vital Sign     Worried About Running Out of Food in the Last Year: Never true     Ran Out of Food in the Last Year: Never true   Transportation Needs: No Transportation Needs (2024)    PRAPARE - Transportation     Lack of Transportation (Medical): No     Lack of Transportation (Non-Medical): No   Physical Activity: Inactive (2021)    Exercise Vital Sign     Days of Exercise per Week: 0 days     Minutes of Exercise per Session: 0 min   Stress: No Stress Concern Present (2021)    [employer] - Occupational Stress Questionnaire     Feeling of Stress : Not at all   Social Connections: Not on file   Intimate Partner Violence: Not on file   Housing Stability: Low Risk  (2024)    Housing Stability Vital Sign     Unable to Pay for Housing in the Last Year: No     Number of Places Lived in the Last Year: 1     Unstable Housing in the Last Year: No       Subjective    Subjective Data: awake and alert    Objective    Physical Exam:   Assessment Type:  "During-treatment  General Appearance: Alert, Awake  Respiratory Pattern: Normal  Chest Assessment: Chest expansion symmetrical  Bilateral Breath Sounds: Diminished  Cough: None  O2 Device: NC    Vitals:  Blood pressure 153/61, pulse 75, temperature 97.6 °F (36.4 °C), resp. rate 18, height 5' 6\" (1.676 m), weight 74.4 kg (164 lb), SpO2 93%.          Imaging and other studies: I have personally reviewed pertinent reports.      O2 Device: NC     Plan    Respiratory Plan: No distress/Pulmonary history  Airway Clearance Plan: Incentive Spirometer     Resp Comments: Will continue with current tx plan   "

## 2024-02-12 NOTE — RESPIRATORY THERAPY NOTE
RT Protocol Note  Ely Hernadez 83 y.o. female MRN: [id number]  Unit/Bed#: -01 Encounter: [id number]    Assessment    Principal Problem:    COPD with acute exacerbation (HCC)  Active Problems:    Coronary artery disease of native artery of native heart with stable angina pectoris (HCC)    Diabetes mellitus with neurological manifestation (HCC)    Hypothyroidism    Anemia    Stage 3a chronic kidney disease    Paroxysmal atrial fibrillation (HCC)    H/O mechanical aortic valve replacement    Acute respiratory failure with hypoxia (HCC)      Home Pulmonary Medications:  Inhalers/neb    Home Devices/Therapy: Home O2    Past Medical History:   Diagnosis Date    Anemia     Aneurysm of thoracic aorta (HCC)     Aortic valve disorder     Benign neoplasm of sigmoid colon     Cardiomyopathy (HCC)     last assessed: 10/10/2014    CHF (congestive heart failure) (HCC)     Chronic kidney disease     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HCC)     last assessed: 10/10/2014    Diabetic nephropathy (HCC)     RAMON (dyspnea on exertion)     GERD (gastroesophageal reflux disease)     History of emphysema (HCC)     Hyperlipidemia     TIA (transient ischemic attack)      Social History     Socioeconomic History    Marital status:      Spouse name: None    Number of children: None    Years of education: None    Highest education level: None   Occupational History    None   Tobacco Use    Smoking status: Former     Current packs/day: 0.00     Average packs/day: 1 pack/day for 52.9 years (52.9 ttl pk-yrs)     Types: Cigarettes     Start date:      Quit date: 2007     Years since quittin.2     Passive exposure: Past    Smokeless tobacco: Former     Quit date:    Vaping Use    Vaping status: Never Used   Substance and Sexual Activity    Alcohol use: Never    Drug use: Never    Sexual activity: Not Currently     Partners: Male   Other Topics Concern    None   Social History Narrative    Home durable medical equipment -  "Freestyle test strips BID Freestyle lancets BID    Living independently with spouse     Social Determinants of Health     Financial Resource Strain: Not on file   Food Insecurity: No Food Insecurity (2/8/2024)    Hunger Vital Sign     Worried About Running Out of Food in the Last Year: Never true     Ran Out of Food in the Last Year: Never true   Transportation Needs: No Transportation Needs (2/8/2024)    PRAPARE - Transportation     Lack of Transportation (Medical): No     Lack of Transportation (Non-Medical): No   Physical Activity: Inactive (5/26/2021)    Exercise Vital Sign     Days of Exercise per Week: 0 days     Minutes of Exercise per Session: 0 min   Stress: No Stress Concern Present (5/26/2021)    [employer] - Occupational Stress Questionnaire     Feeling of Stress : Not at all   Social Connections: Not on file   Intimate Partner Violence: Not on file   Housing Stability: Low Risk  (2/8/2024)    Housing Stability Vital Sign     Unable to Pay for Housing in the Last Year: No     Number of Places Lived in the Last Year: 1     Unstable Housing in the Last Year: No       Subjective    Subjective Data: awake and alert    Objective    Physical Exam:   Assessment Type: During-treatment  General Appearance: Awake, Alert  Respiratory Pattern: Normal  Chest Assessment: Chest expansion symmetrical  Bilateral Breath Sounds: Diminished, Clear  Cough: None  O2 Device: RA    Vitals:  Blood pressure 148/61, pulse 58, temperature 97.6 °F (36.4 °C), resp. rate 18, height 5' 6\" (1.676 m), weight 74.4 kg (164 lb), SpO2 95%.          Imaging and other studies: I have personally reviewed pertinent reports.      O2 Device: RA     Plan    Respiratory Plan: No distress/Pulmonary history      Resp Comments: Pt resting comfortably and in no apparent distress.  OFfers no complaints.  Will cont w/ current therapy.   "

## 2024-02-12 NOTE — PLAN OF CARE
Problem: Potential for Falls  Goal: Patient will remain free of falls  Description: INTERVENTIONS:  - Educate patient/family on patient safety including physical limitations  - Instruct patient to call for assistance with activity   - Consult OT/PT to assist with strengthening/mobility   - Keep Call bell within reach  - Keep bed low and locked with side rails adjusted as appropriate  - Keep care items and personal belongings within reach  - Initiate and maintain comfort rounds  - Make Fall Risk Sign visible to staff  - Obtain necessary fall risk management equipment:   - Apply yellow socks and bracelet for high fall risk patients  - Consider moving patient to room near nurses station  Outcome: Progressing     Problem: Knowledge Deficit  Goal: Patient/family/caregiver demonstrates understanding of disease process, treatment plan, medications, and discharge instructions  Description: Complete learning assessment and assess knowledge base.  Interventions:  - Provide teaching at level of understanding  - Provide teaching via preferred learning methods  Outcome: Progressing

## 2024-02-12 NOTE — ASSESSMENT & PLAN NOTE
Presented with shortness of breath and wheezing noted to be saturating in the mid 80s on room air. Is on home o2 2L qhs. Weaned off oxygen, back to baseline now.   Consistent with COPD exacerbation  Continue with IV solumedrol bid  Can transition likely to oral steroids tomorrow and discharge

## 2024-02-12 NOTE — CASE MANAGEMENT
Case Management Progress Note    Patient name Ely Hernadez  Location /-01 MRN [id number]  : 1940 Date 2024       LOS (days): 6  Geometric Mean LOS (GMLOS) (days): 3.6  Days to GMLOS:-2.1        OBJECTIVE:        Current admission status: Inpatient  Preferred Pharmacy:   [id number] [address] [address]  Phone: [phone] Fax: [phone]    [facility] - [place], [place] - [address]  [address] [facility] [address]  [address] [address]  Phone: [phone] Fax: [phone]    Primary Care Provider: Aviva Mccabe PA-C    Primary Insurance: [employer] [facility] REP  Secondary Insurance:     PROGRESS NOTE:  Discussed patient's case in interdisciplinary rounds this morning with SLIM provider. DC tomorrow. CM will continue to follow for needs.

## 2024-02-12 NOTE — ASSESSMENT & PLAN NOTE
Creatinine currently at baseline at approximately 1.2., cr stable  Euvolemic status post IV diuretics   Creatinine improved  Continue to monitor

## 2024-02-13 LAB
ANION GAP SERPL CALCULATED.3IONS-SCNC: 8 MMOL/L
BASOPHILS # BLD AUTO: 0 THOUSANDS/ÂΜL (ref 0–0.1)
BASOPHILS NFR BLD AUTO: 0 % (ref 0–1)
BUN SERPL-MCNC: 47 MG/DL (ref 5–25)
CALCIUM SERPL-MCNC: 9.4 MG/DL (ref 8.4–10.2)
CHLORIDE SERPL-SCNC: 100 MMOL/L (ref 96–108)
CO2 SERPL-SCNC: 28 MMOL/L (ref 21–32)
CREAT SERPL-MCNC: 1.41 MG/DL (ref 0.6–1.3)
EOSINOPHIL # BLD AUTO: 0 THOUSAND/ÂΜL (ref 0–0.61)
EOSINOPHIL NFR BLD AUTO: 0 % (ref 0–6)
ERYTHROCYTE [DISTWIDTH] IN BLOOD BY AUTOMATED COUNT: 17.4 % (ref 11.6–15.1)
GFR SERPL CREATININE-BSD FRML MDRD: 34 ML/MIN/1.73SQ M
GLUCOSE SERPL-MCNC: 186 MG/DL (ref 65–140)
GLUCOSE SERPL-MCNC: 207 MG/DL (ref 65–140)
GLUCOSE SERPL-MCNC: 241 MG/DL (ref 65–140)
GLUCOSE SERPL-MCNC: 269 MG/DL (ref 65–140)
GLUCOSE SERPL-MCNC: 287 MG/DL (ref 65–140)
HCT VFR BLD AUTO: 26.6 % (ref 34.8–46.1)
HGB BLD-MCNC: 7.9 G/DL (ref 11.5–15.4)
IMM GRANULOCYTES # BLD AUTO: 0.04 THOUSAND/UL (ref 0–0.2)
IMM GRANULOCYTES NFR BLD AUTO: 1 % (ref 0–2)
INR PPP: 2.92 (ref 0.84–1.19)
LYMPHOCYTES # BLD AUTO: 0.82 THOUSANDS/ÂΜL (ref 0.6–4.47)
LYMPHOCYTES NFR BLD AUTO: 15 % (ref 14–44)
MCH RBC QN AUTO: 22.8 PG (ref 26.8–34.3)
MCHC RBC AUTO-ENTMCNC: 29.7 G/DL (ref 31.4–37.4)
MCV RBC AUTO: 77 FL (ref 82–98)
MONOCYTES # BLD AUTO: 0.25 THOUSAND/ÂΜL (ref 0.17–1.22)
MONOCYTES NFR BLD AUTO: 4 % (ref 4–12)
NEUTROPHILS # BLD AUTO: 4.54 THOUSANDS/ÂΜL (ref 1.85–7.62)
NEUTS SEG NFR BLD AUTO: 80 % (ref 43–75)
NRBC BLD AUTO-RTO: 0 /100 WBCS
PLATELET # BLD AUTO: 448 THOUSANDS/UL (ref 149–390)
PMV BLD AUTO: 10.2 FL (ref 8.9–12.7)
POTASSIUM SERPL-SCNC: 4.7 MMOL/L (ref 3.5–5.3)
PROTHROMBIN TIME: 31.5 SECONDS (ref 11.6–14.5)
RBC # BLD AUTO: 3.47 MILLION/UL (ref 3.81–5.12)
SODIUM SERPL-SCNC: 136 MMOL/L (ref 135–147)
WBC # BLD AUTO: 5.65 THOUSAND/UL (ref 4.31–10.16)

## 2024-02-13 PROCEDURE — 82948 REAGENT STRIP/BLOOD GLUCOSE: CPT

## 2024-02-13 PROCEDURE — 99232 SBSQ HOSP IP/OBS MODERATE 35: CPT | Performed by: HOSPITALIST

## 2024-02-13 PROCEDURE — 80048 BASIC METABOLIC PNL TOTAL CA: CPT | Performed by: STUDENT IN AN ORGANIZED HEALTH CARE EDUCATION/TRAINING PROGRAM

## 2024-02-13 PROCEDURE — 94664 DEMO&/EVAL PT USE INHALER: CPT

## 2024-02-13 PROCEDURE — 85025 COMPLETE CBC W/AUTO DIFF WBC: CPT | Performed by: INTERNAL MEDICINE

## 2024-02-13 PROCEDURE — 94640 AIRWAY INHALATION TREATMENT: CPT

## 2024-02-13 PROCEDURE — 94760 N-INVAS EAR/PLS OXIMETRY 1: CPT

## 2024-02-13 PROCEDURE — C9113 INJ PANTOPRAZOLE SODIUM, VIA: HCPCS | Performed by: STUDENT IN AN ORGANIZED HEALTH CARE EDUCATION/TRAINING PROGRAM

## 2024-02-13 PROCEDURE — 85610 PROTHROMBIN TIME: CPT | Performed by: STUDENT IN AN ORGANIZED HEALTH CARE EDUCATION/TRAINING PROGRAM

## 2024-02-13 RX ORDER — INSULIN LISPRO 100 [IU]/ML
2 INJECTION, SOLUTION INTRAVENOUS; SUBCUTANEOUS
Status: DISCONTINUED | OUTPATIENT
Start: 2024-02-13 | End: 2024-02-14 | Stop reason: HOSPADM

## 2024-02-13 RX ADMIN — METOPROLOL TARTRATE 25 MG: 25 TABLET, FILM COATED ORAL at 21:22

## 2024-02-13 RX ADMIN — INSULIN LISPRO 2 UNITS: 100 INJECTION, SOLUTION INTRAVENOUS; SUBCUTANEOUS at 16:29

## 2024-02-13 RX ADMIN — LEVALBUTEROL HYDROCHLORIDE 1.25 MG: 1.25 SOLUTION RESPIRATORY (INHALATION) at 19:58

## 2024-02-13 RX ADMIN — IPRATROPIUM BROMIDE 0.5 MG: 0.5 SOLUTION RESPIRATORY (INHALATION) at 07:19

## 2024-02-13 RX ADMIN — WARFARIN SODIUM 5 MG: 5 TABLET ORAL at 17:33

## 2024-02-13 RX ADMIN — LEVALBUTEROL HYDROCHLORIDE 1.25 MG: 1.25 SOLUTION RESPIRATORY (INHALATION) at 07:19

## 2024-02-13 RX ADMIN — ATORVASTATIN CALCIUM 40 MG: 40 TABLET, FILM COATED ORAL at 17:33

## 2024-02-13 RX ADMIN — METOPROLOL TARTRATE 25 MG: 25 TABLET, FILM COATED ORAL at 08:10

## 2024-02-13 RX ADMIN — LEVOTHYROXINE SODIUM 50 MCG: 0.05 TABLET ORAL at 05:29

## 2024-02-13 RX ADMIN — INSULIN LISPRO 3 UNITS: 100 INJECTION, SOLUTION INTRAVENOUS; SUBCUTANEOUS at 08:06

## 2024-02-13 RX ADMIN — GABAPENTIN 300 MG: 300 CAPSULE ORAL at 16:28

## 2024-02-13 RX ADMIN — GABAPENTIN 300 MG: 300 CAPSULE ORAL at 21:22

## 2024-02-13 RX ADMIN — INSULIN LISPRO 2 UNITS: 100 INJECTION, SOLUTION INTRAVENOUS; SUBCUTANEOUS at 21:23

## 2024-02-13 RX ADMIN — PANTOPRAZOLE SODIUM 40 MG: 40 INJECTION, POWDER, FOR SOLUTION INTRAVENOUS at 08:10

## 2024-02-13 RX ADMIN — METHYLPREDNISOLONE SODIUM SUCCINATE 40 MG: 40 INJECTION, POWDER, FOR SOLUTION INTRAMUSCULAR; INTRAVENOUS at 21:22

## 2024-02-13 RX ADMIN — INSULIN LISPRO 4 UNITS: 100 INJECTION, SOLUTION INTRAVENOUS; SUBCUTANEOUS at 16:29

## 2024-02-13 RX ADMIN — IPRATROPIUM BROMIDE 0.5 MG: 0.5 SOLUTION RESPIRATORY (INHALATION) at 19:58

## 2024-02-13 RX ADMIN — METHYLPREDNISOLONE SODIUM SUCCINATE 40 MG: 40 INJECTION, POWDER, FOR SOLUTION INTRAMUSCULAR; INTRAVENOUS at 08:10

## 2024-02-13 RX ADMIN — FUROSEMIDE 40 MG: 40 TABLET ORAL at 08:10

## 2024-02-13 RX ADMIN — INSULIN LISPRO 2 UNITS: 100 INJECTION, SOLUTION INTRAVENOUS; SUBCUTANEOUS at 12:00

## 2024-02-13 RX ADMIN — INSULIN DETEMIR 23 UNITS: 100 INJECTION, SOLUTION SUBCUTANEOUS at 21:22

## 2024-02-13 RX ADMIN — ASPIRIN 81 MG: 81 TABLET, COATED ORAL at 08:10

## 2024-02-13 RX ADMIN — FLUTICASONE FUROATE AND VILANTEROL TRIFENATATE 1 PUFF: 100; 25 POWDER RESPIRATORY (INHALATION) at 08:14

## 2024-02-13 RX ADMIN — GABAPENTIN 300 MG: 300 CAPSULE ORAL at 08:10

## 2024-02-13 RX ADMIN — INSULIN LISPRO 2 UNITS: 100 INJECTION, SOLUTION INTRAVENOUS; SUBCUTANEOUS at 11:30

## 2024-02-13 NOTE — ASSESSMENT & PLAN NOTE
Hx of cardiomyopathy with EF 45%, G1DD. Pt reports drinking lots of water. Suspect in this cause of intermittent volume overload.  Currently appears euvolemic after prn IV lasix.   Educated on fluid restriction, verbalized understanding  Continue BB/lasix  Continue outpatient cardiology follow up

## 2024-02-13 NOTE — CASE MANAGEMENT
Case Management Progress Note    Patient name Ely Hernadez  Location [facility] MRN [id number]  : 1940 Date 2024       LOS (days): 7  Geometric Mean LOS (GMLOS) (days): 3.6  Days to GMLOS:-3.2        OBJECTIVE:        Current admission status: Inpatient  Preferred Pharmacy:   [id number] - [place], [place] - [address] [place] [id number]  Phone: [phone] Fax: [phone]    [facility] - [place], [place] - [address]  [address]  [address] [address]  Phone: [phone] Fax: [phone]    Primary Care Provider: Aviva Mccabe PA-C    Primary Insurance: [employer]  REP  Secondary Insurance:     PROGRESS NOTE:       Case Management Progress Note     Patient name Ely Hernadez  Location [facility]-Nick MRN [id number]  : 1940 Date 2024         LOS (days): 6  Geometric Mean LOS (GMLOS) (days): 3.6  Days to GMLOS:-2.1         OBJECTIVE:  Current admission status: Inpatient  Preferred Pharmacy:   [id number] - [place], [place] - [address] [place] [address]  Phone: [phone] Fax: [phone]     [facility] - [place], [place] - [address]  [address]  [address] [address]  Phone: [phone] Fax: [phone]     Primary Care Provider: Aviva Mccabe PA-C     [facility] Insurance: [facility]  Secondary Insurance:      PROGRESS NOTE:  Discussed patient's case in interdisciplinary rounds this morning with [facility] provider. DC tomorrow, due to weather. CM will continue to follow for needs.                cellulitis cellulitis

## 2024-02-13 NOTE — RESPIRATORY THERAPY NOTE
RT Protocol Note  Ely Hernadez 83 y.o. female MRN: [id number]  Unit/Bed#: -01 Encounter: [id number]    Assessment    Principal Problem:    COPD with acute exacerbation (HCC)  Active Problems:    Coronary artery disease of native artery of native heart with stable angina pectoris (HCC)    Diabetes mellitus with neurological manifestation (HCC)    Hypothyroidism    Anemia    Cardiomyopathy (HCC)    Stage 3a chronic kidney disease    Paroxysmal atrial fibrillation (HCC)    H/O mechanical aortic valve replacement    Acute respiratory failure with hypoxia (HCC)      Home Pulmonary Medications:     02/13/24 0900   Respiratory Protocol   Protocol Initiated? No   Protocol Selection Respiratory   Language Barrier? No   Medical & Social History Reviewed? Yes   Diagnostic Studies Reviewed? Yes   Physical Assessment Performed? Yes   Home Devices/Therapy Home O2   Respiratory Plan No distress/Pulmonary history   Respiratory Assessment   Resp Comments continue with current therapy   Additional Assessments   Pulse 60   SpO2 97 %       Home Devices/Therapy: Home O2    Past Medical History:   Diagnosis Date    Anemia     Aneurysm of thoracic aorta (HCC)     Aortic valve disorder     Benign neoplasm of sigmoid colon     Cardiomyopathy (HCC)     last assessed: 10/10/2014    CHF (congestive heart failure) (HCC)     Chronic kidney disease     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HCC)     last assessed: 10/10/2014    Diabetic nephropathy (HCC)     RAMON (dyspnea on exertion)     GERD (gastroesophageal reflux disease)     History of emphysema (HCC)     Hyperlipidemia     TIA (transient ischemic attack)      Social History     Socioeconomic History    Marital status:      Spouse name: None    Number of children: None    Years of education: None    Highest education level: None   Occupational History    None   Tobacco Use    Smoking status: Former     Current packs/day: 0.00     Average packs/day: 1 pack/day for 52.9 years (52.9  "ttl pk-yrs)     Types: Cigarettes     Start date:      Quit date: 2007     Years since quittin.2     Passive exposure: Past    Smokeless tobacco: Former     Quit date:    Vaping Use    Vaping status: Never Used   Substance and Sexual Activity    Alcohol use: Never    Drug use: Never    Sexual activity: Not Currently     Partners: Male   Other Topics Concern    None   Social History Narrative    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 BID Freestyle lancets BID    Living independently with spouse     Social Determinants of Health     Financial Resource Strain: Not on file   Food Insecurity: No Food Insecurity (2024)    Hunger Vital Sign     Worried About Running Out of Food in the Last Year: Never true     Ran Out of Food in the Last Year: Never true   Transportation Needs: No Transportation Needs (2024)    PRAPARE - Transportation     Lack of Transportation (Medical): No     Lack of Transportation (Non-Medical): No   Physical Activity: Inactive (2021)    Exercise Vital Sign     Days of Exercise per Week: 0 days     Minutes of Exercise per Session: 0 min   Stress: No Stress Concern Present (2021)    [employer] - Occupational Stress Questionnaire     Feeling of Stress : Not at all   Social Connections: Not on file   Intimate Partner Violence: Not on file   Housing Stability: Low Risk  (2024)    Housing Stability Vital Sign     Unable to Pay for Housing in the Last Year: No     Number of Places Lived in the Last Year: 1     Unstable Housing in the Last Year: No       Subjective    Subjective Data: awake and alert    Objective    Physical Exam:        Vitals:  Blood pressure 133/53, pulse 60, temperature 97.6 °F (36.4 °C), resp. rate 17, height 5' 6\" (1.676 m), weight 74.4 kg (164 lb), SpO2 97%.          Imaging and other studies: I have personally reviewed pertinent reports.      O2 Device: RA     Plan    Respiratory Plan: No " distress/Pulmonary history  Airway Clearance Plan: Incentive Spirometer     Resp Comments: continue with current therapy

## 2024-02-13 NOTE — PLAN OF CARE
Problem: METABOLIC, FLUID AND ELECTROLYTES - ADULT  Goal: Fluid balance maintained  Description: INTERVENTIONS:  - Monitor labs   - Monitor I/O and WT  - Instruct patient on fluid and nutrition as appropriate  - Assess for signs & symptoms of volume excess or deficit  Outcome: Progressing

## 2024-02-13 NOTE — ASSESSMENT & PLAN NOTE
-Cr now 1.41. Suspect that baseline creatinine is going to be 1.2-1.4  Euvolemic status post IV diuretics

## 2024-02-13 NOTE — ASSESSMENT & PLAN NOTE
-was recently admitted for GI bleed, no further GI bleeding  -No acute s/s of bleeding  -trend Hb  -Transfuse for Hb goal >7

## 2024-02-13 NOTE — PROGRESS NOTES
[facility]  Progress Note  Name: Ely Hernadez I  MRN: [id number]  Unit/Bed#: -01 I Date of Admission: 2/6/2024   Date of Service: 2/13/2024 I Hospital Day: 7    Assessment/Plan   Acute respiratory failure with hypoxia (HCC)  Assessment & Plan  Secondary to COPD exacerbation and volume overload  Currently at baseline   Baseline 2L Qhs only   Currently saturating adequately on room air now    H/O mechanical aortic valve replacement  Assessment & Plan  -Continue Coumadin, Goal INR 2.5-3.5      Paroxysmal atrial fibrillation (HCC)  Assessment & Plan  -cont BB/Coumadin            Stage 3a chronic kidney disease  Assessment & Plan  -Cr now 1.41. Suspect that baseline creatinine is going to be 1.2-1.4  Euvolemic status post IV diuretics         Cardiomyopathy (HCC)  Assessment & Plan  Hx of cardiomyopathy with EF 45%, G1DD. Pt reports drinking lots of water. Suspect in this cause of intermittent volume overload.  Currently appears euvolemic after prn IV lasix.   Educated on fluid restriction, verbalized understanding  Continue BB/lasix  Continue outpatient cardiology follow up     Anemia  Assessment & Plan  -was recently admitted for GI bleed, no further GI bleeding  -No acute s/s of bleeding  -trend Hb  -Transfuse for Hb goal >7    Hypothyroidism  Assessment & Plan  Continue Synthroid     Diabetes mellitus with neurological manifestation (HCC)  Assessment & Plan  -cont Levemir  -start 2U premeal Lispro  -SSI/accuchecks    Coronary artery disease of native artery of native heart with stable angina pectoris (HCC)  Assessment & Plan  Continue aspirin, beta-blocker, statin       * COPD with acute exacerbation (HCC)  Assessment & Plan  -Presented with shortness of breath and wheezing noted to be saturating in the mid 80s on room air.   -on home O2 2L HS  -Weaned off oxygen, back to baseline now.   -was on IV Solu-Medrol.  Was transitioned to prednisone but did not tolerate and was placed back  on IV Solu-Medrol.               VTE Pharmacologic Prophylaxis: Coumadin with therapeutic INR    Mobility:   Basic Mobility Inpatient Raw Score: 22  JH-HLM Goal: 7: Walk 25 feet or more  JH-HLM Achieved: 7: Walk 25 feet or more  HLM Goal achieved. Continue to encourage appropriate mobility.    Patient Centered Rounds: I performed bedside rounds with nursing staff today.     Total Time Spent on Date of Encounter in care of patient: 35 mins. This time was spent on one or more of the following: performing physical exam; counseling and coordination of care; obtaining or reviewing history; documenting in the medical record; reviewing/ordering tests, medications or procedures; communicating with other healthcare professionals and discussing with patient's family/caregivers.    Current Length of Stay: 7 day(s)  Current Patient Status: Inpatient   Certification Statement: The patient will continue to require additional inpatient hospital stay due to COPD exacerbation  Discharge Plan: Anticipate discharge in 24-48 hrs to home.    Code Status: Level 1 - Full Code    Subjective:   Currently denies shortness of breath.    Objective:     Vitals:   Temp (24hrs), Av.6 °F (36.4 °C), Min:97.6 °F (36.4 °C), Max:97.6 °F (36.4 °C)    Temp:  [97.6 °F (36.4 °C)] 97.6 °F (36.4 °C)  HR:  [60-61] 60  BP: (133-157)/(53-94) 133/53  SpO2:  [91 %-98 %] 95 %  Body mass index is 26.47 kg/m².     Input and Output Summary (last 24 hours):     Intake/Output Summary (Last 24 hours) at 2024 1100  Last data filed at 2024 0718  Gross per 24 hour   Intake 240 ml   Output 4200 ml   Net -3960 ml       Physical Exam:   Gen: NAD, Awake and alert, well developed, well nourished  Eyes: EOMI, PERRLA, no scleral icterus  ENMT:  no nasal discharge, no otic discharge, moist mucous membranes  Neck:  Supple  Cardiovascular:  Regular rate and rhythm, normal S1-S2, no murmurs, rubs, or gallops  Lungs:  Clear to auscultation bilaterally, no wheezes, or  rales, or rhonchi  Abdomen:  Positive bowel sounds, soft, nontender, nondistended,   Skin:  Intact, no obvious lesions or rashes, no edema  Neuro: Cranial nerves 2-12 are intact, non-focal, moves all 4 extremities        Additional Data:     Labs:  Results from last 7 days   Lab Units 02/13/24  0615   WBC Thousand/uL 5.65   HEMOGLOBIN g/dL 7.9*   HEMATOCRIT % 26.6*   PLATELETS Thousands/uL 448*   NEUTROS PCT % 80*   LYMPHS PCT % 15   MONOS PCT % 4   EOS PCT % 0     Results from last 7 days   Lab Units 02/13/24  0615   SODIUM mmol/L 136   POTASSIUM mmol/L 4.7   CHLORIDE mmol/L 100   CO2 mmol/L 28   BUN mg/dL 47*   CREATININE mg/dL 1.41*   ANION GAP mmol/L 8   CALCIUM mg/dL 9.4   GLUCOSE RANDOM mg/dL 186*     Results from last 7 days   Lab Units 02/13/24  0615   INR  2.92*     Results from last 7 days   Lab Units 02/13/24  0712 02/12/24  2143 02/12/24  1558 02/12/24  1115 02/12/24  0746 02/11/24  2058 02/11/24  1610 02/11/24  1106 02/11/24  0706 02/10/24  2130 02/10/24  1601 02/10/24  1051   POC GLUCOSE mg/dl 269* 202* 240* 179* 186* 203* 256* 152* 100 325* 227* 133         Results from last 7 days   Lab Units 02/07/24  0441   PROCALCITONIN ng/ml 0.06       Lines/Drains:  Invasive Devices       Peripheral Intravenous Line  Duration             Peripheral IV 02/13/24 Right;Ventral (anterior) Forearm <1 day                    Recent Cultures (last 7 days):         Last 24 Hours Medication List:   Current Facility-Administered Medications   Medication Dose Route Frequency Provider Last Rate    acetaminophen  650 mg Oral Q6H PRN Darwin Pfeiffer MD      albuterol  2 puff Inhalation Q6H PRN Darwin Pfeiffer MD      aspirin  81 mg Oral Daily Darwin Pfeiffer MD      atorvastatin  40 mg Oral QPM Darwin Pfeiffer MD      benzonatate  100 mg Oral TID PRN Darwin Pfeiffer MD      Fluticasone Furoate-Vilanterol  1 puff Inhalation Daily Lilibeth Luz Maria Katt,       furosemide  40 mg Oral Daily Lilibeth Luz Maria Saraiya, DO      gabapentin  300 mg  Oral TID Darwin Pfeiffer MD      insulin detemir  23 Units Subcutaneous HS Lilibeth Luz Mariafilemon Bolivar, DO      insulin lispro  1-5 Units Subcutaneous HS Darwin Pfeiffer MD      insulin lispro  1-6 Units Subcutaneous TID AC Darwin Pfeiffer MD      insulin lispro  2 Units Subcutaneous TID With Meals Naveed Allred MD      ipratropium  0.5 mg Nebulization BID [facility] Candeya, DO      levalbuterol  1.25 mg Nebulization BID [facility] Candeya, DO      levothyroxine  50 mcg Oral Daily Darwin fPeiffer MD      methylPREDNISolone sodium succinate  40 mg Intravenous Q12H [facility] Darwin Pfeiffer MD      metoprolol tartrate  25 mg Oral Q12H [facility] Darwin Pfeiffer MD      pantoprazole  40 mg Intravenous Q24H [facility] Lu[facility] Mariafilemon Bolivar, DO      warfarin  5 mg Oral Daily (warfarin) Darwin Pfeiffer MD          Today, Patient Was Seen By: Naveed Allred MD    **Please Note: This note may have been constructed using a voice recognition system.**

## 2024-02-13 NOTE — ASSESSMENT & PLAN NOTE
-Presented with shortness of breath and wheezing noted to be saturating in the mid 80s on room air.   -on home O2 2L HS  -Weaned off oxygen, back to baseline now.   -was on IV Solu-Medrol.  Was transitioned to prednisone but did not tolerate and was placed back on IV Solu-Medrol.

## 2024-02-14 VITALS
TEMPERATURE: 97.6 F | RESPIRATION RATE: 16 BRPM | OXYGEN SATURATION: 98 % | SYSTOLIC BLOOD PRESSURE: 153 MMHG | WEIGHT: 164 LBS | DIASTOLIC BLOOD PRESSURE: 63 MMHG | BODY MASS INDEX: 26.36 KG/M2 | HEART RATE: 60 BPM | HEIGHT: 66 IN

## 2024-02-14 LAB
BASOPHILS # BLD AUTO: 0 THOUSANDS/ÂΜL (ref 0–0.1)
BASOPHILS NFR BLD AUTO: 0 % (ref 0–1)
EOSINOPHIL # BLD AUTO: 0 THOUSAND/ÂΜL (ref 0–0.61)
EOSINOPHIL NFR BLD AUTO: 0 % (ref 0–6)
ERYTHROCYTE [DISTWIDTH] IN BLOOD BY AUTOMATED COUNT: 17.4 % (ref 11.6–15.1)
GLUCOSE SERPL-MCNC: 162 MG/DL (ref 65–140)
GLUCOSE SERPL-MCNC: 180 MG/DL (ref 65–140)
HCT VFR BLD AUTO: 27.1 % (ref 34.8–46.1)
HGB BLD-MCNC: 8 G/DL (ref 11.5–15.4)
IMM GRANULOCYTES # BLD AUTO: 0.07 THOUSAND/UL (ref 0–0.2)
IMM GRANULOCYTES NFR BLD AUTO: 1 % (ref 0–2)
LYMPHOCYTES # BLD AUTO: 0.97 THOUSANDS/ÂΜL (ref 0.6–4.47)
LYMPHOCYTES NFR BLD AUTO: 15 % (ref 14–44)
MCH RBC QN AUTO: 22.8 PG (ref 26.8–34.3)
MCHC RBC AUTO-ENTMCNC: 29.5 G/DL (ref 31.4–37.4)
MCV RBC AUTO: 77 FL (ref 82–98)
MONOCYTES # BLD AUTO: 0.52 THOUSAND/ÂΜL (ref 0.17–1.22)
MONOCYTES NFR BLD AUTO: 8 % (ref 4–12)
NEUTROPHILS # BLD AUTO: 4.98 THOUSANDS/ÂΜL (ref 1.85–7.62)
NEUTS SEG NFR BLD AUTO: 76 % (ref 43–75)
NRBC BLD AUTO-RTO: 0 /100 WBCS
PLATELET # BLD AUTO: 445 THOUSANDS/UL (ref 149–390)
PMV BLD AUTO: 10.2 FL (ref 8.9–12.7)
RBC # BLD AUTO: 3.51 MILLION/UL (ref 3.81–5.12)
WBC # BLD AUTO: 6.54 THOUSAND/UL (ref 4.31–10.16)

## 2024-02-14 PROCEDURE — 85025 COMPLETE CBC W/AUTO DIFF WBC: CPT | Performed by: INTERNAL MEDICINE

## 2024-02-14 PROCEDURE — C9113 INJ PANTOPRAZOLE SODIUM, VIA: HCPCS | Performed by: STUDENT IN AN ORGANIZED HEALTH CARE EDUCATION/TRAINING PROGRAM

## 2024-02-14 PROCEDURE — 99239 HOSP IP/OBS DSCHRG MGMT >30: CPT | Performed by: HOSPITALIST

## 2024-02-14 PROCEDURE — 82948 REAGENT STRIP/BLOOD GLUCOSE: CPT

## 2024-02-14 RX ORDER — PREDNISONE 10 MG/1
10 TABLET ORAL DAILY
Qty: 30 TABLET | Refills: 0 | Status: SHIPPED | OUTPATIENT
Start: 2024-02-14

## 2024-02-14 RX ORDER — PANTOPRAZOLE SODIUM 40 MG/1
40 TABLET, DELAYED RELEASE ORAL 2 TIMES DAILY
Qty: 60 TABLET | Refills: 0 | Status: SHIPPED | OUTPATIENT
Start: 2024-02-14 | End: 2024-03-15

## 2024-02-14 RX ORDER — BUDESONIDE AND FORMOTEROL FUMARATE DIHYDRATE 160; 4.5 UG/1; UG/1
2 AEROSOL RESPIRATORY (INHALATION) 2 TIMES DAILY
Qty: 10.2 G | Refills: 0 | Status: SHIPPED | OUTPATIENT
Start: 2024-02-14

## 2024-02-14 RX ADMIN — INSULIN LISPRO 1 UNITS: 100 INJECTION, SOLUTION INTRAVENOUS; SUBCUTANEOUS at 08:00

## 2024-02-14 RX ADMIN — INSULIN LISPRO 1 UNITS: 100 INJECTION, SOLUTION INTRAVENOUS; SUBCUTANEOUS at 11:50

## 2024-02-14 RX ADMIN — FUROSEMIDE 40 MG: 40 TABLET ORAL at 08:53

## 2024-02-14 RX ADMIN — FLUTICASONE FUROATE AND VILANTEROL TRIFENATATE 1 PUFF: 100; 25 POWDER RESPIRATORY (INHALATION) at 08:43

## 2024-02-14 RX ADMIN — PANTOPRAZOLE SODIUM 40 MG: 40 INJECTION, POWDER, FOR SOLUTION INTRAVENOUS at 08:54

## 2024-02-14 RX ADMIN — ASPIRIN 81 MG: 81 TABLET, COATED ORAL at 08:53

## 2024-02-14 RX ADMIN — METHYLPREDNISOLONE SODIUM SUCCINATE 40 MG: 40 INJECTION, POWDER, FOR SOLUTION INTRAMUSCULAR; INTRAVENOUS at 08:54

## 2024-02-14 RX ADMIN — LEVOTHYROXINE SODIUM 50 MCG: 0.05 TABLET ORAL at 05:53

## 2024-02-14 RX ADMIN — METOPROLOL TARTRATE 25 MG: 25 TABLET, FILM COATED ORAL at 08:53

## 2024-02-14 RX ADMIN — INSULIN LISPRO 2 UNITS: 100 INJECTION, SOLUTION INTRAVENOUS; SUBCUTANEOUS at 11:51

## 2024-02-14 RX ADMIN — GABAPENTIN 300 MG: 300 CAPSULE ORAL at 08:53

## 2024-02-14 RX ADMIN — INSULIN LISPRO 2 UNITS: 100 INJECTION, SOLUTION INTRAVENOUS; SUBCUTANEOUS at 08:00

## 2024-02-14 NOTE — CASE MANAGEMENT
Case Management Progress Note    Patient name Ely Hernadez  Location /-01 MRN [id number]  : 1940 Date 2024       LOS (days): 8  Geometric Mean LOS (GMLOS) (days): 3.6  Days to GMLOS:-4.1        OBJECTIVE:        Current admission status: Inpatient  Preferred Pharmacy:   [id number] - [address] [address]  Phone: [phone] Fax: [phone]    [facility] - [place], [place] - [address]  [address] [address]  Phone: [phone] Fax: [phone]    Primary Care Provider: Aviva Mccabe PA-C    Primary Insurance: [employer] REP  Secondary Insurance:     PROGRESS NOTE:  Patient to be discharged to home today with [facility]. Daughter or son will take patient home.

## 2024-02-14 NOTE — RESPIRATORY THERAPY NOTE
RT Protocol Note  Ely Hernadez 83 y.o. female MRN: [id number]  Unit/Bed#: -01 Encounter: [id number]    Assessment    Principal Problem:    COPD with acute exacerbation (HCC)  Active Problems:    Coronary artery disease of native artery of native heart with stable angina pectoris (HCC)    Diabetes mellitus with neurological manifestation (HCC)    Hypothyroidism    Anemia    Cardiomyopathy (HCC)    Stage 3a chronic kidney disease    Paroxysmal atrial fibrillation (HCC)    H/O mechanical aortic valve replacement    Acute respiratory failure with hypoxia (HCC)      Home Pulmonary Medications:     02/13/24 1958   Respiratory Protocol   Protocol Initiated? No   Protocol Selection Respiratory   Language Barrier? No   Medical & Social History Reviewed? Yes   Diagnostic Studies Reviewed? Yes   Physical Assessment Performed? Yes   Home Devices/Therapy Home O2   Airway Clearance Plan Incentive Spirometer   Respiratory Plan No distress/Pulmonary history   Respiratory Assessment   Assessment Type During-treatment   General Appearance Alert;Awake   Respiratory Pattern Normal   Chest Assessment Chest expansion symmetrical   Bilateral Breath Sounds Diminished   Cough None   Resp Comments Will continue with tx plan   O2 Device RA   Cough Description   Sputum Amount None   Additional Assessments   Pulse 60   Respirations 16   SpO2 97 %       Home Devices/Therapy: Home O2    Past Medical History:   Diagnosis Date    Anemia     Aneurysm of thoracic aorta (HCC)     Aortic valve disorder     Benign neoplasm of sigmoid colon     Cardiomyopathy (HCC)     last assessed: 10/10/2014    CHF (congestive heart failure) (HCC)     Chronic kidney disease     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HCC)     last assessed: 10/10/2014    Diabetic nephropathy (HCC)     RAMON (dyspnea on exertion)     GERD (gastroesophageal reflux disease)     History of emphysema (HCC)     Hyperlipidemia     TIA (transient ischemic attack)      Social History      Socioeconomic History    Marital status:      Spouse name: None    Number of children: None    Years of education: None    Highest education level: None   Occupational History    None   Tobacco Use    Smoking status: Former     Current packs/day: 0.00     Average packs/day: 1 pack/day for 52.9 years (52.9 ttl pk-yrs)     Types: Cigarettes     Start date:      Quit date: 2007     Years since quittin.2     Passive exposure: Past    Smokeless tobacco: Former     Quit date:    Vaping Use    Vaping status: Never Used   Substance and Sexual Activity    Alcohol use: Never    Drug use: Never    Sexual activity: Not Currently     Partners: Male   Other Topics Concern    None   Social History Narrative    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 BID Freestyle lancets BID    Living independently with spouse     Social Determinants of Health     Financial Resource Strain: Not on file   Food Insecurity: No Food Insecurity (2024)    Hunger Vital Sign     Worried About Running Out of Food in the Last Year: Never true     Ran Out of Food in the Last Year: Never true   Transportation Needs: No Transportation Needs (2024)    PRAPARE - Transportation     Lack of Transportation (Medical): No     Lack of Transportation (Non-Medical): No   Physical Activity: Inactive (2021)    Exercise Vital Sign     Days of Exercise per Week: 0 days     Minutes of Exercise per Session: 0 min   Stress: No Stress Concern Present (2021)    [employer] - Occupational Stress Questionnaire     Feeling of Stress : Not at all   Social Connections: Not on file   Intimate Partner Violence: Not on file   Housing Stability: Low Risk  (2024)    Housing Stability Vital Sign     Unable to Pay for Housing in the Last Year: No     Number of Places Lived in the Last Year: 1     Unstable Housing in the Last Year: No       Subjective    Subjective Data: awake and  "alert    Objective    Physical Exam:   Assessment Type: During-treatment  General Appearance: Alert, Awake  Respiratory Pattern: Normal  Chest Assessment: Chest expansion symmetrical  Bilateral Breath Sounds: Diminished  Cough: None  O2 Device: RA    Vitals:  Blood pressure 154/63, pulse 60, temperature 97.6 °F (36.4 °C), resp. rate 16, height 5' 6\" (1.676 m), weight 74.4 kg (164 lb), SpO2 97%.          Imaging and other studies: I have personally reviewed pertinent reports.      O2 Device: RA     Plan    Respiratory Plan: No distress/Pulmonary history  Airway Clearance Plan: Incentive Spirometer     Resp Comments: Will continue with tx plan   "

## 2024-02-14 NOTE — PLAN OF CARE
Problem: Potential for Falls  Goal: Patient will remain free of falls  Description: INTERVENTIONS:  - Educate patient/family on patient safety including physical limitations  - Instruct patient to call for assistance with activity   - Consult OT/PT to assist with strengthening/mobility   - Keep Call bell within reach  - Keep bed low and locked with side rails adjusted as appropriate  - Keep care items and personal belongings within reach  - Initiate and maintain comfort rounds  - Make Fall Risk Sign visible to staff  - Apply yellow socks and bracelet for high fall risk patients  - Consider moving patient to room near nurses station  Outcome: Progressing     Problem: RESPIRATORY - ADULT  Goal: Achieves optimal ventilation and oxygenation  Description: INTERVENTIONS:  - Assess for changes in respiratory status  - Assess for changes in mentation and behavior  - Position to facilitate oxygenation and minimize respiratory effort  - Oxygen administered by appropriate delivery if ordered  - Initiate smoking cessation education as indicated  - Encourage broncho-pulmonary hygiene including cough, deep breathe, Incentive Spirometry  - Assess the need for suctioning and aspirate as needed  - Assess and instruct to report SOB or any respiratory difficulty  - Respiratory Therapy support as indicated  Outcome: Progressing     Problem: METABOLIC, FLUID AND ELECTROLYTES - ADULT  Goal: Fluid balance maintained  Description: INTERVENTIONS:  - Monitor labs   - Monitor I/O and WT  - Instruct patient on fluid and nutrition as appropriate  - Assess for signs & symptoms of volume excess or deficit  Outcome: Progressing     Problem: HEMATOLOGIC - ADULT  Goal: Maintains hematologic stability  Description: INTERVENTIONS  - Assess for signs and symptoms of bleeding or hemorrhage  - Monitor labs  - Administer supportive blood products/factors as ordered and appropriate  Outcome: Progressing     Problem: Knowledge Deficit  Goal:  Patient/family/caregiver demonstrates understanding of disease process, treatment plan, medications, and discharge instructions  Description: Complete learning assessment and assess knowledge base.  Interventions:  - Provide teaching at level of understanding  - Provide teaching via preferred learning methods  Outcome: Progressing

## 2024-02-14 NOTE — WOUND OSTOMY CARE
Progress Note - Wound   Ely Hernadez 83 y.o. female MRN: [id number]  Unit/Bed#: -01 Encounter: [id number]    Assessment:   Patient admitted due to COPD with acute exacerbation. History of cardiomyopathy, CHF, CKD, diabetes, GERD, TIA, HLD. Wound care follow-up for right lower extremity wound. Patient agreeable to assessment, alert and oriented x4, continent of bowel and bladder, standby assist with care, independently turns for assessment.     1. Bilateral sacrum, buttock, elbows, hips and heels- skin is dry, intact, blanchable.     2. Right anterior tibial- Chronic wound, patient follows with out-patient wound center. Wound is round in shape, full-thickness, 100% pink hypergranular tissue, with small amount of serosanguineous drainage noted. Radha-wound is dry, intact, blanchable, no redness.     Educated patient on importance of frequent offloading of pressure via turning, repositioning, and weight-shifting. Verbalized understanding of plan of care.     No induration, fluctuance, odor, warmth, redness, or purulence noted to the above noted wound. New dressing applied. Patient tolerated well, denies pain to the wound. Primary nurse aware of plan of care. See flow sheets for more detailed assessment findings. Will follow along.        Skin care plans:  1-Hydraguard to bilateral sacrum, buttock and heels BID and PRN  2-Elevate heels to offload pressure.  3-Ehob cushion in chair when out of bed.  4-Moisturize skin daily with skin nourishing cream.  5-Turn/reposition q2h for pressure re-distribution on skin.   6-R leg - Cleanse wound with NSS, pat dry. Apply Calcium Alginate with silver over wound bed and secure with small Silicone bordered foam dressing. Change every other day and as needed for soilage/displacement.      Wound 10/29/23 Venous Ulcer Pretibial Right (Active)   Wound Image   02/14/24 0922   Wound Description Pink;Hypergranulation 02/14/24 0922   Radha-wound Assessment Dry;Intact 02/14/24 0922    Wound Length (cm) 0.8 cm 02/14/24 0922   Wound Width (cm) 1.1 cm 02/14/24 0922   Wound Depth (cm) 0.1 cm 02/14/24 0922   Wound Surface Area (cm^2) 0.88 cm^2 02/14/24 0922   Wound Volume (cm^3) 0.088 cm^3 02/14/24 0922   Calculated Wound Volume (cm^3) 0.09 cm^3 02/14/24 0922   Change in Wound Size % 97.75 02/14/24 0922   Drainage Amount Small 02/14/24 0922   Drainage Description Serosanguineous 02/14/24 0922   Non-staged Wound Description Full thickness 02/14/24 0922   Treatments Cleansed;Irrigation with NSS;Site care 02/14/24 0922   Dressing Calcium Alginate with Silver;Foam, Silicon (eg. Allevyn, etc) 02/14/24 0922   Wound packed? No 02/14/24 0922   Dressing Changed Changed 02/14/24 0922   Patient Tolerance Tolerated well 02/14/24 0922   Dressing Status Clean;Dry;Intact 02/14/24 0922         Call or tigertext with any questions  Wound Care will continue to follow    Traci RAMACHANDRANN RN CWON  Wound and Ostomy care

## 2024-02-14 NOTE — ASSESSMENT & PLAN NOTE
-Patient received Levemir and Premeal lispro (and SSI) while hospitalized.  She will resume her home antihyperglycemic regimen on discharge.

## 2024-02-14 NOTE — ASSESSMENT & PLAN NOTE
-Presented with shortness of breath and wheezing noted to be saturating in the mid 80s on room air.   -on home O2 2L HS  -Weaned off oxygen, back to baseline now.   -was on IV Solu-Medrol.  Was transitioned to prednisone but did not tolerate and was placed back on IV Solu-Medrol.  -d/c on Prednisone taper

## 2024-02-14 NOTE — DISCHARGE SUMMARY
[facility]  Discharge- Ely Hernadez 1940, 83 y.o. female MRN: [id number]  Unit/Bed#: -Nick Encounter: [id number]  Primary Care Provider: Aviva Mccabe PA-C   Date and time admitted to hospital: 2/6/2024  5:12 PM    Acute respiratory failure with hypoxia (HCC)  Assessment & Plan  Secondary to COPD exacerbation and volume overload  Currently at baseline   Baseline 2L Qhs only   Currently saturating adequately on room air now    H/O mechanical aortic valve replacement  Assessment & Plan  -Continue Coumadin, Goal INR 2.5-3.5      Paroxysmal atrial fibrillation (HCC)  Assessment & Plan  -cont BB/Coumadin            Stage 3a chronic kidney disease  Assessment & Plan  -Cr now 1.41. Suspect that baseline creatinine is going to be 1.2-1.4  Euvolemic status post IV diuretics         Cardiomyopathy (HCC)  Assessment & Plan  Hx of cardiomyopathy with EF 45%, G1DD. Pt reports drinking lots of water. Suspect in this cause of intermittent volume overload.  Currently appears euvolemic after prn IV lasix.   Educated on fluid restriction, verbalized understanding  Continue BB/lasix  Continue outpatient cardiology follow up     Anemia  Assessment & Plan  -was recently admitted for GI bleed, no further GI bleeding  -No acute s/s of bleeding  -Hb stable    Hypothyroidism  Assessment & Plan  Continue Synthroid     Diabetes mellitus with neurological manifestation (HCC)  Assessment & Plan  -Patient received Levemir and Premeal lispro (and SSI) while hospitalized.  She will resume her home antihyperglycemic regimen on discharge.      Coronary artery disease of native artery of native heart with stable angina pectoris (HCC)  Assessment & Plan  Continue aspirin, beta-blocker, statin       * COPD with acute exacerbation (HCC)  Assessment & Plan  -Presented with shortness of breath and wheezing noted to be saturating in the mid 80s on room air.   -on home O2 2L HS  -Weaned off oxygen, back to baseline  "now.   -was on IV Solu-Medrol.  Was transitioned to prednisone but did not tolerate and was placed back on IV Solu-Medrol.  -d/c on Prednisone taper        Medical Problems       Resolved Problems  Date Reviewed: 2/14/2024   None       Discharging Physician / Practitioner: Naveed Allred MD  PCP: Aviva Mccabe PA-C  Admission Date:   Admission Orders (From admission, onward)       Ordered        02/06/24 1857  INPATIENT ADMISSION  Once                          Discharge Date: 02/14/24    Consultations During Hospital Stay:  None    Procedures Performed:   None    Significant Findings / Test Results:   CXR 2/9/24: Mild pulmonary vascular congestion and trace bilateral pleural effusions. Findings are grossly stable.   CXR 2/7/24: Mild congestive changes with small effusions.     Incidental Findings:   None    Test Results Pending at Discharge (will require follow up):   None     Outpatient Tests Requested:  None    Complications:  none    Reason for Admission: Shortness of breath    Hospital Course:   Ely Hernadez is a 83 y.o. female patient who originally presented to the hospital on 2/6/2024 due to shortness of breath due to acute COPD exacerbation as outlined in the H&P done on admission.  Hospital course per problem list:    Please see above list of diagnoses and related plan for additional information.     Condition at Discharge: good    Discharge Day Visit / Exam:   Subjective: Denies shortness of breath  Vitals: Blood Pressure: 153/63 (02/14/24 0737)  Pulse: 60 (02/14/24 0737)  Temperature: 97.6 °F (36.4 °C) (02/14/24 0737)  Temp Source: Tympanic (02/12/24 0733)  Respirations: 16 (02/13/24 1958)  Height: 5' 6\" (167.6 cm) (02/07/24 1700)  Weight - Scale: 74.4 kg (164 lb) (02/07/24 1700)  SpO2: 98 % (02/14/24 0737)  Exam:   Gen: NAD, Awake and alert, well developed, well nourished  Eyes: EOMI, PERRLA, no scleral icterus  ENMT:  no nasal discharge, no otic discharge, moist mucous membranes  Neck:  " Supple  Cardiovascular: remains Regular rate and rhythm, normal S1-S2, no murmurs, rubs, or gallops  Lungs: remains Clear to auscultation bilaterally, no wheezes, or rales, or rhonchi  Abdomen: remains Positive bowel sounds, soft, nontender, nondistended,   Skin:  Intact, no obvious lesions or rashes, no edema  Neuro: Cranial nerves 2-12 are intact, non-focal, moves all 4 extremities    Discharge instructions/Information to patient and family:   See after visit summary for information provided to patient and family.      Provisions for Follow-Up Care:  See after visit summary for information related to follow-up care and any pertinent home health orders.      Mobility at time of Discharge:   Basic Mobility Inpatient Raw Score: 22  JH-HLM Goal: 7: Walk 25 feet or more  JH-HLM Achieved: 7: Walk 25 feet or more  HLM Goal achieved. Continue to encourage appropriate mobility.     Disposition:   Home with VN     Discharge Statement:  I spent 35 minutes discharging the patient. This time was spent on the day of discharge. I had direct contact with the patient on the day of discharge. Greater than 50% of the total time was spent examining patient, answering all patient questions, arranging and discussing plan of care with patient as well as directly providing post-discharge instructions.  Additional time then spent on discharge activities.    Discharge Medications:  See after visit summary for reconciled discharge medications provided to patient and/or family.      **Please Note: This note may have been constructed using a voice recognition system**

## 2024-02-14 NOTE — ASSESSMENT & PLAN NOTE
-was recently admitted for GI bleed, no further GI bleeding  -No acute s/s of bleeding  -Hb stable

## 2024-02-15 ENCOUNTER — PATIENT OUTREACH (OUTPATIENT)
Age: 84
End: 2024-02-15

## 2024-02-15 ENCOUNTER — TRANSITIONAL CARE MANAGEMENT (OUTPATIENT)
Age: 84
End: 2024-02-15

## 2024-02-15 ENCOUNTER — RA CDI HCC (OUTPATIENT)
Dept: OTHER | Facility: HOSPITAL | Age: 84
End: 2024-02-15

## 2024-02-15 NOTE — PROGRESS NOTES
HCC coding opportunities          Chart Reviewed number of suggestions sent to Provider: 1     Patients Insurance   E11.65  Medicare Insurance: [employer] Medicare Complete

## 2024-02-15 NOTE — PROGRESS NOTES
"Outpatient Care Management Note:  Inbasket ADT alert received that patient was discharged from [facility] on 02/14/24.  Patient was admitted on 02/06/24 for COPD exacerbation and volume overload.  Patient was cleared to discharge to home with resumption of care with [facility] and with recommendation to follow up with Wound Care for wound on right leg, and PCP.      Future Appointments:  2/19/24 SLP - [facility] to establish as a new patient.  02/20/24 with Neuro  02/27/24 with Pulm  03/28/24 with Cardio  03/29/24 with Nephro    Telephone outreach attempt made to assist with transitional care coordination. Spoke with patient.     Is this a good time for you to talk?   yes    We want to make sure you continue to improve now that you are home. Do you have your discharge instructions/AVS?   \"yes.  Tuyte in the hospital went over everything with me\".  Do you have your medication list?     yes.  I instructed patient to turn to page 3 for beginning of med list.  Reviewed medication list with patient?     yes  Do you have your medications?     \"No.  My son is picking up the steroid today at pharmacy.\"  Are you able to afford your medications?     yes  Are you taking your medications as ordered?     yes  Patient able to state names of each medication and reason for use.    Do you have any questions about your medications?     \"I will need refills soon.  Will my new drRahel Be able to give me refills for my medications?\"  Are you experiencing any new or worsening symptoms?     no  Follow up appointments reviewed with patient.  Patient aware she is to make appointment with wound clinic.  Patient aware she will need a PT/INR drawn for her coumadin dosing.   Do you need assistance scheduling follow-up appointments?   no  Do you have transportation for appointments & to  medications?    \"yes.  My son or my daughter take me to my appointments and  my medications for me\".  Do you have sufficient " caregiver support at home?    yes  Have you been contacted by, or visited by, Home Care Services?     [facility].  I contacted agency with discharge update.   Educated on appropriate ED use?     No.  ED use appropriate.  May I follow up with you again?    yes  Patient consents to future outreach to initiate assessment, care planning and establish goals.   Future outreach agreed upon in 2 weeks .  IB message reminder has been set.      Hospital Risk Assessment completed.

## 2024-02-15 NOTE — UTILIZATION REVIEW
NOTIFICATION OF ADMISSION DISCHARGE   This is a Notification of Discharge from [facility]. Please be advised that this patient has been discharge from our facility. Below you will find the admission and discharge date and time including the patient’s disposition.   UTILIZATION REVIEW CONTACT:  Sharon Leonardo  Utilization   Network Utilization Review Department  Phone: [phone] x carefully listen to the prompts. All voicemails are confidential.  Email: [email or web address]     ADMISSION INFORMATION  PRESENTATION DATE: 2/6/2024  5:12 PM  OBERVATION ADMISSION DATE:   INPATIENT ADMISSION DATE: 2/6/24  6:57 PM   DISCHARGE DATE: 2/14/2024  3:22 PM   DISPOSITION:Home/Self Care    Network Utilization Review Department  ATTENTION: Please call with any questions or concerns to [phone] and carefully listen to the prompts so that you are directed to the right person. All voicemails are confidential.   For Discharge needs, contact Care Management DC Support Team at [phone] opt. [phone]  Send all requests for admission clinical reviews, approved or denied determinations and any other requests to dedicated fax number below belonging to the campus where the patient is receiving treatment. List of dedicated fax numbers for the Facilities:  FACILITY NAME UR FAX NUMBER   ADMISSION DENIALS (Administrative/Medical Necessity) [phone]   DISCHARGE SUPPORT TEAM ([facility]) [phone]   PARENT CHILD HEALTH (Maternity/NICU/Pediatrics) [phone]   [facility] [phone]   [facility] [phone]   [facility] [phone]   [facility] [phone]   [facility] [phone]   [facility] [phone]   [facility] [phone]   [facility]  [phone]   [facility] [phone]   [facility] [phone]   [facility] [phone]   [facility] [phone]

## 2024-02-21 ENCOUNTER — TELEPHONE (OUTPATIENT)
Dept: ADMINISTRATIVE | Facility: OTHER | Age: 84
End: 2024-02-21

## 2024-02-21 ENCOUNTER — OFFICE VISIT (OUTPATIENT)
Age: 84
End: 2024-02-21
Payer: COMMERCIAL

## 2024-02-21 VITALS
RESPIRATION RATE: 16 BRPM | DIASTOLIC BLOOD PRESSURE: 54 MMHG | HEIGHT: 66 IN | BODY MASS INDEX: 24.11 KG/M2 | HEART RATE: 68 BPM | WEIGHT: 150 LBS | SYSTOLIC BLOOD PRESSURE: 128 MMHG

## 2024-02-21 DIAGNOSIS — N18.31 STAGE 3A CHRONIC KIDNEY DISEASE (HCC): ICD-10-CM

## 2024-02-21 DIAGNOSIS — E11.40 TYPE 2 DIABETES MELLITUS WITH DIABETIC NEUROPATHY, WITHOUT LONG-TERM CURRENT USE OF INSULIN (HCC): ICD-10-CM

## 2024-02-21 DIAGNOSIS — M20.41 HAMMERTOE OF RIGHT FOOT: ICD-10-CM

## 2024-02-21 DIAGNOSIS — I25.5 ISCHEMIC CARDIOMYOPATHY: ICD-10-CM

## 2024-02-21 DIAGNOSIS — Z95.2 H/O MECHANICAL AORTIC VALVE REPLACEMENT: ICD-10-CM

## 2024-02-21 DIAGNOSIS — J44.1 COPD WITH ACUTE EXACERBATION (HCC): Primary | ICD-10-CM

## 2024-02-21 DIAGNOSIS — D50.0 IRON DEFICIENCY ANEMIA DUE TO CHRONIC BLOOD LOSS: ICD-10-CM

## 2024-02-21 DIAGNOSIS — I48.0 PAROXYSMAL ATRIAL FIBRILLATION (HCC): ICD-10-CM

## 2024-02-21 LAB — SL AMB POCT HEMOGLOBIN AIC: 6.8 (ref ?–6.5)

## 2024-02-21 PROCEDURE — 83036 HEMOGLOBIN GLYCOSYLATED A1C: CPT

## 2024-02-21 PROCEDURE — 99496 TRANSJ CARE MGMT HIGH F2F 7D: CPT

## 2024-02-21 RX ORDER — IPRATROPIUM BROMIDE 0.5 MG/2.5ML
0.5 SOLUTION RESPIRATORY (INHALATION)
COMMUNITY

## 2024-02-21 NOTE — ASSESSMENT & PLAN NOTE
She denies any CP, palpitations.  She is currently on Coumadin and metoprolol.  She is getting her INR checked weekly.  She follows with Dr. ELIZONDO

## 2024-02-21 NOTE — LETTER
Diabetic Eye Exam Form    Date Requested: 24  Patient: Ely Hernadez  Patient : 1940   Referring Provider: Raghav Holloway MD      DIABETIC Eye Exam Date _______________________________      Type of Exam MUST be documented for Diabetic Eye Exams. Please CHECK ONE.     Retinal Exam       Dilated Retinal Exam       OCT       Optomap-Iris Exam      Fundus Photography       Left Eye - Please check Retinopathy or No Retinopathy        Exam did show retinopathy    Exam did not show retinopathy       Right Eye - Please check Retinopathy or No Retinopathy       Exam did show retinopathy    Exam did not show retinopathy       Comments __________________________________________________________    Practice Providing Exam ______________________________________________    Exam Performed By (print name) _______________________________________      Provider Signature ___________________________________________________      These reports are needed for  compliance.  Please fax this completed form and a copy of the Diabetic Eye Exam report to our office located at [address] [place] [address] as soon as possible via Fax [phone] attention Regina: Phone [phone]  We thank you for your assistance in treating our mutual patient.

## 2024-02-21 NOTE — ASSESSMENT & PLAN NOTE
She is using the ipratropium bromide and albuterol sulfate nebulizer BID. She denies any SOB at rest, just RAMON. She denies any cough or wheezing. O2 in office is 99%. She checks her O2 at home and it's around 98%. She is currently on her baseline 2 L qhs. She is finishing up her prednisone taper this week. She has an upcoming appt with pulmonology next week.

## 2024-02-21 NOTE — ASSESSMENT & PLAN NOTE
She drinks an adequate amount of water.  Discussed avoiding nephrotoxic medications like Advil, Aleve, Motrin, and ibuprofen.  She follows with Dr. Marti.  Lab Results   Component Value Date    EGFR 34 02/13/2024    EGFR 39 02/12/2024    EGFR 38 02/11/2024    CREATININE 1.41 (H) 02/13/2024    CREATININE 1.26 02/12/2024    CREATININE 1.28 02/11/2024

## 2024-02-21 NOTE — ASSESSMENT & PLAN NOTE
POCT A1c in office was 6.8. She is taking levemir 26 units every night and glipizide BID. She is checking her sugars at home every am, and it typically runs around 200s the past few days due to being on a prednisone taper.  She is due for diabetic eye exam.  Discussed referral to podiatry at this time for her hammertoe and neuropathy.  Lab Results   Component Value Date    HGBA1C 8.7 (H) 10/28/2023

## 2024-02-21 NOTE — TELEPHONE ENCOUNTER
----- Message from Neetu Hernández sent at 2/21/2024  8:39 AM EST -----  Regarding: care gap request  02/21/24 8:39 AM    Hello, our patient attached above has had Diabetic Eye Exam completed/performed. Please assist in updating the patient chart by making an External outreach to [facility] facility located in [place]. The date of service is 2023.    Thank you,  Neetu Hernández  [facility] [address]

## 2024-02-21 NOTE — LETTER
2nd Request      Diabetic Eye Exam Form    Date Requested: 24  Patient: Ely Hernadez  Patient : 1940   Referring Provider: Raghav Holloway MD      DIABETIC Eye Exam Date _______________________________      Type of Exam MUST be documented for Diabetic Eye Exams. Please CHECK ONE.     Retinal Exam       Dilated Retinal Exam       OCT       Optomap-Iris Exam      Fundus Photography       Left Eye - Please check Retinopathy or No Retinopathy        Exam did show retinopathy    Exam did not show retinopathy       Right Eye - Please check Retinopathy or No Retinopathy       Exam did show retinopathy    Exam did not show retinopathy       Comments __________________________________________________________    Practice Providing Exam ______________________________________________    Exam Performed By (print name) _______________________________________      Provider Signature ___________________________________________________      These reports are needed for  compliance.  Please fax this completed form and a copy of the Diabetic Eye Exam report to our office located at [address] [place] [address] as soon as possible via Fax [phone] attention Regina: Phone [phone]  We thank you for your assistance in treating our mutual patient.

## 2024-02-21 NOTE — PROGRESS NOTES
Assessment & Plan     1. COPD with acute exacerbation (HCC)  Assessment & Plan:  She is using the ipratropium bromide and albuterol sulfate nebulizer BID. She denies any SOB at rest, just RAMON. She denies any cough or wheezing. O2 in office is 99%. She checks her O2 at home and it's around 98%. She is currently on her baseline 2 L qhs. She is finishing up her prednisone taper this week. She has an upcoming appt with pulmonology next week.       2. H/O mechanical aortic valve replacement  Assessment & Plan:  They check her PT/INR once a week for 4 weeks, then once a month.       3. Paroxysmal atrial fibrillation (HCC)  Assessment & Plan:  She denies any CP, palpitations.  She is currently on Coumadin and metoprolol.  She is getting her INR checked weekly.  She follows with Dr. ELIZONDO      4. Stage 3a chronic kidney disease  Assessment & Plan:  She drinks an adequate amount of water.  Discussed avoiding nephrotoxic medications like Advil, Aleve, Motrin, and ibuprofen.  She follows with Dr. Marti.  Lab Results   Component Value Date    EGFR 34 02/13/2024    EGFR 39 02/12/2024    EGFR 38 02/11/2024    CREATININE 1.41 (H) 02/13/2024    CREATININE 1.26 02/12/2024    CREATININE 1.28 02/11/2024         5. Ischemic cardiomyopathy  Assessment & Plan:  She follows with Dr. ELIZONDO  Discussed continuing fluid restriction and taking her Lasix daily.      6. Iron deficiency anemia due to chronic blood loss  Assessment & Plan:  She has a history of iron deficiency, she is not currently on an iron supplement.  Will recheck CBC today.    Orders:  -     CBC and differential; Future    7. Type 2 diabetes mellitus with diabetic neuropathy, without long-term current use of insulin ([facility])  Assessment & Plan:  POCT A1c in office was 6.8. She is taking levemir 26 units every night and glipizide BID. She is checking her sugars at home every am, and it typically runs around 200s the past few days due to being on a prednisone taper.  She is due for  diabetic eye exam.  Discussed referral to podiatry at this time for her hammertoe and neuropathy.  Lab Results   Component Value Date    HGBA1C 8.7 (H) 10/28/2023       Orders:  -     POCT hemoglobin A1c    8. Hammertoe of right foot  -     Ambulatory Referral to Podiatry; Future        Depression Screening and Follow-up Plan: Patient was screened for depression during today's encounter. They screened negative with a PHQ-2 score of 0.      Subjective     Transitional Care Management Review:   Ely Hernadez is a 83 y.o. female here for TCM follow up.  She presented to the hospital 2/6/2024 due to shortness of breath from an acute COPD exacerbation.  She is currently back to her baseline of 2 L every night only.  She was discharged on a prednisone taper 2/14.  Today, she is feeling well.    During the TCM phone call patient stated:  TCM Call       Date and time call was made  2/15/2024 10:00 AM    Hospital care reviewed  Records reviewed    Patient was hospitialized at  [facility]    Date of Admission  02/06/24    Date of discharge  02/14/24    Diagnosis  COPD with acute exacerbation    Disposition  Home; Home health services    Current Symptoms  Fatigue  O2 in PM    Cough Severity  Mild    Fatigue severity  Mild          TCM Call       Post hospital issues  None    Scheduled for follow up?  Patient refused  establising with new PCP    Patients specialists  Other (comment); Neurologist; Pulmonlolgist    Pulmonologist name  Ben Song PA-C    Pulmonologist contact #  [phone]    Endocrinologist name  [phone]    Nephrologist name  Hilario Marti    Nephrologist contact #  [phone]    Neurologist name  Jesus Alcantara MD    Neurologist contact #  [phone]    Other specialists names  Alejandra winter-Vesta Garcia PA-C, [phone]    Did you obtain your prescribed medications  Yes  predniSONE, Symbicort    Do you need help managing your prescriptions or medications  No    Is transportation to  "your appointment needed  No    I have advised the patient to call PCP with any new or worsening symptoms  Kathy King LPN    Living Arrangements  Spouse or Significiant other    Are you recieving any outpatient services  Yes    What type of services  labs, remote device check    Are you recieving home care services  Yes    Types of home care services  Nurse visit; Home PT    Interperter language line needed  Yes    Counseling  Patient    Counseling topics  patient and family education; Importance of RX compliance    Comments  High priority message > April regarding present complaints          HPI  Review of Systems   Constitutional:  Negative for chills, fatigue and fever.   HENT:  Negative for ear discharge, ear pain, postnasal drip, rhinorrhea, sinus pressure, sinus pain, sore throat, tinnitus and trouble swallowing.    Eyes:  Negative for pain, discharge and itching.   Respiratory:  Negative for cough, shortness of breath and wheezing.    Cardiovascular:  Negative for chest pain, palpitations and leg swelling.   Gastrointestinal:  Negative for abdominal pain, constipation, diarrhea, nausea and vomiting.   Endocrine: Negative for polydipsia, polyphagia and polyuria.   Genitourinary:  Negative for difficulty urinating, frequency, hematuria and urgency.   Musculoskeletal:  Negative for arthralgias, joint swelling and myalgias.   Skin:  Negative for color change.   Allergic/Immunologic: Negative for environmental allergies.   Neurological:  Negative for dizziness, weakness, light-headedness, numbness and headaches.   Hematological:  Negative for adenopathy.   Psychiatric/Behavioral:  Negative for decreased concentration and sleep disturbance. The patient is not nervous/anxious.        Objective     /54 (BP Location: Left arm, Patient Position: Sitting, Cuff Size: Standard)   Pulse 68   Resp 16   Ht 5' 6\" (1.676 m)   Wt 68 kg (150 lb)   BMI 24.21 kg/m²      Physical Exam  Vitals and nursing note " reviewed.   Constitutional:       General: She is awake. She is not in acute distress.     Appearance: Normal appearance. She is well-developed, well-groomed and normal weight.   HENT:      Head: Normocephalic and atraumatic.      Right Ear: Hearing and external ear normal.      Left Ear: Hearing and external ear normal.      Nose: Nose normal.      Mouth/Throat:      Lips: Pink.      Mouth: Mucous membranes are moist.   Eyes:      General: Lids are normal. Vision grossly intact. Gaze aligned appropriately.      Conjunctiva/sclera: Conjunctivae normal.   Neck:      Vascular: No carotid bruit.      Trachea: Trachea and phonation normal.   Cardiovascular:      Rate and Rhythm: Normal rate and regular rhythm.      Heart sounds: Normal heart sounds, S1 normal and S2 normal. No murmur heard.     No friction rub. No gallop.   Pulmonary:      Effort: Pulmonary effort is normal. No respiratory distress.      Breath sounds: Normal breath sounds and air entry. No decreased breath sounds, wheezing, rhonchi or rales.   Abdominal:      General: Abdomen is flat.   Musculoskeletal:         General: No swelling.      Cervical back: Neck supple.      Right lower leg: No edema.      Left lower leg: No edema.   Skin:     General: Skin is warm.      Capillary Refill: Capillary refill takes less than 2 seconds.   Neurological:      Mental Status: She is alert.   Psychiatric:         Attention and Perception: Attention and perception normal.         Mood and Affect: Mood and affect normal.         Speech: Speech normal.         Behavior: Behavior normal. Behavior is cooperative.         Thought Content: Thought content normal.         Cognition and Memory: Cognition and memory normal.         Judgment: Judgment normal.       Medications have been reviewed by provider in current encounter    Amanda Villarreal PA-C

## 2024-02-21 NOTE — PROGRESS NOTES
Patient's shoes and socks removed.    Right Foot/Ankle   Right Foot Inspection  Skin Exam: skin normal and skin intact. No dry skin, no warmth, no callus, no erythema, no maceration, no abnormal color, no pre-ulcer, no ulcer and no callus.     Toe Exam: ROM and strength within normal limits.     Sensory   Monofilament testing: intact    Vascular  The right DP pulse is 2+.     Right Toe  - Comprehensive Exam  Ecchymosis: none  Swelling: none       Left Foot/Ankle  Left Foot Inspection  Skin Exam: skin normal and skin intact. No dry skin, no warmth, no erythema, no maceration, normal color, no pre-ulcer, no ulcer and no callus.     Toe Exam: ROM and strength within normal limits.     Sensory   Monofilament testing: intact    Vascular  The left DP pulse is 2+.     Left Toe  - Comprehensive Exam  Ecchymosis: none  Swelling: none       Assign Risk Category  No deformity present  No loss of protective sensation  No weak pulses  Risk: 0

## 2024-02-21 NOTE — ASSESSMENT & PLAN NOTE
She has a history of iron deficiency, she is not currently on an iron supplement.  Will recheck CBC today.

## 2024-02-22 NOTE — TELEPHONE ENCOUNTER
Upon review of the In Basket request and the patient's chart, initial outreach has been made via fax to facility. Please see Contacts section for details.     Thank you  Regina Handy MA

## 2024-02-23 ENCOUNTER — TELEPHONE (OUTPATIENT)
Age: 84
End: 2024-02-23

## 2024-02-23 DIAGNOSIS — I50.9 CONGESTIVE HEART FAILURE, UNSPECIFIED HF CHRONICITY, UNSPECIFIED HEART FAILURE TYPE (HCC): ICD-10-CM

## 2024-02-23 RX ORDER — FUROSEMIDE 40 MG/1
TABLET ORAL
Qty: 90 TABLET | Refills: 3 | Status: SHIPPED | OUTPATIENT
Start: 2024-02-23

## 2024-02-26 NOTE — TELEPHONE ENCOUNTER
As a follow-up, a second attempt has been made for outreach via fax to facility. Please see Contacts section for details.    Thank you  Regina Handy MA

## 2024-02-27 ENCOUNTER — OFFICE VISIT (OUTPATIENT)
Age: 84
End: 2024-02-27
Payer: COMMERCIAL

## 2024-02-27 VITALS
HEIGHT: 66 IN | WEIGHT: 153 LBS | HEART RATE: 62 BPM | OXYGEN SATURATION: 98 % | RESPIRATION RATE: 18 BRPM | SYSTOLIC BLOOD PRESSURE: 124 MMHG | BODY MASS INDEX: 24.59 KG/M2 | DIASTOLIC BLOOD PRESSURE: 67 MMHG

## 2024-02-27 DIAGNOSIS — I25.5 ISCHEMIC CARDIOMYOPATHY: ICD-10-CM

## 2024-02-27 DIAGNOSIS — J41.0 SIMPLE CHRONIC BRONCHITIS (HCC): Primary | ICD-10-CM

## 2024-02-27 DIAGNOSIS — R91.8 GROUND GLASS OPACITY PRESENT ON IMAGING OF LUNG: ICD-10-CM

## 2024-02-27 DIAGNOSIS — G47.34 NOCTURNAL HYPOXEMIA: ICD-10-CM

## 2024-02-27 DIAGNOSIS — J98.4 RESTRICTIVE LUNG DISEASE: ICD-10-CM

## 2024-02-27 PROCEDURE — 99214 OFFICE O/P EST MOD 30 MIN: CPT | Performed by: PHYSICIAN ASSISTANT

## 2024-02-27 RX ORDER — FLUTICASONE PROPIONATE AND SALMETEROL 250; 50 UG/1; UG/1
1 POWDER RESPIRATORY (INHALATION) 2 TIMES DAILY
Qty: 180 BLISTER | Refills: 0 | Status: SHIPPED | OUTPATIENT
Start: 2024-02-27 | End: 2024-05-27

## 2024-02-27 NOTE — PROGRESS NOTES
"Assessment/Plan:   Diagnoses and all orders for this visit:    Simple chronic bronchitis (HCC)  -     Fluticasone-Salmeterol (Advair Diskus) 250-50 mcg/dose inhaler; Inhale 1 puff 2 (two) times a day Rinse mouth after use.  -     CT chest without contrast; Future    Ground glass opacity present on imaging of lung  -     CT chest without contrast; Future    Nocturnal hypoxemia    Ischemic cardiomyopathy    Patient is here today for follow-up.  She was hospitalized since her last visit with us earlier this month.  She was treated for COPD exacerbation as well as fluid overload.  She is feeling much better though does still have some residual cough and shortness of breath which I explained may persist for several weeks after an exacerbation.  She was sent home with an inhaler that she thinks is Breo.  Had not been on any long-acting bronchodilators previously.  Will continue her on the Breo for now along with the albuterol 4 times per day as needed.  She does have oxygen to use at night.  Previous CT scan done in August showed some mild multifocal juxtapleural reticulation with minimal dependent lower lobe groundglass opacity.  Also had a 9 mm groundglass opacity in the posterior right apex.  There was some air trapping consistent with small airway disease.    Will order her a repeat CT scan to be done in August to follow-up the groundglass opacity.  Previously had COVID and does have some residual scarring on her previous CT scan.    She will follow-up with us in 6 months or sooner if necessary.    Return in about 6 months (around 8/27/2024).  All questions are answered to the patient's satisfaction and understanding.  She verbalizes understanding.  She is encouraged to call with any further questions or concerns.    Portions of the record may have been created with voice recognition software.  Occasional wrong word or \"sound a like\" substitutions may have occurred due to the inherent limitations of voice recognition " software.  Read the chart carefully and recognize, using context, where substitutions have occurred.    Electronically Signed by Ben Song PA-C    ______________________________________________________________________    Chief Complaint: No chief complaint on file.      Patient ID: Ely is a 83 y.o. y.o. female has a past medical history of Anemia, Aneurysm of thoracic aorta (HCC), Aortic valve disorder, Benign neoplasm of sigmoid colon, Cardiomyopathy (HCC), CHF (congestive heart failure) (HCC), Chronic kidney disease, 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HCC), Diabetic nephropathy (HCC), RAMON (dyspnea on exertion), GERD (gastroesophageal reflux disease), History of emphysema (HCC), Hyperlipidemia, and TIA (transient ischemic attack).    2/27/2024  Patient presents today for follow-up visit.  Patient is an 83-year-old female with about 50-pack-year smoking history who quit in 2007 with past medical history of CAD, aortic valve replacement, COPD, hypertension, hypothyroidism, cardiomyopathy.    She is here today for follow-up.  She was recently hospitalized for COPD exacerbation which she feels was exacerbated by a viral URI.  She is feeling better from her admission though still having some shortness of breath and cough.  She was sent home with [facility] which she has not been using regularly.  She is using the nebulizer twice per day as well as the albuterol inhaler as needed.          Review of Systems   Constitutional: Negative.    HENT: Negative.     Respiratory:  Positive for cough and shortness of breath.    Cardiovascular: Negative.    Gastrointestinal: Negative.    Genitourinary: Negative.    Musculoskeletal: Negative.    Skin: Negative.    Allergic/Immunologic: Negative.    Neurological: Negative.    Psychiatric/Behavioral: Negative.         Smoking history: She reports that she quit smoking about 16 years ago. Her smoking use included cigarettes. She started smoking about 69 years ago. She has a 52.9  pack-year smoking history. She has been exposed to tobacco smoke. She quit smokeless tobacco use about 17 years ago.    The following portions of the patient's history were reviewed and updated as appropriate: allergies, current medications, past family history, past medical history, past social history, past surgical history, and problem list.    Immunization History   Administered Date(s) Administered    INFLUENZA 09/16/2014, 09/16/2014, 10/08/2015, 09/08/2016, 09/14/2018, 09/27/2021, 09/07/2022, 09/28/2023    Influenza Split High Dose Preservative Free IM 10/08/2015    Influenza, high dose seasonal 0.7 mL 09/01/2020    Influenza, seasonal, injectable 09/03/2014    Pneumococcal Conjugate 13-Valent 11/07/2018    Pneumococcal Conjugate Vaccine 20-valent (Pcv20), Polysace 04/17/2023    Pneumococcal Polysaccharide PPV23 1940, 04/13/2022    Tdap 1940, 05/16/2017    Zoster 1940    influenza, trivalent, adjuvanted 09/16/2019     Current Outpatient Medications   Medication Sig Dispense Refill    Accu-Chek FastClix Lancets MISC Use 3 (three) times a day 300 each 3    albuterol (Ventolin HFA) 90 mcg/act inhaler Inhale 2 puffs every 6 (six) hours as needed for wheezing 18 g 1    Ascorbic Acid (vitamin C) 1000 MG tablet Take 1,000 mg by mouth daily      aspirin (ECOTRIN LOW STRENGTH) 81 mg EC tablet Take 1 tablet (81 mg total) by mouth daily Do not start before November 1, 2023. 30 tablet 0    BD Pen Needle Rosie U/F 32G X 4 MM MISC USE DAILY 100 each 1    Blood Glucose Monitoring Suppl (Accu-Chek Guide Me) w/Device KIT USE 3 TIMES DAILY 1 kit 2    Cholecalciferol (Vitamin D3) 50 MCG (2000 UT) TABS Take 2,000 Units by mouth daily      Fluticasone-Salmeterol (Advair Diskus) 250-50 mcg/dose inhaler Inhale 1 puff 2 (two) times a day Rinse mouth after use. 180 blister 0    furosemide (LASIX) 40 mg tablet take 1 tablet by mouth daily 90 tablet 3    gabapentin (NEURONTIN) 300 mg capsule Take 1 capsule (300 mg  "total) by mouth 3 (three) times a day 270 capsule 3    glipiZIDE (GLUCOTROL) 10 mg tablet TAKE 1 TABLET BY MOUTH  TWICE DAILY BEFORE MEALS 180 tablet 3    glucose blood (Accu-Chek Celeste Plus) test strip Use 1 each 3 (three) times a day Use as instructed 300 each 3    ipratropium (ATROVENT) 0.02 % nebulizer solution Take 0.5 mg by nebulization 4 (four) times a day      Lancets (freestyle) lancets Check blood glucose 3 times daily 300 each 0    Levemir FlexPen 100 units/mL injection pen INJECT SUBCUTANEOUSLY 26 UNITS  DAILY AT BEDTIME 30 mL 2    levothyroxine 50 mcg tablet TAKE 1 TABLET BY MOUTH  DAILY 90 tablet 3    metoprolol tartrate (LOPRESSOR) 50 mg tablet TAKE ONE-HALF TABLET BY  MOUTH TWICE DAILY 90 tablet 3    pantoprazole (PROTONIX) 40 mg tablet Take 1 tablet (40 mg total) by mouth 2 (two) times a day 60 tablet 0    predniSONE 10 mg tablet Take 1 tablet (10 mg total) by mouth daily Taper: 40mg daily x 3 days, 30mg daily x 3 days, 20mg daily x 3 days, 10mg daily x 3 days 30 tablet 0    warfarin (COUMADIN) 5 mg tablet TAKE 1 TO 2 TABLETS BY  MOUTH DAILY OR AS DIRECTED 180 tablet 3    atorvastatin (LIPITOR) 40 mg tablet Take 1 tablet (40 mg total) by mouth every evening (Patient not taking: Reported on 2/21/2024) 30 tablet 0    b complex vitamins capsule Take 1 capsule by mouth daily 30 capsule 2    oxygen gas Inhale 2 L/min daily at bedtime as needed home oxygen nightly (Patient not taking: Reported on 2/21/2024)       No current facility-administered medications for this visit.     Allergies: Patient has no known allergies.    Objective:  Vitals:    02/27/24 1348 02/27/24 1349   BP: 124/67    BP Location: Right arm    Patient Position: Sitting    Cuff Size: Large    Pulse: 62    Resp: 18    SpO2: 98% 98%   Weight: 69.4 kg (153 lb)    Height: 5' 6\" (1.676 m)    Oxygen Therapy  SpO2: 98 %  Oxygen Therapy: None (Room air)  .  Wt Readings from Last 3 Encounters:   02/27/24 69.4 kg (153 lb)   02/21/24 68 kg (150 " lb)   02/07/24 74.4 kg (164 lb)     Body mass index is 24.69 kg/m².    Physical Exam  Constitutional:       General: She is not in acute distress.     Appearance: Normal appearance. She is well-developed. She is not ill-appearing.   HENT:      Head: Normocephalic and atraumatic.      Mouth/Throat:      Pharynx: Oropharynx is clear.   Eyes:      Pupils: Pupils are equal, round, and reactive to light.   Cardiovascular:      Rate and Rhythm: Normal rate and regular rhythm.   Pulmonary:      Effort: Pulmonary effort is normal. No respiratory distress.      Breath sounds: Normal breath sounds. No decreased breath sounds, wheezing, rhonchi or rales.   Abdominal:      General: Abdomen is flat. There is no distension.   Musculoskeletal:         General: Normal range of motion.      Cervical back: Normal range of motion.      Right lower leg: No edema.      Left lower leg: No edema.   Skin:     General: Skin is warm and dry.      Findings: No rash.   Neurological:      Mental Status: She is alert and oriented to person, place, and time.   Psychiatric:         Mood and Affect: Mood normal.         Behavior: Behavior normal.         Lab Review:   Lab Results   Component Value Date     11/18/2015    K 4.7 02/13/2024    K 4.3 02/05/2024     02/13/2024     02/05/2024    CO2 28 02/13/2024    CO2 29 02/05/2024    BUN 47 (H) 02/13/2024    BUN 21 02/05/2024    CREATININE 1.41 (H) 02/13/2024    CREATININE 1.13 (H) 02/05/2024    GLUCOSE 83 11/18/2015    CALCIUM 9.4 02/13/2024    CALCIUM 8.9 02/05/2024     Lab Results   Component Value Date    WBC 6.54 02/14/2024    WBC 3.2 (L) 06/18/2015    HGB 8.0 (L) 02/14/2024    HGB 13.2 06/18/2015    HCT 27.1 (L) 02/14/2024    HCT 39.6 06/18/2015    MCV 77 (L) 02/14/2024    MCV 88 06/18/2015     (H) 02/14/2024     06/18/2015       Diagnostics:  I have personally reviewed pertinent reports.   and I have personally reviewed pertinent films in PACS  Reviewed hospital  imaging  Office Spirometry Results:     ESS:    XR chest portable    Result Date: 2/9/2024  Narrative: XR CHEST PORTABLE INDICATION: peristent sob. COMPARISON: 2/6/2024 FINDINGS: Status post left-sided pacemaker placement. Mild pulmonary vascular congestion. Trace bilateral pleural effusions. Normal cardiomediastinal silhouette. Bones are unremarkable for age. Normal upper abdomen.     Impression: Mild pulmonary vascular congestion and trace bilateral pleural effusions. Findings are grossly stable. Workstation performed: [id number]     XR chest 1 view portable    Result Date: 2/7/2024  Narrative: XR CHEST PORTABLE INDICATION: sob. COMPARISON: Compared with 12/14/2023 FINDINGS: Sternotomy wires. Left chest wall pacemaker. Mild prominent interstitial markings. No pneumothorax. Blunted costophrenic angles. Normal cardiomediastinal silhouette. Glenohumeral degenerative changes more on the right. Normal upper abdomen.     Impression: Mild congestive changes with small effusions. Workstation performed: [id number]

## 2024-03-01 ENCOUNTER — TELEPHONE (OUTPATIENT)
Dept: CARDIOLOGY CLINIC | Facility: CLINIC | Age: 84
End: 2024-03-01

## 2024-03-01 ENCOUNTER — PATIENT OUTREACH (OUTPATIENT)
Age: 84
End: 2024-03-01

## 2024-03-01 NOTE — TELEPHONE ENCOUNTER
Spoke to Rody from outpatient care management and was advised that pt denied symptoms of SOB and chest pain but complained of LLE.    Rody mentioned that pt was inquiring about compression stockings.    Please advise regarding possible medication adjustment and appointment follow up for pt.

## 2024-03-01 NOTE — TELEPHONE ENCOUNTER
Upon review of the In Basket request we were able to locate, review, and update the patient chart as requested for Diabetic Eye Exam.    Any additional questions or concerns should be emailed to the Practice Liaisons via the appropriate education email address, please do not reply via In Basket.    Thank you  Regina Handy MA

## 2024-03-01 NOTE — PROGRESS NOTES
"Outpatient Care Management Note:  Inbasket reminder for outreach received.   Chart review completed.   Telephone call completed with patient today.  Patient states she is doing \"much better\".   Patient informs me she in concerned about the increased swelling in her legs.  Patient denies any other symptoms (SOB, chest pain, increased cough with sputum production).  Patient states she is interested in purchasing a mechanical leg compression device \"like the one in the hospital\" to help relieve LE edema.      Discussed with patient other ways she can manage LE edema (raising her legs when sitting, using compression stockings).  Noted patient missed cardiology follow up appointment in January.  Per chart notes, patient had a 3lb weight gain in 1 week.  (150 lbs 02/21, 153 lbs 02/27) Current weight 153lbs.    Cardiac self-management assessment initiated.    Patient does not do daily weights.  Patient does not limit her fluid and is unsure whether she should limit her fluid intake.  Patient reports she takes her medications as prescribed.     Diabetes self-management assessment initiated.  Patient reports fasting BS in the \"170's\" BS in the low to mid 200's post meals.  Patient denies episodes of hypoglycemia. Patient agreeable to further information on diabetes self-management.  DM zone tool mailed to patient as per patient preference.     Patient appreciative of phone call today.  Agreeable to follow up in 3 weeks. IB Reminder sent.       Phone call made to [facility].  Case discussed with Kim.  Kim will send high priority message to Dr. Layton to report weight change and increased edema and clerical staff will contact patient to schedule appointment to discuss need for leg compression.      "

## 2024-03-01 NOTE — TELEPHONE ENCOUNTER
As a final attempt, a third outreach has been made via telephone call to facility. Please see Contacts section for details. This encounter will be closed and completed by end of day. Should we receive the requested information because of previous outreach attempts, the requested patient's chart will be updated appropriately.     Thank you  Regina Handy MA

## 2024-03-01 NOTE — TELEPHONE ENCOUNTER
Rody, from outpatient care management called and stated that pt is complaining of edema in her legs. Pt also has had a 3 pound weight gain in 1 week.     Rody would like to know if someone can please call pt to touch basis with her. Pt also was scheduled for a 2 wk f/up with Lilian on 1/12 and that appointment was cancelled.       Rody can be reached at [phone]

## 2024-03-04 ENCOUNTER — OFFICE VISIT (OUTPATIENT)
Dept: CARDIOLOGY CLINIC | Facility: CLINIC | Age: 84
End: 2024-03-04
Payer: COMMERCIAL

## 2024-03-04 VITALS
HEIGHT: 66 IN | SYSTOLIC BLOOD PRESSURE: 134 MMHG | RESPIRATION RATE: 16 BRPM | DIASTOLIC BLOOD PRESSURE: 64 MMHG | HEART RATE: 66 BPM | WEIGHT: 160 LBS | BODY MASS INDEX: 25.71 KG/M2 | OXYGEN SATURATION: 100 %

## 2024-03-04 DIAGNOSIS — R60.0 LOWER EXTREMITY EDEMA: Primary | ICD-10-CM

## 2024-03-04 DIAGNOSIS — L97.919 VENOUS ULCER OF RIGHT LEG (HCC): ICD-10-CM

## 2024-03-04 DIAGNOSIS — Z79.4 TYPE 2 DIABETES MELLITUS WITH STAGE 4 CHRONIC KIDNEY DISEASE, WITH LONG-TERM CURRENT USE OF INSULIN (HCC): ICD-10-CM

## 2024-03-04 DIAGNOSIS — I83.019 VENOUS ULCER OF RIGHT LEG (HCC): ICD-10-CM

## 2024-03-04 DIAGNOSIS — I50.22 CHRONIC SYSTOLIC (CONGESTIVE) HEART FAILURE (HCC): ICD-10-CM

## 2024-03-04 DIAGNOSIS — E11.22 TYPE 2 DIABETES MELLITUS WITH STAGE 4 CHRONIC KIDNEY DISEASE, WITH LONG-TERM CURRENT USE OF INSULIN (HCC): ICD-10-CM

## 2024-03-04 DIAGNOSIS — N18.4 TYPE 2 DIABETES MELLITUS WITH STAGE 4 CHRONIC KIDNEY DISEASE, WITH LONG-TERM CURRENT USE OF INSULIN (HCC): ICD-10-CM

## 2024-03-04 PROCEDURE — 99214 OFFICE O/P EST MOD 30 MIN: CPT

## 2024-03-04 NOTE — PROGRESS NOTES
[facility] [place]  [address]  [place] [address]  Cardiology Office Note    Ely Hernadez 83 y.o. female MRN: [id number]    03/04/24          Assessment/Plan:  1. Chronic HFmrEF  Patient notes lower extremity swelling  Weight slightly decreased from recent admission  Continue lasix 40 mg daily  Prescribed compression stockings  Patient agreeable to weight herself daily and maintain log  Heart Failure Zone education discussed and provided to patient    2.  NICM with EF 40%  See plan above    3. S/p mechanical AVR  On warfarin; goal INR 2.5-3.5    4. PAF  Patient primarily V paced  Continue warfarin for AC  Continue Lopressor 50 mg twice daily    5. Nonobstructive CAD  Patient denies chest pain or anginal equivalent  Continue aspirin and Lopressor. Patient should resume atorvastatin.    Follow up: 3 months or sooner as needed  All questions and concerns addressed.  Patient was advised to report any problems requiring medical attention.          1. Lower extremity edema  Compression Stocking      2. Type 2 diabetes mellitus with stage 4 chronic kidney disease, with long-term current use of insulin (HCC)        3. Venous ulcer of right leg (HCC)        4. Chronic systolic (congestive) heart failure (HCC)            HPI: Ely Hernadez is a 83 y.o. 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 Patient is known to Dr. Layton.    Patient notes that she still has some swelling in her legs since discharge from 2/6/24 when she was admitted for a COPD exacerbation. Her weight is 160 lbs at today's visit. It was noted to be 164 lbs on discharge.     Patient would like to have compression stockings for her swelling. She notes that this has helped in the past. She reports adherence to her medications including lasix.    Patient does not weigh herself at home.     She  denies any bleeding issues on warfarin and aspirin.     Patient was instructed to call the office or seek medical attention if any significant chest pain, shortness of breath, palpitations, or lower extremity swelling develop. All medications reviewed and patient is tolerating medications without side effects.     Social history:   Patient denies tobacco, significant alcohol, or recreational drug use.    EKG- 2/6/24 Electronic ventricular pacemaker        Patient Active Problem List   Diagnosis    Hyperlipidemia    Chronic anticoagulation    Hypertension, essential    Coronary artery disease of native artery of native heart with stable angina pectoris (HCC)    Simple chronic bronchitis (HCC)    Diabetes mellitus with neurological manifestation (HCC)    Hypothyroidism    Iron deficiency    GERD (gastroesophageal reflux disease)    Anemia    AVM (arteriovenous malformation) of small bowel, acquired with hemorrhage    Angiectasia    Other vascular disorders of intestine (HCC)    Angioedema    Aortic valve replaced    Cardiomyopathy (HCC)    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[facility])    Stage 3a chronic kidney disease    Chronic kidney disease-mineral and bone disorder    Primary osteoarthritis involving multiple joints    Paroxysmal atrial fibrillation (HCC)    Neutropenia associated with infection (HCC)    Herpes simplex labialis    Restrictive lung disease    Nocturnal hypoxemia    Stroke-like symptoms    Hypertensive urgency    Leg cramps    Supratherapeutic INR    Acute blood loss anemia    H/O mechanical aortic valve replacement    Subtherapeutic international normalized ratio (INR)    Acute anterior epistaxis    Acute respiratory failure with hypoxia (HCC)    Type 2 diabetes mellitus with stage 4 chronic kidney disease, with long-term current use of insulin (HCC)    Venous ulcer of right leg (HCC)    Chronic systolic (congestive) heart failure (HCC)    Lower extremity edema       No Known Allergies      Current Outpatient  Medications:     Accu-Chek FastClix Lancets MISC, Use 3 (three) times a day, Disp: 300 each, Rfl: 3    albuterol (Ventolin HFA) 90 mcg/act inhaler, Inhale 2 puffs every 6 (six) hours as needed for wheezing, Disp: 18 g, Rfl: 1    Ascorbic Acid (vitamin C) 1000 MG tablet, Take 1,000 mg by mouth daily, Disp: , Rfl:     aspirin (ECOTRIN LOW STRENGTH) 81 mg EC tablet, Take 1 tablet (81 mg total) by mouth daily Do not start before November 1, 2023., Disp: 30 tablet, Rfl: 0    b complex vitamins capsule, Take 1 capsule by mouth daily, Disp: 30 capsule, Rfl: 2    BD Pen Needle Rosie U/F 32G X 4 MM MISC, USE DAILY, Disp: 100 each, Rfl: 1    Blood Glucose Monitoring Suppl (Accu-Chek Guide Me) w/Device KIT, USE 3 TIMES DAILY, Disp: 1 kit, Rfl: 2    Cholecalciferol (Vitamin D3) 50 MCG (2000 UT) TABS, Take 2,000 Units by mouth daily, Disp: , Rfl:     Fluticasone-Salmeterol (Advair Diskus) 250-50 mcg/dose inhaler, Inhale 1 puff 2 (two) times a day Rinse mouth after use., Disp: 180 blister, Rfl: 0    furosemide (LASIX) 40 mg tablet, take 1 tablet by mouth daily, Disp: 90 tablet, Rfl: 3    gabapentin (NEURONTIN) 300 mg capsule, Take 1 capsule (300 mg total) by mouth 3 (three) times a day, Disp: 270 capsule, Rfl: 3    glipiZIDE (GLUCOTROL) 10 mg tablet, TAKE 1 TABLET BY MOUTH  TWICE DAILY BEFORE MEALS, Disp: 180 tablet, Rfl: 3    glucose blood (Accu-Chek Celeste Plus) test strip, Use 1 each 3 (three) times a day Use as instructed, Disp: 300 each, Rfl: 3    ipratropium (ATROVENT) 0.02 % nebulizer solution, Take 0.5 mg by nebulization 4 (four) times a day, Disp: , Rfl:     Lancets (freestyle) lancets, Check blood glucose 3 times daily, Disp: 300 each, Rfl: 0    Levemir FlexPen 100 units/mL injection pen, INJECT SUBCUTANEOUSLY 26 UNITS  DAILY AT BEDTIME, Disp: 30 mL, Rfl: 2    levothyroxine 50 mcg tablet, TAKE 1 TABLET BY MOUTH  DAILY, Disp: 90 tablet, Rfl: 3    metoprolol tartrate (LOPRESSOR) 50 mg tablet, TAKE ONE-HALF TABLET BY  MOUTH  TWICE DAILY, Disp: 90 tablet, Rfl: 3    oxygen gas, Inhale 2 L/min daily at bedtime as needed home oxygen nightly, Disp: , Rfl:     pantoprazole (PROTONIX) 40 mg tablet, Take 1 tablet (40 mg total) by mouth 2 (two) times a day, Disp: 60 tablet, Rfl: 0    predniSONE 10 mg tablet, Take 1 tablet (10 mg total) by mouth daily Taper: 40mg daily x 3 days, 30mg daily x 3 days, 20mg daily x 3 days, 10mg daily x 3 days, Disp: 30 tablet, Rfl: 0    warfarin (COUMADIN) 5 mg tablet, TAKE 1 TO 2 TABLETS BY  MOUTH DAILY OR AS DIRECTED, Disp: 180 tablet, Rfl: 3    Past Medical History:   Diagnosis Date    Anemia     Aneurysm of thoracic aorta (HCC)     Aortic valve disorder     Benign neoplasm of sigmoid colon     Cardiomyopathy (HCC)     last assessed: 10/10/2014    CHF (congestive heart failure) (HCC)     Chronic kidney disease     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HCC)     last assessed: 10/10/2014    Diabetic nephropathy (HCC)     RAMON (dyspnea on exertion)     GERD (gastroesophageal reflux disease)     History of emphysema (HCC)     Hyperlipidemia     TIA (transient ischemic attack)        Family History   Problem Relation Age of Onset    No Known Problems Mother     No Known Problems Father        Past Surgical History:   Procedure Laterality Date    AORTIC VALVE REPLACEMENT      CARDIAC PACEMAKER PLACEMENT      last assessed: 10/10/2014    CARDIAC SURGERY      aortic valve replacement    CHOLECYSTECTOMY      COLONOSCOPY      HYSTERECTOMY      INSERT / REPLACE / REMOVE PACEMAKER      KNEE SURGERY Right     OTHER SURGICAL HISTORY      Capsule ENDOscopy description: 12/19/2012    WY COLONOSCOPY FLX DX W/COLLJ SPEC WHEN PFRMD N/A 5/31/2018    Procedure: single balloon ENTEROSCOPY;  Surgeon: David Dennis MD;  Location: [facility];  Service: Gastroenterology    WY ESOPHAGOGASTRODUODENOSCOPY TRANSORAL DIAGNOSTIC N/A 11/29/2017    Procedure: EGD AND COLONOSCOPY;  Surgeon: Jay Mcleod III, MD;  Location: [facility];  Service:  Gastroenterology       Social History     Socioeconomic History    Marital status:      Spouse name: Not on file    Number of children: Not on file    Years of education: Not on file    Highest education level: Not on file   Occupational History    Not on file   Tobacco Use    Smoking status: Former     Current packs/day: 0.00     Average packs/day: 1 pack/day for 52.9 years (52.9 ttl pk-yrs)     Types: Cigarettes     Start date:      Quit date: 2007     Years since quittin.2     Passive exposure: Past    Smokeless tobacco: Former     Quit date:    Vaping Use    Vaping status: Never Used   Substance and Sexual Activity    Alcohol use: Never    Drug use: Never    Sexual activity: Not Currently     Partners: Male   Other Topics Concern    Not on file   Social History Narrative    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 BID Freestyle lancets BID    Living independently with spouse     Social Determinants of Health     Financial Resource Strain: Not on file   Food Insecurity: No Food Insecurity (2024)    Hunger Vital Sign     Worried About Running Out of Food in the Last Year: Never true     Ran Out of Food in the Last Year: Never true   Transportation Needs: No Transportation Needs (2024)    PRAPARE - Transportation     Lack of Transportation (Medical): No     Lack of Transportation (Non-Medical): No   Physical Activity: Inactive (2021)    Exercise Vital Sign     Days of Exercise per Week: 0 days     Minutes of Exercise per Session: 0 min   Stress: No Stress Concern Present (2021)    [employer] - Occupational Stress Questionnaire     Feeling of Stress : Not at all   Social Connections: Not on file   Intimate Partner Violence: Not on file   Housing Stability: Low Risk  (2024)    Housing Stability Vital Sign     Unable to Pay for Housing in the Last Year: No     Number of Places Lived in the Last Year: 1     Unstable Housing in  "the Last Year: No       Review of symptoms:   Review of Systems   Constitutional:  Negative for chills, diaphoresis and fever.   Respiratory:  Negative for cough, chest tightness and shortness of breath.    Cardiovascular:  Positive for leg swelling. Negative for chest pain and palpitations.   Gastrointestinal:  Negative for abdominal distention, blood in stool, nausea and vomiting.   Genitourinary:  Negative for difficulty urinating.   Musculoskeletal:  Negative for arthralgias and back pain.   Neurological:  Negative for dizziness, syncope, light-headedness and headaches.   Psychiatric/Behavioral:  Negative for agitation and confusion. The patient is not nervous/anxious.         Vitals: /64 (BP Location: Left arm, Patient Position: Sitting, Cuff Size: Standard)   Pulse 66   Resp 16   Ht 5' 6\" (1.676 m)   Wt 72.6 kg (160 lb)   SpO2 100%   BMI 25.82 kg/m²         Physical Exam:     Physical Exam  Vitals and nursing note reviewed.   Constitutional:       General: She is not in acute distress.     Appearance: She is well-developed.   HENT:      Head: Normocephalic and atraumatic.   Eyes:      Conjunctiva/sclera: Conjunctivae normal.   Neck:      Vascular: No carotid bruit.   Cardiovascular:      Rate and Rhythm: Normal rate and regular rhythm.      Heart sounds: Normal heart sounds. No murmur heard.  Pulmonary:      Effort: Pulmonary effort is normal. No respiratory distress.      Breath sounds: Normal breath sounds.   Abdominal:      Palpations: Abdomen is soft.      Tenderness: There is no abdominal tenderness.   Musculoskeletal:         General: No swelling.      Cervical back: Neck supple.      Right lower le+ Pitting Edema present.      Left lower le+ Pitting Edema present.   Skin:     General: Skin is warm and dry.      Capillary Refill: Capillary refill takes less than 2 seconds.   Neurological:      Mental Status: She is alert and oriented to person, place, and time.   Psychiatric:         " Mood and Affect: Mood normal.            Thank you for allowing me to participate in the care and evaluation of your patient.  Should you have any questions, please feel free to contact me.

## 2024-03-07 ENCOUNTER — DOCUMENTATION (OUTPATIENT)
Age: 84
End: 2024-03-07

## 2024-03-07 ENCOUNTER — TELEPHONE (OUTPATIENT)
Dept: OTHER | Facility: OTHER | Age: 84
End: 2024-03-07

## 2024-03-08 ENCOUNTER — PATIENT OUTREACH (OUTPATIENT)
Age: 84
End: 2024-03-08

## 2024-03-08 ENCOUNTER — HOSPITAL ENCOUNTER (EMERGENCY)
Facility: HOSPITAL | Age: 84
Discharge: HOME | End: 2024-03-08
Attending: EMERGENCY MEDICINE
Payer: COMMERCIAL

## 2024-03-08 VITALS
DIASTOLIC BLOOD PRESSURE: 67 MMHG | OXYGEN SATURATION: 94 % | TEMPERATURE: 98.6 F | HEART RATE: 60 BPM | RESPIRATION RATE: 16 BRPM | SYSTOLIC BLOOD PRESSURE: 166 MMHG

## 2024-03-08 DIAGNOSIS — D64.9 SYMPTOMATIC ANEMIA: Primary | ICD-10-CM

## 2024-03-08 LAB
2HR DELTA HS TROPONIN: -3 NG/L
ABO GROUP BLD: NORMAL
ANION GAP SERPL CALCULATED.3IONS-SCNC: 8 MMOL/L
APTT PPP: 38 SECONDS (ref 23–37)
ATRIAL RATE: 326 BPM
BASOPHILS # BLD AUTO: 0.03 THOUSANDS/ÂΜL (ref 0–0.1)
BASOPHILS NFR BLD AUTO: 0 % (ref 0–1)
BLD GP AB SCN SERPL QL: POSITIVE
BUN SERPL-MCNC: 20 MG/DL (ref 5–25)
CALCIUM SERPL-MCNC: 8.7 MG/DL (ref 8.4–10.2)
CARDIAC TROPONIN I PNL SERPL HS: 16 NG/L
CARDIAC TROPONIN I PNL SERPL HS: 19 NG/L
CHLORIDE SERPL-SCNC: 104 MMOL/L (ref 96–108)
CO2 SERPL-SCNC: 26 MMOL/L (ref 21–32)
CREAT SERPL-MCNC: 1.27 MG/DL (ref 0.6–1.3)
EOSINOPHIL # BLD AUTO: 0.27 THOUSAND/ÂΜL (ref 0–0.61)
EOSINOPHIL NFR BLD AUTO: 4 % (ref 0–6)
ERYTHROCYTE [DISTWIDTH] IN BLOOD BY AUTOMATED COUNT: 17.7 % (ref 11.6–15.1)
GFR SERPL CREATININE-BSD FRML MDRD: 39 ML/MIN/1.73SQ M
GLUCOSE SERPL-MCNC: 128 MG/DL (ref 65–140)
HCT VFR BLD AUTO: 25.1 % (ref 34.8–46.1)
HCT VFR BLD AUTO: 25.5 % (ref 34.8–46.1)
HGB BLD-MCNC: 7.1 G/DL (ref 11.5–15.4)
HGB BLD-MCNC: 7.8 G/DL (ref 11.5–15.4)
IMM GRANULOCYTES # BLD AUTO: 0.07 THOUSAND/UL (ref 0–0.2)
IMM GRANULOCYTES NFR BLD AUTO: 1 % (ref 0–2)
INR PPP: 2.73 (ref 0.84–1.19)
LYMPHOCYTES # BLD AUTO: 1.15 THOUSANDS/ÂΜL (ref 0.6–4.47)
LYMPHOCYTES NFR BLD AUTO: 16 % (ref 14–44)
MCH RBC QN AUTO: 20.9 PG (ref 26.8–34.3)
MCHC RBC AUTO-ENTMCNC: 28.3 G/DL (ref 31.4–37.4)
MCV RBC AUTO: 74 FL (ref 82–98)
MONOCYTES # BLD AUTO: 0.44 THOUSAND/ÂΜL (ref 0.17–1.22)
MONOCYTES NFR BLD AUTO: 6 % (ref 4–12)
NEUTROPHILS # BLD AUTO: 5.39 THOUSANDS/ÂΜL (ref 1.85–7.62)
NEUTS SEG NFR BLD AUTO: 73 % (ref 43–75)
NRBC BLD AUTO-RTO: 0 /100 WBCS
PLATELET # BLD AUTO: 415 THOUSANDS/UL (ref 149–390)
PMV BLD AUTO: 10.2 FL (ref 8.9–12.7)
POTASSIUM SERPL-SCNC: 3.7 MMOL/L (ref 3.5–5.3)
PROTHROMBIN TIME: 29.9 SECONDS (ref 11.6–14.5)
QRS AXIS: 123 DEGREES
QRSD INTERVAL: 162 MS
QT INTERVAL: 496 MS
QTC INTERVAL: 496 MS
RBC # BLD AUTO: 3.4 MILLION/UL (ref 3.81–5.12)
RH BLD: POSITIVE
SODIUM SERPL-SCNC: 138 MMOL/L (ref 135–147)
SPECIMEN EXPIRATION DATE: NORMAL
T WAVE AXIS: -16 DEGREES
VENTRICULAR RATE: 60 BPM
WBC # BLD AUTO: 7.35 THOUSAND/UL (ref 4.31–10.16)

## 2024-03-08 PROCEDURE — 84484 ASSAY OF TROPONIN QUANT: CPT | Performed by: EMERGENCY MEDICINE

## 2024-03-08 PROCEDURE — 86900 BLOOD TYPING SEROLOGIC ABO: CPT | Performed by: EMERGENCY MEDICINE

## 2024-03-08 PROCEDURE — 93010 ELECTROCARDIOGRAM REPORT: CPT | Performed by: INTERNAL MEDICINE

## 2024-03-08 PROCEDURE — 85018 HEMOGLOBIN: CPT | Performed by: EMERGENCY MEDICINE

## 2024-03-08 PROCEDURE — 85730 THROMBOPLASTIN TIME PARTIAL: CPT | Performed by: EMERGENCY MEDICINE

## 2024-03-08 PROCEDURE — 86901 BLOOD TYPING SEROLOGIC RH(D): CPT | Performed by: EMERGENCY MEDICINE

## 2024-03-08 PROCEDURE — P9016 RBC LEUKOCYTES REDUCED: HCPCS

## 2024-03-08 PROCEDURE — 86902 BLOOD TYPE ANTIGEN DONOR EA: CPT

## 2024-03-08 PROCEDURE — 86922 COMPATIBILITY TEST ANTIGLOB: CPT

## 2024-03-08 PROCEDURE — 36415 COLL VENOUS BLD VENIPUNCTURE: CPT | Performed by: EMERGENCY MEDICINE

## 2024-03-08 PROCEDURE — 85014 HEMATOCRIT: CPT | Performed by: EMERGENCY MEDICINE

## 2024-03-08 PROCEDURE — 85025 COMPLETE CBC W/AUTO DIFF WBC: CPT | Performed by: EMERGENCY MEDICINE

## 2024-03-08 PROCEDURE — 99285 EMERGENCY DEPT VISIT HI MDM: CPT

## 2024-03-08 PROCEDURE — 86850 RBC ANTIBODY SCREEN: CPT | Performed by: EMERGENCY MEDICINE

## 2024-03-08 PROCEDURE — 36430 TRANSFUSION BLD/BLD COMPNT: CPT

## 2024-03-08 PROCEDURE — 85610 PROTHROMBIN TIME: CPT | Performed by: EMERGENCY MEDICINE

## 2024-03-08 PROCEDURE — 93005 ELECTROCARDIOGRAM TRACING: CPT

## 2024-03-08 PROCEDURE — 86921 COMPATIBILITY TEST INCUBATE: CPT

## 2024-03-08 PROCEDURE — 99284 EMERGENCY DEPT VISIT MOD MDM: CPT | Performed by: EMERGENCY MEDICINE

## 2024-03-08 PROCEDURE — 80048 BASIC METABOLIC PNL TOTAL CA: CPT | Performed by: EMERGENCY MEDICINE

## 2024-03-08 NOTE — TELEPHONE ENCOUNTER
"Critical result on Ely Hernadez, 1940, MRN [id number] Hemoglobin 6.7 with results verified  Collected 3/7/24 @1000  Ordering: Dr. Mcneil     [unfilled]    Please reply with \"Acknowledged\" upon receipt of this critical value. By acknowledging, you are agreeing to act upon this critical value. If this is not your patient, please advise.    Reported by Sadia Hope on 03/07/24 at 8:25 PM    Acknowledged by Aviva Mccabe via [employer].    "

## 2024-03-08 NOTE — DISCHARGE INSTRUCTIONS
Repeat hemoglobin after blood transfusion 7.8  Please follow up w heme-onc (number provided) for further eval regarding cause of anemia.

## 2024-03-08 NOTE — PROGRESS NOTES
Outpatient Care Management Note:  Inbasket  ADT ER alert received. Chart review completed.  Patient currently in the ED at [facility] at the recommendation of Sharyn PEREZ's office for evaluation of low hemoglobin of 6.7.  Will continue to monitor.

## 2024-03-08 NOTE — TELEPHONE ENCOUNTER
Response from April Treible:    This patient is a patient of Dr Holloway. Not [facility].  8:59 PM  20 days left  Is the result in the chart? I don’t see it.  9:09 PM  20 days left  RONAL, the lab called  9:10 PM  20 days left  Read  Do you want me to page the on call for Dr. Holloway's office?  9:10 PM  20 days left  Read  It’s just confusing because she was a pt at [facility] which is why [facility] ordered that lab a while back, but looks like she went to [facility] after her last discharge.    I will call the patient. I don’t want to delay her care.  9:13 PM  20 days left  ??  9:14 PM  20 days left  Read  She didn’t answer. I’m calling the alternate number but please page who is on call so they’re aware of the result and that I’m trying to call her to go to the ED  9:15 PM  20 days left

## 2024-03-08 NOTE — PROGRESS NOTES
Received critical lab page of a hemoglobin of 6.7.  This result came from [facility] and is not available to view in the chart.  I asked the triage center to let whoever is on call for Dr. Holloway know the situation.     Called patient and spoke with her son, informing him of her lab result. He states she has been feeling more tired and has “less pep in her step”. Patient got on the phone and informed of her lab result. I advised her to go to the ER as she needs a blood transfusion.   Patient stated she’s feeling tired, but not dizzy, not CP, and not SOB. She denies signs of bleeding, including blood in her stool.  She did not want to go to the ER tonight and states she will go in the morning. Advised patient if she starts feeling worse, she is to go to the ER tonight. Patient acknowledged instructions.

## 2024-03-08 NOTE — ED NOTES
Blood tranfusion increased from 100mL to 150mL/hr, pt tolerating infusion well. Denies any complaints at this time. Davis provided and lights dimmed for pt comfort.      Stephan Cortes RN  03/08/24 [id number]

## 2024-03-08 NOTE — ED NOTES
Per blood bank, pt with antibodies so blood is not ready at this time. Lab will send down when ready.      Stephan Cortes RN  03/08/24 [id number]

## 2024-03-09 LAB
ABO GROUP BLD BPU: NORMAL
BPU ID: NORMAL
CROSSMATCH: NORMAL
UNIT DISPENSE STATUS: NORMAL
UNIT PRODUCT CODE: NORMAL
UNIT PRODUCT VOLUME: 350 ML
UNIT RH: NORMAL

## 2024-03-11 ENCOUNTER — PATIENT OUTREACH (OUTPATIENT)
Age: 84
End: 2024-03-11

## 2024-03-11 ENCOUNTER — TELEPHONE (OUTPATIENT)
Dept: HEMATOLOGY ONCOLOGY | Facility: CLINIC | Age: 84
End: 2024-03-11

## 2024-03-11 NOTE — PROGRESS NOTES
Outpatient Care Management Note:  Inbasket  ADT ER alert received. Chart review completed.  Patient was evaluated at [facility] ED department on 03/08/24 for complaints of symptomatic anemia.  Per AVS patient was instructed to follow up with Heme/Onc.    Per chart review, patient has an appointment scheduled for tomorrow.    FUTURE APPOINTMENTS:  03/28/24 with Cardio - Remote Device Check  03/29/24 with Nephro - 6 mo F/U    Plan of care remains ongoing with planned future outreach later in the week.

## 2024-03-11 NOTE — TELEPHONE ENCOUNTER
New Patient Intake Form   Patient Details:    Ely Hernadez  1940 3/11/24  9:00am Staci ESCOBAR   Appointment Information   Who is calling to schedule? Patient   If not self, what is the caller's name?   N/A   DID YOU CONFIRM INSURANCE WITH PATIENT?    Referring provider Hospital Follow up    What is the diagnosis? Symptomatic anemia      Is there a confirmed tissue diagnosis?   No     Is there a biopsy ordered or pending?  Please specify dates  If yes, route to /[facility]        Is patient aware of diagnosis?   Yes     Have you had any imaging or labs done?  If yes, where?  (If imaging done outside of [facility], please remind patient to bring a disk.) No     If imaging done at outside facility, did you instruct patient to obtain discs and bring to visit?    Have you been seen by another Oncologist/Hematologist?  If so, who and where? no   Are the records in [facility] or Care Everywhere? yes   Does the patient have records at another facility/hospital?    If yes, Name of facility, city and state where facility is located. no     Did you instruct patient to have records faxed to [facility] and provide rightfax number?   N/A   Preferred [place]   Is the patient willing to be seen by another provider?  (This is for breast patients only)      Did you send new patient paperwork?  Email or mail?    Miscellaneous Information:

## 2024-03-12 ENCOUNTER — APPOINTMENT (OUTPATIENT)
Dept: LAB | Facility: HOSPITAL | Age: 84
End: 2024-03-12
Payer: COMMERCIAL

## 2024-03-12 ENCOUNTER — OFFICE VISIT (OUTPATIENT)
Dept: HEMATOLOGY ONCOLOGY | Facility: CLINIC | Age: 84
End: 2024-03-12
Payer: COMMERCIAL

## 2024-03-12 ENCOUNTER — TELEPHONE (OUTPATIENT)
Dept: HEMATOLOGY ONCOLOGY | Facility: CLINIC | Age: 84
End: 2024-03-12

## 2024-03-12 VITALS
SYSTOLIC BLOOD PRESSURE: 122 MMHG | OXYGEN SATURATION: 92 % | WEIGHT: 160 LBS | HEART RATE: 60 BPM | BODY MASS INDEX: 25.71 KG/M2 | TEMPERATURE: 100 F | DIASTOLIC BLOOD PRESSURE: 62 MMHG | HEIGHT: 66 IN | RESPIRATION RATE: 18 BRPM

## 2024-03-12 DIAGNOSIS — N18.32 STAGE 3B CHRONIC KIDNEY DISEASE (HCC): ICD-10-CM

## 2024-03-12 DIAGNOSIS — D50.9 IRON DEFICIENCY ANEMIA, UNSPECIFIED IRON DEFICIENCY ANEMIA TYPE: ICD-10-CM

## 2024-03-12 DIAGNOSIS — D50.9 IRON DEFICIENCY ANEMIA, UNSPECIFIED IRON DEFICIENCY ANEMIA TYPE: Primary | ICD-10-CM

## 2024-03-12 LAB
ANISOCYTOSIS BLD QL SMEAR: PRESENT
BASOPHILS # BLD AUTO: 0.03 THOUSANDS/ÂΜL (ref 0–0.1)
BASOPHILS NFR BLD AUTO: 1 % (ref 0–1)
BLD SMEAR INTERP: NORMAL
EOSINOPHIL # BLD AUTO: 0.17 THOUSAND/ÂΜL (ref 0–0.61)
EOSINOPHIL NFR BLD AUTO: 3 % (ref 0–6)
ERYTHROCYTE [DISTWIDTH] IN BLOOD BY AUTOMATED COUNT: 19.5 % (ref 11.6–15.1)
FERRITIN SERPL-MCNC: 11 NG/ML (ref 11–307)
HCT VFR BLD AUTO: 28.4 % (ref 34.8–46.1)
HGB BLD-MCNC: 8.4 G/DL (ref 11.5–15.4)
HYPERCHROMIA BLD QL SMEAR: PRESENT
IMM GRANULOCYTES # BLD AUTO: 0.07 THOUSAND/UL (ref 0–0.2)
IMM GRANULOCYTES NFR BLD AUTO: 1 % (ref 0–2)
IRON SATN MFR SERPL: 6 % (ref 15–50)
IRON SERPL-MCNC: 23 UG/DL (ref 50–212)
LDH SERPL-CCNC: 262 U/L (ref 140–271)
LYMPHOCYTES # BLD AUTO: 0.97 THOUSANDS/ÂΜL (ref 0.6–4.47)
LYMPHOCYTES NFR BLD AUTO: 15 % (ref 14–44)
MCH RBC QN AUTO: 22.8 PG (ref 26.8–34.3)
MCHC RBC AUTO-ENTMCNC: 29.6 G/DL (ref 31.4–37.4)
MCV RBC AUTO: 77 FL (ref 82–98)
MICROCYTES BLD QL AUTO: PRESENT
MONOCYTES # BLD AUTO: 0.38 THOUSAND/ÂΜL (ref 0.17–1.22)
MONOCYTES NFR BLD AUTO: 6 % (ref 4–12)
NEUTROPHILS # BLD AUTO: 4.82 THOUSANDS/ÂΜL (ref 1.85–7.62)
NEUTS SEG NFR BLD AUTO: 74 % (ref 43–75)
NRBC BLD AUTO-RTO: 0 /100 WBCS
PLATELET # BLD AUTO: 510 THOUSANDS/UL (ref 149–390)
PLATELET BLD QL SMEAR: ABNORMAL
PMV BLD AUTO: 10.3 FL (ref 8.9–12.7)
POLYCHROMASIA BLD QL SMEAR: PRESENT
RBC # BLD AUTO: 3.68 MILLION/UL (ref 3.81–5.12)
RBC MORPH BLD: PRESENT
RETICS # AUTO: ABNORMAL 10*3/UL (ref 14097–95744)
RETICS # CALC: 2.63 % (ref 0.37–1.87)
TIBC SERPL-MCNC: 395 UG/DL (ref 250–450)
UIBC SERPL-MCNC: 372 UG/DL (ref 155–355)
WBC # BLD AUTO: 6.44 THOUSAND/UL (ref 4.31–10.16)

## 2024-03-12 PROCEDURE — 85045 AUTOMATED RETICULOCYTE COUNT: CPT

## 2024-03-12 PROCEDURE — 82728 ASSAY OF FERRITIN: CPT

## 2024-03-12 PROCEDURE — 85025 COMPLETE CBC W/AUTO DIFF WBC: CPT

## 2024-03-12 PROCEDURE — 99215 OFFICE O/P EST HI 40 MIN: CPT | Performed by: PHYSICIAN ASSISTANT

## 2024-03-12 PROCEDURE — 83010 ASSAY OF HAPTOGLOBIN QUANT: CPT

## 2024-03-12 PROCEDURE — 36415 COLL VENOUS BLD VENIPUNCTURE: CPT

## 2024-03-12 PROCEDURE — 83550 IRON BINDING TEST: CPT

## 2024-03-12 PROCEDURE — 83615 LACTATE (LD) (LDH) ENZYME: CPT

## 2024-03-12 PROCEDURE — 83540 ASSAY OF IRON: CPT

## 2024-03-12 PROCEDURE — G2211 COMPLEX E/M VISIT ADD ON: HCPCS | Performed by: PHYSICIAN ASSISTANT

## 2024-03-12 RX ORDER — SODIUM CHLORIDE 9 MG/ML
20 INJECTION, SOLUTION INTRAVENOUS ONCE
Status: CANCELLED | OUTPATIENT
Start: 2024-03-20

## 2024-03-12 NOTE — PATIENT INSTRUCTIONS
Labs today   Call Gastroenterologist Dr. Oliver    Obtain weekly blood count on Thursdays for Staci Contreras PA-C   Keep weekly PT/INR for Dr. LOOMIS

## 2024-03-12 NOTE — PROGRESS NOTES
[facility]ATOLOGY ONCOLOGY SPECIALISTS [address] [id number]  Hematology Ambulatory Follow-Up  Ely Hernadez, 1940, [id number]  3/12/2024      Assessment and Plan   1. Iron deficiency anemia, unspecified iron deficiency anemia type  - CBC and differential; Future  - Iron Panel (Includes Ferritin, Iron Sat%, Iron, and TIBC); Future  - Retic Count; Future  - Haptoglobin; Future  - LD,Blood; Future  - Hemolysis Smear; Future  - Ambulatory referral to Gastroenterology; Future  - CBC and differential; Standing  2. Stage 3b chronic kidney disease (HCC)    In summary this is an 83-year-old female with past medical history as below who presents as a new consult for anemia.  Patient reports history of iron deficiency anemia many years ago treated with oral iron. Unfortunately I do not have these labs available for review.  She reportedly had GI workup at this time including video capsule endoscopy which was unrevealing.  Patient was recently hospitalized in December 2023 for symptomatic anemia, found to have hemoglobin of 5.6 requiring multiple blood transfusions and IV Venofer.  Patient underwent endoscopy/colonoscopy that were unrevealing.  She was recommended to have video capsule endoscopy which she has not had yet performed.  She complains of fatigue and shortness of breath since her discharge.  She was seen in the emergency room for the symptoms last week and was given 1 unit PRBC for symptomatic anemia.  She denies any bleeding or constitutional symptoms.    Discussion/Plan  Given Ely Hernadez's iron deficiency anemia, I recommend IV iron supplementation with IV Venofer. I reviewed the reasoning, process, and side effect profile of Venofer, which includes but is not limited to nausea, headache, hypotension, tattooing of the skin, and an anaphylactic reaction.  Ordered IV Venofer 300mg weekly x4.   The underlying etiology of Ely Hernadez's iron  deficiency anemia is likely occult, chronic blood loss. However, we will evaluate for other co-existing disorders that may be contributing to anemia such as hemolysis, bone marrow dysfunction etc. I explained that IV iron supplementation will only temporarily alleviate her iron deficiency anemia unless the underlying etiology is managed. Thus, I strongly encouraged her to follow up with gastroenterologist for consider of video capsule endoscopy. If negative, could consider repeat endoscopy with biopsies to rule out a malabsorption d/o.   Weekly CBC-D for now to monitor hgb, repeat iron panel in 2 months to confirm adequate iron supplementation and resolution of anemia.   Transfuse 1U PRBC for hgb <8g/dL given history of cardiac disease   Continue INR monitoring through Dr. Newberry off   RTC 8 weeks     Patient voiced agreement and understanding to the above.   Patient advised to call the Hematology/Oncology office with any questions and concerns regarding the above.    Barrier(s) to care: None  The patient is able to self care.    Staci Contreras PA-C   Medical Oncology/Hematology  [facility]    Subjective   No chief complaint on file.      History of present illness: 83-year-old female with past medical history of stroke, heart failure, A-fib, diabetes, COPD, aortic valve replacement in 2007 on Coumadin, and CKD stage III who presents as a new consult for anemia.    Patient endorses history of iron deficiency anemia many years ago.  She has never seen a hematologist or had issues with anemia during childhood. She reports she had GI workup at this time including video capsule endoscopy that was unrevealing of any bleeding source.  She was prescribed oral iron pills which she has been taking for many years that have recently been stopped by her nephrologist approximately 1 year ago.    On chart review back to 05/2019, hgb baseline was around 12-14g/dL until she's was hospitalized in December  2023 for symptomatic anemia, hemoglobin at this time was 5.6.  She received 4 unit PRBC and one-time dose of IV Venofer 300 mg during hospitalization.  EGD with push/colonoscopy were performed which did not identify bleeding source.  Patient was recommended to have video capsule endoscopy as an outpatient however she has not seen GI in the clinic since her discharge.    Patient went to the emergency room this past week on 03/08 for symptomatic anemia.  Hemoglobin at this time was 7.1.  She was given 1 unit PRBC.     Former smoker, quit in 2007.  She denies alcohol or drug use.  Patient is retired, previously worked in a factory.  No personal family history of cancer.    Interval history: Today the patient continues to complain of fatigue and dyspnea that is worse than her normal baseline.  She has occasional dizziness and lightheadedness.  No chest pain, abdominal pain, n/v or PICA. Had diarrhea x 3 weeks, now having soft stools. She denies hematochezia, melena, hematuria or vaginal bleeding.  Currently not on any iron therapy.  She is on vitamin B complex. No history of gastric bypass. No history of malabsorption or history of autoimmune disease. On PPI for many years.  She denies unintentional weight loss, fever, night sweats or lymphadenopathy.        03/12/24 :  Lab Results   Component Value Date    IRON 13 (L) 02/09/2024    TIBC 429 02/09/2024    FERRITIN 17 02/09/2024     Lab Results   Component Value Date    WBC 7.35 03/08/2024    HGB 7.8 (L) 03/08/2024    HCT 25.5 (L) 03/08/2024    MCV 74 (L) 03/08/2024     (H) 03/08/2024       Interval history: hospitalized in 02/2024 for COPD exacerbation, acute respiratory failure       Seen in ED 03/08 for symptomatic anemia, received 1U PRBC     Review of Systems   Constitutional:  Positive for fatigue and fever (low grade today. No other fevers at home). Negative for activity change, appetite change, chills and unexpected weight change.   HENT:  Negative for  nosebleeds.    Respiratory:  Positive for shortness of breath.    Cardiovascular:  Negative for chest pain and leg swelling.   Gastrointestinal:  Negative for blood in stool.   Genitourinary:  Negative for hematuria and vaginal bleeding.   Skin:  Negative for rash.   Hematological:  Negative for adenopathy. Does not bruise/bleed easily.   All other systems reviewed and are negative.      Patient Active Problem List   Diagnosis    Hyperlipidemia    Chronic anticoagulation    Hypertension, essential    Coronary artery disease of native artery of native heart with stable angina pectoris (HCC)    Simple chronic bronchitis (HCC)    Diabetes mellitus with neurological manifestation (HCC)    Hypothyroidism    Iron deficiency    GERD (gastroesophageal reflux disease)    Anemia    AVM (arteriovenous malformation) of small bowel, acquired with hemorrhage    Angiectasia    Other vascular disorders of intestine (HCC)    Angioedema    Aortic valve replaced    Cardiomyopathy (HCC)    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[facility])    Stage 3a chronic kidney disease    Chronic kidney disease-mineral and bone disorder    Primary osteoarthritis involving multiple joints    Paroxysmal atrial fibrillation (HCC)    Neutropenia associated with infection (HCC)    Herpes simplex labialis    Restrictive lung disease    Nocturnal hypoxemia    Stroke-like symptoms    Hypertensive urgency    Leg cramps    Supratherapeutic INR    Acute blood loss anemia    H/O mechanical aortic valve replacement    Subtherapeutic international normalized ratio (INR)    Acute anterior epistaxis    Acute respiratory failure with hypoxia (HCC)    Type 2 diabetes mellitus with stage 4 chronic kidney disease, with long-term current use of insulin (HCC)    Venous ulcer of right leg (HCC)    Chronic systolic (congestive) heart failure (HCC)    Lower extremity edema     Past Medical History:   Diagnosis Date    Anemia     Aneurysm of thoracic aorta (HCC)     Aortic valve disorder      Benign neoplasm of sigmoid colon     Cardiomyopathy (HCC)     last assessed: 10/10/2014    CHF (congestive heart failure) (HCC)     Chronic kidney disease     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HCC)     last assessed: 10/10/2014    Diabetic nephropathy (HCC)     RAMON (dyspnea on exertion)     GERD (gastroesophageal reflux disease)     History of emphysema (HCC)     Hyperlipidemia     TIA (transient ischemic attack)      Past Surgical History:   Procedure Laterality Date    AORTIC VALVE REPLACEMENT      CARDIAC PACEMAKER PLACEMENT      last assessed: 10/10/2014    CARDIAC SURGERY      aortic valve replacement    CHOLECYSTECTOMY      COLONOSCOPY      HYSTERECTOMY      INSERT / REPLACE / REMOVE PACEMAKER      KNEE SURGERY Right     OTHER SURGICAL HISTORY      Capsule ENDOscopy description: 2012    LA COLONOSCOPY FLX DX W/COLLJ SPEC WHEN PFRMD N/A 2018    Procedure: single balloon ENTEROSCOPY;  Surgeon: David Dennis MD;  Location: [facility];  Service: Gastroenterology    LA ESOPHAGOGASTRODUODENOSCOPY TRANSORAL DIAGNOSTIC N/A 2017    Procedure: EGD AND COLONOSCOPY;  Surgeon: Jay Mcleod III, MD;  Location: [facility];  Service: Gastroenterology     Family History   Problem Relation Age of Onset    No Known Problems Mother     No Known Problems Father      Social History     Socioeconomic History    Marital status:      Spouse name: None    Number of children: None    Years of education: None    Highest education level: None   Occupational History    None   Tobacco Use    Smoking status: Former     Current packs/day: 0.00     Average packs/day: 1 pack/day for 52.9 years (52.9 ttl pk-yrs)     Types: Cigarettes     Start date:      Quit date: 2007     Years since quittin.2     Passive exposure: Past    Smokeless tobacco: Former     Quit date:    Vaping Use    Vaping status: Never Used   Substance and Sexual Activity    Alcohol use: Never    Drug use: Never    Sexual activity:  Not Currently     Partners: Male   Other Topics Concern    None   Social History Narrative    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 BID Freestyle lancets BID    Living independently with spouse     Social Determinants of Health     Financial Resource Strain: Not on file   Food Insecurity: No Food Insecurity (2/8/2024)    Hunger Vital Sign     Worried About Running Out of Food in the Last Year: Never true     Ran Out of Food in the Last Year: Never true   Transportation Needs: No Transportation Needs (2/8/2024)    PRAPARE - Transportation     Lack of Transportation (Medical): No     Lack of Transportation (Non-Medical): No   Physical Activity: Inactive (5/26/2021)    Exercise Vital Sign     Days of Exercise per Week: 0 days     Minutes of Exercise per Session: 0 min   Stress: No Stress Concern Present (5/26/2021)    [employer] [place] [employer] - Occupational Stress Questionnaire     Feeling of Stress : Not at all   Social Connections: Not on file   Intimate Partner Violence: Not on file   Housing Stability: Low Risk  (2/8/2024)    Housing Stability Vital Sign     Unable to Pay for Housing in the Last Year: No     Number of Places Lived in the Last Year: 1     Unstable Housing in the Last Year: No       Current Outpatient Medications:     Accu-Chek FastClix Lancets MISC, Use 3 (three) times a day, Disp: 300 each, Rfl: 3    albuterol (Ventolin HFA) 90 mcg/act inhaler, Inhale 2 puffs every 6 (six) hours as needed for wheezing, Disp: 18 g, Rfl: 1    Ascorbic Acid (vitamin C) 1000 MG tablet, Take 1,000 mg by mouth daily, Disp: , Rfl:     aspirin (ECOTRIN LOW STRENGTH) 81 mg EC tablet, Take 1 tablet (81 mg total) by mouth daily Do not start before November 1, 2023., Disp: 30 tablet, Rfl: 0    BD Pen Needle Rosie U/F 32G X 4 MM MISC, USE DAILY, Disp: 100 each, Rfl: 1    Blood Glucose Monitoring Suppl (Accu-Chek Guide Me) w/Device KIT, USE 3 TIMES DAILY, Disp: 1 kit, Rfl: 2    Cholecalciferol  (Vitamin D3) 50 MCG (2000 UT) TABS, Take 2,000 Units by mouth daily, Disp: , Rfl:     Fluticasone-Salmeterol (Advair Diskus) 250-50 mcg/dose inhaler, Inhale 1 puff 2 (two) times a day Rinse mouth after use., Disp: 180 blister, Rfl: 0    furosemide (LASIX) 40 mg tablet, take 1 tablet by mouth daily, Disp: 90 tablet, Rfl: 3    gabapentin (NEURONTIN) 300 mg capsule, Take 1 capsule (300 mg total) by mouth 3 (three) times a day, Disp: 270 capsule, Rfl: 3    glipiZIDE (GLUCOTROL) 10 mg tablet, TAKE 1 TABLET BY MOUTH  TWICE DAILY BEFORE MEALS, Disp: 180 tablet, Rfl: 3    glucose blood (Accu-Chek Celeste Plus) test strip, Use 1 each 3 (three) times a day Use as instructed, Disp: 300 each, Rfl: 3    ipratropium (ATROVENT) 0.02 % nebulizer solution, Take 0.5 mg by nebulization 4 (four) times a day, Disp: , Rfl:     Lancets (freestyle) lancets, Check blood glucose 3 times daily, Disp: 300 each, Rfl: 0    Levemir FlexPen 100 units/mL injection pen, INJECT SUBCUTANEOUSLY 26 UNITS  DAILY AT BEDTIME, Disp: 30 mL, Rfl: 2    levothyroxine 50 mcg tablet, TAKE 1 TABLET BY MOUTH  DAILY, Disp: 90 tablet, Rfl: 3    metoprolol tartrate (LOPRESSOR) 50 mg tablet, TAKE ONE-HALF TABLET BY  MOUTH TWICE DAILY, Disp: 90 tablet, Rfl: 3    oxygen gas, Inhale 2 L/min daily at bedtime as needed home oxygen nightly, Disp: , Rfl:     pantoprazole (PROTONIX) 40 mg tablet, Take 1 tablet (40 mg total) by mouth 2 (two) times a day, Disp: 60 tablet, Rfl: 0    warfarin (COUMADIN) 5 mg tablet, TAKE 1 TO 2 TABLETS BY  MOUTH DAILY OR AS DIRECTED, Disp: 180 tablet, Rfl: 3    b complex vitamins capsule, Take 1 capsule by mouth daily, Disp: 30 capsule, Rfl: 2    predniSONE 10 mg tablet, Take 1 tablet (10 mg total) by mouth daily Taper: 40mg daily x 3 days, 30mg daily x 3 days, 20mg daily x 3 days, 10mg daily x 3 days (Patient not taking: Reported on 3/12/2024), Disp: 30 tablet, Rfl: 0  No Known Allergies    Objective   /62 (BP Location: Left arm, Patient  "Position: Sitting, Cuff Size: Adult)   Pulse 60   Temp 100 °F (37.8 °C) (Temporal)   Resp 18   Ht 5' 6\" (1.676 m)   Wt 72.6 kg (160 lb)   SpO2 92%   BMI 25.82 kg/m²    Physical Exam  Vitals reviewed.   HENT:      Head: Normocephalic.   Cardiovascular:      Rate and Rhythm: Normal rate and regular rhythm.      Heart sounds: Normal heart sounds.   Pulmonary:      Effort: Pulmonary effort is normal.      Breath sounds: Normal breath sounds.   Abdominal:      Palpations: Abdomen is soft.      Tenderness: There is abdominal tenderness.   Musculoskeletal:      Cervical back: Neck supple.   Skin:     Findings: No rash.   Neurological:      Mental Status: She is alert.         Result Review  Labs:  Admission on 03/08/2024, Discharged on 03/08/2024   Component Date Value Ref Range Status    ABO Grouping 03/08/2024 B   Final    Rh Factor 03/08/2024 Positive   Final    Antibody Screen 03/08/2024 Positive   Final    Specimen Expiration Date 03/08/2024 [id number]   Final    WBC 03/08/2024 7.35  4.31 - 10.16 Thousand/uL Final    RBC 03/08/2024 3.40 (L)  3.81 - 5.12 Million/uL Final    Hemoglobin 03/08/2024 7.1 (L)  11.5 - 15.4 g/dL Final    Hematocrit 03/08/2024 25.1 (L)  34.8 - 46.1 % Final    MCV 03/08/2024 74 (L)  82 - 98 fL Final    MCH 03/08/2024 20.9 (L)  26.8 - 34.3 pg Final    MCHC 03/08/2024 28.3 (L)  31.4 - 37.4 g/dL Final    RDW 03/08/2024 17.7 (H)  11.6 - 15.1 % Final    MPV 03/08/2024 10.2  8.9 - 12.7 fL Final    Platelets 03/08/2024 415 (H)  149 - 390 Thousands/uL Final    nRBC 03/08/2024 0  /100 WBCs Final    Neutrophils Relative 03/08/2024 73  43 - 75 % Final    Immature Grans % 03/08/2024 1  0 - 2 % Final    Lymphocytes Relative 03/08/2024 16  14 - 44 % Final    Monocytes Relative 03/08/2024 6  4 - 12 % Final    Eosinophils Relative 03/08/2024 4  0 - 6 % Final    Basophils Relative 03/08/2024 0  0 - 1 % Final    Neutrophils Absolute 03/08/2024 5.39  1.85 - 7.62 Thousands/µL Final    Absolute Immature Grans " "03/08/2024 0.07  0.00 - 0.20 Thousand/uL Final    Absolute Lymphocytes 03/08/2024 1.15  0.60 - 4.47 Thousands/µL Final    Absolute Monocytes 03/08/2024 0.44  0.17 - 1.22 Thousand/µL Final    Eosinophils Absolute 03/08/2024 0.27  0.00 - 0.61 Thousand/µL Final    Basophils Absolute 03/08/2024 0.03  0.00 - 0.10 Thousands/µL Final    Sodium 03/08/2024 138  135 - 147 mmol/L Final    Potassium 03/08/2024 3.7  3.5 - 5.3 mmol/L Final    Chloride 03/08/2024 104  96 - 108 mmol/L Final    CO2 03/08/2024 26  21 - 32 mmol/L Final    ANION GAP 03/08/2024 8  mmol/L Final    BUN 03/08/2024 20  5 - 25 mg/dL Final    Creatinine 03/08/2024 1.27  0.60 - 1.30 mg/dL Final    Standardized to IDMS reference method    Glucose 03/08/2024 128  65 - 140 mg/dL Final    If the patient is fasting, the ADA then defines impaired fasting glucose as > 100 mg/dL and diabetes as > or equal to 123 mg/dL.    Calcium 03/08/2024 8.7  8.4 - 10.2 mg/dL Final    eGFR 03/08/2024 39  ml/min/1.73sq m Final    hs TnI 0hr 03/08/2024 19  \"Refer to ACS Flowchart\"- see link ng/L Final    Comment:                                              Initial (time 0) result  If >=50 ng/L, Myocardial injury suggested ;  Type of myocardial injury and treatment strategy  to be determined.  If 5-49 ng/L, a delta result at 2 hours and or 4 hours will be needed to further evaluate.  If <4 ng/L, and chest pain has been >3 hours since onset, patient may qualify for discharge based on the HEART score in the ED.  If <5 ng/L and <3hours since onset of chest pain, a delta result at 2 hours will be needed to further evaluate.    HS Troponin 99th Percentile URL of a Health Population=12 ng/L with a 95% Confidence Interval of 8-18 ng/L.    Second Troponin (time 2 hours)  If calculated delta >= 20 ng/L,  Myocardial injury suggested ; Type of myocardial injury and treatment strategy to be determined.  If 5-49 ng/L and the calculated delta is 5-19 ng/L, consult medical service for evaluation.  " "Continue evaluation for ischemia on ecg and other possible etiology and repeat hs troponin at 4 hours.  If delta                            is <5 ng/L at 2 hours, consider discharge based on risk stratification via the HEART score (if in ED), or JOI risk score in IP/Observation.    HS Troponin 99th Percentile URL of a Health Population=12 ng/L with a 95% Confidence Interval of 8-18 ng/L.    Ventricular Rate 03/08/2024 60  BPM Final    Atrial Rate 03/08/2024 326  BPM Final    QRSD Interval 03/08/2024 162  ms Final    QT Interval 03/08/2024 496  ms Final    QTC Interval 03/08/2024 496  ms Final    QRS [place] 03/08/2024 123  degrees Final    T Wave Axis 03/08/2024 -16  degrees Final    Protime 03/08/2024 29.9 (H)  11.6 - 14.5 seconds Final    INR 03/08/2024 2.73 (H)  0.84 - 1.19 Final    PTT 03/08/2024 38 (H)  23 - 37 seconds Final    Therapeutic Heparin Range =  60-90 seconds    hs TnI 2hr 03/08/2024 16  \"Refer to ACS Flowchart\"- see link ng/L Final    Comment:                                              Initial (time 0) result  If >=50 ng/L, Myocardial injury suggested ;  Type of myocardial injury and treatment strategy  to be determined.  If 5-49 ng/L, a delta result at 2 hours and or 4 hours will be needed to further evaluate.  If <4 ng/L, and chest pain has been >3 hours since onset, patient may qualify for discharge based on the HEART score in the ED.  If <5 ng/L and <3hours since onset of chest pain, a delta result at 2 hours will be needed to further evaluate.    HS Troponin 99th Percentile URL of a Health Population=12 ng/L with a 95% Confidence Interval of 8-18 ng/L.    Second Troponin (time 2 hours)  If calculated delta >= 20 ng/L,  Myocardial injury suggested ; Type of myocardial injury and treatment strategy to be determined.  If 5-49 ng/L and the calculated delta is 5-19 ng/L, consult medical service for evaluation.  Continue evaluation for ischemia on ecg and other possible etiology and repeat hs " troponin at 4 hours.  If delta                            is <5 ng/L at 2 hours, consider discharge based on risk stratification via the HEART score (if in ED), or JOI risk score in IP/Observation.    HS Troponin 99th Percentile URL of a Health Population=12 ng/L with a 95% Confidence Interval of 8-18 ng/L.    Delta 2hr hsTnI 03/08/2024 -3  <20 ng/L Final    Unit Product Code 03/09/2024 [id number]   Final-Edited    Unit Number 03/09/2024 [id number]   Final-Edited    Unit ABO 03/09/2024 B   Final-Edited    Unit RH 03/09/2024 POS   Final-Edited    Crossmatch 03/09/2024 Compatible   Final    Unit Dispense Status 03/09/2024 Presumed Trans   Final-Edited    Unit Product Volume 03/09/2024 350  ml Final-Edited    Hemoglobin 03/08/2024 7.8 (L)  11.5 - 15.4 g/dL Final    Hematocrit 03/08/2024 25.5 (L)  34.8 - 46.1 % Final   Telephone on 02/21/2024   Component Date Value Ref Range Status    Severity 09/12/2023 Normal   Final    Right Eye Diabetic Retinopathy 09/12/2023 None   Final    Left Eye Diabetic Retinopathy 09/12/2023 None   Final   Office Visit on 02/21/2024   Component Date Value Ref Range Status    Hemoglobin A1C 02/21/2024 6.8 (A)  6.5 Final   No results displayed because visit has over 200 results.          Imaging:   I reviewed relevant imaging    Please note:  This report has been generated by a voice recognition software system. Therefore there may be syntax, spelling, and/or grammatical errors. Please call if you have any questions.

## 2024-03-12 NOTE — TELEPHONE ENCOUNTER
----- Message from Staci Contreras PA-C sent at 3/12/2024  2:04 PM EDT -----  Regarding: Iron infusions  Hello.     I saw patient was scheduled for iron infusion in 2 weeks. I spoke to prior auth team. She does not need prior auth. Is there anyway we can get her in sooner please for symptomatic anemia.    Thank you,   Staci FOWLER

## 2024-03-13 LAB — HAPTOGLOB SERPL-MCNC: 71 MG/DL (ref 41–333)

## 2024-03-14 ENCOUNTER — PROCEDURE VISIT (OUTPATIENT)
Dept: GASTROENTEROLOGY | Facility: CLINIC | Age: 84
End: 2024-03-14
Payer: COMMERCIAL

## 2024-03-14 DIAGNOSIS — D50.0 IRON DEFICIENCY ANEMIA SECONDARY TO BLOOD LOSS (CHRONIC): Primary | ICD-10-CM

## 2024-03-14 PROCEDURE — 91110 GI TRC IMG INTRAL ESOPH-ILE: CPT | Performed by: INTERNAL MEDICINE

## 2024-03-15 NOTE — ED PROVIDER NOTES
History  Chief Complaint   Patient presents with    Abnormal Lab     Pt states she was told by her pcp to come in for a blood transfusion because her Hgb was low (6.7), pt c/o increased fatigue and weakness      83-year-old female sent in by primary care provider because of low hemoglobin count outpatient.  Patient was sent to emergency department for blood transfusion.  This has happened in the past.  She is experiencing fatigue, pallor, generalized weakness.  Denies chest pain or palpitations, no shortness of breath.  No other complaints at this time.    Denies blood in stool, denies melena.  States that she has had multiple episodes of anemia in the past however it has not been identified the cause of her anemia.  Patient is to see heme-onc in the future.          Prior to Admission Medications   Prescriptions Last Dose Informant Patient Reported? Taking?   Accu-Chek FastClix Lancets MISC  Self, Spouse/Significant Other No No   Sig: Use 3 (three) times a day   Ascorbic Acid (vitamin C) 1000 MG tablet  Self, Spouse/Significant Other Yes No   Sig: Take 1,000 mg by mouth daily   BD Pen Needle Rosie U/F 32G X 4 MM MISC  Self, Spouse/Significant Other No No   Sig: USE DAILY   Blood Glucose Monitoring Suppl (Accu-Chek Guide Me) w/Device KIT  Self, Spouse/Significant Other No No   Sig: USE 3 TIMES DAILY   Cholecalciferol (Vitamin D3) 50 MCG (2000 UT) TABS  Self, Spouse/Significant Other Yes No   Sig: Take 2,000 Units by mouth daily   Fluticasone-Salmeterol (Advair Diskus) 250-50 mcg/dose inhaler  Self, Spouse/Significant Other No No   Sig: Inhale 1 puff 2 (two) times a day Rinse mouth after use.   Lancets (freestyle) lancets  Self, Spouse/Significant Other No No   Sig: Check blood glucose 3 times daily   Levemir FlexPen 100 units/mL injection pen  Self, Spouse/Significant Other No No   Sig: INJECT SUBCUTANEOUSLY 26 UNITS  DAILY AT BEDTIME   albuterol (Ventolin HFA) 90 mcg/act inhaler  Self, Spouse/Significant Other No No    Sig: Inhale 2 puffs every 6 (six) hours as needed for wheezing   aspirin (ECOTRIN LOW STRENGTH) 81 mg EC tablet  Self, Spouse/Significant Other No No   Sig: Take 1 tablet (81 mg total) by mouth daily Do not start before November 1, 2023.   b complex vitamins capsule  Self No No   Sig: Take 1 capsule by mouth daily   furosemide (LASIX) 40 mg tablet  Self, Spouse/Significant Other No No   Sig: take 1 tablet by mouth daily   gabapentin (NEURONTIN) 300 mg capsule  Self, Spouse/Significant Other No No   Sig: Take 1 capsule (300 mg total) by mouth 3 (three) times a day   glipiZIDE (GLUCOTROL) 10 mg tablet  Self, Spouse/Significant Other No No   Sig: TAKE 1 TABLET BY MOUTH  TWICE DAILY BEFORE MEALS   glucose blood (Accu-Chek Celeste Plus) test strip  Self, Spouse/Significant Other No No   Sig: Use 1 each 3 (three) times a day Use as instructed   ipratropium (ATROVENT) 0.02 % nebulizer solution  Self, Spouse/Significant Other Yes No   Sig: Take 0.5 mg by nebulization 4 (four) times a day   levothyroxine 50 mcg tablet  Self, Spouse/Significant Other No No   Sig: TAKE 1 TABLET BY MOUTH  DAILY   metoprolol tartrate (LOPRESSOR) 50 mg tablet  Self, Spouse/Significant Other No No   Sig: TAKE ONE-HALF TABLET BY  MOUTH TWICE DAILY   oxygen gas  Self, Spouse/Significant Other Yes No   Sig: Inhale 2 L/min daily at bedtime as needed home oxygen nightly   pantoprazole (PROTONIX) 40 mg tablet  Self, Spouse/Significant Other No No   Sig: Take 1 tablet (40 mg total) by mouth 2 (two) times a day   predniSONE 10 mg tablet  Self, Spouse/Significant Other No No   Sig: Take 1 tablet (10 mg total) by mouth daily Taper: 40mg daily x 3 days, 30mg daily x 3 days, 20mg daily x 3 days, 10mg daily x 3 days   Patient not taking: Reported on 3/12/2024   warfarin (COUMADIN) 5 mg tablet  Self, Spouse/Significant Other No No   Sig: TAKE 1 TO 2 TABLETS BY  MOUTH DAILY OR AS DIRECTED      Facility-Administered Medications: None       Past Medical History:    Diagnosis Date    Anemia     Aneurysm of thoracic aorta (HCC)     Aortic valve disorder     Benign neoplasm of sigmoid colon     Cardiomyopathy (HCC)     last assessed: 10/10/2014    CHF (congestive heart failure) (HCC)     Chronic kidney disease     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HCC)     last assessed: 10/10/2014    Diabetic nephropathy (HCC)     RAMON (dyspnea on exertion)     GERD (gastroesophageal reflux disease)     History of emphysema (HCC)     Hyperlipidemia     TIA (transient ischemic attack)        Past Surgical History:   Procedure Laterality Date    AORTIC VALVE REPLACEMENT      CARDIAC PACEMAKER PLACEMENT      last assessed: 10/10/2014    CARDIAC SURGERY      aortic valve replacement    CHOLECYSTECTOMY      COLONOSCOPY      HYSTERECTOMY      INSERT / REPLACE / REMOVE PACEMAKER      KNEE SURGERY Right     OTHER SURGICAL HISTORY      Capsule ENDOscopy description: 2012    UT COLONOSCOPY FLX DX W/COLLJ SPEC WHEN PFRMD N/A 2018    Procedure: single balloon ENTEROSCOPY;  Surgeon: David Dennis MD;  Location:  GI LAB;  Service: Gastroenterology    UT ESOPHAGOGASTRODUODENOSCOPY TRANSORAL DIAGNOSTIC N/A 2017    Procedure: EGD AND COLONOSCOPY;  Surgeon: Jay Mcleod III, MD;  Location: [facility];  Service: Gastroenterology       Family History   Problem Relation Age of Onset    No Known Problems Mother     No Known Problems Father      I have reviewed and agree with the history as documented.    E-Cigarette/Vaping    E-Cigarette Use Never User      E-Cigarette/Vaping Substances    Nicotine No     THC No     CBD No     Flavoring No     Other No     Unknown No      Social History     Tobacco Use    Smoking status: Former     Current packs/day: 0.00     Average packs/day: 1 pack/day for 52.9 years (52.9 ttl pk-yrs)     Types: Cigarettes     Start date:      Quit date: 2007     Years since quittin.3     Passive exposure: Past    Smokeless tobacco: Former     Quit date:     Vaping Use    Vaping status: Never Used   Substance Use Topics    Alcohol use: Never    Drug use: Never       Review of Systems   Constitutional:  Positive for fatigue. Negative for chills and fever.   Respiratory:  Negative for chest tightness and shortness of breath.    Cardiovascular:  Negative for chest pain and leg swelling.   Gastrointestinal:  Negative for abdominal pain, nausea and vomiting.   Musculoskeletal:  Negative for back pain and neck pain.   Skin:  Positive for pallor. Negative for wound.   Neurological:  Positive for light-headedness. Negative for dizziness and headaches.       Physical Exam  Physical Exam  Vitals reviewed.   Constitutional:       General: She is not in acute distress.     Appearance: She is well-developed. She is not diaphoretic.   HENT:      Head: Normocephalic and atraumatic.      Mouth/Throat:      Mouth: Mucous membranes are dry.   Eyes:      General: No scleral icterus.        Right eye: No discharge.         Left eye: No discharge.      Conjunctiva/sclera: Conjunctivae normal.      Pupils: Pupils are equal, round, and reactive to light.   Neck:      Vascular: No JVD.   Cardiovascular:      Rate and Rhythm: Normal rate and regular rhythm.      Heart sounds: Normal heart sounds. No murmur heard.     No friction rub. No gallop.   Pulmonary:      Effort: Pulmonary effort is normal. No respiratory distress.      Breath sounds: Normal breath sounds. No wheezing or rales.   Chest:      Chest wall: No tenderness.   Abdominal:      General: Bowel sounds are normal. There is no distension.      Palpations: Abdomen is soft.      Tenderness: There is no abdominal tenderness. There is no guarding.   Musculoskeletal:         General: No tenderness or deformity. Normal range of motion.      Cervical back: Normal range of motion and neck supple.      Right lower leg: No edema.      Left lower leg: No edema.   Skin:     General: Skin is warm and dry.      Coloration: Skin is pale.       Findings: No erythema or rash.   Neurological:      Mental Status: She is alert and oriented to person, place, and time.      Cranial Nerves: No cranial nerve deficit.   Psychiatric:         Behavior: Behavior normal.         Vital Signs  ED Triage Vitals [03/08/24 0826]   Temperature Pulse Respirations Blood Pressure SpO2   97.6 °F (36.4 °C) 65 18 153/63 97 %      Temp Source Heart Rate Source Patient Position - Orthostatic VS BP Location FiO2 (%)   Temporal Monitor Sitting Left arm --      Pain Score       --           Vitals:    03/08/24 1400 03/08/24 1504 03/08/24 1518 03/08/24 1600   BP: 134/58 98/56 161/66 166/67   Pulse: 60 60 59 60   Patient Position - Orthostatic VS:  Lying Lying Lying         Visual Acuity      ED Medications  Medications - No data to display    Diagnostic Studies  Results Reviewed       Procedure Component Value Units Date/Time    Hemoglobin and hematocrit, blood [[id number]]  (Abnormal) Collected: 03/08/24 1518    Lab Status: Final result Specimen: Blood from Arm, Right Updated: 03/08/24 1530     Hemoglobin 7.8 g/dL      Hematocrit 25.5 %     HS Troponin I 2hr [[id number]]  (Normal) Collected: 03/08/24 1103    Lab Status: Final result Specimen: Blood Updated: 03/08/24 1127     hs TnI 2hr 16 ng/L      Delta 2hr hsTnI -3 ng/L     Protime-INR [[id number]]  (Abnormal) Collected: 03/08/24 0910    Lab Status: Final result Specimen: Blood from Arm, Right Updated: 03/08/24 0937     Protime 29.9 seconds      INR 2.73    APTT [[id number]]  (Abnormal) Collected: 03/08/24 0910    Lab Status: Final result Specimen: Blood from Arm, Right Updated: 03/08/24 0937     PTT 38 seconds     HS Troponin 0hr (reflex protocol) [[id number]]  (Normal) Collected: 03/08/24 0852    Lab Status: Final result Specimen: Blood from Arm, Right Updated: 03/08/24 0920     hs TnI 0hr 19 ng/L     Basic metabolic panel [[id number]] Collected: 03/08/24 0852    Lab Status: Final result Specimen: Blood from Arm, Right Updated: 03/08/24  0919     Sodium 138 mmol/L      Potassium 3.7 mmol/L      Chloride 104 mmol/L      CO2 26 mmol/L      ANION GAP 8 mmol/L      BUN 20 mg/dL      Creatinine 1.27 mg/dL      Glucose 128 mg/dL      Calcium 8.7 mg/dL      eGFR 39 ml/min/1.73sq m     Narrative:      National Kidney Disease Foundation guidelines for Chronic Kidney Disease (CKD):     Stage 1 with normal or high GFR (GFR > 90 mL/min/1.73 square meters)    Stage 2 Mild CKD (GFR = 60-89 mL/min/1.73 square meters)    Stage 3A Moderate CKD (GFR = 45-59 mL/min/1.73 square meters)    Stage 3B Moderate CKD (GFR = 30-44 mL/min/1.73 square meters)    Stage 4 Severe CKD (GFR = 15-29 mL/min/1.73 square meters)    Stage 5 End Stage CKD (GFR <15 mL/min/1.73 square meters)  Note: GFR calculation is accurate only with a steady state creatinine    CBC and differential [[id number]]  (Abnormal) Collected: 03/08/24 0852    Lab Status: Final result Specimen: Blood from Arm, Right Updated: 03/08/24 0917     WBC 7.35 Thousand/uL      RBC 3.40 Million/uL      Hemoglobin 7.1 g/dL      Hematocrit 25.1 %      MCV 74 fL      MCH 20.9 pg      MCHC 28.3 g/dL      RDW 17.7 %      MPV 10.2 fL      Platelets 415 Thousands/uL      nRBC 0 /100 WBCs      Neutrophils Relative 73 %      Immature Grans % 1 %      Lymphocytes Relative 16 %      Monocytes Relative 6 %      Eosinophils Relative 4 %      Basophils Relative 0 %      Neutrophils Absolute 5.39 Thousands/µL      Absolute Immature Grans 0.07 Thousand/uL      Absolute Lymphocytes 1.15 Thousands/µL      Absolute Monocytes 0.44 Thousand/µL      Eosinophils Absolute 0.27 Thousand/µL      Basophils Absolute 0.03 Thousands/µL                    No orders to display              Procedures  Procedures         ED Course  ED Course as of 03/15/24 0239   Fri Mar 08, 2024   0952 Hemoglobin(!): 7.1   1602 Hemoglobin(!): 7.8                               SBIRT 20yo+      Flowsheet Row Most Recent Value   Initial Alcohol Screen: US AUDIT-C     1. How  often do you have a drink containing alcohol? 0 Filed at: 03/08/2024 0829   2. How many drinks containing alcohol do you have on a typical day you are drinking?  0 Filed at: 03/08/2024 0829   3b. FEMALE Any Age, or MALE 65+: How often do you have 4 or more drinks on one occassion? 0 Filed at: 03/08/2024 0829   Audit-C Score 0 Filed at: 03/08/2024 0829   RY: How many times in the past year have you...    Used an illegal drug or used a prescription medication for non-medical reasons? Never Filed at: 03/08/2024 0829                      Medical Decision Making  Symptomatic anemia without evidence of endorgan damage.  Patient understands and signs blood consent, as explained to her by her physician.  Given blood transfusion.  Feels improved.  Patient discharged with referral to heme-onc.    Amount and/or Complexity of Data Reviewed  Labs: ordered. Decision-making details documented in ED Course.             Disposition  Final diagnoses:   Symptomatic anemia     Time reflects when diagnosis was documented in both MDM as applicable and the Disposition within this note       Time User Action Codes Description Comment    3/8/2024  4:03 PM Aminata Ledesma Add [D64.9] Symptomatic anemia           ED Disposition       ED Disposition   Discharge    Condition   Stable    Date/Time   Fri Mar 8, 2024 1603    Comment   Ely Green discharge to home/self care.                   Follow-up Information       Follow up With Specialties Details Why Contact Info Additional Information    [facility] [place] [facility] Schedule an appointment as soon as possible for a visit  For follow up to ensure improvement, and for further testing and treatment as needed [address] [address] [phone] [facility] [place], [address] [place] [address], [place], [place], [address] [phone]             Discharge Medication List as of 3/8/2024  4:04 PM        CONTINUE these medications which have NOT CHANGED    Details   !! Accu-Chek FastClix Lancets MISC Use 3 (three) times a day, Starting Mon 1/22/2024, Normal      albuterol (Ventolin HFA) 90 mcg/act inhaler Inhale 2 puffs every 6 (six) hours as needed for wheezing, Starting Fri 7/21/2023, Normal      Ascorbic Acid (vitamin C) 1000 MG tablet Take 1,000 mg by mouth daily, Historical Med      aspirin (ECOTRIN LOW STRENGTH) 81 mg EC tablet Take 1 tablet (81 mg total) by mouth daily Do not start before November 1, 2023., Starting Wed 11/1/2023, No Print      b complex vitamins capsule Take 1 capsule by mouth daily, Starting Wed 11/1/2023, Until Mon 3/4/2024, Normal      BD Pen Needle Rosie U/F 32G X 4 MM MISC USE DAILY, Starting Mon 8/21/2023, Normal      Blood Glucose Monitoring Suppl (Accu-Chek Guide Me) w/Device KIT USE 3 TIMES DAILY, Normal      Cholecalciferol (Vitamin D3) 50 MCG (2000 UT) TABS Take 2,000 Units by mouth daily, Historical Med      Fluticasone-Salmeterol (Advair Diskus) 250-50 mcg/dose inhaler Inhale 1 puff 2 (two) times a day Rinse mouth after use., Starting Tue 2/27/2024, Until Mon 5/27/2024, Normal      furosemide (LASIX) 40 mg tablet take 1 tablet by mouth daily, Normal      gabapentin (NEURONTIN) 300 mg capsule Take 1 capsule (300 mg total) by mouth 3 (three) times a day, Starting Fri 8/4/2023, Normal      glipiZIDE (GLUCOTROL) 10 mg tablet TAKE 1 TABLET BY MOUTH  TWICE DAILY BEFORE MEALS, Normal      glucose blood (Accu-Chek Celeste Plus) test strip Use 1 each 3 (three) times a day Use as instructed, Starting Fri 9/23/2022, Normal      ipratropium (ATROVENT) 0.02 % nebulizer solution Take 0.5 mg by nebulization 4 (four) times a day, Historical Med      !! Lancets (freestyle) lancets Check blood glucose 3 times daily, Normal      Levemir FlexPen 100 units/mL injection pen INJECT SUBCUTANEOUSLY 26 UNITS  DAILY AT BEDTIME, Normal       levothyroxine 50 mcg tablet TAKE 1 TABLET BY MOUTH  DAILY, Normal      metoprolol tartrate (LOPRESSOR) 50 mg tablet TAKE ONE-HALF TABLET BY  MOUTH TWICE DAILY, Normal      oxygen gas Inhale 2 L/min daily at bedtime as needed home oxygen nightly, Historical Med      pantoprazole (PROTONIX) 40 mg tablet Take 1 tablet (40 mg total) by mouth 2 (two) times a day, Starting Wed 2/14/2024, Until Fri 3/15/2024, Normal      predniSONE 10 mg tablet Take 1 tablet (10 mg total) by mouth daily Taper: 40mg daily x 3 days, 30mg daily x 3 days, 20mg daily x 3 days, 10mg daily x 3 days, Starting Wed 2/14/2024, Normal      warfarin (COUMADIN) 5 mg tablet TAKE 1 TO 2 TABLETS BY  MOUTH DAILY OR AS DIRECTED, Normal       !! - Potential duplicate medications found. Please discuss with provider.          No discharge procedures on file.    PDMP Review         Value Time User    PDMP Reviewed  Yes 12/23/2023  8:29 AM Mo Cornejo DO            ED Provider  Electronically Signed by             Aminata Ledesma DO  03/15/24 0239

## 2024-03-18 ENCOUNTER — TELEPHONE (OUTPATIENT)
Dept: GASTROENTEROLOGY | Facility: CLINIC | Age: 84
End: 2024-03-18

## 2024-03-18 ENCOUNTER — ANTICOAG VISIT (OUTPATIENT)
Dept: CARDIOLOGY CLINIC | Facility: CLINIC | Age: 84
End: 2024-03-18

## 2024-03-18 ENCOUNTER — TELEPHONE (OUTPATIENT)
Dept: CARDIOLOGY CLINIC | Facility: CLINIC | Age: 84
End: 2024-03-18

## 2024-03-18 NOTE — TELEPHONE ENCOUNTER
S/w pt. INR is 2.2. Advised sd and retest in 1 week. Pt states that she wants to do weekly for now.

## 2024-03-19 ENCOUNTER — TELEPHONE (OUTPATIENT)
Dept: HEMATOLOGY ONCOLOGY | Facility: CLINIC | Age: 84
End: 2024-03-19

## 2024-03-19 ENCOUNTER — TELEPHONE (OUTPATIENT)
Dept: NEPHROLOGY | Facility: CLINIC | Age: 84
End: 2024-03-19

## 2024-03-19 NOTE — PROGRESS NOTES
She is an 83-year-old female with iron deficiency anemia and melena with recent hospitalization.  She had endoscopy colonoscopy done during her hospitalization.  A push enteroscopy done by Dr. Queen on December 15, 2023 was normal.  A colonoscopy on December 18, 2023 showed diverticulosis and 2 small small colon polyps.  He has a history of small bowel angiectasia in 2018.      The video capsule endoscopy shows 1 small nonbleeding angiectasia in the duodenum or upper jejunum.  This should be reachable by push enteroscopy or possibly even endoscopy.  No other small bowel bleeding lesions were noted    I just left a message on her machine regarding these results we will schedule a push enteroscopy.  She will need bridge therapy anticoagulation given her mechanical aortic valve and history of atrial fibrillation.  We will reach out to cardiology for instructions.

## 2024-03-19 NOTE — TELEPHONE ENCOUNTER
She has small bowel angiectasia and needs to have an EGD with push enteroscopy.  Please schedule her.  I just left a message on her machine.  She is on warfarin and I reached out to Dr. Layton for warfarin instructions.

## 2024-03-20 ENCOUNTER — PATIENT OUTREACH (OUTPATIENT)
Age: 84
End: 2024-03-20

## 2024-03-20 ENCOUNTER — HOSPITAL ENCOUNTER (OUTPATIENT)
Dept: INFUSION CENTER | Facility: CLINIC | Age: 84
Discharge: HOME | End: 2024-03-20
Payer: COMMERCIAL

## 2024-03-20 ENCOUNTER — APPOINTMENT (OUTPATIENT)
Dept: LAB | Facility: HOSPITAL | Age: 84
End: 2024-03-20
Attending: INTERNAL MEDICINE
Payer: COMMERCIAL

## 2024-03-20 VITALS
DIASTOLIC BLOOD PRESSURE: 60 MMHG | TEMPERATURE: 96.3 F | OXYGEN SATURATION: 97 % | RESPIRATION RATE: 18 BRPM | HEART RATE: 63 BPM | SYSTOLIC BLOOD PRESSURE: 152 MMHG

## 2024-03-20 DIAGNOSIS — M89.9 CHRONIC KIDNEY DISEASE-MINERAL AND BONE DISORDER: ICD-10-CM

## 2024-03-20 DIAGNOSIS — N18.9 CHRONIC KIDNEY DISEASE-MINERAL AND BONE DISORDER: ICD-10-CM

## 2024-03-20 DIAGNOSIS — I25.5 ISCHEMIC CARDIOMYOPATHY: ICD-10-CM

## 2024-03-20 DIAGNOSIS — Z95.2 H/O MECHANICAL AORTIC VALVE REPLACEMENT: ICD-10-CM

## 2024-03-20 DIAGNOSIS — D50.9 IRON DEFICIENCY ANEMIA, UNSPECIFIED IRON DEFICIENCY ANEMIA TYPE: Primary | ICD-10-CM

## 2024-03-20 DIAGNOSIS — I48.0 PAROXYSMAL ATRIAL FIBRILLATION (HCC): ICD-10-CM

## 2024-03-20 DIAGNOSIS — E83.9 CHRONIC KIDNEY DISEASE-MINERAL AND BONE DISORDER: ICD-10-CM

## 2024-03-20 DIAGNOSIS — N18.31 STAGE 3A CHRONIC KIDNEY DISEASE (HCC): ICD-10-CM

## 2024-03-20 LAB
25(OH)D3 SERPL-MCNC: 60.5 NG/ML (ref 30–100)
ANION GAP SERPL CALCULATED.3IONS-SCNC: 11 MMOL/L (ref 4–13)
BACTERIA UR QL AUTO: NORMAL /HPF
BASOPHILS # BLD AUTO: 0.05 THOUSANDS/ÂΜL (ref 0–0.1)
BASOPHILS NFR BLD AUTO: 1 % (ref 0–1)
BILIRUB UR QL STRIP: NEGATIVE
BUN SERPL-MCNC: 21 MG/DL (ref 5–25)
CALCIUM SERPL-MCNC: 8.9 MG/DL (ref 8.4–10.2)
CHLORIDE SERPL-SCNC: 104 MMOL/L (ref 96–108)
CLARITY UR: CLEAR
CO2 SERPL-SCNC: 24 MMOL/L (ref 21–32)
COLOR UR: YELLOW
CREAT SERPL-MCNC: 1.29 MG/DL (ref 0.6–1.3)
CREAT UR-MCNC: 80.9 MG/DL
EOSINOPHIL # BLD AUTO: 0.1 THOUSAND/ÂΜL (ref 0–0.61)
EOSINOPHIL NFR BLD AUTO: 1 % (ref 0–6)
ERYTHROCYTE [DISTWIDTH] IN BLOOD BY AUTOMATED COUNT: 19.6 % (ref 11.6–15.1)
GFR SERPL CREATININE-BSD FRML MDRD: 38 ML/MIN/1.73SQ M
GLUCOSE P FAST SERPL-MCNC: 134 MG/DL (ref 65–99)
GLUCOSE UR STRIP-MCNC: NEGATIVE MG/DL
HCT VFR BLD AUTO: 28 % (ref 34.8–46.1)
HGB BLD-MCNC: 8.2 G/DL (ref 11.5–15.4)
HGB UR QL STRIP.AUTO: NEGATIVE
IMM GRANULOCYTES # BLD AUTO: 0.09 THOUSAND/UL (ref 0–0.2)
IMM GRANULOCYTES NFR BLD AUTO: 1 % (ref 0–2)
KETONES UR STRIP-MCNC: NEGATIVE MG/DL
LEUKOCYTE ESTERASE UR QL STRIP: NEGATIVE
LYMPHOCYTES # BLD AUTO: 1.3 THOUSANDS/ÂΜL (ref 0.6–4.47)
LYMPHOCYTES NFR BLD AUTO: 13 % (ref 14–44)
MCH RBC QN AUTO: 22 PG (ref 26.8–34.3)
MCHC RBC AUTO-ENTMCNC: 29.3 G/DL (ref 31.4–37.4)
MCV RBC AUTO: 75 FL (ref 82–98)
MONOCYTES # BLD AUTO: 0.69 THOUSAND/ÂΜL (ref 0.17–1.22)
MONOCYTES NFR BLD AUTO: 7 % (ref 4–12)
NEUTROPHILS # BLD AUTO: 7.5 THOUSANDS/ÂΜL (ref 1.85–7.62)
NEUTS SEG NFR BLD AUTO: 77 % (ref 43–75)
NITRITE UR QL STRIP: NEGATIVE
NON-SQ EPI CELLS URNS QL MICRO: NORMAL /HPF
NRBC BLD AUTO-RTO: 0 /100 WBCS
PH UR STRIP.AUTO: 5.5 [PH]
PHOSPHATE SERPL-MCNC: 2.9 MG/DL (ref 2.3–4.1)
PLATELET # BLD AUTO: 546 THOUSANDS/UL (ref 149–390)
PMV BLD AUTO: 9.6 FL (ref 8.9–12.7)
POTASSIUM SERPL-SCNC: 4.3 MMOL/L (ref 3.5–5.3)
PROT UR STRIP-MCNC: ABNORMAL MG/DL
PROT UR-MCNC: 25 MG/DL
PROT/CREAT UR: 0.31 MG/G{CREAT} (ref 0–0.1)
PTH-INTACT SERPL-MCNC: 81.4 PG/ML (ref 12–88)
RBC # BLD AUTO: 3.72 MILLION/UL (ref 3.81–5.12)
RBC #/AREA URNS AUTO: NORMAL /HPF
SODIUM SERPL-SCNC: 139 MMOL/L (ref 135–147)
SP GR UR STRIP.AUTO: 1.01 (ref 1–1.03)
UROBILINOGEN UR STRIP-ACNC: <2 MG/DL
WBC # BLD AUTO: 9.73 THOUSAND/UL (ref 4.31–10.16)
WBC #/AREA URNS AUTO: NORMAL /HPF

## 2024-03-20 PROCEDURE — 36415 COLL VENOUS BLD VENIPUNCTURE: CPT

## 2024-03-20 PROCEDURE — 81001 URINALYSIS AUTO W/SCOPE: CPT

## 2024-03-20 PROCEDURE — 85025 COMPLETE CBC W/AUTO DIFF WBC: CPT

## 2024-03-20 PROCEDURE — 96365 THER/PROPH/DIAG IV INF INIT: CPT

## 2024-03-20 PROCEDURE — 80048 BASIC METABOLIC PNL TOTAL CA: CPT

## 2024-03-20 PROCEDURE — 84100 ASSAY OF PHOSPHORUS: CPT

## 2024-03-20 PROCEDURE — 82306 VITAMIN D 25 HYDROXY: CPT

## 2024-03-20 PROCEDURE — 82570 ASSAY OF URINE CREATININE: CPT

## 2024-03-20 PROCEDURE — 83970 ASSAY OF PARATHORMONE: CPT

## 2024-03-20 PROCEDURE — 84156 ASSAY OF PROTEIN URINE: CPT

## 2024-03-20 RX ORDER — SODIUM CHLORIDE 9 MG/ML
20 INJECTION, SOLUTION INTRAVENOUS ONCE
Status: CANCELLED | OUTPATIENT
Start: 2024-03-20

## 2024-03-20 RX ORDER — SODIUM CHLORIDE 9 MG/ML
20 INJECTION, SOLUTION INTRAVENOUS ONCE
Status: CANCELLED | OUTPATIENT
Start: 2024-03-27

## 2024-03-20 RX ORDER — SODIUM CHLORIDE 9 MG/ML
20 INJECTION, SOLUTION INTRAVENOUS ONCE
Status: COMPLETED | OUTPATIENT
Start: 2024-03-20 | End: 2024-03-20

## 2024-03-20 RX ADMIN — IRON SUCROSE 300 MG: 20 INJECTION, SOLUTION INTRAVENOUS at 11:45

## 2024-03-20 RX ADMIN — SODIUM CHLORIDE 20 ML/HR: 0.9 INJECTION, SOLUTION INTRAVENOUS at 11:46

## 2024-03-20 NOTE — PROGRESS NOTES
[facility] Care Management Follow Up Note:  Followup inbasket reminder received.  Chart review completed.     Since last outreach, patient followed with Cardiology on 3/4/24 for evaluation of LE swelling.    Weight slightly decreased from recent admission 160lbs at time of visit; 164lbs on discharge, was prescribed compression stockings.  Patient agreeable to weight herself daily and maintain log. Heart Failure Zone education discussed and provided to patient    Patient followed with with Hem/Onc on 3/12/24 for iron deficiency anemia  Patient Needs video capsule endoscopy - Iron deficiency anemia most likely 2/2 occult chronic blood loss.  Has appointment schedlued with Gastro 3/27/24  Needs weekly labwork.    Needs Weekly IV iron infusion X 4 weeks.  Had first infusion appointment today.  Had lab work completed today. Future infusion appointments scheduled.   Telephone call attempted today for follow up.  No answer.  Left voicemail message with general contact information with name, title, call back phone number office hours when I am available and message encouraging return call to this .

## 2024-03-20 NOTE — PROGRESS NOTES
Pt into clinic for Venofer. Pt offers no complaints. Tolerated infusion without reaction. PIV removed. Pt aware of next appointment on 3/26/24 at 8am. AVS declined.

## 2024-03-21 ENCOUNTER — PATIENT OUTREACH (OUTPATIENT)
Age: 84
End: 2024-03-21

## 2024-03-21 ENCOUNTER — TELEPHONE (OUTPATIENT)
Age: 84
End: 2024-03-21

## 2024-03-21 NOTE — TELEPHONE ENCOUNTER
Patient called bc she has a nephrology appt next Friday and she wanted the phone number from her appt on 3/14 with Dr. Mcleod. I tried to transfer her to the number but it disconnected. I gave patient the phone number listed on the appointment.

## 2024-03-21 NOTE — PROGRESS NOTES
"OutPatient Complex Care Management Note:  Case reviewed with OPCM manager, RN Lead and new RN assuming this case.  Handoff completed.  I have removed myself from care team and new CM RN added.    Patient returned my phone call after hours yesterday.    Chart review completed.   03/19/24 note from Dr. Mcleod reviewed.  Patient to be scheduled for push enteroscopy.  \"She will need bridge therapy anticoagulation given her mechanical aortic valve and history of atrial fibrillation. We will reach out to cardiology for instructions\".    I returned patient's phone call.  Patient demonstrates knowledge of her treatement plan as per cardiology, Heme/Onc, and gastrol  She reports knowledge of meds and declines need for medication review at this time.  Patient also reports adherence in taking all prescribed meds and lists dates and providers of upcoming appointments.  She informs me that [facility] nurse makes weekly visits to draw her PT/INR bloodwork.     DIABETES MANAGEMENT  Patient reports improvement in her BS numbers.  Patient reports fasting BS typically ranging between 90 and 120.  Patient reports several occasions where BS was in the high 70's - low 80's.  Denies fasting BS below 70.   Education:  Patient educated on s/s and treatment for low blood sugar and when to contact PCP and when to seek Emergency Care for diabetes.  Attachments mailed to patient as per patient's request.    CHF MANAGEMENT:  Patient informs me she is doing Daily Weights \"first thing in the morning, in my pajamas\".  Patient reports daily weight between 150 lbs and 153 lbs.   Patient likes the CHF zone tool and has been using it a a guide to self manage her symptoms.     Patient appreciative of support from [facility].  Patient requests future follow up after her appointment with Gastro on 4/3/24 to assist with care coordination and anticoagulation therapy coordination if needed.  Patient aware Christine SETHI will be outreaching her and agreeable.  " Patient was given Christine's phone number and encouraged to contact as needed with any needs / concerns.  IB message sent to OPCM for case communication and care coordination.

## 2024-03-26 ENCOUNTER — HOSPITAL ENCOUNTER (OUTPATIENT)
Dept: INFUSION CENTER | Facility: CLINIC | Age: 84
Discharge: HOME | End: 2024-03-26
Payer: COMMERCIAL

## 2024-03-26 VITALS
OXYGEN SATURATION: 94 % | HEART RATE: 60 BPM | RESPIRATION RATE: 20 BRPM | TEMPERATURE: 97.2 F | DIASTOLIC BLOOD PRESSURE: 69 MMHG | SYSTOLIC BLOOD PRESSURE: 159 MMHG

## 2024-03-26 DIAGNOSIS — D50.9 IRON DEFICIENCY ANEMIA, UNSPECIFIED IRON DEFICIENCY ANEMIA TYPE: Primary | ICD-10-CM

## 2024-03-26 PROCEDURE — 96365 THER/PROPH/DIAG IV INF INIT: CPT

## 2024-03-26 RX ORDER — SODIUM CHLORIDE 9 MG/ML
20 INJECTION, SOLUTION INTRAVENOUS ONCE
Status: COMPLETED | OUTPATIENT
Start: 2024-03-26 | End: 2024-03-26

## 2024-03-26 RX ORDER — SODIUM CHLORIDE 9 MG/ML
20 INJECTION, SOLUTION INTRAVENOUS ONCE
OUTPATIENT
Start: 2024-04-02

## 2024-03-26 RX ADMIN — SODIUM CHLORIDE 20 ML/HR: 0.9 INJECTION, SOLUTION INTRAVENOUS at 08:27

## 2024-03-26 RX ADMIN — IRON SUCROSE 300 MG: 20 INJECTION, SOLUTION INTRAVENOUS at 08:28

## 2024-03-26 NOTE — PROGRESS NOTES
Pt here venofer, offering no complaints.  Right arm IV placed with positive blood return noted.  Tolerated infusion without incident.  PIV removed.  AVS declined.  Aware of next appt 4/2 @ 1130 am.  Walked out in stable condition.

## 2024-03-27 ENCOUNTER — TELEPHONE (OUTPATIENT)
Dept: PULMONOLOGY | Facility: CLINIC | Age: 84
End: 2024-03-27

## 2024-03-28 ENCOUNTER — TELEPHONE (OUTPATIENT)
Dept: NEPHROLOGY | Facility: CLINIC | Age: 84
End: 2024-03-28

## 2024-03-28 ENCOUNTER — REMOTE DEVICE CLINIC VISIT (OUTPATIENT)
Dept: CARDIOLOGY CLINIC | Facility: CLINIC | Age: 84
End: 2024-03-28
Payer: COMMERCIAL

## 2024-03-28 DIAGNOSIS — Z95.0 PRESENCE OF PERMANENT CARDIAC PACEMAKER: Primary | ICD-10-CM

## 2024-03-28 PROCEDURE — 93294 REM INTERROG EVL PM/LDLS PM: CPT | Performed by: INTERNAL MEDICINE

## 2024-03-28 PROCEDURE — 93296 REM INTERROG EVL PM/IDS: CPT | Performed by: INTERNAL MEDICINE

## 2024-03-28 NOTE — PROGRESS NOTES
Results for orders placed or performed in visit on 03/28/24   Cardiac EP device report    Narrative    SJM DUAL CHAMBER PM/ NOT MRI CONDITIONAL  MERLIN TRANSMISSION: BATTERY VOLTAGE ADEQUATE. (1.3 YRS) AP 7%  93%. ALL AVAILABLE LEAD PARAMETERS WITHIN NORMAL LIMITS HOWEVER VENTRICULAR SENSITIVITY SAFETY MARGIN <2:1. NO SIGNIFICANT HIGH RATE EPISODES. 17 AMS EPISODES DETECTED, CURRENTLY IN AF. PATIENT IS ON WARFARIN AND METOPROLOL TART. NORMAL DEVICE FUNCTION.---VALLE

## 2024-03-29 ENCOUNTER — OFFICE VISIT (OUTPATIENT)
Dept: NEPHROLOGY | Facility: CLINIC | Age: 84
End: 2024-03-29
Payer: COMMERCIAL

## 2024-03-29 ENCOUNTER — TELEPHONE (OUTPATIENT)
Dept: CARDIOLOGY CLINIC | Facility: CLINIC | Age: 84
End: 2024-03-29

## 2024-03-29 VITALS
WEIGHT: 158.8 LBS | HEART RATE: 73 BPM | SYSTOLIC BLOOD PRESSURE: 140 MMHG | OXYGEN SATURATION: 98 % | RESPIRATION RATE: 18 BRPM | HEIGHT: 66 IN | BODY MASS INDEX: 25.52 KG/M2 | TEMPERATURE: 97.8 F | DIASTOLIC BLOOD PRESSURE: 60 MMHG

## 2024-03-29 DIAGNOSIS — N18.32 TYPE 2 DIABETES MELLITUS WITH STAGE 3B CHRONIC KIDNEY DISEASE, WITH LONG-TERM CURRENT USE OF INSULIN (HCC): Primary | ICD-10-CM

## 2024-03-29 DIAGNOSIS — M89.9 CHRONIC KIDNEY DISEASE-MINERAL AND BONE DISORDER: ICD-10-CM

## 2024-03-29 DIAGNOSIS — E11.22 TYPE 2 DIABETES MELLITUS WITH STAGE 3B CHRONIC KIDNEY DISEASE, WITH LONG-TERM CURRENT USE OF INSULIN (HCC): Primary | ICD-10-CM

## 2024-03-29 DIAGNOSIS — N18.32 STAGE 3B CHRONIC KIDNEY DISEASE (HCC): ICD-10-CM

## 2024-03-29 DIAGNOSIS — E83.9 CHRONIC KIDNEY DISEASE-MINERAL AND BONE DISORDER: ICD-10-CM

## 2024-03-29 DIAGNOSIS — J98.4 RESTRICTIVE LUNG DISEASE: ICD-10-CM

## 2024-03-29 DIAGNOSIS — Z79.4 TYPE 2 DIABETES MELLITUS WITH STAGE 3B CHRONIC KIDNEY DISEASE, WITH LONG-TERM CURRENT USE OF INSULIN (HCC): Primary | ICD-10-CM

## 2024-03-29 DIAGNOSIS — I10 HYPERTENSION, ESSENTIAL: ICD-10-CM

## 2024-03-29 DIAGNOSIS — N18.9 CHRONIC KIDNEY DISEASE-MINERAL AND BONE DISORDER: ICD-10-CM

## 2024-03-29 DIAGNOSIS — I50.22 CHRONIC SYSTOLIC (CONGESTIVE) HEART FAILURE (HCC): ICD-10-CM

## 2024-03-29 PROCEDURE — 99214 OFFICE O/P EST MOD 30 MIN: CPT | Performed by: INTERNAL MEDICINE

## 2024-03-29 RX ORDER — IPRATROPIUM BROMIDE AND ALBUTEROL SULFATE 2.5; .5 MG/3ML; MG/3ML
SOLUTION RESPIRATORY (INHALATION)
COMMUNITY
Start: 2024-03-11

## 2024-03-29 NOTE — PROGRESS NOTES
NEPHROLOGY OFFICE FOLLOW UP  Ely Hernadez 83 y.o. female MRN: [id number]    Encounter: [id number] 3/29/2024    REASON FOR VISIT: Ely Hernadez is a 83 y.o. female who is here on 3/29/2024 for Chronic Kidney Disease and Follow-up  .    HPI:    Ely came in today for follow-up of stage IIIb CKD.  83-year-old woman with history of possible diabetic nephropathy    Since I saw her last she was hospitalized multiple times with possible CVA and GI bleed    Patient is doing well right now    Patient does complain of shortness of breath but she does have cardiomyopathy and pulmonary fibrosis    No chest pain no palpitation    No nausea no vomiting        REVIEW OF SYSTEMS:    Review of Systems   Constitutional:  Negative for fatigue.   HENT:  Negative for congestion.    Eyes:  Negative for photophobia and pain.   Respiratory:  Positive for shortness of breath. Negative for chest tightness.    Cardiovascular:  Negative for chest pain and palpitations.   Gastrointestinal:  Negative for abdominal distention, abdominal pain and blood in stool.   Endocrine: Negative for polydipsia.   Genitourinary:  Negative for difficulty urinating, dysuria, flank pain, hematuria and urgency.   Musculoskeletal:  Negative for arthralgias and back pain.   Skin:  Negative for rash.   Neurological:  Negative for dizziness, light-headedness and headaches.   Hematological:  Does not bruise/bleed easily.   Psychiatric/Behavioral:  Negative for behavioral problems. The patient is not nervous/anxious.          PAST MEDICAL HISTORY:  Past Medical History:   Diagnosis Date    Anemia     Aneurysm of thoracic aorta (HCC)     Aortic valve disorder     Benign neoplasm of sigmoid colon     Cardiomyopathy (HCC)     last assessed: 10/10/2014    CHF (congestive heart failure) (HCC)     Chronic kidney disease     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HCC)     last assessed: 10/10/2014    Diabetic nephropathy (HCC)     RAMON (dyspnea on exertion)     GERD  (gastroesophageal reflux disease)     History of emphysema (HCC)     Hyperlipidemia     TIA (transient ischemic attack)        PAST SURGICAL HISTORY:  Past Surgical History:   Procedure Laterality Date    AORTIC VALVE REPLACEMENT      CARDIAC PACEMAKER PLACEMENT      last assessed: 10/10/2014    CARDIAC SURGERY      aortic valve replacement    CHOLECYSTECTOMY      COLONOSCOPY      HYSTERECTOMY      INSERT / REPLACE / REMOVE PACEMAKER      KNEE SURGERY Right     OTHER SURGICAL HISTORY      Capsule ENDOscopy description: 2012    SD COLONOSCOPY FLX DX W/COLLJ SPEC WHEN PFRMD N/A 2018    Procedure: single balloon ENTEROSCOPY;  Surgeon: David Dennis MD;  Location: [facility];  Service: Gastroenterology    SD ESOPHAGOGASTRODUODENOSCOPY TRANSORAL DIAGNOSTIC N/A 2017    Procedure: EGD AND COLONOSCOPY;  Surgeon: Jay Mcleod III, MD;  Location: [facility];  Service: Gastroenterology       SOCIAL HISTORY:  Social History     Substance and Sexual Activity   Alcohol Use Never     Social History     Substance and Sexual Activity   Drug Use Never     Social History     Tobacco Use   Smoking Status Former    Current packs/day: 0.00    Average packs/day: 1 pack/day for 52.9 years (52.9 ttl pk-yrs)    Types: Cigarettes    Start date:     Quit date: 2007    Years since quittin.3    Passive exposure: Past   Smokeless Tobacco Former    Quit date:        FAMILY HISTORY:  Family History   Problem Relation Age of Onset    No Known Problems Mother     No Known Problems Father        MEDICATIONS:    Current Outpatient Medications:     Accu-Chek FastClix Lancets MISC, Use 3 (three) times a day, Disp: 300 each, Rfl: 3    albuterol (Ventolin HFA) 90 mcg/act inhaler, Inhale 2 puffs every 6 (six) hours as needed for wheezing, Disp: 18 g, Rfl: 1    Ascorbic Acid (vitamin C) 1000 MG tablet, Take 1,000 mg by mouth daily, Disp: , Rfl:     aspirin (ECOTRIN LOW STRENGTH) 81 mg EC tablet, Take 1 tablet (81 mg  total) by mouth daily Do not start before November 1, 2023., Disp: 30 tablet, Rfl: 0    b complex vitamins capsule, Take 1 capsule by mouth daily, Disp: 30 capsule, Rfl: 2    BD Pen Needle Rosie U/F 32G X 4 MM MISC, USE DAILY, Disp: 100 each, Rfl: 1    Blood Glucose Monitoring Suppl (Accu-Chek Guide Me) w/Device KIT, USE 3 TIMES DAILY, Disp: 1 kit, Rfl: 2    Cholecalciferol (Vitamin D3) 50 MCG (2000 UT) TABS, Take 2,000 Units by mouth daily, Disp: , Rfl:     Fluticasone-Salmeterol (Advair Diskus) 250-50 mcg/dose inhaler, Inhale 1 puff 2 (two) times a day Rinse mouth after use., Disp: 180 blister, Rfl: 0    furosemide (LASIX) 40 mg tablet, take 1 tablet by mouth daily, Disp: 90 tablet, Rfl: 3    gabapentin (NEURONTIN) 300 mg capsule, Take 1 capsule (300 mg total) by mouth 3 (three) times a day, Disp: 270 capsule, Rfl: 3    glipiZIDE (GLUCOTROL) 10 mg tablet, TAKE 1 TABLET BY MOUTH  TWICE DAILY BEFORE MEALS, Disp: 180 tablet, Rfl: 3    glucose blood (Accu-Chek Celeste Plus) test strip, Use 1 each 3 (three) times a day Use as instructed, Disp: 300 each, Rfl: 3    ipratropium (ATROVENT) 0.02 % nebulizer solution, Take 0.5 mg by nebulization 4 (four) times a day, Disp: , Rfl:     ipratropium-albuterol (DUO-NEB) 0.5-2.5 mg/3 mL nebulizer solution, inhale contents of 1 vial ( 3 milliliters ) in nebulizer four times a day, Disp: , Rfl:     Lancets (freestyle) lancets, Check blood glucose 3 times daily, Disp: 300 each, Rfl: 0    Levemir FlexPen 100 units/mL injection pen, INJECT SUBCUTANEOUSLY 26 UNITS  DAILY AT BEDTIME, Disp: 30 mL, Rfl: 2    levothyroxine 50 mcg tablet, TAKE 1 TABLET BY MOUTH  DAILY, Disp: 90 tablet, Rfl: 3    metoprolol tartrate (LOPRESSOR) 50 mg tablet, TAKE ONE-HALF TABLET BY  MOUTH TWICE DAILY, Disp: 90 tablet, Rfl: 3    oxygen gas, Inhale 2 L/min daily at bedtime as needed home oxygen nightly, Disp: , Rfl:     pantoprazole (PROTONIX) 40 mg tablet, Take 1 tablet (40 mg total) by mouth 2 (two) times a day,  "Disp: 60 tablet, Rfl: 0    predniSONE 10 mg tablet, Take 1 tablet (10 mg total) by mouth daily Taper: 40mg daily x 3 days, 30mg daily x 3 days, 20mg daily x 3 days, 10mg daily x 3 days, Disp: 30 tablet, Rfl: 0    warfarin (COUMADIN) 5 mg tablet, TAKE 1 TO 2 TABLETS BY  MOUTH DAILY OR AS DIRECTED, Disp: 180 tablet, Rfl: 3    PHYSICAL EXAM:  Vitals:    03/29/24 0859   BP: 140/60   BP Location: Right arm   Patient Position: Sitting   Pulse: 73   Resp: 18   Temp: 97.8 °F (36.6 °C)   TempSrc: Temporal   SpO2: 98%   Weight: 72 kg (158 lb 12.8 oz)   Height: 5' 6\" (1.676 m)     Body mass index is 25.63 kg/m².    Physical Exam  Constitutional:       General: She is not in acute distress.     Appearance: She is well-developed.   HENT:      Head: Normocephalic.      Mouth/Throat:      Mouth: Mucous membranes are moist.   Eyes:      General: No scleral icterus.     Conjunctiva/sclera: Conjunctivae normal.   Neck:      Vascular: No JVD.   Cardiovascular:      Rate and Rhythm: Normal rate.      Heart sounds: Normal heart sounds.   Pulmonary:      Effort: Pulmonary effort is normal.      Breath sounds: No wheezing.   Abdominal:      Palpations: Abdomen is soft.      Tenderness: There is no abdominal tenderness.   Musculoskeletal:         General: Normal range of motion.      Cervical back: Neck supple.   Skin:     General: Skin is warm.      Findings: No rash.   Neurological:      Mental Status: She is alert and oriented to person, place, and time.   Psychiatric:         Behavior: Behavior normal.         LAB RESULTS:  Results for orders placed or performed in visit on 03/20/24   Basic metabolic panel   Result Value Ref Range    Sodium 139 135 - 147 mmol/L    Potassium 4.3 3.5 - 5.3 mmol/L    Chloride 104 96 - 108 mmol/L    CO2 24 21 - 32 mmol/L    ANION GAP 11 4 - 13 mmol/L    BUN 21 5 - 25 mg/dL    Creatinine 1.29 0.60 - 1.30 mg/dL    Glucose, Fasting 134 (H) 65 - 99 mg/dL    Calcium 8.9 8.4 - 10.2 mg/dL    eGFR 38 ml/min/1.73sq " m   CBC and differential   Result Value Ref Range    WBC 9.73 4.31 - 10.16 Thousand/uL    RBC 3.72 (L) 3.81 - 5.12 Million/uL    Hemoglobin 8.2 (L) 11.5 - 15.4 g/dL    Hematocrit 28.0 (L) 34.8 - 46.1 %    MCV 75 (L) 82 - 98 fL    MCH 22.0 (L) 26.8 - 34.3 pg    MCHC 29.3 (L) 31.4 - 37.4 g/dL    RDW 19.6 (H) 11.6 - 15.1 %    MPV 9.6 8.9 - 12.7 fL    Platelets 546 (H) 149 - 390 Thousands/uL    nRBC 0 /100 WBCs    Neutrophils Relative 77 (H) 43 - 75 %    Immature Grans % 1 0 - 2 %    Lymphocytes Relative 13 (L) 14 - 44 %    Monocytes Relative 7 4 - 12 %    Eosinophils Relative 1 0 - 6 %    Basophils Relative 1 0 - 1 %    Neutrophils Absolute 7.50 1.85 - 7.62 Thousands/µL    Absolute Immature Grans 0.09 0.00 - 0.20 Thousand/uL    Absolute Lymphocytes 1.30 0.60 - 4.47 Thousands/µL    Absolute Monocytes 0.69 0.17 - 1.22 Thousand/µL    Eosinophils Absolute 0.10 0.00 - 0.61 Thousand/µL    Basophils Absolute 0.05 0.00 - 0.10 Thousands/µL   Phosphorus   Result Value Ref Range    Phosphorus 2.9 2.3 - 4.1 mg/dL   Protein / creatinine ratio, urine   Result Value Ref Range    Creatinine, Ur 80.9 Reference range not established. mg/dL    Protein Urine Random 25 Reference range not established. mg/dL    Prot/Creat Ratio, Ur 0.31 (H) 0.00 - 0.10   PTH, intact   Result Value Ref Range    PTH 81.4 12.0 - 88.0 pg/mL   UA (URINE) with reflex to Scope   Result Value Ref Range    Color, UA Yellow     Clarity, UA Clear     Specific Gravity, UA 1.010 1.003 - 1.030    pH, UA 5.5 4.5, 5.0, 5.5, 6.0, 6.5, 7.0, 7.5, 8.0    Leukocytes, UA Negative Negative    Nitrite, UA Negative Negative    Protein, UA Trace (A) Negative mg/dl    Glucose, UA Negative Negative mg/dl    Ketones, UA Negative Negative mg/dl    Urobilinogen, UA <2.0 <2.0 mg/dl mg/dl    Bilirubin, UA Negative Negative    Occult Blood, UA Negative Negative   Vitamin D 25 hydroxy   Result Value Ref Range    Vit D, 25-Hydroxy 60.5 30.0 - 100.0 ng/mL   Urine Microscopic   Result Value Ref  "Range    RBC, UA 1-2 None Seen, 1-2 /hpf    WBC, UA 1-2 None Seen, 1-2 /hpf    Epithelial Cells None Seen None Seen, Occasional /hpf    Bacteria, UA None Seen None Seen, Occasional /hpf       ASSESSMENT and PLAN:      Stage 3b chronic kidney disease (HCC)  Lab Results   Component Value Date    EGFR 38 03/20/2024    EGFR 39 03/08/2024    EGFR 34 02/13/2024    CREATININE 1.29 03/20/2024    CREATININE 1.27 03/08/2024    CREATININE 1.41 (H) 02/13/2024   Patient renal function is stable overall with GFR about 30-40.  As a mention above likely because of diabetic nephropathy    Asymptomatic and progressive nature of kidney discussed with her and her son.  Importance of hydration and avoiding nephrotoxic medicine also discussed with them    Chronic kidney disease-mineral and bone disorder  Lab Results   Component Value Date    EGFR 38 03/20/2024    EGFR 39 03/08/2024    EGFR 34 02/13/2024    CREATININE 1.29 03/20/2024    CREATININE 1.27 03/08/2024    CREATININE 1.41 (H) 02/13/2024   PTH and phosphorus along with vitamin D are within acceptable range and will continue to monitor    Type 2 diabetes mellitus with stage 3b chronic kidney disease, with long-term current use of insulin ([facility])    Lab Results   Component Value Date    HGBA1C 6.8 (A) 02/21/2024   Diabetes is reasonably well-controlled.  Importance of diabetic control and effect on kidney disease discussed with    Hypertension, essential  Well-controlled    Chronic systolic (congestive) heart failure (HCC)  Wt Readings from Last 3 Encounters:   03/29/24 72 kg (158 lb 12.8 oz)   03/12/24 72.6 kg (160 lb)   03/04/24 72.6 kg (160 lb)       Seems to be euvolemic at this point        Everything discussed with the patient at length.  I will see her back in 6 months.  Will get blood and urine test before that visit        Portions of the record may have been created with voice recognition software. Occasional wrong word or \"sound a like\" substitutions may have occurred due " to the inherent limitations of voice recognition software. Read the chart carefully and recognize, using context, where substitutions have occurred.If you have any questions, please contact the dictating provider.

## 2024-03-29 NOTE — ASSESSMENT & PLAN NOTE
Lab Results   Component Value Date    EGFR 38 03/20/2024    EGFR 39 03/08/2024    EGFR 34 02/13/2024    CREATININE 1.29 03/20/2024    CREATININE 1.27 03/08/2024    CREATININE 1.41 (H) 02/13/2024   PTH and phosphorus along with vitamin D are within acceptable range and will continue to monitor

## 2024-03-29 NOTE — TELEPHONE ENCOUNTER
Jessi From [facility] left a vm regarding Ely's results.   She stated that if the office already has the results of Ely's tests, [facility] would still need to be called to verify.       Please call Jessi at [phone]

## 2024-03-29 NOTE — ASSESSMENT & PLAN NOTE
Lab Results   Component Value Date    EGFR 38 03/20/2024    EGFR 39 03/08/2024    EGFR 34 02/13/2024    CREATININE 1.29 03/20/2024    CREATININE 1.27 03/08/2024    CREATININE 1.41 (H) 02/13/2024   Patient renal function is stable overall with GFR about 30-40.  As a mention above likely because of diabetic nephropathy    Asymptomatic and progressive nature of kidney discussed with her and her son.  Importance of hydration and avoiding nephrotoxic medicine also discussed with them

## 2024-03-29 NOTE — ASSESSMENT & PLAN NOTE
Wt Readings from Last 3 Encounters:   03/29/24 72 kg (158 lb 12.8 oz)   03/12/24 72.6 kg (160 lb)   03/04/24 72.6 kg (160 lb)       Seems to be euvolemic at this point

## 2024-03-29 NOTE — TELEPHONE ENCOUNTER
Spoke to Adelina CHAUHAN at \[facility]\"" and she stated that she will be faxing pt's lab results to our office. Fax # was provided.

## 2024-03-29 NOTE — ASSESSMENT & PLAN NOTE
Lab Results   Component Value Date    HGBA1C 6.8 (A) 02/21/2024   Diabetes is reasonably well-controlled.  Importance of diabetic control and effect on kidney disease discussed with

## 2024-04-01 ENCOUNTER — TELEPHONE (OUTPATIENT)
Age: 84
End: 2024-04-01

## 2024-04-01 DIAGNOSIS — I10 HYPERTENSION, ESSENTIAL: ICD-10-CM

## 2024-04-01 DIAGNOSIS — E11.40 TYPE 2 DIABETES MELLITUS WITH DIABETIC NEUROPATHY, WITHOUT LONG-TERM CURRENT USE OF INSULIN (HCC): ICD-10-CM

## 2024-04-01 DIAGNOSIS — Z79.4 TYPE 2 DIABETES MELLITUS WITH DIABETIC POLYNEUROPATHY, WITH LONG-TERM CURRENT USE OF INSULIN (HCC): ICD-10-CM

## 2024-04-01 DIAGNOSIS — E03.9 HYPOTHYROIDISM, UNSPECIFIED TYPE: ICD-10-CM

## 2024-04-01 DIAGNOSIS — E11.42 TYPE 2 DIABETES MELLITUS WITH DIABETIC POLYNEUROPATHY, WITH LONG-TERM CURRENT USE OF INSULIN (HCC): ICD-10-CM

## 2024-04-01 RX ORDER — GABAPENTIN 300 MG/1
300 CAPSULE ORAL 3 TIMES DAILY
Qty: 270 CAPSULE | Refills: 3 | Status: SHIPPED | OUTPATIENT
Start: 2024-04-01

## 2024-04-01 RX ORDER — GLIPIZIDE 10 MG/1
10 TABLET ORAL
Qty: 180 TABLET | Refills: 3 | Status: SHIPPED | OUTPATIENT
Start: 2024-04-01

## 2024-04-01 RX ORDER — LEVOTHYROXINE SODIUM 0.05 MG/1
50 TABLET ORAL DAILY
Qty: 90 TABLET | Refills: 3 | Status: SHIPPED | OUTPATIENT
Start: 2024-04-01

## 2024-04-01 RX ORDER — METOPROLOL TARTRATE 50 MG/1
TABLET, FILM COATED ORAL
Qty: 90 TABLET | Refills: 3 | Status: SHIPPED | OUTPATIENT
Start: 2024-04-01

## 2024-04-02 ENCOUNTER — HOSPITAL ENCOUNTER (OUTPATIENT)
Dept: INFUSION CENTER | Facility: CLINIC | Age: 84
Discharge: HOME | End: 2024-04-02
Payer: COMMERCIAL

## 2024-04-02 VITALS
RESPIRATION RATE: 20 BRPM | SYSTOLIC BLOOD PRESSURE: 141 MMHG | DIASTOLIC BLOOD PRESSURE: 50 MMHG | TEMPERATURE: 97.1 F | HEART RATE: 62 BPM

## 2024-04-02 DIAGNOSIS — D50.9 IRON DEFICIENCY ANEMIA, UNSPECIFIED IRON DEFICIENCY ANEMIA TYPE: Primary | ICD-10-CM

## 2024-04-02 PROCEDURE — 96365 THER/PROPH/DIAG IV INF INIT: CPT

## 2024-04-02 RX ORDER — SODIUM CHLORIDE 9 MG/ML
20 INJECTION, SOLUTION INTRAVENOUS ONCE
Status: COMPLETED | OUTPATIENT
Start: 2024-04-02 | End: 2024-04-02

## 2024-04-02 RX ORDER — SODIUM CHLORIDE 9 MG/ML
20 INJECTION, SOLUTION INTRAVENOUS ONCE
Status: CANCELLED | OUTPATIENT
Start: 2024-04-09

## 2024-04-02 RX ADMIN — IRON SUCROSE 300 MG: 20 INJECTION, SOLUTION INTRAVENOUS at 11:44

## 2024-04-02 RX ADMIN — SODIUM CHLORIDE 20 ML/HR: 0.9 INJECTION, SOLUTION INTRAVENOUS at 11:41

## 2024-04-02 NOTE — PROGRESS NOTES
Pt to clinic for venofer, offers no complaints today, tolerated infusion without complications, aware of next appointment on 4/9/24 at 8AM, PIV removed, avs declined.

## 2024-04-03 ENCOUNTER — OFFICE VISIT (OUTPATIENT)
Dept: GASTROENTEROLOGY | Facility: CLINIC | Age: 84
End: 2024-04-03
Payer: COMMERCIAL

## 2024-04-03 ENCOUNTER — TELEPHONE (OUTPATIENT)
Dept: HEMATOLOGY ONCOLOGY | Facility: CLINIC | Age: 84
End: 2024-04-03

## 2024-04-03 VITALS
WEIGHT: 156 LBS | OXYGEN SATURATION: 91 % | DIASTOLIC BLOOD PRESSURE: 63 MMHG | SYSTOLIC BLOOD PRESSURE: 143 MMHG | HEIGHT: 66 IN | TEMPERATURE: 97.7 F | BODY MASS INDEX: 25.07 KG/M2 | HEART RATE: 60 BPM

## 2024-04-03 DIAGNOSIS — D50.9 IRON DEFICIENCY ANEMIA, UNSPECIFIED IRON DEFICIENCY ANEMIA TYPE: ICD-10-CM

## 2024-04-03 PROCEDURE — 99213 OFFICE O/P EST LOW 20 MIN: CPT | Performed by: PHYSICIAN ASSISTANT

## 2024-04-03 NOTE — PROGRESS NOTES
[facility] - Outpatient Follow-up Note  Ely Hernadez 83 y.o. female MRN: [id number]  Encounter: [id number]          ASSESSMENT AND PLAN:      1. Iron deficiency anemia, unspecified iron deficiency anemia type  2. Small bowel angiectasia  Patient was admitted to [facility] in December with a Hgb of 5.4  Push enteroscopy was normal  Colonoscopy noted 2 small polyps and diverticulosis with associated luminal narrowing  VCE noted a small nonbleeding AVM in the duodenum or upper jejunum    Will schedule repeat EGD with push enteroscopy  Will discuss coumadin hold and need for bridge with cardiology    She is set up for iron infusions of which she has received 3 with 1 left  Hemoglobin after 1 infusion already improved from 7.1-8.7    She continues to deny any overt bleeding  ______________________________________________________________________    SUBJECTIVE: 83-year-old female with a history of atrial fibrillation, valvular heart disease status post aortic valve replacement, coronary artery disease, hypertension, diabetes mellitus, hypothyroidism, chronic kidney disease stage IIIb, hyperlipidemia, small bowel AVM who presents for follow-up after video capsule endoscopy.  The patient was admitted to [facility] in December of last year with a hemoglobin of 5.4.  On admission the patient reported she had not been feeling well for quite some time with increasing fatigue and shortness of breath with exertion.  The patient actively denied any obvious GI bleeding including bright red blood per rectum, melena or hematemesis.  She underwent a push enteroscopy which was noted to be normal.  Colonoscopy revealed 2 small polyps which were removed and diverticulosis with associated luminal narrowing.  Outpatient video capsule endoscopy was performed noting a small nonbleeding AVM in the duodenum or upper jejunum.  She was advised to follow-up for a push enteroscopy which she has not yet arranged as she  requested an office appointment first.  She has been receiving iron infusions as of the end of March.  To date she has received 3 infusions with 1 left ago.  After 2 infusions her hemoglobin improved from 7.1-8.7.  Patient has a history of GI bleeding.  In 2017 she was referred to Dr. Mcleod for iron deficiency anemia.  At that time she underwent an EGD with mild erosive gastritis and an ulcer in the duodenal bulb.  Colonoscopy at the same time noted diverticulosis and a single small polyp in the sigmoid colon which was removed.  She then had a video capsule endoscopy documenting small bowel angiectasia's.  She underwent a push enteroscopy in May 2018 with 4 AVMs cauterized in the jejunum.  After this time she did very well until recently.      REVIEW OF SYSTEMS IS OTHERWISE NEGATIVE.      Historical Information   Past Medical History:   Diagnosis Date    Anemia     Aneurysm of thoracic aorta (HCC)     Aortic valve disorder     Benign neoplasm of sigmoid colon     Cardiomyopathy (HCC)     last assessed: 10/10/2014    CHF (congestive heart failure) (HCC)     Chronic kidney disease     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HCC)     last assessed: 10/10/2014    Diabetic nephropathy (HCC)     RAMON (dyspnea on exertion)     GERD (gastroesophageal reflux disease)     History of emphysema (HCC)     Hyperlipidemia     TIA (transient ischemic attack)      Past Surgical History:   Procedure Laterality Date    AORTIC VALVE REPLACEMENT      CARDIAC PACEMAKER PLACEMENT      last assessed: 10/10/2014    CARDIAC SURGERY      aortic valve replacement    CHOLECYSTECTOMY      COLONOSCOPY      HYSTERECTOMY      INSERT / REPLACE / REMOVE PACEMAKER      KNEE SURGERY Right     OTHER SURGICAL HISTORY      Capsule ENDOscopy description: 12/19/2012    CO COLONOSCOPY FLX DX W/COLLJ SPEC WHEN PFRMD N/A 5/31/2018    Procedure: single balloon ENTEROSCOPY;  Surgeon: David Dennis MD;  Location: [facility];  Service: Gastroenterology    CO  ESOPHAGOGASTRODUODENOSCOPY TRANSORAL DIAGNOSTIC N/A 2017    Procedure: EGD AND COLONOSCOPY;  Surgeon: Jay Mcleod III, MD;  Location: [facility];  Service: Gastroenterology     Social History   Social History     Substance and Sexual Activity   Alcohol Use Never     Social History     Substance and Sexual Activity   Drug Use Never     Social History     Tobacco Use   Smoking Status Former    Current packs/day: 0.00    Average packs/day: 1 pack/day for 52.9 years (52.9 ttl pk-yrs)    Types: Cigarettes    Start date:     Quit date: 2007    Years since quittin.3    Passive exposure: Past   Smokeless Tobacco Former    Quit date:      Family History   Problem Relation Age of Onset    No Known Problems Mother     No Known Problems Father        Meds/Allergies       Current Outpatient Medications:     Accu-Chek FastClix Lancets MISC    albuterol (Ventolin HFA) 90 mcg/act inhaler    Ascorbic Acid (vitamin C) 1000 MG tablet    aspirin (ECOTRIN LOW STRENGTH) 81 mg EC tablet    b complex vitamins capsule    BD Pen Needle Rosie U/F 32G X 4 MM MISC    Blood Glucose Monitoring Suppl (Accu-Chek Guide Me) w/Device KIT    Cholecalciferol (Vitamin D3) 50 MCG ( UT) TABS    Fluticasone-Salmeterol (Advair Diskus) 250-50 mcg/dose inhaler    furosemide (LASIX) 40 mg tablet    gabapentin (NEURONTIN) 300 mg capsule    glipiZIDE (GLUCOTROL) 10 mg tablet    glucose blood (Accu-Chek Celeste Plus) test strip    ipratropium (ATROVENT) 0.02 % nebulizer solution    ipratropium-albuterol (DUO-NEB) 0.5-2.5 mg/3 mL nebulizer solution    Lancets (freestyle) lancets    Levemir FlexPen 100 units/mL injection pen    levothyroxine 50 mcg tablet    metoprolol tartrate (LOPRESSOR) 50 mg tablet    oxygen gas    pantoprazole (PROTONIX) 40 mg tablet    warfarin (COUMADIN) 5 mg tablet  No current facility-administered medications for this visit.    No Known Allergies        Objective     Blood pressure 143/63, pulse 60, temperature  "97.7 °F (36.5 °C), temperature source Temporal, height 5' 6\" (1.676 m), weight 70.8 kg (156 lb), SpO2 91%. Body mass index is 25.18 kg/m².      PHYSICAL EXAM:      General Appearance:   Alert, cooperative, no distress   HEENT:   Normocephalic, atraumatic, anicteric.     Neck:  Supple, symmetrical, trachea midline   Lungs:   Clear to auscultation bilaterally; no rales, rhonchi or wheezing; respirations unlabored    Heart::   Regular rate and rhythm; no murmur, rub, or gallop.   Abdomen:   Soft, non-tender, non-distended; normal bowel sounds; no masses, no organomegaly    Genitalia:   Deferred    Rectal:   Deferred    Extremities:  No cyanosis, clubbing or edema    Pulses:  2+ and symmetric    Skin:  No jaundice, rashes, or lesions    Lymph nodes:  No palpable cervical lymphadenopathy        Lab Results:   No visits with results within 1 Day(s) from this visit.   Latest known visit with results is:   Appointment on 03/20/2024   Component Date Value    Sodium 03/20/2024 139     Potassium 03/20/2024 4.3     Chloride 03/20/2024 104     CO2 03/20/2024 24     ANION GAP 03/20/2024 11     BUN 03/20/2024 21     Creatinine 03/20/2024 1.29     Glucose, Fasting 03/20/2024 134 (H)     Calcium 03/20/2024 8.9     eGFR 03/20/2024 38     WBC 03/20/2024 9.73     RBC 03/20/2024 3.72 (L)     Hemoglobin 03/20/2024 8.2 (L)     Hematocrit 03/20/2024 28.0 (L)     MCV 03/20/2024 75 (L)     MCH 03/20/2024 22.0 (L)     MCHC 03/20/2024 29.3 (L)     RDW 03/20/2024 19.6 (H)     MPV 03/20/2024 9.6     Platelets 03/20/2024 546 (H)     nRBC 03/20/2024 0     Neutrophils Relative 03/20/2024 77 (H)     Immature Grans % 03/20/2024 1     Lymphocytes Relative 03/20/2024 13 (L)     Monocytes Relative 03/20/2024 7     Eosinophils Relative 03/20/2024 1     Basophils Relative 03/20/2024 1     Neutrophils Absolute 03/20/2024 7.50     Absolute Immature Grans 03/20/2024 0.09     Absolute Lymphocytes 03/20/2024 1.30     Absolute Monocytes 03/20/2024 0.69     " Eosinophils Absolute 03/20/2024 0.10     Basophils Absolute 03/20/2024 0.05     Phosphorus 03/20/2024 2.9     Creatinine, Ur 03/20/2024 80.9     Protein Urine Random 03/20/2024 25     Prot/Creat Ratio, Ur 03/20/2024 0.31 (H)     PTH 03/20/2024 81.4     Color, UA 03/20/2024 Yellow     Clarity, UA 03/20/2024 Clear     Specific Gravity, UA 03/20/2024 1.010     pH, UA 03/20/2024 5.5     Leukocytes, UA 03/20/2024 Negative     Nitrite, UA 03/20/2024 Negative     Protein, UA 03/20/2024 Trace (A)     Glucose, UA 03/20/2024 Negative     Ketones, UA 03/20/2024 Negative     Urobilinogen, UA 03/20/2024 <2.0     Bilirubin, UA 03/20/2024 Negative     Occult Blood, UA 03/20/2024 Negative     Vit D, 25-Hydroxy 03/20/2024 60.5     RBC, UA 03/20/2024 1-2     WBC, UA 03/20/2024 1-2     Epithelial Cells 03/20/2024 None Seen     Bacteria, UA 03/20/2024 None Seen          Radiology Results:   Cardiac EP device report    Result Date: 3/28/2024  Narrative: SJM DUAL CHAMBER PM/ NOT MRI CONDITIONAL MERLIN TRANSMISSION: BATTERY VOLTAGE ADEQUATE. (1.3 YRS) AP 7%  93%. ALL AVAILABLE LEAD PARAMETERS WITHIN NORMAL LIMITS HOWEVER VENTRICULAR SENSITIVITY SAFETY MARGIN <2:1. NO SIGNIFICANT HIGH RATE EPISODES. 17 AMS EPISODES DETECTED, CURRENTLY IN AF. PATIENT IS ON WARFARIN AND METOPROLOL TART. NORMAL DEVICE FUNCTION.---VALLE

## 2024-04-03 NOTE — H&P (VIEW-ONLY)
[facility] - Outpatient Follow-up Note  Ely Hernadez 83 y.o. female MRN: [id number]  Encounter: [id number]          ASSESSMENT AND PLAN:      1. Iron deficiency anemia, unspecified iron deficiency anemia type  2. Small bowel angiectasia  Patient was admitted to [facility] in December with a Hgb of 5.4  Push enteroscopy was normal  Colonoscopy noted 2 small polyps and diverticulosis with associated luminal narrowing  VCE noted a small nonbleeding AVM in the duodenum or upper jejunum    Will schedule repeat EGD with push enteroscopy  Will discuss coumadin hold and need for bridge with cardiology    She is set up for iron infusions of which she has received 3 with 1 left  Hemoglobin after 1 infusion already improved from 7.1-8.7    She continues to deny any overt bleeding  ______________________________________________________________________    SUBJECTIVE: 83-year-old female with a history of atrial fibrillation, valvular heart disease status post aortic valve replacement, coronary artery disease, hypertension, diabetes mellitus, hypothyroidism, chronic kidney disease stage IIIb, hyperlipidemia, small bowel AVM who presents for follow-up after video capsule endoscopy.  The patient was admitted to [facility] in December of last year with a hemoglobin of 5.4.  On admission the patient reported she had not been feeling well for quite some time with increasing fatigue and shortness of breath with exertion.  The patient actively denied any obvious GI bleeding including bright red blood per rectum, melena or hematemesis.  She underwent a push enteroscopy which was noted to be normal.  Colonoscopy revealed 2 small polyps which were removed and diverticulosis with associated luminal narrowing.  Outpatient video capsule endoscopy was performed noting a small nonbleeding AVM in the duodenum or upper jejunum.  She was advised to follow-up for a push enteroscopy which she has not yet arranged as she  requested an office appointment first.  She has been receiving iron infusions as of the end of March.  To date she has received 3 infusions with 1 left ago.  After 2 infusions her hemoglobin improved from 7.1-8.7.  Patient has a history of GI bleeding.  In 2017 she was referred to Dr. Mcleod for iron deficiency anemia.  At that time she underwent an EGD with mild erosive gastritis and an ulcer in the duodenal bulb.  Colonoscopy at the same time noted diverticulosis and a single small polyp in the sigmoid colon which was removed.  She then had a video capsule endoscopy documenting small bowel angiectasia's.  She underwent a push enteroscopy in May 2018 with 4 AVMs cauterized in the jejunum.  After this time she did very well until recently.      REVIEW OF SYSTEMS IS OTHERWISE NEGATIVE.      Historical Information   Past Medical History:   Diagnosis Date    Anemia     Aneurysm of thoracic aorta (HCC)     Aortic valve disorder     Benign neoplasm of sigmoid colon     Cardiomyopathy (HCC)     last assessed: 10/10/2014    CHF (congestive heart failure) (HCC)     Chronic kidney disease     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HCC)     last assessed: 10/10/2014    Diabetic nephropathy (HCC)     RAMON (dyspnea on exertion)     GERD (gastroesophageal reflux disease)     History of emphysema (HCC)     Hyperlipidemia     TIA (transient ischemic attack)      Past Surgical History:   Procedure Laterality Date    AORTIC VALVE REPLACEMENT      CARDIAC PACEMAKER PLACEMENT      last assessed: 10/10/2014    CARDIAC SURGERY      aortic valve replacement    CHOLECYSTECTOMY      COLONOSCOPY      HYSTERECTOMY      INSERT / REPLACE / REMOVE PACEMAKER      KNEE SURGERY Right     OTHER SURGICAL HISTORY      Capsule ENDOscopy description: 12/19/2012    [facility] COLONOSCOPY FLX DX W/COLLJ SPEC WHEN PFRMD N/A 5/31/2018    Procedure: single balloon ENTEROSCOPY;  Surgeon: David Dennis MD;  Location: [facility];  Service: Gastroenterology    NH  ESOPHAGOGASTRODUODENOSCOPY TRANSORAL DIAGNOSTIC N/A 2017    Procedure: EGD AND COLONOSCOPY;  Surgeon: Jay Mcleod III, MD;  Location: [facility];  Service: Gastroenterology     Social History   Social History     Substance and Sexual Activity   Alcohol Use Never     Social History     Substance and Sexual Activity   Drug Use Never     Social History     Tobacco Use   Smoking Status Former    Current packs/day: 0.00    Average packs/day: 1 pack/day for 52.9 years (52.9 ttl pk-yrs)    Types: Cigarettes    Start date:     Quit date: 2007    Years since quittin.3    Passive exposure: Past   Smokeless Tobacco Former    Quit date:      Family History   Problem Relation Age of Onset    No Known Problems Mother     No Known Problems Father        Meds/Allergies       Current Outpatient Medications:     Accu-Chek FastClix Lancets MISC    albuterol (Ventolin HFA) 90 mcg/act inhaler    Ascorbic Acid (vitamin C) 1000 MG tablet    aspirin (ECOTRIN LOW STRENGTH) 81 mg EC tablet    b complex vitamins capsule    BD Pen Needle Rosie U/F 32G X 4 MM MISC    Blood Glucose Monitoring Suppl (Accu-Chek Guide Me) w/Device KIT    Cholecalciferol (Vitamin D3) 50 MCG ( UT) TABS    Fluticasone-Salmeterol (Advair Diskus) 250-50 mcg/dose inhaler    furosemide (LASIX) 40 mg tablet    gabapentin (NEURONTIN) 300 mg capsule    glipiZIDE (GLUCOTROL) 10 mg tablet    glucose blood (Accu-Chek Celeste Plus) test strip    ipratropium (ATROVENT) 0.02 % nebulizer solution    ipratropium-albuterol (DUO-NEB) 0.5-2.5 mg/3 mL nebulizer solution    Lancets (freestyle) lancets    Levemir FlexPen 100 units/mL injection pen    levothyroxine 50 mcg tablet    metoprolol tartrate (LOPRESSOR) 50 mg tablet    oxygen gas    pantoprazole (PROTONIX) 40 mg tablet    warfarin (COUMADIN) 5 mg tablet  No current facility-administered medications for this visit.    No Known Allergies        Objective     Blood pressure 143/63, pulse 60, temperature  "97.7 °F (36.5 °C), temperature source Temporal, height 5' 6\" (1.676 m), weight 70.8 kg (156 lb), SpO2 91%. Body mass index is 25.18 kg/m².      PHYSICAL EXAM:      General Appearance:   Alert, cooperative, no distress   HEENT:   Normocephalic, atraumatic, anicteric.     Neck:  Supple, symmetrical, trachea midline   Lungs:   Clear to auscultation bilaterally; no rales, rhonchi or wheezing; respirations unlabored    Heart::   Regular rate and rhythm; no murmur, rub, or gallop.   Abdomen:   Soft, non-tender, non-distended; normal bowel sounds; no masses, no organomegaly    Genitalia:   Deferred    Rectal:   Deferred    Extremities:  No cyanosis, clubbing or edema    Pulses:  2+ and symmetric    Skin:  No jaundice, rashes, or lesions    Lymph nodes:  No palpable cervical lymphadenopathy        Lab Results:   No visits with results within 1 Day(s) from this visit.   Latest known visit with results is:   Appointment on 03/20/2024   Component Date Value    Sodium 03/20/2024 139     Potassium 03/20/2024 4.3     Chloride 03/20/2024 104     CO2 03/20/2024 24     ANION GAP 03/20/2024 11     BUN 03/20/2024 21     Creatinine 03/20/2024 1.29     Glucose, Fasting 03/20/2024 134 (H)     Calcium 03/20/2024 8.9     eGFR 03/20/2024 38     WBC 03/20/2024 9.73     RBC 03/20/2024 3.72 (L)     Hemoglobin 03/20/2024 8.2 (L)     Hematocrit 03/20/2024 28.0 (L)     MCV 03/20/2024 75 (L)     MCH 03/20/2024 22.0 (L)     MCHC 03/20/2024 29.3 (L)     RDW 03/20/2024 19.6 (H)     MPV 03/20/2024 9.6     Platelets 03/20/2024 546 (H)     nRBC 03/20/2024 0     Neutrophils Relative 03/20/2024 77 (H)     Immature Grans % 03/20/2024 1     Lymphocytes Relative 03/20/2024 13 (L)     Monocytes Relative 03/20/2024 7     Eosinophils Relative 03/20/2024 1     Basophils Relative 03/20/2024 1     Neutrophils Absolute 03/20/2024 7.50     Absolute Immature Grans 03/20/2024 0.09     Absolute Lymphocytes 03/20/2024 1.30     Absolute Monocytes 03/20/2024 0.69     " Eosinophils Absolute 03/20/2024 0.10     Basophils Absolute 03/20/2024 0.05     Phosphorus 03/20/2024 2.9     Creatinine, Ur 03/20/2024 80.9     Protein Urine Random 03/20/2024 25     Prot/Creat Ratio, Ur 03/20/2024 0.31 (H)     PTH 03/20/2024 81.4     Color, UA 03/20/2024 Yellow     Clarity, UA 03/20/2024 Clear     Specific Gravity, UA 03/20/2024 1.010     pH, UA 03/20/2024 5.5     Leukocytes, UA 03/20/2024 Negative     Nitrite, UA 03/20/2024 Negative     Protein, UA 03/20/2024 Trace (A)     Glucose, UA 03/20/2024 Negative     Ketones, UA 03/20/2024 Negative     Urobilinogen, UA 03/20/2024 <2.0     Bilirubin, UA 03/20/2024 Negative     Occult Blood, UA 03/20/2024 Negative     Vit D, 25-Hydroxy 03/20/2024 60.5     RBC, UA 03/20/2024 1-2     WBC, UA 03/20/2024 1-2     Epithelial Cells 03/20/2024 None Seen     Bacteria, UA 03/20/2024 None Seen          Radiology Results:   Cardiac EP device report    Result Date: 3/28/2024  Narrative: SJM DUAL CHAMBER PM/ NOT MRI CONDITIONAL MERLIN TRANSMISSION: BATTERY VOLTAGE ADEQUATE. (1.3 YRS) AP 7%  93%. ALL AVAILABLE LEAD PARAMETERS WITHIN NORMAL LIMITS HOWEVER VENTRICULAR SENSITIVITY SAFETY MARGIN <2:1. NO SIGNIFICANT HIGH RATE EPISODES. 17 AMS EPISODES DETECTED, CURRENTLY IN AF. PATIENT IS ON WARFARIN AND METOPROLOL TART. NORMAL DEVICE FUNCTION.---VALLE

## 2024-04-03 NOTE — TELEPHONE ENCOUNTER
Voicemail left for patient regarding below and to confirm if she is having any blood in stool/urine or back stools   [employer] number provided for assistance      ----- Message from Staci Contreras PA-C sent at 4/2/2024  4:00 PM EDT -----  stable hgb from prior labs. Please confirm with her she is not having any blood in stool or urine or black stools. Continue IV iron infusions. Repeat CBC in 4 weeks.

## 2024-04-04 ENCOUNTER — TELEPHONE (OUTPATIENT)
Dept: CARDIOLOGY CLINIC | Facility: CLINIC | Age: 84
End: 2024-04-04

## 2024-04-04 ENCOUNTER — LAB REQUISITION (OUTPATIENT)
Dept: LAB | Facility: HOSPITAL | Age: 84
End: 2024-04-04
Payer: COMMERCIAL

## 2024-04-04 ENCOUNTER — ANTICOAG VISIT (OUTPATIENT)
Dept: CARDIOLOGY CLINIC | Facility: CLINIC | Age: 84
End: 2024-04-04

## 2024-04-04 DIAGNOSIS — I35.9 AORTIC VALVE DISORDER: ICD-10-CM

## 2024-04-04 DIAGNOSIS — Z95.2 H/O MECHANICAL AORTIC VALVE REPLACEMENT: Primary | ICD-10-CM

## 2024-04-04 DIAGNOSIS — D62 ACUTE POSTHEMORRHAGIC ANEMIA: ICD-10-CM

## 2024-04-04 LAB
BASOPHILS # BLD AUTO: 0.04 THOUSANDS/ÂΜL (ref 0–0.1)
BASOPHILS NFR BLD AUTO: 1 % (ref 0–1)
EOSINOPHIL # BLD AUTO: 0.51 THOUSAND/ÂΜL (ref 0–0.61)
EOSINOPHIL NFR BLD AUTO: 9 % (ref 0–6)
ERYTHROCYTE [DISTWIDTH] IN BLOOD BY AUTOMATED COUNT: 25.4 % (ref 11.6–15.1)
HCT VFR BLD AUTO: 31.2 % (ref 34.8–46.1)
HGB BLD-MCNC: 9.4 G/DL (ref 11.5–15.4)
IMM GRANULOCYTES # BLD AUTO: 0.04 THOUSAND/UL (ref 0–0.2)
IMM GRANULOCYTES NFR BLD AUTO: 1 % (ref 0–2)
INR PPP: 1.62 (ref 0.84–1.19)
LYMPHOCYTES # BLD AUTO: 1.02 THOUSANDS/ÂΜL (ref 0.6–4.47)
LYMPHOCYTES NFR BLD AUTO: 17 % (ref 14–44)
MCH RBC QN AUTO: 23.6 PG (ref 26.8–34.3)
MCHC RBC AUTO-ENTMCNC: 30.1 G/DL (ref 31.4–37.4)
MCV RBC AUTO: 78 FL (ref 82–98)
MONOCYTES # BLD AUTO: 0.39 THOUSAND/ÂΜL (ref 0.17–1.22)
MONOCYTES NFR BLD AUTO: 7 % (ref 4–12)
NEUTROPHILS # BLD AUTO: 3.88 THOUSANDS/ÂΜL (ref 1.85–7.62)
NEUTS SEG NFR BLD AUTO: 65 % (ref 43–75)
NRBC BLD AUTO-RTO: 0 /100 WBCS
PLATELET # BLD AUTO: 380 THOUSANDS/UL (ref 149–390)
PMV BLD AUTO: 10.2 FL (ref 8.9–12.7)
PROTHROMBIN TIME: 20 SECONDS (ref 11.6–14.5)
RBC # BLD AUTO: 3.98 MILLION/UL (ref 3.81–5.12)
WBC # BLD AUTO: 5.88 THOUSAND/UL (ref 4.31–10.16)

## 2024-04-04 PROCEDURE — 85025 COMPLETE CBC W/AUTO DIFF WBC: CPT | Performed by: PHYSICIAN ASSISTANT

## 2024-04-04 PROCEDURE — 85610 PROTHROMBIN TIME: CPT | Performed by: PHYSICIAN ASSISTANT

## 2024-04-04 RX ORDER — ENOXAPARIN SODIUM 100 MG/ML
1 INJECTION SUBCUTANEOUS EVERY 24 HOURS
Qty: 0.7 ML | Refills: 10 | Status: SHIPPED | OUTPATIENT
Start: 2024-04-04

## 2024-04-04 RX ORDER — ENOXAPARIN SODIUM 100 MG/ML
1 INJECTION SUBCUTANEOUS EVERY 24 HOURS
Qty: 0.7 ML | Refills: 10 | Status: SHIPPED | OUTPATIENT
Start: 2024-04-04 | End: 2024-04-04

## 2024-04-04 NOTE — TELEPHONE ENCOUNTER
Pt is having an enteroscopy on 4/15 by Dr Mcleod and needs to be bridged with Lovenox. I s/w the pt. She will need to hold Warfarin 5 days prior. Last dose 4/9. She will start Lovenox 4 days prior (4/11). No Lovenox the morning of the procedure. She will resume both Lovenox and Warfarin at the discretion of the surgeon (preferably that evening) and check INR 4 days after restarting. She is familiar with giving herself the injections as she has done this in the past.

## 2024-04-04 NOTE — RESULT ENCOUNTER NOTE
S/w pt. She was taking differently at 5mg daily. Advised to take 7.5mg Sun/Tues/Thurs, 5mg the rest. Pt will be holding for a procedure on 4/15. Last dose will be 4/9. I explained that when she restarts to restart at the new dose and recheck inr in 4 days after restarting.

## 2024-04-05 ENCOUNTER — PATIENT OUTREACH (OUTPATIENT)
Age: 84
End: 2024-04-05

## 2024-04-09 ENCOUNTER — HOSPITAL ENCOUNTER (OUTPATIENT)
Dept: INFUSION CENTER | Facility: CLINIC | Age: 84
Discharge: HOME | End: 2024-04-09
Payer: COMMERCIAL

## 2024-04-09 VITALS
DIASTOLIC BLOOD PRESSURE: 64 MMHG | HEART RATE: 72 BPM | TEMPERATURE: 97.5 F | RESPIRATION RATE: 20 BRPM | SYSTOLIC BLOOD PRESSURE: 140 MMHG

## 2024-04-09 DIAGNOSIS — D50.9 IRON DEFICIENCY ANEMIA, UNSPECIFIED IRON DEFICIENCY ANEMIA TYPE: Primary | ICD-10-CM

## 2024-04-09 PROCEDURE — 96365 THER/PROPH/DIAG IV INF INIT: CPT

## 2024-04-09 RX ORDER — SODIUM CHLORIDE 9 MG/ML
20 INJECTION, SOLUTION INTRAVENOUS ONCE
Status: COMPLETED | OUTPATIENT
Start: 2024-04-09 | End: 2024-04-09

## 2024-04-09 RX ORDER — SODIUM CHLORIDE 9 MG/ML
20 INJECTION, SOLUTION INTRAVENOUS ONCE
Status: CANCELLED | OUTPATIENT
Start: 2024-04-09

## 2024-04-09 RX ADMIN — IRON SUCROSE 300 MG: 20 INJECTION, SOLUTION INTRAVENOUS at 08:04

## 2024-04-09 RX ADMIN — SODIUM CHLORIDE 20 ML/HR: 0.9 INJECTION, SOLUTION INTRAVENOUS at 08:04

## 2024-04-09 NOTE — PROGRESS NOTES
Patient presents to the infusion center for venofer offering no complaints. Pt tolerated infusion without incident. Pt aware that this is her last appointment

## 2024-04-12 ENCOUNTER — LAB REQUISITION (OUTPATIENT)
Dept: LAB | Facility: HOSPITAL | Age: 84
End: 2024-04-12
Payer: COMMERCIAL

## 2024-04-12 ENCOUNTER — TELEPHONE (OUTPATIENT)
Dept: PULMONOLOGY | Facility: CLINIC | Age: 84
End: 2024-04-12

## 2024-04-12 DIAGNOSIS — D62 ACUTE POSTHEMORRHAGIC ANEMIA: ICD-10-CM

## 2024-04-12 LAB
BASOPHILS # BLD AUTO: 0.03 THOUSANDS/ÂΜL (ref 0–0.1)
BASOPHILS NFR BLD AUTO: 1 % (ref 0–1)
EOSINOPHIL # BLD AUTO: 0.36 THOUSAND/ÂΜL (ref 0–0.61)
EOSINOPHIL NFR BLD AUTO: 6 % (ref 0–6)
ERYTHROCYTE [DISTWIDTH] IN BLOOD BY AUTOMATED COUNT: 26.5 % (ref 11.6–15.1)
HCT VFR BLD AUTO: 32.8 % (ref 34.8–46.1)
HGB BLD-MCNC: 9.7 G/DL (ref 11.5–15.4)
IMM GRANULOCYTES # BLD AUTO: 0.01 THOUSAND/UL (ref 0–0.2)
IMM GRANULOCYTES NFR BLD AUTO: 0 % (ref 0–2)
INR PPP: 1.55 (ref 0.84–1.19)
LYMPHOCYTES # BLD AUTO: 1.04 THOUSANDS/ÂΜL (ref 0.6–4.47)
LYMPHOCYTES NFR BLD AUTO: 18 % (ref 14–44)
MCH RBC QN AUTO: 24.1 PG (ref 26.8–34.3)
MCHC RBC AUTO-ENTMCNC: 29.6 G/DL (ref 31.4–37.4)
MCV RBC AUTO: 82 FL (ref 82–98)
MONOCYTES # BLD AUTO: 0.42 THOUSAND/ÂΜL (ref 0.17–1.22)
MONOCYTES NFR BLD AUTO: 7 % (ref 4–12)
NEUTROPHILS # BLD AUTO: 4.05 THOUSANDS/ÂΜL (ref 1.85–7.62)
NEUTS SEG NFR BLD AUTO: 68 % (ref 43–75)
NRBC BLD AUTO-RTO: 0 /100 WBCS
PLATELET # BLD AUTO: 349 THOUSANDS/UL (ref 149–390)
PMV BLD AUTO: 10.2 FL (ref 8.9–12.7)
PROTHROMBIN TIME: 19.3 SECONDS (ref 11.6–14.5)
RBC # BLD AUTO: 4.02 MILLION/UL (ref 3.81–5.12)
WBC # BLD AUTO: 5.91 THOUSAND/UL (ref 4.31–10.16)

## 2024-04-12 PROCEDURE — 85610 PROTHROMBIN TIME: CPT | Performed by: PHYSICIAN ASSISTANT

## 2024-04-12 PROCEDURE — 85025 COMPLETE CBC W/AUTO DIFF WBC: CPT | Performed by: PHYSICIAN ASSISTANT

## 2024-04-12 NOTE — TELEPHONE ENCOUNTER
NEW PRESCRIPTION REQUEST NEEDS SIGNATURE AND FAXED BACK TO [phone]. UPLOADED IN PATIENTS CHART MEDIA

## 2024-04-15 ENCOUNTER — HOSPITAL ENCOUNTER (OUTPATIENT)
Dept: GASTROENTEROLOGY | Facility: HOSPITAL | Age: 84
Setting detail: OUTPATIENT SURGERY
Discharge: HOME | End: 2024-04-15
Payer: COMMERCIAL

## 2024-04-15 ENCOUNTER — TELEPHONE (OUTPATIENT)
Dept: CARDIOLOGY CLINIC | Facility: CLINIC | Age: 84
End: 2024-04-15

## 2024-04-15 ENCOUNTER — ANESTHESIA (OUTPATIENT)
Dept: GASTROENTEROLOGY | Facility: HOSPITAL | Age: 84
End: 2024-04-15

## 2024-04-15 ENCOUNTER — ANESTHESIA EVENT (OUTPATIENT)
Dept: GASTROENTEROLOGY | Facility: HOSPITAL | Age: 84
End: 2024-04-15

## 2024-04-15 VITALS
TEMPERATURE: 98.6 F | RESPIRATION RATE: 18 BRPM | HEART RATE: 60 BPM | DIASTOLIC BLOOD PRESSURE: 56 MMHG | OXYGEN SATURATION: 95 % | SYSTOLIC BLOOD PRESSURE: 116 MMHG

## 2024-04-15 DIAGNOSIS — D50.9 IRON DEFICIENCY ANEMIA, UNSPECIFIED IRON DEFICIENCY ANEMIA TYPE: ICD-10-CM

## 2024-04-15 LAB — GLUCOSE SERPL-MCNC: 88 MG/DL (ref 65–140)

## 2024-04-15 PROCEDURE — 82948 REAGENT STRIP/BLOOD GLUCOSE: CPT

## 2024-04-15 RX ORDER — SODIUM CHLORIDE, SODIUM LACTATE, POTASSIUM CHLORIDE, CALCIUM CHLORIDE 600; 310; 30; 20 MG/100ML; MG/100ML; MG/100ML; MG/100ML
INJECTION, SOLUTION INTRAVENOUS CONTINUOUS PRN
Status: DISCONTINUED | OUTPATIENT
Start: 2024-04-15 | End: 2024-04-15

## 2024-04-15 RX ORDER — PROPOFOL 10 MG/ML
INJECTION, EMULSION INTRAVENOUS AS NEEDED
Status: DISCONTINUED | OUTPATIENT
Start: 2024-04-15 | End: 2024-04-15

## 2024-04-15 RX ORDER — LIDOCAINE HYDROCHLORIDE 20 MG/ML
INJECTION, SOLUTION EPIDURAL; INFILTRATION; INTRACAUDAL; PERINEURAL AS NEEDED
Status: DISCONTINUED | OUTPATIENT
Start: 2024-04-15 | End: 2024-04-15

## 2024-04-15 RX ORDER — IPRATROPIUM BROMIDE AND ALBUTEROL SULFATE 2.5; .5 MG/3ML; MG/3ML
3 SOLUTION RESPIRATORY (INHALATION) ONCE
Status: COMPLETED | OUTPATIENT
Start: 2024-04-15 | End: 2024-04-15

## 2024-04-15 RX ADMIN — PROPOFOL 20 MG: 10 INJECTION, EMULSION INTRAVENOUS at 08:37

## 2024-04-15 RX ADMIN — SODIUM CHLORIDE, SODIUM LACTATE, POTASSIUM CHLORIDE, AND CALCIUM CHLORIDE: .6; .31; .03; .02 INJECTION, SOLUTION INTRAVENOUS at 08:18

## 2024-04-15 RX ADMIN — PROPOFOL 30 MG: 10 INJECTION, EMULSION INTRAVENOUS at 08:30

## 2024-04-15 RX ADMIN — IPRATROPIUM BROMIDE AND ALBUTEROL SULFATE 3 ML: 2.5; .5 SOLUTION RESPIRATORY (INHALATION) at 07:32

## 2024-04-15 RX ADMIN — PROPOFOL 100 MG: 10 INJECTION, EMULSION INTRAVENOUS at 08:28

## 2024-04-15 RX ADMIN — PROPOFOL 20 MG: 10 INJECTION, EMULSION INTRAVENOUS at 08:32

## 2024-04-15 RX ADMIN — PROPOFOL 30 MG: 10 INJECTION, EMULSION INTRAVENOUS at 08:33

## 2024-04-15 RX ADMIN — LIDOCAINE HYDROCHLORIDE 100 MG: 20 INJECTION, SOLUTION EPIDURAL; INFILTRATION; INTRACAUDAL at 08:28

## 2024-04-15 NOTE — INTERVAL H&P NOTE
H&P reviewed. After examining the patient I find no changes in the patients condition since the H&P had been written.    Vitals:    04/15/24 0709   BP: 151/68   Pulse: 61   Resp: 20   Temp: (!) 97.2 °F (36.2 °C)   SpO2: 95%

## 2024-04-15 NOTE — ANESTHESIA POSTPROCEDURE EVALUATION
Post-Op Assessment Note    CV Status:  Stable  Pain Score: 0    Pain management: adequate       Mental Status:  Sleepy and arousable   Hydration Status:  Euvolemic   PONV Controlled:  Controlled   Airway Patency:  Patent     Post Op Vitals Reviewed: Yes    No anethesia notable event occurred.    Staff: CRNA               BP   119/59   Temp   98.6   Pulse  63   Resp   22   SpO2   94

## 2024-04-15 NOTE — ANESTHESIA PREPROCEDURE EVALUATION
Procedure:  EGD WITH PUSH ENTEROSCOPY  1. Iron deficiency anemia, unspecified iron deficiency anemia type  2. Small bowel angiectasia  Patient was admitted to [facility] in December with a Hgb of 5.4  Push enteroscopy was normal  Colonoscopy noted 2 small polyps and diverticulosis with associated luminal narrowing  VCE noted a small nonbleeding AVM in the duodenum or upper jejunum     Will schedule repeat EGD with push enteroscopy  Will discuss coumadin hold and need for bridge with cardiology     She is set up for iron infusions of which she has received 3 with 1 left  Hemoglobin after 1 infusion already improved from 7.1-8.7     She continues to deny any overt bleeding  ______________________________________________________________________     SUBJECTIVE: 83-year-old female with a history of atrial fibrillation, valvular heart disease status post aortic valve replacement, coronary artery disease, hypertension, diabetes mellitus, hypothyroidism, chronic kidney disease stage IIIb, hyperlipidemia, small bowel AVM who presents for follow-up after video capsule endoscopy.  The patient was admitted to [facility] in December of last year with a hemoglobin of 5.4.  On admission the patient reported she had not been feeling well for quite some time with increasing fatigue and shortness of breath with exertion.  The patient actively denied any obvious GI bleeding including bright red blood per rectum, melena or hematemesis.  She underwent a push enteroscopy which was noted to be normal.  Colonoscopy revealed 2 small polyps which were removed and diverticulosis with associated luminal narrowing.  Outpatient video capsule endoscopy was performed noting a small nonbleeding AVM in the duodenum or upper jejunum.  She was advised to follow-up for a push enteroscopy which she has not yet arranged as she requested an office appointment first.  She has been receiving iron infusions as of the end of March.  To date she has received 3  infusions with 1 left ago.  After 2 infusions her hemoglobin improved from 7.1-8.7.  Patient has a history of GI bleeding.  In 2017 she was referred to Dr. Mcleod for iron deficiency anemia.  At that time she underwent an EGD with mild erosive gastritis and an ulcer in the duodenal bulb.  Colonoscopy at the same time noted diverticulosis and a single small polyp in the sigmoid colon which was removed.  She then had a video capsule endoscopy documenting small bowel angiectasia's.  She underwent a push enteroscopy in May 2018 with 4 AVMs cauterized in the jejunum.  After this time she did very well until recently.  Relevant Problems   CARDIO   (+) Coronary artery disease of native artery of native heart with stable angina pectoris (HCC)   (+) Hyperlipidemia   (+) Hypertension, essential   (+) Hypertensive urgency   (+) Paroxysmal atrial fibrillation (HCC)      ENDO   (+) Hypothyroidism   (+) Type 2 diabetes mellitus with stage 3b chronic kidney disease, with long-term current use of insulin (HCC)      GI/HEPATIC   (+) GERD (gastroesophageal reflux disease)      /RENAL   (+) Chronic kidney disease-mineral and bone disorder   (+) Stage 3b chronic kidney disease (HCC)      HEMATOLOGY   (+) Acute blood loss anemia   (+) Anemia   (+) Iron deficiency anemia, unspecified      MUSCULOSKELETAL   (+) Primary osteoarthritis involving multiple joints      PULMONARY   (+) Acute respiratory failure with hypoxia ([facility])   (+) Simple chronic bronchitis ([facility])        Physical Exam    Airway    Mallampati score: II  TM Distance: >3 FB  Neck ROM: full     Dental       Cardiovascular  Cardiovascular exam normal    Pulmonary  Pulmonary exam normal     Other Findings  post-pubertal.      Anesthesia Plan  ASA Score- 3     Anesthesia Type- IV sedation with anesthesia with ASA Monitors.         Additional Monitors:     Airway Plan:            Plan Factors-Exercise tolerance (METS): <4 METS.    Chart reviewed. EKG reviewed. Imaging results  reviewed. Existing labs reviewed. Patient summary reviewed.    Patient is not a current smoker.              Induction- intravenous.    Postoperative Plan-     Informed Consent- Anesthetic plan and risks discussed with patient.  I personally reviewed this patient with the CRNA. Discussed and agreed on the Anesthesia Plan with the CRNA..

## 2024-04-15 NOTE — TELEPHONE ENCOUNTER
Spoke with patient and they will like a call back to see when they start warfarin again after her endo this morning

## 2024-04-19 ENCOUNTER — LAB REQUISITION (OUTPATIENT)
Dept: LAB | Facility: HOSPITAL | Age: 84
End: 2024-04-19
Payer: COMMERCIAL

## 2024-04-19 DIAGNOSIS — Z79.01 LONG TERM (CURRENT) USE OF ANTICOAGULANTS: ICD-10-CM

## 2024-04-19 DIAGNOSIS — D62 ACUTE POSTHEMORRHAGIC ANEMIA: ICD-10-CM

## 2024-04-19 LAB
BASOPHILS # BLD AUTO: 0.04 THOUSANDS/ÂΜL (ref 0–0.1)
BASOPHILS NFR BLD AUTO: 1 % (ref 0–1)
EOSINOPHIL # BLD AUTO: 0.36 THOUSAND/ÂΜL (ref 0–0.61)
EOSINOPHIL NFR BLD AUTO: 7 % (ref 0–6)
ERYTHROCYTE [DISTWIDTH] IN BLOOD BY AUTOMATED COUNT: 26.3 % (ref 11.6–15.1)
HCT VFR BLD AUTO: 38 % (ref 34.8–46.1)
HGB BLD-MCNC: 10.7 G/DL (ref 11.5–15.4)
IMM GRANULOCYTES # BLD AUTO: 0.01 THOUSAND/UL (ref 0–0.2)
IMM GRANULOCYTES NFR BLD AUTO: 0 % (ref 0–2)
INR PPP: 1.61 (ref 0.84–1.19)
LYMPHOCYTES # BLD AUTO: 0.98 THOUSANDS/ÂΜL (ref 0.6–4.47)
LYMPHOCYTES NFR BLD AUTO: 20 % (ref 14–44)
MCH RBC QN AUTO: 24.8 PG (ref 26.8–34.3)
MCHC RBC AUTO-ENTMCNC: 28.2 G/DL (ref 31.4–37.4)
MCV RBC AUTO: 88 FL (ref 82–98)
MONOCYTES # BLD AUTO: 0.49 THOUSAND/ÂΜL (ref 0.17–1.22)
MONOCYTES NFR BLD AUTO: 10 % (ref 4–12)
NEUTROPHILS # BLD AUTO: 3.15 THOUSANDS/ÂΜL (ref 1.85–7.62)
NEUTS SEG NFR BLD AUTO: 62 % (ref 43–75)
NRBC BLD AUTO-RTO: 0 /100 WBCS
PLATELET # BLD AUTO: 318 THOUSANDS/UL (ref 149–390)
PMV BLD AUTO: 10.7 FL (ref 8.9–12.7)
PROTHROMBIN TIME: 18.9 SECONDS (ref 11.6–14.5)
RBC # BLD AUTO: 4.32 MILLION/UL (ref 3.81–5.12)
WBC # BLD AUTO: 5.03 THOUSAND/UL (ref 4.31–10.16)

## 2024-04-19 PROCEDURE — 85025 COMPLETE CBC W/AUTO DIFF WBC: CPT | Performed by: PHYSICIAN ASSISTANT

## 2024-04-19 PROCEDURE — 85610 PROTHROMBIN TIME: CPT | Performed by: PHYSICIAN ASSISTANT

## 2024-04-22 ENCOUNTER — PATIENT OUTREACH (OUTPATIENT)
Age: 84
End: 2024-04-22

## 2024-04-22 ENCOUNTER — ANTICOAG VISIT (OUTPATIENT)
Dept: CARDIOLOGY CLINIC | Facility: CLINIC | Age: 84
End: 2024-04-22

## 2024-04-22 DIAGNOSIS — J41.0 SIMPLE CHRONIC BRONCHITIS (HCC): ICD-10-CM

## 2024-04-22 RX ORDER — FLUTICASONE PROPIONATE AND SALMETEROL XINAFOATE 115; 21 UG/1; UG/1
2 AEROSOL, METERED RESPIRATORY (INHALATION) 2 TIMES DAILY
Qty: 36 G | Refills: 3 | Status: SHIPPED | OUTPATIENT
Start: 2024-04-22

## 2024-04-22 RX ORDER — ALBUTEROL SULFATE 90 UG/1
2 AEROSOL, METERED RESPIRATORY (INHALATION) EVERY 6 HOURS PRN
Qty: 54 G | Refills: 3 | Status: SHIPPED | OUTPATIENT
Start: 2024-04-22

## 2024-04-22 NOTE — PROGRESS NOTES
Out Patient Care Management Note:  Chart reviewed.     Call placed to patient for ongoing Complex Care Management outreach. Patient reported she has completed her iron infusions and feels good. She is hoping her levels remain. Briefly reviewed medications and offered med rec, but patient reports she is not able to talk at this point as her caregiver was present.     Patient verbalized no further concerns or needs at this time. Next outreach scheduled for after her next appointment on 5/15.

## 2024-04-25 ENCOUNTER — LAB REQUISITION (OUTPATIENT)
Dept: LAB | Facility: HOSPITAL | Age: 84
End: 2024-04-25
Payer: COMMERCIAL

## 2024-04-25 ENCOUNTER — ANTICOAG VISIT (OUTPATIENT)
Dept: CARDIOLOGY CLINIC | Facility: CLINIC | Age: 84
End: 2024-04-25

## 2024-04-25 DIAGNOSIS — D62 ACUTE POSTHEMORRHAGIC ANEMIA: ICD-10-CM

## 2024-04-25 DIAGNOSIS — Z79.01 LONG TERM (CURRENT) USE OF ANTICOAGULANTS: ICD-10-CM

## 2024-04-25 LAB
BASOPHILS # BLD AUTO: 0.03 THOUSANDS/ÂΜL (ref 0–0.1)
BASOPHILS NFR BLD AUTO: 1 % (ref 0–1)
EOSINOPHIL # BLD AUTO: 0.32 THOUSAND/ÂΜL (ref 0–0.61)
EOSINOPHIL NFR BLD AUTO: 5 % (ref 0–6)
ERYTHROCYTE [DISTWIDTH] IN BLOOD BY AUTOMATED COUNT: 25.1 % (ref 11.6–15.1)
HCT VFR BLD AUTO: 36.2 % (ref 34.8–46.1)
HGB BLD-MCNC: 11.1 G/DL (ref 11.5–15.4)
IMM GRANULOCYTES # BLD AUTO: 0.02 THOUSAND/UL (ref 0–0.2)
IMM GRANULOCYTES NFR BLD AUTO: 0 % (ref 0–2)
INR PPP: 2.05 (ref 0.84–1.19)
LYMPHOCYTES # BLD AUTO: 1.31 THOUSANDS/ÂΜL (ref 0.6–4.47)
LYMPHOCYTES NFR BLD AUTO: 20 % (ref 14–44)
MCH RBC QN AUTO: 25.5 PG (ref 26.8–34.3)
MCHC RBC AUTO-ENTMCNC: 30.7 G/DL (ref 31.4–37.4)
MCV RBC AUTO: 83 FL (ref 82–98)
MONOCYTES # BLD AUTO: 0.52 THOUSAND/ÂΜL (ref 0.17–1.22)
MONOCYTES NFR BLD AUTO: 8 % (ref 4–12)
NEUTROPHILS # BLD AUTO: 4.39 THOUSANDS/ÂΜL (ref 1.85–7.62)
NEUTS SEG NFR BLD AUTO: 66 % (ref 43–75)
NRBC BLD AUTO-RTO: 0 /100 WBCS
PLATELET # BLD AUTO: 324 THOUSANDS/UL (ref 149–390)
PMV BLD AUTO: 10.3 FL (ref 8.9–12.7)
PROTHROMBIN TIME: 23.9 SECONDS (ref 11.6–14.5)
RBC # BLD AUTO: 4.36 MILLION/UL (ref 3.81–5.12)
WBC # BLD AUTO: 6.59 THOUSAND/UL (ref 4.31–10.16)

## 2024-04-25 PROCEDURE — 85610 PROTHROMBIN TIME: CPT | Performed by: INTERNAL MEDICINE

## 2024-04-25 PROCEDURE — 85025 COMPLETE CBC W/AUTO DIFF WBC: CPT | Performed by: INTERNAL MEDICINE

## 2024-05-02 ENCOUNTER — APPOINTMENT (OUTPATIENT)
Age: 84
End: 2024-05-02
Payer: COMMERCIAL

## 2024-05-02 DIAGNOSIS — D62 ACUTE POSTHEMORRHAGIC ANEMIA: ICD-10-CM

## 2024-05-02 DIAGNOSIS — Z79.01 LONG TERM (CURRENT) USE OF ANTICOAGULANTS: ICD-10-CM

## 2024-05-02 LAB
BASOPHILS # BLD AUTO: 0.04 THOUSANDS/ÂΜL (ref 0–0.1)
BASOPHILS NFR BLD AUTO: 1 % (ref 0–1)
EOSINOPHIL # BLD AUTO: 0.37 THOUSAND/ÂΜL (ref 0–0.61)
EOSINOPHIL NFR BLD AUTO: 6 % (ref 0–6)
ERYTHROCYTE [DISTWIDTH] IN BLOOD BY AUTOMATED COUNT: 23.8 % (ref 11.6–15.1)
HCT VFR BLD AUTO: 39.8 % (ref 34.8–46.1)
HGB BLD-MCNC: 11.9 G/DL (ref 11.5–15.4)
IMM GRANULOCYTES # BLD AUTO: 0.02 THOUSAND/UL (ref 0–0.2)
IMM GRANULOCYTES NFR BLD AUTO: 0 % (ref 0–2)
INR PPP: 2.73 (ref 0.84–1.19)
LYMPHOCYTES # BLD AUTO: 1.6 THOUSANDS/ÂΜL (ref 0.6–4.47)
LYMPHOCYTES NFR BLD AUTO: 26 % (ref 14–44)
MCH RBC QN AUTO: 25.5 PG (ref 26.8–34.3)
MCHC RBC AUTO-ENTMCNC: 29.9 G/DL (ref 31.4–37.4)
MCV RBC AUTO: 85 FL (ref 82–98)
MONOCYTES # BLD AUTO: 0.48 THOUSAND/ÂΜL (ref 0.17–1.22)
MONOCYTES NFR BLD AUTO: 8 % (ref 4–12)
NEUTROPHILS # BLD AUTO: 3.56 THOUSANDS/ÂΜL (ref 1.85–7.62)
NEUTS SEG NFR BLD AUTO: 59 % (ref 43–75)
NRBC BLD AUTO-RTO: 0 /100 WBCS
PLATELET # BLD AUTO: 345 THOUSANDS/UL (ref 149–390)
PMV BLD AUTO: 10.6 FL (ref 8.9–12.7)
PROTHROMBIN TIME: 28.4 SECONDS (ref 11.6–14.5)
RBC # BLD AUTO: 4.66 MILLION/UL (ref 3.81–5.12)
WBC # BLD AUTO: 6.07 THOUSAND/UL (ref 4.31–10.16)

## 2024-05-02 PROCEDURE — 85025 COMPLETE CBC W/AUTO DIFF WBC: CPT

## 2024-05-02 PROCEDURE — 85610 PROTHROMBIN TIME: CPT

## 2024-05-03 ENCOUNTER — ANTICOAG VISIT (OUTPATIENT)
Dept: CARDIOLOGY CLINIC | Facility: CLINIC | Age: 84
End: 2024-05-03

## 2024-05-14 PROBLEM — I16.0 HYPERTENSIVE URGENCY: Status: RESOLVED | Noted: 2023-10-28 | Resolved: 2024-05-14

## 2024-05-14 PROBLEM — R29.90 STROKE-LIKE SYMPTOMS: Status: RESOLVED | Noted: 2023-10-28 | Resolved: 2024-05-14

## 2024-05-14 PROBLEM — R04.0 ACUTE ANTERIOR EPISTAXIS: Status: RESOLVED | Noted: 2023-12-23 | Resolved: 2024-05-14

## 2024-05-14 PROBLEM — J96.01 ACUTE RESPIRATORY FAILURE WITH HYPOXIA (HCC): Status: RESOLVED | Noted: 2024-02-06 | Resolved: 2024-05-14

## 2024-05-14 PROBLEM — E61.1 IRON DEFICIENCY: Status: RESOLVED | Noted: 2017-09-28 | Resolved: 2024-05-14

## 2024-05-14 PROBLEM — T78.3XXA ANGIOEDEMA: Status: RESOLVED | Noted: 2019-06-07 | Resolved: 2024-05-14

## 2024-05-14 PROBLEM — D62 ACUTE BLOOD LOSS ANEMIA: Status: RESOLVED | Noted: 2023-12-14 | Resolved: 2024-05-14

## 2024-05-14 PROBLEM — R25.2 LEG CRAMPS: Status: RESOLVED | Noted: 2023-11-01 | Resolved: 2024-05-14

## 2024-05-14 NOTE — PATIENT INSTRUCTIONS
Basic Carbohydrate Counting   AMBULATORY CARE:   Carbohydrate counting  is a way to plan your meals by counting the amount of carbohydrate in foods. Carbohydrates are the sugars, starches, and fiber found in fruit, grains, vegetables, and milk products. Carbohydrates increase your blood sugar levels. Carbohydrate counting can help you eat the right amount of carbohydrate to keep your blood sugar levels under control.   What you need to know about planning meals using carbohydrate counting:  A dietitian or healthcare provider will help you develop a healthy meal plan that works best for you. You will be taught how much carbohydrate to eat or drink for each meal and snack. Your meal plan will be based on your age, weight, usual food intake, and physical activity level. If you have diabetes, it will also include your blood sugar levels and diabetes medicine. Once you know how much carbohydrate you should eat, you can decide what type of food you want to eat.    You will need to know what foods contain carbohydrate and how much they contain. Keep track of the amount of carbohydrate in meals and snacks in order to follow your meal plan. Do not avoid carbohydrates or skip meals. Your blood sugar may fall too low if you do not eat enough carbohydrate or you skip meals.    Foods that contain carbohydrate:   Breads:  Each serving of food listed below contains about 15 g of carbohydrate .    1 slice of bread (1 ounce) or 1 flour or corn tortilla (6 inch)    ½ of a hamburger bun or ¼ of a large bagel (about 1 ounce)    1 pancake (about 4 inches across and ¼ inch thick)    Cereals and grains:  Serving sizes of ready-to-eat cereals vary. Look at the serving size and the total carbohydrate amount listed on the food label. Each serving of food listed below contains about 15 g of carbohydrate .    ¾ cup of dry, unsweetened, ready-to-eat cereal or ¼ cup of low-fat granola     ½ cup of oatmeal or other cooked cereal     ? cup of  cooked rice or pasta    Starchy vegetables and beans:  Each serving of food listed below contains about 15 g of carbohydrate .    ½ cup of corn, green peas, sweet potatoes, or mashed potatoes    ¼ of a large baked potato    ½ cup of beans, lentils, and peas (garbanzo, maher, kidney, white, split, black-eyed)    Crackers and snacks:  Each serving of food listed below contains about 15 g of carbohydrate .    3 marco antonio cracker squares or 8 animal crackers     6 saltine-type crackers    3 cups of popcorn or ¾ ounce of pretzels, potato chips, or tortilla chips    Fruit:  Each serving of food listed below contains about 15 g of carbohydrate .    1 small (4 ounce) piece of fresh fruit or ¾ to 1 cup of fresh fruit    ½ cup of canned or frozen fruit, packed in natural juice    ½ cup (4 ounces) of unsweetened fruit juice    2 tablespoons of dried fruit    Desserts or sugary foods:  Each serving of food listed below contains about 15 g of carbohydrate .    2-inch square unfrosted cake or brownie     2 small cookies    ½ cup of ice cream, frozen yogurt, or nondairy frozen yogurt    ¼ cup of sherbet or sorbet    1 tablespoon of regular syrup, jam, or jelly    2 tablespoons of light syrup    Milk and yogurt:  Foods from the milk group contain about 12 g of carbohydrate per serving.    1 cup of fat-free or low-fat milk    1 cup of soy milk    ? cup of fat-free, yogurt sweetened with artificial sweetener    Non-starchy vegetables:  Each serving contains about 5 g of carbohydrate . Three servings of non-starch vegetables count as 1 carbohydrate serving.     ½ cup of cooked vegetables or 1 cup of raw vegetables. This includes beets, broccoli, cabbage, cauliflower, cucumber, mushrooms, tomatoes, and zucchini    ½ cup of vegetable juice    How to use carbohydrate counting to plan meals:   Count carbohydrate amounts using serving sizes:      Pasta dinner example:  You plan to have pasta, tossed salad, and an 8-ounce glass of milk. Your  healthcare provider tells you that you may have 4 carbohydrate servings for dinner. One carbohydrate serving of pasta is ? cup. One cup of pasta will equal 3 carbohydrate servings. An 8-ounce glass of milk will count as 1 carbohydrate serving. These amounts of food would equal 4 carbohydrate servings. One cup of tossed salad does not count toward your carbohydrate servings.       Count carbohydrate amounts using food labels:  Find the total amount of carbohydrate in a packaged food by reading the food label. Food labels tell you the serving size of the food and the total carbohydrate amount in each serving. Find the serving size on the food label and then decide how many servings you will eat. Multiply the number of servings you plan to eat by the carbohydrate amount per serving.     Granola bar snack example:  Your meal plan allows you to have 2 carbohydrate servings (30 grams) of carbohydrate for a snack. You plan to eat 1 package of granola bars, which contains 2 bars. According to the food label, the serving size of food in this package is 1 bar. Each serving (1 bar) contains 25 grams of carbohydrate. The total amount of carbohydrate in this package of granola bars would be 50 g. Based on your meal plan, you should eat only 1 bar.      Follow up with your doctor as directed:  Write down your questions so you remember to ask them during your visits.  © Copyright [employer] 2023 Information is for End User's use only and may not be sold, redistributed or otherwise used for commercial purposes.  The above information is an  only. It is not intended as medical advice for individual conditions or treatments. Talk to your doctor, nurse or pharmacist before following any medical regimen to see if it is safe and effective for you.

## 2024-05-15 ENCOUNTER — APPOINTMENT (OUTPATIENT)
Dept: LAB | Facility: CLINIC | Age: 84
End: 2024-05-15
Payer: COMMERCIAL

## 2024-05-15 ENCOUNTER — OFFICE VISIT (OUTPATIENT)
Age: 84
End: 2024-05-15
Payer: COMMERCIAL

## 2024-05-15 VITALS
RESPIRATION RATE: 18 BRPM | HEART RATE: 66 BPM | DIASTOLIC BLOOD PRESSURE: 64 MMHG | SYSTOLIC BLOOD PRESSURE: 134 MMHG | WEIGHT: 152 LBS | BODY MASS INDEX: 24.43 KG/M2 | HEIGHT: 66 IN | OXYGEN SATURATION: 98 %

## 2024-05-15 DIAGNOSIS — E78.2 MIXED HYPERLIPIDEMIA: ICD-10-CM

## 2024-05-15 DIAGNOSIS — E03.9 ACQUIRED HYPOTHYROIDISM: ICD-10-CM

## 2024-05-15 DIAGNOSIS — I48.0 PAROXYSMAL ATRIAL FIBRILLATION (HCC): ICD-10-CM

## 2024-05-15 DIAGNOSIS — D70.3 NEUTROPENIA DUE TO INFECTION (HCC): ICD-10-CM

## 2024-05-15 DIAGNOSIS — D50.0 IRON DEFICIENCY ANEMIA DUE TO CHRONIC BLOOD LOSS: ICD-10-CM

## 2024-05-15 DIAGNOSIS — Z00.00 MEDICARE ANNUAL WELLNESS VISIT, SUBSEQUENT: Primary | ICD-10-CM

## 2024-05-15 DIAGNOSIS — I10 HYPERTENSION, ESSENTIAL: ICD-10-CM

## 2024-05-15 DIAGNOSIS — D50.9 IRON DEFICIENCY ANEMIA, UNSPECIFIED IRON DEFICIENCY ANEMIA TYPE: ICD-10-CM

## 2024-05-15 DIAGNOSIS — J41.0 SIMPLE CHRONIC BRONCHITIS (HCC): ICD-10-CM

## 2024-05-15 DIAGNOSIS — E11.40 TYPE 2 DIABETES MELLITUS WITH DIABETIC NEUROPATHY, WITHOUT LONG-TERM CURRENT USE OF INSULIN (HCC): ICD-10-CM

## 2024-05-15 DIAGNOSIS — I50.22 CHRONIC SYSTOLIC (CONGESTIVE) HEART FAILURE (HCC): ICD-10-CM

## 2024-05-15 DIAGNOSIS — C71.9: ICD-10-CM

## 2024-05-15 LAB
ALBUMIN SERPL BCP-MCNC: 4.4 G/DL (ref 3.5–5)
ALP SERPL-CCNC: 79 U/L (ref 34–104)
ALT SERPL W P-5'-P-CCNC: 12 U/L (ref 7–52)
ANION GAP SERPL CALCULATED.3IONS-SCNC: 8 MMOL/L (ref 4–13)
AST SERPL W P-5'-P-CCNC: 18 U/L (ref 13–39)
BASOPHILS # BLD AUTO: 0.06 THOUSANDS/ÂΜL (ref 0–0.1)
BASOPHILS NFR BLD AUTO: 1 % (ref 0–1)
BILIRUB SERPL-MCNC: 0.49 MG/DL (ref 0.2–1)
BUN SERPL-MCNC: 40 MG/DL (ref 5–25)
CALCIUM SERPL-MCNC: 9.7 MG/DL (ref 8.4–10.2)
CHLORIDE SERPL-SCNC: 99 MMOL/L (ref 96–108)
CHOLEST SERPL-MCNC: 185 MG/DL
CO2 SERPL-SCNC: 31 MMOL/L (ref 21–32)
CREAT SERPL-MCNC: 1.74 MG/DL (ref 0.6–1.3)
EOSINOPHIL # BLD AUTO: 0.27 THOUSAND/ÂΜL (ref 0–0.61)
EOSINOPHIL NFR BLD AUTO: 3 % (ref 0–6)
ERYTHROCYTE [DISTWIDTH] IN BLOOD BY AUTOMATED COUNT: 21.3 % (ref 11.6–15.1)
EST. AVERAGE GLUCOSE BLD GHB EST-MCNC: 131 MG/DL
GFR SERPL CREATININE-BSD FRML MDRD: 26 ML/MIN/1.73SQ M
GLUCOSE P FAST SERPL-MCNC: 134 MG/DL (ref 65–99)
HBA1C MFR BLD: 6.2 %
HCT VFR BLD AUTO: 41.1 % (ref 34.8–46.1)
HDLC SERPL-MCNC: 40 MG/DL
HGB BLD-MCNC: 12.4 G/DL (ref 11.5–15.4)
IMM GRANULOCYTES # BLD AUTO: 0.05 THOUSAND/UL (ref 0–0.2)
IMM GRANULOCYTES NFR BLD AUTO: 1 % (ref 0–2)
LDLC SERPL CALC-MCNC: 119 MG/DL (ref 0–100)
LYMPHOCYTES # BLD AUTO: 1.26 THOUSANDS/ÂΜL (ref 0.6–4.47)
LYMPHOCYTES NFR BLD AUTO: 15 % (ref 14–44)
MCH RBC QN AUTO: 25.9 PG (ref 26.8–34.3)
MCHC RBC AUTO-ENTMCNC: 30.2 G/DL (ref 31.4–37.4)
MCV RBC AUTO: 86 FL (ref 82–98)
MONOCYTES # BLD AUTO: 0.61 THOUSAND/ÂΜL (ref 0.17–1.22)
MONOCYTES NFR BLD AUTO: 7 % (ref 4–12)
NEUTROPHILS # BLD AUTO: 6.3 THOUSANDS/ÂΜL (ref 1.85–7.62)
NEUTS SEG NFR BLD AUTO: 73 % (ref 43–75)
NRBC BLD AUTO-RTO: 0 /100 WBCS
PLATELET # BLD AUTO: 383 THOUSANDS/UL (ref 149–390)
PMV BLD AUTO: 10.8 FL (ref 8.9–12.7)
POTASSIUM SERPL-SCNC: 5.2 MMOL/L (ref 3.5–5.3)
PROT SERPL-MCNC: 8 G/DL (ref 6.4–8.4)
RBC # BLD AUTO: 4.78 MILLION/UL (ref 3.81–5.12)
SODIUM SERPL-SCNC: 138 MMOL/L (ref 135–147)
T4 FREE SERPL-MCNC: 0.73 NG/DL (ref 0.61–1.12)
TRIGL SERPL-MCNC: 132 MG/DL
TSH SERPL DL<=0.05 MIU/L-ACNC: 5.25 UIU/ML (ref 0.45–4.5)
WBC # BLD AUTO: 8.55 THOUSAND/UL (ref 4.31–10.16)

## 2024-05-15 PROCEDURE — 84443 ASSAY THYROID STIM HORMONE: CPT

## 2024-05-15 PROCEDURE — G0439 PPPS, SUBSEQ VISIT: HCPCS

## 2024-05-15 PROCEDURE — 84439 ASSAY OF FREE THYROXINE: CPT

## 2024-05-15 PROCEDURE — 80053 COMPREHEN METABOLIC PANEL: CPT

## 2024-05-15 PROCEDURE — 80061 LIPID PANEL: CPT

## 2024-05-15 PROCEDURE — 83036 HEMOGLOBIN GLYCOSYLATED A1C: CPT

## 2024-05-15 PROCEDURE — 85025 COMPLETE CBC W/AUTO DIFF WBC: CPT

## 2024-05-15 PROCEDURE — 99214 OFFICE O/P EST MOD 30 MIN: CPT

## 2024-05-15 NOTE — ASSESSMENT & PLAN NOTE
She is 152 lbs in office, but does not check her weight at home. She is on lasix 40 mg daily.  She admits to shortness of breath and some and lower leg swelling.  She denies any cough, abdominal bloating, PND, orthopnea.  Follows with cardiology.  Wt Readings from Last 3 Encounters:   05/15/24 68.9 kg (152 lb)   04/08/24 70.3 kg (155 lb)   04/03/24 70.8 kg (156 lb)

## 2024-05-15 NOTE — ASSESSMENT & PLAN NOTE
She denies any syncope, presyncope, chest pain, palpitations, lightheadedness, or dizziness.  She is currently on Coumadin and metoprolol daily.  She follows with cardiology.

## 2024-05-15 NOTE — ASSESSMENT & PLAN NOTE
BP is 134/64 in office.  She does not check her blood pressure at home.  Discussed limiting salt intake to less than 2000 mg daily.

## 2024-05-15 NOTE — ASSESSMENT & PLAN NOTE
She eats a well-balanced diet of fruits, veggies, and lean meats. She drinks an adequate amount of water, urinating pale to clear yellow every 2-3 hours. She is active around the house. She sleeps well. She denies any alcohol, tobacco, or illicit drug use. She is UTD with eye doctor.  She lives with her daughter Senait, son Odell, and 2 grandchildren.

## 2024-05-15 NOTE — PROGRESS NOTES
Ambulatory Visit  Name: Ely Hernadez      : 1940      MRN: [id number]  Encounter Provider: Amanda Villarreal PA-C  Encounter Date: 5/15/2024   Encounter department: [facility] INTERNAL MEDICINE [facility] [address]    Assessment & Plan   1. Medicare annual wellness visit, subsequent  Assessment & Plan:  She eats a well-balanced diet of fruits, veggies, and lean meats. She drinks an adequate amount of water, urinating pale to clear yellow every 2-3 hours. She is active around the house. She sleeps well. She denies any alcohol, tobacco, or illicit drug use. She is UTD with eye doctor.  She lives with her daughter Senait, son Odell, and 2 grandchildren.   2. Hypertension, essential  Assessment & Plan:  BP is 134/64 in office.  She does not check her blood pressure at home.  Discussed limiting salt intake to less than 2000 mg daily.  3. Paroxysmal atrial fibrillation (HCC)  Assessment & Plan:  She denies any syncope, presyncope, chest pain, palpitations, lightheadedness, or dizziness.  She is currently on Coumadin and metoprolol daily.  She follows with cardiology.  4. Chronic systolic (congestive) heart failure (HCC)  Assessment & Plan:  She is 152 lbs in office, but does not check her weight at home. She is on lasix 40 mg daily.  She admits to shortness of breath and some and lower leg swelling.  She denies any cough, abdominal bloating, PND, orthopnea.  Follows with cardiology.  Wt Readings from Last 3 Encounters:   05/15/24 68.9 kg (152 lb)   24 70.3 kg (155 lb)   24 70.8 kg (156 lb)             5. Simple chronic bronchitis (HCC)  Assessment & Plan:  She uses Advair daily.  She denies any cough or wheezing.  She follows with pulmonology.  6. Type 2 diabetes mellitus with diabetic neuropathy, without long-term current use of insulin (HCC)  Assessment & Plan:  She checks her sugars every morning and they run around 70s-110s.  Will update A1c.  She is currently on glipizide 10 mg daily.  Lab  Results   Component Value Date    HGBA1C 6.8 (A) 02/21/2024     Orders:  -     Hemoglobin A1C  -     Albumin / creatinine urine ratio  -     Comprehensive metabolic panel  7. Acquired hypothyroidism  Assessment & Plan:  Due for TSH.  Orders:  -     TSH, 3rd generation with Free T4 reflex  8. Iron deficiency anemia, unspecified iron deficiency anemia type  Assessment & Plan:  Her last infusion was 2 weeks ago, she completed 4 sessions of Venofer infusions. She had a workup in the hospital that was unrevealing.  She has an upcoming appointment with GI on Friday.  She is following with GI and hematology.  Orders:  -     CBC and differential; Future  9. Mixed hyperlipidemia  Assessment & Plan:  Due for lipid panel.  She is not currently on a statin.  Orders:  -     Lipid Panel with Direct LDL reflex  10. 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HCC)  11. Neutropenia due to infection (HCC)      Depression Screening and Follow-up Plan: Patient was screened for depression during today's encounter. They screened negative with a PHQ-2 score of 0.      Preventive health issues were discussed with patient, and age appropriate screening tests were ordered as noted in patient's After Visit Summary. Personalized health advice and appropriate referrals for health education or preventive services given if needed, as noted in patient's After Visit Summary.    History of Present Illness     Patient is an 83-year-old female presents today for annual Medicare wellness visit.  She has no new complaints today.    Health maintenance:  Due for labs, up-to-date on screenings.       Patient Care Team:  Raghav Holloway MD as PCP - General (Internal Medicine)  Raghav Holloway MD as PCP - PCP-[facility] ([facility]  Jay Mcleod III, MD as Endoscopist  Hilario Marti MD (Nephrology)  Pamela Paige RN as RN Care Manager (Care Coordination)    Review of Systems   Constitutional:  Negative for chills, fatigue and fever.   HENT:  Negative for ear  discharge, ear pain, postnasal drip, rhinorrhea, sinus pressure, sinus pain, sore throat, tinnitus and trouble swallowing.    Eyes:  Negative for pain, discharge and itching.   Respiratory:  Negative for cough, shortness of breath and wheezing.    Cardiovascular:  Negative for chest pain, palpitations and leg swelling.   Gastrointestinal:  Negative for abdominal pain, constipation, diarrhea, nausea and vomiting.   Endocrine: Negative for polydipsia, polyphagia and polyuria.   Genitourinary:  Negative for difficulty urinating, frequency, hematuria and urgency.   Musculoskeletal:  Negative for arthralgias, joint swelling and myalgias.   Skin:  Negative for color change.   Allergic/Immunologic: Negative for environmental allergies.   Neurological:  Negative for dizziness, weakness, light-headedness, numbness and headaches.   Hematological:  Negative for adenopathy.   Psychiatric/Behavioral:  Negative for decreased concentration and sleep disturbance. The patient is not nervous/anxious.      Medical History Reviewed by provider this encounter:  [facility]       Annual Wellness Visit Questionnaire   Ely is here for her Subsequent Wellness visit. Last Medicare Wellness visit information reviewed, patient interviewed and updates made to the record today.      Health Risk Assessment:   Patient rates overall health as good. Patient feels that their physical health rating is same. Patient is satisfied with their life. Eyesight was rated as same. Hearing was rated as same. Patient feels that their emotional and mental health rating is same. Patient states they are never, rarely unusually tired/fatigued. Pain experienced in the last 7 days has been none. Patient states that she has experienced no weight loss or gain in last 6 months.     Depression Screening:   PHQ-2 Score: 0      Fall Risk Screening:   In the past year, patient has experienced: no history of falling in past year      Urinary Incontinence  Screening:   Patient has not leaked urine accidently in the last six months.     Home Safety:  Patient does not have trouble with stairs inside or outside of their home. Patient has working smoke alarms and has working carbon monoxide detector. Home safety hazards include: none.     Nutrition:   Current diet is Regular.     Medications:   Patient is currently taking over-the-counter supplements. OTC medications include: see medication list. Patient is able to manage medications.     Activities of Daily Living (ADLs)/Instrumental Activities of Daily Living (IADLs):   Walk and transfer into and out of bed and chair?: Yes  Dress and groom yourself?: Yes    Bathe or shower yourself?: Yes    Feed yourself? Yes  Do your laundry/housekeeping?: Yes  Manage your money, pay your bills and track your expenses?: Yes  Make your own meals?: Yes    Do your own shopping?: Yes    Previous Hospitalizations:   Any hospitalizations or ED visits within the last 12 months?: No      Advance Care Planning:     Advanced directive counseling given: Yes    ACP document given: Yes      PREVENTIVE SCREENINGS      Cardiovascular Screening:    General: History Lipid Disorder    Due for: Lipid Panel      Diabetes Screening:     General: History Diabetes    Due for: Blood Glucose      Colorectal Cancer Screening:     General: Screening Not Indicated      Breast Cancer Screening:     General: Screening Not Indicated      Cervical Cancer Screening:    General: Screening Not Indicated      Osteoporosis Screening:    General: Screening Not Indicated      Abdominal Aortic Aneurysm (AAA) Screening:        General: Screening Not Indicated      Lung Cancer Screening:     General: Screening Not Indicated      Hepatitis C Screening:    General: Screening Current    Screening, Brief Intervention, and Referral to Treatment (SBIRT)    Screening  Typical number of drinks in a day: 0  Typical number of drinks in a week: 0  Interpretation: Low risk drinking  "behavior.    Single Item Drug Screening:  How often have you used an illegal drug (including marijuana) or a prescription medication for non-medical reasons in the past year? never    Single Item Drug Screen Score: 0  Interpretation: Negative screen for possible drug use disorder    Social Determinants of Health     Food Insecurity: No Food Insecurity (5/15/2024)    Hunger Vital Sign     Worried About Running Out of Food in the Last Year: Never true     Ran Out of Food in the Last Year: Never true   Transportation Needs: No Transportation Needs (5/15/2024)    PRAPARE - Transportation     Lack of Transportation (Medical): No     Lack of Transportation (Non-Medical): No   Housing Stability: Unknown (5/15/2024)    Housing Stability Vital Sign     Unable to Pay for Housing in the Last Year: No     Homeless in the Last Year: No   Utilities: Not At Risk (5/15/2024)    [facility] Utilities     Threatened with loss of utilities: No     No results found.    Objective     /64 (BP Location: Left arm, Patient Position: Sitting, Cuff Size: Standard)   Pulse 66   Resp 18   Ht 5' 6\" (1.676 m)   Wt 68.9 kg (152 lb)   SpO2 98%   BMI 24.53 kg/m²     Physical Exam  Vitals and nursing note reviewed.   Constitutional:       General: She is awake. She is not in acute distress.     Appearance: Normal appearance. She is well-developed, well-groomed and normal weight.   HENT:      Head: Normocephalic and atraumatic.      Right Ear: Hearing and external ear normal.      Left Ear: Hearing and external ear normal.      Nose: Nose normal.      Mouth/Throat:      Lips: Pink.      Mouth: Mucous membranes are moist.   Eyes:      General: Lids are normal. Vision grossly intact. Gaze aligned appropriately.      Conjunctiva/sclera: Conjunctivae normal.   Neck:      Vascular: No carotid bruit.      Trachea: Trachea and phonation normal.   Cardiovascular:      Rate and Rhythm: Normal rate and regular rhythm.      Heart sounds: Normal heart sounds, " S1 normal and S2 normal. No murmur heard.     No friction rub. No gallop.   Pulmonary:      Effort: Pulmonary effort is normal. No respiratory distress.      Breath sounds: Normal breath sounds and air entry. No decreased breath sounds, wheezing, rhonchi or rales.   Abdominal:      General: Abdomen is protuberant.   Musculoskeletal:         General: No swelling.      Cervical back: Neck supple.      Right lower leg: No edema.      Left lower leg: No edema.   Skin:     General: Skin is warm.      Capillary Refill: Capillary refill takes less than 2 seconds.   Neurological:      Mental Status: She is alert.   Psychiatric:         Attention and Perception: Attention and perception normal.         Mood and Affect: Mood and affect normal.         Speech: Speech normal.         Behavior: Behavior normal. Behavior is cooperative.         Thought Content: Thought content normal.         Cognition and Memory: Cognition and memory normal.         Judgment: Judgment normal.       Administrative Statements

## 2024-05-15 NOTE — ASSESSMENT & PLAN NOTE
She checks her sugars every morning and they run around 70s-110s.  Will update A1c.  She is currently on glipizide 10 mg daily.  Lab Results   Component Value Date    HGBA1C 6.8 (A) 02/21/2024

## 2024-05-15 NOTE — ASSESSMENT & PLAN NOTE
Her last infusion was 2 weeks ago, she completed 4 sessions of Venofer infusions. She had a workup in the hospital that was unrevealing.  She has an upcoming appointment with GI on Friday.  She is following with GI and hematology.

## 2024-05-16 ENCOUNTER — ANTICOAG VISIT (OUTPATIENT)
Dept: CARDIOLOGY CLINIC | Facility: CLINIC | Age: 84
End: 2024-05-16

## 2024-05-16 DIAGNOSIS — R79.89 ELEVATED TSH: Primary | ICD-10-CM

## 2024-05-16 DIAGNOSIS — E83.9 CHRONIC KIDNEY DISEASE-MINERAL AND BONE DISORDER: Primary | ICD-10-CM

## 2024-05-16 DIAGNOSIS — N18.9 CHRONIC KIDNEY DISEASE-MINERAL AND BONE DISORDER: Primary | ICD-10-CM

## 2024-05-16 DIAGNOSIS — M89.9 CHRONIC KIDNEY DISEASE-MINERAL AND BONE DISORDER: Primary | ICD-10-CM

## 2024-05-16 DIAGNOSIS — E03.9 ACQUIRED HYPOTHYROIDISM: ICD-10-CM

## 2024-05-17 ENCOUNTER — OFFICE VISIT (OUTPATIENT)
Dept: HEMATOLOGY ONCOLOGY | Facility: CLINIC | Age: 84
End: 2024-05-17
Payer: COMMERCIAL

## 2024-05-17 ENCOUNTER — APPOINTMENT (OUTPATIENT)
Dept: LAB | Facility: CLINIC | Age: 84
End: 2024-05-17
Payer: COMMERCIAL

## 2024-05-17 VITALS
RESPIRATION RATE: 16 BRPM | HEART RATE: 61 BPM | HEIGHT: 66 IN | DIASTOLIC BLOOD PRESSURE: 62 MMHG | OXYGEN SATURATION: 92 % | SYSTOLIC BLOOD PRESSURE: 112 MMHG | WEIGHT: 155 LBS | BODY MASS INDEX: 24.91 KG/M2 | TEMPERATURE: 98.2 F

## 2024-05-17 DIAGNOSIS — D50.0 IRON DEFICIENCY ANEMIA DUE TO CHRONIC BLOOD LOSS: Primary | ICD-10-CM

## 2024-05-17 DIAGNOSIS — N18.9 CHRONIC KIDNEY DISEASE-MINERAL AND BONE DISORDER: ICD-10-CM

## 2024-05-17 DIAGNOSIS — E83.9 CHRONIC KIDNEY DISEASE-MINERAL AND BONE DISORDER: ICD-10-CM

## 2024-05-17 DIAGNOSIS — N18.32 STAGE 3B CHRONIC KIDNEY DISEASE (HCC): ICD-10-CM

## 2024-05-17 DIAGNOSIS — M89.9 CHRONIC KIDNEY DISEASE-MINERAL AND BONE DISORDER: ICD-10-CM

## 2024-05-17 LAB
BILIRUB UR QL STRIP: NEGATIVE
CLARITY UR: CLEAR
COLOR UR: NORMAL
CREAT UR-MCNC: 57.4 MG/DL
CREAT UR-MCNC: 57.4 MG/DL
GLUCOSE UR STRIP-MCNC: NEGATIVE MG/DL
HGB UR QL STRIP.AUTO: NEGATIVE
KETONES UR STRIP-MCNC: NEGATIVE MG/DL
LEUKOCYTE ESTERASE UR QL STRIP: NEGATIVE
MICROALBUMIN UR-MCNC: <7 MG/L
MICROALBUMIN/CREAT 24H UR: <12 MG/G CREATININE (ref 0–30)
NITRITE UR QL STRIP: NEGATIVE
PH UR STRIP.AUTO: 6.5 [PH]
PROT UR STRIP-MCNC: NEGATIVE MG/DL
PROT UR-MCNC: <4 MG/DL
SP GR UR STRIP.AUTO: 1.01 (ref 1–1.03)
UROBILINOGEN UR STRIP-ACNC: <2 MG/DL

## 2024-05-17 PROCEDURE — 99213 OFFICE O/P EST LOW 20 MIN: CPT | Performed by: PHYSICIAN ASSISTANT

## 2024-05-17 PROCEDURE — 82043 UR ALBUMIN QUANTITATIVE: CPT

## 2024-05-17 PROCEDURE — 81003 URINALYSIS AUTO W/O SCOPE: CPT

## 2024-05-17 PROCEDURE — 82570 ASSAY OF URINE CREATININE: CPT

## 2024-05-17 PROCEDURE — 84156 ASSAY OF PROTEIN URINE: CPT

## 2024-05-17 NOTE — PROGRESS NOTES
[facility]AT[facility] SPECIAL[facility] [address] [address]  Hematology Ambulatory Follow-Up  Ely Hernadez, 1940, [id number]  5/17/2024      Assessment and Plan   1. Iron deficiency anemia due to chronic blood loss  - Iron Panel (Includes Ferritin, Iron Sat%, Iron, and TIBC); Future  - CBC and differential; Standing  - Iron Panel (Includes Ferritin, Iron Sat%, Iron, and TIBC); Standing    1. RHEA   In summary this is an 83-year-old female with past medical history as below who presents as a new consult for anemia.  Patient reports history of iron deficiency anemia many years ago treated with oral iron. Unfortunately I do not have these labs available for review.  Patient was recently hospitalized in December 2023 for symptomatic anemia, found to have hemoglobin of 5.6 requiring multiple blood transfusions and IV Venofer.  Patient underwent endoscopy/colonoscopy that were unrevealing.  She was recommended to have video capsule endoscopy which she has not had yet performed. She underwent  IV Venofer 300mg weekly x 4 from March to April. Repeat labs demonstrate improvement in hgb from 8 -> 12 g/dL however no iron panel available for review.     2. Abnormal lung imaging   CT lung 08/2023 showed 9 mm groundglass opacity in the posterior right apex. It cannot be determined if this was present in January 2022 due to changes from COVID. Per 2017 Fleischner guidelines, follow-up is recommended with a chest CT with no contrast at 6 to 12 months to confirm persistence then every 2 years until 5 years of stability is established.  Offered to help patient make appointment for CT scan and pt refused. She will call pulmonologist office to discuss.     Discussion/Plan   Hgb improved. Obtain iron panel to assess needs for additional IV Venofer,   Follow up with pulmonologist   Will decrease frequency of standing CBC and iron panel from weekly to q4 weeks.   RTC  5m    Patient voiced agreement and understanding to the above.   Patient advised to call the Hematology/Oncology office with any questions and concerns regarding the above.    Barrier(s) to care: None  The patient is able to self care.    Staci Contreras PA-C   Medical Oncology/Hematology  [facility]    Subjective   No chief complaint on file.      History of present illness: 83-year-old female with past medical history of stroke, heart failure, A-fib, diabetes, COPD, aortic valve replacement in 2007 on Coumadin, and CKD stage III who presents as a new consult for anemia.     Patient endorses history of iron deficiency anemia many years ago.  She has never seen a hematologist or had issues with anemia during childhood. She reports she had GI workup at this time including video capsule endoscopy that was unrevealing of any bleeding source.  She was prescribed oral iron pills which she has been taking for many years that have recently been stopped by her nephrologist approximately 1 year ago.     On chart review back to 05/2019, hgb baseline was around 12-14g/dL until she's was hospitalized in December 2023 for symptomatic anemia, hemoglobin at this time was 5.6.  She received 4 unit PRBC and one-time dose of IV Venofer 300 mg during hospitalization.  EGD with push/colonoscopy were performed which did not identify bleeding source.  Patient was recommended to have video capsule endoscopy as an outpatient however she has not seen GI in the clinic since her discharge.     Patient went to the emergency room this past week on 03/08 for symptomatic anemia.  Hemoglobin at this time was 7.1.  She was given 1 unit PRBC.      Former smoker, quit in 2007.  She denies alcohol or drug use.  Patient is retired, previously worked in a factory.  No personal family history of cancer.     03/2024: Hgb 7.8, MCV 74, platelets 415,000.  Ferritin 17, iron saturation 13%, TIBC normal       03/2024-04/2024: IV  Venofer 300mg weekly x 4  05/2024: Hgb 12.4, MCV 86, platelets 383,000. No iron panel     Interval history: she has improvement in energy after iron.     Review of Systems   All other systems reviewed and are negative.      Patient Active Problem List   Diagnosis    Hyperlipidemia    Chronic anticoagulation    Hypertension, essential    Coronary artery disease of native artery of native heart with stable angina pectoris (HCC)    Simple chronic bronchitis (HCC)    Diabetes mellitus with neurological manifestation (HCC)    Hypothyroidism    GERD (gastroesophageal reflux disease)    Anemia    AVM (arteriovenous malformation) of small bowel, acquired with hemorrhage    Angiectasia    Other vascular disorders of intestine (HCC)    Aortic valve replaced    Cardiomyopathy (HCC)    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HCC)    Stage 3b chronic kidney disease (HCC)    Chronic kidney disease-mineral and bone disorder    Primary osteoarthritis involving multiple joints    Paroxysmal atrial fibrillation (HCC)    Neutropenia associated with infection (HCC)    Herpes simplex labialis    Restrictive lung disease    Nocturnal hypoxemia    Supratherapeutic INR    H/O mechanical aortic valve replacement    Subtherapeutic international normalized ratio (INR)    Type 2 diabetes mellitus with stage 3b chronic kidney disease, with long-term current use of insulin (HCC)    Venous ulcer of right leg (HCC)    Chronic systolic (congestive) heart failure (HCC)    Lower extremity edema    Iron deficiency anemia, unspecified    Medicare annual wellness visit, subsequent     Past Medical History:   Diagnosis Date    Acute anterior epistaxis 12/23/2023    Acute blood loss anemia 12/14/2023    Acute respiratory failure with hypoxia ([facility]) 02/06/2024    Anemia     Aneurysm of thoracic aorta (HCC)     Angioedema 06/07/2019    Aortic valve disorder     Benign neoplasm of sigmoid colon     Cardiomyopathy (HCC)     last assessed: 10/10/2014    CHF (congestive  heart failure) (HCC)     Chronic kidney disease     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HCC)     last assessed: 10/10/2014    COPD (chronic obstructive pulmonary disease) (HCC)     Diabetic nephropathy (HCC)     Disease of thyroid gland     RAMON (dyspnea on exertion)     GERD (gastroesophageal reflux disease)     History of emphysema (HCC)     History of transfusion     Hyperlipidemia     Hypertensive urgency 10/28/2023    Leg cramps 2023    Stroke-like symptoms 10/28/2023    TIA (transient ischemic attack)      Past Surgical History:   Procedure Laterality Date    AORTIC VALVE REPLACEMENT      CARDIAC PACEMAKER PLACEMENT      last assessed: 10/10/2014    CARDIAC SURGERY      aortic valve replacement    CHOLECYSTECTOMY      COLONOSCOPY      HYSTERECTOMY      INSERT / REPLACE / REMOVE PACEMAKER      KNEE SURGERY Right     OTHER SURGICAL HISTORY      Capsule ENDOscopy description: 2012    VA COLONOSCOPY FLX DX W/COLLJ SPEC WHEN PFRMD N/A 2018    Procedure: single balloon ENTEROSCOPY;  Surgeon: David Dennis MD;  Location: [facility];  Service: Gastroenterology    VA ESOPHAGOGASTRODUODENOSCOPY TRANSORAL DIAGNOSTIC N/A 2017    Procedure: EGD AND COLONOSCOPY;  Surgeon: Jay Mcleod III, MD;  Location: [facility];  Service: Gastroenterology     Family History   Problem Relation Age of Onset    No Known Problems Mother     No Known Problems Father      Social History     Socioeconomic History    Marital status:      Spouse name: None    Number of children: None    Years of education: None    Highest education level: None   Occupational History    None   Tobacco Use    Smoking status: Former     Current packs/day: 0.00     Average packs/day: 1 pack/day for 52.9 years (52.9 ttl pk-yrs)     Types: Cigarettes     Start date:      Quit date: 2007     Years since quittin.4     Passive exposure: Past    Smokeless tobacco: Former     Quit date:    Vaping Use    Vaping status: Never  Used   Substance and Sexual Activity    Alcohol use: Never    Drug use: Never    Sexual activity: Not Currently     Partners: Male   Other Topics Concern    None   Social History Narrative    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 BID Freestyle lancets BID    Living independently with spouse     Social Determinants of Health     Financial Resource Strain: Not on file   Food Insecurity: No Food Insecurity (5/15/2024)    Hunger Vital Sign     Worried About Running Out of Food in the Last Year: Never true     Ran Out of Food in the Last Year: Never true   Transportation Needs: No Transportation Needs (5/15/2024)    PRAPARE - Transportation     Lack of Transportation (Medical): No     Lack of Transportation (Non-Medical): No   Physical Activity: Inactive (5/26/2021)    Exercise Vital Sign     Days of Exercise per Week: 0 days     Minutes of Exercise per Session: 0 min   Stress: No Stress Concern Present (5/26/2021)    [employer] [place] [employer] - Occupational Stress Questionnaire     Feeling of Stress : Not at all   Social Connections: Not on file   Intimate Partner Violence: Not on file   Housing Stability: Unknown (5/15/2024)    Housing Stability Vital Sign     Unable to Pay for Housing in the Last Year: No     Number of Times Moved in the Last Year: Not on file     Homeless in the Last Year: No       Current Outpatient Medications:     Accu-Chek FastClix Lancets MISC, Use 3 (three) times a day, Disp: 300 each, Rfl: 3    albuterol (Ventolin HFA) 90 mcg/act inhaler, Inhale 2 puffs every 6 (six) hours as needed for wheezing, Disp: 54 g, Rfl: 3    Ascorbic Acid (vitamin C) 1000 MG tablet, Take 1,000 mg by mouth daily, Disp: , Rfl:     aspirin (ECOTRIN LOW STRENGTH) 81 mg EC tablet, Take 1 tablet (81 mg total) by mouth daily Do not start before November 1, 2023., Disp: 30 tablet, Rfl: 0    BD Pen Needle Rosie U/F 32G X 4 MM MISC, USE DAILY, Disp: 100 each, Rfl: 1    Blood Glucose Monitoring  Suppl (Accu-Chek Guide Me) w/Device KIT, USE 3 TIMES DAILY, Disp: 1 kit, Rfl: 2    Cholecalciferol (Vitamin D3) 50 MCG (2000 UT) TABS, Take 2,000 Units by mouth daily, Disp: , Rfl:     enoxaparin (LOVENOX) 80 mg/0.8 mL, Inject 0.7 mL (70 mg total) under the skin every 24 hours Hold Warfarin 5 days prior. Last dose 4/9. Start Lovenox 4 days prior (4/11). No Lovenox the morning of the procedure. Resume both Lovenox and Warfarin at the discretion of the surgeon (preferably that evening) and check INR 4 days after restarting. Inject in the abdomen about 2 inches from the belly button and rotate sides at each injection., Disp: 0.7 mL, Rfl: 10    fluticasone-salmeterol (Advair HFA) 115-21 MCG/ACT inhaler, Inhale 2 puffs 2 (two) times a day Rinse mouth after use., Disp: 36 g, Rfl: 3    furosemide (LASIX) 40 mg tablet, take 1 tablet by mouth daily, Disp: 90 tablet, Rfl: 3    gabapentin (NEURONTIN) 300 mg capsule, Take 1 capsule (300 mg total) by mouth 3 (three) times a day (Patient taking differently: Take 300 mg by mouth 2 (two) times a day), Disp: 270 capsule, Rfl: 3    glipiZIDE (GLUCOTROL) 10 mg tablet, Take 1 tablet (10 mg total) by mouth 2 (two) times a day before meals, Disp: 180 tablet, Rfl: 3    glucose blood (Accu-Chek Celeste Plus) test strip, Use 1 each 3 (three) times a day Use as instructed, Disp: 300 each, Rfl: 3    ipratropium (ATROVENT) 0.02 % nebulizer solution, Take 0.5 mg by nebulization 4 (four) times a day, Disp: , Rfl:     ipratropium-albuterol (DUO-NEB) 0.5-2.5 mg/3 mL nebulizer solution, inhale contents of 1 vial ( 3 milliliters ) in nebulizer four times a day, Disp: , Rfl:     Lancets (freestyle) lancets, Check blood glucose 3 times daily, Disp: 300 each, Rfl: 0    Levemir FlexPen 100 units/mL injection pen, INJECT SUBCUTANEOUSLY 26 UNITS  DAILY AT BEDTIME, Disp: 30 mL, Rfl: 2    levothyroxine 50 mcg tablet, Take 1 tablet (50 mcg total) by mouth daily, Disp: 90 tablet, Rfl: 3    metoprolol tartrate  "(LOPRESSOR) 50 mg tablet, TAKE ONE-HALF TABLET BY  MOUTH TWICE DAILY, Disp: 90 tablet, Rfl: 3    oxygen gas, Inhale 2 L/min daily at bedtime as needed home oxygen nightly, Disp: , Rfl:     warfarin (COUMADIN) 5 mg tablet, TAKE 1 TO 2 TABLETS BY  MOUTH DAILY OR AS DIRECTED, Disp: 180 tablet, Rfl: 3    b complex vitamins capsule, Take 1 capsule by mouth daily, Disp: 30 capsule, Rfl: 2    pantoprazole (PROTONIX) 40 mg tablet, Take 1 tablet (40 mg total) by mouth 2 (two) times a day, Disp: 60 tablet, Rfl: 0  No Known Allergies    Objective   /62 (BP Location: Left arm, Patient Position: Sitting, Cuff Size: Adult)   Pulse 61   Temp 98.2 °F (36.8 °C) (Temporal)   Resp 16   Ht 5' 6\" (1.676 m)   Wt 70.3 kg (155 lb)   SpO2 92%   BMI 25.02 kg/m²    Physical Exam  Vitals reviewed.   HENT:      Head: Normocephalic.   Cardiovascular:      Rate and Rhythm: Normal rate and regular rhythm.      Heart sounds: Normal heart sounds.   Pulmonary:      Effort: Pulmonary effort is normal.   Musculoskeletal:      Cervical back: Neck supple.   Skin:     Findings: No rash.   Neurological:      Mental Status: She is alert.         Result Review  Labs:  Appointment on 05/15/2024   Component Date Value Ref Range Status    WBC 05/15/2024 8.55  4.31 - 10.16 Thousand/uL Final    RBC 05/15/2024 4.78  3.81 - 5.12 Million/uL Final    Hemoglobin 05/15/2024 12.4  11.5 - 15.4 g/dL Final    Hematocrit 05/15/2024 41.1  34.8 - 46.1 % Final    MCV 05/15/2024 86  82 - 98 fL Final    MCH 05/15/2024 25.9 (L)  26.8 - 34.3 pg Final    MCHC 05/15/2024 30.2 (L)  31.4 - 37.4 g/dL Final    RDW 05/15/2024 21.3 (H)  11.6 - 15.1 % Final    MPV 05/15/2024 10.8  8.9 - 12.7 fL Final    Platelets 05/15/2024 383  149 - 390 Thousands/uL Final    nRBC 05/15/2024 0  /100 WBCs Final    Segmented % 05/15/2024 73  43 - 75 % Final    Immature Grans % 05/15/2024 1  0 - 2 % Final    Lymphocytes % 05/15/2024 15  14 - 44 % Final    Monocytes % 05/15/2024 7  4 - 12 % Final "    Eosinophils Relative 05/15/2024 3  0 - 6 % Final    Basophils Relative 05/15/2024 1  0 - 1 % Final    Absolute Neutrophils 05/15/2024 6.30  1.85 - 7.62 Thousands/µL Final    Absolute Immature Grans 05/15/2024 0.05  0.00 - 0.20 Thousand/uL Final    Absolute Lymphocytes 05/15/2024 1.26  0.60 - 4.47 Thousands/µL Final    Absolute Monocytes 05/15/2024 0.61  0.17 - 1.22 Thousand/µL Final    Eosinophils Absolute 05/15/2024 0.27  0.00 - 0.61 Thousand/µL Final    Basophils Absolute 05/15/2024 0.06  0.00 - 0.10 Thousands/µL Final   Office Visit on 05/15/2024   Component Date Value Ref Range Status    Hemoglobin A1C 05/15/2024 6.2 (H)  Normal 4.0-5.6%; PreDiabetic 5.7-6.4%; Diabetic >=6.5%; Glycemic control for adults with diabetes <7.0% % Final    EAG 05/15/2024 131  mg/dl Final    Cholesterol 05/15/2024 185  See Comment mg/dL Final    Cholesterol:         Pediatric <18 Years        Desirable          <170 mg/dL      Borderline High    170-199 mg/dL      High               >=200 mg/dL        Adult >=18 Years            Desirable         <200 mg/dL      Borderline High   200-239 mg/dL      High              >239 mg/dL      Triglycerides 05/15/2024 132  See Comment mg/dL Final    Triglyceride:     0-9Y            <75mg/dL     10Y-17Y         <90 mg/dL       >=18Y     Normal          <150 mg/dL     Borderline High 150-199 mg/dL     High            200-499 mg/dL        Very High       >499 mg/dL    Specimen collection should occur prior to Metamizole administration due to the potential for falsely depressed results.    HDL, Direct 05/15/2024 40 (L)  >=50 mg/dL Final    LDL Calculated 05/15/2024 119 (H)  0 - 100 mg/dL Final    LDL Cholesterol:     Optimal           <100 mg/dl     Near Optimal      100-129 mg/dl     Above Optimal       Borderline High 130-159 mg/dl       High            160-189 mg/dl       Very High       >189 mg/dl         This screening LDL is a calculated result.   It does not have the accuracy of the Direct  Measured LDL in the monitoring of patients with hyperlipidemia and/or statin therapy.   Direct Measure LDL (FOR028) must be ordered separately in these patients.    TSH 3RD GENERATON 05/15/2024 5.249 (H)  0.450 - 4.500 uIU/mL Final    The recommended reference ranges for TSH during pregnancy are as follows:   First trimester 0.100 to 2.500 uIU/mL   Second trimester  0.200 to 3.000 uIU/mL   Third trimester 0.300 to 3.000 uIU/m    Note: Normal ranges may not apply to patients who are transgender, non-binary, or whose legal sex, sex at birth, and gender identity differ.  Adult TSH (3rd generation) reference range follows the recommended guidelines of the American Thyroid Association, January, 2020.    Sodium 05/15/2024 138  135 - 147 mmol/L Final    Potassium 05/15/2024 5.2  3.5 - 5.3 mmol/L Final    Chloride 05/15/2024 99  96 - 108 mmol/L Final    CO2 05/15/2024 31  21 - 32 mmol/L Final    ANION GAP 05/15/2024 8  4 - 13 mmol/L Final    BUN 05/15/2024 40 (H)  5 - 25 mg/dL Final    Creatinine 05/15/2024 1.74 (H)  0.60 - 1.30 mg/dL Final    Standardized to IDMS reference method    Glucose, Fasting 05/15/2024 134 (H)  65 - 99 mg/dL Final    Calcium 05/15/2024 9.7  8.4 - 10.2 mg/dL Final    AST 05/15/2024 18  13 - 39 U/L Final    ALT 05/15/2024 12  7 - 52 U/L Final    Specimen collection should occur prior to Sulfasalazine administration due to the potential for falsely depressed results.     Alkaline Phosphatase 05/15/2024 79  34 - 104 U/L Final    Total Protein 05/15/2024 8.0  6.4 - 8.4 g/dL Final    Albumin 05/15/2024 4.4  3.5 - 5.0 g/dL Final    Total Bilirubin 05/15/2024 0.49  0.20 - 1.00 mg/dL Final    Use of this assay is not recommended for patients undergoing treatment with eltrombopag due to the potential for falsely elevated results.  N-acetyl-p-benzoquinone imine (metabolite of Acetaminophen) will generate erroneously low results in samples for patients that have taken an overdose of Acetaminophen.    eGFR  05/15/2024 26  ml/min/1.73sq m Final    Free T4 05/15/2024 0.73  0.61 - 1.12 ng/dL Final    Specimens with biotin concentrations > 10 ng/mL can lead to significant (> 10%) positive bias in result.   Ancillary Orders on 05/03/2024   Component Date Value Ref Range Status    Protime 05/15/2024 33.3 (H)  11.6 - 14.5 seconds Final    INR 05/15/2024 3.36 (H)  0.84 - 1.19 Final   Appointment on 05/02/2024   Component Date Value Ref Range Status    WBC 05/02/2024 6.07  4.31 - 10.16 Thousand/uL Final    RBC 05/02/2024 4.66  3.81 - 5.12 Million/uL Final    Hemoglobin 05/02/2024 11.9  11.5 - 15.4 g/dL Final    Hematocrit 05/02/2024 39.8  34.8 - 46.1 % Final    MCV 05/02/2024 85  82 - 98 fL Final    MCH 05/02/2024 25.5 (L)  26.8 - 34.3 pg Final    MCHC 05/02/2024 29.9 (L)  31.4 - 37.4 g/dL Final    RDW 05/02/2024 23.8 (H)  11.6 - 15.1 % Final    MPV 05/02/2024 10.6  8.9 - 12.7 fL Final    Platelets 05/02/2024 345  149 - 390 Thousands/uL Final    nRBC 05/02/2024 0  /100 WBCs Final    Segmented % 05/02/2024 59  43 - 75 % Final    Immature Grans % 05/02/2024 0  0 - 2 % Final    Lymphocytes % 05/02/2024 26  14 - 44 % Final    Monocytes % 05/02/2024 8  4 - 12 % Final    Eosinophils Relative 05/02/2024 6  0 - 6 % Final    Basophils Relative 05/02/2024 1  0 - 1 % Final    Absolute Neutrophils 05/02/2024 3.56  1.85 - 7.62 Thousands/µL Final    Absolute Immature Grans 05/02/2024 0.02  0.00 - 0.20 Thousand/uL Final    Absolute Lymphocytes 05/02/2024 1.60  0.60 - 4.47 Thousands/µL Final    Absolute Monocytes 05/02/2024 0.48  0.17 - 1.22 Thousand/µL Final    Eosinophils Absolute 05/02/2024 0.37  0.00 - 0.61 Thousand/µL Final    Basophils Absolute 05/02/2024 0.04  0.00 - 0.10 Thousands/µL Final   Lab Requisition on 04/25/2024   Component Date Value Ref Range Status    Protime 04/25/2024 23.9 (H)  11.6 - 14.5 seconds Final    INR 04/25/2024 2.05 (H)  0.84 - 1.19 Final    WBC 04/25/2024 6.59  4.31 - 10.16 Thousand/uL Final    RBC 04/25/2024  4.36  3.81 - 5.12 Million/uL Final    Hemoglobin 04/25/2024 11.1 (L)  11.5 - 15.4 g/dL Final    Hematocrit 04/25/2024 36.2  34.8 - 46.1 % Final    MCV 04/25/2024 83  82 - 98 fL Final    MCH 04/25/2024 25.5 (L)  26.8 - 34.3 pg Final    MCHC 04/25/2024 30.7 (L)  31.4 - 37.4 g/dL Final    RDW 04/25/2024 25.1 (H)  11.6 - 15.1 % Final    MPV 04/25/2024 10.3  8.9 - 12.7 fL Final    Platelets 04/25/2024 324  149 - 390 Thousands/uL Final    nRBC 04/25/2024 0  /100 WBCs Final    Segmented % 04/25/2024 66  43 - 75 % Final    Immature Grans % 04/25/2024 0  0 - 2 % Final    Lymphocytes % 04/25/2024 20  14 - 44 % Final    Monocytes % 04/25/2024 8  4 - 12 % Final    Eosinophils Relative 04/25/2024 5  0 - 6 % Final    Basophils Relative 04/25/2024 1  0 - 1 % Final    Absolute Neutrophils 04/25/2024 4.39  1.85 - 7.62 Thousands/µL Final    Absolute Immature Grans 04/25/2024 0.02  0.00 - 0.20 Thousand/uL Final    Absolute Lymphocytes 04/25/2024 1.31  0.60 - 4.47 Thousands/µL Final    Absolute Monocytes 04/25/2024 0.52  0.17 - 1.22 Thousand/µL Final    Eosinophils Absolute 04/25/2024 0.32  0.00 - 0.61 Thousand/µL Final    Basophils Absolute 04/25/2024 0.03  0.00 - 0.10 Thousands/µL Final   Lab Requisition on 04/19/2024   Component Date Value Ref Range Status    Protime 04/19/2024 18.9 (H)  11.6 - 14.5 seconds Final    INR 04/19/2024 1.61 (H)  0.84 - 1.19 Final    WBC 04/19/2024 5.03  4.31 - 10.16 Thousand/uL Final    RBC 04/19/2024 4.32  3.81 - 5.12 Million/uL Final    Hemoglobin 04/19/2024 10.7 (L)  11.5 - 15.4 g/dL Final    Hematocrit 04/19/2024 38.0  34.8 - 46.1 % Final    MCV 04/19/2024 88  82 - 98 fL Final    MCH 04/19/2024 24.8 (L)  26.8 - 34.3 pg Final    MCHC 04/19/2024 28.2 (L)  31.4 - 37.4 g/dL Final    RDW 04/19/2024 26.3 (H)  11.6 - 15.1 % Final    MPV 04/19/2024 10.7  8.9 - 12.7 fL Final    Platelets 04/19/2024 318  149 - 390 Thousands/uL Final    nRBC 04/19/2024 0  /100 WBCs Final    Segmented % 04/19/2024 62  43 - 75 %  Final    Immature Grans % 04/19/2024 0  0 - 2 % Final    Lymphocytes % 04/19/2024 20  14 - 44 % Final    Monocytes % 04/19/2024 10  4 - 12 % Final    Eosinophils Relative 04/19/2024 7 (H)  0 - 6 % Final    Basophils Relative 04/19/2024 1  0 - 1 % Final    Absolute Neutrophils 04/19/2024 3.15  1.85 - 7.62 Thousands/µL Final    Absolute Immature Grans 04/19/2024 0.01  0.00 - 0.20 Thousand/uL Final    Absolute Lymphocytes 04/19/2024 0.98  0.60 - 4.47 Thousands/µL Final    Absolute Monocytes 04/19/2024 0.49  0.17 - 1.22 Thousand/µL Final    Eosinophils Absolute 04/19/2024 0.36  0.00 - 0.61 Thousand/µL Final    Basophils Absolute 04/19/2024 0.04  0.00 - 0.10 Thousands/µL Final       Imaging:   I reviewed relevant imaging    Please note:  This report has been generated by a voice recognition software system. Therefore there may be syntax, spelling, and/or grammatical errors. Please call if you have any questions.

## 2024-05-24 ENCOUNTER — ANTICOAG VISIT (OUTPATIENT)
Dept: CARDIOLOGY CLINIC | Facility: CLINIC | Age: 84
End: 2024-05-24

## 2024-05-24 ENCOUNTER — APPOINTMENT (OUTPATIENT)
Dept: LAB | Facility: CLINIC | Age: 84
End: 2024-05-24
Payer: COMMERCIAL

## 2024-05-24 DIAGNOSIS — N18.9 CHRONIC KIDNEY DISEASE-MINERAL AND BONE DISORDER: ICD-10-CM

## 2024-05-24 DIAGNOSIS — D50.0 IRON DEFICIENCY ANEMIA DUE TO CHRONIC BLOOD LOSS: ICD-10-CM

## 2024-05-24 DIAGNOSIS — M89.9 CHRONIC KIDNEY DISEASE-MINERAL AND BONE DISORDER: ICD-10-CM

## 2024-05-24 DIAGNOSIS — E83.9 CHRONIC KIDNEY DISEASE-MINERAL AND BONE DISORDER: ICD-10-CM

## 2024-05-24 DIAGNOSIS — R79.89 ELEVATED TSH: ICD-10-CM

## 2024-05-24 DIAGNOSIS — E03.9 ACQUIRED HYPOTHYROIDISM: ICD-10-CM

## 2024-05-24 LAB
ANION GAP SERPL CALCULATED.3IONS-SCNC: 8 MMOL/L (ref 4–13)
BASOPHILS # BLD AUTO: 0.03 THOUSANDS/ÂΜL (ref 0–0.1)
BASOPHILS NFR BLD AUTO: 0 % (ref 0–1)
BUN SERPL-MCNC: 43 MG/DL (ref 5–25)
CALCIUM SERPL-MCNC: 9 MG/DL (ref 8.4–10.2)
CHLORIDE SERPL-SCNC: 103 MMOL/L (ref 96–108)
CO2 SERPL-SCNC: 28 MMOL/L (ref 21–32)
CREAT SERPL-MCNC: 1.7 MG/DL (ref 0.6–1.3)
EOSINOPHIL # BLD AUTO: 0.26 THOUSAND/ÂΜL (ref 0–0.61)
EOSINOPHIL NFR BLD AUTO: 3 % (ref 0–6)
ERYTHROCYTE [DISTWIDTH] IN BLOOD BY AUTOMATED COUNT: 20 % (ref 11.6–15.1)
FERRITIN SERPL-MCNC: 14 NG/ML (ref 11–307)
GFR SERPL CREATININE-BSD FRML MDRD: 27 ML/MIN/1.73SQ M
GLUCOSE P FAST SERPL-MCNC: 144 MG/DL (ref 65–99)
HCT VFR BLD AUTO: 30.3 % (ref 34.8–46.1)
HGB BLD-MCNC: 9.1 G/DL (ref 11.5–15.4)
IMM GRANULOCYTES # BLD AUTO: 0.04 THOUSAND/UL (ref 0–0.2)
IMM GRANULOCYTES NFR BLD AUTO: 1 % (ref 0–2)
IRON SATN MFR SERPL: 7 % (ref 15–50)
IRON SERPL-MCNC: 24 UG/DL (ref 50–212)
LYMPHOCYTES # BLD AUTO: 1.02 THOUSANDS/ÂΜL (ref 0.6–4.47)
LYMPHOCYTES NFR BLD AUTO: 13 % (ref 14–44)
MCH RBC QN AUTO: 26 PG (ref 26.8–34.3)
MCHC RBC AUTO-ENTMCNC: 30 G/DL (ref 31.4–37.4)
MCV RBC AUTO: 87 FL (ref 82–98)
MONOCYTES # BLD AUTO: 0.61 THOUSAND/ÂΜL (ref 0.17–1.22)
MONOCYTES NFR BLD AUTO: 7 % (ref 4–12)
NEUTROPHILS # BLD AUTO: 6.23 THOUSANDS/ÂΜL (ref 1.85–7.62)
NEUTS SEG NFR BLD AUTO: 76 % (ref 43–75)
NRBC BLD AUTO-RTO: 0 /100 WBCS
PLATELET # BLD AUTO: 348 THOUSANDS/UL (ref 149–390)
PMV BLD AUTO: 10.8 FL (ref 8.9–12.7)
POTASSIUM SERPL-SCNC: 4.3 MMOL/L (ref 3.5–5.3)
RBC # BLD AUTO: 3.5 MILLION/UL (ref 3.81–5.12)
SODIUM SERPL-SCNC: 139 MMOL/L (ref 135–147)
TIBC SERPL-MCNC: 367 UG/DL (ref 250–450)
TSH SERPL DL<=0.05 MIU/L-ACNC: 3.12 UIU/ML (ref 0.45–4.5)
UIBC SERPL-MCNC: 343 UG/DL (ref 155–355)
WBC # BLD AUTO: 8.19 THOUSAND/UL (ref 4.31–10.16)

## 2024-05-24 PROCEDURE — 85025 COMPLETE CBC W/AUTO DIFF WBC: CPT

## 2024-05-24 PROCEDURE — 80048 BASIC METABOLIC PNL TOTAL CA: CPT

## 2024-05-24 PROCEDURE — 84443 ASSAY THYROID STIM HORMONE: CPT

## 2024-05-24 PROCEDURE — 36415 COLL VENOUS BLD VENIPUNCTURE: CPT

## 2024-05-24 PROCEDURE — 83540 ASSAY OF IRON: CPT

## 2024-05-24 PROCEDURE — 82728 ASSAY OF FERRITIN: CPT

## 2024-05-24 PROCEDURE — 83550 IRON BINDING TEST: CPT

## 2024-05-24 NOTE — PROGRESS NOTES
S/w pt. Advised to hold today, take 5mg Sat/Tues; 7.5 all other days and retest in one week per Perri PAYAN

## 2024-05-28 ENCOUNTER — TELEPHONE (OUTPATIENT)
Age: 84
End: 2024-05-28

## 2024-05-28 DIAGNOSIS — M89.9 CHRONIC KIDNEY DISEASE-MINERAL AND BONE DISORDER: Primary | ICD-10-CM

## 2024-05-28 DIAGNOSIS — E83.9 CHRONIC KIDNEY DISEASE-MINERAL AND BONE DISORDER: Primary | ICD-10-CM

## 2024-05-28 DIAGNOSIS — N18.9 CHRONIC KIDNEY DISEASE-MINERAL AND BONE DISORDER: Primary | ICD-10-CM

## 2024-05-28 NOTE — TELEPHONE ENCOUNTER
----- Message from Amanda Villarreal PA-C sent at 5/28/2024  7:51 AM EDT -----  Please advise Ely that her kidney function is still elevated and I want her to see Dr. Marti-please have her call their office to make an appointment.  Can we also shoot a message to Dr. Marti's office that I want her to be seen for an COLT?

## 2024-05-29 ENCOUNTER — TELEPHONE (OUTPATIENT)
Dept: HEMATOLOGY ONCOLOGY | Facility: CLINIC | Age: 84
End: 2024-05-29

## 2024-05-29 ENCOUNTER — TELEPHONE (OUTPATIENT)
Age: 84
End: 2024-05-29

## 2024-05-29 DIAGNOSIS — D50.9 IRON DEFICIENCY ANEMIA, UNSPECIFIED IRON DEFICIENCY ANEMIA TYPE: Primary | ICD-10-CM

## 2024-05-29 RX ORDER — SODIUM CHLORIDE 9 MG/ML
20 INJECTION, SOLUTION INTRAVENOUS ONCE
Status: CANCELLED | OUTPATIENT
Start: 2024-06-03

## 2024-05-29 NOTE — TELEPHONE ENCOUNTER
Called patient to review lab results. She has recurrent anemia after IV venofer infusions. Suspect she has ongoing blood loss, probable GI source. Recently had APC by EGD to duodenla and jejunal lesions. She denies gross vaginal bleeding, melena, hematochezia or hematuria.     Recommend additional IV Venofer 300mg weekly x 4.   Standing order for weekly CBCD and iron   Follow up with Gastroenterology asap   Follow up with our office in 2m.

## 2024-05-29 NOTE — TELEPHONE ENCOUNTER
Patients GI provider:  Fide Rodriguez PA-C    Number to return call: [phone]      Reason for call: Pt calling to speak with Fide about recent call from Staci Perez PA-C. Pt would like to go over options with you to discuss next step.    Scheduled procedure/appointment date if applicable: N/A

## 2024-05-30 DIAGNOSIS — K21.9 GASTROESOPHAGEAL REFLUX DISEASE: ICD-10-CM

## 2024-05-30 DIAGNOSIS — Z79.4 TYPE 2 DIABETES MELLITUS WITH DIABETIC POLYNEUROPATHY, WITH LONG-TERM CURRENT USE OF INSULIN (HCC): ICD-10-CM

## 2024-05-30 DIAGNOSIS — E11.42 TYPE 2 DIABETES MELLITUS WITH DIABETIC POLYNEUROPATHY, WITH LONG-TERM CURRENT USE OF INSULIN (HCC): ICD-10-CM

## 2024-05-30 RX ORDER — PANTOPRAZOLE SODIUM 40 MG/1
40 TABLET, DELAYED RELEASE ORAL DAILY
Qty: 90 TABLET | Refills: 1 | Status: SHIPPED | OUTPATIENT
Start: 2024-05-30 | End: 2024-11-26

## 2024-05-30 RX ORDER — PEN NEEDLE, DIABETIC 32GX 5/32"
NEEDLE, DISPOSABLE MISCELLANEOUS
Qty: 100 EACH | Refills: 1 | Status: SHIPPED | OUTPATIENT
Start: 2024-05-30

## 2024-05-30 NOTE — TELEPHONE ENCOUNTER
Reason for call:   [x] Refill   [] Prior Auth  [] Other:     Office:   [x] PCP/Provider - Raghav Holloway MD   [] Specialty/Provider -     Medication: BD Pen Needle Rosie U/F 32G X 4 MM / use daily / 100 each                      pantoprazole 40 mg / 1 tab daily / 90 tabs      Pharmacy: [facility] - [address]      Does the patient have enough for 3 days?   [] Yes   [x] No - Send as HP to POD

## 2024-05-31 ENCOUNTER — TELEPHONE (OUTPATIENT)
Dept: GASTROENTEROLOGY | Facility: CLINIC | Age: 84
End: 2024-05-31

## 2024-06-05 ENCOUNTER — PATIENT OUTREACH (OUTPATIENT)
Dept: CASE MANAGEMENT | Facility: HOSPITAL | Age: 84
End: 2024-06-05

## 2024-06-05 ENCOUNTER — PREP FOR PROCEDURE (OUTPATIENT)
Dept: GASTROENTEROLOGY | Facility: CLINIC | Age: 84
End: 2024-06-05

## 2024-06-05 DIAGNOSIS — I35.9 AORTIC VALVE DISORDER: ICD-10-CM

## 2024-06-05 DIAGNOSIS — D50.9 IRON DEFICIENCY ANEMIA, UNSPECIFIED IRON DEFICIENCY ANEMIA TYPE: Primary | ICD-10-CM

## 2024-06-05 DIAGNOSIS — Z95.2 H/O MECHANICAL AORTIC VALVE REPLACEMENT: ICD-10-CM

## 2024-06-05 DIAGNOSIS — D50.0 IRON DEFICIENCY ANEMIA SECONDARY TO BLOOD LOSS (CHRONIC): ICD-10-CM

## 2024-06-05 RX ORDER — ENOXAPARIN SODIUM 100 MG/ML
1 INJECTION SUBCUTANEOUS EVERY 24 HOURS
Qty: 0.7 ML | Refills: 10 | Status: SHIPPED | OUTPATIENT
Start: 2024-06-05

## 2024-06-05 NOTE — PROGRESS NOTES
IB message received to outreach pt for care management. Chart review completed. Pt has not utilized since last outreach 4/22. Last office visit with hem onc on 5/17 for iron deficiency anemia. Now weekly CBC and recommended repeat EGD by GI for continued low hemoglobin. Iron infusion scheduled for 6/11.     Call placed to [place] for care management. Detailed message left informing pt of new CM for follow up. Call back information provided for Pamela Paige RN CM.     CM on care team and note routed to same. Next outreach scheduled.

## 2024-06-05 NOTE — TELEPHONE ENCOUNTER
I spoke with the patient and she doesn't understand why she needs to do this procedure again and is it really necessary. She doesn't want to have to do the Lovenox shots again if she can help it. She would like to talk with Fide first and then will decide whether she will proceed or not.

## 2024-06-05 NOTE — TELEPHONE ENCOUNTER
Spoke with the patient and she scheduled her procedure. She is familiar with the prep as she has done it before, but advised nothing to eat or drink after midnight.    Procedure: EGD with push enteroscopy  Scheduled date of procedure (as of today):6/20/24  Physician performing procedure:Harsha  Location of procedure:[facility]  Instructions reviewed with patient by: Nata MENDIOLA  Clearances:  none    Coumadin 5 day hold - Lovenox injections to start per cardiologist.

## 2024-06-05 NOTE — TELEPHONE ENCOUNTER
I s/w the pt. She will hold Warfarin 5 days prior to the procedure and start Lovenox injections 4 days prior to the procedure. Rx sent to her pharmacy. Pt has done this in the past and is familiar with the process.

## 2024-06-07 DIAGNOSIS — D50.9 IRON DEFICIENCY ANEMIA, UNSPECIFIED IRON DEFICIENCY ANEMIA TYPE: Primary | ICD-10-CM

## 2024-06-07 RX ORDER — SODIUM CHLORIDE 9 MG/ML
20 INJECTION, SOLUTION INTRAVENOUS ONCE
OUTPATIENT
Start: 2024-06-11

## 2024-06-11 ENCOUNTER — HOSPITAL ENCOUNTER (OUTPATIENT)
Dept: INFUSION CENTER | Facility: CLINIC | Age: 84
Discharge: HOME | End: 2024-06-11
Payer: COMMERCIAL

## 2024-06-11 ENCOUNTER — APPOINTMENT (OUTPATIENT)
Dept: LAB | Facility: CLINIC | Age: 84
End: 2024-06-11
Payer: COMMERCIAL

## 2024-06-11 DIAGNOSIS — D50.9 IRON DEFICIENCY ANEMIA, UNSPECIFIED IRON DEFICIENCY ANEMIA TYPE: Primary | ICD-10-CM

## 2024-06-11 DIAGNOSIS — D50.9 IRON DEFICIENCY ANEMIA, UNSPECIFIED IRON DEFICIENCY ANEMIA TYPE: ICD-10-CM

## 2024-06-11 LAB
BASOPHILS # BLD AUTO: 0.05 THOUSANDS/ÂΜL (ref 0–0.1)
BASOPHILS NFR BLD AUTO: 1 % (ref 0–1)
EOSINOPHIL # BLD AUTO: 0.28 THOUSAND/ÂΜL (ref 0–0.61)
EOSINOPHIL NFR BLD AUTO: 6 % (ref 0–6)
ERYTHROCYTE [DISTWIDTH] IN BLOOD BY AUTOMATED COUNT: 17.8 % (ref 11.6–15.1)
FERRITIN SERPL-MCNC: 20 NG/ML (ref 11–307)
HCT VFR BLD AUTO: 34.2 % (ref 34.8–46.1)
HGB BLD-MCNC: 9.8 G/DL (ref 11.5–15.4)
IMM GRANULOCYTES # BLD AUTO: 0.01 THOUSAND/UL (ref 0–0.2)
IMM GRANULOCYTES NFR BLD AUTO: 0 % (ref 0–2)
IRON SATN MFR SERPL: 8 % (ref 15–50)
IRON SERPL-MCNC: 34 UG/DL (ref 50–212)
LYMPHOCYTES # BLD AUTO: 1.14 THOUSANDS/ÂΜL (ref 0.6–4.47)
LYMPHOCYTES NFR BLD AUTO: 22 % (ref 14–44)
MCH RBC QN AUTO: 25.3 PG (ref 26.8–34.3)
MCHC RBC AUTO-ENTMCNC: 28.7 G/DL (ref 31.4–37.4)
MCV RBC AUTO: 88 FL (ref 82–98)
MONOCYTES # BLD AUTO: 0.48 THOUSAND/ÂΜL (ref 0.17–1.22)
MONOCYTES NFR BLD AUTO: 9 % (ref 4–12)
NEUTROPHILS # BLD AUTO: 3.15 THOUSANDS/ÂΜL (ref 1.85–7.62)
NEUTS SEG NFR BLD AUTO: 62 % (ref 43–75)
NRBC BLD AUTO-RTO: 0 /100 WBCS
PLATELET # BLD AUTO: 412 THOUSANDS/UL (ref 149–390)
PMV BLD AUTO: 10.3 FL (ref 8.9–12.7)
RBC # BLD AUTO: 3.88 MILLION/UL (ref 3.81–5.12)
TIBC SERPL-MCNC: 405 UG/DL (ref 250–450)
UIBC SERPL-MCNC: 371 UG/DL (ref 155–355)
WBC # BLD AUTO: 5.11 THOUSAND/UL (ref 4.31–10.16)

## 2024-06-11 PROCEDURE — 96365 THER/PROPH/DIAG IV INF INIT: CPT

## 2024-06-11 PROCEDURE — 83540 ASSAY OF IRON: CPT

## 2024-06-11 PROCEDURE — 82728 ASSAY OF FERRITIN: CPT

## 2024-06-11 PROCEDURE — 83550 IRON BINDING TEST: CPT

## 2024-06-11 PROCEDURE — 85025 COMPLETE CBC W/AUTO DIFF WBC: CPT

## 2024-06-11 RX ORDER — SODIUM CHLORIDE 9 MG/ML
20 INJECTION, SOLUTION INTRAVENOUS ONCE
Status: COMPLETED | OUTPATIENT
Start: 2024-06-11 | End: 2024-06-11

## 2024-06-11 RX ORDER — SODIUM CHLORIDE 9 MG/ML
20 INJECTION, SOLUTION INTRAVENOUS ONCE
Status: CANCELLED | OUTPATIENT
Start: 2024-06-18

## 2024-06-11 RX ADMIN — IRON SUCROSE 300 MG: 20 INJECTION, SOLUTION INTRAVENOUS at 12:26

## 2024-06-11 RX ADMIN — SODIUM CHLORIDE 20 ML/HR: 0.9 INJECTION, SOLUTION INTRAVENOUS at 12:22

## 2024-06-11 NOTE — PROGRESS NOTES
Pt to clinic for venofer, offers no complaints today, tolerated infusion without complications, aware of next appointment on 6/18/24 at 0800, PIV removed, avs declined.

## 2024-06-12 ENCOUNTER — ANTICOAG VISIT (OUTPATIENT)
Dept: CARDIOLOGY CLINIC | Facility: CLINIC | Age: 84
End: 2024-06-12

## 2024-06-12 ENCOUNTER — PATIENT OUTREACH (OUTPATIENT)
Age: 84
End: 2024-06-12

## 2024-06-12 NOTE — PROGRESS NOTES
S/w pt. She is taking 5mg daily. Her last dose is Sat and will be bridged with Lovenox. She will retest in 1 week after restarting.

## 2024-06-12 NOTE — PROGRESS NOTES
Called to follow up with pt. She reports that she is doing well. Pt lives with her son and his family. She had iron infusion yesterday and states that she will have endoscopy on 20th to determine if there are any new concerns. Pt admits to taking all of her medications as well as not allowing them to run out before refilled. Her son drives her to her appts.  She checks her fasting glucose daily and it depends on what she eats the night before that determines how high it is. Todays was 132,but she admits to having ice cream and cookies before bed. Normally, it is between .  Pt states that she manages fine and declines need for outreach at this time. She will discuss how long she needs to receive iron infusions with GI when she has her post endoscopy follow up. Removing self from care team at this time.

## 2024-06-14 PROBLEM — Z00.00 MEDICARE ANNUAL WELLNESS VISIT, SUBSEQUENT: Status: RESOLVED | Noted: 2024-05-15 | Resolved: 2024-06-14

## 2024-06-18 ENCOUNTER — TELEPHONE (OUTPATIENT)
Dept: HEMATOLOGY ONCOLOGY | Facility: CLINIC | Age: 84
End: 2024-06-18

## 2024-06-18 ENCOUNTER — HOSPITAL ENCOUNTER (OUTPATIENT)
Dept: INFUSION CENTER | Facility: CLINIC | Age: 84
Discharge: HOME | End: 2024-06-18
Payer: COMMERCIAL

## 2024-06-18 VITALS
DIASTOLIC BLOOD PRESSURE: 77 MMHG | RESPIRATION RATE: 18 BRPM | SYSTOLIC BLOOD PRESSURE: 138 MMHG | TEMPERATURE: 97 F | HEART RATE: 60 BPM

## 2024-06-18 DIAGNOSIS — D50.9 IRON DEFICIENCY ANEMIA, UNSPECIFIED IRON DEFICIENCY ANEMIA TYPE: Primary | ICD-10-CM

## 2024-06-18 RX ORDER — SODIUM CHLORIDE 9 MG/ML
20 INJECTION, SOLUTION INTRAVENOUS ONCE
Status: COMPLETED | OUTPATIENT
Start: 2024-06-18 | End: 2024-06-18

## 2024-06-18 RX ORDER — SODIUM CHLORIDE 9 MG/ML
20 INJECTION, SOLUTION INTRAVENOUS ONCE
Status: CANCELLED | OUTPATIENT
Start: 2024-06-26

## 2024-06-18 RX ADMIN — SODIUM CHLORIDE 20 ML/HR: 0.9 INJECTION, SOLUTION INTRAVENOUS at 08:16

## 2024-06-18 RX ADMIN — IRON SUCROSE 300 MG: 20 INJECTION, SOLUTION INTRAVENOUS at 08:17

## 2024-06-18 NOTE — TELEPHONE ENCOUNTER
Voicemail left regarding below      ----- Message from Staci Contreras PA-C sent at 6/17/2024  4:15 PM EDT -----  She is on IV iron. Plan for EGD 06/20. Repeat CBC in 1 week

## 2024-06-18 NOTE — PROGRESS NOTES
Pt here today for an iron infusion, offers no complaints, infusion completed w/o complications, aware of there next appt on 6/26 at 12:30, AVS given,and pt D/C home with son.

## 2024-06-20 ENCOUNTER — TELEPHONE (OUTPATIENT)
Age: 84
End: 2024-06-20

## 2024-06-20 ENCOUNTER — ANESTHESIA EVENT (OUTPATIENT)
Dept: GASTROENTEROLOGY | Facility: HOSPITAL | Age: 84
End: 2024-06-20

## 2024-06-20 ENCOUNTER — HOSPITAL ENCOUNTER (OUTPATIENT)
Dept: GASTROENTEROLOGY | Facility: HOSPITAL | Age: 84
Setting detail: OUTPATIENT SURGERY
End: 2024-06-20
Attending: INTERNAL MEDICINE
Payer: COMMERCIAL

## 2024-06-20 ENCOUNTER — ANESTHESIA (OUTPATIENT)
Dept: GASTROENTEROLOGY | Facility: HOSPITAL | Age: 84
End: 2024-06-20

## 2024-06-20 VITALS
SYSTOLIC BLOOD PRESSURE: 144 MMHG | HEART RATE: 60 BPM | OXYGEN SATURATION: 94 % | HEIGHT: 66 IN | TEMPERATURE: 97.4 F | WEIGHT: 158.51 LBS | BODY MASS INDEX: 25.47 KG/M2 | DIASTOLIC BLOOD PRESSURE: 65 MMHG | RESPIRATION RATE: 20 BRPM

## 2024-06-20 DIAGNOSIS — D50.9 IRON DEFICIENCY ANEMIA, UNSPECIFIED IRON DEFICIENCY ANEMIA TYPE: ICD-10-CM

## 2024-06-20 DIAGNOSIS — D50.0 IRON DEFICIENCY ANEMIA SECONDARY TO BLOOD LOSS (CHRONIC): ICD-10-CM

## 2024-06-20 LAB — GLUCOSE SERPL-MCNC: 170 MG/DL (ref 65–140)

## 2024-06-20 PROCEDURE — 44369 SMALL BOWEL ENDOSCOPY: CPT | Performed by: INTERNAL MEDICINE

## 2024-06-20 PROCEDURE — 82948 REAGENT STRIP/BLOOD GLUCOSE: CPT

## 2024-06-20 RX ORDER — PROPOFOL 10 MG/ML
INJECTION, EMULSION INTRAVENOUS AS NEEDED
Status: DISCONTINUED | OUTPATIENT
Start: 2024-06-20 | End: 2024-06-20

## 2024-06-20 RX ORDER — LIDOCAINE HYDROCHLORIDE 20 MG/ML
INJECTION, SOLUTION EPIDURAL; INFILTRATION; INTRACAUDAL; PERINEURAL AS NEEDED
Status: DISCONTINUED | OUTPATIENT
Start: 2024-06-20 | End: 2024-06-20

## 2024-06-20 RX ORDER — SODIUM CHLORIDE, SODIUM LACTATE, POTASSIUM CHLORIDE, CALCIUM CHLORIDE 600; 310; 30; 20 MG/100ML; MG/100ML; MG/100ML; MG/100ML
75 INJECTION, SOLUTION INTRAVENOUS CONTINUOUS
Status: DISCONTINUED | OUTPATIENT
Start: 2024-06-20 | End: 2024-06-24 | Stop reason: HOSPADM

## 2024-06-20 RX ADMIN — PROPOFOL 20 MG: 10 INJECTION, EMULSION INTRAVENOUS at 09:32

## 2024-06-20 RX ADMIN — PROPOFOL 10 MG: 10 INJECTION, EMULSION INTRAVENOUS at 09:36

## 2024-06-20 RX ADMIN — SODIUM CHLORIDE, SODIUM LACTATE, POTASSIUM CHLORIDE, AND CALCIUM CHLORIDE 75 ML/HR: .6; .31; .03; .02 INJECTION, SOLUTION INTRAVENOUS at 08:59

## 2024-06-20 RX ADMIN — PROPOFOL 20 MG: 10 INJECTION, EMULSION INTRAVENOUS at 09:34

## 2024-06-20 RX ADMIN — PROPOFOL 10 MG: 10 INJECTION, EMULSION INTRAVENOUS at 09:38

## 2024-06-20 RX ADMIN — GLUCAGON 1 MCG: KIT at 09:34

## 2024-06-20 RX ADMIN — LIDOCAINE HYDROCHLORIDE 40 MG: 20 INJECTION, SOLUTION EPIDURAL; INFILTRATION; INTRACAUDAL; PERINEURAL at 09:28

## 2024-06-20 RX ADMIN — PROPOFOL 80 MG: 10 INJECTION, EMULSION INTRAVENOUS at 09:30

## 2024-06-20 RX ADMIN — LIDOCAINE HYDROCHLORIDE 40 MG: 20 INJECTION, SOLUTION EPIDURAL; INFILTRATION; INTRACAUDAL; PERINEURAL at 09:30

## 2024-06-20 NOTE — TELEPHONE ENCOUNTER
Patient called inquiring when to restart Coumadin after her Enteroscopy this AM, had been bridged with Lovenox, last dose last night.    Contacted office to inquire, disconnected the call; attempted to call patient back x2 with no answer, left voice mail message to call us back.    Able to contact [facility], they will reach out to the patient with instructions.

## 2024-06-20 NOTE — ANESTHESIA PREPROCEDURE EVALUATION
Procedure:  EGD WITH PUSH ENTEROSCOPY    Relevant Problems   CARDIO   (+) Coronary artery disease of native artery of native heart with stable angina pectoris (HCC)   (+) Hyperlipidemia   (+) Hypertension, essential   (+) Paroxysmal atrial fibrillation (HCC)      ENDO   (+) Hypothyroidism   (+) Type 2 diabetes mellitus with stage 3b chronic kidney disease, with long-term current use of insulin (HCC)      GI/HEPATIC   (+) GERD (gastroesophageal reflux disease)      /RENAL   (+) Chronic kidney disease-mineral and bone disorder   (+) Stage 3b chronic kidney disease (HCC)      HEMATOLOGY   (+) Anemia   (+) Iron deficiency anemia, unspecified      MUSCULOSKELETAL   (+) Primary osteoarthritis involving multiple joints      PULMONARY   (+) Simple chronic bronchitis (HCC)        Physical Exam    Airway    Mallampati score: I  TM Distance: >3 FB  Neck ROM: full     Dental       Cardiovascular  Cardiovascular exam normal    Pulmonary  Pulmonary exam normal     Other Findings  post-pubertal.      Anesthesia Plan  ASA Score- 3     Anesthesia Type- IV sedation with anesthesia with ASA Monitors.         Additional Monitors:     Airway Plan:            Plan Factors-Exercise tolerance (METS): >4 METS.    Chart reviewed. EKG reviewed. Imaging results reviewed. Existing labs reviewed. Patient summary reviewed.                  Induction- intravenous.    Postoperative Plan- Plan for postoperative opioid use. Planned trial extubation        Informed Consent- Anesthetic plan and risks discussed with patient.  I personally reviewed this patient with the CRNA. Discussed and agreed on the Anesthesia Plan with the CRNA..

## 2024-06-20 NOTE — ANESTHESIA POSTPROCEDURE EVALUATION
Post-Op Assessment Note    CV Status:  Stable  Pain Score: 0    Pain management: adequate       Mental Status:  Arousable and sleepy   Hydration Status:  Euvolemic   PONV Controlled:  Controlled   Airway Patency:  Patent     Post Op Vitals Reviewed: Yes    No anethesia notable event occurred.    Staff: CRNA               BP   140/64   Temp      Pulse 74   Resp 18   SpO2 97% RA

## 2024-06-20 NOTE — PROGRESS NOTES
6/20-After speaking with pt today after her procedure I advised to restart the Warfarin today at 7.5mg to get her in therapeutic range quicker along with Lovenox. Check INR in 4 days.

## 2024-06-20 NOTE — TELEPHONE ENCOUNTER
S/w pt. Advised to restart the Warfarin today at 7.5mg to get her in therapeutic range quicker along with Lovenox. Check INR in 4 days.

## 2024-06-20 NOTE — H&P
History and Physical - [facility]  lEy Hernadez 84 y.o. female MRN: [id number]                  HPI: Ely Hernadez is a 84 y.o. year old female who presents for EGD with push enteroscopy for history of iron deficiency GI blood loss anemia with angiectasia      REVIEW OF SYSTEMS: Per the HPI, and otherwise unremarkable.    Historical Information   Past Medical History:   Diagnosis Date    Acute anterior epistaxis 12/23/2023    Acute blood loss anemia 12/14/2023    Acute respiratory failure with hypoxia (HCC) 02/06/2024    Anemia     Aneurysm of thoracic aorta (HCC)     Angioedema 06/07/2019    Aortic valve disorder     Benign neoplasm of sigmoid colon     Cardiomyopathy (HCC)     last assessed: 10/10/2014    CHF (congestive heart failure) (HCC)     Chronic kidney disease     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HCC)     last assessed: 10/10/2014    COPD (chronic obstructive pulmonary disease) (HCC)     Diabetic nephropathy (HCC)     Disease of thyroid gland     RAMON (dyspnea on exertion)     GERD (gastroesophageal reflux disease)     History of emphysema (HCC)     History of transfusion     Hyperlipidemia     Hypertensive urgency 10/28/2023    Leg cramps 11/01/2023    Stroke-like symptoms 10/28/2023    TIA (transient ischemic attack)      Past Surgical History:   Procedure Laterality Date    AORTIC VALVE REPLACEMENT      CARDIAC PACEMAKER PLACEMENT      last assessed: 10/10/2014    CARDIAC SURGERY      aortic valve replacement    CHOLECYSTECTOMY      COLONOSCOPY      EGD      HYSTERECTOMY      INSERT / REPLACE / REMOVE PACEMAKER      KNEE SURGERY Right     OTHER SURGICAL HISTORY      Capsule ENDOscopy description: 12/19/2012    GA COLONOSCOPY FLX DX W/COLLJ SPEC WHEN PFRMD N/A 05/31/2018    Procedure: single balloon ENTEROSCOPY;  Surgeon: David Dennis MD;  Location: [facility];  Service: Gastroenterology    GA ESOPHAGOGASTRODUODENOSCOPY TRANSORAL DIAGNOSTIC N/A 11/29/2017    Procedure: EGD AND  "COLONOSCOPY;  Surgeon: Jay Mcleod III, MD;  Location: [facility];  Service: Gastroenterology     Social History   Social History     Substance and Sexual Activity   Alcohol Use Never     Social History     Substance and Sexual Activity   Drug Use Never     Social History     Tobacco Use   Smoking Status Former    Current packs/day: 0.00    Average packs/day: 1 pack/day for 52.9 years (52.9 ttl pk-yrs)    Types: Cigarettes    Start date:     Quit date: 2007    Years since quittin.5    Passive exposure: Past   Smokeless Tobacco Former    Quit date:      Family History   Problem Relation Age of Onset    No Known Problems Mother     No Known Problems Father        Meds/Allergies     Not in a hospital admission.    No Known Allergies    Objective     Blood pressure 166/70, pulse 60, temperature (!) 97.2 °F (36.2 °C), resp. rate 20, height 5' 6\" (1.676 m), weight 71.9 kg (158 lb 8.2 oz), SpO2 99%.      PHYSICAL EXAM    /70   Pulse 60   Temp (!) 97.2 °F (36.2 °C)   Resp 20   Ht 5' 6\" (1.676 m)   Wt 71.9 kg (158 lb 8.2 oz)   SpO2 99%   BMI 25.58 kg/m²       Gen: NAD  CV: RRR  CHEST: Clear  ABD: soft, NT/ND  EXT: no edema      ASSESSMENT/PLAN:  This is a 84 y.o. year old female here for egd with push enteroscopy, and she is stable and optimized for her procedure.        "

## 2024-06-25 ENCOUNTER — APPOINTMENT (OUTPATIENT)
Age: 84
End: 2024-06-25
Payer: COMMERCIAL

## 2024-06-25 DIAGNOSIS — M89.9 CHRONIC KIDNEY DISEASE-MINERAL AND BONE DISORDER: ICD-10-CM

## 2024-06-25 DIAGNOSIS — E83.9 CHRONIC KIDNEY DISEASE-MINERAL AND BONE DISORDER: ICD-10-CM

## 2024-06-25 DIAGNOSIS — N18.9 CHRONIC KIDNEY DISEASE-MINERAL AND BONE DISORDER: ICD-10-CM

## 2024-06-25 DIAGNOSIS — D50.9 IRON DEFICIENCY ANEMIA, UNSPECIFIED IRON DEFICIENCY ANEMIA TYPE: ICD-10-CM

## 2024-06-25 LAB
25(OH)D3 SERPL-MCNC: 54.5 NG/ML (ref 30–100)
ANION GAP SERPL CALCULATED.3IONS-SCNC: 10 MMOL/L (ref 4–13)
BACTERIA UR QL AUTO: ABNORMAL /HPF
BASOPHILS # BLD AUTO: 0.05 THOUSANDS/ÂΜL (ref 0–0.1)
BASOPHILS NFR BLD AUTO: 1 % (ref 0–1)
BILIRUB UR QL STRIP: NEGATIVE
BUN SERPL-MCNC: 22 MG/DL (ref 5–25)
CALCIUM SERPL-MCNC: 9.2 MG/DL (ref 8.4–10.2)
CHLORIDE SERPL-SCNC: 105 MMOL/L (ref 96–108)
CLARITY UR: CLEAR
CO2 SERPL-SCNC: 28 MMOL/L (ref 21–32)
COLOR UR: ABNORMAL
CREAT SERPL-MCNC: 1.35 MG/DL (ref 0.6–1.3)
CREAT UR-MCNC: 115.4 MG/DL
EOSINOPHIL # BLD AUTO: 0.33 THOUSAND/ÂΜL (ref 0–0.61)
EOSINOPHIL NFR BLD AUTO: 7 % (ref 0–6)
ERYTHROCYTE [DISTWIDTH] IN BLOOD BY AUTOMATED COUNT: 19.5 % (ref 11.6–15.1)
FERRITIN SERPL-MCNC: 115 NG/ML (ref 11–307)
GFR SERPL CREATININE-BSD FRML MDRD: 36 ML/MIN/1.73SQ M
GLUCOSE P FAST SERPL-MCNC: 145 MG/DL (ref 65–99)
GLUCOSE UR STRIP-MCNC: NEGATIVE MG/DL
HCT VFR BLD AUTO: 39.7 % (ref 34.8–46.1)
HGB BLD-MCNC: 11.9 G/DL (ref 11.5–15.4)
HGB UR QL STRIP.AUTO: NEGATIVE
IMM GRANULOCYTES # BLD AUTO: 0.03 THOUSAND/UL (ref 0–0.2)
IMM GRANULOCYTES NFR BLD AUTO: 1 % (ref 0–2)
IRON SATN MFR SERPL: 11 % (ref 15–50)
IRON SERPL-MCNC: 42 UG/DL (ref 50–212)
KETONES UR STRIP-MCNC: NEGATIVE MG/DL
LEUKOCYTE ESTERASE UR QL STRIP: ABNORMAL
LYMPHOCYTES # BLD AUTO: 1.4 THOUSANDS/ÂΜL (ref 0.6–4.47)
LYMPHOCYTES NFR BLD AUTO: 28 % (ref 14–44)
MCH RBC QN AUTO: 26.8 PG (ref 26.8–34.3)
MCHC RBC AUTO-ENTMCNC: 30 G/DL (ref 31.4–37.4)
MCV RBC AUTO: 89 FL (ref 82–98)
MONOCYTES # BLD AUTO: 0.47 THOUSAND/ÂΜL (ref 0.17–1.22)
MONOCYTES NFR BLD AUTO: 9 % (ref 4–12)
NEUTROPHILS # BLD AUTO: 2.73 THOUSANDS/ÂΜL (ref 1.85–7.62)
NEUTS SEG NFR BLD AUTO: 54 % (ref 43–75)
NITRITE UR QL STRIP: NEGATIVE
NON-SQ EPI CELLS URNS QL MICRO: ABNORMAL /HPF
NRBC BLD AUTO-RTO: 0 /100 WBCS
PH UR STRIP.AUTO: 6 [PH]
PHOSPHATE SERPL-MCNC: 3.5 MG/DL (ref 2.3–4.1)
PLATELET # BLD AUTO: 365 THOUSANDS/UL (ref 149–390)
PMV BLD AUTO: 10.5 FL (ref 8.9–12.7)
POTASSIUM SERPL-SCNC: 4.1 MMOL/L (ref 3.5–5.3)
PROT UR STRIP-MCNC: NEGATIVE MG/DL
PROT UR-MCNC: 5 MG/DL
PROT/CREAT UR: 0.04 MG/G{CREAT} (ref 0–0.1)
PTH-INTACT SERPL-MCNC: 94 PG/ML (ref 12–88)
RBC # BLD AUTO: 4.44 MILLION/UL (ref 3.81–5.12)
RBC #/AREA URNS AUTO: ABNORMAL /HPF
SODIUM SERPL-SCNC: 143 MMOL/L (ref 135–147)
SP GR UR STRIP.AUTO: 1.01 (ref 1–1.03)
TIBC SERPL-MCNC: 371 UG/DL (ref 250–450)
UIBC SERPL-MCNC: 329 UG/DL (ref 155–355)
UROBILINOGEN UR STRIP-ACNC: <2 MG/DL
WBC # BLD AUTO: 5.01 THOUSAND/UL (ref 4.31–10.16)
WBC #/AREA URNS AUTO: ABNORMAL /HPF

## 2024-06-25 PROCEDURE — 82728 ASSAY OF FERRITIN: CPT

## 2024-06-25 PROCEDURE — 84156 ASSAY OF PROTEIN URINE: CPT

## 2024-06-25 PROCEDURE — 83550 IRON BINDING TEST: CPT

## 2024-06-25 PROCEDURE — 83970 ASSAY OF PARATHORMONE: CPT

## 2024-06-25 PROCEDURE — 83540 ASSAY OF IRON: CPT

## 2024-06-25 PROCEDURE — 84100 ASSAY OF PHOSPHORUS: CPT

## 2024-06-25 PROCEDURE — 80048 BASIC METABOLIC PNL TOTAL CA: CPT

## 2024-06-25 PROCEDURE — 81001 URINALYSIS AUTO W/SCOPE: CPT

## 2024-06-25 PROCEDURE — 82306 VITAMIN D 25 HYDROXY: CPT

## 2024-06-25 PROCEDURE — 82570 ASSAY OF URINE CREATININE: CPT

## 2024-06-25 PROCEDURE — 85025 COMPLETE CBC W/AUTO DIFF WBC: CPT

## 2024-06-26 ENCOUNTER — HOSPITAL ENCOUNTER (OUTPATIENT)
Dept: INFUSION CENTER | Facility: CLINIC | Age: 84
Discharge: HOME | End: 2024-06-26
Payer: COMMERCIAL

## 2024-06-26 ENCOUNTER — ANTICOAG VISIT (OUTPATIENT)
Dept: CARDIOLOGY CLINIC | Facility: CLINIC | Age: 84
End: 2024-06-26

## 2024-06-26 DIAGNOSIS — D50.9 IRON DEFICIENCY ANEMIA, UNSPECIFIED IRON DEFICIENCY ANEMIA TYPE: Primary | ICD-10-CM

## 2024-06-26 PROCEDURE — 96365 THER/PROPH/DIAG IV INF INIT: CPT

## 2024-06-26 RX ORDER — SODIUM CHLORIDE 9 MG/ML
20 INJECTION, SOLUTION INTRAVENOUS ONCE
Status: CANCELLED | OUTPATIENT
Start: 2024-07-03

## 2024-06-26 RX ORDER — SODIUM CHLORIDE 9 MG/ML
20 INJECTION, SOLUTION INTRAVENOUS ONCE
Status: COMPLETED | OUTPATIENT
Start: 2024-06-26 | End: 2024-06-26

## 2024-06-26 RX ADMIN — IRON SUCROSE 300 MG: 20 INJECTION, SOLUTION INTRAVENOUS at 12:45

## 2024-06-26 RX ADMIN — SODIUM CHLORIDE 20 ML/HR: 9 INJECTION, SOLUTION INTRAVENOUS at 12:44

## 2024-06-26 NOTE — PROGRESS NOTES
Pt here venofer, offering no complaints.  Right arm IV placed with positive blood return noted.  Tolerated infusion without incident.  PIV removed.  AVS declined.  Aware of next appt 7/3 @ 8 am.  Walked out in stable condition.

## 2024-06-26 NOTE — PROGRESS NOTES
S/w pt. She did not restart Warfarin at 7.5mg as instructed and only took the Lovenox 2 days after the procedure. Advised she take 7.5mg Wed-Sat, 5mg on Sun and check INR on Monday. INR goal is 2.5-3.5

## 2024-06-27 ENCOUNTER — REMOTE DEVICE CLINIC VISIT (OUTPATIENT)
Dept: CARDIOLOGY CLINIC | Facility: CLINIC | Age: 84
End: 2024-06-27
Payer: COMMERCIAL

## 2024-06-27 DIAGNOSIS — Z95.0 PRESENCE OF PERMANENT CARDIAC PACEMAKER: Primary | ICD-10-CM

## 2024-06-27 PROCEDURE — 93296 REM INTERROG EVL PM/IDS: CPT | Performed by: INTERNAL MEDICINE

## 2024-06-27 PROCEDURE — 93294 REM INTERROG EVL PM/LDLS PM: CPT | Performed by: INTERNAL MEDICINE

## 2024-06-27 NOTE — PROGRESS NOTES
Results for orders placed or performed in visit on 06/27/24   Cardiac EP device report    Narrative    SJM DUAL CHAMBER PM/ NOT MRI CONDITIONAL  MERLIN TRANSMISSION: BATTERY VOLTAGE ADEQUATE (1.3 YRS). AP 3.5%  92% (>40%/CHB) ALL AVAILABLE LEAD PARAMETERS WITHIN NORMAL LIMITS. 17 AMS EPISODES W/ EPISODE IN PROGRESS. AT/AF BURDEN 85% SINCE 12/28/23. TAKES WARFARIN, METOPROLOL SUCC. NORMAL DEVICE FUNCTION. AM

## 2024-06-28 ENCOUNTER — OFFICE VISIT (OUTPATIENT)
Dept: WOUND CARE | Facility: CLINIC | Age: 84
End: 2024-06-28
Payer: COMMERCIAL

## 2024-06-28 VITALS
BODY MASS INDEX: 25.07 KG/M2 | DIASTOLIC BLOOD PRESSURE: 67 MMHG | TEMPERATURE: 97.2 F | HEIGHT: 66 IN | RESPIRATION RATE: 20 BRPM | HEART RATE: 61 BPM | WEIGHT: 156 LBS | SYSTOLIC BLOOD PRESSURE: 164 MMHG

## 2024-06-28 DIAGNOSIS — L92.9 HYPERGRANULATION: ICD-10-CM

## 2024-06-28 DIAGNOSIS — I83.019 VENOUS ULCER OF RIGHT LEG (HCC): Primary | ICD-10-CM

## 2024-06-28 DIAGNOSIS — L97.919 VENOUS ULCER OF RIGHT LEG (HCC): Primary | ICD-10-CM

## 2024-06-28 PROCEDURE — 17250 CHEM CAUT OF GRANLTJ TISSUE: CPT | Performed by: ORTHOPAEDIC SURGERY

## 2024-06-28 PROCEDURE — 99213 OFFICE O/P EST LOW 20 MIN: CPT | Performed by: ORTHOPAEDIC SURGERY

## 2024-06-28 RX ORDER — LIDOCAINE 40 MG/G
CREAM TOPICAL ONCE
Status: COMPLETED | OUTPATIENT
Start: 2024-06-28 | End: 2024-06-28

## 2024-06-28 RX ADMIN — LIDOCAINE: 40 CREAM TOPICAL at 09:29

## 2024-06-28 NOTE — PATIENT INSTRUCTIONS
Orders Placed This Encounter   Procedures    Wound Procedure Treatment Venous Ulcer Right Pretibial     This order was created via procedure documentation    Wound cleansing and dressings Venous Ulcer Right Pretibial     Wound cleansing and dressings       Right Leg wound:      Wash your hands with soap and water.  Remove old dressing, discard into plastic bag and place in trash.  Cleanse the wound with normal saline solution prior to applying a clean dressing. Do not use tissue or cotton balls. Do not scrub the wound. Pat dry using gauze.  Shower yes, if able to use cast cover. Do not get dressing wet.       Apply polymem cut fit to the open wound.    Cover with gauze  Secure with rolled gauze and tape  Change dressing 3 times weekly.     May use polymem shapes ,if able to get from supply company, in place of [employer] and gauze     The above was completed today at the wound center.            · Wound compression and edema control      Elastic Tubular Stocking: spanda- size F     Tubular elastic bandage: Apply from base of toes to behind the knee. Apply in AM, may remove for sleep.  Avoid prolonged standing in one place.  Elevate leg(s) above the level of the heart when sitting or as much as possible     Standing Status:   Future     Standing Expiration Date:   7/5/2024

## 2024-06-28 NOTE — PROGRESS NOTES
Patient ID: Ely Hernadez is a 84 y.o. female Date of Birth 1940       Chief Complaint   Patient presents with    New Patient Visit     Right leg wound       Allergies:  Patient has no known allergies.    Diagnosis:   Diagnosis ICD-10-CM Associated Orders   1. Venous ulcer of right leg (HCC)  I83.019 lidocaine (LMX) 4 % cream    L97.919 Wound Procedure Treatment Venous Ulcer Right Pretibial     Wound cleansing and dressings Venous Ulcer Right Pretibial     Chemical Caut Of A Wound      2. Hypergranulation  L92.9            Assessment and Plan :   Follow up evaluation of right venous ulcer slowly improving.  Chemically cauterized as below.  Continue wound management with Polymem, see wound orders below   Continue compression with Tubigrip  No harsh cleansers such as alcohol, peroxide, or antibacterial soap, do not submerge in water  Can cleanse with NSS at dressing changes.   Counseled on importance of frequent elevation of leg and increase exercise/walking for wound healing.  Follow-up in 2 week(s) or call sooner with questions or concerns or any signs of infection such as redness, swelling, increased/purulent drainage, fever, chills, increased severe pain.     Subjective:   6/28: Pt is an 84 y.o. female with pmhx HTN, DMII (A1c 8.7),  CKD, Pafib, CVA (10/28/23 on Coumadin/ASA 81mg) with residual deficits (left sided facial drop and leg weakness) well known to [facility] who presents for follow up evaluation of non healing RLE venous ulcer which has been present for about 2.5 years. Pt has been covering wound with a bandaid. Does not have an odor. No smoking, ETOH or drug use.  Pt denies any sob, fatigue, N/V, CP, fever or chills.    The following portions of the patient's history were reviewed and updated as appropriate:   Patient Active Problem List   Diagnosis    Hyperlipidemia    Chronic anticoagulation    Hypertension, essential    Coronary artery disease of native artery of native heart with stable angina  pectoris (HCC)    Simple chronic bronchitis (HCC)    Diabetes mellitus with neurological manifestation (HCC)    Hypothyroidism    GERD (gastroesophageal reflux disease)    Anemia    AVM (arteriovenous malformation) of small bowel, acquired with hemorrhage    Angiectasia    Other vascular disorders of intestine (HCC)    Aortic valve replaced    Cardiomyopathy (HCC)    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HCC)    Stage 3b chronic kidney disease (HCC)    Chronic kidney disease-mineral and bone disorder    Primary osteoarthritis involving multiple joints    Paroxysmal atrial fibrillation (HCC)    Neutropenia associated with infection (HCC)    Herpes simplex labialis    Restrictive lung disease    Nocturnal hypoxemia    Supratherapeutic INR    H/O mechanical aortic valve replacement    Subtherapeutic international normalized ratio (INR)    Type 2 diabetes mellitus with stage 3b chronic kidney disease, with long-term current use of insulin (HCC)    Venous ulcer of right leg (HCC)    Chronic systolic (congestive) heart failure (HCC)    Lower extremity edema    Iron deficiency anemia, unspecified     Past Medical History:   Diagnosis Date    Acute anterior epistaxis 12/23/2023    Acute blood loss anemia 12/14/2023    Acute respiratory failure with hypoxia (HCC) 02/06/2024    Anemia     Aneurysm of thoracic aorta (HCC)     Angioedema 06/07/2019    Aortic valve disorder     Benign neoplasm of sigmoid colon     Cardiomyopathy (HCC)     last assessed: 10/10/2014    CHF (congestive heart failure) (HCC)     Chronic kidney disease     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HCC)     last assessed: 10/10/2014    COPD (chronic obstructive pulmonary disease) (HCC)     Diabetic nephropathy (HCC)     Disease of thyroid gland     RAMON (dyspnea on exertion)     GERD (gastroesophageal reflux disease)     History of emphysema (HCC)     History of transfusion     Hyperlipidemia     Hypertensive urgency 10/28/2023    Leg cramps 11/01/2023    Stroke-like  symptoms 10/28/2023    TIA (transient ischemic attack)      Past Surgical History:   Procedure Laterality Date    AORTIC VALVE REPLACEMENT      CARDIAC PACEMAKER PLACEMENT      last assessed: 10/10/2014    CARDIAC SURGERY      aortic valve replacement    CHOLECYSTECTOMY      COLONOSCOPY      EGD      HYSTERECTOMY      INSERT / REPLACE / REMOVE PACEMAKER      KNEE SURGERY Right     OTHER SURGICAL HISTORY      Capsule ENDOscopy description: 2012    UT COLONOSCOPY FLX DX W/COLLJ SPEC WHEN PFRMD N/A 2018    Procedure: single balloon ENTEROSCOPY;  Surgeon: David Dennis MD;  Location: [facility];  Service: Gastroenterology    UT ESOPHAGOGASTRODUODENOSCOPY TRANSORAL DIAGNOSTIC N/A 2017    Procedure: EGD AND COLONOSCOPY;  Surgeon: Jay Mcleod III, MD;  Location: [facility];  Service: Gastroenterology     Family History   Problem Relation Age of Onset    No Known Problems Mother     No Known Problems Father      Social History     Socioeconomic History    Marital status:      Spouse name: None    Number of children: None    Years of education: None    Highest education level: None   Occupational History    None   Tobacco Use    Smoking status: Former     Current packs/day: 0.00     Average packs/day: 1 pack/day for 52.9 years (52.9 ttl pk-yrs)     Types: Cigarettes     Start date:      Quit date: 2007     Years since quittin.5     Passive exposure: Past    Smokeless tobacco: Former     Quit date:    Vaping Use    Vaping status: Never Used   Substance and Sexual Activity    Alcohol use: Never    Drug use: Never    Sexual activity: Not Currently     Partners: Male   Other Topics Concern    None   Social History Narrative    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 BID Freestyle lancets BID    Living independently with spouse     Social Determinants of Health     Financial Resource Strain: Not on file   Food Insecurity: No Food Insecurity (5/15/2024)    Hunger Vital  Sign     Worried About Running Out of Food in the Last Year: Never true     Ran Out of Food in the Last Year: Never true   Transportation Needs: No Transportation Needs (5/15/2024)    PRAPARE - Transportation     Lack of Transportation (Medical): No     Lack of Transportation (Non-Medical): No   Physical Activity: Inactive (5/26/2021)    Exercise Vital Sign     Days of Exercise per Week: 0 days     Minutes of Exercise per Session: 0 min   Stress: No Stress Concern Present (5/26/2021)    [employer] - Occupational Stress Questionnaire     Feeling of Stress : Not at all   Social Connections: Not on file   Intimate Partner Violence: Not on file   Housing Stability: Low Risk  (5/15/2024)    Housing Stability Vital Sign     Unable to Pay for Housing in the Last Year: No     Number of Times Moved in the Last Year: 1     Homeless in the Last Year: No       Current Outpatient Medications:     Accu-Chek FastClix Lancets MISC, Use 3 (three) times a day, Disp: 300 each, Rfl: 3    albuterol (Ventolin HFA) 90 mcg/act inhaler, Inhale 2 puffs every 6 (six) hours as needed for wheezing, Disp: 54 g, Rfl: 3    Ascorbic Acid (vitamin C) 1000 MG tablet, Take 1,000 mg by mouth daily, Disp: , Rfl:     aspirin (ECOTRIN LOW STRENGTH) 81 mg EC tablet, Take 1 tablet (81 mg total) by mouth daily Do not start before November 1, 2023., Disp: 30 tablet, Rfl: 0    b complex vitamins capsule, Take 1 capsule by mouth daily, Disp: 30 capsule, Rfl: 2    Blood Glucose Monitoring Suppl (Accu-Chek Guide Me) w/Device KIT, USE 3 TIMES DAILY, Disp: 1 kit, Rfl: 2    Cholecalciferol (Vitamin D3) 50 MCG (2000 UT) TABS, Take 2,000 Units by mouth daily, Disp: , Rfl:     enoxaparin (LOVENOX) 80 mg/0.8 mL, Inject 0.7 mL (70 mg total) under the skin every 24 hours Hold Warfarin 5 days prior. Start Lovenox 4 days prior.  No Lovenox the morning of the procedure. Resume both Lovenox and Warfarin at the discretion of the surgeon  (preferably that evening) and check INR 4 days after restarting. Inject in the abdomen about 2 inches from the belly button and rotate sides at each injection., Disp: 0.7 mL, Rfl: 10    Ferrous Sulfate (IRON PO), Take by mouth daily in the early morning OTC, Disp: , Rfl:     fluticasone-salmeterol (Advair HFA) 115-21 MCG/ACT inhaler, Inhale 2 puffs 2 (two) times a day Rinse mouth after use. (Patient not taking: Reported on 6/10/2024), Disp: 36 g, Rfl: 3    furosemide (LASIX) 40 mg tablet, take 1 tablet by mouth daily, Disp: 90 tablet, Rfl: 3    gabapentin (NEURONTIN) 300 mg capsule, Take 1 capsule (300 mg total) by mouth 3 (three) times a day (Patient taking differently: Take 300 mg by mouth 2 (two) times a day), Disp: 270 capsule, Rfl: 3    glipiZIDE (GLUCOTROL) 10 mg tablet, Take 1 tablet (10 mg total) by mouth 2 (two) times a day before meals, Disp: 180 tablet, Rfl: 3    glucose blood (Accu-Chek Celeste Plus) test strip, Use 1 each 3 (three) times a day Use as instructed, Disp: 300 each, Rfl: 3    Insulin Pen Needle (BD Pen Needle Rosie U/F) 32G X 4 MM MISC, Use daily, Disp: 100 each, Rfl: 1    ipratropium-albuterol (DUO-NEB) 0.5-2.5 mg/3 mL nebulizer solution, inhale contents of 1 vial ( 3 milliliters ) in nebulizer four times a day, Disp: , Rfl:     Lancets (freestyle) lancets, Check blood glucose 3 times daily, Disp: 300 each, Rfl: 0    Levemir FlexPen 100 units/mL injection pen, INJECT SUBCUTANEOUSLY 26 UNITS  DAILY AT BEDTIME, Disp: 30 mL, Rfl: 2    levothyroxine 50 mcg tablet, Take 1 tablet (50 mcg total) by mouth daily, Disp: 90 tablet, Rfl: 3    metoprolol tartrate (LOPRESSOR) 50 mg tablet, TAKE ONE-HALF TABLET BY  MOUTH TWICE DAILY, Disp: 90 tablet, Rfl: 3    oxygen gas, Inhale 2 L/min daily at bedtime as needed home oxygen nightly, Disp: , Rfl:     pantoprazole (PROTONIX) 40 mg tablet, Take 1 tablet (40 mg total) by mouth daily Patient taking differently. 1 tab daily, Disp: 90 tablet, Rfl: 1    warfarin  "(COUMADIN) 5 mg tablet, TAKE 1 TO 2 TABLETS BY  MOUTH DAILY OR AS DIRECTED, Disp: 180 tablet, Rfl: 3  No current facility-administered medications for this visit.    Review of Systems   Constitutional:  Negative for appetite change, chills, fatigue, fever and unexpected weight change.   HENT:  Negative for congestion, hearing loss and postnasal drip.    Respiratory:  Negative for cough and shortness of breath.    Cardiovascular:  Negative for leg swelling.   Musculoskeletal:  Positive for gait problem.   Skin:  Positive for wound (RLE). Negative for rash.   Neurological:  Negative for numbness.   Hematological:  Does not bruise/bleed easily.   Psychiatric/Behavioral: Negative.           Objective:  /67   Pulse 61   Temp (!) 97.2 °F (36.2 °C)   Resp 20   Ht 5' 6\" (1.676 m)   Wt 70.8 kg (156 lb)   BMI 25.18 kg/m²   Pain Score: 0-No pain     Physical Exam  Vitals reviewed.   Constitutional:       General: She is not in acute distress.     Appearance: Normal appearance. She is well-developed and normal weight.   HENT:      Head: Normocephalic and atraumatic.   Cardiovascular:      Rate and Rhythm: Normal rate.   Pulmonary:      Effort: Pulmonary effort is normal.   Musculoskeletal:         General: No deformity.      Right lower leg: No edema.      Left lower leg: No edema.   Skin:     General: Skin is warm and dry.      Findings: Wound (RLE) present.             Comments: Pink granular tissue with serosanguinous drainage. See wound assessment   Neurological:      General: No focal deficit present.      Mental Status: She is alert and oriented to person, place, and time.      Gait: Gait normal.   Psychiatric:         Mood and Affect: Mood and affect normal.         Behavior: Behavior normal. Behavior is cooperative.         Wound 10/29/23 Venous Ulcer Pretibial Right (Active)   Wound Image Images linked 06/28/24 [id number]   Wound Description Slough;Yellow;Pink;Epithelialization;Hypergranulation 06/28/24 0921 " "  Radha-wound Assessment Fragile 06/28/24 0921   Wound Length (cm) 2 cm 06/28/24 0921   Wound Width (cm) 1.4 cm 06/28/24 0921   Wound Depth (cm) 0.1 cm 06/28/24 0921   Wound Surface Area (cm^2) 2.8 cm^2 06/28/24 0921   Wound Volume (cm^3) 0.28 cm^3 06/28/24 0921   Calculated Wound Volume (cm^3) 0.28 cm^3 06/28/24 0921   Change in Wound Size % 93 06/28/24 0921   Drainage Amount Moderate 06/28/24 0921   Drainage Description Serosanguineous;Sanguineous 06/28/24 0921   Non-staged Wound Description Full thickness 06/28/24 0921   Dressing Status Intact 06/28/24 0921           Chemical caut of a wound Venous Ulcer Right Pretibial     Date/Time  6/28/2024 9:30 AM     Performed by  Vesta Garcia PA-C   Authorized by  Vesta Garcia PA-C        Associated wounds:   Wound 10/29/23 Venous Ulcer Pretibial Right   Universal Protocol   Consent: Verbal consent obtained. Written consent obtained.  Risks and benefits: risks, benefits and alternatives were discussed  Consent given by: patient  Time out: Immediately prior to procedure a \"time out\" was called to verify the correct patient, procedure, equipment, support staff and site/side marked as required.  Patient understanding: patient states understanding of the procedure being performed  Patient identity confirmed: verbally with patient      Local anesthesia used: yes     Anesthesia   Local anesthesia used: yes  Local Anesthetic: topical anesthetic     Sedation   Patient sedated: no        Specimen: no    Culture: no   Procedure Details   Procedure Notes: After permission and placement of topical local anesthetic, 1 Silver nitrate stick(s) were used to cauterize the hypergranular tissue of the open wound.  Normal saline was used to neutralize the reaction. A dressing was applied, see orders.  Patient tolerated procedure well.      Patient tolerance: Patient tolerated the procedure well with no immediate complications                  Wound Instructions:  Orders Placed This " "Encounter   Procedures    Wound Procedure Treatment Venous Ulcer Right Pretibial     This order was created via procedure documentation    Wound cleansing and dressings Venous Ulcer Right Pretibial     Wound cleansing and dressings       Right Leg wound:      Wash your hands with soap and water.  Remove old dressing, discard into plastic bag and place in trash.  Cleanse the wound with normal saline solution prior to applying a clean dressing. Do not use tissue or cotton balls. Do not scrub the wound. Pat dry using gauze.  Shower yes, if able to use cast cover. Do not get dressing wet.       Apply polymem cut fit to the open wound.    Cover with gauze  Secure with rolled gauze and tape  Change dressing 3 times weekly.     May use polymem shapes ,if able to get from supply company, in place of he Armani and gauze     The above was completed today at the wound center.            · Wound compression and edema control      Elastic Tubular Stocking: spanda- size F     Tubular elastic bandage: Apply from base of toes to behind the knee. Apply in AM, may remove for sleep.  Avoid prolonged standing in one place.  Elevate leg(s) above the level of the heart when sitting or as much as possible     Standing Status:   Future     Standing Expiration Date:   7/5/2024    Chemical Caut Of A Wound     This order was created via procedure documentation       Vesta Garcia PA-C, [facility]      Portions of the record may have been created with voice recognition software. Occasional wrong word or \"sound alike\" substitutions may have occurred due to the inherent limitations of voice recognition software. Read the chart carefully and recognize, using context, where substitutions have occurred.      "

## 2024-06-28 NOTE — PROGRESS NOTES
Wound Procedure Treatment Venous Ulcer Right Pretibial    Performed by: Graciela Valerio RN  Authorized by: Vesta Garcia PA-C    Associated wounds:   Wound 10/29/23 Venous Ulcer Pretibial Right  Wound cleansed with:  NSS  Applied primary dressing:  Polymem foam  Applied secondary dressing:  ABD  Dressing secured with:  Armani, Tape and Size F

## 2024-07-01 ENCOUNTER — ANTICOAG VISIT (OUTPATIENT)
Dept: CARDIOLOGY CLINIC | Facility: CLINIC | Age: 84
End: 2024-07-01

## 2024-07-01 ENCOUNTER — APPOINTMENT (OUTPATIENT)
Age: 84
End: 2024-07-01
Payer: COMMERCIAL

## 2024-07-01 DIAGNOSIS — D50.9 IRON DEFICIENCY ANEMIA, UNSPECIFIED IRON DEFICIENCY ANEMIA TYPE: ICD-10-CM

## 2024-07-01 LAB
BASOPHILS # BLD AUTO: 0.04 THOUSANDS/ÂΜL (ref 0–0.1)
BASOPHILS NFR BLD AUTO: 1 % (ref 0–1)
EOSINOPHIL # BLD AUTO: 0.26 THOUSAND/ÂΜL (ref 0–0.61)
EOSINOPHIL NFR BLD AUTO: 6 % (ref 0–6)
ERYTHROCYTE [DISTWIDTH] IN BLOOD BY AUTOMATED COUNT: 19.2 % (ref 11.6–15.1)
FERRITIN SERPL-MCNC: 222 NG/ML (ref 11–307)
HCT VFR BLD AUTO: 40.6 % (ref 34.8–46.1)
HGB BLD-MCNC: 12.1 G/DL (ref 11.5–15.4)
IMM GRANULOCYTES # BLD AUTO: 0.02 THOUSAND/UL (ref 0–0.2)
IMM GRANULOCYTES NFR BLD AUTO: 1 % (ref 0–2)
IRON SATN MFR SERPL: 24 % (ref 15–50)
IRON SERPL-MCNC: 79 UG/DL (ref 50–212)
LYMPHOCYTES # BLD AUTO: 1.25 THOUSANDS/ÂΜL (ref 0.6–4.47)
LYMPHOCYTES NFR BLD AUTO: 28 % (ref 14–44)
MCH RBC QN AUTO: 27.3 PG (ref 26.8–34.3)
MCHC RBC AUTO-ENTMCNC: 29.8 G/DL (ref 31.4–37.4)
MCV RBC AUTO: 92 FL (ref 82–98)
MONOCYTES # BLD AUTO: 0.45 THOUSAND/ÂΜL (ref 0.17–1.22)
MONOCYTES NFR BLD AUTO: 10 % (ref 4–12)
NEUTROPHILS # BLD AUTO: 2.39 THOUSANDS/ÂΜL (ref 1.85–7.62)
NEUTS SEG NFR BLD AUTO: 54 % (ref 43–75)
NRBC BLD AUTO-RTO: 0 /100 WBCS
PLATELET # BLD AUTO: 309 THOUSANDS/UL (ref 149–390)
PMV BLD AUTO: 11 FL (ref 8.9–12.7)
RBC # BLD AUTO: 4.43 MILLION/UL (ref 3.81–5.12)
TIBC SERPL-MCNC: 336 UG/DL (ref 250–450)
UIBC SERPL-MCNC: 257 UG/DL (ref 155–355)
WBC # BLD AUTO: 4.41 THOUSAND/UL (ref 4.31–10.16)

## 2024-07-01 PROCEDURE — 83550 IRON BINDING TEST: CPT

## 2024-07-01 PROCEDURE — 82728 ASSAY OF FERRITIN: CPT

## 2024-07-01 PROCEDURE — 83540 ASSAY OF IRON: CPT

## 2024-07-01 PROCEDURE — 85025 COMPLETE CBC W/AUTO DIFF WBC: CPT

## 2024-07-03 ENCOUNTER — HOSPITAL ENCOUNTER (OUTPATIENT)
Dept: INFUSION CENTER | Facility: CLINIC | Age: 84
Discharge: HOME | End: 2024-07-03
Payer: COMMERCIAL

## 2024-07-03 VITALS
DIASTOLIC BLOOD PRESSURE: 55 MMHG | RESPIRATION RATE: 18 BRPM | HEART RATE: 80 BPM | OXYGEN SATURATION: 91 % | TEMPERATURE: 97 F | SYSTOLIC BLOOD PRESSURE: 129 MMHG

## 2024-07-03 DIAGNOSIS — D50.9 IRON DEFICIENCY ANEMIA, UNSPECIFIED IRON DEFICIENCY ANEMIA TYPE: Primary | ICD-10-CM

## 2024-07-03 PROCEDURE — 96365 THER/PROPH/DIAG IV INF INIT: CPT

## 2024-07-03 RX ORDER — SODIUM CHLORIDE 9 MG/ML
20 INJECTION, SOLUTION INTRAVENOUS ONCE
Status: COMPLETED | OUTPATIENT
Start: 2024-07-03 | End: 2024-07-03

## 2024-07-03 RX ORDER — SODIUM CHLORIDE 9 MG/ML
20 INJECTION, SOLUTION INTRAVENOUS ONCE
Status: CANCELLED | OUTPATIENT
Start: 2024-07-03

## 2024-07-03 RX ADMIN — SODIUM CHLORIDE 20 ML/HR: 9 INJECTION, SOLUTION INTRAVENOUS at 08:19

## 2024-07-03 RX ADMIN — IRON SUCROSE 300 MG: 20 INJECTION, SOLUTION INTRAVENOUS at 08:19

## 2024-07-03 NOTE — PROGRESS NOTES
Pt to clinic for Venofer. Pt offers no complaints. Pt tolerated infusion without complications. PIV removed. Pt aware this was her last appointment. AVS declined.

## 2024-07-09 ENCOUNTER — APPOINTMENT (OUTPATIENT)
Dept: LAB | Facility: CLINIC | Age: 84
End: 2024-07-09
Payer: COMMERCIAL

## 2024-07-09 DIAGNOSIS — N18.32 STAGE 3B CHRONIC KIDNEY DISEASE (HCC): ICD-10-CM

## 2024-07-09 DIAGNOSIS — D50.9 IRON DEFICIENCY ANEMIA, UNSPECIFIED IRON DEFICIENCY ANEMIA TYPE: ICD-10-CM

## 2024-07-09 LAB
BASOPHILS # BLD AUTO: 0.04 THOUSANDS/ÂΜL (ref 0–0.1)
BASOPHILS NFR BLD AUTO: 1 % (ref 0–1)
EOSINOPHIL # BLD AUTO: 0.28 THOUSAND/ÂΜL (ref 0–0.61)
EOSINOPHIL NFR BLD AUTO: 5 % (ref 0–6)
ERYTHROCYTE [DISTWIDTH] IN BLOOD BY AUTOMATED COUNT: 18.8 % (ref 11.6–15.1)
FERRITIN SERPL-MCNC: 219 NG/ML (ref 11–307)
HCT VFR BLD AUTO: 40.2 % (ref 34.8–46.1)
HGB BLD-MCNC: 12.3 G/DL (ref 11.5–15.4)
IMM GRANULOCYTES # BLD AUTO: 0.03 THOUSAND/UL (ref 0–0.2)
IMM GRANULOCYTES NFR BLD AUTO: 1 % (ref 0–2)
IRON SATN MFR SERPL: 23 % (ref 15–50)
IRON SERPL-MCNC: 83 UG/DL (ref 50–212)
LYMPHOCYTES # BLD AUTO: 1.52 THOUSANDS/ÂΜL (ref 0.6–4.47)
LYMPHOCYTES NFR BLD AUTO: 28 % (ref 14–44)
MCH RBC QN AUTO: 27.3 PG (ref 26.8–34.3)
MCHC RBC AUTO-ENTMCNC: 30.6 G/DL (ref 31.4–37.4)
MCV RBC AUTO: 89 FL (ref 82–98)
MONOCYTES # BLD AUTO: 0.52 THOUSAND/ÂΜL (ref 0.17–1.22)
MONOCYTES NFR BLD AUTO: 9 % (ref 4–12)
NEUTROPHILS # BLD AUTO: 3.12 THOUSANDS/ÂΜL (ref 1.85–7.62)
NEUTS SEG NFR BLD AUTO: 56 % (ref 43–75)
NRBC BLD AUTO-RTO: 0 /100 WBCS
PLATELET # BLD AUTO: 284 THOUSANDS/UL (ref 149–390)
PMV BLD AUTO: 11.1 FL (ref 8.9–12.7)
RBC # BLD AUTO: 4.51 MILLION/UL (ref 3.81–5.12)
TIBC SERPL-MCNC: 358 UG/DL (ref 250–450)
UIBC SERPL-MCNC: 275 UG/DL (ref 155–355)
WBC # BLD AUTO: 5.51 THOUSAND/UL (ref 4.31–10.16)

## 2024-07-09 PROCEDURE — 82728 ASSAY OF FERRITIN: CPT

## 2024-07-09 PROCEDURE — 83550 IRON BINDING TEST: CPT

## 2024-07-09 PROCEDURE — 85025 COMPLETE CBC W/AUTO DIFF WBC: CPT

## 2024-07-09 PROCEDURE — 83540 ASSAY OF IRON: CPT

## 2024-07-10 ENCOUNTER — ANTICOAG VISIT (OUTPATIENT)
Dept: CARDIOLOGY CLINIC | Facility: CLINIC | Age: 84
End: 2024-07-10

## 2024-07-12 ENCOUNTER — OFFICE VISIT (OUTPATIENT)
Dept: WOUND CARE | Facility: CLINIC | Age: 84
End: 2024-07-12
Payer: COMMERCIAL

## 2024-07-12 VITALS
DIASTOLIC BLOOD PRESSURE: 68 MMHG | RESPIRATION RATE: 22 BRPM | HEART RATE: 61 BPM | SYSTOLIC BLOOD PRESSURE: 166 MMHG | TEMPERATURE: 96.8 F

## 2024-07-12 DIAGNOSIS — I83.019 VENOUS ULCER OF RIGHT LEG (HCC): Primary | ICD-10-CM

## 2024-07-12 DIAGNOSIS — L97.919 VENOUS ULCER OF RIGHT LEG (HCC): Primary | ICD-10-CM

## 2024-07-12 PROCEDURE — 87205 SMEAR GRAM STAIN: CPT | Performed by: ORTHOPAEDIC SURGERY

## 2024-07-12 PROCEDURE — NC001 PR NO CHARGE

## 2024-07-12 PROCEDURE — 87176 TISSUE HOMOGENIZATION CULTR: CPT | Performed by: ORTHOPAEDIC SURGERY

## 2024-07-12 PROCEDURE — 88305 TISSUE EXAM BY PATHOLOGIST: CPT | Performed by: PATHOLOGY

## 2024-07-12 PROCEDURE — 87147 CULTURE TYPE IMMUNOLOGIC: CPT | Performed by: ORTHOPAEDIC SURGERY

## 2024-07-12 PROCEDURE — 87070 CULTURE OTHR SPECIMN AEROBIC: CPT | Performed by: ORTHOPAEDIC SURGERY

## 2024-07-12 PROCEDURE — 11104 PUNCH BX SKIN SINGLE LESION: CPT | Performed by: ORTHOPAEDIC SURGERY

## 2024-07-12 PROCEDURE — 87077 CULTURE AEROBIC IDENTIFY: CPT | Performed by: ORTHOPAEDIC SURGERY

## 2024-07-12 PROCEDURE — 87186 SC STD MICRODIL/AGAR DIL: CPT | Performed by: ORTHOPAEDIC SURGERY

## 2024-07-12 RX ORDER — LIDOCAINE 40 MG/G
CREAM TOPICAL ONCE
Status: SHIPPED | OUTPATIENT
Start: 2024-07-12

## 2024-07-12 RX ORDER — LIDOCAINE HYDROCHLORIDE 10 MG/ML
2.5 INJECTION, SOLUTION EPIDURAL; INFILTRATION; INTRACAUDAL; PERINEURAL ONCE
Status: COMPLETED | OUTPATIENT
Start: 2024-07-12 | End: 2024-07-12

## 2024-07-12 RX ADMIN — LIDOCAINE HYDROCHLORIDE 2.5 ML: 10 INJECTION, SOLUTION EPIDURAL; INFILTRATION; INTRACAUDAL; PERINEURAL at 08:52

## 2024-07-12 NOTE — PROGRESS NOTES
Wound Procedure Treatment Venous Ulcer Right Pretibial    Performed by: Marie Stewart RN  Authorized by: Vesta Garcia PA-C    Associated wounds:   Wound 10/29/23 Venous Ulcer Pretibial Right  Wound cleansed with:  Dakin's 0.25%  Applied primary dressing:  Polymem foam  Applied secondary dressing:  ABD and Gauze  Dressing secured with:  Armani and Size F

## 2024-07-12 NOTE — PROGRESS NOTES
Patient ID: Ely Hernadez is a 84 y.o. female Date of Birth 1940       Chief Complaint   Patient presents with    Follow Up Wound Care Visit     R LEG WOUND       Allergies:  Patient has no known allergies.    Diagnosis:   Diagnosis ICD-10-CM Associated Orders   1. Venous ulcer of right leg (HCC)  I83.019 lidocaine (LMX) 4 % cream    L97.919 Wound cleansing and dressings Venous Ulcer Right Pretibial     Wound cleansing and dressings Venous Ulcer Right Pretibial     Wound Procedure Treatment Venous Ulcer Right Pretibial     Biopsy     lidocaine (PF) (XYLOCAINE-MPF) 1 % injection 2.5 mL     Culture, tissue and Gram stain           Assessment and Plan :  Follow up evaluation of right venous ulcer with stalled healing.  Punch biopsy done today, f/u tissue exam.   Continue wound management with Polymem, see wound orders below   Continue compression with Tubigrip  No harsh cleansers such as alcohol, peroxide, or antibacterial soap, do not submerge in water  Can cleanse with NSS at dressing changes.   Counseled on importance of frequent elevation of leg and increase exercise/walking for wound healing.  Follow-up in 2 week(s) or call sooner with questions or concerns or any signs of infection such as redness, swelling, increased/purulent drainage, fever, chills, increased severe pain.     Subjective:   6/28: Pt is an 84 y.o. female with pmhx HTN, DMII (A1c 8.7),  CKD, Pafib, CVA (10/28/23 on Coumadin/ASA 81mg) with residual deficits (left sided facial drop and leg weakness) well known to [facility] who presents for follow up evaluation of non healing RLE venous ulcer which has been present for about 2.5 years. Pt has been covering wound with a bandaid. Does not have an odor. No smoking, ETOH or drug use.  Pt denies any sob, fatigue, N/V, CP, fever or chills.    7/12: Patient presents for followup evaluation of RLE venous ulcer. No increased pain or drainage. Pt is frustrated with chronic wound. Has been using Polymem on the  wound bed and Tubigrip for compression.  Pt denies any fevers or chills.          The following portions of the patient's history were reviewed and updated as appropriate:   Patient Active Problem List   Diagnosis    Hyperlipidemia    Chronic anticoagulation    Hypertension, essential    Coronary artery disease of native artery of native heart with stable angina pectoris (HCC)    Simple chronic bronchitis (HCC)    Diabetes mellitus with neurological manifestation (HCC)    Hypothyroidism    GERD (gastroesophageal reflux disease)    Anemia    AVM (arteriovenous malformation) of small bowel, acquired with hemorrhage    Angiectasia    Other vascular disorders of intestine (HCC)    Aortic valve replaced    Cardiomyopathy (HCC)    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HCC)    Stage 3b chronic kidney disease (HCC)    Chronic kidney disease-mineral and bone disorder    Primary osteoarthritis involving multiple joints    Paroxysmal atrial fibrillation (HCC)    Neutropenia associated with infection (HCC)    Herpes simplex labialis    Restrictive lung disease    Nocturnal hypoxemia    Supratherapeutic INR    H/O mechanical aortic valve replacement    Subtherapeutic international normalized ratio (INR)    Type 2 diabetes mellitus with stage 3b chronic kidney disease, with long-term current use of insulin (HCC)    Venous ulcer of right leg (HCC)    Chronic systolic (congestive) heart failure (HCC)    Lower extremity edema    Iron deficiency anemia, unspecified     Past Medical History:   Diagnosis Date    Acute anterior epistaxis 12/23/2023    Acute blood loss anemia 12/14/2023    Acute respiratory failure with hypoxia (HCC) 02/06/2024    Anemia     Aneurysm of thoracic aorta (HCC)     Angioedema 06/07/2019    Aortic valve disorder     Benign neoplasm of sigmoid colon     Cardiomyopathy (HCC)     last assessed: 10/10/2014    CHF (congestive heart failure) (HCC)     Chronic kidney disease     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HCC)     last  assessed: 10/10/2014    COPD (chronic obstructive pulmonary disease) (HCC)     Diabetic nephropathy (HCC)     Disease of thyroid gland     RAMON (dyspnea on exertion)     GERD (gastroesophageal reflux disease)     History of emphysema (HCC)     History of transfusion     Hyperlipidemia     Hypertensive urgency 10/28/2023    Leg cramps 2023    Stroke-like symptoms 10/28/2023    TIA (transient ischemic attack)      Past Surgical History:   Procedure Laterality Date    AORTIC VALVE REPLACEMENT      CARDIAC PACEMAKER PLACEMENT      last assessed: 10/10/2014    CARDIAC SURGERY      aortic valve replacement    CHOLECYSTECTOMY      COLONOSCOPY      EGD      HYSTERECTOMY      INSERT / REPLACE / REMOVE PACEMAKER      KNEE SURGERY Right     OTHER SURGICAL HISTORY      Capsule ENDOscopy description: 2012    NM COLONOSCOPY FLX DX W/COLLJ SPEC WHEN PFRMD N/A 2018    Procedure: single balloon ENTEROSCOPY;  Surgeon: David Dennis MD;  Location:  GI LAB;  Service: Gastroenterology    NM ESOPHAGOGASTRODUODENOSCOPY TRANSORAL DIAGNOSTIC N/A 2017    Procedure: EGD AND COLONOSCOPY;  Surgeon: Jay Mcleod III, MD;  Location: [facility];  Service: Gastroenterology     Family History   Problem Relation Age of Onset    No Known Problems Mother     No Known Problems Father      Social History     Socioeconomic History    Marital status:      Spouse name: None    Number of children: None    Years of education: None    Highest education level: None   Occupational History    None   Tobacco Use    Smoking status: Former     Current packs/day: 0.00     Average packs/day: 1 pack/day for 52.9 years (52.9 ttl pk-yrs)     Types: Cigarettes     Start date:      Quit date: 2007     Years since quittin.6     Passive exposure: Past    Smokeless tobacco: Former     Quit date:    Vaping Use    Vaping status: Never Used   Substance and Sexual Activity    Alcohol use: Never    Drug use: Never    Sexual  activity: Not Currently     Partners: Male   Other Topics Concern    None   Social History Narrative    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 BID Freestyle lancets BID    Living independently with spouse     Social Determinants of Health     Financial Resource Strain: Not on file   Food Insecurity: No Food Insecurity (5/15/2024)    Hunger Vital Sign     Worried About Running Out of Food in the Last Year: Never true     Ran Out of Food in the Last Year: Never true   Transportation Needs: No Transportation Needs (5/15/2024)    PRAPARE - Transportation     Lack of Transportation (Medical): No     Lack of Transportation (Non-Medical): No   Physical Activity: Inactive (5/26/2021)    Exercise Vital Sign     Days of Exercise per Week: 0 days     Minutes of Exercise per Session: 0 min   Stress: No Stress Concern Present (5/26/2021)    [employer] [place] [employer] - Occupational Stress Questionnaire     Feeling of Stress : Not at all   Social Connections: Not on file   Intimate Partner Violence: Not on file   Housing Stability: Low Risk  (5/15/2024)    Housing Stability Vital Sign     Unable to Pay for Housing in the Last Year: No     Number of Times Moved in the Last Year: 1     Homeless in the Last Year: No       Current Outpatient Medications:     Accu-Chek FastClix Lancets MISC, Use 3 (three) times a day, Disp: 300 each, Rfl: 3    albuterol (Ventolin HFA) 90 mcg/act inhaler, Inhale 2 puffs every 6 (six) hours as needed for wheezing, Disp: 54 g, Rfl: 3    Ascorbic Acid (vitamin C) 1000 MG tablet, Take 1,000 mg by mouth daily, Disp: , Rfl:     aspirin (ECOTRIN LOW STRENGTH) 81 mg EC tablet, Take 1 tablet (81 mg total) by mouth daily Do not start before November 1, 2023., Disp: 30 tablet, Rfl: 0    b complex vitamins capsule, Take 1 capsule by mouth daily, Disp: 30 capsule, Rfl: 2    Blood Glucose Monitoring Suppl (Accu-Chek Guide Me) w/Device KIT, USE 3 TIMES DAILY, Disp: 1 kit, Rfl: 2     Cholecalciferol (Vitamin D3) 50 MCG (2000 UT) TABS, Take 2,000 Units by mouth daily, Disp: , Rfl:     enoxaparin (LOVENOX) 80 mg/0.8 mL, Inject 0.7 mL (70 mg total) under the skin every 24 hours Hold Warfarin 5 days prior. Start Lovenox 4 days prior.  No Lovenox the morning of the procedure. Resume both Lovenox and Warfarin at the discretion of the surgeon (preferably that evening) and check INR 4 days after restarting. Inject in the abdomen about 2 inches from the belly button and rotate sides at each injection., Disp: 0.7 mL, Rfl: 10    Ferrous Sulfate (IRON PO), Take by mouth daily in the early morning OTC, Disp: , Rfl:     fluticasone-salmeterol (Advair HFA) 115-21 MCG/ACT inhaler, Inhale 2 puffs 2 (two) times a day Rinse mouth after use. (Patient not taking: Reported on 6/10/2024), Disp: 36 g, Rfl: 3    furosemide (LASIX) 40 mg tablet, take 1 tablet by mouth daily, Disp: 90 tablet, Rfl: 3    gabapentin (NEURONTIN) 300 mg capsule, Take 1 capsule (300 mg total) by mouth 3 (three) times a day (Patient taking differently: Take 300 mg by mouth 2 (two) times a day), Disp: 270 capsule, Rfl: 3    glipiZIDE (GLUCOTROL) 10 mg tablet, Take 1 tablet (10 mg total) by mouth 2 (two) times a day before meals, Disp: 180 tablet, Rfl: 3    glucose blood (Accu-Chek Celeste Plus) test strip, Use 1 each 3 (three) times a day Use as instructed, Disp: 300 each, Rfl: 3    Insulin Pen Needle (BD Pen Needle Rosie U/F) 32G X 4 MM MISC, Use daily, Disp: 100 each, Rfl: 1    ipratropium-albuterol (DUO-NEB) 0.5-2.5 mg/3 mL nebulizer solution, inhale contents of 1 vial ( 3 milliliters ) in nebulizer four times a day, Disp: , Rfl:     Lancets (freestyle) lancets, Check blood glucose 3 times daily, Disp: 300 each, Rfl: 0    Levemir FlexPen 100 units/mL injection pen, INJECT SUBCUTANEOUSLY 26 UNITS  DAILY AT BEDTIME, Disp: 30 mL, Rfl: 2    levothyroxine 50 mcg tablet, Take 1 tablet (50 mcg total) by mouth daily, Disp: 90 tablet, Rfl: 3    metoprolol  tartrate (LOPRESSOR) 50 mg tablet, TAKE ONE-HALF TABLET BY  MOUTH TWICE DAILY, Disp: 90 tablet, Rfl: 3    oxygen gas, Inhale 2 L/min daily at bedtime as needed home oxygen nightly, Disp: , Rfl:     pantoprazole (PROTONIX) 40 mg tablet, Take 1 tablet (40 mg total) by mouth daily Patient taking differently. 1 tab daily, Disp: 90 tablet, Rfl: 1    warfarin (COUMADIN) 5 mg tablet, TAKE 1 TO 2 TABLETS BY  MOUTH DAILY OR AS DIRECTED, Disp: 180 tablet, Rfl: 3    Current Facility-Administered Medications:     lidocaine (LMX) 4 % cream, , Topical, Once, Vesta Garcia PA-C    Review of Systems   Constitutional:  Negative for appetite change, chills, fatigue, fever and unexpected weight change.   HENT:  Negative for congestion, hearing loss and postnasal drip.    Respiratory:  Negative for cough and shortness of breath.    Cardiovascular:  Negative for leg swelling.   Musculoskeletal:  Positive for gait problem.   Skin:  Positive for wound (RLE). Negative for rash.   Neurological:  Negative for numbness.   Hematological:  Does not bruise/bleed easily.   Psychiatric/Behavioral: Negative.           Objective:  /68   Pulse 61   Temp (!) 96.8 °F (36 °C)   Resp 22   Pain Score: 0-No pain     Physical Exam  Vitals reviewed.   Constitutional:       General: She is not in acute distress.     Appearance: Normal appearance. She is well-developed and normal weight.   HENT:      Head: Normocephalic and atraumatic.   Cardiovascular:      Rate and Rhythm: Normal rate.   Pulmonary:      Effort: Pulmonary effort is normal.   Musculoskeletal:         General: No deformity.      Right lower leg: No edema.      Left lower leg: No edema.   Skin:     General: Skin is warm and dry.      Findings: Wound (RLE) present.             Comments: Pink granular tissue with serosanguinous drainage. See wound assessment   Neurological:      General: No focal deficit present.      Mental Status: She is alert and oriented to person, place, and time.  "     Gait: Gait normal.   Psychiatric:         Mood and Affect: Mood and affect normal.         Behavior: Behavior normal. Behavior is cooperative.         Wound 10/29/23 Venous Ulcer Pretibial Right (Active)   Wound Image Images linked 07/12/24 0844   Wound Description Slough;Yellow;Pink 07/12/24 0831   Radha-wound Assessment Fragile 07/12/24 0831   Wound Length (cm) 1.9 cm 07/12/24 0831   Wound Width (cm) 1.6 cm 07/12/24 0831   Wound Depth (cm) 0.1 cm 07/12/24 0831   Wound Surface Area (cm^2) 3.04 cm^2 07/12/24 0831   Wound Volume (cm^3) 0.304 cm^3 07/12/24 0831   Calculated Wound Volume (cm^3) 0.3 cm^3 07/12/24 0831   Change in Wound Size % 92.5 07/12/24 0831   Drainage Amount Moderate 07/12/24 0831   Drainage Description Serosanguineous;Yellow 07/12/24 0831   Non-staged Wound Description Full thickness 07/12/24 0831   Dressing Status Intact 07/12/24 0831     Biopsy    Date/Time: 7/12/2024 8:35 AM    Performed by: Vesta Garcia PA-C  Authorized by: Vesta Garcia PA-C  Universal Protocol:  Consent: Verbal consent obtained. Written consent obtained.  Risks and benefits: risks, benefits and alternatives were discussed  Consent given by: patient  Time out: Immediately prior to procedure a \"time out\" was called to verify the correct patient, procedure, equipment, support staff and site/side marked as required.  Patient understanding: patient states understanding of the procedure being performed  Patient identity confirmed: verbally with patient    Procedure Details - Lesion Biopsy:     Body area:  Lower extremity (RLE)    Lower extremity location:  R lower leg    Biopsy method: punch biopsy      Biopsy tissue type: skin and subcutaneous    Initial size (mm):  3    Final defect size (mm):  3    Malignancy: malignancy unknown                 Wound Instructions:  Orders Placed This Encounter   Procedures    Wound cleansing and dressings Venous Ulcer Right Pretibial     Right Leg wound:     Wash your hands with soap and " "water. Remove old dressing, discard into plastic bag and place in trash. Cleanse the wound with normal saline solution prior to applying a clean dressing. Do not use tissue or cotton balls. Do not scrub the wound. Pat dry using gauze.     Shower yes, if able to use cast cover. Do not get dressing wet.     Apply polymem cut fit to the open wound.   Cover with gauze.  Secure with rolled gauze and tape.    Change dressing 3 times weekly.     Standing Status:   Future     Standing Expiration Date:   7/19/2024    Wound cleansing and dressings Venous Ulcer Right Pretibial     Elastic Tubular Stocking: spanda- size F Tubular elastic bandage: Apply from base of toes to behind the knee. Apply in AM, may remove for sleep. Avoid prolonged standing in one place. Elevate leg(s) above the level of the heart when sitting or as much as possible     Standing Status:   Future     Standing Expiration Date:   7/19/2024    Wound Procedure Treatment Venous Ulcer Right Pretibial     This order was created via procedure documentation    Biopsy     This order was created via procedure documentation    Culture, tissue and Gram stain     3 mm punch biopsy from nonhealing wound. Right pretib.     Standing Status:   Future     Standing Expiration Date:   7/12/2025     Order Specific Question:   Release to patient through [employer]     Answer:   Steven Garcia PA-C, [facility]      Portions of the record may have been created with voice recognition software. Occasional wrong word or \"sound alike\" substitutions may have occurred due to the inherent limitations of voice recognition software. Read the chart carefully and recognize, using context, where substitutions have occurred.    " software. Read the chart carefully and recognize, using context, where substitutions have occurred.

## 2024-07-14 LAB
BACTERIA TISS AEROBE CULT: ABNORMAL
GRAM STN SPEC: ABNORMAL

## 2024-07-18 ENCOUNTER — APPOINTMENT (OUTPATIENT)
Dept: LAB | Facility: CLINIC | Age: 84
End: 2024-07-18
Payer: COMMERCIAL

## 2024-07-18 DIAGNOSIS — D50.9 IRON DEFICIENCY ANEMIA, UNSPECIFIED IRON DEFICIENCY ANEMIA TYPE: ICD-10-CM

## 2024-07-18 LAB
BASOPHILS # BLD AUTO: 0.05 THOUSANDS/ÂΜL (ref 0–0.1)
BASOPHILS NFR BLD AUTO: 1 % (ref 0–1)
EOSINOPHIL # BLD AUTO: 0.31 THOUSAND/ÂΜL (ref 0–0.61)
EOSINOPHIL NFR BLD AUTO: 5 % (ref 0–6)
ERYTHROCYTE [DISTWIDTH] IN BLOOD BY AUTOMATED COUNT: 17.8 % (ref 11.6–15.1)
FERRITIN SERPL-MCNC: 122 NG/ML (ref 11–307)
HCT VFR BLD AUTO: 38.6 % (ref 34.8–46.1)
HGB BLD-MCNC: 12.2 G/DL (ref 11.5–15.4)
IMM GRANULOCYTES # BLD AUTO: 0.01 THOUSAND/UL (ref 0–0.2)
IMM GRANULOCYTES NFR BLD AUTO: 0 % (ref 0–2)
IRON SATN MFR SERPL: 20 % (ref 15–50)
IRON SERPL-MCNC: 68 UG/DL (ref 50–212)
LYMPHOCYTES # BLD AUTO: 1.46 THOUSANDS/ÂΜL (ref 0.6–4.47)
LYMPHOCYTES NFR BLD AUTO: 25 % (ref 14–44)
MCH RBC QN AUTO: 28.1 PG (ref 26.8–34.3)
MCHC RBC AUTO-ENTMCNC: 31.6 G/DL (ref 31.4–37.4)
MCV RBC AUTO: 89 FL (ref 82–98)
MONOCYTES # BLD AUTO: 0.54 THOUSAND/ÂΜL (ref 0.17–1.22)
MONOCYTES NFR BLD AUTO: 9 % (ref 4–12)
NEUTROPHILS # BLD AUTO: 3.4 THOUSANDS/ÂΜL (ref 1.85–7.62)
NEUTS SEG NFR BLD AUTO: 60 % (ref 43–75)
NRBC BLD AUTO-RTO: 0 /100 WBCS
PLATELET # BLD AUTO: 320 THOUSANDS/UL (ref 149–390)
PMV BLD AUTO: 11 FL (ref 8.9–12.7)
RBC # BLD AUTO: 4.34 MILLION/UL (ref 3.81–5.12)
TIBC SERPL-MCNC: 334 UG/DL (ref 250–450)
UIBC SERPL-MCNC: 266 UG/DL (ref 155–355)
WBC # BLD AUTO: 5.77 THOUSAND/UL (ref 4.31–10.16)

## 2024-07-18 PROCEDURE — 82728 ASSAY OF FERRITIN: CPT

## 2024-07-18 PROCEDURE — 85025 COMPLETE CBC W/AUTO DIFF WBC: CPT

## 2024-07-18 PROCEDURE — 83540 ASSAY OF IRON: CPT

## 2024-07-18 PROCEDURE — 83550 IRON BINDING TEST: CPT

## 2024-07-19 ENCOUNTER — ANTICOAG VISIT (OUTPATIENT)
Dept: CARDIOLOGY CLINIC | Facility: CLINIC | Age: 84
End: 2024-07-19

## 2024-07-19 ENCOUNTER — TELEPHONE (OUTPATIENT)
Dept: WOUND CARE | Facility: CLINIC | Age: 84
End: 2024-07-19

## 2024-07-19 RX ORDER — SULFAMETHOXAZOLE AND TRIMETHOPRIM 800; 160 MG/1; MG/1
1 TABLET ORAL EVERY 12 HOURS SCHEDULED
Qty: 14 TABLET | Refills: 0 | Status: SHIPPED | OUTPATIENT
Start: 2024-07-19 | End: 2024-07-26

## 2024-07-23 ENCOUNTER — TELEPHONE (OUTPATIENT)
Dept: HEMATOLOGY ONCOLOGY | Facility: CLINIC | Age: 84
End: 2024-07-23

## 2024-07-23 NOTE — TELEPHONE ENCOUNTER
Call out to patient to review below, patient aware and agreeable to have monthly  Aware to have completed before she returns to see Staci       ----- Message from Staci Contreras PA-C sent at 7/22/2024  4:25 PM EDT -----  Labs are stable.  She can switch to monthly labs.

## 2024-07-25 ENCOUNTER — OFFICE VISIT (OUTPATIENT)
Dept: WOUND CARE | Facility: CLINIC | Age: 84
End: 2024-07-25
Payer: COMMERCIAL

## 2024-07-25 VITALS
HEART RATE: 61 BPM | RESPIRATION RATE: 18 BRPM | SYSTOLIC BLOOD PRESSURE: 137 MMHG | DIASTOLIC BLOOD PRESSURE: 56 MMHG | TEMPERATURE: 96.4 F

## 2024-07-25 DIAGNOSIS — I83.019 VENOUS ULCER OF RIGHT LEG (HCC): Primary | ICD-10-CM

## 2024-07-25 DIAGNOSIS — L97.919 VENOUS ULCER OF RIGHT LEG (HCC): Primary | ICD-10-CM

## 2024-07-25 PROCEDURE — 11042 DBRDMT SUBQ TIS 1ST 20SQCM/<: CPT | Performed by: ORTHOPAEDIC SURGERY

## 2024-07-25 RX ORDER — LIDOCAINE 40 MG/G
CREAM TOPICAL ONCE
Status: COMPLETED | OUTPATIENT
Start: 2024-07-25 | End: 2024-07-25

## 2024-07-25 RX ADMIN — LIDOCAINE: 40 CREAM TOPICAL at 08:41

## 2024-07-25 NOTE — PROGRESS NOTES
Patient ID: Ely Hernadez is a 84 y.o. female Date of Birth 1940       Chief Complaint   Patient presents with    Follow Up Wound Care Visit     Venous ulcer right leg       Allergies:  Patient has no known allergies.    Diagnosis:   Diagnosis ICD-10-CM Associated Orders   1. Venous ulcer of right leg ([facility])  I83.019 lidocaine (LMX) 4 % cream    L97.919 Wound cleansing and dressings Venous Ulcer Right Pretibial     Wound Procedure Treatment Venous Ulcer Right Pretibial           Assessment and Plan :  Follow up evaluation of right venous ulcer with slightly improving and decreasing in size.    Continue Bactrim to completion.  Continue wound management with Polymem, see wound orders below   Continue compression with Tubigrip  No harsh cleansers such as alcohol, peroxide, or antibacterial soap, do not submerge in water  Can cleanse with NSS at dressing changes.   Counseled on importance of frequent elevation of leg and increase exercise/walking for wound healing.  Follow-up in 1 week(s) or call sooner with questions or concerns or any signs of infection such as redness, swelling, increased/purulent drainage, fever, chills, increased severe pain.     Subjective:   6/28: Pt is an 84 y.o. female with pmhx HTN, DMII (A1c 8.7),  CKD, Pafib, CVA (10/28/23 on Coumadin/ASA 81mg) with residual deficits (left sided facial drop and leg weakness) well known to [facility] who presents for follow up evaluation of non healing RLE venous ulcer which has been present for about 2.5 years. Pt has been covering wound with a bandaid. Does not have an odor. No smoking, ETOH or drug use.  Pt denies any sob, fatigue, N/V, CP, fever or chills.    7/12: Patient presents for followup evaluation of RLE venous ulcer. No increased pain or drainage. Pt is frustrated with chronic wound. Has been using Polymem on the wound bed and Tubigrip for compression.  Pt denies any fevers or chills.    7/25: Patient presents for followup evaluation of RLE venous  ulcer. Since last visit pt was started on Bactrim for positive wound culture with abnormal 2+ Growth of Staphylococcus Aureus. The tissue exam demonstrated chronic inflammation and fibrosis. No malignancy, nor pyoderma gangrenosum were identified.  Pt noticed wound decreasing in size. Has been using Polymem on the wound bed and Tubigrip for compression.  Pt denies any fevers or chills.        The following portions of the patient's history were reviewed and updated as appropriate:   Patient Active Problem List   Diagnosis    Hyperlipidemia    Chronic anticoagulation    Hypertension, essential    Coronary artery disease of native artery of native heart with stable angina pectoris (HCC)    Simple chronic bronchitis (HCC)    Diabetes mellitus with neurological manifestation (HCC)    Hypothyroidism    GERD (gastroesophageal reflux disease)    Anemia    AVM (arteriovenous malformation) of small bowel, acquired with hemorrhage    Angiectasia    Other vascular disorders of intestine (HCC)    Aortic valve replaced    Cardiomyopathy (HCC)    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HCC)    Stage 3b chronic kidney disease (HCC)    Chronic kidney disease-mineral and bone disorder    Primary osteoarthritis involving multiple joints    Paroxysmal atrial fibrillation (HCC)    Neutropenia associated with infection (HCC)    Herpes simplex labialis    Restrictive lung disease    Nocturnal hypoxemia    Supratherapeutic INR    H/O mechanical aortic valve replacement    Subtherapeutic international normalized ratio (INR)    Type 2 diabetes mellitus with stage 3b chronic kidney disease, with long-term current use of insulin (HCC)    Venous ulcer of right leg (HCC)    Chronic systolic (congestive) heart failure (HCC)    Lower extremity edema    Iron deficiency anemia, unspecified     Past Medical History:   Diagnosis Date    Acute anterior epistaxis 12/23/2023    Acute blood loss anemia 12/14/2023    Acute respiratory failure with hypoxia (HCC)  02/06/2024    Anemia     Aneurysm of thoracic aorta (HCC)     Angioedema 06/07/2019    Aortic valve disorder     Benign neoplasm of sigmoid colon     Cardiomyopathy (HCC)     last assessed: 10/10/2014    CHF (congestive heart failure) (HCC)     Chronic kidney disease     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HCC)     last assessed: 10/10/2014    COPD (chronic obstructive pulmonary disease) (HCC)     Diabetic nephropathy (HCC)     Disease of thyroid gland     RAMON (dyspnea on exertion)     GERD (gastroesophageal reflux disease)     History of emphysema (HCC)     History of transfusion     Hyperlipidemia     Hypertensive urgency 10/28/2023    Leg cramps 11/01/2023    Stroke-like symptoms 10/28/2023    TIA (transient ischemic attack)      Past Surgical History:   Procedure Laterality Date    AORTIC VALVE REPLACEMENT      CARDIAC PACEMAKER PLACEMENT      last assessed: 10/10/2014    CARDIAC SURGERY      aortic valve replacement    CHOLECYSTECTOMY      COLONOSCOPY      EGD      HYSTERECTOMY      INSERT / REPLACE / REMOVE PACEMAKER      KNEE SURGERY Right     OTHER SURGICAL HISTORY      Capsule ENDOscopy description: 12/19/2012    AZ COLONOSCOPY FLX DX W/COLLJ SPEC WHEN PFRMD N/A 05/31/2018    Procedure: single balloon ENTEROSCOPY;  Surgeon: David Dennis MD;  Location: [facility];  Service: Gastroenterology    AZ ESOPHAGOGASTRODUODENOSCOPY TRANSORAL DIAGNOSTIC N/A 11/29/2017    Procedure: EGD AND COLONOSCOPY;  Surgeon: Jay Mcleod III, MD;  Location: [facility];  Service: Gastroenterology     Family History   Problem Relation Age of Onset    No Known Problems Mother     No Known Problems Father      Social History     Socioeconomic History    Marital status:      Spouse name: None    Number of children: None    Years of education: None    Highest education level: None   Occupational History    None   Tobacco Use    Smoking status: Former     Current packs/day: 0.00     Average packs/day: 1 pack/day for 52.9 years  (52.9 ttl pk-yrs)     Types: Cigarettes     Start date:      Quit date: 2007     Years since quittin.6     Passive exposure: Past    Smokeless tobacco: Former     Quit date:    Vaping Use    Vaping status: Never Used   Substance and Sexual Activity    Alcohol use: Never    Drug use: Never    Sexual activity: Not Currently     Partners: Male   Other Topics Concern    None   Social History Narrative    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 BID Freestyle lancets BID    Living independently with spouse     Social Determinants of Health     Financial Resource Strain: Not on file   Food Insecurity: No Food Insecurity (5/15/2024)    Hunger Vital Sign     Worried About Running Out of Food in the Last Year: Never true     Ran Out of Food in the Last Year: Never true   Transportation Needs: No Transportation Needs (5/15/2024)    PRAPARE - Transportation     Lack of Transportation (Medical): No     Lack of Transportation (Non-Medical): No   Physical Activity: Inactive (2021)    Exercise Vital Sign     Days of Exercise per Week: 0 days     Minutes of Exercise per Session: 0 min   Stress: No Stress Concern Present (2021)    [employer] [place] [employer] - Occupational Stress Questionnaire     Feeling of Stress : Not at all   Social Connections: Not on file   Intimate Partner Violence: Not on file   Housing Stability: Low Risk  (5/15/2024)    Housing Stability Vital Sign     Unable to Pay for Housing in the Last Year: No     Number of Times Moved in the Last Year: 1     Homeless in the Last Year: No       Current Outpatient Medications:     Accu-Chek FastClix Lancets MISC, Use 3 (three) times a day, Disp: 300 each, Rfl: 3    albuterol (Ventolin HFA) 90 mcg/act inhaler, Inhale 2 puffs every 6 (six) hours as needed for wheezing, Disp: 54 g, Rfl: 3    Ascorbic Acid (vitamin C) 1000 MG tablet, Take 1,000 mg by mouth daily, Disp: , Rfl:     aspirin (ECOTRIN LOW STRENGTH) 81 mg  EC tablet, Take 1 tablet (81 mg total) by mouth daily Do not start before November 1, 2023., Disp: 30 tablet, Rfl: 0    b complex vitamins capsule, Take 1 capsule by mouth daily, Disp: 30 capsule, Rfl: 2    Blood Glucose Monitoring Suppl (Accu-Chek Guide Me) w/Device KIT, USE 3 TIMES DAILY, Disp: 1 kit, Rfl: 2    Cholecalciferol (Vitamin D3) 50 MCG (2000 UT) TABS, Take 2,000 Units by mouth daily, Disp: , Rfl:     enoxaparin (LOVENOX) 80 mg/0.8 mL, Inject 0.7 mL (70 mg total) under the skin every 24 hours Hold Warfarin 5 days prior. Start Lovenox 4 days prior.  No Lovenox the morning of the procedure. Resume both Lovenox and Warfarin at the discretion of the surgeon (preferably that evening) and check INR 4 days after restarting. Inject in the abdomen about 2 inches from the belly button and rotate sides at each injection., Disp: 0.7 mL, Rfl: 10    Ferrous Sulfate (IRON PO), Take by mouth daily in the early morning OTC, Disp: , Rfl:     fluticasone-salmeterol (Advair HFA) 115-21 MCG/ACT inhaler, Inhale 2 puffs 2 (two) times a day Rinse mouth after use. (Patient not taking: Reported on 6/10/2024), Disp: 36 g, Rfl: 3    furosemide (LASIX) 40 mg tablet, take 1 tablet by mouth daily, Disp: 90 tablet, Rfl: 3    gabapentin (NEURONTIN) 300 mg capsule, Take 1 capsule (300 mg total) by mouth 3 (three) times a day (Patient taking differently: Take 300 mg by mouth 2 (two) times a day), Disp: 270 capsule, Rfl: 3    glipiZIDE (GLUCOTROL) 10 mg tablet, Take 1 tablet (10 mg total) by mouth 2 (two) times a day before meals, Disp: 180 tablet, Rfl: 3    glucose blood (Accu-Chek Celeste Plus) test strip, Use 1 each 3 (three) times a day Use as instructed, Disp: 300 each, Rfl: 3    Insulin Pen Needle (BD Pen Needle Rosie U/F) 32G X 4 MM MISC, Use daily, Disp: 100 each, Rfl: 1    ipratropium-albuterol (DUO-NEB) 0.5-2.5 mg/3 mL nebulizer solution, inhale contents of 1 vial ( 3 milliliters ) in nebulizer four times a day, Disp: , Rfl:      Lancets (freestyle) lancets, Check blood glucose 3 times daily, Disp: 300 each, Rfl: 0    Levemir FlexPen 100 units/mL injection pen, INJECT SUBCUTANEOUSLY 26 UNITS  DAILY AT BEDTIME, Disp: 30 mL, Rfl: 2    levothyroxine 50 mcg tablet, Take 1 tablet (50 mcg total) by mouth daily, Disp: 90 tablet, Rfl: 3    metoprolol tartrate (LOPRESSOR) 50 mg tablet, TAKE ONE-HALF TABLET BY  MOUTH TWICE DAILY, Disp: 90 tablet, Rfl: 3    oxygen gas, Inhale 2 L/min daily at bedtime as needed home oxygen nightly, Disp: , Rfl:     pantoprazole (PROTONIX) 40 mg tablet, Take 1 tablet (40 mg total) by mouth daily Patient taking differently. 1 tab daily, Disp: 90 tablet, Rfl: 1    sulfamethoxazole-trimethoprim (BACTRIM DS) 800-160 mg per tablet, Take 1 tablet by mouth every 12 (twelve) hours for 7 days, Disp: 14 tablet, Rfl: 0    warfarin (COUMADIN) 5 mg tablet, TAKE 1 TO 2 TABLETS BY  MOUTH DAILY OR AS DIRECTED, Disp: 180 tablet, Rfl: 3    Current Facility-Administered Medications:     lidocaine (LMX) 4 % cream, , Topical, Once, Vesta Garcia PA-C    Review of Systems   Constitutional:  Negative for appetite change, chills, fatigue, fever and unexpected weight change.   HENT:  Negative for congestion, hearing loss and postnasal drip.    Respiratory:  Negative for cough and shortness of breath.    Cardiovascular:  Negative for leg swelling.   Musculoskeletal:  Positive for gait problem.   Skin:  Positive for wound (RLE). Negative for rash.   Neurological:  Negative for numbness.   Hematological:  Does not bruise/bleed easily.   Psychiatric/Behavioral: Negative.           Objective:  /56   Pulse 61   Temp (!) 96.4 °F (35.8 °C)   Resp 18   Pain Score: 0-No pain     Physical Exam  Vitals reviewed.   Constitutional:       General: She is not in acute distress.     Appearance: Normal appearance. She is well-developed and normal weight.   HENT:      Head: Normocephalic and atraumatic.   Cardiovascular:      Rate and Rhythm: Normal  "rate.   Pulmonary:      Effort: Pulmonary effort is normal.   Musculoskeletal:         General: No deformity.      Right lower leg: No edema.      Left lower leg: No edema.   Skin:     General: Skin is warm and dry.      Findings: Wound (RLE) present.             Comments: Adherent slough with serosanguinous drainage on pink wound bed. See wound assessment   Neurological:      General: No focal deficit present.      Mental Status: She is alert and oriented to person, place, and time.      Gait: Gait normal.   Psychiatric:         Mood and Affect: Mood and affect normal.         Behavior: Behavior normal. Behavior is cooperative.             Debridement   Wound 10/29/23 Venous Ulcer Pretibial Right    Universal Protocol:  Consent: Verbal consent obtained. Written consent obtained.  Risks and benefits: risks, benefits and alternatives were discussed  Consent given by: patient  Time out: Immediately prior to procedure a \"time out\" was called to verify the correct patient, procedure, equipment, support staff and site/side marked as required.  Patient understanding: patient states understanding of the procedure being performed  Patient identity confirmed: verbally with patient    Debridement Details  Performed by: PA  Debridement type: surgical  Level of debridement: subcutaneous tissue  Pain control: lidocaine 4%      Post-debridement measurements  Length (cm): 1.1  Width (cm): 1  Depth (cm): 1.2  Percent debrided: 100%  Surface Area (cm^2): 1.1  Area Debrided (cm^2): 1.1  Volume (cm^3): 1.32    Tissue and other material debrided: subcutaneous tissue  Devitalized tissue debrided: biofilm, exudate and slough  Instrument(s) utilized: curette  Bleeding: small  Hemostasis obtained with: pressure  Procedural pain (0-10): 0  Post-procedural pain: 0   Response to treatment: procedure was tolerated well                   Wound Instructions:  Orders Placed This Encounter   Procedures    Wound cleansing and dressings Venous Ulcer " "Right Pretibial     Wound cleansing and dressings Venous Ulcer Right Pretibial       Right Leg wound:      Wash your hands with soap and water. Remove old dressing, discard into plastic bag and place in trash. Cleanse the wound with normal saline solution prior to applying a clean dressing. Do not use tissue or cotton balls. Do not scrub the wound. Pat dry using gauze.      Shower yes, if able to use cast cover. Do not get dressing wet.      Apply polymem cut fit to the open wound.   Cover with gauze.  Secure with tape if your skin is breaking down from tape then need to wrap with Armani and put tape on Armani   Change dressing 3 times weekly.    Wear compression during the day          Elastic Tubular Stocking: spanda- size F Tubular elastic bandage: Apply from base of toes to behind the knee. Apply in AM, may remove for sleep. Avoid prolonged standing in one place. Elevate leg(s) above the level of the heart when sitting or as much as possible     Standing Status:   Future     Standing Expiration Date:   8/1/2024    Wound Procedure Treatment Venous Ulcer Right Pretibial     This order was created via procedure documentation    Debridement     This order was created via procedure documentation       Vesta Garcia PA-C, [facility]      Portions of the record may have been created with voice recognition software. Occasional wrong word or \"sound alike\" substitutions may have occurred due to the inherent limitations of voice recognition software. Read the chart carefully and recognize, using context, where substitutions have occurred.      "

## 2024-07-25 NOTE — PATIENT INSTRUCTIONS
Orders Placed This Encounter   Procedures    Wound cleansing and dressings Venous Ulcer Right Pretibial     Wound cleansing and dressings Venous Ulcer Right Pretibial       Right Leg wound:      Wash your hands with soap and water. Remove old dressing, discard into plastic bag and place in trash. Cleanse the wound with normal saline solution prior to applying a clean dressing. Do not use tissue or cotton balls. Do not scrub the wound. Pat dry using gauze.      Shower yes, if able to use cast cover. Do not get dressing wet.      Apply polymem cut fit to the open wound.   Cover with gauze.  Secure with tape if your skin is breaking down from tape then need to wrap with Armani and put tape on Armani   Change dressing 3 times weekly.    Wear compression during the day          Elastic Tubular Stocking: spanda- size F Tubular elastic bandage: Apply from base of toes to behind the knee. Apply in AM, may remove for sleep. Avoid prolonged standing in one place. Elevate leg(s) above the level of the heart when sitting or as much as possible     Standing Status:   Future     Standing Expiration Date:   8/1/2024

## 2024-07-25 NOTE — PROGRESS NOTES
Wound Procedure Treatment Venous Ulcer Right Pretibial    Performed by: Graciela Valerio RN  Authorized by: Vesta Garcia PA-C    Associated wounds:   Wound 10/29/23 Venous Ulcer Pretibial Right  Wound cleansed with:  Dakin's 0.25%  Applied primary dressing:  Polymem foam  Applied secondary dressing:  Gauze  Dressing secured with:  Tape and Size F

## 2024-08-02 ENCOUNTER — APPOINTMENT (OUTPATIENT)
Age: 84
End: 2024-08-02
Payer: COMMERCIAL

## 2024-08-02 ENCOUNTER — ANTICOAG VISIT (OUTPATIENT)
Dept: CARDIOLOGY CLINIC | Facility: CLINIC | Age: 84
End: 2024-08-02

## 2024-08-02 ENCOUNTER — OFFICE VISIT (OUTPATIENT)
Dept: WOUND CARE | Facility: CLINIC | Age: 84
End: 2024-08-02
Payer: COMMERCIAL

## 2024-08-02 VITALS
HEART RATE: 77 BPM | RESPIRATION RATE: 18 BRPM | TEMPERATURE: 97.1 F | SYSTOLIC BLOOD PRESSURE: 124 MMHG | DIASTOLIC BLOOD PRESSURE: 58 MMHG

## 2024-08-02 DIAGNOSIS — L97.919 VENOUS ULCER OF RIGHT LEG (HCC): Primary | ICD-10-CM

## 2024-08-02 DIAGNOSIS — I83.019 VENOUS ULCER OF RIGHT LEG (HCC): Primary | ICD-10-CM

## 2024-08-02 PROCEDURE — 99213 OFFICE O/P EST LOW 20 MIN: CPT | Performed by: ORTHOPAEDIC SURGERY

## 2024-08-02 RX ORDER — LIDOCAINE 40 MG/G
CREAM TOPICAL ONCE
Status: COMPLETED | OUTPATIENT
Start: 2024-08-02 | End: 2024-08-02

## 2024-08-02 RX ADMIN — LIDOCAINE: 40 CREAM TOPICAL at 08:10

## 2024-08-02 NOTE — PROGRESS NOTES
Patient ID: Ely Hernadez is a 84 y.o. female Date of Birth 1940       Chief Complaint   Patient presents with    Follow Up Wound Care Visit     Right leg ulcer       Allergies:  Patient has no known allergies.    Diagnosis:   Diagnosis ICD-10-CM Associated Orders   1. Venous ulcer of right leg ([facility])  I83.019 lidocaine (LMX) 4 % cream    L97.919 Wound cleansing and dressings Venous Ulcer Right Pretibial     Wound Procedure Treatment Venous Ulcer Right Pretibial           Assessment and Plan :  Follow up evaluation of right venous ulcer progressively improving and significantly decreasing in size.    Continue wound management with Polymem, see wound orders below   Continue compression with Tubigrip  No harsh cleansers such as alcohol, peroxide, or antibacterial soap, do not submerge in water  Can cleanse with NSS at dressing changes.   Counseled on importance of frequent elevation of leg and increase exercise/walking for wound healing.  Follow-up in 2 week(s) or call sooner with questions or concerns or any signs of infection such as redness, swelling, increased/purulent drainage, fever, chills, increased severe pain.     Subjective:   6/28: Pt is an 84 y.o. female with pmhx HTN, DMII (A1c 8.7),  CKD, Pafib, CVA (10/28/23 on Coumadin/ASA 81mg) with residual deficits (left sided facial drop and leg weakness) well known to [facility] who presents for follow up evaluation of non healing RLE venous ulcer which has been present for about 2.5 years. Pt has been covering wound with a bandaid. Does not have an odor. No smoking, ETOH or drug use.  Pt denies any sob, fatigue, N/V, CP, fever or chills.    7/12: Patient presents for followup evaluation of RLE venous ulcer. No increased pain or drainage. Pt is frustrated with chronic wound. Has been using Polymem on the wound bed and Tubigrip for compression.  Pt denies any fevers or chills.    7/25: Patient presents for followup evaluation of RLE venous ulcer. Since last visit pt  was started on Bactrim for positive wound culture with abnormal 2+ Growth of Staphylococcus Aureus. The tissue exam demonstrated chronic inflammation and fibrosis. No malignancy, nor pyoderma gangrenosum were identified.  Pt noticed wound decreasing in size. Has been using Polymem on the wound bed and Tubigrip for compression.  Pt denies any fevers or chills.      8/2: Patient presents for followup evaluation of RLE venous ulcer. Pt completed Bactrim as directed. No issues. Pt states this is the best she has felt in 2 years. No fevers or chills.      The following portions of the patient's history were reviewed and updated as appropriate:   Patient Active Problem List   Diagnosis    Hyperlipidemia    Chronic anticoagulation    Hypertension, essential    Coronary artery disease of native artery of native heart with stable angina pectoris (HCC)    Simple chronic bronchitis (HCC)    Diabetes mellitus with neurological manifestation (HCC)    Hypothyroidism    GERD (gastroesophageal reflux disease)    Anemia    AVM (arteriovenous malformation) of small bowel, acquired with hemorrhage    Angiectasia    Other vascular disorders of intestine (HCC)    Aortic valve replaced    Cardiomyopathy (HCC)    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HCC)    Stage 3b chronic kidney disease (HCC)    Chronic kidney disease-mineral and bone disorder    Primary osteoarthritis involving multiple joints    Paroxysmal atrial fibrillation (HCC)    Neutropenia associated with infection (HCC)    Herpes simplex labialis    Restrictive lung disease    Nocturnal hypoxemia    Supratherapeutic INR    H/O mechanical aortic valve replacement    Subtherapeutic international normalized ratio (INR)    Type 2 diabetes mellitus with stage 3b chronic kidney disease, with long-term current use of insulin (HCC)    Venous ulcer of right leg (HCC)    Chronic systolic (congestive) heart failure (HCC)    Lower extremity edema    Iron deficiency anemia, unspecified     Past  Medical History:   Diagnosis Date    Acute anterior epistaxis 12/23/2023    Acute blood loss anemia 12/14/2023    Acute respiratory failure with hypoxia (HCC) 02/06/2024    Anemia     Aneurysm of thoracic aorta (HCC)     Angioedema 06/07/2019    Aortic valve disorder     Benign neoplasm of sigmoid colon     Cardiomyopathy (HCC)     last assessed: 10/10/2014    CHF (congestive heart failure) (HCC)     Chronic kidney disease     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HCC)     last assessed: 10/10/2014    COPD (chronic obstructive pulmonary disease) (HCC)     Diabetic nephropathy (HCC)     Disease of thyroid gland     RAMON (dyspnea on exertion)     GERD (gastroesophageal reflux disease)     History of emphysema (HCC)     History of transfusion     Hyperlipidemia     Hypertensive urgency 10/28/2023    Leg cramps 11/01/2023    Stroke-like symptoms 10/28/2023    TIA (transient ischemic attack)      Past Surgical History:   Procedure Laterality Date    AORTIC VALVE REPLACEMENT      CARDIAC PACEMAKER PLACEMENT      last assessed: 10/10/2014    CARDIAC SURGERY      aortic valve replacement    CHOLECYSTECTOMY      COLONOSCOPY      EGD      HYSTERECTOMY      INSERT / REPLACE / REMOVE PACEMAKER      KNEE SURGERY Right     OTHER SURGICAL HISTORY      Capsule ENDOscopy description: 12/19/2012    KS COLONOSCOPY FLX DX W/COLLJ SPEC WHEN PFRMD N/A 05/31/2018    Procedure: single balloon ENTEROSCOPY;  Surgeon: David Dennis MD;  Location: [facility];  Service: Gastroenterology    KS ESOPHAGOGASTRODUODENOSCOPY TRANSORAL DIAGNOSTIC N/A 11/29/2017    Procedure: EGD AND COLONOSCOPY;  Surgeon: Jay Mcleod III, MD;  Location: [facility];  Service: Gastroenterology     Family History   Problem Relation Age of Onset    No Known Problems Mother     No Known Problems Father      Social History     Socioeconomic History    Marital status:      Spouse name: None    Number of children: None    Years of education: None    Highest education  level: None   Occupational History    None   Tobacco Use    Smoking status: Former     Current packs/day: 0.00     Average packs/day: 1 pack/day for 52.9 years (52.9 ttl pk-yrs)     Types: Cigarettes     Start date:      Quit date: 2007     Years since quittin.6     Passive exposure: Past    Smokeless tobacco: Former     Quit date:    Vaping Use    Vaping status: Never Used   Substance and Sexual Activity    Alcohol use: Never    Drug use: Never    Sexual activity: Not Currently     Partners: Male   Other Topics Concern    None   Social History Narrative    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 BID Freestyle lancets BID    Living independently with spouse     Social Determinants of Health     Financial Resource Strain: Not on file   Food Insecurity: No Food Insecurity (5/15/2024)    Hunger Vital Sign     Worried About Running Out of Food in the Last Year: Never true     Ran Out of Food in the Last Year: Never true   Transportation Needs: No Transportation Needs (5/15/2024)    PRAPARE - Transportation     Lack of Transportation (Medical): No     Lack of Transportation (Non-Medical): No   Physical Activity: Inactive (2021)    Exercise Vital Sign     Days of Exercise per Week: 0 days     Minutes of Exercise per Session: 0 min   Stress: No Stress Concern Present (2021)    [employer] [place] [employer] - Occupational Stress Questionnaire     Feeling of Stress : Not at all   Social Connections: Not on file   Intimate Partner Violence: Not on file   Housing Stability: Low Risk  (5/15/2024)    Housing Stability Vital Sign     Unable to Pay for Housing in the Last Year: No     Number of Times Moved in the Last Year: 1     Homeless in the Last Year: No       Current Outpatient Medications:     Accu-Chek FastClix Lancets MISC, Use 3 (three) times a day, Disp: 300 each, Rfl: 3    albuterol (Ventolin HFA) 90 mcg/act inhaler, Inhale 2 puffs every 6 (six) hours as needed for  wheezing, Disp: 54 g, Rfl: 3    Ascorbic Acid (vitamin C) 1000 MG tablet, Take 1,000 mg by mouth daily, Disp: , Rfl:     aspirin (ECOTRIN LOW STRENGTH) 81 mg EC tablet, Take 1 tablet (81 mg total) by mouth daily Do not start before November 1, 2023., Disp: 30 tablet, Rfl: 0    b complex vitamins capsule, Take 1 capsule by mouth daily, Disp: 30 capsule, Rfl: 2    Blood Glucose Monitoring Suppl (Accu-Chek Guide Me) w/Device KIT, USE 3 TIMES DAILY, Disp: 1 kit, Rfl: 2    Cholecalciferol (Vitamin D3) 50 MCG (2000 UT) TABS, Take 2,000 Units by mouth daily, Disp: , Rfl:     enoxaparin (LOVENOX) 80 mg/0.8 mL, Inject 0.7 mL (70 mg total) under the skin every 24 hours Hold Warfarin 5 days prior. Start Lovenox 4 days prior.  No Lovenox the morning of the procedure. Resume both Lovenox and Warfarin at the discretion of the surgeon (preferably that evening) and check INR 4 days after restarting. Inject in the abdomen about 2 inches from the belly button and rotate sides at each injection., Disp: 0.7 mL, Rfl: 10    Ferrous Sulfate (IRON PO), Take by mouth daily in the early morning OTC, Disp: , Rfl:     fluticasone-salmeterol (Advair HFA) 115-21 MCG/ACT inhaler, Inhale 2 puffs 2 (two) times a day Rinse mouth after use. (Patient not taking: Reported on 6/10/2024), Disp: 36 g, Rfl: 3    furosemide (LASIX) 40 mg tablet, take 1 tablet by mouth daily, Disp: 90 tablet, Rfl: 3    gabapentin (NEURONTIN) 300 mg capsule, Take 1 capsule (300 mg total) by mouth 3 (three) times a day (Patient taking differently: Take 300 mg by mouth 2 (two) times a day), Disp: 270 capsule, Rfl: 3    glipiZIDE (GLUCOTROL) 10 mg tablet, Take 1 tablet (10 mg total) by mouth 2 (two) times a day before meals, Disp: 180 tablet, Rfl: 3    glucose blood (Accu-Chek Celeste Plus) test strip, Use 1 each 3 (three) times a day Use as instructed, Disp: 300 each, Rfl: 3    Insulin Pen Needle (BD Pen Needle Rosie U/F) 32G X 4 MM MISC, Use daily, Disp: 100 each, Rfl: 1     ipratropium-albuterol (DUO-NEB) 0.5-2.5 mg/3 mL nebulizer solution, inhale contents of 1 vial ( 3 milliliters ) in nebulizer four times a day, Disp: , Rfl:     Lancets (freestyle) lancets, Check blood glucose 3 times daily, Disp: 300 each, Rfl: 0    Levemir FlexPen 100 units/mL injection pen, INJECT SUBCUTANEOUSLY 26 UNITS  DAILY AT BEDTIME, Disp: 30 mL, Rfl: 2    levothyroxine 50 mcg tablet, Take 1 tablet (50 mcg total) by mouth daily, Disp: 90 tablet, Rfl: 3    metoprolol tartrate (LOPRESSOR) 50 mg tablet, TAKE ONE-HALF TABLET BY  MOUTH TWICE DAILY, Disp: 90 tablet, Rfl: 3    oxygen gas, Inhale 2 L/min daily at bedtime as needed home oxygen nightly, Disp: , Rfl:     pantoprazole (PROTONIX) 40 mg tablet, Take 1 tablet (40 mg total) by mouth daily Patient taking differently. 1 tab daily, Disp: 90 tablet, Rfl: 1    warfarin (COUMADIN) 5 mg tablet, TAKE 1 TO 2 TABLETS BY  MOUTH DAILY OR AS DIRECTED, Disp: 180 tablet, Rfl: 3    Current Facility-Administered Medications:     lidocaine (LMX) 4 % cream, , Topical, Once, Vesta Garcia PA-C    Review of Systems   Constitutional:  Negative for appetite change, chills, fatigue, fever and unexpected weight change.   HENT:  Negative for congestion, hearing loss and postnasal drip.    Respiratory:  Negative for cough and shortness of breath.    Cardiovascular:  Negative for leg swelling.   Musculoskeletal:  Positive for gait problem.   Skin:  Positive for wound (RLE). Negative for rash.   Neurological:  Negative for numbness.   Hematological:  Does not bruise/bleed easily.   Psychiatric/Behavioral: Negative.           Objective:  /58   Pulse 77   Temp (!) 97.1 °F (36.2 °C)   Resp 18   Pain Score: 0-No pain     Physical Exam  Vitals reviewed.   Constitutional:       General: She is not in acute distress.     Appearance: Normal appearance. She is well-developed and normal weight.   HENT:      Head: Normocephalic and atraumatic.   Cardiovascular:      Rate and Rhythm:  Normal rate.   Pulmonary:      Effort: Pulmonary effort is normal.   Musculoskeletal:         General: No deformity.      Right lower leg: No edema.      Left lower leg: No edema.   Skin:     General: Skin is warm and dry.      Findings: Wound (RLE) present.             Comments: pink granulated wound bed. See wound assessment   Neurological:      General: No focal deficit present.      Mental Status: She is alert and oriented to person, place, and time.      Gait: Gait normal.   Psychiatric:         Mood and Affect: Mood and affect normal.         Behavior: Behavior normal. Behavior is cooperative.         Wound 10/29/23 Venous Ulcer Pretibial Right (Active)   Wound Image Images linked 08/02/24 0808   Wound Description Pink;Epithelialization 08/02/24 0808   Radha-wound Assessment Fragile 08/02/24 0808   Wound Length (cm) 0.3 cm 08/02/24 0808   Wound Width (cm) 0.4 cm 08/02/24 0808   Wound Depth (cm) 0.1 cm 08/02/24 0808   Wound Surface Area (cm^2) 0.12 cm^2 08/02/24 0808   Wound Volume (cm^3) 0.012 cm^3 08/02/24 0808   Calculated Wound Volume (cm^3) 0.01 cm^3 08/02/24 0808   Change in Wound Size % 99.75 08/02/24 0808   Drainage Amount Scant 08/02/24 0808   Drainage Description Serosanguineous 08/02/24 0808   Non-staged Wound Description Full thickness 08/02/24 0808   Dressing Status Intact 08/02/24 0808         Wound Instructions:  Orders Placed This Encounter   Procedures    Wound cleansing and dressings Venous Ulcer Right Pretibial     Wound cleansing and dressings Venous Ulcer Right Pretibial                                         Right Leg wound:      Wash your hands with soap and water. Remove old dressing, discard into plastic bag and place in trash. Cleanse the wound with normal saline solution prior to applying a clean dressing. Do not use tissue or cotton balls. Do not scrub the wound. Pat dry using gauze.      Shower yes, if able to use cast cover. Do not get dressing wet.      Apply polymem cut fit to  "the open wound.   Cover with gauze.  Secure with tape if your skin is breaking down from tape then need to wrap with Armani and put tape on Armani   Change dressing 3 times weekly.     Wear compression during the day                              Elastic Tubular Stocking: spanda- size F Tubular elastic bandage: Apply from base of toes to behind the knee. Apply in AM, may remove for sleep. Avoid prolonged standing in one place. Elevate leg(s) above the level of the heart when sitting or as much as possible     Standing Status:   Future     Standing Expiration Date:   8/9/2024    Wound Procedure Treatment Venous Ulcer Right Pretibial     This order was created via procedure documentation       Vesta Garcia PA-C, [facility]      Portions of the record may have been created with voice recognition software. Occasional wrong word or \"sound alike\" substitutions may have occurred due to the inherent limitations of voice recognition software. Read the chart carefully and recognize, using context, where substitutions have occurred.    "

## 2024-08-02 NOTE — PATIENT INSTRUCTIONS
Orders Placed This Encounter   Procedures    Wound cleansing and dressings Venous Ulcer Right Pretibial     Wound cleansing and dressings Venous Ulcer Right Pretibial                                         Right Leg wound:      Wash your hands with soap and water. Remove old dressing, discard into plastic bag and place in trash. Cleanse the wound with normal saline solution prior to applying a clean dressing. Do not use tissue or cotton balls. Do not scrub the wound. Pat dry using gauze.      Shower yes, if able to use cast cover. Do not get dressing wet.      Apply polymem cut fit to the open wound.   Cover with gauze.  Secure with tape if your skin is breaking down from tape then need to wrap with Armani and put tape on Armani   Change dressing 3 times weekly.     Wear compression during the day                              Elastic Tubular Stocking: spanda- size F Tubular elastic bandage: Apply from base of toes to behind the knee. Apply in AM, may remove for sleep. Avoid prolonged standing in one place. Elevate leg(s) above the level of the heart when sitting or as much as possible     Standing Status:   Future     Standing Expiration Date:   8/9/2024    Wound Procedure Treatment Venous Ulcer Right Pretibial     This order was created via procedure documentation

## 2024-08-02 NOTE — PROGRESS NOTES
Wound Procedure Treatment Venous Ulcer Right Pretibial    Performed by: Graciela Valerio RN  Authorized by: Vesta Garcia PA-C    Associated wounds:   Wound 10/29/23 Venous Ulcer Pretibial Right  Wound cleansed with:  NSS  Applied to periwound:  Skin prep  Applied primary dressing:  Polymem foam  Applied secondary dressing:  Gauze  Dressing secured with:  Tape and Size F

## 2024-08-03 DIAGNOSIS — K21.9 GASTROESOPHAGEAL REFLUX DISEASE: ICD-10-CM

## 2024-08-04 RX ORDER — PANTOPRAZOLE SODIUM 40 MG/1
40 TABLET, DELAYED RELEASE ORAL DAILY
Qty: 100 TABLET | Refills: 1 | Status: SHIPPED | OUTPATIENT
Start: 2024-08-04

## 2024-08-05 ENCOUNTER — OFFICE VISIT (OUTPATIENT)
Dept: CARDIOLOGY CLINIC | Facility: CLINIC | Age: 84
End: 2024-08-05
Payer: COMMERCIAL

## 2024-08-05 VITALS
WEIGHT: 161 LBS | OXYGEN SATURATION: 97 % | HEART RATE: 74 BPM | BODY MASS INDEX: 25.88 KG/M2 | SYSTOLIC BLOOD PRESSURE: 170 MMHG | HEIGHT: 66 IN | RESPIRATION RATE: 16 BRPM | DIASTOLIC BLOOD PRESSURE: 80 MMHG

## 2024-08-05 DIAGNOSIS — I42.0 DILATED CARDIOMYOPATHY (HCC): Primary | ICD-10-CM

## 2024-08-05 DIAGNOSIS — I50.9 CONGESTIVE HEART FAILURE, UNSPECIFIED HF CHRONICITY, UNSPECIFIED HEART FAILURE TYPE (HCC): ICD-10-CM

## 2024-08-05 DIAGNOSIS — R06.09 DYSPNEA ON EXERTION: ICD-10-CM

## 2024-08-05 DIAGNOSIS — I50.22 CHRONIC SYSTOLIC (CONGESTIVE) HEART FAILURE (HCC): ICD-10-CM

## 2024-08-05 PROCEDURE — 99214 OFFICE O/P EST MOD 30 MIN: CPT

## 2024-08-05 RX ORDER — FUROSEMIDE 40 MG/1
40 TABLET ORAL 2 TIMES DAILY
Qty: 180 TABLET | Refills: 3 | Status: SHIPPED | OUTPATIENT
Start: 2024-08-05

## 2024-08-05 RX ORDER — ATORVASTATIN CALCIUM 20 MG/1
20 TABLET, FILM COATED ORAL DAILY
COMMUNITY

## 2024-08-05 NOTE — PROGRESS NOTES
[facility] [place]  [address] [facility] [address]  [place] [address]  Cardiology Office Note    Ely Hernadez 84 y.o. female MRN: [id number]    08/05/24          Assessment/Plan:  1. Dyspnea on exertion  Patient experiencing RAMON with minimal exertion  TTE ordered to evaluate EF/wall motion/valves  Stress testing declined at this time  Patient appears euvolemic  Advised that if symptoms worsen or if chest pain/pressure develops to seek emergent medical attention.    2. Chronic HFmrEF  Patient appears euvolemic.  Continue lasix 40 mg twice daily  On metoprolol tartrate; No ACE/ARB due to CKD     2.  NICM with EF 40%  See plan above     3. S/p mechanical AVR  On warfarin; goal INR 2.5-3.5     4. PAF  Patient primarily V paced  Continue warfarin for AC  Continue Lopressor 50 mg twice daily     5. Nonobstructive CAD  Has RAMON; declined stress testing at this time  Continue aspirin, atorvastatin, and Lopressor    Follow up: 3 months or sooner as needed  All questions and concerns addressed.  Patient was advised to report any problems requiring medical attention.          1. Dilated cardiomyopathy (HCC)        2. Chronic systolic (congestive) heart failure (HCC)        3. Dyspnea on exertion  Echo complete w/ contrast if indicated      4. Congestive heart failure, unspecified HF chronicity, unspecified heart failure type (HCC)  furosemide (LASIX) 40 mg tablet          HPI: Ely Hernadez is a 84 y.o. 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 Patient is known to Dr. Layton.    Weight today 161lbs. Patient notes that her weight has been stable at home.     Patient notes increased shortness of breath when walking up stairs or exerting herself over the past week. Patient denies chest pain. Sometimes correlates with reflux and food. Discussed further evaluation with TTE and pharmacologic  MPI. Unfortunately, patient states that she has been unable to tolerate pharmacologic MPI in the past and does not want to do this test. Her mobility is also limited due to arthritis so she cannot run on a treadmill. She has CKD with GFR of 36 so coronary CTA is not ideal either. Patient currently taking her inhaler for her symptoms with some relief.     Patient notes that she has anemia and was getting iron infusions regularly which stopped a few weeks ago.     Patient denies any bleeding issues on Coumadin.      Patient was instructed to call the office or seek medical attention if any significant chest pain, shortness of breath, palpitations, or lower extremity swelling develop. All medications reviewed and patient is tolerating medications without side effects.     Social history:   Patient denies tobacco, significant alcohol, or recreational drug use.        Patient Active Problem List   Diagnosis   • Hyperlipidemia   • Chronic anticoagulation   • Hypertension, essential   • Coronary artery disease of native artery of native heart with stable angina pectoris (HCC)   • Simple chronic bronchitis (HCC)   • Diabetes mellitus with neurological manifestation (HCC)   • Hypothyroidism   • GERD (gastroesophageal reflux disease)   • Anemia   • AVM (arteriovenous malformation) of small bowel, acquired with hemorrhage   • Angiectasia   • Other vascular disorders of intestine (HCC)   • Aortic valve replaced   • Cardiomyopathy (HCC)   • 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HCC)   • Stage 3b chronic kidney disease (HCC)   • Chronic kidney disease-mineral and bone disorder   • Primary osteoarthritis involving multiple joints   • Paroxysmal atrial fibrillation (HCC)   • Neutropenia associated with infection (HCC)   • Herpes simplex labialis   • Restrictive lung disease   • Nocturnal hypoxemia   • Supratherapeutic INR   • H/O mechanical aortic valve replacement   • Subtherapeutic international normalized ratio (INR)   • Type 2 diabetes  mellitus with stage 3b chronic kidney disease, with long-term current use of insulin (HCC)   • Venous ulcer of right leg (HCC)   • Chronic systolic (congestive) heart failure (HCC)   • Lower extremity edema   • Iron deficiency anemia, unspecified       No Known Allergies      Current Outpatient Medications:   •  Accu-Chek FastClix Lancets MISC, Use 3 (three) times a day, Disp: 300 each, Rfl: 3  •  albuterol (Ventolin HFA) 90 mcg/act inhaler, Inhale 2 puffs every 6 (six) hours as needed for wheezing, Disp: 54 g, Rfl: 3  •  Ascorbic Acid (vitamin C) 1000 MG tablet, Take 1,000 mg by mouth daily, Disp: , Rfl:   •  aspirin (ECOTRIN LOW STRENGTH) 81 mg EC tablet, Take 1 tablet (81 mg total) by mouth daily Do not start before November 1, 2023., Disp: 30 tablet, Rfl: 0  •  atorvastatin (LIPITOR) 20 mg tablet, Take 20 mg by mouth daily, Disp: , Rfl:   •  b complex vitamins capsule, Take 1 capsule by mouth daily, Disp: 30 capsule, Rfl: 2  •  Blood Glucose Monitoring Suppl (Accu-Chek Guide Me) w/Device KIT, USE 3 TIMES DAILY, Disp: 1 kit, Rfl: 2  •  Cholecalciferol (Vitamin D3) 50 MCG (2000 UT) TABS, Take 2,000 Units by mouth daily, Disp: , Rfl:   •  enoxaparin (LOVENOX) 80 mg/0.8 mL, Inject 0.7 mL (70 mg total) under the skin every 24 hours Hold Warfarin 5 days prior. Start Lovenox 4 days prior.  No Lovenox the morning of the procedure. Resume both Lovenox and Warfarin at the discretion of the surgeon (preferably that evening) and check INR 4 days after restarting. Inject in the abdomen about 2 inches from the belly button and rotate sides at each injection., Disp: 0.7 mL, Rfl: 10  •  Ferrous Sulfate (IRON PO), Take by mouth daily in the early morning OTC, Disp: , Rfl:   •  furosemide (LASIX) 40 mg tablet, Take 1 tablet (40 mg total) by mouth 2 (two) times a day, Disp: 180 tablet, Rfl: 3  •  gabapentin (NEURONTIN) 300 mg capsule, Take 1 capsule (300 mg total) by mouth 3 (three) times a day (Patient taking differently: Take  300 mg by mouth 2 (two) times a day), Disp: 270 capsule, Rfl: 3  •  glipiZIDE (GLUCOTROL) 10 mg tablet, Take 1 tablet (10 mg total) by mouth 2 (two) times a day before meals, Disp: 180 tablet, Rfl: 3  •  glucose blood (Accu-Chek Celeste Plus) test strip, Use 1 each 3 (three) times a day Use as instructed, Disp: 300 each, Rfl: 3  •  Insulin Pen Needle (BD Pen Needle Rosie U/F) 32G X 4 MM MISC, Use daily, Disp: 100 each, Rfl: 1  •  ipratropium-albuterol (DUO-NEB) 0.5-2.5 mg/3 mL nebulizer solution, inhale contents of 1 vial ( 3 milliliters ) in nebulizer four times a day, Disp: , Rfl:   •  Lancets (freestyle) lancets, Check blood glucose 3 times daily, Disp: 300 each, Rfl: 0  •  Levemir FlexPen 100 units/mL injection pen, INJECT SUBCUTANEOUSLY 26 UNITS  DAILY AT BEDTIME, Disp: 30 mL, Rfl: 2  •  levothyroxine 50 mcg tablet, Take 1 tablet (50 mcg total) by mouth daily, Disp: 90 tablet, Rfl: 3  •  metoprolol tartrate (LOPRESSOR) 50 mg tablet, TAKE ONE-HALF TABLET BY  MOUTH TWICE DAILY, Disp: 90 tablet, Rfl: 3  •  oxygen gas, Inhale 2 L/min daily at bedtime as needed home oxygen nightly, Disp: , Rfl:   •  pantoprazole (PROTONIX) 40 mg tablet, TAKE 1 TABLET BY MOUTH DAILY AS  DIRECTED, Disp: 100 tablet, Rfl: 1  •  warfarin (COUMADIN) 5 mg tablet, TAKE 1 TO 2 TABLETS BY  MOUTH DAILY OR AS DIRECTED, Disp: 180 tablet, Rfl: 3    Current Facility-Administered Medications:   •  lidocaine (LMX) 4 % cream, , Topical, Once, Vesta Garcia PA-C    Past Medical History:   Diagnosis Date   • Acute anterior epistaxis 12/23/2023   • Acute blood loss anemia 12/14/2023   • Acute respiratory failure with hypoxia (HCC) 02/06/2024   • Anemia    • Aneurysm of thoracic aorta (HCC)    • Angioedema 06/07/2019   • Aortic valve disorder    • Benign neoplasm of sigmoid colon    • Cardiomyopathy (HCC)     last assessed: 10/10/2014   • CHF (congestive heart failure) (HCC)    • Chronic kidney disease    • 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HCC)     last  assessed: 10/10/2014   • COPD (chronic obstructive pulmonary disease) (HCC)    • Diabetic nephropathy (HCC)    • Disease of thyroid gland    • RAMON (dyspnea on exertion)    • GERD (gastroesophageal reflux disease)    • History of emphysema (HCC)    • History of transfusion    • Hyperlipidemia    • Hypertensive urgency 10/28/2023   • Leg cramps 2023   • Stroke-like symptoms 10/28/2023   • TIA (transient ischemic attack)        Family History   Problem Relation Age of Onset   • No Known Problems Mother    • No Known Problems Father        Past Surgical History:   Procedure Laterality Date   • AORTIC VALVE REPLACEMENT     • CARDIAC PACEMAKER PLACEMENT      last assessed: 10/10/2014   • CARDIAC SURGERY      aortic valve replacement   • CHOLECYSTECTOMY     • COLONOSCOPY     • EGD     • HYSTERECTOMY     • INSERT / REPLACE / REMOVE PACEMAKER     • KNEE SURGERY Right    • OTHER SURGICAL HISTORY      Capsule ENDOscopy description: 2012   • ND COLONOSCOPY FLX DX W/COLLJ SPEC WHEN PFRMD N/A 2018    Procedure: single balloon ENTEROSCOPY;  Surgeon: David Dennis MD;  Location: [facility];  Service: Gastroenterology   • ND ESOPHAGOGASTRODUODENOSCOPY TRANSORAL DIAGNOSTIC N/A 2017    Procedure: EGD AND COLONOSCOPY;  Surgeon: Jay Mcleod III, MD;  Location: [facility];  Service: Gastroenterology       Social History     Socioeconomic History   • Marital status:      Spouse name: Not on file   • Number of children: Not on file   • Years of education: Not on file   • Highest education level: Not on file   Occupational History   • Not on file   Tobacco Use   • Smoking status: Former     Current packs/day: 0.00     Average packs/day: 1 pack/day for 52.9 years (52.9 ttl pk-yrs)     Types: Cigarettes     Start date:      Quit date: 2007     Years since quittin.6     Passive exposure: Past   • Smokeless tobacco: Former     Quit date:    Vaping Use   • Vaping status: Never Used   Substance  and Sexual Activity   • Alcohol use: Never   • Drug use: Never   • Sexual activity: Not Currently     Partners: Male   Other Topics Concern   • Not on file   Social History Narrative    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 BID Freestyle lancets BID    Living independently with spouse     Social Determinants of Health     Financial Resource Strain: Not on file   Food Insecurity: No Food Insecurity (5/15/2024)    Hunger Vital Sign    • Worried About Running Out of Food in the Last Year: Never true    • Ran Out of Food in the Last Year: Never true   Transportation Needs: No Transportation Needs (5/15/2024)    PRAPARE - Transportation    • Lack of Transportation (Medical): No    • Lack of Transportation (Non-Medical): No   Physical Activity: Inactive (5/26/2021)    Exercise Vital Sign    • Days of Exercise per Week: 0 days    • Minutes of Exercise per Session: 0 min   Stress: No Stress Concern Present (5/26/2021)    [employer] [employer] - Occupational Stress Questionnaire    • Feeling of Stress : Not at all   Social Connections: Not on file   Intimate Partner Violence: Not on file   Housing Stability: Low Risk  (5/15/2024)    Housing Stability Vital Sign    • Unable to Pay for Housing in the Last Year: No    • Number of Times Moved in the Last Year: 1    • Homeless in the Last Year: No       Review of symptoms:   Review of Systems   Constitutional:  Negative for chills, diaphoresis and fever.   Respiratory:  Positive for shortness of breath. Negative for cough and chest tightness.    Cardiovascular:  Negative for chest pain, palpitations and leg swelling.   Gastrointestinal:  Negative for abdominal distention, blood in stool, nausea and vomiting.   Genitourinary:  Negative for difficulty urinating.   Musculoskeletal:  Negative for arthralgias and back pain.   Neurological:  Negative for dizziness, syncope, light-headedness and headaches.   Psychiatric/Behavioral:  Negative for  "agitation and confusion. The patient is not nervous/anxious.         Vitals: /80 (BP Location: Left arm, Patient Position: Sitting, Cuff Size: Standard)   Pulse 74   Resp 16   Ht 5' 6\" (1.676 m)   Wt 73 kg (161 lb)   SpO2 97%   BMI 25.99 kg/m²         Physical Exam:     Physical Exam  Vitals and nursing note reviewed.   Constitutional:       General: She is not in acute distress.     Appearance: She is well-developed.   HENT:      Head: Normocephalic and atraumatic.   Eyes:      Conjunctiva/sclera: Conjunctivae normal.   Neck:      Vascular: No carotid bruit.   Cardiovascular:      Rate and Rhythm: Normal rate and regular rhythm.      Heart sounds: Normal heart sounds. No murmur heard.  Pulmonary:      Effort: Pulmonary effort is normal. No respiratory distress.      Breath sounds: Normal breath sounds.   Abdominal:      Palpations: Abdomen is soft.      Tenderness: There is no abdominal tenderness.   Musculoskeletal:         General: No swelling.      Cervical back: Neck supple.      Right lower leg: No edema.      Left lower leg: No edema.   Skin:     General: Skin is warm and dry.      Capillary Refill: Capillary refill takes less than 2 seconds.   Neurological:      Mental Status: She is alert and oriented to person, place, and time.   Psychiatric:         Mood and Affect: Mood normal.          Thank you for allowing me to participate in the care and evaluation of your patient.  Should you have any questions, please feel free to contact me.      "

## 2024-08-13 ENCOUNTER — OFFICE VISIT (OUTPATIENT)
Age: 84
End: 2024-08-13
Payer: COMMERCIAL

## 2024-08-13 VITALS
OXYGEN SATURATION: 96 % | HEART RATE: 60 BPM | RESPIRATION RATE: 18 BRPM | SYSTOLIC BLOOD PRESSURE: 138 MMHG | WEIGHT: 164 LBS | TEMPERATURE: 98.3 F | DIASTOLIC BLOOD PRESSURE: 62 MMHG | HEIGHT: 66 IN | BODY MASS INDEX: 26.36 KG/M2

## 2024-08-13 DIAGNOSIS — R05.1 ACUTE COUGH: Primary | ICD-10-CM

## 2024-08-13 PROBLEM — I69.354 HEMIPLEGIA AND HEMIPARESIS FOLLOWING CEREBRAL INFARCTION AFFECTING LEFT NON-DOMINANT SIDE (HCC): Status: ACTIVE | Noted: 2024-08-13

## 2024-08-13 PROCEDURE — 99213 OFFICE O/P EST LOW 20 MIN: CPT

## 2024-08-13 RX ORDER — AMOXICILLIN 500 MG/1
500 CAPSULE ORAL EVERY 8 HOURS SCHEDULED
Qty: 21 CAPSULE | Refills: 0 | Status: SHIPPED | OUTPATIENT
Start: 2024-08-13 | End: 2024-08-20

## 2024-08-13 RX ORDER — AMOXICILLIN 500 MG/1
500 CAPSULE ORAL EVERY 12 HOURS SCHEDULED
Qty: 14 CAPSULE | Refills: 0 | Status: SHIPPED | OUTPATIENT
Start: 2024-08-13 | End: 2024-08-13

## 2024-08-13 NOTE — PROGRESS NOTES
INTERNAL MEDICINE FOLLOW-UP VISIT  [facility] - [facility] INTERNAL MEDICINE [address]    NAME: Ely Hernadez  AGE: 84 y.o. SEX: female  : 1940     DATE: 2024     Assessment and Plan:   1. Upper respiratory tract infection, unspecified type  COVID-negative in office.  She does have a history of community-acquired pneumonia.  Slightly decreased breath sounds on left side.  Start amoxicillin every 8 hours daily for 7 days.  Continue supportive care with increasing fluid intake, using saline as needed, and cough drops.  If you begin to experience any worsening greater than 103, chills, cough, or shortness of provide the office.      No follow-ups on file.       Chief Complaint:     Chief Complaint   Patient presents with    Earache     Left side    Sore Throat     Symptom started two days ago.    Cough     Yellow phlegm       History of Present Illness:   Patient is an 85 yo female that presents today for an earache, sore throat, and cough that started 2 days ago. She did not COVID test at home. She admits to some sick contacts. She admits to a lack of appetite and thirst.  She is struggling to sleep due to the cough. She has been using her nebulizer treatments with no relief.     The following portions of the patient's history were reviewed and updated as appropriate: allergies, current medications, past family history, past medical history, past social history, past surgical history and problem list.     Review of Systems:     Review of Systems   Constitutional:  Positive for appetite change, fatigue and fever. Negative for chills.   HENT:  Positive for ear pain (L), sore throat and trouble swallowing. Negative for ear discharge, rhinorrhea, sinus pressure, sinus pain and sneezing.    Eyes:  Negative for pain and itching.   Respiratory:  Positive for cough (Purulent) and shortness of breath. Negative for wheezing.    Cardiovascular:  Negative for chest pain.   Gastrointestinal:   Negative for abdominal pain, constipation, diarrhea, nausea and vomiting.   Genitourinary:  Negative for difficulty urinating and dysuria.   Musculoskeletal:  Negative for arthralgias and myalgias.   Skin:  Negative for rash.   Neurological:  Positive for headaches. Negative for dizziness and light-headedness.        Past Medical History:     Past Medical History:   Diagnosis Date    Acute anterior epistaxis 12/23/2023    Acute blood loss anemia 12/14/2023    Acute respiratory failure with hypoxia (HCC) 02/06/2024    Anemia     Aneurysm of thoracic aorta (HCC)     Angioedema 06/07/2019    Aortic valve disorder     Benign neoplasm of sigmoid colon     Cardiomyopathy (HCC)     last assessed: 10/10/2014    CHF (congestive heart failure) (HCC)     Chronic kidney disease     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HCC)     last assessed: 10/10/2014    COPD (chronic obstructive pulmonary disease) (HCC)     Diabetic nephropathy ([facility])     Disease of thyroid gland     RAMON (dyspnea on exertion)     GERD (gastroesophageal reflux disease)     History of emphysema ([facility])     History of transfusion     Hyperlipidemia     Hypertensive urgency 10/28/2023    Leg cramps 11/01/2023    Stroke-like symptoms 10/28/2023    TIA (transient ischemic attack)         Current Medications:     Current Outpatient Medications:     Accu-Chek FastClix Lancets MISC, Use 3 (three) times a day, Disp: 300 each, Rfl: 3    albuterol (Ventolin HFA) 90 mcg/act inhaler, Inhale 2 puffs every 6 (six) hours as needed for wheezing, Disp: 54 g, Rfl: 3    Ascorbic Acid (vitamin C) 1000 MG tablet, Take 1,000 mg by mouth daily, Disp: , Rfl:     aspirin (ECOTRIN LOW STRENGTH) 81 mg EC tablet, Take 1 tablet (81 mg total) by mouth daily Do not start before November 1, 2023., Disp: 30 tablet, Rfl: 0    Blood Glucose Monitoring Suppl (Accu-Chek Guide Me) w/Device KIT, USE 3 TIMES DAILY, Disp: 1 kit, Rfl: 2    Cholecalciferol (Vitamin D3) 50 MCG (2000 UT) TABS, Take 2,000 Units  by mouth daily, Disp: , Rfl:     Ferrous Sulfate (IRON PO), Take by mouth daily in the early morning OTC, Disp: , Rfl:     furosemide (LASIX) 40 mg tablet, Take 1 tablet (40 mg total) by mouth 2 (two) times a day, Disp: 180 tablet, Rfl: 3    gabapentin (NEURONTIN) 300 mg capsule, Take 1 capsule (300 mg total) by mouth 3 (three) times a day (Patient taking differently: Take 300 mg by mouth 2 (two) times a day), Disp: 270 capsule, Rfl: 3    glipiZIDE (GLUCOTROL) 10 mg tablet, Take 1 tablet (10 mg total) by mouth 2 (two) times a day before meals, Disp: 180 tablet, Rfl: 3    glucose blood (Accu-Chek Celeste Plus) test strip, Use 1 each 3 (three) times a day Use as instructed, Disp: 300 each, Rfl: 3    ipratropium-albuterol (DUO-NEB) 0.5-2.5 mg/3 mL nebulizer solution, inhale contents of 1 vial ( 3 milliliters ) in nebulizer four times a day, Disp: , Rfl:     Lancets (freestyle) lancets, Check blood glucose 3 times daily, Disp: 300 each, Rfl: 0    Levemir FlexPen 100 units/mL injection pen, INJECT SUBCUTANEOUSLY 26 UNITS  DAILY AT BEDTIME (Patient taking differently: Inject 30 Units under the skin), Disp: 30 mL, Rfl: 2    levothyroxine 50 mcg tablet, Take 1 tablet (50 mcg total) by mouth daily, Disp: 90 tablet, Rfl: 3    metoprolol tartrate (LOPRESSOR) 50 mg tablet, TAKE ONE-HALF TABLET BY  MOUTH TWICE DAILY, Disp: 90 tablet, Rfl: 3    oxygen gas, Inhale 2 L/min daily at bedtime as needed home oxygen nightly, Disp: , Rfl:     pantoprazole (PROTONIX) 40 mg tablet, TAKE 1 TABLET BY MOUTH DAILY AS  DIRECTED, Disp: 100 tablet, Rfl: 1    warfarin (COUMADIN) 5 mg tablet, TAKE 1 TO 2 TABLETS BY  MOUTH DAILY OR AS DIRECTED, Disp: 180 tablet, Rfl: 3    atorvastatin (LIPITOR) 20 mg tablet, Take 20 mg by mouth daily, Disp: , Rfl:     b complex vitamins capsule, Take 1 capsule by mouth daily, Disp: 30 capsule, Rfl: 2    enoxaparin (LOVENOX) 80 mg/0.8 mL, Inject 0.7 mL (70 mg total) under the skin every 24 hours Hold Warfarin 5 days  "prior. Start Lovenox 4 days prior.  No Lovenox the morning of the procedure. Resume both Lovenox and Warfarin at the discretion of the surgeon (preferably that evening) and check INR 4 days after restarting. Inject in the abdomen about 2 inches from the belly button and rotate sides at each injection. (Patient not taking: Reported on 8/13/2024), Disp: 0.7 mL, Rfl: 10    Insulin Pen Needle (BD Pen Needle Rosie U/F) 32G X 4 MM MISC, Use daily, Disp: 100 each, Rfl: 1    Current Facility-Administered Medications:     lidocaine (LMX) 4 % cream, , Topical, Once, Vesta Garcia PA-C     Allergies:   No Known Allergies     Physical Exam:     Resp 18   Ht 5' 6\" (1.676 m)   Wt 74.4 kg (164 lb)   BMI 26.47 kg/m²     Physical Exam  Vitals and nursing note reviewed.   Constitutional:       General: She is awake. She is not in acute distress.     Appearance: Normal appearance. She is well-developed, well-groomed and overweight.   HENT:      Head: Normocephalic and atraumatic.      Right Ear: Hearing, tympanic membrane, ear canal and external ear normal.      Left Ear: Hearing, tympanic membrane, ear canal and external ear normal.      Nose: Nose normal.      Mouth/Throat:      Lips: Pink.      Mouth: Mucous membranes are moist.      Pharynx: Uvula midline. Posterior oropharyngeal erythema present.   Eyes:      General: Lids are normal. Vision grossly intact. Gaze aligned appropriately.      Conjunctiva/sclera: Conjunctivae normal.   Neck:      Vascular: No carotid bruit.      Trachea: Trachea and phonation normal.   Cardiovascular:      Rate and Rhythm: Normal rate and regular rhythm.      Heart sounds: Normal heart sounds, S1 normal and S2 normal. No murmur heard.     No friction rub. No gallop.   Pulmonary:      Effort: Pulmonary effort is normal. No respiratory distress.      Breath sounds: Normal air entry. Decreased breath sounds present. No wheezing, rhonchi or rales.   Abdominal:      General: Abdomen is flat. Bowel " sounds are normal.      Palpations: Abdomen is soft.      Tenderness: There is no abdominal tenderness.   Musculoskeletal:         General: No swelling.      Cervical back: Neck supple.      Right lower leg: No edema.      Left lower leg: No edema.   Skin:     General: Skin is warm.      Capillary Refill: Capillary refill takes less than 2 seconds.   Neurological:      Mental Status: She is alert.   Psychiatric:         Attention and Perception: Attention and perception normal.         Mood and Affect: Mood and affect normal.         Speech: Speech normal.         Behavior: Behavior normal. Behavior is cooperative.         Thought Content: Thought content normal.         Cognition and Memory: Cognition and memory normal.         Judgment: Judgment normal.           Data:     Laboratory Results: I have personally reviewed the pertinent laboratory results/reports   Radiology/Other Diagnostic Testing Results: I have personally reviewed pertinent reports.      Amanda Villarreal PA-C  [facility] INTERNAL MEDICINE [address]

## 2024-08-21 ENCOUNTER — ANTICOAG VISIT (OUTPATIENT)
Dept: CARDIOLOGY CLINIC | Facility: CLINIC | Age: 84
End: 2024-08-21

## 2024-08-21 ENCOUNTER — TELEPHONE (OUTPATIENT)
Dept: SURGICAL ONCOLOGY | Facility: CLINIC | Age: 84
End: 2024-08-21

## 2024-08-21 ENCOUNTER — APPOINTMENT (OUTPATIENT)
Dept: LAB | Facility: HOSPITAL | Age: 84
End: 2024-08-21
Payer: COMMERCIAL

## 2024-08-21 ENCOUNTER — OFFICE VISIT (OUTPATIENT)
Dept: HEMATOLOGY ONCOLOGY | Facility: CLINIC | Age: 84
End: 2024-08-21
Payer: COMMERCIAL

## 2024-08-21 VITALS
SYSTOLIC BLOOD PRESSURE: 120 MMHG | OXYGEN SATURATION: 95 % | WEIGHT: 163 LBS | RESPIRATION RATE: 15 BRPM | DIASTOLIC BLOOD PRESSURE: 78 MMHG | HEIGHT: 66 IN | HEART RATE: 65 BPM | TEMPERATURE: 97.8 F | BODY MASS INDEX: 26.2 KG/M2

## 2024-08-21 DIAGNOSIS — D50.0 IRON DEFICIENCY ANEMIA DUE TO CHRONIC BLOOD LOSS: Primary | ICD-10-CM

## 2024-08-21 DIAGNOSIS — D50.0 IRON DEFICIENCY ANEMIA DUE TO CHRONIC BLOOD LOSS: ICD-10-CM

## 2024-08-21 DIAGNOSIS — R06.02 SHORTNESS OF BREATH: ICD-10-CM

## 2024-08-21 DIAGNOSIS — N18.9 CHRONIC KIDNEY DISEASE-MINERAL AND BONE DISORDER: ICD-10-CM

## 2024-08-21 DIAGNOSIS — E83.9 CHRONIC KIDNEY DISEASE-MINERAL AND BONE DISORDER: ICD-10-CM

## 2024-08-21 DIAGNOSIS — D50.9 IRON DEFICIENCY ANEMIA, UNSPECIFIED IRON DEFICIENCY ANEMIA TYPE: ICD-10-CM

## 2024-08-21 DIAGNOSIS — N18.32 STAGE 3B CHRONIC KIDNEY DISEASE (HCC): ICD-10-CM

## 2024-08-21 DIAGNOSIS — M89.9 CHRONIC KIDNEY DISEASE-MINERAL AND BONE DISORDER: ICD-10-CM

## 2024-08-21 DIAGNOSIS — R06.02 SHORTNESS OF BREATH: Primary | ICD-10-CM

## 2024-08-21 LAB
ALBUMIN SERPL BCG-MCNC: 4.1 G/DL (ref 3.5–5)
ALP SERPL-CCNC: 89 U/L (ref 34–104)
ALT SERPL W P-5'-P-CCNC: 13 U/L (ref 7–52)
ANION GAP SERPL CALCULATED.3IONS-SCNC: 7 MMOL/L (ref 4–13)
AST SERPL W P-5'-P-CCNC: 14 U/L (ref 13–39)
BASOPHILS # BLD AUTO: 0.03 THOUSANDS/ÂΜL (ref 0–0.1)
BASOPHILS NFR BLD AUTO: 1 % (ref 0–1)
BILIRUB SERPL-MCNC: 0.35 MG/DL (ref 0.2–1)
BUN SERPL-MCNC: 32 MG/DL (ref 5–25)
CALCIUM SERPL-MCNC: 9.2 MG/DL (ref 8.4–10.2)
CHLORIDE SERPL-SCNC: 101 MMOL/L (ref 96–108)
CO2 SERPL-SCNC: 30 MMOL/L (ref 21–32)
CREAT SERPL-MCNC: 1.37 MG/DL (ref 0.6–1.3)
EOSINOPHIL # BLD AUTO: 0.24 THOUSAND/ÂΜL (ref 0–0.61)
EOSINOPHIL NFR BLD AUTO: 4 % (ref 0–6)
ERYTHROCYTE [DISTWIDTH] IN BLOOD BY AUTOMATED COUNT: 17.3 % (ref 11.6–15.1)
FERRITIN SERPL-MCNC: 34 NG/ML (ref 11–307)
GFR SERPL CREATININE-BSD FRML MDRD: 35 ML/MIN/1.73SQ M
GLUCOSE SERPL-MCNC: 180 MG/DL (ref 65–140)
HCT VFR BLD AUTO: 37.3 % (ref 34.8–46.1)
HGB BLD-MCNC: 11.4 G/DL (ref 11.5–15.4)
IMM GRANULOCYTES # BLD AUTO: 0.03 THOUSAND/UL (ref 0–0.2)
IMM GRANULOCYTES NFR BLD AUTO: 1 % (ref 0–2)
IRON SATN MFR SERPL: 51 % (ref 15–50)
IRON SERPL-MCNC: 195 UG/DL (ref 50–212)
LYMPHOCYTES # BLD AUTO: 1.39 THOUSANDS/ÂΜL (ref 0.6–4.47)
LYMPHOCYTES NFR BLD AUTO: 25 % (ref 14–44)
MCH RBC QN AUTO: 28.8 PG (ref 26.8–34.3)
MCHC RBC AUTO-ENTMCNC: 30.6 G/DL (ref 31.4–37.4)
MCV RBC AUTO: 94 FL (ref 82–98)
MONOCYTES # BLD AUTO: 0.57 THOUSAND/ÂΜL (ref 0.17–1.22)
MONOCYTES NFR BLD AUTO: 10 % (ref 4–12)
NEUTROPHILS # BLD AUTO: 3.4 THOUSANDS/ÂΜL (ref 1.85–7.62)
NEUTS SEG NFR BLD AUTO: 59 % (ref 43–75)
NRBC BLD AUTO-RTO: 0 /100 WBCS
PLATELET # BLD AUTO: 341 THOUSANDS/UL (ref 149–390)
PMV BLD AUTO: 10.4 FL (ref 8.9–12.7)
POTASSIUM SERPL-SCNC: 4.3 MMOL/L (ref 3.5–5.3)
PROT SERPL-MCNC: 7.2 G/DL (ref 6.4–8.4)
RBC # BLD AUTO: 3.96 MILLION/UL (ref 3.81–5.12)
SODIUM SERPL-SCNC: 138 MMOL/L (ref 135–147)
TIBC SERPL-MCNC: 381 UG/DL (ref 250–450)
UIBC SERPL-MCNC: 186 UG/DL (ref 155–355)
WBC # BLD AUTO: 5.66 THOUSAND/UL (ref 4.31–10.16)

## 2024-08-21 PROCEDURE — 36415 COLL VENOUS BLD VENIPUNCTURE: CPT

## 2024-08-21 PROCEDURE — 83550 IRON BINDING TEST: CPT

## 2024-08-21 PROCEDURE — 99214 OFFICE O/P EST MOD 30 MIN: CPT | Performed by: PHYSICIAN ASSISTANT

## 2024-08-21 PROCEDURE — 82728 ASSAY OF FERRITIN: CPT

## 2024-08-21 PROCEDURE — 83540 ASSAY OF IRON: CPT

## 2024-08-21 PROCEDURE — 80053 COMPREHEN METABOLIC PANEL: CPT

## 2024-08-21 PROCEDURE — 85025 COMPLETE CBC W/AUTO DIFF WBC: CPT

## 2024-08-21 RX ORDER — SODIUM CHLORIDE 9 MG/ML
20 INJECTION, SOLUTION INTRAVENOUS ONCE
Status: CANCELLED | OUTPATIENT
Start: 2024-08-23

## 2024-08-21 NOTE — PROGRESS NOTES
[facility] HEMATOLOGY ONCOLOGY SPECIALISTS [place]  [address] [facility] [id number]  Hematology Ambulatory Follow-Up  Ely Hernadez, 1940, [id number]  8/21/2024      Assessment and Plan   1. Shortness of breath  2. Iron deficiency anemia due to chronic blood loss  - CBC and differential; Future  - Comprehensive metabolic panel; Future  - Iron Panel (Includes Ferritin, Iron Sat%, Iron, and TIBC); Future    84-year-old female who presents as follow-up for iron deficiency anemia secondary to possible GIB.  Patient was admitted to the hospital 12/2023 for symptomatic anemia found to have a hemoglobin of 5.6 requiring PRBC transfusion and IV  iron.  She had endoscopy/colonoscopy that were unrevealing.  She was recommended to have video capsule endoscopy which she never completed.  She did receive IV Venofer from our office in spring 2024 with resolution of her RHEA.  Her last labs from 07/2024 demonstrate hemoglobin of 12.3 g/dL, iron saturation 20%, UIBC normal and ferritin of 122.  She is complaining of recurrent dyspnea and fatigue today and feels that her anemia may have returned.  Having black stools while on iron. She does have COPD and follows with pulmonology.  History of abnormal lung imaging 08/2023 with GGO in right apex.  This is being followed by her pulmonologist.  Additionally she is following with cardiology and has been for TTE next week.  On exam today she has noted respiratory distress.  She is euvolemic and lungs are without any adventitious sounds.  .  Discussion/Plan   Unclear etiology of dyspnea.  Obtain repeat CBC, CMP, iron panel toda to rule out recurrent RHEA. If RHEA is present we will offer her IV iron support and have her follow-up with gastroenterologist  She declines L arm imaging   Proceed w TTE next week and cardiology follow up.  I strongly encouraged follow-up with her PCP for left arm pain and dyspnea (especially if todays labs arm normal)   RTC  4m pending above     Patient voiced agreement and understanding to the above.   Patient advised to call the Hematology/Oncology office with any questions and concerns regarding the above.    Barrier(s) to care: None  The patient is able to self care.    Staci Contreras PA-C   Medical Oncology/Hematology  [facility]    Subjective     Chief Complaint   Patient presents with    Follow-up       History of present illness: 84-year-old female with past medical history of stroke, heart failure, A-fib, diabetes, COPD, aortic valve replacement in 2007 on Coumadin, and CKD stage III who presents as a new consult for anemia.     Patient endorses history of iron deficiency anemia many years ago.  She has never seen a hematologist or had issues with anemia during childhood. She reports she had GI workup at this time including video capsule endoscopy that was unrevealing of any bleeding source.  She was prescribed oral iron pills which she has been taking for many years that have recently been stopped by her nephrologist approximately 1 year ago.     On chart review back to 05/2019, hgb baseline was around 12-14g/dL until she's was hospitalized in December 2023 for symptomatic anemia, hemoglobin at this time was 5.6.  She received 4 unit PRBC and one-time dose of IV Venofer 300 mg during hospitalization.  EGD with push/colonoscopy were performed which did not identify bleeding source.  Patient was recommended to have video capsule endoscopy as an outpatient however she has not seen GI in the clinic since her discharge.     Patient went to the emergency room this past week on 03/08 for symptomatic anemia.  Hemoglobin at this time was 7.1.  She was given 1 unit PRBC.      Former smoker, quit in 2007.  She denies alcohol or drug use.  Patient is retired, previously worked in a factory.  No personal family history of cancer.     03/2024: Hgb 7.8, MCV 74, platelets 415,000.  Ferritin 17, iron saturation  13%, TIBC normal       03/2024-04/2024: IV Venofer 300mg weekly x 4  05/2024: Hgb 12.4, MCV 86, platelets 383,000. No iron panel     05/2024: Called patient to review lab results. She has recurrent anemia after IV venofer infusions. Suspect she has ongoing blood loss, probable GI source. Recently had APC by EGD to duodenla and jejunal lesions. She denies gross vaginal bleeding, melena, hematochezia or hematuria.      Recommend additional IV Venofer 300mg weekly x 4.   Standing order for weekly CBCD and iron   Follow up with Gastroenterology asap   Follow up with our office in .     07/2024: WBC 5.7, Hgb 12.2, MCV 89, ,000.  Iron sat 20%, UIBC nromals. Ferritin 122.    08/21/24 :  Lab Results   Component Value Date    IRON 68 07/18/2024    TIBC 334 07/18/2024    FERRITIN 122 07/18/2024     Lab Results   Component Value Date    WBC 5.77 07/18/2024    HGB 12.2 07/18/2024    HCT 38.6 07/18/2024    MCV 89 07/18/2024     07/18/2024       Interval history: Patient complains of shortness of breath and fatigue have returned.  She cannot make the bed without feeling short of breath.  Her symptoms feel the same as when she was anemic previously.  She is having black stools which she attributes to the iron pills.  There is no change in her stool otherwise.  No hematuria.  She is complaining of right arm pain states that she fell a few days ago and it has hurt since.  She has not missed any of her Coumadin doses.  Her INR is therapeutic.  She was treated with antibiotics last week for upper respiratory illness.  COVID test was negative.    Review of Systems   All other systems reviewed and are negative.      Patient Active Problem List   Diagnosis    Hyperlipidemia    Chronic anticoagulation    Hypertension, essential    Coronary artery disease of native artery of native heart with stable angina pectoris (HCC)    Simple chronic bronchitis (HCC)    Diabetes mellitus with neurological manifestation (HCC)     Hypothyroidism    GERD (gastroesophageal reflux disease)    Anemia    AVM (arteriovenous malformation) of small bowel, acquired with hemorrhage    Angiectasia    Other vascular disorders of intestine (HCC)    Aortic valve replaced    Cardiomyopathy (HCC)    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HCC)    Stage 3b chronic kidney disease (HCC)    Chronic kidney disease-mineral and bone disorder    Primary osteoarthritis involving multiple joints    Paroxysmal atrial fibrillation (HCC)    Neutropenia associated with infection (HCC)    Herpes simplex labialis    Restrictive lung disease    Nocturnal hypoxemia    Supratherapeutic INR    H/O mechanical aortic valve replacement    Subtherapeutic international normalized ratio (INR)    Type 2 diabetes mellitus with stage 3b chronic kidney disease, with long-term current use of insulin (HCC)    Venous ulcer of right leg (HCC)    Chronic systolic (congestive) heart failure (HCC)    Lower extremity edema    Iron deficiency anemia, unspecified    Hemiplegia and hemiparesis following cerebral infarction affecting left non-dominant side (HCC)     Past Medical History:   Diagnosis Date    Acute anterior epistaxis 12/23/2023    Acute blood loss anemia 12/14/2023    Acute respiratory failure with hypoxia (HCC) 02/06/2024    Anemia     Aneurysm of thoracic aorta (HCC)     Angioedema 06/07/2019    Aortic valve disorder     Benign neoplasm of sigmoid colon     Cardiomyopathy (HCC)     last assessed: 10/10/2014    CHF (congestive heart failure) (HCC)     Chronic kidney disease     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HCC)     last assessed: 10/10/2014    COPD (chronic obstructive pulmonary disease) (HCC)     Diabetic nephropathy (HCC)     Disease of thyroid gland     RAMON (dyspnea on exertion)     GERD (gastroesophageal reflux disease)     History of emphysema (HCC)     History of transfusion     Hyperlipidemia     Hypertensive urgency 10/28/2023    Leg cramps 11/01/2023    Stroke-like symptoms  10/28/2023    TIA (transient ischemic attack)      Past Surgical History:   Procedure Laterality Date    AORTIC VALVE REPLACEMENT      CARDIAC PACEMAKER PLACEMENT      last assessed: 10/10/2014    CARDIAC SURGERY      aortic valve replacement    CHOLECYSTECTOMY      COLONOSCOPY      EGD      HYSTERECTOMY      INSERT / REPLACE / REMOVE PACEMAKER      KNEE SURGERY Right     OTHER SURGICAL HISTORY      Capsule ENDOscopy description: 2012    HI COLONOSCOPY FLX DX W/COLLJ SPEC WHEN PFRMD N/A 2018    Procedure: single balloon ENTEROSCOPY;  Surgeon: David Dennis MD;  Location: [facility];  Service: Gastroenterology    HI ESOPHAGOGASTRODUODENOSCOPY TRANSORAL DIAGNOSTIC N/A 2017    Procedure: EGD AND COLONOSCOPY;  Surgeon: Jay Mcleod III, MD;  Location: [facility];  Service: Gastroenterology     Family History   Problem Relation Age of Onset    No Known Problems Mother     No Known Problems Father      Social History     Socioeconomic History    Marital status:      Spouse name: None    Number of children: None    Years of education: None    Highest education level: None   Occupational History    None   Tobacco Use    Smoking status: Former     Current packs/day: 0.00     Average packs/day: 1 pack/day for 52.9 years (52.9 ttl pk-yrs)     Types: Cigarettes     Start date:      Quit date: 2007     Years since quittin.7     Passive exposure: Past    Smokeless tobacco: Former     Quit date:    Vaping Use    Vaping status: Never Used   Substance and Sexual Activity    Alcohol use: Never    Drug use: Never    Sexual activity: Not Currently     Partners: Male   Other Topics Concern    None   Social History Narrative    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 BID Freestyle lancets BID    Living independently with spouse     Social Determinants of Health     Financial Resource Strain: Not on file   Food Insecurity: No Food Insecurity (5/15/2024)    Hunger Vital Sign      Worried About Running Out of Food in the Last Year: Never true     Ran Out of Food in the Last Year: Never true   Transportation Needs: No Transportation Needs (5/15/2024)    PRAPARE - Transportation     Lack of Transportation (Medical): No     Lack of Transportation (Non-Medical): No   Physical Activity: Inactive (5/26/2021)    Exercise Vital Sign     Days of Exercise per Week: 0 days     Minutes of Exercise per Session: 0 min   Stress: No Stress Concern Present (5/26/2021)    [employer] [place] [employer] - Occupational Stress Questionnaire     Feeling of Stress : Not at all   Social Connections: Not on file   Intimate Partner Violence: Not on file   Housing Stability: Low Risk  (5/15/2024)    Housing Stability Vital Sign     Unable to Pay for Housing in the Last Year: No     Number of Times Moved in the Last Year: 1     Homeless in the Last Year: No       Current Outpatient Medications:     Accu-Chek FastClix Lancets MISC, Use 3 (three) times a day, Disp: 300 each, Rfl: 3    albuterol (Ventolin HFA) 90 mcg/act inhaler, Inhale 2 puffs every 6 (six) hours as needed for wheezing, Disp: 54 g, Rfl: 3    Ascorbic Acid (vitamin C) 1000 MG tablet, Take 1,000 mg by mouth daily, Disp: , Rfl:     aspirin (ECOTRIN LOW STRENGTH) 81 mg EC tablet, Take 1 tablet (81 mg total) by mouth daily Do not start before November 1, 2023., Disp: 30 tablet, Rfl: 0    atorvastatin (LIPITOR) 20 mg tablet, Take 20 mg by mouth daily, Disp: , Rfl:     Blood Glucose Monitoring Suppl (Accu-Chek Guide Me) w/Device KIT, USE 3 TIMES DAILY, Disp: 1 kit, Rfl: 2    Cholecalciferol (Vitamin D3) 50 MCG (2000 UT) TABS, Take 2,000 Units by mouth daily, Disp: , Rfl:     Ferrous Sulfate (IRON PO), Take by mouth daily in the early morning OTC, Disp: , Rfl:     furosemide (LASIX) 40 mg tablet, Take 1 tablet (40 mg total) by mouth 2 (two) times a day, Disp: 180 tablet, Rfl: 3    gabapentin (NEURONTIN) 300 mg capsule, Take 1 capsule (300 mg total) by  mouth 3 (three) times a day (Patient taking differently: Take 300 mg by mouth 2 (two) times a day), Disp: 270 capsule, Rfl: 3    glipiZIDE (GLUCOTROL) 10 mg tablet, Take 1 tablet (10 mg total) by mouth 2 (two) times a day before meals, Disp: 180 tablet, Rfl: 3    glucose blood (Accu-Chek Celeste Plus) test strip, Use 1 each 3 (three) times a day Use as instructed, Disp: 300 each, Rfl: 3    Insulin Pen Needle (BD Pen Needle Rosie U/F) 32G X 4 MM MISC, Use daily, Disp: 100 each, Rfl: 1    ipratropium-albuterol (DUO-NEB) 0.5-2.5 mg/3 mL nebulizer solution, inhale contents of 1 vial ( 3 milliliters ) in nebulizer four times a day, Disp: , Rfl:     Lancets (freestyle) lancets, Check blood glucose 3 times daily, Disp: 300 each, Rfl: 0    Levemir FlexPen 100 units/mL injection pen, INJECT SUBCUTANEOUSLY 26 UNITS  DAILY AT BEDTIME (Patient taking differently: Inject 30 Units under the skin), Disp: 30 mL, Rfl: 2    levothyroxine 50 mcg tablet, Take 1 tablet (50 mcg total) by mouth daily, Disp: 90 tablet, Rfl: 3    metoprolol tartrate (LOPRESSOR) 50 mg tablet, TAKE ONE-HALF TABLET BY  MOUTH TWICE DAILY, Disp: 90 tablet, Rfl: 3    oxygen gas, Inhale 2 L/min daily at bedtime as needed home oxygen nightly, Disp: , Rfl:     pantoprazole (PROTONIX) 40 mg tablet, TAKE 1 TABLET BY MOUTH DAILY AS  DIRECTED, Disp: 100 tablet, Rfl: 1    warfarin (COUMADIN) 5 mg tablet, TAKE 1 TO 2 TABLETS BY  MOUTH DAILY OR AS DIRECTED, Disp: 180 tablet, Rfl: 3    b complex vitamins capsule, Take 1 capsule by mouth daily, Disp: 30 capsule, Rfl: 2    enoxaparin (LOVENOX) 80 mg/0.8 mL, Inject 0.7 mL (70 mg total) under the skin every 24 hours Hold Warfarin 5 days prior. Start Lovenox 4 days prior.  No Lovenox the morning of the procedure. Resume both Lovenox and Warfarin at the discretion of the surgeon (preferably that evening) and check INR 4 days after restarting. Inject in the abdomen about 2 inches from the belly button and rotate sides at each  "injection. (Patient not taking: Reported on 8/13/2024), Disp: 0.7 mL, Rfl: 10    Current Facility-Administered Medications:     lidocaine (LMX) 4 % cream, , Topical, Once, Vesta Garcia PA-C  No Known Allergies    Objective   /78 (BP Location: Left arm, Patient Position: Sitting, Cuff Size: Standard)   Pulse 65   Temp 97.8 °F (36.6 °C) (Temporal)   Resp 15   Ht 5' 6\" (1.676 m)   Wt 73.9 kg (163 lb)   SpO2 95%   BMI 26.31 kg/m²    Physical Exam  Vitals reviewed.   HENT:      Head: Normocephalic.   Cardiovascular:      Rate and Rhythm: Normal rate and regular rhythm.      Heart sounds: Murmur heard.   Pulmonary:      Effort: Pulmonary effort is normal. No respiratory distress.      Breath sounds: Normal breath sounds. No wheezing or rales.   Musculoskeletal:      Cervical back: Neck supple.      Comments: Limited left arm abduction secondary to pain.  No ecchymosis or bruising overlying skin.   Skin:     Findings: No rash.   Neurological:      Mental Status: She is alert.         Result Review  Labs:  Ancillary Orders on 08/02/2024   Component Date Value Ref Range Status    Protime 08/02/2024 29.3 (H)  12.3 - 15.0 seconds Final    INR 08/02/2024 2.81 (H)  0.85 - 1.19 Final       Imaging:   I reviewed relevant imaging    Please note:  This report has been generated by a voice recognition software system. Therefore there may be syntax, spelling, and/or grammatical errors. Please call if you have any questions.  "

## 2024-08-21 NOTE — TELEPHONE ENCOUNTER
Called patient to review her lab results.  Labs are consistent with a recurrent anemia hemoglobin 11 g/dL with ferritin level of 24.  Her hemoglobin approximately 1 month ago was 12 g/dL and ferritin was 122.  She is symptomatic with shortness of breath and fatigue which she felt previously when she had RHEA.  she has history of multiple angiectasia and is on coumadin therapy. We will arrange for IV Venofer 300mg weekly x4 in our infusion room. I will reach out to her gastroenterologist for recommendations regarding repeat EGD.  She will repeat CBC, type and screen next week.

## 2024-08-22 ENCOUNTER — TELEPHONE (OUTPATIENT)
Dept: GASTROENTEROLOGY | Facility: CLINIC | Age: 84
End: 2024-08-22

## 2024-08-22 ENCOUNTER — PREP FOR PROCEDURE (OUTPATIENT)
Dept: GASTROENTEROLOGY | Facility: CLINIC | Age: 84
End: 2024-08-22

## 2024-08-22 ENCOUNTER — OFFICE VISIT (OUTPATIENT)
Dept: WOUND CARE | Facility: CLINIC | Age: 84
End: 2024-08-22
Payer: COMMERCIAL

## 2024-08-22 ENCOUNTER — TELEPHONE (OUTPATIENT)
Age: 84
End: 2024-08-22

## 2024-08-22 VITALS
HEART RATE: 62 BPM | RESPIRATION RATE: 18 BRPM | SYSTOLIC BLOOD PRESSURE: 161 MMHG | TEMPERATURE: 97.4 F | DIASTOLIC BLOOD PRESSURE: 70 MMHG

## 2024-08-22 DIAGNOSIS — I87.331 CHRONIC VENOUS HYPERTENSION WITH ULCER AND INFLAMMATION INVOLVING RIGHT SIDE (HCC): Primary | ICD-10-CM

## 2024-08-22 DIAGNOSIS — I99.8 ANGIECTASIA: ICD-10-CM

## 2024-08-22 DIAGNOSIS — K55.21 AVM (ARTERIOVENOUS MALFORMATION) OF SMALL BOWEL, ACQUIRED WITH HEMORRHAGE: Primary | ICD-10-CM

## 2024-08-22 DIAGNOSIS — I87.301 STASIS EDEMA OF RIGHT LOWER EXTREMITY: ICD-10-CM

## 2024-08-22 PROCEDURE — 99213 OFFICE O/P EST LOW 20 MIN: CPT | Performed by: ORTHOPAEDIC SURGERY

## 2024-08-22 PROCEDURE — 99214 OFFICE O/P EST MOD 30 MIN: CPT | Performed by: ORTHOPAEDIC SURGERY

## 2024-08-22 RX ORDER — LIDOCAINE 40 MG/G
CREAM TOPICAL ONCE
Status: COMPLETED | OUTPATIENT
Start: 2024-08-22 | End: 2024-08-22

## 2024-08-22 RX ADMIN — LIDOCAINE: 40 CREAM TOPICAL at 08:42

## 2024-08-22 NOTE — TELEPHONE ENCOUNTER
----- Message from Perri BRISCOE sent at 8/22/2024  8:44 AM EDT -----  Sure, no problem. Is she scheduled for the procedure yet?  ----- Message -----  From: Jay Mcleod III, MD  Sent: 8/22/2024   7:19 AM EDT  To: Perri Hurley LPN; MJ Cooper-    Can you reach out to Ely and give her instructions for an enteroscopy with APC to hold her Coumadin for 5 days and have bridge therapy for her valve with anticoagulation please?    Thank you  Dr Mcleod

## 2024-08-22 NOTE — PATIENT INSTRUCTIONS
Orders Placed This Encounter   Procedures    Wound cleansing and dressings Venous Ulcer Right Pretibial     · Wound cleansing and dressings Venous Ulcer Right Pretibial                                             Right Leg wound:      Wash your hands with soap and water. Remove old dressing, discard into plastic bag and place in trash. Cleanse the wound with normal saline solution prior to applying a clean dressing. Do not use tissue or cotton balls. Do not scrub the wound. Pat dry using gauze.      Shower yes, if able to use cast cover. Do not get dressing wet.     Apply skin prep to john wound to protect    Apply polymem max ag to the wound bed  Cover with gauze.  Secure with tape if your skin is breaking down from tape then need to wrap with Armani and put tape on Armani   Change dressing 3 times weekly.     Wear compression during the day     Standing Status:   Future     Standing Expiration Date:   8/29/2024

## 2024-08-22 NOTE — TELEPHONE ENCOUNTER
Caller: [facility] [place]    Doctor: Vascular    Reason for call: Calling to schedule consult regarding chronic venus ulcer RLE. CHARISSE and LEV was ordered by Vesta VELASCO.  Advised to scheduled imaging first and then call for appointment.       Call back#: [phone]

## 2024-08-22 NOTE — PROGRESS NOTES
Patient ID: Ely Hernadez is a 84 y.o. female Date of Birth 1940       Chief Complaint   Patient presents with    Follow Up Wound Care Visit     Venous ulcer right leg       Allergies:  Patient has no known allergies.    Diagnosis:   Diagnosis ICD-10-CM Associated Orders   1. Chronic venous hypertension with ulcer and inflammation involving right side (HCC)  I87.331 lidocaine (LMX) 4 % cream     Wound cleansing and dressings Venous Ulcer Right Pretibial     Wound Procedure Treatment Venous Ulcer Right Pretibial     VAS ARTERIAL DUPLEX- LOWER LIMB BILATERAL     VAS Lower extremity venous insufficiency duplex, bilateral w/ measurements     Ambulatory Referral to Vascular Surgery      2. Stasis edema of right lower extremity  I87.301 VAS ARTERIAL DUPLEX- LOWER LIMB BILATERAL     VAS Lower extremity venous insufficiency duplex, bilateral w/ measurements     Ambulatory Referral to Vascular Surgery           Assessment and Plan :   Follow up evaluation of right venous ulcer slightly worsened and increased in size and RLE edema.   Today's in office ABIs were R 0.59, L 0.76, pt to f/u with vascular studies as ordered and referred to  vascular surgery.  Change wound management to skin prep to wound edges with Polymem Ag Max, see wound orders below   Continue compression with Tubigrip  DO NOT WET WOUND.  No harsh cleansers such as alcohol, peroxide, or antibacterial soap, do not submerge in water  Can cleanse with NSS at dressing changes.   Counseled on importance of frequent elevation of leg and increase exercise/walking for wound healing.  Follow-up in 2 week(s) or call sooner with questions or concerns or any signs of infection such as redness, swelling, increased/purulent drainage, fever, chills, increased severe pain.     Subjective:   6/28: Pt is an 84 y.o. female with pmhx HTN, DMII (A1c 8.7),  CKD, Pafib, CVA (10/28/23 on Coumadin/ASA 81mg) with residual deficits (left sided facial drop and leg weakness) well  known to [facility] who presents for follow up evaluation of non healing RLE venous ulcer which has been present for about 2.5 years. Pt has been covering wound with a bandaid. Does not have an odor. No smoking, ETOH or drug use.  Pt denies any sob, fatigue, N/V, CP, fever or chills.    7/12: Patient presents for followup evaluation of RLE venous ulcer. No increased pain or drainage. Pt is frustrated with chronic wound. Has been using Polymem on the wound bed and Tubigrip for compression.  Pt denies any fevers or chills.    7/25: Patient presents for followup evaluation of RLE venous ulcer. Since last visit pt was started on Bactrim for positive wound culture with abnormal 2+ Growth of Staphylococcus Aureus. The tissue exam demonstrated chronic inflammation and fibrosis. No malignancy, nor pyoderma gangrenosum were identified.  Pt noticed wound decreasing in size. Has been using Polymem on the wound bed and Tubigrip for compression.  Pt denies any fevers or chills.    8/2: Patient presents for followup evaluation of RLE venous ulcer. Pt completed Bactrim as directed. No issues. Pt states this is the best she has felt in 2 years. No fevers or chills.    8/22: Patient presents for followup evaluation of RLE venous ulcer. Pt states she has been wetting wound with mild soap and water. She noticed a scab formed and came off on dressing. In office CHEYANNE today; R 0.59, L 0.76. No fevers or chills.          The following portions of the patient's history were reviewed and updated as appropriate:   Patient Active Problem List   Diagnosis    Hyperlipidemia    Chronic anticoagulation    Hypertension, essential    Coronary artery disease of native artery of native heart with stable angina pectoris (HCC)    Simple chronic bronchitis (HCC)    Diabetes mellitus with neurological manifestation (HCC)    Hypothyroidism    GERD (gastroesophageal reflux disease)    Anemia    AVM (arteriovenous malformation) of small bowel, acquired with  hemorrhage    Angiectasia    Other vascular disorders of intestine (HCC)    Aortic valve replaced    Cardiomyopathy (HCC)    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HCC)    Stage 3b chronic kidney disease (HCC)    Chronic kidney disease-mineral and bone disorder    Primary osteoarthritis involving multiple joints    Paroxysmal atrial fibrillation (HCC)    Neutropenia associated with infection (HCC)    Herpes simplex labialis    Restrictive lung disease    Nocturnal hypoxemia    Supratherapeutic INR    H/O mechanical aortic valve replacement    Subtherapeutic international normalized ratio (INR)    Type 2 diabetes mellitus with stage 3b chronic kidney disease, with long-term current use of insulin (HCC)    Venous ulcer of right leg (HCC)    Chronic systolic (congestive) heart failure (HCC)    Lower extremity edema    Iron deficiency anemia due to chronic blood loss    Hemiplegia and hemiparesis following cerebral infarction affecting left non-dominant side (HCC)     Past Medical History:   Diagnosis Date    Acute anterior epistaxis 12/23/2023    Acute blood loss anemia 12/14/2023    Acute respiratory failure with hypoxia (HCC) 02/06/2024    Anemia     Aneurysm of thoracic aorta (HCC)     Angioedema 06/07/2019    Aortic valve disorder     Benign neoplasm of sigmoid colon     Cardiomyopathy (HCC)     last assessed: 10/10/2014    CHF (congestive heart failure) (HCC)     Chronic kidney disease     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HCC)     last assessed: 10/10/2014    COPD (chronic obstructive pulmonary disease) (HCC)     Diabetic nephropathy (HCC)     Disease of thyroid gland     RAMON (dyspnea on exertion)     GERD (gastroesophageal reflux disease)     History of emphysema (HCC)     History of transfusion     Hyperlipidemia     Hypertensive urgency 10/28/2023    Leg cramps 11/01/2023    Stroke-like symptoms 10/28/2023    TIA (transient ischemic attack)      Past Surgical History:   Procedure Laterality Date    AORTIC VALVE  REPLACEMENT      CARDIAC PACEMAKER PLACEMENT      last assessed: 10/10/2014    CARDIAC SURGERY      aortic valve replacement    CHOLECYSTECTOMY      COLONOSCOPY      EGD      HYSTERECTOMY      INSERT / REPLACE / REMOVE PACEMAKER      KNEE SURGERY Right     OTHER SURGICAL HISTORY      Capsule ENDOscopy description: 2012    [place] COLONOSCOPY FLX DX W/COLLJ SPEC WHEN PFRMD N/A 2018    Procedure: single balloon ENTEROSCOPY;  Surgeon: David Dennis MD;  Location: [facility];  Service: Gastroenterology    WV ESOPHAGOGASTRODUODENOSCOPY TRANSORAL DIAGNOSTIC N/A 2017    Procedure: EGD AND COLONOSCOPY;  Surgeon: Jay Mcleod III, MD;  Location: [facility];  Service: Gastroenterology     Family History   Problem Relation Age of Onset    No Known Problems Mother     No Known Problems Father      Social History     Socioeconomic History    Marital status:      Spouse name: None    Number of children: None    Years of education: None    Highest education level: None   Occupational History    None   Tobacco Use    Smoking status: Former     Current packs/day: 0.00     Average packs/day: 1 pack/day for 52.9 years (52.9 ttl pk-yrs)     Types: Cigarettes     Start date:      Quit date: 2007     Years since quittin.7     Passive exposure: Past    Smokeless tobacco: Former     Quit date:    Vaping Use    Vaping status: Never Used   Substance and Sexual Activity    Alcohol use: Never    Drug use: Never    Sexual activity: Not Currently     Partners: Male   Other Topics Concern    None   Social History Narrative    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 BID Freestyle lancets BID    Living independently with spouse     Social Determinants of Health     Financial Resource Strain: Not on file   Food Insecurity: No Food Insecurity (5/15/2024)    Hunger Vital Sign     Worried About Running Out of Food in the Last Year: Never true     Ran Out of Food in the Last Year: Never true    Transportation Needs: No Transportation Needs (5/15/2024)    PRAPARE - Transportation     Lack of Transportation (Medical): No     Lack of Transportation (Non-Medical): No   Physical Activity: Inactive (5/26/2021)    Exercise Vital Sign     Days of Exercise per Week: 0 days     Minutes of Exercise per Session: 0 min   Stress: No Stress Concern Present (5/26/2021)    [employer] [place] [employer] - Occupational Stress Questionnaire     Feeling of Stress : Not at all   Social Connections: Not on file   Intimate Partner Violence: Not on file   Housing Stability: Low Risk  (5/15/2024)    Housing Stability Vital Sign     Unable to Pay for Housing in the Last Year: No     Number of Times Moved in the Last Year: 1     Homeless in the Last Year: No       Current Outpatient Medications:     Accu-Chek FastClix Lancets MISC, Use 3 (three) times a day, Disp: 300 each, Rfl: 3    albuterol (Ventolin HFA) 90 mcg/act inhaler, Inhale 2 puffs every 6 (six) hours as needed for wheezing, Disp: 54 g, Rfl: 3    Ascorbic Acid (vitamin C) 1000 MG tablet, Take 1,000 mg by mouth daily, Disp: , Rfl:     aspirin (ECOTRIN LOW STRENGTH) 81 mg EC tablet, Take 1 tablet (81 mg total) by mouth daily Do not start before November 1, 2023., Disp: 30 tablet, Rfl: 0    atorvastatin (LIPITOR) 20 mg tablet, Take 20 mg by mouth daily, Disp: , Rfl:     b complex vitamins capsule, Take 1 capsule by mouth daily, Disp: 30 capsule, Rfl: 2    Blood Glucose Monitoring Suppl (Accu-Chek Guide Me) w/Device KIT, USE 3 TIMES DAILY, Disp: 1 kit, Rfl: 2    Cholecalciferol (Vitamin D3) 50 MCG (2000 UT) TABS, Take 2,000 Units by mouth daily, Disp: , Rfl:     enoxaparin (LOVENOX) 80 mg/0.8 mL, Inject 0.7 mL (70 mg total) under the skin every 24 hours Hold Warfarin 5 days prior. Start Lovenox 4 days prior.  No Lovenox the morning of the procedure. Resume both Lovenox and Warfarin at the discretion of the surgeon (preferably that evening) and check INR 4 days  after restarting. Inject in the abdomen about 2 inches from the belly button and rotate sides at each injection. (Patient not taking: Reported on 8/13/2024), Disp: 0.7 mL, Rfl: 10    Ferrous Sulfate (IRON PO), Take by mouth daily in the early morning OTC, Disp: , Rfl:     furosemide (LASIX) 40 mg tablet, Take 1 tablet (40 mg total) by mouth 2 (two) times a day, Disp: 180 tablet, Rfl: 3    gabapentin (NEURONTIN) 300 mg capsule, Take 1 capsule (300 mg total) by mouth 3 (three) times a day (Patient taking differently: Take 300 mg by mouth 2 (two) times a day), Disp: 270 capsule, Rfl: 3    glipiZIDE (GLUCOTROL) 10 mg tablet, Take 1 tablet (10 mg total) by mouth 2 (two) times a day before meals, Disp: 180 tablet, Rfl: 3    glucose blood (Accu-Chek Celeste Plus) test strip, Use 1 each 3 (three) times a day Use as instructed, Disp: 300 each, Rfl: 3    Insulin Pen Needle (BD Pen Needle Rosie U/F) 32G X 4 MM MISC, Use daily, Disp: 100 each, Rfl: 1    ipratropium-albuterol (DUO-NEB) 0.5-2.5 mg/3 mL nebulizer solution, inhale contents of 1 vial ( 3 milliliters ) in nebulizer four times a day, Disp: , Rfl:     Lancets (freestyle) lancets, Check blood glucose 3 times daily, Disp: 300 each, Rfl: 0    Levemir FlexPen 100 units/mL injection pen, INJECT SUBCUTANEOUSLY 26 UNITS  DAILY AT BEDTIME (Patient taking differently: Inject 30 Units under the skin), Disp: 30 mL, Rfl: 2    levothyroxine 50 mcg tablet, Take 1 tablet (50 mcg total) by mouth daily, Disp: 90 tablet, Rfl: 3    metoprolol tartrate (LOPRESSOR) 50 mg tablet, TAKE ONE-HALF TABLET BY  MOUTH TWICE DAILY, Disp: 90 tablet, Rfl: 3    oxygen gas, Inhale 2 L/min daily at bedtime as needed home oxygen nightly, Disp: , Rfl:     pantoprazole (PROTONIX) 40 mg tablet, TAKE 1 TABLET BY MOUTH DAILY AS  DIRECTED, Disp: 100 tablet, Rfl: 1    warfarin (COUMADIN) 5 mg tablet, TAKE 1 TO 2 TABLETS BY  MOUTH DAILY OR AS DIRECTED, Disp: 180 tablet, Rfl: 3    Current Facility-Administered  Medications:     lidocaine (LMX) 4 % cream, , Topical, Once, Vesta Garcia PA-C    Review of Systems   Constitutional:  Negative for appetite change, chills, fatigue, fever and unexpected weight change.   HENT:  Negative for congestion, hearing loss and postnasal drip.    Respiratory:  Negative for cough and shortness of breath.    Cardiovascular:  Positive for leg swelling.   Musculoskeletal:  Positive for gait problem.   Skin:  Positive for wound (RLE). Negative for rash.   Neurological:  Negative for numbness.   Hematological:  Does not bruise/bleed easily.   Psychiatric/Behavioral: Negative.           Objective:  /70   Pulse 62   Temp (!) 97.4 °F (36.3 °C)   Resp 18   Pain Score: 0-No pain     Physical Exam  Vitals reviewed.   Constitutional:       General: She is not in acute distress.     Appearance: Normal appearance. She is well-developed and normal weight.   HENT:      Head: Normocephalic and atraumatic.   Cardiovascular:      Rate and Rhythm: Normal rate.   Pulmonary:      Effort: Pulmonary effort is normal.   Musculoskeletal:         General: No deformity.      Right lower leg: Edema present.      Left lower leg: No edema.   Skin:     General: Skin is warm and dry.      Findings: Wound (RLE) present.             Comments: Wound increased in size pink granulated wound bed with macerated wound edges. See wound assessment   Neurological:      General: No focal deficit present.      Mental Status: She is alert and oriented to person, place, and time.      Gait: Gait normal.   Psychiatric:         Mood and Affect: Mood and affect normal.         Behavior: Behavior normal. Behavior is cooperative.         Wound 10/29/23 Venous Ulcer Pretibial Right (Active)   Wound Image Images linked 08/22/24 0839   Wound Description Pink;Epithelialization 08/22/24 0839   Radha-wound Assessment Fragile 08/22/24 0839   Wound Length (cm) 1.5 cm 08/22/24 0839   Wound Width (cm) 1 cm 08/22/24 0839   Wound Depth (cm) 0.1  cm 08/22/24 0839   Wound Surface Area (cm^2) 1.5 cm^2 08/22/24 0839   Wound Volume (cm^3) 0.15 cm^3 08/22/24 0839   Calculated Wound Volume (cm^3) 0.15 cm^3 08/22/24 0839   Change in Wound Size % 96.25 08/22/24 0839   Drainage Amount Small 08/22/24 0839   Drainage Description Serosanguineous 08/22/24 0839   Non-staged Wound Description Full thickness 08/22/24 0839   Dressing Status Intact 08/22/24 0839             Wound Instructions:  Orders Placed This Encounter   Procedures    Wound cleansing and dressings Venous Ulcer Right Pretibial     · Wound cleansing and dressings Venous Ulcer Right Pretibial                                             Right Leg wound:      Wash your hands with soap and water. Remove old dressing, discard into plastic bag and place in trash. Cleanse the wound with normal saline solution prior to applying a clean dressing. Do not use tissue or cotton balls. Do not scrub the wound. Pat dry using gauze.      Shower yes, if able to use cast cover. Do not get dressing wet.     Apply skin prep to john wound to protect    Apply polymem max ag to the wound bed  Cover with gauze.  Secure with tape if your skin is breaking down from tape then need to wrap with Armani and put tape on Armani   Change dressing 3 times weekly.     Wear compression during the day     Standing Status:   Future     Standing Expiration Date:   8/29/2024    Wound Procedure Treatment Venous Ulcer Right Pretibial     This order was created via procedure documentation    Ambulatory Referral to Vascular Surgery     Standing Status:   Future     Standing Expiration Date:   8/22/2025     Referral Priority:   Routine     Referral Type:   Consult - AMB     Referral Reason:   Specialty Services Required     Requested Specialty:   Vascular Surgery     Number of Visits Requested:   1     Expiration Date:   8/22/2025       Vesta Garcia PA-C, [facility]      Portions of the record may have been created with voice recognition software. Occasional  "wrong word or \"sound alike\" substitutions may have occurred due to the inherent limitations of voice recognition software. Read the chart carefully and recognize, using context, where substitutions have occurred.    "

## 2024-08-22 NOTE — TELEPHONE ENCOUNTER
Left message to call back to schedule procedure.    Staff message from Dr. Mcleod below:    Can you put her on directly for an EGD push enteroscopy ASAP for small bowel angiectasia iron deficiency anemia?     She needs to have her Coumadin held and bridge therapy with Lovenox.  I sent a message to cardiology.    23-May-2019 16:14

## 2024-08-22 NOTE — PROGRESS NOTES
Wound Procedure Treatment Venous Ulcer Right Pretibial    Performed by: Graciela Valerio RN  Authorized by: Vesta Garcia PA-C    Associated wounds:   Wound 10/29/23 Venous Ulcer Pretibial Right  Wound cleansed with:  NSS  Applied to periwound:  Skin prep  Applied primary dressing:  Silver and Polymem foam  Applied secondary dressing:  ABD and Gauze  Dressing secured with:  Tape and Armani

## 2024-08-23 ENCOUNTER — TELEPHONE (OUTPATIENT)
Dept: CARDIOLOGY CLINIC | Facility: CLINIC | Age: 84
End: 2024-08-23

## 2024-08-23 ENCOUNTER — HOSPITAL ENCOUNTER (OUTPATIENT)
Dept: INFUSION CENTER | Facility: CLINIC | Age: 84
End: 2024-08-23
Payer: COMMERCIAL

## 2024-08-23 VITALS
DIASTOLIC BLOOD PRESSURE: 67 MMHG | HEART RATE: 60 BPM | RESPIRATION RATE: 18 BRPM | SYSTOLIC BLOOD PRESSURE: 161 MMHG | TEMPERATURE: 96.6 F

## 2024-08-23 DIAGNOSIS — I35.9 AORTIC VALVE DISORDER: ICD-10-CM

## 2024-08-23 DIAGNOSIS — D50.0 IRON DEFICIENCY ANEMIA DUE TO CHRONIC BLOOD LOSS: Primary | ICD-10-CM

## 2024-08-23 DIAGNOSIS — Z95.2 H/O MECHANICAL AORTIC VALVE REPLACEMENT: ICD-10-CM

## 2024-08-23 PROCEDURE — 96365 THER/PROPH/DIAG IV INF INIT: CPT

## 2024-08-23 RX ORDER — SODIUM CHLORIDE 9 MG/ML
20 INJECTION, SOLUTION INTRAVENOUS ONCE
Status: COMPLETED | OUTPATIENT
Start: 2024-08-23 | End: 2024-08-23

## 2024-08-23 RX ORDER — SODIUM CHLORIDE 9 MG/ML
20 INJECTION, SOLUTION INTRAVENOUS ONCE
Status: CANCELLED | OUTPATIENT
Start: 2024-08-30

## 2024-08-23 RX ORDER — ENOXAPARIN SODIUM 100 MG/ML
1 INJECTION SUBCUTANEOUS EVERY 24 HOURS
Qty: 0.7 ML | Refills: 10 | Status: SHIPPED | OUTPATIENT
Start: 2024-08-23

## 2024-08-23 RX ADMIN — IRON SUCROSE 300 MG: 20 INJECTION, SOLUTION INTRAVENOUS at 11:53

## 2024-08-23 RX ADMIN — SODIUM CHLORIDE 20 ML/HR: 0.9 INJECTION, SOLUTION INTRAVENOUS at 11:49

## 2024-08-23 NOTE — TELEPHONE ENCOUNTER
Called and s/w pt. Advised that her last dose of Warfarin will be 9/6, 9/7 is her 1st day of Warfarin hold, 9/8 start the Lovenox. No Lovenox the AM of sx. As long as it is ok with the surgeon she can restart both Lovenox and Warfarin the evening of sx on 9/12. Check INR on 9/16.

## 2024-08-23 NOTE — TELEPHONE ENCOUNTER
Spoke with the patient and she scheduled her procedure. Prep instructions will be mailed to her home.    Procedure: EGD push Enteroscopy   Scheduled date of procedure (as of today):9/12/24  Physician performing procedure:Harsha  Location of procedure:[facility]  Instructions reviewed with patient by: Nata MENDIOLA  Clearances:  none    Coumadin 5 day hold with Lovenox bridge. Advised Perri (nurse) of [facility] to provide instructions for the Lovenox bridge.

## 2024-08-23 NOTE — PROGRESS NOTES
Pt here venofer, offering no complaints.  IV placed with positive blood return noted.  Tolerated infusion without incident.  PIV removed.  AVS declined.  Aware of next appt 8/30 @ 1130 am. Walked out in stable condition.

## 2024-08-23 NOTE — TELEPHONE ENCOUNTER
----- Message from Nata MENDIOLA sent at 8/23/2024  9:35 AM EDT -----  Hello! Just wanted to let you know the patient has scheduled her Enteroscopy on 9/12/24 with Dr. Mcleod. She said if you call her today, she will not be home until around 2pm.  ----- Message -----  From: Perri Hurley LPN  Sent: 8/22/2024  11:22 AM EDT  To: Nata Garcia MA    Ok, thanks!  ----- Message -----  From: Nata Garcia MA  Sent: 8/22/2024  11:13 AM EDT  To: Perri Hurley LPN; Jay Mcleod III, MD    Patient is not scheduled yet as I left her a message to call back to schedule.    Thanks  ----- Message -----  From: Perri Hurley LPN  Sent: 8/22/2024   8:44 AM EDT  To: Jay Mcleod III, MD; Nata Garcia MA    Sure, no problem. Is she scheduled for the procedure yet?  ----- Message -----  From: Jay Mcleod III, MD  Sent: 8/22/2024   7:19 AM EDT  To: Perri Hurley LPN; MJ Cooper-    Can you reach out to Ely and give her instructions for an enteroscopy with APC to hold her Coumadin for 5 days and have bridge therapy for her valve with anticoagulation please?    Thank you  Dr Mcleod

## 2024-08-26 ENCOUNTER — HOSPITAL ENCOUNTER (OUTPATIENT)
Dept: NON INVASIVE DIAGNOSTICS | Facility: CLINIC | Age: 84
Discharge: HOME | End: 2024-08-26
Payer: COMMERCIAL

## 2024-08-26 VITALS
HEIGHT: 66 IN | WEIGHT: 163 LBS | HEART RATE: 60 BPM | DIASTOLIC BLOOD PRESSURE: 67 MMHG | SYSTOLIC BLOOD PRESSURE: 161 MMHG | BODY MASS INDEX: 26.2 KG/M2

## 2024-08-26 DIAGNOSIS — R06.09 DYSPNEA ON EXERTION: ICD-10-CM

## 2024-08-26 LAB
AORTIC ROOT: 3.1 CM
AORTIC VALVE MEAN VELOCITY: 9.3 M/S
APICAL FOUR CHAMBER EJECTION FRACTION: 44 %
AV AREA BY CONTINUOUS VTI: 2.3 CM2
AV AREA PEAK VELOCITY: 1.8 CM2
AV LVOT MEAN GRADIENT: 0 MMHG
AV LVOT PEAK GRADIENT: 1 MMHG
AV MEAN GRADIENT: 5 MMHG
AV PEAK GRADIENT: 10 MMHG
AV VALVE AREA: 2.29 CM2
AV VELOCITY RATIO: 0.34
BSA FOR ECHO PROCEDURE: 1.83 M2
DOP CALC AO PEAK VEL: 1.38 M/S
DOP CALC AO VTI: 26.41 CM
DOP CALC LVOT AREA: 5.31 CM2
DOP CALC LVOT CARDIAC INDEX: 2.01 L/MIN/M2
DOP CALC LVOT CARDIAC OUTPUT: 3.68 L/MIN
DOP CALC LVOT DIAMETER: 2.6 CM
DOP CALC LVOT PEAK VEL VTI: 11.4 CM
DOP CALC LVOT PEAK VEL: 0.47 M/S
DOP CALC LVOT STROKE INDEX: 32.8 ML/M2
DOP CALC LVOT STROKE VOLUME: 60.5
E WAVE DECELERATION TIME: 135 MS
E/A RATIO: 1.81
FRACTIONAL SHORTENING: 20 (ref 28–44)
INTERVENTRICULAR SEPTUM IN DIASTOLE (PARASTERNAL SHORT AXIS VIEW): 0.9 CM
INTERVENTRICULAR SEPTUM: 0.9 CM (ref 0.6–1.1)
LAAS-AP2: 25.3 CM2
LAAS-AP4: 24.1 CM2
LEFT ATRIUM SIZE: 4.6 CM
LEFT ATRIUM VOLUME (MOD BIPLANE): 88 ML
LEFT ATRIUM VOLUME INDEX (MOD BIPLANE): 48.1 ML/M2
LEFT INTERNAL DIMENSION IN SYSTOLE: 3.7 CM (ref 2.1–4)
LEFT VENTRICULAR INTERNAL DIMENSION IN DIASTOLE: 4.6 CM (ref 3.5–6)
LEFT VENTRICULAR POSTERIOR WALL IN END DIASTOLE: 0.9 CM
LEFT VENTRICULAR STROKE VOLUME: 42 ML
LVSV (TEICH): 42 ML
MV E'TISSUE VEL-SEP: 6 CM/S
MV PEAK A VEL: 0.67 M/S
MV PEAK E VEL: 121 CM/S
MV STENOSIS PRESSURE HALF TIME: 39 MS
MV VALVE AREA P 1/2 METHOD: 5.64
RA PRESSURE ESTIMATED: 8 MMHG
RIGHT ATRIUM AREA SYSTOLE A4C: 13 CM2
RIGHT VENTRICLE ID DIMENSION: 3.4 CM
RV PSP: 61 MMHG
SL CV LEFT ATRIUM LENGTH A2C: 6 CM
SL CV LV EF: 40
SL CV PED ECHO LEFT VENTRICLE DIASTOLIC VOLUME (MOD BIPLANE) 2D: 98 ML
SL CV PED ECHO LEFT VENTRICLE SYSTOLIC VOLUME (MOD BIPLANE) 2D: 56 ML
TR MAX PG: 53 MMHG
TR PEAK VELOCITY: 3.6 M/S
TRICUSPID ANNULAR PLANE SYSTOLIC EXCURSION: 1.4 CM
TRICUSPID VALVE PEAK REGURGITATION VELOCITY: 3.63 M/S

## 2024-08-26 PROCEDURE — 93306 TTE W/DOPPLER COMPLETE: CPT

## 2024-08-26 PROCEDURE — 93306 TTE W/DOPPLER COMPLETE: CPT | Performed by: INTERNAL MEDICINE

## 2024-08-27 ENCOUNTER — ANTICOAG VISIT (OUTPATIENT)
Dept: CARDIOLOGY CLINIC | Facility: CLINIC | Age: 84
End: 2024-08-27

## 2024-08-27 ENCOUNTER — APPOINTMENT (OUTPATIENT)
Age: 84
End: 2024-08-27
Payer: COMMERCIAL

## 2024-08-27 DIAGNOSIS — N18.32 STAGE 3B CHRONIC KIDNEY DISEASE (HCC): ICD-10-CM

## 2024-08-27 DIAGNOSIS — D50.9 IRON DEFICIENCY ANEMIA, UNSPECIFIED IRON DEFICIENCY ANEMIA TYPE: ICD-10-CM

## 2024-08-27 DIAGNOSIS — D50.0 IRON DEFICIENCY ANEMIA DUE TO CHRONIC BLOOD LOSS: ICD-10-CM

## 2024-08-27 LAB
ABO GROUP BLD: NORMAL
BASOPHILS # BLD AUTO: 0.06 THOUSANDS/ÂΜL (ref 0–0.1)
BASOPHILS NFR BLD AUTO: 1 % (ref 0–1)
BLD GP AB SCN SERPL QL: NEGATIVE
EOSINOPHIL # BLD AUTO: 0.22 THOUSAND/ÂΜL (ref 0–0.61)
EOSINOPHIL NFR BLD AUTO: 5 % (ref 0–6)
ERYTHROCYTE [DISTWIDTH] IN BLOOD BY AUTOMATED COUNT: 17.1 % (ref 11.6–15.1)
FERRITIN SERPL-MCNC: 135 NG/ML (ref 11–307)
HCT VFR BLD AUTO: 36.8 % (ref 34.8–46.1)
HGB BLD-MCNC: 11.4 G/DL (ref 11.5–15.4)
IMM GRANULOCYTES # BLD AUTO: 0.03 THOUSAND/UL (ref 0–0.2)
IMM GRANULOCYTES NFR BLD AUTO: 1 % (ref 0–2)
IRON SATN MFR SERPL: 19 % (ref 15–50)
IRON SERPL-MCNC: 68 UG/DL (ref 50–212)
LYMPHOCYTES # BLD AUTO: 1.1 THOUSANDS/ÂΜL (ref 0.6–4.47)
LYMPHOCYTES NFR BLD AUTO: 24 % (ref 14–44)
MCH RBC QN AUTO: 30.4 PG (ref 26.8–34.3)
MCHC RBC AUTO-ENTMCNC: 31 G/DL (ref 31.4–37.4)
MCV RBC AUTO: 98 FL (ref 82–98)
MONOCYTES # BLD AUTO: 0.42 THOUSAND/ÂΜL (ref 0.17–1.22)
MONOCYTES NFR BLD AUTO: 9 % (ref 4–12)
NEUTROPHILS # BLD AUTO: 2.74 THOUSANDS/ÂΜL (ref 1.85–7.62)
NEUTS SEG NFR BLD AUTO: 60 % (ref 43–75)
NRBC BLD AUTO-RTO: 0 /100 WBCS
PLATELET # BLD AUTO: 320 THOUSANDS/UL (ref 149–390)
PMV BLD AUTO: 11.2 FL (ref 8.9–12.7)
RBC # BLD AUTO: 3.75 MILLION/UL (ref 3.81–5.12)
RH BLD: POSITIVE
SPECIMEN EXPIRATION DATE: NORMAL
TIBC SERPL-MCNC: 355 UG/DL (ref 250–450)
UIBC SERPL-MCNC: 287 UG/DL (ref 155–355)
WBC # BLD AUTO: 4.57 THOUSAND/UL (ref 4.31–10.16)

## 2024-08-27 PROCEDURE — 82728 ASSAY OF FERRITIN: CPT

## 2024-08-27 PROCEDURE — 86901 BLOOD TYPING SEROLOGIC RH(D): CPT

## 2024-08-27 PROCEDURE — 86900 BLOOD TYPING SEROLOGIC ABO: CPT

## 2024-08-27 PROCEDURE — 85025 COMPLETE CBC W/AUTO DIFF WBC: CPT

## 2024-08-27 PROCEDURE — 83550 IRON BINDING TEST: CPT

## 2024-08-27 PROCEDURE — 36415 COLL VENOUS BLD VENIPUNCTURE: CPT

## 2024-08-27 PROCEDURE — 83540 ASSAY OF IRON: CPT

## 2024-08-27 PROCEDURE — 86850 RBC ANTIBODY SCREEN: CPT

## 2024-08-27 NOTE — PROGRESS NOTES
- Continue to decrease sugars and fats in the diet.   - Continue exercise.   - Continue working on weight loss.   S/w pt. Advised to stay on the same dose and retest in one month.

## 2024-08-30 ENCOUNTER — HOSPITAL ENCOUNTER (OUTPATIENT)
Dept: INFUSION CENTER | Facility: CLINIC | Age: 84
End: 2024-08-30
Payer: COMMERCIAL

## 2024-08-30 ENCOUNTER — TELEPHONE (OUTPATIENT)
Dept: HEMATOLOGY ONCOLOGY | Facility: CLINIC | Age: 84
End: 2024-08-30

## 2024-08-30 VITALS
HEART RATE: 69 BPM | RESPIRATION RATE: 18 BRPM | DIASTOLIC BLOOD PRESSURE: 67 MMHG | SYSTOLIC BLOOD PRESSURE: 150 MMHG | TEMPERATURE: 97 F

## 2024-08-30 DIAGNOSIS — D50.0 IRON DEFICIENCY ANEMIA DUE TO CHRONIC BLOOD LOSS: Primary | ICD-10-CM

## 2024-08-30 DIAGNOSIS — D50.9 IRON DEFICIENCY ANEMIA, UNSPECIFIED IRON DEFICIENCY ANEMIA TYPE: Primary | ICD-10-CM

## 2024-08-30 PROCEDURE — 96365 THER/PROPH/DIAG IV INF INIT: CPT

## 2024-08-30 RX ORDER — SODIUM CHLORIDE 9 MG/ML
20 INJECTION, SOLUTION INTRAVENOUS ONCE
OUTPATIENT
Start: 2024-09-06

## 2024-08-30 RX ORDER — SODIUM CHLORIDE 9 MG/ML
20 INJECTION, SOLUTION INTRAVENOUS ONCE
Status: COMPLETED | OUTPATIENT
Start: 2024-08-30 | End: 2024-08-30

## 2024-08-30 RX ADMIN — SODIUM CHLORIDE 20 ML/HR: 0.9 INJECTION, SOLUTION INTRAVENOUS at 11:55

## 2024-08-30 RX ADMIN — IRON SUCROSE 300 MG: 20 INJECTION, SOLUTION INTRAVENOUS at 11:53

## 2024-08-30 NOTE — PROGRESS NOTES
Pt presents for venofer infusion offering no complaints. Peripheral IV placed positive blood return noted. Pt tolerated treatment without incident. Peripheral IV removed. AVS declined, next appointment 9/13/24 at 11:30am.

## 2024-08-30 NOTE — TELEPHONE ENCOUNTER
Left VM for patient to let her know that MALCOLM Small reviewed her labs and she can get labs again in 2 weeks. [facility] number left for her to callback if needed.

## 2024-09-05 DIAGNOSIS — N18.4 TYPE 2 DIABETES MELLITUS WITH STAGE 4 CHRONIC KIDNEY DISEASE, WITH LONG-TERM CURRENT USE OF INSULIN (HCC): ICD-10-CM

## 2024-09-05 DIAGNOSIS — E11.22 TYPE 2 DIABETES MELLITUS WITH STAGE 4 CHRONIC KIDNEY DISEASE, WITH LONG-TERM CURRENT USE OF INSULIN (HCC): ICD-10-CM

## 2024-09-05 DIAGNOSIS — Z79.4 TYPE 2 DIABETES MELLITUS WITH STAGE 4 CHRONIC KIDNEY DISEASE, WITH LONG-TERM CURRENT USE OF INSULIN (HCC): ICD-10-CM

## 2024-09-06 ENCOUNTER — OFFICE VISIT (OUTPATIENT)
Dept: WOUND CARE | Facility: CLINIC | Age: 84
End: 2024-09-06
Payer: COMMERCIAL

## 2024-09-06 VITALS
SYSTOLIC BLOOD PRESSURE: 134 MMHG | TEMPERATURE: 97.2 F | HEART RATE: 61 BPM | RESPIRATION RATE: 18 BRPM | DIASTOLIC BLOOD PRESSURE: 60 MMHG

## 2024-09-06 DIAGNOSIS — I87.331 CHRONIC VENOUS HYPERTENSION WITH ULCER AND INFLAMMATION INVOLVING RIGHT SIDE (HCC): Primary | ICD-10-CM

## 2024-09-06 PROCEDURE — 99213 OFFICE O/P EST LOW 20 MIN: CPT | Performed by: ORTHOPAEDIC SURGERY

## 2024-09-06 PROCEDURE — 99214 OFFICE O/P EST MOD 30 MIN: CPT | Performed by: ORTHOPAEDIC SURGERY

## 2024-09-06 RX ORDER — MUPIROCIN 20 MG/G
OINTMENT TOPICAL DAILY
Qty: 22 G | Refills: 0 | Status: SHIPPED | OUTPATIENT
Start: 2024-09-06

## 2024-09-06 RX ORDER — INSULIN DETEMIR 100 [IU]/ML
30 INJECTION, SOLUTION SUBCUTANEOUS
Qty: 27 ML | Refills: 0 | Status: SHIPPED | OUTPATIENT
Start: 2024-09-06 | End: 2024-12-05

## 2024-09-06 RX ORDER — LIDOCAINE 40 MG/G
CREAM TOPICAL ONCE
Status: COMPLETED | OUTPATIENT
Start: 2024-09-06 | End: 2024-09-06

## 2024-09-06 RX ADMIN — LIDOCAINE: 40 CREAM TOPICAL at 08:12

## 2024-09-06 NOTE — PROGRESS NOTES
Patient ID: Ely Hernadez is a 84 y.o. female Date of Birth 1940       Chief Complaint   Patient presents with    Follow Up Wound Care Visit     Venous ulcer right leg       Allergies:  Patient has no known allergies.    Diagnosis:   Diagnosis ICD-10-CM Associated Orders   1. Chronic venous hypertension with ulcer and inflammation involving right side (HCC)  I87.331 lidocaine (LMX) 4 % cream     Wound cleansing and dressings Venous Ulcer Right Pretibial     mupirocin (BACTROBAN) 2 % ointment     Wound Procedure Treatment Venous Ulcer Right Pretibial           Assessment and Plan :   Follow up evaluation of right venous ulcer slightly worsened and increased in size and RLE edema.   Change wound management to daily Mupirocin with DSD, see wound orders below   Continue compression with Tubigrip  DO NOT WET WOUND.  No harsh cleansers such as alcohol, peroxide, or antibacterial soap, do not submerge in water  Can cleanse with NSS at dressing changes.   Counseled on importance of frequent elevation of leg and increase exercise/walking for wound healing.  Follow-up in 2 week(s) or call sooner with questions or concerns or any signs of infection such as redness, swelling, increased/purulent drainage, fever, chills, increased severe pain.     Subjective:   6/28: Pt is an 84 y.o. female with pmhx HTN, DMII (A1c 8.7),  CKD, Pafib, CVA (10/28/23 on Coumadin/ASA 81mg) with residual deficits (left sided facial drop and leg weakness) well known to [facility] who presents for follow up evaluation of non healing RLE venous ulcer which has been present for about 2.5 years. Pt has been covering wound with a bandaid. Does not have an odor. No smoking, ETOH or drug use.  Pt denies any sob, fatigue, N/V, CP, fever or chills.    7/12: Patient presents for followup evaluation of RLE venous ulcer. No increased pain or drainage. Pt is frustrated with chronic wound. Has been using Polymem on the wound bed and Tubigrip for compression.  Pt  denies any fevers or chills.    7/25: Patient presents for followup evaluation of RLE venous ulcer. Since last visit pt was started on Bactrim for positive wound culture with abnormal 2+ Growth of Staphylococcus Aureus. The tissue exam demonstrated chronic inflammation and fibrosis. No malignancy, nor pyoderma gangrenosum were identified.  Pt noticed wound decreasing in size. Has been using Polymem on the wound bed and Tubigrip for compression.  Pt denies any fevers or chills.    8/2: Patient presents for followup evaluation of RLE venous ulcer. Pt completed Bactrim as directed. No issues. Pt states this is the best she has felt in 2 years. No fevers or chills.    8/22: Patient presents for followup evaluation of RLE venous ulcer. Pt states she has been wetting wound with mild soap and water. She noticed a scab formed and came off on dressing. In office [facility] today; R 0.59, L 0.76. No fevers or chills.    9/6: Patient presents for followup evaluation of RLE venous ulcer. Pt  has been applying Polymem Ag Max on wound bed and Tubigrip for compression. Denies fevers or chills.        The following portions of the patient's history were reviewed and updated as appropriate:   Patient Active Problem List   Diagnosis    Hyperlipidemia    Chronic anticoagulation    Hypertension, essential    Coronary artery disease of native artery of native heart with stable angina pectoris (HCC)    Simple chronic bronchitis (HCC)    Diabetes mellitus with neurological manifestation (HCC)    Hypothyroidism    GERD (gastroesophageal reflux disease)    Anemia    AVM (arteriovenous malformation) of small bowel, acquired with hemorrhage    Angiectasia    Other vascular disorders of intestine (HCC)    Aortic valve replaced    Cardiomyopathy (HCC)    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HCC)    Stage 3b chronic kidney disease (HCC)    Chronic kidney disease-mineral and bone disorder    Primary osteoarthritis involving multiple joints    Paroxysmal atrial  fibrillation (HCC)    Neutropenia associated with infection (HCC)    Herpes simplex labialis    Restrictive lung disease    Nocturnal hypoxemia    Supratherapeutic INR    H/O mechanical aortic valve replacement    Subtherapeutic international normalized ratio (INR)    Type 2 diabetes mellitus with stage 3b chronic kidney disease, with long-term current use of insulin (HCC)    Venous ulcer of right leg (HCC)    Chronic systolic (congestive) heart failure (HCC)    Lower extremity edema    Iron deficiency anemia due to chronic blood loss    Hemiplegia and hemiparesis following cerebral infarction affecting left non-dominant side (HCC)     Past Medical History:   Diagnosis Date    Acute anterior epistaxis 12/23/2023    Acute blood loss anemia 12/14/2023    Acute respiratory failure with hypoxia (HCC) 02/06/2024    Anemia     Aneurysm of thoracic aorta (HCC)     Angioedema 06/07/2019    Aortic valve disorder     Benign neoplasm of sigmoid colon     Cardiomyopathy (HCC)     last assessed: 10/10/2014    CHF (congestive heart failure) (HCC)     Chronic kidney disease     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HCC)     last assessed: 10/10/2014    COPD (chronic obstructive pulmonary disease) (HCC)     Diabetic nephropathy (HCC)     Disease of thyroid gland     RAMON (dyspnea on exertion)     GERD (gastroesophageal reflux disease)     History of emphysema (HCC)     History of transfusion     Hyperlipidemia     Hypertensive urgency 10/28/2023    Leg cramps 11/01/2023    Stroke-like symptoms 10/28/2023    TIA (transient ischemic attack)      Past Surgical History:   Procedure Laterality Date    AORTIC VALVE REPLACEMENT      CARDIAC PACEMAKER PLACEMENT      last assessed: 10/10/2014    CARDIAC SURGERY      aortic valve replacement    CHOLECYSTECTOMY      COLONOSCOPY      EGD      HYSTERECTOMY      INSERT / REPLACE / REMOVE PACEMAKER      KNEE SURGERY Right     OTHER SURGICAL HISTORY      Capsule ENDOscopy description: 12/19/2012    [place]  COLONOSCOPY FLX DX W/COLLJ SPEC WHEN PFRMD N/A 2018    Procedure: single balloon ENTEROSCOPY;  Surgeon: David Dennis MD;  Location: [facility];  Service: Gastroenterology    LA ESOPHAGOGASTRODUODENOSCOPY TRANSORAL DIAGNOSTIC N/A 2017    Procedure: EGD AND COLONOSCOPY;  Surgeon: Jay Mcleod III, MD;  Location: [facility];  Service: Gastroenterology     Family History   Problem Relation Age of Onset    No Known Problems Mother     No Known Problems Father      Social History     Socioeconomic History    Marital status:      Spouse name: None    Number of children: None    Years of education: None    Highest education level: None   Occupational History    None   Tobacco Use    Smoking status: Former     Current packs/day: 0.00     Average packs/day: 1 pack/day for 52.9 years (52.9 ttl pk-yrs)     Types: Cigarettes     Start date:      Quit date: 2007     Years since quittin.7     Passive exposure: Past    Smokeless tobacco: Former     Quit date:    Vaping Use    Vaping status: Never Used   Substance and Sexual Activity    Alcohol use: Never    Drug use: Never    Sexual activity: Not Currently     Partners: Male   Other Topics Concern    None   Social History Narrative    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 BID Freestyle lancets BID    Living independently with spouse     Social Determinants of Health     Financial Resource Strain: Not on file   Food Insecurity: No Food Insecurity (5/15/2024)    Hunger Vital Sign     Worried About Running Out of Food in the Last Year: Never true     Ran Out of Food in the Last Year: Never true   Transportation Needs: No Transportation Needs (5/15/2024)    PRAPARE - Transportation     Lack of Transportation (Medical): No     Lack of Transportation (Non-Medical): No   Physical Activity: Inactive (2021)    Exercise Vital Sign     Days of Exercise per Week: 0 days     Minutes of Exercise per Session: 0 min   Stress: No Stress  Concern Present (5/26/2021)    [employer] - Occupational Stress Questionnaire     Feeling of Stress : Not at all   Social Connections: Not on file   Intimate Partner Violence: Not on file   Housing Stability: Low Risk  (5/15/2024)    Housing Stability Vital Sign     Unable to Pay for Housing in the Last Year: No     Number of Times Moved in the Last Year: 1     Homeless in the Last Year: No       Current Outpatient Medications:     mupirocin (BACTROBAN) 2 % ointment, Apply topically daily, Disp: 22 g, Rfl: 0    Accu-Chek FastClix Lancets MISC, Use 3 (three) times a day, Disp: 300 each, Rfl: 3    albuterol (Ventolin HFA) 90 mcg/act inhaler, Inhale 2 puffs every 6 (six) hours as needed for wheezing, Disp: 54 g, Rfl: 3    Ascorbic Acid (vitamin C) 1000 MG tablet, Take 1,000 mg by mouth daily, Disp: , Rfl:     aspirin (ECOTRIN LOW STRENGTH) 81 mg EC tablet, Take 1 tablet (81 mg total) by mouth daily Do not start before November 1, 2023., Disp: 30 tablet, Rfl: 0    atorvastatin (LIPITOR) 20 mg tablet, Take 20 mg by mouth daily (Patient not taking: Reported on 9/3/2024), Disp: , Rfl:     b complex vitamins capsule, Take 1 capsule by mouth daily, Disp: 30 capsule, Rfl: 2    Blood Glucose Monitoring Suppl (Accu-Chek Guide Me) w/Device KIT, USE 3 TIMES DAILY, Disp: 1 kit, Rfl: 2    Cholecalciferol (Vitamin D3) 50 MCG (2000 UT) TABS, Take 2,000 Units by mouth daily, Disp: , Rfl:     enoxaparin (LOVENOX) 80 mg/0.8 mL, Inject 0.7 mL (70 mg total) under the skin every 24 hours Hold Warfarin 5 days prior. Last dose is 9/6 Start Lovenox on 9/8, 4 days prior to the procedure. No Lovenox the morning of the procedure. Resume both Lovenox and Warfarin at the discretion of the surgeon (preferably that evening) and check INR 4 days after restarting. Inject in the abdomen about 2 inches from the belly button and rotate sides at each injection., Disp: 0.7 mL, Rfl: 10    Ferrous Sulfate (IRON PO), Take by  mouth daily in the early morning OTC, Disp: , Rfl:     furosemide (LASIX) 40 mg tablet, Take 1 tablet (40 mg total) by mouth 2 (two) times a day, Disp: 180 tablet, Rfl: 3    gabapentin (NEURONTIN) 300 mg capsule, Take 1 capsule (300 mg total) by mouth 3 (three) times a day (Patient taking differently: Take 300 mg by mouth 2 (two) times a day), Disp: 270 capsule, Rfl: 3    glipiZIDE (GLUCOTROL) 10 mg tablet, Take 1 tablet (10 mg total) by mouth 2 (two) times a day before meals, Disp: 180 tablet, Rfl: 3    glucose blood (Accu-Chek Celeste Plus) test strip, Use 1 each 3 (three) times a day Use as instructed, Disp: 300 each, Rfl: 3    insulin detemir (Levemir FlexPen) 100 Units/mL injection pen, Inject 30 Units under the skin daily at bedtime, Disp: 27 mL, Rfl: 0    Insulin Pen Needle (BD Pen Needle Rosie U/F) 32G X 4 MM MISC, Use daily, Disp: 100 each, Rfl: 1    ipratropium-albuterol (DUO-NEB) 0.5-2.5 mg/3 mL nebulizer solution, inhale contents of 1 vial ( 3 milliliters ) in nebulizer four times a day, Disp: , Rfl:     Lancets (freestyle) lancets, Check blood glucose 3 times daily, Disp: 300 each, Rfl: 0    levothyroxine 50 mcg tablet, Take 1 tablet (50 mcg total) by mouth daily, Disp: 90 tablet, Rfl: 3    metoprolol tartrate (LOPRESSOR) 50 mg tablet, TAKE ONE-HALF TABLET BY  MOUTH TWICE DAILY, Disp: 90 tablet, Rfl: 3    oxygen gas, Inhale 2 L/min daily at bedtime as needed home oxygen nightly, Disp: , Rfl:     pantoprazole (PROTONIX) 40 mg tablet, TAKE 1 TABLET BY MOUTH DAILY AS  DIRECTED, Disp: 100 tablet, Rfl: 1    warfarin (COUMADIN) 5 mg tablet, TAKE 1 TO 2 TABLETS BY  MOUTH DAILY OR AS DIRECTED, Disp: 180 tablet, Rfl: 3    Current Facility-Administered Medications:     lidocaine (LMX) 4 % cream, , Topical, Once, Vesta Garcia PA-C    Review of Systems   Constitutional:  Negative for appetite change, chills, fatigue, fever and unexpected weight change.   HENT:  Negative for congestion, hearing loss and postnasal  drip.    Respiratory:  Negative for cough and shortness of breath.    Cardiovascular:  Positive for leg swelling.   Musculoskeletal:  Positive for gait problem.   Skin:  Positive for wound (RLE). Negative for rash.   Neurological:  Negative for numbness.   Hematological:  Does not bruise/bleed easily.   Psychiatric/Behavioral: Negative.           Objective:  /60   Pulse 61   Temp (!) 97.2 °F (36.2 °C)   Resp 18   Pain Score: 0-No pain     Physical Exam  Vitals reviewed.   Constitutional:       General: She is not in acute distress.     Appearance: Normal appearance. She is well-developed and normal weight.   HENT:      Head: Normocephalic and atraumatic.   Cardiovascular:      Rate and Rhythm: Normal rate.   Pulmonary:      Effort: Pulmonary effort is normal.   Musculoskeletal:         General: No deformity.      Right lower leg: Edema present.      Left lower leg: No edema.   Skin:     General: Skin is warm and dry.      Findings: Wound (RLE) present.             Comments: Wound decreased in size with beefy red granulated wound bed.See wound assessment   Neurological:      General: No focal deficit present.      Mental Status: She is alert and oriented to person, place, and time.      Gait: Gait normal.   Psychiatric:         Mood and Affect: Mood and affect normal.         Behavior: Behavior normal. Behavior is cooperative.         Wound 10/29/23 Venous Ulcer Pretibial Right (Active)   Wound Image Images linked 09/06/24 0810   Wound Description Pink;Epithelialization;Granulation tissue 09/06/24 0810   Radha-wound Assessment Fragile 09/06/24 0810   Wound Length (cm) 1.5 cm 09/06/24 0810   Wound Width (cm) 0.9 cm 09/06/24 0810   Wound Depth (cm) 0.1 cm 09/06/24 0810   Wound Surface Area (cm^2) 1.35 cm^2 09/06/24 0810   Wound Volume (cm^3) 0.135 cm^3 09/06/24 0810   Calculated Wound Volume (cm^3) 0.14 cm^3 09/06/24 0810   Change in Wound Size % 96.5 09/06/24 0810   Drainage Amount Small 09/06/24 0810  "  Drainage Description Serosanguineous 09/06/24 0810   Non-staged Wound Description Full thickness 09/06/24 0810   Dressing Status Intact 09/06/24 0810         Wound Instructions:  Orders Placed This Encounter   Procedures    Wound cleansing and dressings Venous Ulcer Right Pretibial     Wound cleansing and dressings Venous Ulcer Right Pretibial                             Right Leg wound:      Wash your hands with soap and water. Remove old dressing, discard into plastic bag and place in trash. Cleanse the wound with normal saline solution prior to applying a clean dressing. Do not use tissue or cotton balls. Do not scrub the wound. Pat dry using gauze.      Shower yes, if able to use cast cover. Do not get dressing wet.      Apply mupirocin to leg wound (script sent to your pharmacy)  Cover with gauze.  Secure with tape if your skin is breaking down from tape then need to wrap with Armani and put tape on Armani   Change dressing daily.     Wear compression during the day     Standing Status:   Future     Standing Expiration Date:   9/13/2024    Wound Procedure Treatment Venous Ulcer Right Pretibial     This order was created via procedure documentation       Vesta Garcia PA-C, [facility]      Portions of the record may have been created with voice recognition software. Occasional wrong word or \"sound alike\" substitutions may have occurred due to the inherent limitations of voice recognition software. Read the chart carefully and recognize, using context, where substitutions have occurred.    "

## 2024-09-06 NOTE — PATIENT INSTRUCTIONS
Orders Placed This Encounter   Procedures    Wound cleansing and dressings Venous Ulcer Right Pretibial     Wound cleansing and dressings Venous Ulcer Right Pretibial                             Right Leg wound:      Wash your hands with soap and water. Remove old dressing, discard into plastic bag and place in trash. Cleanse the wound with normal saline solution prior to applying a clean dressing. Do not use tissue or cotton balls. Do not scrub the wound. Pat dry using gauze.      Shower yes, if able to use cast cover. Do not get dressing wet.      Apply mupirocin to leg wound (script sent to your pharmacy)  Cover with gauze.  Secure with tape if your skin is breaking down from tape then need to wrap with Armani and put tape on Armani   Change dressing daily.     Wear compression during the day     Standing Status:   Future     Standing Expiration Date:   9/13/2024    Wound Procedure Treatment Venous Ulcer Right Pretibial     This order was created via procedure documentation

## 2024-09-06 NOTE — PROGRESS NOTES
Wound Procedure Treatment Venous Ulcer Right Pretibial    Performed by: Graciela Valerio RN  Authorized by: Vesta Garcia PA-C    Associated wounds:   Wound 10/29/23 Venous Ulcer Pretibial Right  Wound cleansed with:  NSS  Applied Topical: Mupirocin ointment    Applied secondary dressing:  Gauze  Dressing secured with:  Tape

## 2024-09-11 DIAGNOSIS — D50.0 IRON DEFICIENCY ANEMIA DUE TO CHRONIC BLOOD LOSS: Primary | ICD-10-CM

## 2024-09-11 RX ORDER — SODIUM CHLORIDE 9 MG/ML
20 INJECTION, SOLUTION INTRAVENOUS ONCE
Status: CANCELLED | OUTPATIENT
Start: 2024-09-13

## 2024-09-12 ENCOUNTER — ANESTHESIA EVENT (OUTPATIENT)
Dept: GASTROENTEROLOGY | Facility: HOSPITAL | Age: 84
End: 2024-09-12
Payer: COMMERCIAL

## 2024-09-12 ENCOUNTER — ANESTHESIA (OUTPATIENT)
Dept: GASTROENTEROLOGY | Facility: HOSPITAL | Age: 84
End: 2024-09-12
Payer: COMMERCIAL

## 2024-09-12 ENCOUNTER — HOSPITAL ENCOUNTER (OUTPATIENT)
Dept: GASTROENTEROLOGY | Facility: HOSPITAL | Age: 84
Setting detail: OUTPATIENT SURGERY
Discharge: HOME | End: 2024-09-12
Attending: INTERNAL MEDICINE | Admitting: INTERNAL MEDICINE
Payer: COMMERCIAL

## 2024-09-12 ENCOUNTER — TELEPHONE (OUTPATIENT)
Dept: CARDIOLOGY CLINIC | Facility: CLINIC | Age: 84
End: 2024-09-12

## 2024-09-12 VITALS
HEART RATE: 60 BPM | RESPIRATION RATE: 20 BRPM | HEIGHT: 66 IN | WEIGHT: 160.94 LBS | DIASTOLIC BLOOD PRESSURE: 66 MMHG | BODY MASS INDEX: 25.86 KG/M2 | OXYGEN SATURATION: 95 % | SYSTOLIC BLOOD PRESSURE: 139 MMHG | TEMPERATURE: 97.2 F

## 2024-09-12 DIAGNOSIS — K55.21 AVM (ARTERIOVENOUS MALFORMATION) OF SMALL BOWEL, ACQUIRED WITH HEMORRHAGE: ICD-10-CM

## 2024-09-12 DIAGNOSIS — I99.8 ANGIECTASIA: ICD-10-CM

## 2024-09-12 LAB — GLUCOSE SERPL-MCNC: 201 MG/DL (ref 65–140)

## 2024-09-12 PROCEDURE — 82948 REAGENT STRIP/BLOOD GLUCOSE: CPT

## 2024-09-12 PROCEDURE — 43270 EGD LESION ABLATION: CPT | Performed by: INTERNAL MEDICINE

## 2024-09-12 RX ORDER — PROPOFOL 10 MG/ML
INJECTION, EMULSION INTRAVENOUS AS NEEDED
Status: DISCONTINUED | OUTPATIENT
Start: 2024-09-12 | End: 2024-09-12

## 2024-09-12 RX ORDER — LIDOCAINE HYDROCHLORIDE 10 MG/ML
INJECTION, SOLUTION EPIDURAL; INFILTRATION; INTRACAUDAL; PERINEURAL AS NEEDED
Status: DISCONTINUED | OUTPATIENT
Start: 2024-09-12 | End: 2024-09-12

## 2024-09-12 RX ORDER — SODIUM CHLORIDE, SODIUM LACTATE, POTASSIUM CHLORIDE, CALCIUM CHLORIDE 600; 310; 30; 20 MG/100ML; MG/100ML; MG/100ML; MG/100ML
INJECTION, SOLUTION INTRAVENOUS CONTINUOUS PRN
Status: DISCONTINUED | OUTPATIENT
Start: 2024-09-12 | End: 2024-09-12

## 2024-09-12 RX ORDER — SODIUM CHLORIDE, SODIUM LACTATE, POTASSIUM CHLORIDE, CALCIUM CHLORIDE 600; 310; 30; 20 MG/100ML; MG/100ML; MG/100ML; MG/100ML
125 INJECTION, SOLUTION INTRAVENOUS CONTINUOUS
Status: DISCONTINUED | OUTPATIENT
Start: 2024-09-12 | End: 2024-09-16 | Stop reason: HOSPADM

## 2024-09-12 RX ADMIN — PROPOFOL 30 MG: 10 INJECTION, EMULSION INTRAVENOUS at 10:49

## 2024-09-12 RX ADMIN — SODIUM CHLORIDE, SODIUM LACTATE, POTASSIUM CHLORIDE, AND CALCIUM CHLORIDE: .6; .31; .03; .02 INJECTION, SOLUTION INTRAVENOUS at 10:20

## 2024-09-12 RX ADMIN — PROPOFOL 80 MG: 10 INJECTION, EMULSION INTRAVENOUS at 10:43

## 2024-09-12 RX ADMIN — LIDOCAINE HYDROCHLORIDE 50 MG: 10 INJECTION, SOLUTION EPIDURAL; INFILTRATION; INTRACAUDAL at 10:43

## 2024-09-12 RX ADMIN — PROPOFOL 20 MG: 10 INJECTION, EMULSION INTRAVENOUS at 10:52

## 2024-09-12 RX ADMIN — PROPOFOL 30 MG: 10 INJECTION, EMULSION INTRAVENOUS at 10:46

## 2024-09-12 RX ADMIN — SODIUM CHLORIDE, SODIUM LACTATE, POTASSIUM CHLORIDE, AND CALCIUM CHLORIDE 125 ML/HR: .6; .31; .03; .02 INJECTION, SOLUTION INTRAVENOUS at 10:21

## 2024-09-12 NOTE — ANESTHESIA POSTPROCEDURE EVALUATION
Post-Op Assessment Note    CV Status:  Stable  Pain Score: 0    Pain management: adequate       Mental Status:  Sleepy   Hydration Status:  Stable   PONV Controlled:  None   Airway Patency:  Patent     Post Op Vitals Reviewed: Yes      Staff: CRNA               /65 (09/12/24 1059)    Temp (!) 97.2 °F (36.2 °C) (09/12/24 1059)    Pulse 60 (09/12/24 1059)   Resp 18 (09/12/24 1059)    SpO2 99 % (09/12/24 1059)

## 2024-09-12 NOTE — TELEPHONE ENCOUNTER
----- Message from CAMACHO Sullivan sent at 9/12/2024 12:08 PM EDT -----  Please let patient know that her pump function on her echo is the same as before at 40%. Her mechanical aortic valve looks good. There is mild to moderate regurgitation (leakiness) of the mitral and tricuspid valves which shouldn't be causing her symptoms, but we will continue to monitor. She does have moderately elevated pressure in her pulmonary artery (pulmonary artery hypertension) which may contribute to her shortness of breath. Will discuss further at upcoming appointment.    Case Management Assessment  Initial Evaluation    Date/Time of Evaluation: 7/24/2024 3:00 PM  Assessment Completed by: Ama Manrique RN    If patient is discharged prior to next notation, then this note serves as note for discharge by case management.    Patient Name: Daniel Smith                   YOB: 1946  Diagnosis: Elevated troponin level [R79.89]  Multiple pulmonary nodules [R91.8]  Elevated d-dimer [R79.89]  Sepsis (HCC) [A41.9]  Severe sepsis (HCC) [A41.9, R65.20]  Urinary tract infection with hematuria, site unspecified [N39.0, R31.9]  Other fatigue [R53.83]                   Date / Time: 7/23/2024  1:01 AM    Patient Admission Status: Inpatient   Readmission Risk (Low < 19, Mod (19-27), High > 27): Readmission Risk Score: 14.2    Current PCP: Jona Montoya MD  PCP verified by CM? (P) Yes (Robert Dressler is current PCP)    Chart Reviewed: Yes      History Provided by: (P) Patient  Patient Orientation: (P) Alert and Oriented    Patient Cognition: (P) Alert    Hospitalization in the last 30 days (Readmission):  No    If yes, Readmission Assessment in CM Navigator will be completed.    Advance Directives:      Code Status: Full Code   Patient's Primary Decision Maker is: (P) Named in Scanned ACP Document    Primary Decision Maker: Greta Smith - Spouse - [phone]    Discharge Planning:    Patient lives with: (P) Spouse/Significant Other Type of Home: (P) House  Primary Care Giver: (P) Self  Patient Support Systems include: (P) Spouse/Significant Other   Current Financial resources: (P) Medicare  Current community resources: (P) None  Current services prior to admission: (P) Durable Medical Equipment            Current DME: (P) Cane            Type of Home Care services:  (P) None    ADLS  Prior functional level: (P) Independent in ADLs/IADLs  Current functional level: (P) Assistance with the following:, Mobility    PT AM-PAC: 17 /24  OT AM-PAC: 17 /24    Family can provide assistance at

## 2024-09-12 NOTE — ANESTHESIA PREPROCEDURE EVALUATION
Procedure:  EGD WITH PUSH ENTEROSCOPY    Relevant Problems   CARDIO   (+) Coronary artery disease of native artery of native heart with stable angina pectoris (HCC)   (+) Hyperlipidemia   (+) Hypertension, essential   (+) Paroxysmal atrial fibrillation (HCC)      ENDO   (+) Hypothyroidism   (+) Type 2 diabetes mellitus with stage 3b chronic kidney disease, with long-term current use of insulin (HCC)      GI/HEPATIC   (+) GERD (gastroesophageal reflux disease)      /RENAL   (+) Chronic kidney disease-mineral and bone disorder   (+) Stage 3b chronic kidney disease (HCC)      HEMATOLOGY   (+) Anemia   (+) Iron deficiency anemia due to chronic blood loss      MUSCULOSKELETAL   (+) Primary osteoarthritis involving multiple joints      PULMONARY   (+) Simple chronic bronchitis (HCC)        Physical Exam    Airway      TM Distance: >3 FB  Neck ROM: full     Dental       Cardiovascular  Cardiovascular exam normal    Pulmonary  Pulmonary exam normal     Other Findings  post-pubertal.    Left Ventricle: Left ventricular cavity size is normal. Wall thickness is normal. The left ventricular ejection fraction is 40%. Systolic function is moderately reduced. There is moderate global hypokinesis with regional variation. Diastolic function is moderately abnormal, consistent with grade II (pseudonormal) relaxation.    IVS: There is abnormal septal motion consistent with post-operative status.    Right Ventricle: Right ventricular cavity size is normal. Systolic function is reduced.    Left Atrium: The atrium is mildly dilated.    Aortic Valve: There is a mechanical valve. There is trace paravalvular regurgitation. The aortic valve has no significant stenosis. Mean gradient 5 mm Hg.    Mitral Valve: There is mild to moderate regurgitation.    Tricuspid Valve: There is mild to moderate regurgitation. The right ventricular systolic pressure is moderately elevated. The estimated right ventricular systolic pressure is 61.00 mmHg.     Pulmonic Valve: There is mild regurgitation.    IVC/SVC: The right atrial pressure is estimated at 8.0 mmHg. The inferior vena cava is normal in size. Respirophasic changes were blunted (less than 50% variation).       Anesthesia Plan  ASA Score- 2     Anesthesia Type- IV sedation with anesthesia with ASA Monitors.         Additional Monitors:     Airway Plan:            Plan Factors-Exercise tolerance (METS): >4 METS.    Chart reviewed. EKG reviewed. Imaging results reviewed. Existing labs reviewed. Patient summary reviewed.                  Induction- intravenous.    Postoperative Plan-         Informed Consent- Anesthetic plan and risks discussed with patient.  I personally reviewed this patient with the CRNA. Discussed and agreed on the Anesthesia Plan with the CRNA..

## 2024-09-12 NOTE — H&P
History and Physical - [facility]  Ely Hernadez 84 y.o. female MRN: [id number]                  HPI: Ely Hernadez is a 84 y.o. year old female who presents for EGD with push enteroscopy for iron deficiency anemia angiectasia      REVIEW OF SYSTEMS: Per the HPI, and otherwise unremarkable.    Historical Information   Past Medical History:   Diagnosis Date    Acute anterior epistaxis 12/23/2023    Acute blood loss anemia 12/14/2023    Acute respiratory failure with hypoxia (HCC) 02/06/2024    Anemia     Aneurysm of thoracic aorta (HCC)     Angioedema 06/07/2019    Aortic valve disorder     Benign neoplasm of sigmoid colon     Cardiomyopathy (HCC)     last assessed: 10/10/2014    CHF (congestive heart failure) (HCC)     Chronic kidney disease     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HCC)     last assessed: 10/10/2014    COPD (chronic obstructive pulmonary disease) (HCC)     Diabetic nephropathy (HCC)     Disease of thyroid gland     RAMON (dyspnea on exertion)     GERD (gastroesophageal reflux disease)     History of emphysema (HCC)     History of transfusion     Hyperlipidemia     Hypertensive urgency 10/28/2023    Leg cramps 11/01/2023    Stroke-like symptoms 10/28/2023    TIA (transient ischemic attack)      Past Surgical History:   Procedure Laterality Date    AORTIC VALVE REPLACEMENT      CARDIAC PACEMAKER PLACEMENT      last assessed: 10/10/2014    CARDIAC SURGERY      aortic valve replacement    CHOLECYSTECTOMY      COLONOSCOPY      EGD      HYSTERECTOMY      INSERT / REPLACE / REMOVE PACEMAKER      KNEE SURGERY Right     OTHER SURGICAL HISTORY      Capsule ENDOscopy description: 12/19/2012    MN COLONOSCOPY FLX DX W/COLLJ SPEC WHEN PFRMD N/A 05/31/2018    Procedure: single balloon ENTEROSCOPY;  Surgeon: David Dennis MD;  Location: [facility];  Service: Gastroenterology    MN ESOPHAGOGASTRODUODENOSCOPY TRANSORAL DIAGNOSTIC N/A 11/29/2017    Procedure: EGD AND COLONOSCOPY;  Surgeon: Jay DELGADILLO  "Harsha HERNANDEZ MD;  Location: [facility];  Service: Gastroenterology     Social History   Social History     Substance and Sexual Activity   Alcohol Use Never     Social History     Substance and Sexual Activity   Drug Use Never     Social History     Tobacco Use   Smoking Status Former    Current packs/day: 0.00    Average packs/day: 1 pack/day for 52.9 years (52.9 ttl pk-yrs)    Types: Cigarettes    Start date:     Quit date: 2007    Years since quittin.8    Passive exposure: Past   Smokeless Tobacco Former    Quit date:      Family History   Problem Relation Age of Onset    No Known Problems Mother     No Known Problems Father        Meds/Allergies     Not in a hospital admission.    No Known Allergies    Objective     Pulse 74, temperature 97.5 °F (36.4 °C), temperature source Temporal, resp. rate 18, height 5' 6\" (1.676 m), weight 73 kg (160 lb 15 oz), SpO2 97%.      PHYSICAL EXAM    Pulse 74   Temp 97.5 °F (36.4 °C) (Temporal)   Resp 18   Ht 5' 6\" (1.676 m)   Wt 73 kg (160 lb 15 oz)   SpO2 97%   BMI 25.98 kg/m²       Gen: NAD  CV: RRR  CHEST: Clear  ABD: soft, NT/ND  EXT: no edema      ASSESSMENT/PLAN:  This is a 84 y.o. year old female here for EGD push enteroscopy, and she is stable and optimized for her procedure.        "

## 2024-09-13 ENCOUNTER — TELEPHONE (OUTPATIENT)
Dept: CARDIOLOGY CLINIC | Facility: CLINIC | Age: 84
End: 2024-09-13

## 2024-09-13 ENCOUNTER — HOSPITAL ENCOUNTER (OUTPATIENT)
Dept: INFUSION CENTER | Facility: CLINIC | Age: 84
End: 2024-09-13
Payer: COMMERCIAL

## 2024-09-13 ENCOUNTER — TELEPHONE (OUTPATIENT)
Age: 84
End: 2024-09-13

## 2024-09-13 VITALS
HEART RATE: 69 BPM | RESPIRATION RATE: 18 BRPM | OXYGEN SATURATION: 93 % | SYSTOLIC BLOOD PRESSURE: 142 MMHG | TEMPERATURE: 97.6 F | DIASTOLIC BLOOD PRESSURE: 79 MMHG

## 2024-09-13 DIAGNOSIS — D50.0 IRON DEFICIENCY ANEMIA DUE TO CHRONIC BLOOD LOSS: Primary | ICD-10-CM

## 2024-09-13 PROCEDURE — 96365 THER/PROPH/DIAG IV INF INIT: CPT

## 2024-09-13 RX ORDER — SODIUM CHLORIDE 9 MG/ML
20 INJECTION, SOLUTION INTRAVENOUS ONCE
Status: COMPLETED | OUTPATIENT
Start: 2024-09-13 | End: 2024-09-13

## 2024-09-13 RX ORDER — SODIUM CHLORIDE 9 MG/ML
20 INJECTION, SOLUTION INTRAVENOUS ONCE
Status: CANCELLED | OUTPATIENT
Start: 2024-09-20

## 2024-09-13 RX ADMIN — IRON SUCROSE 300 MG: 20 INJECTION, SOLUTION INTRAVENOUS at 11:35

## 2024-09-13 RX ADMIN — SODIUM CHLORIDE 20 ML/HR: 9 INJECTION, SOLUTION INTRAVENOUS at 11:36

## 2024-09-13 NOTE — PROGRESS NOTES
Pt to clinic for Venofer. Pt offers no complaints. Pt tolerated infusion without complications. PIV removed. Pt aware of next appointment on 9/20/24 at 830. AVS declined.

## 2024-09-13 NOTE — TELEPHONE ENCOUNTER
----- Message from CAMACHO Sullivan sent at 9/12/2024 12:08 PM EDT -----  Please let patient know that her pump function on her echo is the same as before at 40%. Her mechanical aortic valve looks good. There is mild to moderate regurgitation (leakiness) of the mitral and tricuspid valves which shouldn't be causing her symptoms, but we will continue to monitor. She does have moderately elevated pressure in her pulmonary artery (pulmonary artery hypertension) which may contribute to her shortness of breath. Will discuss further at upcoming appointment.

## 2024-09-13 NOTE — TELEPHONE ENCOUNTER
Anisa Nelson LPN LM    9/12/24  5:32 PM  Note     Pt called back, gave provider's full message. Pt understood.

## 2024-09-13 NOTE — TELEPHONE ENCOUNTER
S/w pt. Advised she should start Lovenox and cont Lovenox and Warfarin until therapeutic. Recommended she check INR on Tues 9/17. She states she can not go that day but can go Thurs.

## 2024-09-13 NOTE — TELEPHONE ENCOUNTER
Patient Ely [phone] contacting office had an EGD on 9/12/2024.    Patient resumed her coumadin is it ok for her to resume the levonox injections

## 2024-09-17 ENCOUNTER — OFFICE VISIT (OUTPATIENT)
Age: 84
End: 2024-09-17
Payer: COMMERCIAL

## 2024-09-17 VITALS
BODY MASS INDEX: 25.98 KG/M2 | HEIGHT: 66 IN | SYSTOLIC BLOOD PRESSURE: 116 MMHG | DIASTOLIC BLOOD PRESSURE: 68 MMHG | OXYGEN SATURATION: 99 % | TEMPERATURE: 97.8 F | HEART RATE: 61 BPM

## 2024-09-17 DIAGNOSIS — J06.9 UPPER RESPIRATORY TRACT INFECTION, UNSPECIFIED TYPE: Primary | ICD-10-CM

## 2024-09-17 DIAGNOSIS — I69.354 HEMIPLEGIA AND HEMIPARESIS FOLLOWING CEREBRAL INFARCTION AFFECTING LEFT NON-DOMINANT SIDE (HCC): ICD-10-CM

## 2024-09-17 LAB
SARS-COV-2 AG UPPER RESP QL IA: NEGATIVE
VALID CONTROL: NORMAL

## 2024-09-17 PROCEDURE — 99213 OFFICE O/P EST LOW 20 MIN: CPT

## 2024-09-17 PROCEDURE — 87811 SARS-COV-2 COVID19 W/OPTIC: CPT

## 2024-09-17 NOTE — PROGRESS NOTES
INTERNAL MEDICINE FOLLOW-UP VISIT  [facility] - [facility] INTERNAL MEDICINE [address]    NAME: Ely Hernadez  AGE: 84 y.o. SEX: female  : 1940     DATE: 2024     Assessment and Plan:   1. Upper respiratory tract infection, unspecified type  Discussed pathophysiology of a virus including expected duration of symptoms and supportive treatment.  Recommended continuing Mucinex, vitamin C, and increasing fluid intake.  Can consider Coricidin for cough.  If you begin to experience any fevers greater than 103, chills, or worsening symptoms notify the office.        No follow-ups on file.       Chief Complaint:     Chief Complaint   Patient presents with    Cold Like Symptoms     Started feeling sick after iron infusion / runny nose / painful ears / mucinex helped a little       History of Present Illness:   Patient is an 84-year-old female that presents today for cold-like symptoms that started around .  She started to feel sick after her iron infusion.  She admits to rhinorrhea and ear pain.  Mucinex has been providing some relief. She is eating, drinking, and sleeping okay. She notes her son is sick as well.     The following portions of the patient's history were reviewed and updated as appropriate: allergies, current medications, past family history, past medical history, past social history, past surgical history and problem list.     Review of Systems:     Review of Systems   Constitutional:  Negative for chills and fever.   HENT:  Positive for ear pain, rhinorrhea, sinus pressure and sinus pain. Negative for ear discharge, sore throat and trouble swallowing.    Eyes:  Negative for visual disturbance.   Respiratory:  Positive for cough (Purulent). Negative for shortness of breath and wheezing.    Cardiovascular:  Negative for chest pain.   Gastrointestinal:  Negative for abdominal pain, constipation, diarrhea and vomiting.   Genitourinary:  Negative for difficulty urinating and  dysuria.   Musculoskeletal:  Negative for arthralgias and myalgias.   Skin:  Negative for rash.   Neurological:  Negative for dizziness, light-headedness and headaches.        Past Medical History:     Past Medical History:   Diagnosis Date    Acute anterior epistaxis 12/23/2023    Acute blood loss anemia 12/14/2023    Acute respiratory failure with hypoxia ([facility]) 02/06/2024    Anemia     Aneurysm of thoracic aorta (HCC)     Angioedema 06/07/2019    Aortic valve disorder     Benign neoplasm of sigmoid colon     Cardiomyopathy (HCC)     last assessed: 10/10/2014    CHF (congestive heart failure) (HCC)     Chronic kidney disease     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HCC)     last assessed: 10/10/2014    COPD (chronic obstructive pulmonary disease) (HCC)     Diabetic nephropathy (HCC)     Disease of thyroid gland     RAMON (dyspnea on exertion)     GERD (gastroesophageal reflux disease)     History of emphysema ([facility])     History of transfusion     Hyperlipidemia     Hypertensive urgency 10/28/2023    Leg cramps 11/01/2023    Stroke-like symptoms 10/28/2023    TIA (transient ischemic attack)         Current Medications:     Current Outpatient Medications:     Accu-Chek FastClix Lancets MISC, Use 3 (three) times a day, Disp: 300 each, Rfl: 3    albuterol (Ventolin HFA) 90 mcg/act inhaler, Inhale 2 puffs every 6 (six) hours as needed for wheezing, Disp: 54 g, Rfl: 3    Ascorbic Acid (vitamin C) 1000 MG tablet, Take 1,000 mg by mouth daily, Disp: , Rfl:     aspirin (ECOTRIN LOW STRENGTH) 81 mg EC tablet, Take 1 tablet (81 mg total) by mouth daily Do not start before November 1, 2023., Disp: 30 tablet, Rfl: 0    Blood Glucose Monitoring Suppl (Accu-Chek Guide Me) w/Device KIT, USE 3 TIMES DAILY, Disp: 1 kit, Rfl: 2    Cholecalciferol (Vitamin D3) 50 MCG (2000 UT) TABS, Take 2,000 Units by mouth daily, Disp: , Rfl:     enoxaparin (LOVENOX) 80 mg/0.8 mL, Inject 0.7 mL (70 mg total) under the skin every 24 hours Hold Warfarin 5  days prior. Last dose is 9/6 Start Lovenox on 9/8, 4 days prior to the procedure. No Lovenox the morning of the procedure. Resume both Lovenox and Warfarin at the discretion of the surgeon (preferably that evening) and check INR 4 days after restarting. Inject in the abdomen about 2 inches from the belly button and rotate sides at each injection., Disp: 0.7 mL, Rfl: 10    Ferrous Sulfate (IRON PO), Take by mouth daily in the early morning OTC, Disp: , Rfl:     furosemide (LASIX) 40 mg tablet, Take 1 tablet (40 mg total) by mouth 2 (two) times a day, Disp: 180 tablet, Rfl: 3    gabapentin (NEURONTIN) 300 mg capsule, Take 1 capsule (300 mg total) by mouth 3 (three) times a day (Patient taking differently: Take 300 mg by mouth 2 (two) times a day), Disp: 270 capsule, Rfl: 3    glipiZIDE (GLUCOTROL) 10 mg tablet, Take 1 tablet (10 mg total) by mouth 2 (two) times a day before meals, Disp: 180 tablet, Rfl: 3    glucose blood (Accu-Chek Celeste Plus) test strip, Use 1 each 3 (three) times a day Use as instructed, Disp: 300 each, Rfl: 3    insulin detemir (Levemir FlexPen) 100 Units/mL injection pen, Inject 30 Units under the skin daily at bedtime, Disp: 27 mL, Rfl: 0    Insulin Pen Needle (BD Pen Needle Rosie U/F) 32G X 4 MM MISC, Use daily, Disp: 100 each, Rfl: 1    ipratropium-albuterol (DUO-NEB) 0.5-2.5 mg/3 mL nebulizer solution, inhale contents of 1 vial ( 3 milliliters ) in nebulizer four times a day, Disp: , Rfl:     Lancets (freestyle) lancets, Check blood glucose 3 times daily, Disp: 300 each, Rfl: 0    levothyroxine 50 mcg tablet, Take 1 tablet (50 mcg total) by mouth daily, Disp: 90 tablet, Rfl: 3    metoprolol tartrate (LOPRESSOR) 50 mg tablet, TAKE ONE-HALF TABLET BY  MOUTH TWICE DAILY, Disp: 90 tablet, Rfl: 3    mupirocin (BACTROBAN) 2 % ointment, Apply topically daily, Disp: 22 g, Rfl: 0    oxygen gas, Inhale 2 L/min daily at bedtime as needed home oxygen nightly, Disp: , Rfl:     pantoprazole (PROTONIX) 40 mg  "tablet, TAKE 1 TABLET BY MOUTH DAILY AS  DIRECTED, Disp: 100 tablet, Rfl: 1    warfarin (COUMADIN) 5 mg tablet, TAKE 1 TO 2 TABLETS BY  MOUTH DAILY OR AS DIRECTED, Disp: 180 tablet, Rfl: 3    b complex vitamins capsule, Take 1 capsule by mouth daily, Disp: 30 capsule, Rfl: 2    Current Facility-Administered Medications:     lidocaine (LMX) 4 % cream, , Topical, Once, Vesta Garcia PA-C     Allergies:   No Known Allergies     Physical Exam:     /68 (BP Location: Left arm, Patient Position: Sitting, Cuff Size: Standard)   Pulse 61   Temp 97.8 °F (36.6 °C) (Temporal)   Ht 5' 6\" (1.676 m)   SpO2 99%   BMI 25.98 kg/m²     Physical Exam  Vitals and nursing note reviewed.   Constitutional:       General: She is awake. She is not in acute distress.     Appearance: Normal appearance. She is well-developed, well-groomed and overweight.   HENT:      Head: Normocephalic and atraumatic.      Right Ear: Hearing, tympanic membrane, ear canal and external ear normal.      Left Ear: Hearing, ear canal and external ear normal. There is impacted cerumen.      Nose: Nose normal.      Mouth/Throat:      Lips: Pink.      Mouth: Mucous membranes are moist.      Pharynx: Uvula midline. Posterior oropharyngeal erythema present.   Eyes:      General: Lids are normal. Vision grossly intact. Gaze aligned appropriately.      Conjunctiva/sclera: Conjunctivae normal.   Neck:      Vascular: No carotid bruit.      Trachea: Trachea and phonation normal.   Cardiovascular:      Rate and Rhythm: Normal rate and regular rhythm.      Heart sounds: Normal heart sounds, S1 normal and S2 normal. No murmur heard.     No friction rub. No gallop.   Pulmonary:      Effort: Pulmonary effort is normal. No respiratory distress.      Breath sounds: Normal breath sounds and air entry. No decreased breath sounds, wheezing, rhonchi or rales.   Abdominal:      General: Abdomen is flat.      Palpations: Abdomen is soft.   Musculoskeletal:         General: No " swelling.      Cervical back: Neck supple.      Right lower leg: No edema.      Left lower leg: No edema.   Lymphadenopathy:      Cervical: No cervical adenopathy.   Skin:     General: Skin is warm.      Capillary Refill: Capillary refill takes less than 2 seconds.   Neurological:      Mental Status: She is alert.   Psychiatric:         Mood and Affect: Mood normal.         Behavior: Behavior is cooperative.           Data:     Laboratory Results: I have personally reviewed the pertinent laboratory results/reports   Radiology/Other Diagnostic Testing Results: No pertinent imaging studies reviewed.    Amanda Villarreal PA-C  [facility] INTERNAL MEDICINE [address]

## 2024-09-19 ENCOUNTER — OFFICE VISIT (OUTPATIENT)
Dept: WOUND CARE | Facility: CLINIC | Age: 84
End: 2024-09-19
Payer: COMMERCIAL

## 2024-09-19 ENCOUNTER — APPOINTMENT (OUTPATIENT)
Dept: LAB | Facility: CLINIC | Age: 84
End: 2024-09-19
Payer: COMMERCIAL

## 2024-09-19 VITALS
HEART RATE: 63 BPM | SYSTOLIC BLOOD PRESSURE: 168 MMHG | RESPIRATION RATE: 18 BRPM | TEMPERATURE: 98 F | DIASTOLIC BLOOD PRESSURE: 70 MMHG

## 2024-09-19 DIAGNOSIS — L92.9 HYPERGRANULATION: ICD-10-CM

## 2024-09-19 DIAGNOSIS — I87.331 CHRONIC VENOUS HYPERTENSION (IDIOPATHIC) WITH ULCER AND INFLAMMATION OF RIGHT LOWER EXTREMITY (HCC): Primary | ICD-10-CM

## 2024-09-19 PROCEDURE — 17250 CHEM CAUT OF GRANLTJ TISSUE: CPT | Performed by: ORTHOPAEDIC SURGERY

## 2024-09-19 RX ORDER — LIDOCAINE 40 MG/G
CREAM TOPICAL ONCE
Status: COMPLETED | OUTPATIENT
Start: 2024-09-19 | End: 2024-09-19

## 2024-09-19 RX ADMIN — LIDOCAINE: 40 CREAM TOPICAL at 07:55

## 2024-09-19 NOTE — PATIENT INSTRUCTIONS
Orders Placed This Encounter   Procedures    Wound cleansing and dressings Venous Ulcer Right Pretibial     Wound cleansing and dressings Venous Ulcer Right Pretibial                                                       Right Leg wound:      Wash your hands with soap and water. Remove old dressing, discard into plastic bag and place in trash. Cleanse the wound with normal saline solution prior to applying a clean dressing. Do not use tissue or cotton balls. Do not scrub the wound. Pat dry using gauze.      Shower yes, if able to use cast cover. Do not get dressing wet.     Apply Hydrofera Blue Ready to the wound bed  Cover with gauze.  Secure with tape if your skin is breaking down from tape then need to wrap with Armani and put tape on Armani   Change dressing 3 times weekly.     Wear compression SpandigripF during the day     Standing Status:   Future     Standing Expiration Date:   9/26/2024

## 2024-09-19 NOTE — PROGRESS NOTES
Patient ID: Ely Hernadez is a 84 y.o. female Date of Birth 1940       Chief Complaint   Patient presents with    Follow Up Wound Care Visit     RLE WOUND       Allergies:  Patient has no known allergies.    Diagnosis:   Diagnosis ICD-10-CM Associated Orders   1. Chronic venous hypertension (idiopathic) with ulcer and inflammation of right lower extremity (HCC)  I87.331 lidocaine (LMX) 4 % cream     Wound cleansing and dressings Venous Ulcer Right Pretibial     Wound Procedure Treatment Venous Ulcer Right Pretibial     Chemical Caut Of A Wound      2. Hypergranulation  L92.9 Chemical Caut Of A Wound           Assessment and Plan :  Follow up evaluation of right venous ulcer increased in size and hypergranular.    Chemically cauterized as below  Change wound management to Hydrofera blue ready, see wound orders below   Encouraged to wear daily compression with Tubigrip  DO NOT WET WOUND.  No harsh cleansers such as alcohol, peroxide, or antibacterial soap, do not submerge in water  Can cleanse with NSS at dressing changes.   F/u with vascular studies as ordered.  Counseled on importance of frequent elevation of leg and increase exercise/walking for wound healing.  Follow-up in 1 week(s) or call sooner with questions or concerns or any signs of infection such as redness, swelling, increased/purulent drainage, fever, chills, increased severe pain.    Subjective:   6/28: Pt is an 84 y.o. female with pmhx HTN, DMII (A1c 8.7),  CKD, Pafib, CVA (10/28/23 on Coumadin/ASA 81mg) with residual deficits (left sided facial drop and leg weakness) well known to [facility] who presents for follow up evaluation of non healing RLE venous ulcer which has been present for about 2.5 years. Pt has been covering wound with a bandaid. Does not have an odor. No smoking, ETOH or drug use.  Pt denies any sob, fatigue, N/V, CP, fever or chills.    7/12: Patient presents for followup evaluation of RLE venous ulcer. No increased pain or drainage.  Pt is frustrated with chronic wound. Has been using Polymem on the wound bed and Tubigrip for compression.  Pt denies any fevers or chills.    7/25: Patient presents for followup evaluation of RLE venous ulcer. Since last visit pt was started on Bactrim for positive wound culture with abnormal 2+ Growth of Staphylococcus Aureus. The tissue exam demonstrated chronic inflammation and fibrosis. No malignancy, nor pyoderma gangrenosum were identified.  Pt noticed wound decreasing in size. Has been using Polymem on the wound bed and Tubigrip for compression.  Pt denies any fevers or chills.    8/2: Patient presents for followup evaluation of RLE venous ulcer. Pt completed Bactrim as directed. No issues. Pt states this is the best she has felt in 2 years. No fevers or chills.    8/22: Patient presents for followup evaluation of RLE venous ulcer. Pt states she has been wetting wound with mild soap and water. She noticed a scab formed and came off on dressing. In office [facility] today; R 0.59, L 0.76. No fevers or chills.    9/6: Patient presents for followup evaluation of RLE venous ulcer. Pt  has been applying Polymem Ag Max on wound bed and Tubigrip for compression. Denies fevers or chills.    9/19: Patient presents for followup evaluation of RLE venous ulcer. Pt has been applying Mupirocin wound bed and Tubigrip for compression. Denies fevers or chills.      The following portions of the patient's history were reviewed and updated as appropriate:   Patient Active Problem List   Diagnosis    Hyperlipidemia    Chronic anticoagulation    Hypertension, essential    Coronary artery disease of native artery of native heart with stable angina pectoris (HCC)    Simple chronic bronchitis (HCC)    Diabetes mellitus with neurological manifestation (HCC)    Hypothyroidism    GERD (gastroesophageal reflux disease)    Anemia    AVM (arteriovenous malformation) of small bowel, acquired with hemorrhage    Angiectasia    Other vascular  disorders of intestine (HCC)    Aortic valve replaced    Cardiomyopathy (HCC)    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HCC)    Stage 3b chronic kidney disease (HCC)    Chronic kidney disease-mineral and bone disorder    Primary osteoarthritis involving multiple joints    Paroxysmal atrial fibrillation (HCC)    Neutropenia associated with infection (HCC)    Herpes simplex labialis    Restrictive lung disease    Nocturnal hypoxemia    Supratherapeutic INR    H/O mechanical aortic valve replacement    Subtherapeutic international normalized ratio (INR)    Type 2 diabetes mellitus with stage 3b chronic kidney disease, with long-term current use of insulin (HCC)    Venous ulcer of right leg (HCC)    Chronic systolic (congestive) heart failure (HCC)    Lower extremity edema    Iron deficiency anemia due to chronic blood loss    Hemiplegia and hemiparesis following cerebral infarction affecting left non-dominant side (HCC)     Past Medical History:   Diagnosis Date    Acute anterior epistaxis 12/23/2023    Acute blood loss anemia 12/14/2023    Acute respiratory failure with hypoxia (HCC) 02/06/2024    Anemia     Aneurysm of thoracic aorta (HCC)     Angioedema 06/07/2019    Aortic valve disorder     Benign neoplasm of sigmoid colon     Cardiomyopathy (HCC)     last assessed: 10/10/2014    CHF (congestive heart failure) (HCC)     Chronic kidney disease     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HCC)     last assessed: 10/10/2014    COPD (chronic obstructive pulmonary disease) (HCC)     Diabetic nephropathy (HCC)     Disease of thyroid gland     RAMON (dyspnea on exertion)     GERD (gastroesophageal reflux disease)     History of emphysema (HCC)     History of transfusion     Hyperlipidemia     Hypertensive urgency 10/28/2023    Leg cramps 11/01/2023    Stroke-like symptoms 10/28/2023    TIA (transient ischemic attack)      Past Surgical History:   Procedure Laterality Date    AORTIC VALVE REPLACEMENT      CARDIAC PACEMAKER PLACEMENT       last assessed: 10/10/2014    CARDIAC SURGERY      aortic valve replacement    CHOLECYSTECTOMY      COLONOSCOPY      EGD      HYSTERECTOMY      INSERT / REPLACE / REMOVE PACEMAKER      KNEE SURGERY Right     OTHER SURGICAL HISTORY      Capsule ENDOscopy description: 2012    TX COLONOSCOPY FLX DX W/COLLJ SPEC WHEN PFRMD N/A 2018    Procedure: single balloon ENTEROSCOPY;  Surgeon: David Dennis MD;  Location: [facility];  Service: Gastroenterology    TX ESOPHAGOGASTRODUODENOSCOPY TRANSORAL DIAGNOSTIC N/A 2017    Procedure: EGD AND COLONOSCOPY;  Surgeon: Jay Mcleod III, MD;  Location: [facility];  Service: Gastroenterology     Family History   Problem Relation Age of Onset    No Known Problems Mother     No Known Problems Father      Social History     Socioeconomic History    Marital status:      Spouse name: None    Number of children: None    Years of education: None    Highest education level: None   Occupational History    None   Tobacco Use    Smoking status: Former     Current packs/day: 0.00     Average packs/day: 1 pack/day for 52.9 years (52.9 ttl pk-yrs)     Types: Cigarettes     Start date:      Quit date: 2007     Years since quittin.8     Passive exposure: Past    Smokeless tobacco: Former     Quit date:    Vaping Use    Vaping status: Never Used   Substance and Sexual Activity    Alcohol use: Never    Drug use: Never    Sexual activity: Not Currently     Partners: Male   Other Topics Concern    None   Social History Narrative    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 BID Freestyle lancets BID    Living independently with spouse     Social Determinants of Health     Financial Resource Strain: Not on file   Food Insecurity: No Food Insecurity (5/15/2024)    Hunger Vital Sign     Worried About Running Out of Food in the Last Year: Never true     Ran Out of Food in the Last Year: Never true   Transportation Needs: No Transportation Needs  (5/15/2024)    PRAPARE - Transportation     Lack of Transportation (Medical): No     Lack of Transportation (Non-Medical): No   Physical Activity: Inactive (5/26/2021)    Exercise Vital Sign     Days of Exercise per Week: 0 days     Minutes of Exercise per Session: 0 min   Stress: No Stress Concern Present (5/26/2021)    [employer] - Occupational Stress Questionnaire     Feeling of Stress : Not at all   Social Connections: Not on file   Intimate Partner Violence: Not on file   Housing Stability: Low Risk  (5/15/2024)    Housing Stability Vital Sign     Unable to Pay for Housing in the Last Year: No     Number of Times Moved in the Last Year: 1     Homeless in the Last Year: No       Current Outpatient Medications:     Accu-Chek FastClix Lancets MISC, Use 3 (three) times a day, Disp: 300 each, Rfl: 3    albuterol (Ventolin HFA) 90 mcg/act inhaler, Inhale 2 puffs every 6 (six) hours as needed for wheezing, Disp: 54 g, Rfl: 3    Ascorbic Acid (vitamin C) 1000 MG tablet, Take 1,000 mg by mouth daily, Disp: , Rfl:     aspirin (ECOTRIN LOW STRENGTH) 81 mg EC tablet, Take 1 tablet (81 mg total) by mouth daily Do not start before November 1, 2023., Disp: 30 tablet, Rfl: 0    b complex vitamins capsule, Take 1 capsule by mouth daily, Disp: 30 capsule, Rfl: 2    Blood Glucose Monitoring Suppl (Accu-Chek Guide Me) w/Device KIT, USE 3 TIMES DAILY, Disp: 1 kit, Rfl: 2    Cholecalciferol (Vitamin D3) 50 MCG (2000 UT) TABS, Take 2,000 Units by mouth daily, Disp: , Rfl:     enoxaparin (LOVENOX) 80 mg/0.8 mL, Inject 0.7 mL (70 mg total) under the skin every 24 hours Hold Warfarin 5 days prior. Last dose is 9/6 Start Lovenox on 9/8, 4 days prior to the procedure. No Lovenox the morning of the procedure. Resume both Lovenox and Warfarin at the discretion of the surgeon (preferably that evening) and check INR 4 days after restarting. Inject in the abdomen about 2 inches from the belly button and rotate  sides at each injection., Disp: 0.7 mL, Rfl: 10    Ferrous Sulfate (IRON PO), Take by mouth daily in the early morning OTC, Disp: , Rfl:     furosemide (LASIX) 40 mg tablet, Take 1 tablet (40 mg total) by mouth 2 (two) times a day, Disp: 180 tablet, Rfl: 3    gabapentin (NEURONTIN) 300 mg capsule, Take 1 capsule (300 mg total) by mouth 3 (three) times a day (Patient taking differently: Take 300 mg by mouth 2 (two) times a day), Disp: 270 capsule, Rfl: 3    glipiZIDE (GLUCOTROL) 10 mg tablet, Take 1 tablet (10 mg total) by mouth 2 (two) times a day before meals, Disp: 180 tablet, Rfl: 3    glucose blood (Accu-Chek Celeste Plus) test strip, Use 1 each 3 (three) times a day Use as instructed, Disp: 300 each, Rfl: 3    insulin detemir (Levemir FlexPen) 100 Units/mL injection pen, Inject 30 Units under the skin daily at bedtime, Disp: 27 mL, Rfl: 0    Insulin Pen Needle (BD Pen Needle Rosie U/F) 32G X 4 MM MISC, Use daily, Disp: 100 each, Rfl: 1    ipratropium-albuterol (DUO-NEB) 0.5-2.5 mg/3 mL nebulizer solution, inhale contents of 1 vial ( 3 milliliters ) in nebulizer four times a day, Disp: , Rfl:     Lancets (freestyle) lancets, Check blood glucose 3 times daily, Disp: 300 each, Rfl: 0    levothyroxine 50 mcg tablet, Take 1 tablet (50 mcg total) by mouth daily, Disp: 90 tablet, Rfl: 3    metoprolol tartrate (LOPRESSOR) 50 mg tablet, TAKE ONE-HALF TABLET BY  MOUTH TWICE DAILY, Disp: 90 tablet, Rfl: 3    mupirocin (BACTROBAN) 2 % ointment, Apply topically daily, Disp: 22 g, Rfl: 0    oxygen gas, Inhale 2 L/min daily at bedtime as needed home oxygen nightly, Disp: , Rfl:     pantoprazole (PROTONIX) 40 mg tablet, TAKE 1 TABLET BY MOUTH DAILY AS  DIRECTED, Disp: 100 tablet, Rfl: 1    warfarin (COUMADIN) 5 mg tablet, TAKE 1 TO 2 TABLETS BY  MOUTH DAILY OR AS DIRECTED, Disp: 180 tablet, Rfl: 3    Current Facility-Administered Medications:     lidocaine (LMX) 4 % cream, , Topical, Once, Vesta Garcia PA-C    Review of Systems  "  Constitutional:  Negative for appetite change, chills, fatigue, fever and unexpected weight change.   HENT:  Negative for congestion, hearing loss and postnasal drip.    Respiratory:  Negative for cough and shortness of breath.    Cardiovascular:  Positive for leg swelling.   Musculoskeletal:  Positive for gait problem.   Skin:  Positive for wound (RLE). Negative for rash.   Neurological:  Negative for numbness.   Hematological:  Does not bruise/bleed easily.   Psychiatric/Behavioral: Negative.           Objective:  /70   Pulse 63   Temp 98 °F (36.7 °C)   Resp 18   Pain Score: 0-No pain     Physical Exam               Chemical Caut Of A Wound     Date/Time  9/19/2024 8:00 AM     Performed by  Vesta Garcia PA-C   Authorized by  Vesta Garcia PA-C     Universal Protocol   procedure performed by consultantConsent: Verbal consent obtained. Written consent obtained.  Risks and benefits: risks, benefits and alternatives were discussed  Consent given by: patient  Time out: Immediately prior to procedure a \"time out\" was called to verify the correct patient, procedure, equipment, support staff and site/side marked as required.  Patient understanding: patient states understanding of the procedure being performed  Patient identity confirmed: verbally with patient      Local anesthesia used: yes     Anesthesia   Local anesthesia used: yes  Local Anesthetic: topical anesthetic     Sedation   Patient sedated: no        Specimen: no    Culture: no   Procedure Details   Procedure Notes: After permission and placement of topical local anesthetic, 1 Silver nitrate stick(s) were used to cauterize the hypergranular tissue of the open wound.  Normal saline was used to neutralize the reaction. A dressing was applied, see orders.  Patient tolerated procedure well.      Patient tolerance: Patient tolerated the procedure well with no immediate complications                      Wound Instructions:  Orders Placed This " "Encounter   Procedures    Wound cleansing and dressings Venous Ulcer Right Pretibial     Wound cleansing and dressings Venous Ulcer Right Pretibial                                                       Right Leg wound:      Wash your hands with soap and water. Remove old dressing, discard into plastic bag and place in trash. Cleanse the wound with normal saline solution prior to applying a clean dressing. Do not use tissue or cotton balls. Do not scrub the wound. Pat dry using gauze.      Shower yes, if able to use cast cover. Do not get dressing wet.     Apply Hydrofera Blue Ready to the wound bed  Cover with gauze.  Secure with tape if your skin is breaking down from tape then need to wrap with Armani and put tape on Armani   Change dressing 3 times weekly.     Wear compression SpandigripF during the day     Standing Status:   Future     Standing Expiration Date:   9/26/2024    Wound Procedure Treatment Venous Ulcer Right Pretibial     This order was created via procedure documentation    Chemical Caut Of A Wound     This order was created via procedure documentation       Vesta Garcia PA-C, [facility]      Portions of the record may have been created with voice recognition software. Occasional wrong word or \"sound alike\" substitutions may have occurred due to the inherent limitations of voice recognition software. Read the chart carefully and recognize, using context, where substitutions have occurred.      "

## 2024-09-19 NOTE — PROGRESS NOTES
Wound Procedure Treatment Venous Ulcer Right Pretibial    Performed by: Graciela Valerio RN  Authorized by: Vesta Garcia PA-C    Associated wounds:   Wound 10/29/23 Venous Ulcer Pretibial Right  Wound cleansed with:  NSS  Applied secondary dressing:  Gauze  Dressing secured with:  Tape, Armani and Size F

## 2024-09-20 ENCOUNTER — ANTICOAG VISIT (OUTPATIENT)
Dept: CARDIOLOGY CLINIC | Facility: CLINIC | Age: 84
End: 2024-09-20

## 2024-09-20 ENCOUNTER — HOSPITAL ENCOUNTER (OUTPATIENT)
Dept: INFUSION CENTER | Facility: CLINIC | Age: 84
End: 2024-09-20
Payer: COMMERCIAL

## 2024-09-20 ENCOUNTER — TELEPHONE (OUTPATIENT)
Age: 84
End: 2024-09-20

## 2024-09-20 VITALS
SYSTOLIC BLOOD PRESSURE: 147 MMHG | DIASTOLIC BLOOD PRESSURE: 65 MMHG | RESPIRATION RATE: 20 BRPM | TEMPERATURE: 96.8 F | HEART RATE: 80 BPM

## 2024-09-20 DIAGNOSIS — D50.0 IRON DEFICIENCY ANEMIA DUE TO CHRONIC BLOOD LOSS: Primary | ICD-10-CM

## 2024-09-20 PROCEDURE — 96365 THER/PROPH/DIAG IV INF INIT: CPT

## 2024-09-20 RX ORDER — SODIUM CHLORIDE 9 MG/ML
20 INJECTION, SOLUTION INTRAVENOUS ONCE
Status: COMPLETED | OUTPATIENT
Start: 2024-09-20 | End: 2024-09-20

## 2024-09-20 RX ORDER — SODIUM CHLORIDE 9 MG/ML
20 INJECTION, SOLUTION INTRAVENOUS ONCE
Status: CANCELLED | OUTPATIENT
Start: 2024-09-20

## 2024-09-20 RX ADMIN — SODIUM CHLORIDE 20 ML/HR: 0.9 INJECTION, SOLUTION INTRAVENOUS at 08:15

## 2024-09-20 RX ADMIN — IRON SUCROSE 300 MG: 20 INJECTION, SOLUTION INTRAVENOUS at 08:17

## 2024-09-20 NOTE — PROGRESS NOTES
S/w pt. She was holding for a EGD. Advised to cont the Lovenox and take Warfarin 10mg Fri-Sun and check INR on Mon

## 2024-09-20 NOTE — TELEPHONE ENCOUNTER
Caller: Ely Hernadez    Doctor: Dr. Layton    Call back #: [phone]    Reason for call: Patient called cardiology to return the coumadin clinic's call. I attempted to transfer patient, but I could not reach the team over there. I provided patient with the coumadin clinic patient number and advised patient that someone will call her back.

## 2024-09-20 NOTE — PROGRESS NOTES
Pt here for last venofer infusion ordered.   Vitals stable upon admission. Offers no new complaints.  Treatment tolerated well without complications.  Aware of lab schedule and f/u with provider, AVS declined.

## 2024-09-23 ENCOUNTER — TELEPHONE (OUTPATIENT)
Dept: NEPHROLOGY | Facility: CLINIC | Age: 84
End: 2024-09-23

## 2024-09-23 DIAGNOSIS — N18.32 STAGE 3B CHRONIC KIDNEY DISEASE (HCC): Primary | ICD-10-CM

## 2024-09-24 ENCOUNTER — APPOINTMENT (OUTPATIENT)
Age: 84
End: 2024-09-24
Payer: COMMERCIAL

## 2024-09-24 ENCOUNTER — ANTICOAG VISIT (OUTPATIENT)
Dept: CARDIOLOGY CLINIC | Facility: CLINIC | Age: 84
End: 2024-09-24

## 2024-09-26 ENCOUNTER — OFFICE VISIT (OUTPATIENT)
Dept: WOUND CARE | Facility: CLINIC | Age: 84
End: 2024-09-26
Payer: COMMERCIAL

## 2024-09-26 ENCOUNTER — IMMUNIZATIONS (OUTPATIENT)
Age: 84
End: 2024-09-26
Payer: COMMERCIAL

## 2024-09-26 VITALS
TEMPERATURE: 98.3 F | HEART RATE: 78 BPM | RESPIRATION RATE: 18 BRPM | SYSTOLIC BLOOD PRESSURE: 140 MMHG | DIASTOLIC BLOOD PRESSURE: 68 MMHG

## 2024-09-26 DIAGNOSIS — I87.331 CHRONIC VENOUS HYPERTENSION (IDIOPATHIC) WITH ULCER AND INFLAMMATION OF RIGHT LOWER EXTREMITY (HCC): Primary | ICD-10-CM

## 2024-09-26 DIAGNOSIS — Z23 ENCOUNTER FOR IMMUNIZATION: Primary | ICD-10-CM

## 2024-09-26 PROCEDURE — 99213 OFFICE O/P EST LOW 20 MIN: CPT | Performed by: ORTHOPAEDIC SURGERY

## 2024-09-26 PROCEDURE — G0008 ADMIN INFLUENZA VIRUS VAC: HCPCS | Performed by: INTERNAL MEDICINE

## 2024-09-26 PROCEDURE — 90662 IIV NO PRSV INCREASED AG IM: CPT | Performed by: INTERNAL MEDICINE

## 2024-09-26 RX ORDER — LIDOCAINE 40 MG/G
CREAM TOPICAL ONCE
Status: COMPLETED | OUTPATIENT
Start: 2024-09-26 | End: 2024-09-26

## 2024-09-26 RX ADMIN — LIDOCAINE: 40 CREAM TOPICAL at 08:08

## 2024-09-26 NOTE — PROGRESS NOTES
Wound Procedure Treatment Venous Ulcer Right Pretibial    Performed by: Graciela Valerio RN  Authorized by: Vesta Garcia PA-C    Associated wounds:   Wound 10/29/23 Venous Ulcer Pretibial Right  Wound cleansed with:  NSS  Applied primary dressing:  Hydrafera blue ready  Applied secondary dressing:  Gauze  Dressing secured with:  Tape and Size F

## 2024-09-26 NOTE — PATIENT INSTRUCTIONS
Orders Placed This Encounter   Procedures    Wound cleansing and dressings Venous Ulcer Right Pretibial     Wound cleansing and dressings Venous Ulcer Right Pretibial                                                       Right Leg wound:      Wash your hands with soap and water. Remove old dressing, discard into plastic bag and place in trash. Cleanse the wound with normal saline solution prior to applying a clean dressing. Do not use tissue or cotton balls. Do not scrub the wound. Pat dry using gauze.      Shower yes, if able to use cast cover. Do not get dressing wet.      Apply Hydrofera Blue Ready to the wound bed  Cover with gauze.  Secure with tape if your skin is breaking down from tape then need to wrap with Armani and put tape on Armani   Change dressing 3 times weekly.     Wear compression SpandigripF during the day     Standing Status:   Future     Standing Expiration Date:   10/3/2024

## 2024-09-26 NOTE — PROGRESS NOTES
Patient ID: Ely Hernadez is a 84 y.o. female Date of Birth 1940       Chief Complaint   Patient presents with    Follow Up Wound Care Visit     Right lower extremity ulcer       Allergies:  Patient has no known allergies.    Diagnosis:   Diagnosis ICD-10-CM Associated Orders   1. Chronic venous hypertension (idiopathic) with ulcer and inflammation of right lower extremity (HCC)  I87.331 lidocaine (LMX) 4 % cream     Wound cleansing and dressings Venous Ulcer Right Pretibial     Wound Procedure Treatment Venous Ulcer Right Pretibial           Assessment and Plan :  Follow up evaluation of right venous ulcer slowly improving.    Continue wound management with Hydrofera blue ready, see wound orders below   Encouraged to wear daily compression with Tubigrip  DO NOT WET WOUND.  No harsh cleansers such as alcohol, peroxide, or antibacterial soap, do not submerge in water  Can cleanse with NSS at dressing changes.   F/u with vascular studies as ordered.  Counseled on importance of frequent elevation of leg and increase exercise/walking for wound healing.  Follow-up in 2 week(s) or call sooner with questions or concerns or any signs of infection such as redness, swelling, increased/purulent drainage, fever, chills, increased severe pain.    Subjective:   6/28: Pt is an 84 y.o. female with pmhx HTN, DMII (A1c 8.7),  CKD, Pafib, CVA (10/28/23 on Coumadin/ASA 81mg) with residual deficits (left sided facial drop and leg weakness) well known to [facility] who presents for follow up evaluation of non healing RLE venous ulcer which has been present for about 2.5 years. Pt has been covering wound with a bandaid. Does not have an odor. No smoking, ETOH or drug use.  Pt denies any sob, fatigue, N/V, CP, fever or chills.    7/12: Patient presents for followup evaluation of RLE venous ulcer. No increased pain or drainage. Pt is frustrated with chronic wound. Has been using Polymem on the wound bed and Tubigrip for compression.  Pt  denies any fevers or chills.    7/25: Patient presents for followup evaluation of RLE venous ulcer. Since last visit pt was started on Bactrim for positive wound culture with abnormal 2+ Growth of Staphylococcus Aureus. The tissue exam demonstrated chronic inflammation and fibrosis. No malignancy, nor pyoderma gangrenosum were identified.  Pt noticed wound decreasing in size. Has been using Polymem on the wound bed and Tubigrip for compression.  Pt denies any fevers or chills.    8/2: Patient presents for followup evaluation of RLE venous ulcer. Pt completed Bactrim as directed. No issues. Pt states this is the best she has felt in 2 years. No fevers or chills.    8/22: Patient presents for followup evaluation of RLE venous ulcer. Pt states she has been wetting wound with mild soap and water. She noticed a scab formed and came off on dressing. In office [facility] today; R 0.59, L 0.76. No fevers or chills.    9/6: Patient presents for followup evaluation of RLE venous ulcer. Pt  has been applying Polymem Ag Max on wound bed and Tubigrip for compression. Denies fevers or chills.    9/19: Patient presents for followup evaluation of RLE venous ulcer. Pt has been applying Mupirocin wound bed and Tubigrip for compression. Denies fevers or chills.    9/26: Patient presents for followup evaluation of RLE venous ulcer. No complaints. Pt has been applying Hydrofera Blue ready on wound bed and Tubigrip for compression. Denies fevers or chills.            The following portions of the patient's history were reviewed and updated as appropriate:   Patient Active Problem List   Diagnosis    Hyperlipidemia    Chronic anticoagulation    Hypertension, essential    Coronary artery disease of native artery of native heart with stable angina pectoris (HCC)    Simple chronic bronchitis (HCC)    Diabetes mellitus with neurological manifestation (HCC)    Hypothyroidism    GERD (gastroesophageal reflux disease)    Anemia    AVM (arteriovenous  malformation) of small bowel, acquired with hemorrhage    Angiectasia    Other vascular disorders of intestine (HCC)    Aortic valve replaced    Cardiomyopathy (HCC)    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HCC)    Stage 3b chronic kidney disease (HCC)    Chronic kidney disease-mineral and bone disorder    Primary osteoarthritis involving multiple joints    Paroxysmal atrial fibrillation (HCC)    Neutropenia associated with infection (HCC)    Herpes simplex labialis    Restrictive lung disease    Nocturnal hypoxemia    Supratherapeutic INR    H/O mechanical aortic valve replacement    Subtherapeutic international normalized ratio (INR)    Type 2 diabetes mellitus with stage 3b chronic kidney disease, with long-term current use of insulin (HCC)    Venous ulcer of right leg (HCC)    Chronic systolic (congestive) heart failure (HCC)    Lower extremity edema    Iron deficiency anemia due to chronic blood loss    Hemiplegia and hemiparesis following cerebral infarction affecting left non-dominant side (HCC)     Past Medical History:   Diagnosis Date    Acute anterior epistaxis 12/23/2023    Acute blood loss anemia 12/14/2023    Acute respiratory failure with hypoxia (HCC) 02/06/2024    Anemia     Aneurysm of thoracic aorta (HCC)     Angioedema 06/07/2019    Aortic valve disorder     Benign neoplasm of sigmoid colon     Cardiomyopathy (HCC)     last assessed: 10/10/2014    CHF (congestive heart failure) (HCC)     Chronic kidney disease     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HCC)     last assessed: 10/10/2014    COPD (chronic obstructive pulmonary disease) (HCC)     Diabetic nephropathy (HCC)     Disease of thyroid gland     RAMON (dyspnea on exertion)     GERD (gastroesophageal reflux disease)     History of emphysema (HCC)     History of transfusion     Hyperlipidemia     Hypertensive urgency 10/28/2023    Leg cramps 11/01/2023    Stroke-like symptoms 10/28/2023    TIA (transient ischemic attack)      Past Surgical History:    Procedure Laterality Date    AORTIC VALVE REPLACEMENT      CARDIAC PACEMAKER PLACEMENT      last assessed: 10/10/2014    CARDIAC SURGERY      aortic valve replacement    CHOLECYSTECTOMY      COLONOSCOPY      EGD      HYSTERECTOMY      INSERT / REPLACE / REMOVE PACEMAKER      KNEE SURGERY Right     OTHER SURGICAL HISTORY      Capsule ENDOscopy description: 2012    TX COLONOSCOPY FLX DX W/COLLJ SPEC WHEN PFRMD N/A 2018    Procedure: single balloon ENTEROSCOPY;  Surgeon: David Dennis MD;  Location: [facility];  Service: Gastroenterology    TX ESOPHAGOGASTRODUODENOSCOPY TRANSORAL DIAGNOSTIC N/A 2017    Procedure: EGD AND COLONOSCOPY;  Surgeon: Jay Mcleod III, MD;  Location: [facility];  Service: Gastroenterology     Family History   Problem Relation Age of Onset    No Known Problems Mother     No Known Problems Father      Social History     Socioeconomic History    Marital status:      Spouse name: None    Number of children: None    Years of education: None    Highest education level: None   Occupational History    None   Tobacco Use    Smoking status: Former     Current packs/day: 0.00     Average packs/day: 1 pack/day for 52.9 years (52.9 ttl pk-yrs)     Types: Cigarettes     Start date:      Quit date: 2007     Years since quittin.8     Passive exposure: Past    Smokeless tobacco: Former     Quit date:    Vaping Use    Vaping status: Never Used   Substance and Sexual Activity    Alcohol use: Never    Drug use: Never    Sexual activity: Not Currently     Partners: Male   Other Topics Concern    None   Social History Narrative    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 BID Freestyle lancets BID    Living independently with spouse     Social Determinants of Health     Financial Resource Strain: Not on file   Food Insecurity: No Food Insecurity (5/15/2024)    Hunger Vital Sign     Worried About Running Out of Food in the Last Year: Never true     Ran Out  of Food in the Last Year: Never true   Transportation Needs: No Transportation Needs (5/15/2024)    PRAPARE - Transportation     Lack of Transportation (Medical): No     Lack of Transportation (Non-Medical): No   Physical Activity: Inactive (5/26/2021)    Exercise Vital Sign     Days of Exercise per Week: 0 days     Minutes of Exercise per Session: 0 min   Stress: No Stress Concern Present (5/26/2021)    [employer] - Occupational Stress Questionnaire     Feeling of Stress : Not at all   Social Connections: Not on file   Intimate Partner Violence: Not on file   Housing Stability: Low Risk  (5/15/2024)    Housing Stability Vital Sign     Unable to Pay for Housing in the Last Year: No     Number of Times Moved in the Last Year: 1     Homeless in the Last Year: No       Current Outpatient Medications:     Accu-Chek FastClix Lancets MISC, Use 3 (three) times a day, Disp: 300 each, Rfl: 3    albuterol (Ventolin HFA) 90 mcg/act inhaler, Inhale 2 puffs every 6 (six) hours as needed for wheezing, Disp: 54 g, Rfl: 3    Ascorbic Acid (vitamin C) 1000 MG tablet, Take 1,000 mg by mouth daily, Disp: , Rfl:     aspirin (ECOTRIN LOW STRENGTH) 81 mg EC tablet, Take 1 tablet (81 mg total) by mouth daily Do not start before November 1, 2023., Disp: 30 tablet, Rfl: 0    b complex vitamins capsule, Take 1 capsule by mouth daily, Disp: 30 capsule, Rfl: 2    Blood Glucose Monitoring Suppl (Accu-Chek Guide Me) w/Device KIT, USE 3 TIMES DAILY, Disp: 1 kit, Rfl: 2    Cholecalciferol (Vitamin D3) 50 MCG (2000 UT) TABS, Take 2,000 Units by mouth daily, Disp: , Rfl:     enoxaparin (LOVENOX) 80 mg/0.8 mL, Inject 0.7 mL (70 mg total) under the skin every 24 hours Hold Warfarin 5 days prior. Last dose is 9/6 Start Lovenox on 9/8, 4 days prior to the procedure. No Lovenox the morning of the procedure. Resume both Lovenox and Warfarin at the discretion of the surgeon (preferably that evening) and check INR 4 days after  restarting. Inject in the abdomen about 2 inches from the belly button and rotate sides at each injection., Disp: 0.7 mL, Rfl: 10    Ferrous Sulfate (IRON PO), Take by mouth daily in the early morning OTC, Disp: , Rfl:     furosemide (LASIX) 40 mg tablet, Take 1 tablet (40 mg total) by mouth 2 (two) times a day, Disp: 180 tablet, Rfl: 3    gabapentin (NEURONTIN) 300 mg capsule, Take 1 capsule (300 mg total) by mouth 3 (three) times a day (Patient taking differently: Take 300 mg by mouth 2 (two) times a day), Disp: 270 capsule, Rfl: 3    glipiZIDE (GLUCOTROL) 10 mg tablet, Take 1 tablet (10 mg total) by mouth 2 (two) times a day before meals, Disp: 180 tablet, Rfl: 3    glucose blood (Accu-Chek Celeste Plus) test strip, Use 1 each 3 (three) times a day Use as instructed, Disp: 300 each, Rfl: 3    insulin detemir (Levemir FlexPen) 100 Units/mL injection pen, Inject 30 Units under the skin daily at bedtime, Disp: 27 mL, Rfl: 0    Insulin Pen Needle (BD Pen Needle Rosie U/F) 32G X 4 MM MISC, Use daily, Disp: 100 each, Rfl: 1    ipratropium-albuterol (DUO-NEB) 0.5-2.5 mg/3 mL nebulizer solution, inhale contents of 1 vial ( 3 milliliters ) in nebulizer four times a day, Disp: , Rfl:     Lancets (freestyle) lancets, Check blood glucose 3 times daily, Disp: 300 each, Rfl: 0    levothyroxine 50 mcg tablet, Take 1 tablet (50 mcg total) by mouth daily, Disp: 90 tablet, Rfl: 3    metoprolol tartrate (LOPRESSOR) 50 mg tablet, TAKE ONE-HALF TABLET BY  MOUTH TWICE DAILY, Disp: 90 tablet, Rfl: 3    mupirocin (BACTROBAN) 2 % ointment, Apply topically daily, Disp: 22 g, Rfl: 0    oxygen gas, Inhale 2 L/min daily at bedtime as needed home oxygen nightly, Disp: , Rfl:     pantoprazole (PROTONIX) 40 mg tablet, TAKE 1 TABLET BY MOUTH DAILY AS  DIRECTED, Disp: 100 tablet, Rfl: 1    warfarin (COUMADIN) 5 mg tablet, TAKE 1 TO 2 TABLETS BY  MOUTH DAILY OR AS DIRECTED, Disp: 180 tablet, Rfl: 3    Current Facility-Administered Medications:      lidocaine (LMX) 4 % cream, , Topical, Once, Vesta Garcia PA-C    Review of Systems   Constitutional:  Negative for appetite change, chills, fatigue, fever and unexpected weight change.   HENT:  Negative for congestion, hearing loss and postnasal drip.    Respiratory:  Negative for cough and shortness of breath.    Cardiovascular:  Positive for leg swelling.   Musculoskeletal:  Positive for gait problem.   Skin:  Positive for wound (RLE). Negative for rash.   Neurological:  Negative for numbness.   Hematological:  Does not bruise/bleed easily.   Psychiatric/Behavioral: Negative.           Objective:  /68   Pulse 78   Temp 98.3 °F (36.8 °C)   Resp 18   Pain Score: 0-No pain     Physical Exam  Vitals reviewed.   Constitutional:       General: She is not in acute distress.     Appearance: Normal appearance. She is well-developed and normal weight.   HENT:      Head: Normocephalic and atraumatic.   Cardiovascular:      Rate and Rhythm: Normal rate.   Pulmonary:      Effort: Pulmonary effort is normal.   Musculoskeletal:         General: No deformity.      Right lower leg: Edema present.      Left lower leg: No edema.   Skin:     General: Skin is warm and dry.      Findings: Wound (RLE) present.             Comments: Beefy red granulated wound bed with serosanguinous drainage. See wound assessment   Neurological:      General: No focal deficit present.      Mental Status: She is alert and oriented to person, place, and time.      Gait: Gait normal.   Psychiatric:         Mood and Affect: Mood and affect normal.         Behavior: Behavior normal. Behavior is cooperative.         Wound 10/29/23 Venous Ulcer Pretibial Right (Active)   Wound Image Images linked 09/26/24 0807   Wound Description Pink;Epithelialization;Yellow 09/26/24 0807   Radha-wound Assessment Fragile 09/26/24 0807   Wound Length (cm) 1.6 cm 09/26/24 0807   Wound Width (cm) 0.9 cm 09/26/24 0807   Wound Surface Area (cm^2) 1.44 cm^2 09/26/24 0807  "  Drainage Amount Moderate 09/26/24 0807   Drainage Description Serosanguineous 09/26/24 0807   Non-staged Wound Description Full thickness 09/26/24 0807   Dressing Status Intact 09/26/24 0807       Wound Instructions:  Orders Placed This Encounter   Procedures    Wound cleansing and dressings Venous Ulcer Right Pretibial     Wound cleansing and dressings Venous Ulcer Right Pretibial                                                       Right Leg wound:      Wash your hands with soap and water. Remove old dressing, discard into plastic bag and place in trash. Cleanse the wound with normal saline solution prior to applying a clean dressing. Do not use tissue or cotton balls. Do not scrub the wound. Pat dry using gauze.      Shower yes, if able to use cast cover. Do not get dressing wet.      Apply Hydrofera Blue Ready to the wound bed  Cover with gauze.  Secure with tape if your skin is breaking down from tape then need to wrap with Armani and put tape on Armani   Change dressing 3 times weekly.     Wear compression SpandigripF during the day     Standing Status:   Future     Standing Expiration Date:   10/3/2024    Wound Procedure Treatment Venous Ulcer Right Pretibial     This order was created via procedure documentation       Vesta Garcia PA-C, [facility]      Portions of the record may have been created with voice recognition software. Occasional wrong word or \"sound alike\" substitutions may have occurred due to the inherent limitations of voice recognition software. Read the chart carefully and recognize, using context, where substitutions have occurred.      "

## 2024-09-27 ENCOUNTER — APPOINTMENT (OUTPATIENT)
Age: 84
End: 2024-09-27
Payer: COMMERCIAL

## 2024-09-27 ENCOUNTER — IN-CLINIC DEVICE VISIT (OUTPATIENT)
Dept: CARDIOLOGY CLINIC | Facility: CLINIC | Age: 84
End: 2024-09-27
Payer: COMMERCIAL

## 2024-09-27 ENCOUNTER — ANTICOAG VISIT (OUTPATIENT)
Dept: CARDIOLOGY CLINIC | Facility: CLINIC | Age: 84
End: 2024-09-27

## 2024-09-27 DIAGNOSIS — N18.32 STAGE 3B CHRONIC KIDNEY DISEASE (HCC): ICD-10-CM

## 2024-09-27 DIAGNOSIS — Z95.0 PRESENCE OF PERMANENT CARDIAC PACEMAKER: Primary | ICD-10-CM

## 2024-09-27 LAB
25(OH)D3 SERPL-MCNC: 60.3 NG/ML (ref 30–100)
ANION GAP SERPL CALCULATED.3IONS-SCNC: 9 MMOL/L (ref 4–13)
BACTERIA UR QL AUTO: NORMAL /HPF
BASOPHILS # BLD AUTO: 0.04 THOUSANDS/ΜL (ref 0–0.1)
BASOPHILS NFR BLD AUTO: 1 % (ref 0–1)
BILIRUB UR QL STRIP: NEGATIVE
BUN SERPL-MCNC: 31 MG/DL (ref 5–25)
CALCIUM SERPL-MCNC: 9.4 MG/DL (ref 8.4–10.2)
CHLORIDE SERPL-SCNC: 101 MMOL/L (ref 96–108)
CLARITY UR: CLEAR
CO2 SERPL-SCNC: 29 MMOL/L (ref 21–32)
COLOR UR: COLORLESS
CREAT SERPL-MCNC: 1.44 MG/DL (ref 0.6–1.3)
CREAT UR-MCNC: 54.1 MG/DL
EOSINOPHIL # BLD AUTO: 0.19 THOUSAND/ΜL (ref 0–0.61)
EOSINOPHIL NFR BLD AUTO: 4 % (ref 0–6)
ERYTHROCYTE [DISTWIDTH] IN BLOOD BY AUTOMATED COUNT: 14.4 % (ref 11.6–15.1)
GFR SERPL CREATININE-BSD FRML MDRD: 33 ML/MIN/1.73SQ M
GLUCOSE P FAST SERPL-MCNC: 179 MG/DL (ref 65–99)
GLUCOSE UR STRIP-MCNC: NEGATIVE MG/DL
HCT VFR BLD AUTO: 44.2 % (ref 34.8–46.1)
HGB BLD-MCNC: 14.1 G/DL (ref 11.5–15.4)
HGB UR QL STRIP.AUTO: NEGATIVE
IMM GRANULOCYTES # BLD AUTO: 0.02 THOUSAND/UL (ref 0–0.2)
IMM GRANULOCYTES NFR BLD AUTO: 0 % (ref 0–2)
KETONES UR STRIP-MCNC: NEGATIVE MG/DL
LEUKOCYTE ESTERASE UR QL STRIP: NEGATIVE
LYMPHOCYTES # BLD AUTO: 1.14 THOUSANDS/ΜL (ref 0.6–4.47)
LYMPHOCYTES NFR BLD AUTO: 23 % (ref 14–44)
MCH RBC QN AUTO: 29.9 PG (ref 26.8–34.3)
MCHC RBC AUTO-ENTMCNC: 31.9 G/DL (ref 31.4–37.4)
MCV RBC AUTO: 94 FL (ref 82–98)
MONOCYTES # BLD AUTO: 0.51 THOUSAND/ΜL (ref 0.17–1.22)
MONOCYTES NFR BLD AUTO: 10 % (ref 4–12)
NEUTROPHILS # BLD AUTO: 3.02 THOUSANDS/ΜL (ref 1.85–7.62)
NEUTS SEG NFR BLD AUTO: 62 % (ref 43–75)
NITRITE UR QL STRIP: NEGATIVE
NON-SQ EPI CELLS URNS QL MICRO: NORMAL /HPF
NRBC BLD AUTO-RTO: 0 /100 WBCS
PH UR STRIP.AUTO: 6 [PH]
PHOSPHATE SERPL-MCNC: 2.8 MG/DL (ref 2.3–4.1)
PLATELET # BLD AUTO: 252 THOUSANDS/UL (ref 149–390)
PMV BLD AUTO: 11.4 FL (ref 8.9–12.7)
POTASSIUM SERPL-SCNC: 4.4 MMOL/L (ref 3.5–5.3)
PROT UR STRIP-MCNC: NEGATIVE MG/DL
PROT UR-MCNC: <4 MG/DL
PTH-INTACT SERPL-MCNC: 88.6 PG/ML (ref 12–88)
RBC # BLD AUTO: 4.71 MILLION/UL (ref 3.81–5.12)
RBC #/AREA URNS AUTO: NORMAL /HPF
SODIUM SERPL-SCNC: 139 MMOL/L (ref 135–147)
SP GR UR STRIP.AUTO: 1.01 (ref 1–1.03)
UROBILINOGEN UR STRIP-ACNC: <2 MG/DL
WBC # BLD AUTO: 4.92 THOUSAND/UL (ref 4.31–10.16)
WBC #/AREA URNS AUTO: NORMAL /HPF

## 2024-09-27 PROCEDURE — 84156 ASSAY OF PROTEIN URINE: CPT

## 2024-09-27 PROCEDURE — 82306 VITAMIN D 25 HYDROXY: CPT

## 2024-09-27 PROCEDURE — 84100 ASSAY OF PHOSPHORUS: CPT

## 2024-09-27 PROCEDURE — 81001 URINALYSIS AUTO W/SCOPE: CPT

## 2024-09-27 PROCEDURE — 82570 ASSAY OF URINE CREATININE: CPT

## 2024-09-27 PROCEDURE — 83970 ASSAY OF PARATHORMONE: CPT

## 2024-09-27 PROCEDURE — 85025 COMPLETE CBC W/AUTO DIFF WBC: CPT

## 2024-09-27 PROCEDURE — 93280 PM DEVICE PROGR EVAL DUAL: CPT | Performed by: INTERNAL MEDICINE

## 2024-09-27 PROCEDURE — 80048 BASIC METABOLIC PNL TOTAL CA: CPT

## 2024-09-27 NOTE — PROGRESS NOTES
SUZI DC PM/NOT MRI CONDITIONAL   DEVICE INTERROGATED IN THE [place] OFFICE:  BATTERY VOLTAGE NEARING LACIE (11.4 MONTHS).  WILL SCHEDULE MONTHLY BATTERY CHECKS.  AP<1%  97% (100% MODE SWITCHED).  ALL LEAD PARAMETERS WITHIN NORMAL LIMITS.  NO NEW HIGH RATE EPISODES.  100% AF BURDEN, ON ASA, WARFARIN, AND METOPROLOL.  PVAB SET  MS.  NORMAL DEVICE FUNCTION.  RG

## 2024-10-02 ENCOUNTER — TELEPHONE (OUTPATIENT)
Age: 84
End: 2024-10-02

## 2024-10-02 NOTE — TELEPHONE ENCOUNTER
Patient called, stating she got a letter from [facility] that her med would be changed    insulin detemir (Levemir FlexPen) 100 Units/mL injection pen     She called [facility] but they told her to call PCP-- do we know why the med would be changed?

## 2024-10-02 NOTE — TELEPHONE ENCOUNTER
It looks like we refilled it 9/6, we are not currently changing it, but it may be changed for insurance reasons.  As long as it is basal insulin, there should not be a problem.

## 2024-10-07 ENCOUNTER — APPOINTMENT (OUTPATIENT)
Dept: LAB | Facility: HOSPITAL | Age: 84
End: 2024-10-07
Payer: COMMERCIAL

## 2024-10-07 ENCOUNTER — OFFICE VISIT (OUTPATIENT)
Dept: NEPHROLOGY | Facility: CLINIC | Age: 84
End: 2024-10-07
Payer: COMMERCIAL

## 2024-10-07 ENCOUNTER — ANTICOAG VISIT (OUTPATIENT)
Dept: CARDIOLOGY CLINIC | Facility: CLINIC | Age: 84
End: 2024-10-07

## 2024-10-07 VITALS
HEART RATE: 74 BPM | RESPIRATION RATE: 18 BRPM | TEMPERATURE: 98.3 F | HEIGHT: 66 IN | WEIGHT: 164.8 LBS | BODY MASS INDEX: 26.48 KG/M2 | DIASTOLIC BLOOD PRESSURE: 70 MMHG | SYSTOLIC BLOOD PRESSURE: 130 MMHG | OXYGEN SATURATION: 96 %

## 2024-10-07 DIAGNOSIS — Z79.4 TYPE 2 DIABETES MELLITUS WITH STAGE 3B CHRONIC KIDNEY DISEASE, WITH LONG-TERM CURRENT USE OF INSULIN (HCC): ICD-10-CM

## 2024-10-07 DIAGNOSIS — M89.9 CHRONIC KIDNEY DISEASE-MINERAL AND BONE DISORDER: ICD-10-CM

## 2024-10-07 DIAGNOSIS — N18.32 STAGE 3B CHRONIC KIDNEY DISEASE (HCC): Primary | ICD-10-CM

## 2024-10-07 DIAGNOSIS — I42.0 DILATED CARDIOMYOPATHY (HCC): ICD-10-CM

## 2024-10-07 DIAGNOSIS — E83.9 CHRONIC KIDNEY DISEASE-MINERAL AND BONE DISORDER: ICD-10-CM

## 2024-10-07 DIAGNOSIS — I10 HYPERTENSION, ESSENTIAL: ICD-10-CM

## 2024-10-07 DIAGNOSIS — N18.9 CHRONIC KIDNEY DISEASE-MINERAL AND BONE DISORDER: ICD-10-CM

## 2024-10-07 DIAGNOSIS — E11.22 TYPE 2 DIABETES MELLITUS WITH STAGE 3B CHRONIC KIDNEY DISEASE, WITH LONG-TERM CURRENT USE OF INSULIN (HCC): ICD-10-CM

## 2024-10-07 DIAGNOSIS — N18.32 TYPE 2 DIABETES MELLITUS WITH STAGE 3B CHRONIC KIDNEY DISEASE, WITH LONG-TERM CURRENT USE OF INSULIN (HCC): ICD-10-CM

## 2024-10-07 PROCEDURE — 99214 OFFICE O/P EST MOD 30 MIN: CPT | Performed by: INTERNAL MEDICINE

## 2024-10-07 NOTE — ASSESSMENT & PLAN NOTE
Lab Results   Component Value Date    EGFR 33 09/27/2024    EGFR 35 08/21/2024    EGFR 36 06/25/2024    CREATININE 1.44 (H) 09/27/2024    CREATININE 1.37 (H) 08/21/2024    CREATININE 1.35 (H) 06/25/2024   Renal function is stable overall.  Does have stage III CKD likely because of diabetes though can be multifactorial    Will monitor patient closely at this point.  Advise hydration and avoiding nephrotoxic medication

## 2024-10-07 NOTE — ASSESSMENT & PLAN NOTE
Lab Results   Component Value Date    EGFR 33 09/27/2024    EGFR 35 08/21/2024    EGFR 36 06/25/2024    CREATININE 1.44 (H) 09/27/2024    CREATININE 1.37 (H) 08/21/2024    CREATININE 1.35 (H) 06/25/2024   PTH and phosphorus along with vitamin D are within acceptable range and will continue to monitor

## 2024-10-07 NOTE — PROGRESS NOTES
NEPHROLOGY OFFICE FOLLOW UP  Ely Hernadez 84 y.o. female MRN: [id number]    Encounter: [id number] 10/7/2024    REASON FOR VISIT: Ely Hernadez is a 84 y.o. female who is here on 10/7/2024 for Chronic Kidney Disease and Follow-up  .    HPI:    Ely came into the follow-up of CKD.  84 woman who is doing well overall and denies any acute complaint    Patient is getting recurrent iron infusion    Does have iron deficiency anemia and GI is following closely closely    Has EGD done in the past    Other than the feeling of tiredness no other acute complaint    No chest pain no palpitation    No nausea no vomiting        REVIEW OF SYSTEMS:    Review of Systems   Constitutional:  Negative for fatigue.   HENT:  Negative for congestion.    Eyes:  Negative for photophobia and pain.   Respiratory:  Negative for chest tightness and shortness of breath.    Cardiovascular:  Negative for chest pain and palpitations.   Gastrointestinal:  Negative for abdominal distention, abdominal pain and blood in stool.   Endocrine: Negative for polydipsia.   Genitourinary:  Negative for difficulty urinating, dysuria, flank pain, hematuria and urgency.   Musculoskeletal:  Negative for arthralgias and back pain.   Skin:  Negative for rash.   Neurological:  Negative for dizziness, light-headedness and headaches.   Hematological:  Does not bruise/bleed easily.   Psychiatric/Behavioral:  Negative for behavioral problems. The patient is not nervous/anxious.          PAST MEDICAL HISTORY:  Past Medical History:   Diagnosis Date    Acute anterior epistaxis 12/23/2023    Acute blood loss anemia 12/14/2023    Acute respiratory failure with hypoxia (HCC) 02/06/2024    Anemia     Aneurysm of thoracic aorta (HCC)     Angioedema 06/07/2019    Aortic valve disorder     Benign neoplasm of sigmoid colon     Cardiomyopathy (HCC)     last assessed: 10/10/2014    CHF (congestive heart failure) (HCC)     Chronic kidney disease     Complete atrioventricular  block (HCC)     last assessed: 10/10/2014    COPD (chronic obstructive pulmonary disease) (HCC)     Diabetic nephropathy (HCC)     Disease of thyroid gland     RAMON (dyspnea on exertion)     GERD (gastroesophageal reflux disease)     History of emphysema (HCC)     History of transfusion     Hyperlipidemia     Hypertensive urgency 10/28/2023    Leg cramps 2023    Stroke-like symptoms 10/28/2023    TIA (transient ischemic attack)        PAST SURGICAL HISTORY:  Past Surgical History:   Procedure Laterality Date    AORTIC VALVE REPLACEMENT      CARDIAC PACEMAKER PLACEMENT      last assessed: 10/10/2014    CARDIAC SURGERY      aortic valve replacement    CHOLECYSTECTOMY      COLONOSCOPY      EGD      HYSTERECTOMY      INSERT / REPLACE / REMOVE PACEMAKER      KNEE SURGERY Right     OTHER SURGICAL HISTORY      Capsule ENDOscopy description: 2012    NH COLONOSCOPY FLX DX W/COLLJ SPEC WHEN PFRMD N/A 2018    Procedure: single balloon ENTEROSCOPY;  Surgeon: David Dennis MD;  Location: [facility];  Service: Gastroenterology    NH ESOPHAGOGASTRODUODENOSCOPY TRANSORAL DIAGNOSTIC N/A 2017    Procedure: EGD AND COLONOSCOPY;  Surgeon: Jay Mcleod III, MD;  Location: [facility];  Service: Gastroenterology       SOCIAL HISTORY:  Social History     Substance and Sexual Activity   Alcohol Use Never     Social History     Substance and Sexual Activity   Drug Use Never     Social History     Tobacco Use   Smoking Status Former    Current packs/day: 0.00    Average packs/day: 1 pack/day for 52.9 years (52.9 ttl pk-yrs)    Types: Cigarettes    Start date:     Quit date: 2007    Years since quittin.8    Passive exposure: Past   Smokeless Tobacco Former    Quit date:        FAMILY HISTORY:  Family History   Problem Relation Age of Onset    No Known Problems Mother     No Known Problems Father        MEDICATIONS:    Current Outpatient Medications:     Accu-Chek FastClix Lancets MISC, Use 3 (three)  times a day, Disp: 300 each, Rfl: 3    albuterol (Ventolin HFA) 90 mcg/act inhaler, Inhale 2 puffs every 6 (six) hours as needed for wheezing, Disp: 54 g, Rfl: 3    Ascorbic Acid (vitamin C) 1000 MG tablet, Take 1,000 mg by mouth daily, Disp: , Rfl:     aspirin (ECOTRIN LOW STRENGTH) 81 mg EC tablet, Take 1 tablet (81 mg total) by mouth daily Do not start before November 1, 2023., Disp: 30 tablet, Rfl: 0    b complex vitamins capsule, Take 1 capsule by mouth daily, Disp: 30 capsule, Rfl: 2    Blood Glucose Monitoring Suppl (Accu-Chek Guide Me) w/Device KIT, USE 3 TIMES DAILY, Disp: 1 kit, Rfl: 2    Cholecalciferol (Vitamin D3) 50 MCG (2000 UT) TABS, Take 2,000 Units by mouth daily, Disp: , Rfl:     enoxaparin (LOVENOX) 80 mg/0.8 mL, Inject 0.7 mL (70 mg total) under the skin every 24 hours Hold Warfarin 5 days prior. Last dose is 9/6 Start Lovenox on 9/8, 4 days prior to the procedure. No Lovenox the morning of the procedure. Resume both Lovenox and Warfarin at the discretion of the surgeon (preferably that evening) and check INR 4 days after restarting. Inject in the abdomen about 2 inches from the belly button and rotate sides at each injection., Disp: 0.7 mL, Rfl: 10    Ferrous Sulfate (IRON PO), Take by mouth daily in the early morning OTC, Disp: , Rfl:     furosemide (LASIX) 40 mg tablet, Take 1 tablet (40 mg total) by mouth 2 (two) times a day, Disp: 180 tablet, Rfl: 3    gabapentin (NEURONTIN) 300 mg capsule, Take 1 capsule (300 mg total) by mouth 3 (three) times a day (Patient taking differently: Take 300 mg by mouth 2 (two) times a day), Disp: 270 capsule, Rfl: 3    glipiZIDE (GLUCOTROL) 10 mg tablet, Take 1 tablet (10 mg total) by mouth 2 (two) times a day before meals, Disp: 180 tablet, Rfl: 3    glucose blood (Accu-Chek Celeste Plus) test strip, Use 1 each 3 (three) times a day Use as instructed, Disp: 300 each, Rfl: 3    insulin detemir (Levemir FlexPen) 100 Units/mL injection pen, Inject 30 Units under  "the skin daily at bedtime, Disp: 27 mL, Rfl: 0    Insulin Pen Needle (BD Pen Needle Rosie U/F) 32G X 4 MM MISC, Use daily, Disp: 100 each, Rfl: 1    ipratropium-albuterol (DUO-NEB) 0.5-2.5 mg/3 mL nebulizer solution, inhale contents of 1 vial ( 3 milliliters ) in nebulizer four times a day, Disp: , Rfl:     Lancets (freestyle) lancets, Check blood glucose 3 times daily, Disp: 300 each, Rfl: 0    levothyroxine 50 mcg tablet, Take 1 tablet (50 mcg total) by mouth daily, Disp: 90 tablet, Rfl: 3    metoprolol tartrate (LOPRESSOR) 50 mg tablet, TAKE ONE-HALF TABLET BY  MOUTH TWICE DAILY, Disp: 90 tablet, Rfl: 3    mupirocin (BACTROBAN) 2 % ointment, Apply topically daily, Disp: 22 g, Rfl: 0    oxygen gas, Inhale 2 L/min daily at bedtime as needed home oxygen nightly, Disp: , Rfl:     pantoprazole (PROTONIX) 40 mg tablet, TAKE 1 TABLET BY MOUTH DAILY AS  DIRECTED, Disp: 100 tablet, Rfl: 1    warfarin (COUMADIN) 5 mg tablet, TAKE 1 TO 2 TABLETS BY  MOUTH DAILY OR AS DIRECTED, Disp: 180 tablet, Rfl: 3    Current Facility-Administered Medications:     lidocaine (LMX) 4 % cream, , Topical, Once, Vesta Garcia PA-C    PHYSICAL EXAM:  Vitals:    10/07/24 1052   BP: 130/70   BP Location: Right arm   Patient Position: Sitting   Pulse: 74   Resp: 18   Temp: 98.3 °F (36.8 °C)   TempSrc: Temporal   SpO2: 96%   Weight: 74.8 kg (164 lb 12.8 oz)   Height: 5' 6\" (1.676 m)     Body mass index is 26.6 kg/m².    Physical Exam  Constitutional:       General: She is not in acute distress.     Appearance: She is well-developed.   HENT:      Head: Normocephalic.      Mouth/Throat:      Mouth: Mucous membranes are moist.   Eyes:      General: No scleral icterus.     Conjunctiva/sclera: Conjunctivae normal.   Neck:      Vascular: No JVD.   Cardiovascular:      Rate and Rhythm: Normal rate.      Heart sounds: Normal heart sounds.   Pulmonary:      Effort: Pulmonary effort is normal.      Breath sounds: No wheezing.   Abdominal:      Palpations: " Abdomen is soft.      Tenderness: There is no abdominal tenderness.   Musculoskeletal:         General: Normal range of motion.      Cervical back: Neck supple.   Skin:     General: Skin is warm.      Findings: No rash.   Neurological:      Mental Status: She is alert and oriented to person, place, and time.   Psychiatric:         Behavior: Behavior normal.         LAB RESULTS:  Results for orders placed or performed in visit on 09/27/24   Basic metabolic panel    Collection Time: 09/27/24  8:18 AM   Result Value Ref Range    Sodium 139 135 - 147 mmol/L    Potassium 4.4 3.5 - 5.3 mmol/L    Chloride 101 96 - 108 mmol/L    CO2 29 21 - 32 mmol/L    ANION GAP 9 4 - 13 mmol/L    BUN 31 (H) 5 - 25 mg/dL    Creatinine 1.44 (H) 0.60 - 1.30 mg/dL    Glucose, Fasting 179 (H) 65 - 99 mg/dL    Calcium 9.4 8.4 - 10.2 mg/dL    eGFR 33 ml/min/1.73sq m   CBC and differential    Collection Time: 09/27/24  8:18 AM   Result Value Ref Range    WBC 4.92 4.31 - 10.16 Thousand/uL    RBC 4.71 3.81 - 5.12 Million/uL    Hemoglobin 14.1 11.5 - 15.4 g/dL    Hematocrit 44.2 34.8 - 46.1 %    MCV 94 82 - 98 fL    MCH 29.9 26.8 - 34.3 pg    MCHC 31.9 31.4 - 37.4 g/dL    RDW 14.4 11.6 - 15.1 %    MPV 11.4 8.9 - 12.7 fL    Platelets 252 149 - 390 Thousands/uL    nRBC 0 /100 WBCs    Segmented % 62 43 - 75 %    Immature Grans % 0 0 - 2 %    Lymphocytes % 23 14 - 44 %    Monocytes % 10 4 - 12 %    Eosinophils Relative 4 0 - 6 %    Basophils Relative 1 0 - 1 %    Absolute Neutrophils 3.02 1.85 - 7.62 Thousands/µL    Absolute Immature Grans 0.02 0.00 - 0.20 Thousand/uL    Absolute Lymphocytes 1.14 0.60 - 4.47 Thousands/µL    Absolute Monocytes 0.51 0.17 - 1.22 Thousand/µL    Eosinophils Absolute 0.19 0.00 - 0.61 Thousand/µL    Basophils Absolute 0.04 0.00 - 0.10 Thousands/µL   Protein / creatinine ratio, urine    Collection Time: 09/27/24  8:18 AM   Result Value Ref Range    Creatinine, Ur 54.1 Reference range not established. mg/dL    Protein Urine  Random <4.0 Reference range not established. mg/dL    Prot/Creat Ratio, Ur     Urinalysis with microscopic    Collection Time: 09/27/24  8:18 AM   Result Value Ref Range    Color, UA Colorless     Clarity, UA Clear     Specific Gravity, UA 1.009 1.003 - 1.030    pH, UA 6.0 4.5, 5.0, 5.5, 6.0, 6.5, 7.0, 7.5, 8.0    Leukocytes, UA Negative Negative    Nitrite, UA Negative Negative    Protein, UA Negative Negative mg/dl    Glucose, UA Negative Negative mg/dl    Ketones, UA Negative Negative mg/dl    Urobilinogen, UA <2.0 <2.0 mg/dl mg/dl    Bilirubin, UA Negative Negative    Occult Blood, UA Negative Negative    RBC, UA 1-2 None Seen, 1-2 /hpf    WBC, UA 1-2 None Seen, 1-2 /hpf    Epithelial Cells None Seen None Seen, Occasional /hpf    Bacteria, UA None Seen None Seen, Occasional /hpf     *Note: Due to a large number of results and/or encounters for the requested time period, some results have not been displayed. A complete set of results can be found in Results Review.       ASSESSMENT and PLAN:      Stage 3b chronic kidney disease (HCC)  Lab Results   Component Value Date    EGFR 33 09/27/2024    EGFR 35 08/21/2024    EGFR 36 06/25/2024    CREATININE 1.44 (H) 09/27/2024    CREATININE 1.37 (H) 08/21/2024    CREATININE 1.35 (H) 06/25/2024   Renal function is stable overall.  Does have stage III CKD likely because of diabetes though can be multifactorial    Will monitor patient closely at this point.  Advise hydration and avoiding nephrotoxic medication    Chronic kidney disease-mineral and bone disorder  Lab Results   Component Value Date    EGFR 33 09/27/2024    EGFR 35 08/21/2024    EGFR 36 06/25/2024    CREATININE 1.44 (H) 09/27/2024    CREATININE 1.37 (H) 08/21/2024    CREATININE 1.35 (H) 06/25/2024   PTH and phosphorus along with vitamin D are within acceptable range and will continue to monitor    Type 2 diabetes mellitus with stage 3b chronic kidney disease, with long-term current use of insulin (HCC)    Lab  "Results   Component Value Date    HGBA1C 6.2 (H) 05/15/2024   Overall well-controlled.  Patient can benefit from SGLT2 inhibitor like Farxiga or Jardiance and advised to discuss with primary doctor    Hypertension, essential  Well-controlled    Cardiomyopathy (HCC)  Quite asymptomatic for now        Everything discussed at length with the patient.  I will see her back in 6 months.  She is going to discuss with primary doctor about need for SGLT2 inhibitor.  Will get blood and urine test before that visit      Portions of the record may have been created with voice recognition software. Occasional wrong word or \"sound a like\" substitutions may have occurred due to the inherent limitations of voice recognition software. Read the chart carefully and recognize, using context, where substitutions have occurred.If you have any questions, please contact the dictating provider.   "

## 2024-10-07 NOTE — ASSESSMENT & PLAN NOTE
Lab Results   Component Value Date    HGBA1C 6.2 (H) 05/15/2024   Overall well-controlled.  Patient can benefit from SGLT2 inhibitor like Farxiga or Jardiance and advised to discuss with primary doctor

## 2024-10-10 ENCOUNTER — OFFICE VISIT (OUTPATIENT)
Dept: WOUND CARE | Facility: CLINIC | Age: 84
End: 2024-10-10
Payer: COMMERCIAL

## 2024-10-10 VITALS
SYSTOLIC BLOOD PRESSURE: 140 MMHG | RESPIRATION RATE: 18 BRPM | HEART RATE: 60 BPM | TEMPERATURE: 97.2 F | DIASTOLIC BLOOD PRESSURE: 60 MMHG

## 2024-10-10 DIAGNOSIS — I87.331 CHRONIC VENOUS HYPERTENSION (IDIOPATHIC) WITH ULCER AND INFLAMMATION OF RIGHT LOWER EXTREMITY (HCC): Primary | ICD-10-CM

## 2024-10-10 PROCEDURE — 99213 OFFICE O/P EST LOW 20 MIN: CPT | Performed by: ORTHOPAEDIC SURGERY

## 2024-10-10 NOTE — PROGRESS NOTES
Patient ID: Ely Hernadez is a 84 y.o. female Date of Birth 1940       Chief Complaint   Patient presents with    Follow Up Wound Care Visit     Right lower extremity ulcer       Allergies:  Patient has no known allergies.    Diagnosis:   Diagnosis ICD-10-CM Associated Orders   1. Chronic venous hypertension (idiopathic) with ulcer and inflammation of right lower extremity (HCC)  I87.331 Wound cleansing and dressings Venous Ulcer Right Pretibial     Wound Procedure Treatment Venous Ulcer Right Pretibial           Assessment and Plan :  Follow up evaluation of right venous ulcer slowly improving with epithelial tissue in between.    Continue wound management with Hydrofera blue ready, see wound orders below   Continue to wear daily compression with Tubigrip  DO NOT WET WOUND.  No harsh cleansers such as alcohol, peroxide, or antibacterial soap, do not submerge in water  Can cleanse with NSS at dressing changes.   F/u with vascular studies as ordered.  Continue frequent elevation of leg and increase exercise/walking for wound healing.  Follow-up in 3 week(s) or call sooner with questions or concerns or any signs of infection such as redness, swelling, increased/purulent drainage, fever, chills, increased severe pain.    Subjective:   6/28: Pt is an 84 y.o. female with pmhx HTN, DMII (A1c 8.7),  CKD, Pafib, CVA (10/28/23 on Coumadin/ASA 81mg) with residual deficits (left sided facial drop and leg weakness) well known to [facility] who presents for follow up evaluation of non healing RLE venous ulcer which has been present for about 2.5 years. Pt has been covering wound with a bandaid. Does not have an odor. No smoking, ETOH or drug use.  Pt denies any sob, fatigue, N/V, CP, fever or chills.    7/12: Patient presents for followup evaluation of RLE venous ulcer. No increased pain or drainage. Pt is frustrated with chronic wound. Has been using Polymem on the wound bed and Tubigrip for compression.  Pt denies any fevers or  chills.    7/25: Patient presents for followup evaluation of RLE venous ulcer. Since last visit pt was started on Bactrim for positive wound culture with abnormal 2+ Growth of Staphylococcus Aureus. The tissue exam demonstrated chronic inflammation and fibrosis. No malignancy, nor pyoderma gangrenosum were identified.  Pt noticed wound decreasing in size. Has been using Polymem on the wound bed and Tubigrip for compression.  Pt denies any fevers or chills.    8/2: Patient presents for followup evaluation of RLE venous ulcer. Pt completed Bactrim as directed. No issues. Pt states this is the best she has felt in 2 years. No fevers or chills.    8/22: Patient presents for followup evaluation of RLE venous ulcer. Pt states she has been wetting wound with mild soap and water. She noticed a scab formed and came off on dressing. In office [facility] today; R 0.59, L 0.76. No fevers or chills.    9/6: Patient presents for followup evaluation of RLE venous ulcer. Pt  has been applying Polymem Ag Max on wound bed and Tubigrip for compression. Denies fevers or chills.    9/19: Patient presents for followup evaluation of RLE venous ulcer. Pt has been applying Mupirocin wound bed and Tubigrip for compression. Denies fevers or chills.    9/26: Patient presents for followup evaluation of RLE venous ulcer. No complaints. Pt has been applying Hydrofera Blue ready on wound bed and Tubigrip for compression. Denies fevers or chills.    10/10:  Patient presents for followup evaluation of RLE venous ulcer. No complaints. Pt is scheduled for vascular studies for 11/6/24. Pt has been applying Hydrofera Blue ready on wound bed and Tubigrip for compression. Denies fevers or chills.        The following portions of the patient's history were reviewed and updated as appropriate:   Patient Active Problem List   Diagnosis    Hyperlipidemia    Chronic anticoagulation    Hypertension, essential    Coronary artery disease of native artery of native heart  with stable angina pectoris (HCC)    Simple chronic bronchitis (HCC)    Diabetes mellitus with neurological manifestation (HCC)    Hypothyroidism    GERD (gastroesophageal reflux disease)    Anemia    AVM (arteriovenous malformation) of small bowel, acquired with hemorrhage    Angiectasia    Other vascular disorders of intestine (HCC)    Aortic valve replaced    Cardiomyopathy (HCC)    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HCC)    Stage 3b chronic kidney disease (HCC)    Chronic kidney disease-mineral and bone disorder    Primary osteoarthritis involving multiple joints    Paroxysmal atrial fibrillation (HCC)    Neutropenia associated with infection (HCC)    Herpes simplex labialis    Restrictive lung disease    Nocturnal hypoxemia    Supratherapeutic INR    H/O mechanical aortic valve replacement    Subtherapeutic international normalized ratio (INR)    Type 2 diabetes mellitus with stage 3b chronic kidney disease, with long-term current use of insulin (HCC)    Venous ulcer of right leg (HCC)    Chronic systolic (congestive) heart failure (HCC)    Lower extremity edema    Iron deficiency anemia due to chronic blood loss    Hemiplegia and hemiparesis following cerebral infarction affecting left non-dominant side (HCC)     Past Medical History:   Diagnosis Date    Acute anterior epistaxis 12/23/2023    Acute blood loss anemia 12/14/2023    Acute respiratory failure with hypoxia (HCC) 02/06/2024    Anemia     Aneurysm of thoracic aorta (HCC)     Angioedema 06/07/2019    Aortic valve disorder     Benign neoplasm of sigmoid colon     Cardiomyopathy (HCC)     last assessed: 10/10/2014    CHF (congestive heart failure) (HCC)     Chronic kidney disease     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HCC)     last assessed: 10/10/2014    COPD (chronic obstructive pulmonary disease) (HCC)     Diabetic nephropathy (HCC)     Disease of thyroid gland     RAMON (dyspnea on exertion)     GERD (gastroesophageal reflux disease)     History of emphysema  (HCC)     History of transfusion     Hyperlipidemia     Hypertensive urgency 10/28/2023    Leg cramps 2023    Stroke-like symptoms 10/28/2023    TIA (transient ischemic attack)      Past Surgical History:   Procedure Laterality Date    AORTIC VALVE REPLACEMENT      CARDIAC PACEMAKER PLACEMENT      last assessed: 10/10/2014    CARDIAC SURGERY      aortic valve replacement    CHOLECYSTECTOMY      COLONOSCOPY      EGD      HYSTERECTOMY      INSERT / REPLACE / REMOVE PACEMAKER      KNEE SURGERY Right     OTHER SURGICAL HISTORY      Capsule ENDOscopy description: 2012    MO COLONOSCOPY FLX DX W/COLLJ SPEC WHEN PFRMD N/A 2018    Procedure: single balloon ENTEROSCOPY;  Surgeon: David Dennis MD;  Location: [facility];  Service: Gastroenterology    MO ESOPHAGOGASTRODUODENOSCOPY TRANSORAL DIAGNOSTIC N/A 2017    Procedure: EGD AND COLONOSCOPY;  Surgeon: Jay Mcleod III, MD;  Location: [facility];  Service: Gastroenterology     Family History   Problem Relation Age of Onset    No Known Problems Mother     No Known Problems Father      Social History     Socioeconomic History    Marital status:      Spouse name: None    Number of children: None    Years of education: None    Highest education level: None   Occupational History    None   Tobacco Use    Smoking status: Former     Current packs/day: 0.00     Average packs/day: 1 pack/day for 52.9 years (52.9 ttl pk-yrs)     Types: Cigarettes     Start date:      Quit date: 2007     Years since quittin.8     Passive exposure: Past    Smokeless tobacco: Former     Quit date:    Vaping Use    Vaping status: Never Used   Substance and Sexual Activity    Alcohol use: Never    Drug use: Never    Sexual activity: Not Currently     Partners: Male   Other Topics Concern    None   Social History Narrative    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 BID Freestyle lancets BID    Living independently with spouse     Social  Determinants of Health     Financial Resource Strain: Not on file   Food Insecurity: No Food Insecurity (5/15/2024)    Hunger Vital Sign     Worried About Running Out of Food in the Last Year: Never true     Ran Out of Food in the Last Year: Never true   Transportation Needs: No Transportation Needs (5/15/2024)    PRAPARE - Transportation     Lack of Transportation (Medical): No     Lack of Transportation (Non-Medical): No   Physical Activity: Inactive (5/26/2021)    Exercise Vital Sign     Days of Exercise per Week: 0 days     Minutes of Exercise per Session: 0 min   Stress: No Stress Concern Present (5/26/2021)    [employer] - Occupational Stress Questionnaire     Feeling of Stress : Not at all   Social Connections: Not on file   Intimate Partner Violence: Not on file   Housing Stability: Low Risk  (5/15/2024)    Housing Stability Vital Sign     Unable to Pay for Housing in the Last Year: No     Number of Times Moved in the Last Year: 1     Homeless in the Last Year: No       Current Outpatient Medications:     Accu-Chek FastClix Lancets MISC, Use 3 (three) times a day, Disp: 300 each, Rfl: 3    albuterol (Ventolin HFA) 90 mcg/act inhaler, Inhale 2 puffs every 6 (six) hours as needed for wheezing, Disp: 54 g, Rfl: 3    Ascorbic Acid (vitamin C) 1000 MG tablet, Take 1,000 mg by mouth daily, Disp: , Rfl:     aspirin (ECOTRIN LOW STRENGTH) 81 mg EC tablet, Take 1 tablet (81 mg total) by mouth daily Do not start before November 1, 2023., Disp: 30 tablet, Rfl: 0    b complex vitamins capsule, Take 1 capsule by mouth daily, Disp: 30 capsule, Rfl: 2    Blood Glucose Monitoring Suppl (Accu-Chek Guide Me) w/Device KIT, USE 3 TIMES DAILY, Disp: 1 kit, Rfl: 2    Cholecalciferol (Vitamin D3) 50 MCG (2000 UT) TABS, Take 2,000 Units by mouth daily, Disp: , Rfl:     enoxaparin (LOVENOX) 80 mg/0.8 mL, Inject 0.7 mL (70 mg total) under the skin every 24 hours Hold Warfarin 5 days prior. Last dose is  9/6 Start Lovenox on 9/8, 4 days prior to the procedure. No Lovenox the morning of the procedure. Resume both Lovenox and Warfarin at the discretion of the surgeon (preferably that evening) and check INR 4 days after restarting. Inject in the abdomen about 2 inches from the belly button and rotate sides at each injection., Disp: 0.7 mL, Rfl: 10    Ferrous Sulfate (IRON PO), Take by mouth daily in the early morning OTC, Disp: , Rfl:     furosemide (LASIX) 40 mg tablet, Take 1 tablet (40 mg total) by mouth 2 (two) times a day, Disp: 180 tablet, Rfl: 3    gabapentin (NEURONTIN) 300 mg capsule, Take 1 capsule (300 mg total) by mouth 3 (three) times a day (Patient taking differently: Take 300 mg by mouth 2 (two) times a day), Disp: 270 capsule, Rfl: 3    glipiZIDE (GLUCOTROL) 10 mg tablet, Take 1 tablet (10 mg total) by mouth 2 (two) times a day before meals, Disp: 180 tablet, Rfl: 3    glucose blood (Accu-Chek Celeste Plus) test strip, Use 1 each 3 (three) times a day Use as instructed, Disp: 300 each, Rfl: 3    insulin detemir (Levemir FlexPen) 100 Units/mL injection pen, Inject 30 Units under the skin daily at bedtime, Disp: 27 mL, Rfl: 0    Insulin Pen Needle (BD Pen Needle Rosie U/F) 32G X 4 MM MISC, Use daily, Disp: 100 each, Rfl: 1    ipratropium-albuterol (DUO-NEB) 0.5-2.5 mg/3 mL nebulizer solution, inhale contents of 1 vial ( 3 milliliters ) in nebulizer four times a day, Disp: , Rfl:     Lancets (freestyle) lancets, Check blood glucose 3 times daily, Disp: 300 each, Rfl: 0    levothyroxine 50 mcg tablet, Take 1 tablet (50 mcg total) by mouth daily, Disp: 90 tablet, Rfl: 3    metoprolol tartrate (LOPRESSOR) 50 mg tablet, TAKE ONE-HALF TABLET BY  MOUTH TWICE DAILY, Disp: 90 tablet, Rfl: 3    mupirocin (BACTROBAN) 2 % ointment, Apply topically daily, Disp: 22 g, Rfl: 0    oxygen gas, Inhale 2 L/min daily at bedtime as needed home oxygen nightly, Disp: , Rfl:     pantoprazole (PROTONIX) 40 mg tablet, TAKE 1 TABLET BY  MOUTH DAILY AS  DIRECTED, Disp: 100 tablet, Rfl: 1    warfarin (COUMADIN) 5 mg tablet, TAKE 1 TO 2 TABLETS BY  MOUTH DAILY OR AS DIRECTED, Disp: 180 tablet, Rfl: 3    Current Facility-Administered Medications:     lidocaine (LMX) 4 % cream, , Topical, Once, Vesta Garcia PA-C    Review of Systems   Constitutional:  Negative for appetite change, chills, fatigue, fever and unexpected weight change.   HENT:  Negative for congestion, hearing loss and postnasal drip.    Respiratory:  Negative for cough and shortness of breath.    Cardiovascular:  Positive for leg swelling.   Musculoskeletal:  Positive for gait problem.   Skin:  Positive for wound (RLE). Negative for rash.   Neurological:  Negative for numbness.   Hematological:  Does not bruise/bleed easily.   Psychiatric/Behavioral: Negative.           Objective:  /60   Pulse 60   Temp (!) 97.2 °F (36.2 °C)   Resp 18   Pain Score: 0-No pain     Physical Exam  Vitals reviewed.   Constitutional:       General: She is not in acute distress.     Appearance: Normal appearance. She is well-developed and normal weight.   HENT:      Head: Normocephalic and atraumatic.   Cardiovascular:      Rate and Rhythm: Normal rate.   Pulmonary:      Effort: Pulmonary effort is normal.   Musculoskeletal:         General: No deformity.      Right lower leg: Edema present.      Left lower leg: No edema.   Skin:     General: Skin is warm and dry.      Findings: Wound (RLE) present.             Comments: Beefy red granulated wound bed with epithelial tissue in between and serosanguinous drainage. See wound assessment   Neurological:      General: No focal deficit present.      Mental Status: She is alert and oriented to person, place, and time.      Gait: Gait normal.   Psychiatric:         Mood and Affect: Mood and affect normal.         Behavior: Behavior normal. Behavior is cooperative.           Wound 10/29/23 Venous Ulcer Pretibial Right (Active)   Wound Description Pink;Beefy  "red;Epithelialization 10/10/24 0824   Radha-wound Assessment Fragile;Scar Tissue 10/10/24 0824   Wound Length (cm) 1.6 cm 10/10/24 0824   Wound Width (cm) 1 cm 10/10/24 0824   Wound Depth (cm) 0.1 cm 10/10/24 0824   Wound Surface Area (cm^2) 1.6 cm^2 10/10/24 0824   Wound Volume (cm^3) 0.16 cm^3 10/10/24 0824   Calculated Wound Volume (cm^3) 0.16 cm^3 10/10/24 0824   Change in Wound Size % 96 10/10/24 0824   Drainage Amount Small 10/10/24 0824   Drainage Description Serosanguineous 10/10/24 0824   Non-staged Wound Description Full thickness 10/10/24 0824   Dressing Status Intact 10/10/24 0824           Wound Instructions:  Orders Placed This Encounter   Procedures    Wound cleansing and dressings Venous Ulcer Right Pretibial     Wound cleansing and dressings Venous Ulcer Right Pretibial                                                       Right Leg wound:      Wash your hands with soap and water. Remove old dressing, discard into plastic bag and place in trash. Cleanse the wound with normal saline solution prior to applying a clean dressing. Do not use tissue or cotton balls. Do not scrub the wound. Pat dry using gauze.      Shower yes, if able to use cast cover. Do not get dressing wet.      Apply Hydrofera Blue Ready to the wound bed  Cover with gauze.  Secure with tape if your skin is breaking down from tape then need to wrap with Armani and put tape on Armani   Change dressing 3 times weekly.     Wear compression SpandigripF during the day     Standing Status:   Future     Standing Expiration Date:   10/17/2024    Wound Procedure Treatment Venous Ulcer Right Pretibial     This order was created via procedure documentation       Vesta Garcia PA-C, [facility]      Portions of the record may have been created with voice recognition software. Occasional wrong word or \"sound alike\" substitutions may have occurred due to the inherent limitations of voice recognition software. Read the chart carefully and recognize, using " context, where substitutions have occurred.

## 2024-10-10 NOTE — PATIENT INSTRUCTIONS
Orders Placed This Encounter   Procedures    Wound cleansing and dressings Venous Ulcer Right Pretibial     Wound cleansing and dressings Venous Ulcer Right Pretibial                                                       Right Leg wound:      Wash your hands with soap and water. Remove old dressing, discard into plastic bag and place in trash. Cleanse the wound with normal saline solution prior to applying a clean dressing. Do not use tissue or cotton balls. Do not scrub the wound. Pat dry using gauze.      Shower yes, if able to use cast cover. Do not get dressing wet.      Apply Hydrofera Blue Ready to the wound bed  Cover with gauze.  Secure with tape if your skin is breaking down from tape then need to wrap with Armani and put tape on Armani   Change dressing 3 times weekly.     Wear compression SpandigripF during the day     Standing Status:   Future     Standing Expiration Date:   10/17/2024

## 2024-10-10 NOTE — PROGRESS NOTES
Wound Procedure Treatment Venous Ulcer Right Pretibial    Performed by: Graciela Valerio RN  Authorized by: Vesta Garcia PA-C    Associated wounds:   Wound 10/29/23 Venous Ulcer Pretibial Right  Wound cleansed with:  NSS  Applied primary dressing:  Hydrafera blue ready  Applied secondary dressing:  Gauze  Dressing secured with:  Armani, Size F and Tape

## 2024-10-16 ENCOUNTER — APPOINTMENT (OUTPATIENT)
Dept: LAB | Facility: CLINIC | Age: 84
End: 2024-10-16
Payer: COMMERCIAL

## 2024-10-17 ENCOUNTER — ANTICOAG VISIT (OUTPATIENT)
Dept: CARDIOLOGY CLINIC | Facility: CLINIC | Age: 84
End: 2024-10-17

## 2024-10-23 DIAGNOSIS — N18.4 TYPE 2 DIABETES MELLITUS WITH STAGE 4 CHRONIC KIDNEY DISEASE, WITH LONG-TERM CURRENT USE OF INSULIN (HCC): ICD-10-CM

## 2024-10-23 DIAGNOSIS — Z79.4 TYPE 2 DIABETES MELLITUS WITH STAGE 4 CHRONIC KIDNEY DISEASE, WITH LONG-TERM CURRENT USE OF INSULIN (HCC): ICD-10-CM

## 2024-10-23 DIAGNOSIS — E11.22 TYPE 2 DIABETES MELLITUS WITH STAGE 4 CHRONIC KIDNEY DISEASE, WITH LONG-TERM CURRENT USE OF INSULIN (HCC): ICD-10-CM

## 2024-10-23 RX ORDER — INSULIN DETEMIR 100 [IU]/ML
INJECTION, SOLUTION SUBCUTANEOUS
Qty: 15 ML | Refills: 2 | Status: SHIPPED | OUTPATIENT
Start: 2024-10-23

## 2024-10-31 ENCOUNTER — OFFICE VISIT (OUTPATIENT)
Dept: WOUND CARE | Facility: CLINIC | Age: 84
End: 2024-10-31
Payer: COMMERCIAL

## 2024-10-31 ENCOUNTER — APPOINTMENT (OUTPATIENT)
Dept: LAB | Facility: CLINIC | Age: 84
End: 2024-10-31
Payer: COMMERCIAL

## 2024-10-31 VITALS
RESPIRATION RATE: 18 BRPM | HEART RATE: 60 BPM | SYSTOLIC BLOOD PRESSURE: 168 MMHG | DIASTOLIC BLOOD PRESSURE: 67 MMHG | TEMPERATURE: 97.4 F

## 2024-10-31 DIAGNOSIS — I87.331 CHRONIC VENOUS HYPERTENSION (IDIOPATHIC) WITH ULCER AND INFLAMMATION OF RIGHT LOWER EXTREMITY (HCC): Primary | ICD-10-CM

## 2024-10-31 PROCEDURE — 11042 DBRDMT SUBQ TIS 1ST 20SQCM/<: CPT | Performed by: ORTHOPAEDIC SURGERY

## 2024-10-31 RX ORDER — LIDOCAINE 40 MG/G
CREAM TOPICAL ONCE
Status: COMPLETED | OUTPATIENT
Start: 2024-10-31 | End: 2024-10-31

## 2024-10-31 RX ADMIN — LIDOCAINE: 40 CREAM TOPICAL at 08:47

## 2024-10-31 NOTE — PATIENT INSTRUCTIONS
Orders Placed This Encounter   Procedures    Wound cleansing and dressings Venous Ulcer Right Pretibial     Wound cleansing and dressings Venous Ulcer Right Pretibial                                                       Right Leg wound:      Wash your hands with soap and water. Remove old dressing, discard into plastic bag and place in trash. Cleanse the wound with normal saline solution prior to applying a clean dressing. Do not use tissue or cotton balls. Do not scrub the wound. Pat dry using gauze.      Shower yes, if able to use cast cover. Do not get dressing wet.      Apply Hydrofera Blue Ready to the wound bed  Cover with gauze.  Secure with tape if your skin is breaking down from tape then need to wrap with Armani and put tape on Armani   Change dressing 3 times weekly.     Wear compression SpandigripF during the day        Standing Status:   Future     Standing Status:   Future     Standing Expiration Date:   11/7/2024

## 2024-10-31 NOTE — PROGRESS NOTES
Patient ID: Ely Hernadez is a 84 y.o. female Date of Birth 1940       Chief Complaint   Patient presents with    Follow Up Wound Care Visit     Right lower extremity ulcer       Allergies:  Patient has no known allergies.    Diagnosis:   Diagnosis ICD-10-CM Associated Orders   1. Chronic venous hypertension (idiopathic) with ulcer and inflammation of right lower extremity (HCC)  I87.331 lidocaine (LMX) 4 % cream     Wound cleansing and dressings Venous Ulcer Right Pretibial     Wound Procedure Treatment Venous Ulcer Right Pretibial     Debridement           Assessment and Plan :  Follow up evaluation of right venous ulcer progressively healing.     Continue wound management with Hydrofera blue ready, see wound orders below   Continue to wear daily compression with Tubigrip  DO NOT WET WOUND.  No harsh cleansers such as alcohol, peroxide, or antibacterial soap, do not submerge in water  Can cleanse with NSS at dressing changes.   F/u with vascular studies as ordered.  Continue frequent elevation of leg and increase exercise/walking for wound healing.  Follow-up in 2 week(s) or call sooner with questions or concerns or any signs of infection such as redness, swelling, increased/purulent drainage, fever, chills, increased severe pain.    Subjective:   6/28: Pt is an 84 y.o. female with pmhx HTN, DMII (A1c 8.7),  CKD, Pafib, CVA (10/28/23 on Coumadin/ASA 81mg) with residual deficits (left sided facial drop and leg weakness) well known to [facility] who presents for follow up evaluation of non healing RLE venous ulcer which has been present for about 2.5 years. Pt has been covering wound with a bandaid. Does not have an odor. No smoking, ETOH or drug use.  Pt denies any sob, fatigue, N/V, CP, fever or chills.    7/12: Patient presents for followup evaluation of RLE venous ulcer. No increased pain or drainage. Pt is frustrated with chronic wound. Has been using Polymem on the wound bed and Tubigrip for compression.  Pt  denies any fevers or chills.    7/25: Patient presents for followup evaluation of RLE venous ulcer. Since last visit pt was started on Bactrim for positive wound culture with abnormal 2+ Growth of Staphylococcus Aureus. The tissue exam demonstrated chronic inflammation and fibrosis. No malignancy, nor pyoderma gangrenosum were identified.  Pt noticed wound decreasing in size. Has been using Polymem on the wound bed and Tubigrip for compression.  Pt denies any fevers or chills.    8/2: Patient presents for followup evaluation of RLE venous ulcer. Pt completed Bactrim as directed. No issues. Pt states this is the best she has felt in 2 years. No fevers or chills.    8/22: Patient presents for followup evaluation of RLE venous ulcer. Pt states she has been wetting wound with mild soap and water. She noticed a scab formed and came off on dressing. In office [facility] today; R 0.59, L 0.76. No fevers or chills.    9/6: Patient presents for followup evaluation of RLE venous ulcer. Pt  has been applying Polymem Ag Max on wound bed and Tubigrip for compression. Denies fevers or chills.    9/19: Patient presents for followup evaluation of RLE venous ulcer. Pt has been applying Mupirocin wound bed and Tubigrip for compression. Denies fevers or chills.    9/26: Patient presents for followup evaluation of RLE venous ulcer. No complaints. Pt has been applying Hydrofera Blue ready on wound bed and Tubigrip for compression. Denies fevers or chills.    10/10:  Patient presents for followup evaluation of RLE venous ulcer. No complaints. Pt is scheduled for vascular studies for 11/6/24. Pt has been applying Hydrofera Blue ready on wound bed and Tubigrip for compression. Denies fevers or chills.    10/31:  Patient presents for followup evaluation of RLE venous ulcer. NO issues. Pt has been applying Hydrofera Blue ready on wound bed and Tubigrip for compression. Denies fevers or chills.      The following portions of the patient's history  were reviewed and updated as appropriate:   Patient Active Problem List   Diagnosis    Hyperlipidemia    Chronic anticoagulation    Hypertension, essential    Coronary artery disease of native artery of native heart with stable angina pectoris (HCC)    Simple chronic bronchitis (HCC)    Diabetes mellitus with neurological manifestation (HCC)    Hypothyroidism    GERD (gastroesophageal reflux disease)    Anemia    AVM (arteriovenous malformation) of small bowel, acquired with hemorrhage    Angiectasia    Other vascular disorders of intestine (HCC)    Aortic valve replaced    Cardiomyopathy (HCC)    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HCC)    Stage 3b chronic kidney disease (HCC)    Chronic kidney disease-mineral and bone disorder    Primary osteoarthritis involving multiple joints    Paroxysmal atrial fibrillation (HCC)    Neutropenia associated with infection (HCC)    Herpes simplex labialis    Restrictive lung disease    Nocturnal hypoxemia    Supratherapeutic INR    H/O mechanical aortic valve replacement    Subtherapeutic international normalized ratio (INR)    Type 2 diabetes mellitus with stage 3b chronic kidney disease, with long-term current use of insulin (HCC)    Venous ulcer of right leg (HCC)    Chronic systolic (congestive) heart failure (HCC)    Lower extremity edema    Iron deficiency anemia due to chronic blood loss    Hemiplegia and hemiparesis following cerebral infarction affecting left non-dominant side (HCC)     Past Medical History:   Diagnosis Date    Acute anterior epistaxis 12/23/2023    Acute blood loss anemia 12/14/2023    Acute respiratory failure with hypoxia (HCC) 02/06/2024    Anemia     Aneurysm of thoracic aorta (HCC)     Angioedema 06/07/2019    Aortic valve disorder     Benign neoplasm of sigmoid colon     Cardiomyopathy (HCC)     last assessed: 10/10/2014    CHF (congestive heart failure) (HCC)     Chronic kidney disease     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HCC)     last assessed:  10/10/2014    COPD (chronic obstructive pulmonary disease) (HCC)     Diabetic nephropathy (HCC)     Disease of thyroid gland     RAMON (dyspnea on exertion)     GERD (gastroesophageal reflux disease)     History of emphysema (HCC)     History of transfusion     Hyperlipidemia     Hypertensive urgency 10/28/2023    Leg cramps 2023    Stroke-like symptoms 10/28/2023    TIA (transient ischemic attack)      Past Surgical History:   Procedure Laterality Date    AORTIC VALVE REPLACEMENT      CARDIAC PACEMAKER PLACEMENT      last assessed: 10/10/2014    CARDIAC SURGERY      aortic valve replacement    CHOLECYSTECTOMY      COLONOSCOPY      EGD      HYSTERECTOMY      INSERT / REPLACE / REMOVE PACEMAKER      KNEE SURGERY Right     OTHER SURGICAL HISTORY      Capsule ENDOscopy description: 2012    PA COLONOSCOPY FLX DX W/COLLJ SPEC WHEN PFRMD N/A 2018    Procedure: single balloon ENTEROSCOPY;  Surgeon: David Dennis MD;  Location: [facility];  Service: Gastroenterology    PA ESOPHAGOGASTRODUODENOSCOPY TRANSORAL DIAGNOSTIC N/A 2017    Procedure: EGD AND COLONOSCOPY;  Surgeon: Jay Mcleod III, MD;  Location: [facility];  Service: Gastroenterology     Family History   Problem Relation Age of Onset    No Known Problems Mother     No Known Problems Father      Social History     Socioeconomic History    Marital status:      Spouse name: Not on file    Number of children: Not on file    Years of education: Not on file    Highest education level: Not on file   Occupational History    Not on file   Tobacco Use    Smoking status: Former     Current packs/day: 0.00     Average packs/day: 1 pack/day for 52.9 years (52.9 ttl pk-yrs)     Types: Cigarettes     Start date:      Quit date: 2007     Years since quittin.9     Passive exposure: Past    Smokeless tobacco: Former     Quit date:    Vaping Use    Vaping status: Never Used   Substance and Sexual Activity    Alcohol use: Never    Drug  use: Never    Sexual activity: Not Currently     Partners: Male   Other Topics Concern    Not on file   Social History Narrative    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 BID Freestyle lancets BID    Living independently with spouse     Social Determinants of Health     Financial Resource Strain: Not on file   Food Insecurity: No Food Insecurity (5/15/2024)    Nursing - Inadequate Food Risk Classification     Worried About Running Out of Food in the Last Year: Never true     Ran Out of Food in the Last Year: Never true     Ran Out of Food in the Last Year: Not on file   Transportation Needs: No Transportation Needs (5/15/2024)    PRAPARE - Transportation     Lack of Transportation (Medical): No     Lack of Transportation (Non-Medical): No   Physical Activity: Inactive (5/26/2021)    Exercise Vital Sign     Days of Exercise per Week: 0 days     Minutes of Exercise per Session: 0 min   Stress: No Stress Concern Present (5/26/2021)    [employer] - Occupational Stress Questionnaire     Feeling of Stress : Not at all   Social Connections: Not on file   Intimate Partner Violence: Not on file   Housing Stability: Low Risk  (5/15/2024)    Housing Stability Vital Sign     Unable to Pay for Housing in the Last Year: No     Number of Times Moved in the Last Year: 1     Homeless in the Last Year: No       Current Outpatient Medications:     Accu-Chek FastClix Lancets MISC, Use 3 (three) times a day, Disp: 300 each, Rfl: 3    albuterol (Ventolin HFA) 90 mcg/act inhaler, Inhale 2 puffs every 6 (six) hours as needed for wheezing, Disp: 54 g, Rfl: 3    Ascorbic Acid (vitamin C) 1000 MG tablet, Take 1,000 mg by mouth daily, Disp: , Rfl:     aspirin (ECOTRIN LOW STRENGTH) 81 mg EC tablet, Take 1 tablet (81 mg total) by mouth daily Do not start before November 1, 2023., Disp: 30 tablet, Rfl: 0    b complex vitamins capsule, Take 1 capsule by mouth daily, Disp: 30 capsule, Rfl: 2    Blood  Glucose Monitoring Suppl (Accu-Chek Guide Me) w/Device KIT, USE 3 TIMES DAILY, Disp: 1 kit, Rfl: 2    Cholecalciferol (Vitamin D3) 50 MCG (2000 UT) TABS, Take 2,000 Units by mouth daily, Disp: , Rfl:     enoxaparin (LOVENOX) 80 mg/0.8 mL, Inject 0.7 mL (70 mg total) under the skin every 24 hours Hold Warfarin 5 days prior. Last dose is 9/6 Start Lovenox on 9/8, 4 days prior to the procedure. No Lovenox the morning of the procedure. Resume both Lovenox and Warfarin at the discretion of the surgeon (preferably that evening) and check INR 4 days after restarting. Inject in the abdomen about 2 inches from the belly button and rotate sides at each injection., Disp: 0.7 mL, Rfl: 10    Ferrous Sulfate (IRON PO), Take by mouth daily in the early morning OTC, Disp: , Rfl:     furosemide (LASIX) 40 mg tablet, Take 1 tablet (40 mg total) by mouth 2 (two) times a day, Disp: 180 tablet, Rfl: 3    gabapentin (NEURONTIN) 300 mg capsule, Take 1 capsule (300 mg total) by mouth 3 (three) times a day (Patient taking differently: Take 300 mg by mouth 2 (two) times a day), Disp: 270 capsule, Rfl: 3    glipiZIDE (GLUCOTROL) 10 mg tablet, Take 1 tablet (10 mg total) by mouth 2 (two) times a day before meals, Disp: 180 tablet, Rfl: 3    glucose blood (Accu-Chek Celeste Plus) test strip, Use 1 each 3 (three) times a day Use as instructed, Disp: 300 each, Rfl: 3    insulin detemir (Levemir FlexPen) 100 Units/mL injection pen, INJECT 30 UNITS SUBCUTANEOUSLY  DAILY AT BEDTIME, Disp: 15 mL, Rfl: 2    Insulin Pen Needle (BD Pen Needle Rosie U/F) 32G X 4 MM MISC, Use daily, Disp: 100 each, Rfl: 1    ipratropium-albuterol (DUO-NEB) 0.5-2.5 mg/3 mL nebulizer solution, inhale contents of 1 vial ( 3 milliliters ) in nebulizer four times a day, Disp: , Rfl:     Lancets (freestyle) lancets, Check blood glucose 3 times daily, Disp: 300 each, Rfl: 0    levothyroxine 50 mcg tablet, Take 1 tablet (50 mcg total) by mouth daily, Disp: 90 tablet, Rfl: 3     metoprolol tartrate (LOPRESSOR) 50 mg tablet, TAKE ONE-HALF TABLET BY  MOUTH TWICE DAILY, Disp: 90 tablet, Rfl: 3    mupirocin (BACTROBAN) 2 % ointment, Apply topically daily, Disp: 22 g, Rfl: 0    oxygen gas, Inhale 2 L/min daily at bedtime as needed home oxygen nightly, Disp: , Rfl:     pantoprazole (PROTONIX) 40 mg tablet, TAKE 1 TABLET BY MOUTH DAILY AS  DIRECTED, Disp: 100 tablet, Rfl: 1    warfarin (COUMADIN) 5 mg tablet, TAKE 1 TO 2 TABLETS BY  MOUTH DAILY OR AS DIRECTED, Disp: 180 tablet, Rfl: 3    Current Facility-Administered Medications:     lidocaine (LMX) 4 % cream, , Topical, Once, Vesta Garcia PA-C    Review of Systems   Constitutional:  Negative for appetite change, chills, fatigue, fever and unexpected weight change.   HENT:  Negative for congestion, hearing loss and postnasal drip.    Respiratory:  Negative for cough and shortness of breath.    Cardiovascular:  Positive for leg swelling.   Musculoskeletal:  Positive for gait problem.   Skin:  Positive for wound (RLE). Negative for rash.   Neurological:  Negative for numbness.   Hematological:  Does not bruise/bleed easily.   Psychiatric/Behavioral: Negative.           Objective:  /67   Pulse 60   Temp (!) 97.4 °F (36.3 °C)   Resp 18   Pain Score: 0-No pain     Physical Exam  Vitals reviewed.   Constitutional:       General: She is not in acute distress.     Appearance: Normal appearance. She is well-developed and normal weight.   HENT:      Head: Normocephalic and atraumatic.   Cardiovascular:      Rate and Rhythm: Normal rate.   Pulmonary:      Effort: Pulmonary effort is normal.   Musculoskeletal:         General: No deformity.      Right lower leg: Edema present.      Left lower leg: No edema.   Skin:     General: Skin is warm and dry.      Findings: Wound (RLE) present.             Comments: Beefy red granulated wound bed with epithelial tissue in between and serosanguinous drainage. See wound assessment   Neurological:      General:  "No focal deficit present.      Mental Status: She is alert and oriented to person, place, and time.      Gait: Gait normal.   Psychiatric:         Mood and Affect: Mood and affect normal.         Behavior: Behavior normal. Behavior is cooperative.                Debridement   Wound 10/29/23 Venous Ulcer Pretibial Right    Universal Protocol:  procedure performed by consultantConsent: Verbal consent obtained. Written consent obtained.  Risks and benefits: risks, benefits and alternatives were discussed  Consent given by: patient  Time out: Immediately prior to procedure a \"time out\" was called to verify the correct patient, procedure, equipment, support staff and site/side marked as required.  Patient understanding: patient states understanding of the procedure being performed  Patient identity confirmed: verbally with patient    Debridement Details  Performed by: PA  Debridement type: surgical  Level of debridement: subcutaneous tissue  Pain control: lidocaine 4%      Post-debridement measurements  Length (cm): 1.9  Width (cm): 1  Depth (cm): 0.2  Percent debrided: 100%  Surface Area (cm^2): 1.9  Area Debrided (cm^2): 1.9  Volume (cm^3): 0.38    Tissue and other material debrided: subcutaneous tissue  Devitalized tissue debrided: biofilm, exudate and slough  Instrument(s) utilized: curette  Bleeding: small  Hemostasis obtained with: pressure  Procedural pain (0-10): 0  Post-procedural pain: 0   Response to treatment: procedure was tolerated well                 Wound Instructions:  Orders Placed This Encounter   Procedures    Wound cleansing and dressings Venous Ulcer Right Pretibial     Wound cleansing and dressings Venous Ulcer Right Pretibial                                                       Right Leg wound:      Wash your hands with soap and water. Remove old dressing, discard into plastic bag and place in trash. Cleanse the wound with normal saline solution prior to applying a clean dressing. Do not use " "tissue or cotton balls. Do not scrub the wound. Pat dry using gauze.      Shower yes, if able to use cast cover. Do not get dressing wet.      Apply Hydrofera Blue Ready to the wound bed  Cover with gauze.  Secure with tape if your skin is breaking down from tape then need to wrap with Armani and put tape on Armani   Change dressing 3 times weekly.     Wear compression SpandigripF during the day        Standing Status:   Future     Standing Status:   Future     Standing Expiration Date:   11/7/2024    Wound Procedure Treatment Venous Ulcer Right Pretibial     This order was created via procedure documentation    Debridement     This order was created via procedure documentation       Vesta Garcia PA-C, [facility]      Portions of the record may have been created with voice recognition software. Occasional wrong word or \"sound alike\" substitutions may have occurred due to the inherent limitations of voice recognition software. Read the chart carefully and recognize, using context, where substitutions have occurred.      "

## 2024-10-31 NOTE — PROGRESS NOTES
Wound Procedure Treatment Venous Ulcer Right Pretibial    Performed by: Graciela Valerio RN  Authorized by: Vesta Garcia PA-C    Associated wounds:   Wound 10/29/23 Venous Ulcer Pretibial Right  Wound cleansed with:  NSS  Applied primary dressing:  Hydrafera blue  Applied secondary dressing:  Gauze  Dressing secured with:  Tape and Size F

## 2024-11-01 ENCOUNTER — ANTICOAG VISIT (OUTPATIENT)
Dept: CARDIOLOGY CLINIC | Facility: CLINIC | Age: 84
End: 2024-11-01

## 2024-11-04 ENCOUNTER — REMOTE DEVICE CLINIC VISIT (OUTPATIENT)
Dept: CARDIOLOGY CLINIC | Facility: CLINIC | Age: 84
End: 2024-11-04

## 2024-11-04 DIAGNOSIS — I48.91 ATRIAL FIBRILLATION, UNSPECIFIED TYPE (HCC): ICD-10-CM

## 2024-11-04 DIAGNOSIS — I44.2 CHB (COMPLETE HEART BLOCK) (HCC): Primary | ICD-10-CM

## 2024-11-04 PROCEDURE — RECHECK: Performed by: INTERNAL MEDICINE

## 2024-11-04 NOTE — PROGRESS NOTES
Results for orders placed or performed in visit on 11/04/24   Cardiac EP device report    Narrative    SJM DC PM/NOT MRI CONDITIONAL  NON-BILLABLE MERLIN TRANSMISSION: BATTERY STATUS: BATTERY VOLTAGE NEARING LACIE (1.1 YRS).  WILL SCHEDULE MONTHLY BATTERY CHECKS. AP: <1%. : 98% (>40%~CHB). ALL AVAILABLE LEAD PARAMETERS WITHIN NORMAL LIMITS. NO SIGNIFICANT HIGH RATE EPISODES. 1 AMS W/ AF IN PROGRESS (HX OF SAME). AF BURDEN: >99%> PT TAKES WARFARIN, LOVENOX, ASA 81MG, METOPROLOL TART. EF: 40% (ECHO 8/26/24). NORMAL DEVICE FUNCTION. CH

## 2024-11-06 ENCOUNTER — HOSPITAL ENCOUNTER (OUTPATIENT)
Dept: NON INVASIVE DIAGNOSTICS | Facility: CLINIC | Age: 84
Discharge: HOME | End: 2024-11-06
Payer: COMMERCIAL

## 2024-11-06 DIAGNOSIS — I87.301 STASIS EDEMA OF RIGHT LOWER EXTREMITY: ICD-10-CM

## 2024-11-06 DIAGNOSIS — I87.331 CHRONIC VENOUS HYPERTENSION WITH ULCER AND INFLAMMATION INVOLVING RIGHT SIDE (HCC): ICD-10-CM

## 2024-11-06 PROCEDURE — 93925 LOWER EXTREMITY STUDY: CPT

## 2024-11-06 PROCEDURE — 93923 UPR/LXTR ART STDY 3+ LVLS: CPT

## 2024-11-06 PROCEDURE — 93925 LOWER EXTREMITY STUDY: CPT | Performed by: STUDENT IN AN ORGANIZED HEALTH CARE EDUCATION/TRAINING PROGRAM

## 2024-11-06 PROCEDURE — 93971 EXTREMITY STUDY: CPT

## 2024-11-06 PROCEDURE — 93971 EXTREMITY STUDY: CPT | Performed by: STUDENT IN AN ORGANIZED HEALTH CARE EDUCATION/TRAINING PROGRAM

## 2024-11-06 PROCEDURE — 93922 UPR/L XTREMITY ART 2 LEVELS: CPT | Performed by: STUDENT IN AN ORGANIZED HEALTH CARE EDUCATION/TRAINING PROGRAM

## 2024-11-07 ENCOUNTER — TELEPHONE (OUTPATIENT)
Dept: WOUND CARE | Facility: CLINIC | Age: 84
End: 2024-11-07

## 2024-11-07 NOTE — TELEPHONE ENCOUNTER
Called patient on home phone and left message to call office for results of vascular testing done yesterday. Patient will need to make vascular appointment as soon as possible

## 2024-11-08 ENCOUNTER — TELEPHONE (OUTPATIENT)
Dept: WOUND CARE | Facility: CLINIC | Age: 84
End: 2024-11-08

## 2024-11-08 NOTE — TELEPHONE ENCOUNTER
Spoke with patient in regards to her test results. Provided her with phone number to vascular surgeon for follow up with test results done this week.

## 2024-11-11 ENCOUNTER — RA CDI HCC (OUTPATIENT)
Dept: OTHER | Facility: HOSPITAL | Age: 84
End: 2024-11-11

## 2024-11-14 ENCOUNTER — OFFICE VISIT (OUTPATIENT)
Dept: WOUND CARE | Facility: CLINIC | Age: 84
End: 2024-11-14
Payer: COMMERCIAL

## 2024-11-14 VITALS
HEART RATE: 60 BPM | RESPIRATION RATE: 18 BRPM | DIASTOLIC BLOOD PRESSURE: 67 MMHG | SYSTOLIC BLOOD PRESSURE: 151 MMHG | TEMPERATURE: 96.6 F

## 2024-11-14 DIAGNOSIS — I73.9 PAD (PERIPHERAL ARTERY DISEASE) (HCC): ICD-10-CM

## 2024-11-14 DIAGNOSIS — I87.331 CHRONIC VENOUS HYPERTENSION (IDIOPATHIC) WITH ULCER AND INFLAMMATION OF RIGHT LOWER EXTREMITY (HCC): Primary | ICD-10-CM

## 2024-11-14 PROCEDURE — 97597 DBRDMT OPN WND 1ST 20 CM/<: CPT | Performed by: ORTHOPAEDIC SURGERY

## 2024-11-14 RX ORDER — LIDOCAINE 40 MG/G
CREAM TOPICAL ONCE
Status: COMPLETED | OUTPATIENT
Start: 2024-11-14 | End: 2024-11-14

## 2024-11-14 RX ADMIN — LIDOCAINE: 40 CREAM TOPICAL at 08:44

## 2024-11-14 NOTE — PATIENT INSTRUCTIONS
Orders Placed This Encounter   Procedures    Wound Procedure Treatment Venous Ulcer Right Pretibial     This order was created via procedure documentation    Wound cleansing and dressings Venous Ulcer Right Pretibial     Wound cleansing and dressings Venous Ulcer Right Pretibial                                                Right Leg wound:      Wash your hands with soap and water. Remove old dressing, discard into plastic bag and place in trash. Cleanse the wound with normal saline solution prior to applying a clean dressing. Do not use tissue or cotton balls. Do not scrub the wound. Pat dry using gauze.      Shower yes, if able to use cast cover. Do not get dressing wet.      Apply skin prep to skin around wound to protect from moisture  Apply Hydrofera Blue Ready to the wound bed  Cover with gauze.  Secure with tape if your skin is breaking down from tape then need to wrap with Armani and put tape on Armani   Change dressing 3 times weekly.     Wear compression SpandigripF during the day     Standing Status:   Future     Expiration Date:   11/21/2024

## 2024-11-14 NOTE — PROGRESS NOTES
Patient ID: Ely Hernadez is a 84 y.o. female Date of Birth 1940       Chief Complaint   Patient presents with    Follow Up Wound Care Visit     Right lower extremity ulcer       Allergies:  Patient has no known allergies.    Diagnosis:   Diagnosis ICD-10-CM Associated Orders   1. Chronic venous hypertension (idiopathic) with ulcer and inflammation of right lower extremity (Formerly [facility])  I87.331 lidocaine (LMX) 4 % cream     Wound Procedure Treatment Venous Ulcer Right Pretibial     Wound cleansing and dressings Venous Ulcer Right Pretibial      2. PAD (peripheral artery disease) (Formerly [facility])  I73.9            Assessment and Plan :  Follow up evaluation of right venous ulcer progressively healing with a centralized skin island.  Continue wound management with Hydrofera blue ready, see wound orders below   Continue to wear daily compression with Tubigrip  DO NOT WET WOUND.  No harsh cleansers such as alcohol, peroxide, or antibacterial soap, do not submerge in water  Can cleanse with NSS at dressing changes.   F/u with vascular surgeon for PAD as scheduled for 01/14/25.  Continue frequent elevation of leg and increase exercise/walking for wound healing.  Follow-up in 2 week(s) or call sooner with questions or concerns or any signs of infection such as redness, swelling, increased/purulent drainage, fever, chills, increased severe pain.    Subjective:   6/28: Pt is an 84 y.o. female with pmhx HTN, DMII (A1c 8.7),  CKD, Pafib, CVA (10/28/23 on Coumadin/ASA 81mg) with residual deficits (left sided facial drop and leg weakness) well known to [facility] who presents for follow up evaluation of non healing RLE venous ulcer which has been present for about 2.5 years. Pt has been covering wound with a bandaid. Does not have an odor. No smoking, ETOH or drug use.  Pt denies any sob, fatigue, N/V, CP, fever or chills.    7/12: Patient presents for followup evaluation of RLE venous ulcer. No increased pain or drainage. Pt is frustrated with  chronic wound. Has been using Polymem on the wound bed and Tubigrip for compression.  Pt denies any fevers or chills.    7/25: Patient presents for followup evaluation of RLE venous ulcer. Since last visit pt was started on Bactrim for positive wound culture with abnormal 2+ Growth of Staphylococcus Aureus. The tissue exam demonstrated chronic inflammation and fibrosis. No malignancy, nor pyoderma gangrenosum were identified.  Pt noticed wound decreasing in size. Has been using Polymem on the wound bed and Tubigrip for compression.  Pt denies any fevers or chills.    8/2: Patient presents for followup evaluation of RLE venous ulcer. Pt completed Bactrim as directed. No issues. Pt states this is the best she has felt in 2 years. No fevers or chills.    8/22: Patient presents for followup evaluation of RLE venous ulcer. Pt states she has been wetting wound with mild soap and water. She noticed a scab formed and came off on dressing. In office CHEYANNE today; R 0.59, L 0.76. No fevers or chills.    9/6: Patient presents for followup evaluation of RLE venous ulcer. Pt  has been applying Polymem Ag Max on wound bed and Tubigrip for compression. Denies fevers or chills.    9/19: Patient presents for followup evaluation of RLE venous ulcer. Pt has been applying Mupirocin wound bed and Tubigrip for compression. Denies fevers or chills.    9/26: Patient presents for followup evaluation of RLE venous ulcer. No complaints. Pt has been applying Hydrofera Blue ready on wound bed and Tubigrip for compression. Denies fevers or chills.    10/10:  Patient presents for followup evaluation of RLE venous ulcer. No complaints. Pt is scheduled for vascular studies for 11/6/24. Pt has been applying Hydrofera Blue ready on wound bed and Tubigrip for compression. Denies fevers or chills.    10/31:  Patient presents for followup evaluation of RLE venous ulcer. NO issues. Pt has been applying Hydrofera Blue ready on wound bed and Tubigrip for  compression. Denies fevers or chills.    11/14:  Patient presents for followup evaluation of RLE venous ulcer. RLE vascular studies reveal deep venous incompetence with an incompetent . GSV and SSV is competent. Bilateral LEADs reveal diffuse disease in the RLE femoral and popliteal arteries with a 50-75% stenosis in the common femoral artery. R CHEYANNE:  0.64 (severe disease). There is diffuse disease in the LLE femoral and popliteal arteries with a >75% stenosis of the distal superficial femoral artery.There is a 50-75% stenosis of the proximal and mid superficial femoral arteries. Left CHEYANNE: 0.69 (moderate disease). Pt has scheduled a vascular surgery appointment for January 14, 2025. Pt has been applying Hydrofera Blue ready on wound bed and Tubigrip for compression. Denies fevers or chills.      The following portions of the patient's history were reviewed and updated as appropriate:   Patient Active Problem List   Diagnosis    Hyperlipidemia    Chronic anticoagulation    Hypertension, essential    Coronary artery disease of native artery of native heart with stable angina pectoris (HCC)    Simple chronic bronchitis (HCC)    Diabetes mellitus with neurological manifestation (HCC)    Hypothyroidism    GERD (gastroesophageal reflux disease)    Anemia    AVM (arteriovenous malformation) of small bowel, acquired with hemorrhage    Angiectasia    Other vascular disorders of intestine (HCC)    Aortic valve replaced    Cardiomyopathy (HCC)    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HCC)    Stage 3b chronic kidney disease (HCC)    Chronic kidney disease-mineral and bone disorder    Primary osteoarthritis involving multiple joints    Paroxysmal atrial fibrillation (HCC)    Neutropenia associated with infection (HCC)    Herpes simplex labialis    Restrictive lung disease    Nocturnal hypoxemia    Supratherapeutic INR    H/O mechanical aortic valve replacement    Subtherapeutic international normalized ratio (INR)    Type 2  diabetes mellitus with stage 3b chronic kidney disease, with long-term current use of insulin (HCC)    Venous ulcer of right leg (HCC)    Chronic systolic (congestive) heart failure (HCC)    Lower extremity edema    Iron deficiency anemia due to chronic blood loss    Hemiplegia and hemiparesis following cerebral infarction affecting left non-dominant side (HCC)     Past Medical History:   Diagnosis Date    Acute anterior epistaxis 12/23/2023    Acute blood loss anemia 12/14/2023    Acute respiratory failure with hypoxia (HCC) 02/06/2024    Anemia     Aneurysm of thoracic aorta (HCC)     Angioedema 06/07/2019    Aortic valve disorder     Benign neoplasm of sigmoid colon     Cardiomyopathy (HCC)     last assessed: 10/10/2014    CHF (congestive heart failure) (HCC)     Chronic kidney disease     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HCC)     last assessed: 10/10/2014    COPD (chronic obstructive pulmonary disease) (HCC)     Diabetic nephropathy (HCC)     Disease of thyroid gland     RAMON (dyspnea on exertion)     GERD (gastroesophageal reflux disease)     History of emphysema (HCC)     History of transfusion     Hyperlipidemia     Hypertensive urgency 10/28/2023    Leg cramps 11/01/2023    Stroke-like symptoms 10/28/2023    TIA (transient ischemic attack)      Past Surgical History:   Procedure Laterality Date    AORTIC VALVE REPLACEMENT      CARDIAC PACEMAKER PLACEMENT      last assessed: 10/10/2014    CARDIAC SURGERY      aortic valve replacement    CHOLECYSTECTOMY      COLONOSCOPY      EGD      HYSTERECTOMY      INSERT / REPLACE / REMOVE PACEMAKER      KNEE SURGERY Right     OTHER SURGICAL HISTORY      Capsule ENDOscopy description: 12/19/2012    NE COLONOSCOPY FLX DX W/COLLJ SPEC WHEN PFRMD N/A 05/31/2018    Procedure: single balloon ENTEROSCOPY;  Surgeon: David Dennis MD;  Location: [facility];  Service: Gastroenterology    NE ESOPHAGOGASTRODUODENOSCOPY TRANSORAL DIAGNOSTIC N/A 11/29/2017    Procedure: EGD AND  COLONOSCOPY;  Surgeon: Jay Mcleod III, MD;  Location: [facility];  Service: Gastroenterology     Family History   Problem Relation Age of Onset    No Known Problems Mother     No Known Problems Father      Social History     Socioeconomic History    Marital status:      Spouse name: Not on file    Number of children: Not on file    Years of education: Not on file    Highest education level: Not on file   Occupational History    Not on file   Tobacco Use    Smoking status: Former     Current packs/day: 0.00     Average packs/day: 1 pack/day for 52.9 years (52.9 ttl pk-yrs)     Types: Cigarettes     Start date:      Quit date: 2007     Years since quittin.9     Passive exposure: Past    Smokeless tobacco: Former     Quit date:    Vaping Use    Vaping status: Never Used   Substance and Sexual Activity    Alcohol use: Never    Drug use: Never    Sexual activity: Not Currently     Partners: Male   Other Topics Concern    Not on file   Social History Narrative    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 BID Freestyle lancets BID    Living independently with spouse     Social Drivers of Health     Financial Resource Strain: Not on file   Food Insecurity: No Food Insecurity (5/15/2024)    Nursing - Inadequate Food Risk Classification     Worried About Running Out of Food in the Last Year: Never true     Ran Out of Food in the Last Year: Never true     Ran Out of Food in the Last Year: Not on file   Transportation Needs: No Transportation Needs (5/15/2024)    PRAPARE - Transportation     Lack of Transportation (Medical): No     Lack of Transportation (Non-Medical): No   Physical Activity: Inactive (2021)    Exercise Vital Sign     Days of Exercise per Week: 0 days     Minutes of Exercise per Session: 0 min   Stress: No Stress Concern Present (2021)    [place] [employer] - Occupational Stress Questionnaire     Feeling of Stress : Not at all   Social  Connections: Not on file   Intimate Partner Violence: Not on file   Housing Stability: Low Risk  (5/15/2024)    Housing Stability Vital Sign     Unable to Pay for Housing in the Last Year: No     Number of Times Moved in the Last Year: 1     Homeless in the Last Year: No       Current Outpatient Medications:     Accu-Chek FastClix Lancets MISC, Use 3 (three) times a day, Disp: 300 each, Rfl: 3    albuterol (Ventolin HFA) 90 mcg/act inhaler, Inhale 2 puffs every 6 (six) hours as needed for wheezing, Disp: 54 g, Rfl: 3    Ascorbic Acid (vitamin C) 1000 MG tablet, Take 1,000 mg by mouth daily, Disp: , Rfl:     aspirin (ECOTRIN LOW STRENGTH) 81 mg EC tablet, Take 1 tablet (81 mg total) by mouth daily Do not start before November 1, 2023., Disp: 30 tablet, Rfl: 0    b complex vitamins capsule, Take 1 capsule by mouth daily, Disp: 30 capsule, Rfl: 2    Blood Glucose Monitoring Suppl (Accu-Chek Guide Me) w/Device KIT, USE 3 TIMES DAILY, Disp: 1 kit, Rfl: 2    Cholecalciferol (Vitamin D3) 50 MCG (2000 UT) TABS, Take 2,000 Units by mouth daily, Disp: , Rfl:     enoxaparin (LOVENOX) 80 mg/0.8 mL, Inject 0.7 mL (70 mg total) under the skin every 24 hours Hold Warfarin 5 days prior. Last dose is 9/6 Start Lovenox on 9/8, 4 days prior to the procedure. No Lovenox the morning of the procedure. Resume both Lovenox and Warfarin at the discretion of the surgeon (preferably that evening) and check INR 4 days after restarting. Inject in the abdomen about 2 inches from the belly button and rotate sides at each injection., Disp: 0.7 mL, Rfl: 10    Ferrous Sulfate (IRON PO), Take by mouth daily in the early morning OTC, Disp: , Rfl:     furosemide (LASIX) 40 mg tablet, Take 1 tablet (40 mg total) by mouth 2 (two) times a day, Disp: 180 tablet, Rfl: 3    gabapentin (NEURONTIN) 300 mg capsule, Take 1 capsule (300 mg total) by mouth 3 (three) times a day (Patient taking differently: Take 300 mg by mouth 2 (two) times a day), Disp: 270  capsule, Rfl: 3    glipiZIDE (GLUCOTROL) 10 mg tablet, Take 1 tablet (10 mg total) by mouth 2 (two) times a day before meals, Disp: 180 tablet, Rfl: 3    glucose blood (Accu-Chek Celeste Plus) test strip, Use 1 each 3 (three) times a day Use as instructed, Disp: 300 each, Rfl: 3    insulin detemir (Levemir FlexPen) 100 Units/mL injection pen, INJECT 30 UNITS SUBCUTANEOUSLY  DAILY AT BEDTIME, Disp: 15 mL, Rfl: 2    Insulin Pen Needle (BD Pen Needle Rosie U/F) 32G X 4 MM MISC, Use daily, Disp: 100 each, Rfl: 1    ipratropium-albuterol (DUO-NEB) 0.5-2.5 mg/3 mL nebulizer solution, inhale contents of 1 vial ( 3 milliliters ) in nebulizer four times a day, Disp: , Rfl:     Lancets (freestyle) lancets, Check blood glucose 3 times daily, Disp: 300 each, Rfl: 0    levothyroxine 50 mcg tablet, Take 1 tablet (50 mcg total) by mouth daily, Disp: 90 tablet, Rfl: 3    metoprolol tartrate (LOPRESSOR) 50 mg tablet, TAKE ONE-HALF TABLET BY  MOUTH TWICE DAILY, Disp: 90 tablet, Rfl: 3    mupirocin (BACTROBAN) 2 % ointment, Apply topically daily, Disp: 22 g, Rfl: 0    oxygen gas, Inhale 2 L/min daily at bedtime as needed home oxygen nightly, Disp: , Rfl:     pantoprazole (PROTONIX) 40 mg tablet, TAKE 1 TABLET BY MOUTH DAILY AS  DIRECTED, Disp: 100 tablet, Rfl: 1    warfarin (COUMADIN) 5 mg tablet, TAKE 1 TO 2 TABLETS BY  MOUTH DAILY OR AS DIRECTED, Disp: 180 tablet, Rfl: 3    Current Facility-Administered Medications:     lidocaine (LMX) 4 % cream, , Topical, Once, Vesta Garcia PA-C    Review of Systems   Constitutional:  Negative for appetite change, chills, fatigue, fever and unexpected weight change.   HENT:  Negative for congestion, hearing loss and postnasal drip.    Respiratory:  Negative for cough and shortness of breath.    Cardiovascular:  Positive for leg swelling.   Musculoskeletal:  Positive for gait problem.   Skin:  Positive for wound (RLE). Negative for rash.   Neurological:  Negative for numbness.   Hematological:  Does  not bruise/bleed easily.   Psychiatric/Behavioral: Negative.           Objective:  /67   Pulse 60   Temp (!) 96.6 °F (35.9 °C)   Resp 18   Pain Score: 0-No pain     Physical Exam  Vitals reviewed.   Constitutional:       General: She is not in acute distress.     Appearance: Normal appearance. She is well-developed and normal weight.   HENT:      Head: Normocephalic and atraumatic.   Cardiovascular:      Rate and Rhythm: Normal rate.   Pulmonary:      Effort: Pulmonary effort is normal.   Musculoskeletal:         General: No deformity.      Right lower leg: Edema present.      Left lower leg: No edema.   Skin:     General: Skin is warm and dry.      Findings: Wound (RLE) present.             Comments: Centralized epithelial tissue with pink granulated wound edges and serosanguinous drainage. See wound assessment   Neurological:      General: No focal deficit present.      Mental Status: She is alert and oriented to person, place, and time.      Gait: Gait normal.   Psychiatric:         Mood and Affect: Mood and affect normal.         Behavior: Behavior normal. Behavior is cooperative.            Wound 10/29/23 Venous Ulcer Pretibial Right (Active)   Wound Description Epithelialization;Pink 11/14/24 0842   Radha-wound Assessment Fragile;Scar Tissue;Maceration 11/14/24 0842   Wound Length (cm) 1.8 cm 11/14/24 0842   Wound Width (cm) 1.1 cm 11/14/24 0842   Wound Depth (cm) 0.1 cm 11/14/24 0842   Wound Surface Area (cm^2) 1.98 cm^2 11/14/24 0842   Wound Volume (cm^3) 0.198 cm^3 11/14/24 0842   Calculated Wound Volume (cm^3) 0.2 cm^3 11/14/24 0842   Change in Wound Size % 95 11/14/24 0842   Drainage Amount Small 11/14/24 0842   Drainage Description Serosanguineous 11/14/24 0842   Non-staged Wound Description Full thickness 11/14/24 0842   Dressing Status Intact 11/14/24 0842      Debridement   Wound 10/29/23 Venous Ulcer Pretibial Right    Universal Protocol:  procedure performed by consultantConsent: Verbal  "consent obtained. Written consent obtained.  Risks and benefits: risks, benefits and alternatives were discussed  Consent given by: patient  Time out: Immediately prior to procedure a \"time out\" was called to verify the correct patient, procedure, equipment, support staff and site/side marked as required.  Patient understanding: patient states understanding of the procedure being performed  Patient identity confirmed: verbally with patient    Debridement Details  Performed by: PA  Debridement type: selective  Pain control: lidocaine 4%      Post-debridement measurements  Length (cm): 1.8  Width (cm): 1.1  Depth (cm): 0.1  Percent debrided: 100%  Surface Area (cm^2): 1.98  Area Debrided (cm^2): 1.98  Volume (cm^3): 0.2    Devitalized tissue debrided: exudate  Instrument(s) utilized: curette  Bleeding: small  Hemostasis obtained with: pressure  Procedural pain (0-10): 0  Post-procedural pain: 0   Response to treatment: procedure was tolerated well           Wound Instructions:  Orders Placed This Encounter   Procedures    Wound Procedure Treatment Venous Ulcer Right Pretibial     This order was created via procedure documentation    Wound cleansing and dressings Venous Ulcer Right Pretibial     Wound cleansing and dressings Venous Ulcer Right Pretibial                                                Right Leg wound:      Wash your hands with soap and water. Remove old dressing, discard into plastic bag and place in trash. Cleanse the wound with normal saline solution prior to applying a clean dressing. Do not use tissue or cotton balls. Do not scrub the wound. Pat dry using gauze.      Shower yes, if able to use cast cover. Do not get dressing wet.      Apply skin prep to skin around wound to protect from moisture  Apply Hydrofera Blue Ready to the wound bed  Cover with gauze.  Secure with tape if your skin is breaking down from tape then need to wrap with Armani and put tape on Armani   Change dressing 3 times weekly.   " "  Wear compression SpandigripF during the day     Standing Status:   Future     Expiration Date:   11/21/2024       Vesta Garcia PA-C, [facility]      Portions of the record may have been created with voice recognition software. Occasional wrong word or \"sound alike\" substitutions may have occurred due to the inherent limitations of voice recognition software. Read the chart carefully and recognize, using context, where substitutions have occurred.    "

## 2024-11-14 NOTE — PATIENT INSTRUCTIONS
"Patient Education     Low blood sugar in people with diabetes   The Basics   Written by the doctors and editors at [facility]   What is low blood sugar? -- This is when the level of sugar in a person's blood gets too low. It is also called \"hypoglycemia.\"  Low blood sugar can cause symptoms ranging from sweating and feeling hungry to passing out.  Low blood sugar can happen in people with diabetes who take certain medicines, including insulin, other medicines given as shots, and some types of pills.  When can people with diabetes get low blood sugar? -- People with diabetes can get low blood sugar when they:   Take too much medicine, including insulin, other medicines given as shots, or certain diabetes pills   Do not eat enough food   Exercise too much without eating a snack or reducing their insulin dose   Wait too long between meals   Drink too much alcohol or drink alcohol on an empty stomach  What are the symptoms of low blood sugar? -- The symptoms can be different from person to person, and can change over time. During the early stages of low blood sugar, a person can:   Sweat or tremble   Feel hungry   Feel worried  People who have early symptoms should check their blood sugar level to see if it is low and needs to be treated. If low blood sugar levels are not treated, severe symptoms can occur. These can include:   Trouble walking or feeling weak   Trouble seeing clearly   Being confused or acting in a strange way   Passing out or having a seizure  Some people do not get symptoms during the early stages of low blood sugar. Doctors sometimes call this \"hypoglycemia unawareness.\" People with hypoglycemia unawareness are more likely to have severe symptoms, because they might not know that they have low blood sugar until they have severe symptoms. Hypoglycemia unawareness is more likely in people who:   Have had type 1 diabetes for more than 5 to 10 years   Have frequent episodes of low blood sugar   Use insulin " to keep their blood sugar level tightly managed   Are tired   Drink a lot of alcohol   Take certain medicines for high blood pressure or diabetes  How is low blood sugar treated? -- It can be treated with:   Quick sources of sugar - People can eat or drink quick sources of sugar (table 1). Foods that have fat, such as chocolate or cheese, do not treat low blood sugar as quickly. You and a family member should carry a quick source of sugar at all times.   A dose of glucagon - Glucagon is a hormone that can quickly raise blood sugar levels and stop severe symptoms. It comes as a shot (figure 1) or a nose spray. If your doctor recommends that you carry glucagon with you, they will tell you when and how to use it. If possible, it's also a good idea to have a family member, friend, or roommate learn how to give you glucagon. That way, they can give it to you if you can't do it yourself.  After treating low blood sugar, it is very important to recheck your blood sugar level to make sure that it rises and stays in the normal range. Once your blood sugar is normal, eat a small snack that contains protein, fat, and carbohydrate. This can help keep your blood sugar stable.  What should I do after treatment? -- After treatment for low blood sugar, most people can get back to their usual routine. But your doctor or nurse might recommend that you check your blood sugar level more often during the next 2 to 3 days.  If your low blood sugar was treated with glucagon, call your doctor or nurse. They might change the dose of your diabetes medicine.  How can I prevent low blood sugar? -- The best way is to:   Check your blood sugar levels often - Your doctor or nurse will tell you how and when to check your blood sugar levels at home. They will also tell you what your blood sugar levels should be, and when to treat low blood sugar.   Learn the symptoms of low blood sugar, and be ready to treat it in the early stages. Treating low  "blood sugar early can prevent severe symptoms.  When should I go to a hospital or call for an ambulance? -- A family member or friend should take you to a hospital or call for an ambulance (in the [place] and [place], call 9-1-1) if you:   Are still confused 15 minutes after being treated with a dose of glucagon   Have passed out, and there is no glucagon nearby   Still have low blood sugar after treatment  If you have low blood sugar, do not try to drive yourself to the hospital. Driving with low blood sugar can be dangerous.  All topics are updated as new evidence becomes available and our peer review process is complete.  This topic retrieved from [employer] on: Apr 11, 2024.  Topic 35421 Version 22.0  Release: 32.3.2 - C32.100  © 2024 UpToDate, Inc. and/or its affiliates. All rights reserved.  table 1: Quick sources of sugar to treat low blood sugar  3 or 4 glucose tablets   ½ cup of juice or regular soda (not sugar-free)   2 tablespoons of raisins   4 or 5 saltine crackers   1 tablespoon of sugar   1 tablespoon of honey or corn syrup   6 to 8 hard candies   These sources of sugar act quickly to treat low blood sugar levels. People with diabetes who use insulin or certain other diabetes medicines should carry at least 1 of these items at all times.  Graphic 04202 Version 4.0  figure 1: Glucagon autoinjector     Some people carry glucagon in the form of an autoinjector \"pen.\" This makes it easy to give a dose into the upper arm, thigh, or belly.  Graphic 100911 Version 2.0  Consumer Information Use and Disclaimer   Disclaimer: This generalized information is a limited summary of diagnosis, treatment, and/or medication information. It is not meant to be comprehensive and should be used as a tool to help the user understand and/or assess potential diagnostic and treatment options. It does NOT include all information about conditions, treatments, medications, side effects, or risks that may apply to a specific patient. It " is not intended to be medical advice or a substitute for the medical advice, diagnosis, or treatment of a health care provider based on the health care provider's examination and assessment of a patient's specific and unique circumstances. Patients must speak with a health care provider for complete information about their health, medical questions, and treatment options, including any risks or benefits regarding use of medications. This information does not endorse any treatments or medications as safe, effective, or approved for treating a specific patient. UpToDate, Inc. and its affiliates disclaim any warranty or liability relating to this information or the use thereof.The use of this information is governed by the Terms of Use, available at https://www.woltersVirtustreamuwer.com/en/know/clinical-effectiveness-terms. 2024© UpToDate, Inc. and its affiliates and/or licensors. All rights reserved.  Copyright   © 2024 UpToDate, Inc. and/or its affiliates. All rights reserved.

## 2024-11-15 ENCOUNTER — APPOINTMENT (OUTPATIENT)
Dept: LAB | Facility: CLINIC | Age: 84
End: 2024-11-15
Payer: COMMERCIAL

## 2024-11-15 ENCOUNTER — OFFICE VISIT (OUTPATIENT)
Age: 84
End: 2024-11-15
Payer: COMMERCIAL

## 2024-11-15 ENCOUNTER — APPOINTMENT (OUTPATIENT)
Age: 84
End: 2024-11-15
Payer: COMMERCIAL

## 2024-11-15 VITALS
DIASTOLIC BLOOD PRESSURE: 48 MMHG | WEIGHT: 167 LBS | SYSTOLIC BLOOD PRESSURE: 136 MMHG | OXYGEN SATURATION: 97 % | RESPIRATION RATE: 16 BRPM | HEART RATE: 70 BPM | HEIGHT: 66 IN | BODY MASS INDEX: 26.84 KG/M2

## 2024-11-15 DIAGNOSIS — N18.9 CHRONIC KIDNEY DISEASE-MINERAL AND BONE DISORDER: ICD-10-CM

## 2024-11-15 DIAGNOSIS — I50.22 CHRONIC SYSTOLIC (CONGESTIVE) HEART FAILURE (HCC): Primary | ICD-10-CM

## 2024-11-15 DIAGNOSIS — N18.32 TYPE 2 DIABETES MELLITUS WITH STAGE 3B CHRONIC KIDNEY DISEASE, WITH LONG-TERM CURRENT USE OF INSULIN (HCC): ICD-10-CM

## 2024-11-15 DIAGNOSIS — N18.32 STAGE 3B CHRONIC KIDNEY DISEASE (HCC): ICD-10-CM

## 2024-11-15 DIAGNOSIS — Z79.4 TYPE 2 DIABETES MELLITUS WITH STAGE 3B CHRONIC KIDNEY DISEASE, WITH LONG-TERM CURRENT USE OF INSULIN (HCC): ICD-10-CM

## 2024-11-15 DIAGNOSIS — E03.9 ACQUIRED HYPOTHYROIDISM: ICD-10-CM

## 2024-11-15 DIAGNOSIS — E11.22 TYPE 2 DIABETES MELLITUS WITH STAGE 3B CHRONIC KIDNEY DISEASE, WITH LONG-TERM CURRENT USE OF INSULIN (HCC): ICD-10-CM

## 2024-11-15 DIAGNOSIS — M89.9 CHRONIC KIDNEY DISEASE-MINERAL AND BONE DISORDER: ICD-10-CM

## 2024-11-15 DIAGNOSIS — E78.2 MIXED HYPERLIPIDEMIA: ICD-10-CM

## 2024-11-15 DIAGNOSIS — J44.9 CHRONIC OBSTRUCTIVE PULMONARY DISEASE, UNSPECIFIED COPD TYPE (HCC): ICD-10-CM

## 2024-11-15 DIAGNOSIS — E83.9 CHRONIC KIDNEY DISEASE-MINERAL AND BONE DISORDER: ICD-10-CM

## 2024-11-15 DIAGNOSIS — Z79.4 TYPE 2 DIABETES MELLITUS WITH DIABETIC POLYNEUROPATHY, WITH LONG-TERM CURRENT USE OF INSULIN (HCC): ICD-10-CM

## 2024-11-15 DIAGNOSIS — E11.42 TYPE 2 DIABETES MELLITUS WITH DIABETIC POLYNEUROPATHY, WITH LONG-TERM CURRENT USE OF INSULIN (HCC): ICD-10-CM

## 2024-11-15 DIAGNOSIS — D50.0 IRON DEFICIENCY ANEMIA DUE TO CHRONIC BLOOD LOSS: ICD-10-CM

## 2024-11-15 DIAGNOSIS — I48.0 PAROXYSMAL ATRIAL FIBRILLATION (HCC): ICD-10-CM

## 2024-11-15 DIAGNOSIS — I10 HYPERTENSION, ESSENTIAL: ICD-10-CM

## 2024-11-15 LAB
ALBUMIN SERPL BCG-MCNC: 4.2 G/DL (ref 3.5–5)
ALP SERPL-CCNC: 92 U/L (ref 34–104)
ALT SERPL W P-5'-P-CCNC: 15 U/L (ref 7–52)
ANION GAP SERPL CALCULATED.3IONS-SCNC: 9 MMOL/L (ref 4–13)
AST SERPL W P-5'-P-CCNC: 20 U/L (ref 13–39)
BASOPHILS # BLD AUTO: 0.03 THOUSANDS/ÂΜL (ref 0–0.1)
BASOPHILS NFR BLD AUTO: 1 % (ref 0–1)
BILIRUB SERPL-MCNC: 0.31 MG/DL (ref 0.2–1)
BUN SERPL-MCNC: 32 MG/DL (ref 5–25)
CALCIUM SERPL-MCNC: 8.8 MG/DL (ref 8.4–10.2)
CHLORIDE SERPL-SCNC: 100 MMOL/L (ref 96–108)
CHOLEST SERPL-MCNC: 180 MG/DL (ref ?–200)
CO2 SERPL-SCNC: 30 MMOL/L (ref 21–32)
CREAT SERPL-MCNC: 1.34 MG/DL (ref 0.6–1.3)
CREAT UR-MCNC: 133.5 MG/DL
EOSINOPHIL # BLD AUTO: 0.13 THOUSAND/ÂΜL (ref 0–0.61)
EOSINOPHIL NFR BLD AUTO: 2 % (ref 0–6)
ERYTHROCYTE [DISTWIDTH] IN BLOOD BY AUTOMATED COUNT: 15.9 % (ref 11.6–15.1)
EST. AVERAGE GLUCOSE BLD GHB EST-MCNC: 171 MG/DL
GFR SERPL CREATININE-BSD FRML MDRD: 36 ML/MIN/1.73SQ M
GLUCOSE SERPL-MCNC: 235 MG/DL (ref 65–140)
HBA1C MFR BLD: 7.6 %
HCT VFR BLD AUTO: 34.6 % (ref 34.8–46.1)
HDLC SERPL-MCNC: 32 MG/DL
HGB BLD-MCNC: 10.7 G/DL (ref 11.5–15.4)
IMM GRANULOCYTES # BLD AUTO: 0.02 THOUSAND/UL (ref 0–0.2)
IMM GRANULOCYTES NFR BLD AUTO: 0 % (ref 0–2)
LDLC SERPL CALC-MCNC: 93 MG/DL (ref 0–100)
LYMPHOCYTES # BLD AUTO: 1.19 THOUSANDS/ÂΜL (ref 0.6–4.47)
LYMPHOCYTES NFR BLD AUTO: 21 % (ref 14–44)
MCH RBC QN AUTO: 30.3 PG (ref 26.8–34.3)
MCHC RBC AUTO-ENTMCNC: 30.9 G/DL (ref 31.4–37.4)
MCV RBC AUTO: 98 FL (ref 82–98)
MICROALBUMIN UR-MCNC: 44 MG/L
MICROALBUMIN/CREAT 24H UR: 33 MG/G CREATININE (ref 0–30)
MONOCYTES # BLD AUTO: 0.57 THOUSAND/ÂΜL (ref 0.17–1.22)
MONOCYTES NFR BLD AUTO: 10 % (ref 4–12)
NEUTROPHILS # BLD AUTO: 3.69 THOUSANDS/ÂΜL (ref 1.85–7.62)
NEUTS SEG NFR BLD AUTO: 66 % (ref 43–75)
NRBC BLD AUTO-RTO: 0 /100 WBCS
PLATELET # BLD AUTO: 321 THOUSANDS/UL (ref 149–390)
PMV BLD AUTO: 11.1 FL (ref 8.9–12.7)
POTASSIUM SERPL-SCNC: 4.4 MMOL/L (ref 3.5–5.3)
PROT SERPL-MCNC: 6.8 G/DL (ref 6.4–8.4)
RBC # BLD AUTO: 3.53 MILLION/UL (ref 3.81–5.12)
SODIUM SERPL-SCNC: 139 MMOL/L (ref 135–147)
T4 FREE SERPL-MCNC: 0.82 NG/DL (ref 0.61–1.12)
TRIGL SERPL-MCNC: 276 MG/DL (ref ?–150)
TSH SERPL DL<=0.05 MIU/L-ACNC: 4.97 UIU/ML (ref 0.45–4.5)
WBC # BLD AUTO: 5.63 THOUSAND/UL (ref 4.31–10.16)

## 2024-11-15 PROCEDURE — 82043 UR ALBUMIN QUANTITATIVE: CPT

## 2024-11-15 PROCEDURE — 85025 COMPLETE CBC W/AUTO DIFF WBC: CPT

## 2024-11-15 PROCEDURE — 80061 LIPID PANEL: CPT

## 2024-11-15 PROCEDURE — 83036 HEMOGLOBIN GLYCOSYLATED A1C: CPT

## 2024-11-15 PROCEDURE — 84443 ASSAY THYROID STIM HORMONE: CPT

## 2024-11-15 PROCEDURE — 99214 OFFICE O/P EST MOD 30 MIN: CPT

## 2024-11-15 PROCEDURE — G2211 COMPLEX E/M VISIT ADD ON: HCPCS

## 2024-11-15 PROCEDURE — 82570 ASSAY OF URINE CREATININE: CPT

## 2024-11-15 PROCEDURE — 84439 ASSAY OF FREE THYROXINE: CPT

## 2024-11-15 PROCEDURE — 80053 COMPREHEN METABOLIC PANEL: CPT

## 2024-11-15 RX ORDER — PEN NEEDLE, DIABETIC 32GX 5/32"
NEEDLE, DISPOSABLE MISCELLANEOUS
Qty: 100 EACH | Refills: 1 | Status: SHIPPED | OUTPATIENT
Start: 2024-11-15

## 2024-11-15 NOTE — PROGRESS NOTES
INTERNAL MEDICINE FOLLOW-UP VISIT  [facility] - [facility] [address]    NAME: Ely Hernadez  AGE: 84 y.o. SEX: female  : 1940     DATE: 11/15/2024     Assessment and Plan:   1. Chronic systolic (congestive) heart failure (HCC) (Primary)  Home weights are averaging around 158-61 when she checks.  She is currently on Lasix 40 mg twice daily.  She denies any PND, orthopnea, shortness of breath.  She follows with cardiology.    2. Hypertension, essential  BP in office is 136/48.  No home BPs.  Limit salt intake to less than 2000 g daily.    3. Paroxysmal atrial fibrillation (HCC)  Currently stable on Coumadin 5 mg and metoprolol 50 mg twice daily.    4. Chronic obstructive pulmonary disease, unspecified COPD type (HCC)  She denies any shortness of breath, coughing, wheezing, or chest tightness.  She uses her albuterol inhaler as needed.    5. Type 2 diabetes mellitus with stage 3b chronic kidney disease, with long-term current use of insulin (HCC)  Due for A1c. Her fasting sugars are averaging around 200.  She had seen nephrology who recommended addition of SGLT2, will start Jardiance 10 mg daily.  She is also currently on glipizide 10 mg and Levemir 30 units prior to bed.  She received a letter in the mail that Levemir would not be carried by her pharmacy anymore, will send Tresiba instead.  She is due for the eye doctor.  Continue to limit sugars and carbs in diet.    6. Acquired hypothyroidism  Due for TSH. Currently on levothyroxine 50 mcg daily.     7. Stage 3b chronic kidney disease (HCC)  She follows with nephrology. Continue to increase water intake and avoid nephrotoxic medications like advil, aleve, motrin, and ibuprofen.     8. Mixed hyperlipidemia  Due for lipid panel. She is not currently on a statin.     9. Iron deficiency anemia due to chronic blood loss  She completed her iron infusions.  Just recently completed iron infusions.  She has a follow-up with  hematology in December.    Depression Screening and Follow-up Plan: Patient was screened for depression during today's encounter. They screened negative with a PHQ-2 score of 0.       No follow-ups on file.       Chief Complaint:     Chief Complaint   Patient presents with    Follow-up     6 months      History of Present Illness:   Patient is an 84-year-old female that presents today for a 6-month follow-up.  She has no new complaints today.    She eats a well-balanced diet of fruits, veggies, and lean meats. She drinks an adequate amount of water, urinating pale to clear yellow every 2-3 hours. She is active around the house. She sleeps okay. She denies any alcohol, tobacco, or illicit drug use. She is due for dentist and eye doctor. She lives at home with her son and daughter and 2 grandkids.     Health maintenance:  Due for labs, up-to-date on screenings.  Immunizations: Up-to-date on flu vaccine.    The following portions of the patient's history were reviewed and updated as appropriate: allergies, current medications, past family history, past medical history, past social history, past surgical history and problem list.     Review of Systems:     Review of Systems   Constitutional:  Negative for chills, fatigue and fever.   HENT:  Negative for ear discharge, ear pain, postnasal drip, rhinorrhea, sinus pressure, sinus pain, sore throat, tinnitus and trouble swallowing.    Eyes:  Negative for pain, discharge and itching.   Respiratory:  Negative for cough, shortness of breath and wheezing.    Cardiovascular:  Negative for chest pain, palpitations and leg swelling.   Gastrointestinal:  Negative for abdominal pain, constipation, diarrhea, nausea and vomiting.   Endocrine: Negative for polydipsia, polyphagia and polyuria.   Genitourinary:  Negative for difficulty urinating, frequency, hematuria and urgency.   Musculoskeletal:  Negative for arthralgias, joint swelling and myalgias.   Skin:  Negative for color  change.   Allergic/Immunologic: Negative for environmental allergies.   Neurological:  Negative for dizziness, weakness, light-headedness, numbness and headaches.   Hematological:  Negative for adenopathy.   Psychiatric/Behavioral:  Negative for decreased concentration and sleep disturbance. The patient is not nervous/anxious.         Past Medical History:     Past Medical History:   Diagnosis Date    Acute anterior epistaxis 12/23/2023    Acute blood loss anemia 12/14/2023    Acute respiratory failure with hypoxia (HCC) 02/06/2024    Anemia     Aneurysm of thoracic aorta (HCC)     Angioedema 06/07/2019    Aortic valve disorder     Benign neoplasm of sigmoid colon     Cardiomyopathy (HCC)     last assessed: 10/10/2014    CHF (congestive heart failure) (HCC)     Chronic kidney disease     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HCC)     last assessed: 10/10/2014    COPD (chronic obstructive pulmonary disease) (HCC)     Diabetic nephropathy (HCC)     Disease of thyroid gland     RAMON (dyspnea on exertion)     GERD (gastroesophageal reflux disease)     History of emphysema ([facility])     History of transfusion     Hyperlipidemia     Hypertensive urgency 10/28/2023    Leg cramps 11/01/2023    Stroke-like symptoms 10/28/2023    TIA (transient ischemic attack)         Current Medications:     Current Outpatient Medications:     Accu-Chek FastClix Lancets MISC, Use 3 (three) times a day, Disp: 300 each, Rfl: 3    albuterol (Ventolin HFA) 90 mcg/act inhaler, Inhale 2 puffs every 6 (six) hours as needed for wheezing, Disp: 54 g, Rfl: 3    Ascorbic Acid (vitamin C) 1000 MG tablet, Take 1,000 mg by mouth daily, Disp: , Rfl:     aspirin (ECOTRIN LOW STRENGTH) 81 mg EC tablet, Take 1 tablet (81 mg total) by mouth daily Do not start before November 1, 2023., Disp: 30 tablet, Rfl: 0    b complex vitamins capsule, Take 1 capsule by mouth daily, Disp: 30 capsule, Rfl: 2    Blood Glucose Monitoring Suppl (Accu-Chek Guide Me) w/Device KIT, USE 3  TIMES DAILY, Disp: 1 kit, Rfl: 2    Cholecalciferol (Vitamin D3) 50 MCG (2000 UT) TABS, Take 2,000 Units by mouth daily, Disp: , Rfl:     enoxaparin (LOVENOX) 80 mg/0.8 mL, Inject 0.7 mL (70 mg total) under the skin every 24 hours Hold Warfarin 5 days prior. Last dose is 9/6 Start Lovenox on 9/8, 4 days prior to the procedure. No Lovenox the morning of the procedure. Resume both Lovenox and Warfarin at the discretion of the surgeon (preferably that evening) and check INR 4 days after restarting. Inject in the abdomen about 2 inches from the belly button and rotate sides at each injection., Disp: 0.7 mL, Rfl: 10    Ferrous Sulfate (IRON PO), Take by mouth daily in the early morning OTC, Disp: , Rfl:     furosemide (LASIX) 40 mg tablet, Take 1 tablet (40 mg total) by mouth 2 (two) times a day, Disp: 180 tablet, Rfl: 3    gabapentin (NEURONTIN) 300 mg capsule, Take 1 capsule (300 mg total) by mouth 3 (three) times a day, Disp: 270 capsule, Rfl: 3    glipiZIDE (GLUCOTROL) 10 mg tablet, Take 1 tablet (10 mg total) by mouth 2 (two) times a day before meals, Disp: 180 tablet, Rfl: 3    glucose blood (Accu-Chek Celeste Plus) test strip, Use 1 each 3 (three) times a day Use as instructed, Disp: 300 each, Rfl: 3    insulin detemir (Levemir FlexPen) 100 Units/mL injection pen, INJECT 30 UNITS SUBCUTANEOUSLY  DAILY AT BEDTIME, Disp: 15 mL, Rfl: 2    Insulin Pen Needle (BD Pen Needle Rosie U/F) 32G X 4 MM MISC, Use daily, Disp: 100 each, Rfl: 1    ipratropium-albuterol (DUO-NEB) 0.5-2.5 mg/3 mL nebulizer solution, inhale contents of 1 vial ( 3 milliliters ) in nebulizer four times a day, Disp: , Rfl:     Lancets (freestyle) lancets, Check blood glucose 3 times daily, Disp: 300 each, Rfl: 0    levothyroxine 50 mcg tablet, Take 1 tablet (50 mcg total) by mouth daily, Disp: 90 tablet, Rfl: 3    metoprolol tartrate (LOPRESSOR) 50 mg tablet, TAKE ONE-HALF TABLET BY  MOUTH TWICE DAILY, Disp: 90 tablet, Rfl: 3    mupirocin (BACTROBAN) 2 %  "ointment, Apply topically daily, Disp: 22 g, Rfl: 0    oxygen gas, Inhale 2 L/min daily at bedtime as needed home oxygen nightly, Disp: , Rfl:     pantoprazole (PROTONIX) 40 mg tablet, TAKE 1 TABLET BY MOUTH DAILY AS  DIRECTED, Disp: 100 tablet, Rfl: 1    warfarin (COUMADIN) 5 mg tablet, TAKE 1 TO 2 TABLETS BY  MOUTH DAILY OR AS DIRECTED, Disp: 180 tablet, Rfl: 3    Current Facility-Administered Medications:     lidocaine (LMX) 4 % cream, , Topical, Once, Vesta Garcia PA-C     Allergies:   No Known Allergies     Physical Exam:     BP (!) 136/48 (BP Location: Left arm, Patient Position: Sitting, Cuff Size: Standard)   Pulse 70   Resp 16   Ht 5' 6\" (1.676 m)   Wt 75.8 kg (167 lb)   SpO2 97%   BMI 26.95 kg/m²     Physical Exam  Vitals and nursing note reviewed.   Constitutional:       General: She is awake. She is not in acute distress.     Appearance: Normal appearance. She is well-developed, well-groomed and overweight.   HENT:      Head: Normocephalic and atraumatic.      Right Ear: Hearing and external ear normal.      Left Ear: Hearing and external ear normal.      Nose: Nose normal.      Mouth/Throat:      Lips: Pink.      Mouth: Mucous membranes are moist.   Eyes:      General: Lids are normal. Vision grossly intact. Gaze aligned appropriately.      Conjunctiva/sclera: Conjunctivae normal.   Neck:      Vascular: No carotid bruit.      Trachea: Trachea and phonation normal.   Cardiovascular:      Rate and Rhythm: Normal rate and regular rhythm.      Heart sounds: Normal heart sounds, S1 normal and S2 normal. No murmur heard.     No friction rub. No gallop.   Pulmonary:      Effort: Pulmonary effort is normal. No respiratory distress.      Breath sounds: Normal breath sounds and air entry. No decreased breath sounds, wheezing, rhonchi or rales.   Abdominal:      General: Abdomen is protuberant.   Musculoskeletal:         General: No swelling.      Cervical back: Neck supple.      Right lower leg: No edema. "      Left lower leg: No edema.   Skin:     General: Skin is warm.      Capillary Refill: Capillary refill takes less than 2 seconds.   Neurological:      Mental Status: She is alert.   Psychiatric:         Mood and Affect: Mood normal.         Behavior: Behavior is cooperative.           Data:     Laboratory Results: I have personally reviewed the pertinent laboratory results/reports   Radiology/Other Diagnostic Testing Results: Results Review Statement: No pertinent imaging studies reviewed.    Amanda Vlilarreal PA-C  [facility] [address]

## 2024-11-18 ENCOUNTER — RESULTS FOLLOW-UP (OUTPATIENT)
Dept: CARDIOLOGY CLINIC | Facility: CLINIC | Age: 84
End: 2024-11-18

## 2024-11-18 ENCOUNTER — ANTICOAG VISIT (OUTPATIENT)
Dept: CARDIOLOGY CLINIC | Facility: CLINIC | Age: 84
End: 2024-11-18

## 2024-11-18 ENCOUNTER — RESULTS FOLLOW-UP (OUTPATIENT)
Age: 84
End: 2024-11-18

## 2024-11-25 NOTE — PROGRESS NOTES
[facility] [place]  [address]  [place] [address]  Cardiology Follow Up    Ely Hernadez  1940  [id number]    Assessment & Plan  Shortness of breath  Dyspnea on exertion  Appears euvolemic  Patient declines ischemic evaluation  Patient states she has a history of COPD has previously followed with pulmonary; advised to follow-up with pulmonary  Recent echo  8/2024: EF 40%, moderate global hypokinesis with regional variation, G2 DD, mechanical aortic valve noted, trace perivalvular regurgitation, mild to moderate MR, mild to moderate TR, severe pulmonary hypertension with PASP 61  Aortic valve disorder  Hx of mechanical aortic valve replacement  On chronic Coumadin, goal INR 2.5-3.5  Report any bleeding    Chronic systolic CHF (congestive heart failure) (HCC)  Wt Readings from Last 3 Encounters:   11/26/24 73 kg (161 lb)   11/15/24 75.8 kg (167 lb)   10/07/24 74.8 kg (164 lb 12.8 oz)   Appears euvolemic  Continue current dose of Lasix 40 twice daily  Continue aspirin, Jardiance, metoprolol, Coumadin  Daily weights, salt restriction  Report 3 pound weight gain in 24 hours or 5 pound weight gain in 3 days  Advised to avoid saltshaker, canned foods, lunch meat, pretzels, chips, olives, pickles, etc.  Paroxysmal atrial fibrillation (HCC)  Rate controlled in the 60s  Continue metoprolol, Coumadin  Report any bleeding  Other cardiomyopathy (HCC)  Nonischemic myopathy last known EF 40%  Continue aspirin, Jardiance, Lasix, metoprolol, Coumadin  Chronic anticoagulation  Chronic Coumadin with history of chemical aortic valve replacement/atrial fibrillation  Goal INR 2.5-3.5  Last INR 3.2  Report any bleeding    Continue medications.  Stress test again offered, patient declined.  Advised to follow-up with pulmonary.  Consider stress test.  Continue to monitor symptoms.  Did review echocardiogram with patient.  Noted pulmonary hypertension.  Advised again to follow-up with pulmonary.   Continue to monitor symptoms.  Continue daily weights, salt restriction.  Advised to report 3 pound weight gain in 24 hours or 5 pound weight gain in 3 days.  Report any bleeding on Coumadin.  Follow in Coumadin clinic.  Follow in pacemaker clinic.  Continue all medications. Previous studies reviewed with patient, medications reviewed and possible side effects discussed. Continue risk factor modification. Optimize weight, regular exercise and follow up with appropriate specialists and primary care physician as discussed.  All questions answered. Patient advised to report any problems prompting to medical attention. Return for follow up visit in 4 months or earlier if needed    Chief Complaint   Patient presents with    Follow-up       Interval History: Patient presents for routine follow-up visit with history of mechanical aortic valve replacement, chronic HFmrEF, paroxysmal atrial fibrillation, nonischemic cardiomyopathy, chronic anticoagulation, pacemaker implantation.  Patient states since her last visit about 2 months ago she continues to have shortness of breath.  Patient had echocardiogram since then EF 40% severe pulmonary hypertension noted mild to moderate MR noted.  Patient declined stress test at that time, continues to decline stress test.  Patient notes a history of COPD and has not seen pulmonary in a while.  Advised to follow-up with them.  Notes she has had PFTs in the past.  States she is taking all medications as prescribed, denies any excessive salt use.  Weighs daily.      Patient Active Problem List   Diagnosis    Hyperlipidemia    Chronic anticoagulation    Hypertension, essential    Coronary artery disease of native artery of native heart with stable angina pectoris (HCC)    Simple chronic bronchitis (HCC)    Diabetes mellitus with neurological manifestation (HCC)    Hypothyroidism    GERD (gastroesophageal reflux disease)    Anemia    AVM (arteriovenous malformation) of small bowel, acquired  with hemorrhage    Angiectasia    Other vascular disorders of intestine (HCC)    Aortic valve replaced    Cardiomyopathy (HCC)    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HCC)    Stage 3b chronic kidney disease (HCC)    Chronic kidney disease-mineral and bone disorder    Primary osteoarthritis involving multiple joints    Paroxysmal atrial fibrillation (HCC)    Neutropenia associated with infection (HCC)    Herpes simplex labialis    Restrictive lung disease    Nocturnal hypoxemia    Supratherapeutic INR    H/O mechanical aortic valve replacement    Subtherapeutic international normalized ratio (INR)    Type 2 diabetes mellitus with stage 3b chronic kidney disease, with long-term current use of insulin (HCC)    Venous ulcer of right leg (HCC)    Chronic systolic (congestive) heart failure (HCC)    Lower extremity edema    Iron deficiency anemia due to chronic blood loss    Hemiplegia and hemiparesis following cerebral infarction affecting left non-dominant side (HCC)    Chronic obstructive pulmonary disease (HCC)     Past Medical History:   Diagnosis Date    Acute anterior epistaxis 12/23/2023    Acute blood loss anemia 12/14/2023    Acute respiratory failure with hypoxia (HCC) 02/06/2024    Anemia     Aneurysm of thoracic aorta (HCC)     Angioedema 06/07/2019    Aortic valve disorder     Benign neoplasm of sigmoid colon     Cardiomyopathy (HCC)     last assessed: 10/10/2014    CHF (congestive heart failure) (HCC)     Chronic kidney disease     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HCC)     last assessed: 10/10/2014    COPD (chronic obstructive pulmonary disease) (HCC)     Diabetic nephropathy (HCC)     Disease of thyroid gland     RAMON (dyspnea on exertion)     GERD (gastroesophageal reflux disease)     History of emphysema (HCC)     History of transfusion     Hyperlipidemia     Hypertensive urgency 10/28/2023    Leg cramps 11/01/2023    Stroke-like symptoms 10/28/2023    TIA (transient ischemic attack)      Social History      Socioeconomic History    Marital status:      Spouse name: Not on file    Number of children: Not on file    Years of education: Not on file    Highest education level: Not on file   Occupational History    Not on file   Tobacco Use    Smoking status: Former     Current packs/day: 0.00     Average packs/day: 1 pack/day for 52.9 years (52.9 ttl pk-yrs)     Types: Cigarettes     Start date:      Quit date: 2007     Years since quittin.0     Passive exposure: Past    Smokeless tobacco: Former     Quit date:    Vaping Use    Vaping status: Never Used   Substance and Sexual Activity    Alcohol use: Never    Drug use: Never    Sexual activity: Not Currently     Partners: Male   Other Topics Concern    Not on file   Social History Narrative    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 BID Freestyle lancets BID    Living independently with spouse     Social Drivers of Health     Financial Resource Strain: Not on file   Food Insecurity: No Food Insecurity (5/15/2024)    Nursing - Inadequate Food Risk Classification     Worried About Running Out of Food in the Last Year: Never true     Ran Out of Food in the Last Year: Never true     Ran Out of Food in the Last Year: Not on file   Transportation Needs: No Transportation Needs (5/15/2024)    PRAPARE - Transportation     Lack of Transportation (Medical): No     Lack of Transportation (Non-Medical): No   Physical Activity: Inactive (2021)    Exercise Vital Sign     Days of Exercise per Week: 0 days     Minutes of Exercise per Session: 0 min   Stress: No Stress Concern Present (2021)    [employer] - Occupational Stress Questionnaire     Feeling of Stress : Not at all   Social Connections: Not on file   Intimate Partner Violence: Not on file   Housing Stability: Low Risk  (5/15/2024)    Housing Stability Vital Sign     Unable to Pay for Housing in the Last Year: No     Number of Times Moved in the  Last Year: 1     Homeless in the Last Year: No      Family History   Problem Relation Age of Onset    No Known Problems Mother     No Known Problems Father      Past Surgical History:   Procedure Laterality Date    AORTIC VALVE REPLACEMENT      CARDIAC PACEMAKER PLACEMENT      last assessed: 10/10/2014    CARDIAC SURGERY      aortic valve replacement    CHOLECYSTECTOMY      COLONOSCOPY      EGD      HYSTERECTOMY      INSERT / REPLACE / REMOVE PACEMAKER      KNEE SURGERY Right     OTHER SURGICAL HISTORY      Capsule ENDOscopy description: 12/19/2012    LA COLONOSCOPY FLX DX W/COLLJ SPEC WHEN PFRMD N/A 05/31/2018    Procedure: single balloon ENTEROSCOPY;  Surgeon: David Dennis MD;  Location: [facility];  Service: Gastroenterology    LA ESOPHAGOGASTRODUODENOSCOPY TRANSORAL DIAGNOSTIC N/A 11/29/2017    Procedure: EGD AND COLONOSCOPY;  Surgeon: Jay Mcleod III, MD;  Location: [facility];  Service: Gastroenterology       Current Outpatient Medications:     Accu-Chek FastClix Lancets MISC, Use 3 (three) times a day, Disp: 300 each, Rfl: 3    albuterol (Ventolin HFA) 90 mcg/act inhaler, Inhale 2 puffs every 6 (six) hours as needed for wheezing, Disp: 54 g, Rfl: 3    Ascorbic Acid (vitamin C) 1000 MG tablet, Take 1,000 mg by mouth daily, Disp: , Rfl:     aspirin (ECOTRIN LOW STRENGTH) 81 mg EC tablet, Take 1 tablet (81 mg total) by mouth daily Do not start before November 1, 2023., Disp: 30 tablet, Rfl: 0    b complex vitamins capsule, Take 1 capsule by mouth daily, Disp: 30 capsule, Rfl: 2    Blood Glucose Monitoring Suppl (Accu-Chek Guide Me) w/Device KIT, USE 3 TIMES DAILY, Disp: 1 kit, Rfl: 2    Cholecalciferol (Vitamin D3) 50 MCG (2000 UT) TABS, Take 2,000 Units by mouth daily, Disp: , Rfl:     Empagliflozin (Jardiance) 10 MG TABS tablet, Take 1 tablet (10 mg total) by mouth in the morning, Disp: 90 tablet, Rfl: 1    Ferrous Sulfate (IRON PO), Take by mouth daily in the early morning OTC, Disp: , Rfl:     furosemide  (LASIX) 40 mg tablet, Take 1 tablet (40 mg total) by mouth 2 (two) times a day, Disp: 180 tablet, Rfl: 3    gabapentin (NEURONTIN) 300 mg capsule, Take 1 capsule (300 mg total) by mouth 3 (three) times a day, Disp: 270 capsule, Rfl: 3    glipiZIDE (GLUCOTROL) 10 mg tablet, Take 1 tablet (10 mg total) by mouth 2 (two) times a day before meals, Disp: 180 tablet, Rfl: 3    glucose blood (Accu-Chek Celeste Plus) test strip, Use 1 each 3 (three) times a day Use as instructed, Disp: 300 each, Rfl: 3    insulin degludec (TRESIBA) 100 units/mL injection pen, Inject 30 Units under the skin daily, Disp: 30 mL, Rfl: 3    Insulin Pen Needle (BD Pen Needle Rosie U/F) 32G X 4 MM MISC, Use daily, Disp: 100 each, Rfl: 1    ipratropium-albuterol (DUO-NEB) 0.5-2.5 mg/3 mL nebulizer solution, inhale contents of 1 vial ( 3 milliliters ) in nebulizer four times a day, Disp: , Rfl:     Lancets (freestyle) lancets, Check blood glucose 3 times daily, Disp: 300 each, Rfl: 0    levothyroxine 50 mcg tablet, Take 1 tablet (50 mcg total) by mouth daily, Disp: 90 tablet, Rfl: 3    metoprolol tartrate (LOPRESSOR) 50 mg tablet, TAKE ONE-HALF TABLET BY  MOUTH TWICE DAILY, Disp: 90 tablet, Rfl: 3    mupirocin (BACTROBAN) 2 % ointment, Apply topically daily, Disp: 22 g, Rfl: 0    oxygen gas, Inhale 2 L/min daily at bedtime as needed home oxygen nightly, Disp: , Rfl:     pantoprazole (PROTONIX) 40 mg tablet, TAKE 1 TABLET BY MOUTH DAILY AS  DIRECTED, Disp: 100 tablet, Rfl: 1    warfarin (COUMADIN) 5 mg tablet, TAKE 1 TO 2 TABLETS BY  MOUTH DAILY OR AS DIRECTED, Disp: 180 tablet, Rfl: 3    enoxaparin (LOVENOX) 80 mg/0.8 mL, Inject 0.7 mL (70 mg total) under the skin every 24 hours Hold Warfarin 5 days prior. Last dose is 9/6 Start Lovenox on 9/8, 4 days prior to the procedure. No Lovenox the morning of the procedure. Resume both Lovenox and Warfarin at the discretion of the surgeon (preferably that evening) and check INR 4 days after restarting. Inject in  the abdomen about 2 inches from the belly button and rotate sides at each injection. (Patient not taking: Reported on 11/26/2024), Disp: 0.7 mL, Rfl: 10    Current Facility-Administered Medications:     lidocaine (LMX) 4 % cream, , Topical, Once, Vesta Garcia PA-C  No Known Allergies      Imaging: VAS ARTERIAL DUPLEX- LOWER LIMB BILATERAL  Result Date: 11/6/2024  Narrative:  [facility]PORT [facility]: Indications:  Patient presents with a right proximal calf wound since January 2024 that is not healing. Operative History: Aortic valve replacement Pacer Risk Factors The patient has history of HTN, Diabetes (IDDM), Hyperlipidemia, CKD, CAD and previous smoking (quit >10yrs ago). Clinical Right Pressure:  140/ mm Hg, Left Pressure:  140/ mm Hg.  FINDINGS:  Segment                Right                   Left                                          Impression  PSV (cm/s)  Impression  PSV (cm/s)  Common Femoral Artery  50-75%             240                     198  Prox Profunda                              77                     123  Prox SFA                                   95  50-75%             138  Mid SFA                                   103  50-75%             212  Dist SFA                                  113  >75%               506  Proximal Pop                               59                      72  Distal Pop                                 49                      46  Tibioperoneal                              66                          Dist Post Tibial                           15                      34  Dist. Ant. Tibial                          99                      54     CONCLUSION: Impression: RIGHT LOWER LIMB: Diffuse disease in the femoral and popliteal arteries with a 50-75% stenosis in the common femoral artery. Ankle/Brachial index:  0.64 which is in the severe disease category PVR/ PPG tracings are attenuated/dampened. Metatarsal pressure of 61mmHg Great toe pressure of 39mmHg,  below the healing range  LEFT LOWER LIMB: Diffuse disease in the femoral and popliteal arteries with a >75% stenosis of the distal superficial femoral artery. There is a 50-75% stenosis of the proximal and mid superficial femoral arteries. Ankle/Brachial index: 0.69 which is in the moderate disease category PVR/ PPG tracings are attenuated/dampened. Metatarsal pressure of 68mmHg Great toe pressure of 70mmHg, within the healing range  SIGNATURE: Electronically Signed by: ANGELICA VILLEGAS MD on 2024-11-06 04:43:02 PM    VAS Lower extremity venous insufficiency duplex, Single leg w/ measurements  Result Date: 11/6/2024  Narrative:  [facility] REPORT [facility]INICAL: Indications: Patient presents with a right proximal calf wound since January 2024 that has not healed. Operative History: Aortic valve replacement Pacer Risk Factors The patient has history of HTN, Diabetes (IDDM), Hyperlipidemia, CKD, CAD and previous smoking (quit >10yrs ago). Clinical Right Pressure:  140/ mm Hg, Left Pressure:  140/ mm Hg.  FINDINGS:  Unilateral               AP (mm)  Right AA GSV Prox Thigh      4.4  Right AA GSV Mid Thigh       3.8   Right                   Impression    AP (mm)  Reflux  Valve Closure Time  GSV Inguinal                              6.7                              Right AA GSV Mid Thigh                    4.3                              GSV Prox Thigh          Exits Fascia      2.4                              GSV Mid Thigh                             2.7                              GSV Knee                                  3.6                              GSV Prox Calf                             0.6                              Popliteal                                      Reflux                1.26     CONCLUSION: Impression: RIGHT LIMB: Deep venous incompetence is noted The great saphenous vein is competent. The great saphenous vein exits the saphenous compartment in the proximal thigh. The small saphenous vein  "is competent and does not communicate with the popliteal vein. There is an incompetent  in the proximal calf above the wound bandage and distal calf. There is no evidence of deep vein thrombosis in the CFV, the proximal PFV, the femoral vein and the popliteal vein.   Study performed with patient in steep Reverse Trendelenburg.  SIGNATURE: Electronically Signed by: ANGELICA VILLEGAS MD on 2024-11-06 04:41:39 PM    Cardiac EP device report  Result Date: 11/4/2024  Narrative: SJM DC PM/NOT MRI CONDITIONAL NON-BILLABLE MERLIN TRANSMISSION: BATTERY STATUS: BATTERY VOLTAGE NEARING LACIE (1.1 YRS).  WILL SCHEDULE MONTHLY BATTERY CHECKS. AP: <1%. : 98% (>40%~CHB). ALL AVAILABLE LEAD PARAMETERS WITHIN NORMAL LIMITS. NO SIGNIFICANT HIGH RATE EPISODES. 1 AMS W/ AF IN PROGRESS (HX OF SAME). AF BURDEN: >99%> PT TAKES WARFARIN, LOVENOX, ASA 81MG, METOPROLOL TART. EF: 40% (ECHO 8/26/24). NORMAL DEVICE FUNCTION.        Review of Systems:  Review of Systems   Constitutional: Negative.    Respiratory:  Positive for shortness of breath.    Cardiovascular: Negative.    Musculoskeletal: Negative.    Neurological: Negative.    Hematological: Negative.    Psychiatric/Behavioral: Negative.     All other systems reviewed and are negative.        /62 (BP Location: Left arm, Patient Position: Sitting, Cuff Size: Standard)   Pulse 60   Resp 16   Ht 5' 6\" (1.676 m)   Wt 73 kg (161 lb)   SpO2 96%   BMI 25.99 kg/m²     Physical Exam:  Physical Exam  Vitals and nursing note reviewed.   Constitutional:       Appearance: Normal appearance.   HENT:      Head: Normocephalic and atraumatic.   Cardiovascular:      Rate and Rhythm: Normal rate and regular rhythm.      Pulses: Normal pulses.      Heart sounds: Murmur heard.   Pulmonary:      Effort: Pulmonary effort is normal.      Breath sounds: Normal breath sounds.   Musculoskeletal:         General: Normal range of motion.      Cervical back: Normal range of motion and neck supple. "   Skin:     General: Skin is warm and dry.   Neurological:      General: No focal deficit present.      Mental Status: She is alert and oriented to person, place, and time.   Psychiatric:         Mood and Affect: Mood normal.         Behavior: Behavior normal.         Thought Content: Thought content normal.         Judgment: Judgment normal.

## 2024-11-26 ENCOUNTER — OFFICE VISIT (OUTPATIENT)
Dept: CARDIOLOGY CLINIC | Facility: CLINIC | Age: 84
End: 2024-11-26
Payer: COMMERCIAL

## 2024-11-26 VITALS
RESPIRATION RATE: 16 BRPM | HEIGHT: 66 IN | HEART RATE: 60 BPM | SYSTOLIC BLOOD PRESSURE: 134 MMHG | BODY MASS INDEX: 25.88 KG/M2 | DIASTOLIC BLOOD PRESSURE: 62 MMHG | OXYGEN SATURATION: 96 % | WEIGHT: 161 LBS

## 2024-11-26 DIAGNOSIS — Z79.01 CHRONIC ANTICOAGULATION: ICD-10-CM

## 2024-11-26 DIAGNOSIS — I48.0 PAROXYSMAL ATRIAL FIBRILLATION (HCC): ICD-10-CM

## 2024-11-26 DIAGNOSIS — R06.02 SHORTNESS OF BREATH: Primary | ICD-10-CM

## 2024-11-26 DIAGNOSIS — I42.8 OTHER CARDIOMYOPATHY (HCC): ICD-10-CM

## 2024-11-26 DIAGNOSIS — I35.9 AORTIC VALVE DISORDER: ICD-10-CM

## 2024-11-26 DIAGNOSIS — I50.22 CHRONIC SYSTOLIC CHF (CONGESTIVE HEART FAILURE) (HCC): ICD-10-CM

## 2024-11-26 PROCEDURE — 99214 OFFICE O/P EST MOD 30 MIN: CPT | Performed by: PHYSICIAN ASSISTANT

## 2024-11-26 NOTE — PATIENT INSTRUCTIONS
Continue medications.  Stress test again offered, patient declined.  Advised to follow-up with pulmonary.  Consider stress test.  Continue to monitor symptoms.  Did review echocardiogram with patient.  Noted pulmonary hypertension.  Advised again to follow-up with pulmonary.  Continue to monitor symptoms.  Continue daily weights, salt restriction.  Advised to report 3 pound weight gain in 24 hours or 5 pound weight gain in 3 days.  Report any bleeding on Coumadin.  Follow in Coumadin clinic.  Follow in pacemaker clinic.  Continue all medications. Previous studies reviewed with patient, medications reviewed and possible side effects discussed. Continue risk factor modification. Optimize weight, regular exercise and follow up with appropriate specialists and primary care physician as discussed.  All questions answered. Patient advised to report any problems prompting to medical attention. Return for follow up visit in 4 months or earlier if needed

## 2024-11-26 NOTE — ASSESSMENT & PLAN NOTE
Chronic Coumadin with history of chemical aortic valve replacement/atrial fibrillation  Goal INR 2.5-3.5  Last INR 3.2  Report any bleeding

## 2024-11-29 ENCOUNTER — APPOINTMENT (OUTPATIENT)
Dept: LAB | Facility: CLINIC | Age: 84
End: 2024-11-29
Payer: COMMERCIAL

## 2024-11-29 DIAGNOSIS — N18.32 STAGE 3B CHRONIC KIDNEY DISEASE (HCC): ICD-10-CM

## 2024-11-29 DIAGNOSIS — D50.9 IRON DEFICIENCY ANEMIA, UNSPECIFIED IRON DEFICIENCY ANEMIA TYPE: ICD-10-CM

## 2024-11-29 LAB
BASOPHILS # BLD AUTO: 0.03 THOUSANDS/ΜL (ref 0–0.1)
BASOPHILS NFR BLD AUTO: 1 % (ref 0–1)
EOSINOPHIL # BLD AUTO: 0.12 THOUSAND/ΜL (ref 0–0.61)
EOSINOPHIL NFR BLD AUTO: 3 % (ref 0–6)
ERYTHROCYTE [DISTWIDTH] IN BLOOD BY AUTOMATED COUNT: 15.9 % (ref 11.6–15.1)
FERRITIN SERPL-MCNC: 32 NG/ML (ref 11–307)
HCT VFR BLD AUTO: 36.2 % (ref 34.8–46.1)
HGB BLD-MCNC: 11.3 G/DL (ref 11.5–15.4)
IMM GRANULOCYTES # BLD AUTO: 0.02 THOUSAND/UL (ref 0–0.2)
IMM GRANULOCYTES NFR BLD AUTO: 0 % (ref 0–2)
IRON SATN MFR SERPL: 46 % (ref 15–50)
IRON SERPL-MCNC: 186 UG/DL (ref 50–212)
LYMPHOCYTES # BLD AUTO: 1.19 THOUSANDS/ΜL (ref 0.6–4.47)
LYMPHOCYTES NFR BLD AUTO: 24 % (ref 14–44)
MCH RBC QN AUTO: 29.9 PG (ref 26.8–34.3)
MCHC RBC AUTO-ENTMCNC: 31.2 G/DL (ref 31.4–37.4)
MCV RBC AUTO: 96 FL (ref 82–98)
MONOCYTES # BLD AUTO: 0.49 THOUSAND/ΜL (ref 0.17–1.22)
MONOCYTES NFR BLD AUTO: 10 % (ref 4–12)
NEUTROPHILS # BLD AUTO: 3.04 THOUSANDS/ΜL (ref 1.85–7.62)
NEUTS SEG NFR BLD AUTO: 62 % (ref 43–75)
NRBC BLD AUTO-RTO: 0 /100 WBCS
PLATELET # BLD AUTO: 352 THOUSANDS/UL (ref 149–390)
PMV BLD AUTO: 10.6 FL (ref 8.9–12.7)
RBC # BLD AUTO: 3.78 MILLION/UL (ref 3.81–5.12)
TIBC SERPL-MCNC: 404 UG/DL (ref 250–450)
UIBC SERPL-MCNC: 218 UG/DL (ref 155–355)
WBC # BLD AUTO: 4.89 THOUSAND/UL (ref 4.31–10.16)

## 2024-11-29 PROCEDURE — 83540 ASSAY OF IRON: CPT

## 2024-11-29 PROCEDURE — 82728 ASSAY OF FERRITIN: CPT

## 2024-11-29 PROCEDURE — 85025 COMPLETE CBC W/AUTO DIFF WBC: CPT

## 2024-11-29 PROCEDURE — 83550 IRON BINDING TEST: CPT

## 2024-12-02 ENCOUNTER — ANTICOAG VISIT (OUTPATIENT)
Dept: CARDIOLOGY CLINIC | Facility: CLINIC | Age: 84
End: 2024-12-02

## 2024-12-05 ENCOUNTER — RESULTS FOLLOW-UP (OUTPATIENT)
Dept: CARDIOLOGY CLINIC | Facility: CLINIC | Age: 84
End: 2024-12-05

## 2024-12-05 ENCOUNTER — OFFICE VISIT (OUTPATIENT)
Dept: WOUND CARE | Facility: CLINIC | Age: 84
End: 2024-12-05
Payer: COMMERCIAL

## 2024-12-05 ENCOUNTER — REMOTE DEVICE CLINIC VISIT (OUTPATIENT)
Dept: CARDIOLOGY CLINIC | Facility: CLINIC | Age: 84
End: 2024-12-05

## 2024-12-05 VITALS
DIASTOLIC BLOOD PRESSURE: 58 MMHG | TEMPERATURE: 98.3 F | HEART RATE: 67 BPM | SYSTOLIC BLOOD PRESSURE: 137 MMHG | RESPIRATION RATE: 16 BRPM

## 2024-12-05 DIAGNOSIS — L85.9 HYPERKERATOSIS OF SKIN: ICD-10-CM

## 2024-12-05 DIAGNOSIS — I87.331 CHRONIC VENOUS HYPERTENSION (IDIOPATHIC) WITH ULCER AND INFLAMMATION OF RIGHT LOWER EXTREMITY (HCC): Primary | ICD-10-CM

## 2024-12-05 DIAGNOSIS — I48.91 ATRIAL FIBRILLATION, UNSPECIFIED TYPE (HCC): Primary | ICD-10-CM

## 2024-12-05 DIAGNOSIS — I44.2 CHB (COMPLETE HEART BLOCK) (HCC): ICD-10-CM

## 2024-12-05 PROCEDURE — 97597 DBRDMT OPN WND 1ST 20 CM/<: CPT | Performed by: ORTHOPAEDIC SURGERY

## 2024-12-05 PROCEDURE — RECHECK: Performed by: INTERNAL MEDICINE

## 2024-12-05 RX ORDER — KETOCONAZOLE 20 MG/G
CREAM TOPICAL DAILY
Qty: 15 G | Refills: 1 | Status: SHIPPED | OUTPATIENT
Start: 2024-12-05 | End: 2024-12-05

## 2024-12-05 RX ORDER — LIDOCAINE 40 MG/G
CREAM TOPICAL ONCE
Status: COMPLETED | OUTPATIENT
Start: 2024-12-05 | End: 2024-12-05

## 2024-12-05 RX ORDER — CLOBETASOL PROPIONATE 0.5 MG/G
OINTMENT TOPICAL 2 TIMES DAILY
Qty: 30 G | Refills: 1 | Status: SHIPPED | OUTPATIENT
Start: 2024-12-05

## 2024-12-05 RX ADMIN — LIDOCAINE: 40 CREAM TOPICAL at 08:46

## 2024-12-05 NOTE — PROGRESS NOTES
Results for orders placed or performed in visit on 12/05/24   Cardiac EP device report    Narrative    S[facility] DC PM/NOT MRI CONDITIONAL  NON-BILLABLE MERLIN TRANSMISSION: BATTERY STATUS: BATTERY VOLTAGE NEARING LACIE (1.1 YRS).  WILL SCHEDULE MONTHLY BATTERY CHECKS. AP: 0%. : 98% (>40%~CHB). ALL AVAILABLE LEAD PARAMETERS WITHIN NORMAL LIMITS. 1 AMS EPISODE W/ AF IN PROGRESS (HX OF SAME). AF BURDEN: >99%. PT TAKES WARFARIN, METOPROLOL TART, ASA 81MG. EF: 40% (ECHO 8/26/24). NORMAL DEVICE FUNCTION. CH

## 2024-12-05 NOTE — PROGRESS NOTES
Wound Procedure Treatment Venous Ulcer Right Pretibial    Performed by: Marie Stewart RN  Authorized by: Vesta Garcia PA-C    Associated wounds:   Wound 10/29/23 Venous Ulcer Pretibial Right  Applied to periwound:  Antifungal (powder/cream)  Applied secondary dressing:  Gauze  Dressing secured with:  Tape and Size F

## 2024-12-05 NOTE — PATIENT INSTRUCTIONS
Orders Placed This Encounter   Procedures    Wound cleansing and dressings Venous Ulcer Right Pretibial     Elastic Tubular Stocking: Spanda F    Tubular elastic bandage: Apply from base of toes to behind the knee. Apply in AM, may remove for sleep.    Avoid prolonged standing in one place.    Elevate leg(s) above the level of the heart when sitting or as much as possible.     Standing Status:   Future     Expiration Date:   12/12/2024    Wound Procedure Treatment Venous Ulcer Right Pretibial     This order was created via procedure documentation    Wound cleansing and dressings Venous Ulcer Right Pretibial     Right Leg wound:      Wash your hands with soap and water. Remove old dressing, discard into plastic bag and place in trash. Cleanse the wound with normal saline solution prior to applying a clean dressing. Do not use tissue or cotton balls. Do not scrub the wound. Pat dry using gauze.      Shower yes, if able to use cast cover. Do not get dressing wet.      Apply Clobetasol Cream to the open wound. This is a prescription that will be called into your pharmacy.   Cover with gauze.  Secure with tape if your skin is breaking down from tape then need to wrap with Armani and put tape on Armani     Change dressing 2 times per day.     Standing Status:   Future     Expiration Date:   12/12/2024

## 2024-12-05 NOTE — PROGRESS NOTES
Patient ID: Ely Hernadez is a 84 y.o. female Date of Birth 1940       Chief Complaint   Patient presents with    Follow Up Wound Care Visit     RLE WOUND       Allergies:  Patient has no known allergies.    Diagnosis:   Diagnosis ICD-10-CM Associated Orders   1. Chronic venous hypertension (idiopathic) with ulcer and inflammation of right lower extremity (HCC)  I87.331 lidocaine (LMX) 4 % cream     Wound cleansing and dressings Venous Ulcer Right Pretibial     Wound Procedure Treatment Venous Ulcer Right Pretibial     clobetasol (TEMOVATE) 0.05 % ointment     Wound cleansing and dressings Venous Ulcer Right Pretibial     Debridement      2. Hyperkeratosis of skin  L85.9 clobetasol (TEMOVATE) 0.05 % ointment           Assessment and Plan :  Follow up evaluation of right venous ulcer with stalled healing and centralized hyperkeratosis on wound bed. r  Change wound management to Clobetasol cream twice a day, see wound orders below   Continue to wear daily compression with Tubigrip  No harsh cleansers such as alcohol, peroxide, or antibacterial soap, do not submerge in water  Can cleanse with mild soap and water or NSS at dressing changes.   F/u with vascular surgeon for PAD as scheduled for 01/14/25.  Continue frequent elevation of leg and increase exercise/walking for wound healing.  Follow-up in 2 week(s) or call sooner with questions or concerns or any signs of infection such as redness, swelling, increased/purulent drainage, fever, chills, increased severe pain.    Subjective:   6/28: Pt is an 84 y.o. female with pmhx HTN, DMII (A1c 8.7),  CKD, Pafib, CVA (10/28/23 on Coumadin/ASA 81mg) with residual deficits (left sided facial drop and leg weakness) well known to [facility] who presents for follow up evaluation of non healing RLE venous ulcer which has been present for about 2.5 years. Pt has been covering wound with a bandaid. Does not have an odor. No smoking, ETOH or drug use.  Pt denies any sob, fatigue, N/V,  CP, fever or chills.    7/12: Patient presents for followup evaluation of RLE venous ulcer. No increased pain or drainage. Pt is frustrated with chronic wound. Has been using Polymem on the wound bed and Tubigrip for compression.  Pt denies any fevers or chills.    7/25: Patient presents for followup evaluation of RLE venous ulcer. Since last visit pt was started on Bactrim for positive wound culture with abnormal 2+ Growth of Staphylococcus Aureus. The tissue exam demonstrated chronic inflammation and fibrosis. No malignancy, nor pyoderma gangrenosum were identified.  Pt noticed wound decreasing in size. Has been using Polymem on the wound bed and Tubigrip for compression.  Pt denies any fevers or chills.    8/2: Patient presents for followup evaluation of RLE venous ulcer. Pt completed Bactrim as directed. No issues. Pt states this is the best she has felt in 2 years. No fevers or chills.    8/22: Patient presents for followup evaluation of RLE venous ulcer. Pt states she has been wetting wound with mild soap and water. She noticed a scab formed and came off on dressing. In office CHEYANNE today; R 0.59, L 0.76. No fevers or chills.    9/6: Patient presents for followup evaluation of RLE venous ulcer. Pt  has been applying Polymem Ag Max on wound bed and Tubigrip for compression. Denies fevers or chills.    9/19: Patient presents for followup evaluation of RLE venous ulcer. Pt has been applying Mupirocin wound bed and Tubigrip for compression. Denies fevers or chills.    9/26: Patient presents for followup evaluation of RLE venous ulcer. No complaints. Pt has been applying Hydrofera Blue ready on wound bed and Tubigrip for compression. Denies fevers or chills.    10/10:  Patient presents for followup evaluation of RLE venous ulcer. No complaints. Pt is scheduled for vascular studies for 11/6/24. Pt has been applying Hydrofera Blue ready on wound bed and Tubigrip for compression. Denies fevers or chills.    10/31:   Patient presents for followup evaluation of RLE venous ulcer. NO issues. Pt has been applying Hydrofera Blue ready on wound bed and Tubigrip for compression. Denies fevers or chills.    11/14:  Patient presents for followup evaluation of RLE venous ulcer. RLE vascular studies reveal deep venous incompetence with an incompetent . GSV and SSV is competent. Bilateral LEADs reveal diffuse disease in the RLE femoral and popliteal arteries with a 50-75% stenosis in the common femoral artery. R CHEYANNE:  0.64 (severe disease). There is diffuse disease in the LLE femoral and popliteal arteries with a >75% stenosis of the distal superficial femoral artery.There is a 50-75% stenosis of the proximal and mid superficial femoral arteries. Left CHEYANNE: 0.69 (moderate disease). Pt has scheduled a vascular surgery appointment for January 14, 2025. Pt has been applying Hydrofera Blue ready on wound bed and Tubigrip for compression. Denies fevers or chills.    12/5: Patient presents for followup evaluation of RLE venous ulcer. No complaints. Pt has been applying Hydrofera Blue ready on wound bed and Tubigrip for compression. Denies fevers or chills.     The following portions of the patient's history were reviewed and updated as appropriate:   Patient Active Problem List   Diagnosis    Hyperlipidemia    Chronic anticoagulation    Hypertension, essential    Coronary artery disease of native artery of native heart with stable angina pectoris (HCC)    Simple chronic bronchitis (HCC)    Diabetes mellitus with neurological manifestation (HCC)    Hypothyroidism    GERD (gastroesophageal reflux disease)    Anemia    AVM (arteriovenous malformation) of small bowel, acquired with hemorrhage    Angiectasia    Other vascular disorders of intestine (HCC)    Aortic valve replaced    Cardiomyopathy (HCC)    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HCC)    Stage 3b chronic kidney disease (HCC)    Chronic kidney disease-mineral and bone disorder    Primary  osteoarthritis involving multiple joints    Paroxysmal atrial fibrillation (HCC)    Neutropenia associated with infection (HCC)    Herpes simplex labialis    Restrictive lung disease    Nocturnal hypoxemia    Supratherapeutic INR    H/O mechanical aortic valve replacement    Subtherapeutic international normalized ratio (INR)    Type 2 diabetes mellitus with stage 3b chronic kidney disease, with long-term current use of insulin (HCC)    Venous ulcer of right leg (HCC)    Chronic systolic (congestive) heart failure (HCC)    Lower extremity edema    Iron deficiency anemia due to chronic blood loss    Hemiplegia and hemiparesis following cerebral infarction affecting left non-dominant side (HCC)    Chronic obstructive pulmonary disease (HCC)     Past Medical History:   Diagnosis Date    Acute anterior epistaxis 12/23/2023    Acute blood loss anemia 12/14/2023    Acute respiratory failure with hypoxia (HCC) 02/06/2024    Anemia     Aneurysm of thoracic aorta (HCC)     Angioedema 06/07/2019    Aortic valve disorder     Benign neoplasm of sigmoid colon     Cardiomyopathy (HCC)     last assessed: 10/10/2014    CHF (congestive heart failure) (HCC)     Chronic kidney disease     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HCC)     last assessed: 10/10/2014    COPD (chronic obstructive pulmonary disease) (HCC)     Diabetic nephropathy (HCC)     Disease of thyroid gland     RAMON (dyspnea on exertion)     GERD (gastroesophageal reflux disease)     History of emphysema (HCC)     History of transfusion     Hyperlipidemia     Hypertensive urgency 10/28/2023    Leg cramps 11/01/2023    Stroke-like symptoms 10/28/2023    TIA (transient ischemic attack)      Past Surgical History:   Procedure Laterality Date    AORTIC VALVE REPLACEMENT      CARDIAC PACEMAKER PLACEMENT      last assessed: 10/10/2014    CARDIAC SURGERY      aortic valve replacement    CHOLECYSTECTOMY      COLONOSCOPY      EGD      HYSTERECTOMY      INSERT / REPLACE / REMOVE  PACEMAKER      KNEE SURGERY Right     OTHER SURGICAL HISTORY      Capsule ENDOscopy description: 2012    MD COLONOSCOPY FLX DX W/COLLJ SPEC WHEN PFRMD N/A 2018    Procedure: single balloon ENTEROSCOPY;  Surgeon: David Dennis MD;  Location:  GI LAB;  Service: Gastroenterology    MD ESOPHAGOGASTRODUODENOSCOPY TRANSORAL DIAGNOSTIC N/A 2017    Procedure: EGD AND COLONOSCOPY;  Surgeon: Jay Mcleod III, MD;  Location: [facility];  Service: Gastroenterology     Family History   Problem Relation Age of Onset    No Known Problems Mother     No Known Problems Father      Social History     Socioeconomic History    Marital status:      Spouse name: None    Number of children: None    Years of education: None    Highest education level: None   Occupational History    None   Tobacco Use    Smoking status: Former     Current packs/day: 0.00     Average packs/day: 1 pack/day for 52.9 years (52.9 ttl pk-yrs)     Types: Cigarettes     Start date:      Quit date: 2007     Years since quittin.0     Passive exposure: Past    Smokeless tobacco: Former     Quit date:    Vaping Use    Vaping status: Never Used   Substance and Sexual Activity    Alcohol use: Never    Drug use: Never    Sexual activity: Not Currently     Partners: Male   Other Topics Concern    None   Social History Narrative    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 BID Freestyle lancets BID    Living independently with spouse     Social Drivers of Health     Financial Resource Strain: Not on file   Food Insecurity: No Food Insecurity (5/15/2024)    Nursing - Inadequate Food Risk Classification     Worried About Running Out of Food in the Last Year: Never true     Ran Out of Food in the Last Year: Never true     Ran Out of Food in the Last Year: Not on file   Transportation Needs: No Transportation Needs (5/15/2024)    PRAPARE - Transportation     Lack of Transportation (Medical): No     Lack of  Transportation (Non-Medical): No   Physical Activity: Inactive (5/26/2021)    Exercise Vital Sign     Days of Exercise per Week: 0 days     Minutes of Exercise per Session: 0 min   Stress: No Stress Concern Present (5/26/2021)    [employer] [place] [employer] - Occupational Stress Questionnaire     Feeling of Stress : Not at all   Social Connections: Not on file   Intimate Partner Violence: Not on file   Housing Stability: Low Risk  (5/15/2024)    Housing Stability Vital Sign     Unable to Pay for Housing in the Last Year: No     Number of Times Moved in the Last Year: 1     Homeless in the Last Year: No       Current Outpatient Medications:     clobetasol (TEMOVATE) 0.05 % ointment, Apply topically 2 (two) times a day, Disp: 30 g, Rfl: 1    Accu-Chek FastClix Lancets MISC, Use 3 (three) times a day, Disp: 300 each, Rfl: 3    albuterol (Ventolin HFA) 90 mcg/act inhaler, Inhale 2 puffs every 6 (six) hours as needed for wheezing, Disp: 54 g, Rfl: 3    Ascorbic Acid (vitamin C) 1000 MG tablet, Take 1,000 mg by mouth daily, Disp: , Rfl:     aspirin (ECOTRIN LOW STRENGTH) 81 mg EC tablet, Take 1 tablet (81 mg total) by mouth daily Do not start before November 1, 2023., Disp: 30 tablet, Rfl: 0    b complex vitamins capsule, Take 1 capsule by mouth daily, Disp: 30 capsule, Rfl: 2    Blood Glucose Monitoring Suppl (Accu-Chek Guide Me) w/Device KIT, USE 3 TIMES DAILY, Disp: 1 kit, Rfl: 2    Cholecalciferol (Vitamin D3) 50 MCG (2000 UT) TABS, Take 2,000 Units by mouth daily, Disp: , Rfl:     Empagliflozin (Jardiance) 10 MG TABS tablet, Take 1 tablet (10 mg total) by mouth in the morning, Disp: 90 tablet, Rfl: 1    enoxaparin (LOVENOX) 80 mg/0.8 mL, Inject 0.7 mL (70 mg total) under the skin every 24 hours Hold Warfarin 5 days prior. Last dose is 9/6 Start Lovenox on 9/8, 4 days prior to the procedure. No Lovenox the morning of the procedure. Resume both Lovenox and Warfarin at the discretion of the surgeon  (preferably that evening) and check INR 4 days after restarting. Inject in the abdomen about 2 inches from the belly button and rotate sides at each injection. (Patient not taking: Reported on 11/26/2024), Disp: 0.7 mL, Rfl: 10    Ferrous Sulfate (IRON PO), Take by mouth daily in the early morning OTC, Disp: , Rfl:     furosemide (LASIX) 40 mg tablet, Take 1 tablet (40 mg total) by mouth 2 (two) times a day, Disp: 180 tablet, Rfl: 3    gabapentin (NEURONTIN) 300 mg capsule, Take 1 capsule (300 mg total) by mouth 3 (three) times a day, Disp: 270 capsule, Rfl: 3    glipiZIDE (GLUCOTROL) 10 mg tablet, Take 1 tablet (10 mg total) by mouth 2 (two) times a day before meals, Disp: 180 tablet, Rfl: 3    glucose blood (Accu-Chek Celeste Plus) test strip, Use 1 each 3 (three) times a day Use as instructed, Disp: 300 each, Rfl: 3    insulin degludec (TRESIBA) 100 units/mL injection pen, Inject 30 Units under the skin daily, Disp: 30 mL, Rfl: 3    Insulin Pen Needle (BD Pen Needle Rosie U/F) 32G X 4 MM MISC, Use daily, Disp: 100 each, Rfl: 1    ipratropium-albuterol (DUO-NEB) 0.5-2.5 mg/3 mL nebulizer solution, inhale contents of 1 vial ( 3 milliliters ) in nebulizer four times a day, Disp: , Rfl:     Lancets (freestyle) lancets, Check blood glucose 3 times daily, Disp: 300 each, Rfl: 0    levothyroxine 50 mcg tablet, Take 1 tablet (50 mcg total) by mouth daily, Disp: 90 tablet, Rfl: 3    metoprolol tartrate (LOPRESSOR) 50 mg tablet, TAKE ONE-HALF TABLET BY  MOUTH TWICE DAILY, Disp: 90 tablet, Rfl: 3    mupirocin (BACTROBAN) 2 % ointment, Apply topically daily, Disp: 22 g, Rfl: 0    oxygen gas, Inhale 2 L/min daily at bedtime as needed home oxygen nightly, Disp: , Rfl:     pantoprazole (PROTONIX) 40 mg tablet, TAKE 1 TABLET BY MOUTH DAILY AS  DIRECTED, Disp: 100 tablet, Rfl: 1    warfarin (COUMADIN) 5 mg tablet, TAKE 1 TO 2 TABLETS BY  MOUTH DAILY OR AS DIRECTED, Disp: 180 tablet, Rfl: 3    Current Facility-Administered  Medications:     lidocaine (LMX) 4 % cream, , Topical, Once, Vesta Garcia PA-C    Review of Systems   Constitutional:  Negative for appetite change, chills, fatigue, fever and unexpected weight change.   HENT:  Negative for congestion, hearing loss and postnasal drip.    Respiratory:  Negative for cough and shortness of breath.    Cardiovascular:  Positive for leg swelling.   Musculoskeletal:  Positive for gait problem.   Skin:  Positive for wound (RLE). Negative for rash.   Neurological:  Negative for numbness.   Hematological:  Does not bruise/bleed easily.   Psychiatric/Behavioral: Negative.           Objective:  /58   Pulse 67   Temp 98.3 °F (36.8 °C)   Resp 16   Pain Score: 0-No pain     Physical Exam  Vitals reviewed.   Constitutional:       General: She is not in acute distress.     Appearance: Normal appearance. She is well-developed and normal weight.   HENT:      Head: Normocephalic and atraumatic.   Cardiovascular:      Rate and Rhythm: Normal rate.   Pulmonary:      Effort: Pulmonary effort is normal.   Musculoskeletal:         General: No deformity.      Right lower leg: Edema present.      Left lower leg: No edema.   Skin:     General: Skin is warm and dry.      Findings: Wound (RLE) present.             Comments: Centralized hyperkeratotic tissue with exudate on pink granulated wound bed and serosanguinous drainage. See wound assessment   Neurological:      General: No focal deficit present.      Mental Status: She is alert and oriented to person, place, and time.      Gait: Gait normal.   Psychiatric:         Mood and Affect: Mood and affect normal.         Behavior: Behavior normal. Behavior is cooperative.         Wound 10/29/23 Venous Ulcer Pretibial Right (Active)   Wound Image Images linked 12/05/24 0843   Wound Description Epithelialization;Pink;Granulation tissue 12/05/24 0843   Radha-wound Assessment Fragile;Scar Tissue;Maceration 12/05/24 0843   Wound Length (cm) 2 cm 12/05/24 0843  "  Wound Width (cm) 1.1 cm 12/05/24 0843   Wound Depth (cm) 0.1 cm 12/05/24 0843   Wound Surface Area (cm^2) 2.2 cm^2 12/05/24 0843   Wound Volume (cm^3) 0.22 cm^3 12/05/24 0843   Calculated Wound Volume (cm^3) 0.22 cm^3 12/05/24 0843   Change in Wound Size % 94.5 12/05/24 0843   Drainage Amount Small 12/05/24 0843   Drainage Description Serosanguineous 12/05/24 0843   Non-staged Wound Description Full thickness 12/05/24 0843   Dressing Status Intact 12/05/24 0843       Debridement   Wound 10/29/23 Venous Ulcer Pretibial Right    Universal Protocol:  procedure performed by consultantConsent: Verbal consent obtained. Written consent obtained.  Risks and benefits: risks, benefits and alternatives were discussed  Consent given by: patient  Time out: Immediately prior to procedure a \"time out\" was called to verify the correct patient, procedure, equipment, support staff and site/side marked as required.  Patient understanding: patient states understanding of the procedure being performed  Patient identity confirmed: verbally with patient    Debridement Details  Performed by: PA  Debridement type: selective  Pain control: lidocaine 4%      Post-debridement measurements  Length (cm): 2  Width (cm): 1.1  Depth (cm): 0.1  Percent debrided: 100%  Surface Area (cm^2): 2.2  Area Debrided (cm^2): 2.2  Volume (cm^3): 0.22    Devitalized tissue debrided: exudate  Instrument(s) utilized: curette  Bleeding: small  Hemostasis obtained with: pressure  Procedural pain (0-10): 0  Post-procedural pain: 0   Response to treatment: procedure was tolerated well           Wound Instructions:  Orders Placed This Encounter   Procedures    Wound cleansing and dressings Venous Ulcer Right Pretibial     Elastic Tubular Stocking: Spanda F    Tubular elastic bandage: Apply from base of toes to behind the knee. Apply in AM, may remove for sleep.    Avoid prolonged standing in one place.    Elevate leg(s) above the level of the heart when sitting or " "as much as possible.     Standing Status:   Future     Expiration Date:   12/12/2024    Wound Procedure Treatment Venous Ulcer Right Pretibial     This order was created via procedure documentation    Wound cleansing and dressings Venous Ulcer Right Pretibial     Right Leg wound:      Wash your hands with soap and water. Remove old dressing, discard into plastic bag and place in trash. Cleanse the wound with normal saline solution prior to applying a clean dressing. Do not use tissue or cotton balls. Do not scrub the wound. Pat dry using gauze.      Shower yes, if able to use cast cover. Do not get dressing wet.      Apply Clobetasol Cream to the open wound. This is a prescription that will be called into your pharmacy.   Cover with gauze.  Secure with tape if your skin is breaking down from tape then need to wrap with Armani and put tape on Armani     Change dressing 2 times per day.     Standing Status:   Future     Expiration Date:   12/12/2024    Debridement     This order was created via procedure documentation       Vesta Garcia PA-C, [facility]      Portions of the record may have been created with voice recognition software. Occasional wrong word or \"sound alike\" substitutions may have occurred due to the inherent limitations of voice recognition software. Read the chart carefully and recognize, using context, where substitutions have occurred.      "

## 2024-12-06 ENCOUNTER — OFFICE VISIT (OUTPATIENT)
Dept: HEMATOLOGY ONCOLOGY | Facility: CLINIC | Age: 84
End: 2024-12-06
Payer: COMMERCIAL

## 2024-12-06 ENCOUNTER — TELEPHONE (OUTPATIENT)
Dept: HEMATOLOGY ONCOLOGY | Facility: CLINIC | Age: 84
End: 2024-12-06

## 2024-12-06 VITALS
TEMPERATURE: 96.8 F | DIASTOLIC BLOOD PRESSURE: 68 MMHG | HEART RATE: 60 BPM | WEIGHT: 162.5 LBS | HEIGHT: 66 IN | BODY MASS INDEX: 26.12 KG/M2 | SYSTOLIC BLOOD PRESSURE: 142 MMHG | OXYGEN SATURATION: 95 % | RESPIRATION RATE: 14 BRPM

## 2024-12-06 DIAGNOSIS — D50.0 IRON DEFICIENCY ANEMIA DUE TO CHRONIC BLOOD LOSS: Primary | ICD-10-CM

## 2024-12-06 DIAGNOSIS — E53.8 B12 DEFICIENCY: ICD-10-CM

## 2024-12-06 PROCEDURE — 99214 OFFICE O/P EST MOD 30 MIN: CPT | Performed by: PHYSICIAN ASSISTANT

## 2024-12-06 NOTE — PATIENT INSTRUCTIONS
IV iron weekly x 2 sessions   Repeat blood count in 3 weeks   Follow up in 8 weeks with repeat iron panel and blood counts prior

## 2024-12-06 NOTE — PROGRESS NOTES
[facility]AT[facility] [place]  [address] [address]  Hematology Ambulatory Follow-Up  Ely Hernadez, 1940, [id number]  12/6/2024      Assessment and Plan   1. Iron deficiency anemia due to chronic blood loss (Primary)  - CBC and differential; Future  - Iron Panel (Includes Ferritin, Iron Sat%, Iron, and TIBC); Future    84-year-old female with history of mechanical valve on Coumadin who presents as follow-up for iron deficiency anemia secondary to probable GI bleeding.  In 2023 she was admitted for hemoglobin of 5.6 requiring PRBC transfusion and IV iron therapy.  At that time her EGD/colonoscopy was unremarkable however she has had several repeat push endoscopy since which have shown multiple angioectasias that have been treated.  She has been receiving IV iron intermittently.  The last received IV Venofer 1200 mg in the fall.  She is taking oral iron daily.  Has black stools while on oral iron.  Most recent labs -> Hgb 11.3 g/dL, MCV 96, platelets 352,000.  Iron 186 iron saturation 46, ferritin 32.    RHEA  Reticulocyte count was appropriately high when last checked.  We will repeat with next blood draw.  She has persistent iron deficiency anemia with low ferritin stores despite IV iron therapy approximately 3 months ago. Suspect slow, ongoing GI blood loss given her history of angioectasias and chronic anticoagulation. She likely will have chronic issues with GIB for these reasons.   For now we will repeat her CBC q 2weeks and set her up for IV Iron therapy.  She will need monthly CBC in the future.  Communication sent to her GI NICOL.     2. B12 deficiency   Last B12 level in 200s 12/2023   Check B12, folate with next lab draw     3. Hx of mechanical aortic valve replacement  On chronic Coumadin, goal INR 2.5-3.5  INR management by cardiology     RTC 2m with repeat CBC, Iron panel prior. She was advised to call our office if she experiences  any increasing shortness of breath, dizziness, lightheadedness, palpitations, paleness as this is an indication her anemia may be worse.    Patient voiced agreement and understanding to the above.   Patient advised to call the Hematology/Oncology office with any questions and concerns regarding the above.    Barrier(s) to care: None  The patient is able to self care.    Staci Contreras PA-C   Medical Oncology/Hematology  [facility]    Subjective     Chief Complaint   Patient presents with    Follow-up       History of present illness:  84-year-old female with past medical history of stroke, heart failure, A-fib, diabetes, COPD, aortic valve replacement in 2007 on Coumadin, and CKD stage III who presents as a new consult for anemia.     Patient endorses history of iron deficiency anemia many years ago.  She has never seen a hematologist or had issues with anemia during childhood. She reports she had GI workup at this time including video capsule endoscopy that was unrevealing of any bleeding source.  She was prescribed oral iron pills which she has been taking for many years that have recently been stopped by her nephrologist approximately 1 year ago.     On chart review back to 05/2019, hgb baseline was around 12-14g/dL until she's was hospitalized in December 2023 for symptomatic anemia, hemoglobin at this time was 5.6.  She received 4 unit PRBC and one-time dose of IV Venofer 300 mg during hospitalization.  EGD with push/colonoscopy were performed which did not identify bleeding source.  Patient was recommended to have video capsule endoscopy as an outpatient however she has not seen GI in the clinic since her discharge.     Patient went to the emergency room this past week on 03/08 for symptomatic anemia.  Hemoglobin at this time was 7.1.  She was given 1 unit PRBC.      Former smoker, quit in 2007.  She denies alcohol or drug use.  Patient is retired, previously worked in a factory.   No personal family history of cancer.     Labs  03/2024: Hgb 7.8, MCV 74, platelets 415,000.  Ferritin 17, iron saturation 13%, TIBC normal. Retic 2.63%.        03/2024-04/2024: IV Venofer 300mg weekly x 4  05/2024: Hgb 12.4, MCV 86, platelets 383,000. No iron panel. Called patient to review lab results. She has recurrent anemia after IV venofer infusions. Suspect she has ongoing blood loss, probable GI source. Recently had APC by EGD to duodenla and jejunal lesions. She denies gross vaginal bleeding, melena, hematochezia or hematuria.   07/2024: WBC 5.7, Hgb 12.2, MCV 89, ,000.  Iron sat 20%, UIBC nromals. Ferritin 122.  08/2024: WBC 5.66, hemoglobin 11.4, MCV 94, iron 195, iron saturation 51%, ferritin 34  09/2024: IV Venofer 300mg x4   09/2024: WBC 4.92, hemoglobin 14.1, MCV 94, platelets 252,000  11/2024: WBC 4.89, hemoglobin 11.3, MCV 96, platelets 352,000. Iron 196, iron saturation 46%, UIBC normal, ferritin 32.    Procedures  12/2023:  EGD w push: No AVMs  12/2023: Colonoscopy: Diverticulosis of moderate severity causing moderate luminal narrowing in the sigmoid colon. 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  The symptoms were completely resolved after IV iron previously and endoscopy.  She is having black stools which is normal for her while on oral iron supplements.  No other bleeding is observed.  She denies lightheadedness,  dizziness, chest pain or abdominal pain.    Review of Systems   Constitutional:  Negative for fatigue, fever and unexpected weight change.   Respiratory:  Positive for shortness of breath.    Gastrointestinal:  Negative for blood in stool.   Genitourinary:  Negative for hematuria and vaginal bleeding.   Neurological:  Negative for dizziness and light-headedness.   All other systems reviewed and are negative.      Patient Active Problem List   Diagnosis    Hyperlipidemia    Chronic anticoagulation    Hypertension, essential    Coronary artery disease of native artery of native heart with stable angina pectoris (HCC)    Simple chronic bronchitis (HCC)    Diabetes mellitus with neurological manifestation (HCC)    Hypothyroidism    GERD (gastroesophageal reflux disease)    Anemia    AVM (arteriovenous malformation) of small bowel, acquired with hemorrhage    Angiectasia    Other vascular disorders of intestine (HCC)    Aortic valve replaced    Cardiomyopathy (HCC)    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HCC)    Stage 3b chronic kidney disease (HCC)    Chronic kidney disease-mineral and bone disorder    Primary osteoarthritis involving multiple joints    Paroxysmal atrial fibrillation (HCC)    Neutropenia associated with infection (HCC)    Herpes simplex labialis    Restrictive lung disease    Nocturnal hypoxemia    Supratherapeutic INR    H/O mechanical aortic valve replacement    Subtherapeutic international normalized ratio (INR)    Type 2 diabetes mellitus with stage 3b chronic kidney disease, with long-term current use of insulin (HCC)    Venous ulcer of right leg (HCC)    Chronic systolic (congestive) heart failure (HCC)    Lower extremity edema    Iron deficiency anemia due to chronic blood loss    Hemiplegia and hemiparesis following cerebral infarction affecting left non-dominant side (HCC)    Chronic obstructive pulmonary disease (HCC)     Past Medical History:   Diagnosis Date    Acute anterior epistaxis 12/23/2023     Acute blood loss anemia 12/14/2023    Acute respiratory failure with hypoxia (HCC) 02/06/2024    Anemia     Aneurysm of thoracic aorta (HCC)     Angioedema 06/07/2019    Aortic valve disorder     Benign neoplasm of sigmoid colon     Cardiomyopathy (HCC)     last assessed: 10/10/2014    CHF (congestive heart failure) (HCC)     Chronic kidney disease     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HCC)     last assessed: 10/10/2014    COPD (chronic obstructive pulmonary disease) (HCC)     Diabetic nephropathy (HCC)     Disease of thyroid gland     RAMON (dyspnea on exertion)     GERD (gastroesophageal reflux disease)     History of emphysema (HCC)     History of transfusion     Hyperlipidemia     Hypertensive urgency 10/28/2023    Leg cramps 11/01/2023    Stroke-like symptoms 10/28/2023    TIA (transient ischemic attack)      Past Surgical History:   Procedure Laterality Date    AORTIC VALVE REPLACEMENT      CARDIAC PACEMAKER PLACEMENT      last assessed: 10/10/2014    CARDIAC SURGERY      aortic valve replacement    CHOLECYSTECTOMY      COLONOSCOPY      EGD      HYSTERECTOMY      INSERT / REPLACE / REMOVE PACEMAKER      KNEE SURGERY Right     OTHER SURGICAL HISTORY      Capsule ENDOscopy description: 12/19/2012    WI COLONOSCOPY FLX DX W/COLLJ SPEC WHEN PFRMD N/A 05/31/2018    Procedure: single balloon ENTEROSCOPY;  Surgeon: David Dennis MD;  Location: [facility];  Service: Gastroenterology    WI ESOPHAGOGASTRODUODENOSCOPY TRANSORAL DIAGNOSTIC N/A 11/29/2017    Procedure: EGD AND COLONOSCOPY;  Surgeon: Jay Mcleod III, MD;  Location: [facility];  Service: Gastroenterology     Family History   Problem Relation Age of Onset    No Known Problems Mother     No Known Problems Father      Social History     Socioeconomic History    Marital status:      Spouse name: None    Number of children: None    Years of education: None    Highest education level: None   Occupational History    None   Tobacco Use    Smoking status:  Former     Current packs/day: 0.00     Average packs/day: 1 pack/day for 52.9 years (52.9 ttl pk-yrs)     Types: Cigarettes     Start date:      Quit date: 2007     Years since quittin.0     Passive exposure: Past    Smokeless tobacco: Former     Quit date:    Vaping Use    Vaping status: Never Used   Substance and Sexual Activity    Alcohol use: Never    Drug use: Never    Sexual activity: Not Currently     Partners: Male   Other Topics Concern    None   Social History Narrative    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 BID Freestyle lancets BID    Living independently with spouse     Social Drivers of Health     Financial Resource Strain: Not on file   Food Insecurity: No Food Insecurity (5/15/2024)    Nursing - Inadequate Food Risk Classification     Worried About Running Out of Food in the Last Year: Never true     Ran Out of Food in the Last Year: Never true     Ran Out of Food in the Last Year: Not on file   Transportation Needs: No Transportation Needs (5/15/2024)    PRAPARE - Transportation     Lack of Transportation (Medical): No     Lack of Transportation (Non-Medical): No   Physical Activity: Inactive (2021)    Exercise Vital Sign     Days of Exercise per Week: 0 days     Minutes of Exercise per Session: 0 min   Stress: No Stress Concern Present (2021)    [place] [employer] - Occupational Stress Questionnaire     Feeling of Stress : Not at all   Social Connections: Not on file   Intimate Partner Violence: Not on file   Housing Stability: Low Risk  (5/15/2024)    Housing Stability Vital Sign     Unable to Pay for Housing in the Last Year: No     Number of Times Moved in the Last Year: 1     Homeless in the Last Year: No       Current Outpatient Medications:     Accu-Chek FastClix Lancets MISC, Use 3 (three) times a day, Disp: 300 each, Rfl: 3    albuterol (Ventolin HFA) 90 mcg/act inhaler, Inhale 2 puffs every 6 (six) hours as needed for  wheezing, Disp: 54 g, Rfl: 3    Ascorbic Acid (vitamin C) 1000 MG tablet, Take 1,000 mg by mouth daily, Disp: , Rfl:     aspirin (ECOTRIN LOW STRENGTH) 81 mg EC tablet, Take 1 tablet (81 mg total) by mouth daily Do not start before November 1, 2023., Disp: 30 tablet, Rfl: 0    b complex vitamins capsule, Take 1 capsule by mouth daily, Disp: 30 capsule, Rfl: 2    Blood Glucose Monitoring Suppl (Accu-Chek Guide Me) w/Device KIT, USE 3 TIMES DAILY, Disp: 1 kit, Rfl: 2    Cholecalciferol (Vitamin D3) 50 MCG (2000 UT) TABS, Take 2,000 Units by mouth daily, Disp: , Rfl:     clobetasol (TEMOVATE) 0.05 % ointment, Apply topically 2 (two) times a day, Disp: 30 g, Rfl: 1    Empagliflozin (Jardiance) 10 MG TABS tablet, Take 1 tablet (10 mg total) by mouth in the morning, Disp: 90 tablet, Rfl: 1    enoxaparin (LOVENOX) 80 mg/0.8 mL, Inject 0.7 mL (70 mg total) under the skin every 24 hours Hold Warfarin 5 days prior. Last dose is 9/6 Start Lovenox on 9/8, 4 days prior to the procedure. No Lovenox the morning of the procedure. Resume both Lovenox and Warfarin at the discretion of the surgeon (preferably that evening) and check INR 4 days after restarting. Inject in the abdomen about 2 inches from the belly button and rotate sides at each injection. (Patient not taking: Reported on 11/26/2024), Disp: 0.7 mL, Rfl: 10    Ferrous Sulfate (IRON PO), Take by mouth daily in the early morning OTC, Disp: , Rfl:     furosemide (LASIX) 40 mg tablet, Take 1 tablet (40 mg total) by mouth 2 (two) times a day, Disp: 180 tablet, Rfl: 3    gabapentin (NEURONTIN) 300 mg capsule, Take 1 capsule (300 mg total) by mouth 3 (three) times a day, Disp: 270 capsule, Rfl: 3    glipiZIDE (GLUCOTROL) 10 mg tablet, Take 1 tablet (10 mg total) by mouth 2 (two) times a day before meals, Disp: 180 tablet, Rfl: 3    glucose blood (Accu-Chek Celeste Plus) test strip, Use 1 each 3 (three) times a day Use as instructed, Disp: 300 each, Rfl: 3    insulin degludec  "(TRESIBA) 100 units/mL injection pen, Inject 30 Units under the skin daily, Disp: 30 mL, Rfl: 3    Insulin Pen Needle (BD Pen Needle Rosie U/F) 32G X 4 MM MISC, Use daily, Disp: 100 each, Rfl: 1    ipratropium-albuterol (DUO-NEB) 0.5-2.5 mg/3 mL nebulizer solution, inhale contents of 1 vial ( 3 milliliters ) in nebulizer four times a day, Disp: , Rfl:     Lancets (freestyle) lancets, Check blood glucose 3 times daily, Disp: 300 each, Rfl: 0    levothyroxine 50 mcg tablet, Take 1 tablet (50 mcg total) by mouth daily, Disp: 90 tablet, Rfl: 3    metoprolol tartrate (LOPRESSOR) 50 mg tablet, TAKE ONE-HALF TABLET BY  MOUTH TWICE DAILY, Disp: 90 tablet, Rfl: 3    mupirocin (BACTROBAN) 2 % ointment, Apply topically daily, Disp: 22 g, Rfl: 0    oxygen gas, Inhale 2 L/min daily at bedtime as needed home oxygen nightly, Disp: , Rfl:     pantoprazole (PROTONIX) 40 mg tablet, TAKE 1 TABLET BY MOUTH DAILY AS  DIRECTED, Disp: 100 tablet, Rfl: 1    warfarin (COUMADIN) 5 mg tablet, TAKE 1 TO 2 TABLETS BY  MOUTH DAILY OR AS DIRECTED, Disp: 180 tablet, Rfl: 3    Current Facility-Administered Medications:     lidocaine (LMX) 4 % cream, , Topical, Once, Vesta Garcia PA-C  No Known Allergies    Objective   /68 (BP Location: Left arm, Patient Position: Sitting, Cuff Size: Adult)   Pulse 60   Temp (!) 96.8 °F (36 °C) (Temporal)   Resp 14   Ht 5' 6\" (1.676 m)   Wt 73.7 kg (162 lb 8 oz)   SpO2 95%   BMI 26.23 kg/m²    Physical Exam  Vitals reviewed.   HENT:      Head: Normocephalic.   Cardiovascular:      Rate and Rhythm: Normal rate and regular rhythm.   Pulmonary:      Effort: Pulmonary effort is normal.      Breath sounds: Normal breath sounds. No wheezing or rales.   Abdominal:      Palpations: Abdomen is soft.      Tenderness: There is no abdominal tenderness.   Musculoskeletal:      Cervical back: Neck supple.   Skin:     Findings: No rash.   Neurological:      Mental Status: She is alert.         Result " Review  Labs:  Appointment on 11/29/2024   Component Date Value Ref Range Status    WBC 11/29/2024 4.89  4.31 - 10.16 Thousand/uL Final    RBC 11/29/2024 3.78 (L)  3.81 - 5.12 Million/uL Final    Hemoglobin 11/29/2024 11.3 (L)  11.5 - 15.4 g/dL Final    Hematocrit 11/29/2024 36.2  34.8 - 46.1 % Final    MCV 11/29/2024 96  82 - 98 fL Final    MCH 11/29/2024 29.9  26.8 - 34.3 pg Final    MCHC 11/29/2024 31.2 (L)  31.4 - 37.4 g/dL Final    RDW 11/29/2024 15.9 (H)  11.6 - 15.1 % Final    MPV 11/29/2024 10.6  8.9 - 12.7 fL Final    Platelets 11/29/2024 352  149 - 390 Thousands/uL Final    nRBC 11/29/2024 0  /100 WBCs Final    Segmented % 11/29/2024 62  43 - 75 % Final    Immature Grans % 11/29/2024 0  0 - 2 % Final    Lymphocytes % 11/29/2024 24  14 - 44 % Final    Monocytes % 11/29/2024 10  4 - 12 % Final    Eosinophils Relative 11/29/2024 3  0 - 6 % Final    Basophils Relative 11/29/2024 1  0 - 1 % Final    Absolute Neutrophils 11/29/2024 3.04  1.85 - 7.62 Thousands/µL Final    Absolute Immature Grans 11/29/2024 0.02  0.00 - 0.20 Thousand/uL Final    Absolute Lymphocytes 11/29/2024 1.19  0.60 - 4.47 Thousands/µL Final    Absolute Monocytes 11/29/2024 0.49  0.17 - 1.22 Thousand/µL Final    Eosinophils Absolute 11/29/2024 0.12  0.00 - 0.61 Thousand/µL Final    Basophils Absolute 11/29/2024 0.03  0.00 - 0.10 Thousands/µL Final    Iron Saturation 11/29/2024 46  15 - 50 % Final    TIBC 11/29/2024 404  250 - 450 ug/dL Final    Iron 11/29/2024 186  50 - 212 ug/dL Final    Patients treated with metal-binding drugs (ie. Deferoxamine) may have depressed iron values.    UIBC 11/29/2024 218  155 - 355 ug/dL Final    Ferritin 11/29/2024 32  11 - 307 ng/mL Final   Ancillary Orders on 11/22/2024   Component Date Value Ref Range Status    Protime 11/29/2024 26.8 (H)  12.3 - 15.0 seconds Final    INR 11/29/2024 2.49 (H)  0.85 - 1.19 Final   Appointment on 11/15/2024   Component Date Value Ref Range Status    Creatinine, Ur 11/15/2024  133.5  Reference range not established. mg/dL Final    Albumin,U,Random 11/15/2024 44.0 (H)  <20.0 mg/L Final    Albumin Creat Ratio 11/15/2024 33 (H)  0 - 30 mg/g creatinine Final   Office Visit on 11/15/2024   Component Date Value Ref Range Status    Hemoglobin A1C 11/15/2024 7.6 (H)  Normal 4.0-5.6%; PreDiabetic 5.7-6.4%; Diabetic >=6.5%; Glycemic control for adults with diabetes <7.0% % Final    EAG 11/15/2024 171  mg/dl Final    Sodium 11/15/2024 139  135 - 147 mmol/L Final    Potassium 11/15/2024 4.4  3.5 - 5.3 mmol/L Final    Chloride 11/15/2024 100  96 - 108 mmol/L Final    CO2 11/15/2024 30  21 - 32 mmol/L Final    ANION GAP 11/15/2024 9  4 - 13 mmol/L Final    BUN 11/15/2024 32 (H)  5 - 25 mg/dL Final    Creatinine 11/15/2024 1.34 (H)  0.60 - 1.30 mg/dL Final    Standardized to IDMS reference method    Glucose 11/15/2024 235 (H)  65 - 140 mg/dL Final    If the patient is fasting, the ADA then defines impaired fasting glucose as > 100 mg/dL and diabetes as > or equal to 123 mg/dL.    Calcium 11/15/2024 8.8  8.4 - 10.2 mg/dL Final    AST 11/15/2024 20  13 - 39 U/L Final    ALT 11/15/2024 15  7 - 52 U/L Final    Specimen collection should occur prior to Sulfasalazine administration due to the potential for falsely depressed results.     Alkaline Phosphatase 11/15/2024 92  34 - 104 U/L Final    Total Protein 11/15/2024 6.8  6.4 - 8.4 g/dL Final    Albumin 11/15/2024 4.2  3.5 - 5.0 g/dL Final    Total Bilirubin 11/15/2024 0.31  0.20 - 1.00 mg/dL Final    Use of this assay is not recommended for patients undergoing treatment with eltrombopag due to the potential for falsely elevated results.  N-acetyl-p-benzoquinone imine (metabolite of Acetaminophen) will generate erroneously low results in samples for patients that have taken an overdose of Acetaminophen.    eGFR 11/15/2024 36  ml/min/1.73sq m Final    WBC 11/15/2024 5.63  4.31 - 10.16 Thousand/uL Final    RBC 11/15/2024 3.53 (L)  3.81 - 5.12 Million/uL Final     Hemoglobin 11/15/2024 10.7 (L)  11.5 - 15.4 g/dL Final    Hematocrit 11/15/2024 34.6 (L)  34.8 - 46.1 % Final    MCV 11/15/2024 98  82 - 98 fL Final    MCH 11/15/2024 30.3  26.8 - 34.3 pg Final    MCHC 11/15/2024 30.9 (L)  31.4 - 37.4 g/dL Final    RDW 11/15/2024 15.9 (H)  11.6 - 15.1 % Final    MPV 11/15/2024 11.1  8.9 - 12.7 fL Final    Platelets 11/15/2024 321  149 - 390 Thousands/uL Final    nRBC 11/15/2024 0  /100 WBCs Final    Segmented % 11/15/2024 66  43 - 75 % Final    Immature Grans % 11/15/2024 0  0 - 2 % Final    Lymphocytes % 11/15/2024 21  14 - 44 % Final    Monocytes % 11/15/2024 10  4 - 12 % Final    Eosinophils Relative 11/15/2024 2  0 - 6 % Final    Basophils Relative 11/15/2024 1  0 - 1 % Final    Absolute Neutrophils 11/15/2024 3.69  1.85 - 7.62 Thousands/µL Final    Absolute Immature Grans 11/15/2024 0.02  0.00 - 0.20 Thousand/uL Final    Absolute Lymphocytes 11/15/2024 1.19  0.60 - 4.47 Thousands/µL Final    Absolute Monocytes 11/15/2024 0.57  0.17 - 1.22 Thousand/µL Final    Eosinophils Absolute 11/15/2024 0.13  0.00 - 0.61 Thousand/µL Final    Basophils Absolute 11/15/2024 0.03  0.00 - 0.10 Thousands/µL Final    Cholesterol 11/15/2024 180  See Comment mg/dL Final    Cholesterol:         Pediatric <18 Years        Desirable          <170 mg/dL      Borderline High    170-199 mg/dL      High               >=200 mg/dL        Adult >=18 Years            Desirable         <200 mg/dL      Borderline High   200-239 mg/dL      High              >239 mg/dL      Triglycerides 11/15/2024 276 (H)  See Comment mg/dL Final    Triglyceride:     0-9Y            <75mg/dL     10Y-17Y         <90 mg/dL       >=18Y     Normal          <150 mg/dL     Borderline High 150-199 mg/dL     High            200-499 mg/dL        Very High       >499 mg/dL    Specimen collection should occur prior to Metamizole administration due to the potential for falsely depressed results.    HDL, Direct 11/15/2024 32 (L)  >=50  mg/dL Final    LDL Calculated 11/15/2024 93  0 - 100 mg/dL Final    LDL Cholesterol:     Optimal           <100 mg/dl     Near Optimal      100-129 mg/dl     Above Optimal       Borderline High 130-159 mg/dl       High            160-189 mg/dl       Very High       >189 mg/dl         This screening LDL is a calculated result.   It does not have the accuracy of the Direct Measured LDL in the monitoring of patients with hyperlipidemia and/or statin therapy.   Direct Measure LDL (PYM058) must be ordered separately in these patients.    TSH 3RD GENERATON 11/15/2024 4.967 (H)  0.450 - 4.500 uIU/mL Final    The recommended reference ranges for TSH during pregnancy are as follows:   First trimester 0.100 to 2.500 uIU/mL   Second trimester  0.200 to 3.000 uIU/mL   Third trimester 0.300 to 3.000 uIU/m    Note: Normal ranges may not apply to patients who are transgender, non-binary, or whose legal sex, sex at birth, and gender identity differ.  Adult TSH (3rd generation) reference range follows the recommended guidelines of the American Thyroid Association, January, 2020.    Free T4 11/15/2024 0.82  0.61 - 1.12 ng/dL Final    Specimens with biotin concentrations > 10 ng/mL can lead to significant (> 10%) positive bias in result.       Imaging:   I reviewed relevant imaging    Please note:  This report has been generated by a voice recognition software system. Therefore there may be syntax, spelling, and/or grammatical errors. Please call if you have any questions.

## 2024-12-10 DIAGNOSIS — I10 HYPERTENSION, ESSENTIAL: ICD-10-CM

## 2024-12-10 DIAGNOSIS — Z79.4 TYPE 2 DIABETES MELLITUS WITH DIABETIC POLYNEUROPATHY, WITH LONG-TERM CURRENT USE OF INSULIN (HCC): ICD-10-CM

## 2024-12-10 DIAGNOSIS — E11.42 TYPE 2 DIABETES MELLITUS WITH DIABETIC POLYNEUROPATHY, WITH LONG-TERM CURRENT USE OF INSULIN (HCC): ICD-10-CM

## 2024-12-10 DIAGNOSIS — E03.9 HYPOTHYROIDISM, UNSPECIFIED TYPE: ICD-10-CM

## 2024-12-10 DIAGNOSIS — D50.0 IRON DEFICIENCY ANEMIA DUE TO CHRONIC BLOOD LOSS: Primary | ICD-10-CM

## 2024-12-10 DIAGNOSIS — E11.40 TYPE 2 DIABETES MELLITUS WITH DIABETIC NEUROPATHY, WITHOUT LONG-TERM CURRENT USE OF INSULIN (HCC): ICD-10-CM

## 2024-12-10 RX ORDER — SODIUM CHLORIDE 9 MG/ML
20 INJECTION, SOLUTION INTRAVENOUS ONCE
Status: CANCELLED | OUTPATIENT
Start: 2024-12-16

## 2024-12-11 RX ORDER — LEVOTHYROXINE SODIUM 50 UG/1
50 TABLET ORAL DAILY
Qty: 100 TABLET | Refills: 1 | Status: SHIPPED | OUTPATIENT
Start: 2024-12-11

## 2024-12-11 RX ORDER — GLIPIZIDE 10 MG/1
10 TABLET ORAL
Qty: 200 TABLET | Refills: 1 | Status: SHIPPED | OUTPATIENT
Start: 2024-12-11

## 2024-12-11 RX ORDER — METOPROLOL TARTRATE 50 MG
25 TABLET ORAL 2 TIMES DAILY
Qty: 100 TABLET | Refills: 1 | Status: SHIPPED | OUTPATIENT
Start: 2024-12-11

## 2024-12-11 RX ORDER — GABAPENTIN 300 MG/1
300 CAPSULE ORAL 3 TIMES DAILY
Qty: 300 CAPSULE | Refills: 1 | Status: SHIPPED | OUTPATIENT
Start: 2024-12-11

## 2024-12-16 ENCOUNTER — HOSPITAL ENCOUNTER (OUTPATIENT)
Dept: INFUSION CENTER | Facility: CLINIC | Age: 84
Discharge: HOME | End: 2024-12-16
Payer: COMMERCIAL

## 2024-12-16 VITALS
HEART RATE: 68 BPM | DIASTOLIC BLOOD PRESSURE: 67 MMHG | SYSTOLIC BLOOD PRESSURE: 156 MMHG | TEMPERATURE: 97.8 F | RESPIRATION RATE: 18 BRPM

## 2024-12-16 DIAGNOSIS — D50.0 IRON DEFICIENCY ANEMIA DUE TO CHRONIC BLOOD LOSS: Primary | ICD-10-CM

## 2024-12-16 PROCEDURE — 96365 THER/PROPH/DIAG IV INF INIT: CPT

## 2024-12-16 RX ORDER — SODIUM CHLORIDE 9 MG/ML
20 INJECTION, SOLUTION INTRAVENOUS ONCE
Status: COMPLETED | OUTPATIENT
Start: 2024-12-16 | End: 2024-12-16

## 2024-12-16 RX ORDER — SODIUM CHLORIDE 9 MG/ML
20 INJECTION, SOLUTION INTRAVENOUS ONCE
Status: CANCELLED | OUTPATIENT
Start: 2024-12-23

## 2024-12-16 RX ADMIN — IRON SUCROSE 300 MG: 20 INJECTION, SOLUTION INTRAVENOUS at 08:49

## 2024-12-16 RX ADMIN — SODIUM CHLORIDE 20 ML/HR: 0.9 INJECTION, SOLUTION INTRAVENOUS at 08:45

## 2024-12-16 NOTE — PROGRESS NOTES
Ely Hernadez presents for Venofer, offers no complaints.Confirmed with Vesta Weinstein RN, ok to give first dose as ordered, she will confirm with Staci Contreras PA-C, about the orders for the remaining doses. Tolerated treatment well with no complications.      Ely Hernadez is aware of future appt on 12/23 at 7:30 am. PIV removed.    AVS declined.

## 2024-12-19 ENCOUNTER — OFFICE VISIT (OUTPATIENT)
Dept: WOUND CARE | Facility: CLINIC | Age: 84
End: 2024-12-19
Payer: COMMERCIAL

## 2024-12-19 VITALS
RESPIRATION RATE: 18 BRPM | HEART RATE: 67 BPM | SYSTOLIC BLOOD PRESSURE: 138 MMHG | TEMPERATURE: 97.6 F | DIASTOLIC BLOOD PRESSURE: 64 MMHG

## 2024-12-19 DIAGNOSIS — I87.331 CHRONIC VENOUS HYPERTENSION (IDIOPATHIC) WITH ULCER AND INFLAMMATION OF RIGHT LOWER EXTREMITY (HCC): Primary | ICD-10-CM

## 2024-12-19 PROCEDURE — 99213 OFFICE O/P EST LOW 20 MIN: CPT | Performed by: ORTHOPAEDIC SURGERY

## 2024-12-19 RX ORDER — LIDOCAINE 40 MG/G
CREAM TOPICAL ONCE
Status: COMPLETED | OUTPATIENT
Start: 2024-12-19 | End: 2024-12-19

## 2024-12-19 RX ADMIN — LIDOCAINE: 40 CREAM TOPICAL at 08:36

## 2024-12-19 NOTE — PROGRESS NOTES
Patient ID: Ely Hernadez is a 84 y.o. female Date of Birth 1940       Chief Complaint   Patient presents with    Follow Up Wound Care Visit     RLE WOUND       Allergies:  Patient has no known allergies.    Diagnosis:   Diagnosis ICD-10-CM Associated Orders   1. Chronic venous hypertension (idiopathic) with ulcer and inflammation of right lower extremity (HCC)  I87.331 lidocaine (LMX) 4 % cream     Wound cleansing and dressings Venous Ulcer Right Pretibial     Wound cleansing and dressings Venous Ulcer Right Pretibial     Wound Procedure Treatment Venous Ulcer Right Pretibial           Assessment and Plan :  Follow up evaluation of right venous ulcer with stalled healing   Aggressively cleansed with NSS and gauze.   Change wound management to Hydrofera blue, see wound orders below   Continue to wear daily compression with Tubigrip  No harsh cleansers such as alcohol, peroxide, or antibacterial soap, do not submerge in water  Can cleanse with mild soap and water or NSS at dressing changes.   F/u with vascular surgeon for PAD as scheduled for 01/14/25.  Continue frequent elevation of leg and increase exercise/walking for wound healing.  Follow-up in 2 week(s) or call sooner with questions or concerns or any signs of infection such as redness, swelling, increased/purulent drainage, fever, chills, increased severe pain.     Subjective:   6/28: Pt is an 84 y.o. female with pmhx HTN, DMII (A1c 8.7),  CKD, Pafib, CVA (10/28/23 on Coumadin/ASA 81mg) with residual deficits (left sided facial drop and leg weakness) well known to [facility] who presents for follow up evaluation of non healing RLE venous ulcer which has been present for about 2.5 years. Pt has been covering wound with a bandaid. Does not have an odor. No smoking, ETOH or drug use.  Pt denies any sob, fatigue, N/V, CP, fever or chills.    7/12: Patient presents for followup evaluation of RLE venous ulcer. No increased pain or drainage. Pt is frustrated with  chronic wound. Has been using Polymem on the wound bed and Tubigrip for compression.  Pt denies any fevers or chills.    7/25: Patient presents for followup evaluation of RLE venous ulcer. Since last visit pt was started on Bactrim for positive wound culture with abnormal 2+ Growth of Staphylococcus Aureus. The tissue exam demonstrated chronic inflammation and fibrosis. No malignancy, nor pyoderma gangrenosum were identified.  Pt noticed wound decreasing in size. Has been using Polymem on the wound bed and Tubigrip for compression.  Pt denies any fevers or chills.    8/2: Patient presents for followup evaluation of RLE venous ulcer. Pt completed Bactrim as directed. No issues. Pt states this is the best she has felt in 2 years. No fevers or chills.    8/22: Patient presents for followup evaluation of RLE venous ulcer. Pt states she has been wetting wound with mild soap and water. She noticed a scab formed and came off on dressing. In office CHEYANNE today; R 0.59, L 0.76. No fevers or chills.    9/6: Patient presents for followup evaluation of RLE venous ulcer. Pt  has been applying Polymem Ag Max on wound bed and Tubigrip for compression. Denies fevers or chills.    9/19: Patient presents for followup evaluation of RLE venous ulcer. Pt has been applying Mupirocin wound bed and Tubigrip for compression. Denies fevers or chills.    9/26: Patient presents for followup evaluation of RLE venous ulcer. No complaints. Pt has been applying Hydrofera Blue ready on wound bed and Tubigrip for compression. Denies fevers or chills.    10/10:  Patient presents for followup evaluation of RLE venous ulcer. No complaints. Pt is scheduled for vascular studies for 11/6/24. Pt has been applying Hydrofera Blue ready on wound bed and Tubigrip for compression. Denies fevers or chills.    10/31:  Patient presents for followup evaluation of RLE venous ulcer. NO issues. Pt has been applying Hydrofera Blue ready on wound bed and Tubigrip for  compression. Denies fevers or chills.    11/14:  Patient presents for followup evaluation of RLE venous ulcer. RLE vascular studies reveal deep venous incompetence with an incompetent . GSV and SSV is competent. Bilateral LEADs reveal diffuse disease in the RLE femoral and popliteal arteries with a 50-75% stenosis in the common femoral artery. R CHEYANNE:  0.64 (severe disease). There is diffuse disease in the LLE femoral and popliteal arteries with a >75% stenosis of the distal superficial femoral artery.There is a 50-75% stenosis of the proximal and mid superficial femoral arteries. Left CHEYANNE: 0.69 (moderate disease). Pt has scheduled a vascular surgery appointment for January 14, 2025. Pt has been applying Hydrofera Blue ready on wound bed and Tubigrip for compression. Denies fevers or chills.    12/5: Patient presents for followup evaluation of RLE venous ulcer. No complaints. Pt has been applying Hydrofera Blue ready on wound bed and Tubigrip for compression. Denies fevers or chills.    12/19: Patient presents for followup evaluation of RLE venous ulcer. No issues. Pt has been applying clobetasol cream and DSD on wound bed and Tubigrip for compression. Pt is scheduled to see vascular surgery on January 14,  Denies fevers or chills.          The following portions of the patient's history were reviewed and updated as appropriate:   Patient Active Problem List   Diagnosis    Hyperlipidemia    Chronic anticoagulation    Hypertension, essential    Coronary artery disease of native artery of native heart with stable angina pectoris (HCC)    Simple chronic bronchitis (HCC)    Diabetes mellitus with neurological manifestation (HCC)    Hypothyroidism    GERD (gastroesophageal reflux disease)    Anemia    AVM (arteriovenous malformation) of small bowel, acquired with hemorrhage    Angiectasia    Other vascular disorders of intestine (HCC)    Aortic valve replaced    Cardiomyopathy (HCC)    FA (fibrillary  astrocytoma) (HCC)    Stage 3b chronic kidney disease (HCC)    Chronic kidney disease-mineral and bone disorder    Primary osteoarthritis involving multiple joints    Paroxysmal atrial fibrillation (HCC)    Neutropenia associated with infection (HCC)    Herpes simplex labialis    Restrictive lung disease    Nocturnal hypoxemia    Supratherapeutic INR    H/O mechanical aortic valve replacement    Subtherapeutic international normalized ratio (INR)    Type 2 diabetes mellitus with stage 3b chronic kidney disease, with long-term current use of insulin (HCC)    Venous ulcer of right leg (HCC)    Chronic systolic (congestive) heart failure (HCC)    Lower extremity edema    Iron deficiency anemia due to chronic blood loss    Hemiplegia and hemiparesis following cerebral infarction affecting left non-dominant side (HCC)    Chronic obstructive pulmonary disease (HCC)     Past Medical History:   Diagnosis Date    Acute anterior epistaxis 12/23/2023    Acute blood loss anemia 12/14/2023    Acute respiratory failure with hypoxia (HCC) 02/06/2024    Anemia     Aneurysm of thoracic aorta (HCC)     Angioedema 06/07/2019    Aortic valve disorder     Benign neoplasm of sigmoid colon     Cardiomyopathy (HCC)     last assessed: 10/10/2014    CHF (congestive heart failure) (HCC)     Chronic kidney disease     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HCC)     last assessed: 10/10/2014    COPD (chronic obstructive pulmonary disease) (HCC)     Diabetic nephropathy (HCC)     Disease of thyroid gland     RAMON (dyspnea on exertion)     GERD (gastroesophageal reflux disease)     History of emphysema (HCC)     History of transfusion     Hyperlipidemia     Hypertensive urgency 10/28/2023    Leg cramps 11/01/2023    Stroke-like symptoms 10/28/2023    TIA (transient ischemic attack)      Past Surgical History:   Procedure Laterality Date    AORTIC VALVE REPLACEMENT      CARDIAC PACEMAKER PLACEMENT      last assessed: 10/10/2014    CARDIAC SURGERY       aortic valve replacement    CHOLECYSTECTOMY      COLONOSCOPY      EGD      HYSTERECTOMY      INSERT / REPLACE / REMOVE PACEMAKER      KNEE SURGERY Right     OTHER SURGICAL HISTORY      Capsule ENDOscopy description: 2012    ND COLONOSCOPY FLX DX W/COLLJ SPEC WHEN PFRMD N/A 2018    Procedure: single balloon ENTEROSCOPY;  Surgeon: David Dennis MD;  Location: [facility];  Service: Gastroenterology    ND ESOPHAGOGASTRODUODENOSCOPY TRANSORAL DIAGNOSTIC N/A 2017    Procedure: EGD AND COLONOSCOPY;  Surgeon: Jay Mcleod III, MD;  Location: [facility];  Service: Gastroenterology     Family History   Problem Relation Age of Onset    No Known Problems Mother     No Known Problems Father      Social History     Socioeconomic History    Marital status:      Spouse name: None    Number of children: None    Years of education: None    Highest education level: None   Occupational History    None   Tobacco Use    Smoking status: Former     Current packs/day: 0.00     Average packs/day: 1 pack/day for 52.9 years (52.9 ttl pk-yrs)     Types: Cigarettes     Start date:      Quit date: 2007     Years since quittin.0     Passive exposure: Past    Smokeless tobacco: Former     Quit date:    Vaping Use    Vaping status: Never Used   Substance and Sexual Activity    Alcohol use: Never    Drug use: Never    Sexual activity: Not Currently     Partners: Male   Other Topics Concern    None   Social History Narrative    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 BID Freestyle lancets BID    Living independently with spouse     Social Drivers of Health     Financial Resource Strain: Not on file   Food Insecurity: No Food Insecurity (5/15/2024)    Nursing - Inadequate Food Risk Classification     Worried About Running Out of Food in the Last Year: Never true     Ran Out of Food in the Last Year: Never true     Ran Out of Food in the Last Year: Not on file   Transportation Needs: No  Transportation Needs (5/15/2024)    PRAPARE - Transportation     Lack of Transportation (Medical): No     Lack of Transportation (Non-Medical): No   Physical Activity: Inactive (5/26/2021)    Exercise Vital Sign     Days of Exercise per Week: 0 days     Minutes of Exercise per Session: 0 min   Stress: No Stress Concern Present (5/26/2021)    [employer] - Occupational Stress Questionnaire     Feeling of Stress : Not at all   Social Connections: Not on file   Intimate Partner Violence: Not on file   Housing Stability: Low Risk  (5/15/2024)    Housing Stability Vital Sign     Unable to Pay for Housing in the Last Year: No     Number of Times Moved in the Last Year: 1     Homeless in the Last Year: No       Current Outpatient Medications:     Accu-Chek FastClix Lancets MISC, Use 3 (three) times a day, Disp: 300 each, Rfl: 3    albuterol (Ventolin HFA) 90 mcg/act inhaler, Inhale 2 puffs every 6 (six) hours as needed for wheezing, Disp: 54 g, Rfl: 3    Ascorbic Acid (vitamin C) 1000 MG tablet, Take 1,000 mg by mouth daily, Disp: , Rfl:     aspirin (ECOTRIN LOW STRENGTH) 81 mg EC tablet, Take 1 tablet (81 mg total) by mouth daily Do not start before November 1, 2023., Disp: 30 tablet, Rfl: 0    b complex vitamins capsule, Take 1 capsule by mouth daily, Disp: 30 capsule, Rfl: 2    Blood Glucose Monitoring Suppl (Accu-Chek Guide Me) w/Device KIT, USE 3 TIMES DAILY, Disp: 1 kit, Rfl: 2    Cholecalciferol (Vitamin D3) 50 MCG (2000 UT) TABS, Take 2,000 Units by mouth daily, Disp: , Rfl:     clobetasol (TEMOVATE) 0.05 % ointment, Apply topically 2 (two) times a day, Disp: 30 g, Rfl: 1    Empagliflozin (Jardiance) 10 MG TABS tablet, Take 1 tablet (10 mg total) by mouth in the morning, Disp: 90 tablet, Rfl: 1    enoxaparin (LOVENOX) 80 mg/0.8 mL, Inject 0.7 mL (70 mg total) under the skin every 24 hours Hold Warfarin 5 days prior. Last dose is 9/6 Start Lovenox on 9/8, 4 days prior to the procedure.  No Lovenox the morning of the procedure. Resume both Lovenox and Warfarin at the discretion of the surgeon (preferably that evening) and check INR 4 days after restarting. Inject in the abdomen about 2 inches from the belly button and rotate sides at each injection. (Patient not taking: Reported on 11/26/2024), Disp: 0.7 mL, Rfl: 10    Ferrous Sulfate (IRON PO), Take by mouth daily in the early morning OTC, Disp: , Rfl:     furosemide (LASIX) 40 mg tablet, Take 1 tablet (40 mg total) by mouth 2 (two) times a day, Disp: 180 tablet, Rfl: 3    gabapentin (NEURONTIN) 300 mg capsule, TAKE 1 CAPSULE BY MOUTH 3 TIMES  DAILY, Disp: 300 capsule, Rfl: 1    glipiZIDE (GLUCOTROL) 10 mg tablet, TAKE 1 TABLET BY MOUTH TWICE  DAILY BEFORE MEALS, Disp: 200 tablet, Rfl: 1    glucose blood (Accu-Chek Celeste Plus) test strip, Use 1 each 3 (three) times a day Use as instructed, Disp: 300 each, Rfl: 3    insulin degludec (TRESIBA) 100 units/mL injection pen, Inject 30 Units under the skin daily, Disp: 30 mL, Rfl: 3    Insulin Pen Needle (BD Pen Needle Rosie U/F) 32G X 4 MM MISC, Use daily, Disp: 100 each, Rfl: 1    ipratropium-albuterol (DUO-NEB) 0.5-2.5 mg/3 mL nebulizer solution, inhale contents of 1 vial ( 3 milliliters ) in nebulizer four times a day, Disp: , Rfl:     Lancets (freestyle) lancets, Check blood glucose 3 times daily, Disp: 300 each, Rfl: 0    levothyroxine 50 mcg tablet, TAKE 1 TABLET BY MOUTH DAILY, Disp: 100 tablet, Rfl: 1    metoprolol tartrate (LOPRESSOR) 50 mg tablet, TAKE ONE-HALF TABLET BY MOUTH  TWICE DAILY, Disp: 100 tablet, Rfl: 1    mupirocin (BACTROBAN) 2 % ointment, Apply topically daily, Disp: 22 g, Rfl: 0    oxygen gas, Inhale 2 L/min daily at bedtime as needed home oxygen nightly, Disp: , Rfl:     pantoprazole (PROTONIX) 40 mg tablet, TAKE 1 TABLET BY MOUTH DAILY AS  DIRECTED, Disp: 100 tablet, Rfl: 1    warfarin (COUMADIN) 5 mg tablet, TAKE 1 TO 2 TABLETS BY  MOUTH DAILY OR AS DIRECTED, Disp: 180 tablet,  Rfl: 3    Current Facility-Administered Medications:     lidocaine (LMX) 4 % cream, , Topical, Once, Vesta Garcia PA-C    Review of Systems   Constitutional:  Negative for appetite change, chills, fatigue, fever and unexpected weight change.   HENT:  Negative for congestion, hearing loss and postnasal drip.    Respiratory:  Negative for cough and shortness of breath.    Cardiovascular:  Positive for leg swelling.   Musculoskeletal:  Positive for gait problem.   Skin:  Positive for wound (RLE). Negative for rash.   Neurological:  Negative for numbness.   Hematological:  Does not bruise/bleed easily.   Psychiatric/Behavioral: Negative.           Objective:  /64   Pulse 67   Temp 97.6 °F (36.4 °C)   Resp 18   Pain Score: 0-No pain     Physical Exam  Vitals reviewed.   Constitutional:       General: She is not in acute distress.     Appearance: Normal appearance. She is well-developed and normal weight.   HENT:      Head: Normocephalic and atraumatic.   Cardiovascular:      Rate and Rhythm: Normal rate.   Pulmonary:      Effort: Pulmonary effort is normal.   Musculoskeletal:         General: No deformity.      Right lower leg: Edema present.      Left lower leg: No edema.   Skin:     General: Skin is warm and dry.      Findings: Wound (RLE) present.             Comments: Pink granulated wound bed and serosanguinous drainage. See wound assessment   Neurological:      General: No focal deficit present.      Mental Status: She is alert and oriented to person, place, and time.      Gait: Gait normal.   Psychiatric:         Mood and Affect: Mood and affect normal.         Behavior: Behavior normal. Behavior is cooperative.            Wound 10/29/23 Venous Ulcer Pretibial Right (Active)   Wound Description Epithelialization;Pink;Yellow 12/19/24 0834   Radha-wound Assessment Fragile;Scar Tissue 12/19/24 0834   Wound Length (cm) 1.9 cm 12/19/24 0834   Wound Width (cm) 1.4 cm 12/19/24 0834   Wound Depth (cm) 0.1 cm  "12/19/24 0834   Wound Surface Area (cm^2) 2.66 cm^2 12/19/24 0834   Wound Volume (cm^3) 0.266 cm^3 12/19/24 0834   Calculated Wound Volume (cm^3) 0.27 cm^3 12/19/24 0834   Change in Wound Size % 93.25 12/19/24 0834   Drainage Amount Small 12/19/24 0834   Drainage Description Serosanguineous 12/19/24 0834   Non-staged Wound Description Full thickness 12/19/24 0834   Dressing Status Intact 12/19/24 0834     Wound Instructions:  Orders Placed This Encounter   Procedures    Wound cleansing and dressings Venous Ulcer Right Pretibial     Right Leg wound:      Wash your hands with soap and water. Remove old dressing, discard into plastic bag and place in trash. Cleanse the wound with normal saline solution prior to applying a clean dressing. Do not use tissue or cotton balls. Do not scrub the wound. Pat dry using gauze.      Shower: Yes. Cleanse with a mild soap and water such as Dove. Be sure to remove your old dressing prior to getting in the shower.      Apply Hydraferra Blue Ready to the open wound.   Cover with gauze.  Secure with tape. (If your skin is breaking down from tape, then need to wrap with Armani and put tape on Armani)      Change dressing every other day.     Standing Status:   Future     Expiration Date:   12/26/2024    Wound cleansing and dressings Venous Ulcer Right Pretibial     Elastic Tubular Stocking: Spanda F     Tubular elastic bandage: Apply from base of toes to behind the knee. Apply in AM, may remove for sleep.     Avoid prolonged standing in one place.     Elevate leg(s) above the level of the heart when sitting or as much as possible.     Standing Status:   Future     Expiration Date:   12/26/2024    Wound Procedure Treatment Venous Ulcer Right Pretibial     This order was created via procedure documentation       Vesta Garcia PA-C, [facility]      Portions of the record may have been created with voice recognition software. Occasional wrong word or \"sound alike\" substitutions may have occurred " due to the inherent limitations of voice recognition software. Read the chart carefully and recognize, using context, where substitutions have occurred.

## 2024-12-19 NOTE — PATIENT INSTRUCTIONS
Orders Placed This Encounter   Procedures    Wound cleansing and dressings Venous Ulcer Right Pretibial     Right Leg wound:      Wash your hands with soap and water. Remove old dressing, discard into plastic bag and place in trash. Cleanse the wound with normal saline solution prior to applying a clean dressing. Do not use tissue or cotton balls. Do not scrub the wound. Pat dry using gauze.      Shower: Yes. Cleanse with a mild soap and water such as Dove. Be sure to remove your old dressing prior to getting in the shower.      Apply Hydraferra Blue Ready to the open wound.   Cover with gauze.  Secure with tape. (If your skin is breaking down from tape, then need to wrap with Armani and put tape on Armani)      Change dressing every other day.     Standing Status:   Future     Expiration Date:   12/26/2024    Wound cleansing and dressings Venous Ulcer Right Pretibial     Elastic Tubular Stocking: Spanda F     Tubular elastic bandage: Apply from base of toes to behind the knee. Apply in AM, may remove for sleep.     Avoid prolonged standing in one place.     Elevate leg(s) above the level of the heart when sitting or as much as possible.     Standing Status:   Future     Expiration Date:   12/26/2024

## 2024-12-19 NOTE — PROGRESS NOTES
Wound Procedure Treatment Venous Ulcer Right Pretibial    Performed by: Marie Stewart RN  Authorized by: Vesta Garcia PA-C    Associated wounds:   Wound 10/29/23 Venous Ulcer Pretibial Right  Wound cleansed with:  NSS  Applied primary dressing:  Hydrafera blue ready  Applied secondary dressing:  Gauze  Dressing secured with:  Armani and Size F

## 2024-12-23 ENCOUNTER — HOSPITAL ENCOUNTER (OUTPATIENT)
Dept: INFUSION CENTER | Facility: CLINIC | Age: 84
Discharge: HOME | End: 2024-12-23

## 2024-12-24 ENCOUNTER — TELEPHONE (OUTPATIENT)
Dept: HEMATOLOGY ONCOLOGY | Facility: CLINIC | Age: 84
End: 2024-12-24

## 2024-12-24 DIAGNOSIS — D50.0 IRON DEFICIENCY ANEMIA DUE TO CHRONIC BLOOD LOSS: Primary | ICD-10-CM

## 2024-12-24 RX ORDER — SODIUM CHLORIDE 9 MG/ML
20 INJECTION, SOLUTION INTRAVENOUS ONCE
Status: CANCELLED | OUTPATIENT
Start: 2024-12-30

## 2024-12-24 NOTE — TELEPHONE ENCOUNTER
Mo please cancel appt for 12/30 per provider she already received 2 treatments and the third dose can be held

## 2024-12-26 DIAGNOSIS — D50.0 IRON DEFICIENCY ANEMIA DUE TO CHRONIC BLOOD LOSS: Primary | ICD-10-CM

## 2024-12-26 RX ORDER — SODIUM CHLORIDE 9 MG/ML
20 INJECTION, SOLUTION INTRAVENOUS ONCE
OUTPATIENT
Start: 2024-12-30

## 2024-12-30 ENCOUNTER — HOSPITAL ENCOUNTER (OUTPATIENT)
Dept: INFUSION CENTER | Facility: CLINIC | Age: 84
Discharge: HOME | End: 2024-12-30
Payer: COMMERCIAL

## 2024-12-30 VITALS
SYSTOLIC BLOOD PRESSURE: 131 MMHG | DIASTOLIC BLOOD PRESSURE: 60 MMHG | HEART RATE: 60 BPM | RESPIRATION RATE: 18 BRPM | TEMPERATURE: 97.3 F

## 2024-12-30 DIAGNOSIS — D50.0 IRON DEFICIENCY ANEMIA DUE TO CHRONIC BLOOD LOSS: Primary | ICD-10-CM

## 2024-12-30 PROCEDURE — 96365 THER/PROPH/DIAG IV INF INIT: CPT

## 2024-12-30 RX ORDER — SODIUM CHLORIDE 9 MG/ML
20 INJECTION, SOLUTION INTRAVENOUS ONCE
Status: COMPLETED | OUTPATIENT
Start: 2024-12-30 | End: 2024-12-30

## 2024-12-30 RX ORDER — SODIUM CHLORIDE 9 MG/ML
20 INJECTION, SOLUTION INTRAVENOUS ONCE
Status: CANCELLED | OUTPATIENT
Start: 2024-12-30

## 2024-12-30 RX ADMIN — IRON SUCROSE 300 MG: 20 INJECTION, SOLUTION INTRAVENOUS at 14:10

## 2024-12-30 RX ADMIN — SODIUM CHLORIDE 20 ML/HR: 9 INJECTION, SOLUTION INTRAVENOUS at 14:09

## 2024-12-30 NOTE — PROGRESS NOTES
Pt here today for her last iron infusion, offers no complaints, infusion completed w/o complications, aware that this was her last Venofer and to F/U with her MD,, AVS declined and pt D/C home

## 2025-01-02 ENCOUNTER — OFFICE VISIT (OUTPATIENT)
Dept: WOUND CARE | Facility: CLINIC | Age: 85
End: 2025-01-02
Payer: COMMERCIAL

## 2025-01-02 VITALS
TEMPERATURE: 96.7 F | HEART RATE: 74 BPM | SYSTOLIC BLOOD PRESSURE: 165 MMHG | RESPIRATION RATE: 18 BRPM | DIASTOLIC BLOOD PRESSURE: 73 MMHG

## 2025-01-02 DIAGNOSIS — I87.331 CHRONIC VENOUS HYPERTENSION (IDIOPATHIC) WITH ULCER AND INFLAMMATION OF RIGHT LOWER EXTREMITY (HCC): Primary | ICD-10-CM

## 2025-01-02 PROCEDURE — 99213 OFFICE O/P EST LOW 20 MIN: CPT | Performed by: ORTHOPAEDIC SURGERY

## 2025-01-02 RX ORDER — LIDOCAINE 40 MG/G
CREAM TOPICAL ONCE
Status: COMPLETED | OUTPATIENT
Start: 2025-01-02 | End: 2025-01-02

## 2025-01-02 RX ADMIN — LIDOCAINE: 40 CREAM TOPICAL at 08:01

## 2025-01-02 NOTE — PROGRESS NOTES
Patient ID: Ely Hernadez is a 84 y.o. female Date of Birth 1940       Chief Complaint   Patient presents with    Follow Up Wound Care Visit     RLE WOUND       Allergies:  Patient has no known allergies.    Diagnosis:   Diagnosis ICD-10-CM Associated Orders   1. Chronic venous hypertension (idiopathic) with ulcer and inflammation of right lower extremity (HCC)  I87.331 lidocaine (LMX) 4 % cream     Wound cleansing and dressings Venous Ulcer Right Pretibial     Wound Procedure Treatment Venous Ulcer Right Pretibial           Assessment and Plan :  Follow up evaluation of right venous ulcer with slowly improving.    Aggressively cleansed with NSS and gauze.   Continue wound management to Hydrofera blue ready, (make sure to soak it before removing it), see wound orders below   Continue to wear daily compression with Tubigrip  No harsh cleansers such as alcohol, peroxide, or antibacterial soap, do not submerge in water  Can cleanse with mild soap and water or NSS at dressing changes.   F/u with vascular surgeon as scheduled for 01/14/25.  Continue frequent elevation of leg and increase exercise/walking for wound healing.  Follow-up in 3 week(s) or call sooner with questions or concerns or any signs of infection such as redness, swelling, increased/purulent drainage, fever, chills, increased severe pain.    Subjective:   6/28: Pt is an 84 y.o. female with pmhx HTN, DMII (A1c 8.7),  CKD, Pafib, CVA (10/28/23 on Coumadin/ASA 81mg) with residual deficits (left sided facial drop and leg weakness) well known to [facility] who presents for follow up evaluation of non healing RLE venous ulcer which has been present for about 2.5 years. Pt has been covering wound with a bandaid. Does not have an odor. No smoking, ETOH or drug use.  Pt denies any sob, fatigue, N/V, CP, fever or chills.    7/12: Patient presents for followup evaluation of RLE venous ulcer. No increased pain or drainage. Pt is frustrated with chronic wound. Has  been using Polymem on the wound bed and Tubigrip for compression.  Pt denies any fevers or chills.    7/25: Patient presents for followup evaluation of RLE venous ulcer. Since last visit pt was started on Bactrim for positive wound culture with abnormal 2+ Growth of Staphylococcus Aureus. The tissue exam demonstrated chronic inflammation and fibrosis. No malignancy, nor pyoderma gangrenosum were identified.  Pt noticed wound decreasing in size. Has been using Polymem on the wound bed and Tubigrip for compression.  Pt denies any fevers or chills.    8/2: Patient presents for followup evaluation of RLE venous ulcer. Pt completed Bactrim as directed. No issues. Pt states this is the best she has felt in 2 years. No fevers or chills.    8/22: Patient presents for followup evaluation of RLE venous ulcer. Pt states she has been wetting wound with mild soap and water. She noticed a scab formed and came off on dressing. In office CHEYANNE today; R 0.59, L 0.76. No fevers or chills.    9/6: Patient presents for followup evaluation of RLE venous ulcer. Pt  has been applying Polymem Ag Max on wound bed and Tubigrip for compression. Denies fevers or chills.    9/19: Patient presents for followup evaluation of RLE venous ulcer. Pt has been applying Mupirocin wound bed and Tubigrip for compression. Denies fevers or chills.    9/26: Patient presents for followup evaluation of RLE venous ulcer. No complaints. Pt has been applying Hydrofera Blue ready on wound bed and Tubigrip for compression. Denies fevers or chills.    10/10:  Patient presents for followup evaluation of RLE venous ulcer. No complaints. Pt is scheduled for vascular studies for 11/6/24. Pt has been applying Hydrofera Blue ready on wound bed and Tubigrip for compression. Denies fevers or chills.    10/31:  Patient presents for followup evaluation of RLE venous ulcer. NO issues. Pt has been applying Hydrofera Blue ready on wound bed and Tubigrip for compression. Denies  fevers or chills.    11/14:  Patient presents for followup evaluation of RLE venous ulcer. RLE vascular studies reveal deep venous incompetence with an incompetent . GSV and SSV is competent. Bilateral LEADs reveal diffuse disease in the RLE femoral and popliteal arteries with a 50-75% stenosis in the common femoral artery. R CHEYANNE:  0.64 (severe disease). There is diffuse disease in the LLE femoral and popliteal arteries with a >75% stenosis of the distal superficial femoral artery.There is a 50-75% stenosis of the proximal and mid superficial femoral arteries. Left CHEYANNE: 0.69 (moderate disease). Pt has scheduled a vascular surgery appointment for January 14, 2025. Pt has been applying Hydrofera Blue ready on wound bed and Tubigrip for compression. Denies fevers or chills.    12/5: Patient presents for followup evaluation of RLE venous ulcer. No complaints. Pt has been applying Hydrofera Blue ready on wound bed and Tubigrip for compression. Denies fevers or chills.    12/19: Patient presents for followup evaluation of RLE venous ulcer. No issues. Pt has been applying clobetasol cream and DSD on wound bed and Tubigrip for compression. Pt is scheduled to see vascular surgery on January 14,  Denies fevers or chills.    1/2: Patient presents for followup evaluation of RLE venous ulcer.  Pt states dressing has been adhering to wound bed. Pt has been applying Hydrofera Blue Ready on wound bed and Tubigrip for compression. Denies fevers or chills.      The following portions of the patient's history were reviewed and updated as appropriate:   Patient Active Problem List   Diagnosis    Hyperlipidemia    Chronic anticoagulation    Hypertension, essential    Coronary artery disease of native artery of native heart with stable angina pectoris (HCC)    Simple chronic bronchitis (HCC)    Diabetes mellitus with neurological manifestation (HCC)    Hypothyroidism    GERD (gastroesophageal reflux disease)    Anemia    AVM  (arteriovenous malformation) of small bowel, acquired with hemorrhage    Angiectasia    Other vascular disorders of intestine (HCC)    Aortic valve replaced    Cardiomyopathy (HCC)    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HCC)    Stage 3b chronic kidney disease (HCC)    Chronic kidney disease-mineral and bone disorder    Primary osteoarthritis involving multiple joints    Paroxysmal atrial fibrillation (HCC)    Neutropenia associated with infection (HCC)    Herpes simplex labialis    Restrictive lung disease    Nocturnal hypoxemia    Supratherapeutic INR    H/O mechanical aortic valve replacement    Subtherapeutic international normalized ratio (INR)    Type 2 diabetes mellitus with stage 3b chronic kidney disease, with long-term current use of insulin (HCC)    Venous ulcer of right leg (HCC)    Chronic systolic (congestive) heart failure (HCC)    Lower extremity edema    Iron deficiency anemia due to chronic blood loss    Hemiplegia and hemiparesis following cerebral infarction affecting left non-dominant side (HCC)    Chronic obstructive pulmonary disease (HCC)     Past Medical History:   Diagnosis Date    Acute anterior epistaxis 12/23/2023    Acute blood loss anemia 12/14/2023    Acute respiratory failure with hypoxia (HCC) 02/06/2024    Anemia     Aneurysm of thoracic aorta (HCC)     Angioedema 06/07/2019    Aortic valve disorder     Benign neoplasm of sigmoid colon     Cardiomyopathy (HCC)     last assessed: 10/10/2014    CHF (congestive heart failure) (HCC)     Chronic kidney disease     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HCC)     last assessed: 10/10/2014    COPD (chronic obstructive pulmonary disease) (HCC)     Diabetic nephropathy (HCC)     Disease of thyroid gland     RAMON (dyspnea on exertion)     GERD (gastroesophageal reflux disease)     History of emphysema (HCC)     History of transfusion     Hyperlipidemia     Hypertensive urgency 10/28/2023    Leg cramps 11/01/2023    Stroke-like symptoms 10/28/2023    TIA  (transient ischemic attack)      Past Surgical History:   Procedure Laterality Date    AORTIC VALVE REPLACEMENT      CARDIAC PACEMAKER PLACEMENT      last assessed: 10/10/2014    CARDIAC SURGERY      aortic valve replacement    CHOLECYSTECTOMY      COLONOSCOPY      EGD      HYSTERECTOMY      INSERT / REPLACE / REMOVE PACEMAKER      KNEE SURGERY Right     OTHER SURGICAL HISTORY      Capsule ENDOscopy description: 2012    FL COLONOSCOPY FLX DX W/COLLJ SPEC WHEN PFRMD N/A 2018    Procedure: single balloon ENTEROSCOPY;  Surgeon: David Dennis MD;  Location: [facility];  Service: Gastroenterology    FL ESOPHAGOGASTRODUODENOSCOPY TRANSORAL DIAGNOSTIC N/A 2017    Procedure: EGD AND COLONOSCOPY;  Surgeon: Jay Mcleod III, MD;  Location: [facility];  Service: Gastroenterology     Family History   Problem Relation Age of Onset    No Known Problems Mother     No Known Problems Father      Social History     Socioeconomic History    Marital status:      Spouse name: None    Number of children: None    Years of education: None    Highest education level: None   Occupational History    None   Tobacco Use    Smoking status: Former     Current packs/day: 0.00     Average packs/day: 1 pack/day for 52.9 years (52.9 ttl pk-yrs)     Types: Cigarettes     Start date:      Quit date: 2007     Years since quittin.1     Passive exposure: Past    Smokeless tobacco: Former     Quit date:    Vaping Use    Vaping status: Never Used   Substance and Sexual Activity    Alcohol use: Never    Drug use: Never    Sexual activity: Not Currently     Partners: Male   Other Topics Concern    None   Social History Narrative    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 BID Freestyle lancets BID    Living independently with spouse     Social Drivers of Health     Financial Resource Strain: Not on file   Food Insecurity: No Food Insecurity (5/15/2024)    Nursing - Inadequate Food Risk Classification      Worried About Running Out of Food in the Last Year: Never true     Ran Out of Food in the Last Year: Never true     Ran Out of Food in the Last Year: Not on file   Transportation Needs: No Transportation Needs (5/15/2024)    PRAPARE - Transportation     Lack of Transportation (Medical): No     Lack of Transportation (Non-Medical): No   Physical Activity: Inactive (5/26/2021)    Exercise Vital Sign     Days of Exercise per Week: 0 days     Minutes of Exercise per Session: 0 min   Stress: No Stress Concern Present (5/26/2021)    [facility] [employer] - Occupational Stress Questionnaire     Feeling of Stress : Not at all   Social Connections: Not on file   Intimate Partner Violence: Not on file   Housing Stability: Low Risk  (5/15/2024)    Housing Stability Vital Sign     Unable to Pay for Housing in the Last Year: No     Number of Times Moved in the Last Year: 1     Homeless in the Last Year: No       Current Outpatient Medications:     Accu-Chek FastClix Lancets MISC, Use 3 (three) times a day, Disp: 300 each, Rfl: 3    albuterol (Ventolin HFA) 90 mcg/act inhaler, Inhale 2 puffs every 6 (six) hours as needed for wheezing, Disp: 54 g, Rfl: 3    Ascorbic Acid (vitamin C) 1000 MG tablet, Take 1,000 mg by mouth daily, Disp: , Rfl:     aspirin (ECOTRIN LOW STRENGTH) 81 mg EC tablet, Take 1 tablet (81 mg total) by mouth daily Do not start before November 1, 2023., Disp: 30 tablet, Rfl: 0    b complex vitamins capsule, Take 1 capsule by mouth daily, Disp: 30 capsule, Rfl: 2    Blood Glucose Monitoring Suppl (Accu-Chek Guide Me) w/Device KIT, USE 3 TIMES DAILY, Disp: 1 kit, Rfl: 2    Cholecalciferol (Vitamin D3) 50 MCG (2000 UT) TABS, Take 2,000 Units by mouth daily, Disp: , Rfl:     clobetasol (TEMOVATE) 0.05 % ointment, Apply topically 2 (two) times a day, Disp: 30 g, Rfl: 1    Empagliflozin (Jardiance) 10 MG TABS tablet, Take 1 tablet (10 mg total) by mouth in the morning, Disp: 90 tablet, Rfl: 1     enoxaparin (LOVENOX) 80 mg/0.8 mL, Inject 0.7 mL (70 mg total) under the skin every 24 hours Hold Warfarin 5 days prior. Last dose is 9/6 Start Lovenox on 9/8, 4 days prior to the procedure. No Lovenox the morning of the procedure. Resume both Lovenox and Warfarin at the discretion of the surgeon (preferably that evening) and check INR 4 days after restarting. Inject in the abdomen about 2 inches from the belly button and rotate sides at each injection. (Patient not taking: Reported on 11/26/2024), Disp: 0.7 mL, Rfl: 10    Ferrous Sulfate (IRON PO), Take by mouth daily in the early morning OTC, Disp: , Rfl:     furosemide (LASIX) 40 mg tablet, Take 1 tablet (40 mg total) by mouth 2 (two) times a day, Disp: 180 tablet, Rfl: 3    gabapentin (NEURONTIN) 300 mg capsule, TAKE 1 CAPSULE BY MOUTH 3 TIMES  DAILY, Disp: 300 capsule, Rfl: 1    glipiZIDE (GLUCOTROL) 10 mg tablet, TAKE 1 TABLET BY MOUTH TWICE  DAILY BEFORE MEALS, Disp: 200 tablet, Rfl: 1    glucose blood (Accu-Chek Celeste Plus) test strip, Use 1 each 3 (three) times a day Use as instructed, Disp: 300 each, Rfl: 3    insulin degludec (TRESIBA) 100 units/mL injection pen, Inject 30 Units under the skin daily, Disp: 30 mL, Rfl: 3    Insulin Pen Needle (BD Pen Needle Rosie U/F) 32G X 4 MM MISC, Use daily, Disp: 100 each, Rfl: 1    ipratropium-albuterol (DUO-NEB) 0.5-2.5 mg/3 mL nebulizer solution, inhale contents of 1 vial ( 3 milliliters ) in nebulizer four times a day, Disp: , Rfl:     Lancets (freestyle) lancets, Check blood glucose 3 times daily, Disp: 300 each, Rfl: 0    levothyroxine 50 mcg tablet, TAKE 1 TABLET BY MOUTH DAILY, Disp: 100 tablet, Rfl: 1    metoprolol tartrate (LOPRESSOR) 50 mg tablet, TAKE ONE-HALF TABLET BY MOUTH  TWICE DAILY, Disp: 100 tablet, Rfl: 1    mupirocin (BACTROBAN) 2 % ointment, Apply topically daily, Disp: 22 g, Rfl: 0    oxygen gas, Inhale 2 L/min daily at bedtime as needed home oxygen nightly, Disp: , Rfl:     pantoprazole  (PROTONIX) 40 mg tablet, TAKE 1 TABLET BY MOUTH DAILY AS  DIRECTED, Disp: 100 tablet, Rfl: 1    warfarin (COUMADIN) 5 mg tablet, TAKE 1 TO 2 TABLETS BY  MOUTH DAILY OR AS DIRECTED, Disp: 180 tablet, Rfl: 3    Current Facility-Administered Medications:     lidocaine (LMX) 4 % cream, , Topical, Once, Vetsa Garcia PA-C    Review of Systems   Constitutional:  Negative for appetite change, chills, fatigue, fever and unexpected weight change.   HENT:  Negative for congestion, hearing loss and postnasal drip.    Respiratory:  Negative for cough and shortness of breath.    Cardiovascular:  Negative for leg swelling.   Musculoskeletal:  Positive for gait problem.   Skin:  Positive for wound (RLE). Negative for rash.   Neurological:  Negative for numbness.   Hematological:  Does not bruise/bleed easily.   Psychiatric/Behavioral: Negative.           Objective:  /73   Pulse 74   Temp (!) 96.7 °F (35.9 °C)   Resp 18   Pain Score: 0-No pain     Physical Exam  Vitals reviewed.   Constitutional:       General: She is not in acute distress.     Appearance: Normal appearance. She is well-developed and normal weight.   HENT:      Head: Normocephalic and atraumatic.   Cardiovascular:      Rate and Rhythm: Normal rate.   Pulmonary:      Effort: Pulmonary effort is normal.   Musculoskeletal:         General: No deformity.      Right lower leg: Edema present.      Left lower leg: No edema.   Skin:     General: Skin is warm and dry.      Findings: Wound (RLE) present.             Comments: Pink granulated wound bed and serosanguinous drainage slightly decreasing in size. See wound assessment   Neurological:      General: No focal deficit present.      Mental Status: She is alert and oriented to person, place, and time.      Gait: Gait normal.   Psychiatric:         Mood and Affect: Mood and affect normal.         Behavior: Behavior normal. Behavior is cooperative.         Wound 10/29/23 Venous Ulcer Pretibial Right (Active)    Wound Image Images linked 01/02/25 0806   Wound Description Pink;Epithelialization;Yellow 01/02/25 0805   Radha-wound Assessment Fragile;Scar Tissue 01/02/25 0805   Wound Length (cm) 0.8 cm 01/02/25 0805   Wound Width (cm) 0.3 cm 01/02/25 0805   Wound Depth (cm) 0.1 cm 01/02/25 0805   Wound Surface Area (cm^2) 0.24 cm^2 01/02/25 0805   Wound Volume (cm^3) 0.024 cm^3 01/02/25 0805   Calculated Wound Volume (cm^3) 0.02 cm^3 01/02/25 0805   Change in Wound Size % 99.5 01/02/25 0805   Drainage Description Serosanguineous 01/02/25 0805   Non-staged Wound Description Full thickness 01/02/25 0805   Dressing Status Intact 01/02/25 0805           Wound Instructions:  Orders Placed This Encounter   Procedures    Wound cleansing and dressings Venous Ulcer Right Pretibial     Wound cleansing and dressings Venous Ulcer Right Pretibial       Right Leg wound:      Wash your hands with soap and water. Remove old dressing, discard into plastic bag and place in trash. Cleanse the wound with normal saline solution prior to applying a clean dressing. Do not use tissue or cotton balls. Do not scrub the wound. Pat dry using gauze.      Shower: Yes. Cleanse with a mild soap and water such as Dove. Be sure to remove your old dressing prior to getting in the shower.      Apply Hydrafera Blue Ready to the open wound.   Cover with gauze.  Secure with tape. (If your skin is breaking down from tape, then need to wrap with Armani and put tape on Armani)      Change dressing every other day.            · Wound cleansing and dressings Venous Ulcer Right Pretibial      Elastic Tubular Stocking: Spanda F     Tubular elastic bandage: Apply from base of toes to behind the knee. Apply in AM, may remove for sleep.  Avoid prolonged standing in one place.  Elevate leg(s) above the level of the heart when sitting or as much as possible.     Standing Status:   Future     Expiration Date:   1/9/2025    Wound Procedure Treatment Venous Ulcer Right Pretibial     " This order was created via procedure documentation       Vesta Garcia PA-C, [facility]      Portions of the record may have been created with voice recognition software. Occasional wrong word or \"sound alike\" substitutions may have occurred due to the inherent limitations of voice recognition software. Read the chart carefully and recognize, using context, where substitutions have occurred.      "

## 2025-01-02 NOTE — PATIENT INSTRUCTIONS
Orders Placed This Encounter   Procedures    Wound cleansing and dressings Venous Ulcer Right Pretibial     Wound cleansing and dressings Venous Ulcer Right Pretibial       Right Leg wound:      Wash your hands with soap and water. Remove old dressing, discard into plastic bag and place in trash. Cleanse the wound with normal saline solution prior to applying a clean dressing. Do not use tissue or cotton balls. Do not scrub the wound. Pat dry using gauze.      Shower: Yes. Cleanse with a mild soap and water such as Dove. Be sure to remove your old dressing prior to getting in the shower.      Apply Hydrafera Blue Ready to the open wound.   Cover with gauze.  Secure with tape. (If your skin is breaking down from tape, then need to wrap with Armani and put tape on Armani)      Change dressing every other day.            · Wound cleansing and dressings Venous Ulcer Right Pretibial      Elastic Tubular Stocking: Spanda F     Tubular elastic bandage: Apply from base of toes to behind the knee. Apply in AM, may remove for sleep.  Avoid prolonged standing in one place.  Elevate leg(s) above the level of the heart when sitting or as much as possible.     Standing Status:   Future     Expiration Date:   1/9/2025    Wound Procedure Treatment Venous Ulcer Right Pretibial     This order was created via procedure documentation

## 2025-01-06 ENCOUNTER — REMOTE DEVICE CLINIC VISIT (OUTPATIENT)
Dept: CARDIOLOGY CLINIC | Facility: CLINIC | Age: 85
End: 2025-01-06

## 2025-01-06 ENCOUNTER — RESULTS FOLLOW-UP (OUTPATIENT)
Dept: CARDIOLOGY CLINIC | Facility: CLINIC | Age: 85
End: 2025-01-06

## 2025-01-06 DIAGNOSIS — I44.2 CHB (COMPLETE HEART BLOCK) (HCC): ICD-10-CM

## 2025-01-06 DIAGNOSIS — I48.91 ATRIAL FIBRILLATION, UNSPECIFIED TYPE (HCC): Primary | ICD-10-CM

## 2025-01-06 PROCEDURE — RECHECK: Performed by: INTERNAL MEDICINE

## 2025-01-06 NOTE — PROGRESS NOTES
Results for orders placed or performed in visit on 01/06/25   Cardiac EP device report    Narrative    SJM DC PM/NOT MRI CONDITIONAL  NON-BILLABLE MERLIN TRANSMISSION: BATTERY STATUS: BATTERY VOLTAGE NEARING LACIE (1.1 YRS).  WILL SCHEDULE MONTHLY BATTERY CHECKS. AP: <1%. : 99% (>40%~CHB). ALL AVAILABLE LEAD PARAMETERS WITHIN NORMAL LIMITS. 1 AMS W/ AF IN PROGRESS (HX OF SAME). AF BURDEN: 100%. PT TAKES WARFARIN, ASA 81MG, METOPROLOL TART. EF: 40% (ECHO 8/26/24). NORMAL DEVICE FUNCTION. CH

## 2025-01-13 DIAGNOSIS — K21.9 GASTROESOPHAGEAL REFLUX DISEASE: ICD-10-CM

## 2025-01-14 ENCOUNTER — CONSULT (OUTPATIENT)
Dept: VASCULAR SURGERY | Facility: CLINIC | Age: 85
End: 2025-01-14
Payer: COMMERCIAL

## 2025-01-14 ENCOUNTER — OFFICE VISIT (OUTPATIENT)
Age: 85
End: 2025-01-14
Payer: COMMERCIAL

## 2025-01-14 ENCOUNTER — TELEPHONE (OUTPATIENT)
Dept: VASCULAR SURGERY | Facility: CLINIC | Age: 85
End: 2025-01-14

## 2025-01-14 VITALS
HEART RATE: 60 BPM | OXYGEN SATURATION: 99 % | WEIGHT: 162 LBS | SYSTOLIC BLOOD PRESSURE: 131 MMHG | DIASTOLIC BLOOD PRESSURE: 60 MMHG | BODY MASS INDEX: 26.03 KG/M2 | HEIGHT: 66 IN

## 2025-01-14 VITALS
HEART RATE: 66 BPM | SYSTOLIC BLOOD PRESSURE: 136 MMHG | DIASTOLIC BLOOD PRESSURE: 60 MMHG | WEIGHT: 162 LBS | HEIGHT: 66 IN | OXYGEN SATURATION: 95 % | BODY MASS INDEX: 26.03 KG/M2

## 2025-01-14 DIAGNOSIS — I87.331 CHRONIC VENOUS HYPERTENSION WITH ULCER AND INFLAMMATION INVOLVING RIGHT SIDE (HCC): ICD-10-CM

## 2025-01-14 DIAGNOSIS — J01.90 SUBACUTE SINUSITIS, UNSPECIFIED LOCATION: Primary | ICD-10-CM

## 2025-01-14 DIAGNOSIS — N18.32 STAGE 3B CHRONIC KIDNEY DISEASE (HCC): ICD-10-CM

## 2025-01-14 DIAGNOSIS — I83.019 VENOUS ULCER OF RIGHT LEG (HCC): Primary | ICD-10-CM

## 2025-01-14 DIAGNOSIS — I87.301 STASIS EDEMA OF RIGHT LOWER EXTREMITY: ICD-10-CM

## 2025-01-14 DIAGNOSIS — L97.919 VENOUS ULCER OF RIGHT LEG (HCC): Primary | ICD-10-CM

## 2025-01-14 PROCEDURE — 99203 OFFICE O/P NEW LOW 30 MIN: CPT | Performed by: SURGERY

## 2025-01-14 PROCEDURE — 99213 OFFICE O/P EST LOW 20 MIN: CPT

## 2025-01-14 RX ORDER — PANTOPRAZOLE SODIUM 40 MG/1
40 TABLET, DELAYED RELEASE ORAL DAILY
Qty: 100 TABLET | Refills: 0 | Status: SHIPPED | OUTPATIENT
Start: 2025-01-14

## 2025-01-14 RX ORDER — CHLORHEXIDINE GLUCONATE ORAL RINSE 1.2 MG/ML
15 SOLUTION DENTAL ONCE
OUTPATIENT
Start: 2025-01-14 | End: 2025-01-14

## 2025-01-14 RX ORDER — FLUTICASONE PROPIONATE 50 MCG
1 SPRAY, SUSPENSION (ML) NASAL DAILY
Qty: 16 G | Refills: 0 | Status: SHIPPED | OUTPATIENT
Start: 2025-01-14

## 2025-01-14 RX ORDER — CEFAZOLIN SODIUM 2 G/50ML
2000 SOLUTION INTRAVENOUS ONCE
OUTPATIENT
Start: 2025-01-14 | End: 2025-01-14

## 2025-01-14 NOTE — TELEPHONE ENCOUNTER
REMINDER: Under Reason For Call, comments MUST be formatted as:   (Surgeon's Initials) / (Procedure)      Special Instructions / FYI:     Consent: I certify that patient has signed, printed, timed, and dated their surgery consent.  I certify that the patient's LEGAL NAME and DATE OF BIRTH are written in the upper left corner on BOTH sides of the consent.  I certify that BOTH sides of the completed surgery consent have been scanned into the patient's Epic chart by myself on 1/14/2025.  Yes, I have LABELED the consent in Epic as Consent for Vascular Procedure.     For Surgical Clearances     Levels   1-3   ROUTE this encounter to [facility] Clearance Pool (AND)   [facility] Surgery Coordinator Pool     Level   4   ROUTE this encounter to [facility] Surgery Coordinator Pool       HYDRATION CLEARANCES   ONLY ROUTE TO  [facility] Clearance Pool       Yes, I have ROUTED this encounter to [facility] Surgery Coordinator and/or [facility] Clearance Pool.

## 2025-01-14 NOTE — ASSESSMENT & PLAN NOTE
Venous ulcer in right medial calf ongoing for over 2 years.  Underlying diabetes and chronic anemia as contributing factors.  Reviewed venous duplex, there is a refluxing incompetent  in base of wound.  The GSV is competent.  A biopsy was also taken and there is no cancer.    We discussed the risks and benefits of  treatment with sclerotherapy.  Small risk of DVT were discussed  She is on long term warfarin.        Operative Scheduling Information:    Hospital:  [facility]    Physician:  Rachelle    Surgery: Right leg  sclerotherapy    Urgency:  Standard    Level:  Level 4: Outpatients to be scheduled for screening procedures and elective surgery that can be delayed for longer than one month without reasonable expectation of detriment to patient.    Case Length:  1 hours    Post-op Bed:  Outpatient    OR Table:  Standard    Equipment Needs:  Product/Implant: 1% asclera    Medication Instructions:  Jardiance - hold for 7 days  Warfarin - do not hold    Hydration:  No    Contrast Allergy:  No    Venous Clinical Severity Scores (VCSS)  Item Absent   (0 points) Mild   (1 point) Moderate   (2 points) Severe   (3 points)   Pain [] None [x] Occasional [] Daily [] Daily limiting   Varicose veins [] None [] Few [] Calf or thigh [x] Calf and thigh   Venous edema [] None [x] Foot and ankle [] Above ankle, below knee [] To knee of above   Skin pigmentation [x] None [] Perimalleolar [] Diffuse, lower 1/3 calf [] Wider, above lower 1/3 calf   Inflammation [] None [x] Perimalleolar [] Diffuse, lower 1/3 calf [] Wider, above lower 1/3 calf   Induration [] None [] Perimalleolar [] Diffuse, lower 1/3 calf [] Wider, above lower 1/3 calf   No. active ulcers [] None [x] 1 [] 2 [] >=3   Active ulcer size [] None [] <2 cm [x] 2 - 6 cm [] >6 cm   Ulcer duration [] None [] <3 months [] 3 - 12 months [x] >1 year   Compression therapy [] None [] Intermittent [] Most days [x] Fully comply   Total 15          CEAP  Clinical Classification  [x] Symptomatic   [] Asymptomatic     [] Class 0 No visible or palpable signs of venous disease   [] Class 1 Telangiectasies or reticular veins   [] Class 2 Varicose veins; distinguished from reticular veins by a diameter of 3mm or more   [] Class 3 Edema   [] Class 4 Changes in skin and subcutaneous tissue secondary to CVD    [] Class 4a Pigmentation or eczema   [] Class 4b Lipodermatosclerosis or atrophie rena   [] Class 5 Healed venous ulcer   [x] Class 6 Active venous ulcer         Orders:  •  Case request operating room: sclerotherapy of right ; Standing

## 2025-01-14 NOTE — PROGRESS NOTES
Name: Ely Hernadez      : 1940      MRN: [id number]  Encounter Provider: Verónica Lam PA-C  Encounter Date: 2025   Encounter [facility]  :  Assessment & Plan  Subacute sinusitis, unspecified location  Appears to be bacterial nature.  If symptoms do not resolve by the time patient completes antibiotic course, recommended she reach out for further evaluation.  Orders:    amoxicillin-clavulanate (AUGMENTIN) 875-125 mg per tablet; Take 1 tablet by mouth every 12 (twelve) hours for 7 days    fluticasone (FLONASE) 50 mcg/act nasal spray; 1 spray into each nostril daily           History of Present Illness     Patient is an 84 year old female presenting for an acute visit.   4 weeks ago-started with congestion, headache, ear pain  Started to get better for a few days and then would get worse again. This happened numerous times throughout the past 4 weeks. Has been taking coricidin for the symptoms-has used 4 boxes over the past 4 weeks.   Today has bad nasal congestion, body aches, SOB (says it gets bad for her when she gets sick), sneezing a lot.  Denies n/v/d. Denies cough.  Eating and drinking well-has no appetite though.      Review of Systems   Constitutional:  Positive for appetite change and fatigue. Negative for chills and fever.   HENT:  Negative for ear discharge, ear pain, postnasal drip, rhinorrhea, sinus pressure, sinus pain, sore throat, tinnitus and trouble swallowing.    Eyes:  Negative for pain, discharge and itching.   Respiratory:  Negative for cough, shortness of breath and wheezing.    Cardiovascular:  Negative for chest pain, palpitations and leg swelling.   Gastrointestinal:  Negative for abdominal pain, constipation, diarrhea, nausea and vomiting.   Endocrine: Negative for polydipsia, polyphagia and polyuria.   Genitourinary:  Negative for difficulty urinating, frequency, hematuria and urgency.   Musculoskeletal:  Negative for  "arthralgias, joint swelling and myalgias.   Skin:  Negative for color change.   Allergic/Immunologic: Negative for environmental allergies.   Neurological:  Negative for dizziness, weakness, light-headedness, numbness and headaches.   Hematological:  Negative for adenopathy.   Psychiatric/Behavioral:  Negative for decreased concentration and sleep disturbance. The patient is not nervous/anxious.        Objective   /60   Pulse 60   Ht 5' 6\" (1.676 m)   Wt 73.5 kg (162 lb)   SpO2 99%   BMI 26.15 kg/m²      Physical Exam  Vitals and nursing note reviewed.   Constitutional:       General: She is awake. She is not in acute distress.     Appearance: Normal appearance. She is well-developed, well-groomed and normal weight.   HENT:      Head: Normocephalic and atraumatic.      Right Ear: Hearing and external ear normal.      Left Ear: Hearing and external ear normal.      Nose: Congestion and rhinorrhea present.      Mouth/Throat:      Lips: Pink.      Mouth: Mucous membranes are moist.   Eyes:      General: Lids are normal. Vision grossly intact. Gaze aligned appropriately.      Conjunctiva/sclera: Conjunctivae normal.   Neck:      Vascular: No carotid bruit.      Trachea: Trachea and phonation normal.   Cardiovascular:      Rate and Rhythm: Normal rate and regular rhythm.      Heart sounds: Normal heart sounds, S1 normal and S2 normal. No murmur heard.     No friction rub. No gallop.   Pulmonary:      Effort: Pulmonary effort is normal. No respiratory distress.      Breath sounds: Normal breath sounds and air entry. No decreased breath sounds, wheezing, rhonchi or rales.   Abdominal:      General: Abdomen is flat. Bowel sounds are normal.      Palpations: Abdomen is soft.      Tenderness: There is no abdominal tenderness.   Musculoskeletal:         General: No swelling.      Cervical back: Neck supple.      Right lower leg: No edema.      Left lower leg: No edema.   Skin:     General: Skin is warm.      " Capillary Refill: Capillary refill takes less than 2 seconds.   Neurological:      Mental Status: She is alert.   Psychiatric:         Attention and Perception: Attention and perception normal.         Mood and Affect: Mood and affect normal.         Speech: Speech normal.         Behavior: Behavior normal. Behavior is cooperative.         Thought Content: Thought content normal.         Cognition and Memory: Cognition and memory normal.         Judgment: Judgment normal.

## 2025-01-14 NOTE — ASSESSMENT & PLAN NOTE
Lab Results   Component Value Date    EGFR 36 11/15/2024    EGFR 33 09/27/2024    EGFR 35 08/21/2024    CREATININE 1.34 (H) 11/15/2024    CREATININE 1.44 (H) 09/27/2024    CREATININE 1.37 (H) 08/21/2024     Orders:  •  Case request operating room: sclerotherapy of right ; Standing

## 2025-01-14 NOTE — TELEPHONE ENCOUNTER
Operative Scheduling Information:     Hospital:  [facility]     Physician:  Rachelle     Surgery: Right leg  sclerotherapy     Urgency:  Standard     Level:  Level 4: Outpatients to be scheduled for screening procedures and elective surgery that can be delayed for longer than one month without reasonable expectation of detriment to patient.     Case Length:  1 hours     Post-op Bed:  Outpatient     OR Table:  Standard     Equipment Needs:  Product/Implant: 1% asclera     Medication Instructions:  Jardiance - hold for 7 days  Warfarin - do not hold     Hydration:  No     Contrast Allergy:  No     Venous Clinical Severity Scores (VCSS)  Item Absent   (0 points) Mild   (1 point) Moderate   (2 points) Severe   (3 points)   Pain [] None [x] Occasional [] Daily [] Daily limiting   Varicose veins [] None [] Few [] Calf or thigh [x] Calf and thigh   Venous edema [] None [x] Foot and ankle [] Above ankle, below knee [] To knee of above   Skin pigmentation [x] None [] Perimalleolar [] Diffuse, lower 1/3 calf [] Wider, above lower 1/3 calf   Inflammation [] None [x] Perimalleolar [] Diffuse, lower 1/3 calf [] Wider, above lower 1/3 calf   Induration [] None [] Perimalleolar [] Diffuse, lower 1/3 calf [] Wider, above lower 1/3 calf   No. active ulcers [] None [x] 1 [] 2 [] >=3   Active ulcer size [] None [] <2 cm [x] 2 - 6 cm [] >6 cm   Ulcer duration [] None [] <3 months [] 3 - 12 months [x] >1 year   Compression therapy [] None [] Intermittent [] Most days [x] Fully comply   Total 15               CEAP Clinical Classification  [x] Symptomatic   [] Asymptomatic      [] Class 0 No visible or palpable signs of venous disease   [] Class 1 Telangiectasies or reticular veins   [] Class 2 Varicose veins; distinguished from reticular veins by a diameter of 3mm or more   [] Class 3 Edema   [] Class 4 Changes in skin and subcutaneous tissue secondary to CVD    [] Class 4a Pigmentation or eczema   [] Class 4b Lipodermatosclerosis or  atropfarhana chase   [] Class 5 Healed venous ulcer   [x] Class 6 Active venous ulcer

## 2025-01-14 NOTE — PROGRESS NOTES
Name: Ely Hernadez      : 1940      MRN: [id number]  Encounter Provider: Chris Bush MD  Encounter Date: 2025   [facility] [place]  :  Assessment & Plan  Chronic venous hypertension with ulcer and inflammation involving right side (HCC)  See below  Orders:  •  Ambulatory Referral to Vascular Surgery  •  Case request operating room: sclerotherapy of right ; Standing    Stasis edema of right lower extremity  Continue compression stockings  Orders:  •  Ambulatory Referral to Vascular Surgery  •  Case request operating room: sclerotherapy of right ; Standing    Venous ulcer of right leg (HCC)  Venous ulcer in right medial calf ongoing for over 2 years.  Underlying diabetes and chronic anemia as contributing factors.  Reviewed venous duplex, there is a refluxing incompetent  in base of wound.  The GSV is competent.  A biopsy was also taken and there is no cancer.    We discussed the risks and benefits of  treatment with sclerotherapy.  Small risk of DVT were discussed  She is on long term warfarin.        Operative Scheduling Information:    Hospital:  [facility]    Physician:  Rachelle    Surgery: Right leg  sclerotherapy    Urgency:  Standard    Level:  Level 4: Outpatients to be scheduled for screening procedures and elective surgery that can be delayed for longer than one month without reasonable expectation of detriment to patient.    Case Length:  1 hours    Post-op Bed:  Outpatient    OR Table:  Standard    Equipment Needs:  Product/Implant: 1% asclera    Medication Instructions:  Jardiance - hold for 7 days  Warfarin - do not hold    Hydration:  No    Contrast Allergy:  No    Venous Clinical Severity Scores (VCSS)  Item Absent   (0 points) Mild   (1 point) Moderate   (2 points) Severe   (3 points)   Pain [] None [x] Occasional [] Daily [] Daily limiting   Varicose veins [] None [] Few [] Calf or thigh [x]  Calf and thigh   Venous edema [] None [x] Foot and ankle [] Above ankle, below knee [] To knee of above   Skin pigmentation [x] None [] Perimalleolar [] Diffuse, lower 1/3 calf [] Wider, above lower 1/3 calf   Inflammation [] None [x] Perimalleolar [] Diffuse, lower 1/3 calf [] Wider, above lower 1/3 calf   Induration [] None [] Perimalleolar [] Diffuse, lower 1/3 calf [] Wider, above lower 1/3 calf   No. active ulcers [] None [x] 1 [] 2 [] >=3   Active ulcer size [] None [] <2 cm [x] 2 - 6 cm [] >6 cm   Ulcer duration [] None [] <3 months [] 3 - 12 months [x] >1 year   Compression therapy [] None [] Intermittent [] Most days [x] Fully comply   Total 15          CEAP Clinical Classification  [x] Symptomatic   [] Asymptomatic     [] Class 0 No visible or palpable signs of venous disease   [] Class 1 Telangiectasies or reticular veins   [] Class 2 Varicose veins; distinguished from reticular veins by a diameter of 3mm or more   [] Class 3 Edema   [] Class 4 Changes in skin and subcutaneous tissue secondary to CVD    [] Class 4a Pigmentation or eczema   [] Class 4b Lipodermatosclerosis or atrophie rena   [] Class 5 Healed venous ulcer   [x] Class 6 Active venous ulcer         Orders:  •  Case request operating room: sclerotherapy of right ; Standing    Stage 3b chronic kidney disease (HCC)  Lab Results   Component Value Date    EGFR 36 11/15/2024    EGFR 33 09/27/2024    EGFR 35 08/21/2024    CREATININE 1.34 (H) 11/15/2024    CREATININE 1.44 (H) 09/27/2024    CREATININE 1.37 (H) 08/21/2024     Orders:  •  Case request operating room: sclerotherapy of right ; Standing        History of Present Illness   HPI  Eyl Hernadez is a 84 y.o. female who presents for follow for chronic venous ulcer ongoing for 2 years.  History obtained from: patient    Review of Systems   Constitutional: Negative.    HENT: Negative.     Eyes: Negative.    Respiratory: Negative.     Cardiovascular: Negative.     Gastrointestinal: Negative.    Endocrine: Negative.    Genitourinary: Negative.    Musculoskeletal: Negative.    Skin: Negative.    Allergic/Immunologic: Negative.    Neurological: Negative.    Hematological: Negative.    Psychiatric/Behavioral: Negative.       Past Medical History   Past Medical History:   Diagnosis Date   • Acute anterior epistaxis 12/23/2023   • Acute blood loss anemia 12/14/2023   • Acute respiratory failure with hypoxia (HCC) 02/06/2024   • Anemia    • Aneurysm of thoracic aorta (HCC)    • Angioedema 06/07/2019   • Aortic valve disorder    • Benign neoplasm of sigmoid colon    • Cardiomyopathy (HCC)     last assessed: 10/10/2014   • CHF (congestive heart failure) (HCC)    • Chronic kidney disease    • 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HCC)     last assessed: 10/10/2014   • COPD (chronic obstructive pulmonary disease) (HCC)    • Diabetic nephropathy (HCC)    • Disease of thyroid gland    • RAMON (dyspnea on exertion)    • GERD (gastroesophageal reflux disease)    • History of emphysema (HCC)    • History of transfusion    • Hyperlipidemia    • Hypertensive urgency 10/28/2023   • Leg cramps 11/01/2023   • Stroke-like symptoms 10/28/2023   • TIA (transient ischemic attack)      Past Surgical History:   Procedure Laterality Date   • AORTIC VALVE REPLACEMENT     • CARDIAC PACEMAKER PLACEMENT      last assessed: 10/10/2014   • CARDIAC SURGERY      aortic valve replacement   • CHOLECYSTECTOMY     • COLONOSCOPY     • EGD     • HYSTERECTOMY     • INSERT / REPLACE / REMOVE PACEMAKER     • KNEE SURGERY Right    • OTHER SURGICAL HISTORY      Capsule ENDOscopy description: 12/19/2012   • TN COLONOSCOPY FLX DX W/COLLJ SPEC WHEN PFRMD N/A 05/31/2018    Procedure: single balloon ENTEROSCOPY;  Surgeon: David Dennis MD;  Location: [facility];  Service: Gastroenterology   • TN ESOPHAGOGASTRODUODENOSCOPY TRANSORAL DIAGNOSTIC N/A 11/29/2017    Procedure: EGD AND COLONOSCOPY;  Surgeon: Jay Mcleod III, MD;   Location: [facility];  Service: Gastroenterology     Family History   Problem Relation Age of Onset   • No Known Problems Mother    • No Known Problems Father       reports that she quit smoking about 17 years ago. Her smoking use included cigarettes. She started smoking about 70 years ago. She has a 52.9 pack-year smoking history. She has been exposed to tobacco smoke. She quit smokeless tobacco use about 18 years ago. She reports that she does not drink alcohol and does not use drugs.  Current Outpatient Medications on File Prior to Visit   Medication Sig Dispense Refill   • Accu-Chek FastClix Lancets MISC Use 3 (three) times a day 300 each 3   • albuterol (Ventolin HFA) 90 mcg/act inhaler Inhale 2 puffs every 6 (six) hours as needed for wheezing 54 g 3   • Ascorbic Acid (vitamin C) 1000 MG tablet Take 1,000 mg by mouth daily     • aspirin (ECOTRIN LOW STRENGTH) 81 mg EC tablet Take 1 tablet (81 mg total) by mouth daily Do not start before November 1, 2023. 30 tablet 0   • b complex vitamins capsule Take 1 capsule by mouth daily 30 capsule 2   • Blood Glucose Monitoring Suppl (Accu-Chek Guide Me) w/Device KIT USE 3 TIMES DAILY 1 kit 2   • Cholecalciferol (Vitamin D3) 50 MCG (2000 UT) TABS Take 2,000 Units by mouth daily     • Empagliflozin (Jardiance) 10 MG TABS tablet Take 1 tablet (10 mg total) by mouth in the morning 90 tablet 1   • Ferrous Sulfate (IRON PO) Take by mouth daily in the early morning OTC     • furosemide (LASIX) 40 mg tablet Take 1 tablet (40 mg total) by mouth 2 (two) times a day 180 tablet 3   • gabapentin (NEURONTIN) 300 mg capsule TAKE 1 CAPSULE BY MOUTH 3 TIMES  DAILY 300 capsule 1   • glipiZIDE (GLUCOTROL) 10 mg tablet TAKE 1 TABLET BY MOUTH TWICE  DAILY BEFORE MEALS 200 tablet 1   • glucose blood (Accu-Chek Celeste Plus) test strip Use 1 each 3 (three) times a day Use as instructed 300 each 3   • insulin degludec (TRESIBA) 100 units/mL injection pen Inject 30 Units under the skin daily 30 mL  "3   • Insulin Pen Needle (BD Pen Needle Rosie U/F) 32G X 4 MM MISC Use daily 100 each 1   • ipratropium-albuterol (DUO-NEB) 0.5-2.5 mg/3 mL nebulizer solution inhale contents of 1 vial ( 3 milliliters ) in nebulizer four times a day     • Lancets (freestyle) lancets Check blood glucose 3 times daily 300 each 0   • levothyroxine 50 mcg tablet TAKE 1 TABLET BY MOUTH DAILY 100 tablet 1   • metoprolol tartrate (LOPRESSOR) 50 mg tablet TAKE ONE-HALF TABLET BY MOUTH  TWICE DAILY 100 tablet 1   • oxygen gas Inhale 2 L/min daily at bedtime as needed home oxygen nightly     • pantoprazole (PROTONIX) 40 mg tablet TAKE 1 TABLET BY MOUTH DAILY AS  DIRECTED 100 tablet 1   • warfarin (COUMADIN) 5 mg tablet TAKE 1 TO 2 TABLETS BY  MOUTH DAILY OR AS DIRECTED 180 tablet 3   • clobetasol (TEMOVATE) 0.05 % ointment Apply topically 2 (two) times a day (Patient not taking: Reported on 1/14/2025) 30 g 1   • enoxaparin (LOVENOX) 80 mg/0.8 mL Inject 0.7 mL (70 mg total) under the skin every 24 hours Hold Warfarin 5 days prior. Last dose is 9/6 Start Lovenox on 9/8, 4 days prior to the procedure. No Lovenox the morning of the procedure. Resume both Lovenox and Warfarin at the discretion of the surgeon (preferably that evening) and check INR 4 days after restarting. Inject in the abdomen about 2 inches from the belly button and rotate sides at each injection. (Patient not taking: Reported on 11/26/2024) 0.7 mL 10   • mupirocin (BACTROBAN) 2 % ointment Apply topically daily (Patient not taking: Reported on 1/14/2025) 22 g 0     Current Facility-Administered Medications on File Prior to Visit   Medication Dose Route Frequency Provider Last Rate Last Admin   • lidocaine (LMX) 4 % cream   Topical Once Vesta Garcia PA-C       No Known Allergies      Objective   /60 (Patient Position: Sitting, Cuff Size: Adult)   Pulse 66   Ht 5' 6\" (1.676 m)   Wt 73.5 kg (162 lb)   SpO2 95%   BMI 26.15 kg/m²      Physical Exam  Vitals and nursing note " reviewed.   Constitutional:       Appearance: Normal appearance.   HENT:      Head: Normocephalic and atraumatic.   Cardiovascular:      Rate and Rhythm: Normal rate and regular rhythm.      Heart sounds: Normal heart sounds.      Comments: Tripahsic right DP signals  Pulmonary:      Effort: Pulmonary effort is normal.      Breath sounds: Normal breath sounds.   Abdominal:      Palpations: Abdomen is soft.   Musculoskeletal:      Right lower leg: Edema present.      Left lower leg: No edema.   Skin:     General: Skin is warm and dry.      Capillary Refill: Capillary refill takes less than 2 seconds.      Comments: Venous ulcer on right medial aspect of upper calf.  Surrounding lipodermatosclerosis     Neurological:      General: No focal deficit present.      Mental Status: She is alert.   Psychiatric:         Mood and Affect: Mood normal.         Behavior: Behavior normal.

## 2025-01-15 ENCOUNTER — PREP FOR PROCEDURE (OUTPATIENT)
Dept: VASCULAR SURGERY | Facility: CLINIC | Age: 85
End: 2025-01-15

## 2025-01-15 DIAGNOSIS — L97.919 VENOUS ULCER OF RIGHT LEG (HCC): Primary | ICD-10-CM

## 2025-01-15 DIAGNOSIS — I83.019 VENOUS ULCER OF RIGHT LEG (HCC): Primary | ICD-10-CM

## 2025-01-15 DIAGNOSIS — I87.331 CHRONIC VENOUS HYPERTENSION WITH ULCER AND INFLAMMATION INVOLVING RIGHT SIDE (HCC): ICD-10-CM

## 2025-01-15 NOTE — TELEPHONE ENCOUNTER
Verified patient's insurance   CONFIRMED - Patient's insurance is [employer]  Is patient requesting a call when authorization has been obtained? Patient did not request a call.    Surgery Date: 1/24/25  Primary Surgeon: MARAH Ellis/ Chris Bush (NPI: [id number])  Assisting Surgeon: Not Applicable (N/A)  Facility: [facility] (Tax: [id number] / NPI: [id number])  Inpatient / Outpatient: Outpatient  Level: 4    Clearance Received: No clearance ordered.  Consent Received: Yes, scanned into Epic on 1/14/25.  Medication Hold / Last Dose:  Hold jardiance 7 days last dose 1/16/25  IR Notified: Not Applicable (N/A)  Rep. Notified: Not Applicable (N/A)  Equipment Needs: Not Applicable (N/A)  Vas Lab Requested: Not Applicable (N/A)  Patient Contacted: 1/15/25    Diagnosis: I83.019, L97.919  Procedure/ CPT Code(s):  SCLERO - 93937    For varicose vein related procedures:   Last LEVDR:  11/6/24, patient's LEVDR was completed within 12-months of their procedure date.  CEAP Classification:  6  VCSS:  15    Post Operative Date/ Time: 2/3/25 , 330p [place] with Shantelle Gonzalez (NPI: [id number]) - Patient aware     *Please review medication hold(s), PATs, and check H&P with patient.*  PATIENT WAS MAILED SURGERY/SHOWERING/DISCHARGE/COVID INSTRUCTIONS AFTER REVIEWING WITH THEM VIA PHONE CALL.

## 2025-01-16 NOTE — TELEPHONE ENCOUNTER
Spoke to patient (01/16/2025) in regards of medication hold for up coming procedure on (01/24/2025) with (). Last dose for (Jardiance ) is (01/16/2025).    Area L Indication Text: Tumors in this location are included in Area L (trunk and extremities).  Mohs surgery is indicated for larger tumors, or tumors with aggressive histologic features, in these anatomic locations.

## 2025-01-20 ENCOUNTER — TELEPHONE (OUTPATIENT)
Age: 85
End: 2025-01-20

## 2025-01-20 NOTE — TELEPHONE ENCOUNTER
Patients son called in. Patient was seen on 1/14 for a Head cold, stuffy nose, coughing, and sore throat. Patient is still not feeling well and has one more pill left for tomorrow. Patient is asking if medication can be sent into : [id number] - [address]    Son says she feels a little better but still has some of these lingering symptoms. Please advise

## 2025-01-21 DIAGNOSIS — J06.9 UPPER RESPIRATORY TRACT INFECTION, UNSPECIFIED TYPE: ICD-10-CM

## 2025-01-21 DIAGNOSIS — R05.2 SUBACUTE COUGH: Primary | ICD-10-CM

## 2025-01-21 NOTE — TELEPHONE ENCOUNTER
Patient called today on 01/21/2025  and has cancelled his procedure due not feeling ill. Patient states she will call us back when feeling better to reschedule.

## 2025-01-21 NOTE — TELEPHONE ENCOUNTER
Cold, cough, head hurts, sinus hurts, coughs up phlegm-green colored    Has surgery scheduled for 1/24-can she still have it sick like this?

## 2025-01-21 NOTE — TELEPHONE ENCOUNTER
Patients son calling in to cancel upcoming surgery states pt is not feeling well; patients son will call back when pt is feeling better; called office to advise;

## 2025-01-22 ENCOUNTER — RESULTS FOLLOW-UP (OUTPATIENT)
Age: 85
End: 2025-01-22

## 2025-01-22 ENCOUNTER — RESULTS FOLLOW-UP (OUTPATIENT)
Dept: CARDIOLOGY CLINIC | Facility: CLINIC | Age: 85
End: 2025-01-22

## 2025-01-22 ENCOUNTER — APPOINTMENT (OUTPATIENT)
Age: 85
End: 2025-01-22
Payer: COMMERCIAL

## 2025-01-22 ENCOUNTER — ANTICOAG VISIT (OUTPATIENT)
Dept: CARDIOLOGY CLINIC | Facility: CLINIC | Age: 85
End: 2025-01-22

## 2025-01-22 DIAGNOSIS — E53.8 B12 DEFICIENCY: ICD-10-CM

## 2025-01-22 DIAGNOSIS — R05.2 SUBACUTE COUGH: ICD-10-CM

## 2025-01-22 DIAGNOSIS — I48.91 ATRIAL FIBRILLATION, UNSPECIFIED TYPE (HCC): ICD-10-CM

## 2025-01-22 DIAGNOSIS — J06.9 UPPER RESPIRATORY TRACT INFECTION, UNSPECIFIED TYPE: ICD-10-CM

## 2025-01-22 DIAGNOSIS — I48.91 ATRIAL FIBRILLATION, UNSPECIFIED TYPE (HCC): Primary | ICD-10-CM

## 2025-01-22 DIAGNOSIS — D50.0 IRON DEFICIENCY ANEMIA DUE TO CHRONIC BLOOD LOSS: ICD-10-CM

## 2025-01-22 DIAGNOSIS — Z79.01 LONG TERM (CURRENT) USE OF ANTICOAGULANTS: ICD-10-CM

## 2025-01-22 LAB
BASOPHILS # BLD AUTO: 0.04 THOUSANDS/ΜL (ref 0–0.1)
BASOPHILS NFR BLD AUTO: 1 % (ref 0–1)
CRP SERPL QL: 6.4 MG/L
EOSINOPHIL # BLD AUTO: 0.08 THOUSAND/ΜL (ref 0–0.61)
EOSINOPHIL NFR BLD AUTO: 1 % (ref 0–6)
ERYTHROCYTE [DISTWIDTH] IN BLOOD BY AUTOMATED COUNT: 13.5 % (ref 11.6–15.1)
ERYTHROCYTE [SEDIMENTATION RATE] IN BLOOD: 16 MM/HOUR (ref 0–29)
FERRITIN SERPL-MCNC: 72 NG/ML (ref 11–307)
HCT VFR BLD AUTO: 41.7 % (ref 34.8–46.1)
HGB BLD-MCNC: 13 G/DL (ref 11.5–15.4)
IMM GRANULOCYTES # BLD AUTO: 0.05 THOUSAND/UL (ref 0–0.2)
IMM GRANULOCYTES NFR BLD AUTO: 1 % (ref 0–2)
INR PPP: 2.4 (ref 0.85–1.19)
IRON SATN MFR SERPL: 85 % (ref 15–50)
IRON SERPL-MCNC: 300 UG/DL (ref 50–212)
LYMPHOCYTES # BLD AUTO: 1.23 THOUSANDS/ΜL (ref 0.6–4.47)
LYMPHOCYTES NFR BLD AUTO: 20 % (ref 14–44)
MCH RBC QN AUTO: 29.1 PG (ref 26.8–34.3)
MCHC RBC AUTO-ENTMCNC: 31.2 G/DL (ref 31.4–37.4)
MCV RBC AUTO: 94 FL (ref 82–98)
MONOCYTES # BLD AUTO: 0.51 THOUSAND/ΜL (ref 0.17–1.22)
MONOCYTES NFR BLD AUTO: 8 % (ref 4–12)
NEUTROPHILS # BLD AUTO: 4.36 THOUSANDS/ΜL (ref 1.85–7.62)
NEUTS SEG NFR BLD AUTO: 69 % (ref 43–75)
NRBC BLD AUTO-RTO: 0 /100 WBCS
PLATELET # BLD AUTO: 376 THOUSANDS/UL (ref 149–390)
PMV BLD AUTO: 10.7 FL (ref 8.9–12.7)
PROCALCITONIN SERPL-MCNC: <0.05 NG/ML
PROTHROMBIN TIME: 26.1 SECONDS (ref 12.3–15)
RBC # BLD AUTO: 4.46 MILLION/UL (ref 3.81–5.12)
RETICS # AUTO: ABNORMAL 10*3/UL (ref 14097–95744)
RETICS # CALC: 2.23 % (ref 0.37–1.87)
TIBC SERPL-MCNC: 351.4 UG/DL (ref 250–450)
TRANSFERRIN SERPL-MCNC: 251 MG/DL (ref 203–362)
UIBC SERPL-MCNC: <55 UG/DL (ref 155–355)
VIT B12 SERPL-MCNC: 753 PG/ML (ref 180–914)
WBC # BLD AUTO: 6.27 THOUSAND/UL (ref 4.31–10.16)

## 2025-01-22 PROCEDURE — 85652 RBC SED RATE AUTOMATED: CPT

## 2025-01-22 PROCEDURE — 83540 ASSAY OF IRON: CPT

## 2025-01-22 PROCEDURE — 85025 COMPLETE CBC W/AUTO DIFF WBC: CPT

## 2025-01-22 PROCEDURE — 82728 ASSAY OF FERRITIN: CPT

## 2025-01-22 PROCEDURE — 36415 COLL VENOUS BLD VENIPUNCTURE: CPT

## 2025-01-22 PROCEDURE — 82607 VITAMIN B-12: CPT

## 2025-01-22 PROCEDURE — 71046 X-RAY EXAM CHEST 2 VIEWS: CPT

## 2025-01-22 PROCEDURE — 83550 IRON BINDING TEST: CPT

## 2025-01-22 PROCEDURE — 85610 PROTHROMBIN TIME: CPT

## 2025-01-22 PROCEDURE — 84145 PROCALCITONIN (PCT): CPT

## 2025-01-22 PROCEDURE — 86140 C-REACTIVE PROTEIN: CPT

## 2025-01-22 PROCEDURE — 85045 AUTOMATED RETICULOCYTE COUNT: CPT

## 2025-01-22 NOTE — TELEPHONE ENCOUNTER
----- Message from Verónica Lam PA-C sent at 1/22/2025  3:51 PM EST -----  Chest Xray normal. How are you feeling?

## 2025-01-22 NOTE — TELEPHONE ENCOUNTER
Patient called back relayed results to patient as per provider message.     She did mention she was having some light headiness when standing to move around. Still not feeling better, her nose is stuffy and she is having diarrhea. Tried to warm transfer was advised provider was gone for the evening.     She would like a call back with what her provider recommends.        Thank you.

## 2025-01-23 NOTE — TELEPHONE ENCOUNTER
Patient called stating she is not really feeling better. She feels dizzy, has a lot of phlegm.     Please advise    Kip Rojo

## 2025-01-23 NOTE — TELEPHONE ENCOUNTER
Patient's son called to follow up on recommendations. Odell stated that he will be with the patient all day today.     Please return call with recommendations.     Please advise  Thank you

## 2025-01-24 ENCOUNTER — OFFICE VISIT (OUTPATIENT)
Age: 85
End: 2025-01-24
Payer: COMMERCIAL

## 2025-01-24 VITALS
HEIGHT: 66 IN | WEIGHT: 162 LBS | BODY MASS INDEX: 26.03 KG/M2 | HEART RATE: 78 BPM | OXYGEN SATURATION: 97 % | DIASTOLIC BLOOD PRESSURE: 68 MMHG | SYSTOLIC BLOOD PRESSURE: 132 MMHG

## 2025-01-24 DIAGNOSIS — J01.90 SUBACUTE SINUSITIS, UNSPECIFIED LOCATION: Primary | ICD-10-CM

## 2025-01-24 PROCEDURE — 99213 OFFICE O/P EST LOW 20 MIN: CPT

## 2025-01-24 PROCEDURE — G2211 COMPLEX E/M VISIT ADD ON: HCPCS

## 2025-01-24 RX ORDER — DOXYCYCLINE HYCLATE 100 MG
100 TABLET ORAL 2 TIMES DAILY
Qty: 14 TABLET | Refills: 0 | Status: SHIPPED | OUTPATIENT
Start: 2025-01-24 | End: 2025-01-31

## 2025-01-24 NOTE — TELEPHONE ENCOUNTER
Patient son states that he will not take his mom out today it too cold. All she needs is another 7 days on antibiotics an she will be fine. Please advise.

## 2025-01-24 NOTE — TELEPHONE ENCOUNTER
Patient called to follow up on request for antibiotic.    Relayed provider's comments and recommendation that she be seen in the office.    Patient refused and then reluctantly agreed to be seen today, 1/24/25, at 3pm

## 2025-01-24 NOTE — PROGRESS NOTES
Name: Ely Hernadez      : 1940      MRN: [id number]  Encounter Provider: Verónica Lam PA-C  Encounter Date: 2025   Encounter department: [facility] INTERNAL MEDICINE [address]  :  Assessment & Plan  Subacute sinusitis, unspecified location  Previously given Augmentin.  Will trial doxycycline as second line.  If this does not resolve situation-will consider ENT referral.  Orders:    doxycycline hyclate (VIBRA-TABS) 100 mg tablet; Take 1 tablet (100 mg total) by mouth 2 (two) times a day for 7 days           History of Present Illness   Patient is an 84 year old female presenting for an acute visit.   Does not feel like her congestion has completely resolved.   Still feels sinus pressure and drainage. Denies any fevers.  Still endorses a cough.       URI   Associated symptoms include congestion and sinus pain. Pertinent negatives include no abdominal pain, chest pain, coughing, diarrhea, ear pain, headaches, nausea, rhinorrhea, sore throat, vomiting or wheezing.     Review of Systems   Constitutional:  Negative for chills, fatigue and fever.   HENT:  Positive for congestion, postnasal drip, sinus pressure and sinus pain. Negative for ear discharge, ear pain, rhinorrhea, sore throat, tinnitus and trouble swallowing.    Eyes:  Negative for pain, discharge and itching.   Respiratory:  Negative for cough, shortness of breath and wheezing.    Cardiovascular:  Negative for chest pain, palpitations and leg swelling.   Gastrointestinal:  Negative for abdominal pain, constipation, diarrhea, nausea and vomiting.   Endocrine: Negative for polydipsia, polyphagia and polyuria.   Genitourinary:  Negative for difficulty urinating, frequency, hematuria and urgency.   Musculoskeletal:  Negative for arthralgias, joint swelling and myalgias.   Skin:  Negative for color change.   Allergic/Immunologic: Negative for environmental allergies.   Neurological:  Negative for dizziness, weakness, light-headedness,  "numbness and headaches.   Hematological:  Negative for adenopathy.   Psychiatric/Behavioral:  Negative for decreased concentration and sleep disturbance. The patient is not nervous/anxious.        Objective   /68   Pulse 78   Ht 5' 6\" (1.676 m)   Wt 73.5 kg (162 lb)   SpO2 97%   BMI 26.15 kg/m²      Physical Exam  Vitals and nursing note reviewed.   Constitutional:       General: She is awake. She is not in acute distress.     Appearance: Normal appearance. She is well-developed, well-groomed and normal weight.   HENT:      Head: Normocephalic and atraumatic.      Right Ear: Hearing and external ear normal.      Left Ear: Hearing and external ear normal.      Nose: Nose normal.      Mouth/Throat:      Lips: Pink.      Mouth: Mucous membranes are moist.   Eyes:      General: Lids are normal. Vision grossly intact. Gaze aligned appropriately.      Conjunctiva/sclera: Conjunctivae normal.   Neck:      Vascular: No carotid bruit.      Trachea: Trachea and phonation normal.   Cardiovascular:      Rate and Rhythm: Normal rate and regular rhythm.      Heart sounds: Normal heart sounds, S1 normal and S2 normal. No murmur heard.     No friction rub. No gallop.   Pulmonary:      Effort: Pulmonary effort is normal. No respiratory distress.      Breath sounds: Normal breath sounds and air entry. No decreased breath sounds, wheezing, rhonchi or rales.   Abdominal:      General: Abdomen is flat. Bowel sounds are normal.      Palpations: Abdomen is soft.      Tenderness: There is no abdominal tenderness.   Musculoskeletal:         General: No swelling.      Cervical back: Neck supple.      Right lower leg: No edema.      Left lower leg: No edema.   Skin:     General: Skin is warm.      Capillary Refill: Capillary refill takes less than 2 seconds.   Neurological:      Mental Status: She is alert.   Psychiatric:         Attention and Perception: Attention and perception normal.         Mood and Affect: Mood and affect " normal.         Speech: Speech normal.         Behavior: Behavior normal. Behavior is cooperative.         Thought Content: Thought content normal.         Cognition and Memory: Cognition and memory normal.         Judgment: Judgment normal.

## 2025-01-27 ENCOUNTER — RESULTS FOLLOW-UP (OUTPATIENT)
Dept: OTHER | Facility: HOSPITAL | Age: 85
End: 2025-01-27

## 2025-01-30 DIAGNOSIS — Z79.4 TYPE 2 DIABETES MELLITUS WITH STAGE 3B CHRONIC KIDNEY DISEASE, WITH LONG-TERM CURRENT USE OF INSULIN (HCC): ICD-10-CM

## 2025-01-30 DIAGNOSIS — N18.32 TYPE 2 DIABETES MELLITUS WITH STAGE 3B CHRONIC KIDNEY DISEASE, WITH LONG-TERM CURRENT USE OF INSULIN (HCC): ICD-10-CM

## 2025-01-30 DIAGNOSIS — E11.22 TYPE 2 DIABETES MELLITUS WITH STAGE 3B CHRONIC KIDNEY DISEASE, WITH LONG-TERM CURRENT USE OF INSULIN (HCC): ICD-10-CM

## 2025-01-30 RX ORDER — EMPAGLIFLOZIN 10 MG/1
10 TABLET, FILM COATED ORAL EVERY MORNING
Qty: 100 TABLET | Refills: 1 | Status: SHIPPED | OUTPATIENT
Start: 2025-01-30

## 2025-01-31 ENCOUNTER — TELEPHONE (OUTPATIENT)
Age: 85
End: 2025-01-31

## 2025-01-31 ENCOUNTER — OFFICE VISIT (OUTPATIENT)
Dept: WOUND CARE | Facility: CLINIC | Age: 85
End: 2025-01-31
Payer: COMMERCIAL

## 2025-01-31 ENCOUNTER — OFFICE VISIT (OUTPATIENT)
Age: 85
End: 2025-01-31
Payer: COMMERCIAL

## 2025-01-31 ENCOUNTER — OFFICE VISIT (OUTPATIENT)
Dept: HEMATOLOGY ONCOLOGY | Facility: CLINIC | Age: 85
End: 2025-01-31
Payer: COMMERCIAL

## 2025-01-31 VITALS
TEMPERATURE: 97.3 F | DIASTOLIC BLOOD PRESSURE: 68 MMHG | SYSTOLIC BLOOD PRESSURE: 159 MMHG | HEART RATE: 64 BPM | RESPIRATION RATE: 18 BRPM

## 2025-01-31 VITALS
OXYGEN SATURATION: 96 % | HEIGHT: 66 IN | RESPIRATION RATE: 18 BRPM | DIASTOLIC BLOOD PRESSURE: 70 MMHG | BODY MASS INDEX: 26.44 KG/M2 | SYSTOLIC BLOOD PRESSURE: 130 MMHG | WEIGHT: 164.5 LBS | TEMPERATURE: 98.6 F | HEART RATE: 68 BPM

## 2025-01-31 VITALS
DIASTOLIC BLOOD PRESSURE: 60 MMHG | BODY MASS INDEX: 26.52 KG/M2 | TEMPERATURE: 98.2 F | SYSTOLIC BLOOD PRESSURE: 110 MMHG | HEART RATE: 60 BPM | RESPIRATION RATE: 16 BRPM | WEIGHT: 165 LBS | HEIGHT: 66 IN | OXYGEN SATURATION: 96 %

## 2025-01-31 DIAGNOSIS — E11.22 TYPE 2 DIABETES MELLITUS WITH STAGE 3B CHRONIC KIDNEY DISEASE, WITH LONG-TERM CURRENT USE OF INSULIN (HCC): ICD-10-CM

## 2025-01-31 DIAGNOSIS — D50.0 IRON DEFICIENCY ANEMIA DUE TO CHRONIC BLOOD LOSS: Primary | ICD-10-CM

## 2025-01-31 DIAGNOSIS — Z95.2 H/O MECHANICAL AORTIC VALVE REPLACEMENT: ICD-10-CM

## 2025-01-31 DIAGNOSIS — N18.32 TYPE 2 DIABETES MELLITUS WITH STAGE 3B CHRONIC KIDNEY DISEASE, WITH LONG-TERM CURRENT USE OF INSULIN (HCC): ICD-10-CM

## 2025-01-31 DIAGNOSIS — J01.90 SUBACUTE SINUSITIS, UNSPECIFIED LOCATION: Primary | ICD-10-CM

## 2025-01-31 DIAGNOSIS — E53.8 B12 DEFICIENCY: ICD-10-CM

## 2025-01-31 DIAGNOSIS — Z79.4 TYPE 2 DIABETES MELLITUS WITH STAGE 3B CHRONIC KIDNEY DISEASE, WITH LONG-TERM CURRENT USE OF INSULIN (HCC): ICD-10-CM

## 2025-01-31 DIAGNOSIS — I87.331 CHRONIC VENOUS HYPERTENSION (IDIOPATHIC) WITH ULCER AND INFLAMMATION OF RIGHT LOWER EXTREMITY (HCC): Primary | ICD-10-CM

## 2025-01-31 PROCEDURE — 99214 OFFICE O/P EST MOD 30 MIN: CPT

## 2025-01-31 PROCEDURE — G2211 COMPLEX E/M VISIT ADD ON: HCPCS

## 2025-01-31 PROCEDURE — 99213 OFFICE O/P EST LOW 20 MIN: CPT

## 2025-01-31 PROCEDURE — 99213 OFFICE O/P EST LOW 20 MIN: CPT | Performed by: PHYSICIAN ASSISTANT

## 2025-01-31 NOTE — ASSESSMENT & PLAN NOTE
Now resolved  ferritin 72  No further IV iron needs at this time  She likely will have chronic issues with GIB while on chronic ac which is required for her mechanical valve.  Recommend close observation with CBC monthly and iron panel every 2 months for now     Orders:    CBC and differential; Standing    Iron Panel (Includes Ferritin, Iron Sat%, Iron, and TIBC); Standing

## 2025-01-31 NOTE — PROGRESS NOTES
Patient ID: Ely Hernadez is a 84 y.o. female Date of Birth 1940       Chief Complaint   Patient presents with    Follow Up Wound Care Visit     Right lower extremity ulcer       Allergies:  Patient has no known allergies.    Diagnosis:      Diagnosis ICD-10-CM Associated Orders   1. Chronic venous hypertension (idiopathic) with ulcer and inflammation of right lower extremity ([facility])  I87.331 Wound cleansing and dressings Venous Ulcer Right Pretibial     Wound Procedure Treatment Venous Ulcer Right Pretibial              Assessment & Plan:  Right lower extremity venous ulcer is improved, measuring smaller with increased epithelialization.  Due to dressing sticking will recommend to decrease dressing changes from every other day to every 3 days.  Educated patient that she can soak off the dressing with saline if the dressing is sticking.  Continue with Tubigrip for compression.  See wound orders below  Wound is not showing any clinical s/s of infection  Elevate lower extremities and avoid prolonged standing/keeping legs in dependent position for edema management.   Counseled patient on the importance of tight glycemic control and adequate protein intake (3-4 servings per day) to promote wound healing.   A1C results reviewed with the patient today.   Follow-up in 2 week(s). Call sooner with questions or concerns or any signs of infection such as redness, swelling, increased/purulent drainage, fever or chills, and increased pain.  Patient verbalized understanding and agrees with plan of care.          Subjective:   1/31/25: Patient presents to wound care center for follow-up of right lower extremity venous ulcer.  Patient has been using Hydrofera Blue to the wound, states there is occasional sticking of the dressing.  Patient has been wearing Tubigrip for compression and tolerating it without issue.  Patient states that she was unable to have her scheduled vascular procedure as she was sick with a cold and they had  to cancel, patient is currently waiting rescheduling at this time.  Patient offers no wound related complaints, denies increased pain or drainage.  No fever or chills.        The following portions of the patient's history were reviewed and updated as appropriate:   Patient Active Problem List   Diagnosis    Hyperlipidemia    Chronic anticoagulation    Hypertension, essential    Coronary artery disease of native artery of native heart with stable angina pectoris (HCC)    Simple chronic bronchitis (HCC)    Diabetes mellitus with neurological manifestation (HCC)    Hypothyroidism    GERD (gastroesophageal reflux disease)    Anemia    AVM (arteriovenous malformation) of small bowel, acquired with hemorrhage    Angiectasia    Other vascular disorders of intestine (HCC)    Aortic valve replaced    Cardiomyopathy (HCC)    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HCC)    Stage 3b chronic kidney disease (HCC)    Chronic kidney disease-mineral and bone disorder    Primary osteoarthritis involving multiple joints    Paroxysmal atrial fibrillation (HCC)    Neutropenia associated with infection (HCC)    Herpes simplex labialis    Restrictive lung disease    Nocturnal hypoxemia    Supratherapeutic INR    H/O mechanical aortic valve replacement    Subtherapeutic international normalized ratio (INR)    Type 2 diabetes mellitus with stage 3b chronic kidney disease, with long-term current use of insulin (HCC)    Venous ulcer of right leg (HCC)    Chronic systolic (congestive) heart failure (HCC)    Lower extremity edema    Iron deficiency anemia due to chronic blood loss    Hemiplegia and hemiparesis following cerebral infarction affecting left non-dominant side (HCC)    Chronic obstructive pulmonary disease (HCC)     Past Medical History:   Diagnosis Date    Acute anterior epistaxis 12/23/2023    Acute blood loss anemia 12/14/2023    Acute respiratory failure with hypoxia (HCC) 02/06/2024    Anemia     Aneurysm of thoracic aorta (HCC)      Angioedema 06/07/2019    Aortic valve disorder     Benign neoplasm of sigmoid colon     Cardiomyopathy (HCC)     last assessed: 10/10/2014    CHF (congestive heart failure) (HCC)     Chronic kidney disease     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HCC)     last assessed: 10/10/2014    COPD (chronic obstructive pulmonary disease) (HCC)     Diabetic nephropathy (HCC)     Disease of thyroid gland     RAMON (dyspnea on exertion)     GERD (gastroesophageal reflux disease)     History of emphysema (HCC)     History of transfusion     Hyperlipidemia     Hypertensive urgency 10/28/2023    Leg cramps 11/01/2023    Stroke-like symptoms 10/28/2023    TIA (transient ischemic attack)      Past Surgical History:   Procedure Laterality Date    AORTIC VALVE REPLACEMENT      CARDIAC PACEMAKER PLACEMENT      last assessed: 10/10/2014    CARDIAC SURGERY      aortic valve replacement    CHOLECYSTECTOMY      COLONOSCOPY      EGD      HYSTERECTOMY      INSERT / REPLACE / REMOVE PACEMAKER      KNEE SURGERY Right     OTHER SURGICAL HISTORY      Capsule ENDOscopy description: 12/19/2012    CT COLONOSCOPY FLX DX W/COLLJ SPEC WHEN PFRMD N/A 05/31/2018    Procedure: single balloon ENTEROSCOPY;  Surgeon: David Dennis MD;  Location: [facility];  Service: Gastroenterology    CT ESOPHAGOGASTRODUODENOSCOPY TRANSORAL DIAGNOSTIC N/A 11/29/2017    Procedure: EGD AND COLONOSCOPY;  Surgeon: Jay Mcleod III, MD;  Location: [facility];  Service: Gastroenterology     Family History   Problem Relation Age of Onset    No Known Problems Mother     No Known Problems Father       Social History     Socioeconomic History    Marital status:      Spouse name: Not on file    Number of children: Not on file    Years of education: Not on file    Highest education level: Not on file   Occupational History    Not on file   Tobacco Use    Smoking status: Former     Current packs/day: 0.00     Average packs/day: 1 pack/day for 52.9 years (52.9 ttl pk-yrs)     Types:  Cigarettes     Start date:      Quit date: 2007     Years since quittin.1     Passive exposure: Past    Smokeless tobacco: Former     Quit date:    Vaping Use    Vaping status: Never Used   Substance and Sexual Activity    Alcohol use: Never    Drug use: Never    Sexual activity: Not Currently     Partners: Male   Other Topics Concern    Not on file   Social History Narrative    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 BID Freestyle lancets BID    Living independently with spouse     Social Drivers of Health     Financial Resource Strain: Not on file   Food Insecurity: No Food Insecurity (5/15/2024)    Nursing - Inadequate Food Risk Classification     Worried About Running Out of Food in the Last Year: Never true     Ran Out of Food in the Last Year: Never true     Ran Out of Food in the Last Year: Not on file   Transportation Needs: No Transportation Needs (5/15/2024)    PRAPARE - Transportation     Lack of Transportation (Medical): No     Lack of Transportation (Non-Medical): No   Physical Activity: Inactive (2021)    Exercise Vital Sign     Days of Exercise per Week: 0 days     Minutes of Exercise per Session: 0 min   Stress: No Stress Concern Present (2021)    [employer] - Occupational Stress Questionnaire     Feeling of Stress : Not at all   Social Connections: Not on file   Intimate Partner Violence: Not on file   Housing Stability: Low Risk  (5/15/2024)    Housing Stability Vital Sign     Unable to Pay for Housing in the Last Year: No     Number of Times Moved in the Last Year: 1     Homeless in the Last Year: No        Current Outpatient Medications:     Accu-Chek FastClix Lancets MISC, Use 3 (three) times a day, Disp: 300 each, Rfl: 3    albuterol (Ventolin HFA) 90 mcg/act inhaler, Inhale 2 puffs every 6 (six) hours as needed for wheezing, Disp: 54 g, Rfl: 3    Ascorbic Acid (vitamin C) 1000 MG tablet, Take 1,000 mg by mouth daily,  Disp: , Rfl:     aspirin (ECOTRIN LOW STRENGTH) 81 mg EC tablet, Take 1 tablet (81 mg total) by mouth daily Do not start before November 1, 2023., Disp: 30 tablet, Rfl: 0    Blood Glucose Monitoring Suppl (Accu-Chek Guide Me) w/Device KIT, USE 3 TIMES DAILY, Disp: 1 kit, Rfl: 2    Cholecalciferol (Vitamin D3) 50 MCG (2000 UT) TABS, Take 2,000 Units by mouth daily, Disp: , Rfl:     clobetasol (TEMOVATE) 0.05 % ointment, Apply topically 2 (two) times a day, Disp: 30 g, Rfl: 1    cyanocobalamin (VITAMIN B-12) 1000 MCG tablet, Take 1 tablet (1,000 mcg total) by mouth daily, Disp: , Rfl:     doxycycline hyclate (VIBRA-TABS) 100 mg tablet, Take 1 tablet (100 mg total) by mouth 2 (two) times a day for 7 days, Disp: 14 tablet, Rfl: 0    Empagliflozin (Jardiance) 10 MG TABS tablet, TAKE 1 TABLET BY MOUTH IN THE  MORNING, Disp: 100 tablet, Rfl: 1    enoxaparin (LOVENOX) 80 mg/0.8 mL, Inject 0.7 mL (70 mg total) under the skin every 24 hours Hold Warfarin 5 days prior. Last dose is 9/6 Start Lovenox on 9/8, 4 days prior to the procedure. No Lovenox the morning of the procedure. Resume both Lovenox and Warfarin at the discretion of the surgeon (preferably that evening) and check INR 4 days after restarting. Inject in the abdomen about 2 inches from the belly button and rotate sides at each injection., Disp: 0.7 mL, Rfl: 10    Ferrous Sulfate (IRON PO), Take by mouth daily in the early morning OTC, Disp: , Rfl:     fluticasone (FLONASE) 50 mcg/act nasal spray, 1 spray into each nostril daily, Disp: 16 g, Rfl: 0    furosemide (LASIX) 40 mg tablet, Take 1 tablet (40 mg total) by mouth 2 (two) times a day, Disp: 180 tablet, Rfl: 3    gabapentin (NEURONTIN) 300 mg capsule, TAKE 1 CAPSULE BY MOUTH 3 TIMES  DAILY, Disp: 300 capsule, Rfl: 1    glipiZIDE (GLUCOTROL) 10 mg tablet, TAKE 1 TABLET BY MOUTH TWICE  DAILY BEFORE MEALS, Disp: 200 tablet, Rfl: 1    glucose blood (Accu-Chek Celeste Plus) test strip, Use 1 each 3 (three) times a day  Use as instructed, Disp: 300 each, Rfl: 3    insulin degludec (TRESIBA) 100 units/mL injection pen, Inject 30 Units under the skin daily, Disp: 30 mL, Rfl: 3    Insulin Pen Needle (BD Pen Needle Rosie U/F) 32G X 4 MM MISC, Use daily, Disp: 100 each, Rfl: 1    ipratropium-albuterol (DUO-NEB) 0.5-2.5 mg/3 mL nebulizer solution, inhale contents of 1 vial ( 3 milliliters ) in nebulizer four times a day, Disp: , Rfl:     Lancets (freestyle) lancets, Check blood glucose 3 times daily, Disp: 300 each, Rfl: 0    levothyroxine 50 mcg tablet, TAKE 1 TABLET BY MOUTH DAILY, Disp: 100 tablet, Rfl: 1    metoprolol tartrate (LOPRESSOR) 50 mg tablet, TAKE ONE-HALF TABLET BY MOUTH  TWICE DAILY, Disp: 100 tablet, Rfl: 1    mupirocin (BACTROBAN) 2 % ointment, Apply topically daily (Patient not taking: Reported on 1/14/2025), Disp: 22 g, Rfl: 0    oxygen gas, Inhale 2 L/min daily at bedtime as needed home oxygen nightly, Disp: , Rfl:     pantoprazole (PROTONIX) 40 mg tablet, TAKE 1 TABLET BY MOUTH DAILY AS  DIRECTED, Disp: 100 tablet, Rfl: 0    warfarin (COUMADIN) 5 mg tablet, TAKE 1 TO 2 TABLETS BY  MOUTH DAILY OR AS DIRECTED, Disp: 180 tablet, Rfl: 3    Current Facility-Administered Medications:     lidocaine (LMX) 4 % cream, , Topical, Once, Vesta Garcia PA-C    Review of Systems   Constitutional:  Negative for chills and fever.   HENT:  Negative for congestion and sneezing.    Respiratory:  Negative for cough and shortness of breath.    Cardiovascular:  Positive for leg swelling.   Skin:  Positive for wound.        RLE   Psychiatric/Behavioral:  Negative for agitation.        Objective:  /68   Pulse 64   Temp (!) 97.3 °F (36.3 °C)   Resp 18   Pain Score: 0-No pain     Physical Exam  Constitutional:       General: She is awake. She is not in acute distress.     Appearance: She is not ill-appearing, toxic-appearing or diaphoretic.   HENT:      Head: Normocephalic and atraumatic.      Right Ear: External ear normal.       Left Ear: External ear normal.   Eyes:      Conjunctiva/sclera: Conjunctivae normal.   Cardiovascular:      Pulses:           Dorsalis pedis pulses are 1+ on the right side.   Pulmonary:      Effort: Pulmonary effort is normal. No respiratory distress.   Musculoskeletal:      Right lower leg: Edema present.      Comments: trace   Skin:     General: Skin is warm and dry.      Findings: Wound present. No erythema.      Comments: 1.  Right lower extremity venous ulcer with increased epithelialization.  No devitalized tissue present.  Periwound with scar tissue.  No purulent drainage or malodor. No erythema, warmth or lymphangitic streaking to suggest cellulitis.  Refer to wound assessment for additional details.   Neurological:      Mental Status: She is alert.   Psychiatric:         Mood and Affect: Mood normal.         Behavior: Behavior normal. Behavior is cooperative.         Wound 10/29/23 Venous Ulcer Pretibial Right (Active)   Wound Image   01/31/25 0936   Wound Description Pink;Epithelialization 01/31/25 0936   Radha-wound Assessment Fragile;Scar Tissue 01/31/25 0936   Wound Length (cm) 0.1 cm 01/31/25 0936   Wound Width (cm) 0.5 cm 01/31/25 0936   Wound Depth (cm) 0.1 cm 01/31/25 0936   Wound Surface Area (cm^2) 0.05 cm^2 01/31/25 0936   Wound Volume (cm^3) 0.005 cm^3 01/31/25 0936   Calculated Wound Volume (cm^3) 0.01 cm^3 01/31/25 0936   Change in Wound Size % 99.75 01/31/25 0936   Drainage Amount Small 01/31/25 0936   Drainage Description Sanguineous 01/31/25 0936   Non-staged Wound Description Full thickness 01/31/25 0936   Dressing Status Intact 01/31/25 0936                 Lab Results   Component Value Date    HGBA1C 7.6 (H) 11/15/2024       Wound Instructions:  Orders Placed This Encounter   Procedures    Wound cleansing and dressings Venous Ulcer Right Pretibial     Wound cleansing and dressings Venous Ulcer Right Pretibial                                     Right Leg wound:      Wash your hands with  "soap and water. Remove old dressing, discard into plastic bag and place in trash. Cleanse the wound with normal saline solution prior to applying a clean dressing. Do not use tissue or cotton balls. Do not scrub the wound. Pat dry using gauze.      Shower: Yes. Cleanse with a mild soap and water such as Dove. Be sure to remove your old dressing prior to getting in the shower.      Apply Hydrafera Blue Ready to the open wound.   Cover with gauze.  Secure with tape. (If your skin is breaking down from tape, then need to wrap with Armani and put tape on Armani)      Change dressing three times a week                                  ·Wound cleansing and dressings Venous Ulcer Right Pretibial                          Elastic Tubular Stocking: Spanda F     Tubular elastic bandage: Apply from base of toes to behind the knee. Apply in AM, may remove for sleep.  Avoid prolonged standing in one place.  Elevate leg(s) above the level of the heart when sitting or as much as possible.     Standing Status:   Future     Expiration Date:   2/7/2025    Wound Procedure Treatment Venous Ulcer Right Pretibial     This order was created via procedure documentation           CAMACHO Rodriguez, FISH, VARGAS    Portions of the record may have been created with voice recognition software. Occasional wrong word or \"sound alike\" substitutions may have occurred due to the inherent limitations of voice recognition software. Read the chart carefully and recognize, using context, where substitutions have occurred.          Total time spent today:    Total time (face-to-face and non-face-to-face) spent on today's visit was 11 minutes. This includes preparation for the visits (i.e. reviewing test results from date recent hospitalizations, ER/Urgent Care/primary care visits and recent consultant office visits), performance of a medically appropriate history and examination, and orders for medications or testing.     "

## 2025-01-31 NOTE — PROGRESS NOTES
Name: Ely Hernadez      : 1940      MRN: [id number]  Encounter Provider: Staci Contreras PA-C  Encounter Date: 2025   Encounter department: [facility] [place]  :    84-year-old female with history of mechanical valve on Coumadin who presents as follow-up for iron deficiency anemia secondary to GI blood loss/AVMs. She has had multiple angioectasias that have been treated via endoscopy. She has been receiving IV iron intermittently, last treatment was 2024.  Most recent labs show hemoglobin 13.0, MCV 94, platelets 376,000. She is feeling well. Denies any bleeding   Assessment & Plan  Iron deficiency anemia due to chronic blood loss  Now resolved  ferritin 72  No further IV iron needs at this time  She likely will have chronic issues with GIB while on chronic ac which is required for her mechanical valve.  Recommend close observation with CBC monthly and iron panel every 2 months for now     Orders:    CBC and differential; Standing    Iron Panel (Includes Ferritin, Iron Sat%, Iron, and TIBC); Standing    B12 deficiency  B12 753   Continue B12 1,000mcg daily   Orders:    cyanocobalamin (VITAMIN B-12) 1000 MCG tablet; Take 1 tablet (1,000 mcg total) by mouth daily    H/O mechanical aortic valve replacement  On chronic Coumadin, goal INR 2.5-3.5  INR management by cardiology            History of Present Illness   Chief Complaint   Patient presents with    Follow-up     84-year-old female with past medical history of stroke, heart failure, A-fib, diabetes, COPD, aortic valve replacement in  on Coumadin, and CKD stage III who presents as a new consult for anemia.     Patient endorses history of iron deficiency anemia many years ago.  She has never seen a hematologist or had issues with anemia during childhood. She reports she had GI workup at this time including video capsule endoscopy that was unrevealing of any bleeding source.  She was prescribed  oral iron pills which she has been taking for many years that have recently been stopped by her nephrologist approximately 1 year ago.     On chart review back to 05/2019, hgb baseline was around 12-14g/dL until she's was hospitalized in December 2023 for symptomatic anemia, hemoglobin at this time was 5.6.  She received 4 unit PRBC and one-time dose of IV Venofer 300 mg during hospitalization.  EGD with push/colonoscopy were performed which did not identify bleeding source.  Patient was recommended to have video capsule endoscopy as an outpatient however she has not seen GI in the clinic since her discharge.     Patient went to the emergency room this past week on 03/08 for symptomatic anemia.  Hemoglobin at this time was 7.1.  She was given 1 unit PRBC.      Former smoker, quit in 2007.  She denies alcohol or drug use.  Patient is retired, previously worked in a factory.  No personal family history of cancer.     Labs  03/2024: Hgb 7.8, MCV 74, platelets 415,000.  Ferritin 17, iron saturation 13%, TIBC normal. Retic 2.63%.        03/2024-04/2024: IV Venofer 300mg weekly x 4  05/2024: Hgb 12.4, MCV 86, platelets 383,000. No iron panel. Called patient to review lab results. She has recurrent anemia after IV venofer infusions. Suspect she has ongoing blood loss, probable GI source. Recently had APC by EGD to duodenla and jejunal lesions. She denies gross vaginal bleeding, melena, hematochezia or hematuria.   07/2024: WBC 5.7, Hgb 12.2, MCV 89, ,000.  Iron sat 20%, UIBC nromals. Ferritin 122.  08/2024: WBC 5.66, hemoglobin 11.4, MCV 94, iron 195, iron saturation 51%, ferritin 34  09/2024: IV Venofer 300mg x4   09/2024: WBC 4.92, hemoglobin 14.1, MCV 94, platelets 252,000  11/2024: WBC 4.89, hemoglobin 11.3, MCV 96, platelets 352,000. Iron 196, iron saturation 46%, UIBC normal, ferritin 32.  having mild shortness of breath again and black stools which is normal for her while on oral iron supplements.  No other  "bleeding is observed.       Procedures  12/2023:  EGD w push: No AVMs  12/2023: Colonoscopy: Diverticulosis of moderate severity causing moderate luminal narrowing in the sigmoid colon. 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  Denies any bleeding.  Shortness of breath was not improved after normalization of her anemia.  Likely chronic secondary to her other comorbidities.  No chest pain.    Review of Systems   All other systems reviewed and are negative.          Objective   /60 (BP Location: Left arm, Patient Position: Sitting, Cuff Size: Standard)   Pulse 60   Temp 98.2 °F (36.8 °C) (Temporal)   Resp 16   Ht 5' 6\" (1.676 m)   Wt 74.8 kg (165 lb)   SpO2 96%   BMI 26.63 kg/m²     Physical Exam  Vitals reviewed.   HENT:      Head: Normocephalic.   Cardiovascular:      Rate and Rhythm: Normal rate and regular rhythm.   Pulmonary:      Effort: Pulmonary effort is normal.      Breath sounds: Normal breath sounds.   Abdominal:      Palpations: Abdomen is soft.      Tenderness: There is no abdominal tenderness.   Musculoskeletal:      Cervical back: Neck supple.   Skin:     Findings: No rash.   Neurological:      Mental Status: She is alert.       Labs: I have reviewed the following labs:  Results for orders placed or performed in visit on 01/22/25   Vitamin B12   Result Value Ref Range    Vitamin B-12 753 180 - 914 pg/mL   Retic Count   Result Value Ref Range    Retic Ct Abs 99,500 (H) 14,097 - 95,744    Retic Ct Pct 2.23 (H) 0.37 - 1.87 %   Result Value Ref Range    CRP 6.4 (H) <3.0 mg/L   Result Value Ref Range    Procalcitonin <0.05 <=0.25 ng/ml   Protime-INR   Result Value Ref Range    Protime 26.1 (H) 12.3 - 15.0 seconds "    INR 2.40 (H) 0.85 - 1.19   CBC and differential   Result Value Ref Range    WBC 6.27 4.31 - 10.16 Thousand/uL    RBC 4.46 3.81 - 5.12 Million/uL    Hemoglobin 13.0 11.5 - 15.4 g/dL    Hematocrit 41.7 34.8 - 46.1 %    MCV 94 82 - 98 fL    MCH 29.1 26.8 - 34.3 pg    MCHC 31.2 (L) 31.4 - 37.4 g/dL    RDW 13.5 11.6 - 15.1 %    MPV 10.7 8.9 - 12.7 fL    Platelets 376 149 - 390 Thousands/uL    nRBC 0 /100 WBCs    Segmented % 69 43 - 75 %    Immature Grans % 1 0 - 2 %    Lymphocytes % 20 14 - 44 %    Monocytes % 8 4 - 12 %    Eosinophils Relative 1 0 - 6 %    Basophils Relative 1 0 - 1 %    Absolute Neutrophils 4.36 1.85 - 7.62 Thousands/µL    Absolute Immature Grans 0.05 0.00 - 0.20 Thousand/uL    Absolute Lymphocytes 1.23 0.60 - 4.47 Thousands/µL    Absolute Monocytes 0.51 0.17 - 1.22 Thousand/µL    Eosinophils Absolute 0.08 0.00 - 0.61 Thousand/µL    Basophils Absolute 0.04 0.00 - 0.10 Thousands/µL   TIBC Panel (incl. Iron, TIBC, % Iron Saturation)   Result Value Ref Range    Iron Saturation 85 (H) 15 - 50 %    TIBC 351.4 250 - 450 ug/dL    Iron 300 (H) 50 - 212 ug/dL    Transferrin 251 203 - 362 mg/dL    UIBC <55 (L) 155 - 355 ug/dL   Result Value Ref Range    Ferritin 72 11 - 307 ng/mL     *Note: Due to a large number of results and/or encounters for the requested time period, some results have not been displayed. A complete set of results can be found in Results Review.

## 2025-01-31 NOTE — PROGRESS NOTES
Wound Procedure Treatment Venous Ulcer Right Pretibial    Performed by: Graciela Valerio RN  Authorized by: Vesta Garcia PA-C    Associated wounds:   Wound 10/29/23 Venous Ulcer Pretibial Right  Wound cleansed with:  NSS  Applied primary dressing:  Hydrafera blue  Applied secondary dressing:  Gauze  Dressing secured with:  Tape

## 2025-01-31 NOTE — PROGRESS NOTES
Name: Ely Hernadez      : 1940      MRN: [id number]  Encounter Provider: Verónica Lam PA-C  Encounter Date: 2025   Encounter department: [facility] [address]  :  Assessment & Plan  Subacute sinusitis, unspecified location  Discussed that there is no role for extension of antibiotics.  It appears clinically that she has improved with this recent course of antibiotics.  Discussed at this stage symptomatic management with over-the-counter medication will be more beneficial.  Offered to get imaging or ENT referral if she desires-I do not think this is necessary-patient does not want to do that right now.  Hopeful that over the next few days she will have complete resolution.              History of Present Illness   Patient is a 84-year-old female presenting for a follow-up visit.  Recently placed on doxycycline for ongoing bacterial sinusitis.  Feels like her symptoms have almost resolved but would like an extension of her antibiotics so she can get fully healthy.  She has upcoming procedures and they state that she cannot get the procedure unless she is completely healthy.  As of now her main symptoms are just a little bit of runny nose and some postnasal drip.  All other symptoms have resolved and she feels like it is much better.  She has 1 pill left of the doxycycline.      Review of Systems   Constitutional:  Negative for chills, fatigue and fever.   HENT:  Positive for postnasal drip. Negative for ear discharge, ear pain, rhinorrhea, sinus pressure, sinus pain, sore throat, tinnitus and trouble swallowing.    Eyes:  Negative for pain, discharge and itching.   Respiratory:  Negative for cough, shortness of breath and wheezing.    Cardiovascular:  Negative for chest pain, palpitations and leg swelling.   Gastrointestinal:  Negative for abdominal pain, constipation, diarrhea, nausea and vomiting.   Endocrine: Negative for polydipsia, polyphagia and polyuria.  "  Genitourinary:  Negative for difficulty urinating, frequency, hematuria and urgency.   Musculoskeletal:  Negative for arthralgias, joint swelling and myalgias.   Skin:  Negative for color change.   Allergic/Immunologic: Negative for environmental allergies.   Neurological:  Negative for dizziness, weakness, light-headedness, numbness and headaches.   Hematological:  Negative for adenopathy.   Psychiatric/Behavioral:  Negative for decreased concentration and sleep disturbance. The patient is not nervous/anxious.        Objective   /70 (BP Location: Left arm, Patient Position: Sitting, Cuff Size: Standard)   Pulse 68   Temp 98.6 °F (37 °C) (Tympanic Core)   Resp 18   Ht 5' 6\" (1.676 m)   Wt 74.6 kg (164 lb 8 oz)   SpO2 96%   BMI 26.55 kg/m²      Physical Exam  Vitals and nursing note reviewed.   Constitutional:       General: She is awake. She is not in acute distress.     Appearance: Normal appearance. She is well-developed, well-groomed and normal weight.   HENT:      Head: Normocephalic and atraumatic.      Right Ear: Hearing and external ear normal.      Left Ear: Hearing and external ear normal.      Nose: Nose normal.      Mouth/Throat:      Lips: Pink.      Mouth: Mucous membranes are moist.   Eyes:      General: Lids are normal. Vision grossly intact. Gaze aligned appropriately.      Conjunctiva/sclera: Conjunctivae normal.   Neck:      Vascular: No carotid bruit.      Trachea: Trachea and phonation normal.   Cardiovascular:      Rate and Rhythm: Normal rate and regular rhythm.      Heart sounds: Normal heart sounds, S1 normal and S2 normal. No murmur heard.     No friction rub. No gallop.   Pulmonary:      Effort: Pulmonary effort is normal. No respiratory distress.      Breath sounds: Normal breath sounds and air entry. No decreased breath sounds, wheezing, rhonchi or rales.   Abdominal:      General: Abdomen is flat. Bowel sounds are normal.      Palpations: Abdomen is soft.      Tenderness: " There is no abdominal tenderness.   Musculoskeletal:         General: No swelling.      Cervical back: Neck supple.      Right lower leg: No edema.      Left lower leg: No edema.   Skin:     General: Skin is warm.      Capillary Refill: Capillary refill takes less than 2 seconds.   Neurological:      Mental Status: She is alert.   Psychiatric:         Attention and Perception: Attention and perception normal.         Mood and Affect: Mood and affect normal.         Speech: Speech normal.         Behavior: Behavior normal. Behavior is cooperative.         Thought Content: Thought content normal.         Cognition and Memory: Cognition and memory normal.         Judgment: Judgment normal.

## 2025-01-31 NOTE — PATIENT INSTRUCTIONS
Orders Placed This Encounter   Procedures    Wound cleansing and dressings Venous Ulcer Right Pretibial     Wound cleansing and dressings Venous Ulcer Right Pretibial                                     Right Leg wound:      Wash your hands with soap and water. Remove old dressing, discard into plastic bag and place in trash. Cleanse the wound with normal saline solution prior to applying a clean dressing. Do not use tissue or cotton balls. Do not scrub the wound. Pat dry using gauze.      Shower: Yes. Cleanse with a mild soap and water such as Dove. Be sure to remove your old dressing prior to getting in the shower.      Apply Hydrafera Blue Ready to the open wound.   Cover with gauze.  Secure with tape. (If your skin is breaking down from tape, then need to wrap with Armani and put tape on Armani)      Change dressing three times a week                                  ·Wound cleansing and dressings Venous Ulcer Right Pretibial                          Elastic Tubular Stocking: Spanda F     Tubular elastic bandage: Apply from base of toes to behind the knee. Apply in AM, may remove for sleep.  Avoid prolonged standing in one place.  Elevate leg(s) above the level of the heart when sitting or as much as possible.     Standing Status:   Future     Expiration Date:   2/7/2025

## 2025-02-06 ENCOUNTER — RESULTS FOLLOW-UP (OUTPATIENT)
Dept: CARDIOLOGY CLINIC | Facility: CLINIC | Age: 85
End: 2025-02-06

## 2025-02-06 ENCOUNTER — REMOTE DEVICE CLINIC VISIT (OUTPATIENT)
Dept: CARDIOLOGY CLINIC | Facility: CLINIC | Age: 85
End: 2025-02-06

## 2025-02-06 DIAGNOSIS — Z95.0 PRESENCE OF PERMANENT CARDIAC PACEMAKER: Primary | ICD-10-CM

## 2025-02-06 PROCEDURE — RECHECK: Performed by: INTERNAL MEDICINE

## 2025-02-06 NOTE — RESULT ENCOUNTER NOTE
Normal device function  LACIE 11.6 months  Schedule monthly checks.  Underlying A-fib patient on warfarin.

## 2025-02-06 NOTE — PROGRESS NOTES
SUZI DC PM/NOT MRI CONDITIONAL   NB MERLIN TRANSMISSION:  BATTERY VOLTAGE NEARING LACIE (11.6 MONTHS).  WILL SCHEDULE MONTHLY BATTERY CHECKS.  AP <1%  98% (MODE SWITCHED).  ALL LEAD PARAMETERS WITHIN NORMAL LIMITS.  NO NEW HIGH RATE EPISODES.  100% AF, ON WARFARIN, ASA, AND METOPROLOL.  NORMAL DEVICE FUNCTION.  RG

## 2025-02-28 ENCOUNTER — OFFICE VISIT (OUTPATIENT)
Age: 85
End: 2025-02-28
Payer: COMMERCIAL

## 2025-02-28 ENCOUNTER — APPOINTMENT (OUTPATIENT)
Dept: LAB | Facility: CLINIC | Age: 85
End: 2025-02-28
Payer: COMMERCIAL

## 2025-02-28 VITALS
HEIGHT: 66 IN | BODY MASS INDEX: 26.55 KG/M2 | OXYGEN SATURATION: 95 % | DIASTOLIC BLOOD PRESSURE: 74 MMHG | SYSTOLIC BLOOD PRESSURE: 126 MMHG | HEART RATE: 71 BPM

## 2025-02-28 DIAGNOSIS — J31.0 CHRONIC RHINITIS: Primary | ICD-10-CM

## 2025-02-28 DIAGNOSIS — Z79.4 TYPE 2 DIABETES MELLITUS WITH STAGE 4 CHRONIC KIDNEY DISEASE, WITH LONG-TERM CURRENT USE OF INSULIN (HCC): ICD-10-CM

## 2025-02-28 DIAGNOSIS — J41.0 SIMPLE CHRONIC BRONCHITIS (HCC): ICD-10-CM

## 2025-02-28 DIAGNOSIS — Z79.01 LONG TERM (CURRENT) USE OF ANTICOAGULANTS: ICD-10-CM

## 2025-02-28 DIAGNOSIS — E11.22 TYPE 2 DIABETES MELLITUS WITH STAGE 4 CHRONIC KIDNEY DISEASE, WITH LONG-TERM CURRENT USE OF INSULIN (HCC): ICD-10-CM

## 2025-02-28 DIAGNOSIS — N18.4 TYPE 2 DIABETES MELLITUS WITH STAGE 4 CHRONIC KIDNEY DISEASE, WITH LONG-TERM CURRENT USE OF INSULIN (HCC): ICD-10-CM

## 2025-02-28 DIAGNOSIS — C71.9: ICD-10-CM

## 2025-02-28 DIAGNOSIS — I48.91 ATRIAL FIBRILLATION, UNSPECIFIED TYPE (HCC): ICD-10-CM

## 2025-02-28 DIAGNOSIS — I69.354 HEMIPLEGIA AND HEMIPARESIS FOLLOWING CEREBRAL INFARCTION AFFECTING LEFT NON-DOMINANT SIDE (HCC): ICD-10-CM

## 2025-02-28 DIAGNOSIS — I50.22 CHRONIC SYSTOLIC CHF (CONGESTIVE HEART FAILURE) (HCC): ICD-10-CM

## 2025-02-28 DIAGNOSIS — D50.9 IRON DEFICIENCY ANEMIA, UNSPECIFIED IRON DEFICIENCY ANEMIA TYPE: ICD-10-CM

## 2025-02-28 DIAGNOSIS — I48.0 PAROXYSMAL ATRIAL FIBRILLATION (HCC): ICD-10-CM

## 2025-02-28 DIAGNOSIS — N18.32 STAGE 3B CHRONIC KIDNEY DISEASE (HCC): ICD-10-CM

## 2025-02-28 LAB
BASOPHILS # BLD AUTO: 0.04 THOUSANDS/ÂΜL (ref 0–0.1)
BASOPHILS NFR BLD AUTO: 1 % (ref 0–1)
EOSINOPHIL # BLD AUTO: 0.17 THOUSAND/ÂΜL (ref 0–0.61)
EOSINOPHIL NFR BLD AUTO: 3 % (ref 0–6)
ERYTHROCYTE [DISTWIDTH] IN BLOOD BY AUTOMATED COUNT: 15.5 % (ref 11.6–15.1)
FERRITIN SERPL-MCNC: 30 NG/ML (ref 11–307)
HCT VFR BLD AUTO: 35.8 % (ref 34.8–46.1)
HGB BLD-MCNC: 11 G/DL (ref 11.5–15.4)
IMM GRANULOCYTES # BLD AUTO: 0.03 THOUSAND/UL (ref 0–0.2)
IMM GRANULOCYTES NFR BLD AUTO: 1 % (ref 0–2)
IRON SATN MFR SERPL: 23 % (ref 15–50)
IRON SERPL-MCNC: 92 UG/DL (ref 50–212)
LYMPHOCYTES # BLD AUTO: 1.36 THOUSANDS/ÂΜL (ref 0.6–4.47)
LYMPHOCYTES NFR BLD AUTO: 26 % (ref 14–44)
MCH RBC QN AUTO: 29.3 PG (ref 26.8–34.3)
MCHC RBC AUTO-ENTMCNC: 30.7 G/DL (ref 31.4–37.4)
MCV RBC AUTO: 95 FL (ref 82–98)
MONOCYTES # BLD AUTO: 0.58 THOUSAND/ÂΜL (ref 0.17–1.22)
MONOCYTES NFR BLD AUTO: 11 % (ref 4–12)
NEUTROPHILS # BLD AUTO: 3.09 THOUSANDS/ÂΜL (ref 1.85–7.62)
NEUTS SEG NFR BLD AUTO: 58 % (ref 43–75)
NRBC BLD AUTO-RTO: 0 /100 WBCS
PLATELET # BLD AUTO: 340 THOUSANDS/UL (ref 149–390)
PMV BLD AUTO: 10.6 FL (ref 8.9–12.7)
RBC # BLD AUTO: 3.76 MILLION/UL (ref 3.81–5.12)
TIBC SERPL-MCNC: 400.4 UG/DL (ref 250–450)
TRANSFERRIN SERPL-MCNC: 286 MG/DL (ref 203–362)
UIBC SERPL-MCNC: 308 UG/DL (ref 155–355)
WBC # BLD AUTO: 5.27 THOUSAND/UL (ref 4.31–10.16)

## 2025-02-28 PROCEDURE — 82728 ASSAY OF FERRITIN: CPT

## 2025-02-28 PROCEDURE — 83540 ASSAY OF IRON: CPT

## 2025-02-28 PROCEDURE — G2211 COMPLEX E/M VISIT ADD ON: HCPCS

## 2025-02-28 PROCEDURE — 99214 OFFICE O/P EST MOD 30 MIN: CPT

## 2025-02-28 PROCEDURE — 85025 COMPLETE CBC W/AUTO DIFF WBC: CPT

## 2025-02-28 PROCEDURE — 83550 IRON BINDING TEST: CPT

## 2025-02-28 RX ORDER — IPRATROPIUM BROMIDE AND ALBUTEROL SULFATE 2.5; .5 MG/3ML; MG/3ML
3 SOLUTION RESPIRATORY (INHALATION) 4 TIMES DAILY
Qty: 360 ML | Refills: 1 | Status: SHIPPED | OUTPATIENT
Start: 2025-02-28 | End: 2025-02-28

## 2025-02-28 RX ORDER — IPRATROPIUM BROMIDE AND ALBUTEROL SULFATE 2.5; .5 MG/3ML; MG/3ML
3 SOLUTION RESPIRATORY (INHALATION) 4 TIMES DAILY
Qty: 360 ML | Refills: 1 | Status: SHIPPED | OUTPATIENT
Start: 2025-02-28

## 2025-02-28 NOTE — PROGRESS NOTES
Name: Ely Hernadez      : 1940      MRN: [id number]  Encounter Provider: Amanda Villarreal PA-C  Encounter Date: 2025   Encounter department: [facility] INTERNAL MEDICINE [facility] [address]  :  Assessment & Plan  Chronic rhinitis  Recommended starting a daily antihistamine like Zyrtec or Claritin in combination with Flonase.  Can consider addition of Singulair or referral to ENT if symptoms persist.       Simple chronic bronchitis (HCC)  Lung exam unremarkable. Likely due to PND and worsening COPD. Recommend using DuoNeb up to 4 times daily and albuterol inhaler every hours as needed for wheezing.  Continue to use oxygen as needed.  Recommended follow-up with pulmonologist and to complete CT chest without contrast that was ordered by pulmonology.  Orders:    ipratropium-albuterol (DUO-NEB) 0.5-2.5 mg/3 mL nebulizer solution; Take 3 mL by nebulization 4 (four) times a day    Type 2 diabetes mellitus with stage 4 chronic kidney disease, with long-term current use of insulin ([facility])  A1c stable.  Lab Results   Component Value Date    HGBA1C 7.6 (H) 11/15/2024          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HCC)  Recent CT head wo contrast was unremarkable.        Hemiplegia and hemiparesis following cerebral infarction affecting left non-dominant side (HCC)  Able to ambulate well without walker.       Chronic systolic CHF (congestive heart failure) ([facility])  Wt Readings from Last 3 Encounters:   25 74.6 kg (164 lb 8 oz)   25 74.8 kg (165 lb)   25 73.5 kg (162 lb)   Following with cardiology.       Paroxysmal atrial fibrillation (HCC)  Following with cardiology.              History of Present Illness   Patient is an 85 yo female that presents today for rhinitis and a cough. This has been present for over 2 months. She has been treated with multiple rounds of ABX and OTC medications like mucinex DM which has provided some relief. She has been wearing her oxygen more and using duo-neb treatments  "with some relief.         Review of Systems   Constitutional:  Positive for appetite change (Lack of appetite) and fatigue. Negative for chills and fever.   HENT:  Positive for rhinorrhea and sinus pain. Negative for ear discharge, ear pain, sinus pressure, sore throat and trouble swallowing.    Eyes:  Positive for discharge.   Respiratory:  Positive for cough (Purulent), chest tightness, shortness of breath and wheezing.    Gastrointestinal:  Negative for abdominal pain, constipation, diarrhea, nausea and vomiting.   Musculoskeletal:  Positive for arthralgias and myalgias.   Neurological:  Negative for dizziness, light-headedness and headaches.   Psychiatric/Behavioral:  Positive for sleep disturbance.        Objective   /74   Pulse 71   Ht 5' 6\" (1.676 m)   SpO2 95%   BMI 26.55 kg/m²      Physical Exam  Vitals and nursing note reviewed.   Constitutional:       General: She is awake. She is not in acute distress.     Appearance: Normal appearance. She is well-developed, well-groomed and overweight.   HENT:      Head: Normocephalic and atraumatic.      Right Ear: Hearing, tympanic membrane, ear canal and external ear normal.      Left Ear: Hearing, tympanic membrane, ear canal and external ear normal.      Nose: Nose normal.      Mouth/Throat:      Lips: Pink.      Mouth: Mucous membranes are moist.      Pharynx: Uvula midline. No posterior oropharyngeal erythema.   Eyes:      General: Lids are normal. Vision grossly intact. Gaze aligned appropriately.      Conjunctiva/sclera: Conjunctivae normal.   Neck:      Vascular: No carotid bruit.      Trachea: Trachea and phonation normal.   Cardiovascular:      Rate and Rhythm: Normal rate and regular rhythm.      Heart sounds: Normal heart sounds, S1 normal and S2 normal. No murmur heard.     No friction rub. No gallop.   Pulmonary:      Effort: Pulmonary effort is normal. No respiratory distress.      Breath sounds: Normal breath sounds and air entry. No " decreased breath sounds, wheezing, rhonchi or rales.   Abdominal:      General: Abdomen is protuberant.   Musculoskeletal:         General: No swelling.      Cervical back: Neck supple.      Right lower leg: No edema.      Left lower leg: No edema.   Skin:     General: Skin is warm.      Capillary Refill: Capillary refill takes less than 2 seconds.   Neurological:      Mental Status: She is alert.   Psychiatric:         Attention and Perception: Attention and perception normal.         Mood and Affect: Mood and affect normal.         Speech: Speech normal.         Behavior: Behavior normal. Behavior is cooperative.         Thought Content: Thought content normal.         Cognition and Memory: Cognition and memory normal.         Judgment: Judgment normal.

## 2025-02-28 NOTE — ASSESSMENT & PLAN NOTE
Lung exam unremarkable. Likely due to PND and worsening COPD. Recommend using DuoNeb up to 4 times daily and albuterol inhaler every hours as needed for wheezing.  Continue to use oxygen as needed.  Recommended follow-up with pulmonologist and to complete CT chest without contrast that was ordered by pulmonology.  Orders:    ipratropium-albuterol (DUO-NEB) 0.5-2.5 mg/3 mL nebulizer solution; Take 3 mL by nebulization 4 (four) times a day

## 2025-03-03 ENCOUNTER — ANTICOAG VISIT (OUTPATIENT)
Dept: CARDIOLOGY CLINIC | Facility: CLINIC | Age: 85
End: 2025-03-03

## 2025-03-03 ENCOUNTER — RESULTS FOLLOW-UP (OUTPATIENT)
Dept: HEMATOLOGY ONCOLOGY | Facility: CLINIC | Age: 85
End: 2025-03-03

## 2025-03-03 DIAGNOSIS — D50.0 IRON DEFICIENCY ANEMIA DUE TO CHRONIC BLOOD LOSS: Primary | ICD-10-CM

## 2025-03-03 DIAGNOSIS — J01.90 SUBACUTE SINUSITIS, UNSPECIFIED LOCATION: ICD-10-CM

## 2025-03-03 RX ORDER — SODIUM CHLORIDE 9 MG/ML
20 INJECTION, SOLUTION INTRAVENOUS ONCE
OUTPATIENT
Start: 2025-03-17

## 2025-03-03 RX ORDER — FLUTICASONE PROPIONATE 50 MCG
1 SPRAY, SUSPENSION (ML) NASAL DAILY
Qty: 16 G | Refills: 2 | Status: SHIPPED | OUTPATIENT
Start: 2025-03-03

## 2025-03-03 NOTE — TELEPHONE ENCOUNTER
Reason for call:   [x] Refill   [] Prior Auth  [] Other:     Office:   [x] PCP/Provider -  Verónica Lam PA-C   [] Specialty/Provider -     Medication: fluticasone 50 mcg    Dose/Frequency: 1 spay daily    Quantity: 16 g    [id number] [address] [place] [address]    Does the patient have enough for 3 days?   [] Yes   [x] No - Send as HP to POD

## 2025-03-03 NOTE — TELEPHONE ENCOUNTER
Follow up call to patient to make sure she received my message. She is not having any bleeding, just feels fatigued. Patient called this AM to schedule her infusions. She is aware to repeat labs 2 weeks after her last infusion.     -----------  Call to patient, no answer. Left brief message regarding below message from Staci FOWLER, awaiting return call back regarding setting up for infusions. If patient calls back please assess symptoms of bleeding as indicated below and see if patient is agreeable to IV iron. Thank you!    ----- Message from Staci Contreras PA-C sent at 3/3/2025  2:53 PM EST -----  She is anemia again with low iron. Please reach out to patient to see if she is experiencing and blood in stool or black stools. Recommend iv venofer 300mg weekly x 2. Orders placed. Repeat blood counts in 2 weeks.

## 2025-03-06 ENCOUNTER — OFFICE VISIT (OUTPATIENT)
Dept: WOUND CARE | Facility: CLINIC | Age: 85
End: 2025-03-06
Payer: COMMERCIAL

## 2025-03-06 VITALS
WEIGHT: 164 LBS | TEMPERATURE: 98.5 F | RESPIRATION RATE: 18 BRPM | HEIGHT: 66 IN | BODY MASS INDEX: 26.36 KG/M2 | HEART RATE: 60 BPM | DIASTOLIC BLOOD PRESSURE: 76 MMHG | SYSTOLIC BLOOD PRESSURE: 170 MMHG

## 2025-03-06 DIAGNOSIS — I87.331 CHRONIC VENOUS HYPERTENSION (IDIOPATHIC) WITH ULCER AND INFLAMMATION OF RIGHT LOWER EXTREMITY (HCC): Primary | ICD-10-CM

## 2025-03-06 PROCEDURE — 99213 OFFICE O/P EST LOW 20 MIN: CPT | Performed by: ORTHOPAEDIC SURGERY

## 2025-03-06 RX ORDER — LIDOCAINE 40 MG/G
CREAM TOPICAL ONCE
Status: COMPLETED | OUTPATIENT
Start: 2025-03-06 | End: 2025-03-06

## 2025-03-06 RX ADMIN — LIDOCAINE: 40 CREAM TOPICAL at 08:19

## 2025-03-06 NOTE — PATIENT INSTRUCTIONS
Orders Placed This Encounter   Procedures    Wound Procedure Treatment Venous Ulcer Right Pretibial     This order was created via procedure documentation    Wound cleansing and dressings Venous Ulcer Right Pretibial     · Wound cleansing and dressings Venous Ulcer Right Pretibial                                               Right Leg wound:      Wash your hands with soap and water. Remove old dressing, discard into plastic bag and place in trash. Cleanse the wound with normal saline solution prior to applying a clean dressing. Do not use tissue or cotton balls. Do not scrub the wound. Pat dry using gauze.      Shower: Yes. Cleanse with a mild soap and water such as Dove. Be sure to remove your old dressing prior to getting in the shower.      Apply Dermagran to the open wound.   Cover with gauze.  Secure with tape. (If your skin is breaking down from tape, then need to wrap with Armani and put tape on Armani)      Change dressing three times a week                                  ·Wound cleansing and dressings Venous Ulcer Right Pretibial                          Elastic Tubular Stocking: Spanda F     Tubular elastic bandage: Apply from base of toes to behind the knee. Apply in AM, may remove for sleep.  Avoid prolonged standing in one place.  Elevate leg(s) above the level of the heart when sitting or as much as possible.     Standing Status:   Future     Expiration Date:   3/13/2025

## 2025-03-06 NOTE — PROGRESS NOTES
Wound Procedure Treatment Venous Ulcer Right Pretibial    Performed by: Graciela Valerio RN  Authorized by: Vesta Garcia PA-C  Associated wounds:   Wound 10/29/23 Venous Ulcer Pretibial Right    Wound cleansed with:  NSS   Applied primary dressing:  Dermagran   Applied secondary dressing:  Gauze   Dressing secured with:  Tape and Size F

## 2025-03-06 NOTE — PROGRESS NOTES
Patient ID: Ely Hernadez is a 84 y.o. female Date of Birth 1940       Chief Complaint   Patient presents with    New Patient Visit     Right leg venous ulcer       Allergies:  Patient has no known allergies.    Diagnosis:   Diagnosis ICD-10-CM Associated Orders   1. Chronic venous hypertension (idiopathic) with ulcer and inflammation of right lower extremity (HCC)  I87.331 lidocaine (LMX) 4 % cream     Wound Procedure Treatment Venous Ulcer Right Pretibial     Wound cleansing and dressings Venous Ulcer Right Pretibial           Assessment and Plan :  Follow up evaluation of right venous ulcer slowly increasing in size.    Aggressively cleansed with NSS and gauze.   Change  wound management to Dermagran, see wound orders below   Continue to wear daily compression with Tubigrip  No harsh cleansers such as alcohol, peroxide, or antibacterial soap, do not submerge in water  Can cleanse with mild soap and water or NSS at dressing changes.   F/u with vascular surgeon  Continue frequent elevation of leg and increase exercise/walking for wound healing.  Follow-up in 2 week(s) or call sooner with questions or concerns or any signs of infection such as redness, swelling, increased/purulent drainage, fever, chills, increased severe pain.    Subjective:   6/28: Pt is an 84 y.o. female with pmhx HTN, DMII (A1c 8.7),  CKD, Pafib, CVA (10/28/23 on Coumadin/ASA 81mg) with residual deficits (left sided facial drop and leg weakness) well known to [facility] who presents for follow up evaluation of non healing RLE venous ulcer which has been present for about 2.5 years. Pt has been covering wound with a bandaid. Does not have an odor. No smoking, ETOH or drug use.  Pt denies any sob, fatigue, N/V, CP, fever or chills.    7/12: Patient presents for followup evaluation of RLE venous ulcer. No increased pain or drainage. Pt is frustrated with chronic wound. Has been using Polymem on the wound bed and Tubigrip for compression.  Pt denies  any fevers or chills.    7/25: Patient presents for followup evaluation of RLE venous ulcer. Since last visit pt was started on Bactrim for positive wound culture with abnormal 2+ Growth of Staphylococcus Aureus. The tissue exam demonstrated chronic inflammation and fibrosis. No malignancy, nor pyoderma gangrenosum were identified.  Pt noticed wound decreasing in size. Has been using Polymem on the wound bed and Tubigrip for compression.  Pt denies any fevers or chills.    8/2: Patient presents for followup evaluation of RLE venous ulcer. Pt completed Bactrim as directed. No issues. Pt states this is the best she has felt in 2 years. No fevers or chills.    8/22: Patient presents for followup evaluation of RLE venous ulcer. Pt states she has been wetting wound with mild soap and water. She noticed a scab formed and came off on dressing. In office CHEYANNE today; R 0.59, L 0.76. No fevers or chills.    9/6: Patient presents for followup evaluation of RLE venous ulcer. Pt  has been applying Polymem Ag Max on wound bed and Tubigrip for compression. Denies fevers or chills.    9/19: Patient presents for followup evaluation of RLE venous ulcer. Pt has been applying Mupirocin wound bed and Tubigrip for compression. Denies fevers or chills.    9/26: Patient presents for followup evaluation of RLE venous ulcer. No complaints. Pt has been applying Hydrofera Blue ready on wound bed and Tubigrip for compression. Denies fevers or chills.    10/10:  Patient presents for followup evaluation of RLE venous ulcer. No complaints. Pt is scheduled for vascular studies for 11/6/24. Pt has been applying Hydrofera Blue ready on wound bed and Tubigrip for compression. Denies fevers or chills.    10/31:  Patient presents for followup evaluation of RLE venous ulcer. NO issues. Pt has been applying Hydrofera Blue ready on wound bed and Tubigrip for compression. Denies fevers or chills.    11/14:  Patient presents for followup evaluation of RLE  venous ulcer. RLE vascular studies reveal deep venous incompetence with an incompetent . GSV and SSV is competent. Bilateral LEADs reveal diffuse disease in the RLE femoral and popliteal arteries with a 50-75% stenosis in the common femoral artery. R CHEYANNE:  0.64 (severe disease). There is diffuse disease in the LLE femoral and popliteal arteries with a >75% stenosis of the distal superficial femoral artery.There is a 50-75% stenosis of the proximal and mid superficial femoral arteries. Left CHEYANNE: 0.69 (moderate disease). Pt has scheduled a vascular surgery appointment for January 14, 2025. Pt has been applying Hydrofera Blue ready on wound bed and Tubigrip for compression. Denies fevers or chills.    12/5: Patient presents for followup evaluation of RLE venous ulcer. No complaints. Pt has been applying Hydrofera Blue ready on wound bed and Tubigrip for compression. Denies fevers or chills.    12/19: Patient presents for followup evaluation of RLE venous ulcer. No issues. Pt has been applying clobetasol cream and DSD on wound bed and Tubigrip for compression. Pt is scheduled to see vascular surgery on January 14,  Denies fevers or chills.    1/2: Patient presents for followup evaluation of RLE venous ulcer.  Pt states dressing has been adhering to wound bed. Pt has been applying Hydrofera Blue Ready on wound bed and Tubigrip for compression. Denies fevers or chills.    3/6: Patient presents for followup evaluation of RLE venous ulcer. Pt has been applying Hydrofera Blue Ready on wound bed and Tubigrip for compression. Pt appointment with vascular was cancelled due to patient being ill. Pt plans on rescheduling. Denies fevers or chills.          The following portions of the patient's history were reviewed and updated as appropriate:   Patient Active Problem List   Diagnosis    Hyperlipidemia    Chronic anticoagulation    Hypertension, essential    Coronary artery disease of native artery of native heart with  stable angina pectoris (HCC)    Simple chronic bronchitis (HCC)    Diabetes mellitus with neurological manifestation (HCC)    Hypothyroidism    GERD (gastroesophageal reflux disease)    Anemia    AVM (arteriovenous malformation) of small bowel, acquired with hemorrhage    Angiectasia    Other vascular disorders of intestine (HCC)    Aortic valve replaced    Cardiomyopathy (HCC)    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HCC)    Stage 3b chronic kidney disease (HCC)    Chronic kidney disease-mineral and bone disorder    Primary osteoarthritis involving multiple joints    Paroxysmal atrial fibrillation (HCC)    Neutropenia associated with infection (HCC)    Herpes simplex labialis    Restrictive lung disease    Nocturnal hypoxemia    Supratherapeutic INR    H/O mechanical aortic valve replacement    Subtherapeutic international normalized ratio (INR)    Type 2 diabetes mellitus with stage 3b chronic kidney disease, with long-term current use of insulin (HCC)    Venous ulcer of right leg (HCC)    Chronic systolic (congestive) heart failure (HCC)    Lower extremity edema    Iron deficiency anemia due to chronic blood loss    Hemiplegia and hemiparesis following cerebral infarction affecting left non-dominant side (HCC)    Chronic obstructive pulmonary disease (HCC)    Type 2 diabetes mellitus with stage 4 chronic kidney disease, with long-term current use of insulin (HCC)     Past Medical History:   Diagnosis Date    Acute anterior epistaxis 12/23/2023    Acute blood loss anemia 12/14/2023    Acute respiratory failure with hypoxia (HCC) 02/06/2024    Anemia     Aneurysm of thoracic aorta (HCC)     Angioedema 06/07/2019    Aortic valve disorder     Benign neoplasm of sigmoid colon     Cardiomyopathy (HCC)     last assessed: 10/10/2014    CHF (congestive heart failure) (HCC)     Chronic kidney disease     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HCC)     last assessed: 10/10/2014    COPD (chronic obstructive pulmonary disease) (HCC)      Diabetic nephropathy (HCC)     Disease of thyroid gland     RAMON (dyspnea on exertion)     GERD (gastroesophageal reflux disease)     History of emphysema (HCC)     History of transfusion     Hyperlipidemia     Hypertensive urgency 10/28/2023    Leg cramps 2023    Stroke-like symptoms 10/28/2023    TIA (transient ischemic attack)      Past Surgical History:   Procedure Laterality Date    AORTIC VALVE REPLACEMENT      CARDIAC PACEMAKER PLACEMENT      last assessed: 10/10/2014    CARDIAC SURGERY      aortic valve replacement    CHOLECYSTECTOMY      COLONOSCOPY      EGD      HYSTERECTOMY      INSERT / REPLACE / REMOVE PACEMAKER      KNEE SURGERY Right     OTHER SURGICAL HISTORY      Capsule ENDOscopy description: 2012    OK COLONOSCOPY FLX DX W/COLLJ SPEC WHEN PFRMD N/A 2018    Procedure: single balloon ENTEROSCOPY;  Surgeon: David Dennis MD;  Location: [facility];  Service: Gastroenterology    OK ESOPHAGOGASTRODUODENOSCOPY TRANSORAL DIAGNOSTIC N/A 2017    Procedure: EGD AND COLONOSCOPY;  Surgeon: Jay Mcleod III, MD;  Location: [facility];  Service: Gastroenterology     Family History   Problem Relation Age of Onset    No Known Problems Mother     No Known Problems Father      Social History     Socioeconomic History    Marital status:      Spouse name: None    Number of children: None    Years of education: None    Highest education level: None   Occupational History    None   Tobacco Use    Smoking status: Former     Current packs/day: 0.00     Average packs/day: 1 pack/day for 52.9 years (52.9 ttl pk-yrs)     Types: Cigarettes     Start date:      Quit date: 2007     Years since quittin.2     Passive exposure: Past    Smokeless tobacco: Former     Quit date:    Vaping Use    Vaping status: Never Used   Substance and Sexual Activity    Alcohol use: Never    Drug use: Never    Sexual activity: Not Currently     Partners: Male   Other Topics Concern    None   Social  History Narrative    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 BID Freestyle lancets BID    Living independently with spouse     Social Drivers of Health     Financial Resource Strain: Not on file   Food Insecurity: No Food Insecurity (5/15/2024)    Nursing - Inadequate Food Risk Classification     Worried About Running Out of Food in the Last Year: Never true     Ran Out of Food in the Last Year: Never true     Ran Out of Food in the Last Year: Not on file   Transportation Needs: No Transportation Needs (5/15/2024)    PRAPARE - Transportation     Lack of Transportation (Medical): No     Lack of Transportation (Non-Medical): No   Physical Activity: Inactive (5/26/2021)    Exercise Vital Sign     Days of Exercise per Week: 0 days     Minutes of Exercise per Session: 0 min   Stress: No Stress Concern Present (5/26/2021)    [employer] - Occupational Stress Questionnaire     Feeling of Stress : Not at all   Social Connections: Not on file   Intimate Partner Violence: Not on file   Housing Stability: Unknown (5/15/2024)    Housing Stability Vital Sign     Unable to Pay for Housing in the Last Year: No     Number of Times Moved in the Last Year: Not on file     Homeless in the Last Year: No       Current Outpatient Medications:     Accu-Chek FastClix Lancets MISC, Use 3 (three) times a day, Disp: 300 each, Rfl: 3    albuterol (Ventolin HFA) 90 mcg/act inhaler, Inhale 2 puffs every 6 (six) hours as needed for wheezing, Disp: 54 g, Rfl: 3    Ascorbic Acid (vitamin C) 1000 MG tablet, Take 1,000 mg by mouth daily, Disp: , Rfl:     aspirin (ECOTRIN LOW STRENGTH) 81 mg EC tablet, Take 1 tablet (81 mg total) by mouth daily Do not start before November 1, 2023., Disp: 30 tablet, Rfl: 0    Blood Glucose Monitoring Suppl (Accu-Chek Guide Me) w/Device KIT, USE 3 TIMES DAILY, Disp: 1 kit, Rfl: 2    Cholecalciferol (Vitamin D3) 50 MCG (2000 UT) TABS, Take 2,000 Units by mouth daily, Disp: ,  Rfl:     clobetasol (TEMOVATE) 0.05 % ointment, Apply topically 2 (two) times a day, Disp: 30 g, Rfl: 1    cyanocobalamin (VITAMIN B-12) 1000 MCG tablet, Take 1 tablet (1,000 mcg total) by mouth daily, Disp: , Rfl:     Empagliflozin (Jardiance) 10 MG TABS tablet, TAKE 1 TABLET BY MOUTH IN THE  MORNING, Disp: 100 tablet, Rfl: 1    enoxaparin (LOVENOX) 80 mg/0.8 mL, Inject 0.7 mL (70 mg total) under the skin every 24 hours Hold Warfarin 5 days prior. Last dose is 9/6 Start Lovenox on 9/8, 4 days prior to the procedure. No Lovenox the morning of the procedure. Resume both Lovenox and Warfarin at the discretion of the surgeon (preferably that evening) and check INR 4 days after restarting. Inject in the abdomen about 2 inches from the belly button and rotate sides at each injection., Disp: 0.7 mL, Rfl: 10    Ferrous Sulfate (IRON PO), Take by mouth daily in the early morning OTC, Disp: , Rfl:     fluticasone (FLONASE) 50 mcg/act nasal spray, 1 spray into each nostril daily, Disp: 16 g, Rfl: 2    furosemide (LASIX) 40 mg tablet, Take 1 tablet (40 mg total) by mouth 2 (two) times a day, Disp: 180 tablet, Rfl: 3    gabapentin (NEURONTIN) 300 mg capsule, TAKE 1 CAPSULE BY MOUTH 3 TIMES  DAILY, Disp: 300 capsule, Rfl: 1    glipiZIDE (GLUCOTROL) 10 mg tablet, TAKE 1 TABLET BY MOUTH TWICE  DAILY BEFORE MEALS, Disp: 200 tablet, Rfl: 1    glucose blood (Accu-Chek Celeste Plus) test strip, Use 1 each 3 (three) times a day Use as instructed, Disp: 300 each, Rfl: 3    insulin degludec (TRESIBA) 100 units/mL injection pen, Inject 30 Units under the skin daily, Disp: 30 mL, Rfl: 3    Insulin Pen Needle (BD Pen Needle Rosie U/F) 32G X 4 MM MISC, Use daily, Disp: 100 each, Rfl: 1    ipratropium-albuterol (DUO-NEB) 0.5-2.5 mg/3 mL nebulizer solution, Take 3 mL by nebulization 4 (four) times a day, Disp: 360 mL, Rfl: 1    Lancets (freestyle) lancets, Check blood glucose 3 times daily, Disp: 300 each, Rfl: 0    levothyroxine 50 mcg tablet,  "TAKE 1 TABLET BY MOUTH DAILY, Disp: 100 tablet, Rfl: 1    metoprolol tartrate (LOPRESSOR) 50 mg tablet, TAKE ONE-HALF TABLET BY MOUTH  TWICE DAILY, Disp: 100 tablet, Rfl: 1    mupirocin (BACTROBAN) 2 % ointment, Apply topically daily (Patient not taking: Reported on 1/14/2025), Disp: 22 g, Rfl: 0    oxygen gas, Inhale 2 L/min daily at bedtime as needed home oxygen nightly, Disp: , Rfl:     pantoprazole (PROTONIX) 40 mg tablet, TAKE 1 TABLET BY MOUTH DAILY AS  DIRECTED, Disp: 100 tablet, Rfl: 0    warfarin (COUMADIN) 5 mg tablet, TAKE 1 TO 2 TABLETS BY  MOUTH DAILY OR AS DIRECTED, Disp: 180 tablet, Rfl: 3    Current Facility-Administered Medications:     lidocaine (LMX) 4 % cream, , Topical, Once, Vesta Garcia PA-C    Review of Systems   Constitutional:  Negative for appetite change, chills, fatigue, fever and unexpected weight change.   HENT:  Negative for congestion, hearing loss and postnasal drip.    Respiratory:  Negative for cough and shortness of breath.    Cardiovascular:  Negative for leg swelling.   Musculoskeletal:  Positive for gait problem.   Skin:  Positive for wound (RLE). Negative for rash.   Neurological:  Negative for numbness.   Hematological:  Does not bruise/bleed easily.   Psychiatric/Behavioral: Negative.           Objective:  /76   Pulse 60   Temp 98.5 °F (36.9 °C)   Resp 18   Ht 5' 6\" (1.676 m)   Wt 74.4 kg (164 lb)   BMI 26.47 kg/m²   Pain Score: 0-No pain     Physical Exam  Constitutional:       General: She is awake. She is not in acute distress.     Appearance: She is not ill-appearing, toxic-appearing or diaphoretic.   HENT:      Head: Normocephalic and atraumatic.      Right Ear: External ear normal.      Left Ear: External ear normal.   Eyes:      Conjunctiva/sclera: Conjunctivae normal.   Cardiovascular:      Pulses:           Dorsalis pedis pulses are 1+ on the right side.   Pulmonary:      Effort: Pulmonary effort is normal. No respiratory distress.   Musculoskeletal: "      Right lower leg: Edema present.      Comments: trace   Skin:     General: Skin is warm and dry.      Findings: Wound (RLE) present. No erythema.      Comments: 1.  Right lower extremity venous ulcer with an increase in size and pink granulated wound bed with periwound scar tissue, See wound assessment for additional details.   Neurological:      Mental Status: She is alert.   Psychiatric:         Mood and Affect: Mood normal.         Behavior: Behavior normal. Behavior is cooperative.         Wound 10/29/23 Venous Ulcer Pretibial Right (Active)   Wound Image Images linked 03/06/25 0804   Wound Description Epithelialization;Pink 03/06/25 0804   Radha-wound Assessment Maceration 03/06/25 0804   Wound Length (cm) 2.5 cm 03/06/25 0804   Wound Width (cm) 1.5 cm 03/06/25 0804   Wound Depth (cm) 0.1 cm 03/06/25 0804   Wound Surface Area (cm^2) 3.75 cm^2 03/06/25 0804   Wound Volume (cm^3) 0.375 cm^3 03/06/25 0804   Calculated Wound Volume (cm^3) 0.38 cm^3 03/06/25 0804   Change in Wound Size % 90.5 03/06/25 0804   Drainage Amount Small 03/06/25 0804   Drainage Description Serosanguineous 03/06/25 0804   Non-staged Wound Description Full thickness 03/06/25 0804   Dressing Status Intact 03/06/25 0804             Wound Instructions:  Orders Placed This Encounter   Procedures    Wound Procedure Treatment Venous Ulcer Right Pretibial     This order was created via procedure documentation    Wound cleansing and dressings Venous Ulcer Right Pretibial     · Wound cleansing and dressings Venous Ulcer Right Pretibial                                               Right Leg wound:      Wash your hands with soap and water. Remove old dressing, discard into plastic bag and place in trash. Cleanse the wound with normal saline solution prior to applying a clean dressing. Do not use tissue or cotton balls. Do not scrub the wound. Pat dry using gauze.      Shower: Yes. Cleanse with a mild soap and water such as Dove. Be sure to remove  "your old dressing prior to getting in the shower.      Apply Dermagran to the open wound.   Cover with gauze.  Secure with tape. (If your skin is breaking down from tape, then need to wrap with Armani and put tape on Armani)      Change dressing three times a week                                  ·Wound cleansing and dressings Venous Ulcer Right Pretibial                          Elastic Tubular Stocking: Spanda F     Tubular elastic bandage: Apply from base of toes to behind the knee. Apply in AM, may remove for sleep.  Avoid prolonged standing in one place.  Elevate leg(s) above the level of the heart when sitting or as much as possible.     Standing Status:   Future     Expiration Date:   3/13/2025       Vesta Garcia PA-C, [facility]      Portions of the record may have been created with voice recognition software. Occasional wrong word or \"sound alike\" substitutions may have occurred due to the inherent limitations of voice recognition software. Read the chart carefully and recognize, using context, where substitutions have occurred.    "

## 2025-03-07 ENCOUNTER — REMOTE DEVICE CLINIC VISIT (OUTPATIENT)
Dept: CARDIOLOGY CLINIC | Facility: CLINIC | Age: 85
End: 2025-03-07

## 2025-03-07 ENCOUNTER — RESULTS FOLLOW-UP (OUTPATIENT)
Dept: CARDIOLOGY CLINIC | Facility: CLINIC | Age: 85
End: 2025-03-07

## 2025-03-07 ENCOUNTER — HOSPITAL ENCOUNTER (OUTPATIENT)
Dept: CT IMAGING | Facility: CLINIC | Age: 85
Discharge: HOME | End: 2025-03-07
Payer: COMMERCIAL

## 2025-03-07 DIAGNOSIS — R91.8 OTHER NONSPECIFIC ABNORMAL FINDING OF LUNG FIELD: ICD-10-CM

## 2025-03-07 DIAGNOSIS — Z95.0 CARDIAC PACEMAKER IN SITU: Primary | ICD-10-CM

## 2025-03-07 DIAGNOSIS — R91.8 GROUND GLASS OPACITY PRESENT ON IMAGING OF LUNG: Primary | ICD-10-CM

## 2025-03-07 DIAGNOSIS — R91.8 GROUND GLASS OPACITY PRESENT ON IMAGING OF LUNG: ICD-10-CM

## 2025-03-07 PROCEDURE — RECHECK: Performed by: INTERNAL MEDICINE

## 2025-03-07 PROCEDURE — 71250 CT THORAX DX C-: CPT

## 2025-03-07 NOTE — PROGRESS NOTES
Results for orders placed or performed in visit on 03/07/25   Cardiac EP device report    Narrative    SJM DC PM/NOT MRI CONDITIONAL  NB/MERLIN TRANSMISSION: PRESENTING EGRAM AF /VS@ 60 BPM. BATTERY VOLTAGE NEARING LACIE-11 MONS. WILL SCHEDULE MONTHLY BATTERY CHECKS. AP 0%  100%. 100% AF BURDEN; PT ON WARFARIN. NORMAL DEVICE FUNCTION. NC

## 2025-03-14 ENCOUNTER — PREP FOR PROCEDURE (OUTPATIENT)
Dept: VASCULAR SURGERY | Facility: CLINIC | Age: 85
End: 2025-03-14

## 2025-03-14 DIAGNOSIS — I87.331 CHRONIC VENOUS HYPERTENSION WITH ULCER AND INFLAMMATION INVOLVING RIGHT SIDE (HCC): ICD-10-CM

## 2025-03-14 DIAGNOSIS — N18.32 STAGE 3B CHRONIC KIDNEY DISEASE (HCC): ICD-10-CM

## 2025-03-14 DIAGNOSIS — L97.919 VENOUS ULCER OF RIGHT LEG (HCC): Primary | ICD-10-CM

## 2025-03-14 DIAGNOSIS — I83.019 VENOUS ULCER OF RIGHT LEG (HCC): Primary | ICD-10-CM

## 2025-03-14 DIAGNOSIS — I87.301 STASIS EDEMA OF RIGHT LOWER EXTREMITY: ICD-10-CM

## 2025-03-17 ENCOUNTER — APPOINTMENT (OUTPATIENT)
Age: 85
End: 2025-03-17
Payer: COMMERCIAL

## 2025-03-17 ENCOUNTER — ANTICOAG VISIT (OUTPATIENT)
Dept: CARDIOLOGY CLINIC | Facility: CLINIC | Age: 85
End: 2025-03-17

## 2025-03-17 DIAGNOSIS — Z79.01 LONG TERM (CURRENT) USE OF ANTICOAGULANTS: ICD-10-CM

## 2025-03-17 DIAGNOSIS — I87.331 CHRONIC VENOUS HYPERTENSION WITH ULCER AND INFLAMMATION INVOLVING RIGHT SIDE (HCC): ICD-10-CM

## 2025-03-17 DIAGNOSIS — L97.919 VENOUS ULCER OF RIGHT LEG (HCC): ICD-10-CM

## 2025-03-17 DIAGNOSIS — I83.019 VENOUS ULCER OF RIGHT LEG (HCC): ICD-10-CM

## 2025-03-17 DIAGNOSIS — I48.91 ATRIAL FIBRILLATION, UNSPECIFIED TYPE (HCC): ICD-10-CM

## 2025-03-17 LAB
ANION GAP SERPL CALCULATED.3IONS-SCNC: 12 MMOL/L (ref 4–13)
BUN SERPL-MCNC: 24 MG/DL (ref 5–25)
CALCIUM SERPL-MCNC: 9.3 MG/DL (ref 8.4–10.2)
CHLORIDE SERPL-SCNC: 101 MMOL/L (ref 96–108)
CO2 SERPL-SCNC: 25 MMOL/L (ref 21–32)
CREAT SERPL-MCNC: 1.41 MG/DL (ref 0.6–1.3)
ERYTHROCYTE [DISTWIDTH] IN BLOOD BY AUTOMATED COUNT: 14.6 % (ref 11.6–15.1)
GFR SERPL CREATININE-BSD FRML MDRD: 34 ML/MIN/1.73SQ M
GLUCOSE P FAST SERPL-MCNC: 162 MG/DL (ref 65–99)
HCT VFR BLD AUTO: 40.3 % (ref 34.8–46.1)
HGB BLD-MCNC: 12.4 G/DL (ref 11.5–15.4)
INR PPP: 2.71 (ref 0.85–1.19)
MCH RBC QN AUTO: 28.8 PG (ref 26.8–34.3)
MCHC RBC AUTO-ENTMCNC: 30.8 G/DL (ref 31.4–37.4)
MCV RBC AUTO: 94 FL (ref 82–98)
PLATELET # BLD AUTO: 366 THOUSANDS/UL (ref 149–390)
PMV BLD AUTO: 11 FL (ref 8.9–12.7)
POTASSIUM SERPL-SCNC: 4 MMOL/L (ref 3.5–5.3)
PROTHROMBIN TIME: 28.6 SECONDS (ref 12.3–15)
RBC # BLD AUTO: 4.31 MILLION/UL (ref 3.81–5.12)
SODIUM SERPL-SCNC: 138 MMOL/L (ref 135–147)
WBC # BLD AUTO: 6.54 THOUSAND/UL (ref 4.31–10.16)

## 2025-03-17 PROCEDURE — 85610 PROTHROMBIN TIME: CPT

## 2025-03-17 PROCEDURE — 85027 COMPLETE CBC AUTOMATED: CPT

## 2025-03-17 PROCEDURE — 36415 COLL VENOUS BLD VENIPUNCTURE: CPT

## 2025-03-17 PROCEDURE — 80048 BASIC METABOLIC PNL TOTAL CA: CPT

## 2025-03-19 ENCOUNTER — OFFICE VISIT (OUTPATIENT)
Age: 85
End: 2025-03-19
Payer: COMMERCIAL

## 2025-03-19 VITALS
BODY MASS INDEX: 26.52 KG/M2 | HEIGHT: 66 IN | OXYGEN SATURATION: 94 % | HEART RATE: 60 BPM | SYSTOLIC BLOOD PRESSURE: 130 MMHG | WEIGHT: 165 LBS | RESPIRATION RATE: 16 BRPM | TEMPERATURE: 97.6 F | DIASTOLIC BLOOD PRESSURE: 68 MMHG

## 2025-03-19 DIAGNOSIS — R06.02 SHORTNESS OF BREATH: Primary | ICD-10-CM

## 2025-03-19 DIAGNOSIS — R59.0 MEDIASTINAL LYMPHADENOPATHY: ICD-10-CM

## 2025-03-19 DIAGNOSIS — J41.0 SIMPLE CHRONIC BRONCHITIS (HCC): ICD-10-CM

## 2025-03-19 PROCEDURE — 99214 OFFICE O/P EST MOD 30 MIN: CPT | Performed by: PHYSICIAN ASSISTANT

## 2025-03-19 NOTE — ASSESSMENT & PLAN NOTE
Currently using just albuterol as needed  Given ongoing shortness of breath after her sinus infection, will give her a sample of Trelegy to use 1 puff daily.  She had previously been on Advair but is not currently on any long-acting bronchodilators.  Will hold off on any systemic steroids for now.

## 2025-03-19 NOTE — PROGRESS NOTES
Follow-up  Visit - Pulmonary Medicine   Name: Ely Hernadez      : 1940      MRN: [id number]  Encounter Provider: Ben Song PA-C  Encounter Date: 3/19/2025   Encounter department: [facility] PULMONARY [facility]  :  Assessment & Plan  Shortness of breath  Unclear etiology of her shortness of breath, possibly related to her underlying chronic bronchitis with recent sinus infection.  Although she does not have any significant wheezing on exam.    She does have an appoint with her cardiologist on Monday and will mention it to them as well to be sure there is no cardiac etiology to her symptoms.  Does not appear volume overloaded.       Simple chronic bronchitis (HCC)  Currently using just albuterol as needed  Given ongoing shortness of breath after her sinus infection, will give her a sample of Trelegy to use 1 puff daily.  She had previously been on Advair but is not currently on any long-acting bronchodilators.  Will hold off on any systemic steroids for now.       Mediastinal lymphadenopathy  Recent CT scan shows again groundglass nodules.  Right apex groundglass nodule is 7 x 6 mm which appears stable from prior, for millimeter subpleural pulmonary nodule in the lingula  She does have some new mediastinal lymphadenopathy measuring up to 2.6 x 1.4 cm and a precarinal lymph node  Given she did recently have respiratory illness, will hold off on a PET scan and repeat a CT scan in about 6 weeks to see if these lymph nodes are decreasing in size as they may be reactive.  If they persist or worsen, we will send her for a PET scan    Orders:    CT chest without contrast; Future      Return in about 6 weeks (around 2025).    History of Present Illness   Ely Hernadez is a 84 y.o. female former smoker with 50-pack-year smoking history who quit  with past medical history of COPD, hypertension, CAD, diabetes, GERD, aortic valve replacement, cardiomyopathy, CKD who presents for  follow-up.  She started with what felt like a sinus infection in January, she was given a course of Augmentin without much improvement, then was given doxycycline.  She has continued to have nasal symptoms and was started on an antihistamine as well as Flonase.  During that she has also complained of worsening shortness of breath, dyspnea on exertion. She has been using her albuterol occasionally.     Review of Systems   Constitutional: Negative.    HENT:  Positive for postnasal drip.    Respiratory:  Positive for shortness of breath.    Cardiovascular: Negative.    Gastrointestinal: Negative.    Genitourinary: Negative.    Musculoskeletal: Negative.    Skin: Negative.    Allergic/Immunologic: Negative.    Neurological: Negative.    Psychiatric/Behavioral: Negative.         Aside from what is mentioned in the HPI, ROS is otherwise negative    Current Outpatient Medications on File Prior to Visit   Medication Sig Dispense Refill    Accu-Chek FastClix Lancets MISC Use 3 (three) times a day 300 each 3    albuterol (Ventolin HFA) 90 mcg/act inhaler Inhale 2 puffs every 6 (six) hours as needed for wheezing 54 g 3    Ascorbic Acid (vitamin C) 1000 MG tablet Take 1,000 mg by mouth daily      aspirin (ECOTRIN LOW STRENGTH) 81 mg EC tablet Take 1 tablet (81 mg total) by mouth daily Do not start before November 1, 2023. 30 tablet 0    Blood Glucose Monitoring Suppl (Accu-Chek Guide Me) w/Device KIT USE 3 TIMES DAILY 1 kit 2    Cholecalciferol (Vitamin D3) 50 MCG (2000 UT) TABS Take 2,000 Units by mouth daily      clobetasol (TEMOVATE) 0.05 % ointment Apply topically 2 (two) times a day 30 g 1    cyanocobalamin (VITAMIN B-12) 1000 MCG tablet Take 1 tablet (1,000 mcg total) by mouth daily      Empagliflozin (Jardiance) 10 MG TABS tablet TAKE 1 TABLET BY MOUTH IN THE  MORNING 100 tablet 1    enoxaparin (LOVENOX) 80 mg/0.8 mL Inject 0.7 mL (70 mg total) under the skin every 24 hours Hold Warfarin 5 days prior. Last dose is 9/6  Start Lovenox on 9/8, 4 days prior to the procedure. No Lovenox the morning of the procedure. Resume both Lovenox and Warfarin at the discretion of the surgeon (preferably that evening) and check INR 4 days after restarting. Inject in the abdomen about 2 inches from the belly button and rotate sides at each injection. 0.7 mL 10    Ferrous Sulfate (IRON PO) Take by mouth daily in the early morning OTC      fluticasone (FLONASE) 50 mcg/act nasal spray 1 spray into each nostril daily 16 g 2    furosemide (LASIX) 40 mg tablet Take 1 tablet (40 mg total) by mouth 2 (two) times a day 180 tablet 3    gabapentin (NEURONTIN) 300 mg capsule TAKE 1 CAPSULE BY MOUTH 3 TIMES  DAILY 300 capsule 1    glipiZIDE (GLUCOTROL) 10 mg tablet TAKE 1 TABLET BY MOUTH TWICE  DAILY BEFORE MEALS 200 tablet 1    glucose blood (Accu-Chek Celeste Plus) test strip Use 1 each 3 (three) times a day Use as instructed 300 each 3    insulin degludec (TRESIBA) 100 units/mL injection pen Inject 30 Units under the skin daily 30 mL 3    Insulin Pen Needle (BD Pen Needle Rosie U/F) 32G X 4 MM MISC Use daily 100 each 1    ipratropium-albuterol (DUO-NEB) 0.5-2.5 mg/3 mL nebulizer solution Take 3 mL by nebulization 4 (four) times a day 360 mL 1    Lancets (freestyle) lancets Check blood glucose 3 times daily 300 each 0    levothyroxine 50 mcg tablet TAKE 1 TABLET BY MOUTH DAILY 100 tablet 1    metoprolol tartrate (LOPRESSOR) 50 mg tablet TAKE ONE-HALF TABLET BY MOUTH  TWICE DAILY 100 tablet 1    oxygen gas Inhale 2 L/min daily at bedtime as needed home oxygen nightly      pantoprazole (PROTONIX) 40 mg tablet TAKE 1 TABLET BY MOUTH DAILY AS  DIRECTED 100 tablet 0    warfarin (COUMADIN) 5 mg tablet TAKE 1 TO 2 TABLETS BY  MOUTH DAILY OR AS DIRECTED 180 tablet 3    mupirocin (BACTROBAN) 2 % ointment Apply topically daily (Patient not taking: Reported on 1/14/2025) 22 g 0     Current Facility-Administered Medications on File Prior to Visit   Medication Dose Route  "Frequency Provider Last Rate Last Admin    lidocaine (LMX) 4 % cream   Topical Once Vesta Garcia PA-C             Medical History Reviewed by provider this encounter:     .    Objective   /68 (BP Location: Right arm, Patient Position: Sitting, Cuff Size: Large)   Pulse 60   Temp 97.6 °F (36.4 °C) (Oral)   Resp 16   Ht 5' 6\" (1.676 m)   Wt 74.8 kg (165 lb)   SpO2 94%   BMI 26.63 kg/m²     Physical Exam  Vitals reviewed.   Constitutional:       General: She is not in acute distress.     Appearance: Normal appearance. She is well-developed. She is not ill-appearing.   HENT:      Head: Normocephalic and atraumatic.      Mouth/Throat:      Pharynx: Oropharynx is clear.   Eyes:      Pupils: Pupils are equal, round, and reactive to light.   Cardiovascular:      Rate and Rhythm: Normal rate and regular rhythm.   Pulmonary:      Effort: Pulmonary effort is normal. No respiratory distress.      Breath sounds: Decreased breath sounds present. No wheezing, rhonchi or rales.   Abdominal:      General: Abdomen is flat. There is no distension.   Musculoskeletal:         General: Normal range of motion.      Cervical back: Normal range of motion.      Right lower leg: No edema.      Left lower leg: No edema.   Skin:     General: Skin is warm and dry.      Findings: No rash.   Neurological:      Mental Status: She is alert and oriented to person, place, and time.   Psychiatric:         Mood and Affect: Mood normal.         Behavior: Behavior normal.           Diagnostic Data:  Labs: I personally reviewed the most recent laboratory data pertinent to today's visit.      Radiology results:  Radiology Results Review: I have reviewed radiology reports from this month including: CT chest.  Mediastinal lymphadenopathy, groundglass nodules    PFT/spirometry results:  No results found for: \"FEV1\", \"FVC\", \"OML3ICY\", \"TLC\", \"DLCO\"   PFT done in 2023 showed restriction, severely decreased DLCO    Oximetry testing:      Other " studies:      Ben Song PA-C

## 2025-03-20 ENCOUNTER — OFFICE VISIT (OUTPATIENT)
Dept: WOUND CARE | Facility: CLINIC | Age: 85
End: 2025-03-20
Payer: COMMERCIAL

## 2025-03-20 VITALS
TEMPERATURE: 97.6 F | DIASTOLIC BLOOD PRESSURE: 63 MMHG | RESPIRATION RATE: 18 BRPM | SYSTOLIC BLOOD PRESSURE: 162 MMHG | HEART RATE: 59 BPM

## 2025-03-20 DIAGNOSIS — I87.331 CHRONIC VENOUS HYPERTENSION (IDIOPATHIC) WITH ULCER AND INFLAMMATION OF RIGHT LOWER EXTREMITY (HCC): Primary | ICD-10-CM

## 2025-03-20 DIAGNOSIS — L92.9 HYPERGRANULATION: ICD-10-CM

## 2025-03-20 PROCEDURE — 17250 CHEM CAUT OF GRANLTJ TISSUE: CPT | Performed by: ORTHOPAEDIC SURGERY

## 2025-03-20 RX ORDER — LIDOCAINE 40 MG/G
CREAM TOPICAL ONCE
Status: COMPLETED | OUTPATIENT
Start: 2025-03-20 | End: 2025-03-20

## 2025-03-20 RX ADMIN — LIDOCAINE: 40 CREAM TOPICAL at 08:59

## 2025-03-20 NOTE — PATIENT INSTRUCTIONS
Orders Placed This Encounter   Procedures    Wound cleansing and dressings Venous Ulcer Right Pretibial     Right Leg wound:      Wash your hands with soap and water. Remove old dressing, discard into plastic bag and place in trash. Cleanse the wound with Normal Saline solution prior to applying a clean dressing. Do not use tissue or cotton balls. Do not scrub the wound. Pat dry using gauze.      Shower: Yes. Cleanse with a mild soap and water such as Dove. Be sure to remove your old dressing prior to getting in the shower.      Apply Exufiber AG to the open wound.   Cover with gauze.  Secure with tape. (If your skin is breaking down from tape, then need to wrap with Armani and put tape on Armani)      Change dressing EVERY OTHER DAY.     Standing Status:   Future     Expiration Date:   3/27/2025    Wound cleansing and dressings Venous Ulcer Right Pretibial     Elastic Tubular Stocking: Spanda F     Tubular elastic bandage: Apply from base of toes to behind the knee. Apply in AM, may remove for sleep.  Avoid prolonged standing in one place.  Elevate leg(s) above the level of the heart when sitting or as much as possible.     Standing Status:   Future     Expiration Date:   3/27/2025

## 2025-03-20 NOTE — PROGRESS NOTES
Wound Procedure Treatment Venous Ulcer Right Pretibial    Performed by: Marie Stewart RN  Authorized by: Vesta Garcia PA-C  Associated wounds:   Wound 10/29/23 Venous Ulcer Pretibial Right    Wound cleansed with:  NSS   Applied primary dressing:  Gelling fiber and Silver   Applied secondary dressing:  Gauze   Dressing secured with:  Tape and Size F

## 2025-03-20 NOTE — PROGRESS NOTES
Patient ID: Ely Hernadez is a 84 y.o. female Date of Birth 1940       Chief Complaint   Patient presents with    Follow Up Wound Care Visit     RLE WOUND       Allergies:  Patient has no known allergies.    Diagnosis:   Diagnosis ICD-10-CM Associated Orders   1. Chronic venous hypertension (idiopathic) with ulcer and inflammation of right lower extremity (HCC)  I87.331 lidocaine (LMX) 4 % cream     Wound cleansing and dressings Venous Ulcer Right Pretibial     Wound cleansing and dressings Venous Ulcer Right Pretibial     Wound Procedure Treatment Venous Ulcer Right Pretibial      2. Hypergranulation  L92.9            Assessment and Plan :  Follow up evaluation of right venous ulcer is hypergranular and increasing in size with serosanguinous drainage.    Chemically cauterized as below  Change wound management to Exufiber Ag on wound bed, see wound orders below   Continue to wear daily compression with Tubigrip.  No harsh cleansers such as alcohol, peroxide, or antibacterial soap, do not submerge in water  Can cleanse with mild soap and water or NSS at dressing changes.   F/u with vascular surgeon for scheduled R sclerotherapy.  Continue frequent elevation of leg and increase exercise/walking for wound healing.  Follow-up in 2 week(s) or call sooner with questions or concerns or any signs of infection such as redness, swelling, increased/purulent drainage, fever, chills, increased severe pain.    Subjective:   6/28: Pt is an 84 y.o. female with pmhx HTN, DMII (A1c 8.7),  CKD, Pafib, CVA (10/28/23 on Coumadin/ASA 81mg) with residual deficits (left sided facial drop and leg weakness) well known to [facility] who presents for follow up evaluation of non healing RLE venous ulcer which has been present for about 2.5 years. Pt has been covering wound with a bandaid. Does not have an odor. No smoking, ETOH or drug use.  Pt denies any sob, fatigue, N/V, CP, fever or chills.    7/12: Patient presents for followup evaluation  of RLE venous ulcer. No increased pain or drainage. Pt is frustrated with chronic wound. Has been using Polymem on the wound bed and Tubigrip for compression.  Pt denies any fevers or chills.    7/25: Patient presents for followup evaluation of RLE venous ulcer. Since last visit pt was started on Bactrim for positive wound culture with abnormal 2+ Growth of Staphylococcus Aureus. The tissue exam demonstrated chronic inflammation and fibrosis. No malignancy, nor pyoderma gangrenosum were identified.  Pt noticed wound decreasing in size. Has been using Polymem on the wound bed and Tubigrip for compression.  Pt denies any fevers or chills.    8/2: Patient presents for followup evaluation of RLE venous ulcer. Pt completed Bactrim as directed. No issues. Pt states this is the best she has felt in 2 years. No fevers or chills.    8/22: Patient presents for followup evaluation of RLE venous ulcer. Pt states she has been wetting wound with mild soap and water. She noticed a scab formed and came off on dressing. In office CHEYANNE today; R 0.59, L 0.76. No fevers or chills.    9/6: Patient presents for followup evaluation of RLE venous ulcer. Pt  has been applying Polymem Ag Max on wound bed and Tubigrip for compression. Denies fevers or chills.    9/19: Patient presents for followup evaluation of RLE venous ulcer. Pt has been applying Mupirocin wound bed and Tubigrip for compression. Denies fevers or chills.    9/26: Patient presents for followup evaluation of RLE venous ulcer. No complaints. Pt has been applying Hydrofera Blue ready on wound bed and Tubigrip for compression. Denies fevers or chills.    10/10:  Patient presents for followup evaluation of RLE venous ulcer. No complaints. Pt is scheduled for vascular studies for 11/6/24. Pt has been applying Hydrofera Blue ready on wound bed and Tubigrip for compression. Denies fevers or chills.    10/31:  Patient presents for followup evaluation of RLE venous ulcer. NO issues. Pt  has been applying Hydrofera Blue ready on wound bed and Tubigrip for compression. Denies fevers or chills.    11/14:  Patient presents for followup evaluation of RLE venous ulcer. RLE vascular studies reveal deep venous incompetence with an incompetent . GSV and SSV is competent. Bilateral LEADs reveal diffuse disease in the RLE femoral and popliteal arteries with a 50-75% stenosis in the common femoral artery. R CHEYANNE:  0.64 (severe disease). There is diffuse disease in the LLE femoral and popliteal arteries with a >75% stenosis of the distal superficial femoral artery.There is a 50-75% stenosis of the proximal and mid superficial femoral arteries. Left CHEYANNE: 0.69 (moderate disease). Pt has scheduled a vascular surgery appointment for January 14, 2025. Pt has been applying Hydrofera Blue ready on wound bed and Tubigrip for compression. Denies fevers or chills.    12/5: Patient presents for followup evaluation of RLE venous ulcer. No complaints. Pt has been applying Hydrofera Blue ready on wound bed and Tubigrip for compression. Denies fevers or chills.    12/19: Patient presents for followup evaluation of RLE venous ulcer. No issues. Pt has been applying clobetasol cream and DSD on wound bed and Tubigrip for compression. Pt is scheduled to see vascular surgery on January 14,  Denies fevers or chills.    1/2: Patient presents for followup evaluation of RLE venous ulcer.  Pt states dressing has been adhering to wound bed. Pt has been applying Hydrofera Blue Ready on wound bed and Tubigrip for compression. Denies fevers or chills.    3/6: Patient presents for followup evaluation of RLE venous ulcer. Pt has been applying Hydrofera Blue Ready on wound bed and Tubigrip for compression. Pt appointment with vascular was cancelled due to patient being ill. Pt plans on rescheduling. Denies fevers or chills.    3/20: Patient presents for followup evaluation of RLE venous ulcer. Pt c/o increased serosanguinous drainage.  Pt is scheduled for RLE sclerotherapy with vascular on 4/11/25. Pt has been applying Dermagran on wound bed and Tubigrip for compression. Denies fevers or chills.      The following portions of the patient's history were reviewed and updated as appropriate:   Patient Active Problem List   Diagnosis    Hyperlipidemia    Chronic anticoagulation    Hypertension, essential    Coronary artery disease of native artery of native heart with stable angina pectoris (HCC)    Simple chronic bronchitis (HCC)    Diabetes mellitus with neurological manifestation (HCC)    Hypothyroidism    GERD (gastroesophageal reflux disease)    Anemia    AVM (arteriovenous malformation) of small bowel, acquired with hemorrhage    Angiectasia    Other vascular disorders of intestine (HCC)    Aortic valve replaced    Cardiomyopathy (HCC)    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HCC)    Stage 3b chronic kidney disease (HCC)    Chronic kidney disease-mineral and bone disorder    Primary osteoarthritis involving multiple joints    Paroxysmal atrial fibrillation (HCC)    Neutropenia associated with infection (HCC)    Herpes simplex labialis    Restrictive lung disease    Nocturnal hypoxemia    Supratherapeutic INR    H/O mechanical aortic valve replacement    Subtherapeutic international normalized ratio (INR)    Type 2 diabetes mellitus with stage 3b chronic kidney disease, with long-term current use of insulin (HCC)    Venous ulcer of right leg (HCC)    Chronic systolic (congestive) heart failure (HCC)    Lower extremity edema    Iron deficiency anemia due to chronic blood loss    Hemiplegia and hemiparesis following cerebral infarction affecting left non-dominant side (HCC)    Chronic obstructive pulmonary disease (HCC)    Type 2 diabetes mellitus with stage 4 chronic kidney disease, with long-term current use of insulin (HCC)     Past Medical History:   Diagnosis Date    Acute anterior epistaxis 12/23/2023    Acute blood loss anemia 12/14/2023    Acute  respiratory failure with hypoxia (HCC) 02/06/2024    Anemia     Aneurysm of thoracic aorta (HCC)     Angioedema 06/07/2019    Aortic valve disorder     Benign neoplasm of sigmoid colon     Cardiomyopathy (HCC)     last assessed: 10/10/2014    CHF (congestive heart failure) (HCC)     Chronic kidney disease     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HCC)     last assessed: 10/10/2014    COPD (chronic obstructive pulmonary disease) (HCC)     Diabetic nephropathy (HCC)     Disease of thyroid gland     RAMON (dyspnea on exertion)     GERD (gastroesophageal reflux disease)     History of emphysema (HCC)     History of transfusion     Hyperlipidemia     Hypertensive urgency 10/28/2023    Leg cramps 11/01/2023    Stroke-like symptoms 10/28/2023    TIA (transient ischemic attack)      Past Surgical History:   Procedure Laterality Date    AORTIC VALVE REPLACEMENT      CARDIAC PACEMAKER PLACEMENT      last assessed: 10/10/2014    CARDIAC SURGERY      aortic valve replacement    CHOLECYSTECTOMY      COLONOSCOPY      EGD      HYSTERECTOMY      INSERT / REPLACE / REMOVE PACEMAKER      KNEE SURGERY Right     OTHER SURGICAL HISTORY      Capsule ENDOscopy description: 12/19/2012    OH COLONOSCOPY FLX DX W/COLLJ SPEC WHEN PFRMD N/A 05/31/2018    Procedure: single balloon ENTEROSCOPY;  Surgeon: David Dennis MD;  Location: [facility];  Service: Gastroenterology    OH ESOPHAGOGASTRODUODENOSCOPY TRANSORAL DIAGNOSTIC N/A 11/29/2017    Procedure: EGD AND COLONOSCOPY;  Surgeon: Jay Mcleod III, MD;  Location: [facility];  Service: Gastroenterology     Family History   Problem Relation Age of Onset    No Known Problems Mother     No Known Problems Father      Social History     Socioeconomic History    Marital status:      Spouse name: None    Number of children: None    Years of education: None    Highest education level: None   Occupational History    None   Tobacco Use    Smoking status: Former     Current packs/day: 0.00     Average  packs/day: 1 pack/day for 52.9 years (52.9 ttl pk-yrs)     Types: Cigarettes     Start date:      Quit date: 2007     Years since quittin.3     Passive exposure: Past    Smokeless tobacco: Former     Quit date:    Vaping Use    Vaping status: Never Used   Substance and Sexual Activity    Alcohol use: Never    Drug use: Never    Sexual activity: Not Currently     Partners: Male   Other Topics Concern    None   Social History Narrative    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 BID Freestyle lancets BID    Living independently with spouse     Social Drivers of Health     Financial Resource Strain: Not on file   Food Insecurity: No Food Insecurity (5/15/2024)    Nursing - Inadequate Food Risk Classification     Worried About Running Out of Food in the Last Year: Never true     Ran Out of Food in the Last Year: Never true     Ran Out of Food in the Last Year: Not on file   Transportation Needs: No Transportation Needs (5/15/2024)    PRAPARE - Transportation     Lack of Transportation (Medical): No     Lack of Transportation (Non-Medical): No   Physical Activity: Inactive (2021)    Exercise Vital Sign     Days of Exercise per Week: 0 days     Minutes of Exercise per Session: 0 min   Stress: No Stress Concern Present (2021)    [place] [employer] [place] [employer] - Occupational Stress Questionnaire     Feeling of Stress : Not at all   Social Connections: Not on file   Intimate Partner Violence: Not on file   Housing Stability: Unknown (5/15/2024)    Housing Stability Vital Sign     Unable to Pay for Housing in the Last Year: No     Number of Times Moved in the Last Year: Not on file     Homeless in the Last Year: No       Current Outpatient Medications:     Accu-Chek FastClix Lancets MISC, Use 3 (three) times a day, Disp: 300 each, Rfl: 3    albuterol (Ventolin HFA) 90 mcg/act inhaler, Inhale 2 puffs every 6 (six) hours as needed for wheezing, Disp: 54 g, Rfl: 3    Ascorbic  Acid (vitamin C) 1000 MG tablet, Take 1,000 mg by mouth daily, Disp: , Rfl:     aspirin (ECOTRIN LOW STRENGTH) 81 mg EC tablet, Take 1 tablet (81 mg total) by mouth daily Do not start before November 1, 2023., Disp: 30 tablet, Rfl: 0    Blood Glucose Monitoring Suppl (Accu-Chek Guide Me) w/Device KIT, USE 3 TIMES DAILY, Disp: 1 kit, Rfl: 2    Cholecalciferol (Vitamin D3) 50 MCG (2000 UT) TABS, Take 2,000 Units by mouth daily, Disp: , Rfl:     clobetasol (TEMOVATE) 0.05 % ointment, Apply topically 2 (two) times a day, Disp: 30 g, Rfl: 1    cyanocobalamin (VITAMIN B-12) 1000 MCG tablet, Take 1 tablet (1,000 mcg total) by mouth daily, Disp: , Rfl:     Empagliflozin (Jardiance) 10 MG TABS tablet, TAKE 1 TABLET BY MOUTH IN THE  MORNING, Disp: 100 tablet, Rfl: 1    enoxaparin (LOVENOX) 80 mg/0.8 mL, Inject 0.7 mL (70 mg total) under the skin every 24 hours Hold Warfarin 5 days prior. Last dose is 9/6 Start Lovenox on 9/8, 4 days prior to the procedure. No Lovenox the morning of the procedure. Resume both Lovenox and Warfarin at the discretion of the surgeon (preferably that evening) and check INR 4 days after restarting. Inject in the abdomen about 2 inches from the belly button and rotate sides at each injection., Disp: 0.7 mL, Rfl: 10    Ferrous Sulfate (IRON PO), Take by mouth daily in the early morning OTC, Disp: , Rfl:     fluticasone (FLONASE) 50 mcg/act nasal spray, 1 spray into each nostril daily, Disp: 16 g, Rfl: 2    furosemide (LASIX) 40 mg tablet, Take 1 tablet (40 mg total) by mouth 2 (two) times a day, Disp: 180 tablet, Rfl: 3    gabapentin (NEURONTIN) 300 mg capsule, TAKE 1 CAPSULE BY MOUTH 3 TIMES  DAILY, Disp: 300 capsule, Rfl: 1    glipiZIDE (GLUCOTROL) 10 mg tablet, TAKE 1 TABLET BY MOUTH TWICE  DAILY BEFORE MEALS, Disp: 200 tablet, Rfl: 1    glucose blood (Accu-Chek Celeste Plus) test strip, Use 1 each 3 (three) times a day Use as instructed, Disp: 300 each, Rfl: 3    insulin degludec (TRESIBA) 100  units/mL injection pen, Inject 30 Units under the skin daily, Disp: 30 mL, Rfl: 3    Insulin Pen Needle (BD Pen Needle Rosie U/F) 32G X 4 MM MISC, Use daily, Disp: 100 each, Rfl: 1    ipratropium-albuterol (DUO-NEB) 0.5-2.5 mg/3 mL nebulizer solution, Take 3 mL by nebulization 4 (four) times a day, Disp: 360 mL, Rfl: 1    Lancets (freestyle) lancets, Check blood glucose 3 times daily, Disp: 300 each, Rfl: 0    levothyroxine 50 mcg tablet, TAKE 1 TABLET BY MOUTH DAILY, Disp: 100 tablet, Rfl: 1    metoprolol tartrate (LOPRESSOR) 50 mg tablet, TAKE ONE-HALF TABLET BY MOUTH  TWICE DAILY, Disp: 100 tablet, Rfl: 1    mupirocin (BACTROBAN) 2 % ointment, Apply topically daily (Patient not taking: Reported on 1/14/2025), Disp: 22 g, Rfl: 0    oxygen gas, Inhale 2 L/min daily at bedtime as needed home oxygen nightly, Disp: , Rfl:     pantoprazole (PROTONIX) 40 mg tablet, TAKE 1 TABLET BY MOUTH DAILY AS  DIRECTED, Disp: 100 tablet, Rfl: 0    warfarin (COUMADIN) 5 mg tablet, TAKE 1 TO 2 TABLETS BY  MOUTH DAILY OR AS DIRECTED, Disp: 180 tablet, Rfl: 3    Current Facility-Administered Medications:     lidocaine (LMX) 4 % cream, , Topical, Once, Vesta Garcia PA-C    Review of Systems   Constitutional:  Negative for appetite change, chills, fatigue, fever and unexpected weight change.   HENT:  Negative for congestion, hearing loss and postnasal drip.    Respiratory:  Negative for cough and shortness of breath.    Cardiovascular:  Negative for leg swelling.   Musculoskeletal:  Positive for gait problem.   Skin:  Positive for wound (RLE). Negative for rash.   Neurological:  Negative for numbness.   Hematological:  Does not bruise/bleed easily.   Psychiatric/Behavioral: Negative.           Objective:  /63   Pulse 59   Temp 97.6 °F (36.4 °C)   Resp 18   Pain Score: 0-No pain     Physical Exam  Constitutional:       General: She is awake. She is not in acute distress.     Appearance: She is not ill-appearing, toxic-appearing  or diaphoretic.   HENT:      Head: Normocephalic and atraumatic.      Right Ear: External ear normal.      Left Ear: External ear normal.   Eyes:      Conjunctiva/sclera: Conjunctivae normal.   Cardiovascular:      Pulses:           Dorsalis pedis pulses are 1+ on the right side.   Pulmonary:      Effort: Pulmonary effort is normal. No respiratory distress.   Musculoskeletal:      Right lower leg: Edema present.      Comments: trace   Skin:     General: Skin is warm and dry.      Findings: Wound (RLE) present. No erythema.      Comments: 1.  Right lower extremity venous ulcer with an increase in size and pink hypergranulated wound bed with periwound scar tissue, See wound assessment for additional details.   Neurological:      Mental Status: She is alert.   Psychiatric:         Mood and Affect: Mood normal.         Behavior: Behavior normal. Behavior is cooperative.              Wound 10/29/23 Venous Ulcer Pretibial Right (Active)   Wound Description Epithelialization;Pink;Hypergranulation 03/20/25 0854   Non-staged Wound Description Full thickness 03/20/25 0854   Wound Length (cm) 2.4 cm 03/20/25 0854   Wound Width (cm) 1.6 cm 03/20/25 0854   Wound Depth (cm) 0.1 cm 03/20/25 0854   Wound Surface Area (cm^2) 3.84 cm^2 03/20/25 0854   Wound Volume (cm^3) 0.384 cm^3 03/20/25 0854   Calculated Wound Volume (cm^3) 0.38 cm^3 03/20/25 0854   Change in Wound Size % 90.5 03/20/25 0854   Drainage Amount Small 03/20/25 0854   Drainage Description Serosanguineous 03/20/25 0854   Radha-wound Assessment Maceration 03/20/25 0854   Dressing Status Intact 03/20/25 0854         Chemical caut of a wound Venous Ulcer Right Pretibial     Date/Time  3/20/2025 10:00 AM     Performed by  Vesta Garcia PA-C   Authorized by  Vesta Garcia PA-C      Associated wounds:   Wound 10/29/23 Venous Ulcer Pretibial Right     Universal Protocol   procedure performed by consultantConsent: Verbal consent obtained. Written consent obtained.  Risks and  "benefits: risks, benefits and alternatives were discussed  Consent given by: patient  Time out: Immediately prior to procedure a \"time out\" was called to verify the correct patient, procedure, equipment, support staff and site/side marked as required.  Patient understanding: patient states understanding of the procedure being performed  Patient identity confirmed: verbally with patient      Local anesthesia used: yes     Anesthesia   Local anesthesia used: yes  Local Anesthetic: topical anesthetic     Sedation   Patient sedated: no        Specimen: no    Culture: no   Procedure Details   Procedure Notes: After permission and placement of topical local anesthetic, 1 Silver nitrate stick(s) were used to cauterize the hypergranular tissue of the open wound.  Normal saline was used to neutralize the reaction. A dressing was applied, see orders.  Patient tolerated procedure well.      Patient tolerance: Patient tolerated the procedure well with no immediate complications                  Wound Instructions:  Orders Placed This Encounter   Procedures    Wound cleansing and dressings Venous Ulcer Right Pretibial     Right Leg wound:      Wash your hands with soap and water. Remove old dressing, discard into plastic bag and place in trash. Cleanse the wound with Normal Saline solution prior to applying a clean dressing. Do not use tissue or cotton balls. Do not scrub the wound. Pat dry using gauze.      Shower: Yes. Cleanse with a mild soap and water such as Dove. Be sure to remove your old dressing prior to getting in the shower.      Apply Exufiber AG to the open wound.   Cover with gauze.  Secure with tape. (If your skin is breaking down from tape, then need to wrap with Armani and put tape on Armani)      Change dressing EVERY OTHER DAY.     Standing Status:   Future     Expiration Date:   3/27/2025    Wound cleansing and dressings Venous Ulcer Right Pretibial     Elastic Tubular Stocking: Spanda F     Tubular elastic " "bandage: Apply from base of toes to behind the knee. Apply in AM, may remove for sleep.  Avoid prolonged standing in one place.  Elevate leg(s) above the level of the heart when sitting or as much as possible.     Standing Status:   Future     Expiration Date:   3/27/2025    Wound Procedure Treatment Venous Ulcer Right Pretibial     This order was created via procedure documentation       Vesta Garcia PA-C, [facility]      Portions of the record may have been created with voice recognition software. Occasional wrong word or \"sound alike\" substitutions may have occurred due to the inherent limitations of voice recognition software. Read the chart carefully and recognize, using context, where substitutions have occurred.      "

## 2025-03-21 DIAGNOSIS — D50.0 IRON DEFICIENCY ANEMIA DUE TO CHRONIC BLOOD LOSS: Primary | ICD-10-CM

## 2025-03-21 RX ORDER — SODIUM CHLORIDE 9 MG/ML
20 INJECTION, SOLUTION INTRAVENOUS ONCE
Status: CANCELLED | OUTPATIENT
Start: 2025-03-25

## 2025-03-24 ENCOUNTER — OFFICE VISIT (OUTPATIENT)
Dept: CARDIOLOGY CLINIC | Facility: CLINIC | Age: 85
End: 2025-03-24
Payer: COMMERCIAL

## 2025-03-24 VITALS
SYSTOLIC BLOOD PRESSURE: 142 MMHG | WEIGHT: 165 LBS | RESPIRATION RATE: 16 BRPM | HEART RATE: 65 BPM | DIASTOLIC BLOOD PRESSURE: 70 MMHG | HEIGHT: 66 IN | BODY MASS INDEX: 26.52 KG/M2 | OXYGEN SATURATION: 95 %

## 2025-03-24 DIAGNOSIS — I10 HYPERTENSION, ESSENTIAL: Primary | ICD-10-CM

## 2025-03-24 DIAGNOSIS — I42.0 DILATED CARDIOMYOPATHY (HCC): ICD-10-CM

## 2025-03-24 DIAGNOSIS — I35.9 AORTIC VALVE DISORDER: ICD-10-CM

## 2025-03-24 DIAGNOSIS — I48.0 PAROXYSMAL ATRIAL FIBRILLATION (HCC): ICD-10-CM

## 2025-03-24 DIAGNOSIS — I44.2 COMPLETE HEART BLOCK (HCC): ICD-10-CM

## 2025-03-24 DIAGNOSIS — I50.22 CHRONIC SYSTOLIC CHF (CONGESTIVE HEART FAILURE) (HCC): ICD-10-CM

## 2025-03-24 DIAGNOSIS — K21.9 GASTROESOPHAGEAL REFLUX DISEASE: ICD-10-CM

## 2025-03-24 PROCEDURE — 99214 OFFICE O/P EST MOD 30 MIN: CPT | Performed by: PHYSICIAN ASSISTANT

## 2025-03-24 NOTE — PROGRESS NOTES
[facility] [place]  [address]  [place] [address]  Cardiology Follow Up    Ely Hernadez  1940  [id number]    Assessment & Plan  Hypertension, essential  Stable, 142/70  Continue Lasix, metoprolol  Chronic systolic CHF (congestive heart failure) (HCC)  Wt Readings from Last 3 Encounters:   03/24/25 74.8 kg (165 lb)   03/19/25 74.8 kg (165 lb)   03/06/25 74.4 kg (164 lb)   Appears euvolemic  Continue current dose of Lasix 40 twice daily  Continue daily weights, salt restriction  Advised to report the panel again in 24 hours or 5 pound weight gain in 3 days  Paroxysmal atrial fibrillation (HCC)  Heart rate stable in the 60s  Continue metoprolol, Coumadin  Goal INR 2-3  Dilated cardiomyopathy (HCC)  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  Report any symptoms.  Follow in pacemaker clinic as it is nearing LACIE.  Follow with PCP.  Report any bleeding.  Follow in Coumadin clinic.  Patient continues to decline ischemic evaluation for her SOB.   Continue all medications. Previous studies reviewed with patient, medications reviewed and possible side effects discussed. Continue risk factor modification. Optimize weight, regular exercise and follow up with appropriate specialists and primary care physician as discussed.  All questions answered. Patient advised to report any problems prompting to medical attention. Return for follow up visit in 4 months or earlier if needed    Chief Complaint   Patient presents with    Follow-up       Interval History: Patient presents the office today for routine follow-up visit with history of mechanical aortic valve  replacement, chronic HFmrEF, paroxysmal atrial fibrillation, nonischemic cardiomyopathy, chronic anticoagulation, PPM, long-term anticoagulation. Pt states since her last visit about 6 months ago she is overall feeling the same. Still with shortness of breath with exertion especially worse with today's overcast weather, but notes it is about the same as usual on normal weather days.  Patient has previously declined and continues to decline ischemic evaluation. she does note she has been battling bronchitis for several months now.  Patient had CT scan which showed pulmonary nodules and mediastinal lymphadenopathy being closely monitored by pulmonary and due for another repeat CAT scan next month.  Patient also following closely in the pacemaker clinic as pacemaker is nearing LACIE with approximate 11 months left.  Patient also notes she is due to have some kind of vascular procedure next month.  Denies any chest pain, chest pressure, chest heaviness. Denies dizziness, palpitations        Patient Active Problem List   Diagnosis    Hyperlipidemia    Chronic anticoagulation    Hypertension, essential    Coronary artery disease of native artery of native heart with stable angina pectoris (HCC)    Simple chronic bronchitis (HCC)    Diabetes mellitus with neurological manifestation (HCC)    Hypothyroidism    GERD (gastroesophageal reflux disease)    Anemia    AVM (arteriovenous malformation) of small bowel, acquired with hemorrhage    Angiectasia    Other vascular disorders of intestine (HCC)    Aortic valve replaced    Cardiomyopathy (HCC)    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HCC)    Stage 3b chronic kidney disease (HCC)    Chronic kidney disease-mineral and bone disorder    Primary osteoarthritis involving multiple joints    Paroxysmal atrial fibrillation (HCC)    Neutropenia associated with infection (HCC)    Herpes simplex labialis    Restrictive lung disease    Nocturnal hypoxemia    Supratherapeutic INR    H/O mechanical  aortic valve replacement    Subtherapeutic international normalized ratio (INR)    Type 2 diabetes mellitus with stage 3b chronic kidney disease, with long-term current use of insulin (HCC)    Venous ulcer of right leg (HCC)    Chronic systolic (congestive) heart failure (HCC)    Lower extremity edema    Iron deficiency anemia due to chronic blood loss    Hemiplegia and hemiparesis following cerebral infarction affecting left non-dominant side (HCC)    Chronic obstructive pulmonary disease (HCC)    Type 2 diabetes mellitus with stage 4 chronic kidney disease, with long-term current use of insulin (HCC)     Past Medical History:   Diagnosis Date    Acute anterior epistaxis 12/23/2023    Acute blood loss anemia 12/14/2023    Acute respiratory failure with hypoxia (HCC) 02/06/2024    Anemia     Aneurysm of thoracic aorta (HCC)     Angioedema 06/07/2019    Aortic valve disorder     Benign neoplasm of sigmoid colon     Cardiomyopathy ([facility])     last assessed: 10/10/2014    CHF (congestive heart failure) (HCC)     Chronic kidney disease     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HCC)     last assessed: 10/10/2014    COPD (chronic obstructive pulmonary disease) (HCC)     Diabetic nephropathy (HCC)     Disease of thyroid gland     RAMON (dyspnea on exertion)     GERD (gastroesophageal reflux disease)     History of emphysema (HCC)     History of transfusion     Hyperlipidemia     Hypertensive urgency 10/28/2023    Leg cramps 11/01/2023    Stroke-like symptoms 10/28/2023    TIA (transient ischemic attack)      Social History     Socioeconomic History    Marital status:      Spouse name: Not on file    Number of children: Not on file    Years of education: Not on file    Highest education level: Not on file   Occupational History    Not on file   Tobacco Use    Smoking status: Former     Current packs/day: 0.00     Average packs/day: 1 pack/day for 52.9 years (52.9 ttl pk-yrs)     Types: Cigarettes     Start date: 1955     Quit  date: 2007     Years since quittin.3     Passive exposure: Past    Smokeless tobacco: Former     Quit date:    Vaping Use    Vaping status: Never Used   Substance and Sexual Activity    Alcohol use: Never    Drug use: Never    Sexual activity: Not Currently     Partners: Male   Other Topics Concern    Not on file   Social History Narrative    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 BID Freestyle lancets BID    Living independently with spouse     Social Drivers of Health     Financial Resource Strain: Not on file   Food Insecurity: No Food Insecurity (5/15/2024)    Nursing - Inadequate Food Risk Classification     Worried About Running Out of Food in the Last Year: Never true     Ran Out of Food in the Last Year: Never true     Ran Out of Food in the Last Year: Not on file   Transportation Needs: No Transportation Needs (5/15/2024)    PRAPARE - Transportation     Lack of Transportation (Medical): No     Lack of Transportation (Non-Medical): No   Physical Activity: Inactive (2021)    Exercise Vital Sign     Days of Exercise per Week: 0 days     Minutes of Exercise per Session: 0 min   Stress: No Stress Concern Present (2021)    [employer] - Occupational Stress Questionnaire     Feeling of Stress : Not at all   Social Connections: Not on file   Intimate Partner Violence: Not on file   Housing Stability: Unknown (5/15/2024)    Housing Stability Vital Sign     Unable to Pay for Housing in the Last Year: No     Number of Times Moved in the Last Year: Not on file     Homeless in the Last Year: No      Family History   Problem Relation Age of Onset    No Known Problems Mother     No Known Problems Father      Past Surgical History:   Procedure Laterality Date    AORTIC VALVE REPLACEMENT      CARDIAC PACEMAKER PLACEMENT      last assessed: 10/10/2014    CARDIAC SURGERY      aortic valve replacement    CHOLECYSTECTOMY      COLONOSCOPY      EGD       HYSTERECTOMY      INSERT / REPLACE / REMOVE PACEMAKER      KNEE SURGERY Right     OTHER SURGICAL HISTORY      Capsule ENDOscopy description: 12/19/2012    NM COLONOSCOPY FLX DX W/COLLJ SPEC WHEN PFRMD N/A 05/31/2018    Procedure: single balloon ENTEROSCOPY;  Surgeon: David Dennis MD;  Location: [facility];  Service: Gastroenterology    NM ESOPHAGOGASTRODUODENOSCOPY TRANSORAL DIAGNOSTIC N/A 11/29/2017    Procedure: EGD AND COLONOSCOPY;  Surgeon: Jay Mcleod III, MD;  Location: [facility];  Service: Gastroenterology       Current Outpatient Medications:     Accu-Chek FastClix Lancets MISC, Use 3 (three) times a day, Disp: 300 each, Rfl: 3    albuterol (Ventolin HFA) 90 mcg/act inhaler, Inhale 2 puffs every 6 (six) hours as needed for wheezing, Disp: 54 g, Rfl: 3    Ascorbic Acid (vitamin C) 1000 MG tablet, Take 1,000 mg by mouth daily, Disp: , Rfl:     aspirin (ECOTRIN LOW STRENGTH) 81 mg EC tablet, Take 1 tablet (81 mg total) by mouth daily Do not start before November 1, 2023., Disp: 30 tablet, Rfl: 0    Blood Glucose Monitoring Suppl (Accu-Chek Guide Me) w/Device KIT, USE 3 TIMES DAILY, Disp: 1 kit, Rfl: 2    Cholecalciferol (Vitamin D3) 50 MCG (2000 UT) TABS, Take 2,000 Units by mouth daily, Disp: , Rfl:     clobetasol (TEMOVATE) 0.05 % ointment, Apply topically 2 (two) times a day, Disp: 30 g, Rfl: 1    cyanocobalamin (VITAMIN B-12) 1000 MCG tablet, Take 1 tablet (1,000 mcg total) by mouth daily, Disp: , Rfl:     Empagliflozin (Jardiance) 10 MG TABS tablet, TAKE 1 TABLET BY MOUTH IN THE  MORNING, Disp: 100 tablet, Rfl: 1    Ferrous Sulfate (IRON PO), Take by mouth daily in the early morning OTC, Disp: , Rfl:     fluticasone (FLONASE) 50 mcg/act nasal spray, 1 spray into each nostril daily, Disp: 16 g, Rfl: 2    furosemide (LASIX) 40 mg tablet, Take 1 tablet (40 mg total) by mouth 2 (two) times a day, Disp: 180 tablet, Rfl: 3    gabapentin (NEURONTIN) 300 mg capsule, TAKE 1 CAPSULE BY MOUTH 3 TIMES  DAILY,  Disp: 300 capsule, Rfl: 1    glipiZIDE (GLUCOTROL) 10 mg tablet, TAKE 1 TABLET BY MOUTH TWICE  DAILY BEFORE MEALS, Disp: 200 tablet, Rfl: 1    glucose blood (Accu-Chek Celeste Plus) test strip, Use 1 each 3 (three) times a day Use as instructed, Disp: 300 each, Rfl: 3    insulin degludec (TRESIBA) 100 units/mL injection pen, Inject 30 Units under the skin daily, Disp: 30 mL, Rfl: 3    Insulin Pen Needle (BD Pen Needle Rosie U/F) 32G X 4 MM MISC, Use daily, Disp: 100 each, Rfl: 1    ipratropium-albuterol (DUO-NEB) 0.5-2.5 mg/3 mL nebulizer solution, Take 3 mL by nebulization 4 (four) times a day, Disp: 360 mL, Rfl: 1    Lancets (freestyle) lancets, Check blood glucose 3 times daily, Disp: 300 each, Rfl: 0    levothyroxine 50 mcg tablet, TAKE 1 TABLET BY MOUTH DAILY, Disp: 100 tablet, Rfl: 1    metoprolol tartrate (LOPRESSOR) 50 mg tablet, TAKE ONE-HALF TABLET BY MOUTH  TWICE DAILY, Disp: 100 tablet, Rfl: 1    oxygen gas, Inhale 2 L/min daily at bedtime as needed home oxygen nightly, Disp: , Rfl:     pantoprazole (PROTONIX) 40 mg tablet, TAKE 1 TABLET BY MOUTH DAILY AS  DIRECTED, Disp: 100 tablet, Rfl: 0    warfarin (COUMADIN) 5 mg tablet, TAKE 1 TO 2 TABLETS BY  MOUTH DAILY OR AS DIRECTED, Disp: 180 tablet, Rfl: 3    enoxaparin (LOVENOX) 80 mg/0.8 mL, Inject 0.7 mL (70 mg total) under the skin every 24 hours Hold Warfarin 5 days prior. Last dose is 9/6 Start Lovenox on 9/8, 4 days prior to the procedure. No Lovenox the morning of the procedure. Resume both Lovenox and Warfarin at the discretion of the surgeon (preferably that evening) and check INR 4 days after restarting. Inject in the abdomen about 2 inches from the belly button and rotate sides at each injection., Disp: 0.7 mL, Rfl: 10    Current Facility-Administered Medications:     lidocaine (LMX) 4 % cream, , Topical, Once, Vesta Garcia PA-C  No Known Allergies      Imaging: CT chest without contrast  Result Date: 3/12/2025  Narrative: CT CHEST WITHOUT IV  CONTRAST INDICATION: R91.8: Other nonspecific abnormal finding of lung field. COMPARISON: Radiograph chest 1/22/2025 TECHNIQUE: CT examination of the chest was performed without intravenous contrast. Multiplanar 2D reformatted images were created from the source data. This examination, like all CT scans performed in the [facility], was performed utilizing techniques to minimize radiation dose exposure, including the use of iterative reconstruction and automated exposure control. Radiation dose length product (DLP) for this visit: 265 mGy-cm FINDINGS: LUNGS: Lungs are clear. There is no tracheal or endobronchial lesion. Trace secretions in the trachea. Pulmonary nodules are seen on series 303 as described below. *  A 0.7 x 0.6 cm focal groundglass opacity at the right lung apex (image 20) *  A 0.4 cm subpleural pulmonary nodule in the lingula (image 76) PLEURA: Unremarkable. HEART/GREAT VESSELS: Aortic valve replacement. Mild coronary artery atherosclerotic calcifications. Mild cardiomegaly. Fusiform ectasia of the ascending thoracic aorta measuring up to 43 mm. Recommendation is for follow-up low radiation dose chest CT in one year. MEDIASTINUM AND MARYURI: There are multiple enlarged mediastinal lymph nodes seen on series 301. Representative lymph nodes are as described below *  1.3 cm right paratracheal lymph node (image 31) *  2.6 x 1.4 cm precarinal lymph node (image 40) *  A 1.4 cm lymph node in the AP window (image 36) CHEST WALL AND LOWER NECK: Left-sided pacemaker. Enlarged left supraclavicular lymph node measuring 1.8 x 1.6 cm (series 301 image 10) VISUALIZED STRUCTURES IN THE UPPER ABDOMEN: Unremarkable. OSSEOUS STRUCTURES: No acute fracture or destructive osseous lesion.     Impression: Multiple enlarged mediastinal and left supraclavicular lymph nodes. Differential diagnoses include reactive lymph nodes, lymphoproliferative disorder or metastatic lymphadenopathy. PET/CT is recommended for  "further assessment. A 0.7 x 0.6 cm focal groundglass opacity at the right lung apex and a 0.4 cm lingular pulmonary nodule. Based on current Fleischner Society 2017 Guidelines on incidental pulmonary nodule, follow-up noncontrast CT is recommended at 6-12 months from the initial examination to confirm persistence; if stable at that time, additional follow-up CT is recommended for every 2 years until 5 years of stability is demonstrated. Fusiform ectasia of ascending thoracic aorta measuring 43 mm. Attention on follow-up CT chest in 6 months to us.. The study was marked in EPIC for significant notification. Workstation performed: [id number]     Cardiac EP device report  Result Date: 3/7/2025  Narrative: SJM DC PM/NOT MRI CONDITIONAL NB/MERLIN TRANSMISSION: PRESENTING EGRAM AF /VS@ 60 BPM. BATTERY VOLTAGE NEARING LACIE-11 MONS. WILL SCHEDULE MONTHLY BATTERY CHECKS. AP 0%  100%. 100% AF BURDEN; PT ON WARFARIN. NORMAL DEVICE FUNCTION. NC       Review of Systems:  Review of Systems   Constitutional: Negative.    Respiratory:  Positive for shortness of breath.    Cardiovascular: Negative.    Musculoskeletal: Negative.    Neurological: Negative.    Hematological: Negative.    Psychiatric/Behavioral: Negative.     All other systems reviewed and are negative.        /70 (BP Location: Left arm, Patient Position: Sitting, Cuff Size: Standard)   Pulse 65   Resp 16   Ht 5' 6\" (1.676 m)   Wt 74.8 kg (165 lb)   SpO2 95%   BMI 26.63 kg/m²     Physical Exam:  Physical Exam  Vitals and nursing note reviewed.   Constitutional:       Appearance: Normal appearance.   HENT:      Head: Normocephalic and atraumatic.   Cardiovascular:      Rate and Rhythm: Normal rate and regular rhythm.      Pulses: Normal pulses.      Heart sounds: Murmur heard.   Pulmonary:      Effort: Pulmonary effort is normal.      Breath sounds: Normal breath sounds.   Musculoskeletal:         General: Normal range of motion.      Cervical back: Normal " range of motion and neck supple.   Skin:     General: Skin is warm and dry.   Neurological:      General: No focal deficit present.      Mental Status: She is alert and oriented to person, place, and time.   Psychiatric:         Mood and Affect: Mood normal.         Behavior: Behavior normal.         Thought Content: Thought content normal.         Judgment: Judgment normal.

## 2025-03-24 NOTE — ASSESSMENT & PLAN NOTE
Non-ischemic dilated cardiomyopathy with last known EF at 40%  Continue aspirin, Lasix, metoprolol, Jardiance

## 2025-03-24 NOTE — PATIENT INSTRUCTIONS
Continue medications.  Report any symptoms.  Follow in pacemaker clinic as it is nearing LACIE.  Follow with PCP.  Report any bleeding.  Follow in Coumadin clinic.  Patient continues to decline ischemic evaluation for her SOB.   Continue all medications. Previous studies reviewed with patient, medications reviewed and possible side effects discussed. Continue risk factor modification. Optimize weight, regular exercise and follow up with appropriate specialists and primary care physician as discussed.  All questions answered. Patient advised to report any problems prompting to medical attention. Return for follow up visit in 4 months or earlier if needed

## 2025-03-25 ENCOUNTER — HOSPITAL ENCOUNTER (OUTPATIENT)
Dept: INFUSION CENTER | Facility: CLINIC | Age: 85
Discharge: HOME | End: 2025-03-25
Payer: COMMERCIAL

## 2025-03-25 VITALS — SYSTOLIC BLOOD PRESSURE: 155 MMHG | RESPIRATION RATE: 16 BRPM | DIASTOLIC BLOOD PRESSURE: 70 MMHG | TEMPERATURE: 98.1 F

## 2025-03-25 DIAGNOSIS — D50.0 IRON DEFICIENCY ANEMIA DUE TO CHRONIC BLOOD LOSS: Primary | ICD-10-CM

## 2025-03-25 RX ORDER — PANTOPRAZOLE SODIUM 40 MG/1
40 TABLET, DELAYED RELEASE ORAL DAILY
Qty: 100 TABLET | Refills: 2 | Status: SHIPPED | OUTPATIENT
Start: 2025-03-25

## 2025-03-25 RX ORDER — SODIUM CHLORIDE 9 MG/ML
20 INJECTION, SOLUTION INTRAVENOUS ONCE
OUTPATIENT
Start: 2025-04-01

## 2025-03-25 RX ORDER — SODIUM CHLORIDE 9 MG/ML
20 INJECTION, SOLUTION INTRAVENOUS ONCE
Status: COMPLETED | OUTPATIENT
Start: 2025-03-25 | End: 2025-03-25

## 2025-03-25 RX ADMIN — IRON SUCROSE 300 MG: 20 INJECTION, SOLUTION INTRAVENOUS at 08:19

## 2025-03-25 RX ADMIN — SODIUM CHLORIDE 20 ML/HR: 9 INJECTION, SOLUTION INTRAVENOUS at 08:15

## 2025-03-25 NOTE — PROGRESS NOTES
Pt here for IV venofer, offering no complaints. PIV established with positive blood return noted. Tolerated entire infusion without complications. PIV flushed and removed. AVS declined. Next appt 4/1 730am. Walked out in stable condition.

## 2025-03-31 ENCOUNTER — TELEPHONE (OUTPATIENT)
Age: 85
End: 2025-03-31

## 2025-03-31 DIAGNOSIS — J41.0 SIMPLE CHRONIC BRONCHITIS (HCC): Primary | ICD-10-CM

## 2025-03-31 RX ORDER — FLUTICASONE FUROATE, UMECLIDINIUM BROMIDE AND VILANTEROL TRIFENATATE 100; 62.5; 25 UG/1; UG/1; UG/1
1 POWDER RESPIRATORY (INHALATION) DAILY
Qty: 180 BLISTER | Refills: 0 | Status: SHIPPED | OUTPATIENT
Start: 2025-03-31 | End: 2025-06-29

## 2025-03-31 NOTE — TELEPHONE ENCOUNTER
Patient called asking for a RX to be sent for Trelegy   Inhaler  She had an office visit on March 19, 2025 and ROD Bowles gave her a sample stating that if this worked for her she could send in a RX     She is requesting a 90 day supply be sent to her [facility] ORDER pharmacy [facility] - [facility] [address]

## 2025-04-01 ENCOUNTER — ANESTHESIA EVENT (OUTPATIENT)
Dept: PERIOP | Facility: HOSPITAL | Age: 85
End: 2025-04-01
Payer: COMMERCIAL

## 2025-04-01 ENCOUNTER — HOSPITAL ENCOUNTER (OUTPATIENT)
Dept: INFUSION CENTER | Facility: CLINIC | Age: 85
Discharge: HOME | End: 2025-04-01
Payer: COMMERCIAL

## 2025-04-01 VITALS
DIASTOLIC BLOOD PRESSURE: 68 MMHG | HEART RATE: 80 BPM | SYSTOLIC BLOOD PRESSURE: 143 MMHG | RESPIRATION RATE: 18 BRPM | TEMPERATURE: 97.4 F

## 2025-04-01 DIAGNOSIS — D50.0 IRON DEFICIENCY ANEMIA DUE TO CHRONIC BLOOD LOSS: Primary | ICD-10-CM

## 2025-04-01 PROCEDURE — 96365 THER/PROPH/DIAG IV INF INIT: CPT

## 2025-04-01 RX ORDER — SODIUM CHLORIDE 9 MG/ML
20 INJECTION, SOLUTION INTRAVENOUS ONCE
Status: CANCELLED | OUTPATIENT
Start: 2025-04-01

## 2025-04-01 RX ORDER — SODIUM CHLORIDE 9 MG/ML
20 INJECTION, SOLUTION INTRAVENOUS ONCE
Status: COMPLETED | OUTPATIENT
Start: 2025-04-01 | End: 2025-04-01

## 2025-04-01 RX ADMIN — IRON SUCROSE 300 MG: 20 INJECTION, SOLUTION INTRAVENOUS at 07:50

## 2025-04-01 RX ADMIN — SODIUM CHLORIDE 20 ML/HR: 9 INJECTION, SOLUTION INTRAVENOUS at 07:47

## 2025-04-01 NOTE — PROGRESS NOTES
Pt to clinic for venofer, offers no complaints today, tolerated infusion without complications, aware this is her last infusion appointment, PIV removed, avs declined.

## 2025-04-02 NOTE — PRE-PROCEDURE INSTRUCTIONS
Pre-Surgery Instructions:   Medication Instructions    albuterol (Ventolin HFA) 90 mcg/act inhaler Uses PRN- OK to take day of surgery    Ascorbic Acid (vitamin C) 1000 MG tablet Stop taking 7 days prior to surgery.    aspirin (ECOTRIN LOW STRENGTH) 81 mg EC tablet Take day of surgery.    Cholecalciferol (Vitamin D3) 50 MCG (2000 UT) TABS Stop taking 7 days prior to surgery.    clobetasol (TEMOVATE) 0.05 % ointment Hold day of surgery.    cyanocobalamin (VITAMIN B-12) 1000 MCG tablet Stop taking 7 days prior to surgery.    Empagliflozin (Jardiance) 10 MG TABS tablet Per surgeons instructions pt is stopping one week prior to surgery     Ferrous Sulfate (IRON PO) Hold day of surgery.    fluticasone (FLONASE) 50 mcg/act nasal spray Take day of surgery.    furosemide (LASIX) 40 mg tablet Hold day of surgery.    gabapentin (NEURONTIN) 300 mg capsule Take day of surgery.    glipiZIDE (GLUCOTROL) 10 mg tablet Hold day of surgery.    insulin degludec (TRESIBA) 100 units/mL injection pen Take night before surgery    ipratropium-albuterol (DUO-NEB) 0.5-2.5 mg/3 mL nebulizer solution Uses PRN- OK to take day of surgery    levothyroxine 50 mcg tablet Take day of surgery.    metoprolol tartrate (LOPRESSOR) 50 mg tablet Take day of surgery.    pantoprazole (PROTONIX) 40 mg tablet Take day of surgery.    warfarin (COUMADIN) 5 mg tablet Take day of surgery.    Medication instructions for day of surgery reviewed. Please take all instructed medications with only a sip of water.       You will receive a call one business day prior to surgery with an arrival time and hospital directions. If your surgery is scheduled on a Monday, the hospital will be calling you on the Friday prior to your surgery. If you have not heard from anyone by 8pm, please call the hospital supervisor through the hospital  at [phone]. ([place] [phone] or [place] [phone]).    Do not eat or drink anything after midnight the night before  your surgery, including candy, mints, lifesavers, or chewing gum. Do not drink alcohol 24hrs before your surgery. Try not to smoke at least 24hrs before your surgery.       Follow the pre surgery showering instructions as listed in the “My Surgical Experience Booklet” or otherwise provided by your surgeon's office. Do not use a blade to shave the surgical area 1 week before surgery. It is okay to use a clean electric clippers up to 24 hours before surgery. Do not apply any lotions, creams, including makeup, cologne, deodorant, or perfumes after showering on the day of your surgery. Do not use dry shampoo, hair spray, hair gel, or any type of hair products.     No contact lenses, eye make-up, or artificial eyelashes. Remove nail polish, including gel polish, and any artificial, gel, or acrylic nails if possible. Remove all jewelry including rings and body piercing jewelry.     Wear causal clothing that is easy to take on and off. Consider your type of surgery.    Keep any valuables, jewelry, piercings at home. Please bring any specially ordered equipment (sling, braces) if indicated.    Arrange for a responsible person to drive you to and from the hospital on the day of your surgery. Please confirm the visitor policy for the day of your procedure when you receive your phone call with an arrival time.     Call the surgeon's office with any new illnesses, exposures, or additional questions prior to surgery.    Please reference your “My Surgical Experience Booklet” for additional information to prepare for your upcoming surgery.

## 2025-04-03 ENCOUNTER — OFFICE VISIT (OUTPATIENT)
Dept: WOUND CARE | Facility: CLINIC | Age: 85
End: 2025-04-03
Payer: COMMERCIAL

## 2025-04-03 VITALS
RESPIRATION RATE: 18 BRPM | SYSTOLIC BLOOD PRESSURE: 166 MMHG | TEMPERATURE: 97.5 F | DIASTOLIC BLOOD PRESSURE: 72 MMHG | HEART RATE: 60 BPM

## 2025-04-03 DIAGNOSIS — I87.331 CHRONIC VENOUS HYPERTENSION (IDIOPATHIC) WITH ULCER AND INFLAMMATION OF RIGHT LOWER EXTREMITY (HCC): Primary | ICD-10-CM

## 2025-04-03 DIAGNOSIS — L92.9 HYPERGRANULATION: ICD-10-CM

## 2025-04-03 PROCEDURE — 11042 DBRDMT SUBQ TIS 1ST 20SQCM/<: CPT | Performed by: ORTHOPAEDIC SURGERY

## 2025-04-03 RX ORDER — LIDOCAINE 40 MG/G
CREAM TOPICAL ONCE
Status: COMPLETED | OUTPATIENT
Start: 2025-04-03 | End: 2025-04-03

## 2025-04-03 RX ADMIN — LIDOCAINE: 40 CREAM TOPICAL at 08:10

## 2025-04-03 NOTE — PATIENT INSTRUCTIONS
Orders Placed This Encounter   Procedures    Wound cleansing and dressings Venous Ulcer Right Pretibial     Wound cleansing and dressings Venous Ulcer Right Pretibial       Right Leg wound:      Wash your hands with soap and water. Remove old dressing, discard into plastic bag and place in trash. Cleanse the wound with Normal Saline solution prior to applying a clean dressing. Do not use tissue or cotton balls. Do not scrub the wound. Pat dry using gauze.      Shower: Yes. Cleanse with a mild soap and water such as Dove. Be sure to remove your old dressing prior to getting in the shower.      Skin prep to john wound  Apply Exufiber AG to the open wound.   Cover with gauze.  Secure with tape. (If your skin is breaking down from tape, then need to wrap with Armani and put tape on Armani)      Change dressing EVERY OTHER DAY.            · Wound cleansing and dressings Venous Ulcer Right Pretibial      Elastic Tubular Stocking: Spanda F     Tubular elastic bandage: Apply from base of toes to behind the knee. Apply in AM, may remove for sleep.  Avoid prolonged standing in one place.  Elevate leg(s) above the level of the heart when sitting or as much as possible.     Standing Status:   Future     Expiration Date:   4/10/2025

## 2025-04-03 NOTE — PROGRESS NOTES
Patient ID: Ely Hernadez is a 84 y.o. female Date of Birth 1940       Chief Complaint   Patient presents with    Follow Up Wound Care Visit     Right lower extremity ulcer       Allergies:  Patient has no known allergies.    Diagnosis:   Diagnosis ICD-10-CM Associated Orders   1. Chronic venous hypertension (idiopathic) with ulcer and inflammation of right lower extremity (HCC)  I87.331 lidocaine (LMX) 4 % cream     Wound cleansing and dressings Venous Ulcer Right Pretibial     Wound Procedure Treatment Venous Ulcer Right Pretibial     Debridement Venous Ulcer Right Pretibial      2. Hypergranulation  L92.9            Assessment and Plan :  Follow up evaluation of right venous ulcer continues to increase in size  and is hypergranular.    Debrided as below  Continue wound management with Exufiber Ag on wound bed and skin prep to wound edges, see wound orders below   Continue to wear daily compression with Spandagrip.  No harsh cleansers such as alcohol, peroxide, or antibacterial soap, do not submerge in water  Can cleanse with mild soap and water or NSS at dressing changes.   F/u with vascular surgeon for scheduled RLE sclerotherapy on 4/11/25.  Continue frequent elevation of leg and increase exercise/walking for wound healing.  Follow-up in 3 week(s) or call sooner with questions or concerns or any signs of infection such as redness, swelling, increased/purulent drainage, fever, chills, increased severe pain.    Subjective:   6/28: Pt is an 84 y.o. female with pmhx HTN, DMII (A1c 8.7),  CKD, Pafib, CVA (10/28/23 on Coumadin/ASA 81mg) with residual deficits (left sided facial drop and leg weakness) well known to [facility] who presents for follow up evaluation of non healing RLE venous ulcer which has been present for about 2.5 years. Pt has been covering wound with a bandaid. Does not have an odor. No smoking, ETOH or drug use.  Pt denies any sob, fatigue, N/V, CP, fever or chills.    7/12: Patient presents for  followup evaluation of RLE venous ulcer. No increased pain or drainage. Pt is frustrated with chronic wound. Has been using Polymem on the wound bed and Tubigrip for compression.  Pt denies any fevers or chills.    7/25: Patient presents for followup evaluation of RLE venous ulcer. Since last visit pt was started on Bactrim for positive wound culture with abnormal 2+ Growth of Staphylococcus Aureus. The tissue exam demonstrated chronic inflammation and fibrosis. No malignancy, nor pyoderma gangrenosum were identified.  Pt noticed wound decreasing in size. Has been using Polymem on the wound bed and Tubigrip for compression.  Pt denies any fevers or chills.    8/2: Patient presents for followup evaluation of RLE venous ulcer. Pt completed Bactrim as directed. No issues. Pt states this is the best she has felt in 2 years. No fevers or chills.    8/22: Patient presents for followup evaluation of RLE venous ulcer. Pt states she has been wetting wound with mild soap and water. She noticed a scab formed and came off on dressing. In office CHEYANNE today; R 0.59, L 0.76. No fevers or chills.    9/6: Patient presents for followup evaluation of RLE venous ulcer. Pt  has been applying Polymem Ag Max on wound bed and Tubigrip for compression. Denies fevers or chills.    9/19: Patient presents for followup evaluation of RLE venous ulcer. Pt has been applying Mupirocin wound bed and Tubigrip for compression. Denies fevers or chills.    9/26: Patient presents for followup evaluation of RLE venous ulcer. No complaints. Pt has been applying Hydrofera Blue ready on wound bed and Tubigrip for compression. Denies fevers or chills.    10/10:  Patient presents for followup evaluation of RLE venous ulcer. No complaints. Pt is scheduled for vascular studies for 11/6/24. Pt has been applying Hydrofera Blue ready on wound bed and Tubigrip for compression. Denies fevers or chills.    10/31:  Patient presents for followup evaluation of RLE venous  ulcer. NO issues. Pt has been applying Hydrofera Blue ready on wound bed and Tubigrip for compression. Denies fevers or chills.    11/14:  Patient presents for followup evaluation of RLE venous ulcer. RLE vascular studies reveal deep venous incompetence with an incompetent . GSV and SSV is competent. Bilateral LEADs reveal diffuse disease in the RLE femoral and popliteal arteries with a 50-75% stenosis in the common femoral artery. R CHEYANNE:  0.64 (severe disease). There is diffuse disease in the LLE femoral and popliteal arteries with a >75% stenosis of the distal superficial femoral artery.There is a 50-75% stenosis of the proximal and mid superficial femoral arteries. Left CHEYANNE: 0.69 (moderate disease). Pt has scheduled a vascular surgery appointment for January 14, 2025. Pt has been applying Hydrofera Blue ready on wound bed and Tubigrip for compression. Denies fevers or chills.    12/5: Patient presents for followup evaluation of RLE venous ulcer. No complaints. Pt has been applying Hydrofera Blue ready on wound bed and Tubigrip for compression. Denies fevers or chills.    12/19: Patient presents for followup evaluation of RLE venous ulcer. No issues. Pt has been applying clobetasol cream and DSD on wound bed and Tubigrip for compression. Pt is scheduled to see vascular surgery on January 14,  Denies fevers or chills.    1/2: Patient presents for followup evaluation of RLE venous ulcer.  Pt states dressing has been adhering to wound bed. Pt has been applying Hydrofera Blue Ready on wound bed and Tubigrip for compression. Denies fevers or chills.    3/6: Patient presents for followup evaluation of RLE venous ulcer. Pt has been applying Hydrofera Blue Ready on wound bed and Tubigrip for compression. Pt appointment with vascular was cancelled due to patient being ill. Pt plans on rescheduling. Denies fevers or chills.    3/20: Patient presents for followup evaluation of RLE venous ulcer. Pt c/o increased  serosanguinous drainage. Pt is scheduled for RLE sclerotherapy with vascular on 4/11/25. Pt has been applying Dermagran on wound bed and Tubigrip for compression. Denies fevers or chills.    4/3:  Patient presents for followup evaluation of RLE venous ulcer. Pt c/o increased serosanguinous drainage. Pt has been applying Exufiber Ag on wound bed and Tubigrip for compression. Denies fevers or chills.      The following portions of the patient's history were reviewed and updated as appropriate:   Patient Active Problem List   Diagnosis    Hyperlipidemia    Chronic anticoagulation    Hypertension, essential    Coronary artery disease of native artery of native heart with stable angina pectoris (HCC)    Simple chronic bronchitis (HCC)    Diabetes mellitus with neurological manifestation (HCC)    Hypothyroidism    GERD (gastroesophageal reflux disease)    Anemia    AVM (arteriovenous malformation) of small bowel, acquired with hemorrhage    Angiectasia    Other vascular disorders of intestine (HCC)    Aortic valve replaced    Cardiomyopathy (HCC)    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HCC)    Stage 3b chronic kidney disease (HCC)    Chronic kidney disease-mineral and bone disorder    Primary osteoarthritis involving multiple joints    Paroxysmal atrial fibrillation (HCC)    Neutropenia associated with infection (HCC)    Herpes simplex labialis    Restrictive lung disease    Nocturnal hypoxemia    Supratherapeutic INR    H/O mechanical aortic valve replacement    Subtherapeutic international normalized ratio (INR)    Type 2 diabetes mellitus with stage 3b chronic kidney disease, with long-term current use of insulin (HCC)    Venous ulcer of right leg (HCC)    Chronic systolic (congestive) heart failure (HCC)    Lower extremity edema    Iron deficiency anemia due to chronic blood loss    Hemiplegia and hemiparesis following cerebral infarction affecting left non-dominant side (HCC)    Chronic obstructive pulmonary disease (HCC)     Type 2 diabetes mellitus with stage 4 chronic kidney disease, with long-term current use of insulin (HCC)     Past Medical History:   Diagnosis Date    Acute anterior epistaxis 12/23/2023    Acute blood loss anemia 12/14/2023    Acute respiratory failure with hypoxia (HCC) 02/06/2024    Anemia     Aneurysm of thoracic aorta (HCC)     Angioedema 06/07/2019    Aortic valve disorder     Benign neoplasm of sigmoid colon     Cardiomyopathy (HCC)     last assessed: 10/10/2014    CHF (congestive heart failure) (HCC)     Chronic kidney disease     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HCC)     last assessed: 10/10/2014    COPD (chronic obstructive pulmonary disease) (HCC)     Diabetic nephropathy (HCC)     Disease of thyroid gland     RAMON (dyspnea on exertion)     GERD (gastroesophageal reflux disease)     History of emphysema (HCC)     History of transfusion     Hyperlipidemia     Hypertensive urgency 10/28/2023    Leg cramps 11/01/2023    Stroke-like symptoms 10/28/2023    TIA (transient ischemic attack)      Past Surgical History:   Procedure Laterality Date    AORTIC VALVE REPLACEMENT      CARDIAC PACEMAKER PLACEMENT      last assessed: 10/10/2014    CARDIAC SURGERY      aortic valve replacement    CHOLECYSTECTOMY      COLONOSCOPY      EGD      HYSTERECTOMY      INSERT / REPLACE / REMOVE PACEMAKER      KNEE SURGERY Right     OTHER SURGICAL HISTORY      Capsule ENDOscopy description: 12/19/2012    ME COLONOSCOPY FLX DX W/COLLJ SPEC WHEN PFRMD N/A 05/31/2018    Procedure: single balloon ENTEROSCOPY;  Surgeon: David Dennis MD;  Location:  GI LAB;  Service: Gastroenterology    ME ESOPHAGOGASTRODUODENOSCOPY TRANSORAL DIAGNOSTIC N/A 11/29/2017    Procedure: EGD AND COLONOSCOPY;  Surgeon: Jay Mcleod III, MD;  Location: [facility];  Service: Gastroenterology     Family History   Problem Relation Age of Onset    No Known Problems Mother     No Known Problems Father      Social History     Socioeconomic History    Marital  status:      Spouse name: Not on file    Number of children: Not on file    Years of education: Not on file    Highest education level: Not on file   Occupational History    Not on file   Tobacco Use    Smoking status: Former     Current packs/day: 0.00     Average packs/day: 1 pack/day for 52.9 years (52.9 ttl pk-yrs)     Types: Cigarettes     Start date:      Quit date: 2007     Years since quittin.3     Passive exposure: Past    Smokeless tobacco: Former     Quit date:    Vaping Use    Vaping status: Never Used   Substance and Sexual Activity    Alcohol use: Never    Drug use: Never    Sexual activity: Not Currently     Partners: Male   Other Topics Concern    Not on file   Social History Narrative    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 BID Freestyle lancets BID    Living independently with spouse     Social Drivers of Health     Financial Resource Strain: Not on file   Food Insecurity: No Food Insecurity (5/15/2024)    Nursing - Inadequate Food Risk Classification     Worried About Running Out of Food in the Last Year: Never true     Ran Out of Food in the Last Year: Never true     Ran Out of Food in the Last Year: Not on file   Transportation Needs: No Transportation Needs (5/15/2024)    PRAPARE - Transportation     Lack of Transportation (Medical): No     Lack of Transportation (Non-Medical): No   Physical Activity: Inactive (2021)    Exercise Vital Sign     Days of Exercise per Week: 0 days     Minutes of Exercise per Session: 0 min   Stress: No Stress Concern Present (2021)    [employer] - Occupational Stress Questionnaire     Feeling of Stress : Not at all   Social Connections: Not on file   Intimate Partner Violence: Not on file   Housing Stability: Unknown (5/15/2024)    Housing Stability Vital Sign     Unable to Pay for Housing in the Last Year: No     Number of Times Moved in the Last Year: Not on file     Homeless in  the Last Year: No       Current Outpatient Medications:     Accu-Chek FastClix Lancets MISC, Use 3 (three) times a day, Disp: 300 each, Rfl: 3    albuterol (Ventolin HFA) 90 mcg/act inhaler, Inhale 2 puffs every 6 (six) hours as needed for wheezing, Disp: 54 g, Rfl: 3    Ascorbic Acid (vitamin C) 1000 MG tablet, Take 1,000 mg by mouth daily, Disp: , Rfl:     aspirin (ECOTRIN LOW STRENGTH) 81 mg EC tablet, Take 1 tablet (81 mg total) by mouth daily Do not start before November 1, 2023., Disp: 30 tablet, Rfl: 0    Blood Glucose Monitoring Suppl (Accu-Chek Guide Me) w/Device KIT, USE 3 TIMES DAILY, Disp: 1 kit, Rfl: 2    Cholecalciferol (Vitamin D3) 50 MCG (2000 UT) TABS, Take 2,000 Units by mouth daily, Disp: , Rfl:     clobetasol (TEMOVATE) 0.05 % ointment, Apply topically 2 (two) times a day, Disp: 30 g, Rfl: 1    cyanocobalamin (VITAMIN B-12) 1000 MCG tablet, Take 1 tablet (1,000 mcg total) by mouth daily, Disp: , Rfl:     Empagliflozin (Jardiance) 10 MG TABS tablet, TAKE 1 TABLET BY MOUTH IN THE  MORNING, Disp: 100 tablet, Rfl: 1    enoxaparin (LOVENOX) 80 mg/0.8 mL, Inject 0.7 mL (70 mg total) under the skin every 24 hours Hold Warfarin 5 days prior. Last dose is 9/6 Start Lovenox on 9/8, 4 days prior to the procedure. No Lovenox the morning of the procedure. Resume both Lovenox and Warfarin at the discretion of the surgeon (preferably that evening) and check INR 4 days after restarting. Inject in the abdomen about 2 inches from the belly button and rotate sides at each injection. (Patient not taking: Reported on 4/2/2025), Disp: 0.7 mL, Rfl: 10    Ferrous Sulfate (IRON PO), Take by mouth daily in the early morning OTC, Disp: , Rfl:     fluticasone (FLONASE) 50 mcg/act nasal spray, 1 spray into each nostril daily, Disp: 16 g, Rfl: 2    fluticasone-umeclidinium-vilanterol (Trelegy Ellipta) 100-62.5-25 mcg/actuation inhaler, Inhale 1 puff daily Rinse mouth after use. (Patient not taking: Reported on 4/2/2025),  Disp: 180 blister, Rfl: 0    furosemide (LASIX) 40 mg tablet, Take 1 tablet (40 mg total) by mouth 2 (two) times a day, Disp: 180 tablet, Rfl: 3    gabapentin (NEURONTIN) 300 mg capsule, TAKE 1 CAPSULE BY MOUTH 3 TIMES  DAILY, Disp: 300 capsule, Rfl: 1    glipiZIDE (GLUCOTROL) 10 mg tablet, TAKE 1 TABLET BY MOUTH TWICE  DAILY BEFORE MEALS, Disp: 200 tablet, Rfl: 1    glucose blood (Accu-Chek Celeste Plus) test strip, Use 1 each 3 (three) times a day Use as instructed, Disp: 300 each, Rfl: 3    insulin degludec (TRESIBA) 100 units/mL injection pen, Inject 30 Units under the skin daily (Patient taking differently: Inject 30 Units under the skin daily at bedtime), Disp: 30 mL, Rfl: 3    Insulin Pen Needle (BD Pen Needle Rosie U/F) 32G X 4 MM MISC, Use daily, Disp: 100 each, Rfl: 1    ipratropium-albuterol (DUO-NEB) 0.5-2.5 mg/3 mL nebulizer solution, Take 3 mL by nebulization 4 (four) times a day (Patient taking differently: Take 3 mL by nebulization every 6 (six) hours as needed), Disp: 360 mL, Rfl: 1    Lancets (freestyle) lancets, Check blood glucose 3 times daily, Disp: 300 each, Rfl: 0    levothyroxine 50 mcg tablet, TAKE 1 TABLET BY MOUTH DAILY, Disp: 100 tablet, Rfl: 1    metoprolol tartrate (LOPRESSOR) 50 mg tablet, TAKE ONE-HALF TABLET BY MOUTH  TWICE DAILY, Disp: 100 tablet, Rfl: 1    oxygen gas, Inhale 2 L/min daily at bedtime as needed home oxygen nightly, Disp: , Rfl:     pantoprazole (PROTONIX) 40 mg tablet, TAKE 1 TABLET BY MOUTH DAILY AS  DIRECTED, Disp: 100 tablet, Rfl: 2    warfarin (COUMADIN) 5 mg tablet, TAKE 1 TO 2 TABLETS BY  MOUTH DAILY OR AS DIRECTED, Disp: 180 tablet, Rfl: 3    Current Facility-Administered Medications:     lidocaine (LMX) 4 % cream, , Topical, Once, Vesta Garcia PA-C    Review of Systems   Constitutional:  Negative for appetite change, chills, fatigue, fever and unexpected weight change.   HENT:  Negative for congestion, hearing loss and postnasal drip.    Respiratory:   Negative for cough and shortness of breath.    Cardiovascular:  Negative for leg swelling.   Musculoskeletal:  Positive for gait problem.   Skin:  Positive for wound (RLE). Negative for rash.   Neurological:  Negative for numbness.   Hematological:  Does not bruise/bleed easily.   Psychiatric/Behavioral: Negative.           Objective:  /72   Pulse 60   Temp 97.5 °F (36.4 °C)   Resp 18   Pain Score: 0-No pain     Physical Exam  Constitutional:       General: She is awake. She is not in acute distress.     Appearance: She is not ill-appearing, toxic-appearing or diaphoretic.   HENT:      Head: Normocephalic and atraumatic.      Right Ear: External ear normal.      Left Ear: External ear normal.   Eyes:      Conjunctiva/sclera: Conjunctivae normal.   Cardiovascular:      Pulses:           Dorsalis pedis pulses are 1+ on the right side.   Pulmonary:      Effort: Pulmonary effort is normal. No respiratory distress.   Musculoskeletal:      Right lower leg: Edema present.      Comments: trace   Skin:     General: Skin is warm and dry.      Findings: Wound (RLE) present. No erythema.      Comments: 1.  Right lower extremity venous ulcer with an increase in size and pink hypergranulated wound bed with fragile periwound scar tissue, See wound assessment for additional details.   Neurological:      Mental Status: She is alert.   Psychiatric:         Mood and Affect: Mood normal.         Behavior: Behavior normal. Behavior is cooperative.       Wound 10/29/23 Venous Ulcer Pretibial Right (Active)   Wound Description Pink;Yellow;Slough;Eschar;Hypergranulation 04/03/25 0802   Non-staged Wound Description Full thickness 04/03/25 0802   Wound Length (cm) 2 cm 04/03/25 0802   Wound Width (cm) 1.4 cm 04/03/25 0802   Wound Depth (cm) 0.1 cm 04/03/25 0802   Wound Surface Area (cm^2) 2.8 cm^2 04/03/25 0802   Wound Volume (cm^3) 0.28 cm^3 04/03/25 0802   Calculated Wound Volume (cm^3) 0.28 cm^3 04/03/25 0802   Change in Wound  "Size % 93 04/03/25 0802   Drainage Amount Scant 04/03/25 0802   Drainage Description Serosanguineous 04/03/25 0802   Radha-wound Assessment Fragile;Pink 04/03/25 0802   Dressing Status Intact 04/03/25 0802     Debridement   Wound 10/29/23 Venous Ulcer Pretibial Right     Date/Time: 4/3/2025 8:00 AM  Universal Protocol:  procedure performed by consultantConsent: Verbal consent obtained. Written consent obtained.  Risks and benefits: risks, benefits and alternatives were discussed  Consent given by: patient  Time out: Immediately prior to procedure a \"time out\" was called to verify the correct patient, procedure, equipment, support staff and site/side marked as required.  Patient understanding: patient states understanding of the procedure being performed  Patient identity confirmed: verbally with patient    Debridement Details  Performed by: PA  Debridement type: surgical  Level of debridement: subcutaneous tissue  Pain control: lidocaine 4%    Post-debridement measurements  Length (cm): 2  Width (cm): 1.4  Depth (cm): 0.2  Percent debrided: 100%  Surface Area (cm^2): 2.8  Area Debrided (cm^2): 2.8  Volume (cm^3): 0.56    Tissue and other material debrided: subcutaneous tissue  Devitalized tissue debrided: exudate, necrotic debris and slough  Instrument(s) utilized: curette  Bleeding: medium  Hemostasis obtained with: pressure and silver nitrate  Procedural pain (0-10): 0  Post-procedural pain: 0   Response to treatment: procedure was tolerated well                 Wound Instructions:  Orders Placed This Encounter   Procedures    Wound cleansing and dressings Venous Ulcer Right Pretibial     Wound cleansing and dressings Venous Ulcer Right Pretibial       Right Leg wound:      Wash your hands with soap and water. Remove old dressing, discard into plastic bag and place in trash. Cleanse the wound with Normal Saline solution prior to applying a clean dressing. Do not use tissue or cotton balls. Do not scrub the wound. " "Pat dry using gauze.      Shower: Yes. Cleanse with a mild soap and water such as Dove. Be sure to remove your old dressing prior to getting in the shower.      Skin prep to john wound  Apply Exufiber AG to the open wound.   Cover with gauze.  Secure with tape. (If your skin is breaking down from tape, then need to wrap with Armani and put tape on Armani)      Change dressing EVERY OTHER DAY.            · Wound cleansing and dressings Venous Ulcer Right Pretibial      Elastic Tubular Stocking: Spanda F     Tubular elastic bandage: Apply from base of toes to behind the knee. Apply in AM, may remove for sleep.  Avoid prolonged standing in one place.  Elevate leg(s) above the level of the heart when sitting or as much as possible.     Standing Status:   Future     Expiration Date:   4/10/2025    Wound Procedure Treatment Venous Ulcer Right Pretibial     This order was created via procedure documentation    Debridement Venous Ulcer Right Pretibial     This order was created via procedure documentation       Vesta Garcia PA-C, [facility]      Portions of the record may have been created with voice recognition software. Occasional wrong word or \"sound alike\" substitutions may have occurred due to the inherent limitations of voice recognition software. Read the chart carefully and recognize, using context, where substitutions have occurred.    "

## 2025-04-03 NOTE — PROGRESS NOTES
Wound Procedure Treatment Venous Ulcer Right Pretibial    Performed by: Graciela Valerio RN  Authorized by: Vesta Garcia PA-C  Associated wounds:   Wound 10/29/23 Venous Ulcer Pretibial Right    Wound cleansed with:  NSS   Applied to periwound:  Skin prep   Applied primary dressing:  Silver and Gelling fiber   Applied secondary dressing:  Gauze   Dressing secured with:  Tape and Size F

## 2025-04-07 ENCOUNTER — HOSPITAL ENCOUNTER (OUTPATIENT)
Dept: CT IMAGING | Facility: CLINIC | Age: 85
Discharge: HOME | End: 2025-04-07
Payer: COMMERCIAL

## 2025-04-07 DIAGNOSIS — R59.0 MEDIASTINAL LYMPHADENOPATHY: ICD-10-CM

## 2025-04-07 PROCEDURE — 71250 CT THORAX DX C-: CPT

## 2025-04-08 ENCOUNTER — RESULTS FOLLOW-UP (OUTPATIENT)
Dept: CARDIOLOGY CLINIC | Facility: CLINIC | Age: 85
End: 2025-04-08

## 2025-04-08 ENCOUNTER — REMOTE DEVICE CLINIC VISIT (OUTPATIENT)
Dept: CARDIOLOGY CLINIC | Facility: CLINIC | Age: 85
End: 2025-04-08
Payer: COMMERCIAL

## 2025-04-08 ENCOUNTER — RESULTS FOLLOW-UP (OUTPATIENT)
Age: 85
End: 2025-04-08

## 2025-04-08 DIAGNOSIS — I44.2 CHB (COMPLETE HEART BLOCK) (HCC): ICD-10-CM

## 2025-04-08 DIAGNOSIS — I48.91 ATRIAL FIBRILLATION, UNSPECIFIED TYPE (HCC): Primary | ICD-10-CM

## 2025-04-08 DIAGNOSIS — R91.8 OTHER NONSPECIFIC ABNORMAL FINDING OF LUNG FIELD: ICD-10-CM

## 2025-04-08 DIAGNOSIS — R59.0 MEDIASTINAL ADENOPATHY: Primary | ICD-10-CM

## 2025-04-08 PROCEDURE — 93294 REM INTERROG EVL PM/LDLS PM: CPT | Performed by: INTERNAL MEDICINE

## 2025-04-08 PROCEDURE — 93296 REM INTERROG EVL PM/IDS: CPT | Performed by: INTERNAL MEDICINE

## 2025-04-08 NOTE — PROGRESS NOTES
Results for orders placed or performed in visit on 04/08/25   Cardiac EP device report    Narrative    SJM DC PM/NOT MRI CONDITIONAL  MERLIN TRANSMISSION: BATTERY VOLTAGE NEARING LACIE (10.6 MOS).  WILL SCHEDULE MONTHLY BATTERY CHECKS. AP: <1%. : 97% (>40%~CHB). ALL AVAILABLE LEAD PARAMETERS WITHIN NORMAL LIMITS. 1 AMS W/ AF IN PROGRESS (HX OF SAME). AF BURDEN: 100%. PT TAKES WARFARIN, METOPROLOL TART. EF: 40% (ECHO 8/26/24). NORMAL DEVICE FUNCTION. CH

## 2025-04-11 ENCOUNTER — ANESTHESIA (OUTPATIENT)
Dept: PERIOP | Facility: HOSPITAL | Age: 85
End: 2025-04-11
Payer: COMMERCIAL

## 2025-04-11 ENCOUNTER — HOSPITAL ENCOUNTER (OUTPATIENT)
Facility: HOSPITAL | Age: 85
Setting detail: OUTPATIENT SURGERY
Discharge: HOME | End: 2025-04-11
Attending: SURGERY | Admitting: SURGERY
Payer: COMMERCIAL

## 2025-04-11 ENCOUNTER — APPOINTMENT (OUTPATIENT)
Dept: VASCULAR ULTRASOUND | Facility: HOSPITAL | Age: 85
End: 2025-04-11
Payer: COMMERCIAL

## 2025-04-11 VITALS
SYSTOLIC BLOOD PRESSURE: 154 MMHG | DIASTOLIC BLOOD PRESSURE: 65 MMHG | HEIGHT: 66 IN | RESPIRATION RATE: 17 BRPM | HEART RATE: 60 BPM | OXYGEN SATURATION: 94 % | TEMPERATURE: 97.3 F | WEIGHT: 166.23 LBS | BODY MASS INDEX: 26.71 KG/M2

## 2025-04-11 DIAGNOSIS — Z95.2 H/O MECHANICAL AORTIC VALVE REPLACEMENT: ICD-10-CM

## 2025-04-11 DIAGNOSIS — N18.9 CHRONIC KIDNEY DISEASE-MINERAL AND BONE DISORDER: ICD-10-CM

## 2025-04-11 DIAGNOSIS — N18.32 STAGE 3B CHRONIC KIDNEY DISEASE (HCC): ICD-10-CM

## 2025-04-11 DIAGNOSIS — M89.9 CHRONIC KIDNEY DISEASE-MINERAL AND BONE DISORDER: ICD-10-CM

## 2025-04-11 DIAGNOSIS — D55.1: ICD-10-CM

## 2025-04-11 DIAGNOSIS — R60.0 LOWER EXTREMITY EDEMA: ICD-10-CM

## 2025-04-11 DIAGNOSIS — I83.811 VARICOSE VEINS OF LEG WITH PAIN, RIGHT: ICD-10-CM

## 2025-04-11 DIAGNOSIS — N18.32 TYPE 2 DIABETES MELLITUS WITH STAGE 3B CHRONIC KIDNEY DISEASE, WITH LONG-TERM CURRENT USE OF INSULIN (HCC): ICD-10-CM

## 2025-04-11 DIAGNOSIS — M15.0 PRIMARY OSTEOARTHRITIS INVOLVING MULTIPLE JOINTS: ICD-10-CM

## 2025-04-11 DIAGNOSIS — I69.354 HEMIPLEGIA AND HEMIPARESIS FOLLOWING CEREBRAL INFARCTION AFFECTING LEFT NON-DOMINANT SIDE (HCC): ICD-10-CM

## 2025-04-11 DIAGNOSIS — E11.22 TYPE 2 DIABETES MELLITUS WITH STAGE 4 CHRONIC KIDNEY DISEASE, WITH LONG-TERM CURRENT USE OF INSULIN (HCC): ICD-10-CM

## 2025-04-11 DIAGNOSIS — C71.9: ICD-10-CM

## 2025-04-11 DIAGNOSIS — J41.0 SIMPLE CHRONIC BRONCHITIS (HCC): ICD-10-CM

## 2025-04-11 DIAGNOSIS — K55.21 AVM (ARTERIOVENOUS MALFORMATION) OF SMALL BOWEL, ACQUIRED WITH HEMORRHAGE: ICD-10-CM

## 2025-04-11 DIAGNOSIS — G47.34 NOCTURNAL HYPOXEMIA: ICD-10-CM

## 2025-04-11 DIAGNOSIS — Z79.01 CHRONIC ANTICOAGULATION: ICD-10-CM

## 2025-04-11 DIAGNOSIS — D50.0 IRON DEFICIENCY ANEMIA DUE TO CHRONIC BLOOD LOSS: ICD-10-CM

## 2025-04-11 DIAGNOSIS — D70.3 NEUTROPENIA ASSOCIATED WITH INFECTION (HCC): ICD-10-CM

## 2025-04-11 DIAGNOSIS — R79.1 SUPRATHERAPEUTIC INR: ICD-10-CM

## 2025-04-11 DIAGNOSIS — Z79.4 TYPE 2 DIABETES MELLITUS WITH STAGE 4 CHRONIC KIDNEY DISEASE, WITH LONG-TERM CURRENT USE OF INSULIN (HCC): ICD-10-CM

## 2025-04-11 DIAGNOSIS — E11.22 TYPE 2 DIABETES MELLITUS WITH STAGE 3B CHRONIC KIDNEY DISEASE, WITH LONG-TERM CURRENT USE OF INSULIN (HCC): ICD-10-CM

## 2025-04-11 DIAGNOSIS — L97.919 VENOUS ULCER OF RIGHT LEG (HCC): ICD-10-CM

## 2025-04-11 DIAGNOSIS — K55.8 OTHER VASCULAR DISORDERS OF INTESTINE (HCC): ICD-10-CM

## 2025-04-11 DIAGNOSIS — I83.019 VENOUS ULCER OF RIGHT LEG (HCC): ICD-10-CM

## 2025-04-11 DIAGNOSIS — Z95.2 AORTIC VALVE REPLACED: ICD-10-CM

## 2025-04-11 DIAGNOSIS — N18.4 TYPE 2 DIABETES MELLITUS WITH STAGE 4 CHRONIC KIDNEY DISEASE, WITH LONG-TERM CURRENT USE OF INSULIN (HCC): ICD-10-CM

## 2025-04-11 DIAGNOSIS — J98.4 RESTRICTIVE LUNG DISEASE: ICD-10-CM

## 2025-04-11 DIAGNOSIS — Z79.4 TYPE 2 DIABETES MELLITUS WITH STAGE 3B CHRONIC KIDNEY DISEASE, WITH LONG-TERM CURRENT USE OF INSULIN (HCC): ICD-10-CM

## 2025-04-11 DIAGNOSIS — I10 HYPERTENSION, ESSENTIAL: ICD-10-CM

## 2025-04-11 DIAGNOSIS — I25.118 CORONARY ARTERY DISEASE OF NATIVE ARTERY OF NATIVE HEART WITH STABLE ANGINA PECTORIS (HCC): ICD-10-CM

## 2025-04-11 DIAGNOSIS — I50.22 CHRONIC SYSTOLIC (CONGESTIVE) HEART FAILURE (HCC): ICD-10-CM

## 2025-04-11 DIAGNOSIS — I87.331 CHRONIC VENOUS HYPERTENSION WITH ULCER AND INFLAMMATION INVOLVING RIGHT SIDE (HCC): Primary | ICD-10-CM

## 2025-04-11 DIAGNOSIS — I99.8 ANGIECTASIA: ICD-10-CM

## 2025-04-11 DIAGNOSIS — I48.0 PAROXYSMAL ATRIAL FIBRILLATION (HCC): ICD-10-CM

## 2025-04-11 DIAGNOSIS — E83.9 CHRONIC KIDNEY DISEASE-MINERAL AND BONE DISORDER: ICD-10-CM

## 2025-04-11 DIAGNOSIS — R79.1 SUBTHERAPEUTIC INTERNATIONAL NORMALIZED RATIO (INR): ICD-10-CM

## 2025-04-11 DIAGNOSIS — B00.1 HERPES SIMPLEX LABIALIS: ICD-10-CM

## 2025-04-11 LAB — GLUCOSE SERPL-MCNC: 93 MG/DL (ref 65–140)

## 2025-04-11 PROCEDURE — 82948 REAGENT STRIP/BLOOD GLUCOSE: CPT

## 2025-04-11 PROCEDURE — NC001 PR NO CHARGE: Performed by: SURGERY

## 2025-04-11 PROCEDURE — 36470 NJX SCLRSNT 1 INCMPTNT VEIN: CPT | Performed by: SURGERY

## 2025-04-11 PROCEDURE — C1769 GUIDE WIRE: HCPCS | Performed by: SURGERY

## 2025-04-11 PROCEDURE — 76942 ECHO GUIDE FOR BIOPSY: CPT | Performed by: SURGERY

## 2025-04-11 PROCEDURE — 93971 EXTREMITY STUDY: CPT

## 2025-04-11 RX ORDER — CEFAZOLIN SODIUM 2 G/50ML
2000 SOLUTION INTRAVENOUS ONCE
Status: COMPLETED | OUTPATIENT
Start: 2025-04-11 | End: 2025-04-11

## 2025-04-11 RX ORDER — ACETAMINOPHEN 325 MG/1
325 TABLET ORAL EVERY 4 HOURS PRN
Status: DISCONTINUED | OUTPATIENT
Start: 2025-04-11 | End: 2025-04-11 | Stop reason: HOSPADM

## 2025-04-11 RX ORDER — FENTANYL CITRATE/PF 50 MCG/ML
25 SYRINGE (ML) INJECTION
Refills: 0 | Status: CANCELLED | OUTPATIENT
Start: 2025-04-11

## 2025-04-11 RX ORDER — ACETAMINOPHEN 325 MG/1
650 TABLET ORAL EVERY 6 HOURS PRN
Start: 2025-04-11 | End: 2025-04-16

## 2025-04-11 RX ORDER — PROPOFOL 10 MG/ML
INJECTION, EMULSION INTRAVENOUS CONTINUOUS PRN
Status: DISCONTINUED | OUTPATIENT
Start: 2025-04-11 | End: 2025-04-11

## 2025-04-11 RX ORDER — FENTANYL CITRATE 50 UG/ML
INJECTION, SOLUTION INTRAMUSCULAR; INTRAVENOUS AS NEEDED
Status: DISCONTINUED | OUTPATIENT
Start: 2025-04-11 | End: 2025-04-11

## 2025-04-11 RX ORDER — MAGNESIUM HYDROXIDE 1200 MG/15ML
LIQUID ORAL AS NEEDED
Status: DISCONTINUED | OUTPATIENT
Start: 2025-04-11 | End: 2025-04-11 | Stop reason: HOSPADM

## 2025-04-11 RX ORDER — LIDOCAINE HYDROCHLORIDE 10 MG/ML
INJECTION, SOLUTION EPIDURAL; INFILTRATION; INTRACAUDAL; PERINEURAL AS NEEDED
Status: DISCONTINUED | OUTPATIENT
Start: 2025-04-11 | End: 2025-04-11 | Stop reason: HOSPADM

## 2025-04-11 RX ADMIN — FENTANYL CITRATE 25 MCG: 50 INJECTION, SOLUTION INTRAMUSCULAR; INTRAVENOUS at 13:50

## 2025-04-11 RX ADMIN — CEFAZOLIN SODIUM 2000 MG: 2 SOLUTION INTRAVENOUS at 13:45

## 2025-04-11 RX ADMIN — PROPOFOL 20 MCG/KG/MIN: 10 INJECTION, EMULSION INTRAVENOUS at 13:47

## 2025-04-11 RX ADMIN — FENTANYL CITRATE 25 MCG: 50 INJECTION, SOLUTION INTRAMUSCULAR; INTRAVENOUS at 13:47

## 2025-04-11 NOTE — OP NOTE
OPERATIVE REPORT  PATIENT NAME: Ely Hernadez    :  1940  MRN: [id number]  Pt Location: [facility] OR ROOM 01    SURGERY DATE: 2025    Surgeons and Role:     * Chris Bush MD - Primary    Preop Diagnosis:  Stasis edema of right lower extremity I87.301  Chronic venous hypertension with ulcer and inflammation involving right side (HCC) I87.331    Post-Op Diagnosis Codes:     * Stasis edema of right lower extremity [I87.301]     * Chronic venous hypertension with ulcer and inflammation involving right side (HCC) [I87.331]    Procedure(s):  Right - right leg peforator sclerotherapy    Specimen(s):  * No specimens in log *    Estimated Blood Loss:   Minimal    Drains:  * No LDAs found *    Anesthesia Type:   MAC    Operative Indications:  Stasis edema of right lower extremity I87.301  Chronic venous hypertension with ulcer and inflammation involving right side (HCC) I87.331      Operative Findings:  Mid calf posterior tibial vein  underneath the wound.   measures 3.5 mm in diameter with reflux.    Accessible  sclerotherapy with a Asclera foam 1%      Complications:   None    Procedure and Technique:  Patient was brought to the operating room and placed in a supine position.  The left leg was prepped and draped with Betadine.  Using ultrasound guidance we identified an underlying  communicating from the mid posterior tibial vein.  It was directly underneath the wound.  This  measured about 3.5 mm in diameter and was communicating with the greater saphenous vein.  We then punctured the greater saphenous vein in proximity to the  and threaded a wire under ultrasound guidance into the .  Then the inner cannula of the micropuncture device was introduced into the .  Foam was made using 1 cc of 1% Asclera with 3 cc of air.  Using Tessari method foam was created.  The foam was then gently injected into the .  We gently moved  the ankle back-and-forth to make sure there was no reflux into the deep vein.  We then did a completion duplex which showed patent posterior tibial vein and posterior tibial artery and closure of the .    Multilayer compression dressing with Webril and Ace wrap was applied.   I was present for the entire procedure.    Patient Disposition:  PACU     The greater saphenous vein is also in close proximity to the wound.  If wound fails to heal we may consider ablation of this vein even though there is no reflux.             SIGNATURE: Chris Bush MD  DATE: April 11, 2025  TIME: 2:14 PM

## 2025-04-11 NOTE — ANESTHESIA POSTPROCEDURE EVALUATION
Post-Op Assessment Note    CV Status:  Stable  Pain Score: 0    Pain management: adequate       Mental Status:  Alert and awake   PONV Controlled:  None   Airway Patency:  Patent  Two or more mitigation strategies used for obstructive sleep apnea   Post Op Vitals Reviewed: Yes    No anethesia notable event occurred.            Last Filed PACU Vitals:  Vitals Value Taken Time   Temp     Pulse     BP     Resp     SpO2

## 2025-04-11 NOTE — ANESTHESIA PREPROCEDURE EVALUATION
Procedure:  right leg peforator sclerotherapy (Right: Leg)    Relevant Problems   CARDIO   (+) AVM (arteriovenous malformation) of small bowel, acquired with hemorrhage   (+) Angiectasia   (+) Coronary artery disease of native artery of native heart with stable angina pectoris ([facility])   (+) Hyperlipidemia   (+) Hypertension, essential   (+) Other vascular disorders of intestine (HCC)   (+) Paroxysmal atrial fibrillation (HCC)      ENDO   (+) Hypothyroidism   (+) Type 2 diabetes mellitus with stage 3b chronic kidney disease, with long-term current use of insulin (HCC)   (+) Type 2 diabetes mellitus with stage 4 chronic kidney disease, with long-term current use of insulin (HCC)      GI/HEPATIC   (+) GERD (gastroesophageal reflux disease)      /RENAL   (+) Chronic kidney disease-mineral and bone disorder   (+) Stage 3b chronic kidney disease (HCC)      HEMATOLOGY   (+) Anemia   (+) Iron deficiency anemia due to chronic blood loss      MUSCULOSKELETAL   (+) Primary osteoarthritis involving multiple joints      PULMONARY   (+) Chronic obstructive pulmonary disease (HCC)   (+) Simple chronic bronchitis (HCC)      Neurology/Sleep   (+) Hemiplegia and hemiparesis following cerebral infarction affecting left non-dominant side (HCC)      Cardiovascular/Peripheral Vascular   (+) Chronic systolic (congestive) heart failure (HCC)      Respiratory/Allergy   (+) Restrictive lung disease        Physical Exam    Airway    Mallampati score: IV  TM Distance: <3 FB  Neck ROM: limited     Dental    upper dentures,     Cardiovascular      Pulmonary   No wheezes    Other Findings  post-pubertal.    Anesthesia Plan  ASA Score- 3     Anesthesia Type- IV sedation with anesthesia with ASA Monitors.         Additional Monitors:     Airway Plan:     Comment: Light sedation.       Plan Factors-    Chart reviewed. EKG reviewed. Imaging results reviewed. Existing labs reviewed. Patient summary reviewed.                  Induction-  intravenous.    Postoperative Plan-         Informed Consent- Anesthetic plan and risks discussed with patient.  I personally reviewed this patient with the CRNA. Discussed and agreed on the Anesthesia Plan with the CRNA..      NPO Status:  No vitals data found for the desired time range.      VITALS  There were no vitals taken for this visit. BP Readings from Last 3 Encounters:   04/03/25 166/72   04/01/25 143/68   03/25/25 155/70        LABS  Results from Last 12 Months   Lab Units 03/17/25  0932 02/28/25  1405   WBC Thousand/uL 6.54 5.27   HEMOGLOBIN g/dL 12.4 11.0*   HEMATOCRIT % 40.3 35.8   PLATELETS Thousands/uL 366 340     Results from Last 12 Months   Lab Units 03/17/25  0932 02/28/25  1405   INR  2.71* 2.24*    Results from Last 12 Months   Lab Units 03/17/25  0932 11/15/24  0840 09/27/24  0818 08/21/24  1001 06/25/24  0902 05/24/24  0922 05/15/24  0926   SODIUM mmol/L 138 139 139 138 143   < > 138   POTASSIUM mmol/L 4.0 4.4 4.4 4.3 4.1   < > 5.2   CHLORIDE mmol/L 101 100 101 101 105   < > 99   CO2 mmol/L 25 30 29 30 28   < > 31   ANION GAP mmol/L 12 9 9 7 10   < > 8   BUN mg/dL 24 32* 31* 32* 22   < > 40*   CREATININE mg/dL 1.41* 1.34* 1.44* 1.37* 1.35*   < > 1.74*   CALCIUM mg/dL 9.3 8.8 9.4 9.2 9.2   < > 9.7   GLUCOSE RANDOM mg/dL  --  235*  --  180*  --   --   --    PHOSPHORUS mg/dL  --   --  2.8  --  3.5  --   --    HEMOGLOBIN A1C %  --  7.6*  --   --   --   --  6.2*    < > = values in this interval not displayed.        ECG      ECHOCARDIOGRAPHY OR OTHER TESTING/IMAGING  Narrative & Impression    Electronic ventricular pacemaker  Confirmed by Qiana Layton ([id number]) on 3/8/2024 8:42:29 PM       No results found. However, due to the size of the patient record, not all encounters were searched. Please check Results Review for a complete set of results.  Results for orders placed or performed in visit on 04/08/25   Cardiac EP device report    Narrative    SUZI DC PM/NOT MRI CONDITIONAL  [place]  TRANSMISSION: BATTERY VOLTAGE NEARING LACIE (10.6 MOS).  WILL SCHEDULE MONTHLY BATTERY CHECKS. AP: <1%. : 97% (>40%~CHB). ALL AVAILABLE LEAD PARAMETERS WITHIN NORMAL LIMITS. 1 AMS W/ AF IN PROGRESS (HX OF SAME). AF BURDEN: 100%. PT TAKES WARFARIN, METOPROLOL TART. EF: 40% (ECHO 8/26/24). NORMAL DEVICE FUNCTION. CH      History    HLD, Cardiomyopathy, Paroxysmal afib, CHF     Interpretation Summary       •  Left Ventricle: Left ventricular cavity size is normal. Wall thickness is normal. The left ventricular ejection fraction is 40%. Systolic function is moderately reduced. There is moderate global hypokinesis with regional variation. Diastolic function is moderately abnormal, consistent with grade II (pseudonormal) relaxation.  •  IVS: There is abnormal septal motion consistent with post-operative status.  •  Right Ventricle: Right ventricular cavity size is normal. Systolic function is reduced.  •  Left Atrium: The atrium is mildly dilated.  •  Aortic Valve: There is a mechanical valve. There is trace paravalvular regurgitation. The aortic valve has no significant stenosis. Mean gradient 5 mm Hg.  •  Mitral Valve: There is mild to moderate regurgitation.  •  Tricuspid Valve: There is mild to moderate regurgitation. The right ventricular systolic pressure is moderately elevated. The estimated right ventricular systolic pressure is 61.00 mmHg.  •  Pulmonic Valve: There is mild regurgitation.  •  IVC/SVC: The right atrial pressure is estimated at 8.0 mmHg. The inferior vena cava is normal in size. Respirophasic changes were blunted (less than 50% variation).     ------- ANESTHESIA RISK-BENEFIT DISCUSSION -------  BENEFITS OF A SPECIALIZED ANESTHESIA TEAM INCLUDE (NBK [id number], PMID 47656868):  (1) Reduce mortality and morbidity for major surgeries/procedures. (2) The team provides analgesia/sedation/amnesia/akinesia as safely as possible. (3) The team strives to reduce discomfort as safely as possible.    RISKS, AND  PLANS TO MITIGATE RISKS, INCLUDE:    - Neurologic system: IntraOp awareness (Risk is ~1:1,000 - 1:14,000; PMID 49235636), Stroke (Risk ~<0.1-2% for most cases; PMID 65832141), nerve injury, vision loss, and POCD.     - Airway and Pulmonary system: Dental or mouth injury, throat pain, critical hypoxia, pneumothorax, prolonged intubation, post-op respiratory compromise.  Airway/Intubation risks and prior data: No prior advanced airway notes in [facility] EMR  Major ARISCAT risk factors for pulmonary complications include: Other factors/Clinical gestalt: 1 pt, yielding a score of 0-1= Low risk, 1.6%.  - Cardiovascular system: Hypotension, arrhythmias, cardiac injury or arrest, blood clots, bleeding, infection, vascular injuries.  Zain's RCRI score items: ICM, CHF, CVA/TIA, and IDDM, yielding an RCRI Score of 3 or more= 11% risk of MACE. Discussion of exercise tolerance or stress testing is warranted: Patient is high risk but is already optimized from a cardiac perspective.  Are john-op or intra-op beta blockers indicated? (PMID 29655768): no, patient has already taken recently.  - FEN/GI system: Aspiration risk (~0.5% per PMC 8943641) and PONV (10-80% per Apfel score) especially if the patient has not fasted.  ASA NPO guideline compliance?: Yes  - Medication risk assessment: Allergic reactions, excessive bleeding with anticoagulant use, overdoses, drug-drug interactions, injury to a fetus or  in pregnant or breastfeeding patients, john-procedural sedation including while driving/operating machinery.  Recent relevant medications: See MAR or Med Review  Personal or family history of anesthesia complications: no  Pregnancy Status: N/A  - Estimate mortality risks associated with anesthesia based on ASA-PS (PMID 12169033): ASA-PS III: 1:3,500

## 2025-04-11 NOTE — ANESTHESIA POSTPROCEDURE EVALUATION
Post-Op Assessment Note    CV Status:  Stable    Pain management: adequate       Mental Status:  Awake and alert   Hydration Status:  Stable   PONV Controlled:  None   Airway Patency:  Patent     Post Op Vitals Reviewed: Yes    No anethesia notable event occurred.    Staff: Anesthesiologist           Last Filed PACU Vitals:  Vitals Value Taken Time   Temp 97.3 °F (36.3 °C) 04/11/25 1422   Pulse 60 04/11/25 1446   /65 04/11/25 1445   Resp 20 04/11/25 1446   SpO2 94 % 04/11/25 1446   Vitals shown include unfiled device data.    Modified Spike:     Vitals Value Taken Time   Activity 2 04/11/25 1445   Respiration 2 04/11/25 1445   Circulation 2 04/11/25 1445   Consciousness 2 04/11/25 1445   Oxygen Saturation 2 04/11/25 1445     Modified Spike Score: 10

## 2025-04-11 NOTE — H&P
Assessment & Plan  Chronic venous hypertension with ulcer and inflammation involving right side (HCC)  See below  Orders:    Ambulatory Referral to Vascular Surgery    Case request operating room: sclerotherapy of right ; Standing     Stasis edema of right lower extremity  Continue compression stockings  Orders:    Ambulatory Referral to Vascular Surgery    Case request operating room: sclerotherapy of right ; Standing     Venous ulcer of right leg (HCC)  Venous ulcer in right medial calf ongoing for over 2 years.  Underlying diabetes and chronic anemia as contributing factors.  Reviewed venous duplex, there is a refluxing incompetent  in base of wound.  The GSV is competent.  A biopsy was also taken and there is no cancer.     We discussed the risks and benefits of  treatment with sclerotherapy.  Small risk of DVT were discussed  She is on long term warfarin.           Operative Scheduling Information:     Hospital:  [facility]     Physician:  Rachelle     Surgery: Right leg  sclerotherapy     Urgency:  Standard     Level:  Level 4: Outpatients to be scheduled for screening procedures and elective surgery that can be delayed for longer than one month without reasonable expectation of detriment to patient.     Case Length:  1 hours     Post-op Bed:  Outpatient     OR Table:  Standard     Equipment Needs:  Product/Implant: 1% asclera     Medication Instructions:  Jardiance - hold for 7 days  Warfarin - do not hold     Hydration:  No     Contrast Allergy:  No     Venous Clinical Severity Scores (VCSS)  Item Absent   (0 points) Mild   (1 point) Moderate   (2 points) Severe   (3 points)   Pain [] None [x] Occasional [] Daily [] Daily limiting   Varicose veins [] None [] Few [] Calf or thigh [x] Calf and thigh   Venous edema [] None [x] Foot and ankle [] Above ankle, below knee [] To knee of above   Skin pigmentation [x] None [] Perimalleolar [] Diffuse, lower 1/3 calf []  Wider, above lower 1/3 calf   Inflammation [] None [x] Perimalleolar [] Diffuse, lower 1/3 calf [] Wider, above lower 1/3 calf   Induration [] None [] Perimalleolar [] Diffuse, lower 1/3 calf [] Wider, above lower 1/3 calf   No. active ulcers [] None [x] 1 [] 2 [] >=3   Active ulcer size [] None [] <2 cm [x] 2 - 6 cm [] >6 cm   Ulcer duration [] None [] <3 months [] 3 - 12 months [x] >1 year   Compression therapy [] None [] Intermittent [] Most days [x] Fully comply   Total 15               CEAP Clinical Classification  [x] Symptomatic   [] Asymptomatic      [] Class 0 No visible or palpable signs of venous disease   [] Class 1 Telangiectasies or reticular veins   [] Class 2 Varicose veins; distinguished from reticular veins by a diameter of 3mm or more   [] Class 3 Edema   [] Class 4 Changes in skin and subcutaneous tissue secondary to CVD    [] Class 4a Pigmentation or eczema   [] Class 4b Lipodermatosclerosis or atrophie rena   [] Class 5 Healed venous ulcer   [x] Class 6 Active venous ulcer            Orders:    Case request operating room: sclerotherapy of right ; Standing     Stage 3b chronic kidney disease (HCC)        Lab Results   Component Value Date     EGFR 36 11/15/2024     EGFR 33 09/27/2024     EGFR 35 08/21/2024     CREATININE 1.34 (H) 11/15/2024     CREATININE 1.44 (H) 09/27/2024     CREATININE 1.37 (H) 08/21/2024      Orders:    Case request operating room: sclerotherapy of right ; Standing           History of Present Illness     HPI  Ely Hernadez is a 84 y.o. female who presents for follow for chronic venous ulcer ongoing for 2 years.  History obtained from: patient     Review of Systems   Constitutional: Negative.    HENT: Negative.     Eyes: Negative.    Respiratory: Negative.     Cardiovascular: Negative.    Gastrointestinal: Negative.    Endocrine: Negative.    Genitourinary: Negative.    Musculoskeletal: Negative.    Skin: Negative.    Allergic/Immunologic: Negative.     Neurological: Negative.    Hematological: Negative.    Psychiatric/Behavioral: Negative.        Past Medical History  Medical History        Past Medical History:   Diagnosis Date    Acute anterior epistaxis 12/23/2023    Acute blood loss anemia 12/14/2023    Acute respiratory failure with hypoxia (HCC) 02/06/2024    Anemia      Aneurysm of thoracic aorta (HCC)      Angioedema 06/07/2019    Aortic valve disorder      Benign neoplasm of sigmoid colon      Cardiomyopathy (HCC)       last assessed: 10/10/2014    CHF (congestive heart failure) (HCC)      Chronic kidney disease      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HCC)       last assessed: 10/10/2014    COPD (chronic obstructive pulmonary disease) (HCC)      Diabetic nephropathy (HCC)      Disease of thyroid gland      RAMON (dyspnea on exertion)      GERD (gastroesophageal reflux disease)      History of emphysema (HCC)      History of transfusion      Hyperlipidemia      Hypertensive urgency 10/28/2023    Leg cramps 11/01/2023    Stroke-like symptoms 10/28/2023    TIA (transient ischemic attack)           Surgical History         Past Surgical History:   Procedure Laterality Date    AORTIC VALVE REPLACEMENT        CARDIAC PACEMAKER PLACEMENT         last assessed: 10/10/2014    CARDIAC SURGERY         aortic valve replacement    CHOLECYSTECTOMY        COLONOSCOPY        EGD        HYSTERECTOMY        INSERT / REPLACE / REMOVE PACEMAKER        KNEE SURGERY Right      OTHER SURGICAL HISTORY         Capsule ENDOscopy description: 12/19/2012    UT COLONOSCOPY FLX DX W/COLLJ SPEC WHEN PFRMD N/A 05/31/2018     Procedure: single balloon ENTEROSCOPY;  Surgeon: David Dennis MD;  Location:  GI LAB;  Service: Gastroenterology    UT ESOPHAGOGASTRODUODENOSCOPY TRANSORAL DIAGNOSTIC N/A 11/29/2017     Procedure: EGD AND COLONOSCOPY;  Surgeon: Jay Mcleod III, MD;  Location: [facility];  Service: Gastroenterology         Family History         Family History   Problem  Relation Age of Onset    No Known Problems Mother      No Known Problems Father            reports that she quit smoking about 17 years ago. Her smoking use included cigarettes. She started smoking about 70 years ago. She has a 52.9 pack-year smoking history. She has been exposed to tobacco smoke. She quit smokeless tobacco use about 18 years ago. She reports that she does not drink alcohol and does not use drugs.  Medications Ordered Prior to Encounter          Current Outpatient Medications on File Prior to Visit   Medication Sig Dispense Refill    Accu-Chek FastClix Lancets MISC Use 3 (three) times a day 300 each 3    albuterol (Ventolin HFA) 90 mcg/act inhaler Inhale 2 puffs every 6 (six) hours as needed for wheezing 54 g 3    Ascorbic Acid (vitamin C) 1000 MG tablet Take 1,000 mg by mouth daily        aspirin (ECOTRIN LOW STRENGTH) 81 mg EC tablet Take 1 tablet (81 mg total) by mouth daily Do not start before November 1, 2023. 30 tablet 0    b complex vitamins capsule Take 1 capsule by mouth daily 30 capsule 2    Blood Glucose Monitoring Suppl (Accu-Chek Guide Me) w/Device KIT USE 3 TIMES DAILY 1 kit 2    Cholecalciferol (Vitamin D3) 50 MCG (2000 UT) TABS Take 2,000 Units by mouth daily        Empagliflozin (Jardiance) 10 MG TABS tablet Take 1 tablet (10 mg total) by mouth in the morning 90 tablet 1    Ferrous Sulfate (IRON PO) Take by mouth daily in the early morning OTC        furosemide (LASIX) 40 mg tablet Take 1 tablet (40 mg total) by mouth 2 (two) times a day 180 tablet 3    gabapentin (NEURONTIN) 300 mg capsule TAKE 1 CAPSULE BY MOUTH 3 TIMES  DAILY 300 capsule 1    glipiZIDE (GLUCOTROL) 10 mg tablet TAKE 1 TABLET BY MOUTH TWICE  DAILY BEFORE MEALS 200 tablet 1    glucose blood (Accu-Chek Celeste Plus) test strip Use 1 each 3 (three) times a day Use as instructed 300 each 3    insulin degludec (TRESIBA) 100 units/mL injection pen Inject 30 Units under the skin daily 30 mL 3    Insulin Pen Needle (BD Pen  "Needle Rosie U/F) 32G X 4 MM MISC Use daily 100 each 1    ipratropium-albuterol (DUO-NEB) 0.5-2.5 mg/3 mL nebulizer solution inhale contents of 1 vial ( 3 milliliters ) in nebulizer four times a day        Lancets (freestyle) lancets Check blood glucose 3 times daily 300 each 0    levothyroxine 50 mcg tablet TAKE 1 TABLET BY MOUTH DAILY 100 tablet 1    metoprolol tartrate (LOPRESSOR) 50 mg tablet TAKE ONE-HALF TABLET BY MOUTH  TWICE DAILY 100 tablet 1    oxygen gas Inhale 2 L/min daily at bedtime as needed home oxygen nightly        pantoprazole (PROTONIX) 40 mg tablet TAKE 1 TABLET BY MOUTH DAILY AS  DIRECTED 100 tablet 1    warfarin (COUMADIN) 5 mg tablet TAKE 1 TO 2 TABLETS BY  MOUTH DAILY OR AS DIRECTED 180 tablet 3    clobetasol (TEMOVATE) 0.05 % ointment Apply topically 2 (two) times a day (Patient not taking: Reported on 1/14/2025) 30 g 1    enoxaparin (LOVENOX) 80 mg/0.8 mL Inject 0.7 mL (70 mg total) under the skin every 24 hours Hold Warfarin 5 days prior. Last dose is 9/6 Start Lovenox on 9/8, 4 days prior to the procedure. No Lovenox the morning of the procedure. Resume both Lovenox and Warfarin at the discretion of the surgeon (preferably that evening) and check INR 4 days after restarting. Inject in the abdomen about 2 inches from the belly button and rotate sides at each injection. (Patient not taking: Reported on 11/26/2024) 0.7 mL 10    mupirocin (BACTROBAN) 2 % ointment Apply topically daily (Patient not taking: Reported on 1/14/2025) 22 g 0                Current Facility-Administered Medications on File Prior to Visit   Medication Dose Route Frequency Provider Last Rate Last Admin    lidocaine (LMX) 4 % cream   Topical Once Vesta Garcia PA-C            Allergies   No Known Allergies        Objective     /68   Pulse 60   Temp 97.6 °F (36.4 °C) (Temporal)   Resp 16   Ht 5' 6\" (1.676 m)   Wt 75.4 kg (166 lb 3.6 oz)   SpO2 98%   BMI 26.83 kg/m²        Physical Exam  Vitals and nursing " note reviewed.   Constitutional:       Appearance: Normal appearance.   HENT:      Head: Normocephalic and atraumatic.   Cardiovascular:      Rate and Rhythm: Normal rate and regular rhythm.      Heart sounds: Normal heart sounds.      Comments: Tripahsic right DP signals  Pulmonary:      Effort: Pulmonary effort is normal.      Breath sounds: Normal breath sounds.   Abdominal:      Palpations: Abdomen is soft.   Musculoskeletal:      Right lower leg: Edema present.      Left lower leg: No edema.   Skin:     General: Skin is warm and dry.      Capillary Refill: Capillary refill takes less than 2 seconds.      Comments: Venous ulcer on right medial aspect of upper calf.  Surrounding lipodermatosclerosis     Neurological:      General: No focal deficit present.      Mental Status: She is alert.   Psychiatric:         Mood and Affect: Mood normal.         Behavior: Behavior normal.

## 2025-04-11 NOTE — DISCHARGE INSTR - AVS FIRST PAGE
DISCHARGE INSTRUCTIONS    VEIN SURGERY    When released from the hospital, you should have a compression bandage in place on the operated leg.  This bandage should feel snug but not “too tight”.  If the bandage becomes blood soaked or painfully tight, elevate your leg and call your surgeon immediately.    If the operated leg becomes increasingly painful or swollen, or if there is increasing redness or pain around your incisions, contact our office.    On the day of your operation, take it easy and elevate your leg as much as possible.  You can take short walks around the house.  When sitting, the leg should be elevated.  The preferred position is to have the leg at or above the level of the heart.  Starting on the first day after surgery, light walking is strongly encouraged as tolerated.  After your ultrasound test, you can resume your normal activity, but no heavy lifting or strenuous exercise for 2 weeks.    Some bruising and redness of the skin is common after varicose vein surgery.  This can be lessened by strict elevation of the leg. Many patients will notice some numbness of the shin, ankle, calf, or the top of the foot. This usually fades with time, but may be persistent.  After surgery you can expect bruising, swelling and hard knots on your leg.  As your body heals the bruising will fade and the swelling and knots will subside.    Keep your operative dressings on for till your scheduled followup.  However if the bandages feel too tight before the office visit then  you can remove all bandages.   You can use the ACE wraps instead if your leg is too swollen for the compression stocking.   Report to our office any of the following:  Any areas that are red and angry in appearance.  Any drainage that is milky or cloudy in appearance or that has a foul odor.  Elevated temperature of 100.5 degrees F or greater.            7)        Your first post-operative appointment will be 2 to 3 days after your surgery.                        At this appointment, you will have an ultrasound.  You will follow-up with                    your surgeon ~2 weeks after your procedure.            8)         If have any questions, please call our office at ([phone]).    [phone]  [phone]  TOLL FREE [phone] [address], [address], [place] [address]  [address] [place] [address]  [id number]  [address] [place], [place] [id number]  [address] [facility], [place] [address]  [address] [facility], 2nd Floor, [address]  [address]  [address] [facility], [address], [place] [address]  [address] [facility], 1st Floor, [address], [place] [address]

## 2025-04-17 ENCOUNTER — OFFICE VISIT (OUTPATIENT)
Dept: WOUND CARE | Facility: CLINIC | Age: 85
End: 2025-04-17
Payer: COMMERCIAL

## 2025-04-17 VITALS
HEART RATE: 64 BPM | TEMPERATURE: 98.4 F | DIASTOLIC BLOOD PRESSURE: 63 MMHG | RESPIRATION RATE: 16 BRPM | SYSTOLIC BLOOD PRESSURE: 156 MMHG

## 2025-04-17 DIAGNOSIS — L97.919 VENOUS ULCER OF RIGHT LEG (HCC): Primary | ICD-10-CM

## 2025-04-17 DIAGNOSIS — I73.9 PAD (PERIPHERAL ARTERY DISEASE) (HCC): ICD-10-CM

## 2025-04-17 DIAGNOSIS — I83.019 VENOUS ULCER OF RIGHT LEG (HCC): Primary | ICD-10-CM

## 2025-04-17 PROCEDURE — 99213 OFFICE O/P EST LOW 20 MIN: CPT | Performed by: ORTHOPAEDIC SURGERY

## 2025-04-17 RX ORDER — LIDOCAINE 40 MG/G
CREAM TOPICAL ONCE
Status: COMPLETED | OUTPATIENT
Start: 2025-04-17 | End: 2025-04-17

## 2025-04-17 RX ADMIN — LIDOCAINE 1 APPLICATION: 40 CREAM TOPICAL at 09:31

## 2025-04-17 NOTE — PATIENT INSTRUCTIONS
Orders Placed This Encounter   Procedures    Wound cleansing and dressings Venous Ulcer Right Pretibial     Right Leg wound:      Wash your hands with soap and water. Remove old dressing, discard into plastic bag and place in trash. Cleanse the wound with Normal Saline solution prior to applying a clean dressing. Do not use tissue or cotton balls. Do not scrub the wound. Pat dry using gauze.      Shower: Yes. Cleanse with a mild soap and water such as Dove. Be sure to remove your old dressing prior to getting in the shower.      Skin prep to john wound  Apply Exufiber AG to the open wound.   Cover with gauze.  Secure with tape. (If your skin is breaking down from tape, then need to wrap with Armani and put tape on Armani)      Change dressing EVERY OTHER DAY.                            Elastic Tubular Stocking: Spanda F     Tubular elastic bandage: Apply from base of toes to behind the knee. Apply in AM, may remove for sleep.  Avoid prolonged standing in one place.  Elevate leg(s) above the level of the heart when sitting or as much as possible.     Standing Status:   Future     Expiration Date:   4/24/2025

## 2025-04-17 NOTE — PROGRESS NOTES
Patient ID: Ely Hernadez is a 84 y.o. female Date of Birth 1940       Chief Complaint   Patient presents with    Follow Up Wound Care Visit     RLE wound       Allergies:  Patient has no known allergies.    Diagnosis:   Diagnosis ICD-10-CM Associated Orders   1. Venous ulcer of right leg ([facility])  I83.019 lidocaine (LMX) 4 % cream    L97.919 Wound cleansing and dressings Venous Ulcer Right Pretibial     Wound Procedure Treatment Venous Ulcer Right Pretibial      2. PAD (peripheral artery disease) ([facility])  I73.9 lidocaine (LMX) 4 % cream     Wound cleansing and dressings Venous Ulcer Right Pretibial     Wound Procedure Treatment Venous Ulcer Right Pretibial           Assessment and Plan :  Follow up evaluation of right venous ulcer with increased epithelial tissue on distal aspect of wound bed.    Continue wound management with Exufiber Ag on wound bed and skin prep to wound edges, see wound orders below   Continue to wear daily compression with Spandagrip.  No harsh cleansers such as alcohol, peroxide, or antibacterial soap, do not submerge in water  Can cleanse with mild soap and water or NSS at dressing changes.   F/u with vascular surgeon psot RLE sclerotherapy  Continue frequent elevation of leg and increase exercise/walking for wound healing.  Follow-up in 2 week(s) or call sooner with questions or concerns or any signs of infection such as redness, swelling, increased/purulent drainage, fever, chills, increased severe pain.    Subjective:   6/28: Pt is an 84 y.o. female with pmhx HTN, DMII (A1c 8.7),  CKD, Pafib, CVA (10/28/23 on Coumadin/ASA 81mg) with residual deficits (left sided facial drop and leg weakness) well known to [facility] who presents for follow up evaluation of non healing RLE venous ulcer which has been present for about 2.5 years. Pt has been covering wound with a bandaid. Does not have an odor. No smoking, ETOH or drug use.  Pt denies any sob, fatigue, N/V, CP, fever or chills.    7/12: Patient  presents for followup evaluation of RLE venous ulcer. No increased pain or drainage. Pt is frustrated with chronic wound. Has been using Polymem on the wound bed and Tubigrip for compression.  Pt denies any fevers or chills.    7/25: Patient presents for followup evaluation of RLE venous ulcer. Since last visit pt was started on Bactrim for positive wound culture with abnormal 2+ Growth of Staphylococcus Aureus. The tissue exam demonstrated chronic inflammation and fibrosis. No malignancy, nor pyoderma gangrenosum were identified.  Pt noticed wound decreasing in size. Has been using Polymem on the wound bed and Tubigrip for compression.  Pt denies any fevers or chills.    8/2: Patient presents for followup evaluation of RLE venous ulcer. Pt completed Bactrim as directed. No issues. Pt states this is the best she has felt in 2 years. No fevers or chills.    8/22: Patient presents for followup evaluation of RLE venous ulcer. Pt states she has been wetting wound with mild soap and water. She noticed a scab formed and came off on dressing. In office CHEYANNE today; R 0.59, L 0.76. No fevers or chills.    9/6: Patient presents for followup evaluation of RLE venous ulcer. Pt  has been applying Polymem Ag Max on wound bed and Tubigrip for compression. Denies fevers or chills.    9/19: Patient presents for followup evaluation of RLE venous ulcer. Pt has been applying Mupirocin wound bed and Tubigrip for compression. Denies fevers or chills.    9/26: Patient presents for followup evaluation of RLE venous ulcer. No complaints. Pt has been applying Hydrofera Blue ready on wound bed and Tubigrip for compression. Denies fevers or chills.    10/10:  Patient presents for followup evaluation of RLE venous ulcer. No complaints. Pt is scheduled for vascular studies for 11/6/24. Pt has been applying Hydrofera Blue ready on wound bed and Tubigrip for compression. Denies fevers or chills.    10/31:  Patient presents for followup evaluation  of RLE venous ulcer. NO issues. Pt has been applying Hydrofera Blue ready on wound bed and Tubigrip for compression. Denies fevers or chills.    11/14:  Patient presents for followup evaluation of RLE venous ulcer. RLE vascular studies reveal deep venous incompetence with an incompetent . GSV and SSV is competent. Bilateral LEADs reveal diffuse disease in the RLE femoral and popliteal arteries with a 50-75% stenosis in the common femoral artery. R CHEYANNE:  0.64 (severe disease). There is diffuse disease in the LLE femoral and popliteal arteries with a >75% stenosis of the distal superficial femoral artery.There is a 50-75% stenosis of the proximal and mid superficial femoral arteries. Left CHEYANNE: 0.69 (moderate disease). Pt has scheduled a vascular surgery appointment for January 14, 2025. Pt has been applying Hydrofera Blue ready on wound bed and Tubigrip for compression. Denies fevers or chills.    12/5: Patient presents for followup evaluation of RLE venous ulcer. No complaints. Pt has been applying Hydrofera Blue ready on wound bed and Tubigrip for compression. Denies fevers or chills.    12/19: Patient presents for followup evaluation of RLE venous ulcer. No issues. Pt has been applying clobetasol cream and DSD on wound bed and Tubigrip for compression. Pt is scheduled to see vascular surgery on January 14,  Denies fevers or chills.    1/2: Patient presents for followup evaluation of RLE venous ulcer.  Pt states dressing has been adhering to wound bed. Pt has been applying Hydrofera Blue Ready on wound bed and Tubigrip for compression. Denies fevers or chills.    3/6: Patient presents for followup evaluation of RLE venous ulcer. Pt has been applying Hydrofera Blue Ready on wound bed and Tubigrip for compression. Pt appointment with vascular was cancelled due to patient being ill. Pt plans on rescheduling. Denies fevers or chills.    3/20: Patient presents for followup evaluation of RLE venous ulcer. Pt c/o  increased serosanguinous drainage. Pt is scheduled for RLE sclerotherapy with vascular on 4/11/25. Pt has been applying Dermagran on wound bed and Tubigrip for compression. Denies fevers or chills.    4/3:  Patient presents for followup evaluation of RLE venous ulcer. Pt c/o increased serosanguinous drainage. Pt has been applying Exufiber Ag on wound bed and Tubigrip for compression. Denies fevers or chills.    4/17: Patient presents for followup evaluation of RLE venous ulcer.  Pt had RLE sclerotherapy procedure on 4/11/25. Pt has been applying Exufiber Ag on wound bed and Tubigrip for compression. Denies fevers or chills.      The following portions of the patient's history were reviewed and updated as appropriate:   Patient Active Problem List   Diagnosis    Hyperlipidemia    Chronic anticoagulation    Hypertension, essential    Coronary artery disease of native artery of native heart with stable angina pectoris (HCC)    Simple chronic bronchitis (HCC)    Diabetes mellitus with neurological manifestation (HCC)    Hypothyroidism    GERD (gastroesophageal reflux disease)    Anemia    AVM (arteriovenous malformation) of small bowel, acquired with hemorrhage    Angiectasia    Other vascular disorders of intestine (HCC)    Aortic valve replaced    Cardiomyopathy (HCC)    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HCC)    Stage 3b chronic kidney disease (HCC)    Chronic kidney disease-mineral and bone disorder    Primary osteoarthritis involving multiple joints    Paroxysmal atrial fibrillation (HCC)    Neutropenia associated with infection (HCC)    Herpes simplex labialis    Restrictive lung disease    Nocturnal hypoxemia    Supratherapeutic INR    H/O mechanical aortic valve replacement    Subtherapeutic international normalized ratio (INR)    Type 2 diabetes mellitus with stage 3b chronic kidney disease, with long-term current use of insulin (HCC)    Venous ulcer of right leg (HCC)    Chronic systolic (congestive) heart failure  (HCC)    Lower extremity edema    Iron deficiency anemia due to chronic blood loss    Hemiplegia and hemiparesis following cerebral infarction affecting left non-dominant side (HCC)    Chronic obstructive pulmonary disease (HCC)    Type 2 diabetes mellitus with stage 4 chronic kidney disease, with long-term current use of insulin (HCC)    Chronic venous hypertension with ulcer and inflammation involving right side (HCC)     Past Medical History:   Diagnosis Date    Acute anterior epistaxis 12/23/2023    Acute blood loss anemia 12/14/2023    Acute respiratory failure with hypoxia (HCC) 02/06/2024    Anemia     Aneurysm of thoracic aorta (HCC)     Angioedema 06/07/2019    Aortic valve disorder     Benign neoplasm of sigmoid colon     Cardiomyopathy (HCC)     last assessed: 10/10/2014    CHF (congestive heart failure) (HCC)     Chronic kidney disease     Chronic venous hypertension with ulcer and inflammation involving right side (HCC) 04/11/2025    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HCC)     last assessed: 10/10/2014    COPD (chronic obstructive pulmonary disease) (HCC)     Diabetic nephropathy (HCC)     Disease of thyroid gland     RAMON (dyspnea on exertion)     GERD (gastroesophageal reflux disease)     History of emphysema (HCC)     History of transfusion     Hyperlipidemia     Hypertensive urgency 10/28/2023    Leg cramps 11/01/2023    Stroke-like symptoms 10/28/2023    TIA (transient ischemic attack)      Past Surgical History:   Procedure Laterality Date    AORTIC VALVE REPLACEMENT      CARDIAC PACEMAKER PLACEMENT      last assessed: 10/10/2014    CARDIAC SURGERY      aortic valve replacement    CHOLECYSTECTOMY      COLONOSCOPY      EGD      HYSTERECTOMY      INSERT / REPLACE / REMOVE PACEMAKER      KNEE SURGERY Right     OTHER SURGICAL HISTORY      Capsule ENDOscopy description: 12/19/2012    IN COLONOSCOPY FLX DX W/COLLJ SPEC WHEN PFRMD N/A 05/31/2018    Procedure: single balloon ENTEROSCOPY;  Surgeon: David  MD Sonny;  Location: [facility];  Service: Gastroenterology    GA ESOPHAGOGASTRODUODENOSCOPY TRANSORAL DIAGNOSTIC N/A 2017    Procedure: EGD AND COLONOSCOPY;  Surgeon: Jay Mcleod III, MD;  Location: [facility];  Service: Gastroenterology    VEIN LIGATION Right 2025    Procedure: right leg peforator sclerotherapy;  Surgeon: Chris Bush MD;  Location: [facility];  Service: Vascular     Family History   Problem Relation Age of Onset    No Known Problems Mother     No Known Problems Father      Social History     Socioeconomic History    Marital status:      Spouse name: Not on file    Number of children: Not on file    Years of education: Not on file    Highest education level: Not on file   Occupational History    Not on file   Tobacco Use    Smoking status: Former     Current packs/day: 0.00     Average packs/day: 1 pack/day for 52.9 years (52.9 ttl pk-yrs)     Types: Cigarettes     Start date:      Quit date: 2007     Years since quittin.3     Passive exposure: Past    Smokeless tobacco: Former     Quit date:    Vaping Use    Vaping status: Never Used   Substance and Sexual Activity    Alcohol use: Never    Drug use: Never    Sexual activity: Not Currently     Partners: Male   Other Topics Concern    Not on file   Social History Narrative    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 BID Freestyle lancets BID    Living independently with spouse     Social Drivers of Health     Financial Resource Strain: Not on file   Food Insecurity: No Food Insecurity (5/15/2024)    Nursing - Inadequate Food Risk Classification     Worried About Running Out of Food in the Last Year: Never true     Ran Out of Food in the Last Year: Never true     Ran Out of Food in the Last Year: Not on file   Transportation Needs: No Transportation Needs (5/15/2024)    PRAPARE - Transportation     Lack of Transportation (Medical): No     Lack of Transportation (Non-Medical): No    Physical Activity: Inactive (5/26/2021)    Exercise Vital Sign     Days of Exercise per Week: 0 days     Minutes of Exercise per Session: 0 min   Stress: No Stress Concern Present (5/26/2021)    [employer] - Occupational Stress Questionnaire     Feeling of Stress : Not at all   Social Connections: Not on file   Intimate Partner Violence: Not on file   Housing Stability: Unknown (5/15/2024)    Housing Stability Vital Sign     Unable to Pay for Housing in the Last Year: No     Number of Times Moved in the Last Year: Not on file     Homeless in the Last Year: No       Current Outpatient Medications:     Accu-Chek FastClix Lancets MISC, Use 3 (three) times a day, Disp: 300 each, Rfl: 3    albuterol (Ventolin HFA) 90 mcg/act inhaler, Inhale 2 puffs every 6 (six) hours as needed for wheezing, Disp: 54 g, Rfl: 3    Ascorbic Acid (vitamin C) 1000 MG tablet, Take 1,000 mg by mouth daily, Disp: , Rfl:     aspirin (ECOTRIN LOW STRENGTH) 81 mg EC tablet, Take 1 tablet (81 mg total) by mouth daily Do not start before November 1, 2023., Disp: 30 tablet, Rfl: 0    Blood Glucose Monitoring Suppl (Accu-Chek Guide Me) w/Device KIT, USE 3 TIMES DAILY, Disp: 1 kit, Rfl: 2    Cholecalciferol (Vitamin D3) 50 MCG (2000 UT) TABS, Take 2,000 Units by mouth daily, Disp: , Rfl:     clobetasol (TEMOVATE) 0.05 % ointment, Apply topically 2 (two) times a day, Disp: 30 g, Rfl: 1    cyanocobalamin (VITAMIN B-12) 1000 MCG tablet, Take 1 tablet (1,000 mcg total) by mouth daily, Disp: , Rfl:     Empagliflozin (Jardiance) 10 MG TABS tablet, TAKE 1 TABLET BY MOUTH IN THE  MORNING, Disp: 100 tablet, Rfl: 1    Ferrous Sulfate (IRON PO), Take by mouth daily in the early morning OTC, Disp: , Rfl:     fluticasone (FLONASE) 50 mcg/act nasal spray, 1 spray into each nostril daily, Disp: 16 g, Rfl: 2    fluticasone-umeclidinium-vilanterol (Trelegy Ellipta) 100-62.5-25 mcg/actuation inhaler, Inhale 1 puff daily Rinse mouth after  use., Disp: 180 blister, Rfl: 0    furosemide (LASIX) 40 mg tablet, Take 1 tablet (40 mg total) by mouth 2 (two) times a day, Disp: 180 tablet, Rfl: 3    gabapentin (NEURONTIN) 300 mg capsule, TAKE 1 CAPSULE BY MOUTH 3 TIMES  DAILY, Disp: 300 capsule, Rfl: 1    glipiZIDE (GLUCOTROL) 10 mg tablet, TAKE 1 TABLET BY MOUTH TWICE  DAILY BEFORE MEALS, Disp: 200 tablet, Rfl: 1    glucose blood (Accu-Chek Celeste Plus) test strip, Use 1 each 3 (three) times a day Use as instructed, Disp: 300 each, Rfl: 3    insulin degludec (TRESIBA) 100 units/mL injection pen, Inject 30 Units under the skin daily (Patient taking differently: Inject 30 Units under the skin daily at bedtime), Disp: 30 mL, Rfl: 3    Insulin Pen Needle (BD Pen Needle Rosie U/F) 32G X 4 MM MISC, Use daily, Disp: 100 each, Rfl: 1    ipratropium-albuterol (DUO-NEB) 0.5-2.5 mg/3 mL nebulizer solution, Take 3 mL by nebulization 4 (four) times a day (Patient taking differently: Take 3 mL by nebulization every 6 (six) hours as needed), Disp: 360 mL, Rfl: 1    Lancets (freestyle) lancets, Check blood glucose 3 times daily, Disp: 300 each, Rfl: 0    levothyroxine 50 mcg tablet, TAKE 1 TABLET BY MOUTH DAILY, Disp: 100 tablet, Rfl: 1    metoprolol tartrate (LOPRESSOR) 50 mg tablet, TAKE ONE-HALF TABLET BY MOUTH  TWICE DAILY, Disp: 100 tablet, Rfl: 1    oxygen gas, Inhale 2 L/min daily at bedtime as needed home oxygen nightly, Disp: , Rfl:     pantoprazole (PROTONIX) 40 mg tablet, TAKE 1 TABLET BY MOUTH DAILY AS  DIRECTED, Disp: 100 tablet, Rfl: 2    warfarin (COUMADIN) 5 mg tablet, TAKE 1 TO 2 TABLETS BY  MOUTH DAILY OR AS DIRECTED, Disp: 180 tablet, Rfl: 3    Current Facility-Administered Medications:     lidocaine (LMX) 4 % cream, , Topical, Once, Vesta Garcia PA-C    Review of Systems   Constitutional:  Negative for appetite change, chills, fatigue, fever and unexpected weight change.   HENT:  Negative for congestion, hearing loss and postnasal drip.    Respiratory:   Negative for cough and shortness of breath.    Cardiovascular:  Negative for leg swelling.   Musculoskeletal:  Positive for gait problem.   Skin:  Positive for wound (RLE). Negative for rash.   Neurological:  Negative for numbness.   Hematological:  Does not bruise/bleed easily.   Psychiatric/Behavioral: Negative.           Objective:  /63   Pulse 64   Temp 98.4 °F (36.9 °C)   Resp 16   Pain Score: 0-No pain     Physical Exam  Constitutional:       General: She is awake. She is not in acute distress.     Appearance: She is not ill-appearing, toxic-appearing or diaphoretic.   HENT:      Head: Normocephalic and atraumatic.      Right Ear: External ear normal.      Left Ear: External ear normal.   Eyes:      Conjunctiva/sclera: Conjunctivae normal.   Cardiovascular:      Pulses:           Dorsalis pedis pulses are 1+ on the right side.   Pulmonary:      Effort: Pulmonary effort is normal. No respiratory distress.   Musculoskeletal:      Right lower leg: Edema present.      Comments: trace   Skin:     General: Skin is warm and dry.      Findings: Wound (RLE) present. No erythema.      Comments: 1.  Right lower extremity venous ulcer with increased epithelial tissue size and pink granular wound bed with fragile periwound scar tissue, See wound assessment for additional details.   Neurological:      Mental Status: She is alert.   Psychiatric:         Mood and Affect: Mood normal.         Behavior: Behavior normal. Behavior is cooperative.              Wound Instructions:  Orders Placed This Encounter   Procedures    Wound cleansing and dressings Venous Ulcer Right Pretibial     Right Leg wound:      Wash your hands with soap and water. Remove old dressing, discard into plastic bag and place in trash. Cleanse the wound with Normal Saline solution prior to applying a clean dressing. Do not use tissue or cotton balls. Do not scrub the wound. Pat dry using gauze.      Shower: Yes. Cleanse with a mild soap and water  "such as Dove. Be sure to remove your old dressing prior to getting in the shower.      Skin prep to john wound  Apply Exufiber AG to the open wound.   Cover with gauze.  Secure with tape. (If your skin is breaking down from tape, then need to wrap with Armani and put tape on Armani)      Change dressing EVERY OTHER DAY.                            Elastic Tubular Stocking: Spanda F     Tubular elastic bandage: Apply from base of toes to behind the knee. Apply in AM, may remove for sleep.  Avoid prolonged standing in one place.  Elevate leg(s) above the level of the heart when sitting or as much as possible.     Standing Status:   Future     Expiration Date:   4/24/2025    Wound Procedure Treatment Venous Ulcer Right Pretibial     This order was created via procedure documentation       Vesta Garcia PA-C, [facility]      Portions of the record may have been created with voice recognition software. Occasional wrong word or \"sound alike\" substitutions may have occurred due to the inherent limitations of voice recognition software. Read the chart carefully and recognize, using context, where substitutions have occurred.    "

## 2025-04-17 NOTE — PROGRESS NOTES
Wound Procedure Treatment Venous Ulcer Right Pretibial    Performed by: Soraya Arzola RN  Authorized by: Vesta Garcia PA-C  Associated wounds:   Wound 10/29/23 Venous Ulcer Pretibial Right    Wound cleansed with:  NSS   Applied primary dressing:  Gelling fiber and Silver   Applied secondary dressing:  Gauze   Dressing secured with:  Tape, Elastic tubular stocking and Size F   Comments:  Exmai Ag

## 2025-04-22 ENCOUNTER — OFFICE VISIT (OUTPATIENT)
Dept: VASCULAR SURGERY | Facility: CLINIC | Age: 85
End: 2025-04-22
Payer: COMMERCIAL

## 2025-04-22 VITALS
HEART RATE: 60 BPM | BODY MASS INDEX: 26.52 KG/M2 | SYSTOLIC BLOOD PRESSURE: 136 MMHG | DIASTOLIC BLOOD PRESSURE: 72 MMHG | HEIGHT: 66 IN | WEIGHT: 165 LBS

## 2025-04-22 DIAGNOSIS — I87.331 CHRONIC VENOUS HYPERTENSION WITH ULCER AND INFLAMMATION INVOLVING RIGHT SIDE (HCC): Primary | ICD-10-CM

## 2025-04-22 DIAGNOSIS — Z79.4 TYPE 2 DIABETES MELLITUS WITH STAGE 4 CHRONIC KIDNEY DISEASE, WITH LONG-TERM CURRENT USE OF INSULIN (HCC): ICD-10-CM

## 2025-04-22 DIAGNOSIS — I73.9 PERIPHERAL ARTERIAL DISEASE (HCC): ICD-10-CM

## 2025-04-22 DIAGNOSIS — N18.4 TYPE 2 DIABETES MELLITUS WITH STAGE 4 CHRONIC KIDNEY DISEASE, WITH LONG-TERM CURRENT USE OF INSULIN (HCC): ICD-10-CM

## 2025-04-22 DIAGNOSIS — E11.22 TYPE 2 DIABETES MELLITUS WITH STAGE 4 CHRONIC KIDNEY DISEASE, WITH LONG-TERM CURRENT USE OF INSULIN (HCC): ICD-10-CM

## 2025-04-22 PROCEDURE — 99213 OFFICE O/P EST LOW 20 MIN: CPT | Performed by: PHYSICIAN ASSISTANT

## 2025-04-22 NOTE — ASSESSMENT & PLAN NOTE
84 yoF COPD, diabetes, CKD 4, osteoarthritis, parox AF, CAD, dCMP, AVM, Hx mechanical aortic valve replacement on warfarin, PPM, PAD, venous hypertension with RLE ulceration present x 2 years who underwent R  PT vein sclerotherapy.      S/P Right leg peforator sclerotherapy (4/11/25, Rachelle)    Stasis edema of right lower extremity  Chronic venous hypertension with ulcer and inflammation involving right side (HCC)     S:  -R shin wound x 2 years going to Wound Care applying  Exufiber Ag and Tubigrip  -No new problems after Sclerotherapy  -No new leg or calf pain. No LE swelling. Walking ok.  No CP/SOB.    Exam:  -RLE ulceration over the shin with granulation and pink john-wound. No LE edema. Decreased pulses. AT/DP signals present.     A/P POD # 11 after R leg  sclerotherapy. No acute concerns.     -Continue with compression and local wound are at the Wound Center  -Activity as tolerated.   -Maintain good diabetes control  -We discussed signs/symptoms which may be concerning for which she should call office right away - leg pain, swelling, difficulty walking, CP, etc.   -Follow up in 2-3 months with Dr. Bush (or AP)

## 2025-04-22 NOTE — PROGRESS NOTES
Name: Ely Hernadez      : 1940      MRN: [id number]  Encounter Provider: Paula Carrasco PA-C  Encounter Date: 2025   Encounter department: [facility] [place]  :  Assessment & Plan  Chronic venous hypertension with ulcer and inflammation involving right side (HCC)  84 yoF COPD, diabetes, CKD 4, osteoarthritis, parox AF, CAD, dCMP, AVM, Hx mechanical aortic valve replacement on warfarin, PPM, PAD, venous hypertension with RLE ulceration present x 2 years who underwent R  PT vein sclerotherapy.      S/P Right leg peforator sclerotherapy (25, Rachelle)    Stasis edema of right lower extremity  Chronic venous hypertension with ulcer and inflammation involving right side (HCC)     S:  -R shin wound x 2 years going to Wound Care applying  Exufiber Ag and Tubigrip  -No new problems after Sclerotherapy  -No new leg or calf pain. No LE swelling. Walking ok.  No CP/SOB.    Exam:  -RLE ulceration over the shin with granulation and pink john-wound. No LE edema. Decreased pulses. AT/DP signals present.     A/P POD # 11 after R leg  sclerotherapy. No acute concerns.     -Continue with compression and local wound are at the Wound Center  -Activity as tolerated.   -Maintain good diabetes control  -We discussed signs/symptoms which may be concerning for which she should call office right away - leg pain, swelling, difficulty walking, CP, etc.   -Follow up in 2-3 months with Dr. Bush (or AP)       Peripheral arterial disease ([facility])    CHARISSE 24  Diffuse disease in the femoral and popliteal arteries with a 50-75% stenosis in  the common femoral artery.  Ankle/Brachial index:  0.64 which is in the severe disease category  PVR/ PPG tracings are attenuated/dampened.  Metatarsal pressure of 61mmHg  Great toe pressure of 39mmHg, below the healing range     LEFT LOWER LIMB:  Diffuse disease in the femoral and popliteal arteries with a >75% stenosis of  the distal superficial femoral  artery.  There is a 50-75% stenosis of the proximal and mid superficial femoral  arteries.  Ankle/Brachial index: 0.69 which is in the moderate disease category  PVR/ PPG tracings are attenuated/dampened.  Metatarsal pressure of 68mmHg  Great toe pressure of 70mmHg, within the healing range    Plan:  -Atherosclerosis of the lower extremities and reports that she is not a candidate for vascular procedures/ surgery due to cardiac lung disease.  -Recommend walking and activity as tolerated  -Maintain good blood pressure, cholesterol and glucose control  -Continue with medical therapy on aspirin and warfarin  -Consider statin therapy in the future.  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 RLE wound, seeing wound care every 2 weeks.     HPI  Ely Hernadez is a 84 y.o. female who presents post-op after R LE  sclerotherapy. See above under assessment / plan.         CHARISSE 11/6/24  FINDINGS:     Segment                Right                   Left                                            Impression  PSV (cm/s)  Impression  PSV (cm/s)    Common Femoral Artery  50-75%             240                     198    Prox Profunda                              77                     123    Prox SFA                                   95  50-75%             138    Mid SFA                                   103  50-75%             212    Dist SFA                                  113  >75%               506    Proximal  "Pop                               59                      72    Distal Pop                                 49                      46    Tibioperoneal                              66                            Dist Post Tibial                           15                      34    Dist. Ant. Tibial                          99                      54             CONCLUSION:  Impression:  RIGHT LOWER LIMB:  Diffuse disease in the femoral and popliteal arteries with a 50-75% stenosis in  the common femoral artery.  Ankle/Brachial index:  0.64 which is in the severe disease category  PVR/ PPG tracings are attenuated/dampened.  Metatarsal pressure of 61mmHg  Great toe pressure of 39mmHg, below the healing range     LEFT LOWER LIMB:  Diffuse disease in the femoral and popliteal arteries with a >75% stenosis of  the distal superficial femoral artery.  There is a 50-75% stenosis of the proximal and mid superficial femoral  arteries.  Ankle/Brachial index: 0.69 which is in the moderate disease category  PVR/ PPG tracings are attenuated/dampened.  Metatarsal pressure of 68mmHg  Great toe pressure of 70mmHg, within the healing range     Review of Systems   Constitutional: Negative.    HENT: Negative.     Eyes: Negative.    Respiratory: Negative.     Cardiovascular:  Positive for leg swelling.   Gastrointestinal: Negative.    Endocrine: Negative.    Genitourinary: Negative.    Musculoskeletal: Negative.    Skin:  Positive for wound (R leg).   Allergic/Immunologic: Negative.    Neurological: Negative.    Hematological: Negative.    Psychiatric/Behavioral: Negative.            Objective   /72 (BP Location: Left arm, Patient Position: Sitting)   Pulse 60   Ht 5' 6\" (1.676 m)   Wt 74.8 kg (165 lb)   BMI 26.63 kg/m²          Physical Exam  Vitals and nursing note reviewed.   Constitutional:       Appearance: She is well-developed.   HENT:      Head: Normocephalic and atraumatic.   Eyes:      Pupils: Pupils are equal, " "round, and reactive to light.   Cardiovascular:      Rate and Rhythm: Normal rate and regular rhythm.      Pulses:           Radial pulses are 2+ on the right side and 2+ on the left side.        Dorsalis pedis pulses are detected w/ Doppler on the right side.        Posterior tibial pulses are detected w/ Doppler on the right side.      Heart sounds: Normal heart sounds, S1 normal and S2 normal. No murmur heard.     No friction rub. No gallop.   Pulmonary:      Effort: Pulmonary effort is normal. No accessory muscle usage or respiratory distress.      Breath sounds: Normal breath sounds. No wheezing or rales.   Abdominal:      General: Bowel sounds are normal. There is no distension.      Palpations: Abdomen is soft.      Tenderness: There is no abdominal tenderness.   Musculoskeletal:         General: No deformity. Normal range of motion.      Right lower leg: No edema.      Left lower leg: No edema.   Skin:     General: Skin is warm and dry.      Findings: No lesion or rash.      Nails: There is no clubbing.   Neurological:      Mental Status: She is alert and oriented to person, place, and time.      Comments: Grossly normal    Psychiatric:         Behavior: Behavior is cooperative.         I have reviewed and made appropriate changes to the review of systems input by the medical assistant.    Vitals:    04/22/25 1422   BP: 136/72   BP Location: Left arm   Patient Position: Sitting   Pulse: 60   Weight: 74.8 kg (165 lb)   Height: 5' 6\" (1.676 m)       Patient Active Problem List   Diagnosis    Hyperlipidemia    Chronic anticoagulation    Hypertension, essential    Coronary artery disease of native artery of native heart with stable angina pectoris (HCC)    Simple chronic bronchitis (HCC)    Diabetes mellitus with neurological manifestation (HCC)    Hypothyroidism    GERD (gastroesophageal reflux disease)    Anemia    AVM (arteriovenous malformation) of small bowel, acquired with hemorrhage    Angiectasia    " Other vascular disorders of intestine (HCC)    Aortic valve replaced    Cardiomyopathy (HCC)    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HCC)    Stage 3b chronic kidney disease (HCC)    Chronic kidney disease-mineral and bone disorder    Primary osteoarthritis involving multiple joints    Paroxysmal atrial fibrillation (HCC)    Neutropenia associated with infection (HCC)    Herpes simplex labialis    Restrictive lung disease    Nocturnal hypoxemia    Supratherapeutic INR    H/O mechanical aortic valve replacement    Subtherapeutic international normalized ratio (INR)    Type 2 diabetes mellitus with stage 3b chronic kidney disease, with long-term current use of insulin (HCC)    Venous ulcer of right leg (HCC)    Chronic systolic (congestive) heart failure (HCC)    Lower extremity edema    Iron deficiency anemia due to chronic blood loss    Hemiplegia and hemiparesis following cerebral infarction affecting left non-dominant side (HCC)    Chronic obstructive pulmonary disease (HCC)    Type 2 diabetes mellitus with stage 4 chronic kidney disease, with long-term current use of insulin (HCC)    Chronic venous hypertension with ulcer and inflammation involving right side (HCC)       Past Surgical History:   Procedure Laterality Date    AORTIC VALVE REPLACEMENT      CARDIAC PACEMAKER PLACEMENT      last assessed: 10/10/2014    CARDIAC SURGERY      aortic valve replacement    CHOLECYSTECTOMY      COLONOSCOPY      EGD      HYSTERECTOMY      INSERT / REPLACE / REMOVE PACEMAKER      KNEE SURGERY Right     OTHER SURGICAL HISTORY      Capsule ENDOscopy description: 12/19/2012    MA COLONOSCOPY FLX DX W/COLLJ SPEC WHEN PFRMD N/A 05/31/2018    Procedure: single balloon ENTEROSCOPY;  Surgeon: David Dennis MD;  Location: [facility];  Service: Gastroenterology    MA ESOPHAGOGASTRODUODENOSCOPY TRANSORAL DIAGNOSTIC N/A 11/29/2017    Procedure: EGD AND COLONOSCOPY;  Surgeon: Jay Mcleod III, MD;  Location: [facility];  Service: Gastroenterology     VEIN LIGATION Right 2025    Procedure: right leg peforator sclerotherapy;  Surgeon: Chris Bush MD;  Location: [facility];  Service: Vascular       Family History   Problem Relation Age of Onset    No Known Problems Mother     No Known Problems Father        Social History     Socioeconomic History    Marital status:      Spouse name: Not on file    Number of children: Not on file    Years of education: Not on file    Highest education level: Not on file   Occupational History    Not on file   Tobacco Use    Smoking status: Former     Current packs/day: 0.00     Average packs/day: 1 pack/day for 52.9 years (52.9 ttl pk-yrs)     Types: Cigarettes     Start date:      Quit date: 2007     Years since quittin.4     Passive exposure: Past    Smokeless tobacco: Former     Quit date:    Vaping Use    Vaping status: Never Used   Substance and Sexual Activity    Alcohol use: Never    Drug use: Never    Sexual activity: Not Currently     Partners: Male   Other Topics Concern    Not on file   Social History Narrative    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 BID Freestyle lancets BID    Living independently with spouse     Social Drivers of Health     Financial Resource Strain: Not on file   Food Insecurity: No Food Insecurity (5/15/2024)    Nursing - Inadequate Food Risk Classification     Worried About Running Out of Food in the Last Year: Never true     Ran Out of Food in the Last Year: Never true     Ran Out of Food in the Last Year: Not on file   Transportation Needs: No Transportation Needs (5/15/2024)    PRAPARE - Transportation     Lack of Transportation (Medical): No     Lack of Transportation (Non-Medical): No   Physical Activity: Inactive (2021)    Exercise Vital Sign     Days of Exercise per Week: 0 days     Minutes of Exercise per Session: 0 min   Stress: No Stress Concern Present (2021)    [employer] - Occupational  Stress Questionnaire     Feeling of Stress : Not at all   Social Connections: Not on file   Intimate Partner Violence: Not on file   Housing Stability: Unknown (5/15/2024)    Housing Stability Vital Sign     Unable to Pay for Housing in the Last Year: No     Number of Times Moved in the Last Year: Not on file     Homeless in the Last Year: No       No Known Allergies      Current Outpatient Medications:     Accu-Chek FastClix Lancets MISC, Use 3 (three) times a day, Disp: 300 each, Rfl: 3    albuterol (Ventolin HFA) 90 mcg/act inhaler, Inhale 2 puffs every 6 (six) hours as needed for wheezing, Disp: 54 g, Rfl: 3    Ascorbic Acid (vitamin C) 1000 MG tablet, Take 1,000 mg by mouth daily, Disp: , Rfl:     aspirin (ECOTRIN LOW STRENGTH) 81 mg EC tablet, Take 1 tablet (81 mg total) by mouth daily Do not start before November 1, 2023., Disp: 30 tablet, Rfl: 0    Blood Glucose Monitoring Suppl (Accu-Chek Guide Me) w/Device KIT, USE 3 TIMES DAILY, Disp: 1 kit, Rfl: 2    Cholecalciferol (Vitamin D3) 50 MCG (2000 UT) TABS, Take 2,000 Units by mouth daily, Disp: , Rfl:     clobetasol (TEMOVATE) 0.05 % ointment, Apply topically 2 (two) times a day, Disp: 30 g, Rfl: 1    cyanocobalamin (VITAMIN B-12) 1000 MCG tablet, Take 1 tablet (1,000 mcg total) by mouth daily, Disp: , Rfl:     Empagliflozin (Jardiance) 10 MG TABS tablet, TAKE 1 TABLET BY MOUTH IN THE  MORNING, Disp: 100 tablet, Rfl: 1    Ferrous Sulfate (IRON PO), Take by mouth daily in the early morning OTC, Disp: , Rfl:     fluticasone (FLONASE) 50 mcg/act nasal spray, 1 spray into each nostril daily, Disp: 16 g, Rfl: 2    fluticasone-umeclidinium-vilanterol (Trelegy Ellipta) 100-62.5-25 mcg/actuation inhaler, Inhale 1 puff daily Rinse mouth after use., Disp: 180 blister, Rfl: 0    furosemide (LASIX) 40 mg tablet, Take 1 tablet (40 mg total) by mouth 2 (two) times a day, Disp: 180 tablet, Rfl: 3    gabapentin (NEURONTIN) 300 mg capsule, TAKE 1 CAPSULE BY MOUTH 3 TIMES   DAILY, Disp: 300 capsule, Rfl: 1    glipiZIDE (GLUCOTROL) 10 mg tablet, TAKE 1 TABLET BY MOUTH TWICE  DAILY BEFORE MEALS, Disp: 200 tablet, Rfl: 1    glucose blood (Accu-Chek Celeste Plus) test strip, Use 1 each 3 (three) times a day Use as instructed, Disp: 300 each, Rfl: 3    insulin degludec (TRESIBA) 100 units/mL injection pen, Inject 30 Units under the skin daily, Disp: 30 mL, Rfl: 3    Insulin Pen Needle (BD Pen Needle Rosie U/F) 32G X 4 MM MISC, Use daily, Disp: 100 each, Rfl: 1    ipratropium-albuterol (DUO-NEB) 0.5-2.5 mg/3 mL nebulizer solution, Take 3 mL by nebulization 4 (four) times a day, Disp: 360 mL, Rfl: 1    Lancets (freestyle) lancets, Check blood glucose 3 times daily, Disp: 300 each, Rfl: 0    levothyroxine 50 mcg tablet, TAKE 1 TABLET BY MOUTH DAILY, Disp: 100 tablet, Rfl: 1    metoprolol tartrate (LOPRESSOR) 50 mg tablet, TAKE ONE-HALF TABLET BY MOUTH  TWICE DAILY, Disp: 100 tablet, Rfl: 1    oxygen gas, Inhale 2 L/min daily at bedtime as needed home oxygen nightly, Disp: , Rfl:     pantoprazole (PROTONIX) 40 mg tablet, TAKE 1 TABLET BY MOUTH DAILY AS  DIRECTED, Disp: 100 tablet, Rfl: 2    warfarin (COUMADIN) 5 mg tablet, TAKE 1 TO 2 TABLETS BY  MOUTH DAILY OR AS DIRECTED, Disp: 180 tablet, Rfl: 3    Current Facility-Administered Medications:     lidocaine (LMX) 4 % cream, , Topical, Once, Vesta Garcia PA-C

## 2025-04-22 NOTE — PATIENT INSTRUCTIONS
Right Leg vein  sclerotherapy   - Local care and compression per Wound Center  - Elevate leg as often as you can  - Activity as tolerated.   - Continue healthy lifestyle changes; heart-healthy diet, low sodium and regular walking.  - Tylenol or ice for mild soreness  - Patient education for venous insufficiency.   - May shower; but avoid lotions, creams to leg until everything is healed.   - Call right away, if you feel ill, develop fever, chest pain, shortness of breath, increased leg pain,  redness at procedure sites, bleeding, numbness of the leg or pain/discoloration of feet.    - Follow up 2- 3 months with Dr. Bush               Operative Findings:  Mid calf posterior tibial vein  underneath the wound.   measures 3.5 mm in diameter with reflux.     Accessible  sclerotherapy with a Asclera foam 1%

## 2025-04-22 NOTE — ASSESSMENT & PLAN NOTE
CHARISSE 11/6/24  Diffuse disease in the femoral and popliteal arteries with a 50-75% stenosis in  the common femoral artery.  Ankle/Brachial index:  0.64 which is in the severe disease category  PVR/ PPG tracings are attenuated/dampened.  Metatarsal pressure of 61mmHg  Great toe pressure of 39mmHg, below the healing range     LEFT LOWER LIMB:  Diffuse disease in the femoral and popliteal arteries with a >75% stenosis of  the distal superficial femoral artery.  There is a 50-75% stenosis of the proximal and mid superficial femoral  arteries.  Ankle/Brachial index: 0.69 which is in the moderate disease category  PVR/ PPG tracings are attenuated/dampened.  Metatarsal pressure of 68mmHg  Great toe pressure of 70mmHg, within the healing range    Plan:  -Atherosclerosis of the lower extremities and reports that she is not a candidate for vascular procedures/ surgery due to cardiac lung disease.  -Recommend walking and activity as tolerated  -Maintain good blood pressure, cholesterol and glucose control  -Continue with medical therapy on aspirin and warfarin  -Consider statin therapy in the future.  -Discussed the importance of preventative care, wearing protective footwear, avoidance of foot wounds and checking feet daily for any wounds or changes  -Follow up LEAD in 1 year  Orders:    VAS ARTERIAL DUPLEX- LOWER LIMB BILATERAL; Future

## 2025-04-23 ENCOUNTER — TELEPHONE (OUTPATIENT)
Dept: VASCULAR SURGERY | Facility: CLINIC | Age: 85
End: 2025-04-23

## 2025-04-24 DIAGNOSIS — I50.9 CONGESTIVE HEART FAILURE, UNSPECIFIED HF CHRONICITY, UNSPECIFIED HEART FAILURE TYPE (HCC): ICD-10-CM

## 2025-04-25 RX ORDER — FUROSEMIDE 40 MG/1
40 TABLET ORAL 2 TIMES DAILY
Qty: 180 TABLET | Refills: 3 | Status: SHIPPED | OUTPATIENT
Start: 2025-04-25

## 2025-05-06 ENCOUNTER — HOSPITAL ENCOUNTER (OUTPATIENT)
Dept: RADIOLOGY | Age: 85
Discharge: HOME | End: 2025-05-06
Attending: PHYSICIAN ASSISTANT
Payer: COMMERCIAL

## 2025-05-06 DIAGNOSIS — R91.8 OTHER NONSPECIFIC ABNORMAL FINDING OF LUNG FIELD: ICD-10-CM

## 2025-05-06 DIAGNOSIS — D38.3 NEOPLASM OF UNCERTAIN BEHAVIOR OF MEDIASTINUM: ICD-10-CM

## 2025-05-06 DIAGNOSIS — R59.0 MEDIASTINAL ADENOPATHY: ICD-10-CM

## 2025-05-06 LAB — GLUCOSE SERPL-MCNC: 176 MG/DL (ref 65–140)

## 2025-05-06 PROCEDURE — A9552 F18 FDG: HCPCS

## 2025-05-06 PROCEDURE — 82948 REAGENT STRIP/BLOOD GLUCOSE: CPT

## 2025-05-06 PROCEDURE — 78815 PET IMAGE W/CT SKULL-THIGH: CPT

## 2025-05-06 NOTE — LETTER
[facility]  [address] [place] [address]      May 12, 2025    MRN: [id number]     Phone: [phone]     Dear Ms. Hernadez,    You recently had a(n) Nuclear Medicine performed on 5/6/2025 at  [facility] that was requested by Ben Song PA-C. The study was reviewed by a radiologist, which is a physician who specializes in medical imaging. The radiologist issued a report describing his or her findings. In that report there was a finding that the radiologist felt warranted further discussion with your health care provider and that discussion would be beneficial to you.      The results were sent to Ben Song PA-C on 05/06/2025  4:22 PM. We recommend that you contact Ben Song PA-C at [phone] or set up an appointment to discuss the results of the imaging test. If you have already heard from Ben Song PA-C regarding the results of your study, you can disregard this letter.     This letter is not meant to alarm you, but intended to encourage you to follow-up on your results with the provider that sent you for the imaging study. In addition, we have enclosed answers to frequently asked questions by other patients who have also received a letter to review results with their health care provider (see page two).      Thank you for choosing [facility] for your medical imaging needs.                                                                                                                                                        FREQUENTLY ASKED QUESTIONS    Why am I receiving this letter?  [place] State Law requires us to notify patients who have findings on imaging exams that may require more testing or follow-up with a health professional within the next 3 months.        How serious is the finding on the imaging test?  This letter is sent to all patients who may need follow-up or more testing within the  next 3 months.  Receiving this letter does not necessarily mean you have a life-threatening imaging finding or disease.  Recommendations in the radiologist’s imaging report are general in nature and it is up to your healthcare provider to say whether those recommendations make sense for your situation.  You are strongly encouraged to talk to your health care provider about the results and ask whether additional steps need to be taken.    Where can I get a copy of the final report for my recent radiology exam?  To get a full copy of the report you can access your records online at [email or web address] or please contact [facility] Medical Records Department at [phone] Monday through Friday between 8 am and 6 pm.         What do I need to do now?           Please contact your health care provider who requested the imaging study to discuss what further actions (if any) are needed.  You may have already heard from (your ordering provider) in regard to this test in which case you can disregard this letter.        NOTICE IN ACCORDANCE WITH THE [place] STATE “PATIENT TEST RESULT INFORMATION ACT OF 2018”    You are receiving this notice as a result of a determination by your diagnostic imaging service that further discussions of your test results are warranted and would be beneficial to you.    The complete results of your test or tests have been or will be sent to the health care practitioner that ordered the test or tests. It is recommended that you contact your health care practitioner to discuss your results as soon as possible.

## 2025-05-08 ENCOUNTER — OFFICE VISIT (OUTPATIENT)
Dept: WOUND CARE | Facility: CLINIC | Age: 85
End: 2025-05-08
Payer: COMMERCIAL

## 2025-05-08 ENCOUNTER — RESULTS FOLLOW-UP (OUTPATIENT)
Dept: CARDIOLOGY CLINIC | Facility: CLINIC | Age: 85
End: 2025-05-08

## 2025-05-08 ENCOUNTER — REMOTE DEVICE CLINIC VISIT (OUTPATIENT)
Dept: CARDIOLOGY CLINIC | Facility: CLINIC | Age: 85
End: 2025-05-08

## 2025-05-08 ENCOUNTER — TELEPHONE (OUTPATIENT)
Age: 85
End: 2025-05-08

## 2025-05-08 VITALS
DIASTOLIC BLOOD PRESSURE: 63 MMHG | SYSTOLIC BLOOD PRESSURE: 145 MMHG | TEMPERATURE: 97.1 F | RESPIRATION RATE: 18 BRPM | HEART RATE: 72 BPM

## 2025-05-08 DIAGNOSIS — Z95.0 CARDIAC PACEMAKER IN SITU: Primary | ICD-10-CM

## 2025-05-08 DIAGNOSIS — I83.019 VENOUS ULCER OF RIGHT LEG (HCC): Primary | ICD-10-CM

## 2025-05-08 DIAGNOSIS — L97.919 VENOUS ULCER OF RIGHT LEG (HCC): Primary | ICD-10-CM

## 2025-05-08 DIAGNOSIS — L92.9 HYPERGRANULATION: ICD-10-CM

## 2025-05-08 PROCEDURE — 17250 CHEM CAUT OF GRANLTJ TISSUE: CPT | Performed by: ORTHOPAEDIC SURGERY

## 2025-05-08 PROCEDURE — RECHECK: Performed by: INTERNAL MEDICINE

## 2025-05-08 RX ORDER — LIDOCAINE 40 MG/G
CREAM TOPICAL ONCE
Status: COMPLETED | OUTPATIENT
Start: 2025-05-08 | End: 2025-05-08

## 2025-05-08 RX ADMIN — LIDOCAINE: 40 CREAM TOPICAL at 08:10

## 2025-05-08 NOTE — PROGRESS NOTES
Results for orders placed or performed in visit on 05/08/25   Cardiac EP device report    Narrative    [facility] DC PM/NOT MRI CONDITIONAL  [facility] TRANSMISSION TO CHECK BATTERY STATUS- NB: BATTERY VOLTAGE NEARING LACIE (10.6 MOS). WILL SCHEDULE MONTHLY BATTERY CHECKS. AP<1%, -98% (>40% CHB/DDDR@60PPM). ALL AVAILABLE LEAD PARAMETERS WITHIN NORMAL LIMITS. 1 AMS EPISODE IN PROGRESS. AF BURDEN SINCE 4/8/25>99%. HX: PAF & ON WARFARIN, ASA 81MG & METOPROLOL. NORMAL DEVICE FUNCTION. GV

## 2025-05-08 NOTE — PROGRESS NOTES
Patient ID: Ely Hernadez is a 84 y.o. female Date of Birth 1940       Chief Complaint   Patient presents with    Follow Up Wound Care Visit     Ulcer right leg       Allergies:  Patient has no known allergies.    Diagnosis:   Diagnosis ICD-10-CM Associated Orders   1. Venous ulcer of right leg (HCC)  I83.019 lidocaine (LMX) 4 % cream    L97.919 Wound cleansing and dressings Venous Ulcer Right Pretibial     Wound Procedure Treatment Venous Ulcer Right Pretibial     Chemical Caut Of A Wound      2. Hypergranulation  L92.9 Chemical Caut Of A Wound           Assessment and Plan :  Follow up evaluation of right venous ulcer increasing in size.    Change wound management to Dermagran on wound bed, see wound orders below   Continue to wear daily compression with Spandagrip.  No harsh cleansers such as alcohol, peroxide, or antibacterial soap, do not submerge in water  Can cleanse with mild soap and water or NSS at dressing changes.   Continue to f/u with vascular surgeon.  Continue frequent elevation of leg and increase exercise/walking for wound healing.  Follow-up in 1 week(s) or call sooner with questions or concerns or any signs of infection such as redness, swelling, increased/purulent drainage, fever, chills, increased severe pain.    Subjective:   6/28: Pt is an 84 y.o. female with pmhx HTN, DMII (A1c 8.7),  CKD, Pafib, CVA (10/28/23 on Coumadin/ASA 81mg) with residual deficits (left sided facial drop and leg weakness) well known to [facility] who presents for follow up evaluation of non healing RLE venous ulcer which has been present for about 2.5 years. Pt has been covering wound with a bandaid. Does not have an odor. No smoking, ETOH or drug use.  Pt denies any sob, fatigue, N/V, CP, fever or chills.    7/12: Patient presents for followup evaluation of RLE venous ulcer. No increased pain or drainage. Pt is frustrated with chronic wound. Has been using Polymem on the wound bed and Tubigrip for compression.  Pt  denies any fevers or chills.    7/25: Patient presents for followup evaluation of RLE venous ulcer. Since last visit pt was started on Bactrim for positive wound culture with abnormal 2+ Growth of Staphylococcus Aureus. The tissue exam demonstrated chronic inflammation and fibrosis. No malignancy, nor pyoderma gangrenosum were identified.  Pt noticed wound decreasing in size. Has been using Polymem on the wound bed and Tubigrip for compression.  Pt denies any fevers or chills.    8/2: Patient presents for followup evaluation of RLE venous ulcer. Pt completed Bactrim as directed. No issues. Pt states this is the best she has felt in 2 years. No fevers or chills.    8/22: Patient presents for followup evaluation of RLE venous ulcer. Pt states she has been wetting wound with mild soap and water. She noticed a scab formed and came off on dressing. In office CHEYANNE today; R 0.59, L 0.76. No fevers or chills.    9/6: Patient presents for followup evaluation of RLE venous ulcer. Pt  has been applying Polymem Ag Max on wound bed and Tubigrip for compression. Denies fevers or chills.    9/19: Patient presents for followup evaluation of RLE venous ulcer. Pt has been applying Mupirocin wound bed and Tubigrip for compression. Denies fevers or chills.    9/26: Patient presents for followup evaluation of RLE venous ulcer. No complaints. Pt has been applying Hydrofera Blue ready on wound bed and Tubigrip for compression. Denies fevers or chills.    10/10:  Patient presents for followup evaluation of RLE venous ulcer. No complaints. Pt is scheduled for vascular studies for 11/6/24. Pt has been applying Hydrofera Blue ready on wound bed and Tubigrip for compression. Denies fevers or chills.    10/31:  Patient presents for followup evaluation of RLE venous ulcer. NO issues. Pt has been applying Hydrofera Blue ready on wound bed and Tubigrip for compression. Denies fevers or chills.    11/14:  Patient presents for followup evaluation of  RLE venous ulcer. RLE vascular studies reveal deep venous incompetence with an incompetent . GSV and SSV is competent. Bilateral LEADs reveal diffuse disease in the RLE femoral and popliteal arteries with a 50-75% stenosis in the common femoral artery. R CHEYANNE:  0.64 (severe disease). There is diffuse disease in the LLE femoral and popliteal arteries with a >75% stenosis of the distal superficial femoral artery.There is a 50-75% stenosis of the proximal and mid superficial femoral arteries. Left CHEYANNE: 0.69 (moderate disease). Pt has scheduled a vascular surgery appointment for January 14, 2025. Pt has been applying Hydrofera Blue ready on wound bed and Tubigrip for compression. Denies fevers or chills.    12/5: Patient presents for followup evaluation of RLE venous ulcer. No complaints. Pt has been applying Hydrofera Blue ready on wound bed and Tubigrip for compression. Denies fevers or chills.    12/19: Patient presents for followup evaluation of RLE venous ulcer. No issues. Pt has been applying clobetasol cream and DSD on wound bed and Tubigrip for compression. Pt is scheduled to see vascular surgery on January 14,  Denies fevers or chills.    1/2: Patient presents for followup evaluation of RLE venous ulcer.  Pt states dressing has been adhering to wound bed. Pt has been applying Hydrofera Blue Ready on wound bed and Tubigrip for compression. Denies fevers or chills.    3/6: Patient presents for followup evaluation of RLE venous ulcer. Pt has been applying Hydrofera Blue Ready on wound bed and Tubigrip for compression. Pt appointment with vascular was cancelled due to patient being ill. Pt plans on rescheduling. Denies fevers or chills.    3/20: Patient presents for followup evaluation of RLE venous ulcer. Pt c/o increased serosanguinous drainage. Pt is scheduled for RLE sclerotherapy with vascular on 4/11/25. Pt has been applying Dermagran on wound bed and Tubigrip for compression. Denies fevers or  chills.    4/3:  Patient presents for followup evaluation of RLE venous ulcer. Pt c/o increased serosanguinous drainage. Pt has been applying Exufiber Ag on wound bed and Tubigrip for compression. Denies fevers or chills.    4/17: Patient presents for followup evaluation of RLE venous ulcer.  Pt had RLE sclerotherapy procedure on 4/11/25. Pt has been applying Exufiber Ag on wound bed and Tubigrip for compression. Denies fevers or chills.    5/8: patient presents for followup evaluation of RLE venous ulcer.  Pt states Exufiber dressing has been adhering to wound bed.              The following portions of the patient's history were reviewed and updated as appropriate:   Patient Active Problem List   Diagnosis    Hyperlipidemia    Chronic anticoagulation    Hypertension, essential    Coronary artery disease of native artery of native heart with stable angina pectoris (HCC)    Simple chronic bronchitis (HCC)    Diabetes mellitus with neurological manifestation (HCC)    Hypothyroidism    GERD (gastroesophageal reflux disease)    Anemia    AVM (arteriovenous malformation) of small bowel, acquired with hemorrhage    Angiectasia    Other vascular disorders of intestine (HCC)    Aortic valve replaced    Cardiomyopathy (HCC)    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HCC)    Stage 3b chronic kidney disease (HCC)    Chronic kidney disease-mineral and bone disorder    Primary osteoarthritis involving multiple joints    Paroxysmal atrial fibrillation (HCC)    Neutropenia associated with infection (HCC)    Herpes simplex labialis    Restrictive lung disease    Nocturnal hypoxemia    Supratherapeutic INR    H/O mechanical aortic valve replacement    Subtherapeutic international normalized ratio (INR)    Type 2 diabetes mellitus with stage 3b chronic kidney disease, with long-term current use of insulin (HCC)    Venous ulcer of right leg (HCC)    Chronic systolic (congestive) heart failure (HCC)    Lower extremity edema    Iron deficiency  anemia due to chronic blood loss    Hemiplegia and hemiparesis following cerebral infarction affecting left non-dominant side (HCC)    Chronic obstructive pulmonary disease (HCC)    Type 2 diabetes mellitus with stage 4 chronic kidney disease, with long-term current use of insulin (HCC)    Chronic venous hypertension with ulcer and inflammation involving right side (HCC)    Peripheral arterial disease (HCC)     Past Medical History:   Diagnosis Date    Acute anterior epistaxis 12/23/2023    Acute blood loss anemia 12/14/2023    Acute respiratory failure with hypoxia (HCC) 02/06/2024    Anemia     Aneurysm of thoracic aorta (HCC)     Angioedema 06/07/2019    Aortic valve disorder     Benign neoplasm of sigmoid colon     Cardiomyopathy (HCC)     last assessed: 10/10/2014    CHF (congestive heart failure) (HCC)     Chronic kidney disease     Chronic venous hypertension with ulcer and inflammation involving right side (HCC) 04/11/2025    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HCC)     last assessed: 10/10/2014    COPD (chronic obstructive pulmonary disease) (HCC)     Diabetic nephropathy (HCC)     Disease of thyroid gland     RAMON (dyspnea on exertion)     GERD (gastroesophageal reflux disease)     History of emphysema (HCC)     History of transfusion     Hyperlipidemia     Hypertensive urgency 10/28/2023    Leg cramps 11/01/2023    Stroke-like symptoms 10/28/2023    TIA (transient ischemic attack)      Past Surgical History:   Procedure Laterality Date    AORTIC VALVE REPLACEMENT      CARDIAC PACEMAKER PLACEMENT      last assessed: 10/10/2014    CARDIAC SURGERY      aortic valve replacement    CHOLECYSTECTOMY      COLONOSCOPY      EGD      HYSTERECTOMY      INSERT / REPLACE / REMOVE PACEMAKER      KNEE SURGERY Right     OTHER SURGICAL HISTORY      Capsule ENDOscopy description: 12/19/2012    CO COLONOSCOPY FLX DX W/COLLJ SPEC WHEN PFRMD N/A 05/31/2018    Procedure: single balloon ENTEROSCOPY;  Surgeon: David Dennis MD;   Location: [facility];  Service: Gastroenterology    AK ESOPHAGOGASTRODUODENOSCOPY TRANSORAL DIAGNOSTIC N/A 2017    Procedure: EGD AND COLONOSCOPY;  Surgeon: Jay Mcleod III, MD;  Location: [facility];  Service: Gastroenterology    VEIN LIGATION Right 2025    Procedure: right leg peforator sclerotherapy;  Surgeon: Chris Bush MD;  Location: [facility];  Service: Vascular     Family History   Problem Relation Age of Onset    No Known Problems Mother     No Known Problems Father      Social History     Socioeconomic History    Marital status:      Spouse name: Not on file    Number of children: Not on file    Years of education: Not on file    Highest education level: Not on file   Occupational History    Not on file   Tobacco Use    Smoking status: Former     Current packs/day: 0.00     Average packs/day: 1 pack/day for 52.9 years (52.9 ttl pk-yrs)     Types: Cigarettes     Start date:      Quit date: 2007     Years since quittin.4     Passive exposure: Past    Smokeless tobacco: Former     Quit date:    Vaping Use    Vaping status: Never Used   Substance and Sexual Activity    Alcohol use: Never    Drug use: Never    Sexual activity: Not Currently     Partners: Male   Other Topics Concern    Not on file   Social History Narrative    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 BID Freestyle lancets BID    Living independently with spouse     Social Drivers of Health     Financial Resource Strain: Not on file   Food Insecurity: No Food Insecurity (5/15/2024)    Nursing - Inadequate Food Risk Classification     Worried About Running Out of Food in the Last Year: Never true     Ran Out of Food in the Last Year: Never true     Ran Out of Food in the Last Year: Not on file   Transportation Needs: No Transportation Needs (5/15/2024)    PRAPARE - Transportation     Lack of Transportation (Medical): No     Lack of Transportation (Non-Medical): No   Physical Activity:  Inactive (5/26/2021)    Exercise Vital Sign     Days of Exercise per Week: 0 days     Minutes of Exercise per Session: 0 min   Stress: No Stress Concern Present (5/26/2021)    [employer] [place] [employer] - Occupational Stress Questionnaire     Feeling of Stress : Not at all   Social Connections: Not on file   Intimate Partner Violence: Not on file   Housing Stability: Unknown (5/15/2024)    Housing Stability Vital Sign     Unable to Pay for Housing in the Last Year: No     Number of Times Moved in the Last Year: Not on file     Homeless in the Last Year: No       Current Outpatient Medications:     Accu-Chek FastClix Lancets MISC, Use 3 (three) times a day, Disp: 300 each, Rfl: 3    albuterol (Ventolin HFA) 90 mcg/act inhaler, Inhale 2 puffs every 6 (six) hours as needed for wheezing, Disp: 54 g, Rfl: 3    Ascorbic Acid (vitamin C) 1000 MG tablet, Take 1,000 mg by mouth daily, Disp: , Rfl:     aspirin (ECOTRIN LOW STRENGTH) 81 mg EC tablet, Take 1 tablet (81 mg total) by mouth daily Do not start before November 1, 2023., Disp: 30 tablet, Rfl: 0    Blood Glucose Monitoring Suppl (Accu-Chek Guide Me) w/Device KIT, USE 3 TIMES DAILY, Disp: 1 kit, Rfl: 2    Cholecalciferol (Vitamin D3) 50 MCG (2000 UT) TABS, Take 2,000 Units by mouth daily, Disp: , Rfl:     clobetasol (TEMOVATE) 0.05 % ointment, Apply topically 2 (two) times a day, Disp: 30 g, Rfl: 1    cyanocobalamin (VITAMIN B-12) 1000 MCG tablet, Take 1 tablet (1,000 mcg total) by mouth daily, Disp: , Rfl:     Empagliflozin (Jardiance) 10 MG TABS tablet, TAKE 1 TABLET BY MOUTH IN THE  MORNING, Disp: 100 tablet, Rfl: 1    Ferrous Sulfate (IRON PO), Take by mouth daily in the early morning OTC, Disp: , Rfl:     fluticasone (FLONASE) 50 mcg/act nasal spray, 1 spray into each nostril daily, Disp: 16 g, Rfl: 2    fluticasone-umeclidinium-vilanterol (Trelegy Ellipta) 100-62.5-25 mcg/actuation inhaler, Inhale 1 puff daily Rinse mouth after use., Disp: 180  blister, Rfl: 0    furosemide (LASIX) 40 mg tablet, Take 1 tablet (40 mg total) by mouth 2 (two) times a day, Disp: 180 tablet, Rfl: 3    gabapentin (NEURONTIN) 300 mg capsule, TAKE 1 CAPSULE BY MOUTH 3 TIMES  DAILY, Disp: 300 capsule, Rfl: 1    glipiZIDE (GLUCOTROL) 10 mg tablet, TAKE 1 TABLET BY MOUTH TWICE  DAILY BEFORE MEALS, Disp: 200 tablet, Rfl: 1    glucose blood (Accu-Chek Celeste Plus) test strip, Use 1 each 3 (three) times a day Use as instructed, Disp: 300 each, Rfl: 3    insulin degludec (TRESIBA) 100 units/mL injection pen, Inject 30 Units under the skin daily, Disp: 30 mL, Rfl: 3    Insulin Pen Needle (BD Pen Needle Rosie U/F) 32G X 4 MM MISC, Use daily, Disp: 100 each, Rfl: 1    ipratropium-albuterol (DUO-NEB) 0.5-2.5 mg/3 mL nebulizer solution, Take 3 mL by nebulization 4 (four) times a day, Disp: 360 mL, Rfl: 1    Lancets (freestyle) lancets, Check blood glucose 3 times daily, Disp: 300 each, Rfl: 0    levothyroxine 50 mcg tablet, TAKE 1 TABLET BY MOUTH DAILY, Disp: 100 tablet, Rfl: 1    metoprolol tartrate (LOPRESSOR) 50 mg tablet, TAKE ONE-HALF TABLET BY MOUTH  TWICE DAILY, Disp: 100 tablet, Rfl: 1    oxygen gas, Inhale 2 L/min daily at bedtime as needed home oxygen nightly, Disp: , Rfl:     pantoprazole (PROTONIX) 40 mg tablet, TAKE 1 TABLET BY MOUTH DAILY AS  DIRECTED, Disp: 100 tablet, Rfl: 2    warfarin (COUMADIN) 5 mg tablet, TAKE 1 TO 2 TABLETS BY  MOUTH DAILY OR AS DIRECTED, Disp: 180 tablet, Rfl: 3    Current Facility-Administered Medications:     lidocaine (LMX) 4 % cream, , Topical, Once, Vesta Garcia PA-C    Review of Systems   Constitutional:  Negative for appetite change, chills, fatigue, fever and unexpected weight change.   HENT:  Negative for congestion, hearing loss and postnasal drip.    Respiratory:  Negative for cough and shortness of breath.    Cardiovascular:  Negative for leg swelling.   Musculoskeletal:  Positive for gait problem.   Skin:  Positive for wound (RLE). Negative  "for rash.   Neurological:  Negative for numbness.   Hematological:  Does not bruise/bleed easily.   Psychiatric/Behavioral: Negative.           Objective:  /63   Pulse 72   Temp (!) 97.1 °F (36.2 °C)   Resp 18   Pain Score: 0-No pain     Physical Exam  Constitutional:       General: She is awake. She is not in acute distress.     Appearance: She is not ill-appearing, toxic-appearing or diaphoretic.   HENT:      Head: Normocephalic and atraumatic.      Right Ear: External ear normal.      Left Ear: External ear normal.   Eyes:      Conjunctiva/sclera: Conjunctivae normal.   Cardiovascular:      Pulses:           Dorsalis pedis pulses are 1+ on the right side.   Pulmonary:      Effort: Pulmonary effort is normal. No respiratory distress.   Musculoskeletal:      Right lower leg: Edema present.      Comments: trace   Skin:     General: Skin is warm and dry.      Findings: Wound (RLE) present. No erythema.      Comments: 1.  Right lower extremity venous ulcer with increased epithelial tissue size and pink granular wound bed with fragile periwound scar tissue, See wound assessment for additional details.   Neurological:      Mental Status: She is alert.   Psychiatric:         Mood and Affect: Mood normal.         Behavior: Behavior normal. Behavior is cooperative.                Chemical caut of a wound Venous Ulcer Right Pretibial     Date/Time  5/8/2025 8:00 AM     Performed by  Vesta Garcia PA-C   Authorized by  Vesta Garcia PA-C      Associated wounds:   Wound 10/29/23 Venous Ulcer Pretibial Right     Universal Protocol   procedure performed by consultantConsent: Verbal consent obtained. Written consent obtained.  Risks and benefits: risks, benefits and alternatives were discussed  Consent given by: patient  Time out: Immediately prior to procedure a \"time out\" was called to verify the correct patient, procedure, equipment, support staff and site/side marked as required.  Patient understanding: patient " states understanding of the procedure being performed  Patient identity confirmed: verbally with patient      Local anesthesia used: yes     Anesthesia   Local anesthesia used: yes  Local Anesthetic: topical anesthetic     Sedation   Patient sedated: no        Specimen: no    Culture: no   Procedure Details   Procedure Notes: After permission and placement of topical local anesthetic, 1 Silver nitrate stick(s) were used to cauterize the hypergranular tissue of the open wound.  Normal saline was used to neutralize the reaction. A dressing was applied, see orders.  Patient tolerated procedure well.      Patient tolerance: Patient tolerated the procedure well with no immediate complications                      Wound Instructions:  Orders Placed This Encounter   Procedures    Wound cleansing and dressings Venous Ulcer Right Pretibial     Wound cleansing and dressings Venous Ulcer Right Pretibial       Right Leg wound:      Wash your hands with soap and water. Remove old dressing, discard into plastic bag and place in trash. Cleanse the wound with Normal Saline solution prior to applying a clean dressing. Do not use tissue or cotton balls. Do not scrub the wound. Pat dry using gauze.      Shower: Yes. Cleanse with a mild soap and water such as Dove. Be sure to remove your old dressing prior to getting in the shower.        Apply Dermagran to the open wound.   Cover with gauze.  Secure with tape. (If your skin is breaking down from tape, then need to wrap with Armani and put tape on Armani)      Change dressing EVERY OTHER DAY.                            Elastic Tubular Stocking: Spanda F     Tubular elastic bandage: Apply from base of toes to behind the knee. Apply in AM, may remove for sleep.  Avoid prolonged standing in one place.  Elevate leg(s) above the level of the heart when sitting or as much as possible.     Standing Status:   Future     Expiration Date:   5/15/2025    Wound Procedure Treatment Venous Ulcer Right  "Pretibial     This order was created via procedure documentation    Chemical Caut Of A Wound     This order was created via procedure documentation       Vesta Garcia PA-C, [facility]      Portions of the record may have been created with voice recognition software. Occasional wrong word or \"sound alike\" substitutions may have occurred due to the inherent limitations of voice recognition software. Read the chart carefully and recognize, using context, where substitutions have occurred.    "

## 2025-05-08 NOTE — PATIENT INSTRUCTIONS
Orders Placed This Encounter   Procedures    Wound cleansing and dressings Venous Ulcer Right Pretibial     Wound cleansing and dressings Venous Ulcer Right Pretibial       Right Leg wound:      Wash your hands with soap and water. Remove old dressing, discard into plastic bag and place in trash. Cleanse the wound with Normal Saline solution prior to applying a clean dressing. Do not use tissue or cotton balls. Do not scrub the wound. Pat dry using gauze.      Shower: Yes. Cleanse with a mild soap and water such as Dove. Be sure to remove your old dressing prior to getting in the shower.        Apply Dermagran to the open wound.   Cover with gauze.  Secure with tape. (If your skin is breaking down from tape, then need to wrap with Armani and put tape on Armani)      Change dressing EVERY OTHER DAY.                            Elastic Tubular Stocking: Spanda F     Tubular elastic bandage: Apply from base of toes to behind the knee. Apply in AM, may remove for sleep.  Avoid prolonged standing in one place.  Elevate leg(s) above the level of the heart when sitting or as much as possible.     Standing Status:   Future     Expiration Date:   5/15/2025

## 2025-05-08 NOTE — PROGRESS NOTES
Wound Procedure Treatment Venous Ulcer Right Pretibial    Performed by: Graciela Valerio RN  Authorized by: Vesta Garcia PA-C  Associated wounds:   Wound 10/29/23 Venous Ulcer Pretibial Right    Wound cleansed with:  NSS   Applied primary dressing:  Dermagran   Dressing secured with:  Size F     cosmopor

## 2025-05-12 ENCOUNTER — OFFICE VISIT (OUTPATIENT)
Age: 85
End: 2025-05-12
Payer: COMMERCIAL

## 2025-05-12 VITALS
TEMPERATURE: 98.1 F | HEIGHT: 66 IN | OXYGEN SATURATION: 96 % | WEIGHT: 167 LBS | BODY MASS INDEX: 26.84 KG/M2 | HEART RATE: 64 BPM | DIASTOLIC BLOOD PRESSURE: 70 MMHG | SYSTOLIC BLOOD PRESSURE: 128 MMHG | RESPIRATION RATE: 16 BRPM

## 2025-05-12 DIAGNOSIS — J41.0 SIMPLE CHRONIC BRONCHITIS (HCC): Primary | ICD-10-CM

## 2025-05-12 DIAGNOSIS — R91.8 LUNG NODULES: ICD-10-CM

## 2025-05-12 DIAGNOSIS — R59.0 MEDIASTINAL LYMPHADENOPATHY: ICD-10-CM

## 2025-05-12 PROCEDURE — 99214 OFFICE O/P EST MOD 30 MIN: CPT | Performed by: PHYSICIAN ASSISTANT

## 2025-05-12 NOTE — PROGRESS NOTES
Follow-up  Visit - Pulmonary Medicine   Name: Ely Hernadez      : 1940      MRN: [id number]  Encounter Provider: Ben Song PA-C  Encounter Date: 2025   Encounter department: [facility] PULMONARY [facility]  :  Assessment & Plan  Simple chronic bronchitis (HCC)  She was given a sample of Trelegy at her last visit - has noticed some improvement in her breathing with the Trelegy.  She will continue with the Trelegy, albuterol as needed.   PFT in  with moderate restriction, decreased lung volumes, severely decreased DLCO       Lung nodules  Groundglass nodules on CT scans which are similar to previous imaging in   No uptake on recent PET scan  Orders:    CT chest without contrast; Future    Mediastinal lymphadenopathy  Recent PET scan shows adenopathy similar to prior studies without any FDG activity  Some of these nodes have been present since   In comparing sizes, some of the mediastinal nodes look decreased in size.  1 precarinal lymph node measured 2.6 x 1.4 cm, on recent PET scan lymph nodes measured up to 1.5 cm    Will plan to repeat a CT scan in 6 months  Orders:    CT chest without contrast; Future      No follow-ups on file.    History of Present Illness   Ely Hernadez is a 84 y.o. female former smoker with past medical history of COPD, hypertension, CAD, diabetes, GERD, aortic valve placement, cardiomyopathy, CKD who presents for follow-up.  She is overall stable with her breathing.  Notes ongoing dyspnea on exertion which is chronic and longstanding since her aortic valve replacement.  She did note some slight improvement with the addition of the Trelegy, albuterol occasionally.    Review of Systems   Constitutional: Negative.    HENT: Negative.     Respiratory:  Positive for shortness of breath.    Cardiovascular: Negative.    Gastrointestinal: Negative.    Genitourinary: Negative.    Musculoskeletal: Negative.    Skin: Negative.    Allergic/Immunologic:  Negative.    Neurological: Negative.    Psychiatric/Behavioral: Negative.         Aside from what is mentioned in the HPI, ROS is otherwise negative    Current Outpatient Medications on File Prior to Visit   Medication Sig Dispense Refill    Accu-Chek FastClix Lancets MISC Use 3 (three) times a day 300 each 3    albuterol (Ventolin HFA) 90 mcg/act inhaler Inhale 2 puffs every 6 (six) hours as needed for wheezing 54 g 3    Ascorbic Acid (vitamin C) 1000 MG tablet Take 1,000 mg by mouth daily      aspirin (ECOTRIN LOW STRENGTH) 81 mg EC tablet Take 1 tablet (81 mg total) by mouth daily Do not start before November 1, 2023. 30 tablet 0    Blood Glucose Monitoring Suppl (Accu-Chek Guide Me) w/Device KIT USE 3 TIMES DAILY 1 kit 2    Cholecalciferol (Vitamin D3) 50 MCG (2000 UT) TABS Take 2,000 Units by mouth daily      clobetasol (TEMOVATE) 0.05 % ointment Apply topically 2 (two) times a day 30 g 1    cyanocobalamin (VITAMIN B-12) 1000 MCG tablet Take 1 tablet (1,000 mcg total) by mouth daily      Empagliflozin (Jardiance) 10 MG TABS tablet TAKE 1 TABLET BY MOUTH IN THE  MORNING 100 tablet 1    Ferrous Sulfate (IRON PO) Take by mouth daily in the early morning OTC      fluticasone (FLONASE) 50 mcg/act nasal spray 1 spray into each nostril daily 16 g 2    fluticasone-umeclidinium-vilanterol (Trelegy Ellipta) 100-62.5-25 mcg/actuation inhaler Inhale 1 puff daily Rinse mouth after use. 180 blister 0    furosemide (LASIX) 40 mg tablet Take 1 tablet (40 mg total) by mouth 2 (two) times a day 180 tablet 3    gabapentin (NEURONTIN) 300 mg capsule TAKE 1 CAPSULE BY MOUTH 3 TIMES  DAILY 300 capsule 1    glipiZIDE (GLUCOTROL) 10 mg tablet TAKE 1 TABLET BY MOUTH TWICE  DAILY BEFORE MEALS 200 tablet 1    glucose blood (Accu-Chek Celeste Plus) test strip Use 1 each 3 (three) times a day Use as instructed 300 each 3    insulin degludec (TRESIBA) 100 units/mL injection pen Inject 30 Units under the skin daily 30 mL 3    Insulin Pen  "Needle (BD Pen Needle Rosie U/F) 32G X 4 MM MISC Use daily 100 each 1    ipratropium-albuterol (DUO-NEB) 0.5-2.5 mg/3 mL nebulizer solution Take 3 mL by nebulization 4 (four) times a day 360 mL 1    Lancets (freestyle) lancets Check blood glucose 3 times daily 300 each 0    levothyroxine 50 mcg tablet TAKE 1 TABLET BY MOUTH DAILY 100 tablet 1    metoprolol tartrate (LOPRESSOR) 50 mg tablet TAKE ONE-HALF TABLET BY MOUTH  TWICE DAILY 100 tablet 1    oxygen gas Inhale 2 L/min daily at bedtime as needed home oxygen nightly      pantoprazole (PROTONIX) 40 mg tablet TAKE 1 TABLET BY MOUTH DAILY AS  DIRECTED 100 tablet 2    warfarin (COUMADIN) 5 mg tablet TAKE 1 TO 2 TABLETS BY  MOUTH DAILY OR AS DIRECTED 180 tablet 3     Current Facility-Administered Medications on File Prior to Visit   Medication Dose Route Frequency Provider Last Rate Last Admin    lidocaine (LMX) 4 % cream   Topical Once Vesta Garcia PA-C             Medical History Reviewed by provider this encounter:     .    Objective   /70 (BP Location: Right arm, Patient Position: Sitting, Cuff Size: Large)   Pulse 64   Temp 98.1 °F (36.7 °C) (Oral)   Resp 16   Ht 5' 6\" (1.676 m)   Wt 75.8 kg (167 lb)   SpO2 96%   BMI 26.95 kg/m²     Physical Exam  Vitals reviewed.   Constitutional:       General: She is not in acute distress.     Appearance: Normal appearance. She is well-developed. She is not ill-appearing.   HENT:      Head: Normocephalic and atraumatic.      Mouth/Throat:      Pharynx: Oropharynx is clear.   Eyes:      Pupils: Pupils are equal, round, and reactive to light.   Cardiovascular:      Rate and Rhythm: Normal rate and regular rhythm.   Pulmonary:      Effort: Pulmonary effort is normal. No respiratory distress.      Breath sounds: Normal breath sounds. No decreased breath sounds, wheezing, rhonchi or rales.   Abdominal:      General: Abdomen is flat. There is no distension.   Musculoskeletal:         General: Normal range of motion.    " "  Cervical back: Normal range of motion.      Right lower leg: No edema.      Left lower leg: No edema.   Skin:     General: Skin is warm and dry.      Findings: No rash.   Neurological:      Mental Status: She is alert and oriented to person, place, and time.   Psychiatric:         Mood and Affect: Mood normal.         Behavior: Behavior normal.           Diagnostic Data:  Labs: I personally reviewed the most recent laboratory data pertinent to today's visit.      Radiology results:  Radiology Results Review: I have reviewed radiology reports from March, April May including: PET CT and CT chest.      PFT/spirometry results:  No results found for: \"FEV1\", \"FVC\", \"CXV2JYU\", \"TLC\", \"DLCO\"       Oximetry testing:      Other studies:      Ben Song PA-C      "

## 2025-05-12 NOTE — ASSESSMENT & PLAN NOTE
She was given a sample of Trelegy at her last visit - has noticed some improvement in her breathing with the Trelegy.  She will continue with the Trelegy, albuterol as needed.   PFT in 2023 with moderate restriction, decreased lung volumes, severely decreased DLCO

## 2025-05-15 ENCOUNTER — OFFICE VISIT (OUTPATIENT)
Dept: WOUND CARE | Facility: CLINIC | Age: 85
End: 2025-05-15
Payer: COMMERCIAL

## 2025-05-15 VITALS
HEART RATE: 60 BPM | TEMPERATURE: 97.7 F | DIASTOLIC BLOOD PRESSURE: 68 MMHG | RESPIRATION RATE: 18 BRPM | SYSTOLIC BLOOD PRESSURE: 148 MMHG

## 2025-05-15 DIAGNOSIS — L97.919 VENOUS ULCER OF RIGHT LEG (HCC): Primary | ICD-10-CM

## 2025-05-15 DIAGNOSIS — L92.9 HYPERGRANULATION: ICD-10-CM

## 2025-05-15 DIAGNOSIS — I83.019 VENOUS ULCER OF RIGHT LEG (HCC): Primary | ICD-10-CM

## 2025-05-15 PROCEDURE — 17250 CHEM CAUT OF GRANLTJ TISSUE: CPT | Performed by: ORTHOPAEDIC SURGERY

## 2025-05-15 RX ORDER — LIDOCAINE 40 MG/G
CREAM TOPICAL ONCE
Status: COMPLETED | OUTPATIENT
Start: 2025-05-15 | End: 2025-05-15

## 2025-05-15 RX ADMIN — LIDOCAINE: 40 CREAM TOPICAL at 08:15

## 2025-05-15 NOTE — PROGRESS NOTES
Wound Procedure Treatment Venous Ulcer Right Pretibial    Performed by: Alexandra Morrissey RN  Authorized by: Vesta Garcia PA-C  Associated wounds:   Wound 10/29/23 Venous Ulcer Pretibial Right    Wound cleansed with:  NSS   Applied primary dressing:  Non adherent contact layer, Calcium alginate and Silver   Applied secondary dressing:  Gauze   Dressing secured with:  Tape     ADAPTIC

## 2025-05-15 NOTE — PATIENT INSTRUCTIONS
Orders Placed This Encounter   Procedures    Wound cleansing and dressings Venous Ulcer Right Pretibial     Right Leg wound:      Wash your hands with soap and water. Remove old dressing, discard into plastic bag and place in trash. Cleanse the wound with Normal Saline solution prior to applying a clean dressing. Do not use tissue or cotton balls. Do not scrub the wound. Pat dry using gauze.      Shower: Yes. Cleanse with a mild soap and water such as Dove. Be sure to remove your old dressing prior to getting in the shower.         Apply ADAPTIC to the open wound.   Cover with SILVER AG and gauze.  Secure with tape. (If your skin is breaking down from tape, then need to wrap with Armani and put tape on Armani)      Change dressing THREE TIMES A WEEK                             Elastic Tubular Stocking: Spanda F     Tubular elastic bandage: Apply from base of toes to behind the knee. Apply in AM, may remove for sleep.  Avoid prolonged standing in one place.  Elevate leg(s) above the level of the heart when sitting or as much as possible.     Standing Status:   Future     Expiration Date:   5/22/2025

## 2025-05-15 NOTE — PROGRESS NOTES
Patient ID: Ely Hernadez is a 84 y.o. female Date of Birth 1940       Chief Complaint   Patient presents with    Follow Up Wound Care Visit     Right leg wound       Allergies:  Patient has no known allergies.    Diagnosis:   Diagnosis ICD-10-CM Associated Orders   1. Venous ulcer of right leg (HCC)  I83.019 Wound cleansing and dressings Venous Ulcer Right Pretibial    L97.919 lidocaine (LMX) 4 % cream     Wound Procedure Treatment Venous Ulcer Right Pretibial     Chemical caut of a wound Venous Ulcer Right Pretibial      2. Hypergranulation  L92.9 Chemical caut of a wound Venous Ulcer Right Pretibial           Assessment and Plan :  Follow up evaluation of right venous ulcer remains hypergranulated with sanguinous drainage.     Change wound management to Adaptic with Silver Alginate on wound bed, see wound orders below   Continue to wear daily compression with Spandagrip.  No harsh cleansers such as alcohol, peroxide, or antibacterial soap, do not submerge in water  Can cleanse with mild soap and water or NSS at dressing changes.   Continue to f/u with vascular surgeon.  Continue frequent elevation of leg and increase exercise/walking for wound healing.  Follow-up in 1 week(s) or call sooner with questions or concerns or any signs of infection such as redness, swelling, increased/purulent drainage, fever, chills, increased severe pain.     Subjective:   6/28: Pt is an 84 y.o. female with pmhx HTN, DMII (A1c 8.7),  CKD, Pafib, CVA (10/28/23 on Coumadin/ASA 81mg) with residual deficits (left sided facial drop and leg weakness) well known to [facility] who presents for follow up evaluation of non healing RLE venous ulcer which has been present for about 2.5 years. Pt has been covering wound with a bandaid. Does not have an odor. No smoking, ETOH or drug use.  Pt denies any sob, fatigue, N/V, CP, fever or chills.    7/12: Patient presents for followup evaluation of RLE venous ulcer. No increased pain or drainage. Pt is  frustrated with chronic wound. Has been using Polymem on the wound bed and Tubigrip for compression.  Pt denies any fevers or chills.    7/25: Patient presents for followup evaluation of RLE venous ulcer. Since last visit pt was started on Bactrim for positive wound culture with abnormal 2+ Growth of Staphylococcus Aureus. The tissue exam demonstrated chronic inflammation and fibrosis. No malignancy, nor pyoderma gangrenosum were identified.  Pt noticed wound decreasing in size. Has been using Polymem on the wound bed and Tubigrip for compression.  Pt denies any fevers or chills.    8/2: Patient presents for followup evaluation of RLE venous ulcer. Pt completed Bactrim as directed. No issues. Pt states this is the best she has felt in 2 years. No fevers or chills.    8/22: Patient presents for followup evaluation of RLE venous ulcer. Pt states she has been wetting wound with mild soap and water. She noticed a scab formed and came off on dressing. In office CHEYANNE today; R 0.59, L 0.76. No fevers or chills.    9/6: Patient presents for followup evaluation of RLE venous ulcer. Pt  has been applying Polymem Ag Max on wound bed and Tubigrip for compression. Denies fevers or chills.    9/19: Patient presents for followup evaluation of RLE venous ulcer. Pt has been applying Mupirocin wound bed and Tubigrip for compression. Denies fevers or chills.    9/26: Patient presents for followup evaluation of RLE venous ulcer. No complaints. Pt has been applying Hydrofera Blue ready on wound bed and Tubigrip for compression. Denies fevers or chills.    10/10:  Patient presents for followup evaluation of RLE venous ulcer. No complaints. Pt is scheduled for vascular studies for 11/6/24. Pt has been applying Hydrofera Blue ready on wound bed and Tubigrip for compression. Denies fevers or chills.    10/31:  Patient presents for followup evaluation of RLE venous ulcer. NO issues. Pt has been applying Hydrofera Blue ready on wound bed and  Tubigrip for compression. Denies fevers or chills.    11/14:  Patient presents for followup evaluation of RLE venous ulcer. RLE vascular studies reveal deep venous incompetence with an incompetent . GSV and SSV is competent. Bilateral LEADs reveal diffuse disease in the RLE femoral and popliteal arteries with a 50-75% stenosis in the common femoral artery. R CHEYANNE:  0.64 (severe disease). There is diffuse disease in the LLE femoral and popliteal arteries with a >75% stenosis of the distal superficial femoral artery.There is a 50-75% stenosis of the proximal and mid superficial femoral arteries. Left CHEYANNE: 0.69 (moderate disease). Pt has scheduled a vascular surgery appointment for January 14, 2025. Pt has been applying Hydrofera Blue ready on wound bed and Tubigrip for compression. Denies fevers or chills.    12/5: Patient presents for followup evaluation of RLE venous ulcer. No complaints. Pt has been applying Hydrofera Blue ready on wound bed and Tubigrip for compression. Denies fevers or chills.    12/19: Patient presents for followup evaluation of RLE venous ulcer. No issues. Pt has been applying clobetasol cream and DSD on wound bed and Tubigrip for compression. Pt is scheduled to see vascular surgery on January 14,  Denies fevers or chills.    1/2: Patient presents for followup evaluation of RLE venous ulcer.  Pt states dressing has been adhering to wound bed. Pt has been applying Hydrofera Blue Ready on wound bed and Tubigrip for compression. Denies fevers or chills.    3/6: Patient presents for followup evaluation of RLE venous ulcer. Pt has been applying Hydrofera Blue Ready on wound bed and Tubigrip for compression. Pt appointment with vascular was cancelled due to patient being ill. Pt plans on rescheduling. Denies fevers or chills.    3/20: Patient presents for followup evaluation of RLE venous ulcer. Pt c/o increased serosanguinous drainage. Pt is scheduled for RLE sclerotherapy with vascular on  4/11/25. Pt has been applying Dermagran on wound bed and Tubigrip for compression. Denies fevers or chills.    4/3:  Patient presents for followup evaluation of RLE venous ulcer. Pt c/o increased serosanguinous drainage. Pt has been applying Exufiber Ag on wound bed and Tubigrip for compression. Denies fevers or chills.    4/17: Patient presents for followup evaluation of RLE venous ulcer.  Pt had RLE sclerotherapy procedure on 4/11/25. Pt has been applying Exufiber Ag on wound bed and Tubigrip for compression. Denies fevers or chills.    5/8: Patient presents for followup evaluation of RLE venous ulcer.  Pt states Exufiber dressing has been adhering to wound bed.      5/15:Patient presents for followup evaluation of RLE venous ulcer.  Pt states she has increased bloody drainage to wound bed. Has been applying Dermagran on wound bed. No fevers or chills.          The following portions of the patient's history were reviewed and updated as appropriate:   Problem List[1]  Past Medical History:   Diagnosis Date    Acute anterior epistaxis 12/23/2023    Acute blood loss anemia 12/14/2023    Acute respiratory failure with hypoxia (HCC) 02/06/2024    Anemia     Aneurysm of thoracic aorta (HCC)     Angioedema 06/07/2019    Aortic valve disorder     Benign neoplasm of sigmoid colon     Cardiomyopathy (HCC)     last assessed: 10/10/2014    CHF (congestive heart failure) (HCC)     Chronic kidney disease     Chronic venous hypertension with ulcer and inflammation involving right side (HCC) 04/11/2025    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HCC)     last assessed: 10/10/2014    COPD (chronic obstructive pulmonary disease) (HCC)     Diabetic nephropathy (HCC)     Disease of thyroid gland     RAMON (dyspnea on exertion)     GERD (gastroesophageal reflux disease)     History of emphysema (HCC)     History of transfusion     Hyperlipidemia     Hypertensive urgency 10/28/2023    Leg cramps 11/01/2023    Stroke-like symptoms 10/28/2023     TIA (transient ischemic attack)      Past Surgical History:   Procedure Laterality Date    AORTIC VALVE REPLACEMENT      CARDIAC PACEMAKER PLACEMENT      last assessed: 10/10/2014    CARDIAC SURGERY      aortic valve replacement    CHOLECYSTECTOMY      COLONOSCOPY      EGD      HYSTERECTOMY      INSERT / REPLACE / REMOVE PACEMAKER      KNEE SURGERY Right     OTHER SURGICAL HISTORY      Capsule ENDOscopy description: 2012    GA COLONOSCOPY FLX DX W/COLLJ SPEC WHEN PFRMD N/A 2018    Procedure: single balloon ENTEROSCOPY;  Surgeon: David Dennis MD;  Location: [facility];  Service: Gastroenterology    GA ESOPHAGOGASTRODUODENOSCOPY TRANSORAL DIAGNOSTIC N/A 2017    Procedure: EGD AND COLONOSCOPY;  Surgeon: Jay Mcleod III, MD;  Location: [facility];  Service: Gastroenterology    VEIN LIGATION Right 2025    Procedure: right leg peforator sclerotherapy;  Surgeon: Chris Bush MD;  Location: [facility];  Service: Vascular     Family History   Problem Relation Age of Onset    No Known Problems Mother     No Known Problems Father      Social History     Socioeconomic History    Marital status:      Spouse name: Not on file    Number of children: Not on file    Years of education: Not on file    Highest education level: Not on file   Occupational History    Not on file   Tobacco Use    Smoking status: Former     Current packs/day: 0.00     Average packs/day: 1 pack/day for 52.9 years (52.9 ttl pk-yrs)     Types: Cigarettes     Start date:      Quit date: 2007     Years since quittin.4     Passive exposure: Past    Smokeless tobacco: Former     Quit date:    Vaping Use    Vaping status: Never Used   Substance and Sexual Activity    Alcohol use: Never    Drug use: Never    Sexual activity: Not Currently     Partners: Male   Other Topics Concern    Not on file   Social History Narrative    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 BID Freestyle lancets BID     Living independently with spouse     Social Drivers of Health     Financial Resource Strain: Not on file   Food Insecurity: No Food Insecurity (5/15/2024)    Nursing - Inadequate Food Risk Classification     Worried About Running Out of Food in the Last Year: Never true     Ran Out of Food in the Last Year: Never true     Ran Out of Food in the Last Year: Not on file   Transportation Needs: No Transportation Needs (5/15/2024)    PRAPARE - Transportation     Lack of Transportation (Medical): No     Lack of Transportation (Non-Medical): No   Physical Activity: Inactive (5/26/2021)    Exercise Vital Sign     Days of Exercise per Week: 0 days     Minutes of Exercise per Session: 0 min   Stress: No Stress Concern Present (5/26/2021)    [employer] - Occupational Stress Questionnaire     Feeling of Stress : Not at all   Social Connections: Not on file   Intimate Partner Violence: Not on file   Housing Stability: Unknown (5/15/2024)    Housing Stability Vital Sign     Unable to Pay for Housing in the Last Year: No     Number of Times Moved in the Last Year: Not on file     Homeless in the Last Year: No     Current Medications[2]    Review of Systems   Constitutional:  Negative for appetite change, chills, fatigue, fever and unexpected weight change.   HENT:  Negative for congestion, hearing loss and postnasal drip.    Respiratory:  Negative for cough and shortness of breath.    Cardiovascular:  Negative for leg swelling.   Musculoskeletal:  Positive for gait problem.   Skin:  Positive for wound (RLE). Negative for rash.   Neurological:  Negative for numbness.   Hematological:  Does not bruise/bleed easily.   Psychiatric/Behavioral: Negative.           Objective:  /68   Pulse 60   Temp 97.7 °F (36.5 °C)   Resp 18   Pain Score: 0-No pain     Physical Exam  Constitutional:       General: She is awake. She is not in acute distress.     Appearance: She is not ill-appearing,  toxic-appearing or diaphoretic.   HENT:      Head: Normocephalic and atraumatic.      Right Ear: External ear normal.      Left Ear: External ear normal.     Eyes:      Conjunctiva/sclera: Conjunctivae normal.       Cardiovascular:      Pulses:           Dorsalis pedis pulses are 1+ on the right side.   Pulmonary:      Effort: Pulmonary effort is normal. No respiratory distress.     Musculoskeletal:      Right lower leg: Edema present.      Comments: trace     Skin:     General: Skin is warm and dry.      Findings: Wound (RLE) present. No erythema.      Comments: 1.  Right lower extremity venous ulcer with increased sanguinous drainage and hypergranular pink wound bed with fragile periwound scar tissue. See wound assessment for additional details.     Neurological:      Mental Status: She is alert.     Psychiatric:         Mood and Affect: Mood normal.         Behavior: Behavior normal. Behavior is cooperative.         Wound 10/29/23 Venous Ulcer Pretibial Right (Active)   Wound Image Images linked 05/15/25 0808   Wound Description Pink;Epithelialization;Beefy red;Hypergranulation 05/15/25 0808   Non-staged Wound Description Full thickness 05/15/25 0808   Wound Length (cm) 2.3 cm 05/15/25 0808   Wound Width (cm) 1.4 cm 05/15/25 0808   Wound Depth (cm) 0.1 cm 05/15/25 0808   Wound Surface Area (cm^2) 2.53 cm^2 05/15/25 0808   Wound Volume (cm^3) 0.169 cm^3 05/15/25 0808   Calculated Wound Volume (cm^3) 0.32 cm^3 05/15/25 0808   Change in Wound Size % 92 05/15/25 0808   Drainage Amount Moderate 05/15/25 0808   Drainage Description Serosanguineous;Gayle 05/15/25 0808   Radha-wound Assessment Pink;Fragile;Maceration 05/15/25 0808   Dressing Status Intact 05/15/25 0808           Chemical caut of a wound Venous Ulcer Right Pretibial     Date/Time  5/15/2025 8:30 AM     Performed by  Vesta Garcia PA-C   Authorized by  Vesta Garcia PA-C      Associated wounds:   Wound 10/29/23 Venous Ulcer Pretibial Right     Universal  "Protocol   procedure performed by consultantConsent: Verbal consent obtained. Written consent obtained  Risks and benefits: risks, benefits and alternatives were discussed  Consent given by: patient  Time out: Immediately prior to procedure a \"time out\" was called to verify the correct patient, procedure, equipment, support staff and site/side marked as required.  Patient understanding: patient states understanding of the procedure being performed  Patient identity confirmed: verbally with patient      Local anesthesia used: yes     Anesthesia   Local anesthesia used: yes  Local Anesthetic: topical anesthetic     Sedation   Patient sedated: no        Specimen: no    Culture: no   Procedure Details   Procedure Notes: After permission and placement of topical local anesthetic, 1 Silver nitrate stick(s) were used to cauterize the hypergranular tissue of the open wound.  Normal saline was used to neutralize the reaction. A dressing was applied, see orders.  Patient tolerated procedure well.      Patient tolerance: Patient tolerated the procedure well with no immediate complications                      Wound Instructions:  Orders Placed This Encounter   Procedures    Wound cleansing and dressings Venous Ulcer Right Pretibial     Right Leg wound:      Wash your hands with soap and water. Remove old dressing, discard into plastic bag and place in trash. Cleanse the wound with Normal Saline solution prior to applying a clean dressing. Do not use tissue or cotton balls. Do not scrub the wound. Pat dry using gauze.      Shower: Yes. Cleanse with a mild soap and water such as Dove. Be sure to remove your old dressing prior to getting in the shower.         Apply ADAPTIC to the open wound.   Cover with SILVER AG and gauze.  Secure with tape. (If your skin is breaking down from tape, then need to wrap with Armani and put tape on Armani)      Change dressing THREE TIMES A WEEK                             Elastic Tubular Stocking: " "Spanda F     Tubular elastic bandage: Apply from base of toes to behind the knee. Apply in AM, may remove for sleep.  Avoid prolonged standing in one place.  Elevate leg(s) above the level of the heart when sitting or as much as possible.     Standing Status:   Future     Expiration Date:   5/22/2025    Wound Procedure Treatment Venous Ulcer Right Pretibial     This order was created via procedure documentation    Chemical caut of a wound Venous Ulcer Right Pretibial     This order was created via procedure documentation       Vesta Garcia PA-C, [facility]      Portions of the record may have been created with voice recognition software. Occasional wrong word or \"sound alike\" substitutions may have occurred due to the inherent limitations of voice recognition software. Read the chart carefully and recognize, using context, where substitutions have occurred.         [1]   Patient Active Problem List  Diagnosis    Hyperlipidemia    Chronic anticoagulation    Hypertension, essential    Coronary artery disease of native artery of native heart with stable angina pectoris (HCC)    Simple chronic bronchitis (HCC)    Diabetes mellitus with neurological manifestation (HCC)    Hypothyroidism    GERD (gastroesophageal reflux disease)    Anemia    AVM (arteriovenous malformation) of small bowel, acquired with hemorrhage    Angiectasia    Other vascular disorders of intestine (HCC)    Aortic valve replaced    Cardiomyopathy (HCC)    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HCC)    Stage 3b chronic kidney disease (HCC)    Chronic kidney disease-mineral and bone disorder    Primary osteoarthritis involving multiple joints    Paroxysmal atrial fibrillation (HCC)    Neutropenia associated with infection (HCC)    Herpes simplex labialis    Restrictive lung disease    Nocturnal hypoxemia    Supratherapeutic INR    H/O mechanical aortic valve replacement    Subtherapeutic international normalized ratio (INR)    Type 2 diabetes mellitus with stage 3b " chronic kidney disease, with long-term current use of insulin (HCC)    Venous ulcer of right leg (HCC)    Chronic systolic (congestive) heart failure (HCC)    Lower extremity edema    Iron deficiency anemia due to chronic blood loss    Hemiplegia and hemiparesis following cerebral infarction affecting left non-dominant side (HCC)    Chronic obstructive pulmonary disease (HCC)    Type 2 diabetes mellitus with stage 4 chronic kidney disease, with long-term current use of insulin (HCC)    Chronic venous hypertension with ulcer and inflammation involving right side (HCC)    Peripheral arterial disease (HCC)   [2]   Current Outpatient Medications:     Accu-Chek FastClix Lancets MISC, Use 3 (three) times a day, Disp: 300 each, Rfl: 3    albuterol (Ventolin HFA) 90 mcg/act inhaler, Inhale 2 puffs every 6 (six) hours as needed for wheezing, Disp: 54 g, Rfl: 3    Ascorbic Acid (vitamin C) 1000 MG tablet, Take 1,000 mg by mouth daily, Disp: , Rfl:     aspirin (ECOTRIN LOW STRENGTH) 81 mg EC tablet, Take 1 tablet (81 mg total) by mouth daily Do not start before November 1, 2023., Disp: 30 tablet, Rfl: 0    Blood Glucose Monitoring Suppl (Accu-Chek Guide Me) w/Device KIT, USE 3 TIMES DAILY, Disp: 1 kit, Rfl: 2    Cholecalciferol (Vitamin D3) 50 MCG (2000 UT) TABS, Take 2,000 Units by mouth daily, Disp: , Rfl:     clobetasol (TEMOVATE) 0.05 % ointment, Apply topically 2 (two) times a day, Disp: 30 g, Rfl: 1    cyanocobalamin (VITAMIN B-12) 1000 MCG tablet, Take 1 tablet (1,000 mcg total) by mouth daily, Disp: , Rfl:     Empagliflozin (Jardiance) 10 MG TABS tablet, TAKE 1 TABLET BY MOUTH IN THE  MORNING, Disp: 100 tablet, Rfl: 1    Ferrous Sulfate (IRON PO), Take by mouth daily in the early morning OTC, Disp: , Rfl:     fluticasone (FLONASE) 50 mcg/act nasal spray, 1 spray into each nostril daily, Disp: 16 g, Rfl: 2    fluticasone-umeclidinium-vilanterol (Trelegy Ellipta) 100-62.5-25 mcg/actuation inhaler, Inhale 1 puff daily  Rinse mouth after use., Disp: 180 blister, Rfl: 0    furosemide (LASIX) 40 mg tablet, Take 1 tablet (40 mg total) by mouth 2 (two) times a day, Disp: 180 tablet, Rfl: 3    gabapentin (NEURONTIN) 300 mg capsule, TAKE 1 CAPSULE BY MOUTH 3 TIMES  DAILY, Disp: 300 capsule, Rfl: 1    glipiZIDE (GLUCOTROL) 10 mg tablet, TAKE 1 TABLET BY MOUTH TWICE  DAILY BEFORE MEALS, Disp: 200 tablet, Rfl: 1    glucose blood (Accu-Chek Celeste Plus) test strip, Use 1 each 3 (three) times a day Use as instructed, Disp: 300 each, Rfl: 3    insulin degludec (TRESIBA) 100 units/mL injection pen, Inject 30 Units under the skin daily, Disp: 30 mL, Rfl: 3    Insulin Pen Needle (BD Pen Needle Rosie U/F) 32G X 4 MM MISC, Use daily, Disp: 100 each, Rfl: 1    ipratropium-albuterol (DUO-NEB) 0.5-2.5 mg/3 mL nebulizer solution, Take 3 mL by nebulization 4 (four) times a day, Disp: 360 mL, Rfl: 1    Lancets (freestyle) lancets, Check blood glucose 3 times daily, Disp: 300 each, Rfl: 0    levothyroxine 50 mcg tablet, TAKE 1 TABLET BY MOUTH DAILY, Disp: 100 tablet, Rfl: 1    metoprolol tartrate (LOPRESSOR) 50 mg tablet, TAKE ONE-HALF TABLET BY MOUTH  TWICE DAILY, Disp: 100 tablet, Rfl: 1    oxygen gas, Inhale 2 L/min daily at bedtime as needed home oxygen nightly, Disp: , Rfl:     pantoprazole (PROTONIX) 40 mg tablet, TAKE 1 TABLET BY MOUTH DAILY AS  DIRECTED, Disp: 100 tablet, Rfl: 2    warfarin (COUMADIN) 5 mg tablet, TAKE 1 TO 2 TABLETS BY  MOUTH DAILY OR AS DIRECTED, Disp: 180 tablet, Rfl: 3    Current Facility-Administered Medications:     lidocaine (LMX) 4 % cream, , Topical, Once, Vesta Garcia PA-C

## 2025-05-20 DIAGNOSIS — N18.4 TYPE 2 DIABETES MELLITUS WITH STAGE 4 CHRONIC KIDNEY DISEASE, WITH LONG-TERM CURRENT USE OF INSULIN (HCC): ICD-10-CM

## 2025-05-20 DIAGNOSIS — E11.22 TYPE 2 DIABETES MELLITUS WITH STAGE 4 CHRONIC KIDNEY DISEASE, WITH LONG-TERM CURRENT USE OF INSULIN (HCC): ICD-10-CM

## 2025-05-20 DIAGNOSIS — Z79.4 TYPE 2 DIABETES MELLITUS WITH STAGE 4 CHRONIC KIDNEY DISEASE, WITH LONG-TERM CURRENT USE OF INSULIN (HCC): ICD-10-CM

## 2025-05-20 DIAGNOSIS — I35.9 AORTIC VALVE DISORDER: ICD-10-CM

## 2025-05-20 RX ORDER — BLOOD-GLUCOSE METER
EACH MISCELLANEOUS
Qty: 1 KIT | Refills: 2 | Status: SHIPPED | OUTPATIENT
Start: 2025-05-20

## 2025-05-20 NOTE — TELEPHONE ENCOUNTER
Reason for call:   [x] Refill   [] Prior Auth  [] Other:     Office:   [x] PCP/Provider - [facility]  Authorized By: Amanda Villarreal PA-C    [] Specialty/Provider -     Medication:  warfarin (COUMADIN) 5 mg tablet    Dose/Frequency: TAKE 1 TO 2 TABLETS BY MOUTH DAILY OR AS DIRECTED    Quantity: 180    Pharmacy: [facility] - [address]   Does the patient have enough for 3 days?   [] Yes   [] No - Send as HP to POD    Mail Away Pharmacy   Does the patient have enough for 10 days?   [x] Yes   [] No - Send as HP to POD

## 2025-05-20 NOTE — TELEPHONE ENCOUNTER
Patient needs a script for accu check guide test strips test once daily sent to [facility] - [place], [place] - [address]

## 2025-05-21 RX ORDER — WARFARIN SODIUM 5 MG/1
TABLET ORAL
Qty: 60 TABLET | Refills: 0 | Status: SHIPPED | OUTPATIENT
Start: 2025-05-21

## 2025-05-21 NOTE — TELEPHONE ENCOUNTER
Patient needs updated blood work and has previously placed orders. Please contact patient to go for labs. Courtesy refill provided.     yes...

## 2025-05-22 ENCOUNTER — APPOINTMENT (OUTPATIENT)
Dept: LAB | Facility: CLINIC | Age: 85
End: 2025-05-22
Payer: COMMERCIAL

## 2025-05-22 ENCOUNTER — OFFICE VISIT (OUTPATIENT)
Dept: WOUND CARE | Facility: CLINIC | Age: 85
End: 2025-05-22
Payer: COMMERCIAL

## 2025-05-22 VITALS
DIASTOLIC BLOOD PRESSURE: 67 MMHG | TEMPERATURE: 98.1 F | SYSTOLIC BLOOD PRESSURE: 145 MMHG | RESPIRATION RATE: 18 BRPM | HEART RATE: 80 BPM

## 2025-05-22 DIAGNOSIS — Z79.01 LONG TERM (CURRENT) USE OF ANTICOAGULANTS: ICD-10-CM

## 2025-05-22 DIAGNOSIS — I83.019 VENOUS ULCER OF RIGHT LEG (HCC): Primary | ICD-10-CM

## 2025-05-22 DIAGNOSIS — D50.0 IRON DEFICIENCY ANEMIA DUE TO CHRONIC BLOOD LOSS: ICD-10-CM

## 2025-05-22 DIAGNOSIS — I48.91 ATRIAL FIBRILLATION, UNSPECIFIED TYPE (HCC): ICD-10-CM

## 2025-05-22 DIAGNOSIS — L97.919 VENOUS ULCER OF RIGHT LEG (HCC): Primary | ICD-10-CM

## 2025-05-22 LAB
BASOPHILS # BLD AUTO: 0.05 THOUSANDS/ÂΜL (ref 0–0.1)
BASOPHILS NFR BLD AUTO: 1 % (ref 0–1)
EOSINOPHIL # BLD AUTO: 0.25 THOUSAND/ÂΜL (ref 0–0.61)
EOSINOPHIL NFR BLD AUTO: 4 % (ref 0–6)
ERYTHROCYTE [DISTWIDTH] IN BLOOD BY AUTOMATED COUNT: 14.7 % (ref 11.6–15.1)
FERRITIN SERPL-MCNC: 43 NG/ML (ref 30–307)
HCT VFR BLD AUTO: 44.9 % (ref 34.8–46.1)
HGB BLD-MCNC: 14.6 G/DL (ref 11.5–15.4)
IMM GRANULOCYTES # BLD AUTO: 0.03 THOUSAND/UL (ref 0–0.2)
IMM GRANULOCYTES NFR BLD AUTO: 1 % (ref 0–2)
INR PPP: 2.51 (ref 0.85–1.19)
IRON SATN MFR SERPL: 9 % (ref 15–50)
IRON SERPL-MCNC: 36 UG/DL (ref 50–212)
LYMPHOCYTES # BLD AUTO: 0.95 THOUSANDS/ÂΜL (ref 0.6–4.47)
LYMPHOCYTES NFR BLD AUTO: 15 % (ref 14–44)
MCH RBC QN AUTO: 29.3 PG (ref 26.8–34.3)
MCHC RBC AUTO-ENTMCNC: 32.5 G/DL (ref 31.4–37.4)
MCV RBC AUTO: 90 FL (ref 82–98)
MONOCYTES # BLD AUTO: 0.63 THOUSAND/ÂΜL (ref 0.17–1.22)
MONOCYTES NFR BLD AUTO: 10 % (ref 4–12)
NEUTROPHILS # BLD AUTO: 4.29 THOUSANDS/ÂΜL (ref 1.85–7.62)
NEUTS SEG NFR BLD AUTO: 69 % (ref 43–75)
NRBC BLD AUTO-RTO: 0 /100 WBCS
PLATELET # BLD AUTO: 289 THOUSANDS/UL (ref 149–390)
PMV BLD AUTO: 10.7 FL (ref 8.9–12.7)
PROTHROMBIN TIME: 27 SECONDS (ref 12.3–15)
RBC # BLD AUTO: 4.99 MILLION/UL (ref 3.81–5.12)
TIBC SERPL-MCNC: 380.8 UG/DL (ref 250–450)
TRANSFERRIN SERPL-MCNC: 272 MG/DL (ref 203–362)
UIBC SERPL-MCNC: 345 UG/DL (ref 155–355)
WBC # BLD AUTO: 6.2 THOUSAND/UL (ref 4.31–10.16)

## 2025-05-22 PROCEDURE — 85610 PROTHROMBIN TIME: CPT

## 2025-05-22 PROCEDURE — 83540 ASSAY OF IRON: CPT

## 2025-05-22 PROCEDURE — 99213 OFFICE O/P EST LOW 20 MIN: CPT | Performed by: ORTHOPAEDIC SURGERY

## 2025-05-22 PROCEDURE — 36415 COLL VENOUS BLD VENIPUNCTURE: CPT

## 2025-05-22 PROCEDURE — 83550 IRON BINDING TEST: CPT

## 2025-05-22 PROCEDURE — 82728 ASSAY OF FERRITIN: CPT

## 2025-05-22 PROCEDURE — 85025 COMPLETE CBC W/AUTO DIFF WBC: CPT

## 2025-05-22 RX ORDER — LIDOCAINE 40 MG/G
CREAM TOPICAL ONCE
Status: COMPLETED | OUTPATIENT
Start: 2025-05-22 | End: 2025-05-22

## 2025-05-22 RX ADMIN — LIDOCAINE: 40 CREAM TOPICAL at 08:11

## 2025-05-22 NOTE — PROGRESS NOTES
Wound Procedure Treatment Venous Ulcer Right Pretibial    Performed by: Marie Stewart RN  Authorized by: Vesta Garcia PA-C  Associated wounds:   Wound 10/29/23 Venous Ulcer Pretibial Right    Wound cleansed with:  NSS   Applied primary dressing:  Other   Applied secondary dressing:  Gauze   Dressing secured with:  Tape and Size F     Xeroform

## 2025-05-22 NOTE — PROGRESS NOTES
Patient ID: Ely Hernadez is a 84 y.o. female Date of Birth 1940       Chief Complaint   Patient presents with    Follow Up Wound Care Visit     Right leg wound       Allergies:  Patient has no known allergies.    Diagnosis:   Diagnosis ICD-10-CM Associated Orders   1. Venous ulcer of right leg (HCC)  I83.019 lidocaine (LMX) 4 % cream    L97.919 Wound cleansing and dressings Venous Ulcer Right Pretibial     Wound Procedure Treatment Venous Ulcer Right Pretibial           Assessment and Plan :  Follow up evaluation of right venous ulcer remains stable with sanguinous drainage.     Change wound management to xeroform on wound bed, see wound orders below   Continue to wear daily compression with Spandagrip.  No harsh cleansers such as alcohol, peroxide, or antibacterial soap, do not submerge in water  Can cleanse with mild soap and water or NSS at dressing changes.   Continue to f/u with vascular surgeon.  Continue frequent elevation of leg and increase exercise/walking for wound healing.  Follow-up in 1 week(s) or call sooner with questions or concerns or any signs of infection such as redness, swelling, increased/purulent drainage, fever, chills, increased severe pain.    Subjective:   6/28: Pt is an 84 y.o. female with pmhx HTN, DMII (A1c 8.7),  CKD, Pafib, CVA (10/28/23 on Coumadin/ASA 81mg) with residual deficits (left sided facial drop and leg weakness) well known to [facility] who presents for follow up evaluation of non healing RLE venous ulcer which has been present for about 2.5 years. Pt has been covering wound with a bandaid. Does not have an odor. No smoking, ETOH or drug use.  Pt denies any sob, fatigue, N/V, CP, fever or chills.    7/12: Patient presents for followup evaluation of RLE venous ulcer. No increased pain or drainage. Pt is frustrated with chronic wound. Has been using Polymem on the wound bed and Tubigrip for compression.  Pt denies any fevers or chills.    7/25: Patient presents for followup  evaluation of RLE venous ulcer. Since last visit pt was started on Bactrim for positive wound culture with abnormal 2+ Growth of Staphylococcus Aureus. The tissue exam demonstrated chronic inflammation and fibrosis. No malignancy, nor pyoderma gangrenosum were identified.  Pt noticed wound decreasing in size. Has been using Polymem on the wound bed and Tubigrip for compression.  Pt denies any fevers or chills.    8/2: Patient presents for followup evaluation of RLE venous ulcer. Pt completed Bactrim as directed. No issues. Pt states this is the best she has felt in 2 years. No fevers or chills.    8/22: Patient presents for followup evaluation of RLE venous ulcer. Pt states she has been wetting wound with mild soap and water. She noticed a scab formed and came off on dressing. In office CHEYANNE today; R 0.59, L 0.76. No fevers or chills.    9/6: Patient presents for followup evaluation of RLE venous ulcer. Pt  has been applying Polymem Ag Max on wound bed and Tubigrip for compression. Denies fevers or chills.    9/19: Patient presents for followup evaluation of RLE venous ulcer. Pt has been applying Mupirocin wound bed and Tubigrip for compression. Denies fevers or chills.    9/26: Patient presents for followup evaluation of RLE venous ulcer. No complaints. Pt has been applying Hydrofera Blue ready on wound bed and Tubigrip for compression. Denies fevers or chills.    10/10:  Patient presents for followup evaluation of RLE venous ulcer. No complaints. Pt is scheduled for vascular studies for 11/6/24. Pt has been applying Hydrofera Blue ready on wound bed and Tubigrip for compression. Denies fevers or chills.    10/31:  Patient presents for followup evaluation of RLE venous ulcer. NO issues. Pt has been applying Hydrofera Blue ready on wound bed and Tubigrip for compression. Denies fevers or chills.    11/14:  Patient presents for followup evaluation of RLE venous ulcer. RLE vascular studies reveal deep venous  incompetence with an incompetent . GSV and SSV is competent. Bilateral LEADs reveal diffuse disease in the RLE femoral and popliteal arteries with a 50-75% stenosis in the common femoral artery. R CHEYANNE:  0.64 (severe disease). There is diffuse disease in the LLE femoral and popliteal arteries with a >75% stenosis of the distal superficial femoral artery.There is a 50-75% stenosis of the proximal and mid superficial femoral arteries. Left CHEYANNE: 0.69 (moderate disease). Pt has scheduled a vascular surgery appointment for January 14, 2025. Pt has been applying Hydrofera Blue ready on wound bed and Tubigrip for compression. Denies fevers or chills.    12/5: Patient presents for followup evaluation of RLE venous ulcer. No complaints. Pt has been applying Hydrofera Blue ready on wound bed and Tubigrip for compression. Denies fevers or chills.    12/19: Patient presents for followup evaluation of RLE venous ulcer. No issues. Pt has been applying clobetasol cream and DSD on wound bed and Tubigrip for compression. Pt is scheduled to see vascular surgery on January 14,  Denies fevers or chills.    1/2: Patient presents for followup evaluation of RLE venous ulcer.  Pt states dressing has been adhering to wound bed. Pt has been applying Hydrofera Blue Ready on wound bed and Tubigrip for compression. Denies fevers or chills.    3/6: Patient presents for followup evaluation of RLE venous ulcer. Pt has been applying Hydrofera Blue Ready on wound bed and Tubigrip for compression. Pt appointment with vascular was cancelled due to patient being ill. Pt plans on rescheduling. Denies fevers or chills.    3/20: Patient presents for followup evaluation of RLE venous ulcer. Pt c/o increased serosanguinous drainage. Pt is scheduled for RLE sclerotherapy with vascular on 4/11/25. Pt has been applying Dermagran on wound bed and Tubigrip for compression. Denies fevers or chills.    4/3:  Patient presents for followup evaluation of RLE  venous ulcer. Pt c/o increased serosanguinous drainage. Pt has been applying Exufiber Ag on wound bed and Tubigrip for compression. Denies fevers or chills.    4/17: Patient presents for followup evaluation of RLE venous ulcer.  Pt had RLE sclerotherapy procedure on 4/11/25. Pt has been applying Exufiber Ag on wound bed and Tubigrip for compression. Denies fevers or chills.    5/8: Patient presents for followup evaluation of RLE venous ulcer.  Pt states Exufiber dressing has been adhering to wound bed.      5/15: Patient presents for followup evaluation of RLE venous ulcer.  Pt states she has increased bloody drainage to wound bed. Has been applying Dermagran on wound bed. No fevers or chills.    5/22: Patient presents for followup evaluation of RLE venous ulcer.  Pt states the adaptic with silver alginate has been sticking to wound bed. No fevers or chills.         The following portions of the patient's history were reviewed and updated as appropriate:   Problem List[1]  Past Medical History[2]  Past Surgical History[3]  Family History[4]  Social History[5]  Current Medications[6]    Review of Systems   Constitutional:  Negative for appetite change, chills, fatigue, fever and unexpected weight change.   HENT:  Negative for congestion, hearing loss and postnasal drip.    Respiratory:  Negative for cough and shortness of breath.    Cardiovascular:  Negative for leg swelling.   Musculoskeletal:  Positive for gait problem.   Skin:  Positive for wound (RLE). Negative for rash.   Neurological:  Negative for numbness.   Hematological:  Does not bruise/bleed easily.   Psychiatric/Behavioral: Negative.           Objective:  /67   Pulse 80   Temp 98.1 °F (36.7 °C)   Resp 18   Pain Score: 0-No pain     Physical Exam  Constitutional:       General: She is awake. She is not in acute distress.     Appearance: She is not ill-appearing, toxic-appearing or diaphoretic.   HENT:      Head: Normocephalic and atraumatic.       Right Ear: External ear normal.      Left Ear: External ear normal.     Eyes:      Conjunctiva/sclera: Conjunctivae normal.       Cardiovascular:      Pulses:           Dorsalis pedis pulses are 1+ on the right side.   Pulmonary:      Effort: Pulmonary effort is normal. No respiratory distress.     Musculoskeletal:      Right lower leg: Edema present.      Comments: trace     Skin:     General: Skin is warm and dry.      Findings: Wound (RLE) present. No erythema.      Comments: 1.  Right lower extremity venous ulcer with sanguinous drainage and and fragile periwound scar tissue. See wound assessment for additional details.     Neurological:      Mental Status: She is alert.     Psychiatric:         Mood and Affect: Mood normal.         Behavior: Behavior normal. Behavior is cooperative.          Wound 10/29/23 Venous Ulcer Pretibial Right (Active)   Wound Description Pink;Epithelialization;Beefy red;Hypergranulation 05/22/25 0807   Non-staged Wound Description Full thickness 05/22/25 0807   Wound Length (cm) 2.4 cm 05/22/25 0807   Wound Width (cm) 1.5 cm 05/22/25 0807   Wound Depth (cm) 0.1 cm 05/22/25 0807   Wound Surface Area (cm^2) 2.83 cm^2 05/22/25 0807   Wound Volume (cm^3) 0.188 cm^3 05/22/25 0807   Calculated Wound Volume (cm^3) 0.36 cm^3 05/22/25 0807   Change in Wound Size % 91 05/22/25 0807   Drainage Amount Moderate 05/22/25 0807   Drainage Description Serosanguineous;Tan 05/22/25 0807   Radha-wound Assessment Pink;Fragile;Maceration 05/22/25 0807   Dressing Status Intact 05/22/25 0807             Wound Instructions:  Orders Placed This Encounter   Procedures    Wound cleansing and dressings Venous Ulcer Right Pretibial     Right Leg wound:      Wash your hands with soap and water. Remove old dressing, discard into plastic bag and place in trash. Cleanse the wound with Normal Saline solution prior to applying a clean dressing. Do not use tissue or cotton balls. Do not scrub the wound. Pat dry using  "gauze.      Shower: Yes. Cleanse with a mild soap and water such as Dove. Be sure to remove your old dressing prior to getting in the shower.         Apply Xeroform to the open wound.   Cover with gauze.  Secure with tape. (If your skin is breaking down from tape, then need to wrap with Armani and put tape on Armani)      Change dressing THREE TIMES A WEEK.                             Elastic Tubular Stocking: Spanda F     Tubular elastic bandage: Apply from base of toes to behind the knee. Apply in AM, may remove for sleep.  Avoid prolonged standing in one place.  Elevate leg(s) above the level of the heart when sitting or as much as possible.     Standing Status:   Future     Expiration Date:   5/29/2025    Wound Procedure Treatment Venous Ulcer Right Pretibial     This order was created via procedure documentation       Vesta Garcia PA-C, [facility]      Portions of the record may have been created with voice recognition software. Occasional wrong word or \"sound alike\" substitutions may have occurred due to the inherent limitations of voice recognition software. Read the chart carefully and recognize, using context, where substitutions have occurred.           [1]   Patient Active Problem List  Diagnosis    Hyperlipidemia    Chronic anticoagulation    Hypertension, essential    Coronary artery disease of native artery of native heart with stable angina pectoris (HCC)    Simple chronic bronchitis (HCC)    Diabetes mellitus with neurological manifestation (HCC)    Hypothyroidism    GERD (gastroesophageal reflux disease)    Anemia    AVM (arteriovenous malformation) of small bowel, acquired with hemorrhage    Angiectasia    Other vascular disorders of intestine (HCC)    Aortic valve replaced    Cardiomyopathy (HCC)    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HCC)    Stage 3b chronic kidney disease (HCC)    Chronic kidney disease-mineral and bone disorder    Primary osteoarthritis involving multiple joints    Paroxysmal atrial " fibrillation (HCC)    Neutropenia associated with infection (HCC)    Herpes simplex labialis    Restrictive lung disease    Nocturnal hypoxemia    Supratherapeutic INR    H/O mechanical aortic valve replacement    Subtherapeutic international normalized ratio (INR)    Type 2 diabetes mellitus with stage 3b chronic kidney disease, with long-term current use of insulin (HCC)    Venous ulcer of right leg (HCC)    Chronic systolic (congestive) heart failure (HCC)    Lower extremity edema    Iron deficiency anemia due to chronic blood loss    Hemiplegia and hemiparesis following cerebral infarction affecting left non-dominant side (HCC)    Chronic obstructive pulmonary disease (HCC)    Type 2 diabetes mellitus with stage 4 chronic kidney disease, with long-term current use of insulin (HCC)    Chronic venous hypertension with ulcer and inflammation involving right side (HCC)    Peripheral arterial disease (HCC)   [2]   Past Medical History:  Diagnosis Date    Acute anterior epistaxis 12/23/2023    Acute blood loss anemia 12/14/2023    Acute respiratory failure with hypoxia (HCC) 02/06/2024    Anemia     Aneurysm of thoracic aorta (HCC)     Angioedema 06/07/2019    Aortic valve disorder     Benign neoplasm of sigmoid colon     Cardiomyopathy (HCC)     last assessed: 10/10/2014    CHF (congestive heart failure) (HCC)     Chronic kidney disease     Chronic venous hypertension with ulcer and inflammation involving right side (HCC) 04/11/2025    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HCC)     last assessed: 10/10/2014    COPD (chronic obstructive pulmonary disease) (HCC)     Diabetic nephropathy (HCC)     Disease of thyroid gland     RAMON (dyspnea on exertion)     GERD (gastroesophageal reflux disease)     History of emphysema (HCC)     History of transfusion     Hyperlipidemia     Hypertensive urgency 10/28/2023    Leg cramps 11/01/2023    Stroke-like symptoms 10/28/2023    TIA (transient ischemic attack)    [3]   Past Surgical  History:  Procedure Laterality Date    AORTIC VALVE REPLACEMENT      CARDIAC PACEMAKER PLACEMENT      last assessed: 10/10/2014    CARDIAC SURGERY      aortic valve replacement    CHOLECYSTECTOMY      COLONOSCOPY      EGD      HYSTERECTOMY      INSERT / REPLACE / REMOVE PACEMAKER      KNEE SURGERY Right     OTHER SURGICAL HISTORY      Capsule ENDOscopy description: 2012    NM COLONOSCOPY FLX DX W/COLLJ SPEC WHEN PFRMD N/A 2018    Procedure: single balloon ENTEROSCOPY;  Surgeon: David Dennis MD;  Location: [facility];  Service: Gastroenterology    NM ESOPHAGOGASTRODUODENOSCOPY TRANSORAL DIAGNOSTIC N/A 2017    Procedure: EGD AND COLONOSCOPY;  Surgeon: Jay Mcleod III, MD;  Location: [facility];  Service: Gastroenterology    VEIN LIGATION Right 2025    Procedure: right leg peforator sclerotherapy;  Surgeon: Chris Bush MD;  Location: [facility];  Service: Vascular   [4]   Family History  Problem Relation Name Age of Onset    No Known Problems Mother      No Known Problems Father     [5]   Social History  Socioeconomic History    Marital status:    Tobacco Use    Smoking status: Former     Current packs/day: 0.00     Average packs/day: 1 pack/day for 52.9 years (52.9 ttl pk-yrs)     Types: Cigarettes     Start date:      Quit date: 2007     Years since quittin.4     Passive exposure: Past    Smokeless tobacco: Former     Quit date:    Vaping Use    Vaping status: Never Used   Substance and Sexual Activity    Alcohol use: Never    Drug use: Never    Sexual activity: Not Currently     Partners: Male   Social History Narrative    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 BID Freestyle lancets BID    Living independently with spouse     Social Drivers of Health     Food Insecurity: No Food Insecurity (5/15/2024)    Nursing - Inadequate Food Risk Classification     Worried About Running Out of Food in the Last Year: Never true     Ran Out of Food in  the Last Year: Never true   Transportation Needs: No Transportation Needs (5/15/2024)    PRAPARE - Transportation     Lack of Transportation (Medical): No     Lack of Transportation (Non-Medical): No   Physical Activity: Inactive (5/26/2021)    Exercise Vital Sign     Days of Exercise per Week: 0 days     Minutes of Exercise per Session: 0 min   Stress: No Stress Concern Present (5/26/2021)    [place] [employer] - Occupational Stress Questionnaire     Feeling of Stress : Not at all   Housing Stability: Unknown (5/15/2024)    Housing Stability Vital Sign     Unable to Pay for Housing in the Last Year: No     Homeless in the Last Year: No   [6]   Current Outpatient Medications:     Accu-Chek FastClix Lancets MISC, Use 3 (three) times a day, Disp: 300 each, Rfl: 3    albuterol (Ventolin HFA) 90 mcg/act inhaler, Inhale 2 puffs every 6 (six) hours as needed for wheezing, Disp: 54 g, Rfl: 3    Ascorbic Acid (vitamin C) 1000 MG tablet, Take 1,000 mg by mouth daily, Disp: , Rfl:     aspirin (ECOTRIN LOW STRENGTH) 81 mg EC tablet, Take 1 tablet (81 mg total) by mouth daily Do not start before November 1, 2023., Disp: 30 tablet, Rfl: 0    Blood Glucose Monitoring Suppl (Accu-Chek Guide Me) w/Device KIT, Use to check sugars once daily, Disp: 1 kit, Rfl: 2    Cholecalciferol (Vitamin D3) 50 MCG (2000 UT) TABS, Take 2,000 Units by mouth daily, Disp: , Rfl:     clobetasol (TEMOVATE) 0.05 % ointment, Apply topically 2 (two) times a day, Disp: 30 g, Rfl: 1    cyanocobalamin (VITAMIN B-12) 1000 MCG tablet, Take 1 tablet (1,000 mcg total) by mouth daily, Disp: , Rfl:     Empagliflozin (Jardiance) 10 MG TABS tablet, TAKE 1 TABLET BY MOUTH IN THE  MORNING, Disp: 100 tablet, Rfl: 1    Ferrous Sulfate (IRON PO), Take by mouth daily in the early morning OTC, Disp: , Rfl:     fluticasone (FLONASE) 50 mcg/act nasal spray, 1 spray into each nostril daily, Disp: 16 g, Rfl: 2    fluticasone-umeclidinium-vilanterol  (Trelegy Ellipta) 100-62.5-25 mcg/actuation inhaler, Inhale 1 puff daily Rinse mouth after use., Disp: 180 blister, Rfl: 0    furosemide (LASIX) 40 mg tablet, Take 1 tablet (40 mg total) by mouth 2 (two) times a day, Disp: 180 tablet, Rfl: 3    gabapentin (NEURONTIN) 300 mg capsule, TAKE 1 CAPSULE BY MOUTH 3 TIMES  DAILY, Disp: 300 capsule, Rfl: 1    glipiZIDE (GLUCOTROL) 10 mg tablet, TAKE 1 TABLET BY MOUTH TWICE  DAILY BEFORE MEALS, Disp: 200 tablet, Rfl: 1    glucose blood (Accu-Chek Celeste Plus) test strip, Use 1 each 3 (three) times a day Use as instructed, Disp: 300 each, Rfl: 3    insulin degludec (TRESIBA) 100 units/mL injection pen, Inject 30 Units under the skin daily, Disp: 30 mL, Rfl: 3    Insulin Pen Needle (BD Pen Needle Rosie U/F) 32G X 4 MM MISC, Use daily, Disp: 100 each, Rfl: 1    ipratropium-albuterol (DUO-NEB) 0.5-2.5 mg/3 mL nebulizer solution, Take 3 mL by nebulization 4 (four) times a day, Disp: 360 mL, Rfl: 1    Lancets (freestyle) lancets, Check blood glucose 3 times daily, Disp: 300 each, Rfl: 0    levothyroxine 50 mcg tablet, TAKE 1 TABLET BY MOUTH DAILY, Disp: 100 tablet, Rfl: 1    metoprolol tartrate (LOPRESSOR) 50 mg tablet, TAKE ONE-HALF TABLET BY MOUTH  TWICE DAILY, Disp: 100 tablet, Rfl: 1    oxygen gas, Inhale 2 L/min daily at bedtime as needed home oxygen nightly, Disp: , Rfl:     pantoprazole (PROTONIX) 40 mg tablet, TAKE 1 TABLET BY MOUTH DAILY AS  DIRECTED, Disp: 100 tablet, Rfl: 2    warfarin (COUMADIN) 5 mg tablet, TAKE 1 TO 2 TABLETS BY  MOUTH DAILY OR AS DIRECTED, Disp: 60 tablet, Rfl: 0    Current Facility-Administered Medications:     lidocaine (LMX) 4 % cream, , Topical, Once, Vesta Garcia PA-C

## 2025-05-22 NOTE — PATIENT INSTRUCTIONS
Orders Placed This Encounter   Procedures    Wound cleansing and dressings Venous Ulcer Right Pretibial     Right Leg wound:      Wash your hands with soap and water. Remove old dressing, discard into plastic bag and place in trash. Cleanse the wound with Normal Saline solution prior to applying a clean dressing. Do not use tissue or cotton balls. Do not scrub the wound. Pat dry using gauze.      Shower: Yes. Cleanse with a mild soap and water such as Dove. Be sure to remove your old dressing prior to getting in the shower.         Apply Xeroform to the open wound.   Cover with gauze.  Secure with tape. (If your skin is breaking down from tape, then need to wrap with Armani and put tape on Armani)      Change dressing THREE TIMES A WEEK.                             Elastic Tubular Stocking: Spanda F     Tubular elastic bandage: Apply from base of toes to behind the knee. Apply in AM, may remove for sleep.  Avoid prolonged standing in one place.  Elevate leg(s) above the level of the heart when sitting or as much as possible.     Standing Status:   Future     Expiration Date:   5/29/2025

## 2025-05-23 ENCOUNTER — ANTICOAG VISIT (OUTPATIENT)
Dept: CARDIOLOGY CLINIC | Facility: CLINIC | Age: 85
End: 2025-05-23

## 2025-05-23 ENCOUNTER — RESULTS FOLLOW-UP (OUTPATIENT)
Dept: CARDIOLOGY CLINIC | Facility: CLINIC | Age: 85
End: 2025-05-23

## 2025-05-23 DIAGNOSIS — Z79.4 TYPE 2 DIABETES MELLITUS WITH DIABETIC POLYNEUROPATHY, WITH LONG-TERM CURRENT USE OF INSULIN (HCC): ICD-10-CM

## 2025-05-23 DIAGNOSIS — E03.9 HYPOTHYROIDISM, UNSPECIFIED TYPE: ICD-10-CM

## 2025-05-23 DIAGNOSIS — E11.40 TYPE 2 DIABETES MELLITUS WITH DIABETIC NEUROPATHY, WITHOUT LONG-TERM CURRENT USE OF INSULIN (HCC): ICD-10-CM

## 2025-05-23 DIAGNOSIS — E11.42 TYPE 2 DIABETES MELLITUS WITH DIABETIC POLYNEUROPATHY, WITH LONG-TERM CURRENT USE OF INSULIN (HCC): ICD-10-CM

## 2025-05-23 DIAGNOSIS — I10 HYPERTENSION, ESSENTIAL: ICD-10-CM

## 2025-05-25 RX ORDER — GABAPENTIN 300 MG/1
300 CAPSULE ORAL 3 TIMES DAILY
Qty: 300 CAPSULE | Refills: 1 | Status: SHIPPED | OUTPATIENT
Start: 2025-05-25

## 2025-05-25 RX ORDER — GLIPIZIDE 10 MG/1
10 TABLET ORAL
Qty: 200 TABLET | Refills: 0 | Status: SHIPPED | OUTPATIENT
Start: 2025-05-25

## 2025-05-25 RX ORDER — LEVOTHYROXINE SODIUM 50 UG/1
50 TABLET ORAL DAILY
Qty: 100 TABLET | Refills: 1 | Status: SHIPPED | OUTPATIENT
Start: 2025-05-25

## 2025-05-25 RX ORDER — METOPROLOL TARTRATE 50 MG
25 TABLET ORAL 2 TIMES DAILY
Qty: 100 TABLET | Refills: 1 | Status: SHIPPED | OUTPATIENT
Start: 2025-05-25

## 2025-05-27 DIAGNOSIS — E11.40 TYPE 2 DIABETES MELLITUS WITH DIABETIC NEUROPATHY, WITHOUT LONG-TERM CURRENT USE OF INSULIN (HCC): ICD-10-CM

## 2025-05-27 DIAGNOSIS — R53.83 FATIGUE, UNSPECIFIED TYPE: ICD-10-CM

## 2025-05-27 DIAGNOSIS — E78.2 MIXED HYPERLIPIDEMIA: ICD-10-CM

## 2025-05-27 DIAGNOSIS — E03.9 HYPOTHYROIDISM, UNSPECIFIED TYPE: Primary | ICD-10-CM

## 2025-05-29 ENCOUNTER — TELEPHONE (OUTPATIENT)
Dept: HEMATOLOGY ONCOLOGY | Facility: CLINIC | Age: 85
End: 2025-05-29

## 2025-05-29 ENCOUNTER — OFFICE VISIT (OUTPATIENT)
Dept: WOUND CARE | Facility: CLINIC | Age: 85
End: 2025-05-29
Payer: COMMERCIAL

## 2025-05-29 ENCOUNTER — TELEPHONE (OUTPATIENT)
Dept: INFUSION CENTER | Facility: CLINIC | Age: 85
End: 2025-05-29

## 2025-05-29 ENCOUNTER — OFFICE VISIT (OUTPATIENT)
Dept: HEMATOLOGY ONCOLOGY | Facility: CLINIC | Age: 85
End: 2025-05-29
Payer: COMMERCIAL

## 2025-05-29 VITALS
OXYGEN SATURATION: 85 % | WEIGHT: 163 LBS | HEART RATE: 90 BPM | DIASTOLIC BLOOD PRESSURE: 68 MMHG | SYSTOLIC BLOOD PRESSURE: 144 MMHG | HEIGHT: 66 IN | TEMPERATURE: 97.3 F | BODY MASS INDEX: 26.2 KG/M2

## 2025-05-29 VITALS
SYSTOLIC BLOOD PRESSURE: 139 MMHG | TEMPERATURE: 98.2 F | RESPIRATION RATE: 18 BRPM | HEART RATE: 72 BPM | DIASTOLIC BLOOD PRESSURE: 60 MMHG

## 2025-05-29 DIAGNOSIS — Z79.01 CURRENT USE OF LONG TERM ANTICOAGULATION: ICD-10-CM

## 2025-05-29 DIAGNOSIS — E61.1 IRON DEFICIENCY: Primary | ICD-10-CM

## 2025-05-29 DIAGNOSIS — N18.32 STAGE 3B CHRONIC KIDNEY DISEASE (HCC): ICD-10-CM

## 2025-05-29 DIAGNOSIS — R06.02 SHORTNESS OF BREATH: ICD-10-CM

## 2025-05-29 DIAGNOSIS — I83.019 VENOUS ULCER OF RIGHT LEG (HCC): Primary | ICD-10-CM

## 2025-05-29 DIAGNOSIS — L97.919 VENOUS ULCER OF RIGHT LEG (HCC): Primary | ICD-10-CM

## 2025-05-29 PROCEDURE — 99213 OFFICE O/P EST LOW 20 MIN: CPT | Performed by: ORTHOPAEDIC SURGERY

## 2025-05-29 PROCEDURE — 99214 OFFICE O/P EST MOD 30 MIN: CPT | Performed by: PHYSICIAN ASSISTANT

## 2025-05-29 RX ORDER — LIDOCAINE 40 MG/G
CREAM TOPICAL ONCE
Status: COMPLETED | OUTPATIENT
Start: 2025-05-29 | End: 2025-05-29

## 2025-05-29 RX ORDER — SODIUM CHLORIDE 9 MG/ML
20 INJECTION, SOLUTION INTRAVENOUS ONCE
OUTPATIENT
Start: 2025-07-17

## 2025-05-29 RX ORDER — SODIUM CHLORIDE 9 MG/ML
20 INJECTION, SOLUTION INTRAVENOUS ONCE
OUTPATIENT
Start: 2025-06-20

## 2025-05-29 RX ADMIN — LIDOCAINE: 40 CREAM TOPICAL at 08:44

## 2025-05-29 NOTE — PROGRESS NOTES
"Name: Ely Hernadez      : 1940      MRN: [id number]  Encounter Provider: Staci Contreras PA-C  Encounter Date: 2025   Encounter department: [facility] [place]  :  84-year-old female with history of mechanical valve on Coumadin who presents as follow-up for iron deficiency anemia secondary to GI blood loss/AVMs. She has had multiple angioectasias that have been treated via endoscopy. She has been receiving IV iron intermittently, last treatment was 2025.    Assessment & Plan  Iron deficiency  Hgb 14.6, iron 36, iron saturation 9%, UIBC normal, ferritin 43  Anemia improved however iron panel demonstrates iron deficiency   Recommend additional IV Venofer 300mg weekly x 2.  It appears that she is requiring IV Venofer every 3 months.  She is interested in receiving maintenance dose of IV iron once a month.  We will initiate this following completion of above.  Will need CBC, iron panel prior.  Hold for ferritin greater than 300.  Orders:    sodium chloride 0.9 % infusion    CBC and differential; Future    iron sucrose (VENOFER) 300 mg in sodium chloride 0.9 % 250 mL IVPB    Current use of long term anticoagulation  On long term coumadin for history of mechanical aortic valve replacement   INR managed by cardiology        Stage 3b chronic kidney disease (HCC)  Lab Results   Component Value Date    EGFR 34 2025    EGFR 36 11/15/2024    EGFR 33 2024    CREATININE 1.41 (H) 2025    CREATININE 1.34 (H) 11/15/2024    CREATININE 1.44 (H) 2024   \"There is general agreement that the criteria for absolute iron deficiency anemia in CKD prompting treatment in any circumstance should include a transferrin saturation <= 20% and a serum ferritin concentration <= 100 ng/ml.\"  Bon CATALAN. Treatment of Iron Deficiency Anemia in CKD and End-Stage Kidney Disease. Kidney Int Rep. 2021;6(9):3832-4749. doi: 10.1016/j.ekir...020. PMID: " [id number];  PMCID: [id number].   IV iron as above        Shortness of breath  Unlikely related to her blood counts as her anemia is resolved.    Lungs are CTA and VSS.   PET from 2 weeks ago showed stable non-hypermetabolic adenopathy. ? pneumonitis in RUL   Advised her to try her rescue inhaler given her history of COPD.  If she has persistent shortness of breath, advise follow-up with pulmonology.  If symptoms are significantly worse or she develops chest pain she should go to the emergency room for evaluation.          Assessment & Plan        Return in about 3 months (around 9/8/2025).    History of Present Illness   Chief Complaint   Patient presents with    Follow-up     4 month f/u     History of Present Illness  84-year-old female with past medical history of stroke, heart failure, A-fib, diabetes, COPD, aortic valve replacement in 2007 on Coumadin, and CKD stage III who presents as a new consult for anemia.     Patient endorses history of iron deficiency anemia many years ago.  She has never seen a hematologist or had issues with anemia during childhood. She reports she had GI workup at this time including video capsule endoscopy that was unrevealing of any bleeding source.  She was prescribed oral iron pills which she has been taking for many years that have recently been stopped by her nephrologist approximately 1 year ago.     On chart review back to 05/2019, hgb baseline was around 12-14g/dL until she's was hospitalized in December 2023 for symptomatic anemia, hemoglobin at this time was 5.6.  She received 4 unit PRBC and one-time dose of IV Venofer 300 mg during hospitalization.  EGD with push/colonoscopy were performed which did not identify bleeding source.  Patient was recommended to have video capsule endoscopy as an outpatient however she has not seen GI in the clinic since her discharge.     Patient went to the emergency room this past week on 03/08 for symptomatic anemia.  Hemoglobin at this time  was 7.1.  She was given 1 unit PRBC.      Former smoker, quit in 2007.  She denies alcohol or drug use.  Patient is retired, previously worked in a factory.  No personal family history of cancer.     Labs  03/2024: Hgb 7.8, MCV 74, platelets 415,000.  Ferritin 17, iron saturation 13%, TIBC normal. Retic 2.63%.        03/2024-04/2024: IV Venofer 300mg weekly x 4  05/2024: Hgb 12.4, MCV 86, platelets 383,000. No iron panel. Called patient to review lab results. She has recurrent anemia after IV venofer infusions. Suspect she has ongoing blood loss, probable GI source. Recently had APC by EGD to duodenla and jejunal lesions. She denies gross vaginal bleeding, melena, hematochezia or hematuria.   07/2024: WBC 5.7, Hgb 12.2, MCV 89, ,000.  Iron sat 20%, UIBC nromals. Ferritin 122.  08/2024: WBC 5.66, hemoglobin 11.4, MCV 94, iron 195, iron saturation 51%, ferritin 34  09/2024: IV Venofer 300mg x4   09/2024: WBC 4.92, hemoglobin 14.1, MCV 94, platelets 252,000  11/2024: WBC 4.89, hemoglobin 11.3, MCV 96, platelets 352,000. Iron 196, iron saturation 46%, UIBC normal, ferritin 32.  having mild shortness of breath again and black stools which is normal for her while on oral iron supplements.  No other bleeding is observed.       Procedures  12/2023:  EGD w push: No AVMs  12/2023: Colonoscopy: Diverticulosis of moderate severity causing moderate luminal narrowing in the sigmoid colon. 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  Denies any bleeding.  Shortness of breath was not improved after normalization of her anemia.  Likely chronic secondary to her other comorbidities.  No chest pain.    Review of Systems   All other systems reviewed  "and are negative.            05/29/25: She is complaining of fatigue, shortness of breath for approximately 1 week.  She has not used rescue inhaler for her shortness of breath.  Shortness of breath is worse with exertion or talking too long.  Denies any chest pain.     Review of Systems   Constitutional:  Positive for fatigue. Negative for fever.   Respiratory:  Positive for shortness of breath. Negative for cough.    Cardiovascular:  Negative for leg swelling.   Skin:  Negative for rash.   All other systems reviewed and are negative.          Objective   /68   Pulse 90   Temp (!) 97.3 °F (36.3 °C) (Temporal)   Ht 5' 6\" (1.676 m)   Wt 73.9 kg (163 lb)   SpO2 (!) 85% Comment: having sob for a week now  BMI 26.31 kg/m²     Physical Exam  Vitals reviewed.   HENT:      Head: Normocephalic.     Cardiovascular:      Rate and Rhythm: Normal rate and regular rhythm.   Pulmonary:      Effort: Pulmonary effort is normal.      Breath sounds: Normal breath sounds. No wheezing or rales.   Abdominal:      Palpations: Abdomen is soft.      Tenderness: There is no abdominal tenderness.     Musculoskeletal:      Cervical back: Neck supple.     Skin:     Findings: No rash.     Neurological:      Mental Status: She is alert.       Physical Exam      Results    Labs: I have reviewed the following labs:  Results for orders placed or performed in visit on 05/22/25   Protime-INR   Result Value Ref Range    Protime 27.0 (H) 12.3 - 15.0 seconds    INR 2.51 (H) 0.85 - 1.19   CBC and differential   Result Value Ref Range    WBC 6.20 4.31 - 10.16 Thousand/uL    RBC 4.99 3.81 - 5.12 Million/uL    Hemoglobin 14.6 11.5 - 15.4 g/dL    Hematocrit 44.9 34.8 - 46.1 %    MCV 90 82 - 98 fL    MCH 29.3 26.8 - 34.3 pg    MCHC 32.5 31.4 - 37.4 g/dL    RDW 14.7 11.6 - 15.1 %    MPV 10.7 8.9 - 12.7 fL    Platelets 289 149 - 390 Thousands/uL    nRBC 0 /100 WBCs    Segmented % 69 43 - 75 %    Immature Grans % 1 0 - 2 %    Lymphocytes % 15 14 - 44 " %    Monocytes % 10 4 - 12 %    Eosinophils Relative 4 0 - 6 %    Basophils Relative 1 0 - 1 %    Absolute Neutrophils 4.29 1.85 - 7.62 Thousands/µL    Absolute Immature Grans 0.03 0.00 - 0.20 Thousand/uL    Absolute Lymphocytes 0.95 0.60 - 4.47 Thousands/µL    Absolute Monocytes 0.63 0.17 - 1.22 Thousand/µL    Eosinophils Absolute 0.25 0.00 - 0.61 Thousand/µL    Basophils Absolute 0.05 0.00 - 0.10 Thousands/µL   TIBC Panel (incl. Iron, TIBC, % Iron Saturation)   Result Value Ref Range    Iron Saturation 9 (L) 15 - 50 %    TIBC 380.8 250 - 450 ug/dL    Iron 36 (L) 50 - 212 ug/dL    Transferrin 272 203 - 362 mg/dL    UIBC 345 155 - 355 ug/dL   Result Value Ref Range    Ferritin 43 30 - 307 ng/mL     *Note: Due to a large number of results and/or encounters for the requested time period, some results have not been displayed. A complete set of results can be found in Results Review.

## 2025-05-29 NOTE — ASSESSMENT & PLAN NOTE
"Lab Results   Component Value Date    EGFR 34 03/17/2025    EGFR 36 11/15/2024    EGFR 33 09/27/2024    CREATININE 1.41 (H) 03/17/2025    CREATININE 1.34 (H) 11/15/2024    CREATININE 1.44 (H) 09/27/2024   \"There is general agreement that the criteria for absolute iron deficiency anemia in CKD prompting treatment in any circumstance should include a transferrin saturation <= 20% and a serum ferritin concentration <= 100 ng/ml.\"  Bon CATALAN. Treatment of Iron Deficiency Anemia in CKD and End-Stage Kidney Disease. Kidney Int Rep. 2021 Jun 5;6(9):4590-9610. doi: 10.1016/j.ekir.2021.05.020. PMID: 44282347;  PMCID: [id number].   IV iron as above        "

## 2025-05-29 NOTE — PROGRESS NOTES
Wound Procedure Treatment Venous Ulcer Right Pretibial    Performed by: Marie Stewart RN  Authorized by: Vesta Garcia PA-C  Associated wounds:   Wound 10/29/23 Venous Ulcer Pretibial Right    Wound cleansed with:  NSS   Applied primary dressing:  Other   Applied secondary dressing:  Gauze   Dressing secured with:  Tape and Size F     Xerofrom

## 2025-05-29 NOTE — PROGRESS NOTES
Patient ID: Ely Hernadez is a 84 y.o. female Date of Birth 1940       Chief Complaint   Patient presents with    Follow Up Wound Care Visit     RLE WOUND       Allergies:  Patient has no known allergies.    Diagnosis:   Diagnosis ICD-10-CM Associated Orders   1. Venous ulcer of right leg (HCC)  I83.019 Wound cleansing and dressings Venous Ulcer Right Pretibial    L97.919 lidocaine (LMX) 4 % cream     Wound Procedure Treatment Venous Ulcer Right Pretibial           Assessment and Plan :  Follow up evaluation of right venous ulcer slowly improving and decreasing in size.  Continue wound management with xeroform on wound bed, see wound orders below   Continue to wear daily compression with Spandagrip.  No harsh cleansers such as alcohol, peroxide, or antibacterial soap, do not submerge in water  Can cleanse with mild soap and water or NSS at dressing changes.   Continue to f/u with vascular surgeon.  Continue frequent elevation of leg and increase exercise/walking for wound healing.  Follow-up in 2 week(s) or call sooner with questions or concerns or any signs of infection such as redness, swelling, increased/purulent drainage, fever, chills, increased severe pain.    Subjective:   6/28: Pt is an 84 y.o. female with pmhx HTN, DMII (A1c 8.7),  CKD, Pafib, CVA (10/28/23 on Coumadin/ASA 81mg) with residual deficits (left sided facial drop and leg weakness) well known to [facility] who presents for follow up evaluation of non healing RLE venous ulcer which has been present for about 2.5 years. Pt has been covering wound with a bandaid. Does not have an odor. No smoking, ETOH or drug use.  Pt denies any sob, fatigue, N/V, CP, fever or chills.    7/12: Patient presents for followup evaluation of RLE venous ulcer. No increased pain or drainage. Pt is frustrated with chronic wound. Has been using Polymem on the wound bed and Tubigrip for compression.  Pt denies any fevers or chills.    7/25: Patient presents for followup  evaluation of RLE venous ulcer. Since last visit pt was started on Bactrim for positive wound culture with abnormal 2+ Growth of Staphylococcus Aureus. The tissue exam demonstrated chronic inflammation and fibrosis. No malignancy, nor pyoderma gangrenosum were identified.  Pt noticed wound decreasing in size. Has been using Polymem on the wound bed and Tubigrip for compression.  Pt denies any fevers or chills.    8/2: Patient presents for followup evaluation of RLE venous ulcer. Pt completed Bactrim as directed. No issues. Pt states this is the best she has felt in 2 years. No fevers or chills.    8/22: Patient presents for followup evaluation of RLE venous ulcer. Pt states she has been wetting wound with mild soap and water. She noticed a scab formed and came off on dressing. In office CHEYANNE today; R 0.59, L 0.76. No fevers or chills.    9/6: Patient presents for followup evaluation of RLE venous ulcer. Pt  has been applying Polymem Ag Max on wound bed and Tubigrip for compression. Denies fevers or chills.    9/19: Patient presents for followup evaluation of RLE venous ulcer. Pt has been applying Mupirocin wound bed and Tubigrip for compression. Denies fevers or chills.    9/26: Patient presents for followup evaluation of RLE venous ulcer. No complaints. Pt has been applying Hydrofera Blue ready on wound bed and Tubigrip for compression. Denies fevers or chills.    10/10:  Patient presents for followup evaluation of RLE venous ulcer. No complaints. Pt is scheduled for vascular studies for 11/6/24. Pt has been applying Hydrofera Blue ready on wound bed and Tubigrip for compression. Denies fevers or chills.    10/31:  Patient presents for followup evaluation of RLE venous ulcer. NO issues. Pt has been applying Hydrofera Blue ready on wound bed and Tubigrip for compression. Denies fevers or chills.    11/14:  Patient presents for followup evaluation of RLE venous ulcer. RLE vascular studies reveal deep venous  incompetence with an incompetent . GSV and SSV is competent. Bilateral LEADs reveal diffuse disease in the RLE femoral and popliteal arteries with a 50-75% stenosis in the common femoral artery. R CHEYANNE:  0.64 (severe disease). There is diffuse disease in the LLE femoral and popliteal arteries with a >75% stenosis of the distal superficial femoral artery.There is a 50-75% stenosis of the proximal and mid superficial femoral arteries. Left CHEYANNE: 0.69 (moderate disease). Pt has scheduled a vascular surgery appointment for January 14, 2025. Pt has been applying Hydrofera Blue ready on wound bed and Tubigrip for compression. Denies fevers or chills.    12/5: Patient presents for followup evaluation of RLE venous ulcer. No complaints. Pt has been applying Hydrofera Blue ready on wound bed and Tubigrip for compression. Denies fevers or chills.    12/19: Patient presents for followup evaluation of RLE venous ulcer. No issues. Pt has been applying clobetasol cream and DSD on wound bed and Tubigrip for compression. Pt is scheduled to see vascular surgery on January 14,  Denies fevers or chills.    1/2: Patient presents for followup evaluation of RLE venous ulcer.  Pt states dressing has been adhering to wound bed. Pt has been applying Hydrofera Blue Ready on wound bed and Tubigrip for compression. Denies fevers or chills.    3/6: Patient presents for followup evaluation of RLE venous ulcer. Pt has been applying Hydrofera Blue Ready on wound bed and Tubigrip for compression. Pt appointment with vascular was cancelled due to patient being ill. Pt plans on rescheduling. Denies fevers or chills.    3/20: Patient presents for followup evaluation of RLE venous ulcer. Pt c/o increased serosanguinous drainage. Pt is scheduled for RLE sclerotherapy with vascular on 4/11/25. Pt has been applying Dermagran on wound bed and Tubigrip for compression. Denies fevers or chills.    4/3:  Patient presents for followup evaluation of RLE  venous ulcer. Pt c/o increased serosanguinous drainage. Pt has been applying Exufiber Ag on wound bed and Tubigrip for compression. Denies fevers or chills.    4/17: Patient presents for followup evaluation of RLE venous ulcer.  Pt had RLE sclerotherapy procedure on 4/11/25. Pt has been applying Exufiber Ag on wound bed and Tubigrip for compression. Denies fevers or chills.    5/8: Patient presents for followup evaluation of RLE venous ulcer.  Pt states Exufiber dressing has been adhering to wound bed.      5/15: Patient presents for followup evaluation of RLE venous ulcer.  Pt states she has increased bloody drainage to wound bed. Has been applying Dermagran on wound bed. No fevers or chills.    5/22: Patient presents for followup evaluation of RLE venous ulcer.  Pt states the adaptic with silver alginate has been sticking to wound bed. No fevers or chills.     5/29: Patient presents for followup evaluation of RLE venous ulcer. No complaints.. Has been applying xeroform to wound bed. No fevers or chills.           The following portions of the patient's history were reviewed and updated as appropriate:   Problem List[1]  Past Medical History[2]  Past Surgical History[3]  Family History[4]  Social History[5]  Current Medications[6]    Review of Systems   Constitutional:  Negative for appetite change, chills, fatigue, fever and unexpected weight change.   HENT:  Negative for congestion, hearing loss and postnasal drip.    Respiratory:  Negative for cough and shortness of breath.    Cardiovascular:  Negative for leg swelling.   Musculoskeletal:  Positive for gait problem.   Skin:  Positive for wound (RLE). Negative for rash.   Neurological:  Negative for numbness.   Hematological:  Does not bruise/bleed easily.   Psychiatric/Behavioral: Negative.           Objective:  /60   Pulse 72   Temp 98.2 °F (36.8 °C)   Resp 18   Pain Score: 0-No pain     Physical Exam  Constitutional:       General: She is awake. She  is not in acute distress.     Appearance: She is not ill-appearing, toxic-appearing or diaphoretic.   HENT:      Head: Normocephalic and atraumatic.      Right Ear: External ear normal.      Left Ear: External ear normal.     Eyes:      Conjunctiva/sclera: Conjunctivae normal.       Cardiovascular:      Pulses:           Dorsalis pedis pulses are 1+ on the right side.   Pulmonary:      Effort: Pulmonary effort is normal. No respiratory distress.     Musculoskeletal:      Right lower leg: Edema present.      Comments: trace     Skin:     General: Skin is warm and dry.      Findings: Wound (RLE) present. No erythema.      Comments: 1.  Right lower extremity venous ulcer with sanguinous drainage and and fragile periwound scar tissue. Skin island appreciated on exam. See wound assessment for additional details.     Neurological:      Mental Status: She is alert.     Psychiatric:         Mood and Affect: Mood normal.         Behavior: Behavior normal. Behavior is cooperative.         Wound 10/29/23 Venous Ulcer Pretibial Right (Active)   Wound Image Images linked 05/29/25 0840   Wound Description Pink;Epithelialization;Beefy red 05/29/25 0840   Non-staged Wound Description Full thickness 05/29/25 0840   Wound Length (cm) 2.2 cm 05/29/25 0840   Wound Width (cm) 1.8 cm 05/29/25 0840   Wound Depth (cm) 0.1 cm 05/29/25 0840   Wound Surface Area (cm^2) 3.11 cm^2 05/29/25 0840   Wound Volume (cm^3) 0.207 cm^3 05/29/25 0840   Calculated Wound Volume (cm^3) 0.4 cm^3 05/29/25 0840   Change in Wound Size % 90 05/29/25 0840   Drainage Amount Small 05/29/25 0840   Drainage Description Serosanguineous;Gayle;Milky 05/29/25 0840   Radha-wound Assessment Pink;Fragile;Maceration 05/29/25 0840   Dressing Status Intact 05/29/25 0840             Wound Instructions:  Orders Placed This Encounter   Procedures    Wound cleansing and dressings Venous Ulcer Right Pretibial     Right Leg wound:      Wash your hands with soap and water. Remove old  "dressing, discard into plastic bag and place in trash. Cleanse the wound with Normal Saline solution prior to applying a clean dressing. Do not use tissue or cotton balls. Do not scrub the wound. Pat dry using gauze.      Shower: Yes. Cleanse with a mild soap and water such as Dove. Be sure to remove your old dressing prior to getting in the shower.      Apply Xeroform to the open wound.   Cover with gauze.  Secure with tape. (If your skin is breaking down from tape, then need to wrap with Armani and put tape on Armani)      Change dressing THREE TIMES A WEEK.                             Elastic Tubular Stocking: Spanda F     Tubular elastic bandage: Apply from base of toes to behind the knee. Apply in AM, may remove for sleep.     Standing Status:   Future     Expiration Date:   6/5/2025    Wound Procedure Treatment Venous Ulcer Right Pretibial     This order was created via procedure documentation       Vesta Garcia PA-C, [facility]      Portions of the record may have been created with voice recognition software. Occasional wrong word or \"sound alike\" substitutions may have occurred due to the inherent limitations of voice recognition software. Read the chart carefully and recognize, using context, where substitutions have occurred.         [1]   Patient Active Problem List  Diagnosis    Hyperlipidemia    Chronic anticoagulation    Hypertension, essential    Coronary artery disease of native artery of native heart with stable angina pectoris (HCC)    Simple chronic bronchitis (HCC)    Diabetes mellitus with neurological manifestation (HCC)    Hypothyroidism    Iron deficiency    GERD (gastroesophageal reflux disease)    Anemia    AVM (arteriovenous malformation) of small bowel, acquired with hemorrhage    Angiectasia    Other vascular disorders of intestine (HCC)    Aortic valve replaced    Cardiomyopathy (HCC)    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HCC)    Stage 3b chronic kidney disease (HCC)    Chronic kidney " disease-mineral and bone disorder    Primary osteoarthritis involving multiple joints    Paroxysmal atrial fibrillation (HCC)    Neutropenia associated with infection (HCC)    Herpes simplex labialis    Restrictive lung disease    Nocturnal hypoxemia    Supratherapeutic INR    H/O mechanical aortic valve replacement    Subtherapeutic international normalized ratio (INR)    Type 2 diabetes mellitus with stage 3b chronic kidney disease, with long-term current use of insulin (HCC)    Venous ulcer of right leg (HCC)    Chronic systolic (congestive) heart failure (HCC)    Lower extremity edema    Iron deficiency anemia due to chronic blood loss    Hemiplegia and hemiparesis following cerebral infarction affecting left non-dominant side (HCC)    Chronic obstructive pulmonary disease (HCC)    Type 2 diabetes mellitus with stage 4 chronic kidney disease, with long-term current use of insulin (HCC)    Chronic venous hypertension with ulcer and inflammation involving right side (HCC)    Peripheral arterial disease (HCC)   [2]   Past Medical History:  Diagnosis Date    Acute anterior epistaxis 12/23/2023    Acute blood loss anemia 12/14/2023    Acute respiratory failure with hypoxia (HCC) 02/06/2024    Anemia     Aneurysm of thoracic aorta (HCC)     Angioedema 06/07/2019    Aortic valve disorder     Benign neoplasm of sigmoid colon     Cardiomyopathy (HCC)     last assessed: 10/10/2014    CHF (congestive heart failure) (HCC)     Chronic kidney disease     Chronic venous hypertension with ulcer and inflammation involving right side (HCC) 04/11/2025    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HCC)     last assessed: 10/10/2014    COPD (chronic obstructive pulmonary disease) (HCC)     Diabetic nephropathy (HCC)     Disease of thyroid gland     RAMON (dyspnea on exertion)     GERD (gastroesophageal reflux disease)     History of emphysema (HCC)     History of transfusion     Hyperlipidemia     Hypertensive urgency 10/28/2023    Leg cramps  2023    Stroke-like symptoms 10/28/2023    TIA (transient ischemic attack)    [3]   Past Surgical History:  Procedure Laterality Date    AORTIC VALVE REPLACEMENT      CARDIAC PACEMAKER PLACEMENT      last assessed: 10/10/2014    CARDIAC SURGERY      aortic valve replacement    CHOLECYSTECTOMY      COLONOSCOPY      EGD      HYSTERECTOMY      INSERT / REPLACE / REMOVE PACEMAKER      KNEE SURGERY Right     OTHER SURGICAL HISTORY      Capsule ENDOscopy description: 2012    AK COLONOSCOPY FLX DX W/COLLJ SPEC WHEN PFRMD N/A 2018    Procedure: single balloon ENTEROSCOPY;  Surgeon: David Dennis MD;  Location: [facility];  Service: Gastroenterology    AK ESOPHAGOGASTRODUODENOSCOPY TRANSORAL DIAGNOSTIC N/A 2017    Procedure: EGD AND COLONOSCOPY;  Surgeon: Jay Mcleod III, MD;  Location: [facility];  Service: Gastroenterology    VEIN LIGATION Right 2025    Procedure: right leg peforator sclerotherapy;  Surgeon: Chris Bush MD;  Location: [facility];  Service: Vascular   [4]   Family History  Problem Relation Name Age of Onset    No Known Problems Mother      No Known Problems Father     [5]   Social History  Socioeconomic History    Marital status:    Tobacco Use    Smoking status: Former     Current packs/day: 0.00     Average packs/day: 1 pack/day for 52.9 years (52.9 ttl pk-yrs)     Types: Cigarettes     Start date:      Quit date: 2007     Years since quittin.5     Passive exposure: Past    Smokeless tobacco: Former     Quit date:    Vaping Use    Vaping status: Never Used   Substance and Sexual Activity    Alcohol use: Never    Drug use: Never    Sexual activity: Not Currently     Partners: Male   Social History Narrative    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 BID Freestyle lancets BID    Living independently with spouse     Social Drivers of Health     Food Insecurity: No Food Insecurity (5/15/2024)    Nursing - Inadequate Food Risk  Classification     Worried About Running Out of Food in the Last Year: Never true     Ran Out of Food in the Last Year: Never true   Transportation Needs: No Transportation Needs (5/15/2024)    PRAPARE - Transportation     Lack of Transportation (Medical): No     Lack of Transportation (Non-Medical): No   Physical Activity: Inactive (5/26/2021)    Exercise Vital Sign     Days of Exercise per Week: 0 days     Minutes of Exercise per Session: 0 min   Stress: No Stress Concern Present (5/26/2021)    [place] [employer] - Occupational Stress Questionnaire     Feeling of Stress : Not at all   Housing Stability: Unknown (5/15/2024)    Housing Stability Vital Sign     Unable to Pay for Housing in the Last Year: No     Homeless in the Last Year: No   [6]   Current Outpatient Medications:     Accu-Chek FastClix Lancets MISC, Use 3 (three) times a day, Disp: 300 each, Rfl: 3    albuterol (Ventolin HFA) 90 mcg/act inhaler, Inhale 2 puffs every 6 (six) hours as needed for wheezing, Disp: 54 g, Rfl: 3    Ascorbic Acid (vitamin C) 1000 MG tablet, Take 1,000 mg by mouth in the morning., Disp: , Rfl:     aspirin (ECOTRIN LOW STRENGTH) 81 mg EC tablet, Take 1 tablet (81 mg total) by mouth daily Do not start before November 1, 2023., Disp: 30 tablet, Rfl: 0    Blood Glucose Monitoring Suppl (Accu-Chek Guide Me) w/Device KIT, Use to check sugars once daily, Disp: 1 kit, Rfl: 2    Cholecalciferol (Vitamin D3) 50 MCG (2000 UT) TABS, Take 2,000 Units by mouth in the morning., Disp: , Rfl:     clobetasol (TEMOVATE) 0.05 % ointment, Apply topically 2 (two) times a day, Disp: 30 g, Rfl: 1    cyanocobalamin (VITAMIN B-12) 1000 MCG tablet, Take 1 tablet (1,000 mcg total) by mouth daily, Disp: , Rfl:     Empagliflozin (Jardiance) 10 MG TABS tablet, TAKE 1 TABLET BY MOUTH IN THE  MORNING, Disp: 100 tablet, Rfl: 1    Ferrous Sulfate (IRON PO), Take by mouth daily in the early morning OTC, Disp: , Rfl:     fluticasone  (FLONASE) 50 mcg/act nasal spray, 1 spray into each nostril daily, Disp: 16 g, Rfl: 2    fluticasone-umeclidinium-vilanterol (Trelegy Ellipta) 100-62.5-25 mcg/actuation inhaler, Inhale 1 puff daily Rinse mouth after use., Disp: 180 blister, Rfl: 0    furosemide (LASIX) 40 mg tablet, Take 1 tablet (40 mg total) by mouth 2 (two) times a day, Disp: 180 tablet, Rfl: 3    gabapentin (NEURONTIN) 300 mg capsule, TAKE 1 CAPSULE BY MOUTH 3 TIMES  DAILY, Disp: 300 capsule, Rfl: 1    glipiZIDE (GLUCOTROL) 10 mg tablet, TAKE 1 TABLET BY MOUTH TWICE  DAILY BEFORE MEALS, Disp: 200 tablet, Rfl: 0    glucose blood (Accu-Chek Celeste Plus) test strip, Use 1 each 3 (three) times a day Use as instructed, Disp: 300 each, Rfl: 3    insulin degludec (TRESIBA) 100 units/mL injection pen, Inject 30 Units under the skin daily, Disp: 30 mL, Rfl: 3    Insulin Pen Needle (BD Pen Needle Rosie U/F) 32G X 4 MM MISC, Use daily, Disp: 100 each, Rfl: 1    ipratropium-albuterol (DUO-NEB) 0.5-2.5 mg/3 mL nebulizer solution, Take 3 mL by nebulization 4 (four) times a day, Disp: 360 mL, Rfl: 1    Lancets (freestyle) lancets, Check blood glucose 3 times daily, Disp: 300 each, Rfl: 0    levothyroxine 50 mcg tablet, TAKE 1 TABLET BY MOUTH DAILY, Disp: 100 tablet, Rfl: 1    metoprolol tartrate (LOPRESSOR) 50 mg tablet, TAKE ONE-HALF TABLET BY MOUTH  TWICE DAILY, Disp: 100 tablet, Rfl: 1    oxygen gas, Inhale 2 L/min daily at bedtime as needed home oxygen nightly, Disp: , Rfl:     pantoprazole (PROTONIX) 40 mg tablet, TAKE 1 TABLET BY MOUTH DAILY AS  DIRECTED, Disp: 100 tablet, Rfl: 2    warfarin (COUMADIN) 5 mg tablet, TAKE 1 TO 2 TABLETS BY  MOUTH DAILY OR AS DIRECTED, Disp: 60 tablet, Rfl: 0    Current Facility-Administered Medications:     lidocaine (LMX) 4 % cream, , Topical, Once, Vesta Garcia PA-C

## 2025-05-29 NOTE — ASSESSMENT & PLAN NOTE
Hgb 14.6, iron 36, iron saturation 9%, UIBC normal, ferritin 43  Anemia improved however iron panel demonstrates iron deficiency   Recommend additional IV Venofer 300mg weekly x 2.  It appears that she is requiring IV Venofer every 3 months.  She is interested in receiving maintenance dose of IV iron once a month.  We will initiate this following completion of above.  Will need CBC, iron panel prior.  Hold for ferritin greater than 300.  Orders:    sodium chloride 0.9 % infusion    CBC and differential; Future    iron sucrose (VENOFER) 300 mg in sodium chloride 0.9 % 250 mL IVPB

## 2025-05-29 NOTE — TELEPHONE ENCOUNTER
----- Message from Phoebe YBARRA sent at 5/29/2025  8:41 AM EDT -----  Regarding: Iron Infusions  Please call patient and set up iron infusions that were ordered at her recent appointment with Staci FOWLER.Thank you,Phoebe

## 2025-05-29 NOTE — TELEPHONE ENCOUNTER
LVM for patient to please call back to get infusion appts scheduled for venofer. Weekly x 2 then monthly per Staci FOWLER

## 2025-05-29 NOTE — PATIENT INSTRUCTIONS
Orders Placed This Encounter   Procedures    Wound cleansing and dressings Venous Ulcer Right Pretibial     Right Leg wound:      Wash your hands with soap and water. Remove old dressing, discard into plastic bag and place in trash. Cleanse the wound with Normal Saline solution prior to applying a clean dressing. Do not use tissue or cotton balls. Do not scrub the wound. Pat dry using gauze.      Shower: Yes. Cleanse with a mild soap and water such as Dove. Be sure to remove your old dressing prior to getting in the shower.      Apply Xeroform to the open wound.   Cover with gauze.  Secure with tape. (If your skin is breaking down from tape, then need to wrap with Armani and put tape on Armani)      Change dressing THREE TIMES A WEEK.                             Elastic Tubular Stocking: Spanda F     Tubular elastic bandage: Apply from base of toes to behind the knee. Apply in AM, may remove for sleep.     Standing Status:   Future     Expiration Date:   6/5/2025

## 2025-05-29 NOTE — ASSESSMENT & PLAN NOTE
On long term coumadin for history of mechanical aortic valve replacement   INR managed by cardiology

## 2025-06-01 DIAGNOSIS — J41.0 SIMPLE CHRONIC BRONCHITIS (HCC): ICD-10-CM

## 2025-06-02 ENCOUNTER — APPOINTMENT (OUTPATIENT)
Dept: LAB | Facility: CLINIC | Age: 85
End: 2025-06-02
Payer: COMMERCIAL

## 2025-06-02 ENCOUNTER — OFFICE VISIT (OUTPATIENT)
Age: 85
End: 2025-06-02
Payer: COMMERCIAL

## 2025-06-02 VITALS
HEIGHT: 66 IN | WEIGHT: 163 LBS | OXYGEN SATURATION: 96 % | DIASTOLIC BLOOD PRESSURE: 66 MMHG | HEART RATE: 88 BPM | SYSTOLIC BLOOD PRESSURE: 136 MMHG | BODY MASS INDEX: 26.2 KG/M2

## 2025-06-02 DIAGNOSIS — E03.9 ACQUIRED HYPOTHYROIDISM: ICD-10-CM

## 2025-06-02 DIAGNOSIS — E11.22 TYPE 2 DIABETES MELLITUS WITH STAGE 3B CHRONIC KIDNEY DISEASE, WITH LONG-TERM CURRENT USE OF INSULIN (HCC): ICD-10-CM

## 2025-06-02 DIAGNOSIS — I50.22 CHRONIC SYSTOLIC (CONGESTIVE) HEART FAILURE (HCC): ICD-10-CM

## 2025-06-02 DIAGNOSIS — K21.9 GASTROESOPHAGEAL REFLUX DISEASE, UNSPECIFIED WHETHER ESOPHAGITIS PRESENT: ICD-10-CM

## 2025-06-02 DIAGNOSIS — N18.32 TYPE 2 DIABETES MELLITUS WITH STAGE 3B CHRONIC KIDNEY DISEASE, WITH LONG-TERM CURRENT USE OF INSULIN (HCC): ICD-10-CM

## 2025-06-02 DIAGNOSIS — Z79.4 TYPE 2 DIABETES MELLITUS WITH STAGE 4 CHRONIC KIDNEY DISEASE, WITH LONG-TERM CURRENT USE OF INSULIN (HCC): ICD-10-CM

## 2025-06-02 DIAGNOSIS — E78.2 MIXED HYPERLIPIDEMIA: ICD-10-CM

## 2025-06-02 DIAGNOSIS — Z79.4 TYPE 2 DIABETES MELLITUS WITH STAGE 3B CHRONIC KIDNEY DISEASE, WITH LONG-TERM CURRENT USE OF INSULIN (HCC): ICD-10-CM

## 2025-06-02 DIAGNOSIS — E61.1 IRON DEFICIENCY: ICD-10-CM

## 2025-06-02 DIAGNOSIS — E11.40 TYPE 2 DIABETES MELLITUS WITH DIABETIC NEUROPATHY, WITHOUT LONG-TERM CURRENT USE OF INSULIN (HCC): ICD-10-CM

## 2025-06-02 DIAGNOSIS — I48.0 PAROXYSMAL ATRIAL FIBRILLATION (HCC): Primary | ICD-10-CM

## 2025-06-02 DIAGNOSIS — J44.9 CHRONIC OBSTRUCTIVE PULMONARY DISEASE, UNSPECIFIED COPD TYPE (HCC): ICD-10-CM

## 2025-06-02 DIAGNOSIS — N18.4 TYPE 2 DIABETES MELLITUS WITH STAGE 4 CHRONIC KIDNEY DISEASE, WITH LONG-TERM CURRENT USE OF INSULIN (HCC): ICD-10-CM

## 2025-06-02 DIAGNOSIS — E11.22 TYPE 2 DIABETES MELLITUS WITH STAGE 4 CHRONIC KIDNEY DISEASE, WITH LONG-TERM CURRENT USE OF INSULIN (HCC): ICD-10-CM

## 2025-06-02 DIAGNOSIS — I10 HYPERTENSION, ESSENTIAL: ICD-10-CM

## 2025-06-02 DIAGNOSIS — I25.118 CORONARY ARTERY DISEASE OF NATIVE ARTERY OF NATIVE HEART WITH STABLE ANGINA PECTORIS (HCC): ICD-10-CM

## 2025-06-02 LAB
ALBUMIN SERPL BCG-MCNC: 4.2 G/DL (ref 3.5–5)
ALP SERPL-CCNC: 96 U/L (ref 34–104)
ALT SERPL W P-5'-P-CCNC: 11 U/L (ref 7–52)
ANION GAP SERPL CALCULATED.3IONS-SCNC: 10 MMOL/L (ref 4–13)
AST SERPL W P-5'-P-CCNC: 17 U/L (ref 13–39)
BASOPHILS # BLD AUTO: 0.05 THOUSANDS/ÂΜL (ref 0–0.1)
BASOPHILS NFR BLD AUTO: 1 % (ref 0–1)
BILIRUB DIRECT SERPL-MCNC: 0.15 MG/DL (ref 0–0.2)
BILIRUB SERPL-MCNC: 0.4 MG/DL (ref 0.2–1)
BUN SERPL-MCNC: 26 MG/DL (ref 5–25)
CALCIUM SERPL-MCNC: 9.4 MG/DL (ref 8.4–10.2)
CHLORIDE SERPL-SCNC: 100 MMOL/L (ref 96–108)
CHOLEST SERPL-MCNC: 198 MG/DL (ref ?–200)
CO2 SERPL-SCNC: 29 MMOL/L (ref 21–32)
CREAT SERPL-MCNC: 1.65 MG/DL (ref 0.6–1.3)
EOSINOPHIL # BLD AUTO: 0.7 THOUSAND/ÂΜL (ref 0–0.61)
EOSINOPHIL NFR BLD AUTO: 10 % (ref 0–6)
ERYTHROCYTE [DISTWIDTH] IN BLOOD BY AUTOMATED COUNT: 14.3 % (ref 11.6–15.1)
EST. AVERAGE GLUCOSE BLD GHB EST-MCNC: 177 MG/DL
GFR SERPL CREATININE-BSD FRML MDRD: 28 ML/MIN/1.73SQ M
GLUCOSE P FAST SERPL-MCNC: 154 MG/DL (ref 65–99)
HBA1C MFR BLD: 7.8 %
HCT VFR BLD AUTO: 49.1 % (ref 34.8–46.1)
HDLC SERPL-MCNC: 34 MG/DL
HGB BLD-MCNC: 15.7 G/DL (ref 11.5–15.4)
IMM GRANULOCYTES # BLD AUTO: 0.04 THOUSAND/UL (ref 0–0.2)
IMM GRANULOCYTES NFR BLD AUTO: 1 % (ref 0–2)
LDLC SERPL CALC-MCNC: 119 MG/DL (ref 0–100)
LYMPHOCYTES # BLD AUTO: 1.1 THOUSANDS/ÂΜL (ref 0.6–4.47)
LYMPHOCYTES NFR BLD AUTO: 15 % (ref 14–44)
MCH RBC QN AUTO: 28.4 PG (ref 26.8–34.3)
MCHC RBC AUTO-ENTMCNC: 32 G/DL (ref 31.4–37.4)
MCV RBC AUTO: 89 FL (ref 82–98)
MONOCYTES # BLD AUTO: 0.82 THOUSAND/ÂΜL (ref 0.17–1.22)
MONOCYTES NFR BLD AUTO: 11 % (ref 4–12)
NEUTROPHILS # BLD AUTO: 4.47 THOUSANDS/ÂΜL (ref 1.85–7.62)
NEUTS SEG NFR BLD AUTO: 62 % (ref 43–75)
NONHDLC SERPL-MCNC: 164 MG/DL
NRBC BLD AUTO-RTO: 0 /100 WBCS
PLATELET # BLD AUTO: 407 THOUSANDS/UL (ref 149–390)
PMV BLD AUTO: 10.6 FL (ref 8.9–12.7)
POTASSIUM SERPL-SCNC: 4.7 MMOL/L (ref 3.5–5.3)
PROT SERPL-MCNC: 7.9 G/DL (ref 6.4–8.4)
RBC # BLD AUTO: 5.52 MILLION/UL (ref 3.81–5.12)
SODIUM SERPL-SCNC: 139 MMOL/L (ref 135–147)
TRIGL SERPL-MCNC: 226 MG/DL (ref ?–150)
TSH SERPL DL<=0.05 MIU/L-ACNC: 2.01 UIU/ML (ref 0.45–4.5)
WBC # BLD AUTO: 7.18 THOUSAND/UL (ref 4.31–10.16)

## 2025-06-02 PROCEDURE — 85025 COMPLETE CBC W/AUTO DIFF WBC: CPT

## 2025-06-02 PROCEDURE — 83036 HEMOGLOBIN GLYCOSYLATED A1C: CPT

## 2025-06-02 PROCEDURE — 99214 OFFICE O/P EST MOD 30 MIN: CPT | Performed by: INTERNAL MEDICINE

## 2025-06-02 PROCEDURE — 84443 ASSAY THYROID STIM HORMONE: CPT

## 2025-06-02 PROCEDURE — 80053 COMPREHEN METABOLIC PANEL: CPT

## 2025-06-02 PROCEDURE — 36415 COLL VENOUS BLD VENIPUNCTURE: CPT

## 2025-06-02 PROCEDURE — 82248 BILIRUBIN DIRECT: CPT

## 2025-06-02 PROCEDURE — 80061 LIPID PANEL: CPT

## 2025-06-02 PROCEDURE — G0439 PPPS, SUBSEQ VISIT: HCPCS | Performed by: INTERNAL MEDICINE

## 2025-06-02 RX ORDER — FLUTICASONE FUROATE, UMECLIDINIUM BROMIDE AND VILANTEROL TRIFENATATE 100; 62.5; 25 UG/1; UG/1; UG/1
POWDER RESPIRATORY (INHALATION)
Qty: 180 EACH | Refills: 3 | Status: SHIPPED | OUTPATIENT
Start: 2025-06-02

## 2025-06-02 RX ORDER — BLOOD SUGAR DIAGNOSTIC
1 STRIP MISCELLANEOUS 3 TIMES DAILY
Qty: 300 EACH | Refills: 3 | Status: SHIPPED | OUTPATIENT
Start: 2025-06-02

## 2025-06-02 NOTE — PATIENT INSTRUCTIONS
Medicare Preventive Visit Patient Instructions  Thank you for completing your Welcome to Medicare Visit or Medicare Annual Wellness Visit today. Your next wellness visit will be due in one year (6/3/2026).  The screening/preventive services that you may require over the next 5-10 years are detailed below. Some tests may not apply to you based off risk factors and/or age. Screening tests ordered at today's visit but not completed yet may show as past due. Also, please note that scanned in results may not display below.  Preventive Screenings:  Service Recommendations Previous Testing/Comments   Colorectal Cancer Screening  * Colonoscopy    * Fecal Occult Blood Test (FOBT)/Fecal Immunochemical Test (FIT)  * Fecal DNA/Cologuard Test  * Flexible Sigmoidoscopy Age: 45-75 years old   Colonoscopy: every 10 years (may be performed more frequently if at higher risk)  OR  FOBT/FIT: every 1 year  OR  Cologuard: every 3 years  OR  Sigmoidoscopy: every 5 years  Screening may be recommended earlier than age 45 if at higher risk for colorectal cancer. Also, an individualized decision between you and your healthcare provider will decide whether screening between the ages of 76-85 would be appropriate. Colonoscopy: 12/18/2023  FOBT/FIT: Not on file  Cologuard: Not on file  Sigmoidoscopy: Not on file    Screening Not Indicated     Breast Cancer Screening Age: 40+ years old  Frequency: every 1-2 years  Not required if history of left and right mastectomy Mammogram: Not on file        Cervical Cancer Screening Between the ages of 21-29, pap smear recommended once every 3 years.   Between the ages of 30-65, can perform pap smear with HPV co-testing every 5 years.   Recommendations may differ for women with a history of total hysterectomy, cervical cancer, or abnormal pap smears in past. Pap Smear: Not on file    Screening Not Indicated   Hepatitis C Screening Once for adults born between 1945 and 1965  More frequently in patients at  high risk for Hepatitis C Hep C Antibody: Not on file        Diabetes Screening 1-2 times per year if you're at risk for diabetes or have pre-diabetes Fasting glucose: 162 mg/dL (3/17/2025)  A1C: 7.6 % (11/15/2024)  Screening Not Indicated  History Diabetes   Cholesterol Screening Once every 5 years if you don't have a lipid disorder. May order more often based on risk factors. Lipid panel: 11/15/2024    Screening Not Indicated  History Lipid Disorder     Other Preventive Screenings Covered by Medicare:  Abdominal Aortic Aneurysm (AAA) Screening: covered once if your at risk. You're considered to be at risk if you have a family history of AAA.  Lung Cancer Screening: covers low dose CT scan once per year if you meet all of the following conditions: (1) Age 55-77; (2) No signs or symptoms of lung cancer; (3) Current smoker or have quit smoking within the last 15 years; (4) You have a tobacco smoking history of at least 20 pack years (packs per day multiplied by number of years you smoked); (5) You get a written order from a healthcare provider.  Glaucoma Screening: covered annually if you're considered high risk: (1) You have diabetes OR (2) Family history of glaucoma OR (3)  aged 50 and older OR (4)  American aged 65 and older  Osteoporosis Screening: covered every 2 years if you meet one of the following conditions: (1) You're estrogen deficient and at risk for osteoporosis based off medical history and other findings; (2) Have a vertebral abnormality; (3) On glucocorticoid therapy for more than 3 months; (4) Have primary hyperparathyroidism; (5) On osteoporosis medications and need to assess response to drug therapy.   Last bone density test (DXA Scan): 10/12/2022.  HIV Screening: covered annually if you're between the age of 15-65. Also covered annually if you are younger than 15 and older than 65 with risk factors for HIV infection. For pregnant patients, it is covered up to 3 times per  pregnancy.    Immunizations:  Immunization Recommendations   Influenza Vaccine Annual influenza vaccination during flu season is recommended for all persons aged >= 6 months who do not have contraindications   Pneumococcal Vaccine   * Pneumococcal conjugate vaccine = PCV13 (Prevnar 13), PCV15 (Vaxneuvance), PCV20 (Prevnar 20)  * Pneumococcal polysaccharide vaccine = PPSV23 (Pneumovax) Adults 19-63 yo with certain risk factors or if 65+ yo  If never received any pneumonia vaccine: recommend Prevnar 20 (PCV20)  Give PCV20 if previously received 1 dose of PCV13 or PPSV23   Hepatitis B Vaccine 3 dose series if at intermediate or high risk (ex: diabetes, end stage renal disease, liver disease)   Respiratory syncytial virus (RSV) Vaccine - COVERED BY MEDICARE PART D  * RSVPreF3 (Arexvy) CDC recommends that adults 60 years of age and older may receive a single dose of RSV vaccine using shared clinical decision-making (SCDM)   Tetanus (Td) Vaccine - COST NOT COVERED BY MEDICARE PART B Following completion of primary series, a booster dose should be given every 10 years to maintain immunity against tetanus. Td may also be given as tetanus wound prophylaxis.   Tdap Vaccine - COST NOT COVERED BY MEDICARE PART B Recommended at least once for all adults. For pregnant patients, recommended with each pregnancy.   Shingles Vaccine (Shingrix) - COST NOT COVERED BY MEDICARE PART B  2 shot series recommended in those 19 years and older who have or will have weakened immune systems or those 50 years and older     Health Maintenance Due:      Topic Date Due   • Lung Cancer Screening  Discontinued     Immunizations Due:      Topic Date Due   • COVID-19 Vaccine (1 - 2024-25 season) Never done     Advance Directives   What are advance directives?  Advance directives are legal documents that state your wishes and plans for medical care. These plans are made ahead of time in case you lose your ability to make decisions for yourself. Advance  directives can apply to any medical decision, such as the treatments you want, and if you want to donate organs.   What are the types of advance directives?  There are many types of advance directives, and each state has rules about how to use them. You may choose a combination of any of the following:  Living will:  This is a written record of the treatment you want. You can also choose which treatments you do not want, which to limit, and which to stop at a certain time. This includes surgery, medicine, IV fluid, and tube feedings.   Durable power of  for healthcare (DPAHC):  This is a written record that states who you want to make healthcare choices for you when you are unable to make them for yourself. This person, called a proxy, is usually a family member or a friend. You may choose more than 1 proxy.  Do not resuscitate (DNR) order:  A DNR order is used in case your heart stops beating or you stop breathing. It is a request not to have certain forms of treatment, such as CPR. A DNR order may be included in other types of advance directives.  Medical directive:  This covers the care that you want if you are in a coma, near death, or unable to make decisions for yourself. You can list the treatments you want for each condition. Treatment may include pain medicine, surgery, blood transfusions, dialysis, IV or tube feedings, and a ventilator (breathing machine).  Values history:  This document has questions about your views, beliefs, and how you feel and think about life. This information can help others choose the care that you would choose.  Why are advance directives important?  An advance directive helps you control your care. Although spoken wishes may be used, it is better to have your wishes written down. Spoken wishes can be misunderstood, or not followed. Treatments may be given even if you do not want them. An advance directive may make it easier for your family to make difficult choices about  your care.   Weight Management   Why it is important to manage your weight:  Being overweight increases your risk of health conditions such as heart disease, high blood pressure, type 2 diabetes, and certain types of cancer. It can also increase your risk for osteoarthritis, sleep apnea, and other respiratory problems. Aim for a slow, steady weight loss. Even a small amount of weight loss can lower your risk of health problems.  How to lose weight safely:  A safe and healthy way to lose weight is to eat fewer calories and get regular exercise. You can lose up about 1 pound a week by decreasing the number of calories you eat by 500 calories each day.   Healthy meal plan for weight management:  A healthy meal plan includes a variety of foods, contains fewer calories, and helps you stay healthy. A healthy meal plan includes the following:  Eat whole-grain foods more often.  A healthy meal plan should contain fiber. Fiber is the part of grains, fruits, and vegetables that is not broken down by your body. Whole-grain foods are healthy and provide extra fiber in your diet. Some examples of whole-grain foods are whole-wheat breads and pastas, oatmeal, brown rice, and bulgur.  Eat a variety of vegetables every day.  Include dark, leafy greens such as spinach, kale, lior greens, and mustard greens. Eat yellow and orange vegetables such as carrots, sweet potatoes, and winter squash.   Eat a variety of fruits every day.  Choose fresh or canned fruit (canned in its own juice or light syrup) instead of juice. Fruit juice has very little or no fiber.  Eat low-fat dairy foods.  Drink fat-free (skim) milk or 1% milk. Eat fat-free yogurt and low-fat cottage cheese. Try low-fat cheeses such as mozzarella and other reduced-fat cheeses.  Choose meat and other protein foods that are low in fat.  Choose beans or other legumes such as split peas or lentils. Choose fish, skinless poultry (chicken or turkey), or lean cuts of red meat  (beef or pork). Before you cook meat or poultry, cut off any visible fat.   Use less fat and oil.  Try baking foods instead of frying them. Add less fat, such as margarine, sour cream, regular salad dressing and mayonnaise to foods. Eat fewer high-fat foods. Some examples of high-fat foods include french fries, doughnuts, ice cream, and cakes.  Eat fewer sweets.  Limit foods and drinks that are high in sugar. This includes candy, cookies, regular soda, and sweetened drinks.  Exercise:  Exercise at least 30 minutes per day on most days of the week. Some examples of exercise include walking, biking, dancing, and swimming. You can also fit in more physical activity by taking the stairs instead of the elevator or parking farther away from stores. Ask your healthcare provider about the best exercise plan for you.    © Copyright [employer] 2018 Information is for End User's use only and may not be sold, redistributed or otherwise used for commercial purposes. All illustrations and images included in CareNotes® are the copyrighted property of A.D.A.M., Inc. or [employer]

## 2025-06-02 NOTE — ASSESSMENT & PLAN NOTE
Taking Jardiance, glipizide and Tresiba, due for A1c  - I would like to come off glipizide due to CKD  Lab Results   Component Value Date    HGBA1C 7.6 (H) 11/15/2024

## 2025-06-02 NOTE — ASSESSMENT & PLAN NOTE
Not currently taking a statin, check lipid profile, will likely need to initiate statin.  Orders:    Lipid panel; Future

## 2025-06-02 NOTE — ASSESSMENT & PLAN NOTE
Following with cardiology, heart rate controlled with Lopressor, anticoagulated with Coumadin

## 2025-06-02 NOTE — ASSESSMENT & PLAN NOTE
Wt Readings from Last 3 Encounters:   06/02/25 73.9 kg (163 lb)   05/29/25 73.9 kg (163 lb)   05/12/25 75.8 kg (167 lb)     Appears well compensated on Lasix 40 twice daily, following with cardiology

## 2025-06-02 NOTE — ASSESSMENT & PLAN NOTE
As above  Lab Results   Component Value Date    HGBA1C 7.6 (H) 11/15/2024       Orders:    Basic metabolic panel; Future    CBC and differential; Future    Hemoglobin A1C; Future    Hepatic function panel; Future    Albumin / creatinine urine ratio; Future

## 2025-06-02 NOTE — PROGRESS NOTES
Name: Ely Hernadez      : 1940      MRN: [id number]  Encounter Provider: Raghav Holloway MD  Encounter Date: 2025   Encounter department: [facility] [address]  :  Assessment & Plan  Paroxysmal atrial fibrillation (HCC)  Following with cardiology, heart rate controlled with Lopressor, anticoagulated with Coumadin       Hypertension, essential  Blood pressure stable on Lopressor, furosemide       Coronary artery disease of native artery of native heart with stable angina pectoris (HCC)  Stable without angina following with cardiology       Chronic systolic (congestive) heart failure (HCC)  Wt Readings from Last 3 Encounters:   25 73.9 kg (163 lb)   25 73.9 kg (163 lb)   25 75.8 kg (167 lb)     Appears well compensated on Lasix 40 twice daily, following with cardiology               Chronic obstructive pulmonary disease, unspecified COPD type (HCC)  Stable on Trelegy, rarely needing rescue inhaler       Gastroesophageal reflux disease, unspecified whether esophagitis present  Stable on pantoprazole       Type 2 diabetes mellitus with stage 3b chronic kidney disease, with long-term current use of insulin (HCC)  Taking Jardiance, glipizide and Tresiba, due for A1c  - I would like to come off glipizide due to CKD  Lab Results   Component Value Date    HGBA1C 7.6 (H) 11/15/2024            Acquired hypothyroidism  On levothyroxine, check TSH  Orders:    TSH, 3rd generation with Free T4 reflex; Future    Type 2 diabetes mellitus with diabetic neuropathy, without long-term current use of insulin (HCC)  As above  Lab Results   Component Value Date    HGBA1C 7.6 (H) 11/15/2024       Orders:    Basic metabolic panel; Future    CBC and differential; Future    Hemoglobin A1C; Future    Hepatic function panel; Future    Albumin / creatinine urine ratio; Future    Iron deficiency  Following with hematology, scheduled for iron infusions       Mixed hyperlipidemia  Not currently  taking a statin, check lipid profile, will likely need to initiate statin.  Orders:    Lipid panel; Future       Preventive health issues were discussed with patient, and age appropriate screening tests were ordered as noted in patient's After Visit Summary. Personalized health advice and appropriate referrals for health education or preventive services given if needed, as noted in patient's After Visit Summary.    History of Present Illness     AWV and f/u MMP  Here w/ son Odell  Lives w/ son         Patient Care Team:  Raghav Holloway MD as PCP - General (Internal Medicine)  Raghav Holloway MD as PCP - PCP-[facility] ([facility])  Jay Mcleod III, MD as Endoscopist  Hilario Marti MD (Nephrology)  Amanda Villarreal PA-C as Physician Assistant (Internal Medicine)  Satci Contreras PA-C as Physician Assistant (Hematology and Oncology)  Verónica Lam PA-C as Physician Assistant (Internal Medicine)    Review of Systems   Constitutional:  Negative for appetite change, chills, diaphoresis, fatigue, fever and unexpected weight change.   HENT:  Negative for congestion, hearing loss and rhinorrhea.    Eyes:  Negative for visual disturbance.   Respiratory:  Negative for cough, chest tightness, shortness of breath and wheezing.    Cardiovascular:  Negative for chest pain, palpitations and leg swelling.   Gastrointestinal:  Negative for abdominal pain and blood in stool.   Endocrine: Negative for cold intolerance, heat intolerance, polydipsia and polyuria.   Genitourinary:  Negative for difficulty urinating, dysuria, frequency and urgency.   Musculoskeletal:  Negative for arthralgias and myalgias.   Skin:  Negative for rash.   Neurological:  Negative for dizziness, weakness, light-headedness and headaches.   Hematological:  Does not bruise/bleed easily.   Psychiatric/Behavioral:  Negative for dysphoric mood and sleep disturbance.      Medical History Reviewed by provider this encounter:       Annual  Wellness Visit Questionnaire       Health Risk Assessment:   Patient rates overall health as fair. Patient feels that their physical health rating is slightly worse. Patient is satisfied with their life. Eyesight was rated as much worse. Hearing was rated as same. Patient feels that their emotional and mental health rating is same. Patients states they are never, rarely angry. Patient states they are never, rarely unusually tired/fatigued. Pain experienced in the last 7 days has been none. Patient states that she has experienced no weight loss or gain in last 6 months.     Fall Risk Screening:   In the past year, patient has experienced: no history of falling in past year      Urinary Incontinence Screening:   Patient has not leaked urine accidently in the last six months.     Home Safety:  Patient has trouble with stairs inside or outside of their home. Patient has working smoke alarms and has working carbon monoxide detector. Home safety hazards include: none.     Nutrition:   Current diet is Regular.     Medications:   Patient is not currently taking any over-the-counter supplements. Patient is able to manage medications.     Activities of Daily Living (ADLs)/Instrumental Activities of Daily Living (IADLs):   Walk and transfer into and out of bed and chair?: Yes  Dress and groom yourself?: Yes    Bathe or shower yourself?: Yes    Feed yourself? Yes  Do your laundry/housekeeping?: Yes  Manage your money, pay your bills and track your expenses?: Yes  Make your own meals?: Yes    Do your own shopping?: Yes    Previous Hospitalizations:   Any hospitalizations or ED visits within the last 12 months?: No      Advance Care Planning:   Living will: Yes    Durable POA for healthcare: Yes    Advanced directive: Yes      Cognitive Screening:   Provider or family/friend/caregiver concerned regarding cognition?: No    Preventive Screenings      Cardiovascular Screening:    General: Screening Not Indicated and History Lipid  "Disorder      Diabetes Screening:     General: Screening Not Indicated and History Diabetes      Colorectal Cancer Screening:     General: Screening Not Indicated      Breast Cancer Screening:     General: Screening Not Indicated      Cervical Cancer Screening:    General: Screening Not Indicated      Osteoporosis Screening:    General: Risks and Benefits Discussed      Abdominal Aortic Aneurysm (AAA) Screening:        General: Screening Not Indicated      Lung Cancer Screening:     General: Screening Not Indicated      Hepatitis C Screening:    General: Screening Not Indicated    Immunizations:  - Immunizations due: Zoster (Shingrix)    Screening, Brief Intervention, and Referral to Treatment (SBIRT)     Screening      Single Item Drug Screening:  How often have you used an illegal drug (including marijuana) or a prescription medication for non-medical reasons in the past year? never    Single Item Drug Screen Score: 0  Interpretation: Negative screen for possible drug use disorder    Social Drivers of Health     Food Insecurity: No Food Insecurity (5/15/2024)    Nursing - Inadequate Food Risk Classification     Worried About Running Out of Food in the Last Year: Never true     Ran Out of Food in the Last Year: Never true   Transportation Needs: No Transportation Needs (5/15/2024)    PRAPARE - Transportation     Lack of Transportation (Medical): No     Lack of Transportation (Non-Medical): No   Housing Stability: Unknown (5/15/2024)    Housing Stability Vital Sign     Unable to Pay for Housing in the Last Year: No     Homeless in the Last Year: No   Utilities: Not At Risk (5/15/2024)    [facility] Utilities     Threatened with loss of utilities: No     No results found.    Objective   Ht 5' 6\" (1.676 m)   Wt 73.9 kg (163 lb)   BMI 26.31 kg/m²     Physical Exam  Constitutional:       Appearance: She is well-developed.   HENT:      Head: Normocephalic and atraumatic.      Nose: Nose normal.     Eyes:      General: No " scleral icterus.     Conjunctiva/sclera: Conjunctivae normal.      Pupils: Pupils are equal, round, and reactive to light.     Neck:      Thyroid: No thyromegaly.      Vascular: No JVD.      Trachea: No tracheal deviation.     Cardiovascular:      Rate and Rhythm: Normal rate. Rhythm irregular.      Heart sounds: Murmur heard.      No friction rub. No gallop.      Comments: Prosthetic aov  Pulmonary:      Effort: Pulmonary effort is normal. No respiratory distress.      Breath sounds: Normal breath sounds. No wheezing or rales.     Musculoskeletal:         General: No deformity.      Cervical back: Normal range of motion and neck supple.   Lymphadenopathy:      Cervical: No cervical adenopathy.     Skin:     General: Skin is warm and dry.      Coloration: Skin is not pale.      Findings: No erythema or rash.     Neurological:      Mental Status: She is alert and oriented to person, place, and time.      Cranial Nerves: No cranial nerve deficit.     Psychiatric:         Behavior: Behavior normal.         Thought Content: Thought content normal.         Judgment: Judgment normal.

## 2025-06-03 ENCOUNTER — TELEPHONE (OUTPATIENT)
Age: 85
End: 2025-06-03

## 2025-06-03 NOTE — TELEPHONE ENCOUNTER
Caller: Ely Hernadez    Doctor: Paula Carrasco    Reason for call: Patient was seen in the office with Paula on 4/22/25 and advised to follow up in 2-3 months. A CHARISSE was ordered, questioning if this should be performed prior to next office visit.    Call back#: [phone]

## 2025-06-04 NOTE — TELEPHONE ENCOUNTER
Lvm for pt that she does need [place] done and gave central scheduling phone # and asked her to callus so we can give her an appt to f/u in office after

## 2025-06-05 ENCOUNTER — APPOINTMENT (OUTPATIENT)
Dept: LAB | Facility: CLINIC | Age: 85
End: 2025-06-05
Payer: COMMERCIAL

## 2025-06-05 LAB
BACTERIA UR QL AUTO: NORMAL /HPF
BILIRUB UR QL STRIP: NEGATIVE
CLARITY UR: CLEAR
COLOR UR: ABNORMAL
CREAT UR-MCNC: 67.7 MG/DL
CREAT UR-MCNC: 68.2 MG/DL
GLUCOSE UR STRIP-MCNC: ABNORMAL MG/DL
HGB UR QL STRIP.AUTO: NEGATIVE
KETONES UR STRIP-MCNC: NEGATIVE MG/DL
LEUKOCYTE ESTERASE UR QL STRIP: ABNORMAL
MICROALBUMIN UR-MCNC: <7 MG/L
NITRITE UR QL STRIP: NEGATIVE
NON-SQ EPI CELLS URNS QL MICRO: NORMAL /HPF
PH UR STRIP.AUTO: 6.5 [PH]
PROT UR STRIP-MCNC: NEGATIVE MG/DL
PROT UR-MCNC: 7.3 MG/DL
PROT/CREAT UR: 0.1 MG/G{CREAT}
RBC #/AREA URNS AUTO: NORMAL /HPF
SP GR UR STRIP.AUTO: 1.02 (ref 1–1.03)
UROBILINOGEN UR STRIP-ACNC: <2 MG/DL
WBC #/AREA URNS AUTO: NORMAL /HPF

## 2025-06-05 PROCEDURE — 82043 UR ALBUMIN QUANTITATIVE: CPT

## 2025-06-05 PROCEDURE — 82570 ASSAY OF URINE CREATININE: CPT

## 2025-06-06 DIAGNOSIS — I35.9 AORTIC VALVE DISORDER: ICD-10-CM

## 2025-06-07 RX ORDER — WARFARIN SODIUM 5 MG/1
5-10 TABLET ORAL DAILY
Qty: 60 TABLET | Refills: 11 | Status: SHIPPED | OUTPATIENT
Start: 2025-06-07

## 2025-06-09 ENCOUNTER — REMOTE DEVICE CLINIC VISIT (OUTPATIENT)
Dept: CARDIOLOGY CLINIC | Facility: CLINIC | Age: 85
End: 2025-06-09

## 2025-06-09 DIAGNOSIS — E11.42 TYPE 2 DIABETES MELLITUS WITH DIABETIC POLYNEUROPATHY, WITH LONG-TERM CURRENT USE OF INSULIN (HCC): ICD-10-CM

## 2025-06-09 DIAGNOSIS — Z79.4 TYPE 2 DIABETES MELLITUS WITH DIABETIC POLYNEUROPATHY, WITH LONG-TERM CURRENT USE OF INSULIN (HCC): ICD-10-CM

## 2025-06-09 DIAGNOSIS — Z95.0 PRESENCE OF PERMANENT CARDIAC PACEMAKER: Primary | ICD-10-CM

## 2025-06-09 PROCEDURE — RECHECK

## 2025-06-09 NOTE — PROGRESS NOTES
Results for orders placed or performed in visit on 06/09/25   Cardiac EP device report    Narrative    SJM DC PM/NOT MRI CONDITIONAL  NON-BILLABLE MERLIN TRANSMISSION; BATTERY STATUS: BATTERY VOLTAGE NEARING LACIE (9.2 MOS). WILL SCHEDULE MONTHLY BATTERY CHECKS. AP 18%,   98% (>40% CHB/DDDR 60PPM). ALL AVAILABLE LEAD PARAMETERS WITHIN NORMAL LIMITS. 8 AMS EPISODE W/AF IN PROGRESS. AF BURDEN SINCE 5/8/25: >98%. PVC BURDEN 1.1%. HX: PAF & PATIENT ON ASA 81 MG, WARFARIN, METOPROLOL TART. NORMAL DEVICE FUNCTION.  ES

## 2025-06-10 RX ORDER — PEN NEEDLE, DIABETIC 31 GX5/16"
NEEDLE, DISPOSABLE MISCELLANEOUS DAILY
Qty: 100 EACH | Refills: 1 | Status: SHIPPED | OUTPATIENT
Start: 2025-06-10

## 2025-06-11 ENCOUNTER — RESULTS FOLLOW-UP (OUTPATIENT)
Dept: NON INVASIVE DIAGNOSTICS | Facility: HOSPITAL | Age: 85
End: 2025-06-11

## 2025-06-19 ENCOUNTER — APPOINTMENT (EMERGENCY)
Dept: RADIOLOGY | Facility: HOSPITAL | Age: 85
End: 2025-06-19
Payer: COMMERCIAL

## 2025-06-19 ENCOUNTER — OFFICE VISIT (OUTPATIENT)
Dept: WOUND CARE | Facility: CLINIC | Age: 85
End: 2025-06-19
Payer: COMMERCIAL

## 2025-06-19 ENCOUNTER — APPOINTMENT (EMERGENCY)
Dept: CT IMAGING | Facility: HOSPITAL | Age: 85
End: 2025-06-19
Payer: COMMERCIAL

## 2025-06-19 ENCOUNTER — HOSPITAL ENCOUNTER (OUTPATIENT)
Facility: HOSPITAL | Age: 85
Setting detail: OBSERVATION
Discharge: HOME HEALTH | End: 2025-06-20
Attending: EMERGENCY MEDICINE
Payer: COMMERCIAL

## 2025-06-19 ENCOUNTER — APPOINTMENT (OUTPATIENT)
Dept: NON INVASIVE DIAGNOSTICS | Facility: HOSPITAL | Age: 85
End: 2025-06-19
Payer: COMMERCIAL

## 2025-06-19 VITALS
HEART RATE: 68 BPM | RESPIRATION RATE: 18 BRPM | SYSTOLIC BLOOD PRESSURE: 156 MMHG | DIASTOLIC BLOOD PRESSURE: 70 MMHG | TEMPERATURE: 97 F

## 2025-06-19 DIAGNOSIS — L97.919 VENOUS ULCER OF RIGHT LEG (HCC): Primary | ICD-10-CM

## 2025-06-19 DIAGNOSIS — E11.40 TYPE 2 DIABETES MELLITUS WITH DIABETIC NEUROPATHY, WITHOUT LONG-TERM CURRENT USE OF INSULIN (HCC): ICD-10-CM

## 2025-06-19 DIAGNOSIS — I83.019 VENOUS ULCER OF RIGHT LEG (HCC): Primary | ICD-10-CM

## 2025-06-19 DIAGNOSIS — R29.90 STROKE-LIKE SYMPTOMS: Primary | ICD-10-CM

## 2025-06-19 LAB
4HR DELTA HS TROPONIN: 1 NG/L
ALBUMIN SERPL BCG-MCNC: 4.2 G/DL (ref 3.5–5)
ALP SERPL-CCNC: 97 U/L (ref 34–104)
ALT SERPL W P-5'-P-CCNC: 11 U/L (ref 7–52)
ANION GAP SERPL CALCULATED.3IONS-SCNC: 9 MMOL/L (ref 4–13)
AORTIC ROOT: 3.7 CM
AORTIC VALVE MEAN VELOCITY: 13.8 M/S
APTT PPP: 39 SECONDS (ref 23–34)
ASCENDING AORTA: 2.7 CM
AST SERPL W P-5'-P-CCNC: 18 U/L (ref 13–39)
AV AREA BY CONTINUOUS VTI: 1.7 CM2
AV AREA PEAK VELOCITY: 1.8 CM2
AV LVOT MEAN GRADIENT: 1 MMHG
AV LVOT PEAK GRADIENT: 2 MMHG
AV MEAN PRESS GRAD SYS DOP V1V2: 9 MMHG
AV ORIFICE AREA US: 1.7 CM2
AV PEAK GRADIENT: 18 MMHG
AV VELOCITY RATIO: 0.32
AV VMAX SYS DOP: 2.09 M/S
BILIRUB SERPL-MCNC: 0.42 MG/DL (ref 0.2–1)
BSA FOR ECHO PROCEDURE: 1.82 M2
BUN SERPL-MCNC: 24 MG/DL (ref 5–25)
CALCIUM SERPL-MCNC: 9.5 MG/DL (ref 8.4–10.2)
CARDIAC TROPONIN I PNL SERPL HS: 32 NG/L (ref ?–50)
CARDIAC TROPONIN I PNL SERPL HS: 33 NG/L (ref ?–50)
CHLORIDE SERPL-SCNC: 101 MMOL/L (ref 96–108)
CO2 SERPL-SCNC: 28 MMOL/L (ref 21–32)
CREAT SERPL-MCNC: 1.51 MG/DL (ref 0.6–1.3)
DOP CALC AO VTI: 46.29 CM
DOP CALC LVOT AREA: 5.31 CM2
DOP CALC LVOT CARDIAC INDEX: 2.68 L/MIN/M2
DOP CALC LVOT CARDIAC OUTPUT: 4.87 L/MIN
DOP CALC LVOT DIAMETER: 2.6 CM
DOP CALC LVOT PEAK VEL VTI: 14.79 CM
DOP CALC LVOT PEAK VEL: 0.71 M/S
DOP CALC LVOT STROKE INDEX: 44.5 ML/M2
DOP CALC LVOT STROKE VOLUME: 78.48
E WAVE DECELERATION TIME: 402 MS
E/A RATIO: 1.12
ERYTHROCYTE [DISTWIDTH] IN BLOOD BY AUTOMATED COUNT: 14 % (ref 11.6–15.1)
FRACTIONAL SHORTENING: 22 (ref 28–44)
GFR SERPL CREATININE-BSD FRML MDRD: 31 ML/MIN/1.73SQ M
GLUCOSE SERPL-MCNC: 135 MG/DL (ref 65–140)
GLUCOSE SERPL-MCNC: 75 MG/DL (ref 65–140)
GLUCOSE SERPL-MCNC: 83 MG/DL (ref 65–140)
GLUCOSE SERPL-MCNC: 90 MG/DL (ref 65–140)
GLUCOSE SERPL-MCNC: 99 MG/DL (ref 65–140)
HCT VFR BLD AUTO: 45 % (ref 34.8–46.1)
HGB BLD-MCNC: 14.6 G/DL (ref 11.5–15.4)
INR PPP: 2.78 (ref 0.85–1.19)
INTERVENTRICULAR SEPTUM IN DIASTOLE (PARASTERNAL SHORT AXIS VIEW): 1.1 CM
INTERVENTRICULAR SEPTUM: 1.1 CM (ref 0.6–1.1)
LAAS-AP2: 16.8 CM2
LAAS-AP4: 19.1 CM2
LEFT ATRIUM AREA SYSTOLE SINGLE PLANE A4C: 18 CM2
LEFT ATRIUM SIZE: 4 CM
LEFT ATRIUM VOLUME (MOD BIPLANE): 49 ML
LEFT ATRIUM VOLUME INDEX (MOD BIPLANE): 26.9 ML/M2
LEFT INTERNAL DIMENSION IN SYSTOLE: 3.9 CM (ref 2.1–4)
LEFT VENTRICULAR INTERNAL DIMENSION IN DIASTOLE: 5 CM (ref 3.5–6)
LEFT VENTRICULAR POSTERIOR WALL IN END DIASTOLE: 0.8 CM
LEFT VENTRICULAR STROKE VOLUME: 53 ML
LV EF US.2D.A4C+ESTIMATED: 28 %
LVSV (TEICH): 53 ML
MCH RBC QN AUTO: 27.7 PG (ref 26.8–34.3)
MCHC RBC AUTO-ENTMCNC: 32.4 G/DL (ref 31.4–37.4)
MCV RBC AUTO: 85 FL (ref 82–98)
MV E'TISSUE VEL-LAT: 8 CM/S
MV E'TISSUE VEL-SEP: 4 CM/S
MV PEAK A VEL: 0.9 M/S
MV PEAK E VEL: 101 CM/S
MV STENOSIS PRESSURE HALF TIME: 117 MS
MV VALVE AREA P 1/2 METHOD: 1.88
PLATELET # BLD AUTO: 324 THOUSANDS/UL (ref 149–390)
PMV BLD AUTO: 9.9 FL (ref 8.9–12.7)
POTASSIUM SERPL-SCNC: 3.5 MMOL/L (ref 3.5–5.3)
PROT SERPL-MCNC: 8.2 G/DL (ref 6.4–8.4)
PROTHROMBIN TIME: 30 SECONDS (ref 12.3–15)
RBC # BLD AUTO: 5.27 MILLION/UL (ref 3.81–5.12)
RIGHT ATRIUM AREA SYSTOLE A4C: 16.9 CM2
RIGHT VENTRICLE ID DIMENSION: 4 CM
SL CV LEFT ATRIUM LENGTH A2C: 5.2 CM
SL CV LV EF: 35
SL CV PED ECHO LEFT VENTRICLE DIASTOLIC VOLUME (MOD BIPLANE) 2D: 119 ML
SL CV PED ECHO LEFT VENTRICLE SYSTOLIC VOLUME (MOD BIPLANE) 2D: 66 ML
SODIUM SERPL-SCNC: 138 MMOL/L (ref 135–147)
TR MAX PG: 36 MMHG
TR PEAK VELOCITY: 3 M/S
TRICUSPID ANNULAR PLANE SYSTOLIC EXCURSION: 2.1 CM
TRICUSPID VALVE PEAK REGURGITATION VELOCITY: 3 M/S
WBC # BLD AUTO: 6.24 THOUSAND/UL (ref 4.31–10.16)

## 2025-06-19 PROCEDURE — 93306 TTE W/DOPPLER COMPLETE: CPT

## 2025-06-19 PROCEDURE — 99222 1ST HOSP IP/OBS MODERATE 55: CPT

## 2025-06-19 PROCEDURE — 92610 EVALUATE SWALLOWING FUNCTION: CPT

## 2025-06-19 PROCEDURE — 85730 THROMBOPLASTIN TIME PARTIAL: CPT | Performed by: EMERGENCY MEDICINE

## 2025-06-19 PROCEDURE — 99215 OFFICE O/P EST HI 40 MIN: CPT | Performed by: ORTHOPAEDIC SURGERY

## 2025-06-19 PROCEDURE — 82948 REAGENT STRIP/BLOOD GLUCOSE: CPT

## 2025-06-19 PROCEDURE — 93005 ELECTROCARDIOGRAM TRACING: CPT

## 2025-06-19 PROCEDURE — 36415 COLL VENOUS BLD VENIPUNCTURE: CPT | Performed by: EMERGENCY MEDICINE

## 2025-06-19 PROCEDURE — 85610 PROTHROMBIN TIME: CPT | Performed by: EMERGENCY MEDICINE

## 2025-06-19 PROCEDURE — 99204 OFFICE O/P NEW MOD 45 MIN: CPT | Performed by: PSYCHIATRY & NEUROLOGY

## 2025-06-19 PROCEDURE — 71045 X-RAY EXAM CHEST 1 VIEW: CPT

## 2025-06-19 PROCEDURE — 85027 COMPLETE CBC AUTOMATED: CPT | Performed by: EMERGENCY MEDICINE

## 2025-06-19 PROCEDURE — 99285 EMERGENCY DEPT VISIT HI MDM: CPT

## 2025-06-19 PROCEDURE — 84484 ASSAY OF TROPONIN QUANT: CPT | Performed by: EMERGENCY MEDICINE

## 2025-06-19 PROCEDURE — 99213 OFFICE O/P EST LOW 20 MIN: CPT | Performed by: ORTHOPAEDIC SURGERY

## 2025-06-19 PROCEDURE — 93306 TTE W/DOPPLER COMPLETE: CPT | Performed by: INTERNAL MEDICINE

## 2025-06-19 PROCEDURE — 70496 CT ANGIOGRAPHY HEAD: CPT

## 2025-06-19 PROCEDURE — 70498 CT ANGIOGRAPHY NECK: CPT

## 2025-06-19 PROCEDURE — 80053 COMPREHEN METABOLIC PANEL: CPT | Performed by: EMERGENCY MEDICINE

## 2025-06-19 PROCEDURE — 99285 EMERGENCY DEPT VISIT HI MDM: CPT | Performed by: EMERGENCY MEDICINE

## 2025-06-19 RX ORDER — WARFARIN SODIUM 5 MG/1
5 TABLET ORAL DAILY
Status: DISCONTINUED | OUTPATIENT
Start: 2025-06-19 | End: 2025-06-20 | Stop reason: HOSPADM

## 2025-06-19 RX ORDER — FLUTICASONE PROPIONATE 50 MCG
1 SPRAY, SUSPENSION (ML) NASAL DAILY
Status: DISCONTINUED | OUTPATIENT
Start: 2025-06-20 | End: 2025-06-20 | Stop reason: HOSPADM

## 2025-06-19 RX ORDER — INSULIN LISPRO 100 [IU]/ML
1-6 INJECTION, SOLUTION INTRAVENOUS; SUBCUTANEOUS
Status: DISCONTINUED | OUTPATIENT
Start: 2025-06-19 | End: 2025-06-20 | Stop reason: HOSPADM

## 2025-06-19 RX ORDER — GABAPENTIN 300 MG/1
300 CAPSULE ORAL 2 TIMES DAILY
Status: DISCONTINUED | OUTPATIENT
Start: 2025-06-19 | End: 2025-06-20 | Stop reason: HOSPADM

## 2025-06-19 RX ORDER — FLUTICASONE FUROATE AND VILANTEROL 100; 25 UG/1; UG/1
1 POWDER RESPIRATORY (INHALATION) DAILY
Status: DISCONTINUED | OUTPATIENT
Start: 2025-06-20 | End: 2025-06-20 | Stop reason: HOSPADM

## 2025-06-19 RX ORDER — PANTOPRAZOLE SODIUM 40 MG/1
40 TABLET, DELAYED RELEASE ORAL DAILY
Status: DISCONTINUED | OUTPATIENT
Start: 2025-06-19 | End: 2025-06-20 | Stop reason: HOSPADM

## 2025-06-19 RX ORDER — INSULIN GLARGINE 100 [IU]/ML
30 INJECTION, SOLUTION SUBCUTANEOUS
Status: DISCONTINUED | OUTPATIENT
Start: 2025-06-19 | End: 2025-06-20 | Stop reason: HOSPADM

## 2025-06-19 RX ORDER — LIDOCAINE 40 MG/G
CREAM TOPICAL ONCE
Status: DISCONTINUED | OUTPATIENT
Start: 2025-06-19 | End: 2025-06-19

## 2025-06-19 RX ORDER — FERROUS SULFATE 325(65) MG
325 TABLET ORAL
Status: DISCONTINUED | OUTPATIENT
Start: 2025-06-20 | End: 2025-06-20 | Stop reason: HOSPADM

## 2025-06-19 RX ORDER — LEVOTHYROXINE SODIUM 50 UG/1
50 TABLET ORAL DAILY
Status: DISCONTINUED | OUTPATIENT
Start: 2025-06-20 | End: 2025-06-20 | Stop reason: HOSPADM

## 2025-06-19 RX ORDER — ASCORBIC ACID 500 MG
1000 TABLET ORAL DAILY
Status: DISCONTINUED | OUTPATIENT
Start: 2025-06-19 | End: 2025-06-20 | Stop reason: HOSPADM

## 2025-06-19 RX ORDER — ASPIRIN 81 MG/1
81 TABLET ORAL DAILY
Status: DISCONTINUED | OUTPATIENT
Start: 2025-06-20 | End: 2025-06-20 | Stop reason: HOSPADM

## 2025-06-19 RX ORDER — ATORVASTATIN CALCIUM 40 MG/1
40 TABLET, FILM COATED ORAL EVERY EVENING
Status: DISCONTINUED | OUTPATIENT
Start: 2025-06-19 | End: 2025-06-20 | Stop reason: HOSPADM

## 2025-06-19 RX ADMIN — WARFARIN SODIUM 5 MG: 5 TABLET ORAL at 18:10

## 2025-06-19 RX ADMIN — INSULIN GLARGINE 30 UNITS: 100 INJECTION, SOLUTION SUBCUTANEOUS at 22:24

## 2025-06-19 RX ADMIN — SODIUM CHLORIDE 1000 ML: 0.9 INJECTION, SOLUTION INTRAVENOUS at 09:34

## 2025-06-19 RX ADMIN — ATORVASTATIN CALCIUM 40 MG: 40 TABLET, FILM COATED ORAL at 18:10

## 2025-06-19 RX ADMIN — IOHEXOL 85 ML: 350 INJECTION, SOLUTION INTRAVENOUS at 08:45

## 2025-06-19 RX ADMIN — LIDOCAINE: 40 CREAM TOPICAL at 08:08

## 2025-06-19 RX ADMIN — OXYCODONE HYDROCHLORIDE AND ACETAMINOPHEN 1000 MG: 500 TABLET ORAL at 13:53

## 2025-06-19 RX ADMIN — GABAPENTIN 300 MG: 300 CAPSULE ORAL at 18:10

## 2025-06-19 RX ADMIN — PANTOPRAZOLE SODIUM 40 MG: 40 TABLET, DELAYED RELEASE ORAL at 13:53

## 2025-06-19 NOTE — ASSESSMENT & PLAN NOTE
Patient currently denies any chest pain.  Opponent x2 remains flat 32-34  Continue aspirin and Lipitor.

## 2025-06-19 NOTE — ASSESSMENT & PLAN NOTE
Lab Results   Component Value Date    EGFR 31 06/19/2025    EGFR 28 06/02/2025    EGFR 34 03/17/2025    CREATININE 1.51 (H) 06/19/2025    CREATININE 1.65 (H) 06/02/2025    CREATININE 1.41 (H) 03/17/2025     CKD stage III with baseline 1.4-1.6.  Currently within patient's baseline.  Patient received IV contrast.follow-up on a.m. BMP   avoid other nephrotoxic agents and hypoperfusion

## 2025-06-19 NOTE — CONSULTS
"Consultation - Neurology   Name: Ely Hernadez 85 y.o. female I MRN: [id number]  Unit/Bed#: ED 28 I Date of Admission: 6/19/2025   Date of Service: 6/19/2025 I Hospital Day: 0   Consult to Neurology  Consult performed by: Maria Eugenia Gore PA-C  Consult ordered by: Giana Arroyo DO        Physician Requesting Evaluation: No att. providers found   Reason for Evaluation / Principal Problem: Strokelike symptoms    Assessment & Plan  Stroke-like symptoms  85 y.o. female with paroxysmal A-fib on Coumadin s/p PPM (not MRI conditional), possible prior lacunar stroke in 2023 (L sided numbness/weakness at the time), CAD on ASA, HTN, HLD, DM2,  CHF, thoracic aortic aneurysm repair, aortic valve disease s/p mechanical AVR, cardiomyopathy, CKD stage III, COPD, iron deficiency anemia, and hypothyroidism who presented to [facility] on 6/19/2025 as a stroke alert with LUE numbness/weakness and LLE \"heaviness.\"  Symptoms are similar to prior stroke.    Upon arrival to the ED, BP 91/62, however this quickly elevated to 215/79 and it improved to 134/91 without medications.    NIHSS 1.    CT head negative for acute intracranial abnormalities.  Only showed mild microangiopathic changes.    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    Patient was not a TNK candidate due to nondisabling symptoms and INR 2.78.    High suspicion for recrudescence in the setting of initial hypotension.  However, cannot rule out acute stroke.    Plan:  - Unable to obtain MRI brain as patient's pacemaker is not MRI conditional  - Will repeat CT head 24 hours post stroke symptom onset (approximately 0900 tomorrow)  - Recommend Echo  - Continue home Coumadin, goal INR 2.5-3.5 due to mechanical valve  - Continue home " "ASA 81 mg daily  - Okay to initiate atorvastatin 40 mg daily  - Telemetry  - Allow for permissive hypertension for 24 hours post stroke symptom onset. Goal -180  - Goal euglycemia, normothermia  - Frequent neurochecks  - Stroke education  - PT/OT/speech  - Medical management supportive care per primary team, notify of changes    Recommendations for outpatient neurological follow up have yet to be determined.    History of Present Illness   Hx and PE limited by: N/A  Patient last known well: 8 AM toady  Stroke alert called: 8:35 AM  Neurology time of arrival: Attending neurologist responded immediately to the phone call    Thrombolytic Decision: Patient not a candidate. Symptoms resolved/clearly non disabling. and Bleeding risk. (INR therapeutic)    HPI: Ely Hernadez is an 85 y.o. female with paroxysmal A-fib on Coumadin s/p PPM (not MRI conditional), possible prior lacunar stroke in 2023 (L sided numbness/weakness at the time), CAD on ASA, HTN, HLD, DM2,  CHF, thoracic aortic aneurysm repair, aortic valve disease s/p mechanical AVR, cardiomyopathy, CKD stage III, COPD, iron deficiency anemia, and hypothyroidism who presented to  [facility] on 6/19/2025 as a stroke alert with LUE numbness/weakness and LLE \"heaviness.\"    History obtained per patient and chart review.  LKN today at 8 AM.  While at wound care today, she suddenly developed numbness of the LUE.  She noted that her L hand was weak and she was having difficulty holding a towel.  She then developed left leg heaviness.  She states the symptoms felt similar to her prior stroke a few years ago.    Upon arrival to the ED, BP 91/62, however this quickly elevated to 215/79 and it improved to 134/91.  Stroke alert activated.  NIHSS 1.  CT head negative for acute intracranial abnormalities.  Only showed mild microangiopathic changes.  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 "  Also showed stable ectasia of the ascending aorta measuring 4.1 cm and stable 1 to 2 mm aneurysm directed anteriorly arising from the distal right M1 segment.  Patient was not a TNK candidate due to nondisabling symptoms and INR 2.78.    Of note, patient was previously evaluated by inpatient neurology on 10/30/2023 when patient had presented to [facility] with left face, arm, and leg weakness and numbness, dysarthria, and dysphagia.  At the time stroke alert was called during that admission, patient's SBP was in the 140s and symptoms improved with SBP in the 170s-190s.  She was on Coumadin at that time with a therapeutic INR.  Patient was unable to obtain MRI brain at that time due to PPM being MRI noncompatible.  Suspicion was for right small vessel/lacunar infarct at that time in the setting of uncontrolled vascular risk factors.  Patient was instructed to continue ASA 81 mg daily in addition to her Coumadin.    Review of Systems   Neurological:  Positive for weakness and numbness.   All other systems reviewed and are negative.    Historical Information   Past Medical History[1]  Past Surgical History[2]  Social History[3]  E-Cigarette/Vaping    E-Cigarette Use Never User      E-Cigarette/Vaping Substances    Nicotine No     THC No     CBD No     Flavoring No     Other No     Unknown No      Family History[4]  Social History[5]    Current Facility-Administered Medications:     lidocaine (LMX) 4 % cream, Once  Prior to Admission Medications   Prescriptions Last Dose Informant Patient Reported? Taking?   Accu-Chek FastClix Lancets MISC  Self No No   Sig: Use 3 (three) times a day   Ascorbic Acid (vitamin C) 1000 MG tablet  Self Yes No   Sig: Take 1,000 mg by mouth in the morning.   BD Pen Needle Rosie Ultrafine 32G X 4 MM MISC   No No   Sig: USE DAILY   Blood Glucose Monitoring Suppl (Accu-Chek Guide Me) w/Device KIT   No No   Sig: Use to check sugars once daily   Cholecalciferol (Vitamin D3) 50 MCG (2000 UT) TABS   Self Yes No   Sig: Take 2,000 Units by mouth in the morning.   Empagliflozin (Jardiance) 10 MG TABS tablet  Self No No   Sig: TAKE 1 TABLET BY MOUTH IN THE  MORNING   Ferrous Sulfate (IRON PO)  Self Yes No   Sig: Take by mouth daily in the early morning OTC   Lancets (freestyle) lancets  Self No No   Sig: Check blood glucose 3 times daily   Trelegy Ellipta 100-62.5-25 MCG/ACT inhaler   No No   Sig: USE 1 INHALATION BY MOUTH ONCE  DAILY AT THE SAME TIME EACH DAY  RINSE MOUTH AFTER USE   albuterol (Ventolin HFA) 90 mcg/act inhaler  Self No No   Sig: Inhale 2 puffs every 6 (six) hours as needed for wheezing   aspirin (ECOTRIN LOW STRENGTH) 81 mg EC tablet  Self No No   Sig: Take 1 tablet (81 mg total) by mouth daily Do not start before 2023.   clobetasol (TEMOVATE) 0.05 % ointment  Self No No   Sig: Apply topically 2 (two) times a day   cyanocobalamin (VITAMIN B-12) 1000 MCG tablet  Self No No   Sig: Take 1 tablet (1,000 mcg total) by mouth daily   fluticasone (FLONASE) 50 mcg/act nasal spray  Self No No   Si spray into each nostril daily   furosemide (LASIX) 40 mg tablet   No No   Sig: Take 1 tablet (40 mg total) by mouth 2 (two) times a day   gabapentin (NEURONTIN) 300 mg capsule   No No   Sig: TAKE 1 CAPSULE BY MOUTH 3 TIMES  DAILY   glipiZIDE (GLUCOTROL) 10 mg tablet   No No   Sig: TAKE 1 TABLET BY MOUTH TWICE  DAILY BEFORE MEALS   glucose blood (Accu-Chek Celeste Plus) test strip   No No   Sig: Use 1 each 3 (three) times a day Use as instructed   insulin degludec (TRESIBA) 100 units/mL injection pen  Self No No   Sig: Inject 30 Units under the skin daily   ipratropium-albuterol (DUO-NEB) 0.5-2.5 mg/3 mL nebulizer solution  Self No No   Sig: Take 3 mL by nebulization 4 (four) times a day   levothyroxine 50 mcg tablet   No No   Sig: TAKE 1 TABLET BY MOUTH DAILY   metoprolol tartrate (LOPRESSOR) 50 mg tablet   No No   Sig: TAKE ONE-HALF TABLET BY MOUTH  TWICE DAILY   oxygen gas  Self Yes No   Sig:  Inhale 2 L/min daily at bedtime as needed home oxygen nightly   pantoprazole (PROTONIX) 40 mg tablet  Self No No   Sig: TAKE 1 TABLET BY MOUTH DAILY AS  DIRECTED   warfarin (COUMADIN) 5 mg tablet   No No   Sig: TAKE 1 TO 2 TABLETS BY MOUTH  DAILY OR AS DIRECTED      Facility-Administered Medications Last Administration Doses Remaining   lidocaine (LMX) 4 % cream None recorded 1   lidocaine (LMX) 4 % cream 6/19/2025  8:08 AM 0        Patient has no known allergies.    Objective :  Temp:  [97 °F (36.1 °C)-97.5 °F (36.4 °C)] 97.5 °F (36.4 °C)  HR:  [68-73] 73  BP: ()/(62-70) 91/62  Resp:  [18-19] 19  SpO2:  [97 %] 97 %  O2 Device: None (Room air)    Physical Exam  Vitals and nursing note reviewed.   Constitutional:       Appearance: Normal appearance.      Comments: Pleasant elderly female lying comfortably in bed in no acute distress   HENT:      Head: Normocephalic and atraumatic.      Mouth/Throat:      Mouth: Mucous membranes are moist.      Pharynx: Oropharynx is clear.     Eyes:      Extraocular Movements: Extraocular movements intact.      Conjunctiva/sclera: Conjunctivae normal.      Pupils: Pupils are equal, round, and reactive to light.     Pulmonary:      Effort: Pulmonary effort is normal.     Musculoskeletal:         General: Normal range of motion.     Skin:     General: Skin is warm and dry.     Neurological:      Mental Status: She is alert.       Neurological Exam  Mental Status  Alert.  Patient awake and alert.  Oriented to person, age, and month  No dysarthria or aphasia.  Able to follow simple midline and appendicular commands..    Cranial Nerves  CN III, IV, VI: Extraocular movements intact bilaterally. Pupils equal round and reactive to light bilaterally.    Pupils 3 mm, round, reactive to light bilaterally.  EOMs intact without nystagmus.  No visual field deficits.  Facial sensation to light touch and pinprick intact throughout.  Facial expressions full and symmetric.  Tongue  midline.  Hearing grossly intact..    Motor  Normal muscle bulk throughout. Normal muscle tone.    No drift in any extremities  5/5 strength in bilateral upper and lower extremities except trace weakness (5-/5) throughout the left upper extremity..    Sensory  Light touch and pinprick is diminished in the left upper extremity.  Decreased pinprick in bilateral distal lower extremities..    Coordination    No ataxia with finger-to-nose or heel-to-shin bilaterally.    Gait    Deferred for patient safety.      NIHSS:  1a.Level of Consciousness: 0 = Alert   1b. LOC Questions: 0 = Answers both correctly   1c. LOC Commands: 0 = Obeys both correctly   2. Best Gaze: 0 = Normal   3. Visual: 0 = No visual field loss   4. Facial Palsy: 0=Normal symmetric movement   5a. Motor Right Arm: 0=No drift, limb holds 90 (or 45) degrees for full 10 seconds   5b. Motor Left Arm: 0=No drift, limb holds 90 (or 45) degrees for full 10 seconds   6a. Motor Right Le=No drift, limb holds 90 (or 45) degrees for full 10 seconds   6b. Motor Left Le=No drift, limb holds 90 (or 45) degrees for full 10 seconds   7. Limb Ataxia:  0=Absent   8. Sensory: 1=Mild to moderate sensory loss; patient feels pinprick is less sharp or is dull on the affected side; there is a loss of superficial pain with pinprick but patient is aware She is being touched   9. Best Language:  0=No aphasia, normal   10. Dysarthria: 0=Normal articulation   11. Extinction and Inattention (formerly Neglect): 0=No abnormality   Total Score: 1     Time NIHSS was completed: 8:50 AM    Modified Marienthal Score:  1 (No significant disability. Able to carry out all usual activities, despite some symptoms)      Lab Results: I have personally reviewed pertinent reports.  Recent Results (from the past 24 hours)   Fingerstick Glucose (POCT)    Collection Time: 25  8:35 AM   Result Value Ref Range    POC Glucose 99 65 - 140 mg/dl     Imaging Studies: I have personally reviewed  pertinent reports and I have personally reviewed pertinent films in PACS.    EKG, Pathology, and Other Studies: I have personally reviewed pertinent reports.    VTE Prophylaxis: Warfarin (Coumadin)    Code Status: Prior    Dictation voice to text software has been used in the creation of this document.  Please consider this in light of any contextual or grammatical errors.          [1]   Past Medical History:  Diagnosis Date    Acute anterior epistaxis 12/23/2023    Acute blood loss anemia 12/14/2023    Acute respiratory failure with hypoxia (HCC) 02/06/2024    Anemia     Aneurysm of thoracic aorta (HCC)     Angioedema 06/07/2019    Aortic valve disorder     Benign neoplasm of sigmoid colon     Cardiomyopathy (HCC)     last assessed: 10/10/2014    CHF (congestive heart failure) (HCC)     Chronic kidney disease     Chronic venous hypertension with ulcer and inflammation involving right side (HCC) 04/11/2025    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HCC)     last assessed: 10/10/2014    COPD (chronic obstructive pulmonary disease) (HCC)     Diabetic nephropathy (HCC)     Disease of thyroid gland     RAMON (dyspnea on exertion)     GERD (gastroesophageal reflux disease)     History of emphysema (HCC)     History of transfusion     Hyperlipidemia     Hypertensive urgency 10/28/2023    Leg cramps 11/01/2023    Stroke-like symptoms 10/28/2023    TIA (transient ischemic attack)    [2]   Past Surgical History:  Procedure Laterality Date    AORTIC VALVE REPLACEMENT      CARDIAC PACEMAKER PLACEMENT      last assessed: 10/10/2014    CARDIAC SURGERY      aortic valve replacement    CHOLECYSTECTOMY      COLONOSCOPY      EGD      HYSTERECTOMY      INSERT / REPLACE / REMOVE PACEMAKER      KNEE SURGERY Right     OTHER SURGICAL HISTORY      Capsule ENDOscopy description: 12/19/2012    MI COLONOSCOPY FLX DX W/COLLJ SPEC WHEN PFRMD N/A 05/31/2018    Procedure: single balloon ENTEROSCOPY;  Surgeon: David Dennis MD;  Location: [facility];   Service: Gastroenterology    KS ESOPHAGOGASTRODUODENOSCOPY TRANSORAL DIAGNOSTIC N/A 2017    Procedure: EGD AND COLONOSCOPY;  Surgeon: Jay Mcleod III, MD;  Location: [facility];  Service: Gastroenterology    VEIN LIGATION Right 2025    Procedure: right leg peforator sclerotherapy;  Surgeon: Chris Bush MD;  Location: [facility];  Service: Vascular   [3]   Social History  Tobacco Use    Smoking status: Former     Current packs/day: 0.00     Average packs/day: 1 pack/day for 52.9 years (52.9 ttl pk-yrs)     Types: Cigarettes     Start date:      Quit date: 2007     Years since quittin.5     Passive exposure: Past    Smokeless tobacco: Former     Quit date:    Vaping Use    Vaping status: Never Used   Substance and Sexual Activity    Alcohol use: Never    Drug use: Never    Sexual activity: Not Currently     Partners: Male   [4]   Family History  Problem Relation Name Age of Onset    No Known Problems Mother      No Known Problems Father     [5]   Social History  Tobacco Use    Smoking status: Former     Current packs/day: 0.00     Average packs/day: 1 pack/day for 52.9 years (52.9 ttl pk-yrs)     Types: Cigarettes     Start date:      Quit date: 2007     Years since quittin.5     Passive exposure: Past    Smokeless tobacco: Former     Quit date:    Vaping Use    Vaping status: Never Used   Substance and Sexual Activity    Alcohol use: Never    Drug use: Never    Sexual activity: Not Currently     Partners: Male

## 2025-06-19 NOTE — ASSESSMENT & PLAN NOTE
Patient follows with cardiology.  Heart rate control on Lopressor  Currently anticoagulated on Coumadin with INR goal 2.5-3.5.  INR on admission 2.7  Continue home regimen

## 2025-06-19 NOTE — PLAN OF CARE
Problem: PAIN - ADULT  Goal: Verbalizes/displays adequate comfort level or baseline comfort level  Description: Interventions:  - Encourage patient to monitor pain and request assistance  - Assess pain using appropriate pain scale  - Administer analgesics as ordered based on type and severity of pain and evaluate response  - Implement non-pharmacological measures as appropriate and evaluate response  - Consider cultural and social influences on pain and pain management  - Notify physician/advanced practitioner if interventions unsuccessful or patient reports new pain  - Educate patient/family on pain management process including their role and importance of  reporting pain   - Provide non-pharmacologic/complimentary pain relief interventions  6/19/2025 1141 by Ramona Guillen RN  Outcome: Progressing  6/19/2025 1141 by Ramona Guillen RN  Outcome: Progressing     Problem: DISCHARGE PLANNING  Goal: Discharge to home or other facility with appropriate resources  Description: INTERVENTIONS:  - Identify barriers to discharge w/patient and caregiver  - Arrange for needed discharge resources and transportation as appropriate  - Identify discharge learning needs (meds, wound care, etc.)  - Arrange for interpretive services to assist at discharge as needed  - Refer to Case Management Department for coordinating discharge planning if the patient needs post-hospital services based on physician/advanced practitioner order or complex needs related to functional status, cognitive ability, or social support system  6/19/2025 1141 by Ramona Guillen RN  Outcome: Progressing  6/19/2025 1141 by Ramona Guillen RN  Outcome: Progressing

## 2025-06-19 NOTE — PATIENT INSTRUCTIONS
Orders Placed This Encounter   Procedures    Wound cleansing and dressings Venous Ulcer Right Pretibial     Wound cleansing and dressings Venous Ulcer Right Pretibial      Right Leg wound:      Wash your hands with soap and water. Remove old dressing, discard into plastic bag and place in trash. Cleanse the wound with Normal Saline solution prior to applying a clean dressing. Do not use tissue or cotton balls. Do not scrub the wound. Pat dry using gauze.      Shower: Yes. Cleanse with a mild soap and water such as Dove. Be sure to remove your old dressing prior to getting in the shower.      Apply Xeroform to the open wound.   Cover with gauze.  Secure with tape. (If your skin is breaking down from tape, then need to wrap with Armani and put tape on Armani)      Change dressing THREE TIMES A WEEK.                             Elastic Tubular Stocking: Spanda F     Tubular elastic bandage: Apply from base of toes to behind the knee. Apply in AM, may remove for sleep.     Standing Status:   Future     Expiration Date:   6/26/2025

## 2025-06-19 NOTE — H&P
H&P - Hospitalist   Name: Ely Hernadez 85 y.o. female I MRN: [id number]  Unit/Bed#: 2 E 278-01 I Date of Admission: 6/19/2025   Date of Service: 6/19/2025 I Hospital Day: 0     Assessment & Plan  Stroke-like symptoms  Patient on admission due to strokelike symptoms with left-sided numbness of the face upper and lower extremity.  Patient reports reminding her previous stroke.  Denies weakness.  The symptoms faded on admission.  Stroke alert was called.  CT head negative for acute intracranial abnormalities.  Only showed mild microangiopathic changes.    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   Blood Pressure: 135/61  EKG with ventricular paced rhythm  Per neurology team high suspicion of recrudescence of the previous stroke in the setting of hypertension.  Plan:    Unfortunately patient not a candidate for MRI given noncompatible pacemaker.  TTE, monitor on tele  Lipid Panel, HbA1c  Atorvastatin 40 mg q.d., Aspirin 81 q.d.  Consult Neurology  PT/OT/Speech eval  Allow for permissive hypertension with -180  Neuro checks per protocol  Reassess CT head in the a.m.    Hypertension, essential  Blood Pressure: 135/61  Patient takes Lasix 40 mg daily and Lopressor 25 mg twice a day  Given patient under stroke pathway will allow permissive hypertension with SBP goal 140-180.  Coronary artery disease of native artery of native heart with stable angina pectoris (HCC)  Patient currently denies any chest pain.  Opponent x2 remains flat 32-34  Continue aspirin and Lipitor.    Stage 3b chronic kidney disease (HCC)  Lab Results   Component Value Date    EGFR 31 06/19/2025    EGFR 28 06/02/2025    EGFR 34 03/17/2025    CREATININE 1.51 (H) 06/19/2025    CREATININE 1.65 (H) 06/02/2025     CREATININE 1.41 (H) 03/17/2025     CKD stage III with baseline 1.4-1.6.  Currently within patient's baseline.  Patient received IV contrast.follow-up on a.m. BMP   avoid other nephrotoxic agents and hypoperfusion  Paroxysmal atrial fibrillation (HCC)  Patient follows with cardiology.  Heart rate control on Lopressor  Currently anticoagulated on Coumadin with INR goal 2.5-3.5.  INR on admission 2.7  Continue home regimen  Type 2 diabetes mellitus with stage 3b chronic kidney disease, with long-term current use of insulin ([facility])  Lab Results   Component Value Date    HGBA1C 7.8 (H) 06/02/2025       Recent Labs     06/19/25  0835 06/19/25  1148   POCGLU 99 75       Patient reports being on Lantus 30 units at bedtime.  Fairly controlled diabetes.  Will continue regimen  Sliding scale insulin  Accu-Cheks 4 times a day    Blood Sugar Average: Last 72 hrs:  (P) 87    Chronic obstructive pulmonary disease (HCC)  Patient currently not in acute exacerbation.  Patient on Trelegy.  Will continue with analog in the hospital setting  Reports using oxygen at bedtime.  Chronic venous hypertension with ulcer and inflammation involving right side (HCC)  Chronic wounds of the lower extremity.  Follows with wound care specialist.  Continue wound care while in the hospital setting      VTE Pharmacologic Prophylaxis:   High Risk (Score >/= 5) - Pharmacological DVT Prophylaxis Ordered: warfarin (Coumadin). Sequential Compression Devices Ordered.  Code Status: Level 1 - Full Code patient  Discussion with family: Patient declined call to .     Anticipated Length of Stay: Patient will be admitted on an observation basis with an anticipated length of stay of less than 2 midnights secondary to strokelike symptoms ruling out a stroke.    History of Present Illness   Chief Complaint: numbness of the left side of the body    Ely Hernadez is a 85 y.o. female with a PMH of previous stroke, A-fib on Coumadin s/p pacemaker not  compatible with MRI, CAD on aspirin, hypertension, hyperlipidemia, type 2 diabetes mellitus, thoracic aortic aneurysm, aortic valve s/p mechanical AVR, CHF, CKD stage III, COPD, hypothyroidism presenting to the ED due to strokelike symptoms describing as left upper and lower extremity numbness as well as left side of the face.  Patient states that was at the wound care visit when her symptoms started.  Was about 8:30 AM.  Patient admits the symptoms reminded her of previous stroke.  In the ED was called stroke alert.  NIH was 1 initial workup including CT and CTA had an neck ruled out acute intracranial abnormalities and show stable severe stenosis of the vertebral artery.  Patient admitted for further evaluation and stroke rule out by continue with stroke pathway.    Review of Systems   Constitutional:  Negative for chills and fever.   HENT:  Negative for ear pain and sore throat.    Eyes:  Negative for pain and visual disturbance.   Respiratory:  Negative for cough and shortness of breath.    Cardiovascular:  Negative for chest pain and palpitations.   Gastrointestinal:  Negative for abdominal pain and vomiting.   Genitourinary:  Negative for dysuria and hematuria.   Musculoskeletal:  Negative for arthralgias and back pain.   Skin:  Negative for color change and rash.   Neurological:  Positive for numbness. Negative for seizures and syncope.   All other systems reviewed and are negative.      Historical Information   Past Medical History[1]  Past Surgical History[2]  Social History[3]  E-Cigarette/Vaping    E-Cigarette Use Never User      E-Cigarette/Vaping Substances    Nicotine No     THC No     CBD No     Flavoring No     Other No     Unknown No        Social History:  Marital Status:    Occupation:   Patient Pre-hospital Living Situation: Home  Patient Pre-hospital Level of Mobility: walks  Patient Pre-hospital Diet Restrictions: none    Meds/Allergies   I have reviewed home medications with patient  personally.  Prior to Admission medications    Medication Sig Start Date End Date Taking? Authorizing Provider   Accu-Chek FastClix Lancets MISC Use 3 (three) times a day 1/22/24  Yes Aviva Mccabe PA-C   albuterol (Ventolin HFA) 90 mcg/act inhaler Inhale 2 puffs every 6 (six) hours as needed for wheezing 4/22/24  Yes Ben Song PA-C   Ascorbic Acid (vitamin C) 1000 MG tablet Take 1,000 mg by mouth in the morning.   Yes Historical Provider, MD   aspirin (ECOTRIN LOW STRENGTH) 81 mg EC tablet Take 1 tablet (81 mg total) by mouth daily Do not start before November 1, 2023. 11/1/23  Yes Chico Joshi MD   BD Pen Needle Rosie Ultrafine 32G X 4 MM MISC USE DAILY 6/10/25  Yes Amanda Villarreal PA-C   Blood Glucose Monitoring Suppl (Accu-Chek Guide Me) w/Device KIT Use to check sugars once daily 5/20/25  Yes Amanda Villarreal PA-C   Cholecalciferol (Vitamin D3) 50 MCG (2000 UT) TABS Take 2,000 Units by mouth in the morning.   Yes Historical Provider, MD   cyanocobalamin (VITAMIN B-12) 1000 MCG tablet Take 1 tablet (1,000 mcg total) by mouth daily 1/31/25  Yes Staci Contreras PA-C   Empagliflozin (Jardiance) 10 MG TABS tablet TAKE 1 TABLET BY MOUTH IN THE  MORNING 1/30/25  Yes Amanda Villarreal PA-C   Ferrous Sulfate (IRON PO) Take by mouth daily in the early morning OTC   Yes Historical Provider, MD   fluticasone (FLONASE) 50 mcg/act nasal spray 1 spray into each nostril daily 3/3/25  Yes Verónica Lam PA-C   furosemide (LASIX) 40 mg tablet Take 1 tablet (40 mg total) by mouth 2 (two) times a day 4/25/25  Yes Lilian Kirby PA-C   glipiZIDE (GLUCOTROL) 10 mg tablet TAKE 1 TABLET BY MOUTH TWICE  DAILY BEFORE MEALS 5/25/25  Yes Amanda Villarreal PA-C   glucose blood (Accu-Chek Celeste Plus) test strip Use 1 each 3 (three) times a day Use as instructed 6/2/25  Yes Verónica Lam PA-C   insulin degludec (TRESIBA) 100 units/mL injection pen Inject 30 Units under the skin daily 11/15/24  Yes  Amanda Villarreal PA-C   ipratropium-albuterol (DUO-NEB) 0.5-2.5 mg/3 mL nebulizer solution Take 3 mL by nebulization 4 (four) times a day 2/28/25  Yes Amanda Villarreal PA-C   Lancets (freestyle) lancets Check blood glucose 3 times daily 9/13/22  Yes Corey Silva MD   levothyroxine 50 mcg tablet TAKE 1 TABLET BY MOUTH DAILY 5/25/25  Yes Amanda Villarreal PA-C   metoprolol tartrate (LOPRESSOR) 50 mg tablet TAKE ONE-HALF TABLET BY MOUTH  TWICE DAILY 5/25/25  Yes Amanda Villarreal PA-C   oxygen gas Inhale 2 L/min daily at bedtime as needed home oxygen nightly   Yes Historical Provider, MD   pantoprazole (PROTONIX) 40 mg tablet TAKE 1 TABLET BY MOUTH DAILY AS  DIRECTED 3/25/25  Yes Amanda Villarreal PA-C   Trelegy Ellipta 100-62.5-25 MCG/ACT inhaler USE 1 INHALATION BY MOUTH ONCE  DAILY AT THE SAME TIME EACH DAY  RINSE MOUTH AFTER USE 6/2/25  Yes Ben Song PA-C   warfarin (COUMADIN) 5 mg tablet TAKE 1 TO 2 TABLETS BY MOUTH  DAILY OR AS DIRECTED 6/7/25  Yes Amanda Villarreal PA-C   clobetasol (TEMOVATE) 0.05 % ointment Apply topically 2 (two) times a day 12/5/24   Vesta Garcia PA-C   gabapentin (NEURONTIN) 300 mg capsule TAKE 1 CAPSULE BY MOUTH 3 TIMES  DAILY  Patient taking differently: Take 300 mg by mouth 2 (two) times a day 5/25/25   Amanda Villarreal PA-C     No Known Allergies    Objective :  Temp:  [97 °F (36.1 °C)-97.5 °F (36.4 °C)] 97.5 °F (36.4 °C)  HR:  [60-79] 60  BP: ()/(61-97) 135/61  Resp:  [12-50] 17  SpO2:  [90 %-97 %] 96 %  O2 Device: None (Room air)    Physical Exam  Vitals and nursing note reviewed.   Constitutional:       General: She is not in acute distress.     Appearance: She is well-developed. She is not ill-appearing.   HENT:      Head: Normocephalic and atraumatic.     Eyes:      Conjunctiva/sclera: Conjunctivae normal.       Cardiovascular:      Rate and Rhythm: Normal rate and regular rhythm.      Heart sounds: No murmur heard.  Pulmonary:      Effort: Pulmonary effort is  normal. No respiratory distress.      Breath sounds: Normal breath sounds. No wheezing or rales.   Abdominal:      Palpations: Abdomen is soft.      Tenderness: There is no abdominal tenderness.     Musculoskeletal:         General: No swelling.      Cervical back: Neck supple.      Right lower leg: No edema.      Left lower leg: No edema.     Skin:     General: Skin is warm and dry.      Capillary Refill: Capillary refill takes less than 2 seconds.     Neurological:      Mental Status: She is alert and oriented to person, place, and time. Mental status is at baseline.      Cranial Nerves: No cranial nerve deficit.      Sensory: No sensory deficit.      Motor: No weakness.      Coordination: Coordination normal.      Gait: Gait normal.      Deep Tendon Reflexes: Reflexes normal.     Psychiatric:         Mood and Affect: Mood normal.          Lines/Drains:            Lab Results: I have reviewed the following results:  Results from last 7 days   Lab Units 06/19/25  0840   WBC Thousand/uL 6.24   HEMOGLOBIN g/dL 14.6   HEMATOCRIT % 45.0   PLATELETS Thousands/uL 324     Results from last 7 days   Lab Units 06/19/25  0840   SODIUM mmol/L 138   POTASSIUM mmol/L 3.5   CHLORIDE mmol/L 101   CO2 mmol/L 28   BUN mg/dL 24   CREATININE mg/dL 1.51*   ANION GAP mmol/L 9   CALCIUM mg/dL 9.5   ALBUMIN g/dL 4.2   TOTAL BILIRUBIN mg/dL 0.42   ALK PHOS U/L 97   ALT U/L 11   AST U/L 18   GLUCOSE RANDOM mg/dL 90     Results from last 7 days   Lab Units 06/19/25  0840   INR  2.78*     Results from last 7 days   Lab Units 06/19/25  1148 06/19/25  0835   POC GLUCOSE mg/dl 75 99     Lab Results   Component Value Date    HGBA1C 7.8 (H) 06/02/2025    HGBA1C 7.6 (H) 11/15/2024    HGBA1C 6.2 (H) 05/15/2024           Imaging Results Review: I reviewed radiology reports from this admission including: CT head.  Other Study Results Review: EKG was reviewed.  EKG was personally reviewed and my interpretation is: Personally  Reviewed..    Administrative Statements       ** Please Note: This note has been constructed using a voice recognition system. **         [1]   Past Medical History:  Diagnosis Date    Acute anterior epistaxis 12/23/2023    Acute blood loss anemia 12/14/2023    Acute respiratory failure with hypoxia (HCC) 02/06/2024    Anemia     Aneurysm of thoracic aorta (HCC)     Angioedema 06/07/2019    Aortic valve disorder     Benign neoplasm of sigmoid colon     Cardiomyopathy (HCC)     last assessed: 10/10/2014    CHF (congestive heart failure) (HCC)     Chronic kidney disease     Chronic venous hypertension with ulcer and inflammation involving right side (HCC) 04/11/2025    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HCC)     last assessed: 10/10/2014    COPD (chronic obstructive pulmonary disease) (HCC)     Diabetic nephropathy (HCC)     Disease of thyroid gland     RAMON (dyspnea on exertion)     GERD (gastroesophageal reflux disease)     History of emphysema (HCC)     History of transfusion     Hyperlipidemia     Hypertensive urgency 10/28/2023    Leg cramps 11/01/2023    Stroke-like symptoms 10/28/2023    TIA (transient ischemic attack)    [2]   Past Surgical History:  Procedure Laterality Date    AORTIC VALVE REPLACEMENT      CARDIAC PACEMAKER PLACEMENT      last assessed: 10/10/2014    CARDIAC SURGERY      aortic valve replacement    CHOLECYSTECTOMY      COLONOSCOPY      EGD      HYSTERECTOMY      INSERT / REPLACE / REMOVE PACEMAKER      KNEE SURGERY Right     OTHER SURGICAL HISTORY      Capsule ENDOscopy description: 12/19/2012    OH COLONOSCOPY FLX DX W/COLLJ SPEC WHEN PFRMD N/A 05/31/2018    Procedure: single balloon ENTEROSCOPY;  Surgeon: David Dennis MD;  Location: [facility];  Service: Gastroenterology    OH ESOPHAGOGASTRODUODENOSCOPY TRANSORAL DIAGNOSTIC N/A 11/29/2017    Procedure: EGD AND COLONOSCOPY;  Surgeon: Jay Mcleod III, MD;  Location: [facility];  Service: Gastroenterology    VEIN LIGATION Right 4/11/2025     Procedure: right leg peforator sclerotherapy;  Surgeon: Chris Bush MD;  Location: [facility];  Service: Vascular   [3]   Social History  Tobacco Use    Smoking status: Former     Current packs/day: 0.00     Average packs/day: 1 pack/day for 52.9 years (52.9 ttl pk-yrs)     Types: Cigarettes     Start date:      Quit date: 2007     Years since quittin.5     Passive exposure: Past    Smokeless tobacco: Former     Quit date:    Vaping Use    Vaping status: Never Used   Substance and Sexual Activity    Alcohol use: Never    Drug use: Never    Sexual activity: Not Currently     Partners: Male

## 2025-06-19 NOTE — ASSESSMENT & PLAN NOTE
Lab Results   Component Value Date    HGBA1C 7.8 (H) 06/02/2025       Recent Labs     06/19/25  0835 06/19/25  1148   POCGLU 99 75       Patient reports being on Lantus 30 units at bedtime.  Fairly controlled diabetes.  Will continue regimen  Sliding scale insulin  Accu-Cheks 4 times a day    Blood Sugar Average: Last 72 hrs:  (P) 87

## 2025-06-19 NOTE — PROGRESS NOTES
Patient ID: Ely Hernadez is a 85 y.o. female Date of Birth 1940       Chief Complaint   Patient presents with    Follow Up Wound Care Visit     Venous ulcer right leg       Allergies:  Patient has no known allergies.    Diagnosis:   Diagnosis ICD-10-CM Associated Orders   1. Venous ulcer of right leg (HCC)  I83.019 lidocaine (LMX) 4 % cream    L97.919            Assessment and Plan :  Follow up evaluation of right venous ulcer slowly improving and decreasing in size.  On discharge pt was unable to ambulate without assistance and Left arm became numb where she was not able to hold anything. Facial asymmetry noted with left sided weakness. Pt refused ambulance and was immediately escorted to ER by JOSSIE Stewart. ER Charge nurse informed of possible stroke alert given signs, symptoms and past h/o CVA.   Continue wound management with xeroform on wound bed, see wound orders below   Continue to wear daily compression with Spandagrip.  No harsh cleansers such as alcohol, peroxide, or antibacterial soap, do not submerge in water  Can cleanse with mild soap and water or NSS at dressing changes.   Continue to f/u with vascular surgeon.  Continue frequent elevation of leg and increase exercise/walking for wound healing.  Follow-up in 3 week(s) or call sooner with questions or concerns or any signs of infection such as redness, swelling, increased/purulent drainage, fever, chills, increased severe pain.    Subjective:   6/28: Pt is an 84 y.o. female with pmhx HTN, DMII (A1c 8.7),  CKD, Pafib, CVA (10/28/23 on Coumadin/ASA 81mg) with residual deficits (left sided facial drop and leg weakness) well known to [facility] who presents for follow up evaluation of non healing RLE venous ulcer which has been present for about 2.5 years. Pt has been covering wound with a bandaid. Does not have an odor. No smoking, ETOH or drug use.  Pt denies any sob, fatigue, N/V, CP, fever or chills.    7/12: Patient presents for followup evaluation  of RLE venous ulcer. No increased pain or drainage. Pt is frustrated with chronic wound. Has been using Polymem on the wound bed and Tubigrip for compression.  Pt denies any fevers or chills.    7/25: Patient presents for followup evaluation of RLE venous ulcer. Since last visit pt was started on Bactrim for positive wound culture with abnormal 2+ Growth of Staphylococcus Aureus. The tissue exam demonstrated chronic inflammation and fibrosis. No malignancy, nor pyoderma gangrenosum were identified.  Pt noticed wound decreasing in size. Has been using Polymem on the wound bed and Tubigrip for compression.  Pt denies any fevers or chills.    8/2: Patient presents for followup evaluation of RLE venous ulcer. Pt completed Bactrim as directed. No issues. Pt states this is the best she has felt in 2 years. No fevers or chills.    8/22: Patient presents for followup evaluation of RLE venous ulcer. Pt states she has been wetting wound with mild soap and water. She noticed a scab formed and came off on dressing. In office CHEYANNE today; R 0.59, L 0.76. No fevers or chills.    9/6: Patient presents for followup evaluation of RLE venous ulcer. Pt  has been applying Polymem Ag Max on wound bed and Tubigrip for compression. Denies fevers or chills.    9/19: Patient presents for followup evaluation of RLE venous ulcer. Pt has been applying Mupirocin wound bed and Tubigrip for compression. Denies fevers or chills.    9/26: Patient presents for followup evaluation of RLE venous ulcer. No complaints. Pt has been applying Hydrofera Blue ready on wound bed and Tubigrip for compression. Denies fevers or chills.    10/10:  Patient presents for followup evaluation of RLE venous ulcer. No complaints. Pt is scheduled for vascular studies for 11/6/24. Pt has been applying Hydrofera Blue ready on wound bed and Tubigrip for compression. Denies fevers or chills.    10/31:  Patient presents for followup evaluation of RLE venous ulcer. NO issues. Pt  has been applying Hydrofera Blue ready on wound bed and Tubigrip for compression. Denies fevers or chills.    11/14:  Patient presents for followup evaluation of RLE venous ulcer. RLE vascular studies reveal deep venous incompetence with an incompetent . GSV and SSV is competent. Bilateral LEADs reveal diffuse disease in the RLE femoral and popliteal arteries with a 50-75% stenosis in the common femoral artery. R CHEYANNE:  0.64 (severe disease). There is diffuse disease in the LLE femoral and popliteal arteries with a >75% stenosis of the distal superficial femoral artery.There is a 50-75% stenosis of the proximal and mid superficial femoral arteries. Left CHEYANNE: 0.69 (moderate disease). Pt has scheduled a vascular surgery appointment for January 14, 2025. Pt has been applying Hydrofera Blue ready on wound bed and Tubigrip for compression. Denies fevers or chills.    12/5: Patient presents for followup evaluation of RLE venous ulcer. No complaints. Pt has been applying Hydrofera Blue ready on wound bed and Tubigrip for compression. Denies fevers or chills.    12/19: Patient presents for followup evaluation of RLE venous ulcer. No issues. Pt has been applying clobetasol cream and DSD on wound bed and Tubigrip for compression. Pt is scheduled to see vascular surgery on January 14,  Denies fevers or chills.    1/2: Patient presents for followup evaluation of RLE venous ulcer.  Pt states dressing has been adhering to wound bed. Pt has been applying Hydrofera Blue Ready on wound bed and Tubigrip for compression. Denies fevers or chills.    3/6: Patient presents for followup evaluation of RLE venous ulcer. Pt has been applying Hydrofera Blue Ready on wound bed and Tubigrip for compression. Pt appointment with vascular was cancelled due to patient being ill. Pt plans on rescheduling. Denies fevers or chills.    3/20: Patient presents for followup evaluation of RLE venous ulcer. Pt c/o increased serosanguinous drainage.  Pt is scheduled for RLE sclerotherapy with vascular on 4/11/25. Pt has been applying Dermagran on wound bed and Tubigrip for compression. Denies fevers or chills.    4/3:  Patient presents for followup evaluation of RLE venous ulcer. Pt c/o increased serosanguinous drainage. Pt has been applying Exufiber Ag on wound bed and Tubigrip for compression. Denies fevers or chills.    4/17: Patient presents for followup evaluation of RLE venous ulcer.  Pt had RLE sclerotherapy procedure on 4/11/25. Pt has been applying Exufiber Ag on wound bed and Tubigrip for compression. Denies fevers or chills.    5/8: Patient presents for followup evaluation of RLE venous ulcer.  Pt states Exufiber dressing has been adhering to wound bed.      5/15: Patient presents for followup evaluation of RLE venous ulcer.  Pt states she has increased bloody drainage to wound bed. Has been applying Dermagran on wound bed. No fevers or chills.    5/22: Patient presents for followup evaluation of RLE venous ulcer.  Pt states the adaptic with silver alginate has been sticking to wound bed. No fevers or chills.     5/29: Patient presents for followup evaluation of RLE venous ulcer. No complaints.. Has been applying xeroform to wound bed. No fevers or chills.     6/19: Patient presents for followup evaluation of RLE venous ulcer. Pt has a follow up arterial doppler scheduled for 7/11/25. Pt c/o feeling weak today. Has been applying xeroform to wound bed. Denies any sob, fatigue, N/V, CP, fever or chills.            The following portions of the patient's history were reviewed and updated as appropriate:   Problem List[1]  Past Medical History[2]  Past Surgical History[3]  Family History[4]  Social History[5]  Current Medications[6]    Review of Systems   Constitutional:  Negative for appetite change, chills, fatigue, fever and unexpected weight change.   HENT:  Negative for congestion, hearing loss and postnasal drip.    Respiratory:  Negative for cough  and shortness of breath.    Cardiovascular:  Negative for leg swelling.   Musculoskeletal:  Positive for gait problem (unsteady).   Skin:  Positive for wound (RLE). Negative for rash.   Neurological:  Positive for dizziness, facial asymmetry, weakness, light-headedness and numbness (left arm).   Hematological:  Does not bruise/bleed easily.   Psychiatric/Behavioral: Negative.           Objective:  /70   Pulse 68   Temp (!) 97 °F (36.1 °C)   Resp 18   Pain Score: 0-No pain     Physical Exam  Constitutional:       General: She is awake. She is not in acute distress.     Appearance: She is not ill-appearing, toxic-appearing or diaphoretic.   HENT:      Head: Normocephalic and atraumatic.      Right Ear: External ear normal.      Left Ear: External ear normal.     Eyes:      Conjunctiva/sclera: Conjunctivae normal.       Cardiovascular:      Pulses:           Dorsalis pedis pulses are 1+ on the right side.   Pulmonary:      Effort: Pulmonary effort is normal. No respiratory distress.     Musculoskeletal:      Right lower leg: Edema present.      Comments: trace     Skin:     General: Skin is warm and dry.      Findings: Wound (RLE) present. No erythema.      Comments: 1.  Right lower extremity venous ulcer with sanguinous drainage and and fragile periwound scar tissue. Skin island appreciated on exam. See wound assessment for additional details.     Neurological:      Mental Status: She is alert.     Psychiatric:         Mood and Affect: Mood normal.         Behavior: Behavior normal. Behavior is cooperative.         Wound 10/29/23 Venous Ulcer Pretibial Right (Active)   Wound Image Images linked 06/19/25 0803   Wound Description Pink;Epithelialization;Beefy red 06/19/25 0803   Non-staged Wound Description Full thickness 06/19/25 0803   Wound Length (cm) 2.1 cm 06/19/25 0803   Wound Width (cm) 1.7 cm 06/19/25 0803   Wound Depth (cm) 0.1 cm 06/19/25 0803   Wound Surface Area (cm^2) 2.8 cm^2 06/19/25 0803   Wound  "Volume (cm^3) 0.187 cm^3 06/19/25 0803   Calculated Wound Volume (cm^3) 0.36 cm^3 06/19/25 0803   Change in Wound Size % 91 06/19/25 0803   Drainage Amount Small 06/19/25 0803   Drainage Description Serosanguineous 06/19/25 0803   Radha-wound Assessment Pink;Fragile;Maceration 06/19/25 0803   Dressing Status Intact 06/19/25 0803       Procedures             Wound Instructions:  No orders of the defined types were placed in this encounter.      Vesta Garcia PA-C, [facility]      Portions of the record may have been created with voice recognition software. Occasional wrong word or \"sound alike\" substitutions may have occurred due to the inherent limitations of voice recognition software. Read the chart carefully and recognize, using context, where substitutions have occurred.         [1]   Patient Active Problem List  Diagnosis    Hyperlipidemia    Chronic anticoagulation    Hypertension, essential    Coronary artery disease of native artery of native heart with stable angina pectoris (HCC)    Simple chronic bronchitis (HCC)    Diabetes mellitus with neurological manifestation (HCC)    Hypothyroidism    Iron deficiency    GERD (gastroesophageal reflux disease)    Anemia    AVM (arteriovenous malformation) of small bowel, acquired with hemorrhage    Angiectasia    Other vascular disorders of intestine (HCC)    Aortic valve replaced    Cardiomyopathy (HCC)    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HCC)    Stage 3b chronic kidney disease (HCC)    Chronic kidney disease-mineral and bone disorder    Primary osteoarthritis involving multiple joints    Paroxysmal atrial fibrillation (HCC)    Neutropenia associated with infection (HCC)    Herpes simplex labialis    Restrictive lung disease    Nocturnal hypoxemia    Supratherapeutic INR    H/O mechanical aortic valve replacement    Subtherapeutic international normalized ratio (INR)    Type 2 diabetes mellitus with stage 3b chronic kidney disease, with long-term current use of insulin (HCC) "    Venous ulcer of right leg (HCC)    Chronic systolic (congestive) heart failure (HCC)    Lower extremity edema    Iron deficiency anemia due to chronic blood loss    Hemiplegia and hemiparesis following cerebral infarction affecting left non-dominant side (HCC)    Chronic obstructive pulmonary disease (HCC)    Type 2 diabetes mellitus with stage 4 chronic kidney disease, with long-term current use of insulin (HCC)    Chronic venous hypertension with ulcer and inflammation involving right side (HCC)    Peripheral arterial disease (HCC)   [2]   Past Medical History:  Diagnosis Date    Acute anterior epistaxis 12/23/2023    Acute blood loss anemia 12/14/2023    Acute respiratory failure with hypoxia (HCC) 02/06/2024    Anemia     Aneurysm of thoracic aorta (HCC)     Angioedema 06/07/2019    Aortic valve disorder     Benign neoplasm of sigmoid colon     Cardiomyopathy (HCC)     last assessed: 10/10/2014    CHF (congestive heart failure) (HCC)     Chronic kidney disease     Chronic venous hypertension with ulcer and inflammation involving right side (HCC) 04/11/2025    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HCC)     last assessed: 10/10/2014    COPD (chronic obstructive pulmonary disease) (HCC)     Diabetic nephropathy (HCC)     Disease of thyroid gland     RAMON (dyspnea on exertion)     GERD (gastroesophageal reflux disease)     History of emphysema (HCC)     History of transfusion     Hyperlipidemia     Hypertensive urgency 10/28/2023    Leg cramps 11/01/2023    Stroke-like symptoms 10/28/2023    TIA (transient ischemic attack)    [3]   Past Surgical History:  Procedure Laterality Date    AORTIC VALVE REPLACEMENT      CARDIAC PACEMAKER PLACEMENT      last assessed: 10/10/2014    CARDIAC SURGERY      aortic valve replacement    CHOLECYSTECTOMY      COLONOSCOPY      EGD      HYSTERECTOMY      INSERT / REPLACE / REMOVE PACEMAKER      KNEE SURGERY Right     OTHER SURGICAL HISTORY      Capsule ENDOscopy description: 12/19/2012     OH COLONOSCOPY FLX DX W/COLLJ SPEC WHEN PFRMD N/A 2018    Procedure: single balloon ENTEROSCOPY;  Surgeon: David Dennis MD;  Location: [facility];  Service: Gastroenterology    OH ESOPHAGOGASTRODUODENOSCOPY TRANSORAL DIAGNOSTIC N/A 2017    Procedure: EGD AND COLONOSCOPY;  Surgeon: Jay Mcleod III, MD;  Location: [facility];  Service: Gastroenterology    VEIN LIGATION Right 2025    Procedure: right leg peforator sclerotherapy;  Surgeon: Chris Bush MD;  Location: [facility];  Service: Vascular   [4]   Family History  Problem Relation Name Age of Onset    No Known Problems Mother      No Known Problems Father     [5]   Social History  Socioeconomic History    Marital status:    Tobacco Use    Smoking status: Former     Current packs/day: 0.00     Average packs/day: 1 pack/day for 52.9 years (52.9 ttl pk-yrs)     Types: Cigarettes     Start date:      Quit date: 2007     Years since quittin.5     Passive exposure: Past    Smokeless tobacco: Former     Quit date:    Vaping Use    Vaping status: Never Used   Substance and Sexual Activity    Alcohol use: Never    Drug use: Never    Sexual activity: Not Currently     Partners: Male   Social History Narrative    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 BID Freestyle lancets BID    Living independently with spouse     Social Drivers of Health     Food Insecurity: No Food Insecurity (2025)    Nursing - Inadequate Food Risk Classification     Worried About Running Out of Food in the Last Year: Never true     Ran Out of Food in the Last Year: Never true   Transportation Needs: No Transportation Needs (2025)    PRAPARE - Transportation     Lack of Transportation (Medical): No     Lack of Transportation (Non-Medical): No   Physical Activity: Inactive (2021)    Exercise Vital Sign     Days of Exercise per Week: 0 days     Minutes of Exercise per Session: 0 min   Stress: No Stress Concern Present  (5/26/2021)    [employer] - Occupational Stress Questionnaire     Feeling of Stress : Not at all   Housing Stability: Unknown (6/2/2025)    Housing Stability Vital Sign     Unable to Pay for Housing in the Last Year: No     Homeless in the Last Year: No   [6]   Current Outpatient Medications:     Accu-Chek FastClix Lancets MISC, Use 3 (three) times a day, Disp: 300 each, Rfl: 3    albuterol (Ventolin HFA) 90 mcg/act inhaler, Inhale 2 puffs every 6 (six) hours as needed for wheezing, Disp: 54 g, Rfl: 3    Ascorbic Acid (vitamin C) 1000 MG tablet, Take 1,000 mg by mouth in the morning., Disp: , Rfl:     aspirin (ECOTRIN LOW STRENGTH) 81 mg EC tablet, Take 1 tablet (81 mg total) by mouth daily Do not start before November 1, 2023., Disp: 30 tablet, Rfl: 0    BD Pen Needle Rosie Ultrafine 32G X 4 MM MISC, USE DAILY, Disp: 100 each, Rfl: 1    Blood Glucose Monitoring Suppl (Accu-Chek Guide Me) w/Device KIT, Use to check sugars once daily, Disp: 1 kit, Rfl: 2    Cholecalciferol (Vitamin D3) 50 MCG (2000 UT) TABS, Take 2,000 Units by mouth in the morning., Disp: , Rfl:     clobetasol (TEMOVATE) 0.05 % ointment, Apply topically 2 (two) times a day, Disp: 30 g, Rfl: 1    cyanocobalamin (VITAMIN B-12) 1000 MCG tablet, Take 1 tablet (1,000 mcg total) by mouth daily, Disp: , Rfl:     Empagliflozin (Jardiance) 10 MG TABS tablet, TAKE 1 TABLET BY MOUTH IN THE  MORNING, Disp: 100 tablet, Rfl: 1    Ferrous Sulfate (IRON PO), Take by mouth daily in the early morning OTC, Disp: , Rfl:     fluticasone (FLONASE) 50 mcg/act nasal spray, 1 spray into each nostril daily, Disp: 16 g, Rfl: 2    furosemide (LASIX) 40 mg tablet, Take 1 tablet (40 mg total) by mouth 2 (two) times a day, Disp: 180 tablet, Rfl: 3    gabapentin (NEURONTIN) 300 mg capsule, TAKE 1 CAPSULE BY MOUTH 3 TIMES  DAILY, Disp: 300 capsule, Rfl: 1    glipiZIDE (GLUCOTROL) 10 mg tablet, TAKE 1 TABLET BY MOUTH TWICE  DAILY BEFORE MEALS, Disp: 200  tablet, Rfl: 0    glucose blood (Accu-Chek Celeste Plus) test strip, Use 1 each 3 (three) times a day Use as instructed, Disp: 300 each, Rfl: 3    insulin degludec (TRESIBA) 100 units/mL injection pen, Inject 30 Units under the skin daily, Disp: 30 mL, Rfl: 3    ipratropium-albuterol (DUO-NEB) 0.5-2.5 mg/3 mL nebulizer solution, Take 3 mL by nebulization 4 (four) times a day, Disp: 360 mL, Rfl: 1    Lancets (freestyle) lancets, Check blood glucose 3 times daily, Disp: 300 each, Rfl: 0    levothyroxine 50 mcg tablet, TAKE 1 TABLET BY MOUTH DAILY, Disp: 100 tablet, Rfl: 1    metoprolol tartrate (LOPRESSOR) 50 mg tablet, TAKE ONE-HALF TABLET BY MOUTH  TWICE DAILY, Disp: 100 tablet, Rfl: 1    oxygen gas, Inhale 2 L/min daily at bedtime as needed home oxygen nightly, Disp: , Rfl:     pantoprazole (PROTONIX) 40 mg tablet, TAKE 1 TABLET BY MOUTH DAILY AS  DIRECTED, Disp: 100 tablet, Rfl: 2    Trelegy Ellipta 100-62.5-25 MCG/ACT inhaler, USE 1 INHALATION BY MOUTH ONCE  DAILY AT THE SAME TIME EACH DAY  RINSE MOUTH AFTER USE, Disp: 180 each, Rfl: 3    warfarin (COUMADIN) 5 mg tablet, TAKE 1 TO 2 TABLETS BY MOUTH  DAILY OR AS DIRECTED, Disp: 60 tablet, Rfl: 11    Current Facility-Administered Medications:     lidocaine (LMX) 4 % cream, , Topical, Once, Vesta Garcia PA-C

## 2025-06-19 NOTE — ED PROVIDER NOTES
Time reflects when diagnosis was documented in both MDM as applicable and the Disposition within this note       Time User Action Codes Description Comment    6/19/2025  8:38 AM Dina Giana ABHIJEET Add [R29.90] Stroke-like symptoms           ED Disposition       ED Disposition   Admit    Condition   Stable    Date/Time   Thu Jun 19, 2025  9:31 AM    Comment   Case was discussed with [facility] and the patient's admission status was agreed to be Admission Status: observation status to the service of Dr. Cummins .               Assessment & Plan       Medical Decision Making  85-year-old female with strokelike symptoms-stroke alert was called given continued symptoms although they are improving.  Labs, CT scans ordered.  Spoke with Dr. Crabtree from neurology who recommends admission under [facility] and keeping blood pressure goal 140-180 systolic.  No further recommendations for additional medications at this time.    Amount and/or Complexity of Data Reviewed  Labs: ordered.  Radiology: ordered.    Risk  Prescription drug management.  Decision regarding hospitalization.        ED Course as of 06/19/25 1110   Thu Jun 19, 2025   0914 Neuro at bedside    0929 Spoke with Dr Crabtree from neuro- states that she does not need anything additional for medications at this time. Goal BP is 140-180 sys. Admit to [facility]        Medications   sodium chloride 0.9 % bolus 1,000 mL (1,000 mL Intravenous New Bag 6/19/25 0934)   iohexol (OMNIPAQUE) 350 MG/ML injection (MULTI-DOSE) 85 mL (85 mL Intravenous Given 6/19/25 0845)       ED Risk Strat Scores                    No data recorded                            History of Present Illness       Chief Complaint   Patient presents with    Numbness     Left sided arm numbness that started 15 minutes ago while at wound care. Endorses feeling dizzy.        Past Medical History[1]   Past Surgical History[2]   Family History[3]   Social History[4]   E-Cigarette/Vaping    E-Cigarette Use Never User        E-Cigarette/Vaping Substances    Nicotine No     THC No     CBD No     Flavoring No     Other No     Unknown No       I have reviewed and agree with the history as documented.     84 y/o female, hx of stroke, DM, HTN, HLD, and AVR on coumadin, presents to the ED for stroke like symptoms.  Patient states that about 15 minutes prior to arrival she was at [facility] care where she developed left-sided weakness, and left facial droop.  She states that symptoms have been improving since.  States that she just feels slightly weaker on the left arm and leg but these are improved from initial onset.  She states that she does have a history of prior TIA in the past with similar symptoms.  She denies any headache, chest pain, shortness of breath, abdominal pain, nausea/vomiting, diarrhea/constipation, or urinary symptoms.  States that she is on Coumadin.  No recent falls or injury.  No other complaints.      History provided by:  Patient      Review of Systems   Constitutional:  Negative for chills and fever.   HENT:  Negative for congestion, ear pain and sore throat.    Eyes:  Negative for pain and visual disturbance.   Respiratory:  Negative for cough, shortness of breath and wheezing.    Cardiovascular:  Negative for chest pain and leg swelling.   Gastrointestinal:  Negative for abdominal pain, diarrhea, nausea and vomiting.   Genitourinary:  Negative for dysuria, frequency, hematuria and urgency.   Musculoskeletal:  Negative for neck pain and neck stiffness.   Skin:  Negative for rash and wound.   Neurological:  Positive for facial asymmetry and weakness. Negative for numbness and headaches.   Psychiatric/Behavioral:  Negative for agitation and confusion.    All other systems reviewed and are negative.          Objective       ED Triage Vitals   Temperature Pulse Blood Pressure Respirations SpO2 Patient Position - Orthostatic VS   06/19/25 0827 06/19/25 0827 06/19/25 0827 06/19/25 0827 06/19/25 0827 06/19/25 0827   97.5 °F  (36.4 °C) 73 91/62 19 97 % Sitting      Temp Source Heart Rate Source BP Location FiO2 (%) Pain Score    06/19/25 0827 06/19/25 0827 06/19/25 0827 -- 06/19/25 1107    Temporal Monitor Left arm  No Pain      Vitals      Date and Time Temp Pulse SpO2 Resp BP Pain Score FACES Pain Rating User   06/19/25 1107 -- -- -- -- -- No Pain -- EB   06/19/25 1045 -- 66 94 % 18 145/61 -- -- RITESH   06/19/25 1020 -- 62 92 % 19 -- -- -- RITESH   06/19/25 1015 -- 60 93 % 22 141/63 -- -- RITESH   06/19/25 1010 -- 60 92 % 23 -- -- -- RITESH   06/19/25 1005 -- 65 93 % 50 -- -- -- RITESH   06/19/25 1000 -- 60 93 % 25 145/67 -- -- RITESH   06/19/25 0955 -- 62 90 % 20 -- -- -- RITESH   06/19/25 0950 -- 60 91 % 31 -- -- -- RITESH   06/19/25 0945 -- 61 92 % 20 145/67 -- -- RITESH   06/19/25 0940 -- 60 94 % 44 -- -- -- RITESH   06/19/25 0935 -- 68 93 % 33 -- -- -- RITESH   06/19/25 0930 -- 60 96 % 12 141/65 -- -- RITESH   06/19/25 0925 -- 63 94 % 25 -- -- -- RITESH   06/19/25 0920 -- 72 93 % 42 -- -- -- RITESH   06/19/25 0915 -- 70 91 % 32 159/68 -- -- RITESH   06/19/25 0910 -- 75 91 % 36 -- -- -- RITESH   06/19/25 0905 -- 73 93 % 24 -- -- -- RITESH   06/19/25 0900 -- 72 92 % 23 163/67 -- -- RITESH   06/19/25 0855 -- 79 92 % 26 -- -- -- RITESH   06/19/25 0850 -- 77 97 % 46 127/97 -- -- RITESH   06/19/25 0845 -- 68 97 % 18 134/91 -- -- RITESH   06/19/25 0840 -- 70 96 % 28 -- -- -- RITESH   06/19/25 0835 -- -- -- -- 215/79 -- -- RITESH   06/19/25 0830 97.5 °F (36.4 °C) 73 96 % 18 215/79 -- -- RITESH   06/19/25 0827 97.5 °F (36.4 °C) 73 97 % 19 91/62 -- -- GP            Physical Exam  Vitals and nursing note reviewed.   Constitutional:       Appearance: She is well-developed.   HENT:      Head: Normocephalic and atraumatic.     Eyes:      Pupils: Pupils are equal, round, and reactive to light.       Cardiovascular:      Rate and Rhythm: Normal rate and regular rhythm.   Pulmonary:      Effort: Pulmonary effort is normal.      Breath sounds: Normal breath sounds.   Abdominal:      General: Bowel sounds are normal.      Palpations: Abdomen  is soft.     Musculoskeletal:         General: Normal range of motion.      Cervical back: Normal range of motion and neck supple.     Skin:     General: Skin is warm and dry.     Neurological:      Mental Status: She is alert and oriented to person, place, and time.      Comments: No facial droop, no slurred speech.  Strength 4/5 left upper extremity and left lower extremity without any drift.       Results Reviewed       Procedure Component Value Units Date/Time    HS Troponin I 4hr [[id number]]     Lab Status: No result Specimen: Blood     HS Troponin 0hr (reflex protocol) [[id number]]  (Normal) Collected: 06/19/25 0840    Lab Status: Final result Specimen: Blood from Arm, Right Updated: 06/19/25 0907     hs TnI 0hr 32 ng/L     HS Troponin I 2hr [[id number]]     Lab Status: No result Specimen: Blood     Protime-INR [[id number]]  (Abnormal) Collected: 06/19/25 0840    Lab Status: Final result Specimen: Blood from Arm, Right Updated: 06/19/25 0907     Protime 30.0 seconds      INR 2.78    Narrative:      INR Therapeutic Range    Indication                                             INR Range      Atrial Fibrillation                                               2.0-3.0  Hypercoagulable State                                    2.0.2.3  Left Ventricular Asist Device                            2.0-3.0  Mechanical Heart Valve                                  -    Aortic(with afib, MI, embolism, HF, LA enlargement,    and/or coagulopathy)                                     2.0-3.0 (2.5-3.5)     Mitral                                                             2.5-3.5  Prosthetic/Bioprosthetic Heart Valve               2.0-3.0  Venous thromboembolism (VTE: VT, PE        2.0-3.0    APTT [[id number]]  (Abnormal) Collected: 06/19/25 0840    Lab Status: Final result Specimen: Blood from Arm, Right Updated: 06/19/25 0907     PTT 39 seconds     Comprehensive metabolic panel [[id number]]  (Abnormal) Collected: 06/19/25 0840    Lab  Status: Final result Specimen: Blood from Arm, Right Updated: 06/19/25 0900     Sodium 138 mmol/L      Potassium 3.5 mmol/L      Chloride 101 mmol/L      CO2 28 mmol/L      ANION GAP 9 mmol/L      BUN 24 mg/dL      Creatinine 1.51 mg/dL      Glucose 90 mg/dL      Calcium 9.5 mg/dL      AST 18 U/L      ALT 11 U/L      Alkaline Phosphatase 97 U/L      Total Protein 8.2 g/dL      Albumin 4.2 g/dL      Total Bilirubin 0.42 mg/dL      eGFR 31 ml/min/1.73sq m     Narrative:      National Kidney Disease Foundation guidelines for Chronic Kidney Disease (CKD):     Stage 1 with normal or high GFR (GFR > 90 mL/min/1.73 square meters)    Stage 2 Mild CKD (GFR = 60-89 mL/min/1.73 square meters)    Stage 3A Moderate CKD (GFR = 45-59 mL/min/1.73 square meters)    Stage 3B Moderate CKD (GFR = 30-44 mL/min/1.73 square meters)    Stage 4 Severe CKD (GFR = 15-29 mL/min/1.73 square meters)    Stage 5 End Stage CKD (GFR <15 mL/min/1.73 square meters)  Note: GFR calculation is accurate only with a steady state creatinine    CBC and Platelet [[id number]]  (Abnormal) Collected: 06/19/25 0840    Lab Status: Final result Specimen: Blood from Arm, Right Updated: 06/19/25 0846     WBC 6.24 Thousand/uL      RBC 5.27 Million/uL      Hemoglobin 14.6 g/dL      Hematocrit 45.0 %      MCV 85 fL      MCH 27.7 pg      MCHC 32.4 g/dL      RDW 14.0 %      Platelets 324 Thousands/uL      MPV 9.9 fL     Fingerstick Glucose (POCT) [[id number]]  (Normal) Collected: 06/19/25 0835    Lab Status: Final result Specimen: Blood Updated: 06/19/25 0836     POC Glucose 99 mg/dl             XR stroke alert portable chest   Final Interpretation by Audi Zee MD (06/19 1003)      Possible mild pulmonary edema. No consolidation.               Workstation performed: [id number]         CTA stroke alert (head/neck)   Final Interpretation by Jean Soares MD (06/19 0909)      1.  No intracranial large vessel occlusion.   2.  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   3.  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 Recommendation is for follow-up low-dose chest CT in 1 year.   4.  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 Recommend follow-up with neurovascular service.         Findings of no LVO and unchanged right vertebral arterial stenosis were directly discussed with Lili Crabtree and Giana Arroyo at 9:07 a.m 6/19/2025.      Workstation performed: [id number]         CT stroke alert brain   Final Interpretation by Jean Soares MD (06/19 0904)      No acute intracranial abnormality.  Chronic microangiopathic changes.         I personally discussed this study with GIANA ARROYO on 6/19/2025 855 AM.         Workstation performed: [id number]         CT head wo contrast    (Results Pending)       Procedures    ED Medication and Procedure Management   Prior to Admission Medications   Prescriptions Last Dose Informant Patient Reported? Taking?   Accu-Chek FastClix Lancets MISC 6/19/2025 Self No Yes   Sig: Use 3 (three) times a day   Ascorbic Acid (vitamin C) 1000 MG tablet 6/18/2025 Morning Self Yes Yes   Sig: Take 1,000 mg by mouth in the morning.   BD Pen Needle Rosie Ultrafine 32G X 4 MM MISC 6/19/2025  No Yes   Sig: USE DAILY   Blood Glucose Monitoring Suppl (Accu-Chek Guide Me) w/Device KIT 6/19/2025  No Yes   Sig: Use to check sugars once daily   Cholecalciferol (Vitamin D3) 50 MCG (2000 UT) TABS 6/19/2025 Morning Self Yes Yes   Sig: Take 2,000 Units by mouth in the morning.   Empagliflozin (Jardiance) 10 MG TABS tablet 6/19/2025 Morning Self No Yes   Sig: TAKE 1 TABLET BY MOUTH IN THE  MORNING   Ferrous Sulfate (IRON PO) 6/19/2025 Morning Self Yes Yes   Sig: Take by mouth daily in the early morning OTC   Lancets (freestyle) lancets 6/19/2025 Self No Yes   Sig: Check blood glucose 3 times daily    Trelegy Ellipta 100-62.5-25 MCG/ACT inhaler 2025 Morning  No Yes   Sig: USE 1 INHALATION BY MOUTH ONCE  DAILY AT THE SAME TIME EACH DAY  RINSE MOUTH AFTER USE   albuterol (Ventolin HFA) 90 mcg/act inhaler 2025 Self No Yes   Sig: Inhale 2 puffs every 6 (six) hours as needed for wheezing   aspirin (ECOTRIN LOW STRENGTH) 81 mg EC tablet 2025 Morning Self No Yes   Sig: Take 1 tablet (81 mg total) by mouth daily Do not start before 2023.   clobetasol (TEMOVATE) 0.05 % ointment  Self No No   Sig: Apply topically 2 (two) times a day   cyanocobalamin (VITAMIN B-12) 1000 MCG tablet 2025 Morning Self No Yes   Sig: Take 1 tablet (1,000 mcg total) by mouth daily   fluticasone (FLONASE) 50 mcg/act nasal spray 2025 Morning Self No Yes   Si spray into each nostril daily   furosemide (LASIX) 40 mg tablet 2025 Morning  No Yes   Sig: Take 1 tablet (40 mg total) by mouth 2 (two) times a day   gabapentin (NEURONTIN) 300 mg capsule   No No   Sig: TAKE 1 CAPSULE BY MOUTH 3 TIMES  DAILY   Patient taking differently: Take 300 mg by mouth 2 (two) times a day   glipiZIDE (GLUCOTROL) 10 mg tablet 2025 Morning  No Yes   Sig: TAKE 1 TABLET BY MOUTH TWICE  DAILY BEFORE MEALS   glucose blood (Accu-Chek Celeste Plus) test strip 2025  No Yes   Sig: Use 1 each 3 (three) times a day Use as instructed   insulin degludec (TRESIBA) 100 units/mL injection pen 2025 Bedtime Self No Yes   Sig: Inject 30 Units under the skin daily   ipratropium-albuterol (DUO-NEB) 0.5-2.5 mg/3 mL nebulizer solution Past Week Self No Yes   Sig: Take 3 mL by nebulization 4 (four) times a day   levothyroxine 50 mcg tablet 2025 Morning  No Yes   Sig: TAKE 1 TABLET BY MOUTH DAILY   metoprolol tartrate (LOPRESSOR) 50 mg tablet 2025 Evening  No Yes   Sig: TAKE ONE-HALF TABLET BY MOUTH  TWICE DAILY   oxygen gas 2025 Bedtime Self Yes Yes   Sig: Inhale 2 L/min daily at bedtime as needed home oxygen nightly    pantoprazole (PROTONIX) 40 mg tablet 6/18/2025 Morning Self No Yes   Sig: TAKE 1 TABLET BY MOUTH DAILY AS  DIRECTED   warfarin (COUMADIN) 5 mg tablet 6/18/2025 Evening  No Yes   Sig: TAKE 1 TO 2 TABLETS BY MOUTH  DAILY OR AS DIRECTED      Facility-Administered Medications Last Administration Doses Remaining   lidocaine (LMX) 4 % cream None recorded 1   lidocaine (LMX) 4 % cream 6/19/2025  8:08 AM 0        Current Discharge Medication List        CONTINUE these medications which have NOT CHANGED    Details   !! Accu-Chek FastClix Lancets MISC Use 3 (three) times a day  Qty: 300 each, Refills: 3    Associated Diagnoses: Type 2 diabetes mellitus with stage 4 chronic kidney disease, with long-term current use of insulin ([facility])      albuterol (Ventolin HFA) 90 mcg/act inhaler Inhale 2 puffs every 6 (six) hours as needed for wheezing  Qty: 54 g, Refills: 3    Comments: Substitution to a formulary equivalent within the same pharmaceutical class is authorized.  Associated Diagnoses: Simple chronic bronchitis ([facility])      Ascorbic Acid (vitamin C) 1000 MG tablet Take 1,000 mg by mouth in the morning.      aspirin (ECOTRIN LOW STRENGTH) 81 mg EC tablet Take 1 tablet (81 mg total) by mouth daily Do not start before November 1, 2023.  Qty: 30 tablet, Refills: 0    Associated Diagnoses: Stroke-like symptoms      BD Pen Needle Rosie Ultrafine 32G X 4 MM MISC USE DAILY  Qty: 100 each, Refills: 1    Comments: Please send a replace/new response with 100-Day Supply if appropriate to maximize member benefit. Requesting 1 year supply.  Associated Diagnoses: Type 2 diabetes mellitus with diabetic polyneuropathy, with long-term current use of insulin ([facility])      Blood Glucose Monitoring Suppl (Accu-Chek Guide Me) w/Device KIT Use to check sugars once daily  Qty: 1 kit, Refills: 2    Associated Diagnoses: Type 2 diabetes mellitus with stage 4 chronic kidney disease, with long-term current use of insulin ([facility])      Cholecalciferol (Vitamin  D3) 50 MCG (2000 UT) TABS Take 2,000 Units by mouth in the morning.      cyanocobalamin (VITAMIN B-12) 1000 MCG tablet Take 1 tablet (1,000 mcg total) by mouth daily    Associated Diagnoses: B12 deficiency      Empagliflozin (Jardiance) 10 MG TABS tablet TAKE 1 TABLET BY MOUTH IN THE  MORNING  Qty: 100 tablet, Refills: 1    Comments: Please send a replace/new response with 100-Day Supply if appropriate to maximize member benefit. Requesting 1 year supply.  Associated Diagnoses: Type 2 diabetes mellitus with stage 3b chronic kidney disease, with long-term current use of insulin ([facility])      Ferrous Sulfate (IRON PO) Take by mouth daily in the early morning OTC      fluticasone (FLONASE) 50 mcg/act nasal spray 1 spray into each nostril daily  Qty: 16 g, Refills: 2    Associated Diagnoses: Subacute sinusitis, unspecified location      furosemide (LASIX) 40 mg tablet Take 1 tablet (40 mg total) by mouth 2 (two) times a day  Qty: 180 tablet, Refills: 3    Comments: Requesting 1 year supply  Associated Diagnoses: Congestive heart failure, unspecified HF chronicity, unspecified heart failure type ([facility])      glipiZIDE (GLUCOTROL) 10 mg tablet TAKE 1 TABLET BY MOUTH TWICE  DAILY BEFORE MEALS  Qty: 200 tablet, Refills: 0    Comments: Please send a replace/new response with 100-Day Supply if appropriate to maximize member benefit. Requesting 1 year supply.  Associated Diagnoses: Type 2 diabetes mellitus with diabetic polyneuropathy, with long-term current use of insulin ([facility])      glucose blood (Accu-Chek Celeste Plus) test strip Use 1 each 3 (three) times a day Use as instructed  Qty: 300 each, Refills: 3    Associated Diagnoses: Type 2 diabetes mellitus with stage 4 chronic kidney disease, with long-term current use of insulin ([facility])      insulin degludec (TRESIBA) 100 units/mL injection pen Inject 30 Units under the skin daily  Qty: 30 mL, Refills: 3    Comments: May substitute with any brand insulin degludec pen  Associated  Diagnoses: Type 2 diabetes mellitus with stage 3b chronic kidney disease, with long-term current use of insulin ([facility])      ipratropium-albuterol (DUO-NEB) 0.5-2.5 mg/3 mL nebulizer solution Take 3 mL by nebulization 4 (four) times a day  Qty: 360 mL, Refills: 1    Associated Diagnoses: Simple chronic bronchitis ([facility])      !! Lancets (freestyle) lancets Check blood glucose 3 times daily  Qty: 300 each, Refills: 0    Associated Diagnoses: Type 2 diabetes mellitus with diabetic neuropathy, without long-term current use of insulin ([facility])      levothyroxine 50 mcg tablet TAKE 1 TABLET BY MOUTH DAILY  Qty: 100 tablet, Refills: 1    Comments: Please send a replace/new response with 100-Day Supply if appropriate to maximize member benefit. Requesting 1 year supply.  Associated Diagnoses: Hypothyroidism, unspecified type      metoprolol tartrate (LOPRESSOR) 50 mg tablet TAKE ONE-HALF TABLET BY MOUTH  TWICE DAILY  Qty: 100 tablet, Refills: 1    Comments: Please send a replace/new response with 100-Day Supply if appropriate to maximize member benefit. Requesting 1 year supply.  Associated Diagnoses: Hypertension, essential      oxygen gas Inhale 2 L/min daily at bedtime as needed home oxygen nightly      pantoprazole (PROTONIX) 40 mg tablet TAKE 1 TABLET BY MOUTH DAILY AS  DIRECTED  Qty: 100 tablet, Refills: 2    Comments: Please send a replace/new response with 100-Day Supply if appropriate to maximize member benefit. Requesting 1 year supply.  Associated Diagnoses: Gastroesophageal reflux disease      Trelegy Ellipta 100-62.5-25 MCG/ACT inhaler USE 1 INHALATION BY MOUTH ONCE  DAILY AT THE SAME TIME EACH DAY  RINSE MOUTH AFTER USE  Qty: 180 each, Refills: 3    Comments: Please send a replace/new response with 100-Day Supply if appropriate to maximize member benefit. Requesting 1 year supply.  Associated Diagnoses: Simple chronic bronchitis ([facility])      warfarin (COUMADIN) 5 mg tablet TAKE 1 TO 2 TABLETS BY MOUTH  DAILY OR AS  DIRECTED  Qty: 60 tablet, Refills: 11    Comments: Requesting 1 year supply  Associated Diagnoses: Aortic valve disorder      clobetasol (TEMOVATE) 0.05 % ointment Apply topically 2 (two) times a day  Qty: 30 g, Refills: 1    Associated Diagnoses: Chronic venous hypertension (idiopathic) with ulcer and inflammation of right lower extremity (HCC); Hyperkeratosis of skin      gabapentin (NEURONTIN) 300 mg capsule TAKE 1 CAPSULE BY MOUTH 3 TIMES  DAILY  Qty: 300 capsule, Refills: 1    Comments: Please send a replace/new response with 100-Day Supply if appropriate to maximize member benefit. Requesting 1 year supply.  Associated Diagnoses: Type 2 diabetes mellitus with diabetic neuropathy, without long-term current use of insulin (HCC)       !! - Potential duplicate medications found. Please discuss with provider.        No discharge procedures on file.  ED SEPSIS DOCUMENTATION   Time reflects when diagnosis was documented in both MDM as applicable and the Disposition within this note       Time User Action Codes Description Comment    6/19/2025  8:38 AM Giana Arroyo Add [R29.90] Stroke-like symptoms                    [1]   Past Medical History:  Diagnosis Date    Acute anterior epistaxis 12/23/2023    Acute blood loss anemia 12/14/2023    Acute respiratory failure with hypoxia (HCC) 02/06/2024    Anemia     Aneurysm of thoracic aorta (HCC)     Angioedema 06/07/2019    Aortic valve disorder     Benign neoplasm of sigmoid colon     Cardiomyopathy (HCC)     last assessed: 10/10/2014    CHF (congestive heart failure) (HCC)     Chronic kidney disease     Chronic venous hypertension with ulcer and inflammation involving right side (HCC) 04/11/2025    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HCC)     last assessed: 10/10/2014    COPD (chronic obstructive pulmonary disease) (HCC)     Diabetic nephropathy (HCC)     Disease of thyroid gland     RAMON (dyspnea on exertion)     GERD (gastroesophageal reflux disease)     History of  emphysema (HCC)     History of transfusion     Hyperlipidemia     Hypertensive urgency 10/28/2023    Leg cramps 2023    Stroke-like symptoms 10/28/2023    TIA (transient ischemic attack)    [2]   Past Surgical History:  Procedure Laterality Date    AORTIC VALVE REPLACEMENT      CARDIAC PACEMAKER PLACEMENT      last assessed: 10/10/2014    CARDIAC SURGERY      aortic valve replacement    CHOLECYSTECTOMY      COLONOSCOPY      EGD      HYSTERECTOMY      INSERT / REPLACE / REMOVE PACEMAKER      KNEE SURGERY Right     OTHER SURGICAL HISTORY      Capsule ENDOscopy description: 2012    AZ COLONOSCOPY FLX DX W/COLLJ SPEC WHEN PFRMD N/A 2018    Procedure: single balloon ENTEROSCOPY;  Surgeon: David Dennis MD;  Location: [facility];  Service: Gastroenterology    AZ ESOPHAGOGASTRODUODENOSCOPY TRANSORAL DIAGNOSTIC N/A 2017    Procedure: EGD AND COLONOSCOPY;  Surgeon: Jay Mcleod III, MD;  Location: [facility];  Service: Gastroenterology    VEIN LIGATION Right 2025    Procedure: right leg peforator sclerotherapy;  Surgeon: Chris Bush MD;  Location: [facility];  Service: Vascular   [3]   Family History  Problem Relation Name Age of Onset    No Known Problems Mother      No Known Problems Father     [4]   Social History  Tobacco Use    Smoking status: Former     Current packs/day: 0.00     Average packs/day: 1 pack/day for 52.9 years (52.9 ttl pk-yrs)     Types: Cigarettes     Start date:      Quit date: 2007     Years since quittin.5     Passive exposure: Past    Smokeless tobacco: Former     Quit date:    Vaping Use    Vaping status: Never Used   Substance Use Topics    Alcohol use: Never    Drug use: Never        Giana Arrooy DO  25 1110

## 2025-06-19 NOTE — ASSESSMENT & PLAN NOTE
Chronic wounds of the lower extremity.  Follows with wound care specialist.  Continue wound care while in the hospital setting

## 2025-06-19 NOTE — ASSESSMENT & PLAN NOTE
Patient on admission due to strokelike symptoms with left-sided numbness of the face upper and lower extremity.  Patient reports reminding her previous stroke.  Denies weakness.  The symptoms faded on admission.  Stroke alert was called.  CT head negative for acute intracranial abnormalities.  Only showed mild microangiopathic changes.    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   Blood Pressure: 135/61  EKG with ventricular paced rhythm  Per neurology team high suspicion of recrudescence of the previous stroke in the setting of hypertension.  Plan:    Unfortunately patient not a candidate for MRI given noncompatible pacemaker.  TTE, monitor on tele  Lipid Panel, HbA1c  Atorvastatin 40 mg q.d., Aspirin 81 q.d.  Consult Neurology  PT/OT/Speech eval  Allow for permissive hypertension with -180  Neuro checks per protocol  Reassess CT head in the a.m.

## 2025-06-19 NOTE — SPEECH THERAPY NOTE
"Speech-Language Pathology Bedside Swallow Evaluation        Patient Name: Ely Hernadez    Today's Date: 6/19/2025     Problem List  Active Problems:    Stroke-like symptoms     Past Medical History  Past Medical History[1]    Past Surgical History  Past Surgical History[2]    Summary/Impressions:   Bedside observations support grossly intact oropharyngeal swallow function across all consistencies tested.  Self-fed solid and liquid trials with no s/s of dysphagia or distress.  Patient reports generally baseline swallow function (CVA w/ dysphagia Oct 2023).  Does endorse new onset globus sensation in upper esophagus, clears with sips of liquid.       Recommendations:   Diet: regular diet and thin liquids -added sauces/gravy  Meds: whole with liquid - cut/crush larger   Feeding assistance: tray set up   Frequent Oral care: 2-4x/day  Aspiration precautions and compensatory swallowing strategies: upright posture, slow rate of feeding, small bites/sips, and alternating bites and sips  Other Recommendations/ considerations: No further ST follow-up while in the acute care setting; may benefit form outpt GI referral      Current Medical Status  Pt is a 85 y.o. female who presented to [facility] with  paroxysmal A-fib on Coumadin s/p PPM (not MRI conditional), possible prior lacunar stroke in 2023 (L sided numbness/weakness at the time), CAD on ASA, HTN, HLD, DM2,  CHF, thoracic aortic aneurysm repair, aortic valve disease s/p mechanical AVR, cardiomyopathy, CKD stage III, COPD, iron deficiency anemia, and hypothyroidism who presented to [facility] on 6/19/2025 as a stroke alert with LUE numbness/weakness and LLE \"heaviness.\"  Symptoms are similar to prior stroke. .    Past medical history:  Please see H&P for details    Special Studies:  Chest XR 6/19/25:  Possible mild pulmonary edema. No consolidation.     6/19/25 CTA stroke alert:  1.  No intracranial large vessel occlusion.  2.  Unchanged severe stenosis right " vertebral arterial origin with immediate reconstitution.  3.  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 Recommendation is for follow-up low-dose chest CT in 1 year.  4.  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 Recommend follow-up with neurovascular service.    Social/Education/Vocational Hx:  Pt lives with family    Swallow Information   Current Risks for Dysphagia & Aspiration: CVA and known history of dysphagia  Current Symptoms/Concerns: facial droop   Current Diet: regular diet and thin liquids   Baseline Diet: regular diet and thin liquids    Baseline Assessment   Behavior/Cognition: alert  Speech/Language Status: able to participate in conversation  Patient Positioning: upright in bed    Swallow Mechanism Exam   Facial: left facial droop  Labial: decreased ROM left side  Lingual: left sided tongue deviation  Velum: symmetrical  Mandible: adequate ROM  Dentition: full dentures  Vocal quality:clear/adequate   Volitional Cough: strong/productive   Respiratory: RA    Consistencies Assessed and Performance   Consistencies Administered: thin liquids, puree, soft solids, and hard solids    Oral Stage: WFL. Patient presents with adequate bolus acceptance, containment and manipulation.  Mastication judged to be efficient and complete.  No oral residue.     Pharyngeal Stage: Appears functional.  Laryngeal rise noted upon palpation.  Swallow initiation appears timely.  No overt s/s of aspiration or distress.  Vocal quality remains clear and dry.     Esophageal Concerns: Occasional globus sensation, none reported today      Plan  No additional follow-up at this time. Please re-order should pt exhibit change in status or concerns arise.     Results Reviewed with: patient and RN       Amanda Mendez MS, CCC-SLP  Speech-Language Pathologist  [place] #[id number]  [place] #[id number]        [1]   Past Medical  History:  Diagnosis Date    Acute anterior epistaxis 12/23/2023    Acute blood loss anemia 12/14/2023    Acute respiratory failure with hypoxia (HCC) 02/06/2024    Anemia     Aneurysm of thoracic aorta (HCC)     Angioedema 06/07/2019    Aortic valve disorder     Benign neoplasm of sigmoid colon     Cardiomyopathy (HCC)     last assessed: 10/10/2014    CHF (congestive heart failure) (HCC)     Chronic kidney disease     Chronic venous hypertension with ulcer and inflammation involving right side (HCC) 04/11/2025    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HCC)     last assessed: 10/10/2014    COPD (chronic obstructive pulmonary disease) (HCC)     Diabetic nephropathy (HCC)     Disease of thyroid gland     RAMON (dyspnea on exertion)     GERD (gastroesophageal reflux disease)     History of emphysema (HCC)     History of transfusion     Hyperlipidemia     Hypertensive urgency 10/28/2023    Leg cramps 11/01/2023    Stroke-like symptoms 10/28/2023    TIA (transient ischemic attack)    [2]   Past Surgical History:  Procedure Laterality Date    AORTIC VALVE REPLACEMENT      CARDIAC PACEMAKER PLACEMENT      last assessed: 10/10/2014    CARDIAC SURGERY      aortic valve replacement    CHOLECYSTECTOMY      COLONOSCOPY      EGD      HYSTERECTOMY      INSERT / REPLACE / REMOVE PACEMAKER      KNEE SURGERY Right     OTHER SURGICAL HISTORY      Capsule ENDOscopy description: 12/19/2012    MT COLONOSCOPY FLX DX W/COLLJ SPEC WHEN PFRMD N/A 05/31/2018    Procedure: single balloon ENTEROSCOPY;  Surgeon: David Dennis MD;  Location: [facility];  Service: Gastroenterology    MT ESOPHAGOGASTRODUODENOSCOPY TRANSORAL DIAGNOSTIC N/A 11/29/2017    Procedure: EGD AND COLONOSCOPY;  Surgeon: Jay Mcleod III, MD;  Location: [facility];  Service: Gastroenterology    VEIN LIGATION Right 4/11/2025    Procedure: right leg peforator sclerotherapy;  Surgeon: Chris Bush MD;  Location: [facility];  Service: Vascular

## 2025-06-19 NOTE — ASSESSMENT & PLAN NOTE
"85 y.o. female with paroxysmal A-fib on Coumadin s/p PPM (not MRI conditional), possible prior lacunar stroke in 2023 (L sided numbness/weakness at the time), CAD on ASA, HTN, HLD, DM2,  CHF, thoracic aortic aneurysm repair, aortic valve disease s/p mechanical AVR, cardiomyopathy, CKD stage III, COPD, iron deficiency anemia, and hypothyroidism who presented to [facility] on 6/19/2025 as a stroke alert with LUE numbness/weakness and LLE \"heaviness.\"  Symptoms are similar to prior stroke.    Upon arrival to the ED, BP 91/62, however this quickly elevated to 215/79 and it improved to 134/91 without medications.    NIHSS 1.    CT head negative for acute intracranial abnormalities.  Only showed mild microangiopathic changes.    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    Patient was not a TNK candidate due to nondisabling symptoms and INR 2.78.    High suspicion for recrudescence in the setting of initial hypotension.  However, cannot rule out acute stroke.    Plan:  - Unable to obtain MRI brain as patient's pacemaker is not MRI conditional  - Will repeat CT head 24 hours post stroke symptom onset (approximately 0900 tomorrow)  - Recommend Echo  - Continue home Coumadin, goal INR 2.5-3.5 due to mechanical valve  - Continue home ASA 81 mg daily  - Okay to initiate atorvastatin 40 mg daily  - Telemetry  - Allow for permissive hypertension for 24 hours post stroke symptom onset. Goal -180  - Goal euglycemia, normothermia  - Frequent neurochecks  - Stroke education  - PT/OT/speech  - Medical management supportive care per primary team, notify of changes  "

## 2025-06-19 NOTE — PROGRESS NOTES
Wound Procedure Treatment Venous Ulcer Right Pretibial    Performed by: Graciela Valerio RN  Authorized by: Vesta Garcia PA-C  Associated wounds:   Wound 10/29/23 Venous Ulcer Pretibial Right    Wound cleansed with:  NSS   Applied primary dressing:  Other   Applied secondary dressing:  Gauze   Dressing secured with:  Tape     xeroform

## 2025-06-19 NOTE — ASSESSMENT & PLAN NOTE
Patient currently not in acute exacerbation.  Patient on Trelegy.  Will continue with analog in the hospital setting  Reports using oxygen at bedtime.

## 2025-06-19 NOTE — ASSESSMENT & PLAN NOTE
Blood Pressure: 135/61  Patient takes Lasix 40 mg daily and Lopressor 25 mg twice a day  Given patient under stroke pathway will allow permissive hypertension with SBP goal 140-180.

## 2025-06-20 ENCOUNTER — HOSPITAL ENCOUNTER (OUTPATIENT)
Dept: INFUSION CENTER | Facility: CLINIC | Age: 85
Discharge: HOME | End: 2025-06-20
Attending: INTERNAL MEDICINE

## 2025-06-20 ENCOUNTER — APPOINTMENT (OUTPATIENT)
Dept: CT IMAGING | Facility: HOSPITAL | Age: 85
End: 2025-06-20
Payer: COMMERCIAL

## 2025-06-20 VITALS
DIASTOLIC BLOOD PRESSURE: 68 MMHG | HEART RATE: 63 BPM | BODY MASS INDEX: 25.65 KG/M2 | SYSTOLIC BLOOD PRESSURE: 146 MMHG | WEIGHT: 159.61 LBS | HEIGHT: 66 IN | OXYGEN SATURATION: 94 % | TEMPERATURE: 97.5 F | RESPIRATION RATE: 18 BRPM

## 2025-06-20 LAB
ATRIAL RATE: 68 BPM
CHOLEST SERPL-MCNC: 168 MG/DL (ref ?–200)
EST. AVERAGE GLUCOSE BLD GHB EST-MCNC: 177 MG/DL
GLUCOSE SERPL-MCNC: 111 MG/DL (ref 65–140)
GLUCOSE SERPL-MCNC: 93 MG/DL (ref 65–140)
HBA1C MFR BLD: 7.8 %
HDLC SERPL-MCNC: 31 MG/DL
LDLC SERPL CALC-MCNC: 111 MG/DL (ref 0–100)
P AXIS: 75 DEGREES
PR INTERVAL: 204 MS
QRS AXIS: 119 DEGREES
QRSD INTERVAL: 188 MS
QT INTERVAL: 492 MS
QTC INTERVAL: 523 MS
T WAVE AXIS: -36 DEGREES
TRIGL SERPL-MCNC: 132 MG/DL (ref ?–150)
VENTRICULAR RATE: 68 BPM

## 2025-06-20 PROCEDURE — 70450 CT HEAD/BRAIN W/O DYE: CPT

## 2025-06-20 PROCEDURE — 97110 THERAPEUTIC EXERCISES: CPT

## 2025-06-20 PROCEDURE — 82948 REAGENT STRIP/BLOOD GLUCOSE: CPT

## 2025-06-20 PROCEDURE — 83036 HEMOGLOBIN GLYCOSYLATED A1C: CPT

## 2025-06-20 PROCEDURE — 93010 ELECTROCARDIOGRAM REPORT: CPT | Performed by: INTERNAL MEDICINE

## 2025-06-20 PROCEDURE — 99232 SBSQ HOSP IP/OBS MODERATE 35: CPT | Performed by: PSYCHIATRY & NEUROLOGY

## 2025-06-20 PROCEDURE — 99239 HOSP IP/OBS DSCHRG MGMT >30: CPT | Performed by: INTERNAL MEDICINE

## 2025-06-20 PROCEDURE — 97167 OT EVAL HIGH COMPLEX 60 MIN: CPT

## 2025-06-20 PROCEDURE — 80061 LIPID PANEL: CPT

## 2025-06-20 PROCEDURE — 97162 PT EVAL MOD COMPLEX 30 MIN: CPT

## 2025-06-20 RX ORDER — GABAPENTIN 300 MG/1
300 CAPSULE ORAL 2 TIMES DAILY
Start: 2025-06-20

## 2025-06-20 RX ADMIN — IRON SUCROSE 300 MG: 20 INJECTION, SOLUTION INTRAVENOUS at 13:04

## 2025-06-20 RX ADMIN — PANTOPRAZOLE SODIUM 40 MG: 40 TABLET, DELAYED RELEASE ORAL at 07:46

## 2025-06-20 RX ADMIN — Medication 2000 UNITS: at 07:45

## 2025-06-20 RX ADMIN — FERROUS SULFATE TAB 325 MG (65 MG ELEMENTAL FE) 325 MG: 325 (65 FE) TAB at 06:06

## 2025-06-20 RX ADMIN — LEVOTHYROXINE SODIUM 50 MCG: 0.05 TABLET ORAL at 07:45

## 2025-06-20 RX ADMIN — OXYCODONE HYDROCHLORIDE AND ACETAMINOPHEN 1000 MG: 500 TABLET ORAL at 07:45

## 2025-06-20 RX ADMIN — GABAPENTIN 300 MG: 300 CAPSULE ORAL at 07:45

## 2025-06-20 RX ADMIN — CYANOCOBALAMIN TAB 500 MCG 1000 MCG: 500 TAB at 07:45

## 2025-06-20 RX ADMIN — ASPIRIN 81 MG: 81 TABLET, COATED ORAL at 07:45

## 2025-06-20 NOTE — ASSESSMENT & PLAN NOTE
Lab Results   Component Value Date    EGFR 31 06/19/2025    EGFR 28 06/02/2025    EGFR 34 03/17/2025    CREATININE 1.51 (H) 06/19/2025    CREATININE 1.65 (H) 06/02/2025    CREATININE 1.41 (H) 03/17/2025   Defer management to primary medicine team

## 2025-06-20 NOTE — DISCHARGE SUMMARY
Discharge Summary - Ely Hernadez 85 y.o. female MRN: [id number]  Unit/Bed#: 2 E 278-01 Encounter: [id number]    Admission Date:    6/19/2025   Discharge Date:   06/20/25   Admitting Diagnosis:   Numbness [R20.0]  Dizziness [R42]  Stroke-like symptoms [R29.90]  Admitting Provider:   Lenora Cummins MD  Discharge Provider:   Raghav Holloway MD     Primary Care Physician at Discharge:   Raghav Holloway MD,[phone]    HPI: From history and physical by Dr. Cummins  Chief Complaint: numbness of the left side of the body     Ely Hernadez is a 85 y.o. female with a PMH of previous stroke, A-fib on Coumadin s/p pacemaker not compatible with MRI, CAD on aspirin, hypertension, hyperlipidemia, type 2 diabetes mellitus, thoracic aortic aneurysm, aortic valve s/p mechanical AVR, CHF, CKD stage III, COPD, hypothyroidism presenting to the ED due to strokelike symptoms describing as left upper and lower extremity numbness as well as left side of the face.  Patient states that was at the wound care visit when her symptoms started.  Was about 8:30 AM.  Patient admits the symptoms reminded her of previous stroke.  In the ED was called stroke alert.  NIH was 1 initial workup including CT and CTA had an neck ruled out acute intracranial abnormalities and show stable severe stenosis of the vertebral artery.  Patient admitted for further evaluation and stroke rule out by continue with stroke pathway.    Procedures Performed:   No orders of the defined types were placed in this encounter.      Hospital Course:   Stroke-like symptoms  Patient on admission due to strokelike symptoms with left-sided numbness of the face upper and lower extremity.  Patient reports reminding her previous stroke.  Denies weakness.  The symptoms faded on admission.  Stroke alert was called.  CT head negative for acute intracranial abnormalities.  Only showed mild microangiopathic changes.    CTA head/neck negative for LVO and stable severe stenosis of the  right vertebral artery origin with immediate reconstitution.  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   Telemetry unremarkable  24-hour repeat CT head unchanged  Blood Pressure: 135/61  EKG with ventricular paced rhythm  Per neurology team high suspicion of recrudescence of the previous stroke in the setting of hypertension.  Unfortunately patient not a candidate for MRI given noncompatible pacemaker.  -Patient was offered home PT OT but declined    Hypertension, essential  Blood Pressure: 135/61  Patient takes Lasix 40 mg daily and Lopressor 25 mg twice a day  Permissive hypertension was allowed on the stroke pathway, meds resumed on discharge  Coronary artery disease of native artery of native heart with stable angina pectoris (HCC)  Patient currently denies any chest pain.  Opponent x2 remains flat 32-34  Continue aspirin and Lipitor.     Stage 3b chronic kidney disease ([facility])        Lab Results   Component Value Date     EGFR 31 06/19/2025     EGFR 28 06/02/2025     EGFR 34 03/17/2025     CREATININE 1.51 (H) 06/19/2025     CREATININE 1.65 (H) 06/02/2025     CREATININE 1.41 (H) 03/17/2025      CKD stage III with baseline 1.4-1.6.  Currently within patient's baseline.  Patient received IV contrast.follow-up on a.m. BMP   avoid other nephrotoxic agents and hypoperfusion  Paroxysmal atrial fibrillation (HCC)  Patient follows with cardiology.  Heart rate control on Lopressor  Currently anticoagulated on Coumadin with INR goal 2.5-3.5.  INR on admission 2.7  Continue home regimen  Type 2 diabetes mellitus with stage 3b chronic kidney disease, with long-term current use of insulin ([facility])        Lab Results   Component Value Date     HGBA1C 7.8 (H) 06/02/2025              Recent Labs     06/19/25  0835 06/19/25  1148   POCGLU 99 75         Patient reports being on Lantus 30 units at bedtime.  Fairly  controlled diabetes.  Will continue regimen  Sliding scale insulin  Accu-Cheks 4 times a day     Blood Sugar Average: Last 72 hrs:  (P) 87     Chronic obstructive pulmonary disease (HCC)  Patient currently not in acute exacerbation.  Patient on Trelegy.  Will continue with analog in the hospital setting  Reports using oxygen at bedtime.  Chronic venous hypertension with ulcer and inflammation involving right side (HCC)  Chronic wounds of the lower extremity.  Follows with wound care specialist.  Continue wound care while in the hospital setting    Current Medications[1]      Consulting Providers   Neurology    Significant Findings, Care, Treatment and Services Provided:   CT brain 6/20:  IMPRESSION:     No intracranial hemorrhage, mass effect, or edema.     Mild chronic microangiopathy.      CTA stroke alert:    IMPRESSION:     1.  No intracranial large vessel occlusion.  2.  Unchanged severe stenosis right vertebral arterial origin with immediate reconstitution.  3.  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 Recommendation is for follow-up low-dose chest CT in 1 year.  4.  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 Recommend follow-up with neurovascular service.       Echocardiogram:    Left Ventricle Left ventricular cavity size is normal. Wall thickness is normal. The left ventricular ejection fraction is 35%. Systolic function is moderately reduced. There is moderate global hypokinesis. Diastolic function is moderately abnormal, consistent with grade II (pseudonormal) relaxation.   Interventricular Septum There is abnormal septal motion consistent with post-operative status.   Right Ventricle Right ventricular cavity size is normal. Systolic function is normal. Normal tricuspid annular plane systolic excursion (TAPSE) > 1.7 cm. Wall thickness is normal. A pacer wire is present.   Left Atrium The atrium  is normal in size.   Right Atrium The atrium is normal in size. A pacer wire is present.   Aortic Valve There is a mechanical valve.  Peak velocity across the valve was 2.2 m/s.  Peak and mean gradients across the valve were 19 and 9 mmHg respectively.  DVI was 0.32.  The gradients are likely underestimated due to low cardiac output state. There is trace regurgitation. The aortic valve has no significant stenosis.   Mitral Valve There is mild annular calcification.  There is mild regurgitation. There is no evidence of stenosis.   Tricuspid Valve Tricuspid valve structure is normal. There is mild regurgitation. There is no evidence of stenosis. The right ventricular systolic pressure is mildly elevated (at least 39 mm Hg).   Pulmonic Valve Pulmonic valve structure is normal. There is trace to mild regurgitation. There is no evidence of stenosis.   Ascending Aorta The aortic root is normal in size.   IVC/SVC The inferior vena cava is normal in size.   Pericardium There is no pericardial effusion. The pericardium is normal in appearance.         Complications:  None  Physical Exam:  General Appearance:    Alert, cooperative, no distress   Head:    Normocephalic, without obvious abnormality, atraumatic   Eyes:    PERRL, conjunctiva/corneas clear, EOM's intact       Nose:   Moist mucous membranes, no drainage or sinus tenderness   Throat:   No tenderness, no exudates   Neck:   Supple, symmetrical, trachea midline, no JVD   Lungs:     Clear to auscultation bilaterally, respirations unlabored   Heart:    Regular rate and rhythm, S1 and S2 normal, no murmur, rub   or gallop   Abdomen: Soft, non-tender, positive bowel sounds, no masses, no organomegaly   Extremities:  No pedal edema, calf tenderness. Distal pulses palpable.   Neurologic:     CNII-XII intact.  Bilateral upper and lower extremity strength equal and symmetrical, cerebellar intact rapid alternating movements   Labs:   Lab Results   Component Value Date    WBC  6.24 06/19/2025    WBC 3.2 (L) 06/18/2015    RBC 5.27 (H) 06/19/2025    RBC 4.49 06/18/2015    HGB 14.6 06/19/2025    HGB 13.2 06/18/2015    HCT 45.0 06/19/2025    HCT 39.6 06/18/2015    MCV 85 06/19/2025    MCV 88 06/18/2015    MCH 27.7 06/19/2025    MCH 29.3 06/18/2015    RDW 14.0 06/19/2025    RDW 13.2 06/18/2015     06/19/2025     06/18/2015     Lab Results   Component Value Date    CREATININE 1.51 (H) 06/19/2025    CREATININE 1.13 (H) 02/05/2024    BUN 24 06/19/2025    BUN 21 02/05/2024     11/18/2015    K 3.5 06/19/2025    K 4.3 02/05/2024     06/19/2025     02/05/2024    CO2 28 06/19/2025    CO2 29 02/05/2024    GLUCOSE 83 11/18/2015    PROT 7.5 06/18/2015    ALKPHOS 97 06/19/2025    ALKPHOS 65 02/05/2024    ALT 11 06/19/2025    ALT 8 02/05/2024    AST 18 06/19/2025    AST 12 02/05/2024    BILIDIR 0.15 06/02/2025         Discharge Diagnosis:   Principal Problem:    Stroke-like symptoms  Active Problems:    Hypertension, essential    Coronary artery disease of native artery of native heart with stable angina pectoris (HCC)    Stage 3b chronic kidney disease (HCC)    Paroxysmal atrial fibrillation (HCC)    Type 2 diabetes mellitus with stage 3b chronic kidney disease, with long-term current use of insulin (HCC)    Chronic obstructive pulmonary disease (HCC)    Chronic venous hypertension with ulcer and inflammation involving right side (HCC)  Resolved Problems:    * No resolved hospital problems. *      Condition at Discharge:   Good     Code Status: Level 1 - Full Code  Advance Directive and Living Will: <no information>  Power of :    POLST:      Discharge instructions/Information to patient and family:   See after visit summary for information provided to patient and family.      Provisions for Follow-Up Care:  See after visit summary for information related to follow-up care and any pertinent home health orders.      Disposition:   Home    Planned Readmission:    No    Discharge Statement   I spent 35 minutes discharging the patient. This time was spent on the day of discharge. I had direct contact with the patient on the day of discharge. Additional documentation is required if more than 30 minutes were spent on discharge. Greater than 50% of the total time was spent examining patient, answering all patient questions, arranging and discussing plan of care with patient as well as directly providing post-discharge instructions. Additional time then spent on discharge activities.    Discharge Medications:  See after visit summary for reconciled discharge medications provided to patient and family.      Raghav Holloway MD  6/20/2025,12:39 PM         [1]   Current Facility-Administered Medications:     ascorbic acid (VITAMIN C) tablet 1,000 mg, 1,000 mg, Oral, Daily, Lenora Cummins MD, 1,000 mg at 06/20/25 0745    aspirin (ECOTRIN LOW STRENGTH) EC tablet 81 mg, 81 mg, Oral, Daily, Lenora Cummins MD, 81 mg at 06/20/25 0745    atorvastatin (LIPITOR) tablet 40 mg, 40 mg, Oral, QPM, Lenora Cummins MD, 40 mg at 06/19/25 1810    Cholecalciferol (VITAMIN D3) tablet 2,000 Units, 2,000 Units, Oral, Daily, Lenora Cummins MD, 2,000 Units at 06/20/25 0745    cyanocobalamin (VITAMIN B-12) tablet 1,000 mcg, 1,000 mcg, Oral, Daily, Lenora Cummins MD, 1,000 mcg at 06/20/25 0745    ferrous sulfate tablet 325 mg, 325 mg, Oral, Early Morning, Lenora Cummins MD, 325 mg at 06/20/25 0606    fluticasone (FLONASE) 50 mcg/act nasal spray 1 spray, 1 spray, Nasal, Daily, Lenora Cummins MD    Fluticasone Furoate-Vilanterol 100-25 mcg/actuation 1 puff, 1 puff, Inhalation, Daily **AND** umeclidinium 62.5 mcg/actuation inhaler AEPB 1 puff, 1 puff, Inhalation, Daily, Lenora Cummins MD    gabapentin (NEURONTIN) capsule 300 mg, 300 mg, Oral, BID, Lenora Cummins MD, 300 mg at 06/20/25 0745    insulin glargine (LANTUS) subcutaneous injection 30 Units 0.3 mL, 30 Units, Subcutaneous, ,  Lenora Cummins MD, 30 Units at 06/19/25 2224    insulin lispro (HumALOG/ADMELOG) 100 units/mL subcutaneous injection 1-6 Units, 1-6 Units, Subcutaneous, TID AC **AND** Fingerstick Glucose (POCT), , , 4x Daily AC and at bedtime, Lenora Cummins MD    iron sucrose (VENOFER) 300 mg in sodium chloride 0.9 % 250 mL IVPB, 300 mg, Intravenous, Once, Raghav Holloway MD    levothyroxine tablet 50 mcg, 50 mcg, Oral, Daily, Lenora Cummins MD, 50 mcg at 06/20/25 0745    pantoprazole (PROTONIX) EC tablet 40 mg, 40 mg, Oral, Daily, Lenora Cummins MD, 40 mg at 06/20/25 0746    warfarin (COUMADIN) tablet 5 mg, 5 mg, Oral, Daily, Lenora Cummins MD, 5 mg at 06/19/25 1810

## 2025-06-20 NOTE — PHYSICAL THERAPY NOTE
"   Physical Therapy Evaluation     Patient's Name: Ely Hernadez    Admitting Diagnosis  Numbness [R20.0]  Dizziness [R42]  Stroke-like symptoms [R29.90]    Problem List  Problem List[1]  Past Medical History  Past Medical History[2]  Past Surgical History  Past Surgical History[3]       06/20/25 0807   PT Last Visit   PT Visit Date 06/20/25   Note Type   Note type Evaluation and Treatment   Pain Assessment   Pain Assessment Tool 0-10   Pain Score No Pain   Restrictions/Precautions   Weight Bearing Precautions Per Order No   Braces or Orthoses Other (Comment)  (none per patient)   Other Precautions Chair Alarm;Bed Alarm;Telemetry;Fall Risk   Home Living   Type of Home House   Home Layout One level;Able to live on main level with bedroom/bathroom;Performs ADLs on one level  (No SHIRA at basement; flight of stairs to reach main living area)   Bathroom Shower/Tub Walk-in shower  (pt has been sponge bathing)   Bathroom Toilet Standard   Bathroom Equipment Shower chair;Grab bars around toilet;Grab bars in shower   Bathroom Accessibility Accessible   Home Equipment Walker;Cane  (RW and rollator)   Additional Comments Pt ambulates without an AD.   Prior Function   Level of [place] Independent with functional mobility;Independent with ADLs;Needs assistance with IADLS   Lives With Son;Family   Receives Help From Family  (son)   IADLs Family/Friend/Other provides transportation;Family/Friend/Other provides meals;Family/Friend/Other provides medication management   Falls in the last 6 months 0   Vocational Retired   General   Family/Caregiver Present No   Cognition   Overall Cognitive Status WFL   Arousal/Participation Alert   Orientation Level Oriented X4   Memory Within functional limits   Following Commands Follows all commands and directions without difficulty   Comments Pt agreeable to PT.   Subjective   Subjective \"I've been walking with a walker.\"   RLE Assessment   RLE Assessment X   Strength RLE   R Hip Flexion " 4-/5   R Knee Extension 4-/5   R Ankle Dorsiflexion 4-/5   LLE Assessment   LLE Assessment X   Strength LLE   L Hip Flexion 4-/5   L Knee Extension 4-/5   L Ankle Dorsiflexion 4-/5   Light Touch   RLE Light Touch Grossly intact   LLE Light Touch Impaired   LLE Light Touch Comments Decreased sensation compared to R LE   Bed Mobility   Supine to Sit 6  Modified independent   Additional items HOB elevated;Bedrails   Transfers   Sit to Stand 5  Supervision   Additional items Assist x 1;Increased time required;Verbal cues   Stand to Sit 5  Supervision   Additional items Assist x 1;Increased time required;Verbal cues   Ambulation/Elevation   Gait pattern Wide MARIVEL;Short stride;Decreased hip extension;Decreased heel strike;Decreased toe off;Improper Weight shift   Gait Assistance 4  Minimal assist   Additional items Assist x 1;Verbal cues;Tactile cues   Assistive Device Rolling walker   Distance 30' x 2 trials  (standing rest break after initial trial)   Balance   Static Sitting Good   Dynamic Sitting Good   Static Standing Fair   Dynamic Standing Poor +   Ambulatory Poor +   Endurance Deficit   Endurance Deficit Yes   Endurance Deficit Description Pt limited secondary to fatigue   Activity Tolerance   Activity Tolerance Patient tolerated treatment well;Patient limited by fatigue   Medical Staff Made Aware PT Sumi   Assessment   Prognosis Good   Problem List Decreased strength;Decreased endurance;Impaired balance;Decreased mobility;Impaired sensation   Assessment Pt is 85 y.o. female evaluated by PT secondary to admission to [facility] due to stroke-like symptoms. Co-morbidities affecting pt at this time include essential hypertension, coronary artery disease of native artery of native heart with stable angina pectoris, stage 3b chronic kidney disease, paroxysmal atrial fibrillation, type 2 diabetes, COPD, and chronic venous hypertension with ulcer and inflammation involving right side. Pt lives in a single level  home with son and has to ascend a flight of stairs to enter main living area. Pt was previously independent in ADLs and functional mobility and is ambulatory without an AD at baseline. Body systems were assessed as indicated, refer to flowsheet for details. Pt performed supine to sit at a modified independent level, utilizing HOB elevated and bedrails. Pt performed sit to stand and stand to sit transfers with supervision A x 1 requiring increased time and verbal cues for LE placement in relation to walker. Pt ambulated 30' x 2 trials with min A x 1 and RW taking a standing rest break after initial trial. During ambulation pt presented with improper weight shift, requiring tactile cues to maintain COM over MARIVEL. Pt presents with the following impairments: decreased strength, decreased endurance, impaired balance, and decreased mobility. These impairments limit pt's ability to perform dynamic household activities independently, ambulate community distances, navigate elevations, and negotiate uneven surfaces. AM-Pac basic mobility assessment was administered during initial evaluation with pt scoring 19/24. PT to recommend level 3 minimum resource intensity to address above listed impairments and return to PLOF. Pt to benefit from PT.   Barriers to Discharge Inaccessible home environment;Other (Comment)  (decline in functional mobility)   Goals   STG Expiration Date 06/30/25   Short Term Goal #1 In 10 days: Pt will improve bilateral LE strength by 1/2 MMT grade in order to improve ability to navigate steps. Pt will be independent in transfers in order to decrease caregiver burden. Pt will improve ambulatory balance from poor+ to fair in order to progress toward least restrictive AD. Pt will ambulate 250 feet with least restrictive AD, independently, in order to ambulate community distances. Pt will navigate a flight of stairs with supervision A x 1 in order to enter main living area with son.   PT Treatment Day 1   Plan    Treatment/Interventions Functional transfer training;LE strengthening/ROM;Elevations;Therapeutic exercise;Endurance training;Patient/family training;Equipment eval/education;Gait training;Bed mobility   PT Frequency 2-3x/wk   Discharge Recommendation   Rehab Resource Intensity Level, PT III (Minimum Resource Intensity)   AM-PAC Basic Mobility Inpatient   Turning in Flat Bed Without Bedrails 4   Lying on Back to Sitting on Edge of Flat Bed Without Bedrails 4   Moving Bed to Chair 3   Standing Up From Chair Using Arms 3   Walk in Room 3   Climb 3-5 Stairs With Railing 2   Basic Mobility Inpatient Raw Score 19   Basic Mobility Standardized Score 42.48   [facility] Highest Level Of Mobility   -HLM Goal 6: Walk 10 steps or more   JH-HLM Achieved 7: Walk 25 feet or more   Additional Treatment Session   Start Time 0821   End Time 0833   Treatment Assessment Pt agreeable to PT treatment after initial evaluation. Pt participated in therapeutic exercise focused on LE strengthening. Pt responded well to exercise, requiring short rest breaks in between exercises due to fatigue. Pt required minimal verbal cues to perform exercises with appropriate technique. Pt continues to present with the following impairments: decreased strength, decreased endurance, impaired balance, and decreased mobility. These impairments limit pt's ability to perform dynamic household activities independently, ambulate community distances, navigate elevations, and negotiate uneven surfaces. PT to continue to recommend level 3 minimum resource intensity to address above listed impairments and return to PLOF. Pt to continue to benefit from PT.   Exercises   Hip Flexion Sitting;20 reps;AROM;Bilateral   Knee AROM Long Arc Quad Sitting;20 reps;AROM;Bilateral   Ankle Pumps Sitting;20 reps;AROM;Bilateral   End of Consult   Patient Position at End of Consult Bedside chair;Bed/Chair alarm activated;All needs within reach       PT Evaluation Time:  9050-5265  PT Treatment Time: 0821- 0833    Emilia Kunalcristian, SPT         [1]   Patient Active Problem List  Diagnosis    Hyperlipidemia    Chronic anticoagulation    Hypertension, essential    Coronary artery disease of native artery of native heart with stable angina pectoris (HCC)    Simple chronic bronchitis (HCC)    Diabetes mellitus with neurological manifestation (HCC)    Hypothyroidism    Iron deficiency    GERD (gastroesophageal reflux disease)    Anemia    AVM (arteriovenous malformation) of small bowel, acquired with hemorrhage    Angiectasia    Other vascular disorders of intestine (HCC)    Aortic valve replaced    Cardiomyopathy (HCC)    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HCC)    Stage 3b chronic kidney disease (HCC)    Chronic kidney disease-mineral and bone disorder    Primary osteoarthritis involving multiple joints    Paroxysmal atrial fibrillation (HCC)    Neutropenia associated with infection (HCC)    Herpes simplex labialis    Restrictive lung disease    Nocturnal hypoxemia    Stroke-like symptoms    Supratherapeutic INR    H/O mechanical aortic valve replacement    Subtherapeutic international normalized ratio (INR)    Type 2 diabetes mellitus with stage 3b chronic kidney disease, with long-term current use of insulin (HCC)    Venous ulcer of right leg (HCC)    Chronic systolic (congestive) heart failure (HCC)    Lower extremity edema    Iron deficiency anemia due to chronic blood loss    Hemiplegia and hemiparesis following cerebral infarction affecting left non-dominant side (HCC)    Chronic obstructive pulmonary disease (HCC)    Type 2 diabetes mellitus with stage 4 chronic kidney disease, with long-term current use of insulin (HCC)    Chronic venous hypertension with ulcer and inflammation involving right side (HCC)    Peripheral arterial disease (HCC)   [2]   Past Medical History:  Diagnosis Date    Acute anterior epistaxis 12/23/2023    Acute blood loss anemia 12/14/2023    Acute respiratory failure  with hypoxia (HCC) 02/06/2024    Anemia     Aneurysm of thoracic aorta (HCC)     Angioedema 06/07/2019    Aortic valve disorder     Benign neoplasm of sigmoid colon     Cardiomyopathy (HCC)     last assessed: 10/10/2014    CHF (congestive heart failure) (HCC)     Chronic kidney disease     Chronic venous hypertension with ulcer and inflammation involving right side (HCC) 04/11/2025    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HCC)     last assessed: 10/10/2014    COPD (chronic obstructive pulmonary disease) (HCC)     Diabetic nephropathy (HCC)     Disease of thyroid gland     RAMON (dyspnea on exertion)     GERD (gastroesophageal reflux disease)     History of emphysema (HCC)     History of transfusion     Hyperlipidemia     Hypertensive urgency 10/28/2023    Leg cramps 11/01/2023    Stroke-like symptoms 10/28/2023    TIA (transient ischemic attack)    [3]   Past Surgical History:  Procedure Laterality Date    AORTIC VALVE REPLACEMENT      CARDIAC PACEMAKER PLACEMENT      last assessed: 10/10/2014    CARDIAC SURGERY      aortic valve replacement    CHOLECYSTECTOMY      COLONOSCOPY      EGD      HYSTERECTOMY      INSERT / REPLACE / REMOVE PACEMAKER      KNEE SURGERY Right     OTHER SURGICAL HISTORY      Capsule ENDOscopy description: 12/19/2012    DC COLONOSCOPY FLX DX W/COLLJ SPEC WHEN PFRMD N/A 05/31/2018    Procedure: single balloon ENTEROSCOPY;  Surgeon: David Dennis MD;  Location: [facility];  Service: Gastroenterology    DC ESOPHAGOGASTRODUODENOSCOPY TRANSORAL DIAGNOSTIC N/A 11/29/2017    Procedure: EGD AND COLONOSCOPY;  Surgeon: Jay Mcleod III, MD;  Location: [facility];  Service: Gastroenterology    VEIN LIGATION Right 4/11/2025    Procedure: right leg peforator sclerotherapy;  Surgeon: Chris Bush MD;  Location: [facility];  Service: Vascular

## 2025-06-20 NOTE — PLAN OF CARE
Problem: PAIN - ADULT  Goal: Verbalizes/displays adequate comfort level or baseline comfort level  Description: Interventions:  - Encourage patient to monitor pain and request assistance  - Assess pain using appropriate pain scale  - Administer analgesics as ordered based on type and severity of pain and evaluate response  - Implement non-pharmacological measures as appropriate and evaluate response  - Consider cultural and social influences on pain and pain management  - Notify physician/advanced practitioner if interventions unsuccessful or patient reports new pain  - Educate patient/family on pain management process including their role and importance of  reporting pain   - Provide non-pharmacologic/complimentary pain relief interventions  Outcome: Progressing     Problem: INFECTION - ADULT  Goal: Absence or prevention of progression during hospitalization  Description: INTERVENTIONS:  - Assess and monitor for signs and symptoms of infection  - Monitor lab/diagnostic results  - Monitor all insertion sites, i.e. indwelling lines, tubes, and drains  - Monitor endotracheal if appropriate and nasal secretions for changes in amount and color  - [place] appropriate cooling/warming therapies per order  - Administer medications as ordered  - Instruct and encourage patient and family to use good hand hygiene technique  - Identify and instruct in appropriate isolation precautions for identified infection/condition  Outcome: Progressing  Goal: Absence of fever/infection during neutropenic period  Description: INTERVENTIONS:  - Monitor WBC  - Perform strict hand hygiene  - Limit to healthy visitors only  - No plants, dried, fresh or silk flowers with telles in patient room  Outcome: Progressing     Problem: SAFETY ADULT  Goal: Patient will remain free of falls  Description: INTERVENTIONS:  - Educate patient/family on patient safety including physical limitations  - Instruct patient to call for assistance with activity   -  Consider consulting OT/PT to assist with strengthening/mobility based on AM PAC & JH-HLM score  - Consult OT/PT to assist with strengthening/mobility   - Keep Call bell within reach  - Keep bed low and locked with side rails adjusted as appropriate  - Keep care items and personal belongings within reach  - Initiate and maintain comfort rounds  - Make Fall Risk Sign visible to staff  - Offer Toileting every 2 Hours, in advance of need  - Initiate/Maintain bed alarm  - Obtain necessary fall risk management equipment:   - Apply yellow socks and bracelet for high fall risk patients  - Consider moving patient to room near nurses station  Outcome: Progressing  Goal: Maintain or return to baseline ADL function  Description: INTERVENTIONS:  -  Assess patient's ability to carry out ADLs; assess patient's baseline for ADL function and identify physical deficits which impact ability to perform ADLs (bathing, care of mouth/teeth, toileting, grooming, dressing, etc.)  - Assess/evaluate cause of self-care deficits   - Assess range of motion  - Assess patient's mobility; develop plan if impaired  - Assess patient's need for assistive devices and provide as appropriate  - Encourage maximum independence but intervene and supervise when necessary  - Involve family in performance of ADLs  - Assess for home care needs following discharge   - Consider OT consult to assist with ADL evaluation and planning for discharge  - Provide patient education as appropriate  - Monitor functional capacity and physical performance, use of AM PAC & JH-HLM   - Monitor gait, balance and fatigue with ambulation    Outcome: Progressing  Goal: Maintains/Returns to pre admission functional level  Description: INTERVENTIONS:  - Perform AM-PAC 6 Click Basic Mobility/ Daily Activity assessment daily.  - Set and communicate daily mobility goal to care team and patient/family/caregiver.   - Collaborate with rehabilitation services on mobility goals if  consulted  - Perform Range of Motion  times a day.  - Reposition patient every hours.  - Dangle patient  times a day  - Stand patient  times a day  - Ambulate patient  times a day  - Out of bed to chair  times a day   - Out of bed for meals  times a day  - Out of bed for toileting  - Record patient progress and toleration of activity level   Outcome: Progressing     Problem: DISCHARGE PLANNING  Goal: Discharge to home or other facility with appropriate resources  Description: INTERVENTIONS:  - Identify barriers to discharge w/patient and caregiver  - Arrange for needed discharge resources and transportation as appropriate  - Identify discharge learning needs (meds, wound care, etc.)  - Arrange for interpretive services to assist at discharge as needed  - Refer to Case Management Department for coordinating discharge planning if the patient needs post-hospital services based on physician/advanced practitioner order or complex needs related to functional status, cognitive ability, or social support system  Outcome: Progressing     Problem: Knowledge Deficit  Goal: Patient/family/caregiver demonstrates understanding of disease process, treatment plan, medications, and discharge instructions  Description: Complete learning assessment and assess knowledge base.  Interventions:  - Provide teaching at level of understanding  - Provide teaching via preferred learning methods  Outcome: Progressing

## 2025-06-20 NOTE — PLAN OF CARE
Problem: PHYSICAL THERAPY ADULT  Goal: Performs mobility at highest level of function for planned discharge setting.  See evaluation for individualized goals.  Description: Treatment/Interventions: Functional transfer training, LE strengthening/ROM, Elevations, Therapeutic exercise, Endurance training, Patient/family training, Equipment eval/education, Gait training, Bed mobility          See flowsheet documentation for full assessment, interventions and recommendations.  Note: Prognosis: Good  Problem List: Decreased strength, Decreased endurance, Impaired balance, Decreased mobility, Impaired sensation  Assessment: Pt is 85 y.o. female evaluated by PT secondary to admission to [facility] due to stroke-like symptoms. Co-morbidities affecting pt at this time include essential hypertension, coronary artery disease of native artery of native heart with stable angina pectoris, stage 3b chronic kidney disease, paroxysmal atrial fibrillation, type 2 diabetes, COPD, and chronic venous hypertension with ulcer and inflammation involving right side. Pt lives in a single level home with son and has to ascend a flight of stairs to enter main living area. Pt was previously independent in ADLs and functional mobility and is ambulatory without an AD at baseline. Body systems were assessed as indicated, refer to flowsheet for details. Pt performed supine to sit at a modified independent level, utilizing HOB elevated and bedrails. Pt performed sit to stand and stand to sit transfers with supervision A x 1 requiring increased time and verbal cues for LE placement in relation to walker. Pt ambulated 30' x 2 trials with min A x 1 and RW taking a standing rest break after initial trial. During ambulation pt presented with improper weight shift, requiring tactile cues to maintain COM over MARIVEL. Pt presents with the following impairments: decreased strength, decreased endurance, impaired balance, and decreased mobility. These  impairments limit pt's ability to perform dynamic household activities independently, ambulate community distances, navigate elevations, and negotiate uneven surfaces. AM-Pac basic mobility assessment was administered during initial evaluation with pt scoring 19/24. PT to recommend level 3 minimum resource intensity to address above listed impairments and return to PLOF. Pt to benefit from PT.  Barriers to Discharge: Inaccessible home environment, Other (Comment) (decline in functional mobility)     Rehab Resource Intensity Level, PT: III (Minimum Resource Intensity)    See flowsheet documentation for full assessment.

## 2025-06-20 NOTE — OCCUPATIONAL THERAPY NOTE
Occupational Therapy Evaluation         Patient Name: Ely Hrenadez  Today's Date: 6/20/2025 06/20/25 1127   OT Last Visit   OT Visit Date 06/20/25   Note Type   Note type Evaluation   Pain Assessment   Pain Assessment Tool 0-10   Pain Score No Pain   Restrictions/Precautions   Weight Bearing Precautions Per Order No   Braces or Orthoses Other (Comment)  (none per patient)   Other Precautions Chair Alarm;Bed Alarm;Telemetry;Fall Risk   Home Living   Type of Home House   Home Layout One level;Performs ADLs on one level;Able to live on main level with bedroom/bathroom  (No SHIRA at basement; flight of stairs to reach main living area)   Bathroom Shower/Tub Walk-in shower  (pt has been sponge bathing)   Bathroom Toilet Standard   Bathroom Equipment Shower chair;Grab bars around toilet;Grab bars in shower   Bathroom Accessibility Accessible   Home Equipment Walker;Cane;Other (Comment)  (rollator)   Additional Comments Pt ambulates without an AD, spouse walks with patient for community distances for additional support.    Prior Function   Level of [place] Independent with ADLs;Independent with functional mobility;Needs assistance with IADLS   Lives With Family;Son;Daughter  (2 grandsons: 26 & 20)   Receives Help From Family  (son)   IADLs Family/Friend/Other provides transportation;Family/Friend/Other provides meals;Family/Friend/Other provides medication management   Falls in the last 6 months 0   Vocational Retired  ()   Lifestyle   Autonomy Pt reports residing with family in one-level home w/ no SHIRA, full flight up to main level. Pt reported being independent with ADLs and requiring assistance with IADLs. Pt reported ambulating with no AD at baseline.   Reciprocal Relationships Supportive Family   General   Family/Caregiver Present Yes  (son present throughout session)   ADL   Where Assessed   (Bathroom; ADL levels based on functional performance during OT evaluation.)   Eating  Assistance 6  Modified independent   Grooming Assistance 5  Supervision/Setup   Grooming Deficit Wash/dry hands  (standing at sink)   UB Bathing Assistance 5  Supervision/Setup   UB Bathing Deficit Setup;Increased time to complete;Supervision/safety   LB Bathing Assistance 4  Minimal Assistance   LB Bathing Deficit Setup;Supervision/safety;Verbal cueing;Increased time to complete   UB Dressing Assistance 5  Supervision/Setup   UB Dressing Deficit Setup;Increased time to complete;Thread RUE;Thread LUE   LB Dressing Assistance 4  Minimal Assistance   LB Dressing Deficit Setup;Verbal cueing;Supervision/safety;Increased time to complete;Don/doff R sock;Don/doff L sock   Toileting Assistance  5  Supervision/Setup   Functional Assistance 5  Supervision/Setup   Bed Mobility   Supine to Sit 6  Modified independent   Additional items HOB elevated;Bedrails;Increased time required   Sit to Supine 6  Modified independent   Additional items Assist x 1;HOB elevated;Bedrails;Increased time required   Transfers   Sit to Stand 5  Supervision   Additional items Assist x 1;Increased time required;Verbal cues   Stand to Sit 5  Supervision   Additional items Assist x 1;Increased time required;Verbal cues   Additional Comments w/ RW   Functional Mobility   Functional Mobility 4  Minimal assistance   Additional Comments Min/CGA x1 w/ RW, pt requiring verbal cues and increased time during ambulation in room and bathroom.   Additional items Rolling walker   Balance   Static Sitting Good   Dynamic Sitting Fair +   Static Standing Fair   Dynamic Standing Fair -   Activity Tolerance   Activity Tolerance Patient tolerated treatment well;Patient limited by fatigue   RUE Assessment   RUE Assessment WFL  (4-/5)   LUE Assessment   LUE Assessment WFL  (4-/5)   Hand Function   Gross Motor Coordination Functional   Fine Motor Coordination Functional   Sensation   Light Touch Partial deficits in the LUE  (pt reported partial decreased sensation in L  UE)   Vision-Basic Assessment   Current Vision Wears glasses only for reading  (And sometimes for distance)   Vision - Complex Assessment   Acuity Able to read clock/calendar on wall without difficulty;Able to read employee name badge without difficulty   Psychosocial   Psychosocial (WDL) WDL   Cognition   Overall Cognitive Status WFL   Arousal/Participation Alert;Responsive;Cooperative   Attention Within functional limits   Orientation Level Oriented X4   Memory Within functional limits   Following Commands Follows all commands and directions without difficulty   Assessment   Limitation Decreased ADL status;Decreased UE strength;Decreased Safe judgement during ADL;Decreased endurance;Decreased self-care trans;Decreased high-level ADLs   Prognosis Good   Assessment Patient is a 85 y.o. female seen for OT evaluation s/p admit to [facility] on 6/[facility]/2025 w/Stroke-like symptoms. PMHx and co-morbidities affecting patient's functional performance at time of assessment include: essential hypertension, coronary artery disease of native artery of native heart with stable angina pectoris, stage 3b chronic kidney disease, paroxysmal atrial fibrillation, type 2 diabetes, COPD, and chronic venous hypertension with ulcer and inflammation involving right side. Orders placed for OT evaluation and treatment.  Performed at least two patient identifiers during session including name and wristband. Pt reports residing with family in one-level home w/ no SHIRA, full flight up to main level. Pt reported being independent with ADLs and requiring assistance with IADLs. Pt reported ambulating with no AD at baseline. Personal factors affecting patient at time of initial evaluation include: difficulty performing ADLs and difficulty performing IADLs. Upon evaluation, patient requires supervision assist for UB ADLs, minimal  assist for LB ADLs, transfers and functional ambulation in room and bathroom with contact guard and minimal   assist, with the use of Rolling Walker.  Patient is oriented x 4.  Occupational performance is affected by the following deficits: decreased muscle strength, dynamic sit/ stand balance deficit with poor standing tolerance time for self care and functional mobility, decreased activity tolerance, decreased safety awareness, and increased pain.  Patient to benefit from continued Occupational Therapy treatment while in the hospital to address deficits as defined above and maximize level of functional independence with ADLs and functional mobility. Occupational Performance areas to address include: grooming , bathing/ shower, dressing, toilet hygiene, transfer to all surfaces, functional ambulation, and functional mobility. From OT standpoint, recommendation at time of d/c would be Level 3.   Plan   Treatment Interventions ADL retraining;UE strengthening/ROM;Functional transfer training;Endurance training;Equipment evaluation/education;Compensatory technique education;Energy conservation;Activityengagement   Goal Expiration Date 07/04/25   OT Treatment Day 0   OT Frequency 2-3x/wk   Discharge Recommendation   Rehab Resource Intensity Level, OT III (Minimum Resource Intensity)   AM-PAC Daily Activity Inpatient   Lower Body Dressing 3   Bathing 3   Toileting 3   Upper Body Dressing 3   Grooming 3   Eating 4   Daily Activity Raw Score 19   Daily Activity Standardized Score (Calc for Raw Score >=11) 40.22   AM-PAC Applied Cognition Inpatient   Following a Speech/Presentation 4   Understanding Ordinary Conversation 4   Taking Medications 3   Remembering Where Things Are Placed or Put Away 3   Remembering List of 4-5 Errands 3   Taking Care of Complicated Tasks 3   Applied Cognition Raw Score 20   Applied Cognition Standardized Score 41.76   Barthel Index   Feeding 10   Bathing 0   Grooming Score 0   Dressing Score 5   Bladder Score 10   Bowels Score 10   Toilet Use Score 5   Transfers (Bed/Chair) Score 10   Mobility (Level  Surface) Score 0   Stairs Score 0   Barthel Index Score 50   Modified [place] Scale   Modified [place] Scale 3   End of Consult   Education Provided Yes   Patient Position at End of Consult Supine;Bed/Chair alarm activated;All needs within reach  (son at bedside)   Nurse Communication Nurse aware of consult       Occupational therapy Goals: In 2-4 days    1- Patient will verbalize and demonstrate use of energy conservation/ deep breathing technique and work simplification skills during functional activity with no verbal cues.  2- Patient will verbalize and demonstrate good body mechanics and joint protection techniques during ADLs/ IADLs with no verbal cues.  3- Patient will increase OOB/ sitting tolerance to 4-6 hours per day for increased participation in self care and leisure tasks with no s/s of exertion.  4- Patient will identify s/s of exertion during ADL and functional mobility with no verbal cues.  5-Patient will verbalize/ demonstrate compensatory strategies to recover from exertion with no verbal cues.   6-Patient will increase standing tolerance time to 10 minutes with No UE support to complete sink level ADLs @ Mod I level.      ALEXIA Hendrickson, OTR/L

## 2025-06-20 NOTE — PROGRESS NOTES
"Progress Note - Neurology   Name: Ely Hernadez 85 y.o. female I MRN: [id number]  Unit/Bed#: 2 E 278-01 I Date of Admission: 6/19/2025   Date of Service: 6/20/2025 I Hospital Day: 0    Assessment & Plan  Stroke-like symptoms  85 y.o. female with paroxysmal A-fib on Coumadin s/p PPM (not MRI conditional), possible prior lacunar stroke in 2023 (L sided numbness/weakness at the time), CAD on ASA, HTN, HLD, DM2,  CHF, thoracic aortic aneurysm repair, aortic valve disease s/p mechanical AVR, cardiomyopathy, CKD stage III, COPD, iron deficiency anemia, and hypothyroidism who presented to [facility] on 6/19/2025 as a stroke alert with LUE numbness/weakness and LLE \"heaviness.\"  Symptoms are similar to prior stroke.    Upon arrival to the ED, BP 91/62, however this quickly elevated to 215/79 and it improved to 134/91 without medications.    NIHSS 1.    CT head negative for acute intracranial abnormalities.  Only showed mild microangiopathic changes.    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    Patient was not a TNK candidate due to nondisabling symptoms and INR 2.78.    6/20 NCCT head: No acute intracranial abnormality    High suspicion for recrudescence in the setting of initial hypotension.    Plan:  Continue with home doses of aspirin and coumadin.   Continue home Coumadin, goal INR 2.5-3.5 due to mechanical valve  Continue home ASA 81 mg daily  Okay to initiate atorvastatin 40 mg daily  Telemetry  Allow for permissive hypertension for 24 hours post stroke symptom onset. Goal -180  Goal euglycemia, normothermia  Frequent neurochecks  Stroke education  PT/OT/speech  Medical management supportive care per primary team, notify of changes  Hypertension, essential  Defer " management to primary medicine team    Stage 3b chronic kidney disease (HCC)  Lab Results   Component Value Date    EGFR 31 06/19/2025    EGFR 28 06/02/2025    EGFR 34 03/17/2025    CREATININE 1.51 (H) 06/19/2025    CREATININE 1.65 (H) 06/02/2025    CREATININE 1.41 (H) 03/17/2025   Defer management to primary medicine team    Paroxysmal atrial fibrillation (HCC)  Patient maintained on coumadin with regular INR levels  Defer management to primary medicine team    Type 2 diabetes mellitus with stage 3b chronic kidney disease, with long-term current use of insulin ([facility])  Lab Results   Component Value Date    HGBA1C 7.8 (H) 06/20/2025     Recent Labs     06/19/25  1613 06/19/25  2049 06/20/25  0727 06/20/25  1104   POCGLU 83 135 93 111   Blood Sugar Average: Last 72 hrs:  (P) [id number]    Defer management to primary medicine team      No further inpatient neurology recommendations and patient will not need 6-week OP neurology follow-up. Neurology team will remain available, please call with any questions or concerns.   I have discussed the above plan with attending neurologist & SLIM provider, who agree with the plan.      Subjective   Patient is alert and awake with son at the bedside. Reviewed repeat CT head results with them. Patient denies any complaints. All questions answered.     Review of Systems   Constitutional:  Negative for chills, fever and unexpected weight change.   HENT:  Negative for congestion, ear pain, hearing loss, sinus pressure, sinus pain, sore throat, trouble swallowing and voice change.    Eyes:  Negative for photophobia, pain and visual disturbance.   Respiratory:  Negative for cough and shortness of breath.    Cardiovascular:  Negative for chest pain and palpitations.   Gastrointestinal:  Negative for abdominal pain, constipation, diarrhea, nausea and vomiting.   Endocrine: Negative.    Genitourinary:  Negative for dysuria and hematuria.   Musculoskeletal:  Negative for arthralgias  and back pain.   Skin:  Negative for color change and rash.   Allergic/Immunologic: Negative.    Neurological:  Negative for dizziness, seizures, syncope, facial asymmetry, speech difficulty, weakness, light-headedness, numbness and headaches.   Hematological: Negative.    Psychiatric/Behavioral: Negative.     All other systems reviewed and are negative.      Objective :  Temp:  [97.4 °F (36.3 °C)-98.5 °F (36.9 °C)] 97.5 °F (36.4 °C)  HR:  [55-68] 63  BP: (125-161)/(55-82) 146/68  Resp:  [16-18] 18  SpO2:  [90 %-96 %] 94 %  O2 Device: None (Room air)    Physical Exam  Vitals and nursing note reviewed.   Constitutional:       General: She is awake. She is not in acute distress.     Appearance: She is well-developed.   HENT:      Head: Normocephalic and atraumatic.      Nose: Nose normal.      Mouth/Throat:      Mouth: Mucous membranes are moist.     Eyes:      General: No scleral icterus.     Conjunctiva/sclera: Conjunctivae normal.       Cardiovascular:      Rate and Rhythm: Normal rate.      Heart sounds: No murmur heard.  Pulmonary:      Effort: Pulmonary effort is normal. No respiratory distress.   Abdominal:      Tenderness: There is no abdominal tenderness.     Musculoskeletal:         General: No swelling.      Cervical back: Neck supple.     Skin:     General: Skin is warm and dry.     Neurological:      Mental Status: She is alert.     Psychiatric:         Mood and Affect: Mood normal.         Speech: Speech normal.         Behavior: Behavior normal.         Thought Content: Thought content normal.         Judgment: Judgment normal.     Neurological Exam  Mental Status  Awake and alert. Oriented to person, place, time and situation. Recent and remote memory are intact. Speech is normal. Follows three-step commands. Attention and concentration are normal. Fund of knowledge is appropriate for level of education. Apraxia absent.  Answers all questions appropriately  Follows central and appendicular  commands.    Cranial Nerves  CN 1: Not tested  CN 2: No visual field deficits noted   CN 3/4/6: Pupils equal, round, reactive bilaterally, Primary gaze midline, conjugate gaze noted   - No gaze preference or forced gaze deviation   - EOMI, with a few beats of horizontal L>R and upgoing vertical nystagmus that fatigues  CN 5: V1-3 Facial sensation intact to light touch & pinprick bilaterally  CN 7: No facial asymmetry noted- equal smile and eyebrow raise  CN 8: Hearing grossly intact, denies dizziness/ imbalance  CN 9: palate symmetrical, gag reflex not tested, denies difficulty swallowing  CN 10: No dysarthria or apraxia noted  CN 11: able to shrug shoulders against confrontation without difficulty  CN 12: Tongue deviates left however this appears to be physiologic, able to move side-to-side freely.    Motor  Normal muscle bulk throughout. No fasciculations present. Normal muscle tone. No abnormal involuntary movements. Strength is 5/5 in all four extremities except as noted. No pronator drift.  Strength is 5/5 in all 4 extremities to confrontation without obvious asymmetry  Full ROM  No drift appreciated.    Sensory  Light touch sensation equal to bilateral face, arms and legs  No extinction to bilateral simultaneous stimulation.    Reflexes  R upgoing toes, L downgoing toes.    Coordination    Finger-to-nose coordination testing intact bilaterally.    Gait    Gait testing deferred for patient safety.        Lab Results: I have personally reviewed pertinent reports.  Recent Results (from the past 24 hours)   Fingerstick Glucose (POCT)    Collection Time: 06/19/25  4:13 PM   Result Value Ref Range    POC Glucose 83 65 - 140 mg/dl   Fingerstick Glucose (POCT)    Collection Time: 06/19/25  8:49 PM   Result Value Ref Range    POC Glucose 135 65 - 140 mg/dl   Lipid Panel with Direct LDL reflex    Collection Time: 06/20/25  4:45 AM   Result Value Ref Range    Cholesterol 168 See Comment mg/dL    Triglycerides 132 See  Comment mg/dL    HDL, Direct 31 (L) >=50 mg/dL    LDL Calculated 111 (H) 0 - 100 mg/dL   Hemoglobin A1c w/EAG Estimation    Collection Time: 06/20/25  4:45 AM   Result Value Ref Range    Hemoglobin A1C 7.8 (H) Normal 4.0-5.6%; PreDiabetic 5.7-6.4%; Diabetic >=6.5%; Glycemic control for adults with diabetes <7.0% %     mg/dl   Fingerstick Glucose (POCT)    Collection Time: 06/20/25  7:27 AM   Result Value Ref Range    POC Glucose 93 65 - 140 mg/dl   Fingerstick Glucose (POCT)    Collection Time: 06/20/25 11:04 AM   Result Value Ref Range    POC Glucose 111 65 - 140 mg/dl   Imaging Studies: I have personally reviewed pertinent reports and I have personally reviewed pertinent films in PACS.  EKG, Pathology, and Other Studies: I have personally reviewed pertinent reports.    Please see attending's attestation for total time spent/billing. Time spent with patient and son in examination of patient, discussing course of illness, plan of care, and answering questions.    Please be advised that dictation software was used in the formulation of this note which may cause unintended spelling or grammatical errors.

## 2025-06-20 NOTE — PLAN OF CARE
Problem: PAIN - ADULT  Goal: Verbalizes/displays adequate comfort level or baseline comfort level  Description: Interventions:  - Encourage patient to monitor pain and request assistance  - Assess pain using appropriate pain scale  - Administer analgesics as ordered based on type and severity of pain and evaluate response  - Implement non-pharmacological measures as appropriate and evaluate response  - Consider cultural and social influences on pain and pain management  - Notify physician/advanced practitioner if interventions unsuccessful or patient reports new pain  - Educate patient/family on pain management process including their role and importance of  reporting pain   - Provide non-pharmacologic/complimentary pain relief interventions  6/20/2025 0837 by oJse Saini RN  Outcome: Progressing  6/20/2025 0827 by Jose Saini RN  Outcome: Progressing

## 2025-06-20 NOTE — ASSESSMENT & PLAN NOTE
Patient maintained on coumadin with regular INR levels  Defer management to primary medicine team

## 2025-06-20 NOTE — CASE MANAGEMENT
Case Management Assessment & Discharge Planning Note    Patient name Ely Hernadez  Location 2 EAST 278/2 E 278-01 MRN [id number]  : 1940 Date 2025       Current Admission Date: 2025  Current Admission Diagnosis:Stroke-like symptoms   Patient Active Problem List    Diagnosis Date Noted    Peripheral arterial disease (HCC) 2025    Chronic venous hypertension with ulcer and inflammation involving right side (HCC) 2025    Type 2 diabetes mellitus with stage 4 chronic kidney disease, with long-term current use of insulin ([facility]) 2025    Hemiplegia and hemiparesis following cerebral infarction affecting left non-dominant side (HCC) 2024    Iron deficiency anemia due to chronic blood loss 2024    Type 2 diabetes mellitus with stage 3b chronic kidney disease, with long-term current use of insulin ([facility]) 2024    Venous ulcer of right leg ([facility]) 2024    Chronic systolic (congestive) heart failure ([facility]) 2024    Lower extremity edema 2024    Subtherapeutic international normalized ratio (INR) 2023    Supratherapeutic INR 2023    H/O mechanical aortic valve replacement 2023    Stroke-like symptoms 10/28/2023    Restrictive lung disease 2023    Nocturnal hypoxemia 2023    Herpes simplex labialis 2022    Neutropenia associated with infection ([facility]) 2022    Paroxysmal atrial fibrillation ([facility]) 2022    Primary osteoarthritis involving multiple joints 2021    Stage 3b chronic kidney disease ([facility]) 2020    Chronic kidney disease-mineral and bone disorder 2020    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[facility]) 2020    Cardiomyopathy ([facility]) 2020    Other vascular disorders of intestine ([facility]) 2019    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    AVM (arteriovenous malformation) of small bowel, acquired with hemorrhage 2018    Anemia 2018    GERD (gastroesophageal reflux disease) 2018     Hyperlipidemia 02/08/2018    Chronic anticoagulation 02/08/2018    Hypertension, essential 02/08/2018    Iron deficiency 09/28/2017    Coronary artery disease of native artery of native heart with stable angina pectoris (HCC) 03/18/2015    Hypothyroidism 05/16/2014    Simple chronic bronchitis (HCC) 03/05/2014    Diabetes mellitus with neurological manifestation (HCC) 03/05/2014    Chronic obstructive pulmonary disease (HCC) 03/05/2014    Aortic valve replaced 03/14/2007      LOS (days): 0  Geometric Mean LOS (GMLOS) (days):   Days to GMLOS:     OBJECTIVE:              Current admission status: Observation  Referral Reason: Stroke    Preferred Pharmacy:   [facility] - [place], [place] - [address]  [address]  [facility] [address]  [facility] [address]  Phone: [phone] Fax: [phone]    [facility] [id number] [place], [place] - [address] [facility]  [address] [facility] [address]  Phone: [phone] Fax: [phone]    Primary Care Provider: Raghav Holloway MD    [facility]  Secondary Insurance:     ASSESSMENT:  Active Health Care Proxies       Odell Hernadez Health Care Representative - Son   Primary Phone: [phone] (Home)  Mobile Phone: [phone]                 Advance Directives  Does patient have a Health Care POA?: No  Was patient offered paperwork?: No (declined)  Does patient currently have a Health Care decision maker?: Yes, please see Health Care Proxy section  Does patient have Advance Directives?: No  Was patient offered paperwork?: Yes (declined)  Primary Contact: Odell Hernadez         Readmission Root Cause  30 Day Readmission: No    Patient Information  Admitted from:: Home  Mental Status: Alert  During Assessment patient was accompanied by: Not accompanied during assessment  Assessment information provided by:: Patient  Primary Caregiver: Self  Support Systems: Family members  County [place] Residence: [facility]  city do you live in?: [place]  Home entry access options. Select all that apply.: Stairs  Number of steps to enter home.: One Flight  Do the steps have railings?: Yes  Type of Current Residence: [facility]  Living Arrangements: Lives w/ Daughter, Lives w/ Extended Family  Is patient a ?: No    Activities of Daily Living Prior to Admission  Functional Status: Independent  Completes ADLs independently?: Yes  Ambulates independently?: Yes  Does patient use assisted devices?: Yes  Assisted Devices (DME) used: Walker  Does patient currently own DME?: Yes  What DME does the patient currently own?: Walker  Does patient have a history of Outpatient Therapy (PT/OT)?: No  Does the patient have a history of Short-Term Rehab?: No  Does patient have a history of HHC?: Yes  Does patient currently have HHC?: No         Patient Information Continued  Income Source: Pension/long term  Does patient have prescription coverage?: Yes  Can the patient afford their medications and any related supplies (such as glucometers or test strips)?: Yes  Does patient receive dialysis treatments?: No  Does patient have a history of substance abuse?: No  Does patient have a history of Mental Health Diagnosis?: No         Means of Transportation  Means of Transport to Appts:: Family transport          DISCHARGE DETAILS:    Discharge planning discussed with:: Patient  Freedom of Choice: Yes  Comments - Freedom of Choice: CM reviewed freedom of choice with patient who reported understanding and had no questions. IPTA, lives with son, daughter, MARIA G, and 2 grandkids. Uses a RW at baseline and is a level 3 rec, but pt prefers to go home and is refusing HHC.  CM contacted family/caregiver?: No- see comments (declined)  Were Treatment Team discharge recommendations reviewed with patient/caregiver?: Yes  Did patient/caregiver verbalize understanding of patient care needs?: Yes  Were patient/caregiver advised of the risks associated with not  following Treatment Team discharge recommendations?: Yes      Requested Home Health Care         Is the patient interested in HHC at discharge?: No    DME Referral Provided  Referral made for DME?: No    Other Referral/Resources/Interventions Provided:  Interventions: Other (Specify) (none indicated)    Would you like to participate in our [facility] service program?  : No - Declined    Treatment Team Recommendation: Home with Home Health Care  Expected Discharge Disposition: Home or Self Care  Additional Discharge Dispositions: Home or Self Care  Transport at Discharge : Family

## 2025-06-20 NOTE — ASSESSMENT & PLAN NOTE
Lab Results   Component Value Date    HGBA1C 7.8 (H) 06/20/2025     Recent Labs     06/19/25  1613 06/19/25  2049 06/20/25  0727 06/20/25  1104   POCGLU 83 135 93 111   Blood Sugar Average: Last 72 hrs:  (P) [id number]    Defer management to primary medicine team

## 2025-06-20 NOTE — PLAN OF CARE
Problem: OCCUPATIONAL THERAPY ADULT  Goal: Performs self-care activities at highest level of function for planned discharge setting.  See evaluation for individualized goals.  Description: Treatment Interventions: ADL retraining, UE strengthening/ROM, Functional transfer training, Endurance training, Equipment evaluation/education, Compensatory technique education, Energy conservation, Activityengagement          See flowsheet documentation for full assessment, interventions and recommendations.   Outcome: Progressing  Note: Limitation: Decreased ADL status, Decreased UE strength, Decreased Safe judgement during ADL, Decreased endurance, Decreased self-care trans, Decreased high-level ADLs  Prognosis: Good  Assessment: Patient is a 85 y.o. female seen for OT evaluation s/p admit to [facility] on 6/19/2025 w/Stroke-like symptoms. PMHx and co-morbidities affecting patient's functional performance at time of assessment include: essential hypertension, coronary artery disease of native artery of native heart with stable angina pectoris, stage 3b chronic kidney disease, paroxysmal atrial fibrillation, type 2 diabetes, COPD, and chronic venous hypertension with ulcer and inflammation involving right side. Orders placed for OT evaluation and treatment.  Performed at least two patient identifiers during session including name and wristband. Pt reports residing with family in one-level home w/ no SHIRA, full flight up to main level. Pt reported being independent with ADLs and requiring assistance with IADLs. Pt reported ambulating with no AD at baseline. Personal factors affecting patient at time of initial evaluation include: difficulty performing ADLs and difficulty performing IADLs. Upon evaluation, patient requires supervision assist for UB ADLs, minimal  assist for LB ADLs, transfers and functional ambulation in room and bathroom with contact guard and minimal  assist, with the use of Rolling Walker.   Patient is oriented x 4.  Occupational performance is affected by the following deficits: decreased muscle strength, dynamic sit/ stand balance deficit with poor standing tolerance time for self care and functional mobility, decreased activity tolerance, decreased safety awareness, and increased pain.  Patient to benefit from continued Occupational Therapy treatment while in the hospital to address deficits as defined above and maximize level of functional independence with ADLs and functional mobility. Occupational Performance areas to address include: grooming , bathing/ shower, dressing, toilet hygiene, transfer to all surfaces, functional ambulation, and functional mobility. From OT standpoint, recommendation at time of d/c would be Level 3.     Rehab Resource Intensity Level, OT: III (Minimum Resource Intensity)

## 2025-06-20 NOTE — ASSESSMENT & PLAN NOTE
"85 y.o. female with paroxysmal A-fib on Coumadin s/p PPM (not MRI conditional), possible prior lacunar stroke in 2023 (L sided numbness/weakness at the time), CAD on ASA, HTN, HLD, DM2,  CHF, thoracic aortic aneurysm repair, aortic valve disease s/p mechanical AVR, cardiomyopathy, CKD stage III, COPD, iron deficiency anemia, and hypothyroidism who presented to [facility] on 6/19/2025 as a stroke alert with LUE numbness/weakness and LLE \"heaviness.\"  Symptoms are similar to prior stroke.    Upon arrival to the ED, BP 91/62, however this quickly elevated to 215/79 and it improved to 134/91 without medications.    NIHSS 1.    CT head negative for acute intracranial abnormalities.  Only showed mild microangiopathic changes.    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    Patient was not a TNK candidate due to nondisabling symptoms and INR 2.78.    6/20 NCCT head: No acute intracranial abnormality    High suspicion for recrudescence in the setting of initial hypotension.    Plan:  Continue with home doses of aspirin and coumadin.   Continue home Coumadin, goal INR 2.5-3.5 due to mechanical valve  Continue home ASA 81 mg daily  Okay to initiate atorvastatin 40 mg daily  Telemetry  Allow for permissive hypertension for 24 hours post stroke symptom onset. Goal -180  Goal euglycemia, normothermia  Frequent neurochecks  Stroke education  PT/OT/speech  Medical management supportive care per primary team, notify of changes  "

## 2025-06-23 ENCOUNTER — TRANSITIONAL CARE MANAGEMENT (OUTPATIENT)
Age: 85
End: 2025-06-23

## 2025-06-26 ENCOUNTER — TELEPHONE (OUTPATIENT)
Age: 85
End: 2025-06-26

## 2025-06-26 NOTE — TELEPHONE ENCOUNTER
Phoebe from [facility] called and will be sending a fax requesting patient's recent lab results.

## 2025-06-27 ENCOUNTER — HOSPITAL ENCOUNTER (OUTPATIENT)
Dept: INFUSION CENTER | Facility: CLINIC | Age: 85
End: 2025-06-27
Attending: INTERNAL MEDICINE
Payer: COMMERCIAL

## 2025-06-27 VITALS
DIASTOLIC BLOOD PRESSURE: 66 MMHG | TEMPERATURE: 98.1 F | RESPIRATION RATE: 18 BRPM | SYSTOLIC BLOOD PRESSURE: 158 MMHG | HEART RATE: 60 BPM

## 2025-06-27 DIAGNOSIS — E61.1 IRON DEFICIENCY: Primary | ICD-10-CM

## 2025-06-27 PROCEDURE — 96365 THER/PROPH/DIAG IV INF INIT: CPT

## 2025-06-27 RX ORDER — SODIUM CHLORIDE 9 MG/ML
20 INJECTION, SOLUTION INTRAVENOUS ONCE
OUTPATIENT
Start: 2025-07-25

## 2025-06-27 RX ORDER — SODIUM CHLORIDE 9 MG/ML
20 INJECTION, SOLUTION INTRAVENOUS ONCE
Status: COMPLETED | OUTPATIENT
Start: 2025-06-27 | End: 2025-06-27

## 2025-06-27 RX ADMIN — SODIUM CHLORIDE 20 ML/HR: 9 INJECTION, SOLUTION INTRAVENOUS at 07:44

## 2025-06-27 RX ADMIN — IRON SUCROSE 300 MG: 20 INJECTION, SOLUTION INTRAVENOUS at 07:44

## 2025-06-27 NOTE — PROGRESS NOTES
Patient arrives to infusion center for IV Venofer infusion. Patient offers no complaints today. PIV established, patient tolerated entire infusion without complication. PIV removed, AVS offered.     Next appointment: 7/10/25 @ 9990

## 2025-07-01 ENCOUNTER — OFFICE VISIT (OUTPATIENT)
Age: 85
End: 2025-07-01
Payer: COMMERCIAL

## 2025-07-01 VITALS
HEART RATE: 78 BPM | DIASTOLIC BLOOD PRESSURE: 70 MMHG | SYSTOLIC BLOOD PRESSURE: 130 MMHG | WEIGHT: 162 LBS | OXYGEN SATURATION: 95 % | BODY MASS INDEX: 26.03 KG/M2 | HEIGHT: 66 IN

## 2025-07-01 DIAGNOSIS — E78.2 MIXED HYPERLIPIDEMIA: ICD-10-CM

## 2025-07-01 DIAGNOSIS — E11.22 TYPE 2 DIABETES MELLITUS WITH STAGE 3B CHRONIC KIDNEY DISEASE, WITH LONG-TERM CURRENT USE OF INSULIN (HCC): ICD-10-CM

## 2025-07-01 DIAGNOSIS — I10 HYPERTENSION, ESSENTIAL: Primary | ICD-10-CM

## 2025-07-01 DIAGNOSIS — N18.32 TYPE 2 DIABETES MELLITUS WITH STAGE 3B CHRONIC KIDNEY DISEASE, WITH LONG-TERM CURRENT USE OF INSULIN (HCC): ICD-10-CM

## 2025-07-01 DIAGNOSIS — Z79.4 TYPE 2 DIABETES MELLITUS WITH STAGE 3B CHRONIC KIDNEY DISEASE, WITH LONG-TERM CURRENT USE OF INSULIN (HCC): ICD-10-CM

## 2025-07-01 DIAGNOSIS — I42.0 DILATED CARDIOMYOPATHY (HCC): ICD-10-CM

## 2025-07-01 DIAGNOSIS — E03.9 ACQUIRED HYPOTHYROIDISM: ICD-10-CM

## 2025-07-01 DIAGNOSIS — N18.32 STAGE 3B CHRONIC KIDNEY DISEASE (HCC): ICD-10-CM

## 2025-07-01 PROCEDURE — 99495 TRANSJ CARE MGMT MOD F2F 14D: CPT | Performed by: INTERNAL MEDICINE

## 2025-07-01 RX ORDER — ATORVASTATIN CALCIUM 20 MG/1
20 TABLET, FILM COATED ORAL DAILY
Qty: 30 TABLET | Refills: 3 | Status: SHIPPED | OUTPATIENT
Start: 2025-07-01 | End: 2025-07-07 | Stop reason: SDUPTHER

## 2025-07-01 NOTE — ASSESSMENT & PLAN NOTE
Total cholesterol 198, LDL of 119  Given CAD history and prior CVA with strokelike symptoms we will recommend initiation of statin  Start on Lipitor 20 mg once daily  Reinforced dietary and lifestyle changes  Follow-up lipid panel in 4 months  Orders:    atorvastatin (LIPITOR) 20 mg tablet; Take 1 tablet (20 mg total) by mouth in the evening.

## 2025-07-01 NOTE — PROGRESS NOTES
Transition of Care Visit:  Name: Ely Hernadez      : 1940      MRN: [id number]  Encounter Provider: Raghav Holloway MD  Encounter Date: 2025   Encounter department: [facility] INTERNAL MEDICINE [facility] [address]    Assessment & Plan  Type 2 diabetes mellitus with stage 3b chronic kidney disease, with long-term current use of insulin (HCC)  POA medications include Jardiance 10 mg daily, glipizi blood glucoses are de 10 mg twice daily and Lantus 30 units nightly.  Last HbA1c of 7.8, blood glucoses are well-controlled but not optimal  Given patient has history of Kd stage IIIb, will recommend discontinuing glipizide at this time.    Plan  Discontinue glipizide  Increase Jardiance to 25 mg at next dispensing  (Temporarily patient will take 20 mg of Jardiance daily)  Keep log of blood sugars twice a week and send communication to us in 2 weeks  Follow-up HbA1c in 4 months  Lab Results   Component Value Date    HGBA1C 7.8 (H) 2025       Orders:    Empagliflozin (Jardiance) 25 MG TABS; Take 1 tablet (25 mg total) by mouth every morning    Hemoglobin A1C; Future    Hypertension, essential  Blood pressure today 130/70  Continue Lopressor 25 mg twice daily and Lasix 40 mg daily  Orders:    Comprehensive metabolic panel; Future    Lipid panel; Future    Mixed hyperlipidemia  Total cholesterol 198, LDL of 119  Given CAD history and prior CVA with strokelike symptoms we will recommend initiation of statin  Start on Lipitor 20 mg once daily  Reinforced dietary and lifestyle changes  Follow-up lipid panel in 4 months  Orders:    atorvastatin (LIPITOR) 20 mg tablet; Take 1 tablet (20 mg total) by mouth in the evening.    Acquired hypothyroidism  Continue levothyroxine 50 mcg once daily  Orders:    TSH, 3rd generation with Free T4 reflex; Future    Dilated cardiomyopathy (HCC)  Patient with history of dilated cardiomyopathy  Appears euvolemic  Continue Lopressor 25 mg twice daily and Lasix 40 mg daily       Stage  3b chronic kidney disease (HCC)  Lab Results   Component Value Date    EGFR 31 06/19/2025    EGFR 28 06/02/2025    EGFR 34 03/17/2025    CREATININE 1.51 (H) 06/19/2025    CREATININE 1.65 (H) 06/02/2025    CREATININE 1.41 (H) 03/17/2025   Creatinine stable and within baseline  Continue Jardiance    Orders:    Albumin / creatinine urine ratio; Future         History of Present Illness     Transitional Care Management Review:   Ely Hernadez is a 85 y.o. female here for TCM follow up.     During the TCM phone call patient stated:  TCM Call (since 6/17/2025)       Date and time call was made  6/23/2025  8:53 AM    Hospital care reviewed  Records reviewed    Patient was hospitialized at  [facility]    Date of Admission  06/19/25    Date of discharge  06/20/25    Diagnosis  stroke like symptoms    Disposition  Home; Home health services    Were the patients medications reviewed and updated  Yes    Current Symptoms  None          TCM Call (since 6/17/2025)       Post hospital issues  None    Scheduled for follow up?  Yes    Did you obtain your prescribed medications  Yes    Do you need help managing your prescriptions or medications  No    Is transportation to your appointment needed  No    I have advised the patient to call PCP with any new or worsening symptoms  Patricia Rankin, Manager    Living Arrangements  Family members    Are you recieving home care services  No          Patient is an 85 year old female with PMH CVA, afib on coumadin, CAD, pacemaker, HTN, HLD, TII DM, thoracic aortic aneurysm, Aortic valve replacement with mechanical aortic valve, CHF, CKD stage II, COPD hypothyroidism    Presents for TCM visit s/p admission for strokelike symptoms and likely had stroke recrudecence.     Denies any strokelike symptoms since discharge, denies any focal weakness, slurred speech or visual changes.    Patient says that she is feels better since receiving iron infusions, she states she feels she has more  "energy.    Patient takes Coumadin 5 mg daily for atrial fibrillation and follows up with INR outpatient, has been within normal limits.    Lipid panel showing elevated LDL of 119, patient is amenable to start statin.    She denies any chest pain, shortness of breath, or increase in leg swelling.    Blood glucose in the a.m. usually around 130s, in the p.m. around 170s with some uptakes to 200.  Patient takes Jardiance 10 mg daily, glipizide 10 mg twice daily and Lantus 30 units nightly.         Review of Systems   Constitutional:  Negative for chills and fever.   HENT:  Negative for sore throat.    Eyes:  Negative for visual disturbance.   Respiratory:  Negative for cough and shortness of breath.    Cardiovascular:  Negative for chest pain, palpitations and leg swelling.   Gastrointestinal:  Negative for abdominal pain, constipation, diarrhea, nausea and vomiting.   Genitourinary:  Negative for dysuria and hematuria.   Musculoskeletal:  Negative for arthralgias.   Skin:  Negative for color change and rash.   Neurological:  Negative for syncope.   All other systems reviewed and are negative.    Objective   /70 (BP Location: Left arm, Patient Position: Sitting, Cuff Size: Adult)   Pulse 78   Ht 5' 6\" (1.676 m)   Wt 73.5 kg (162 lb)   SpO2 95%   BMI 26.15 kg/m²     Physical Exam  Vitals and nursing note reviewed.   Constitutional:       General: She is not in acute distress.     Appearance: She is well-developed.   HENT:      Head: Normocephalic and atraumatic.     Eyes:      Conjunctiva/sclera: Conjunctivae normal.       Cardiovascular:      Rate and Rhythm: Normal rate and regular rhythm.      Heart sounds: No murmur heard.     Comments: Mechanical click over aortic area  Pulmonary:      Effort: Pulmonary effort is normal. No respiratory distress.      Breath sounds: Normal breath sounds.   Abdominal:      Palpations: Abdomen is soft.      Tenderness: There is no abdominal tenderness.     Musculoskeletal:  "        General: No swelling.      Cervical back: Neck supple.     Skin:     General: Skin is warm and dry.      Capillary Refill: Capillary refill takes less than 2 seconds.     Neurological:      Mental Status: She is alert.     Psychiatric:         Mood and Affect: Mood normal.       Medications have been reviewed by provider in current encounter

## 2025-07-01 NOTE — ASSESSMENT & PLAN NOTE
Patient with history of dilated cardiomyopathy  Appears euvolemic  Continue Lopressor 25 mg twice daily and Lasix 40 mg daily

## 2025-07-01 NOTE — PATIENT INSTRUCTIONS
Start Lipitor 20 mg once daily for elevated cholesterol    Stop glipizide at this time.    For the time being take two 10 mg jardiance (20 mg total) daily.    We will increase you jardiance to 25 mg for your next prescription    Please return to clinic for follow up and labs in 4 months.

## 2025-07-01 NOTE — ASSESSMENT & PLAN NOTE
Lab Results   Component Value Date    EGFR 31 06/19/2025    EGFR 28 06/02/2025    EGFR 34 03/17/2025    CREATININE 1.51 (H) 06/19/2025    CREATININE 1.65 (H) 06/02/2025    CREATININE 1.41 (H) 03/17/2025   Creatinine stable and within baseline  Continue Jardiance    Orders:    Albumin / creatinine urine ratio; Future

## 2025-07-01 NOTE — ASSESSMENT & PLAN NOTE
POA medications include Jardiance 10 mg daily, glipizi blood glucoses are de 10 mg twice daily and Lantus 30 units nightly.  Last HbA1c of 7.8, blood glucoses are well-controlled but not optimal  Given patient has history of Kd stage IIIb, will recommend discontinuing glipizide at this time.    Plan  Discontinue glipizide  Increase Jardiance to 25 mg at next dispensing  (Temporarily patient will take 20 mg of Jardiance daily)  Keep log of blood sugars twice a week and send communication to us in 2 weeks  Follow-up HbA1c in 4 months  Lab Results   Component Value Date    HGBA1C 7.8 (H) 06/20/2025       Orders:    Empagliflozin (Jardiance) 25 MG TABS; Take 1 tablet (25 mg total) by mouth every morning    Hemoglobin A1C; Future

## 2025-07-01 NOTE — ASSESSMENT & PLAN NOTE
Continue levothyroxine 50 mcg once daily  Orders:    TSH, 3rd generation with Free T4 reflex; Future

## 2025-07-01 NOTE — ASSESSMENT & PLAN NOTE
Blood pressure today 130/70  Continue Lopressor 25 mg twice daily and Lasix 40 mg daily  Orders:    Comprehensive metabolic panel; Future    Lipid panel; Future

## 2025-07-07 DIAGNOSIS — Z79.4 TYPE 2 DIABETES MELLITUS WITH STAGE 4 CHRONIC KIDNEY DISEASE, WITH LONG-TERM CURRENT USE OF INSULIN (HCC): ICD-10-CM

## 2025-07-07 DIAGNOSIS — N18.32 TYPE 2 DIABETES MELLITUS WITH STAGE 3B CHRONIC KIDNEY DISEASE, WITH LONG-TERM CURRENT USE OF INSULIN (HCC): ICD-10-CM

## 2025-07-07 DIAGNOSIS — E78.2 MIXED HYPERLIPIDEMIA: ICD-10-CM

## 2025-07-07 DIAGNOSIS — N18.4 TYPE 2 DIABETES MELLITUS WITH STAGE 4 CHRONIC KIDNEY DISEASE, WITH LONG-TERM CURRENT USE OF INSULIN (HCC): ICD-10-CM

## 2025-07-07 DIAGNOSIS — E11.22 TYPE 2 DIABETES MELLITUS WITH STAGE 3B CHRONIC KIDNEY DISEASE, WITH LONG-TERM CURRENT USE OF INSULIN (HCC): ICD-10-CM

## 2025-07-07 DIAGNOSIS — E11.22 TYPE 2 DIABETES MELLITUS WITH STAGE 4 CHRONIC KIDNEY DISEASE, WITH LONG-TERM CURRENT USE OF INSULIN (HCC): ICD-10-CM

## 2025-07-07 DIAGNOSIS — Z79.4 TYPE 2 DIABETES MELLITUS WITH STAGE 3B CHRONIC KIDNEY DISEASE, WITH LONG-TERM CURRENT USE OF INSULIN (HCC): ICD-10-CM

## 2025-07-07 RX ORDER — ATORVASTATIN CALCIUM 20 MG/1
20 TABLET, FILM COATED ORAL DAILY
Qty: 90 TABLET | Refills: 1 | Status: SHIPPED | OUTPATIENT
Start: 2025-07-07

## 2025-07-07 RX ORDER — LANCETS
EACH MISCELLANEOUS 3 TIMES DAILY
Qty: 300 EACH | Refills: 1 | Status: SHIPPED | OUTPATIENT
Start: 2025-07-07

## 2025-07-07 NOTE — TELEPHONE ENCOUNTER
Not a duplicate- medication needs to be sent as 90 day supply     Reason for call:   [x] Refill   [] Prior Auth  [] Other:     Office:   [x] PCP/Provider - Raghav Holloway MD - [facility] [address] [facility]       Medication: atorvastatin (LIPITOR) 20 mg tablet     Dose/Frequency: : Take 1 tablet (20 mg total) by mouth in the evening.     Quantity: 90    Medication:   Empagliflozin (Jardiance) 25 MG TABS      Dose/Frequency: Take 1 tablet (25 mg total) by mouth every morning     Quantity: 90    Medication: Accu-Chek FastClix Lancets MISC     Dose/Frequency: Use 3 (three) times a day     Quantity: 300    [facility]      [facility] Pharm[facility]   Does the patient have enough for 10 days?   [] Yes   [x] No - Send as HP to POD

## 2025-07-08 DIAGNOSIS — E61.1 IRON DEFICIENCY: Primary | ICD-10-CM

## 2025-07-08 RX ORDER — SODIUM CHLORIDE 9 MG/ML
20 INJECTION, SOLUTION INTRAVENOUS ONCE
Status: CANCELLED | OUTPATIENT
Start: 2025-07-10

## 2025-07-09 ENCOUNTER — REMOTE DEVICE CLINIC VISIT (OUTPATIENT)
Dept: CARDIOLOGY CLINIC | Facility: CLINIC | Age: 85
End: 2025-07-09
Payer: COMMERCIAL

## 2025-07-09 DIAGNOSIS — I48.0 PAROXYSMAL ATRIAL FIBRILLATION (HCC): Primary | ICD-10-CM

## 2025-07-09 DIAGNOSIS — Z95.0 PRESENCE OF CARDIAC PACEMAKER: ICD-10-CM

## 2025-07-09 PROCEDURE — 93294 REM INTERROG EVL PM/LDLS PM: CPT | Performed by: INTERNAL MEDICINE

## 2025-07-09 PROCEDURE — 93296 REM INTERROG EVL PM/IDS: CPT | Performed by: INTERNAL MEDICINE

## 2025-07-09 NOTE — PROGRESS NOTES
Results for orders placed or performed in visit on 07/09/25   Cardiac EP device report    Narrative    SJM DC PM/NOT MRI CONDITIONAL  MERLIN TRANSMISSION:  BATTERY VOLTAGE NEARING LACIE.  WILL SCHEDULE MONTHLY BATTERY CHECKS (7.2 MTHS).  AP 56%   98% (>40% CHB/DDDR 60 BPM).  ALL AVAILABLE LEAD PARAMETERS WITHIN NORMAL LIMITS.  NO SIGNIFICANT HIGH RATE EPISODES. 21 AMS EPISODES SHOWING AF, HX OF SAME.  AT/AF BURDEN SINCE 6/9/2025 10%. PVC BURDEN <1%.  PATIENT TAKES ASA 81, WARFARIN, METOPROLOL TART. NORMAL DEVICE FUNCTION. PAS

## 2025-07-10 ENCOUNTER — TELEPHONE (OUTPATIENT)
Dept: INFUSION CENTER | Facility: CLINIC | Age: 85
End: 2025-07-10

## 2025-07-10 ENCOUNTER — HOSPITAL ENCOUNTER (OUTPATIENT)
Dept: INFUSION CENTER | Facility: CLINIC | Age: 85
Discharge: HOME | End: 2025-07-10
Attending: INTERNAL MEDICINE

## 2025-07-10 ENCOUNTER — OFFICE VISIT (OUTPATIENT)
Dept: WOUND CARE | Facility: CLINIC | Age: 85
End: 2025-07-10
Payer: COMMERCIAL

## 2025-07-10 VITALS
SYSTOLIC BLOOD PRESSURE: 145 MMHG | TEMPERATURE: 97.4 F | DIASTOLIC BLOOD PRESSURE: 67 MMHG | HEART RATE: 65 BPM | RESPIRATION RATE: 18 BRPM

## 2025-07-10 DIAGNOSIS — L92.9 HYPERGRANULATION: ICD-10-CM

## 2025-07-10 DIAGNOSIS — I83.019 VENOUS ULCER OF RIGHT LEG (HCC): Primary | ICD-10-CM

## 2025-07-10 DIAGNOSIS — L97.919 VENOUS ULCER OF RIGHT LEG (HCC): Primary | ICD-10-CM

## 2025-07-10 PROCEDURE — 17250 CHEM CAUT OF GRANLTJ TISSUE: CPT | Performed by: ORTHOPAEDIC SURGERY

## 2025-07-10 RX ORDER — LIDOCAINE 40 MG/G
CREAM TOPICAL ONCE
Status: COMPLETED | OUTPATIENT
Start: 2025-07-10 | End: 2025-07-10

## 2025-07-10 RX ADMIN — LIDOCAINE: 40 CREAM TOPICAL at 08:05

## 2025-07-10 NOTE — PATIENT INSTRUCTIONS
Orders Placed This Encounter   Procedures    Wound cleansing and dressings Venous Ulcer Right Pretibial     Wound cleansing and dressings Venous Ulcer Right Pretibial                                Right Leg wound:      Wash your hands with soap and water. Remove old dressing, discard into plastic bag and place in trash. Cleanse the wound with Normal Saline solution prior to applying a clean dressing. Do not use tissue or cotton balls. Do not scrub the wound. Pat dry using gauze.      Shower: Yes. Cleanse with a mild soap and water such as Dove. Be sure to remove your old dressing prior to getting in the shower.      Apply skin prep to john wound to protect (you can get this over the counter)  Apply Dermagran to the open wound.   Cover with gauze.  Secure with tape. (If your skin is breaking down from tape, then need to wrap with Armani and put tape on Armani)      Change dressing THREE TIMES A WEEK.                             Elastic Tubular Stocking: Spanda F     Tubular elastic bandage: Apply from base of toes to behind the knee. Apply in AM, may remove for sleep.     Standing Status:   Future     Expiration Date:   7/17/2025

## 2025-07-10 NOTE — PROGRESS NOTES
Patient ID: Ely Hernadez is a 85 y.o. female Date of Birth 1940       Chief Complaint   Patient presents with    Follow Up Wound Care Visit     Right leg ulcer       Allergies:  Patient has no known allergies.    Diagnosis:   Diagnosis ICD-10-CM Associated Orders   1. Venous ulcer of right leg ([facility])  I83.019 lidocaine (LMX) 4 % cream    L97.919 Wound cleansing and dressings Venous Ulcer Right Pretibial     Wound Procedure Treatment Venous Ulcer Right Pretibial     Chemical Caut Of A Wound      2. Hypergranulation  L92.9 Chemical Caut Of A Wound           Assessment and Plan :  Follow up evaluation of right venous ulcer slowly improving with more epithelial tissue and hypergranular pink tissue.  Chemically cauterized as below.  Continue wound management with Dermagran, add skin prep to wound edges, see wound orders below   Continue to wear daily compression with Spandagrip.  No harsh cleansers such as alcohol, peroxide, or antibacterial soap, do not submerge in water  Can cleanse with mild soap and water or NSS at dressing changes.   Continue to f/u with vascular surgeon.  Continue frequent elevation of leg and increase exercise/walking for wound healing.  Follow-up in 2 week(s) or call sooner with questions or concerns or any signs of infection such as redness, swelling, increased/purulent drainage, fever, chills, increased severe pain.       Subjective:   6/28: Pt is an 84 y.o. female with pmhx HTN, DMII (A1c 8.7),  CKD, Pafib, CVA (10/28/23 on Coumadin/ASA 81mg) with residual deficits (left sided facial drop and leg weakness) well known to [facility] who presents for follow up evaluation of non healing RLE venous ulcer which has been present for about 2.5 years. Pt has been covering wound with a bandaid. Does not have an odor. No smoking, ETOH or drug use.  Pt denies any sob, fatigue, N/V, CP, fever or chills.    7/12: Patient presents for followup evaluation of RLE venous ulcer. No increased pain or drainage. Pt  is frustrated with chronic wound. Has been using Polymem on the wound bed and Tubigrip for compression.  Pt denies any fevers or chills.    7/25: Patient presents for followup evaluation of RLE venous ulcer. Since last visit pt was started on Bactrim for positive wound culture with abnormal 2+ Growth of Staphylococcus Aureus. The tissue exam demonstrated chronic inflammation and fibrosis. No malignancy, nor pyoderma gangrenosum were identified.  Pt noticed wound decreasing in size. Has been using Polymem on the wound bed and Tubigrip for compression.  Pt denies any fevers or chills.    8/2: Patient presents for followup evaluation of RLE venous ulcer. Pt completed Bactrim as directed. No issues. Pt states this is the best she has felt in 2 years. No fevers or chills.    8/22: Patient presents for followup evaluation of RLE venous ulcer. Pt states she has been wetting wound with mild soap and water. She noticed a scab formed and came off on dressing. In office CHEYANNE today; R 0.59, L 0.76. No fevers or chills.    9/6: Patient presents for followup evaluation of RLE venous ulcer. Pt  has been applying Polymem Ag Max on wound bed and Tubigrip for compression. Denies fevers or chills.    9/19: Patient presents for followup evaluation of RLE venous ulcer. Pt has been applying Mupirocin wound bed and Tubigrip for compression. Denies fevers or chills.    9/26: Patient presents for followup evaluation of RLE venous ulcer. No complaints. Pt has been applying Hydrofera Blue ready on wound bed and Tubigrip for compression. Denies fevers or chills.    10/10:  Patient presents for followup evaluation of RLE venous ulcer. No complaints. Pt is scheduled for vascular studies for 11/6/24. Pt has been applying Hydrofera Blue ready on wound bed and Tubigrip for compression. Denies fevers or chills.    10/31:  Patient presents for followup evaluation of RLE venous ulcer. NO issues. Pt has been applying Hydrofera Blue ready on wound bed  and Tubigrip for compression. Denies fevers or chills.    11/14:  Patient presents for followup evaluation of RLE venous ulcer. RLE vascular studies reveal deep venous incompetence with an incompetent . GSV and SSV is competent. Bilateral LEADs reveal diffuse disease in the RLE femoral and popliteal arteries with a 50-75% stenosis in the common femoral artery. R CHEYANNE:  0.64 (severe disease). There is diffuse disease in the LLE femoral and popliteal arteries with a >75% stenosis of the distal superficial femoral artery.There is a 50-75% stenosis of the proximal and mid superficial femoral arteries. Left CHEYANNE: 0.69 (moderate disease). Pt has scheduled a vascular surgery appointment for January 14, 2025. Pt has been applying Hydrofera Blue ready on wound bed and Tubigrip for compression. Denies fevers or chills.    12/5: Patient presents for followup evaluation of RLE venous ulcer. No complaints. Pt has been applying Hydrofera Blue ready on wound bed and Tubigrip for compression. Denies fevers or chills.    12/19: Patient presents for followup evaluation of RLE venous ulcer. No issues. Pt has been applying clobetasol cream and DSD on wound bed and Tubigrip for compression. Pt is scheduled to see vascular surgery on January 14,  Denies fevers or chills.    1/2: Patient presents for followup evaluation of RLE venous ulcer.  Pt states dressing has been adhering to wound bed. Pt has been applying Hydrofera Blue Ready on wound bed and Tubigrip for compression. Denies fevers or chills.    3/6: Patient presents for followup evaluation of RLE venous ulcer. Pt has been applying Hydrofera Blue Ready on wound bed and Tubigrip for compression. Pt appointment with vascular was cancelled due to patient being ill. Pt plans on rescheduling. Denies fevers or chills.    3/20: Patient presents for followup evaluation of RLE venous ulcer. Pt c/o increased serosanguinous drainage. Pt is scheduled for RLE sclerotherapy with vascular  on 4/11/25. Pt has been applying Dermagran on wound bed and Tubigrip for compression. Denies fevers or chills.    4/3:  Patient presents for followup evaluation of RLE venous ulcer. Pt c/o increased serosanguinous drainage. Pt has been applying Exufiber Ag on wound bed and Tubigrip for compression. Denies fevers or chills.    4/17: Patient presents for followup evaluation of RLE venous ulcer.  Pt had RLE sclerotherapy procedure on 4/11/25. Pt has been applying Exufiber Ag on wound bed and Tubigrip for compression. Denies fevers or chills.    5/8: Patient presents for followup evaluation of RLE venous ulcer.  Pt states Exufiber dressing has been adhering to wound bed.      5/15: Patient presents for followup evaluation of RLE venous ulcer.  Pt states she has increased bloody drainage to wound bed. Has been applying Dermagran on wound bed. No fevers or chills.    5/22: Patient presents for followup evaluation of RLE venous ulcer.  Pt states the adaptic with silver alginate has been sticking to wound bed. No fevers or chills.     5/29: Patient presents for followup evaluation of RLE venous ulcer. No complaints.. Has been applying xeroform to wound bed. No fevers or chills.     6/19: Patient presents for followup evaluation of RLE venous ulcer. Pt has a follow up arterial doppler scheduled for 7/11/25. Pt c/o feeling weak today. Has been applying xeroform to wound bed. Denies any sob, fatigue, N/V, CP, fever or chills.    7/10: Patient presents for followup evaluation of RLE venous ulcer. At last visit pt was escorted to ED for stroke like symptoms. Pt was ruled out and has no further stroke like symptoms. Has been applying Dermagran to wound bed. Denies any fever or chills.              The following portions of the patient's history were reviewed and updated as appropriate:   Problem List[1]  Past Medical History[2]  Past Surgical History[3]  Family History[4]  Social History[5]  Current Medications[6]    Review of  Systems   Constitutional:  Negative for appetite change, chills, fatigue, fever and unexpected weight change.   HENT:  Negative for congestion, hearing loss and postnasal drip.    Respiratory:  Negative for cough and shortness of breath.    Cardiovascular:  Negative for leg swelling.   Musculoskeletal:  Positive for gait problem.   Skin:  Positive for wound (RLE). Negative for rash.   Neurological:  Negative for numbness.   Hematological:  Does not bruise/bleed easily.   Psychiatric/Behavioral: Negative.           Objective:  /67   Pulse 65   Temp (!) 97.4 °F (36.3 °C)   Resp 18   Pain Score: 0-No pain     Physical Exam  Constitutional:       General: She is awake. She is not in acute distress.     Appearance: She is not ill-appearing, toxic-appearing or diaphoretic.   HENT:      Head: Normocephalic and atraumatic.      Right Ear: External ear normal.      Left Ear: External ear normal.     Eyes:      Conjunctiva/sclera: Conjunctivae normal.       Cardiovascular:      Pulses:           Dorsalis pedis pulses are 1+ on the right side.   Pulmonary:      Effort: Pulmonary effort is normal. No respiratory distress.     Musculoskeletal:      Right lower leg: Edema present.      Comments: trace     Skin:     General: Skin is warm and dry.      Findings: Wound (RLE) present. No erythema.      Comments: 1.  Right lower extremity venous ulcer with hypergranular tissue, serosanguinous drainage and and fragile periwound scar tissue. Skin islands appreciated on exam. See wound assessment for additional details.     Neurological:      Mental Status: She is alert.     Psychiatric:         Mood and Affect: Mood normal.         Behavior: Behavior normal. Behavior is cooperative.         Wound 10/29/23 Venous Ulcer Pretibial Right (Active)   Wound Image Images linked 07/10/25 0801   Wound Description Pink;Epithelialization;Beefy red;Hypergranulation 07/10/25 0801   Non-staged Wound Description Full thickness 07/10/25 0801  "  Wound Length (cm) 2.6 cm 07/10/25 0801   Wound Width (cm) 1.8 cm 07/10/25 0801   Wound Depth (cm) 0.1 cm 07/10/25 0801   Wound Surface Area (cm^2) 3.68 cm^2 07/10/25 0801   Wound Volume (cm^3) 0.245 cm^3 07/10/25 0801   Calculated Wound Volume (cm^3) 0.47 cm^3 07/10/25 0801   Change in Wound Size % 88.25 07/10/25 0801   Drainage Amount Small 07/10/25 0801   Drainage Description Serosanguineous 07/10/25 0801   Radha-wound Assessment Pink;Fragile;Maceration 07/10/25 0801   Dressing Status Intact 07/10/25 0801           Chemical caut of a wound Venous Ulcer Right Pretibial     Date/Time  7/10/2025 8:00 AM     Performed by  Vesta Garcia PA-C   Authorized by  Vesta Garcia PA-C      Associated wounds:   Wound 10/29/23 Venous Ulcer Pretibial Right     Universal Protocol   procedure performed by consultantConsent: Verbal consent obtained. Written consent obtained  Risks and benefits: risks, benefits and alternatives were discussed  Consent given by: patient  Time out: Immediately prior to procedure a \"time out\" was called to verify the correct patient, procedure, equipment, support staff and site/side marked as required.  Patient understanding: patient states understanding of the procedure being performed  Patient identity confirmed: verbally with patient      Local anesthesia used: yes     Anesthesia   Local anesthesia used: yes  Local Anesthetic: topical anesthetic     Sedation   Patient sedated: no        Specimen: no    Culture: no   Procedure Details   Procedure Notes: After permission and placement of topical local anesthetic, 1 Silver nitrate stick(s) were used to cauterize the hypergranular tissue of the open wound.  Normal saline was used to neutralize the reaction. A dressing was applied, see orders.  Patient tolerated procedure well.      Patient tolerance: Patient tolerated the procedure well with no immediate complications                  Wound Instructions:  Orders Placed This Encounter   Procedures    " "Wound cleansing and dressings Venous Ulcer Right Pretibial     Wound cleansing and dressings Venous Ulcer Right Pretibial                                Right Leg wound:      Wash your hands with soap and water. Remove old dressing, discard into plastic bag and place in trash. Cleanse the wound with Normal Saline solution prior to applying a clean dressing. Do not use tissue or cotton balls. Do not scrub the wound. Pat dry using gauze.      Shower: Yes. Cleanse with a mild soap and water such as Dove. Be sure to remove your old dressing prior to getting in the shower.      Apply skin prep to john wound to protect (you can get this over the counter)  Apply Dermagran to the open wound.   Cover with gauze.  Secure with tape. (If your skin is breaking down from tape, then need to wrap with Armani and put tape on Armani)      Change dressing THREE TIMES A WEEK.                             Elastic Tubular Stocking: Spanda F     Tubular elastic bandage: Apply from base of toes to behind the knee. Apply in AM, may remove for sleep.     Standing Status:   Future     Expiration Date:   7/17/2025    Wound Procedure Treatment Venous Ulcer Right Pretibial     This order was created via procedure documentation    Chemical Caut Of A Wound     This order was created via procedure documentation       Vesta Garcia PA-C, [facility]      Portions of the record may have been created with voice recognition software. Occasional wrong word or \"sound alike\" substitutions may have occurred due to the inherent limitations of voice recognition software. Read the chart carefully and recognize, using context, where substitutions have occurred.         [1]   Patient Active Problem List  Diagnosis    Hyperlipidemia    Chronic anticoagulation    Hypertension, essential    Coronary artery disease of native artery of native heart with stable angina pectoris (HCC)    Simple chronic bronchitis (HCC)    Diabetes mellitus with neurological manifestation (HCC) "    Hypothyroidism    Iron deficiency    GERD (gastroesophageal reflux disease)    Anemia    AVM (arteriovenous malformation) of small bowel, acquired with hemorrhage    Angiectasia    Other vascular disorders of intestine (HCC)    Aortic valve replaced    Cardiomyopathy (HCC)    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HCC)    Stage 3b chronic kidney disease (HCC)    Chronic kidney disease-mineral and bone disorder    Primary osteoarthritis involving multiple joints    Paroxysmal atrial fibrillation (HCC)    Neutropenia associated with infection (HCC)    Herpes simplex labialis    Restrictive lung disease    Nocturnal hypoxemia    Stroke-like symptoms    Supratherapeutic INR    H/O mechanical aortic valve replacement    Subtherapeutic international normalized ratio (INR)    Type 2 diabetes mellitus with stage 3b chronic kidney disease, with long-term current use of insulin (HCC)    Venous ulcer of right leg (HCC)    Chronic systolic (congestive) heart failure (HCC)    Lower extremity edema    Iron deficiency anemia due to chronic blood loss    Hemiplegia and hemiparesis following cerebral infarction affecting left non-dominant side (HCC)    Chronic obstructive pulmonary disease (HCC)    Type 2 diabetes mellitus with stage 4 chronic kidney disease, with long-term current use of insulin (HCC)    Chronic venous hypertension with ulcer and inflammation involving right side (HCC)    Peripheral arterial disease (HCC)   [2]   Past Medical History:  Diagnosis Date    Acute anterior epistaxis 12/23/2023    Acute blood loss anemia 12/14/2023    Acute respiratory failure with hypoxia (HCC) 02/06/2024    Anemia     Aneurysm of thoracic aorta (HCC)     Angioedema 06/07/2019    Aortic valve disorder     Benign neoplasm of sigmoid colon     Cardiomyopathy (HCC)     last assessed: 10/10/2014    CHF (congestive heart failure) (HCC)     Chronic kidney disease     Chronic venous hypertension with ulcer and inflammation involving right side (HCC)  2025    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HCC)     last assessed: 10/10/2014    COPD (chronic obstructive pulmonary disease) (HCC)     Diabetic nephropathy (HCC)     Disease of thyroid gland     RAMON (dyspnea on exertion)     GERD (gastroesophageal reflux disease)     History of emphysema (HCC)     History of transfusion     Hyperlipidemia     Hypertensive urgency 10/28/2023    Leg cramps 2023    Stroke-like symptoms 10/28/2023    TIA (transient ischemic attack)    [3]   Past Surgical History:  Procedure Laterality Date    AORTIC VALVE REPLACEMENT      CARDIAC PACEMAKER PLACEMENT      last assessed: 10/10/2014    CARDIAC SURGERY      aortic valve replacement    CHOLECYSTECTOMY      COLONOSCOPY      EGD      HYSTERECTOMY      INSERT / REPLACE / REMOVE PACEMAKER      KNEE SURGERY Right     OTHER SURGICAL HISTORY      Capsule ENDOscopy description: 2012    NV COLONOSCOPY FLX DX W/COLLJ SPEC WHEN PFRMD N/A 2018    Procedure: single balloon ENTEROSCOPY;  Surgeon: David Dennis MD;  Location: [facility];  Service: Gastroenterology    NV ESOPHAGOGASTRODUODENOSCOPY TRANSORAL DIAGNOSTIC N/A 2017    Procedure: EGD AND COLONOSCOPY;  Surgeon: Jay Mcleod III, MD;  Location: [facility];  Service: Gastroenterology    VEIN LIGATION Right 2025    Procedure: right leg peforator sclerotherapy;  Surgeon: Chris Bush MD;  Location: [facility];  Service: Vascular   [4]   Family History  Problem Relation Name Age of Onset    No Known Problems Mother      No Known Problems Father     [5]   Social History  Socioeconomic History    Marital status:    Tobacco Use    Smoking status: Former     Current packs/day: 0.00     Average packs/day: 1 pack/day for 52.9 years (52.9 ttl pk-yrs)     Types: Cigarettes     Start date:      Quit date: 2007     Years since quittin.6     Passive exposure: Past    Smokeless tobacco: Former     Quit date:    Vaping Use    Vaping status: Never  Used   Substance and Sexual Activity    Alcohol use: Never    Drug use: Never    Sexual activity: Not Currently     Partners: Male   Social History Narrative    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 BID Freestyle lancets BID    Living independently with spouse     Social Drivers of Health     Food Insecurity: No Food Insecurity (6/19/2025)    Nursing - Inadequate Food Risk Classification     Worried About Running Out of Food in the Last Year: Never true     Ran Out of Food in the Last Year: Never true     Ran Out of Food in the Last Year: Never true   Transportation Needs: No Transportation Needs (6/19/2025)    Nursing - Transportation Risk Classification     Lack of Transportation: No   Physical Activity: Inactive (5/26/2021)    Exercise Vital Sign     Days of Exercise per Week: 0 days     Minutes of Exercise per Session: 0 min   Stress: No Stress Concern Present (5/26/2021)    [employer] [place] [employer] - Occupational Stress Questionnaire     Feeling of Stress : Not at all   Intimate Partner Violence: Unknown (6/19/2025)    Nursing IPS     Physically Hurt by Someone: No     Hurt or Threatened by Someone: No   Housing Stability: Unknown (6/19/2025)    Nursing: Inadequate Housing Risk Classification     Unable to Pay for Housing in the Last Year: No     Has Housing: No   [6]   Current Outpatient Medications:     Accu-Chek FastClix Lancets MISC, Use 3 (three) times a day, Disp: 300 each, Rfl: 1    albuterol (Ventolin HFA) 90 mcg/act inhaler, Inhale 2 puffs every 6 (six) hours as needed for wheezing, Disp: 54 g, Rfl: 3    Ascorbic Acid (vitamin C) 1000 MG tablet, Take 1,000 mg by mouth in the morning., Disp: , Rfl:     aspirin (ECOTRIN LOW STRENGTH) 81 mg EC tablet, Take 1 tablet (81 mg total) by mouth daily Do not start before November 1, 2023., Disp: 30 tablet, Rfl: 0    atorvastatin (LIPITOR) 20 mg tablet, Take 1 tablet (20 mg total) by mouth in the evening., Disp: 90 tablet, Rfl: 1     BD Pen Needle Rosie Ultrafine 32G X 4 MM MISC, USE DAILY, Disp: 100 each, Rfl: 1    Blood Glucose Monitoring Suppl (Accu-Chek Guide Me) w/Device KIT, Use to check sugars once daily, Disp: 1 kit, Rfl: 2    Cholecalciferol (Vitamin D3) 50 MCG (2000 UT) TABS, Take 2,000 Units by mouth in the morning., Disp: , Rfl:     clobetasol (TEMOVATE) 0.05 % ointment, Apply topically 2 (two) times a day, Disp: 30 g, Rfl: 1    cyanocobalamin (VITAMIN B-12) 1000 MCG tablet, Take 1 tablet (1,000 mcg total) by mouth daily, Disp: , Rfl:     Empagliflozin (Jardiance) 25 MG TABS, Take 1 tablet (25 mg total) by mouth every morning, Disp: 90 tablet, Rfl: 1    Ferrous Sulfate (IRON PO), Take by mouth daily in the early morning OTC, Disp: , Rfl:     fluticasone (FLONASE) 50 mcg/act nasal spray, 1 spray into each nostril daily, Disp: 16 g, Rfl: 2    furosemide (LASIX) 40 mg tablet, Take 1 tablet (40 mg total) by mouth 2 (two) times a day, Disp: 180 tablet, Rfl: 3    gabapentin (NEURONTIN) 300 mg capsule, Take 1 capsule (300 mg total) by mouth 2 (two) times a day, Disp: , Rfl:     glucose blood (Accu-Chek Celeste Plus) test strip, Use 1 each 3 (three) times a day Use as instructed, Disp: 300 each, Rfl: 3    insulin degludec (TRESIBA) 100 units/mL injection pen, Inject 30 Units under the skin daily, Disp: 30 mL, Rfl: 3    ipratropium-albuterol (DUO-NEB) 0.5-2.5 mg/3 mL nebulizer solution, Take 3 mL by nebulization 4 (four) times a day, Disp: 360 mL, Rfl: 1    Lancets (freestyle) lancets, Check blood glucose 3 times daily, Disp: 300 each, Rfl: 0    levothyroxine 50 mcg tablet, TAKE 1 TABLET BY MOUTH DAILY, Disp: 100 tablet, Rfl: 1    metoprolol tartrate (LOPRESSOR) 50 mg tablet, TAKE ONE-HALF TABLET BY MOUTH  TWICE DAILY, Disp: 100 tablet, Rfl: 1    oxygen gas, Inhale 2 L/min daily at bedtime as needed home oxygen nightly, Disp: , Rfl:     pantoprazole (PROTONIX) 40 mg tablet, TAKE 1 TABLET BY MOUTH DAILY AS  DIRECTED, Disp: 100 tablet, Rfl: 2     Edward Ellipta 100-62.5-25 MCG/ACT inhaler, USE 1 INHALATION BY MOUTH ONCE  DAILY AT THE SAME TIME EACH DAY  RINSE MOUTH AFTER USE, Disp: 180 each, Rfl: 3    warfarin (COUMADIN) 5 mg tablet, TAKE 1 TO 2 TABLETS BY MOUTH  DAILY OR AS DIRECTED, Disp: 60 tablet, Rfl: 11    Current Facility-Administered Medications:     lidocaine (LMX) 4 % cream, , Topical, Once, Vesta Garcia PA-C

## 2025-07-10 NOTE — TELEPHONE ENCOUNTER
Called patient and let her know again that her appt for today is cancelled and we rescheduled her next appt to 7/25/25 at 1130, she wanted an earlier time so I moved it to 0830 per her request. I also let her know about her appt on 8/22 at 0730. She was appreciative for the call

## 2025-07-10 NOTE — TELEPHONE ENCOUNTER
Called patient and left voicemail that the office reviewed her venofer plans and determined she is not due for venofer until the end of this month. Appt for today is canceled.

## 2025-07-10 NOTE — PROGRESS NOTES
Wound Procedure Treatment Venous Ulcer Right Pretibial    Performed by: Graciela Valerio RN  Authorized by: Vesta Garcia PA-C  Associated wounds:   Wound 10/29/23 Venous Ulcer Pretibial Right    Wound cleansed with:  NSS   Applied to periwound:  Skin prep   Applied primary dressing:  Dermagran   Applied secondary dressing:  Gauze   Dressing secured with:  Tape and Size F

## 2025-07-11 ENCOUNTER — HOSPITAL ENCOUNTER (OUTPATIENT)
Dept: NON INVASIVE DIAGNOSTICS | Facility: CLINIC | Age: 85
Discharge: HOME | End: 2025-07-11
Attending: PHYSICIAN ASSISTANT
Payer: COMMERCIAL

## 2025-07-11 DIAGNOSIS — I73.9 PERIPHERAL ARTERIAL DISEASE (HCC): ICD-10-CM

## 2025-07-11 PROCEDURE — 93923 UPR/LXTR ART STDY 3+ LVLS: CPT | Performed by: SURGERY

## 2025-07-11 PROCEDURE — 93925 LOWER EXTREMITY STUDY: CPT | Performed by: SURGERY

## 2025-07-11 PROCEDURE — 93923 UPR/LXTR ART STDY 3+ LVLS: CPT

## 2025-07-11 PROCEDURE — 93925 LOWER EXTREMITY STUDY: CPT

## 2025-07-12 NOTE — PROGRESS NOTES
LM relaying Lilian FOWLER response and advised pt to contact office with any further questions or concerns.  
Out Patient Care Management Note:  Chart reviewed.     Call placed to patient for ongoing Complex Care Management outreach. Patient reports she has been doing well. States she has some edema in lower extremities, more on one side. Slightly more than baseline. Denies any discomfort or additional SOB. Discussed elevating legs as often throughout day, monitor weight, and contact provider if not resolved or worsening. She admits she does not take weight daily. Discussed HF, zone tools, and weight gain parameters. Encouraged daily weights. Patient reports understanding and her baseline weight has been stable. Patient reports low salt diet and her daughter prepares meals for her.    Reviewed medication and coumadin adjustments. Patient reports she is taking as prescribed. Has knowledge of med changes for upcoming procedure.    Patient last iron infusion is next week. Is concerned without infusions her levels will not sustain. Assured her, they will be monitoring her so that does not happen.     Reviewed blood sugars and diabetes. Patient reports levels continue to be between . Occasionally they will be high 80's in morning. Directed her to reach out to PCP if FBS are under 70. Patient verbalized understanding.     Reviewed upcoming appts.     Patient verbalized no further concerns or needs at this time. Next outreach scheduled.     Note routed to cardiologist for reported increase LLE.  
Spoke to pt and she stated her weight is the same.  Pts SOB has not gotten worse, still the same as usual.  Pt stated her leg swelling goes up and down.      
normal...

## 2025-07-21 ENCOUNTER — TELEPHONE (OUTPATIENT)
Age: 85
End: 2025-07-21

## 2025-07-21 DIAGNOSIS — N18.32 TYPE 2 DIABETES MELLITUS WITH STAGE 3B CHRONIC KIDNEY DISEASE, WITH LONG-TERM CURRENT USE OF INSULIN (HCC): ICD-10-CM

## 2025-07-21 DIAGNOSIS — Z79.4 TYPE 2 DIABETES MELLITUS WITH STAGE 3B CHRONIC KIDNEY DISEASE, WITH LONG-TERM CURRENT USE OF INSULIN (HCC): ICD-10-CM

## 2025-07-21 DIAGNOSIS — E11.22 TYPE 2 DIABETES MELLITUS WITH STAGE 3B CHRONIC KIDNEY DISEASE, WITH LONG-TERM CURRENT USE OF INSULIN (HCC): ICD-10-CM

## 2025-07-23 DIAGNOSIS — E61.1 IRON DEFICIENCY: Primary | ICD-10-CM

## 2025-07-25 ENCOUNTER — OFFICE VISIT (OUTPATIENT)
Dept: WOUND CARE | Facility: CLINIC | Age: 85
End: 2025-07-25
Payer: COMMERCIAL

## 2025-07-25 ENCOUNTER — HOSPITAL ENCOUNTER (OUTPATIENT)
Dept: INFUSION CENTER | Facility: CLINIC | Age: 85
End: 2025-07-25
Attending: INTERNAL MEDICINE
Payer: COMMERCIAL

## 2025-07-25 VITALS
TEMPERATURE: 96.7 F | RESPIRATION RATE: 18 BRPM | DIASTOLIC BLOOD PRESSURE: 55 MMHG | HEART RATE: 70 BPM | SYSTOLIC BLOOD PRESSURE: 107 MMHG

## 2025-07-25 VITALS
DIASTOLIC BLOOD PRESSURE: 57 MMHG | RESPIRATION RATE: 18 BRPM | TEMPERATURE: 97.5 F | SYSTOLIC BLOOD PRESSURE: 124 MMHG | HEART RATE: 74 BPM

## 2025-07-25 DIAGNOSIS — E61.1 IRON DEFICIENCY: Primary | ICD-10-CM

## 2025-07-25 DIAGNOSIS — I83.019 VENOUS ULCER OF RIGHT LEG (HCC): Primary | ICD-10-CM

## 2025-07-25 DIAGNOSIS — L97.919 VENOUS ULCER OF RIGHT LEG (HCC): Primary | ICD-10-CM

## 2025-07-25 DIAGNOSIS — D50.0 IRON DEFICIENCY ANEMIA DUE TO CHRONIC BLOOD LOSS: ICD-10-CM

## 2025-07-25 PROCEDURE — 96365 THER/PROPH/DIAG IV INF INIT: CPT

## 2025-07-25 PROCEDURE — 99213 OFFICE O/P EST LOW 20 MIN: CPT | Performed by: ORTHOPAEDIC SURGERY

## 2025-07-25 RX ORDER — SODIUM CHLORIDE 9 MG/ML
20 INJECTION, SOLUTION INTRAVENOUS ONCE
Status: CANCELLED | OUTPATIENT
Start: 2025-08-22

## 2025-07-25 RX ORDER — SODIUM CHLORIDE 9 MG/ML
20 INJECTION, SOLUTION INTRAVENOUS ONCE
OUTPATIENT
Start: 2025-08-22

## 2025-07-25 RX ORDER — LIDOCAINE 40 MG/G
CREAM TOPICAL ONCE
Status: COMPLETED | OUTPATIENT
Start: 2025-07-25 | End: 2025-07-25

## 2025-07-25 RX ORDER — SODIUM CHLORIDE 9 MG/ML
20 INJECTION, SOLUTION INTRAVENOUS ONCE
Status: COMPLETED | OUTPATIENT
Start: 2025-07-25 | End: 2025-07-25

## 2025-07-25 RX ADMIN — SODIUM CHLORIDE 20 ML/HR: 9 INJECTION, SOLUTION INTRAVENOUS at 08:40

## 2025-07-25 RX ADMIN — LIDOCAINE: 40 CREAM TOPICAL at 08:05

## 2025-07-25 RX ADMIN — IRON SUCROSE 300 MG: 20 INJECTION, SOLUTION INTRAVENOUS at 08:40

## 2025-07-25 NOTE — PROGRESS NOTES
Pt to clinic for Venofer. Pt offers no complaints. Pt tolerated infusion without complications. PIV removed. Pt aware of next appointment on 8/22/25 at 730. AVS declined.

## 2025-07-25 NOTE — PROGRESS NOTES
Patient ID: Ely Hernadez is a 85 y.o. female Date of Birth 1940       Chief Complaint   Patient presents with    Follow Up Wound Care Visit     Right leg wound       Allergies:  Patient has no known allergies.    Diagnosis:   Diagnosis ICD-10-CM Associated Orders   1. Venous ulcer of right leg (HCC)  I83.019 lidocaine (LMX) 4 % cream    L97.919 Wound cleansing and dressings Venous Ulcer Right Pretibial     Wound cleansing and dressings Venous Ulcer Right Pretibial     Wound Procedure Treatment Venous Ulcer Right Pretibial           Assessment and Plan :  Follow up evaluation of right venous ulcer remains unchanged most likely due to worsening PAD. LEADs from 7/11/25 with R CHEYANNE: .50 (prior 0.64), L CHEYANNE: 0.69.   Follow up with vascular surgeon as scheduled for 8/19/25.  Change wound management to Urgoclean Ag and skin prep to wound edges, see wound orders below.  Continue to wear daily compression with Spandagrip.  No harsh cleansers such as alcohol, peroxide, or antibacterial soap, do not submerge in water  Can cleanse with mild soap and water or NSS at dressing changes.   Continue frequent elevation of leg and increase exercise/walking for wound healing.  Follow-up in 2 week(s) or call sooner with questions or concerns or any signs of infection such as redness, swelling, increased/purulent drainage, fever, chills, increased severe pain.    Subjective:   6/28: Pt is an 84 y.o. female with pmhx HTN, DMII (A1c 8.7),  CKD, Pafib, CVA (10/28/23 on Coumadin/ASA 81mg) with residual deficits (left sided facial drop and leg weakness) well known to [facility] who presents for follow up evaluation of non healing RLE venous ulcer which has been present for about 2.5 years. Pt has been covering wound with a bandaid. Does not have an odor. No smoking, ETOH or drug use.  Pt denies any sob, fatigue, N/V, CP, fever or chills.    7/12: Patient presents for followup evaluation of RLE venous ulcer. No increased pain or drainage. Pt is  frustrated with chronic wound. Has been using Polymem on the wound bed and Tubigrip for compression.  Pt denies any fevers or chills.    7/25: Patient presents for followup evaluation of RLE venous ulcer. Since last visit pt was started on Bactrim for positive wound culture with abnormal 2+ Growth of Staphylococcus Aureus. The tissue exam demonstrated chronic inflammation and fibrosis. No malignancy, nor pyoderma gangrenosum were identified.  Pt noticed wound decreasing in size. Has been using Polymem on the wound bed and Tubigrip for compression.  Pt denies any fevers or chills.    8/2: Patient presents for followup evaluation of RLE venous ulcer. Pt completed Bactrim as directed. No issues. Pt states this is the best she has felt in 2 years. No fevers or chills.    8/22: Patient presents for followup evaluation of RLE venous ulcer. Pt states she has been wetting wound with mild soap and water. She noticed a scab formed and came off on dressing. In office CHEYANNE today; R 0.59, L 0.76. No fevers or chills.    9/6: Patient presents for followup evaluation of RLE venous ulcer. Pt  has been applying Polymem Ag Max on wound bed and Tubigrip for compression. Denies fevers or chills.    9/19: Patient presents for followup evaluation of RLE venous ulcer. Pt has been applying Mupirocin wound bed and Tubigrip for compression. Denies fevers or chills.    9/26: Patient presents for followup evaluation of RLE venous ulcer. No complaints. Pt has been applying Hydrofera Blue ready on wound bed and Tubigrip for compression. Denies fevers or chills.    10/10:  Patient presents for followup evaluation of RLE venous ulcer. No complaints. Pt is scheduled for vascular studies for 11/6/24. Pt has been applying Hydrofera Blue ready on wound bed and Tubigrip for compression. Denies fevers or chills.    10/31:  Patient presents for followup evaluation of RLE venous ulcer. NO issues. Pt has been applying Hydrofera Blue ready on wound bed and  Tubigrip for compression. Denies fevers or chills.    11/14:  Patient presents for followup evaluation of RLE venous ulcer. RLE vascular studies reveal deep venous incompetence with an incompetent . GSV and SSV is competent. Bilateral LEADs reveal diffuse disease in the RLE femoral and popliteal arteries with a 50-75% stenosis in the common femoral artery. R CHEYANNE:  0.64 (severe disease). There is diffuse disease in the LLE femoral and popliteal arteries with a >75% stenosis of the distal superficial femoral artery.There is a 50-75% stenosis of the proximal and mid superficial femoral arteries. Left CHEYANNE: 0.69 (moderate disease). Pt has scheduled a vascular surgery appointment for January 14, 2025. Pt has been applying Hydrofera Blue ready on wound bed and Tubigrip for compression. Denies fevers or chills.    12/5: Patient presents for followup evaluation of RLE venous ulcer. No complaints. Pt has been applying Hydrofera Blue ready on wound bed and Tubigrip for compression. Denies fevers or chills.    12/19: Patient presents for followup evaluation of RLE venous ulcer. No issues. Pt has been applying clobetasol cream and DSD on wound bed and Tubigrip for compression. Pt is scheduled to see vascular surgery on January 14,  Denies fevers or chills.    1/2: Patient presents for followup evaluation of RLE venous ulcer.  Pt states dressing has been adhering to wound bed. Pt has been applying Hydrofera Blue Ready on wound bed and Tubigrip for compression. Denies fevers or chills.    3/6: Patient presents for followup evaluation of RLE venous ulcer. Pt has been applying Hydrofera Blue Ready on wound bed and Tubigrip for compression. Pt appointment with vascular was cancelled due to patient being ill. Pt plans on rescheduling. Denies fevers or chills.    3/20: Patient presents for followup evaluation of RLE venous ulcer. Pt c/o increased serosanguinous drainage. Pt is scheduled for RLE sclerotherapy with vascular on  4/11/25. Pt has been applying Dermagran on wound bed and Tubigrip for compression. Denies fevers or chills.    4/3:  Patient presents for followup evaluation of RLE venous ulcer. Pt c/o increased serosanguinous drainage. Pt has been applying Exufiber Ag on wound bed and Tubigrip for compression. Denies fevers or chills.    4/17: Patient presents for followup evaluation of RLE venous ulcer.  Pt had RLE sclerotherapy procedure on 4/11/25. Pt has been applying Exufiber Ag on wound bed and Tubigrip for compression. Denies fevers or chills.    5/8: Patient presents for followup evaluation of RLE venous ulcer.  Pt states Exufiber dressing has been adhering to wound bed.      5/15: Patient presents for followup evaluation of RLE venous ulcer.  Pt states she has increased bloody drainage to wound bed. Has been applying Dermagran on wound bed. No fevers or chills.    5/22: Patient presents for followup evaluation of RLE venous ulcer.  Pt states the adaptic with silver alginate has been sticking to wound bed. No fevers or chills.     5/29: Patient presents for followup evaluation of RLE venous ulcer. No complaints.. Has been applying xeroform to wound bed. No fevers or chills.     6/19: Patient presents for followup evaluation of RLE venous ulcer. Pt has a follow up arterial doppler scheduled for 7/11/25. Pt c/o feeling weak today. Has been applying xeroform to wound bed. Denies any sob, fatigue, N/V, CP, fever or chills.    7/10: Patient presents for followup evaluation of RLE venous ulcer. At last visit pt was escorted to ED for stroke like symptoms. Pt was ruled out and has no further stroke like symptoms. Has been applying Dermagran to wound bed. Denies any fever or chills.    7/25:  Patient presents for followup evaluation of RLE venous ulcer. Pt has a repeat arterial dopplers by vascular which show progression of PAD on RLE with a >75% stenosis in the distal superficial femoral artery and an occlusion of the posterior  tibial artery.  LLE with diffuse disease in the femoral and popliteal arteries with a >75% stenosis of the mid/distal superficial femoral artery and an occlusion of the posterior tibial artery. Pt is scheduled to f/u with vascular. Has been applying Dermagran to wound bed. Denies any fever or chills.            The following portions of the patient's history were reviewed and updated as appropriate:   Problem List[1]  Past Medical History[2]  Past Surgical History[3]  Family History[4]  Social History[5]  Current Medications[6]    Review of Systems   Constitutional:  Negative for appetite change, chills, fatigue, fever and unexpected weight change.   HENT:  Negative for congestion, hearing loss and postnasal drip.    Respiratory:  Negative for cough and shortness of breath.    Cardiovascular:  Negative for leg swelling.   Musculoskeletal:  Positive for gait problem.   Skin:  Positive for wound (RLE). Negative for rash.   Neurological:  Negative for numbness.   Hematological:  Does not bruise/bleed easily.   Psychiatric/Behavioral: Negative.           Objective:  /57   Pulse 74   Temp 97.5 °F (36.4 °C)   Resp 18   Pain Score: 0-No pain     Physical Exam  Constitutional:       General: She is awake. She is not in acute distress.     Appearance: She is not ill-appearing, toxic-appearing or diaphoretic.   HENT:      Head: Normocephalic and atraumatic.      Right Ear: External ear normal.      Left Ear: External ear normal.     Eyes:      Conjunctiva/sclera: Conjunctivae normal.       Cardiovascular:      Pulses:           Dorsalis pedis pulses are 1+ on the right side.   Pulmonary:      Effort: Pulmonary effort is normal. No respiratory distress.     Musculoskeletal:      Right lower leg: Edema present.      Comments: trace     Skin:     General: Skin is warm and dry.      Findings: Wound (RLE) present. No erythema.      Comments: 1.  Right lower extremity venous ulcer with serosanguinous drainage and fragile  "periwound scar tissue. Skin islands appreciated on exam. See wound assessment for additional details.     Neurological:      Mental Status: She is alert.     Psychiatric:         Mood and Affect: Mood normal.         Behavior: Behavior normal. Behavior is cooperative.                    Wound Instructions:  Orders Placed This Encounter   Procedures    Wound cleansing and dressings Venous Ulcer Right Pretibial     Right Leg wound:      Wash your hands with soap and water. Remove old dressing, discard into plastic bag and place in trash. Cleanse the wound with VASHE OR NORMAL SALINE solution prior to applying a clean dressing. Do not use tissue or cotton balls. Do not scrub the wound. Pat dry using gauze.      Shower: Yes. Cleanse with a mild soap and water such as Dove. Be sure to remove your old dressing prior to getting in the shower.      Apply skin prep to john wound to protect (you can get this over the counter)  Apply URGOCLEAN to the open wound. PLEASE KEEP APPLYING DERMAGRAN UNTIL YOUR SUPPLIES ARE DELIVERED  Cover with gauze.  Secure with tape. (If your skin is breaking down from tape, then need to wrap with Armani and put tape on Armani)      Change dressing DAILY     Standing Status:   Future     Expiration Date:   8/1/2025    Wound cleansing and dressings Venous Ulcer Right Pretibial     Elastic Tubular Stocking: Spanda F     Tubular elastic bandage: Apply from base of toes to behind the knee. Apply in AM, may remove for sleep.     Standing Status:   Future     Expiration Date:   8/1/2025    Wound Procedure Treatment Venous Ulcer Right Pretibial     This order was created via procedure documentation       Vesta Garcia PA-C, [facility]      Portions of the record may have been created with voice recognition software. Occasional wrong word or \"sound alike\" substitutions may have occurred due to the inherent limitations of voice recognition software. Read the chart carefully and recognize, using context, where " substitutions have occurred.           [1]   Patient Active Problem List  Diagnosis    Hyperlipidemia    Chronic anticoagulation    Hypertension, essential    Coronary artery disease of native artery of native heart with stable angina pectoris (HCC)    Simple chronic bronchitis (HCC)    Diabetes mellitus with neurological manifestation (HCC)    Hypothyroidism    Iron deficiency    GERD (gastroesophageal reflux disease)    Anemia    AVM (arteriovenous malformation) of small bowel, acquired with hemorrhage    Angiectasia    Other vascular disorders of intestine (HCC)    Aortic valve replaced    Cardiomyopathy (HCC)    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HCC)    Stage 3b chronic kidney disease (HCC)    Chronic kidney disease-mineral and bone disorder    Primary osteoarthritis involving multiple joints    Paroxysmal atrial fibrillation (HCC)    Neutropenia associated with infection (HCC)    Herpes simplex labialis    Restrictive lung disease    Nocturnal hypoxemia    Stroke-like symptoms    Supratherapeutic INR    H/O mechanical aortic valve replacement    Subtherapeutic international normalized ratio (INR)    Type 2 diabetes mellitus with stage 3b chronic kidney disease, with long-term current use of insulin (HCC)    Venous ulcer of right leg (HCC)    Chronic systolic (congestive) heart failure (HCC)    Lower extremity edema    Iron deficiency anemia due to chronic blood loss    Hemiplegia and hemiparesis following cerebral infarction affecting left non-dominant side (HCC)    Chronic obstructive pulmonary disease (HCC)    Type 2 diabetes mellitus with stage 4 chronic kidney disease, with long-term current use of insulin (HCC)    Chronic venous hypertension with ulcer and inflammation involving right side (HCC)    Peripheral arterial disease (HCC)   [2]   Past Medical History:  Diagnosis Date    Acute anterior epistaxis 12/23/2023    Acute blood loss anemia 12/14/2023    Acute respiratory failure with hypoxia (HCC) 02/06/2024     Anemia     Aneurysm of thoracic aorta (HCC)     Angioedema 06/07/2019    Aortic valve disorder     Benign neoplasm of sigmoid colon     Cardiomyopathy (HCC)     last assessed: 10/10/2014    CHF (congestive heart failure) (HCC)     Chronic kidney disease     Chronic venous hypertension with ulcer and inflammation involving right side (HCC) 04/11/2025    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HCC)     last assessed: 10/10/2014    COPD (chronic obstructive pulmonary disease) (HCC)     Diabetic nephropathy (HCC)     Disease of thyroid gland     RAMON (dyspnea on exertion)     GERD (gastroesophageal reflux disease)     History of emphysema (HCC)     History of transfusion     Hyperlipidemia     Hypertensive urgency 10/28/2023    Leg cramps 11/01/2023    Stroke-like symptoms 10/28/2023    TIA (transient ischemic attack)    [3]   Past Surgical History:  Procedure Laterality Date    AORTIC VALVE REPLACEMENT      CARDIAC PACEMAKER PLACEMENT      last assessed: 10/10/2014    CARDIAC SURGERY      aortic valve replacement    CHOLECYSTECTOMY      COLONOSCOPY      EGD      HYSTERECTOMY      INSERT / REPLACE / REMOVE PACEMAKER      KNEE SURGERY Right     OTHER SURGICAL HISTORY      Capsule ENDOscopy description: 12/19/2012    CT COLONOSCOPY FLX DX W/COLLJ SPEC WHEN PFRMD N/A 05/31/2018    Procedure: single balloon ENTEROSCOPY;  Surgeon: David Dennis MD;  Location: [facility];  Service: Gastroenterology    CT ESOPHAGOGASTRODUODENOSCOPY TRANSORAL DIAGNOSTIC N/A 11/29/2017    Procedure: EGD AND COLONOSCOPY;  Surgeon: Jay Mcleod III, MD;  Location: [facility];  Service: Gastroenterology    VEIN LIGATION Right 4/11/2025    Procedure: right leg peforator sclerotherapy;  Surgeon: Chris Bush MD;  Location: [facility];  Service: Vascular   [4]   Family History  Problem Relation Name Age of Onset    No Known Problems Mother      No Known Problems Father     [5]   Social History  Socioeconomic History    Marital status:     Tobacco Use    Smoking status: Former     Current packs/day: 0.00     Average packs/day: 1 pack/day for 52.9 years (52.9 ttl pk-yrs)     Types: Cigarettes     Start date:      Quit date: 2007     Years since quittin.6     Passive exposure: Past    Smokeless tobacco: Former     Quit date:    Vaping Use    Vaping status: Never Used   Substance and Sexual Activity    Alcohol use: Never    Drug use: Never    Sexual activity: Not Currently     Partners: Male   Social History Narrative    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 BID Freestyle lancets BID    Living independently with spouse     Social Drivers of Health     Food Insecurity: No Food Insecurity (2025)    Nursing - Inadequate Food Risk Classification     Worried About Running Out of Food in the Last Year: Never true     Ran Out of Food in the Last Year: Never true     Ran Out of Food in the Last Year: Never true   Transportation Needs: No Transportation Needs (2025)    Nursing - Transportation Risk Classification     Lack of Transportation: No   Physical Activity: Inactive (2021)    Exercise Vital Sign     Days of Exercise per Week: 0 days     Minutes of Exercise per Session: 0 min   Stress: No Stress Concern Present (2021)    [employer] - Occupational Stress Questionnaire     Feeling of Stress : Not at all   Intimate Partner Violence: Unknown (2025)    Nursing IPS     Physically Hurt by Someone: No     Hurt or Threatened by Someone: No   Housing Stability: Unknown (2025)    Nursing: Inadequate Housing Risk Classification     Unable to Pay for Housing in the Last Year: No     Has Housing: No   [6]   Current Outpatient Medications:     Accu-Chek FastClix Lancets MISC, Use 3 (three) times a day, Disp: 300 each, Rfl: 1    albuterol (Ventolin HFA) 90 mcg/act inhaler, Inhale 2 puffs every 6 (six) hours as needed for wheezing, Disp: 54 g, Rfl: 3    Ascorbic Acid (vitamin  C) 1000 MG tablet, Take 1,000 mg by mouth in the morning., Disp: , Rfl:     aspirin (ECOTRIN LOW STRENGTH) 81 mg EC tablet, Take 1 tablet (81 mg total) by mouth daily Do not start before November 1, 2023., Disp: 30 tablet, Rfl: 0    atorvastatin (LIPITOR) 20 mg tablet, Take 1 tablet (20 mg total) by mouth in the evening., Disp: 90 tablet, Rfl: 1    BD Pen Needle Rosie Ultrafine 32G X 4 MM MISC, USE DAILY, Disp: 100 each, Rfl: 1    Blood Glucose Monitoring Suppl (Accu-Chek Guide Me) w/Device KIT, Use to check sugars once daily, Disp: 1 kit, Rfl: 2    Cholecalciferol (Vitamin D3) 50 MCG (2000 UT) TABS, Take 2,000 Units by mouth in the morning., Disp: , Rfl:     clobetasol (TEMOVATE) 0.05 % ointment, Apply topically 2 (two) times a day, Disp: 30 g, Rfl: 1    cyanocobalamin (VITAMIN B-12) 1000 MCG tablet, Take 1 tablet (1,000 mcg total) by mouth daily, Disp: , Rfl:     Empagliflozin (Jardiance) 25 MG TABS, Take 1 tablet (25 mg total) by mouth every morning, Disp: 90 tablet, Rfl: 1    Ferrous Sulfate (IRON PO), Take by mouth daily in the early morning OTC, Disp: , Rfl:     fluticasone (FLONASE) 50 mcg/act nasal spray, 1 spray into each nostril daily, Disp: 16 g, Rfl: 2    furosemide (LASIX) 40 mg tablet, Take 1 tablet (40 mg total) by mouth 2 (two) times a day, Disp: 180 tablet, Rfl: 3    gabapentin (NEURONTIN) 300 mg capsule, Take 1 capsule (300 mg total) by mouth 2 (two) times a day, Disp: , Rfl:     glucose blood (Accu-Chek Celeste Plus) test strip, Use 1 each 3 (three) times a day Use as instructed, Disp: 300 each, Rfl: 3    insulin degludec (TRESIBA) 100 units/mL injection pen, Inject 35 Units under the skin daily, Disp: , Rfl:     ipratropium-albuterol (DUO-NEB) 0.5-2.5 mg/3 mL nebulizer solution, Take 3 mL by nebulization 4 (four) times a day, Disp: 360 mL, Rfl: 1    Lancets (freestyle) lancets, Check blood glucose 3 times daily, Disp: 300 each, Rfl: 0    levothyroxine 50 mcg tablet, TAKE 1 TABLET BY MOUTH DAILY,  Disp: 100 tablet, Rfl: 1    metoprolol tartrate (LOPRESSOR) 50 mg tablet, TAKE ONE-HALF TABLET BY MOUTH  TWICE DAILY, Disp: 100 tablet, Rfl: 1    oxygen gas, Inhale 2 L/min daily at bedtime as needed home oxygen nightly, Disp: , Rfl:     pantoprazole (PROTONIX) 40 mg tablet, TAKE 1 TABLET BY MOUTH DAILY AS  DIRECTED, Disp: 100 tablet, Rfl: 2    Trelegy Ellipta 100-62.5-25 MCG/ACT inhaler, USE 1 INHALATION BY MOUTH ONCE  DAILY AT THE SAME TIME EACH DAY  RINSE MOUTH AFTER USE, Disp: 180 each, Rfl: 3    warfarin (COUMADIN) 5 mg tablet, TAKE 1 TO 2 TABLETS BY MOUTH  DAILY OR AS DIRECTED, Disp: 60 tablet, Rfl: 11    Current Facility-Administered Medications:     lidocaine (LMX) 4 % cream, , Topical, Once, Vesta Garcia PA-C    Facility-Administered Medications Ordered in Other Visits:     iron sucrose (VENOFER) 300 mg in sodium chloride 0.9% 250 ml IVPB 300 mg, 300 mg, Intravenous, Once, Vandana Dave MD, Last Rate: 166.7 mL/hr at 07/25/25 0840, 300 mg at 07/25/25 0840

## 2025-07-25 NOTE — PROGRESS NOTES
Wound Procedure Treatment Venous Ulcer Right Pretibial    Performed by: Alexandra Morrissey RN  Authorized by: Vesta Garcia PA-C  Associated wounds:   Wound 10/29/23 Venous Ulcer Pretibial Right    Wound cleansed with:  NSS   Applied to periwound:  Skin prep   Applied primary dressing:  Dermagran   Applied secondary dressing:  Gauze   Dressing secured with:  Armani, Tape, Elastic tubular stocking and Size F

## 2025-07-25 NOTE — PATIENT INSTRUCTIONS
Orders Placed This Encounter   Procedures    Wound cleansing and dressings Venous Ulcer Right Pretibial     Right Leg wound:      Wash your hands with soap and water. Remove old dressing, discard into plastic bag and place in trash. Cleanse the wound with VASHE OR NORMAL SALINE solution prior to applying a clean dressing. Do not use tissue or cotton balls. Do not scrub the wound. Pat dry using gauze.      Shower: Yes. Cleanse with a mild soap and water such as Dove. Be sure to remove your old dressing prior to getting in the shower.      Apply skin prep to john wound to protect (you can get this over the counter)  Apply URGOCLEAN to the open wound. PLEASE KEEP APPLYING DERMAGRAN UNTIL YOUR SUPPLIES ARE DELIVERED  Cover with gauze.  Secure with tape. (If your skin is breaking down from tape, then need to wrap with Armani and put tape on Armani)      Change dressing DAILY     Standing Status:   Future     Expiration Date:   8/1/2025    Wound cleansing and dressings Venous Ulcer Right Pretibial     Elastic Tubular Stocking: Spanda F     Tubular elastic bandage: Apply from base of toes to behind the knee. Apply in AM, may remove for sleep.     Standing Status:   Future     Expiration Date:   8/1/2025    Wound Procedure Treatment Venous Ulcer Right Pretibial     This order was created via procedure documentation

## 2025-07-28 ENCOUNTER — OFFICE VISIT (OUTPATIENT)
Dept: CARDIOLOGY CLINIC | Facility: CLINIC | Age: 85
End: 2025-07-28
Payer: COMMERCIAL

## 2025-07-28 VITALS
WEIGHT: 159 LBS | BODY MASS INDEX: 25.55 KG/M2 | DIASTOLIC BLOOD PRESSURE: 64 MMHG | SYSTOLIC BLOOD PRESSURE: 126 MMHG | HEIGHT: 66 IN | OXYGEN SATURATION: 96 % | HEART RATE: 81 BPM | RESPIRATION RATE: 17 BRPM

## 2025-07-28 DIAGNOSIS — I42.0 DILATED CARDIOMYOPATHY (HCC): ICD-10-CM

## 2025-07-28 DIAGNOSIS — I35.9 AORTIC VALVE DISORDER: ICD-10-CM

## 2025-07-28 DIAGNOSIS — I48.0 PAROXYSMAL ATRIAL FIBRILLATION (HCC): Primary | ICD-10-CM

## 2025-07-28 DIAGNOSIS — I44.2 CHB (COMPLETE HEART BLOCK) (HCC): ICD-10-CM

## 2025-07-28 DIAGNOSIS — I50.22 CHRONIC SYSTOLIC CHF (CONGESTIVE HEART FAILURE) (HCC): ICD-10-CM

## 2025-07-28 DIAGNOSIS — Z79.01 CHRONIC ANTICOAGULATION: ICD-10-CM

## 2025-07-28 PROCEDURE — 99214 OFFICE O/P EST MOD 30 MIN: CPT | Performed by: INTERNAL MEDICINE

## 2025-07-29 ENCOUNTER — HOSPITAL ENCOUNTER (OUTPATIENT)
Dept: NUCLEAR MEDICINE | Facility: HOSPITAL | Age: 85
Discharge: HOME | End: 2025-07-29
Attending: INTERNAL MEDICINE
Payer: COMMERCIAL

## 2025-07-29 DIAGNOSIS — I42.0 DILATED CARDIOMYOPATHY (HCC): ICD-10-CM

## 2025-07-29 PROCEDURE — A9560 TC99M LABELED RBC: HCPCS

## 2025-07-29 PROCEDURE — 78472 GATED HEART PLANAR SINGLE: CPT

## 2025-08-07 ENCOUNTER — OFFICE VISIT (OUTPATIENT)
Dept: WOUND CARE | Facility: CLINIC | Age: 85
End: 2025-08-07
Payer: COMMERCIAL

## 2025-08-07 VITALS
TEMPERATURE: 97.4 F | RESPIRATION RATE: 18 BRPM | SYSTOLIC BLOOD PRESSURE: 170 MMHG | HEART RATE: 60 BPM | DIASTOLIC BLOOD PRESSURE: 73 MMHG

## 2025-08-07 DIAGNOSIS — I83.019 VENOUS ULCER OF RIGHT LEG (HCC): Primary | ICD-10-CM

## 2025-08-07 DIAGNOSIS — L97.919 VENOUS ULCER OF RIGHT LEG (HCC): Primary | ICD-10-CM

## 2025-08-07 PROCEDURE — 99213 OFFICE O/P EST LOW 20 MIN: CPT | Performed by: ORTHOPAEDIC SURGERY

## 2025-08-07 RX ORDER — LIDOCAINE 40 MG/G
CREAM TOPICAL ONCE
Status: COMPLETED | OUTPATIENT
Start: 2025-08-07 | End: 2025-08-07

## 2025-08-07 RX ADMIN — LIDOCAINE: 40 CREAM TOPICAL at 08:13

## 2025-08-08 ENCOUNTER — TELEPHONE (OUTPATIENT)
Age: 85
End: 2025-08-08

## 2025-08-11 ENCOUNTER — REMOTE DEVICE CLINIC VISIT (OUTPATIENT)
Dept: CARDIOLOGY CLINIC | Facility: CLINIC | Age: 85
End: 2025-08-11

## 2025-08-14 ENCOUNTER — APPOINTMENT (OUTPATIENT)
Dept: LAB | Facility: CLINIC | Age: 85
End: 2025-08-14
Payer: COMMERCIAL

## 2025-08-15 ENCOUNTER — ANTICOAG VISIT (OUTPATIENT)
Dept: CARDIOLOGY CLINIC | Facility: CLINIC | Age: 85
End: 2025-08-15

## 2025-08-15 ENCOUNTER — TELEPHONE (OUTPATIENT)
Dept: NEPHROLOGY | Facility: CLINIC | Age: 85
End: 2025-08-15

## 2025-08-18 ENCOUNTER — TELEPHONE (OUTPATIENT)
Dept: HEMATOLOGY ONCOLOGY | Facility: CLINIC | Age: 85
End: 2025-08-18

## 2025-08-19 ENCOUNTER — OFFICE VISIT (OUTPATIENT)
Dept: VASCULAR SURGERY | Facility: CLINIC | Age: 85
End: 2025-08-19
Payer: COMMERCIAL

## 2025-08-19 VITALS
HEART RATE: 64 BPM | WEIGHT: 160 LBS | BODY MASS INDEX: 25.71 KG/M2 | HEIGHT: 66 IN | DIASTOLIC BLOOD PRESSURE: 60 MMHG | SYSTOLIC BLOOD PRESSURE: 124 MMHG

## 2025-08-19 DIAGNOSIS — Z79.4 TYPE 2 DIABETES MELLITUS WITH STAGE 3B CHRONIC KIDNEY DISEASE, WITH LONG-TERM CURRENT USE OF INSULIN (HCC): ICD-10-CM

## 2025-08-19 DIAGNOSIS — Z79.01 CHRONIC ANTICOAGULATION: ICD-10-CM

## 2025-08-19 DIAGNOSIS — E11.22 TYPE 2 DIABETES MELLITUS WITH STAGE 3B CHRONIC KIDNEY DISEASE, WITH LONG-TERM CURRENT USE OF INSULIN (HCC): ICD-10-CM

## 2025-08-19 DIAGNOSIS — J44.9 CHRONIC OBSTRUCTIVE PULMONARY DISEASE, UNSPECIFIED COPD TYPE (HCC): ICD-10-CM

## 2025-08-19 DIAGNOSIS — E78.2 MIXED HYPERLIPIDEMIA: ICD-10-CM

## 2025-08-19 DIAGNOSIS — I73.9 PERIPHERAL ARTERIAL DISEASE (HCC): ICD-10-CM

## 2025-08-19 DIAGNOSIS — Z95.2 AORTIC VALVE REPLACED: ICD-10-CM

## 2025-08-19 DIAGNOSIS — I87.331 CHRONIC VENOUS HYPERTENSION WITH ULCER AND INFLAMMATION INVOLVING RIGHT SIDE (HCC): Primary | ICD-10-CM

## 2025-08-19 DIAGNOSIS — I42.0 DILATED CARDIOMYOPATHY (HCC): ICD-10-CM

## 2025-08-19 DIAGNOSIS — E11.40 TYPE 2 DIABETES MELLITUS WITH DIABETIC NEUROPATHY, WITHOUT LONG-TERM CURRENT USE OF INSULIN (HCC): ICD-10-CM

## 2025-08-19 DIAGNOSIS — N18.32 TYPE 2 DIABETES MELLITUS WITH STAGE 3B CHRONIC KIDNEY DISEASE, WITH LONG-TERM CURRENT USE OF INSULIN (HCC): ICD-10-CM

## 2025-08-19 DIAGNOSIS — I69.354 HEMIPLEGIA AND HEMIPARESIS FOLLOWING CEREBRAL INFARCTION AFFECTING LEFT NON-DOMINANT SIDE (HCC): ICD-10-CM

## 2025-08-19 DIAGNOSIS — I10 HYPERTENSION, ESSENTIAL: ICD-10-CM

## 2025-08-19 DIAGNOSIS — I65.23 BILATERAL CAROTID ARTERY STENOSIS: ICD-10-CM

## 2025-08-19 PROCEDURE — 99214 OFFICE O/P EST MOD 30 MIN: CPT | Performed by: PHYSICIAN ASSISTANT

## 2025-08-21 ENCOUNTER — PATIENT OUTREACH (OUTPATIENT)
Dept: CASE MANAGEMENT | Facility: OTHER | Age: 85
End: 2025-08-21

## 2025-08-21 ENCOUNTER — VBI (OUTPATIENT)
Age: 85
End: 2025-08-21

## (undated) DEVICE — GAUZE SPONGES,16 PLY: Brand: CURITY

## (undated) DEVICE — MICROPUNCTURE INTRODUCER SET SILHOUETTE TRANSITIONLESS PUSH-PLUS DESIGN - STIFFENED CANNULA WITH NITINOL WIRE GUIDE: Brand: MICROPUNCTURE

## (undated) DEVICE — DRAPE SHEET X-LG

## (undated) DEVICE — POV-IOD SOLUTION 4OZ BT

## (undated) DEVICE — SINGLE USE SPLINTING TUBE: Brand: SINGLE USE SPLINTING TUBE

## (undated) DEVICE — GUIDEWIRE ANGLED TIP 0.035 IN SOLO PLUS

## (undated) DEVICE — EXOFIN PRECISION PEN HIGH VISCOSITY TOPICAL SKIN ADHESIVE: Brand: EXOFIN PRECISION PEN, 1G

## (undated) DEVICE — DRAPE SHEET THREE QUARTER

## (undated) DEVICE — ACE WRAP 6 IN UNSTERILE

## (undated) DEVICE — FIAPC® PROBE W/ FILTER 2200 C OD 2.3MM/6.9FR; L 2.2M/7.2FT: Brand: ERBE

## (undated) DEVICE — PADDING CAST 4 IN  COTTON STRL

## (undated) DEVICE — GLOVE INDICATOR PI UNDERGLOVE SZ 8 BLUE

## (undated) DEVICE — TIBURON SPLIT SHEET: Brand: CONVERTORS

## (undated) DEVICE — DRAPE EQUIPMENT RF WAND

## (undated) DEVICE — NEEDLE SCLERO INTERJECT 25G 240MM

## (undated) DEVICE — SPONGE LAP 18 X 18 IN

## (undated) DEVICE — GLOVE SRG BIOGEL 7.5

## (undated) DEVICE — ACE WRAP 4 IN UNSTERILE

## (undated) DEVICE — PROBE COVER: Brand: STERILE PROBE COVER

## (undated) DEVICE — INTENDED FOR TISSUE SEPARATION, AND OTHER PROCEDURES THAT REQUIRE A SHARP SURGICAL BLADE TO PUNCTURE OR CUT.: Brand: BARD-PARKER SAFETY BLADES SIZE 11, STERILE